# Patient Record
Sex: FEMALE | Race: WHITE | Employment: OTHER | URBAN - NONMETROPOLITAN AREA
[De-identification: names, ages, dates, MRNs, and addresses within clinical notes are randomized per-mention and may not be internally consistent; named-entity substitution may affect disease eponyms.]

---

## 2017-02-02 ENCOUNTER — TELEPHONE (OUTPATIENT)
Dept: FAMILY MEDICINE CLINIC | Age: 77
End: 2017-02-02

## 2017-03-11 DIAGNOSIS — I10 ESSENTIAL HYPERTENSION: ICD-10-CM

## 2017-03-13 RX ORDER — LISINOPRIL AND HYDROCHLOROTHIAZIDE 25; 20 MG/1; MG/1
TABLET ORAL
Qty: 90 TABLET | Refills: 0 | Status: SHIPPED | OUTPATIENT
Start: 2017-03-13 | End: 2017-05-30 | Stop reason: SDUPTHER

## 2017-03-31 DIAGNOSIS — I10 ESSENTIAL HYPERTENSION: ICD-10-CM

## 2017-03-31 RX ORDER — DILTIAZEM HYDROCHLORIDE 180 MG/1
CAPSULE, EXTENDED RELEASE ORAL
Qty: 90 CAPSULE | Refills: 0 | Status: SHIPPED | OUTPATIENT
Start: 2017-03-31 | End: 2017-05-30 | Stop reason: SDUPTHER

## 2017-05-30 DIAGNOSIS — I10 ESSENTIAL HYPERTENSION: ICD-10-CM

## 2017-05-30 RX ORDER — DILTIAZEM HYDROCHLORIDE 180 MG/1
CAPSULE, COATED, EXTENDED RELEASE ORAL
Qty: 90 CAPSULE | Refills: 0 | Status: ON HOLD | OUTPATIENT
Start: 2017-05-30 | End: 2017-09-07 | Stop reason: HOSPADM

## 2017-05-30 RX ORDER — LISINOPRIL AND HYDROCHLOROTHIAZIDE 25; 20 MG/1; MG/1
TABLET ORAL
Qty: 90 TABLET | Refills: 0 | Status: SHIPPED | OUTPATIENT
Start: 2017-05-30 | End: 2017-08-28 | Stop reason: SDUPTHER

## 2017-07-19 ENCOUNTER — HOSPITAL ENCOUNTER (INPATIENT)
Age: 77
LOS: 1 days | Discharge: HOME | DRG: 309 | End: 2017-07-20
Attending: INTERNAL MEDICINE | Admitting: INTERNAL MEDICINE
Payer: MEDICARE

## 2017-07-19 ENCOUNTER — DIRECT ADMIT ORDERS (OUTPATIENT)
Dept: INPATIENT UNIT | Age: 77
End: 2017-07-19

## 2017-07-19 ENCOUNTER — OFFICE VISIT (OUTPATIENT)
Dept: FAMILY MEDICINE CLINIC | Age: 77
End: 2017-07-19
Payer: MEDICARE

## 2017-07-19 VITALS
BODY MASS INDEX: 45.99 KG/M2 | DIASTOLIC BLOOD PRESSURE: 76 MMHG | WEIGHT: 293 LBS | SYSTOLIC BLOOD PRESSURE: 120 MMHG | HEIGHT: 67 IN | RESPIRATION RATE: 20 BRPM | HEART RATE: 74 BPM | OXYGEN SATURATION: 95 %

## 2017-07-19 DIAGNOSIS — Z00.00 MEDICARE ANNUAL WELLNESS VISIT, SUBSEQUENT: ICD-10-CM

## 2017-07-19 DIAGNOSIS — Z23 NEED FOR VACCINATION WITH 13-POLYVALENT PNEUMOCOCCAL CONJUGATE VACCINE: ICD-10-CM

## 2017-07-19 DIAGNOSIS — I48.91 NEW ONSET A-FIB (HCC): Primary | ICD-10-CM

## 2017-07-19 DIAGNOSIS — I10 ESSENTIAL HYPERTENSION: ICD-10-CM

## 2017-07-19 DIAGNOSIS — E66.01 MORBID OBESITY DUE TO EXCESS CALORIES (HCC): ICD-10-CM

## 2017-07-19 DIAGNOSIS — M15.9 PRIMARY OSTEOARTHRITIS INVOLVING MULTIPLE JOINTS: ICD-10-CM

## 2017-07-19 DIAGNOSIS — I49.9 IRREGULAR HEART RATE: ICD-10-CM

## 2017-07-19 DIAGNOSIS — R73.01 IMPAIRED FASTING GLUCOSE: ICD-10-CM

## 2017-07-19 DIAGNOSIS — E78.5 DYSLIPIDEMIA: ICD-10-CM

## 2017-07-19 DIAGNOSIS — I48.91 ATRIAL FIBRILLATION, UNSPECIFIED TYPE (HCC): Primary | ICD-10-CM

## 2017-07-19 DIAGNOSIS — E88.81 INSULIN RESISTANCE: ICD-10-CM

## 2017-07-19 LAB
ABSOLUTE EOS #: 0.2 K/UL (ref 0–0.4)
ABSOLUTE LYMPH #: 1 K/UL (ref 1–4.8)
ABSOLUTE MONO #: 0.7 K/UL (ref 0.1–1.2)
ANION GAP SERPL CALCULATED.3IONS-SCNC: 14 MMOL/L (ref 9–17)
BASOPHILS # BLD: 4 %
BASOPHILS ABSOLUTE: 0.3 K/UL (ref 0–0.2)
BUN BLDV-MCNC: 25 MG/DL (ref 8–23)
BUN/CREAT BLD: 32 (ref 9–20)
CALCIUM SERPL-MCNC: 10 MG/DL (ref 8.6–10.4)
CHLORIDE BLD-SCNC: 100 MMOL/L (ref 98–107)
CO2: 27 MMOL/L (ref 20–31)
CREAT SERPL-MCNC: 0.77 MG/DL (ref 0.5–0.9)
DIFFERENTIAL TYPE: ABNORMAL
EOSINOPHILS RELATIVE PERCENT: 2 %
GFR AFRICAN AMERICAN: >60 ML/MIN
GFR NON-AFRICAN AMERICAN: >60 ML/MIN
GFR SERPL CREATININE-BSD FRML MDRD: ABNORMAL ML/MIN/{1.73_M2}
GFR SERPL CREATININE-BSD FRML MDRD: ABNORMAL ML/MIN/{1.73_M2}
GLUCOSE BLD-MCNC: 116 MG/DL (ref 70–99)
HCT VFR BLD CALC: 45.9 % (ref 36–46)
HEMOGLOBIN: 14.4 G/DL (ref 12–16)
INR BLD: 1.1
LYMPHOCYTES # BLD: 10 %
MAGNESIUM: 1.9 MG/DL (ref 1.6–2.6)
MCH RBC QN AUTO: 29 PG (ref 26–34)
MCHC RBC AUTO-ENTMCNC: 31.4 G/DL (ref 31–37)
MCV RBC AUTO: 92.4 FL (ref 80–100)
MONOCYTES # BLD: 7 %
PARTIAL THROMBOPLASTIN TIME: 30.9 SEC (ref 27–35)
PDW BLD-RTO: 15.2 % (ref 11–14.5)
PLATELET # BLD: 134 K/UL (ref 140–450)
PLATELET ESTIMATE: ABNORMAL
PMV BLD AUTO: 13 FL (ref 6–12)
POTASSIUM SERPL-SCNC: 4.4 MMOL/L (ref 3.7–5.3)
PROTHROMBIN TIME: 11.2 SEC (ref 9.4–11.3)
RBC # BLD: 4.97 M/UL (ref 4–5.2)
RBC # BLD: ABNORMAL 10*6/UL
SEG NEUTROPHILS: 77 %
SEGMENTED NEUTROPHILS ABSOLUTE COUNT: 7.6 K/UL (ref 1.8–7.7)
SODIUM BLD-SCNC: 141 MMOL/L (ref 135–144)
TROPONIN INTERP: NORMAL
TROPONIN T: <0.03 NG/ML
WBC # BLD: 9.9 K/UL (ref 3.5–11)
WBC # BLD: ABNORMAL 10*3/UL

## 2017-07-19 PROCEDURE — 80048 BASIC METABOLIC PNL TOTAL CA: CPT

## 2017-07-19 PROCEDURE — 93000 ELECTROCARDIOGRAM COMPLETE: CPT | Performed by: FAMILY MEDICINE

## 2017-07-19 PROCEDURE — 85730 THROMBOPLASTIN TIME PARTIAL: CPT

## 2017-07-19 PROCEDURE — 2580000003 HC RX 258: Performed by: FAMILY MEDICINE

## 2017-07-19 PROCEDURE — G8427 DOCREV CUR MEDS BY ELIG CLIN: HCPCS | Performed by: FAMILY MEDICINE

## 2017-07-19 PROCEDURE — 1090F PRES/ABSN URINE INCON ASSESS: CPT | Performed by: FAMILY MEDICINE

## 2017-07-19 PROCEDURE — G8417 CALC BMI ABV UP PARAM F/U: HCPCS | Performed by: FAMILY MEDICINE

## 2017-07-19 PROCEDURE — 90670 PCV13 VACCINE IM: CPT | Performed by: FAMILY MEDICINE

## 2017-07-19 PROCEDURE — 1036F TOBACCO NON-USER: CPT | Performed by: FAMILY MEDICINE

## 2017-07-19 PROCEDURE — 99215 OFFICE O/P EST HI 40 MIN: CPT | Performed by: FAMILY MEDICINE

## 2017-07-19 PROCEDURE — 84484 ASSAY OF TROPONIN QUANT: CPT

## 2017-07-19 PROCEDURE — 1123F ACP DISCUSS/DSCN MKR DOCD: CPT | Performed by: FAMILY MEDICINE

## 2017-07-19 PROCEDURE — 2060000000 HC ICU INTERMEDIATE R&B

## 2017-07-19 PROCEDURE — 36415 COLL VENOUS BLD VENIPUNCTURE: CPT

## 2017-07-19 PROCEDURE — 85610 PROTHROMBIN TIME: CPT

## 2017-07-19 PROCEDURE — G0009 ADMIN PNEUMOCOCCAL VACCINE: HCPCS | Performed by: FAMILY MEDICINE

## 2017-07-19 PROCEDURE — 85025 COMPLETE CBC W/AUTO DIFF WBC: CPT

## 2017-07-19 PROCEDURE — 83735 ASSAY OF MAGNESIUM: CPT

## 2017-07-19 PROCEDURE — G8399 PT W/DXA RESULTS DOCUMENT: HCPCS | Performed by: FAMILY MEDICINE

## 2017-07-19 PROCEDURE — 4040F PNEUMOC VAC/ADMIN/RCVD: CPT | Performed by: FAMILY MEDICINE

## 2017-07-19 PROCEDURE — 6370000000 HC RX 637 (ALT 250 FOR IP): Performed by: INTERNAL MEDICINE

## 2017-07-19 PROCEDURE — G0439 PPPS, SUBSEQ VISIT: HCPCS | Performed by: FAMILY MEDICINE

## 2017-07-19 RX ORDER — DICLOFENAC SODIUM 75 MG/1
75 TABLET, DELAYED RELEASE ORAL 2 TIMES DAILY
Qty: 180 TABLET | Refills: 1 | Status: ON HOLD | OUTPATIENT
Start: 2017-07-19 | End: 2017-07-20 | Stop reason: HOSPADM

## 2017-07-19 RX ORDER — ONDANSETRON 2 MG/ML
4 INJECTION INTRAMUSCULAR; INTRAVENOUS EVERY 6 HOURS PRN
Status: CANCELLED | OUTPATIENT
Start: 2017-07-19

## 2017-07-19 RX ORDER — HYDROCHLOROTHIAZIDE 25 MG/1
25 TABLET ORAL DAILY
Status: DISCONTINUED | OUTPATIENT
Start: 2017-07-19 | End: 2017-07-20 | Stop reason: HOSPADM

## 2017-07-19 RX ORDER — SODIUM CHLORIDE 0.9 % (FLUSH) 0.9 %
10 SYRINGE (ML) INJECTION EVERY 12 HOURS SCHEDULED
Status: DISCONTINUED | OUTPATIENT
Start: 2017-07-19 | End: 2017-07-20 | Stop reason: HOSPADM

## 2017-07-19 RX ORDER — DILTIAZEM HYDROCHLORIDE 180 MG/1
180 CAPSULE, COATED, EXTENDED RELEASE ORAL DAILY
Status: DISCONTINUED | OUTPATIENT
Start: 2017-07-19 | End: 2017-07-20 | Stop reason: HOSPADM

## 2017-07-19 RX ORDER — SODIUM CHLORIDE 0.9 % (FLUSH) 0.9 %
10 SYRINGE (ML) INJECTION PRN
Status: DISCONTINUED | OUTPATIENT
Start: 2017-07-19 | End: 2017-07-20 | Stop reason: HOSPADM

## 2017-07-19 RX ORDER — NYSTATIN 100000 [USP'U]/G
POWDER TOPICAL 2 TIMES DAILY
Status: DISCONTINUED | OUTPATIENT
Start: 2017-07-20 | End: 2017-07-20 | Stop reason: HOSPADM

## 2017-07-19 RX ORDER — SODIUM CHLORIDE 0.9 % (FLUSH) 0.9 %
10 SYRINGE (ML) INJECTION EVERY 12 HOURS SCHEDULED
Status: CANCELLED | OUTPATIENT
Start: 2017-07-19

## 2017-07-19 RX ORDER — LISINOPRIL 20 MG/1
20 TABLET ORAL DAILY
Status: DISCONTINUED | OUTPATIENT
Start: 2017-07-19 | End: 2017-07-20 | Stop reason: HOSPADM

## 2017-07-19 RX ORDER — ONDANSETRON 2 MG/ML
4 INJECTION INTRAMUSCULAR; INTRAVENOUS EVERY 6 HOURS PRN
Status: DISCONTINUED | OUTPATIENT
Start: 2017-07-19 | End: 2017-07-20 | Stop reason: HOSPADM

## 2017-07-19 RX ORDER — SODIUM CHLORIDE 0.9 % (FLUSH) 0.9 %
10 SYRINGE (ML) INJECTION PRN
Status: CANCELLED | OUTPATIENT
Start: 2017-07-19

## 2017-07-19 RX ORDER — TRAMADOL HYDROCHLORIDE 50 MG/1
50 TABLET ORAL EVERY 6 HOURS PRN
Qty: 30 TABLET | Refills: 1 | Status: SHIPPED | OUTPATIENT
Start: 2017-07-19 | End: 2017-12-29 | Stop reason: SDUPTHER

## 2017-07-19 RX ORDER — ACETAMINOPHEN 325 MG/1
650 TABLET ORAL EVERY 4 HOURS PRN
Status: CANCELLED | OUTPATIENT
Start: 2017-07-19

## 2017-07-19 RX ORDER — ACETAMINOPHEN 325 MG/1
650 TABLET ORAL EVERY 4 HOURS PRN
Status: DISCONTINUED | OUTPATIENT
Start: 2017-07-19 | End: 2017-07-20 | Stop reason: HOSPADM

## 2017-07-19 RX ORDER — TRAMADOL HYDROCHLORIDE 50 MG/1
50 TABLET ORAL EVERY 6 HOURS PRN
Status: DISCONTINUED | OUTPATIENT
Start: 2017-07-19 | End: 2017-07-20 | Stop reason: HOSPADM

## 2017-07-19 RX ORDER — LISINOPRIL AND HYDROCHLOROTHIAZIDE 25; 20 MG/1; MG/1
1 TABLET ORAL DAILY
Status: DISCONTINUED | OUTPATIENT
Start: 2017-07-19 | End: 2017-07-19

## 2017-07-19 RX ADMIN — Medication 10 ML: at 20:49

## 2017-07-19 RX ADMIN — APIXABAN 5 MG: 2.5 TABLET, FILM COATED ORAL at 20:49

## 2017-07-19 RX ADMIN — METOPROLOL TARTRATE 25 MG: 25 TABLET ORAL at 20:49

## 2017-07-19 ASSESSMENT — ENCOUNTER SYMPTOMS
WHEEZING: 0
SINUS PRESSURE: 0
ABDOMINAL PAIN: 0
DIARRHEA: 0
EYE DISCHARGE: 0
EYE REDNESS: 0
VOMITING: 0
SORE THROAT: 0
CONSTIPATION: 0
NAUSEA: 0
COUGH: 0
TROUBLE SWALLOWING: 0
SHORTNESS OF BREATH: 0
RHINORRHEA: 0

## 2017-07-19 ASSESSMENT — PATIENT HEALTH QUESTIONNAIRE - PHQ9
2. FEELING DOWN, DEPRESSED OR HOPELESS: 1
SUM OF ALL RESPONSES TO PHQ QUESTIONS 1-9: 1
1. LITTLE INTEREST OR PLEASURE IN DOING THINGS: 0
SUM OF ALL RESPONSES TO PHQ9 QUESTIONS 1 & 2: 1

## 2017-07-19 ASSESSMENT — PAIN SCALES - GENERAL: PAINLEVEL_OUTOF10: 0

## 2017-07-20 VITALS
SYSTOLIC BLOOD PRESSURE: 120 MMHG | TEMPERATURE: 97.5 F | DIASTOLIC BLOOD PRESSURE: 73 MMHG | OXYGEN SATURATION: 94 % | HEIGHT: 67 IN | BODY MASS INDEX: 45.99 KG/M2 | WEIGHT: 293 LBS | RESPIRATION RATE: 16 BRPM | HEART RATE: 86 BPM

## 2017-07-20 PROBLEM — I48.91 ATRIAL FIBRILLATION (HCC): Status: ACTIVE | Noted: 2017-07-20

## 2017-07-20 LAB
ABSOLUTE EOS #: 0.3 K/UL (ref 0–0.4)
ABSOLUTE LYMPH #: 1.6 K/UL (ref 1–4.8)
ABSOLUTE MONO #: 0.9 K/UL (ref 0.1–1.2)
ANION GAP SERPL CALCULATED.3IONS-SCNC: 11 MMOL/L (ref 9–17)
BASOPHILS # BLD: 1 %
BASOPHILS ABSOLUTE: 0.1 K/UL (ref 0–0.2)
BUN BLDV-MCNC: 22 MG/DL (ref 8–23)
BUN/CREAT BLD: 33 (ref 9–20)
CALCIUM SERPL-MCNC: 9.6 MG/DL (ref 8.6–10.4)
CHLORIDE BLD-SCNC: 103 MMOL/L (ref 98–107)
CO2: 27 MMOL/L (ref 20–31)
CREAT SERPL-MCNC: 0.67 MG/DL (ref 0.5–0.9)
DIFFERENTIAL TYPE: ABNORMAL
EKG ATRIAL RATE: 220 BPM
EKG Q-T INTERVAL: 402 MS
EKG QRS DURATION: 92 MS
EKG QTC CALCULATION (BAZETT): 458 MS
EKG R AXIS: 16 DEGREES
EKG T AXIS: 23 DEGREES
EKG VENTRICULAR RATE: 78 BPM
EOSINOPHILS RELATIVE PERCENT: 3 %
GFR AFRICAN AMERICAN: >60 ML/MIN
GFR NON-AFRICAN AMERICAN: >60 ML/MIN
GFR SERPL CREATININE-BSD FRML MDRD: ABNORMAL ML/MIN/{1.73_M2}
GFR SERPL CREATININE-BSD FRML MDRD: ABNORMAL ML/MIN/{1.73_M2}
GLUCOSE BLD-MCNC: 98 MG/DL (ref 70–99)
HCT VFR BLD CALC: 41.6 % (ref 36–46)
HEMOGLOBIN: 13.3 G/DL (ref 12–16)
INR BLD: 1.1
LYMPHOCYTES # BLD: 18 %
MAGNESIUM: 1.9 MG/DL (ref 1.6–2.6)
MCH RBC QN AUTO: 29.2 PG (ref 26–34)
MCHC RBC AUTO-ENTMCNC: 31.9 G/DL (ref 31–37)
MCV RBC AUTO: 91.4 FL (ref 80–100)
MONOCYTES # BLD: 9 %
PARTIAL THROMBOPLASTIN TIME: 33.4 SEC (ref 27–35)
PDW BLD-RTO: 15 % (ref 11–14.5)
PLATELET # BLD: 121 K/UL (ref 140–450)
PLATELET ESTIMATE: ABNORMAL
PMV BLD AUTO: 12.9 FL (ref 6–12)
POTASSIUM SERPL-SCNC: 3.9 MMOL/L (ref 3.7–5.3)
PROTHROMBIN TIME: 11.7 SEC (ref 9.4–11.3)
RBC # BLD: 4.55 M/UL (ref 4–5.2)
RBC # BLD: ABNORMAL 10*6/UL
SEG NEUTROPHILS: 69 %
SEGMENTED NEUTROPHILS ABSOLUTE COUNT: 6.5 K/UL (ref 1.8–7.7)
SODIUM BLD-SCNC: 141 MMOL/L (ref 135–144)
TROPONIN INTERP: NORMAL
TROPONIN T: <0.03 NG/ML
WBC # BLD: 9.3 K/UL (ref 3.5–11)
WBC # BLD: ABNORMAL 10*3/UL

## 2017-07-20 PROCEDURE — 80048 BASIC METABOLIC PNL TOTAL CA: CPT

## 2017-07-20 PROCEDURE — 84484 ASSAY OF TROPONIN QUANT: CPT

## 2017-07-20 PROCEDURE — 2580000003 HC RX 258: Performed by: FAMILY MEDICINE

## 2017-07-20 PROCEDURE — 83735 ASSAY OF MAGNESIUM: CPT

## 2017-07-20 PROCEDURE — 99223 1ST HOSP IP/OBS HIGH 75: CPT | Performed by: INTERNAL MEDICINE

## 2017-07-20 PROCEDURE — 6370000000 HC RX 637 (ALT 250 FOR IP): Performed by: INTERNAL MEDICINE

## 2017-07-20 PROCEDURE — 99222 1ST HOSP IP/OBS MODERATE 55: CPT | Performed by: INTERNAL MEDICINE

## 2017-07-20 PROCEDURE — 85730 THROMBOPLASTIN TIME PARTIAL: CPT

## 2017-07-20 PROCEDURE — 85610 PROTHROMBIN TIME: CPT

## 2017-07-20 PROCEDURE — 93005 ELECTROCARDIOGRAM TRACING: CPT

## 2017-07-20 PROCEDURE — 36415 COLL VENOUS BLD VENIPUNCTURE: CPT

## 2017-07-20 PROCEDURE — 85025 COMPLETE CBC W/AUTO DIFF WBC: CPT

## 2017-07-20 RX ORDER — NYSTATIN 100000 [USP'U]/G
POWDER TOPICAL
Qty: 1 BOTTLE | Refills: 0 | Status: SHIPPED | OUTPATIENT
Start: 2017-07-20 | End: 2017-09-13 | Stop reason: ALTCHOICE

## 2017-07-20 RX ADMIN — METOPROLOL TARTRATE 25 MG: 25 TABLET ORAL at 09:13

## 2017-07-20 RX ADMIN — APIXABAN 5 MG: 2.5 TABLET, FILM COATED ORAL at 09:22

## 2017-07-20 RX ADMIN — Medication 10 ML: at 09:22

## 2017-07-20 RX ADMIN — LISINOPRIL 20 MG: 20 TABLET ORAL at 09:13

## 2017-07-20 RX ADMIN — HYDROCHLOROTHIAZIDE 25 MG: 25 TABLET ORAL at 09:13

## 2017-07-20 RX ADMIN — DILTIAZEM HYDROCHLORIDE 180 MG: 180 CAPSULE, COATED, EXTENDED RELEASE ORAL at 09:13

## 2017-07-20 RX ADMIN — NYSTATIN: 100000 POWDER TOPICAL at 09:28

## 2017-07-20 ASSESSMENT — PAIN SCALES - GENERAL
PAINLEVEL_OUTOF10: 0

## 2017-07-21 ENCOUNTER — TELEPHONE (OUTPATIENT)
Dept: FAMILY MEDICINE CLINIC | Age: 77
End: 2017-07-21

## 2017-07-24 ENCOUNTER — HOSPITAL ENCOUNTER (OUTPATIENT)
Dept: PHARMACY | Age: 77
Setting detail: THERAPIES SERIES
Discharge: HOME | End: 2017-07-24
Payer: MEDICARE

## 2017-07-24 LAB — INR BLD: 1.3

## 2017-07-24 PROCEDURE — 36416 COLLJ CAPILLARY BLOOD SPEC: CPT

## 2017-07-24 PROCEDURE — 99211 OFF/OP EST MAY X REQ PHY/QHP: CPT

## 2017-07-24 PROCEDURE — 85610 PROTHROMBIN TIME: CPT

## 2017-07-24 RX ORDER — WARFARIN SODIUM 2.5 MG/1
5 TABLET ORAL DAILY
COMMUNITY
End: 2017-08-15 | Stop reason: SDUPTHER

## 2017-07-25 ENCOUNTER — OFFICE VISIT (OUTPATIENT)
Dept: FAMILY MEDICINE CLINIC | Age: 77
End: 2017-07-25
Payer: MEDICARE

## 2017-07-25 VITALS
RESPIRATION RATE: 22 BRPM | HEIGHT: 67 IN | HEART RATE: 76 BPM | DIASTOLIC BLOOD PRESSURE: 76 MMHG | SYSTOLIC BLOOD PRESSURE: 126 MMHG | OXYGEN SATURATION: 96 % | WEIGHT: 293 LBS | BODY MASS INDEX: 45.99 KG/M2

## 2017-07-25 DIAGNOSIS — I48.19 PERSISTENT ATRIAL FIBRILLATION (HCC): Primary | ICD-10-CM

## 2017-07-25 DIAGNOSIS — R53.83 FATIGUE, UNSPECIFIED TYPE: ICD-10-CM

## 2017-07-25 DIAGNOSIS — G47.9 SLEEP DISTURBANCE: ICD-10-CM

## 2017-07-25 DIAGNOSIS — I10 ESSENTIAL HYPERTENSION: ICD-10-CM

## 2017-07-25 DIAGNOSIS — E66.01 MORBID OBESITY WITH BODY MASS INDEX (BMI) OF 50.0 TO 59.9 IN ADULT (HCC): ICD-10-CM

## 2017-07-25 PROCEDURE — 99495 TRANSJ CARE MGMT MOD F2F 14D: CPT | Performed by: FAMILY MEDICINE

## 2017-07-26 ASSESSMENT — ENCOUNTER SYMPTOMS
COUGH: 0
NAUSEA: 0
EYE REDNESS: 0
TROUBLE SWALLOWING: 0
VOMITING: 0
DIARRHEA: 0
EYE DISCHARGE: 0
ABDOMINAL PAIN: 0
SHORTNESS OF BREATH: 1
RHINORRHEA: 0
SORE THROAT: 0
SINUS PRESSURE: 0
CONSTIPATION: 0
WHEEZING: 0

## 2017-07-28 ENCOUNTER — HOSPITAL ENCOUNTER (OUTPATIENT)
Dept: PHARMACY | Age: 77
Setting detail: THERAPIES SERIES
Discharge: HOME | End: 2017-07-28
Payer: MEDICARE

## 2017-07-28 LAB — INR BLD: 2

## 2017-07-28 PROCEDURE — 85610 PROTHROMBIN TIME: CPT

## 2017-07-28 PROCEDURE — 99211 OFF/OP EST MAY X REQ PHY/QHP: CPT

## 2017-07-28 PROCEDURE — 36416 COLLJ CAPILLARY BLOOD SPEC: CPT

## 2017-07-31 ENCOUNTER — HOSPITAL ENCOUNTER (OUTPATIENT)
Dept: SLEEP CENTER | Age: 77
Discharge: HOME | End: 2017-07-31
Payer: MEDICARE

## 2017-07-31 DIAGNOSIS — I48.91 ATRIAL FIBRILLATION, UNSPECIFIED TYPE (HCC): ICD-10-CM

## 2017-07-31 PROCEDURE — 95810 POLYSOM 6/> YRS 4/> PARAM: CPT

## 2017-08-01 VITALS
DIASTOLIC BLOOD PRESSURE: 89 MMHG | RESPIRATION RATE: 22 BRPM | WEIGHT: 293 LBS | HEIGHT: 67 IN | SYSTOLIC BLOOD PRESSURE: 147 MMHG | HEART RATE: 96 BPM | TEMPERATURE: 97.8 F | BODY MASS INDEX: 45.99 KG/M2

## 2017-08-03 ENCOUNTER — PROCEDURE VISIT (OUTPATIENT)
Dept: CARDIOLOGY | Age: 77
End: 2017-08-03
Payer: MEDICARE

## 2017-08-03 ENCOUNTER — HOSPITAL ENCOUNTER (OUTPATIENT)
Dept: PHARMACY | Age: 77
Setting detail: THERAPIES SERIES
Discharge: HOME | End: 2017-08-03
Payer: MEDICARE

## 2017-08-03 DIAGNOSIS — I48.91 ATRIAL FIBRILLATION, UNSPECIFIED TYPE (HCC): Primary | ICD-10-CM

## 2017-08-03 LAB
INR BLD: 2.2
LV EF: 60 %
LVEF MODALITY: NORMAL

## 2017-08-03 PROCEDURE — 85610 PROTHROMBIN TIME: CPT

## 2017-08-03 PROCEDURE — 99211 OFF/OP EST MAY X REQ PHY/QHP: CPT

## 2017-08-03 PROCEDURE — 36416 COLLJ CAPILLARY BLOOD SPEC: CPT

## 2017-08-03 PROCEDURE — 93306 TTE W/DOPPLER COMPLETE: CPT | Performed by: INTERNAL MEDICINE

## 2017-08-04 ENCOUNTER — TELEPHONE (OUTPATIENT)
Dept: CARDIOLOGY | Age: 77
End: 2017-08-04

## 2017-08-10 ENCOUNTER — HOSPITAL ENCOUNTER (OUTPATIENT)
Dept: PHARMACY | Age: 77
Setting detail: THERAPIES SERIES
Discharge: HOME | End: 2017-08-10
Payer: MEDICARE

## 2017-08-10 ENCOUNTER — OFFICE VISIT (OUTPATIENT)
Dept: CARDIOLOGY | Age: 77
End: 2017-08-10
Payer: MEDICARE

## 2017-08-10 VITALS
WEIGHT: 293 LBS | DIASTOLIC BLOOD PRESSURE: 76 MMHG | SYSTOLIC BLOOD PRESSURE: 132 MMHG | HEART RATE: 88 BPM | BODY MASS INDEX: 45.99 KG/M2 | HEIGHT: 67 IN

## 2017-08-10 DIAGNOSIS — I48.91 ATRIAL FIBRILLATION, UNSPECIFIED TYPE (HCC): ICD-10-CM

## 2017-08-10 DIAGNOSIS — I48.91 ATRIAL FIBRILLATION, UNSPECIFIED TYPE (HCC): Primary | ICD-10-CM

## 2017-08-10 DIAGNOSIS — E78.5 DYSLIPIDEMIA: ICD-10-CM

## 2017-08-10 DIAGNOSIS — R07.9 CHEST PAIN, UNSPECIFIED TYPE: ICD-10-CM

## 2017-08-10 DIAGNOSIS — I10 ESSENTIAL HYPERTENSION: ICD-10-CM

## 2017-08-10 DIAGNOSIS — E88.81 INSULIN RESISTANCE: ICD-10-CM

## 2017-08-10 LAB — INR BLD: 2.4

## 2017-08-10 PROCEDURE — 85610 PROTHROMBIN TIME: CPT

## 2017-08-10 PROCEDURE — 1123F ACP DISCUSS/DSCN MKR DOCD: CPT | Performed by: INTERNAL MEDICINE

## 2017-08-10 PROCEDURE — 1111F DSCHRG MED/CURRENT MED MERGE: CPT | Performed by: INTERNAL MEDICINE

## 2017-08-10 PROCEDURE — 99213 OFFICE O/P EST LOW 20 MIN: CPT | Performed by: INTERNAL MEDICINE

## 2017-08-10 PROCEDURE — 4040F PNEUMOC VAC/ADMIN/RCVD: CPT | Performed by: INTERNAL MEDICINE

## 2017-08-10 PROCEDURE — 1090F PRES/ABSN URINE INCON ASSESS: CPT | Performed by: INTERNAL MEDICINE

## 2017-08-10 PROCEDURE — G8417 CALC BMI ABV UP PARAM F/U: HCPCS | Performed by: INTERNAL MEDICINE

## 2017-08-10 PROCEDURE — 1036F TOBACCO NON-USER: CPT | Performed by: INTERNAL MEDICINE

## 2017-08-10 PROCEDURE — G8399 PT W/DXA RESULTS DOCUMENT: HCPCS | Performed by: INTERNAL MEDICINE

## 2017-08-10 PROCEDURE — 36416 COLLJ CAPILLARY BLOOD SPEC: CPT

## 2017-08-10 PROCEDURE — G8427 DOCREV CUR MEDS BY ELIG CLIN: HCPCS | Performed by: INTERNAL MEDICINE

## 2017-08-10 PROCEDURE — 93000 ELECTROCARDIOGRAM COMPLETE: CPT | Performed by: INTERNAL MEDICINE

## 2017-08-10 PROCEDURE — 99211 OFF/OP EST MAY X REQ PHY/QHP: CPT

## 2017-08-11 ENCOUNTER — TELEPHONE (OUTPATIENT)
Dept: FAMILY MEDICINE CLINIC | Age: 77
End: 2017-08-11

## 2017-08-11 DIAGNOSIS — G47.33 OSA (OBSTRUCTIVE SLEEP APNEA): Primary | ICD-10-CM

## 2017-08-15 RX ORDER — WARFARIN SODIUM 2.5 MG/1
5 TABLET ORAL DAILY
Qty: 180 TABLET | Refills: 1 | Status: SHIPPED | OUTPATIENT
Start: 2017-08-15 | End: 2018-01-26 | Stop reason: SDUPTHER

## 2017-08-24 ENCOUNTER — HOSPITAL ENCOUNTER (OUTPATIENT)
Dept: PHARMACY | Age: 77
Setting detail: THERAPIES SERIES
Discharge: HOME | End: 2017-08-24
Payer: MEDICARE

## 2017-08-24 DIAGNOSIS — I48.91 ATRIAL FIBRILLATION, UNSPECIFIED TYPE (HCC): ICD-10-CM

## 2017-08-24 LAB — INR BLD: 2.7

## 2017-08-24 PROCEDURE — 36416 COLLJ CAPILLARY BLOOD SPEC: CPT

## 2017-08-24 PROCEDURE — 99211 OFF/OP EST MAY X REQ PHY/QHP: CPT

## 2017-08-24 PROCEDURE — 85610 PROTHROMBIN TIME: CPT

## 2017-08-28 ENCOUNTER — HOSPITAL ENCOUNTER (OUTPATIENT)
Dept: NUCLEAR MEDICINE | Age: 77
Discharge: HOME | DRG: 308 | End: 2017-08-28
Payer: MEDICARE

## 2017-08-28 ENCOUNTER — HOSPITAL ENCOUNTER (OUTPATIENT)
Dept: NON INVASIVE DIAGNOSTICS | Age: 77
Discharge: HOME | DRG: 308 | End: 2017-08-28
Payer: MEDICARE

## 2017-08-28 DIAGNOSIS — E78.5 DYSLIPIDEMIA: ICD-10-CM

## 2017-08-28 DIAGNOSIS — I48.91 ATRIAL FIBRILLATION, UNSPECIFIED TYPE (HCC): ICD-10-CM

## 2017-08-28 DIAGNOSIS — I10 ESSENTIAL HYPERTENSION: ICD-10-CM

## 2017-08-28 DIAGNOSIS — E88.81 INSULIN RESISTANCE: ICD-10-CM

## 2017-08-28 DIAGNOSIS — R07.9 CHEST PAIN, UNSPECIFIED TYPE: ICD-10-CM

## 2017-08-28 PROCEDURE — A9500 TC99M SESTAMIBI: HCPCS | Performed by: INTERNAL MEDICINE

## 2017-08-28 PROCEDURE — 93017 CV STRESS TEST TRACING ONLY: CPT

## 2017-08-28 PROCEDURE — 3430000000 HC RX DIAGNOSTIC RADIOPHARMACEUTICAL: Performed by: INTERNAL MEDICINE

## 2017-08-28 PROCEDURE — 6360000002 HC RX W HCPCS: Performed by: INTERNAL MEDICINE

## 2017-08-28 PROCEDURE — 78452 HT MUSCLE IMAGE SPECT MULT: CPT

## 2017-08-28 RX ORDER — AMINOPHYLLINE DIHYDRATE 25 MG/ML
100 INJECTION, SOLUTION INTRAVENOUS ONCE
Status: COMPLETED | OUTPATIENT
Start: 2017-08-28 | End: 2017-08-28

## 2017-08-28 RX ADMIN — REGADENOSON 0.4 MG: 0.08 INJECTION, SOLUTION INTRAVENOUS at 15:06

## 2017-08-28 RX ADMIN — AMINOPHYLLINE 100 MG: 25 INJECTION, SOLUTION INTRAVENOUS at 15:06

## 2017-08-28 RX ADMIN — TETRAKIS(2-METHOXYISOBUTYLISOCYANIDE)COPPER(I) TETRAFLUOROBORATE 10 MILLICURIE: 1 INJECTION, POWDER, LYOPHILIZED, FOR SOLUTION INTRAVENOUS at 14:58

## 2017-08-28 RX ADMIN — TETRAKIS(2-METHOXYISOBUTYLISOCYANIDE)COPPER(I) TETRAFLUOROBORATE 30 MILLICURIE: 1 INJECTION, POWDER, LYOPHILIZED, FOR SOLUTION INTRAVENOUS at 14:59

## 2017-08-29 RX ORDER — LISINOPRIL AND HYDROCHLOROTHIAZIDE 25; 20 MG/1; MG/1
TABLET ORAL
Qty: 90 TABLET | Refills: 1 | Status: ON HOLD | OUTPATIENT
Start: 2017-08-29 | End: 2017-09-06 | Stop reason: HOSPADM

## 2017-08-30 ENCOUNTER — TELEPHONE (OUTPATIENT)
Dept: CARDIOLOGY | Age: 77
End: 2017-08-30

## 2017-08-30 ENCOUNTER — HOSPITAL ENCOUNTER (OUTPATIENT)
Dept: SLEEP CENTER | Age: 77
Discharge: HOME | DRG: 308 | End: 2017-08-30
Payer: MEDICARE

## 2017-08-30 DIAGNOSIS — G47.33 OSA (OBSTRUCTIVE SLEEP APNEA): ICD-10-CM

## 2017-08-30 PROCEDURE — 95811 POLYSOM 6/>YRS CPAP 4/> PARM: CPT

## 2017-08-31 ENCOUNTER — HOSPITAL ENCOUNTER (INPATIENT)
Age: 77
LOS: 1 days | Discharge: ANOTHER HOSPITAL | DRG: 308 | End: 2017-09-01
Attending: EMERGENCY MEDICINE | Admitting: FAMILY MEDICINE
Payer: MEDICARE

## 2017-08-31 ENCOUNTER — APPOINTMENT (OUTPATIENT)
Dept: GENERAL RADIOLOGY | Age: 77
DRG: 308 | End: 2017-08-31
Payer: MEDICARE

## 2017-08-31 VITALS
TEMPERATURE: 97.3 F | SYSTOLIC BLOOD PRESSURE: 116 MMHG | BODY MASS INDEX: 45.99 KG/M2 | WEIGHT: 293 LBS | HEIGHT: 67 IN | DIASTOLIC BLOOD PRESSURE: 74 MMHG | RESPIRATION RATE: 20 BRPM | HEART RATE: 103 BPM

## 2017-08-31 DIAGNOSIS — I50.9 ACUTE CONGESTIVE HEART FAILURE, UNSPECIFIED CONGESTIVE HEART FAILURE TYPE: Primary | ICD-10-CM

## 2017-08-31 LAB
ABSOLUTE EOS #: 0.2 K/UL (ref 0–0.4)
ABSOLUTE LYMPH #: 2 K/UL (ref 1–4.8)
ABSOLUTE MONO #: 1.2 K/UL (ref 0.1–1.2)
ANION GAP SERPL CALCULATED.3IONS-SCNC: 13 MMOL/L (ref 9–17)
BASOPHILS # BLD: 0 %
BASOPHILS ABSOLUTE: 0 K/UL (ref 0–0.2)
BNP INTERPRETATION: ABNORMAL
BUN BLDV-MCNC: 31 MG/DL (ref 8–23)
BUN/CREAT BLD: 33 (ref 9–20)
CALCIUM SERPL-MCNC: 9.2 MG/DL (ref 8.6–10.4)
CHLORIDE BLD-SCNC: 102 MMOL/L (ref 98–107)
CK MB: 1 NG/ML
CO2: 25 MMOL/L (ref 20–31)
CREAT SERPL-MCNC: 0.95 MG/DL (ref 0.5–0.9)
DIFFERENTIAL TYPE: ABNORMAL
EKG ATRIAL RATE: 120 BPM
EKG Q-T INTERVAL: 358 MS
EKG QRS DURATION: 86 MS
EKG QTC CALCULATION (BAZETT): 475 MS
EKG R AXIS: 1 DEGREES
EKG T AXIS: 33 DEGREES
EKG VENTRICULAR RATE: 106 BPM
EOSINOPHILS RELATIVE PERCENT: 2 %
GFR AFRICAN AMERICAN: >60 ML/MIN
GFR NON-AFRICAN AMERICAN: 57 ML/MIN
GFR SERPL CREATININE-BSD FRML MDRD: ABNORMAL ML/MIN/{1.73_M2}
GFR SERPL CREATININE-BSD FRML MDRD: ABNORMAL ML/MIN/{1.73_M2}
GLUCOSE BLD-MCNC: 115 MG/DL (ref 70–99)
HCT VFR BLD CALC: 44.4 % (ref 36–46)
HEMOGLOBIN: 14.1 G/DL (ref 12–16)
INR BLD: 2
LYMPHOCYTES # BLD: 17 %
MCH RBC QN AUTO: 29.3 PG (ref 26–34)
MCHC RBC AUTO-ENTMCNC: 31.8 G/DL (ref 31–37)
MCV RBC AUTO: 92 FL (ref 80–100)
MONOCYTES # BLD: 11 %
MYOGLOBIN: 40 NG/ML (ref 25–58)
PDW BLD-RTO: 14.3 % (ref 11–14.5)
PLATELET # BLD: 136 K/UL (ref 140–450)
PLATELET ESTIMATE: ABNORMAL
PMV BLD AUTO: 12.5 FL (ref 6–12)
POTASSIUM SERPL-SCNC: 4.2 MMOL/L (ref 3.7–5.3)
PRO-BNP: 1280 PG/ML
PROTHROMBIN TIME: 21.5 SEC (ref 9.4–11.3)
RBC # BLD: 4.82 M/UL (ref 4–5.2)
RBC # BLD: ABNORMAL 10*6/UL
SEG NEUTROPHILS: 70 %
SEGMENTED NEUTROPHILS ABSOLUTE COUNT: 7.8 K/UL (ref 1.8–7.7)
SODIUM BLD-SCNC: 140 MMOL/L (ref 135–144)
TOTAL CK: 34 U/L (ref 26–192)
TROPONIN INTERP: NORMAL
TROPONIN T: <0.03 NG/ML
WBC # BLD: 11.2 K/UL (ref 3.5–11)
WBC # BLD: ABNORMAL 10*3/UL

## 2017-08-31 PROCEDURE — 82553 CREATINE MB FRACTION: CPT

## 2017-08-31 PROCEDURE — 83880 ASSAY OF NATRIURETIC PEPTIDE: CPT

## 2017-08-31 PROCEDURE — 93005 ELECTROCARDIOGRAM TRACING: CPT

## 2017-08-31 PROCEDURE — 85025 COMPLETE CBC W/AUTO DIFF WBC: CPT

## 2017-08-31 PROCEDURE — 2060000000 HC ICU INTERMEDIATE R&B

## 2017-08-31 PROCEDURE — 82550 ASSAY OF CK (CPK): CPT

## 2017-08-31 PROCEDURE — 71010 XR CHEST PORTABLE: CPT

## 2017-08-31 PROCEDURE — 6360000002 HC RX W HCPCS: Performed by: EMERGENCY MEDICINE

## 2017-08-31 PROCEDURE — 84484 ASSAY OF TROPONIN QUANT: CPT

## 2017-08-31 PROCEDURE — 83874 ASSAY OF MYOGLOBIN: CPT

## 2017-08-31 PROCEDURE — 85610 PROTHROMBIN TIME: CPT

## 2017-08-31 PROCEDURE — 6370000000 HC RX 637 (ALT 250 FOR IP): Performed by: PHYSICIAN ASSISTANT

## 2017-08-31 PROCEDURE — 6370000000 HC RX 637 (ALT 250 FOR IP): Performed by: EMERGENCY MEDICINE

## 2017-08-31 PROCEDURE — 99285 EMERGENCY DEPT VISIT HI MDM: CPT

## 2017-08-31 PROCEDURE — 80048 BASIC METABOLIC PNL TOTAL CA: CPT

## 2017-08-31 PROCEDURE — 36415 COLL VENOUS BLD VENIPUNCTURE: CPT

## 2017-08-31 PROCEDURE — 96374 THER/PROPH/DIAG INJ IV PUSH: CPT

## 2017-08-31 RX ORDER — HYDROCHLOROTHIAZIDE 25 MG/1
25 TABLET ORAL DAILY
Status: DISCONTINUED | OUTPATIENT
Start: 2017-09-01 | End: 2017-09-01 | Stop reason: HOSPADM

## 2017-08-31 RX ORDER — POTASSIUM CHLORIDE 20MEQ/15ML
40 LIQUID (ML) ORAL PRN
Status: DISCONTINUED | OUTPATIENT
Start: 2017-08-31 | End: 2017-09-01 | Stop reason: HOSPADM

## 2017-08-31 RX ORDER — POTASSIUM CHLORIDE 20 MEQ/1
40 TABLET, EXTENDED RELEASE ORAL PRN
Status: DISCONTINUED | OUTPATIENT
Start: 2017-08-31 | End: 2017-09-01 | Stop reason: HOSPADM

## 2017-08-31 RX ORDER — FUROSEMIDE 10 MG/ML
20 INJECTION INTRAMUSCULAR; INTRAVENOUS ONCE
Status: COMPLETED | OUTPATIENT
Start: 2017-08-31 | End: 2017-08-31

## 2017-08-31 RX ORDER — MAGNESIUM SULFATE 1 G/100ML
1 INJECTION INTRAVENOUS PRN
Status: DISCONTINUED | OUTPATIENT
Start: 2017-08-31 | End: 2017-09-01 | Stop reason: HOSPADM

## 2017-08-31 RX ORDER — LISINOPRIL AND HYDROCHLOROTHIAZIDE 25; 20 MG/1; MG/1
1 TABLET ORAL DAILY
Status: DISCONTINUED | OUTPATIENT
Start: 2017-09-01 | End: 2017-08-31 | Stop reason: SDUPTHER

## 2017-08-31 RX ORDER — ASPIRIN 81 MG/1
324 TABLET, CHEWABLE ORAL ONCE
Status: COMPLETED | OUTPATIENT
Start: 2017-08-31 | End: 2017-08-31

## 2017-08-31 RX ORDER — POTASSIUM CHLORIDE 7.45 MG/ML
10 INJECTION INTRAVENOUS PRN
Status: DISCONTINUED | OUTPATIENT
Start: 2017-08-31 | End: 2017-09-01 | Stop reason: HOSPADM

## 2017-08-31 RX ORDER — FUROSEMIDE 10 MG/ML
20 INJECTION INTRAMUSCULAR; INTRAVENOUS 2 TIMES DAILY
Status: DISCONTINUED | OUTPATIENT
Start: 2017-09-01 | End: 2017-09-01 | Stop reason: HOSPADM

## 2017-08-31 RX ORDER — DILTIAZEM HYDROCHLORIDE 180 MG/1
180 CAPSULE, COATED, EXTENDED RELEASE ORAL DAILY
Status: DISCONTINUED | OUTPATIENT
Start: 2017-09-01 | End: 2017-09-01 | Stop reason: HOSPADM

## 2017-08-31 RX ORDER — LISINOPRIL 20 MG/1
20 TABLET ORAL DAILY
Status: DISCONTINUED | OUTPATIENT
Start: 2017-09-01 | End: 2017-09-01 | Stop reason: HOSPADM

## 2017-08-31 RX ADMIN — ASPIRIN 81 MG 324 MG: 81 TABLET ORAL at 18:59

## 2017-08-31 RX ADMIN — METOPROLOL TARTRATE 25 MG: 25 TABLET ORAL at 22:24

## 2017-08-31 RX ADMIN — FUROSEMIDE 20 MG: 10 INJECTION, SOLUTION INTRAMUSCULAR; INTRAVENOUS at 20:40

## 2017-08-31 ASSESSMENT — PAIN SCALES - GENERAL: PAINLEVEL_OUTOF10: 0

## 2017-08-31 ASSESSMENT — ENCOUNTER SYMPTOMS: EYES NEGATIVE: 1

## 2017-09-01 ENCOUNTER — HOSPITAL ENCOUNTER (INPATIENT)
Age: 77
LOS: 6 days | Discharge: HOME | DRG: 287 | End: 2017-09-07
Attending: INTERNAL MEDICINE | Admitting: INTERNAL MEDICINE
Payer: MEDICARE

## 2017-09-01 VITALS
DIASTOLIC BLOOD PRESSURE: 63 MMHG | RESPIRATION RATE: 18 BRPM | HEIGHT: 65 IN | OXYGEN SATURATION: 93 % | HEART RATE: 95 BPM | BODY MASS INDEX: 48.82 KG/M2 | TEMPERATURE: 98 F | WEIGHT: 293 LBS | SYSTOLIC BLOOD PRESSURE: 110 MMHG

## 2017-09-01 LAB
INR BLD: 2
PROTHROMBIN TIME: 22.1 SEC (ref 9.4–12.6)
TROPONIN INTERP: NORMAL
TROPONIN T: <0.03 NG/ML

## 2017-09-01 PROCEDURE — 36415 COLL VENOUS BLD VENIPUNCTURE: CPT

## 2017-09-01 PROCEDURE — 84484 ASSAY OF TROPONIN QUANT: CPT

## 2017-09-01 PROCEDURE — 6370000000 HC RX 637 (ALT 250 FOR IP): Performed by: INTERNAL MEDICINE

## 2017-09-01 PROCEDURE — 93005 ELECTROCARDIOGRAM TRACING: CPT

## 2017-09-01 PROCEDURE — 1200000000 HC SEMI PRIVATE

## 2017-09-01 PROCEDURE — 94762 N-INVAS EAR/PLS OXIMTRY CONT: CPT

## 2017-09-01 PROCEDURE — 85610 PROTHROMBIN TIME: CPT

## 2017-09-01 RX ORDER — DILTIAZEM HYDROCHLORIDE 180 MG/1
180 CAPSULE, COATED, EXTENDED RELEASE ORAL DAILY
Status: DISCONTINUED | OUTPATIENT
Start: 2017-09-02 | End: 2017-09-07

## 2017-09-01 RX ORDER — TRAMADOL HYDROCHLORIDE 50 MG/1
50 TABLET ORAL EVERY 6 HOURS PRN
Status: DISCONTINUED | OUTPATIENT
Start: 2017-09-01 | End: 2017-09-07 | Stop reason: HOSPADM

## 2017-09-01 RX ORDER — LISINOPRIL AND HYDROCHLOROTHIAZIDE 25; 20 MG/1; MG/1
1 TABLET ORAL DAILY
Status: DISCONTINUED | OUTPATIENT
Start: 2017-09-01 | End: 2017-09-01

## 2017-09-01 RX ORDER — WARFARIN SODIUM 5 MG/1
5 TABLET ORAL
Status: DISCONTINUED | OUTPATIENT
Start: 2017-09-01 | End: 2017-09-01 | Stop reason: HOSPADM

## 2017-09-01 RX ORDER — FAMOTIDINE 20 MG/1
20 TABLET, FILM COATED ORAL 2 TIMES DAILY
Status: DISCONTINUED | OUTPATIENT
Start: 2017-09-01 | End: 2017-09-07 | Stop reason: HOSPADM

## 2017-09-01 RX ORDER — ACETAMINOPHEN 325 MG/1
650 TABLET ORAL EVERY 4 HOURS PRN
Status: DISCONTINUED | OUTPATIENT
Start: 2017-09-01 | End: 2017-09-07 | Stop reason: HOSPADM

## 2017-09-01 RX ORDER — ONDANSETRON 2 MG/ML
4 INJECTION INTRAMUSCULAR; INTRAVENOUS EVERY 6 HOURS PRN
Status: DISCONTINUED | OUTPATIENT
Start: 2017-09-01 | End: 2017-09-07 | Stop reason: HOSPADM

## 2017-09-01 RX ORDER — WARFARIN SODIUM 2.5 MG/1
2.5 TABLET ORAL DAILY
Status: DISCONTINUED | OUTPATIENT
Start: 2017-09-01 | End: 2017-09-01

## 2017-09-01 RX ADMIN — METOPROLOL TARTRATE 25 MG: 25 TABLET ORAL at 20:15

## 2017-09-01 RX ADMIN — FAMOTIDINE 20 MG: 20 TABLET, FILM COATED ORAL at 20:15

## 2017-09-01 RX ADMIN — METOPROLOL TARTRATE 25 MG: 25 TABLET ORAL at 11:10

## 2017-09-01 RX ADMIN — TRAMADOL HYDROCHLORIDE 50 MG: 50 TABLET, FILM COATED ORAL at 22:38

## 2017-09-01 RX ADMIN — FAMOTIDINE 20 MG: 20 TABLET, FILM COATED ORAL at 11:10

## 2017-09-01 ASSESSMENT — PAIN SCALES - GENERAL: PAINLEVEL_OUTOF10: 1

## 2017-09-01 NOTE — H&P
Department of Internal Medicine  General Internal Medicine  Physician Assistant History and Physical      CHIEF COMPLAINT:  Shortness of breath    Reason for Admission:  CHF, a fib    History Obtained From:  patient, father    HISTORY OF PRESENT ILLNESS:      The patient is a 67 y.o. female with significant past medical history of new diagnosis a. Fib, CAD, morbid obesity, and hypertension who presents with increasing a fib symptoms, increasing shortness of breath and leg swelling as well has a recent stress test that was conveyed to the family as \"quite abnormal\". After the patient received her poor results on her stress test she came to the ED for the other symptoms. The patient denies chest pain, she does get short of breath with exertion. Past Medical History:        Diagnosis Date    A-fib ([facility] [address] 07/19/2017    Dyslipidemia     FH: total abdominal hysterectomy and bilateral salpingo-oophorectomy 1966    Glucose intolerance (impaired glucose tolerance)     Hypertension     Obesity     Osteoarthritis      Past Surgical History:        Procedure Laterality Date    CHOLECYSTECTOMY  1960s    HIP ARTHROPLASTY Left     KNEE ARTHROPLASTY Left     KNEE ARTHROPLASTY Right     KARYN AND BSO      KARYN AND BSO  1966    VARICOSE VEIN SURGERY Right 1960s     Immunizations:                Influenza:  Not indicated            Pneumococcal Polysaccharide:  Up-to-date; approximate date: Prevnar 13 7/2017    Medications Prior to Admission:    Prescriptions Prior to Admission: lisinopril-hydrochlorothiazide (PRINZIDE;ZESTORETIC) 20-25 MG per tablet, TAKE ONE TABLET BY MOUTH DAILY  metoprolol tartrate (LOPRESSOR) 25 MG tablet, TAKE ONE TABLET BY MOUTH TWICE A DAY  warfarin (COUMADIN) 2.5 MG tablet, Take 2 tablets by mouth daily Indications: or as directed by INR results  nystatin (MYCOSTATIN) 796227 UNIT/GM powder, Apply topically 4 times daily.   traMADol (ULTRAM) 50 MG tablet, Take 1 tablet by mouth every 6 hours as needed for Pain  diltiazem (CARTIA XT) 180 MG extended release capsule, TAKE ONE CAPSULE BY MOUTH DAILY    Allergies:  Pcn [penicillins] and Bextra [valdecoxib]    Social History:   Social History     Social History    Marital status:      Spouse name: N/A    Number of children: N/A    Years of education: N/A     Occupational History    Not on file. Social History Main Topics    Smoking status: Never Smoker    Smokeless tobacco: Never Used      Comment: sakina rrt 7/19/17    Alcohol use No    Drug use: No    Sexual activity: Not on file     Other Topics Concern    Not on file     Social History Narrative       Family History:       Problem Relation Age of Onset    High Blood Pressure Brother      REVIEW OF SYSTEMS:  CONSTITUTIONAL:  negative  EYES:  negative  HEENT:  negative  RESPIRATORY:  positive for  dyspnea  CARDIOVASCULAR:  positive for  palpitations, orthopnea, PND  GASTROINTESTINAL:  negative  GENITOURINARY:  negative  MUSCULOSKELETAL:  negative  NEUROLOGICAL:  negative  BEHAVIOR/PSYCH:  negative  PHYSICAL EXAM:    Vitals:  BP (!) 127/56  Pulse 106  Temp 98.2 °F (36.8 °C) (Tympanic)   Resp 18  Wt (!) 349 lb (158.3 kg)  LMP 01/01/1968  SpO2 96%  BMI 54.66 kg/m2    CONSTITUTIONAL:  awake, alert, cooperative, no apparent distress, and appears stated age  EYES:  Lids and lashes normal, pupils equal, round and reactive to light, extra ocular muscles intact, sclera clear, conjunctiva normal  ENT:  Normocephalic, without obvious abnormality, atraumatic, sinuses nontender on palpation, external ears without lesions, oral pharynx with moist mucus membranes, tonsils without erythema or exudates, gums normal and good dentition.   NECK:  Supple, symmetrical, trachea midline, no adenopathy, thyroid symmetric, not enlarged and no tenderness, skin normal  HEMATOLOGIC/LYMPHATICS:  no cervical lymphadenopathy and no supraclavicular lymphadenopathy  BACK:  Symmetric, no curvature, spinous processes are non-tender on palpation, paraspinous muscles are non-tender on palpation, no costal vertebral tenderness  LUNGS:  tachypneic, decreased air exchange, no retractions and diminished breath sounds throughout lungs  CARDIOVASCULAR:  normal apical pulses, irregularly irregular rhythm and normal S1 and S2  ABDOMEN:  No scars, normal bowel sounds, soft, non-distended, non-tender, no masses palpated, no hepatosplenomegally  MUSCULOSKELETAL:  There is no redness, warmth, or swelling of the joints. Full range of motion noted. Motor strength is 5 out of 5 all extremities bilaterally. Tone is normal.  NEUROLOGIC:  Awake, alert, oriented to name, place and time. Cranial nerves II-XII are grossly intact. Motor is 5 out of 5 bilaterally. Cerebellar finger to nose, heel to shin intact. Sensory is intact.   Babinski down going, Romberg negative, and gait is normal.  SKIN:  no bruising or bleeding, normal skin color, texture, turgor and no redness, warmth, or swelling    DATA:  CBC with Differential:    Lab Results   Component Value Date    WBC 11.2 08/31/2017    RBC 4.82 08/31/2017    HGB 14.1 08/31/2017    HCT 44.4 08/31/2017     08/31/2017    MCV 92.0 08/31/2017    MCH 29.3 08/31/2017    MCHC 31.8 08/31/2017    RDW 14.3 08/31/2017    LYMPHOPCT 17 08/31/2017    MONOPCT 11 08/31/2017    BASOPCT 0 08/31/2017    MONOSABS 1.20 08/31/2017    LYMPHSABS 2.00 08/31/2017    EOSABS 0.20 08/31/2017    BASOSABS 0.00 08/31/2017    DIFFTYPE NOT REPORTED 08/31/2017     BMP:    Lab Results   Component Value Date     08/31/2017    K 4.2 08/31/2017     08/31/2017    CO2 25 08/31/2017    BUN 31 08/31/2017    LABALBU 3.7 07/12/2017    CREATININE 0.95 08/31/2017    CALCIUM 9.2 08/31/2017    GFRAA >60 08/31/2017    LABGLOM 57 08/31/2017    GLUCOSE 115 08/31/2017     PT/INR:    Lab Results   Component Value Date    PROTIME 21.5 08/31/2017    INR 2.0 08/31/2017     Troponin:  No results found for: TROPONINI  U/A:  No results found for: NITRITE, COLORU, [address], PHUR, LABCAST, [address], RBCUA, MUCUS, TRICHOMONAS, YEAST, BACTERIA, CLARITYU, SPECGRAV, LEUKOCYTESUR, UROBILINOGEN, BILIRUBINUR, BLOODU, GLUCOSEU, AMORPHOUS  ASSESSMENT AND PLAN:      Patient Active Hospital Problem List:   [facility] (8/31/2017)    Assessment: recurrent    Plan: in light of recent stress test results and the absence of inpatient cardiology consult until early next week we will seek a transfer of the patient to [facility] at family's request   Atrial fibrillation ([facility] (7/20/2017)    Assessment: new    Plan: same as above        Claudio Brown PA-C

## 2017-09-01 NOTE — ED PROVIDER NOTES
eMERGENCY dEPARTMENT eNCOUnter      Pt Name: Debra Lerma  MRN: [id number]  [place] 1940  Date of evaluation: 8/31/2017      CHIEF COMPLAINT     No chief complaint on file. HISTORY OF PRESENT ILLNESS    Debra Lerma is a 67 y.o. female who presents the emergency department complaining of a fluttering sensation in her chest that has been going on for several hours -patient states that she's been nauseated. No vomiting ,no diaphoresis, she says she may be mildly short of breath. She complains of no chest pain. The patient states recently had a stress test where she was diagnosed with ischemia and atrial fibrillation and possibly a myocardial infarction. We  Do not know if she was scheduled for cardiac catheterization. REVIEW OF SYSTEMS       Review of Systems   Constitutional: Negative. HENT: Negative. Eyes: Negative. Cardiovascular: Positive for palpitations. Genitourinary: Negative. Musculoskeletal: Negative. Neurological: Negative. Psychiatric/Behavioral: Negative. PAST MEDICAL HISTORY    has a past medical history of A-fib ([address] Dyslipidemia; FH: total abdominal hysterectomy and bilateral salpingo-oophorectomy; Glucose intolerance (impaired glucose tolerance); Hypertension; Obesity; and Osteoarthritis. SURGICAL HISTORY      has a past surgical history that includes kenyon and bso (cervix removed); kenyon and bso (cervix removed) (1966); Cholecystectomy (1960s); Varicose vein surgery (Right, 1960s); Hip Arthroplasty (Left); Knee Arthroplasty (Left); and Knee Arthroplasty (Right).     CURRENT MEDICATIONS       Previous Medications    DILTIAZEM (CARTIA XT) 180 MG EXTENDED RELEASE CAPSULE    TAKE ONE CAPSULE BY MOUTH DAILY    LISINOPRIL-HYDROCHLOROTHIAZIDE (PRINZIDE;ZESTORETIC) 20-25 MG PER TABLET    TAKE ONE TABLET BY MOUTH DAILY    METOPROLOL TARTRATE (LOPRESSOR) 25 MG TABLET    TAKE ONE TABLET BY MOUTH TWICE A DAY    NYSTATIN (MYCOSTATIN) 267293 UNIT/GM POWDER
Blood Pressure in her brother. SOCIAL HISTORY      reports that she has never smoked. She has never used smokeless tobacco. She reports that she does not drink alcohol or use illicit drugs.     Diagnostic Results       RADIOLOGY:   Non-plain film images such as CT, Ultrasound and MRI are read by the radiologist. Plain radiographic images are visualized and the radiologist interpretations are reviewed as follows:     X-ray per radiology shows some mild CHF    LABS:   Results for orders placed or performed during the hospital encounter of 08/31/17   CK   Result Value Ref Range    Total CK 34 26 - 192 U/L   CK MB   Result Value Ref Range    CK-MB 1.0 <5.4 ng/mL   Myoglobin, Serum   Result Value Ref Range    Myoglobin 40 25 - 58 ng/mL   CBC Auto Differential   Result Value Ref Range    WBC 11.2 (H) 3.5 - 11.0 k/uL    RBC 4.82 4.0 - 5.2 m/uL    Hemoglobin 14.1 12.0 - 16.0 g/dL    Hematocrit 44.4 36 - 46 %    MCV 92.0 80 - 100 fL    MCH 29.3 26 - 34 pg    MCHC 31.8 31 - 37 g/dL    RDW 14.3 11.0 - 14.5 %    Platelets 867 (L) 839 - 450 k/uL    MPV 12.5 (H) 6.0 - 12.0 fL    Differential Type NOT REPORTED     WBC Morphology NOT REPORTED     RBC Morphology NOT REPORTED     Platelet Estimate NOT REPORTED     Seg Neutrophils 70 %    Lymphocytes 17 %    Monocytes 11 %    Eosinophils % 2 %    Basophils 0 %    Segs Absolute 7.80 (H) 1.8 - 7.7 k/uL    Absolute Lymph # 2.00 1.0 - 4.8 k/uL    Absolute Mono # 1.20 0.1 - 1.2 k/uL    Absolute Eos # 0.20 0.0 - 0.4 k/uL    Basophils # 0.00 0.0 - 0.2 k/uL   Basic Metabolic Panel   Result Value Ref Range    Glucose 115 (H) 70 - 99 mg/dL    BUN 31 (H) 8 - 23 mg/dL    CREATININE 0.95 (H) 0.50 - 0.90 mg/dL    Bun/Cre Ratio 33 (H) 9 - 20    Calcium 9.2 8.6 - 10.4 mg/dL    Sodium 140 135 - 144 mmol/L    Potassium 4.2 3.7 - 5.3 mmol/L    Chloride 102 98 - 107 mmol/L    CO2 25 20 - 31 mmol/L    Anion Gap 13 9 - 17 mmol/L    GFR Non-African American 57 (L) >60 mL/min    GFR African American >60

## 2017-09-02 LAB
EKG ATRIAL RATE: 101 BPM
EKG Q-T INTERVAL: 368 MS
EKG QRS DURATION: 86 MS
EKG QTC CALCULATION (BAZETT): 472 MS
EKG R AXIS: -166 DEGREES
EKG T AXIS: 178 DEGREES
EKG VENTRICULAR RATE: 99 BPM
INR BLD: 1.6
PROTHROMBIN TIME: 17.7 SEC (ref 9.4–12.6)

## 2017-09-02 PROCEDURE — 6360000002 HC RX W HCPCS: Performed by: NURSE PRACTITIONER

## 2017-09-02 PROCEDURE — 36415 COLL VENOUS BLD VENIPUNCTURE: CPT

## 2017-09-02 PROCEDURE — 1200000000 HC SEMI PRIVATE

## 2017-09-02 PROCEDURE — 6370000000 HC RX 637 (ALT 250 FOR IP): Performed by: INTERNAL MEDICINE

## 2017-09-02 PROCEDURE — 94762 N-INVAS EAR/PLS OXIMTRY CONT: CPT

## 2017-09-02 PROCEDURE — 85610 PROTHROMBIN TIME: CPT

## 2017-09-02 PROCEDURE — 6370000000 HC RX 637 (ALT 250 FOR IP): Performed by: NURSE PRACTITIONER

## 2017-09-02 RX ORDER — ZOLPIDEM TARTRATE 5 MG/1
5 TABLET ORAL NIGHTLY PRN
Status: DISCONTINUED | OUTPATIENT
Start: 2017-09-02 | End: 2017-09-07 | Stop reason: HOSPADM

## 2017-09-02 RX ADMIN — METOPROLOL TARTRATE 25 MG: 25 TABLET ORAL at 08:25

## 2017-09-02 RX ADMIN — FAMOTIDINE 20 MG: 20 TABLET, FILM COATED ORAL at 08:25

## 2017-09-02 RX ADMIN — ENOXAPARIN SODIUM 150 MG: 150 INJECTION SUBCUTANEOUS at 20:32

## 2017-09-02 RX ADMIN — METOPROLOL TARTRATE 25 MG: 25 TABLET ORAL at 20:33

## 2017-09-02 RX ADMIN — FAMOTIDINE 20 MG: 20 TABLET, FILM COATED ORAL at 20:33

## 2017-09-02 RX ADMIN — ZOLPIDEM TARTRATE 5 MG: 5 TABLET ORAL at 22:04

## 2017-09-02 RX ADMIN — DILTIAZEM HYDROCHLORIDE 180 MG: 180 CAPSULE, COATED, EXTENDED RELEASE ORAL at 08:25

## 2017-09-02 RX ADMIN — ENOXAPARIN SODIUM 150 MG: 150 INJECTION SUBCUTANEOUS at 11:14

## 2017-09-02 ASSESSMENT — PAIN SCALES - GENERAL: PAINLEVEL_OUTOF10: 0

## 2017-09-03 LAB
INR BLD: 1.3
PROTHROMBIN TIME: 14.3 SEC (ref 9.4–12.6)

## 2017-09-03 PROCEDURE — 36415 COLL VENOUS BLD VENIPUNCTURE: CPT

## 2017-09-03 PROCEDURE — 6370000000 HC RX 637 (ALT 250 FOR IP): Performed by: NURSE PRACTITIONER

## 2017-09-03 PROCEDURE — 6370000000 HC RX 637 (ALT 250 FOR IP): Performed by: INTERNAL MEDICINE

## 2017-09-03 PROCEDURE — 85610 PROTHROMBIN TIME: CPT

## 2017-09-03 PROCEDURE — 1200000000 HC SEMI PRIVATE

## 2017-09-03 PROCEDURE — 6360000002 HC RX W HCPCS: Performed by: NURSE PRACTITIONER

## 2017-09-03 PROCEDURE — 94762 N-INVAS EAR/PLS OXIMTRY CONT: CPT

## 2017-09-03 RX ORDER — DOCUSATE SODIUM 100 MG/1
100 CAPSULE, LIQUID FILLED ORAL DAILY
Status: DISCONTINUED | OUTPATIENT
Start: 2017-09-03 | End: 2017-09-07 | Stop reason: HOSPADM

## 2017-09-03 RX ADMIN — TRAMADOL HYDROCHLORIDE 50 MG: 50 TABLET, FILM COATED ORAL at 02:45

## 2017-09-03 RX ADMIN — ENOXAPARIN SODIUM 150 MG: 150 INJECTION SUBCUTANEOUS at 20:39

## 2017-09-03 RX ADMIN — ENOXAPARIN SODIUM 150 MG: 150 INJECTION SUBCUTANEOUS at 08:19

## 2017-09-03 RX ADMIN — ZOLPIDEM TARTRATE 5 MG: 5 TABLET ORAL at 20:40

## 2017-09-03 RX ADMIN — FAMOTIDINE 20 MG: 20 TABLET, FILM COATED ORAL at 08:19

## 2017-09-03 RX ADMIN — FAMOTIDINE 20 MG: 20 TABLET, FILM COATED ORAL at 20:39

## 2017-09-03 RX ADMIN — METOPROLOL TARTRATE 25 MG: 25 TABLET ORAL at 08:18

## 2017-09-03 RX ADMIN — DILTIAZEM HYDROCHLORIDE 180 MG: 180 CAPSULE, COATED, EXTENDED RELEASE ORAL at 08:17

## 2017-09-03 RX ADMIN — DOCUSATE SODIUM 100 MG: 100 CAPSULE ORAL at 12:20

## 2017-09-03 RX ADMIN — TRAMADOL HYDROCHLORIDE 50 MG: 50 TABLET, FILM COATED ORAL at 20:40

## 2017-09-03 RX ADMIN — METOPROLOL TARTRATE 25 MG: 25 TABLET ORAL at 20:39

## 2017-09-03 RX ADMIN — TRAMADOL HYDROCHLORIDE 50 MG: 50 TABLET, FILM COATED ORAL at 14:33

## 2017-09-03 ASSESSMENT — PAIN SCALES - GENERAL
PAINLEVEL_OUTOF10: 7
PAINLEVEL_OUTOF10: 4
PAINLEVEL_OUTOF10: 0
PAINLEVEL_OUTOF10: 2
PAINLEVEL_OUTOF10: 4

## 2017-09-03 ASSESSMENT — PAIN DESCRIPTION - PAIN TYPE: TYPE: ACUTE PAIN

## 2017-09-03 ASSESSMENT — PAIN DESCRIPTION - PROGRESSION: CLINICAL_PROGRESSION: GRADUALLY IMPROVING

## 2017-09-03 ASSESSMENT — PAIN DESCRIPTION - LOCATION: LOCATION: LEG

## 2017-09-03 ASSESSMENT — PAIN DESCRIPTION - FREQUENCY: FREQUENCY: INTERMITTENT

## 2017-09-03 ASSESSMENT — PAIN DESCRIPTION - ONSET: ONSET: ON-GOING

## 2017-09-03 ASSESSMENT — PAIN DESCRIPTION - DESCRIPTORS: DESCRIPTORS: DISCOMFORT

## 2017-09-04 LAB
INR BLD: 2
PROTHROMBIN TIME: 22 SEC (ref 9.4–12.6)

## 2017-09-04 PROCEDURE — 1200000000 HC SEMI PRIVATE

## 2017-09-04 PROCEDURE — 85610 PROTHROMBIN TIME: CPT

## 2017-09-04 PROCEDURE — 6370000000 HC RX 637 (ALT 250 FOR IP): Performed by: INTERNAL MEDICINE

## 2017-09-04 PROCEDURE — 6370000000 HC RX 637 (ALT 250 FOR IP): Performed by: NURSE PRACTITIONER

## 2017-09-04 PROCEDURE — 6360000002 HC RX W HCPCS: Performed by: NURSE PRACTITIONER

## 2017-09-04 PROCEDURE — 36415 COLL VENOUS BLD VENIPUNCTURE: CPT

## 2017-09-04 PROCEDURE — 94762 N-INVAS EAR/PLS OXIMTRY CONT: CPT

## 2017-09-04 RX ADMIN — METOPROLOL TARTRATE 25 MG: 25 TABLET ORAL at 20:09

## 2017-09-04 RX ADMIN — DILTIAZEM HYDROCHLORIDE 180 MG: 180 CAPSULE, COATED, EXTENDED RELEASE ORAL at 08:28

## 2017-09-04 RX ADMIN — METOPROLOL TARTRATE 25 MG: 25 TABLET ORAL at 08:28

## 2017-09-04 RX ADMIN — ZOLPIDEM TARTRATE 5 MG: 5 TABLET ORAL at 20:09

## 2017-09-04 RX ADMIN — FAMOTIDINE 20 MG: 20 TABLET, FILM COATED ORAL at 08:28

## 2017-09-04 RX ADMIN — ENOXAPARIN SODIUM 150 MG: 150 INJECTION SUBCUTANEOUS at 08:29

## 2017-09-04 RX ADMIN — FAMOTIDINE 20 MG: 20 TABLET, FILM COATED ORAL at 20:09

## 2017-09-04 RX ADMIN — ENOXAPARIN SODIUM 150 MG: 150 INJECTION SUBCUTANEOUS at 20:09

## 2017-09-04 RX ADMIN — TRAMADOL HYDROCHLORIDE 50 MG: 50 TABLET, FILM COATED ORAL at 20:09

## 2017-09-04 ASSESSMENT — PAIN SCALES - GENERAL
PAINLEVEL_OUTOF10: 0
PAINLEVEL_OUTOF10: 4

## 2017-09-05 ENCOUNTER — APPOINTMENT (OUTPATIENT)
Dept: CARDIAC CATH/INVASIVE PROCEDURES | Age: 77
DRG: 287 | End: 2017-09-05
Attending: INTERNAL MEDICINE
Payer: MEDICARE

## 2017-09-05 LAB
ANION GAP SERPL CALCULATED.3IONS-SCNC: 16 MMOL/L (ref 9–17)
BUN BLDV-MCNC: 16 MG/DL (ref 8–23)
BUN/CREAT BLD: ABNORMAL (ref 9–20)
CALCIUM SERPL-MCNC: 8.8 MG/DL (ref 8.6–10.4)
CHLORIDE BLD-SCNC: 107 MMOL/L (ref 98–107)
CO2: 22 MMOL/L (ref 20–31)
CREAT SERPL-MCNC: 0.68 MG/DL (ref 0.5–0.9)
GFR AFRICAN AMERICAN: >60 ML/MIN
GFR NON-AFRICAN AMERICAN: >60 ML/MIN
GFR SERPL CREATININE-BSD FRML MDRD: ABNORMAL ML/MIN/{1.73_M2}
GFR SERPL CREATININE-BSD FRML MDRD: ABNORMAL ML/MIN/{1.73_M2}
GLUCOSE BLD-MCNC: 110 MG/DL (ref 70–99)
HCT VFR BLD CALC: 42 % (ref 36–46)
HEMOGLOBIN: 13.8 G/DL (ref 12–16)
INR BLD: 1.1
MCH RBC QN AUTO: 29.5 PG (ref 26–34)
MCHC RBC AUTO-ENTMCNC: 32.8 G/DL (ref 31–37)
MCV RBC AUTO: 90 FL (ref 80–100)
PDW BLD-RTO: 14.3 % (ref 12.5–15.4)
PLATELET # BLD: 116 K/UL (ref 140–450)
PMV BLD AUTO: 13.3 FL (ref 6–12)
POTASSIUM SERPL-SCNC: 3.6 MMOL/L (ref 3.7–5.3)
PROTHROMBIN TIME: 12.2 SEC (ref 9.4–12.6)
RBC # BLD: 4.66 M/UL (ref 4–5.2)
SODIUM BLD-SCNC: 145 MMOL/L (ref 135–144)
TROPONIN INTERP: NORMAL
TROPONIN INTERP: NORMAL
TROPONIN T: <0.03 NG/ML
TROPONIN T: <0.03 NG/ML
WBC # BLD: 8 K/UL (ref 3.5–11)

## 2017-09-05 PROCEDURE — 6360000002 HC RX W HCPCS

## 2017-09-05 PROCEDURE — 6360000002 HC RX W HCPCS: Performed by: INTERNAL MEDICINE

## 2017-09-05 PROCEDURE — 76937 US GUIDE VASCULAR ACCESS: CPT

## 2017-09-05 PROCEDURE — 84484 ASSAY OF TROPONIN QUANT: CPT

## 2017-09-05 PROCEDURE — 85027 COMPLETE CBC AUTOMATED: CPT

## 2017-09-05 PROCEDURE — 93312 ECHO TRANSESOPHAGEAL: CPT

## 2017-09-05 PROCEDURE — B2151ZZ FLUOROSCOPY OF LEFT HEART USING LOW OSMOLAR CONTRAST: ICD-10-PCS | Performed by: INTERNAL MEDICINE

## 2017-09-05 PROCEDURE — 1200000000 HC SEMI PRIVATE

## 2017-09-05 PROCEDURE — 4A023N7 MEASUREMENT OF CARDIAC SAMPLING AND PRESSURE, LEFT HEART, PERCUTANEOUS APPROACH: ICD-10-PCS | Performed by: INTERNAL MEDICINE

## 2017-09-05 PROCEDURE — 2580000003 HC RX 258: Performed by: INTERNAL MEDICINE

## 2017-09-05 PROCEDURE — 92960 CARDIOVERSION ELECTRIC EXT: CPT

## 2017-09-05 PROCEDURE — C1769 GUIDE WIRE: HCPCS

## 2017-09-05 PROCEDURE — 6370000000 HC RX 637 (ALT 250 FOR IP): Performed by: INTERNAL MEDICINE

## 2017-09-05 PROCEDURE — 36415 COLL VENOUS BLD VENIPUNCTURE: CPT

## 2017-09-05 PROCEDURE — B2111ZZ FLUOROSCOPY OF MULTIPLE CORONARY ARTERIES USING LOW OSMOLAR CONTRAST: ICD-10-PCS | Performed by: INTERNAL MEDICINE

## 2017-09-05 PROCEDURE — C1894 INTRO/SHEATH, NON-LASER: HCPCS

## 2017-09-05 PROCEDURE — 80048 BASIC METABOLIC PNL TOTAL CA: CPT

## 2017-09-05 PROCEDURE — 85610 PROTHROMBIN TIME: CPT

## 2017-09-05 PROCEDURE — C1725 CATH, TRANSLUMIN NON-LASER: HCPCS

## 2017-09-05 PROCEDURE — 94762 N-INVAS EAR/PLS OXIMTRY CONT: CPT

## 2017-09-05 PROCEDURE — 5A2204Z RESTORATION OF CARDIAC RHYTHM, SINGLE: ICD-10-PCS | Performed by: INTERNAL MEDICINE

## 2017-09-05 PROCEDURE — 93325 DOPPLER ECHO COLOR FLOW MAPG: CPT

## 2017-09-05 PROCEDURE — 93458 L HRT ARTERY/VENTRICLE ANGIO: CPT

## 2017-09-05 PROCEDURE — 6370000000 HC RX 637 (ALT 250 FOR IP): Performed by: NURSE PRACTITIONER

## 2017-09-05 PROCEDURE — C1760 CLOSURE DEV, VASC: HCPCS

## 2017-09-05 PROCEDURE — B246ZZ4 ULTRASONOGRAPHY OF RIGHT AND LEFT HEART, TRANSESOPHAGEAL: ICD-10-PCS | Performed by: INTERNAL MEDICINE

## 2017-09-05 PROCEDURE — 93005 ELECTROCARDIOGRAM TRACING: CPT

## 2017-09-05 RX ORDER — SODIUM CHLORIDE 0.9 % (FLUSH) 0.9 %
10 SYRINGE (ML) INJECTION EVERY 12 HOURS SCHEDULED
Status: DISCONTINUED | OUTPATIENT
Start: 2017-09-05 | End: 2017-09-07 | Stop reason: HOSPADM

## 2017-09-05 RX ORDER — WARFARIN SODIUM 5 MG/1
5 TABLET ORAL
Status: DISCONTINUED | OUTPATIENT
Start: 2017-09-05 | End: 2017-09-07 | Stop reason: HOSPADM

## 2017-09-05 RX ORDER — ACETAMINOPHEN 325 MG/1
650 TABLET ORAL EVERY 4 HOURS PRN
Status: DISCONTINUED | OUTPATIENT
Start: 2017-09-05 | End: 2017-09-07 | Stop reason: HOSPADM

## 2017-09-05 RX ORDER — NYSTATIN 100000 U/G
CREAM TOPICAL 2 TIMES DAILY
Status: DISCONTINUED | OUTPATIENT
Start: 2017-09-05 | End: 2017-09-07 | Stop reason: HOSPADM

## 2017-09-05 RX ORDER — SODIUM CHLORIDE 0.9 % (FLUSH) 0.9 %
10 SYRINGE (ML) INJECTION PRN
Status: DISCONTINUED | OUTPATIENT
Start: 2017-09-05 | End: 2017-09-07 | Stop reason: HOSPADM

## 2017-09-05 RX ORDER — ATORVASTATIN CALCIUM 40 MG/1
40 TABLET, FILM COATED ORAL NIGHTLY
Status: DISCONTINUED | OUTPATIENT
Start: 2017-09-05 | End: 2017-09-07 | Stop reason: HOSPADM

## 2017-09-05 RX ORDER — WARFARIN SODIUM 7.5 MG/1
7.5 TABLET ORAL
Status: DISCONTINUED | OUTPATIENT
Start: 2017-09-07 | End: 2017-09-07 | Stop reason: HOSPADM

## 2017-09-05 RX ADMIN — TRAMADOL HYDROCHLORIDE 50 MG: 50 TABLET, FILM COATED ORAL at 19:21

## 2017-09-05 RX ADMIN — DEXTROSE 0.5 MG/MIN: 5 SOLUTION INTRAVENOUS at 20:13

## 2017-09-05 RX ADMIN — METOPROLOL TARTRATE 25 MG: 25 TABLET ORAL at 20:16

## 2017-09-05 RX ADMIN — DEXTROSE 150 MG: 50 INJECTION, SOLUTION INTRAVENOUS at 11:04

## 2017-09-05 RX ADMIN — ATORVASTATIN CALCIUM 40 MG: 40 TABLET, FILM COATED ORAL at 20:16

## 2017-09-05 RX ADMIN — ZOLPIDEM TARTRATE 5 MG: 5 TABLET ORAL at 22:10

## 2017-09-05 RX ADMIN — FAMOTIDINE 20 MG: 20 TABLET, FILM COATED ORAL at 08:59

## 2017-09-05 RX ADMIN — TRAMADOL HYDROCHLORIDE 50 MG: 50 TABLET, FILM COATED ORAL at 11:52

## 2017-09-05 RX ADMIN — WARFARIN SODIUM 5 MG: 5 TABLET ORAL at 17:58

## 2017-09-05 RX ADMIN — NYSTATIN: 100000 CREAM TOPICAL at 20:19

## 2017-09-05 RX ADMIN — DILTIAZEM HYDROCHLORIDE 180 MG: 180 CAPSULE, COATED, EXTENDED RELEASE ORAL at 09:00

## 2017-09-05 RX ADMIN — DEXTROSE 1 MG/MIN: 5 SOLUTION INTRAVENOUS at 11:52

## 2017-09-05 RX ADMIN — METOPROLOL TARTRATE 25 MG: 25 TABLET ORAL at 08:59

## 2017-09-05 ASSESSMENT — PAIN DESCRIPTION - LOCATION: LOCATION: HEAD

## 2017-09-05 ASSESSMENT — PAIN DESCRIPTION - ORIENTATION: ORIENTATION: ANTERIOR;LEFT

## 2017-09-05 ASSESSMENT — PAIN DESCRIPTION - PAIN TYPE: TYPE: ACUTE PAIN

## 2017-09-05 ASSESSMENT — PAIN SCALES - GENERAL
PAINLEVEL_OUTOF10: 7
PAINLEVEL_OUTOF10: 6

## 2017-09-05 ASSESSMENT — PAIN DESCRIPTION - DESCRIPTORS: DESCRIPTORS: HEADACHE

## 2017-09-06 LAB
ANION GAP SERPL CALCULATED.3IONS-SCNC: 12 MMOL/L (ref 9–17)
BUN BLDV-MCNC: 15 MG/DL (ref 8–23)
BUN/CREAT BLD: ABNORMAL (ref 9–20)
CALCIUM SERPL-MCNC: 8.6 MG/DL (ref 8.6–10.4)
CHLORIDE BLD-SCNC: 102 MMOL/L (ref 98–107)
CO2: 25 MMOL/L (ref 20–31)
CREAT SERPL-MCNC: 0.66 MG/DL (ref 0.5–0.9)
EKG ATRIAL RATE: 208 BPM
EKG Q-T INTERVAL: 424 MS
EKG QRS DURATION: 96 MS
EKG QTC CALCULATION (BAZETT): 521 MS
EKG R AXIS: 6 DEGREES
EKG T AXIS: 41 DEGREES
EKG VENTRICULAR RATE: 91 BPM
GFR AFRICAN AMERICAN: >60 ML/MIN
GFR NON-AFRICAN AMERICAN: >60 ML/MIN
GFR SERPL CREATININE-BSD FRML MDRD: ABNORMAL ML/MIN/{1.73_M2}
GFR SERPL CREATININE-BSD FRML MDRD: ABNORMAL ML/MIN/{1.73_M2}
GLUCOSE BLD-MCNC: 104 MG/DL (ref 70–99)
HCT VFR BLD CALC: 39.7 % (ref 36–46)
HEMOGLOBIN: 13.2 G/DL (ref 12–16)
INR BLD: 1.2
MCH RBC QN AUTO: 29.7 PG (ref 26–34)
MCHC RBC AUTO-ENTMCNC: 33.3 G/DL (ref 31–37)
MCV RBC AUTO: 89.2 FL (ref 80–100)
PARTIAL THROMBOPLASTIN TIME: 27.4 SEC (ref 21.3–31.3)
PDW BLD-RTO: 14.3 % (ref 12.5–15.4)
PLATELET # BLD: 100 K/UL (ref 140–450)
PMV BLD AUTO: 11.5 FL (ref 6–12)
POTASSIUM SERPL-SCNC: 3.9 MMOL/L (ref 3.7–5.3)
PROTHROMBIN TIME: 12.5 SEC (ref 9.4–12.6)
RBC # BLD: 4.45 M/UL (ref 4–5.2)
SODIUM BLD-SCNC: 139 MMOL/L (ref 135–144)
WBC # BLD: 8.9 K/UL (ref 3.5–11)

## 2017-09-06 PROCEDURE — 85730 THROMBOPLASTIN TIME PARTIAL: CPT

## 2017-09-06 PROCEDURE — 6370000000 HC RX 637 (ALT 250 FOR IP): Performed by: INTERNAL MEDICINE

## 2017-09-06 PROCEDURE — 1200000000 HC SEMI PRIVATE

## 2017-09-06 PROCEDURE — 85027 COMPLETE CBC AUTOMATED: CPT

## 2017-09-06 PROCEDURE — 6370000000 HC RX 637 (ALT 250 FOR IP): Performed by: NURSE PRACTITIONER

## 2017-09-06 PROCEDURE — 85610 PROTHROMBIN TIME: CPT

## 2017-09-06 PROCEDURE — 36415 COLL VENOUS BLD VENIPUNCTURE: CPT

## 2017-09-06 PROCEDURE — 6360000002 HC RX W HCPCS: Performed by: INTERNAL MEDICINE

## 2017-09-06 PROCEDURE — 2580000003 HC RX 258: Performed by: INTERNAL MEDICINE

## 2017-09-06 PROCEDURE — 94762 N-INVAS EAR/PLS OXIMTRY CONT: CPT

## 2017-09-06 PROCEDURE — 80048 BASIC METABOLIC PNL TOTAL CA: CPT

## 2017-09-06 RX ORDER — AMIODARONE HYDROCHLORIDE 200 MG/1
200 TABLET ORAL 2 TIMES DAILY
Qty: 10 TABLET | Refills: 0 | Status: SHIPPED | OUTPATIENT
Start: 2017-09-11 | End: 2017-10-03 | Stop reason: ALTCHOICE

## 2017-09-06 RX ORDER — AMIODARONE HYDROCHLORIDE 200 MG/1
200 TABLET ORAL DAILY
Qty: 30 TABLET | Refills: 3 | Status: SHIPPED | OUTPATIENT
Start: 2017-09-16 | End: 2017-10-03 | Stop reason: SDUPTHER

## 2017-09-06 RX ORDER — FAMOTIDINE 20 MG/1
20 TABLET, FILM COATED ORAL 2 TIMES DAILY
Qty: 60 TABLET | Refills: 3 | Status: SHIPPED | OUTPATIENT
Start: 2017-09-06 | End: 2018-08-29

## 2017-09-06 RX ORDER — AMIODARONE HYDROCHLORIDE 200 MG/1
200 TABLET ORAL 3 TIMES DAILY
Qty: 15 TABLET | Refills: 0 | Status: SHIPPED | OUTPATIENT
Start: 2017-09-06 | End: 2017-10-03 | Stop reason: ALTCHOICE

## 2017-09-06 RX ORDER — AMIODARONE HYDROCHLORIDE 200 MG/1
200 TABLET ORAL DAILY
Status: DISCONTINUED | OUTPATIENT
Start: 2017-09-16 | End: 2017-09-07 | Stop reason: HOSPADM

## 2017-09-06 RX ORDER — ATORVASTATIN CALCIUM 40 MG/1
40 TABLET, FILM COATED ORAL NIGHTLY
Qty: 30 TABLET | Refills: 3 | Status: SHIPPED | OUTPATIENT
Start: 2017-09-06 | End: 2017-10-03 | Stop reason: SDUPTHER

## 2017-09-06 RX ORDER — AMIODARONE HYDROCHLORIDE 200 MG/1
200 TABLET ORAL 3 TIMES DAILY
Status: DISCONTINUED | OUTPATIENT
Start: 2017-09-06 | End: 2017-09-07 | Stop reason: HOSPADM

## 2017-09-06 RX ORDER — DIPHENHYDRAMINE HCL 25 MG
25 TABLET ORAL EVERY 6 HOURS PRN
Status: DISCONTINUED | OUTPATIENT
Start: 2017-09-06 | End: 2017-09-07 | Stop reason: HOSPADM

## 2017-09-06 RX ORDER — AMIODARONE HYDROCHLORIDE 200 MG/1
200 TABLET ORAL 2 TIMES DAILY
Status: DISCONTINUED | OUTPATIENT
Start: 2017-09-11 | End: 2017-09-07 | Stop reason: HOSPADM

## 2017-09-06 RX ADMIN — FAMOTIDINE 20 MG: 20 TABLET, FILM COATED ORAL at 21:06

## 2017-09-06 RX ADMIN — AMIODARONE HYDROCHLORIDE 200 MG: 200 TABLET ORAL at 12:40

## 2017-09-06 RX ADMIN — FAMOTIDINE 20 MG: 20 TABLET, FILM COATED ORAL at 08:26

## 2017-09-06 RX ADMIN — DIPHENHYDRAMINE HCL 25 MG: 25 TABLET ORAL at 09:59

## 2017-09-06 RX ADMIN — METOPROLOL TARTRATE 25 MG: 25 TABLET ORAL at 08:26

## 2017-09-06 RX ADMIN — NYSTATIN: 100000 CREAM TOPICAL at 21:00

## 2017-09-06 RX ADMIN — WARFARIN SODIUM 5 MG: 5 TABLET ORAL at 19:07

## 2017-09-06 RX ADMIN — NYSTATIN: 100000 CREAM TOPICAL at 08:31

## 2017-09-06 RX ADMIN — ENOXAPARIN SODIUM 150 MG: 150 INJECTION SUBCUTANEOUS at 21:07

## 2017-09-06 RX ADMIN — ATORVASTATIN CALCIUM 40 MG: 40 TABLET, FILM COATED ORAL at 21:06

## 2017-09-06 RX ADMIN — AMIODARONE HYDROCHLORIDE 200 MG: 200 TABLET ORAL at 21:06

## 2017-09-06 RX ADMIN — METOPROLOL TARTRATE 25 MG: 25 TABLET ORAL at 21:06

## 2017-09-06 RX ADMIN — ENOXAPARIN SODIUM 150 MG: 150 INJECTION SUBCUTANEOUS at 08:26

## 2017-09-06 RX ADMIN — DILTIAZEM HYDROCHLORIDE 180 MG: 180 CAPSULE, COATED, EXTENDED RELEASE ORAL at 08:26

## 2017-09-06 RX ADMIN — ZOLPIDEM TARTRATE 5 MG: 5 TABLET ORAL at 23:43

## 2017-09-06 RX ADMIN — SODIUM CHLORIDE, PRESERVATIVE FREE 10 ML: 5 INJECTION INTRAVENOUS at 21:00

## 2017-09-06 ASSESSMENT — PAIN SCALES - GENERAL: PAINLEVEL_OUTOF10: 0

## 2017-09-07 VITALS
HEART RATE: 74 BPM | WEIGHT: 293 LBS | TEMPERATURE: 98.1 F | BODY MASS INDEX: 48.82 KG/M2 | SYSTOLIC BLOOD PRESSURE: 135 MMHG | RESPIRATION RATE: 17 BRPM | HEIGHT: 65 IN | DIASTOLIC BLOOD PRESSURE: 76 MMHG | OXYGEN SATURATION: 96 %

## 2017-09-07 LAB
INR BLD: 1
PROTHROMBIN TIME: 10.9 SEC (ref 9.4–12.6)

## 2017-09-07 PROCEDURE — 6370000000 HC RX 637 (ALT 250 FOR IP): Performed by: INTERNAL MEDICINE

## 2017-09-07 PROCEDURE — 94762 N-INVAS EAR/PLS OXIMTRY CONT: CPT

## 2017-09-07 PROCEDURE — G8988 SELF CARE GOAL STATUS: HCPCS

## 2017-09-07 PROCEDURE — G8987 SELF CARE CURRENT STATUS: HCPCS

## 2017-09-07 PROCEDURE — 97161 PT EVAL LOW COMPLEX 20 MIN: CPT

## 2017-09-07 PROCEDURE — G8978 MOBILITY CURRENT STATUS: HCPCS

## 2017-09-07 PROCEDURE — 6370000000 HC RX 637 (ALT 250 FOR IP): Performed by: NURSE PRACTITIONER

## 2017-09-07 PROCEDURE — G8980 MOBILITY D/C STATUS: HCPCS

## 2017-09-07 PROCEDURE — 85610 PROTHROMBIN TIME: CPT

## 2017-09-07 PROCEDURE — 36415 COLL VENOUS BLD VENIPUNCTURE: CPT

## 2017-09-07 PROCEDURE — 6360000002 HC RX W HCPCS: Performed by: INTERNAL MEDICINE

## 2017-09-07 PROCEDURE — G8979 MOBILITY GOAL STATUS: HCPCS

## 2017-09-07 PROCEDURE — 97165 OT EVAL LOW COMPLEX 30 MIN: CPT

## 2017-09-07 RX ORDER — DILTIAZEM HYDROCHLORIDE 240 MG/1
240 CAPSULE, COATED, EXTENDED RELEASE ORAL DAILY
Qty: 30 CAPSULE | Refills: 3 | Status: SHIPPED | OUTPATIENT
Start: 2017-09-08 | End: 2017-10-03 | Stop reason: SDUPTHER

## 2017-09-07 RX ORDER — DILTIAZEM HYDROCHLORIDE 240 MG/1
240 CAPSULE, COATED, EXTENDED RELEASE ORAL DAILY
Status: DISCONTINUED | OUTPATIENT
Start: 2017-09-08 | End: 2017-09-07 | Stop reason: HOSPADM

## 2017-09-07 RX ADMIN — AMIODARONE HYDROCHLORIDE 200 MG: 200 TABLET ORAL at 09:18

## 2017-09-07 RX ADMIN — METOPROLOL TARTRATE 25 MG: 25 TABLET ORAL at 09:19

## 2017-09-07 RX ADMIN — NYSTATIN: 100000 CREAM TOPICAL at 09:20

## 2017-09-07 RX ADMIN — DILTIAZEM HYDROCHLORIDE 180 MG: 180 CAPSULE, COATED, EXTENDED RELEASE ORAL at 09:19

## 2017-09-07 RX ADMIN — ENOXAPARIN SODIUM 150 MG: 150 INJECTION SUBCUTANEOUS at 09:19

## 2017-09-07 RX ADMIN — FAMOTIDINE 20 MG: 20 TABLET, FILM COATED ORAL at 09:18

## 2017-09-07 ASSESSMENT — PAIN SCALES - GENERAL: PAINLEVEL_OUTOF10: 0

## 2017-09-08 ENCOUNTER — TELEPHONE (OUTPATIENT)
Dept: FAMILY MEDICINE CLINIC | Age: 77
End: 2017-09-08

## 2017-09-08 ENCOUNTER — TELEPHONE (OUTPATIENT)
Dept: PHARMACY | Age: 77
End: 2017-09-08

## 2017-09-08 ENCOUNTER — CARE COORDINATION (OUTPATIENT)
Dept: CASE MANAGEMENT | Age: 77
End: 2017-09-08

## 2017-09-11 LAB — INR BLD: 1.7

## 2017-09-13 ENCOUNTER — OFFICE VISIT (OUTPATIENT)
Dept: FAMILY MEDICINE CLINIC | Age: 77
End: 2017-09-13
Payer: MEDICARE

## 2017-09-13 VITALS
HEIGHT: 65 IN | RESPIRATION RATE: 20 BRPM | BODY MASS INDEX: 48.82 KG/M2 | SYSTOLIC BLOOD PRESSURE: 134 MMHG | OXYGEN SATURATION: 95 % | HEART RATE: 60 BPM | WEIGHT: 293 LBS | DIASTOLIC BLOOD PRESSURE: 76 MMHG

## 2017-09-13 DIAGNOSIS — I48.91 ATRIAL FIBRILLATION, UNSPECIFIED TYPE (HCC): Primary | ICD-10-CM

## 2017-09-13 DIAGNOSIS — I50.9 ACUTE CONGESTIVE HEART FAILURE, UNSPECIFIED CONGESTIVE HEART FAILURE TYPE: ICD-10-CM

## 2017-09-13 DIAGNOSIS — F51.01 PRIMARY INSOMNIA: ICD-10-CM

## 2017-09-13 DIAGNOSIS — I10 ESSENTIAL HYPERTENSION: ICD-10-CM

## 2017-09-13 DIAGNOSIS — Z23 FLU VACCINE NEED: ICD-10-CM

## 2017-09-13 PROCEDURE — 99495 TRANSJ CARE MGMT MOD F2F 14D: CPT | Performed by: FAMILY MEDICINE

## 2017-09-13 PROCEDURE — G0008 ADMIN INFLUENZA VIRUS VAC: HCPCS | Performed by: FAMILY MEDICINE

## 2017-09-13 PROCEDURE — 90662 IIV NO PRSV INCREASED AG IM: CPT | Performed by: FAMILY MEDICINE

## 2017-09-13 RX ORDER — CHOLECALCIFEROL (VITAMIN D3) 125 MCG
5 CAPSULE ORAL NIGHTLY
Qty: 30 TABLET | Refills: 5 | Status: SHIPPED | OUTPATIENT
Start: 2017-09-13 | End: 2017-10-19

## 2017-09-13 ASSESSMENT — ENCOUNTER SYMPTOMS
WHEEZING: 0
TROUBLE SWALLOWING: 0
SINUS PRESSURE: 0
SHORTNESS OF BREATH: 1
RHINORRHEA: 0
COUGH: 0
EYE DISCHARGE: 0
CONSTIPATION: 0
NAUSEA: 0
BACK PAIN: 1
VOMITING: 0
EYE REDNESS: 0
DIARRHEA: 0
SORE THROAT: 0
ABDOMINAL PAIN: 0

## 2017-09-14 ENCOUNTER — CARE COORDINATION (OUTPATIENT)
Dept: CASE MANAGEMENT | Age: 77
End: 2017-09-14

## 2017-09-15 ENCOUNTER — HOSPITAL ENCOUNTER (OUTPATIENT)
Dept: PHARMACY | Age: 77
Discharge: HOME | End: 2017-09-15

## 2017-09-15 DIAGNOSIS — I48.91 ATRIAL FIBRILLATION, UNSPECIFIED TYPE (HCC): ICD-10-CM

## 2017-09-15 LAB — INR BLD: 1.8

## 2017-09-19 ENCOUNTER — CARE COORDINATION (OUTPATIENT)
Dept: CASE MANAGEMENT | Age: 77
End: 2017-09-19

## 2017-09-20 ENCOUNTER — TELEPHONE (OUTPATIENT)
Dept: FAMILY MEDICINE CLINIC | Age: 77
End: 2017-09-20
Payer: MEDICARE

## 2017-09-20 DIAGNOSIS — I11.0 HYPERTENSIVE HEART DISEASE WITH HEART FAILURE (HCC): ICD-10-CM

## 2017-09-20 DIAGNOSIS — Z79.01 LONG TERM (CURRENT) USE OF ANTICOAGULANTS: ICD-10-CM

## 2017-09-20 DIAGNOSIS — E66.01 MORBID (SEVERE) OBESITY DUE TO EXCESS CALORIES (HCC): ICD-10-CM

## 2017-09-20 DIAGNOSIS — M19.91 PRIMARY OSTEOARTHRITIS, UNSPECIFIED SITE: ICD-10-CM

## 2017-09-20 DIAGNOSIS — Z51.81 ENCOUNTER FOR THERAPEUTIC DRUG LEVEL MONITORING: ICD-10-CM

## 2017-09-20 DIAGNOSIS — I50.9 HEART FAILURE, UNSPECIFIED (HCC): ICD-10-CM

## 2017-09-20 DIAGNOSIS — M81.0 AGE-RELATED OSTEOPOROSIS WITHOUT CURRENT PATHOLOGICAL FRACTURE: ICD-10-CM

## 2017-09-20 DIAGNOSIS — F41.9 ANXIETY DISORDER, UNSPECIFIED TYPE: ICD-10-CM

## 2017-09-20 DIAGNOSIS — I48.91 UNSPECIFIED ATRIAL FIBRILLATION (HCC): Primary | ICD-10-CM

## 2017-09-20 DIAGNOSIS — F33.0 MAJOR DEPRESSIVE DISORDER, RECURRENT, MILD (HCC): ICD-10-CM

## 2017-09-20 DIAGNOSIS — I25.10 ATHEROSCLEROSIS OF NATIVE CORONARY ARTERY OF NATIVE HEART WITHOUT ANGINA PECTORIS: ICD-10-CM

## 2017-09-20 PROCEDURE — G0180 MD CERTIFICATION HHA PATIENT: HCPCS | Performed by: FAMILY MEDICINE

## 2017-09-22 ENCOUNTER — HOSPITAL ENCOUNTER (OUTPATIENT)
Dept: PHARMACY | Age: 77
Discharge: HOME | End: 2017-09-22

## 2017-09-22 DIAGNOSIS — I48.91 ATRIAL FIBRILLATION, UNSPECIFIED TYPE (HCC): ICD-10-CM

## 2017-09-22 LAB — INR BLD: 1.7

## 2017-09-28 ENCOUNTER — HOSPITAL ENCOUNTER (OUTPATIENT)
Dept: PHARMACY | Age: 77
Discharge: HOME | End: 2017-09-28

## 2017-09-28 DIAGNOSIS — I48.91 ATRIAL FIBRILLATION, UNSPECIFIED TYPE (HCC): ICD-10-CM

## 2017-09-28 LAB — INR BLD: 1.9

## 2017-10-03 ENCOUNTER — TELEPHONE (OUTPATIENT)
Dept: FAMILY MEDICINE CLINIC | Age: 77
End: 2017-10-03

## 2017-10-03 RX ORDER — AMIODARONE HYDROCHLORIDE 200 MG/1
200 TABLET ORAL DAILY
Qty: 30 TABLET | Refills: 0 | Status: SHIPPED | OUTPATIENT
Start: 2017-10-03 | End: 2017-10-04 | Stop reason: SDUPTHER

## 2017-10-03 RX ORDER — DILTIAZEM HYDROCHLORIDE 240 MG/1
240 CAPSULE, COATED, EXTENDED RELEASE ORAL DAILY
Qty: 30 CAPSULE | Refills: 0 | Status: SHIPPED | OUTPATIENT
Start: 2017-10-03 | End: 2017-10-04 | Stop reason: SDUPTHER

## 2017-10-03 RX ORDER — ATORVASTATIN CALCIUM 40 MG/1
40 TABLET, FILM COATED ORAL NIGHTLY
Qty: 30 TABLET | Refills: 0 | Status: SHIPPED | OUTPATIENT
Start: 2017-10-03 | End: 2017-10-04 | Stop reason: SDUPTHER

## 2017-10-03 NOTE — TELEPHONE ENCOUNTER
Patient is calling stating she is having trouble affording to pay for her Amiodarone 200 mg once daily, Diltiazem  mg once daily, and Lipitor 40 mg once daily. Advised patient she can get the Lipitor for free from [address] [place] [address]. Also checked with Michelle on the price for Amiodarone $47.66 and Diltizem  mg $37.24.  Patient needs us to send 1 month worth of each of these to [address] [place] [address] and then she will come in to sign an application from [address] [place] [address] where she can get 90 days worth of all her medications for $137.00

## 2017-10-04 ENCOUNTER — TELEPHONE (OUTPATIENT)
Dept: FAMILY MEDICINE CLINIC | Age: 77
End: 2017-10-04

## 2017-10-04 RX ORDER — ATORVASTATIN CALCIUM 40 MG/1
40 TABLET, FILM COATED ORAL NIGHTLY
Qty: 90 TABLET | Refills: 1 | Status: SHIPPED | OUTPATIENT
Start: 2017-10-04 | End: 2018-05-04 | Stop reason: SDUPTHER

## 2017-10-04 RX ORDER — AMIODARONE HYDROCHLORIDE 200 MG/1
200 TABLET ORAL DAILY
Qty: 90 TABLET | Refills: 1 | Status: SHIPPED | OUTPATIENT
Start: 2017-10-04 | End: 2018-04-25 | Stop reason: SDUPTHER

## 2017-10-04 RX ORDER — DILTIAZEM HYDROCHLORIDE 240 MG/1
240 CAPSULE, COATED, EXTENDED RELEASE ORAL DAILY
Qty: 90 CAPSULE | Refills: 1 | Status: SHIPPED | OUTPATIENT
Start: 2017-10-04 | End: 2018-04-25 | Stop reason: SDUPTHER

## 2017-10-04 NOTE — TELEPHONE ENCOUNTER
Called patient back and she stated she spoke with [facility]. and they have an assistance program there for people who do not have prescription insurance and they could get her Lipitor, Amiodarone, and Diltiazem for $50 a month which is affordable for patient. She would like me to send more refills in for these 3 so she can continue to get them monthly.

## 2017-10-05 ENCOUNTER — HOSPITAL ENCOUNTER (OUTPATIENT)
Dept: PHARMACY | Age: 77
Discharge: HOME | End: 2017-10-05

## 2017-10-05 DIAGNOSIS — I48.91 ATRIAL FIBRILLATION, UNSPECIFIED TYPE (HCC): ICD-10-CM

## 2017-10-05 LAB — INR BLD: 2.8

## 2017-10-05 NOTE — PROGRESS NOTES
ANTICOAGULATION SERVICE    Date of Clinic Visit:  10/5/2017    Nasreen Bianchi is a 67 y.o. female who presents to clinic today for anticoagulation monitoring and adjustment. Recent INR Results:  Internal QC passed  Lab Results   Component Value Date    INR 2.8 10/05/2017    INR 1.9 09/28/2017       Current Warfarin Dosage:  Dosing Plan as of 10/5/2017     Full instructions 7.5 mg on Tue, Sat; 5 mg all other days            Assessment/Plan:    Modify warfarin dose as noted above: Significant increase in INR from last week. I am going to decrease Yvette's dose back down to old dose now that she is in range. Recheck one week. Next Clinic Appointment:  Return date as of 10/5/2017     Next INR check 10/12/2017          Please call [facility]ticoagulation Clinic at [phone] with any questions. Thanks!   Hina Hitchcock, [facility]  Anticoagulation Service Pharmacist  10/5/2017 12:19 PM

## 2017-10-12 ENCOUNTER — HOSPITAL ENCOUNTER (OUTPATIENT)
Dept: PHARMACY | Age: 77
Discharge: HOME | End: 2017-10-12

## 2017-10-12 DIAGNOSIS — I48.91 ATRIAL FIBRILLATION, UNSPECIFIED TYPE (HCC): ICD-10-CM

## 2017-10-12 LAB — INR BLD: 2.3

## 2017-10-12 NOTE — PROGRESS NOTES
ANTICOAGULATION SERVICE    Date of Clinic Visit:  10/12/2017    Vin Berg is a 67 y.o. female who is being checked by Home health. Recent INR Results:  Internal QC passed  Lab Results   Component Value Date    INR 2.3 10/12/2017    INR 2.8 10/05/2017       Current Warfarin Dosage:  Dosing Plan  As of 10/12/2017    TTR:   64.1 % (1.8 mo)   Full instructions:   7.5 mg on Tue, Sat; 5 mg all other days               Assessment/Plan:    Continue current regimen as INR remains stable. Next Clinic Appointment:  Return date  As of 10/12/2017    TTR:   64.1 % (1.8 mo)   Next INR check:   10/26/2017             Please call [facility] Anticoagulation Clinic at [phone] with any questions. Thanks!   Millie Ugarte [facility]  Anticoagulation Service Pharmacist  10/12/2017 1:30 PM

## 2017-10-19 ENCOUNTER — OFFICE VISIT (OUTPATIENT)
Dept: CARDIOLOGY | Age: 77
End: 2017-10-19
Payer: MEDICARE

## 2017-10-19 VITALS
HEART RATE: 56 BPM | DIASTOLIC BLOOD PRESSURE: 72 MMHG | HEIGHT: 65 IN | WEIGHT: 293 LBS | SYSTOLIC BLOOD PRESSURE: 183 MMHG | BODY MASS INDEX: 48.82 KG/M2

## 2017-10-19 DIAGNOSIS — I48.91 ATRIAL FIBRILLATION, UNSPECIFIED TYPE (HCC): Primary | ICD-10-CM

## 2017-10-19 PROCEDURE — 1036F TOBACCO NON-USER: CPT | Performed by: INTERNAL MEDICINE

## 2017-10-19 PROCEDURE — G8417 CALC BMI ABV UP PARAM F/U: HCPCS | Performed by: INTERNAL MEDICINE

## 2017-10-19 PROCEDURE — 1090F PRES/ABSN URINE INCON ASSESS: CPT | Performed by: INTERNAL MEDICINE

## 2017-10-19 PROCEDURE — G8427 DOCREV CUR MEDS BY ELIG CLIN: HCPCS | Performed by: INTERNAL MEDICINE

## 2017-10-19 PROCEDURE — 99213 OFFICE O/P EST LOW 20 MIN: CPT | Performed by: INTERNAL MEDICINE

## 2017-10-19 PROCEDURE — 1123F ACP DISCUSS/DSCN MKR DOCD: CPT | Performed by: INTERNAL MEDICINE

## 2017-10-19 PROCEDURE — G8484 FLU IMMUNIZE NO ADMIN: HCPCS | Performed by: INTERNAL MEDICINE

## 2017-10-19 PROCEDURE — 4040F PNEUMOC VAC/ADMIN/RCVD: CPT | Performed by: INTERNAL MEDICINE

## 2017-10-19 PROCEDURE — G8399 PT W/DXA RESULTS DOCUMENT: HCPCS | Performed by: INTERNAL MEDICINE

## 2017-10-19 PROCEDURE — G8598 ASA/ANTIPLAT THER USED: HCPCS | Performed by: INTERNAL MEDICINE

## 2017-10-19 PROCEDURE — 93000 ELECTROCARDIOGRAM COMPLETE: CPT | Performed by: INTERNAL MEDICINE

## 2017-10-19 NOTE — PROGRESS NOTES
Today's Date: 10/19/2017  Patient Name: Halima Roman  Patient's age: 67 y. o., 1940          The patient is a 67 y.o.  female is in the office for f/u after a recent admission for afib where she had BALDO and cardioversion. Past Medical History:   has a past medical history of A-fib [facility]); CHF (congestive heart failure) ([facility] [address] Dyslipidemia; FH: total abdominal hysterectomy and bilateral salpingo-oophorectomy; Glucose intolerance (impaired glucose tolerance); Hypertension; Obesity; and Osteoarthritis. Past Surgical History:   has a past surgical history that includes kenyon and bso (cervix removed); kenyon and bso (cervix removed) (1966); Cholecystectomy (1960s); Varicose vein surgery (Right, 1960s); Hip Arthroplasty (Left); Knee Arthroplasty (Left); Knee Arthroplasty (Right); and Cardiac catheterization (09/05/2017). Home Medications:    Prior to Admission medications    Medication Sig Start Date End Date Taking?  Authorizing Provider   diltiazem (CARDIZEM CD) 240 MG extended release capsule Take 1 capsule by mouth daily 10/4/17  Yes Samantha Mendoza DO   atorvastatin (LIPITOR) 40 MG tablet Take 1 tablet by mouth nightly 10/4/17  Yes Samantha Mendoza DO   amiodarone (CORDARONE) 200 MG tablet Take 1 tablet by mouth daily 10/4/17  Yes Samantha Mendoza DO   famotidine (PEPCID) 20 MG tablet Take 1 tablet by mouth 2 times daily 9/6/17  Yes Esperanza Oneal CNP   metoprolol tartrate (LOPRESSOR) 25 MG tablet TAKE ONE TABLET BY MOUTH TWICE A DAY 8/29/17  Yes Samantha Mendoza DO   warfarin (COUMADIN) 2.5 MG tablet Take 2 tablets by mouth daily Indications: or as directed by INR results  Patient taking differently: Take 2.5 mg by mouth daily Indications: or as directed by INR results 5mg everyday but Monday and Thursday (2 2.5mg)  7.5mg Mondays and Thursdays (3 2.5mg) 8/15/17  Yes Samantha Mendoza DO   traMADol (ULTRAM) 50 MG tablet Take 1 tablet by mouth every 6 hours as complications. 2. Normal Lv size with low normal systolic function. Estimated LVEF 50%  3. No CAROLYN thrombus   4. Moderate to severe tricuspid insufficiency   5. Mild mitral regurgitation   Successful CV to NSR     Cardiac cath on 09/05/17:  Findings:  Left main: NL  LAD: mid-20%  Diag-1: normal  LCX: NL  OM-1: 40%  RCA: Proximal mild-aneurysmal dilation  The LV gram was performed in the OLSON 30 position. LVEF: 55%. LV Wall Motion: Normal  Labs:     CBC: No results for input(s): WBC, HGB, HCT, PLT in the last 72 hours. BMP: No results for input(s): NA, K, CO2, BUN, CREATININE, LABGLOM, GLUCOSE in the last 72 hours. PT/INR: No results for input(s): PROTIME, INR in the last 72 hours. FASTING LIPID PANEL:  Lab Results   Component Value Date    HDL 37 07/12/2017    TRIG 113 07/12/2017     LIVER PROFILE:No results for input(s): AST, ALT, LABALBU in the last 72 hours.     IMPRESSION:    PAF S/P DCCV 9/5/2017  OBESITY  JAYJAY ON CPAP  MILD CAD  MILD MR/TR  A/C ON COUMADIN  Patient Active Problem List   Diagnosis    Healthcare maintenance    HTN (hypertension)    Insulin resistance    Dyslipidemia    Osteoarthritis    Osteoporosis    Medicare annual wellness visit, initial    Medicare annual wellness visit, subsequent    Depression    Anxiety    At[facility] fibrillation ([facility] [address]    CHF (congestive heart failure) ([facility] [address]       RECOMMENDATIONS:  AMIODARONE MONITORING   REGULAR EXERCISE  CALORIE RESTRICTION  WEIGHT LOSS  CONTINUE CURRENT TREATMENT    F[address] Cardiology Consult           [phone]

## 2017-10-20 ENCOUNTER — HOSPITAL ENCOUNTER (OUTPATIENT)
Dept: PHARMACY | Age: 77
Discharge: HOME | End: 2017-10-20

## 2017-10-20 DIAGNOSIS — I48.91 ATRIAL FIBRILLATION, UNSPECIFIED TYPE (HCC): ICD-10-CM

## 2017-10-20 LAB — INR BLD: 2.1

## 2017-10-20 NOTE — PROGRESS NOTES
ANTICOAGULATION SERVICE    Date of Clinic Visit:  10/20/2017    Moreno Falcon is a 67 y.o. female who presents to clinic today for anticoagulation monitoring and adjustment. Recent INR Results:  Internal QC passed  Lab Results   Component Value Date    INR 2.1 10/20/2017    INR 2.3 10/12/2017       Current Warfarin Dosage:  Dosing Plan  As of 10/20/2017    TTR:   71.6 % (2.3 mo)   Full instructions:   7.5 mg on Tue, Sat; 5 mg all other days               Assessment/Plan:    Continue current regimen as INR remains stable. Next Clinic Appointment:  Return date  As of 10/20/2017    TTR:   71.6 % (2.3 mo)   Next INR check:   11/3/2017             Please call [address] [facility] Anticoagulation Clinic at [phone] with any questions. Thanks!   Xenia Vicente, [facility]  Anticoagulation Service Pharmacist  10/20/2017 11:01 AM

## 2017-11-02 ENCOUNTER — HOSPITAL ENCOUNTER (OUTPATIENT)
Dept: PHARMACY | Age: 77
Discharge: HOME | End: 2017-11-02

## 2017-11-02 DIAGNOSIS — I48.91 ATRIAL FIBRILLATION, UNSPECIFIED TYPE (HCC): ICD-10-CM

## 2017-11-02 LAB — INR BLD: 1.8

## 2017-11-02 NOTE — PROGRESS NOTES
ANTICOAGULATION SERVICE    Date of Clinic Visit:  11/2/2017    Barber Arriaga is a 65 y.o. female who presents to clinic today for anticoagulation monitoring and adjustment. Recent INR Results:  Internal QC passed  Lab Results   Component Value Date    INR 1.8 11/02/2017    INR 2.1 10/20/2017       Current Warfarin Dosage:  Dosing Plan  As of 11/2/2017    TTR:   71.6 % (2.3 mo)   Full instructions:   5 mg on Mon, Fri; 6.25 mg all other days               Assessment/Plan:    Modify warfarin dose as noted above: Increase weekly dose 3%. Next Clinic Appointment:  Return date  As of 11/2/2017    TTR:   71.6 % (2.3 mo)   Next INR check:   11/16/2017             Please call [facility] Anticoagulation Clinic at [phone] with any questions. Thanks!   Khadijah Sheikh, [facility]  Anticoagulation Service Pharmacist  11/2/2017 10:05 AM

## 2017-11-03 ENCOUNTER — OFFICE VISIT (OUTPATIENT)
Dept: FAMILY MEDICINE CLINIC | Age: 77
End: 2017-11-03
Payer: MEDICARE

## 2017-11-03 VITALS
DIASTOLIC BLOOD PRESSURE: 80 MMHG | OXYGEN SATURATION: 94 % | HEIGHT: 65 IN | BODY MASS INDEX: 48.82 KG/M2 | SYSTOLIC BLOOD PRESSURE: 130 MMHG | HEART RATE: 56 BPM | RESPIRATION RATE: 20 BRPM | WEIGHT: 293 LBS

## 2017-11-03 DIAGNOSIS — I48.91 ATRIAL FIBRILLATION, UNSPECIFIED TYPE (HCC): ICD-10-CM

## 2017-11-03 DIAGNOSIS — M15.9 PRIMARY OSTEOARTHRITIS INVOLVING MULTIPLE JOINTS: ICD-10-CM

## 2017-11-03 DIAGNOSIS — G47.33 OBSTRUCTIVE SLEEP APNEA: Primary | ICD-10-CM

## 2017-11-03 PROCEDURE — G8399 PT W/DXA RESULTS DOCUMENT: HCPCS | Performed by: FAMILY MEDICINE

## 2017-11-03 PROCEDURE — G8484 FLU IMMUNIZE NO ADMIN: HCPCS | Performed by: FAMILY MEDICINE

## 2017-11-03 PROCEDURE — 1123F ACP DISCUSS/DSCN MKR DOCD: CPT | Performed by: FAMILY MEDICINE

## 2017-11-03 PROCEDURE — G8417 CALC BMI ABV UP PARAM F/U: HCPCS | Performed by: FAMILY MEDICINE

## 2017-11-03 PROCEDURE — G8598 ASA/ANTIPLAT THER USED: HCPCS | Performed by: FAMILY MEDICINE

## 2017-11-03 PROCEDURE — 4040F PNEUMOC VAC/ADMIN/RCVD: CPT | Performed by: FAMILY MEDICINE

## 2017-11-03 PROCEDURE — G8427 DOCREV CUR MEDS BY ELIG CLIN: HCPCS | Performed by: FAMILY MEDICINE

## 2017-11-03 PROCEDURE — 1090F PRES/ABSN URINE INCON ASSESS: CPT | Performed by: FAMILY MEDICINE

## 2017-11-03 PROCEDURE — 1036F TOBACCO NON-USER: CPT | Performed by: FAMILY MEDICINE

## 2017-11-03 PROCEDURE — 99214 OFFICE O/P EST MOD 30 MIN: CPT | Performed by: FAMILY MEDICINE

## 2017-11-03 ASSESSMENT — ENCOUNTER SYMPTOMS
SHORTNESS OF BREATH: 1
GASTROINTESTINAL NEGATIVE: 1
EYES NEGATIVE: 1

## 2017-11-03 NOTE — PROGRESS NOTES
Subjective:      Patient ID: Marisela Nash is a 65 y.o. female. HPI    Patient comes in today for follow-up regarding her CPAP usage for her obstructive sleep apnea. Patient has had her CPAP machine for a little over a month duration. She has been doing relatively well with the machine but notes in the last couple of days she had talked to her son about how she was wearing her mask and he had advised her on some changes as far as the headgear and she seems be tolerating it much better. Was getting a lot of leakage before but apparently now has made some adjustments that seems to be working much better for her. She does feel more rested in her sleep. Is tolerating it at night. Is using it consistently. Does seem to be getting medical benefit from use of her CPAP machine. Does have known history of atrial fibrillation and is on Coumadin therapy. She did discuss with me today as well that she's concerned about coming into the Coumadin clinic to get her Coumadin level checked through the winter months. 3 difficult for her to get in and out of her house and she's concerned about bad weather putting her higher risk for falls. I did talk to her about perhaps getting her a home machine that she can monitor from her home and would have to do weekly INRs but this would prevent her from having to go out during the winter months. She seems agreeable with this plan. She does have known osteoarthritis as well and was interested in pursuing obtaining a scooter. We did talk about this somewhat today but due to the holiday season coming up she did not want to go through the evaluation that is necessary to get her covered for her scooter. Ultimately she will let me know if she does decide to pursue this in the future. She did note with regards to her osteoarthritis that the tramadol doesn't really seem to write her with much benefit as far as her osteoarthritis pain.   She states that her shoulder is really causing her significant pain and she also has problems with her knees and hips. Is not able to take anti-inflammatory is any longer due to the fact that she is on Coumadin therapy. I did advise her she could try Tylenol therapy and she states she was unaware that she could take Tylenol with the Coumadin by think this would be a better option. Another option would be to try intermittent low dose narcotic pain medication if needed such as Tylenol with Codeine, but she really did not want to add any narcotic therapy and I think it's best if we try to avoid this as well. Patient otherwise has no other acute medical concerns. Did review patient's med list, allergies, social history, fam history, pmhx and pshx today as noted in the record. Preventative measures are reviewed today. See health maintenance section for complete preventative plan of care. Review of Systems   Constitutional: Negative. HENT: Negative. Eyes: Negative. Respiratory: Positive for shortness of breath (with exertion). Cardiovascular: Negative. Gastrointestinal: Negative. Genitourinary: Negative. Musculoskeletal: Positive for arthralgias and gait problem. Skin: Negative. Psychiatric/Behavioral: Negative for sleep disturbance. Objective:   Physical Exam   Constitutional: She is oriented to person, place, and time. She appears well-developed and well-nourished. No distress. HENT:   Head: Normocephalic and atraumatic. Right Ear: External ear normal.   Left Ear: External ear normal.   Nose: Nose normal.   Mouth/Throat: Oropharynx is clear and moist. No oropharyngeal exudate. Eyes: Conjunctivae and EOM are normal. Pupils are equal, round, and reactive to light. Right eye exhibits no discharge. Left eye exhibits no discharge. Neck: Normal range of motion. Neck supple. No thyromegaly present. Cardiovascular: Normal rate, regular rhythm and normal heart sounds.     Pulmonary/Chest: Effort normal and breath sounds normal. She has no wheezes. She has no rales. Musculoskeletal: She exhibits no edema. Lymphadenopathy:     She has no cervical adenopathy. Neurological: She is alert and oriented to person, place, and time. Skin: Skin is warm and dry. No rash noted. She is not diaphoretic. Psychiatric: She has a normal mood and affect. Her behavior is normal. Judgment and thought content normal.   Nursing note and vitals reviewed. Assessment:      Encounter Diagnoses   Name Primary?  Obstructive sleep apnea Yes    Atrial fibrillation, unspecified type ([facility] [address]     Primary osteoarthritis involving multiple joints              Plan:        Patient is compliant with CPAP use and continues to get ongoing medical benefit from it. Seems to feel more energetic during the day and sleeps better during the night. With regards to the osteoarthritis she is going to try Tylenol at home to see if this will help provide her with benefit. I also suggested perhaps trying glucosamine and chondroitin sulfate over-the-counter supplement as this may help with some inflammation as well. Could consider doing low-dose pain medications such as Tylenol with codeine if needed for more severe pain issues. She would like to avoid this at this time. Will check into doing at home weekly INRs with a home monitor. This would be easier for patient as it is very difficult for her to leave her home particularly in the winter months. Patient is to return to my office in April as scheduled for her routine medical office visit or sooner if any further problems or symptoms arise.     (Please note that portions of this note were completed with a voice recognition program. Efforts were made to edit the dictations but occasionally words are mis-transcribed.)

## 2017-11-06 ENCOUNTER — HOSPITAL ENCOUNTER (OUTPATIENT)
Dept: PHARMACY | Age: 77
Discharge: HOME | End: 2017-11-06

## 2017-11-06 DIAGNOSIS — I48.91 ATRIAL FIBRILLATION, UNSPECIFIED TYPE (HCC): ICD-10-CM

## 2017-11-06 LAB — INR BLD: 2.3

## 2017-11-13 ENCOUNTER — TELEPHONE (OUTPATIENT)
Dept: FAMILY MEDICINE CLINIC | Age: 77
End: 2017-11-13

## 2017-11-13 NOTE — TELEPHONE ENCOUNTER
[employer] call stating that they need the ICD code needs to be change to Z79.01. They request that old dx code be crossed  off initialed and then new code added. Then faxed back.

## 2017-11-28 LAB — INR BLD: 2

## 2017-12-04 ENCOUNTER — ANTI-COAG VISIT (OUTPATIENT)
Dept: FAMILY MEDICINE CLINIC | Age: 77
End: 2017-12-04

## 2017-12-04 DIAGNOSIS — I48.20 CHRONIC ATRIAL FIBRILLATION (HCC): Primary | ICD-10-CM

## 2017-12-04 PROBLEM — I48.91 ATRIAL FIBRILLATION (HCC): Status: RESOLVED | Noted: 2017-07-20 | Resolved: 2017-12-04

## 2017-12-05 ENCOUNTER — TELEPHONE (OUTPATIENT)
Dept: FAMILY MEDICINE CLINIC | Age: 77
End: 2017-12-05

## 2017-12-05 DIAGNOSIS — R60.0 LOCALIZED EDEMA: Primary | ICD-10-CM

## 2017-12-05 LAB — INR BLD: 2.7

## 2017-12-05 RX ORDER — FUROSEMIDE 20 MG/1
20 TABLET ORAL DAILY
Qty: 30 TABLET | Refills: 5 | Status: SHIPPED | OUTPATIENT
Start: 2017-12-05 | End: 2018-08-29 | Stop reason: SDUPTHER

## 2017-12-05 RX ORDER — POTASSIUM CHLORIDE 20 MEQ/1
20 TABLET, EXTENDED RELEASE ORAL DAILY
Qty: 30 TABLET | Refills: 5 | Status: SHIPPED | OUTPATIENT
Start: 2017-12-05 | End: 2020-03-09 | Stop reason: SDUPTHER

## 2017-12-05 NOTE — TELEPHONE ENCOUNTER
Patient calling because she weighed herself today and noticed she had gained 8 lbs since her last weight 2 wks ago. States she does have some swelling in her feet ankles and maybe a little short of breath but nothing significant. She wondered because of her history of CHF would you need to give her a water pill to drain some fluid? Patient would like Mary Powell if you send something in. Advised patient she needs to try an wear compression stockings and get feet up above heart level when possible.

## 2017-12-05 NOTE — TELEPHONE ENCOUNTER
Patient called about her INR. Did not say anymore, but would like a call back from the nurse.  Please call back at home number   [phone]

## 2017-12-06 ENCOUNTER — ANTI-COAG VISIT (OUTPATIENT)
Dept: FAMILY MEDICINE CLINIC | Age: 77
End: 2017-12-06

## 2017-12-12 LAB — INR BLD: 2.6

## 2017-12-13 ENCOUNTER — ANTI-COAG VISIT (OUTPATIENT)
Dept: FAMILY MEDICINE CLINIC | Age: 77
End: 2017-12-13

## 2017-12-19 LAB — INR BLD: 3

## 2017-12-20 ENCOUNTER — ANTI-COAG VISIT (OUTPATIENT)
Dept: FAMILY MEDICINE CLINIC | Age: 77
End: 2017-12-20

## 2017-12-26 LAB — INR BLD: 3.4

## 2017-12-27 ENCOUNTER — TELEPHONE (OUTPATIENT)
Dept: FAMILY MEDICINE CLINIC | Age: 77
End: 2017-12-27

## 2017-12-27 ENCOUNTER — ANTI-COAG VISIT (OUTPATIENT)
Dept: FAMILY MEDICINE CLINIC | Age: 77
End: 2017-12-27

## 2017-12-27 NOTE — TELEPHONE ENCOUNTER
I would keep her on this chronically, as I think this may be something that is going to be a recurrent issue. If she stays on the daily diuretic this will prevent the fluid from re accumulating.   I think that I would recommend that she come in when able, however to have a BMP done to make sure that with the diuretic that her kidney function and potassium level was normal.

## 2017-12-29 DIAGNOSIS — M15.9 PRIMARY OSTEOARTHRITIS INVOLVING MULTIPLE JOINTS: ICD-10-CM

## 2017-12-29 RX ORDER — TRAMADOL HYDROCHLORIDE 50 MG/1
TABLET ORAL
Qty: 30 TABLET | Refills: 0 | Status: SHIPPED | OUTPATIENT
Start: 2017-12-29 | End: 2018-01-28

## 2017-12-29 NOTE — TELEPHONE ENCOUNTER
Controlled Substances Monitoring:     Attestation: The Prescription Monitoring Report for this patient was reviewed today.  Lorelei Ledezma)

## 2017-12-29 NOTE — TELEPHONE ENCOUNTER
Last ov 11/3/17 next ov 1/19/18. Medication and dosage verified in epic. OARRS on desk for review, last fill 9/21/17 #30. TWV out of office, HEF on notes.

## 2018-01-03 LAB — INR BLD: 2.4

## 2018-01-04 ENCOUNTER — ANTI-COAG VISIT (OUTPATIENT)
Dept: FAMILY MEDICINE CLINIC | Age: 78
End: 2018-01-04

## 2018-01-09 LAB — INR BLD: 2.2

## 2018-01-10 ENCOUNTER — ANTI-COAG VISIT (OUTPATIENT)
Dept: FAMILY MEDICINE CLINIC | Age: 78
End: 2018-01-10

## 2018-01-17 LAB — INR BLD: 1.8

## 2018-01-18 ENCOUNTER — ANTI-COAG VISIT (OUTPATIENT)
Dept: FAMILY MEDICINE CLINIC | Age: 78
End: 2018-01-18

## 2018-01-24 LAB — INR BLD: 2.1

## 2018-01-26 RX ORDER — WARFARIN SODIUM 2.5 MG/1
TABLET ORAL
Qty: 225 TABLET | Refills: 1 | Status: SHIPPED | OUTPATIENT
Start: 2018-01-26 | End: 2018-09-10 | Stop reason: SDUPTHER

## 2018-01-29 ENCOUNTER — HOSPITAL ENCOUNTER (OUTPATIENT)
Dept: LAB | Age: 78
Setting detail: SPECIMEN
Discharge: HOME | End: 2018-01-29
Payer: MEDICARE

## 2018-01-29 DIAGNOSIS — R60.0 LOCALIZED EDEMA: ICD-10-CM

## 2018-01-29 DIAGNOSIS — I10 ESSENTIAL HYPERTENSION: ICD-10-CM

## 2018-01-29 DIAGNOSIS — E78.5 DYSLIPIDEMIA: ICD-10-CM

## 2018-01-29 DIAGNOSIS — R73.01 IMPAIRED FASTING GLUCOSE: ICD-10-CM

## 2018-01-29 LAB
ABSOLUTE EOS #: 0.3 K/UL (ref 0–0.4)
ABSOLUTE IMMATURE GRANULOCYTE: ABNORMAL K/UL (ref 0–0.3)
ABSOLUTE LYMPH #: 2 K/UL (ref 1–4.8)
ABSOLUTE MONO #: 0.8 K/UL (ref 0.1–1.2)
ALBUMIN SERPL-MCNC: 4 G/DL (ref 3.5–5.2)
ALBUMIN/GLOBULIN RATIO: 1.3 (ref 1–2.5)
ALP BLD-CCNC: 114 U/L (ref 35–104)
ALT SERPL-CCNC: 13 U/L (ref 5–33)
ANION GAP SERPL CALCULATED.3IONS-SCNC: 15 MMOL/L (ref 9–17)
AST SERPL-CCNC: 16 U/L
BASOPHILS # BLD: 1 % (ref 0–1)
BASOPHILS ABSOLUTE: 0 K/UL (ref 0–0.2)
BILIRUB SERPL-MCNC: 0.59 MG/DL (ref 0.3–1.2)
BUN BLDV-MCNC: 20 MG/DL (ref 8–23)
BUN/CREAT BLD: 26 (ref 9–20)
CALCIUM SERPL-MCNC: 9.7 MG/DL (ref 8.6–10.4)
CHLORIDE BLD-SCNC: 105 MMOL/L (ref 98–107)
CHOLESTEROL/HDL RATIO: 2.3
CHOLESTEROL: 108 MG/DL
CO2: 24 MMOL/L (ref 20–31)
CREAT SERPL-MCNC: 0.77 MG/DL (ref 0.5–0.9)
DIFFERENTIAL TYPE: ABNORMAL
EOSINOPHILS RELATIVE PERCENT: 4 % (ref 1–7)
ESTIMATED AVERAGE GLUCOSE: 108 MG/DL
GFR AFRICAN AMERICAN: >60 ML/MIN
GFR NON-AFRICAN AMERICAN: >60 ML/MIN
GFR SERPL CREATININE-BSD FRML MDRD: ABNORMAL ML/MIN/{1.73_M2}
GFR SERPL CREATININE-BSD FRML MDRD: ABNORMAL ML/MIN/{1.73_M2}
GLUCOSE BLD-MCNC: 103 MG/DL (ref 70–99)
HBA1C MFR BLD: 5.4 % (ref 4.8–5.9)
HCT VFR BLD CALC: 43.8 % (ref 36–46)
HDLC SERPL-MCNC: 48 MG/DL
HEMOGLOBIN: 14.1 G/DL (ref 12–16)
IMMATURE GRANULOCYTES: ABNORMAL %
LDL CHOLESTEROL: 41 MG/DL (ref 0–130)
LYMPHOCYTES # BLD: 24 % (ref 16–46)
MCH RBC QN AUTO: 29.5 PG (ref 26–34)
MCHC RBC AUTO-ENTMCNC: 32.1 G/DL (ref 31–37)
MCV RBC AUTO: 91.8 FL (ref 80–100)
MONOCYTES # BLD: 9 % (ref 4–11)
NRBC AUTOMATED: ABNORMAL PER 100 WBC
PDW BLD-RTO: 16.1 % (ref 11–14.5)
PLATELET # BLD: 133 K/UL (ref 140–450)
PLATELET ESTIMATE: ABNORMAL
PMV BLD AUTO: 12.5 FL (ref 6–12)
POTASSIUM SERPL-SCNC: 3.8 MMOL/L (ref 3.7–5.3)
RBC # BLD: 4.77 M/UL (ref 4–5.2)
RBC # BLD: ABNORMAL 10*6/UL
SEG NEUTROPHILS: 62 % (ref 43–77)
SEGMENTED NEUTROPHILS ABSOLUTE COUNT: 5.3 K/UL (ref 1.8–7.7)
SODIUM BLD-SCNC: 144 MMOL/L (ref 135–144)
TOTAL PROTEIN: 7.1 G/DL (ref 6.4–8.3)
TRIGL SERPL-MCNC: 95 MG/DL
TSH SERPL DL<=0.05 MIU/L-ACNC: 6.36 MIU/L (ref 0.3–5)
VLDLC SERPL CALC-MCNC: NORMAL MG/DL (ref 1–30)
WBC # BLD: 8.5 K/UL (ref 3.5–11)
WBC # BLD: ABNORMAL 10*3/UL

## 2018-01-29 PROCEDURE — 83036 HEMOGLOBIN GLYCOSYLATED A1C: CPT

## 2018-01-29 PROCEDURE — 85025 COMPLETE CBC W/AUTO DIFF WBC: CPT

## 2018-01-29 PROCEDURE — 80053 COMPREHEN METABOLIC PANEL: CPT

## 2018-01-29 PROCEDURE — 84443 ASSAY THYROID STIM HORMONE: CPT

## 2018-01-29 PROCEDURE — 80061 LIPID PANEL: CPT

## 2018-01-29 PROCEDURE — 36415 COLL VENOUS BLD VENIPUNCTURE: CPT

## 2018-01-30 ENCOUNTER — OFFICE VISIT (OUTPATIENT)
Dept: FAMILY MEDICINE CLINIC | Age: 78
End: 2018-01-30
Payer: MEDICARE

## 2018-01-30 ENCOUNTER — ANTI-COAG VISIT (OUTPATIENT)
Dept: FAMILY MEDICINE CLINIC | Age: 78
End: 2018-01-30

## 2018-01-30 VITALS
SYSTOLIC BLOOD PRESSURE: 130 MMHG | HEART RATE: 56 BPM | HEIGHT: 65 IN | OXYGEN SATURATION: 96 % | WEIGHT: 293 LBS | BODY MASS INDEX: 48.82 KG/M2 | DIASTOLIC BLOOD PRESSURE: 80 MMHG | RESPIRATION RATE: 20 BRPM

## 2018-01-30 DIAGNOSIS — I50.9 ACUTE CONGESTIVE HEART FAILURE, UNSPECIFIED CONGESTIVE HEART FAILURE TYPE: ICD-10-CM

## 2018-01-30 DIAGNOSIS — E78.5 DYSLIPIDEMIA: ICD-10-CM

## 2018-01-30 DIAGNOSIS — E03.2 HYPOTHYROIDISM DUE TO MEDICATION: ICD-10-CM

## 2018-01-30 DIAGNOSIS — I48.0 PAROXYSMAL ATRIAL FIBRILLATION (HCC): ICD-10-CM

## 2018-01-30 DIAGNOSIS — F32.A DEPRESSION, UNSPECIFIED DEPRESSION TYPE: ICD-10-CM

## 2018-01-30 DIAGNOSIS — R73.01 IMPAIRED FASTING GLUCOSE: ICD-10-CM

## 2018-01-30 DIAGNOSIS — E88.81 INSULIN RESISTANCE: ICD-10-CM

## 2018-01-30 DIAGNOSIS — M15.9 PRIMARY OSTEOARTHRITIS INVOLVING MULTIPLE JOINTS: Primary | ICD-10-CM

## 2018-01-30 DIAGNOSIS — F41.9 ANXIETY: ICD-10-CM

## 2018-01-30 DIAGNOSIS — E66.01 MORBID OBESITY (HCC): ICD-10-CM

## 2018-01-30 DIAGNOSIS — I10 ESSENTIAL HYPERTENSION: ICD-10-CM

## 2018-01-30 LAB — INR BLD: 2.1

## 2018-01-30 PROCEDURE — G8427 DOCREV CUR MEDS BY ELIG CLIN: HCPCS | Performed by: FAMILY MEDICINE

## 2018-01-30 PROCEDURE — 99214 OFFICE O/P EST MOD 30 MIN: CPT | Performed by: FAMILY MEDICINE

## 2018-01-30 PROCEDURE — G8417 CALC BMI ABV UP PARAM F/U: HCPCS | Performed by: FAMILY MEDICINE

## 2018-01-30 PROCEDURE — G8484 FLU IMMUNIZE NO ADMIN: HCPCS | Performed by: FAMILY MEDICINE

## 2018-01-30 PROCEDURE — G8399 PT W/DXA RESULTS DOCUMENT: HCPCS | Performed by: FAMILY MEDICINE

## 2018-01-30 PROCEDURE — 1090F PRES/ABSN URINE INCON ASSESS: CPT | Performed by: FAMILY MEDICINE

## 2018-01-30 PROCEDURE — 1036F TOBACCO NON-USER: CPT | Performed by: FAMILY MEDICINE

## 2018-01-30 PROCEDURE — 1123F ACP DISCUSS/DSCN MKR DOCD: CPT | Performed by: FAMILY MEDICINE

## 2018-01-30 PROCEDURE — 4040F PNEUMOC VAC/ADMIN/RCVD: CPT | Performed by: FAMILY MEDICINE

## 2018-01-30 RX ORDER — HYDROCODONE BITARTRATE AND ACETAMINOPHEN 5; 325 MG/1; MG/1
1 TABLET ORAL EVERY 6 HOURS PRN
Qty: 30 TABLET | Refills: 0 | Status: SHIPPED | OUTPATIENT
Start: 2018-01-30 | End: 2018-05-09 | Stop reason: SDUPTHER

## 2018-01-30 RX ORDER — LEVOTHYROXINE SODIUM 0.05 MG/1
50 TABLET ORAL DAILY
Qty: 30 TABLET | Refills: 3 | Status: SHIPPED | OUTPATIENT
Start: 2018-01-30 | End: 2018-05-23 | Stop reason: SDUPTHER

## 2018-01-30 ASSESSMENT — ENCOUNTER SYMPTOMS
NAUSEA: 0
SINUS PRESSURE: 0
RHINORRHEA: 0
VOMITING: 0
SHORTNESS OF BREATH: 0
EYE REDNESS: 0
DIARRHEA: 0
SORE THROAT: 0
WHEEZING: 0
TROUBLE SWALLOWING: 0
EYE DISCHARGE: 0
CONSTIPATION: 0
ABDOMINAL PAIN: 0
COUGH: 0

## 2018-01-30 NOTE — PATIENT INSTRUCTIONS
make and go to all appointments, and call your doctor if you are having problems. It's also a good idea to know your test results and keep a list of the medicines you take. Where can you learn more? Go to [email or web address] and sign in to your [employer] account. Enter P501 in the Quotations Book box to learn more about \"Learning About High Blood Pressure. \"     If you do not have an account, please click on the \"Sign Up Now\" link. Current as of: September 21, 2016  Content Version: 11.5  © 5016-0974 Healthwise, Incorporated. Care instructions adapted under license by [facility] ([facility]). If you have questions about a medical condition or this instruction, always ask your healthcare professional. [address] any warranty or liability for your use of this information.

## 2018-01-30 NOTE — PROGRESS NOTES
Patient declines tdap vaccine at this time.
Alkaline Phosphatase 114 (H) 35 - 104 U/L    ALT 13 5 - 33 U/L    AST 16 <32 U/L    Total Bilirubin 0.59 0.3 - 1.2 mg/dL    Total Protein 7.1 6.4 - 8.3 g/dL    Alb 4.0 3.5 - 5.2 g/dL    Albumin/Globulin Ratio 1.3 1.0 - 2.5    GFR Non-African American >60 >60 mL/min    GFR African American >60 >60 mL/min    GFR Comment          GFR Staging NOT REPORTED    Lipid Panel   Result Value Ref Range    Cholesterol 108 <200 mg/dL    HDL 48 >40 mg/dL    LDL Cholesterol 41 0 - 130 mg/dL    Chol/HDL Ratio 2.3 <5    Triglycerides 95 <150 mg/dL    VLDL NOT REPORTED 1 - 30 mg/dL   Hemoglobin A1C   Result Value Ref Range    Hemoglobin A1C 5.4 4.8 - 5.9 %    Estimated Avg Glucose 108 mg/dL   TSH without Reflex   Result Value Ref Range    TSH 6.36 (H) 0.30 - 5.00 mIU/L     Did discuss dietary modification and increased exercise to keep cholesterol and blood sugar under good control. Other review of systems are as noted below. Did review patient's med list, allergies, social history, fam history, pmhx and pshx today as noted in the record. Preventative measures are reviewed today. See health maintenance section for complete preventative plan of care. Review of Systems   Constitutional: Negative for chills, fatigue and fever. HENT: Negative for congestion, ear pain, postnasal drip, rhinorrhea, sinus pressure, sore throat and trouble swallowing. Eyes: Negative for discharge and redness. Respiratory: Negative for cough, shortness of breath and wheezing. Cardiovascular: Negative for chest pain. Gastrointestinal: Negative for abdominal pain, constipation, diarrhea, nausea and vomiting. Musculoskeletal: Negative for arthralgias, myalgias and neck pain. Skin: Negative for rash and wound. Allergic/Immunologic: Negative for environmental allergies. Neurological: Negative for dizziness, weakness, light-headedness and headaches. Hematological: Negative for adenopathy. Psychiatric/Behavioral: Negative.

## 2018-02-06 LAB — INR BLD: 1.8

## 2018-02-07 ENCOUNTER — ANTI-COAG VISIT (OUTPATIENT)
Dept: PRIMARY CARE CLINIC | Age: 78
End: 2018-02-07

## 2018-02-07 NOTE — PROGRESS NOTES
INR is a little low. I increased her coumadin to 6.25mg every day except Sun and Thurs which is 5mg.  Repeat INR in 1 week

## 2018-02-13 LAB — INR BLD: 1.6

## 2018-02-14 ENCOUNTER — ANTI-COAG VISIT (OUTPATIENT)
Dept: FAMILY MEDICINE CLINIC | Age: 78
End: 2018-02-14

## 2018-02-20 LAB — INR BLD: 2

## 2018-02-21 ENCOUNTER — ANTI-COAG VISIT (OUTPATIENT)
Dept: FAMILY MEDICINE CLINIC | Age: 78
End: 2018-02-21
Payer: MEDICARE

## 2018-02-21 DIAGNOSIS — I48.0 PAROXYSMAL ATRIAL FIBRILLATION (HCC): ICD-10-CM

## 2018-02-21 PROCEDURE — 93793 ANTICOAG MGMT PT WARFARIN: CPT | Performed by: FAMILY MEDICINE

## 2018-02-27 LAB — INR BLD: 2.6

## 2018-02-28 ENCOUNTER — ANTI-COAG VISIT (OUTPATIENT)
Dept: FAMILY MEDICINE CLINIC | Age: 78
End: 2018-02-28
Payer: MEDICARE

## 2018-02-28 DIAGNOSIS — I48.0 PAROXYSMAL ATRIAL FIBRILLATION (HCC): ICD-10-CM

## 2018-02-28 PROCEDURE — 93793 ANTICOAG MGMT PT WARFARIN: CPT | Performed by: FAMILY MEDICINE

## 2018-03-02 DIAGNOSIS — I10 ESSENTIAL HYPERTENSION: ICD-10-CM

## 2018-03-06 ENCOUNTER — ANTI-COAG VISIT (OUTPATIENT)
Dept: FAMILY MEDICINE CLINIC | Age: 78
End: 2018-03-06
Payer: MEDICARE

## 2018-03-06 DIAGNOSIS — I48.0 PAROXYSMAL ATRIAL FIBRILLATION (HCC): ICD-10-CM

## 2018-03-06 LAB — INR BLD: 2.6

## 2018-03-06 PROCEDURE — 93793 ANTICOAG MGMT PT WARFARIN: CPT | Performed by: FAMILY MEDICINE

## 2018-03-13 LAB — INR BLD: 2.9

## 2018-03-14 ENCOUNTER — ANTI-COAG VISIT (OUTPATIENT)
Dept: FAMILY MEDICINE CLINIC | Age: 78
End: 2018-03-14
Payer: MEDICARE

## 2018-03-14 DIAGNOSIS — I48.0 PAROXYSMAL ATRIAL FIBRILLATION (HCC): ICD-10-CM

## 2018-03-14 PROCEDURE — 93793 ANTICOAG MGMT PT WARFARIN: CPT | Performed by: FAMILY MEDICINE

## 2018-03-20 ENCOUNTER — ANTI-COAG VISIT (OUTPATIENT)
Dept: FAMILY MEDICINE CLINIC | Age: 78
End: 2018-03-20
Payer: MEDICARE

## 2018-03-20 DIAGNOSIS — I48.0 PAROXYSMAL ATRIAL FIBRILLATION (HCC): ICD-10-CM

## 2018-03-20 LAB — INR BLD: 3.2

## 2018-03-20 PROCEDURE — 93793 ANTICOAG MGMT PT WARFARIN: CPT | Performed by: FAMILY MEDICINE

## 2018-03-27 ENCOUNTER — ANTI-COAG VISIT (OUTPATIENT)
Dept: FAMILY MEDICINE CLINIC | Age: 78
End: 2018-03-27
Payer: MEDICARE

## 2018-03-27 DIAGNOSIS — I48.0 PAROXYSMAL ATRIAL FIBRILLATION (HCC): Primary | ICD-10-CM

## 2018-03-27 LAB — INR BLD: 2.9

## 2018-03-27 PROCEDURE — 93793 ANTICOAG MGMT PT WARFARIN: CPT | Performed by: FAMILY MEDICINE

## 2018-03-27 NOTE — PROGRESS NOTES
Patient aware of INR results, coumadin dosing and recheck INR in one week. Verbalizes understanding.

## 2018-04-03 ENCOUNTER — ANTI-COAG VISIT (OUTPATIENT)
Dept: FAMILY MEDICINE CLINIC | Age: 78
End: 2018-04-03
Payer: MEDICARE

## 2018-04-03 DIAGNOSIS — I48.0 PAROXYSMAL ATRIAL FIBRILLATION (HCC): ICD-10-CM

## 2018-04-03 LAB — INR BLD: 2.9

## 2018-04-03 PROCEDURE — 93793 ANTICOAG MGMT PT WARFARIN: CPT | Performed by: FAMILY MEDICINE

## 2018-04-12 ENCOUNTER — ANTI-COAG VISIT (OUTPATIENT)
Dept: FAMILY MEDICINE CLINIC | Age: 78
End: 2018-04-12
Payer: MEDICARE

## 2018-04-12 ENCOUNTER — TELEPHONE (OUTPATIENT)
Dept: FAMILY MEDICINE CLINIC | Age: 78
End: 2018-04-12

## 2018-04-12 DIAGNOSIS — I48.0 PAROXYSMAL ATRIAL FIBRILLATION (HCC): ICD-10-CM

## 2018-04-12 LAB — INR BLD: 3.4

## 2018-04-12 PROCEDURE — 93793 ANTICOAG MGMT PT WARFARIN: CPT | Performed by: FAMILY MEDICINE

## 2018-04-17 ENCOUNTER — ANTI-COAG VISIT (OUTPATIENT)
Dept: FAMILY MEDICINE CLINIC | Age: 78
End: 2018-04-17
Payer: MEDICARE

## 2018-04-17 ENCOUNTER — TELEPHONE (OUTPATIENT)
Dept: FAMILY MEDICINE CLINIC | Age: 78
End: 2018-04-17

## 2018-04-17 DIAGNOSIS — I48.0 PAROXYSMAL ATRIAL FIBRILLATION (HCC): ICD-10-CM

## 2018-04-17 LAB — INR BLD: 3.3

## 2018-04-17 PROCEDURE — 93793 ANTICOAG MGMT PT WARFARIN: CPT | Performed by: FAMILY MEDICINE

## 2018-04-17 RX ORDER — AMITRIPTYLINE HYDROCHLORIDE 25 MG/1
25 TABLET, FILM COATED ORAL NIGHTLY
Qty: 30 TABLET | Refills: 2 | Status: SHIPPED | OUTPATIENT
Start: 2018-04-17 | End: 2018-08-25 | Stop reason: SDUPTHER

## 2018-04-19 ENCOUNTER — OFFICE VISIT (OUTPATIENT)
Dept: CARDIOLOGY | Age: 78
End: 2018-04-19
Payer: MEDICARE

## 2018-04-19 VITALS
HEIGHT: 65 IN | BODY MASS INDEX: 48.82 KG/M2 | DIASTOLIC BLOOD PRESSURE: 90 MMHG | WEIGHT: 293 LBS | HEART RATE: 58 BPM | SYSTOLIC BLOOD PRESSURE: 146 MMHG

## 2018-04-19 DIAGNOSIS — I10 ESSENTIAL HYPERTENSION: Primary | ICD-10-CM

## 2018-04-19 PROCEDURE — 93000 ELECTROCARDIOGRAM COMPLETE: CPT | Performed by: INTERNAL MEDICINE

## 2018-04-19 PROCEDURE — 99213 OFFICE O/P EST LOW 20 MIN: CPT | Performed by: INTERNAL MEDICINE

## 2018-04-25 ENCOUNTER — ANTI-COAG VISIT (OUTPATIENT)
Dept: FAMILY MEDICINE CLINIC | Age: 78
End: 2018-04-25
Payer: MEDICARE

## 2018-04-25 DIAGNOSIS — I48.0 PAROXYSMAL ATRIAL FIBRILLATION (HCC): ICD-10-CM

## 2018-04-25 LAB — INR BLD: 4.1

## 2018-04-25 PROCEDURE — 93793 ANTICOAG MGMT PT WARFARIN: CPT | Performed by: FAMILY MEDICINE

## 2018-04-25 RX ORDER — DILTIAZEM HYDROCHLORIDE 240 MG/1
CAPSULE, COATED, EXTENDED RELEASE ORAL
Qty: 30 CAPSULE | Refills: 5 | Status: SHIPPED | OUTPATIENT
Start: 2018-04-25 | End: 2018-12-21 | Stop reason: SDUPTHER

## 2018-04-25 RX ORDER — AMIODARONE HYDROCHLORIDE 200 MG/1
TABLET ORAL
Qty: 30 TABLET | Refills: 5 | Status: SHIPPED | OUTPATIENT
Start: 2018-04-25 | End: 2018-12-21 | Stop reason: SDUPTHER

## 2018-05-01 LAB — INR BLD: 2.5

## 2018-05-02 ENCOUNTER — ANTI-COAG VISIT (OUTPATIENT)
Dept: FAMILY MEDICINE CLINIC | Age: 78
End: 2018-05-02
Payer: MEDICARE

## 2018-05-02 DIAGNOSIS — I48.0 PAROXYSMAL ATRIAL FIBRILLATION (HCC): ICD-10-CM

## 2018-05-02 PROCEDURE — 93793 ANTICOAG MGMT PT WARFARIN: CPT | Performed by: FAMILY MEDICINE

## 2018-05-02 RX ORDER — WARFARIN SODIUM 2.5 MG/1
TABLET ORAL
Qty: 180 TABLET | Refills: 0 | OUTPATIENT
Start: 2018-05-02

## 2018-05-04 RX ORDER — ATORVASTATIN CALCIUM 40 MG/1
TABLET, FILM COATED ORAL
Qty: 90 TABLET | Refills: 1 | Status: SHIPPED | OUTPATIENT
Start: 2018-05-04 | End: 2018-12-21 | Stop reason: SDUPTHER

## 2018-05-08 LAB — INR BLD: 2.6

## 2018-05-09 ENCOUNTER — ANTI-COAG VISIT (OUTPATIENT)
Dept: FAMILY MEDICINE CLINIC | Age: 78
End: 2018-05-09
Payer: MEDICARE

## 2018-05-09 DIAGNOSIS — I48.0 PAROXYSMAL ATRIAL FIBRILLATION (HCC): ICD-10-CM

## 2018-05-09 DIAGNOSIS — M15.9 PRIMARY OSTEOARTHRITIS INVOLVING MULTIPLE JOINTS: ICD-10-CM

## 2018-05-09 PROCEDURE — 93793 ANTICOAG MGMT PT WARFARIN: CPT | Performed by: FAMILY MEDICINE

## 2018-05-09 RX ORDER — HYDROCODONE BITARTRATE AND ACETAMINOPHEN 5; 325 MG/1; MG/1
1 TABLET ORAL EVERY 8 HOURS PRN
Qty: 30 TABLET | Refills: 0 | Status: SHIPPED | OUTPATIENT
Start: 2018-05-09 | End: 2018-06-14

## 2018-05-15 ENCOUNTER — ANTI-COAG VISIT (OUTPATIENT)
Dept: FAMILY MEDICINE CLINIC | Age: 78
End: 2018-05-15
Payer: MEDICARE

## 2018-05-15 DIAGNOSIS — I48.0 PAROXYSMAL ATRIAL FIBRILLATION (HCC): ICD-10-CM

## 2018-05-15 LAB — INR BLD: 2.6

## 2018-05-15 PROCEDURE — 93793 ANTICOAG MGMT PT WARFARIN: CPT | Performed by: FAMILY MEDICINE

## 2018-05-22 LAB — INR BLD: 3.8

## 2018-05-23 ENCOUNTER — ANTI-COAG VISIT (OUTPATIENT)
Dept: FAMILY MEDICINE CLINIC | Age: 78
End: 2018-05-23
Payer: MEDICARE

## 2018-05-23 DIAGNOSIS — I48.0 PAROXYSMAL ATRIAL FIBRILLATION (HCC): ICD-10-CM

## 2018-05-23 PROCEDURE — 93793 ANTICOAG MGMT PT WARFARIN: CPT | Performed by: FAMILY MEDICINE

## 2018-05-23 RX ORDER — LEVOTHYROXINE SODIUM 0.05 MG/1
TABLET ORAL
Qty: 30 TABLET | Refills: 2 | Status: SHIPPED | OUTPATIENT
Start: 2018-05-23 | End: 2018-08-25 | Stop reason: SDUPTHER

## 2018-05-29 LAB — INR BLD: 2.6

## 2018-06-05 ENCOUNTER — ANTI-COAG VISIT (OUTPATIENT)
Dept: FAMILY MEDICINE CLINIC | Age: 78
End: 2018-06-05

## 2018-06-05 LAB — INR BLD: 3.5

## 2018-06-14 ENCOUNTER — ANTI-COAG VISIT (OUTPATIENT)
Dept: FAMILY MEDICINE CLINIC | Age: 78
End: 2018-06-14
Payer: MEDICARE

## 2018-06-14 DIAGNOSIS — I48.0 PAROXYSMAL ATRIAL FIBRILLATION (HCC): ICD-10-CM

## 2018-06-14 DIAGNOSIS — M15.9 PRIMARY OSTEOARTHRITIS INVOLVING MULTIPLE JOINTS: ICD-10-CM

## 2018-06-14 LAB — INR BLD: 3.9

## 2018-06-14 PROCEDURE — 93793 ANTICOAG MGMT PT WARFARIN: CPT | Performed by: FAMILY MEDICINE

## 2018-06-14 RX ORDER — HYDROCODONE BITARTRATE AND ACETAMINOPHEN 5; 325 MG/1; MG/1
1 TABLET ORAL EVERY 8 HOURS PRN
Qty: 30 TABLET | Refills: 0 | Status: SHIPPED | OUTPATIENT
Start: 2018-06-14 | End: 2018-07-10 | Stop reason: SDUPTHER

## 2018-06-19 LAB — INR BLD: 2.8

## 2018-06-21 ENCOUNTER — ANTI-COAG VISIT (OUTPATIENT)
Dept: FAMILY MEDICINE CLINIC | Age: 78
End: 2018-06-21
Payer: MEDICARE

## 2018-06-21 DIAGNOSIS — I48.0 PAROXYSMAL ATRIAL FIBRILLATION (HCC): ICD-10-CM

## 2018-06-21 PROCEDURE — 93793 ANTICOAG MGMT PT WARFARIN: CPT | Performed by: FAMILY MEDICINE

## 2018-06-26 ENCOUNTER — ANTI-COAG VISIT (OUTPATIENT)
Dept: FAMILY MEDICINE CLINIC | Age: 78
End: 2018-06-26
Payer: MEDICARE

## 2018-06-26 DIAGNOSIS — I48.0 PAROXYSMAL ATRIAL FIBRILLATION (HCC): ICD-10-CM

## 2018-06-26 LAB — INR BLD: 3

## 2018-06-26 PROCEDURE — 93793 ANTICOAG MGMT PT WARFARIN: CPT | Performed by: FAMILY MEDICINE

## 2018-07-03 ENCOUNTER — ANTI-COAG VISIT (OUTPATIENT)
Dept: FAMILY MEDICINE CLINIC | Age: 78
End: 2018-07-03
Payer: MEDICARE

## 2018-07-03 DIAGNOSIS — I48.0 PAROXYSMAL ATRIAL FIBRILLATION (HCC): ICD-10-CM

## 2018-07-03 LAB — INR BLD: 2.8

## 2018-07-03 PROCEDURE — 93793 ANTICOAG MGMT PT WARFARIN: CPT | Performed by: FAMILY MEDICINE

## 2018-07-10 ENCOUNTER — ANTI-COAG VISIT (OUTPATIENT)
Dept: FAMILY MEDICINE CLINIC | Age: 78
End: 2018-07-10
Payer: MEDICARE

## 2018-07-10 DIAGNOSIS — M15.9 PRIMARY OSTEOARTHRITIS INVOLVING MULTIPLE JOINTS: ICD-10-CM

## 2018-07-10 DIAGNOSIS — I48.0 PAROXYSMAL ATRIAL FIBRILLATION (HCC): ICD-10-CM

## 2018-07-10 LAB — INR BLD: 2.6

## 2018-07-10 PROCEDURE — 93793 ANTICOAG MGMT PT WARFARIN: CPT | Performed by: FAMILY MEDICINE

## 2018-07-11 RX ORDER — HYDROCODONE BITARTRATE AND ACETAMINOPHEN 5; 325 MG/1; MG/1
1 TABLET ORAL EVERY 8 HOURS PRN
Qty: 30 TABLET | Refills: 0 | Status: SHIPPED | OUTPATIENT
Start: 2018-07-13 | End: 2018-08-29 | Stop reason: SDUPTHER

## 2018-07-17 ENCOUNTER — ANTI-COAG VISIT (OUTPATIENT)
Dept: FAMILY MEDICINE CLINIC | Age: 78
End: 2018-07-17
Payer: MEDICARE

## 2018-07-17 DIAGNOSIS — I48.0 PAROXYSMAL ATRIAL FIBRILLATION (HCC): ICD-10-CM

## 2018-07-17 LAB — INR BLD: 2.5

## 2018-07-17 PROCEDURE — 93793 ANTICOAG MGMT PT WARFARIN: CPT | Performed by: FAMILY MEDICINE

## 2018-07-24 LAB — INR BLD: 2.1

## 2018-07-25 ENCOUNTER — ANTI-COAG VISIT (OUTPATIENT)
Dept: FAMILY MEDICINE CLINIC | Age: 78
End: 2018-07-25
Payer: MEDICARE

## 2018-07-25 DIAGNOSIS — I48.0 PAROXYSMAL ATRIAL FIBRILLATION (HCC): ICD-10-CM

## 2018-07-25 PROCEDURE — 93793 ANTICOAG MGMT PT WARFARIN: CPT | Performed by: FAMILY MEDICINE

## 2018-07-27 ENCOUNTER — HOSPITAL ENCOUNTER (OUTPATIENT)
Dept: LAB | Age: 78
Setting detail: SPECIMEN
Discharge: HOME | End: 2018-07-27
Payer: MEDICARE

## 2018-07-27 DIAGNOSIS — R73.01 IMPAIRED FASTING GLUCOSE: ICD-10-CM

## 2018-07-27 DIAGNOSIS — E03.2 HYPOTHYROIDISM DUE TO MEDICATION: ICD-10-CM

## 2018-07-27 DIAGNOSIS — E78.5 DYSLIPIDEMIA: ICD-10-CM

## 2018-07-27 DIAGNOSIS — I10 ESSENTIAL HYPERTENSION: ICD-10-CM

## 2018-07-27 LAB
ABSOLUTE EOS #: 0.3 K/UL (ref 0–0.4)
ABSOLUTE IMMATURE GRANULOCYTE: ABNORMAL K/UL (ref 0–0.3)
ABSOLUTE LYMPH #: 2 K/UL (ref 1–4.8)
ABSOLUTE MONO #: 0.8 K/UL (ref 0.1–1.2)
ALBUMIN SERPL-MCNC: 3.7 G/DL (ref 3.5–5.2)
ALBUMIN/GLOBULIN RATIO: 1.1 (ref 1–2.5)
ALP BLD-CCNC: 107 U/L (ref 35–104)
ALT SERPL-CCNC: 15 U/L (ref 5–33)
ANION GAP SERPL CALCULATED.3IONS-SCNC: 14 MMOL/L (ref 9–17)
AST SERPL-CCNC: 22 U/L
BASOPHILS # BLD: 0 % (ref 0–1)
BASOPHILS ABSOLUTE: 0 K/UL (ref 0–0.2)
BILIRUB SERPL-MCNC: 0.43 MG/DL (ref 0.3–1.2)
BUN BLDV-MCNC: 18 MG/DL (ref 8–23)
BUN/CREAT BLD: 25 (ref 9–20)
CALCIUM SERPL-MCNC: 9.2 MG/DL (ref 8.6–10.4)
CHLORIDE BLD-SCNC: 103 MMOL/L (ref 98–107)
CHOLESTEROL/HDL RATIO: 2.3
CHOLESTEROL: 109 MG/DL
CO2: 24 MMOL/L (ref 20–31)
CREAT SERPL-MCNC: 0.72 MG/DL (ref 0.5–0.9)
DIFFERENTIAL TYPE: ABNORMAL
EOSINOPHILS RELATIVE PERCENT: 4 % (ref 1–7)
ESTIMATED AVERAGE GLUCOSE: 100 MG/DL
GFR AFRICAN AMERICAN: >60 ML/MIN
GFR NON-AFRICAN AMERICAN: >60 ML/MIN
GFR SERPL CREATININE-BSD FRML MDRD: ABNORMAL ML/MIN/{1.73_M2}
GFR SERPL CREATININE-BSD FRML MDRD: ABNORMAL ML/MIN/{1.73_M2}
GLUCOSE BLD-MCNC: 96 MG/DL (ref 70–99)
HBA1C MFR BLD: 5.1 % (ref 4.8–5.9)
HCT VFR BLD CALC: 41 % (ref 36–46)
HDLC SERPL-MCNC: 48 MG/DL
HEMOGLOBIN: 13.3 G/DL (ref 12–16)
IMMATURE GRANULOCYTES: ABNORMAL %
LDL CHOLESTEROL: 40 MG/DL (ref 0–130)
LYMPHOCYTES # BLD: 24 % (ref 16–46)
MCH RBC QN AUTO: 31.8 PG (ref 26–34)
MCHC RBC AUTO-ENTMCNC: 32.6 G/DL (ref 31–37)
MCV RBC AUTO: 97.8 FL (ref 80–100)
MONOCYTES # BLD: 9 % (ref 4–11)
NRBC AUTOMATED: ABNORMAL PER 100 WBC
PDW BLD-RTO: 15.7 % (ref 11–14.5)
PLATELET # BLD: 148 K/UL (ref 140–450)
PLATELET ESTIMATE: ABNORMAL
PMV BLD AUTO: 12.8 FL (ref 6–12)
POTASSIUM SERPL-SCNC: 4 MMOL/L (ref 3.7–5.3)
RBC # BLD: 4.19 M/UL (ref 4–5.2)
RBC # BLD: ABNORMAL 10*6/UL
SEG NEUTROPHILS: 63 % (ref 43–77)
SEGMENTED NEUTROPHILS ABSOLUTE COUNT: 5.2 K/UL (ref 1.8–7.7)
SODIUM BLD-SCNC: 141 MMOL/L (ref 135–144)
THYROXINE, FREE: 1.59 NG/DL (ref 0.93–1.7)
TOTAL PROTEIN: 7 G/DL (ref 6.4–8.3)
TRIGL SERPL-MCNC: 106 MG/DL
TSH SERPL DL<=0.05 MIU/L-ACNC: 2.1 MIU/L (ref 0.3–5)
VLDLC SERPL CALC-MCNC: NORMAL MG/DL (ref 1–30)
WBC # BLD: 8.4 K/UL (ref 3.5–11)
WBC # BLD: ABNORMAL 10*3/UL

## 2018-07-27 PROCEDURE — 80053 COMPREHEN METABOLIC PANEL: CPT

## 2018-07-27 PROCEDURE — 36415 COLL VENOUS BLD VENIPUNCTURE: CPT

## 2018-07-27 PROCEDURE — 84439 ASSAY OF FREE THYROXINE: CPT

## 2018-07-27 PROCEDURE — 85025 COMPLETE CBC W/AUTO DIFF WBC: CPT

## 2018-07-27 PROCEDURE — 80061 LIPID PANEL: CPT

## 2018-07-27 PROCEDURE — 84443 ASSAY THYROID STIM HORMONE: CPT

## 2018-07-27 PROCEDURE — 83036 HEMOGLOBIN GLYCOSYLATED A1C: CPT

## 2018-07-31 ENCOUNTER — ANTI-COAG VISIT (OUTPATIENT)
Dept: FAMILY MEDICINE CLINIC | Age: 78
End: 2018-07-31
Payer: MEDICARE

## 2018-07-31 DIAGNOSIS — I48.0 PAROXYSMAL ATRIAL FIBRILLATION (HCC): ICD-10-CM

## 2018-07-31 LAB — INR BLD: 2

## 2018-07-31 PROCEDURE — 93793 ANTICOAG MGMT PT WARFARIN: CPT | Performed by: FAMILY MEDICINE

## 2018-08-07 ENCOUNTER — ANTI-COAG VISIT (OUTPATIENT)
Dept: FAMILY MEDICINE CLINIC | Age: 78
End: 2018-08-07
Payer: MEDICARE

## 2018-08-07 DIAGNOSIS — I48.0 PAROXYSMAL ATRIAL FIBRILLATION (HCC): ICD-10-CM

## 2018-08-07 LAB — INR BLD: 2.3

## 2018-08-07 PROCEDURE — 93793 ANTICOAG MGMT PT WARFARIN: CPT | Performed by: FAMILY MEDICINE

## 2018-08-14 LAB — INR BLD: 2.4

## 2018-08-15 ENCOUNTER — ANTI-COAG VISIT (OUTPATIENT)
Dept: FAMILY MEDICINE CLINIC | Age: 78
End: 2018-08-15
Payer: MEDICARE

## 2018-08-15 DIAGNOSIS — I48.0 PAROXYSMAL ATRIAL FIBRILLATION (HCC): ICD-10-CM

## 2018-08-15 PROCEDURE — 93793 ANTICOAG MGMT PT WARFARIN: CPT | Performed by: FAMILY MEDICINE

## 2018-08-21 LAB — INR BLD: 2.2

## 2018-08-22 ENCOUNTER — ANTI-COAG VISIT (OUTPATIENT)
Dept: FAMILY MEDICINE CLINIC | Age: 78
End: 2018-08-22
Payer: MEDICARE

## 2018-08-22 DIAGNOSIS — I48.0 PAROXYSMAL ATRIAL FIBRILLATION (HCC): ICD-10-CM

## 2018-08-22 PROCEDURE — 93793 ANTICOAG MGMT PT WARFARIN: CPT | Performed by: FAMILY MEDICINE

## 2018-08-27 RX ORDER — AMITRIPTYLINE HYDROCHLORIDE 25 MG/1
25 TABLET, FILM COATED ORAL NIGHTLY
Qty: 30 TABLET | Refills: 1 | Status: SHIPPED | OUTPATIENT
Start: 2018-08-27 | End: 2018-08-29 | Stop reason: ALTCHOICE

## 2018-08-27 RX ORDER — LEVOTHYROXINE SODIUM 0.05 MG/1
TABLET ORAL
Qty: 30 TABLET | Refills: 1 | Status: SHIPPED | OUTPATIENT
Start: 2018-08-27 | End: 2018-10-27 | Stop reason: SDUPTHER

## 2018-08-27 NOTE — TELEPHONE ENCOUNTER
Last Appt:  1/30/2018   Next Appt:   8/29/2018  Med verified in [facility] [address],   Last script snythroid was 5/23/18 for 3 months  Last script for Elavil 25mg was 4/17/18 for 3 months

## 2018-08-28 LAB — INR BLD: 2.2

## 2018-08-29 ENCOUNTER — OFFICE VISIT (OUTPATIENT)
Dept: FAMILY MEDICINE CLINIC | Age: 78
End: 2018-08-29
Payer: MEDICARE

## 2018-08-29 ENCOUNTER — ANTI-COAG VISIT (OUTPATIENT)
Dept: FAMILY MEDICINE CLINIC | Age: 78
End: 2018-08-29

## 2018-08-29 VITALS
DIASTOLIC BLOOD PRESSURE: 80 MMHG | HEART RATE: 68 BPM | HEIGHT: 65 IN | SYSTOLIC BLOOD PRESSURE: 138 MMHG | WEIGHT: 293 LBS | BODY MASS INDEX: 48.82 KG/M2 | RESPIRATION RATE: 18 BRPM

## 2018-08-29 DIAGNOSIS — E88.81 INSULIN RESISTANCE: ICD-10-CM

## 2018-08-29 DIAGNOSIS — F41.9 ANXIETY: ICD-10-CM

## 2018-08-29 DIAGNOSIS — Z00.00 ROUTINE GENERAL MEDICAL EXAMINATION AT A HEALTH CARE FACILITY: ICD-10-CM

## 2018-08-29 DIAGNOSIS — I48.0 PAROXYSMAL ATRIAL FIBRILLATION (HCC): ICD-10-CM

## 2018-08-29 DIAGNOSIS — Z23 NEED FOR PNEUMOCOCCAL VACCINATION: ICD-10-CM

## 2018-08-29 DIAGNOSIS — Z00.00 MEDICARE ANNUAL WELLNESS VISIT, SUBSEQUENT: ICD-10-CM

## 2018-08-29 DIAGNOSIS — F32.A DEPRESSION, UNSPECIFIED DEPRESSION TYPE: ICD-10-CM

## 2018-08-29 DIAGNOSIS — F51.01 PRIMARY INSOMNIA: ICD-10-CM

## 2018-08-29 DIAGNOSIS — E66.01 MORBID OBESITY (HCC): ICD-10-CM

## 2018-08-29 DIAGNOSIS — E03.2 HYPOTHYROIDISM DUE TO MEDICATION: ICD-10-CM

## 2018-08-29 DIAGNOSIS — M15.9 PRIMARY OSTEOARTHRITIS INVOLVING MULTIPLE JOINTS: ICD-10-CM

## 2018-08-29 DIAGNOSIS — I10 ESSENTIAL HYPERTENSION: Primary | ICD-10-CM

## 2018-08-29 DIAGNOSIS — E78.5 DYSLIPIDEMIA: ICD-10-CM

## 2018-08-29 PROCEDURE — G0439 PPPS, SUBSEQ VISIT: HCPCS | Performed by: FAMILY MEDICINE

## 2018-08-29 PROCEDURE — G8417 CALC BMI ABV UP PARAM F/U: HCPCS | Performed by: FAMILY MEDICINE

## 2018-08-29 PROCEDURE — 90732 PPSV23 VACC 2 YRS+ SUBQ/IM: CPT | Performed by: FAMILY MEDICINE

## 2018-08-29 PROCEDURE — G0009 ADMIN PNEUMOCOCCAL VACCINE: HCPCS | Performed by: FAMILY MEDICINE

## 2018-08-29 PROCEDURE — 1090F PRES/ABSN URINE INCON ASSESS: CPT | Performed by: FAMILY MEDICINE

## 2018-08-29 PROCEDURE — 93793 ANTICOAG MGMT PT WARFARIN: CPT | Performed by: FAMILY MEDICINE

## 2018-08-29 PROCEDURE — 1101F PT FALLS ASSESS-DOCD LE1/YR: CPT | Performed by: FAMILY MEDICINE

## 2018-08-29 PROCEDURE — G8399 PT W/DXA RESULTS DOCUMENT: HCPCS | Performed by: FAMILY MEDICINE

## 2018-08-29 PROCEDURE — 1036F TOBACCO NON-USER: CPT | Performed by: FAMILY MEDICINE

## 2018-08-29 PROCEDURE — 99214 OFFICE O/P EST MOD 30 MIN: CPT | Performed by: FAMILY MEDICINE

## 2018-08-29 PROCEDURE — 1123F ACP DISCUSS/DSCN MKR DOCD: CPT | Performed by: FAMILY MEDICINE

## 2018-08-29 PROCEDURE — 4040F PNEUMOC VAC/ADMIN/RCVD: CPT | Performed by: FAMILY MEDICINE

## 2018-08-29 PROCEDURE — G8427 DOCREV CUR MEDS BY ELIG CLIN: HCPCS | Performed by: FAMILY MEDICINE

## 2018-08-29 RX ORDER — FUROSEMIDE 20 MG/1
20 TABLET ORAL DAILY
Qty: 30 TABLET | Refills: 5 | Status: SHIPPED | OUTPATIENT
Start: 2018-08-29 | End: 2019-02-26 | Stop reason: SDUPTHER

## 2018-08-29 RX ORDER — HYDROCODONE BITARTRATE AND ACETAMINOPHEN 5; 325 MG/1; MG/1
1 TABLET ORAL EVERY 8 HOURS PRN
Qty: 30 TABLET | Refills: 0 | Status: SHIPPED | OUTPATIENT
Start: 2018-08-29 | End: 2018-09-28

## 2018-08-29 RX ORDER — TRAZODONE HYDROCHLORIDE 50 MG/1
50 TABLET ORAL NIGHTLY
Qty: 30 TABLET | Refills: 5 | Status: SHIPPED | OUTPATIENT
Start: 2018-08-29 | End: 2019-02-26 | Stop reason: SDUPTHER

## 2018-08-29 ASSESSMENT — PATIENT HEALTH QUESTIONNAIRE - PHQ9
SUM OF ALL RESPONSES TO PHQ QUESTIONS 1-9: 0
SUM OF ALL RESPONSES TO PHQ QUESTIONS 1-9: 0

## 2018-08-29 ASSESSMENT — ANXIETY QUESTIONNAIRES: GAD7 TOTAL SCORE: 0

## 2018-08-29 ASSESSMENT — LIFESTYLE VARIABLES: HOW OFTEN DO YOU HAVE A DRINK CONTAINING ALCOHOL: 0

## 2018-09-04 ENCOUNTER — ANTI-COAG VISIT (OUTPATIENT)
Dept: FAMILY MEDICINE CLINIC | Age: 78
End: 2018-09-04
Payer: MEDICARE

## 2018-09-04 DIAGNOSIS — I48.0 PAROXYSMAL ATRIAL FIBRILLATION (HCC): ICD-10-CM

## 2018-09-04 PROBLEM — F51.01 PRIMARY INSOMNIA: Status: ACTIVE | Noted: 2018-09-04

## 2018-09-04 LAB — INR BLD: 2.6

## 2018-09-04 PROCEDURE — 93793 ANTICOAG MGMT PT WARFARIN: CPT | Performed by: FAMILY MEDICINE

## 2018-09-04 ASSESSMENT — ENCOUNTER SYMPTOMS
SORE THROAT: 0
DIARRHEA: 0
NAUSEA: 0
COUGH: 0
EYE DISCHARGE: 0
SHORTNESS OF BREATH: 0
VOMITING: 0
TROUBLE SWALLOWING: 0
WHEEZING: 0
RHINORRHEA: 0
EYE REDNESS: 0
CONSTIPATION: 0
ABDOMINAL PAIN: 0
SINUS PRESSURE: 0

## 2018-09-04 NOTE — PROGRESS NOTES
2018     Eve Quintero (:  1940) is a 65 y.o. female, here for evaluation of the following medical concerns:    HPI  Patient comes in today for follow up of chronic health issues   Chief Complaint   Patient presents with    Medicare AWV     subsequent; last 17    Hypertension     6 mo f/u    Hyperlipidemia     6 mo f/u    Hypothyroidism     6 mo f/u    Blood Sugar Problem     insulin resistance; 6 mo f/u    Atrial Fibrillation     6 mo f/u    Arthritis     OA; 6 mo f/u    Discuss Labs     drawn 18    Insomnia     able to get to sleep but doesn't stay asleep-med not working   . Patient Seems to be doing relatively well overall. Her biggest concern today is that she does doesn't sleep well at night. She is on amitriptyline and states that it does help her fall asleep but she is not able to stay asleep. Just doesn't feel that it's providing her with enough benefit. We can try different medication to help with her sleep patterns and she seems agreeable with this plan. Patient does have ongoing issues with chronic osteoarthritic pain. Is unable to take anti-inflammatories secondary to her being on Coumadin therapy. As a result she is taking Tylenol for less severe pain but will intermittently use Norco when needed for more severe pain issues. Uses the Norco very sparingly she's not really sure that it provides her with significant benefit. Sometimes when the pain is really severe it will help tone down the level of pain but doesn't completely alleviate her pain issues. She does have a known history of hypertension which is stable and controlled on her current medical therapy. As a known history of hyperlipidemia and her cholesterol levels are stable and adequately controlled with her current statin medication. Does have known hypothyroidism and her thyroid levels are adequately supplemented on her current thyroid dose.   Has a known history of impaired fasting glucose MEQ extended release tablet Take 1 tablet by mouth daily  Flor Meneses DO        Social History   Substance Use Topics    Smoking status: Never Smoker    Smokeless tobacco: Never Used      Comment: sakina rrt 7/19/17    Alcohol use No        Vitals:    08/29/18 1521   BP: 138/80   Site: Left Arm  Comment: radial   Position: Sitting   Cuff Size: Medium Adult   Pulse: 68   Resp: 18   Weight: (!) 333 lb (151 kg)   Height: 5' 5\" (1.651 m)     Estimated body mass index is 55.41 kg/m² as calculated from the following:    Height as of this encounter: 5' 5\" (1.651 m). Weight as of this encounter: 333 lb (151 kg). Physical Exam   Constitutional: She is oriented to person, place, and time. She appears well-developed and well-nourished. No distress. HENT:   Head: Normocephalic and atraumatic. Right Ear: External ear normal.   Left Ear: External ear normal.   Nose: Nose normal.   Mouth/Throat: Oropharynx is clear and moist. No oropharyngeal exudate. Eyes: Pupils are equal, round, and reactive to light. Conjunctivae and EOM are normal. Right eye exhibits no discharge. Left eye exhibits no discharge. Neck: Normal range of motion. Neck supple. No thyromegaly present. Cardiovascular: Normal rate, regular rhythm and normal heart sounds. Pulmonary/Chest: Effort normal and breath sounds normal. She has no wheezes. She has no rales. Abdominal: Soft. Bowel sounds are normal.   Musculoskeletal: She exhibits no edema. Lymphadenopathy:     She has no cervical adenopathy. Neurological: She is alert and oriented to person, place, and time. Skin: Skin is warm and dry. No rash noted. She is not diaphoretic. Psychiatric: She has a normal mood and affect. Her behavior is normal. Judgment and thought content normal.   Nursing note and vitals reviewed. ASSESSMENT/PLAN:  1. Essential hypertension  Stable and controlled on her current medical therapy.   No changes are made at this time  - CBC Auto Differential; Future  - Comprehensive Metabolic Panel; Future    2. Insulin resistance  Stable and controlled with dietary efforts. Encouraged continued low carb/low sugar diet and routine exercise to keep this optimally controlled. 3. Dyslipidemia  And controlled on her current statin medication. Continued current med dosing.  - Lipid Panel; Future    4. Hypothyroidism due to medication  Stable and controlled with continued use of Synthroid. - TSH without Reflex; Future  - T4, Free; Future    5. Depression, unspecified depression type  Stable at this time. 6. Anxiety  Stable at this time without medical therapy. 7. Paroxysmal atrial fibrillation (HCC)  Rate controlled with current medical therapy. Appropriately anticoagulated with Coumadin therapy. 8. Morbid obesity ([address]  Weight loss is discussed. 9. Need for pneumococcal vaccination  Immunization given today. - Pneumococcal polysaccharide vaccine 23-valent greater than or equal to 1yo subcutaneous/IM    10. Primary osteoarthritis involving multiple joints  Patient is given a prescription for Norco to use for more severe pain. Can use Tylenol for less severe pain.  - HYDROcodone-acetaminophen (NORCO) 5-325 MG per tablet; Take 1 tablet by mouth every 8 hours as needed for Pain for up to 30 days. .  Dispense: 30 tablet; Refill: 0    11. Medicare annual wellness visit, subsequent  Preventative protocol review as noted on separate annual wellness visit note. 12. Routine general medical examination at a health care facility  As above. 13. Insomnia  Did add trazodone therapy and discontinued amitriptyline as she did not feel this was helping with her sleep patterns.     Orders Placed This Encounter   Procedures    Pneumococcal polysaccharide vaccine 23-valent greater than or equal to 1yo subcutaneous/IM    CBC Auto Differential     To be done in 6 months     Standing Status:   Future     Standing Expiration Date:   8/29/2019   [facility] Comprehensive Metabolic Panel     To be done in 6 months     Standing Status:   Future     Standing Expiration Date:   8/29/2019    Lipid Panel     To be done in 6 months     Standing Status:   Future     Standing Expiration Date:   8/29/2019     Order Specific Question:   Is Patient Fasting?/# of Hours     Answer:   12 hours    TSH without Reflex     To be done in 6 months     Standing Status:   Future     Standing Expiration Date:   8/29/2019    T4, Free     To be done in 6 months     Standing Status:   Future     Standing Expiration Date:   8/29/2019     Orders Placed This Encounter   Medications    furosemide (LASIX) 20 MG tablet     Sig: Take 1 tablet by mouth daily     Dispense:  30 tablet     Refill:  5    HYDROcodone-acetaminophen (NORCO) 5-325 MG per tablet     Sig: Take 1 tablet by mouth every 8 hours as needed for Pain for up to 30 days. .     Dispense:  30 tablet     Refill:  0    traZODone (DESYREL) 50 MG tablet     Sig: Take 1 tablet by mouth nightly     Dispense:  30 tablet     Refill:  5     Discontinue elavil. Sending trazodone in it's place    SHINGRIX 50 MCG SUSR injection     Sig: Inject 0.5 mLs into the muscle once for 1 dose Give 0.5mL IM     Dispense:  1 each     Refill:  1       Patient is to return to my office in 6 months duration or sooner if any further problems or symptoms arise. (Please note that portions of this note were completed with a voice recognition program. Efforts were made to edit the dictations but occasionally words are mis-transcribed.)      Return in about 6 months (around 2/28/2019). An electronic signature was used to authenticate this note.     --Justino Jones DO on 9/4/2018 at 8:22 AM

## 2018-09-10 RX ORDER — WARFARIN SODIUM 2.5 MG/1
TABLET ORAL
Qty: 225 TABLET | Refills: 0 | Status: ON HOLD | OUTPATIENT
Start: 2018-09-10 | End: 2018-10-09 | Stop reason: HOSPADM

## 2018-09-11 LAB — INR BLD: 2.1

## 2018-09-12 ENCOUNTER — ANTI-COAG VISIT (OUTPATIENT)
Dept: FAMILY MEDICINE CLINIC | Age: 78
End: 2018-09-12
Payer: MEDICARE

## 2018-09-12 DIAGNOSIS — I48.0 PAROXYSMAL ATRIAL FIBRILLATION (HCC): ICD-10-CM

## 2018-09-12 PROCEDURE — 93793 ANTICOAG MGMT PT WARFARIN: CPT | Performed by: FAMILY MEDICINE

## 2018-09-18 LAB — INR BLD: 1.8

## 2018-09-19 ENCOUNTER — ANTI-COAG VISIT (OUTPATIENT)
Dept: FAMILY MEDICINE CLINIC | Age: 78
End: 2018-09-19
Payer: MEDICARE

## 2018-09-19 DIAGNOSIS — I48.0 PAROXYSMAL ATRIAL FIBRILLATION (HCC): ICD-10-CM

## 2018-09-19 PROCEDURE — 93793 ANTICOAG MGMT PT WARFARIN: CPT | Performed by: FAMILY MEDICINE

## 2018-09-21 ENCOUNTER — APPOINTMENT (OUTPATIENT)
Dept: CT IMAGING | Age: 78
End: 2018-09-21
Payer: MEDICARE

## 2018-09-21 ENCOUNTER — HOSPITAL ENCOUNTER (EMERGENCY)
Age: 78
Discharge: ANOTHER HOSPITAL | End: 2018-09-22
Attending: EMERGENCY MEDICINE
Payer: MEDICARE

## 2018-09-21 ENCOUNTER — APPOINTMENT (OUTPATIENT)
Dept: GENERAL RADIOLOGY | Age: 78
End: 2018-09-21
Payer: MEDICARE

## 2018-09-21 VITALS
BODY MASS INDEX: 45.99 KG/M2 | RESPIRATION RATE: 18 BRPM | HEART RATE: 57 BPM | HEIGHT: 67 IN | DIASTOLIC BLOOD PRESSURE: 80 MMHG | TEMPERATURE: 97.9 F | WEIGHT: 293 LBS | SYSTOLIC BLOOD PRESSURE: 163 MMHG | OXYGEN SATURATION: 97 %

## 2018-09-21 DIAGNOSIS — Z79.01 ANTICOAGULATED ON COUMADIN: ICD-10-CM

## 2018-09-21 DIAGNOSIS — S01.01XA LACERATION OF SCALP, INITIAL ENCOUNTER: ICD-10-CM

## 2018-09-21 DIAGNOSIS — S22.41XA CLOSED FRACTURE OF MULTIPLE RIBS OF RIGHT SIDE, INITIAL ENCOUNTER: ICD-10-CM

## 2018-09-21 DIAGNOSIS — N39.0 URINARY TRACT INFECTION WITHOUT HEMATURIA, SITE UNSPECIFIED: ICD-10-CM

## 2018-09-21 DIAGNOSIS — S09.90XA INJURY OF HEAD, INITIAL ENCOUNTER: Primary | ICD-10-CM

## 2018-09-21 LAB
-: ABNORMAL
ABSOLUTE EOS #: 0.2 K/UL (ref 0–0.4)
ABSOLUTE IMMATURE GRANULOCYTE: ABNORMAL K/UL (ref 0–0.3)
ABSOLUTE LYMPH #: 1.4 K/UL (ref 1–4.8)
ABSOLUTE MONO #: 0.7 K/UL (ref 0.1–1.2)
AMORPHOUS: ABNORMAL
ANION GAP SERPL CALCULATED.3IONS-SCNC: 14 MMOL/L (ref 9–17)
BACTERIA: ABNORMAL
BASOPHILS # BLD: 1 % (ref 0–1)
BASOPHILS ABSOLUTE: 0 K/UL (ref 0–0.2)
BILIRUBIN URINE: NEGATIVE
BUN BLDV-MCNC: 26 MG/DL (ref 8–23)
BUN/CREAT BLD: 30 (ref 9–20)
CALCIUM SERPL-MCNC: 9.5 MG/DL (ref 8.6–10.4)
CASTS UA: ABNORMAL /LPF (ref 0–2)
CHLORIDE BLD-SCNC: 102 MMOL/L (ref 98–107)
CO2: 25 MMOL/L (ref 20–31)
COLOR: ABNORMAL
COMMENT UA: ABNORMAL
CREAT SERPL-MCNC: 0.88 MG/DL (ref 0.5–0.9)
CRYSTALS, UA: ABNORMAL /HPF
DIFFERENTIAL TYPE: ABNORMAL
EOSINOPHILS RELATIVE PERCENT: 2 % (ref 1–7)
EPITHELIAL CELLS UA: ABNORMAL /HPF (ref 0–5)
GFR AFRICAN AMERICAN: >60 ML/MIN
GFR NON-AFRICAN AMERICAN: >60 ML/MIN
GFR SERPL CREATININE-BSD FRML MDRD: ABNORMAL ML/MIN/{1.73_M2}
GFR SERPL CREATININE-BSD FRML MDRD: ABNORMAL ML/MIN/{1.73_M2}
GLUCOSE BLD-MCNC: 105 MG/DL (ref 70–99)
GLUCOSE URINE: NEGATIVE
HCT VFR BLD CALC: 39.3 % (ref 36–46)
HEMOGLOBIN: 12.9 G/DL (ref 12–16)
IMMATURE GRANULOCYTES: ABNORMAL %
INR BLD: 1.9
KETONES, URINE: NEGATIVE
LEUKOCYTE ESTERASE, URINE: NEGATIVE
LYMPHOCYTES # BLD: 20 % (ref 16–46)
MCH RBC QN AUTO: 31.4 PG (ref 26–34)
MCHC RBC AUTO-ENTMCNC: 32.9 G/DL (ref 31–37)
MCV RBC AUTO: 95.6 FL (ref 80–100)
MONOCYTES # BLD: 10 % (ref 4–11)
MUCUS: ABNORMAL
NITRITE, URINE: POSITIVE
NRBC AUTOMATED: ABNORMAL PER 100 WBC
OTHER OBSERVATIONS UA: ABNORMAL
PARTIAL THROMBOPLASTIN TIME: 34.8 SEC (ref 27–35)
PDW BLD-RTO: 16 % (ref 11–14.5)
PH UA: 6 (ref 5–6)
PLATELET # BLD: 126 K/UL (ref 140–450)
PLATELET ESTIMATE: ABNORMAL
PMV BLD AUTO: 12.3 FL (ref 6–12)
POTASSIUM SERPL-SCNC: 3.8 MMOL/L (ref 3.7–5.3)
PROTEIN UA: NEGATIVE
PROTHROMBIN TIME: 18.8 SEC (ref 9.4–11.3)
RBC # BLD: 4.11 M/UL (ref 4–5.2)
RBC # BLD: ABNORMAL 10*6/UL
RBC UA: ABNORMAL /HPF (ref 0–4)
RENAL EPITHELIAL, UA: ABNORMAL /HPF
SEG NEUTROPHILS: 67 % (ref 43–77)
SEGMENTED NEUTROPHILS ABSOLUTE COUNT: 4.6 K/UL (ref 1.8–7.7)
SODIUM BLD-SCNC: 141 MMOL/L (ref 135–144)
SPECIFIC GRAVITY UA: 1.02 (ref 1.01–1.02)
TRICHOMONAS: ABNORMAL
TURBIDITY: ABNORMAL
URINE HGB: NEGATIVE
UROBILINOGEN, URINE: NORMAL
WBC # BLD: 6.8 K/UL (ref 3.5–11)
WBC # BLD: ABNORMAL 10*3/UL
WBC UA: ABNORMAL /HPF (ref 0–4)
YEAST: ABNORMAL

## 2018-09-21 PROCEDURE — 6360000004 HC RX CONTRAST MEDICATION: Performed by: EMERGENCY MEDICINE

## 2018-09-21 PROCEDURE — 80048 BASIC METABOLIC PNL TOTAL CA: CPT

## 2018-09-21 PROCEDURE — 70450 CT HEAD/BRAIN W/O DYE: CPT

## 2018-09-21 PROCEDURE — 2709999900 CT ABDOMEN PELVIS W IV CONTRAST

## 2018-09-21 PROCEDURE — 71101 X-RAY EXAM UNILAT RIBS/CHEST: CPT

## 2018-09-21 PROCEDURE — 12001 RPR S/N/AX/GEN/TRNK 2.5CM/<: CPT

## 2018-09-21 PROCEDURE — 81001 URINALYSIS AUTO W/SCOPE: CPT

## 2018-09-21 PROCEDURE — 85610 PROTHROMBIN TIME: CPT

## 2018-09-21 PROCEDURE — 85025 COMPLETE CBC W/AUTO DIFF WBC: CPT

## 2018-09-21 PROCEDURE — 72125 CT NECK SPINE W/O DYE: CPT

## 2018-09-21 PROCEDURE — 36415 COLL VENOUS BLD VENIPUNCTURE: CPT

## 2018-09-21 PROCEDURE — 2580000003 HC RX 258: Performed by: EMERGENCY MEDICINE

## 2018-09-21 PROCEDURE — 6370000000 HC RX 637 (ALT 250 FOR IP): Performed by: EMERGENCY MEDICINE

## 2018-09-21 PROCEDURE — 87086 URINE CULTURE/COLONY COUNT: CPT

## 2018-09-21 PROCEDURE — 99285 EMERGENCY DEPT VISIT HI MDM: CPT

## 2018-09-21 PROCEDURE — 85730 THROMBOPLASTIN TIME PARTIAL: CPT

## 2018-09-21 RX ORDER — 0.9 % SODIUM CHLORIDE 0.9 %
1000 INTRAVENOUS SOLUTION INTRAVENOUS ONCE
Status: DISCONTINUED | OUTPATIENT
Start: 2018-09-21 | End: 2018-09-21

## 2018-09-21 RX ORDER — LIDOCAINE HYDROCHLORIDE AND EPINEPHRINE 10; 10 MG/ML; UG/ML
20 INJECTION, SOLUTION INFILTRATION; PERINEURAL ONCE
Status: DISCONTINUED | OUTPATIENT
Start: 2018-09-21 | End: 2018-09-22 | Stop reason: HOSPADM

## 2018-09-21 RX ORDER — NITROFURANTOIN 25; 75 MG/1; MG/1
100 CAPSULE ORAL ONCE
Status: COMPLETED | OUTPATIENT
Start: 2018-09-22 | End: 2018-09-21

## 2018-09-21 RX ORDER — 0.9 % SODIUM CHLORIDE 0.9 %
500 INTRAVENOUS SOLUTION INTRAVENOUS ONCE
Status: COMPLETED | OUTPATIENT
Start: 2018-09-21 | End: 2018-09-21

## 2018-09-21 RX ADMIN — SODIUM CHLORIDE 500 ML: 9 INJECTION, SOLUTION INTRAVENOUS at 20:52

## 2018-09-21 RX ADMIN — IOPAMIDOL 100 ML: 755 INJECTION, SOLUTION INTRAVENOUS at 21:34

## 2018-09-21 RX ADMIN — NITROFURANTOIN MONOHYDRATE AND NITROFURANTOIN MACROCRYSTALLINE 100 MG: 75; 25 CAPSULE ORAL at 23:56

## 2018-09-21 ASSESSMENT — PAIN DESCRIPTION - FREQUENCY: FREQUENCY: CONTINUOUS

## 2018-09-21 ASSESSMENT — PAIN SCALES - GENERAL: PAINLEVEL_OUTOF10: 5

## 2018-09-21 ASSESSMENT — PAIN DESCRIPTION - DESCRIPTORS: DESCRIPTORS: ACHING

## 2018-09-21 ASSESSMENT — PAIN DESCRIPTION - LOCATION: LOCATION: RIB CAGE

## 2018-09-21 ASSESSMENT — PAIN DESCRIPTION - PAIN TYPE: TYPE: ACUTE PAIN

## 2018-09-21 ASSESSMENT — PAIN DESCRIPTION - ORIENTATION: ORIENTATION: RIGHT

## 2018-09-21 NOTE — ED TRIAGE NOTES
Patient presents to the ED after falling on her pouch at home. Son states she got her cane caught on a rug which caused her to fall onto her right side hitting her head. No LOC. Skin tear noted to right arm. Small laceration noted to posterior scalp. Bleeding controlled. Patient states she fells \"shaken up\". Patient also states her rib cage on the right side is hurting. Bruising noted to area. No other questions or concerns at this time. Call light within reach.

## 2018-09-22 ENCOUNTER — HOSPITAL ENCOUNTER (EMERGENCY)
Age: 78
Discharge: HOME | End: 2018-09-22
Attending: EMERGENCY MEDICINE | Admitting: SURGERY
Payer: MEDICARE

## 2018-09-22 VITALS
RESPIRATION RATE: 16 BRPM | DIASTOLIC BLOOD PRESSURE: 61 MMHG | TEMPERATURE: 97.6 F | BODY MASS INDEX: 45.99 KG/M2 | SYSTOLIC BLOOD PRESSURE: 127 MMHG | OXYGEN SATURATION: 97 % | WEIGHT: 293 LBS | HEIGHT: 67 IN | HEART RATE: 62 BPM
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
## 2018-09-22 DIAGNOSIS — N39.0 URINARY TRACT INFECTION WITHOUT HEMATURIA, SITE UNSPECIFIED: ICD-10-CM

## 2018-09-22 DIAGNOSIS — S22.41XA CLOSED FRACTURE OF MULTIPLE RIBS OF RIGHT SIDE, INITIAL ENCOUNTER: ICD-10-CM

## 2018-09-22 DIAGNOSIS — S09.90XA CLOSED HEAD INJURY, INITIAL ENCOUNTER: ICD-10-CM

## 2018-09-22 PROBLEM — S22.49XA MULTIPLE CLOSED FRACTURES OF RIBS: Status: ACTIVE | Noted: 2018-09-22

## 2018-09-22 LAB
ABSOLUTE EOS #: 0.16 K/UL (ref 0–0.44)
ABSOLUTE IMMATURE GRANULOCYTE: <0.03 K/UL (ref 0–0.3)
ABSOLUTE LYMPH #: 1.37 K/UL (ref 1.1–3.7)
ABSOLUTE MONO #: 0.67 K/UL (ref 0.1–1.2)
ANION GAP SERPL CALCULATED.3IONS-SCNC: 8 MMOL/L (ref 9–17)
BASOPHILS # BLD: 0 % (ref 0–2)
BASOPHILS ABSOLUTE: 0.03 K/UL (ref 0–0.2)
BUN BLDV-MCNC: 18 MG/DL (ref 8–23)
BUN/CREAT BLD: ABNORMAL (ref 9–20)
CALCIUM SERPL-MCNC: 8.6 MG/DL (ref 8.6–10.4)
CHLORIDE BLD-SCNC: 108 MMOL/L (ref 98–107)
CO2: 26 MMOL/L (ref 20–31)
CREAT SERPL-MCNC: 0.7 MG/DL (ref 0.5–0.9)
DIFFERENTIAL TYPE: ABNORMAL
EOSINOPHILS RELATIVE PERCENT: 2 % (ref 1–4)
GFR AFRICAN AMERICAN: >60 ML/MIN
GFR NON-AFRICAN AMERICAN: >60 ML/MIN
GFR SERPL CREATININE-BSD FRML MDRD: ABNORMAL ML/MIN/{1.73_M2}
GFR SERPL CREATININE-BSD FRML MDRD: ABNORMAL ML/MIN/{1.73_M2}
GLUCOSE BLD-MCNC: 104 MG/DL (ref 70–99)
HCT VFR BLD CALC: 38.6 % (ref 36.3–47.1)
HEMOGLOBIN: 12 G/DL (ref 11.9–15.1)
IMMATURE GRANULOCYTES: 0 %
LYMPHOCYTES # BLD: 20 % (ref 24–43)
MCH RBC QN AUTO: 30.2 PG (ref 25.2–33.5)
MCHC RBC AUTO-ENTMCNC: 31.1 G/DL (ref 28.4–34.8)
MCV RBC AUTO: 97.2 FL (ref 82.6–102.9)
MONOCYTES # BLD: 10 % (ref 3–12)
NRBC AUTOMATED: 0 PER 100 WBC
PDW BLD-RTO: 14.9 % (ref 11.8–14.4)
PLATELET # BLD: ABNORMAL K/UL (ref 138–453)
PLATELET ESTIMATE: ABNORMAL
PLATELET, FLUORESCENCE: 125 K/UL (ref 138–453)
PLATELET, IMMATURE FRACTION: 12.9 % (ref 1.1–10.3)
PMV BLD AUTO: ABNORMAL FL (ref 8.1–13.5)
POTASSIUM SERPL-SCNC: 3.6 MMOL/L (ref 3.7–5.3)
RBC # BLD: 3.97 M/UL (ref 3.95–5.11)
RBC # BLD: ABNORMAL 10*6/UL
SEG NEUTROPHILS: 67 % (ref 36–65)
SEGMENTED NEUTROPHILS ABSOLUTE COUNT: 4.59 K/UL (ref 1.5–8.1)
SODIUM BLD-SCNC: 142 MMOL/L (ref 135–144)
WBC # BLD: 6.8 K/UL (ref 3.5–11.3)
WBC # BLD: ABNORMAL 10*3/UL

## 2018-09-22 PROCEDURE — 85055 RETICULATED PLATELET ASSAY: CPT

## 2018-09-22 PROCEDURE — 99284 EMERGENCY DEPT VISIT MOD MDM: CPT

## 2018-09-22 PROCEDURE — 6370000000 HC RX 637 (ALT 250 FOR IP): Performed by: STUDENT IN AN ORGANIZED HEALTH CARE EDUCATION/TRAINING PROGRAM

## 2018-09-22 PROCEDURE — 80048 BASIC METABOLIC PNL TOTAL CA: CPT

## 2018-09-22 PROCEDURE — 85025 COMPLETE CBC W/AUTO DIFF WBC: CPT

## 2018-09-22 RX ORDER — LEVOTHYROXINE SODIUM 0.05 MG/1
50 TABLET ORAL DAILY
Status: DISCONTINUED | OUTPATIENT
Start: 2018-09-22 | End: 2018-09-22 | Stop reason: HOSPADM

## 2018-09-22 RX ORDER — OXYCODONE HYDROCHLORIDE 5 MG/1
10 TABLET ORAL EVERY 4 HOURS PRN
Status: DISCONTINUED | OUTPATIENT
Start: 2018-09-22 | End: 2018-09-22 | Stop reason: HOSPADM

## 2018-09-22 RX ORDER — OXYCODONE HYDROCHLORIDE 5 MG/1
5 TABLET ORAL EVERY 4 HOURS PRN
Status: DISCONTINUED | OUTPATIENT
Start: 2018-09-22 | End: 2018-09-22 | Stop reason: HOSPADM

## 2018-09-22 RX ORDER — ATORVASTATIN CALCIUM 40 MG/1
40 TABLET, FILM COATED ORAL DAILY
Status: DISCONTINUED | OUTPATIENT
Start: 2018-09-22 | End: 2018-09-22 | Stop reason: HOSPADM

## 2018-09-22 RX ORDER — NITROFURANTOIN 25; 75 MG/1; MG/1
100 CAPSULE ORAL ONCE
Status: COMPLETED | OUTPATIENT
Start: 2018-09-22 | End: 2018-09-22

## 2018-09-22 RX ORDER — GINSENG 100 MG
CAPSULE ORAL 3 TIMES DAILY
Status: CANCELLED | OUTPATIENT
Start: 2018-09-22

## 2018-09-22 RX ORDER — SODIUM CHLORIDE 0.9 % (FLUSH) 0.9 %
10 SYRINGE (ML) INJECTION EVERY 12 HOURS SCHEDULED
Status: CANCELLED | OUTPATIENT
Start: 2018-09-22

## 2018-09-22 RX ORDER — OXYCODONE HYDROCHLORIDE 5 MG/1
5 TABLET ORAL EVERY 4 HOURS PRN
Qty: 15 TABLET | Refills: 0 | Status: SHIPPED | OUTPATIENT
Start: 2018-09-22 | End: 2018-09-27

## 2018-09-22 RX ORDER — ONDANSETRON 2 MG/ML
4 INJECTION INTRAMUSCULAR; INTRAVENOUS EVERY 6 HOURS PRN
Status: DISCONTINUED | OUTPATIENT
Start: 2018-09-22 | End: 2018-09-22 | Stop reason: HOSPADM

## 2018-09-22 RX ORDER — ACETAMINOPHEN 325 MG/1
650 TABLET ORAL EVERY 6 HOURS
Status: DISCONTINUED | OUTPATIENT
Start: 2018-09-22 | End: 2018-09-22 | Stop reason: HOSPADM

## 2018-09-22 RX ORDER — DILTIAZEM HYDROCHLORIDE 240 MG/1
240 CAPSULE, COATED, EXTENDED RELEASE ORAL DAILY
Status: DISCONTINUED | OUTPATIENT
Start: 2018-09-22 | End: 2018-09-22 | Stop reason: HOSPADM

## 2018-09-22 RX ORDER — AMIODARONE HYDROCHLORIDE 200 MG/1
200 TABLET ORAL DAILY
Status: DISCONTINUED | OUTPATIENT
Start: 2018-09-22 | End: 2018-09-22 | Stop reason: HOSPADM

## 2018-09-22 RX ORDER — FAMOTIDINE 20 MG/1
20 TABLET, FILM COATED ORAL 2 TIMES DAILY
Status: DISCONTINUED | OUTPATIENT
Start: 2018-09-22 | End: 2018-09-22 | Stop reason: HOSPADM

## 2018-09-22 RX ORDER — OXYCODONE HYDROCHLORIDE 5 MG/1
5 TABLET ORAL EVERY 4 HOURS PRN
Qty: 15 TABLET | Refills: 0 | Status: CANCELLED | OUTPATIENT
Start: 2018-09-22 | End: 2018-09-27

## 2018-09-22 RX ORDER — POTASSIUM CHLORIDE 20 MEQ/1
20 TABLET, EXTENDED RELEASE ORAL DAILY
Status: CANCELLED | OUTPATIENT
Start: 2018-09-22

## 2018-09-22 RX ORDER — NITROFURANTOIN 25; 75 MG/1; MG/1
100 CAPSULE ORAL 2 TIMES DAILY
Qty: 6 CAPSULE | Refills: 0 | Status: SHIPPED | OUTPATIENT
Start: 2018-09-22 | End: 2018-09-25

## 2018-09-22 RX ORDER — LIDOCAINE 50 MG/G
1 PATCH TOPICAL DAILY
Status: CANCELLED | OUTPATIENT
Start: 2018-09-22 | End: 2018-10-02

## 2018-09-22 RX ORDER — ACETAMINOPHEN 325 MG/1
325 TABLET ORAL EVERY 6 HOURS PRN
Qty: 60 TABLET | Refills: 0 | Status: ON HOLD | OUTPATIENT
Start: 2018-09-22 | End: 2018-11-09

## 2018-09-22 RX ORDER — FUROSEMIDE 20 MG/1
20 TABLET ORAL DAILY
Status: DISCONTINUED | OUTPATIENT
Start: 2018-09-22 | End: 2018-09-22 | Stop reason: HOSPADM

## 2018-09-22 RX ORDER — SODIUM CHLORIDE 0.9 % (FLUSH) 0.9 %
10 SYRINGE (ML) INJECTION PRN
Status: CANCELLED | OUTPATIENT
Start: 2018-09-22

## 2018-09-22 RX ADMIN — ATORVASTATIN CALCIUM 40 MG: 40 TABLET, FILM COATED ORAL at 09:44

## 2018-09-22 RX ADMIN — METOPROLOL TARTRATE 25 MG: 25 TABLET ORAL at 09:44

## 2018-09-22 RX ADMIN — LEVOTHYROXINE SODIUM 50 MCG: 50 TABLET ORAL at 09:44

## 2018-09-22 RX ADMIN — OXYCODONE HYDROCHLORIDE 10 MG: 5 TABLET ORAL at 14:01

## 2018-09-22 RX ADMIN — DILTIAZEM HYDROCHLORIDE 240 MG: 240 CAPSULE, COATED, EXTENDED RELEASE ORAL at 09:44

## 2018-09-22 RX ADMIN — NITROFURANTOIN (MONOHYDRATE/MACROCRYSTALS) 100 MG: 75; 25 CAPSULE ORAL at 17:09

## 2018-09-22 RX ADMIN — AMIODARONE HYDROCHLORIDE 200 MG: 200 TABLET ORAL at 09:44

## 2018-09-22 ASSESSMENT — ENCOUNTER SYMPTOMS
EYE REDNESS: 0
VOMITING: 0
APNEA: 0
NAUSEA: 0
SHORTNESS OF BREATH: 0
DIARRHEA: 0
ABDOMINAL PAIN: 0
RHINORRHEA: 0
COUGH: 0
VOMITING: 0
EYE PAIN: 0
BLOOD IN STOOL: 0
SHORTNESS OF BREATH: 0
WHEEZING: 0
NAUSEA: 0
SINUS PAIN: 0

## 2018-09-22 ASSESSMENT — PAIN SCALES - GENERAL
PAINLEVEL_OUTOF10: 7
PAINLEVEL_OUTOF10: 2

## 2018-09-22 ASSESSMENT — PAIN DESCRIPTION - LOCATION: LOCATION: RIB CAGE

## 2018-09-22 NOTE — ED PROVIDER NOTES
[facility] ED  Emergency Department  Emergency Medicine Resident Sign-out     Care of Eve Quintero was assumed from Dr. Fariha Gonzalez and is being seen for Fall; Head Laceration (staple placed by defiance ); and Arm Injury (skin tear to right arm)  . The patient's initial evaluation and plan have been discussed with the prior provider who initially evaluated the patient.      EMERGENCY DEPARTMENT COURSE / MEDICAL DECISION MAKING:       MEDICATIONS GIVEN:  Orders Placed This Encounter   Medications    amiodarone (CORDARONE) tablet 200 mg    atorvastatin (LIPITOR) tablet 40 mg    diltiazem (CARDIZEM CD) extended release capsule 240 mg    furosemide (LASIX) tablet 20 mg    levothyroxine (SYNTHROID) tablet 50 mcg    metoprolol tartrate (LOPRESSOR) tablet 25 mg    acetaminophen (TYLENOL) tablet 650 mg    magnesium hydroxide (MILK OF MAGNESIA) 400 MG/5ML suspension 30 mL    ondansetron (ZOFRAN) injection 4 mg    OR Linked Order Group     oxyCODONE (ROXICODONE) immediate release tablet 5 mg     oxyCODONE (ROXICODONE) immediate release tablet 10 mg    famotidine (PEPCID) tablet 20 mg       LABS / RADIOLOGY:     Labs Reviewed   BASIC METABOLIC PANEL W/ REFLEX TO MG FOR LOW K  - Abnormal; Notable for the following:        Result Value    Glucose 104 (*)     Potassium 3.6 (*)     Chloride 108 (*)     Anion Gap 8 (*)     All other components within normal limits   CBC WITH AUTO DIFFERENTIAL - Abnormal; Notable for the following:     RDW 14.9 (*)     Seg Neutrophils 67 (*)     Lymphocytes 20 (*)     All other components within normal limits   IMMATURE PLATELET FRACTION - Abnormal; Notable for the following:     Platelet, Immature Fraction 12.9 (*)     Platelet, Fluorescence 125 (*)     All other components within normal limits       Xr Ribs Right Include Chest (min 3 Views)    Result Date: 9/21/2018  EXAMINATION: FIVE XRAY VIEWS OF THE RIGHT RIBS WITH FRONTAL XRAY VIEW OF THE CHEST 9/21/2018 9:51 Lytic changes noted within L5 and sacrum. Total hip arthroplasty on the left. Multiple nondisplaced rib fractures are noted on the right. Multiple rib fractures on the right. 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 Recommend follow-up MRI of the lumbar spine with contrast non emergently. Fluid collection right buttock consistent with hematoma. RECENT VITALS:     Temp: 97.6 °F (36.4 °C),  Pulse: 62, Resp: 16, BP: 127/61, SpO2: 97 %    This patient is a 65 y.o. Female with him def[place] for fall. Scalp lac. Transfer for trauma evaluation. Patient will be discharged from ED for trauma. Patient will need prescription for antibiotic. OUTSTANDING TASKS / RECOMMENDATIONS:    1. Pending discharge. FINAL IMPRESSION:     1. Fall, initial encounter    2. Closed fracture of multiple ribs of right side, initial encounter    3. Closed head injury, initial encounter    4.  Urinary tract infection without hematuria, site unspecified        DISPOSITION:         DISPOSITION:  [x]  Discharge   []  Transfer -    []  Admission -     []  Against Medical Advice   []  Eloped   FOLLOW-UP: Gopalrenae Oseas   [id number] [facility]  [phone]           DISCHARGE MEDICATIONS: New Prescriptions    No medications on file          Christi Bond MD  Emergency Medicine Resident  [address] [place] [address]       Christi Bond MD  09/23/18 [id number]

## 2018-09-22 NOTE — ED PROVIDER NOTES
admitted the patient. Further management per trauma. PROCEDURES:  None     CONSULTS:  None    CRITICAL CARE:  None     FINAL IMPRESSION      1. Fall, initial encounter          DISPOSITION / [address]    Admitted to Trauma Service    PATIENT REFERRED TO:  No follow-up provider specified.     DISCHARGE MEDICATIONS:  New Prescriptions    No medications on file       Maria Esther Marley MD  Emergency Medicine Resident    (Please note that portions of this note were completed with a voice recognition program.  Efforts were made to edit the dictations but occasionally words are mis-transcribed.)        Maria Esther Marley MD  Resident  09/22/18 [id number]

## 2018-09-22 NOTE — H&P
HYDROcodone-acetaminophen (NORCO) 5-325 MG per tablet Take 1 tablet by mouth every 8 hours as needed for Pain for up to 30 days. . 8/29/18 9/28/18  Conchis Lay, DO   traZODone (DESYREL) 50 MG tablet Take 1 tablet by mouth nightly 8/29/18   Conchis Lay, DO   levothyroxine (SYNTHROID) 50 MCG tablet TAKE 1 TABLET BY MOUTH ONE TIME A DAY  8/27/18   Molly Bro MD   atorvastatin (LIPITOR) 40 MG tablet TAKE ONE TABLET BY MOUTH NIGHTLY 5/4/18   Conchis Lay, DO   amiodarone (CORDARONE) 200 MG tablet TAKE 1 TABLET BY MOUTH ONE TIME A DAY  4/25/18   Conchis Lay, DO   diltiazem (CARDIZEM CD) 240 MG extended release capsule TAKE 1 CAPSULE BY MOUTH ONE TIME A DAY  4/25/18   Conchis Lay, DO   metoprolol tartrate (LOPRESSOR) 25 MG tablet TAKE ONE TABLET BY MOUTH TWICE A DAY 3/2/18   Conchis Lay, DO   potassium chloride (KLOR-CON M) 20 MEQ extended release tablet Take 1 tablet by mouth daily 12/5/17   Conchis Lay, DO       ALLERGIES:   []  None    []   Information not available due to exam limitations documented above   Pcn [penicillins] and Bextra [valdecoxib]    PAST MEDICAL HISTORY: []  None   []   Information not available due to exam limitations documented above    has a past medical history of A-fib [facility]); CHF (congestive heart failure) ([facility] [address] Dyslipidemia; FH: total abdominal hysterectomy and bilateral salpingo-oophorectomy; Glucose intolerance (impaired glucose tolerance); Hypertension; Obesity; and Osteoarthritis. has a past surgical history that includes kenyon and bso (cervix removed); kenyon and bso (cervix removed) (1966); Cholecystectomy (1960s); Varicose vein surgery (Right, 1960s); Hip Arthroplasty (Left); Knee Arthroplasty (Left); Knee Arthroplasty (Right); and Cardiac catheterization (09/05/2017). FAMILY HISTORY   []   Information not available due to exam limitations documented above    family history includes High Blood Pressure in her brother.     SOCIAL HISTORY  [] to person, place and time, well developed and well- nourished, in no acute distress  Skin: warm and dry, small skin tear to right arm with dressing in place - bleeding controlled  Head: normocephalic and laceration to right scalp repaired with staple at outlying facility  Eyes: pupils equal, round, and reactive to light, extraocular eye movements intact, conjunctivae normal  ENT: tympanic membrane, external ear and ear canal normal bilaterally, nose without deformity, nasal mucosa and turbinates normal without polyps  Neck: supple and non-tender without mass, no thyromegaly or thyroid nodules, no cervical lymphadenopathy  Pulmonary/Chest: clear to auscultation bilaterally- no wheezes, rales or rhonchi, normal air movement, no respiratory distress, tender to palpation of right chest wall  Cardiovascular: normal rate, regular rhythm, normal S1 and S2, no murmurs, rubs, clicks, or gallops, distal pulses intact, no carotid bruits  Abdomen: soft, non-tender, non-distended, normal bowel sounds, no masses or organomegaly  Extremities: no cyanosis, clubbing, bilateral lower extremity swelling with 1+ pitting edema and chronic skin changes  Musculoskeletal: normal range of motion, no joint swelling, deformity or tenderness, no midline cervical, thoracic, lumbar tenderness no step-offs or deformities, pelvis stable  Neurologic: reflexes normal and symmetric, no cranial nerve deficit, gait, coordination and speech normal, motor and sensation intact in bilateral upper and lower extremities           RADIOLOGY  See outside images above    LABS    Labs Reviewed   BASIC METABOLIC PANEL W/ REFLEX TO MG FOR LOW K  - Abnormal; Notable for the following:        Result Value    Glucose 104 (*)     Potassium 3.6 (*)     Chloride 108 (*)     Anion Gap 8 (*)     All other components within normal limits   CBC WITH AUTO DIFFERENTIAL - Abnormal; Notable for the following:     RDW 14.9 (*)     Seg Neutrophils 67 (*)     Lymphocytes 20 (*) All other components within normal limits   IMMATURE PLATELET FRACTION - Abnormal; Notable for the following:     Platelet, Immature Fraction 12.9 (*)     Platelet, Fluorescence 125 (*)     All other components within normal limits         Arnoldo Ayon DO  9/22/18, 2:46 AM      Trauma Attending Attestation      I have reviewed the above TECSS note(s) and confirmed the key elements of the medical history and physical exam. I have seen and examined the pt. I have discussed the findings, established the care plan and recommendations with Resident, GCS RN, bedside nurse. I personally reviewed any images in real time to expedite the patient's care.         Nancy Gould MD  9/22/2018  10:36 PM

## 2018-09-22 NOTE — PROGRESS NOTES
Trauma Tertiary Survey    Admit Date: 9/22/2018  Hospital day 0    Fall from standing height        Past Medical History:   Diagnosis Date   [facility]) 07/19/2017    CHF (congestive heart failure) (HCC)     Dyslipidemia     FH: total abdominal hysterectomy and bilateral salpingo-oophorectomy 1966    Glucose intolerance (impaired glucose tolerance)     Hypertension     Obesity     Osteoarthritis        Scheduled Meds:   amiodarone  200 mg Oral Daily    atorvastatin  40 mg Oral Daily    diltiazem  240 mg Oral Daily    furosemide  20 mg Oral Daily    levothyroxine  50 mcg Oral Daily    metoprolol tartrate  25 mg Oral BID    acetaminophen  650 mg Oral Q6H    famotidine  20 mg Oral BID     Continuous Infusions:  PRN Meds:magnesium hydroxide, ondansetron, oxyCODONE **OR** oxyCODONE    Subjective:     Patient complains of right sided chest wall pain. The patient denies any shortness of breath, abdominal pain, or weakness/pain in her extremities. Objective:   Patient Vitals for the past 8 hrs:   BP Temp Temp src Pulse Resp SpO2 Height Weight   09/22/18 0702 (!) 133/52 - - 65 23 95 % - -   09/22/18 0631 (!) 112/49 - - 59 12 - - -   09/22/18 0602 (!) 90/51 - - 60 12 - - -   09/22/18 0532 (!) 107/49 - - 60 13 - - -   09/22/18 0502 (!) 107/48 - - 61 13 - - -   09/22/18 0431 (!) 114/46 - - 60 11 - - -   09/22/18 0402 (!) 119/40 - - 63 13 95 % - -   09/22/18 0332 (!) 122/48 - - 61 14 92 % - -   09/22/18 0302 131/61 - - 66 18 92 % - -   09/22/18 0231 (!) 128/51 - - 60 13 92 % - -   09/22/18 0148 (!) 114/93 97.6 °F (36.4 °C) Oral 63 13 93 % 5' 7\" (1.702 m) (!) 320 lb (145.2 kg)       No intake/output data recorded. No intake/output data recorded.     Radiology:    PHYSICAL EXAM:   GCS:  4 - Opens eyes on own   6 - Follows simple motor commands  5 - Alert and oriented    Pupil size:  Left 3 mm Right 3 mm  Pupil reaction: Yes  Wiggles fingers: Left Yes Right Yes  Hand grasp:   Left normal   Right

## 2018-09-22 NOTE — ED PROVIDER NOTES
[facility] ED  Emergency Department  Emergency Medicine Resident Sign-out     Care of Berenda Moritz was assumed from Dr. Ananya Navarro and is being seen for Fall; Head Laceration (staple placed by [facility] ); and Arm Injury (skin tear to right arm)  . The patient's initial evaluation and plan have been discussed with the prior provider who initially evaluated the patient.      EMERGENCY DEPARTMENT COURSE / MEDICAL DECISION MAKING:       MEDICATIONS GIVEN:  Orders Placed This Encounter   Medications    amiodarone (CORDARONE) tablet 200 mg    atorvastatin (LIPITOR) tablet 40 mg    diltiazem (CARDIZEM CD) extended release capsule 240 mg    furosemide (LASIX) tablet 20 mg    levothyroxine (SYNTHROID) tablet 50 mcg    metoprolol tartrate (LOPRESSOR) tablet 25 mg    acetaminophen (TYLENOL) tablet 650 mg    magnesium hydroxide (MILK OF MAGNESIA) 400 MG/5ML suspension 30 mL    ondansetron (ZOFRAN) injection 4 mg    OR Linked Order Group     oxyCODONE (ROXICODONE) immediate release tablet 5 mg     oxyCODONE (ROXICODONE) immediate release tablet 10 mg    famotidine (PEPCID) tablet 20 mg       LABS / RADIOLOGY:     Labs Reviewed   BASIC METABOLIC PANEL W/ REFLEX TO MG FOR LOW K  - Abnormal; Notable for the following:        Result Value    Glucose 104 (*)     Potassium 3.6 (*)     Chloride 108 (*)     Anion Gap 8 (*)     All other components within normal limits   CBC WITH AUTO DIFFERENTIAL - Abnormal; Notable for the following:     RDW 14.9 (*)     Seg Neutrophils 67 (*)     Lymphocytes 20 (*)     All other components within normal limits   IMMATURE PLATELET FRACTION - Abnormal; Notable for the following:     Platelet, Immature Fraction 12.9 (*)     Platelet, Fluorescence 125 (*)     All other components within normal limits       Xr Ribs Right Include Chest (min 3 Views)    Result Date: 9/21/2018  EXAMINATION: FIVE XRAY VIEWS OF THE RIGHT RIBS WITH FRONTAL XRAY VIEW OF THE CHEST 9/21/2018 9:51 pm COMPARISON: None. HISTORY: ORDERING SYSTEM PROVIDED HISTORY: trauma / right rib pain TECHNOLOGIST PROVIDED HISTORY: trauma / right rib pain Ordering Physician Provided Reason for Exam: Fall; rt sided rib pain Acuity: Acute Type of Exam: Initial FINDINGS: No acute rib fracture identified. Visualized lungs are clear. No pneumothorax. Heart size is normal. No significant pleural effusion. No acute rib fracture identified. Ct Head Wo Contrast    Result Date: 9/21/2018  EXAMINATION: CT OF THE HEAD WITHOUT CONTRAST  9/21/2018 9:17 pm TECHNIQUE: CT of the head was performed without the administration of intravenous contrast. Dose modulation, iterative reconstruction, and/or weight based adjustment of the mA/kV was utilized to reduce the radiation dose to as low as reasonably achievable. COMPARISON: None. HISTORY: ORDERING SYSTEM PROVIDED HISTORY: post head lac / on coumadin TECHNOLOGIST PROVIDED HISTORY: Ordering Physician Provided Reason for Exam: Trauma, fall, posterior head laceration, on coumadin Acuity: Acute Type of Exam: Initial FINDINGS: BRAIN/VENTRICLES: There is no acute intracranial hemorrhage, mass effect or midline shift. No abnormal extra-axial fluid collection. The gray-white differentiation is maintained without evidence of an acute infarct. There is no evidence of hydrocephalus. ORBITS: The visualized portion of the orbits demonstrate no acute abnormality. SINUSES: Opacification left maxillary sinus. SOFT TISSUES/SKULL:  Subcutaneous hematoma right parietal scalp. No acute intracranial abnormality. Ct Cervical Spine Wo Contrast    Result Date: 9/21/2018  EXAMINATION: CT OF THE CERVICAL SPINE WITHOUT CONTRAST 9/21/2018 9:20 pm TECHNIQUE: CT of the cervical spine was performed without the administration of intravenous contrast. Multiplanar reformatted images are provided for review.  Dose modulation, iterative reconstruction, and/or weight based adjustment of the mA/kV was utilized to reduce the radiation dose to as low as reasonably achievable. COMPARISON: None. HISTORY: ORDERING SYSTEM PROVIDED HISTORY: trauma TECHNOLOGIST PROVIDED HISTORY: Ordering Physician Provided Reason for Exam: fall; neck pain Acuity: Acute Type of Exam: Initial FINDINGS: BONES/ALIGNMENT: There is no evidence of an acute cervical spine fracture. There is normal alignment of the cervical spine. DEGENERATIVE CHANGES: No significant degenerative changes. SOFT TISSUES: There is no prevertebral soft tissue swelling. No acute abnormality of the cervical spine. Ct Abdomen Pelvis W Iv Contrast    Result Date: 9/21/2018  EXAMINATION: CT OF THE ABDOMEN AND PELVIS WITH CONTRAST 9/21/2018 9:34 pm TECHNIQUE: CT of the abdomen and pelvis was performed with the administration of intravenous contrast. Multiplanar reformatted images are provided for review. Dose modulation, iterative reconstruction, and/or weight based adjustment of the mA/kV was utilized to reduce the radiation dose to as low as reasonably achievable. COMPARISON: None. HISTORY: ORDERING SYSTEM PROVIDED HISTORY: trauma / fall / contusion RUQ with tenderness / on coumadin TECHNOLOGIST PROVIDED HISTORY: IV contrast only Ordering Physician Provided Reason for Exam: Trauma, fall, contusion of RUQ with tenderness, on coumadin; hx of hysterectomy, cholecystectomy Acuity: Acute Type of Exam: Initial FINDINGS: Lower Chest: Moderate cardiomegaly. Organs: The abdominal wall appears normal. The liver, spleen, pancreas, and adrenals appear normal.  Gallbladder not well visualized. Kidneys appear normal. The bladder appears normal. GI/Bowel: The stomache,small bowel, and colon appear normal. Appendix normal. Small hiatal hernia. Pelvis: Subcutaneous fluid collection right bile duct measuring 52 x 150 mm. Peritoneum/Retroperitoneum: The abdominal aorta and iliac arteries are normal in caliber. There is no pathologic adenopathy. Bones/Soft Tissues: Anterior subluxation L4-5. Lytic changes noted within L5 and sacrum. Total hip arthroplasty on the left. Multiple nondisplaced rib fractures are noted on the right. Multiple rib fractures on the right. 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 Recommend follow-up MRI of the lumbar spine with contrast non emergently. Fluid collection right buttock consistent with hematoma. RECENT VITALS:     Temp: 97.6 °F (36.4 °C),  Pulse: 62, Resp: 16, BP: 127/61, SpO2: 97 %    This patient is a 65 y.o. Female who presents status post fall. Patient was found to have the scalp laceration rib fractures. Patient was transferred from an outlying facility. She is transferred trauma evaluation. Patient admitted to the trauma service. 4:00 PM  Patient's been boarding down here for quite some time. At this point, trauma is okay with plans for discharge. Currently awaiting approval from the attending. Patient care signed out to Dr. Vinita Ha / RECOMMENDATIONS:    1. Follow up with Trauma recommendations     FINAL IMPRESSION:     1. Fall, initial encounter    2. Closed fracture of multiple ribs of right side, initial encounter    3. Closed head injury, initial encounter    4.  Urinary tract infection without hematuria, site unspecified        DISPOSITION:         DISPOSITION:  [x]  Discharge   []  Transfer -    []  Admission -     []  Against Medical Advice   []  Eloped   FOLLOW-UP: Marilia Mcconnell DO  [id number] [facility]  [phone]           DISCHARGE MEDICATIONS: New Prescriptions    No medications on file           Kaylee Kern MD  Emergency Medicine Resident  [facility]       Kaylee Kern MD  Resident  09/22/18 0493

## 2018-09-22 NOTE — ED PROVIDER NOTES
Documentation of the HPI, Physical Exam and Medical Decision Making performed by medical students or scribes is based on my personal performance of the HPI, PE and MDM. For Phys Assistant/ Nurse Practitioner cases/documentation I have had a face to face evaluation of this patient and have completed at least one if not all key elements of the E/M (history, physical exam, and MDM). Additional findings are as noted. For APC cases I have personally evaluated and examined the patient in conjunction with the APC and agree with the treatment plan and disposition of the patient as recorded by the APC.     Yarely Cai MD  Attending Emergency  Physician        Park Zarate MD  09/22/18 2019

## 2018-09-22 NOTE — PROGRESS NOTES
Incentive Spirometry education and demonstration given by Respiratory Therapy. Pt achieving 1500 mL at time of instruction. Pt instructed on cough and splinting techniques as well.       Raymonda Dinning  8:08 AM

## 2018-09-22 NOTE — CONSULTS
Brandon Gather, DO   furosemide (LASIX) 20 MG tablet Take 1 tablet by mouth daily 8/29/18   Tia Giles, DO   HYDROcodone-acetaminophen (NORCO) 5-325 MG per tablet Take 1 tablet by mouth every 8 hours as needed for Pain for up to 30 days. . 8/29/18 9/28/18  Tia Giles, DO   traZODone (DESYREL) 50 MG tablet Take 1 tablet by mouth nightly 8/29/18   Tia Giles, DO   levothyroxine (SYNTHROID) 50 MCG tablet TAKE 1 TABLET BY MOUTH ONE TIME A DAY  8/27/18   Emily Claudio MD   atorvastatin (LIPITOR) 40 MG tablet TAKE ONE TABLET BY MOUTH NIGHTLY 5/4/18   Tia Giles, DO   amiodarone (CORDARONE) 200 MG tablet TAKE 1 TABLET BY MOUTH ONE TIME A DAY  4/25/18   Tia Giles, DO   diltiazem (CARDIZEM CD) 240 MG extended release capsule TAKE 1 CAPSULE BY MOUTH ONE TIME A DAY  4/25/18   Tia Giles, DO   metoprolol tartrate (LOPRESSOR) 25 MG tablet TAKE ONE TABLET BY MOUTH TWICE A DAY 3/2/18   Tia Giles, DO   potassium chloride (KLOR-CON M) 20 MEQ extended release tablet Take 1 tablet by mouth daily 12/5/17   Tia Giles, DO     Allergies:    Pcn [penicillins] and Bextra [valdecoxib]    Social History:   Social History     Social History    Marital status:      Spouse name: N/A    Number of children: N/A    Years of education: N/A     Social History Main Topics    Smoking status: Never Smoker    Smokeless tobacco: Never Used      Comment: sakina rrt 7/19/17    Alcohol use No    Drug use: No    Sexual activity: Not on file     Other Topics Concern    Not on file     Social History Narrative    No narrative on file     Family History:  Family History   Problem Relation Age of Onset    High Blood Pressure Brother      REVIEW OF SYSTEMS:   Constitutional: Negative for fever and chills. HENT: Negative for congestion. Eyes: Negative for blurred vision and double vision. Respiratory: Negative for cough, shortness of breath and wheezing.     Cardiovascular: Negative

## 2018-09-22 NOTE — FLOWSHEET NOTE
[address] [facility] [address]     Emergency/Trauma Note    PATIENT NAME: Eve Quintero    Shift date: 9/21/18  Shift day: Friday   Shift # 2    Room # 14/14   Name: Eve Quintero            Age: 65 y.o. Gender: female          [employer]: None [employer]  Place of [employer]:     Trauma/Incident type: Adult Trauma Consult  Admit Date & Time: 9/22/2018  1:42 AM        PATIENT/EVENT DESCRIPTION:  Eve Quintero is a 65 y.o. female who arrived via EMS from [address] [place] [address] [facility]. Patient was out on here prorch and her cane \"got stuck\" and the patient fell crack open her head and fracturing some ribs. . Pt to be admitted to 14/14. SPIRITUAL ASSESSMENT/INTERVENTION:  Patient lives alone but her granddaughter lives 2 doors down the street, other family members are also nearby.  provided a listening presence then before leaving  prayed with the patient. PATIENT BELONGINGS:  With patient    ANY BELONGINGS OF SIGNIFICANT VALUE NOTED:  None noted    REGISTRATION STAFF NOTIFIED?   Yes      WHAT IS YOUR SPIRITUAL CARE PLAN FOR THIS PATIENT?:  Spiritual Care is ready to provide spiritual and emotional support       09/22/18 0253   Encounter Summary   Services provided to: Patient   Referral/Consult From: Multi-disciplinary team   Support System Family members   Place of [employer] ([address])   Continue Visiting (*9/21/18)   Complexity of Encounter High   Length of Encounter 15 minutes   Spiritual Assessment Completed Yes   Crisis   Type Trauma   Assessment Calm;Passive   Intervention Active listening;Explored coping resources;Nurtured hope;Prayer   Outcome Expressed gratitude       Electronically signed by Karl Gottron Resident, on 9/22/2018 at 2:56 AM.  [address]  [phone]

## 2018-09-22 NOTE — ED PROVIDER NOTES
[facility] ED  [place] [address]  Phone: [phone]  eMERGENCY dEPARTMENT eNCOUnter      Pt Name: Viviane Talamantes  MRN: [id number]  [place] 1940  Date of evaluation: 9/22/18      CHIEF COMPLAINT       Chief Complaint   Patient presents with    Fall    Head Laceration     posterior    Arm Injury     right, skin tear         HISTORY OF PRESENT ILLNESS    Viviane Talamantes is a 65 y.o. female who presents Today after a fall. She got her cane caught on a rug when going out on the porch. She fell striking the back of her head and onto her right side. She has some bleeding from her posterior scalp in her right arm. She also complains of some pain on the right side and flank. She denies loss of consciousness no visual changes no nausea no vomiting denies any neck pain or back pain. The patient is on Coumadin for atrial fibrillation. REVIEW OF SYSTEMS     Review of Systems   Constitutional: Negative for fever. HENT: Negative for congestion. Respiratory: Negative for shortness of breath. Cardiovascular: Negative for chest pain. Gastrointestinal: Negative for nausea and vomiting. Genitourinary: Positive for flank pain. Skin: Positive for wound. Negative for rash. Neurological: Negative for light-headedness and headaches. Psychiatric/Behavioral: Negative for confusion. All other systems reviewed and are negative. PAST MEDICAL HISTORY    has a past medical history of A-fib [facility]); CHF (congestive heart failure) ([facility] [address] Dyslipidemia; FH: total abdominal hysterectomy and bilateral salpingo-oophorectomy; Glucose intolerance (impaired glucose tolerance); Hypertension; Obesity; and Osteoarthritis. SURGICAL HISTORY      has a past surgical history that includes kenyon and bso (cervix removed); kenyon and bso (cervix removed) (1966); Cholecystectomy (1960s); Varicose vein surgery (Right, 1960s);  Hip Arthroplasty (Left); Knee Arthroplasty (Left); Knee Arthroplasty 9/21/2018  EXAMINATION: CT OF THE CERVICAL SPINE WITHOUT CONTRAST 9/21/2018 9:20 pm TECHNIQUE: CT of the cervical spine was performed without the administration of intravenous contrast. Multiplanar reformatted images are provided for review. Dose modulation, iterative reconstruction, and/or weight based adjustment of the mA/kV was utilized to reduce the radiation dose to as low as reasonably achievable. COMPARISON: None. HISTORY: ORDERING SYSTEM PROVIDED HISTORY: trauma TECHNOLOGIST PROVIDED HISTORY: Ordering Physician Provided Reason for Exam: fall; neck pain Acuity: Acute Type of Exam: Initial FINDINGS: BONES/ALIGNMENT: There is no evidence of an acute cervical spine fracture. There is normal alignment of the cervical spine. DEGENERATIVE CHANGES: No significant degenerative changes. SOFT TISSUES: There is no prevertebral soft tissue swelling. No acute abnormality of the cervical spine. Ct Abdomen Pelvis W Iv Contrast    Result Date: 9/21/2018  EXAMINATION: CT OF THE ABDOMEN AND PELVIS WITH CONTRAST 9/21/2018 9:34 pm TECHNIQUE: CT of the abdomen and pelvis was performed with the administration of intravenous contrast. Multiplanar reformatted images are provided for review. Dose modulation, iterative reconstruction, and/or weight based adjustment of the mA/kV was utilized to reduce the radiation dose to as low as reasonably achievable. COMPARISON: None. HISTORY: ORDERING SYSTEM PROVIDED HISTORY: trauma / fall / contusion RUQ with tenderness / on coumadin TECHNOLOGIST PROVIDED HISTORY: IV contrast only Ordering Physician Provided Reason for Exam: Trauma, fall, contusion of RUQ with tenderness, on coumadin; hx of hysterectomy, cholecystectomy Acuity: Acute Type of Exam: Initial FINDINGS: Lower Chest: Moderate cardiomegaly. Organs: The abdominal wall appears normal. The liver, spleen, pancreas, and adrenals appear normal.  Gallbladder not well visualized.  Kidneys appear normal. The bladder appears normal. - 10.4 mg/dL    Sodium 141 135 - 144 mmol/L    Potassium 3.8 3.7 - 5.3 mmol/L    Chloride 102 98 - 107 mmol/L    CO2 25 20 - 31 mmol/L    Anion Gap 14 9 - 17 mmol/L    GFR Non-African American >60 >60 mL/min    GFR African American >60 >60 mL/min    GFR Comment          GFR Staging NOT REPORTED    APTT   Result Value Ref Range    PTT 34.8 27.0 - 35.0 sec   Protime-INR   Result Value Ref Range    Protime 18.8 (H) 9.4 - 11.3 sec    INR 1.9    Urinalysis with Microscopic   Result Value Ref Range    Color, UA NOT REPORTED YEL    Turbidity UA NOT REPORTED CLEAR    Glucose, Ur NEGATIVE NEG    Bilirubin Urine NEGATIVE NEG    Ketones, Urine NEGATIVE NEG    Specific Gravity, UA 1.020 1.010 - 1.025    Urine Hgb NEGATIVE NEG    pH, UA 6.0 5.0 - 6.0    Protein, UA NEGATIVE NEG    Urobilinogen, Urine Normal NORM    Nitrite, Urine POSITIVE (A) NEG    Leukocyte Esterase, Urine NEGATIVE NEG    Urinalysis Comments NOT REPORTED     -          WBC, UA 0 TO 4 0 - 4 /HPF    RBC, UA None 0 - 4 /HPF    Casts UA NOT REPORTED 0 - 2 /LPF    Crystals UA NOT REPORTED NONE /HPF    Epithelial Cells UA 0 TO 4 0 - 5 /HPF    Renal Epithelial, Urine NOT REPORTED 0 /HPF    Bacteria, UA 4+ (A) NONE    Mucus, UA NOT REPORTED NONE    Trichomonas, UA NOT REPORTED NONE    Amorphous, UA NOT REPORTED NONE    Other Observations UA Specimen Cultured (A) NREQ    Yeast, UA NOT REPORTED NONE       EMERGENCY DEPARTMENT COURSE:   Vitals:    Vitals:    09/21/18 2043 09/21/18 2147 09/21/18 2202 09/21/18 2329   BP: (!) 162/75 132/78 (!) 148/71 (!) 163/80   Pulse: 62  62 57   Resp: 18  18    Temp:       TempSrc:       SpO2: 95%  98% 97%   Weight:       Height:         -------------------------  BP: (!) 163/80, Temp: 97.9 °F (36.6 °C), Pulse: 57, Resp: 18      CONSULTS:  Radiology, accepting emergency medicine physician     PROCEDURES:  None    FINAL IMPRESSION      1. Injury of head, initial encounter    2. Laceration of scalp, initial encounter    3.  Closed fracture

## 2018-09-22 NOTE — Clinical Note
Patient Class: Inpatient [101]   REQUIRED: Diagnosis: Multiple closed fractures of ribs [140816]   Estimated Length of Stay: Estimated stay of more than 2 midnights   Future Attending Provider: Georgiana Stratton [[id number]]   Admitting Provider: Georgiana Stratton [[id number]]   Telemetry Bed Required?: Yes

## 2018-09-23 LAB
CULTURE: NORMAL
Lab: NORMAL
SPECIMEN DESCRIPTION: NORMAL
STATUS: NORMAL

## 2018-09-25 ENCOUNTER — ANTI-COAG VISIT (OUTPATIENT)
Dept: FAMILY MEDICINE CLINIC | Age: 78
End: 2018-09-25
Payer: MEDICARE

## 2018-09-25 ENCOUNTER — TELEPHONE (OUTPATIENT)
Dept: FAMILY MEDICINE CLINIC | Age: 78
End: 2018-09-25

## 2018-09-25 DIAGNOSIS — I48.0 PAROXYSMAL ATRIAL FIBRILLATION (HCC): ICD-10-CM

## 2018-09-25 LAB — INR BLD: 1.7

## 2018-09-25 PROCEDURE — 93793 ANTICOAG MGMT PT WARFARIN: CPT | Performed by: FAMILY MEDICINE

## 2018-09-25 NOTE — TELEPHONE ENCOUNTER
Typically staples are removed 1 week after they are put in. She can come to the office or urgent care if needed.

## 2018-09-25 NOTE — TELEPHONE ENCOUNTER
Patient made aware. This happened on 9/22/2018 and she is leaving for out of town on Friday 9/28/2018. She will come into UC on 9/27/2018 to see if ready to come out.

## 2018-09-27 ENCOUNTER — OFFICE VISIT (OUTPATIENT)
Dept: PRIMARY CARE CLINIC | Age: 78
End: 2018-09-27
Payer: MEDICARE

## 2018-09-27 VITALS
TEMPERATURE: 98 F | SYSTOLIC BLOOD PRESSURE: 132 MMHG | DIASTOLIC BLOOD PRESSURE: 82 MMHG | HEART RATE: 74 BPM | OXYGEN SATURATION: 98 %

## 2018-09-27 DIAGNOSIS — S01.01XA LACERATION OF SCALP WITHOUT FOREIGN BODY, INITIAL ENCOUNTER: Primary | ICD-10-CM

## 2018-09-27 DIAGNOSIS — Z48.02 VISIT FOR SUTURE REMOVAL: ICD-10-CM

## 2018-09-27 DIAGNOSIS — Z23 NEED FOR TETANUS BOOSTER: ICD-10-CM

## 2018-09-27 PROCEDURE — 1101F PT FALLS ASSESS-DOCD LE1/YR: CPT | Performed by: FAMILY MEDICINE

## 2018-09-27 PROCEDURE — 99212 OFFICE O/P EST SF 10 MIN: CPT | Performed by: FAMILY MEDICINE

## 2018-09-27 PROCEDURE — G8417 CALC BMI ABV UP PARAM F/U: HCPCS | Performed by: FAMILY MEDICINE

## 2018-09-27 PROCEDURE — 1036F TOBACCO NON-USER: CPT | Performed by: FAMILY MEDICINE

## 2018-09-27 PROCEDURE — 1123F ACP DISCUSS/DSCN MKR DOCD: CPT | Performed by: FAMILY MEDICINE

## 2018-09-27 PROCEDURE — 4040F PNEUMOC VAC/ADMIN/RCVD: CPT | Performed by: FAMILY MEDICINE

## 2018-09-27 PROCEDURE — G8399 PT W/DXA RESULTS DOCUMENT: HCPCS | Performed by: FAMILY MEDICINE

## 2018-09-27 PROCEDURE — G8427 DOCREV CUR MEDS BY ELIG CLIN: HCPCS | Performed by: FAMILY MEDICINE

## 2018-09-27 PROCEDURE — 90714 TD VACC NO PRESV 7 YRS+ IM: CPT | Performed by: FAMILY MEDICINE

## 2018-09-27 PROCEDURE — 90471 IMMUNIZATION ADMIN: CPT | Performed by: FAMILY MEDICINE

## 2018-09-27 PROCEDURE — 1090F PRES/ABSN URINE INCON ASSESS: CPT | Performed by: FAMILY MEDICINE

## 2018-09-27 NOTE — PROGRESS NOTES
[facility]             [address], [address]                        Telephone [phone]             Fax [phone]       Christopher Leos  1940  MRN:  [id number]  Date of visit:  9/27/18    Subjective: Christopher Leos is a 65 y.o.  female who presents  to [facility] today (9/27/18) for follow up/evaluation of:  Suture / Staple Removal (head )      She was seen at [facility] ER on 9/22/2018 after a fall at home. She was diagnosed with rib fractures, and a scalp laceration. She had one staple in the scalp. She was actually transferred to [facility] and stayed one night. She is here today for removal of the staple. She did not receive a Tetanus vaccination. She has the following problem list:  Patient Active Problem List   Diagnosis    Healthcare maintenance    HTN (hypertension)    Insulin resistance    Dyslipidemia    Osteoarthritis    Osteoporosis    Medicare annual wellness visit, initial    Medicare annual wellness visit, subsequent    Depression    Anxiety    Paroxysmal atrial fibrillation ([address]    CHF (congestive heart failure) ([address]    Hypothyroidism due to medication    Morbid obesity ([address]    Primary insomnia    Multiple closed fractures of ribs    UTI (urinary tract infection)        Current medications are:  Outpatient Prescriptions Marked as Taking for the 9/27/18 encounter (Office Visit) with Vickey Chávez MD   Medication Sig Dispense Refill    oxyCODONE (ROXICODONE) 5 MG immediate release tablet Take 1 tablet by mouth every 4 hours as needed for Pain for up to 5 days. Keith Casiano Date: 9/22/18 15 tablet 0    acetaminophen (TYLENOL) 325 MG tablet Take 1 tablet by mouth every 6 hours as needed for Pain 60 tablet 0    warfarin (COUMADIN) 2.5 MG tablet TAKE 2 AND ONE-HALF TABLETS BY MOUTH DAILY 225 tablet 0    than or equal to 6yo IM    She was advised to follow up if symptoms worsen or do not resolve.            (Please note that portions of this note were completed with a voice-recognition program. Efforts were made to edit the dictation but occasionally words are mis-transcribed.)

## 2018-10-03 ENCOUNTER — ANTI-COAG VISIT (OUTPATIENT)
Dept: FAMILY MEDICINE CLINIC | Age: 78
End: 2018-10-03
Payer: MEDICARE

## 2018-10-03 DIAGNOSIS — I48.0 PAROXYSMAL ATRIAL FIBRILLATION (HCC): ICD-10-CM

## 2018-10-03 LAB — INR BLD: 2.1

## 2018-10-03 PROCEDURE — 93793 ANTICOAG MGMT PT WARFARIN: CPT | Performed by: FAMILY MEDICINE

## 2018-10-06 ENCOUNTER — HOSPITAL ENCOUNTER (INPATIENT)
Age: 78
LOS: 2 days | Discharge: HOME | DRG: 375 | End: 2018-10-09
Attending: EMERGENCY MEDICINE | Admitting: FAMILY MEDICINE
Payer: MEDICARE

## 2018-10-06 ENCOUNTER — OFFICE VISIT (OUTPATIENT)
Dept: PRIMARY CARE CLINIC | Age: 78
End: 2018-10-06
Payer: MEDICARE

## 2018-10-06 ENCOUNTER — APPOINTMENT (OUTPATIENT)
Dept: CT IMAGING | Age: 78
DRG: 375 | End: 2018-10-06
Payer: MEDICARE

## 2018-10-06 ENCOUNTER — HOSPITAL ENCOUNTER (OUTPATIENT)
Age: 78
Setting detail: SPECIMEN
Discharge: HOME | DRG: 375 | End: 2018-10-06
Payer: MEDICARE

## 2018-10-06 VITALS
DIASTOLIC BLOOD PRESSURE: 64 MMHG | WEIGHT: 293 LBS | SYSTOLIC BLOOD PRESSURE: 118 MMHG | BODY MASS INDEX: 54.5 KG/M2 | HEART RATE: 64 BPM

## 2018-10-06 DIAGNOSIS — K62.5 RECTAL BLEEDING: ICD-10-CM

## 2018-10-06 DIAGNOSIS — Z79.01 ON ANTICOAGULANT THERAPY: ICD-10-CM

## 2018-10-06 DIAGNOSIS — I10 ESSENTIAL HYPERTENSION: ICD-10-CM

## 2018-10-06 DIAGNOSIS — K62.5 RECTAL BLEEDING: Primary | ICD-10-CM

## 2018-10-06 DIAGNOSIS — K64.4 EXTERNAL HEMORRHOID: Primary | ICD-10-CM

## 2018-10-06 DIAGNOSIS — K92.1 BLOOD IN STOOL: ICD-10-CM

## 2018-10-06 PROBLEM — K92.2 GI BLEED: Status: ACTIVE | Noted: 2018-10-06

## 2018-10-06 PROBLEM — Z99.89 OSA ON CPAP: Status: ACTIVE | Noted: 2018-10-06

## 2018-10-06 PROBLEM — G47.33 OSA ON CPAP: Status: ACTIVE | Noted: 2018-10-06

## 2018-10-06 LAB
ABSOLUTE EOS #: 0.14 K/UL (ref 0–0.4)
ABSOLUTE IMMATURE GRANULOCYTE: ABNORMAL K/UL (ref 0–0.3)
ABSOLUTE LYMPH #: 1.09 K/UL (ref 1–4.8)
ABSOLUTE MONO #: 0.54 K/UL (ref 0.1–1.2)
ALBUMIN SERPL-MCNC: 3.9 G/DL (ref 3.5–5.2)
ALBUMIN/GLOBULIN RATIO: 1.6 (ref 1–2.5)
ALP BLD-CCNC: 121 U/L (ref 35–104)
ALT SERPL-CCNC: 16 U/L (ref 5–33)
AMYLASE: 52 U/L (ref 28–100)
ANION GAP SERPL CALCULATED.3IONS-SCNC: 8 MMOL/L (ref 9–17)
AST SERPL-CCNC: 24 U/L
BASOPHILS # BLD: 1 % (ref 0–1)
BASOPHILS ABSOLUTE: 0.07 K/UL (ref 0–0.2)
BILIRUB SERPL-MCNC: 0.34 MG/DL (ref 0.3–1.2)
BILIRUBIN DIRECT: 0.09 MG/DL
BILIRUBIN, INDIRECT: 0.25 MG/DL (ref 0–1)
BUN BLDV-MCNC: 18 MG/DL (ref 8–23)
BUN/CREAT BLD: 22 (ref 9–20)
CALCIUM SERPL-MCNC: 9.3 MG/DL (ref 8.6–10.4)
CHLORIDE BLD-SCNC: 108 MMOL/L (ref 98–107)
CO2: 28 MMOL/L (ref 20–31)
CREAT SERPL-MCNC: 0.82 MG/DL (ref 0.5–0.9)
DIFFERENTIAL TYPE: ABNORMAL
EOSINOPHILS RELATIVE PERCENT: 2 % (ref 1–7)
GFR AFRICAN AMERICAN: >60 ML/MIN
GFR NON-AFRICAN AMERICAN: >60 ML/MIN
GFR SERPL CREATININE-BSD FRML MDRD: ABNORMAL ML/MIN/{1.73_M2}
GFR SERPL CREATININE-BSD FRML MDRD: ABNORMAL ML/MIN/{1.73_M2}
GLOBULIN: 2.5 G/DL (ref 1.5–3.8)
GLUCOSE BLD-MCNC: 107 MG/DL (ref 70–99)
HCT VFR BLD CALC: 38 % (ref 36–46)
HCT VFR BLD CALC: 39.5 % (ref 36–46)
HEMOGLOBIN: 12.3 G/DL (ref 12–16)
HEMOGLOBIN: 12.4 G/DL (ref 12–16)
IMMATURE GRANULOCYTES: ABNORMAL %
INR BLD: 2.6
LIPASE: 21 U/L (ref 13–60)
LYMPHOCYTES # BLD: 16 % (ref 16–46)
MCH RBC QN AUTO: 30 PG (ref 26–34)
MCHC RBC AUTO-ENTMCNC: 31.4 G/DL (ref 31–37)
MCV RBC AUTO: 95.5 FL (ref 80–100)
MONOCYTES # BLD: 8 % (ref 4–11)
MORPHOLOGY: ABNORMAL
NRBC AUTOMATED: ABNORMAL PER 100 WBC
PDW BLD-RTO: 16.5 % (ref 11–14.5)
PLATELET # BLD: 132 K/UL (ref 140–450)
PLATELET ESTIMATE: ABNORMAL
PMV BLD AUTO: 12.1 FL (ref 6–12)
POTASSIUM SERPL-SCNC: 3.7 MMOL/L (ref 3.7–5.3)
PROTHROMBIN TIME: 25.5 SEC (ref 9.4–11.3)
RBC # BLD: 4.13 M/UL (ref 4–5.2)
RBC # BLD: ABNORMAL 10*6/UL
SEG NEUTROPHILS: 73 % (ref 43–77)
SEGMENTED NEUTROPHILS ABSOLUTE COUNT: 4.96 K/UL (ref 1.8–7.7)
SODIUM BLD-SCNC: 144 MMOL/L (ref 135–144)
TOTAL PROTEIN: 6.4 G/DL (ref 6.4–8.3)
WBC # BLD: 6.8 K/UL (ref 3.5–11)
WBC # BLD: ABNORMAL 10*3/UL

## 2018-10-06 PROCEDURE — G8482 FLU IMMUNIZE ORDER/ADMIN: HCPCS | Performed by: FAMILY MEDICINE

## 2018-10-06 PROCEDURE — 85025 COMPLETE CBC W/AUTO DIFF WBC: CPT

## 2018-10-06 PROCEDURE — G0378 HOSPITAL OBSERVATION PER HR: HCPCS

## 2018-10-06 PROCEDURE — 80076 HEPATIC FUNCTION PANEL: CPT

## 2018-10-06 PROCEDURE — 85014 HEMATOCRIT: CPT

## 2018-10-06 PROCEDURE — 99214 OFFICE O/P EST MOD 30 MIN: CPT | Performed by: FAMILY MEDICINE

## 2018-10-06 PROCEDURE — 36415 COLL VENOUS BLD VENIPUNCTURE: CPT

## 2018-10-06 PROCEDURE — 94761 N-INVAS EAR/PLS OXIMETRY MLT: CPT

## 2018-10-06 PROCEDURE — 1090F PRES/ABSN URINE INCON ASSESS: CPT | Performed by: FAMILY MEDICINE

## 2018-10-06 PROCEDURE — 82150 ASSAY OF AMYLASE: CPT

## 2018-10-06 PROCEDURE — 2580000003 HC RX 258: Performed by: NURSE PRACTITIONER

## 2018-10-06 PROCEDURE — 6370000000 HC RX 637 (ALT 250 FOR IP): Performed by: NURSE PRACTITIONER

## 2018-10-06 PROCEDURE — 2709999900 CT ABDOMEN PELVIS W IV CONTRAST

## 2018-10-06 PROCEDURE — 4040F PNEUMOC VAC/ADMIN/RCVD: CPT | Performed by: FAMILY MEDICINE

## 2018-10-06 PROCEDURE — 94660 CPAP INITIATION&MGMT: CPT

## 2018-10-06 PROCEDURE — 80048 BASIC METABOLIC PNL TOTAL CA: CPT

## 2018-10-06 PROCEDURE — G8399 PT W/DXA RESULTS DOCUMENT: HCPCS | Performed by: FAMILY MEDICINE

## 2018-10-06 PROCEDURE — 1123F ACP DISCUSS/DSCN MKR DOCD: CPT | Performed by: FAMILY MEDICINE

## 2018-10-06 PROCEDURE — 85018 HEMOGLOBIN: CPT

## 2018-10-06 PROCEDURE — 6360000004 HC RX CONTRAST MEDICATION: Performed by: EMERGENCY MEDICINE

## 2018-10-06 PROCEDURE — 1036F TOBACCO NON-USER: CPT | Performed by: FAMILY MEDICINE

## 2018-10-06 PROCEDURE — 83690 ASSAY OF LIPASE: CPT

## 2018-10-06 PROCEDURE — 99285 EMERGENCY DEPT VISIT HI MDM: CPT

## 2018-10-06 PROCEDURE — G8417 CALC BMI ABV UP PARAM F/U: HCPCS | Performed by: FAMILY MEDICINE

## 2018-10-06 PROCEDURE — 85610 PROTHROMBIN TIME: CPT

## 2018-10-06 PROCEDURE — G8427 DOCREV CUR MEDS BY ELIG CLIN: HCPCS | Performed by: FAMILY MEDICINE

## 2018-10-06 PROCEDURE — 1101F PT FALLS ASSESS-DOCD LE1/YR: CPT | Performed by: FAMILY MEDICINE

## 2018-10-06 RX ORDER — SODIUM CHLORIDE 0.9 % (FLUSH) 0.9 %
10 SYRINGE (ML) INJECTION EVERY 12 HOURS SCHEDULED
Status: DISCONTINUED | OUTPATIENT
Start: 2018-10-06 | End: 2018-10-09 | Stop reason: HOSPADM

## 2018-10-06 RX ORDER — TRAZODONE HYDROCHLORIDE 50 MG/1
50 TABLET ORAL NIGHTLY
Status: DISCONTINUED | OUTPATIENT
Start: 2018-10-06 | End: 2018-10-09 | Stop reason: HOSPADM

## 2018-10-06 RX ORDER — SODIUM CHLORIDE 0.9 % (FLUSH) 0.9 %
10 SYRINGE (ML) INJECTION PRN
Status: DISCONTINUED | OUTPATIENT
Start: 2018-10-06 | End: 2018-10-09 | Stop reason: HOSPADM

## 2018-10-06 RX ORDER — AMIODARONE HYDROCHLORIDE 200 MG/1
200 TABLET ORAL DAILY
Status: DISCONTINUED | OUTPATIENT
Start: 2018-10-07 | End: 2018-10-09 | Stop reason: HOSPADM

## 2018-10-06 RX ORDER — ONDANSETRON 2 MG/ML
4 INJECTION INTRAMUSCULAR; INTRAVENOUS EVERY 6 HOURS PRN
Status: DISCONTINUED | OUTPATIENT
Start: 2018-10-06 | End: 2018-10-09 | Stop reason: HOSPADM

## 2018-10-06 RX ORDER — DILTIAZEM HYDROCHLORIDE 120 MG/1
240 CAPSULE, COATED, EXTENDED RELEASE ORAL DAILY
Status: DISCONTINUED | OUTPATIENT
Start: 2018-10-07 | End: 2018-10-09 | Stop reason: HOSPADM

## 2018-10-06 RX ORDER — FUROSEMIDE 20 MG/1
20 TABLET ORAL DAILY
Status: DISCONTINUED | OUTPATIENT
Start: 2018-10-07 | End: 2018-10-09 | Stop reason: HOSPADM

## 2018-10-06 RX ORDER — ACETAMINOPHEN 325 MG/1
650 TABLET ORAL EVERY 6 HOURS PRN
Status: DISCONTINUED | OUTPATIENT
Start: 2018-10-06 | End: 2018-10-09 | Stop reason: HOSPADM

## 2018-10-06 RX ORDER — POTASSIUM CHLORIDE 20 MEQ/1
20 TABLET, EXTENDED RELEASE ORAL DAILY
Status: DISCONTINUED | OUTPATIENT
Start: 2018-10-07 | End: 2018-10-09 | Stop reason: HOSPADM

## 2018-10-06 RX ORDER — ATORVASTATIN CALCIUM 40 MG/1
40 TABLET, FILM COATED ORAL NIGHTLY
Status: DISCONTINUED | OUTPATIENT
Start: 2018-10-06 | End: 2018-10-09 | Stop reason: HOSPADM

## 2018-10-06 RX ORDER — LEVOTHYROXINE SODIUM 0.03 MG/1
50 TABLET ORAL DAILY
Status: DISCONTINUED | OUTPATIENT
Start: 2018-10-07 | End: 2018-10-09 | Stop reason: HOSPADM

## 2018-10-06 RX ADMIN — METOPROLOL TARTRATE 25 MG: 25 TABLET ORAL at 22:15

## 2018-10-06 RX ADMIN — ATORVASTATIN CALCIUM 40 MG: 40 TABLET, FILM COATED ORAL at 22:14

## 2018-10-06 RX ADMIN — Medication 10 ML: at 22:16

## 2018-10-06 RX ADMIN — IOPAMIDOL 100 ML: 755 INJECTION, SOLUTION INTRAVENOUS at 19:49

## 2018-10-06 RX ADMIN — TRAZODONE HYDROCHLORIDE 50 MG: 50 TABLET ORAL at 22:14

## 2018-10-06 RX ADMIN — IOHEXOL 50 ML: 240 INJECTION, SOLUTION INTRATHECAL; INTRAVASCULAR; INTRAVENOUS; ORAL at 18:45

## 2018-10-06 ASSESSMENT — ENCOUNTER SYMPTOMS
RECTAL PAIN: 0
SHORTNESS OF BREATH: 0
CONSTIPATION: 1
DIARRHEA: 0
HEMATOCHEZIA: 1
BLOOD IN STOOL: 0

## 2018-10-06 ASSESSMENT — PAIN SCALES - GENERAL: PAINLEVEL_OUTOF10: 0

## 2018-10-06 NOTE — PROGRESS NOTES
[address] [facility]  [address], [address]  [phone]      Philippe Babin is a 65 y.o. female who is c/o of Rectal Bleeding (Reports it first started 1 week ago. There was blood on the toilet paper. However today after a BM  the stool was dark. Patient reported it was dark red. )      HPI:     Rectal Bleeding    The current episode started today (had one other mild episode last week; worse today). The patient is experiencing no pain. Pertinent negatives include no fever, no diarrhea, no rectal pain and no chest pain. Intake amount: Drinking less water than she should. Feels a swollen area in the anus x past week - not tender. Has had only the 2 episodes of blood with stool. Pt ran out of her Lasix x 2 doses earlier this week while waiting for her refill from pharmacy. Subjective:      Past Medical History:   Diagnosis Date    A-fib ([facility] [address] 07/19/2017    CHF (congestive heart failure) (HCC)     Dyslipidemia     FH: total abdominal hysterectomy and bilateral salpingo-oophorectomy 1966    Glucose intolerance (impaired glucose tolerance)     Hypertension     Obesity     Osteoarthritis       Past Surgical History:   Procedure Laterality Date    CARDIAC CATHETERIZATION  09/05/2017    CHOLECYSTECTOMY  1960s    HIP ARTHROPLASTY Left     KNEE ARTHROPLASTY Left     KNEE ARTHROPLASTY Right     KARYN AND BSO      KARYN AND BSO  1966    VARICOSE VEIN SURGERY Right 1960s       Social History   Substance Use Topics    Smoking status: Never Smoker    Smokeless tobacco: Never Used      Comment: hernando rrt 10/06/2018    Alcohol use No      No current facility-administered medications for this visit. No current outpatient prescriptions on file.      Facility-Administered Medications Ordered in Other Visits   Medication Dose Route Frequency Provider Last Rate Last Dose    acetaminophen (TYLENOL) tablet 650 mg  650 mg Oral Q6H PRN Mary Sim

## 2018-10-06 NOTE — PATIENT INSTRUCTIONS
Patient Education        Hemorrhoids: Care Instructions  Your Care Instructions    Hemorrhoids are enlarged veins that develop in the anal canal. Bleeding during bowel movements, itching, swelling, and rectal pain are the most common symptoms. They can be uncomfortable at times, but hemorrhoids rarely are a serious problem. You can treat most hemorrhoids with simple changes to your diet and bowel habits. These changes include eating more fiber and not straining to pass stools. Most hemorrhoids do not need surgery or other treatment unless they are very large and painful or bleed a lot. Follow-up care is a key part of your treatment and safety. Be sure to make and go to all appointments, and call your doctor if you are having problems. It's also a good idea to know your test results and keep a list of the medicines you take. How can you care for yourself at home? · Sit in a few inches of warm water (sitz bath) 3 times a day and after bowel movements. The warm water helps with pain and itching. · Put ice on your anal area several times a day for 10 minutes at a time. Put a thin cloth between the ice and your skin. Follow this by placing a warm, wet towel on the area for another 10 to 20 minutes. · Take pain medicines exactly as directed. ¨ If the doctor gave you a prescription medicine for pain, take it as prescribed. ¨ If you are not taking a prescription pain medicine, ask your doctor if you can take an over-the-counter medicine. · Keep the anal area clean, but be gentle. Use water and a fragrance-free soap, such as [place], or use baby wipes or medicated pads, such as Tucks. · Wear cotton underwear and loose clothing to decrease moisture in the anal area. · Eat more fiber. Include foods such as whole-grain breads and cereals, raw vegetables, raw and dried fruits, and beans. · Drink plenty of fluids, enough so that your urine is light yellow or clear like water.  If you have kidney, heart, or liver disease and have to limit fluids, talk with your doctor before you increase the amount of fluids you drink. · Use a stool softener that contains bran or psyllium. You can save money by buying bran or psyllium (available in bulk at most health food stores) and sprinkling it on foods or stirring it into fruit juice. Or you can use a product such as Metamucil or Hydrocil. · Practice healthy bowel habits. ¨ Go to the bathroom as soon as you have the urge. ¨ Avoid straining to pass stools. Relax and give yourself time to let things happen naturally. ¨ Do not hold your breath while passing stools. ¨ Do not read while sitting on the toilet. Get off the toilet as soon as you have finished. · Take your medicines exactly as prescribed. Call your doctor if you think you are having a problem with your medicine. When should you call for help? Call 911 anytime you think you may need emergency care. For example, call if:    · You pass maroon or very bloody stools.    Call your doctor now or seek immediate medical care if:    · You have increased pain.     · You have increased bleeding.    Watch closely for changes in your health, and be sure to contact your doctor if:    · Your symptoms have not improved after 3 or 4 days. Where can you learn more? Go to https://[employer]. org and sign in to your [employer] account. Enter H515 in the [facility] box to learn more about \"Hemorrhoids: Care Instructions. \"     If you do not have an account, please click on the \"Sign Up Now\" link. Current as of: May 12, 2017  Content Version: 11.7  © 7774-8546 [employer]. Care instructions adapted under license by [facility] ([facility]). If you have questions about a medical condition or this instruction, always ask your healthcare professional. [place] [address] any warranty or liability for your use of this information.        Patient Education        Rectal Bleeding: Care Health Information box to learn more about \"Rectal Bleeding: Care Instructions. \"     If you do not have an account, please click on the \"Sign Up Now\" link. Current as of: May 12, 2017  Content Version: 11.7  © 9197-6340 [facility]. Care instructions adapted under license by [facility] ([facility]). If you have questions about a medical condition or this instruction, always ask your healthcare professional. [address] any warranty or liability for your use of this information.

## 2018-10-07 LAB
ABSOLUTE EOS #: 0.26 K/UL (ref 0–0.4)
ABSOLUTE IMMATURE GRANULOCYTE: ABNORMAL K/UL (ref 0–0.3)
ABSOLUTE LYMPH #: 1.24 K/UL (ref 1–4.8)
ABSOLUTE MONO #: 0.65 K/UL (ref 0.1–1.2)
ANION GAP SERPL CALCULATED.3IONS-SCNC: 10 MMOL/L (ref 9–17)
BASOPHILS # BLD: 1 % (ref 0–1)
BASOPHILS ABSOLUTE: 0.07 K/UL (ref 0–0.2)
BUN BLDV-MCNC: 14 MG/DL (ref 8–23)
BUN/CREAT BLD: 20 (ref 9–20)
CALCIUM SERPL-MCNC: 9.1 MG/DL (ref 8.6–10.4)
CHLORIDE BLD-SCNC: 108 MMOL/L (ref 98–107)
CO2: 25 MMOL/L (ref 20–31)
CREAT SERPL-MCNC: 0.69 MG/DL (ref 0.5–0.9)
DIFFERENTIAL TYPE: ABNORMAL
EOSINOPHILS RELATIVE PERCENT: 4 % (ref 1–7)
GFR AFRICAN AMERICAN: >60 ML/MIN
GFR NON-AFRICAN AMERICAN: >60 ML/MIN
GFR SERPL CREATININE-BSD FRML MDRD: ABNORMAL ML/MIN/{1.73_M2}
GFR SERPL CREATININE-BSD FRML MDRD: ABNORMAL ML/MIN/{1.73_M2}
GLUCOSE BLD-MCNC: 111 MG/DL (ref 70–99)
HCT VFR BLD CALC: 33.7 % (ref 36–46)
HCT VFR BLD CALC: 34.5 % (ref 36–46)
HCT VFR BLD CALC: 39.4 % (ref 36–46)
HEMOGLOBIN: 11.1 G/DL (ref 12–16)
HEMOGLOBIN: 11.1 G/DL (ref 12–16)
HEMOGLOBIN: 12.6 G/DL (ref 12–16)
IMMATURE GRANULOCYTES: ABNORMAL %
INR BLD: 2.2
LYMPHOCYTES # BLD: 19 % (ref 16–46)
MCH RBC QN AUTO: 30.1 PG (ref 26–34)
MCHC RBC AUTO-ENTMCNC: 32.1 G/DL (ref 31–37)
MCV RBC AUTO: 93.6 FL (ref 80–100)
MONOCYTES # BLD: 10 % (ref 4–11)
MORPHOLOGY: ABNORMAL
NRBC AUTOMATED: ABNORMAL PER 100 WBC
PDW BLD-RTO: 15.7 % (ref 11–14.5)
PLATELET # BLD: 125 K/UL (ref 140–450)
PLATELET ESTIMATE: ABNORMAL
PMV BLD AUTO: 12.3 FL (ref 6–12)
POTASSIUM SERPL-SCNC: 3.7 MMOL/L (ref 3.7–5.3)
PROTHROMBIN TIME: 22.1 SEC (ref 9.4–11.3)
RBC # BLD: 3.69 M/UL (ref 4–5.2)
RBC # BLD: ABNORMAL 10*6/UL
SEG NEUTROPHILS: 66 % (ref 43–77)
SEGMENTED NEUTROPHILS ABSOLUTE COUNT: 4.28 K/UL (ref 1.8–7.7)
SODIUM BLD-SCNC: 143 MMOL/L (ref 135–144)
WBC # BLD: 6.5 K/UL (ref 3.5–11)
WBC # BLD: ABNORMAL 10*3/UL

## 2018-10-07 PROCEDURE — 6370000000 HC RX 637 (ALT 250 FOR IP): Performed by: NURSE PRACTITIONER

## 2018-10-07 PROCEDURE — 36415 COLL VENOUS BLD VENIPUNCTURE: CPT

## 2018-10-07 PROCEDURE — G0378 HOSPITAL OBSERVATION PER HR: HCPCS

## 2018-10-07 PROCEDURE — 2580000003 HC RX 258: Performed by: NURSE PRACTITIONER

## 2018-10-07 PROCEDURE — 2060000000 HC ICU INTERMEDIATE R&B

## 2018-10-07 PROCEDURE — 80048 BASIC METABOLIC PNL TOTAL CA: CPT

## 2018-10-07 PROCEDURE — 85610 PROTHROMBIN TIME: CPT

## 2018-10-07 PROCEDURE — 94761 N-INVAS EAR/PLS OXIMETRY MLT: CPT

## 2018-10-07 PROCEDURE — 6360000002 HC RX W HCPCS: Performed by: SURGERY

## 2018-10-07 PROCEDURE — 2580000003 HC RX 258: Performed by: FAMILY MEDICINE

## 2018-10-07 PROCEDURE — 99232 SBSQ HOSP IP/OBS MODERATE 35: CPT | Performed by: FAMILY MEDICINE

## 2018-10-07 PROCEDURE — 85018 HEMOGLOBIN: CPT

## 2018-10-07 PROCEDURE — 6370000000 HC RX 637 (ALT 250 FOR IP): Performed by: SURGERY

## 2018-10-07 PROCEDURE — 99231 SBSQ HOSP IP/OBS SF/LOW 25: CPT | Performed by: SURGERY

## 2018-10-07 PROCEDURE — 94660 CPAP INITIATION&MGMT: CPT

## 2018-10-07 PROCEDURE — 85014 HEMATOCRIT: CPT

## 2018-10-07 PROCEDURE — 2580000003 HC RX 258: Performed by: SURGERY

## 2018-10-07 PROCEDURE — 85025 COMPLETE CBC W/AUTO DIFF WBC: CPT

## 2018-10-07 RX ORDER — PHYTONADIONE 5 MG/1
5 TABLET ORAL ONCE
Status: DISCONTINUED | OUTPATIENT
Start: 2018-10-07 | End: 2018-10-07

## 2018-10-07 RX ORDER — SODIUM CHLORIDE 9 MG/ML
INJECTION, SOLUTION INTRAVENOUS CONTINUOUS
Status: DISCONTINUED | OUTPATIENT
Start: 2018-10-07 | End: 2018-10-08

## 2018-10-07 RX ORDER — 0.9 % SODIUM CHLORIDE 0.9 %
10 VIAL (ML) INJECTION PRN
Status: DISCONTINUED | OUTPATIENT
Start: 2018-10-07 | End: 2018-10-09 | Stop reason: HOSPADM

## 2018-10-07 RX ORDER — POLYETHYLENE GLYCOL 3350, SODIUM CHLORIDE, SODIUM BICARBONATE, POTASSIUM CHLORIDE 420; 11.2; 5.72; 1.48 G/4L; G/4L; G/4L; G/4L
4000 POWDER, FOR SOLUTION ORAL ONCE
Status: COMPLETED | OUTPATIENT
Start: 2018-10-07 | End: 2018-10-07

## 2018-10-07 RX ORDER — 0.9 % SODIUM CHLORIDE 0.9 %
10 VIAL (ML) INJECTION EVERY 12 HOURS SCHEDULED
Status: DISCONTINUED | OUTPATIENT
Start: 2018-10-07 | End: 2018-10-09 | Stop reason: HOSPADM

## 2018-10-07 RX ORDER — PHYTONADIONE 10 MG/ML
5 INJECTION, EMULSION INTRAMUSCULAR; INTRAVENOUS; SUBCUTANEOUS ONCE
Status: COMPLETED | OUTPATIENT
Start: 2018-10-07 | End: 2018-10-07

## 2018-10-07 RX ADMIN — SODIUM CHLORIDE: 9 INJECTION, SOLUTION INTRAVENOUS at 11:14

## 2018-10-07 RX ADMIN — Medication 10 ML: at 08:02

## 2018-10-07 RX ADMIN — DILTIAZEM HYDROCHLORIDE 240 MG: 120 CAPSULE, COATED, EXTENDED RELEASE ORAL at 07:41

## 2018-10-07 RX ADMIN — AMIODARONE HYDROCHLORIDE 200 MG: 200 TABLET ORAL at 07:41

## 2018-10-07 RX ADMIN — FUROSEMIDE 20 MG: 20 TABLET ORAL at 07:42

## 2018-10-07 RX ADMIN — ACETAMINOPHEN 650 MG: 325 TABLET ORAL at 08:08

## 2018-10-07 RX ADMIN — POTASSIUM CHLORIDE 20 MEQ: 20 TABLET, EXTENDED RELEASE ORAL at 07:42

## 2018-10-07 RX ADMIN — LEVOTHYROXINE SODIUM 50 MCG: 25 TABLET ORAL at 07:42

## 2018-10-07 RX ADMIN — METOPROLOL TARTRATE 25 MG: 25 TABLET ORAL at 20:23

## 2018-10-07 RX ADMIN — BISACODYL 10 MG: 5 TABLET, COATED ORAL at 15:28

## 2018-10-07 RX ADMIN — Medication 10 ML: at 20:24

## 2018-10-07 RX ADMIN — METOPROLOL TARTRATE 25 MG: 25 TABLET ORAL at 07:41

## 2018-10-07 RX ADMIN — ATORVASTATIN CALCIUM 40 MG: 40 TABLET, FILM COATED ORAL at 20:23

## 2018-10-07 RX ADMIN — PHYTONADIONE 5 MG: 10 INJECTION, EMULSION INTRAMUSCULAR; INTRAVENOUS; SUBCUTANEOUS at 15:29

## 2018-10-07 RX ADMIN — POLYETHYLENE GLYCOL-3350, SODIUM CHLORIDE, POTASSIUM CHLORIDE AND SODIUM BICARBONATE 4000 ML: 420; 11.2; 5.72; 1.48 POWDER, FOR SOLUTION ORAL at 18:06

## 2018-10-07 ASSESSMENT — PAIN SCALES - GENERAL
PAINLEVEL_OUTOF10: 3
PAINLEVEL_OUTOF10: 0
PAINLEVEL_OUTOF10: 0

## 2018-10-07 ASSESSMENT — PAIN DESCRIPTION - ONSET: ONSET: ON-GOING

## 2018-10-07 ASSESSMENT — PAIN DESCRIPTION - ORIENTATION: ORIENTATION: LEFT

## 2018-10-07 ASSESSMENT — PAIN DESCRIPTION - DESCRIPTORS: DESCRIPTORS: DISCOMFORT

## 2018-10-07 ASSESSMENT — PAIN DESCRIPTION - PAIN TYPE: TYPE: CHRONIC PAIN

## 2018-10-07 ASSESSMENT — PAIN DESCRIPTION - FREQUENCY: FREQUENCY: CONTINUOUS

## 2018-10-07 ASSESSMENT — PAIN DESCRIPTION - LOCATION: LOCATION: SHOULDER

## 2018-10-07 ASSESSMENT — PAIN DESCRIPTION - PROGRESSION: CLINICAL_PROGRESSION: NOT CHANGED

## 2018-10-07 NOTE — ED PROVIDER NOTES
Extraocular muscles intact, mucus membranes moist,    Neck: Trachea midline   Cardiovascular: Regular rhythm and rate no S3, S4, or murmurs   Respiratory: Clear to auscultation bilaterally no wheezes, rhonchi, rales, no respiratory distress no tachypnea no retractions no hypoxia  Gastrointestinal: Soft, nontender, nondistended, positive bowel sounds. No rebound, rigidity, or guarding. Obese  Musculoskeletal: No extremity pain bilateral lower extremity edema  Neurologic: Moving all 4 extremities without difficulty there are no gross focal neurologic deficits   Skin: Warm and dry     Physical Exam  DIFFERENTIAL DIAGNOSIS/ MDM:     Rectal bleeding. Repeat H&H from this afternoon. CT abdomen and pelvis.     DIAGNOSTIC RESULTS     EKG: All EKG's are interpreted by the Emergency Department Physician who either signs or Co-signs this chart in the absence of a cardiologist.        Not indicated unless otherwise documented above    LABS:  Results for orders placed or performed during the hospital encounter of 10/06/18   HEMOGLOBIN AND HEMATOCRIT, BLOOD   Result Value Ref Range    Hemoglobin 12.3 12.0 - 16.0 g/dL    Hematocrit 38.0 36 - 46 %   Amylase   Result Value Ref Range    Amylase 52 28 - 100 U/L   Lipase   Result Value Ref Range    Lipase 21 13 - 60 U/L   Hepatic Function Panel   Result Value Ref Range    Alb 3.9 3.5 - 5.2 g/dL    Alkaline Phosphatase 121 (H) 35 - 104 U/L    ALT 16 5 - 33 U/L    AST 24 <32 U/L    Total Bilirubin 0.34 0.3 - 1.2 mg/dL    Bilirubin, Direct 0.09 <0.31 mg/dL    Bilirubin, Indirect 0.25 0.00 - 1.00 mg/dL    Total Protein 6.4 6.4 - 8.3 g/dL    Globulin 2.5 1.5 - 3.8 g/dL    Albumin/Globulin Ratio 1.6 1.0 - 2.5       Not indicated unless otherwise documented above    RADIOLOGY:   I reviewed the radiologist interpretations:    CT ABDOMEN PELVIS W IV CONTRAST Additional Contrast? Oral    (Results Pending)       Not indicated unless otherwise documented above    EMERGENCY DEPARTMENT COURSE:
Osteopenia and sclerosis in the superior sacrum is again demonstrated, which may represent sequela of old fracture. Mild induration of the presacral fat is again noted and unchanged in appearance. The left hip arthroplasty is partially visualized. Advanced degenerative change in the spine with grade 1 anterolisthesis of L4 is noted. Partial fusion at L5-S1 is noted. Right gluteal fluid density in the subcutaneous fat is again noted, slightly smaller measuring 14.7 x 3.7 cm (previously 15.0 x 5.2 cm). Diverticulosis. No evidence for acute diverticulitis. Chronic osseous changes in the lumbar spine and sacrum. Slight interval decrease in size of right gluteal subcutaneous hematoma. Ct Abdomen Pelvis W Iv Contrast    Result Date: 9/21/2018  EXAMINATION: CT OF THE ABDOMEN AND PELVIS WITH CONTRAST 9/21/2018 9:34 pm TECHNIQUE: CT of the abdomen and pelvis was performed with the administration of intravenous contrast. Multiplanar reformatted images are provided for review. Dose modulation, iterative reconstruction, and/or weight based adjustment of the mA/kV was utilized to reduce the radiation dose to as low as reasonably achievable. COMPARISON: None. HISTORY: ORDERING SYSTEM PROVIDED HISTORY: trauma / fall / contusion RUQ with tenderness / on coumadin TECHNOLOGIST PROVIDED HISTORY: IV contrast only Ordering Physician Provided Reason for Exam: Trauma, fall, contusion of RUQ with tenderness, on coumadin; hx of hysterectomy, cholecystectomy Acuity: Acute Type of Exam: Initial FINDINGS: Lower Chest: Moderate cardiomegaly. Organs: The abdominal wall appears normal. The liver, spleen, pancreas, and adrenals appear normal.  Gallbladder not well visualized. Kidneys appear normal. The bladder appears normal. GI/Bowel: The stomache,small bowel, and colon appear normal. Appendix normal. Small hiatal hernia. Pelvis: Subcutaneous fluid collection right bile duct measuring 52 x 150 mm. Peritoneum/Retroperitoneum:  The

## 2018-10-07 NOTE — PLAN OF CARE
Problem:  Bowel Function - Altered:  Goal: Bowel elimination is within specified parameters  Bowel elimination is within specified parameters  Outcome: Ongoing      Problem: Fluid Volume - Imbalance:  Goal: Will show no signs and symptoms of excessive bleeding  Will show no signs and symptoms of excessive bleeding  Outcome: Ongoing    Goal: Absence of imbalanced fluid volume signs and symptoms  Absence of imbalanced fluid volume signs and symptoms  Outcome: Ongoing

## 2018-10-07 NOTE — H&P
amiodarone, cardizem, and lopressor  3. CHF--stable, monitor I&Os and daily weight, continue home Lasix  4. JAYJAY--home cpap at hs/rest  5. Home medications reviewed  6. DVT prophylaxis--SCDs due to GI bleeding  7.  See orders     Note that over 50 minutes was spent in evaluation of the patient, review of the chart and pertinent records, discussion with family/staff, etc    Tyler SOLANO, NP-C, FNP-BC  10:42 PM  10/6/2018

## 2018-10-08 ENCOUNTER — ANESTHESIA (OUTPATIENT)
Dept: OPERATING ROOM | Age: 78
DRG: 375 | End: 2018-10-08
Payer: MEDICARE

## 2018-10-08 ENCOUNTER — ANTI-COAG VISIT (OUTPATIENT)
Dept: FAMILY MEDICINE CLINIC | Age: 78
End: 2018-10-08

## 2018-10-08 ENCOUNTER — ANESTHESIA EVENT (OUTPATIENT)
Dept: OPERATING ROOM | Age: 78
DRG: 375 | End: 2018-10-08
Payer: MEDICARE

## 2018-10-08 VITALS — SYSTOLIC BLOOD PRESSURE: 118 MMHG | DIASTOLIC BLOOD PRESSURE: 58 MMHG | OXYGEN SATURATION: 96 %

## 2018-10-08 DIAGNOSIS — I48.0 PAROXYSMAL ATRIAL FIBRILLATION (HCC): ICD-10-CM

## 2018-10-08 PROBLEM — I48.91 A-FIB (HCC): Status: ACTIVE | Noted: 2017-07-19

## 2018-10-08 LAB
ABSOLUTE EOS #: 0.2 K/UL (ref 0–0.4)
ABSOLUTE IMMATURE GRANULOCYTE: ABNORMAL K/UL (ref 0–0.3)
ABSOLUTE LYMPH #: 1.6 K/UL (ref 1–4.8)
ABSOLUTE MONO #: 0.8 K/UL (ref 0.1–1.2)
ANION GAP SERPL CALCULATED.3IONS-SCNC: 11 MMOL/L (ref 9–17)
BASOPHILS # BLD: 0 % (ref 0–1)
BASOPHILS ABSOLUTE: 0 K/UL (ref 0–0.2)
BUN BLDV-MCNC: 9 MG/DL (ref 8–23)
BUN/CREAT BLD: 13 (ref 9–20)
CALCIUM SERPL-MCNC: 9.5 MG/DL (ref 8.6–10.4)
CARCINOEMBRYONIC ANTIGEN: 3.3 NG/ML
CHLORIDE BLD-SCNC: 107 MMOL/L (ref 98–107)
CO2: 25 MMOL/L (ref 20–31)
CREAT SERPL-MCNC: 0.68 MG/DL (ref 0.5–0.9)
DIFFERENTIAL TYPE: ABNORMAL
EOSINOPHILS RELATIVE PERCENT: 3 % (ref 1–7)
GFR AFRICAN AMERICAN: >60 ML/MIN
GFR NON-AFRICAN AMERICAN: >60 ML/MIN
GFR SERPL CREATININE-BSD FRML MDRD: NORMAL ML/MIN/{1.73_M2}
GFR SERPL CREATININE-BSD FRML MDRD: NORMAL ML/MIN/{1.73_M2}
GLUCOSE BLD-MCNC: 90 MG/DL (ref 70–99)
HCT VFR BLD CALC: 33.5 % (ref 36–46)
HCT VFR BLD CALC: 36.1 % (ref 36–46)
HEMOGLOBIN: 10.7 G/DL (ref 12–16)
HEMOGLOBIN: 11.6 G/DL (ref 12–16)
IMMATURE GRANULOCYTES: ABNORMAL %
INR BLD: 1.6
LYMPHOCYTES # BLD: 21 % (ref 16–46)
MCH RBC QN AUTO: 30.2 PG (ref 26–34)
MCHC RBC AUTO-ENTMCNC: 32 G/DL (ref 31–37)
MCV RBC AUTO: 94.2 FL (ref 80–100)
MONOCYTES # BLD: 11 % (ref 4–11)
NRBC AUTOMATED: ABNORMAL PER 100 WBC
PDW BLD-RTO: 15.8 % (ref 11–14.5)
PLATELET # BLD: 124 K/UL (ref 140–450)
PLATELET ESTIMATE: ABNORMAL
PMV BLD AUTO: 12.1 FL (ref 6–12)
POTASSIUM SERPL-SCNC: 3.9 MMOL/L (ref 3.7–5.3)
PROTHROMBIN TIME: 16.6 SEC (ref 9.4–11.3)
RBC # BLD: 3.83 M/UL (ref 4–5.2)
RBC # BLD: ABNORMAL 10*6/UL
SEG NEUTROPHILS: 65 % (ref 43–77)
SEGMENTED NEUTROPHILS ABSOLUTE COUNT: 4.9 K/UL (ref 1.8–7.7)
SODIUM BLD-SCNC: 143 MMOL/L (ref 135–144)
WBC # BLD: 7.5 K/UL (ref 3.5–11)
WBC # BLD: ABNORMAL 10*3/UL

## 2018-10-08 PROCEDURE — 6360000002 HC RX W HCPCS: Performed by: SURGERY

## 2018-10-08 PROCEDURE — 2060000000 HC ICU INTERMEDIATE R&B

## 2018-10-08 PROCEDURE — 82378 CARCINOEMBRYONIC ANTIGEN: CPT

## 2018-10-08 PROCEDURE — 85014 HEMATOCRIT: CPT

## 2018-10-08 PROCEDURE — 2709999900 HC NON-CHARGEABLE SUPPLY: Performed by: SURGERY

## 2018-10-08 PROCEDURE — 85018 HEMOGLOBIN: CPT

## 2018-10-08 PROCEDURE — 6370000000 HC RX 637 (ALT 250 FOR IP): Performed by: INTERNAL MEDICINE

## 2018-10-08 PROCEDURE — 7100000011 HC PHASE II RECOVERY - ADDTL 15 MIN: Performed by: SURGERY

## 2018-10-08 PROCEDURE — 80048 BASIC METABOLIC PNL TOTAL CA: CPT

## 2018-10-08 PROCEDURE — 6370000000 HC RX 637 (ALT 250 FOR IP): Performed by: NURSE PRACTITIONER

## 2018-10-08 PROCEDURE — 36415 COLL VENOUS BLD VENIPUNCTURE: CPT

## 2018-10-08 PROCEDURE — 2580000003 HC RX 258: Performed by: SURGERY

## 2018-10-08 PROCEDURE — 88342 IMHCHEM/IMCYTCHM 1ST ANTB: CPT

## 2018-10-08 PROCEDURE — 88305 TISSUE EXAM BY PATHOLOGIST: CPT

## 2018-10-08 PROCEDURE — 0DBL8ZX EXCISION OF TRANSVERSE COLON, VIA NATURAL OR ARTIFICIAL OPENING ENDOSCOPIC, DIAGNOSTIC: ICD-10-PCS | Performed by: SURGERY

## 2018-10-08 PROCEDURE — 00811 ANES LWR INTST NDSC NOS: CPT | Performed by: NURSE ANESTHETIST, CERTIFIED REGISTERED

## 2018-10-08 PROCEDURE — 3700000000 HC ANESTHESIA ATTENDED CARE: Performed by: SURGERY

## 2018-10-08 PROCEDURE — 88341 IMHCHEM/IMCYTCHM EA ADD ANTB: CPT

## 2018-10-08 PROCEDURE — 3609010300 HC COLONOSCOPY W/BIOPSY SINGLE/MULTIPLE: Performed by: SURGERY

## 2018-10-08 PROCEDURE — 45385 COLONOSCOPY W/LESION REMOVAL: CPT | Performed by: SURGERY

## 2018-10-08 PROCEDURE — 6360000002 HC RX W HCPCS: Performed by: NURSE ANESTHETIST, CERTIFIED REGISTERED

## 2018-10-08 PROCEDURE — 85025 COMPLETE CBC W/AUTO DIFF WBC: CPT

## 2018-10-08 PROCEDURE — 45380 COLONOSCOPY AND BIOPSY: CPT | Performed by: SURGERY

## 2018-10-08 PROCEDURE — 3700000001 HC ADD 15 MINUTES (ANESTHESIA): Performed by: SURGERY

## 2018-10-08 PROCEDURE — 0DBH8ZX EXCISION OF CECUM, VIA NATURAL OR ARTIFICIAL OPENING ENDOSCOPIC, DIAGNOSTIC: ICD-10-PCS | Performed by: SURGERY

## 2018-10-08 PROCEDURE — 0DBP8ZX EXCISION OF RECTUM, VIA NATURAL OR ARTIFICIAL OPENING ENDOSCOPIC, DIAGNOSTIC: ICD-10-PCS | Performed by: SURGERY

## 2018-10-08 PROCEDURE — 99232 SBSQ HOSP IP/OBS MODERATE 35: CPT | Performed by: INTERNAL MEDICINE

## 2018-10-08 PROCEDURE — 94660 CPAP INITIATION&MGMT: CPT

## 2018-10-08 PROCEDURE — 7100000010 HC PHASE II RECOVERY - FIRST 15 MIN: Performed by: SURGERY

## 2018-10-08 PROCEDURE — 85610 PROTHROMBIN TIME: CPT

## 2018-10-08 PROCEDURE — 94760 N-INVAS EAR/PLS OXIMETRY 1: CPT

## 2018-10-08 RX ORDER — PHYTONADIONE 5 MG/1
10 TABLET ORAL ONCE
Status: DISCONTINUED | OUTPATIENT
Start: 2018-10-08 | End: 2018-10-08 | Stop reason: RX

## 2018-10-08 RX ORDER — PROPOFOL 10 MG/ML
INJECTION, EMULSION INTRAVENOUS PRN
Status: DISCONTINUED | OUTPATIENT
Start: 2018-10-08 | End: 2018-10-08 | Stop reason: SDUPTHER

## 2018-10-08 RX ORDER — PHYTONADIONE 10 MG/ML
10 INJECTION, EMULSION INTRAMUSCULAR; INTRAVENOUS; SUBCUTANEOUS ONCE
Status: COMPLETED | OUTPATIENT
Start: 2018-10-08 | End: 2018-10-08

## 2018-10-08 RX ORDER — DOCUSATE SODIUM 100 MG/1
100 CAPSULE, LIQUID FILLED ORAL 2 TIMES DAILY
Status: DISCONTINUED | OUTPATIENT
Start: 2018-10-08 | End: 2018-10-09 | Stop reason: HOSPADM

## 2018-10-08 RX ORDER — SODIUM CHLORIDE, SODIUM LACTATE, POTASSIUM CHLORIDE, CALCIUM CHLORIDE 600; 310; 30; 20 MG/100ML; MG/100ML; MG/100ML; MG/100ML
INJECTION, SOLUTION INTRAVENOUS CONTINUOUS
Status: DISCONTINUED | OUTPATIENT
Start: 2018-10-08 | End: 2018-10-08

## 2018-10-08 RX ADMIN — POTASSIUM CHLORIDE 20 MEQ: 20 TABLET, EXTENDED RELEASE ORAL at 11:40

## 2018-10-08 RX ADMIN — ATORVASTATIN CALCIUM 40 MG: 40 TABLET, FILM COATED ORAL at 21:15

## 2018-10-08 RX ADMIN — PHYTONADIONE 10 MG: 10 INJECTION, EMULSION INTRAMUSCULAR; INTRAVENOUS; SUBCUTANEOUS at 11:41

## 2018-10-08 RX ADMIN — LEVOTHYROXINE SODIUM 50 MCG: 25 TABLET ORAL at 11:40

## 2018-10-08 RX ADMIN — DOCUSATE SODIUM 100 MG: 100 CAPSULE, LIQUID FILLED ORAL at 21:15

## 2018-10-08 RX ADMIN — SODIUM CHLORIDE, POTASSIUM CHLORIDE, SODIUM LACTATE AND CALCIUM CHLORIDE: 600; 310; 30; 20 INJECTION, SOLUTION INTRAVENOUS at 07:32

## 2018-10-08 RX ADMIN — Medication 10 ML: at 21:15

## 2018-10-08 RX ADMIN — TRAZODONE HYDROCHLORIDE 50 MG: 50 TABLET ORAL at 21:15

## 2018-10-08 RX ADMIN — TRAZODONE HYDROCHLORIDE 50 MG: 50 TABLET ORAL at 01:52

## 2018-10-08 RX ADMIN — FUROSEMIDE 20 MG: 20 TABLET ORAL at 11:41

## 2018-10-08 RX ADMIN — ACETAMINOPHEN 650 MG: 325 TABLET ORAL at 19:13

## 2018-10-08 RX ADMIN — AMIODARONE HYDROCHLORIDE 200 MG: 200 TABLET ORAL at 11:41

## 2018-10-08 RX ADMIN — METOPROLOL TARTRATE 25 MG: 25 TABLET ORAL at 21:15

## 2018-10-08 RX ADMIN — DILTIAZEM HYDROCHLORIDE 240 MG: 120 CAPSULE, COATED, EXTENDED RELEASE ORAL at 11:40

## 2018-10-08 RX ADMIN — PROPOFOL 750 MG: 10 INJECTION, EMULSION INTRAVENOUS at 07:44

## 2018-10-08 ASSESSMENT — PAIN SCALES - GENERAL
PAINLEVEL_OUTOF10: 0
PAINLEVEL_OUTOF10: 0
PAINLEVEL_OUTOF10: 3
PAINLEVEL_OUTOF10: 0

## 2018-10-08 ASSESSMENT — PAIN DESCRIPTION - ONSET: ONSET: ON-GOING

## 2018-10-08 ASSESSMENT — PAIN DESCRIPTION - ORIENTATION: ORIENTATION: LEFT;RIGHT

## 2018-10-08 ASSESSMENT — PAIN DESCRIPTION - LOCATION: LOCATION: SHOULDER

## 2018-10-08 ASSESSMENT — PAIN DESCRIPTION - DESCRIPTORS: DESCRIPTORS: ACHING

## 2018-10-08 ASSESSMENT — PAIN DESCRIPTION - FREQUENCY: FREQUENCY: CONTINUOUS

## 2018-10-08 ASSESSMENT — PAIN DESCRIPTION - PAIN TYPE: TYPE: CHRONIC PAIN

## 2018-10-08 ASSESSMENT — PAIN DESCRIPTION - PROGRESSION: CLINICAL_PROGRESSION: NOT CHANGED

## 2018-10-08 NOTE — ANESTHESIA POSTPROCEDURE EVALUATION
Department of Anesthesiology  Postprocedure Note    Patient: Bella Nettles  MRN: [id number]  YOB: 1940  Date of evaluation: 10/8/2018  Time:  8:33 AM     Procedure Summary     Date:  10/08/18 Room / Location:  [address]    Anesthesia Start:  8930 Anesthesia Stop:  1512    Procedure:  COLONOSCOPY WITH COLD ET HOT BIOPSIES ET POLYPECTOMIES (N/A ) Diagnosis:  (lower GI Bleed, bloody stools)    Surgeon:  Bevreley Moreno MD Responsible Provider:  XIOMARA Gibbs CRNA    Anesthesia Type:  MAC, TIVA ASA Status:  3          Anesthesia Type: MAC, TIVA    Shankar Phase I:      Shankar Phase II:      Last vitals: Reviewed and per EMR flowsheets.        Anesthesia Post Evaluation    Patient location during evaluation: PACU  Patient participation: complete - patient participated  Level of consciousness: awake and alert  Pain score: 0  Airway patency: patent  Nausea & Vomiting: no nausea and no vomiting  Complications: no  Cardiovascular status: blood pressure returned to baseline and hemodynamically stable  Respiratory status: acceptable, spontaneous ventilation and room air  Hydration status: euvolemic

## 2018-10-08 NOTE — PROGRESS NOTES
Bilaterally  GI/Abdomen: Obese, Bowel Sounds Present and Soft, Non-tender, without Guarding or Rebound Tenderness  : Not examined  Extremities/Musculoskeletal: BLE with chronic non-pitting edema  Skin: No Cyanosis, No rash and Skin warm and dry  Neuro: Alert and Oriented, to Person, to Time, to Place, to Situation and No Localizing Signs/Symptoms  Psychiatric: Normal mood and affect, slightly anxious at times      Assessment:    Active Problems:    GI bleed    Rectal bleeding  Resolved Problems:    * No resolved hospital problems. *          Plan:  1. GI bleed--per general surgery--plan for colonoscopy later today  2. PAF--in NSR  3.  See orders    Electronically signed by Gauri SOLANO, NPJunC, FNP-BC on 10/8/2018 at 1:25 AM    Hospitalist

## 2018-10-08 NOTE — PROGRESS NOTES
Physical Exam:  Vitals: /70   Pulse 56   Temp 97.6 °F (36.4 °C) (Oral)   Resp 18   Ht 5' 7\" (1.702 m)   Wt (!) 341 lb 4.8 oz (154.8 kg)   LMP 01/01/1968   SpO2 93%   BMI 53.46 kg/m²   24 hour intake/output:  Intake/Output Summary (Last 24 hours) at 10/08/18 1340  Last data filed at 10/08/18 0900   Gross per 24 hour   Intake             1656 ml   Output              250 ml   Net             1406 ml     Last 3 weights: Wt Readings from Last 3 Encounters:   10/08/18 (!) 341 lb 4.8 oz (154.8 kg)   10/06/18 (!) 348 lb (157.9 kg)   09/22/18 (!) 320 lb (145.2 kg)     HEENT: Normocephalic and Atraumatic  Neck: Supple, No Masses, Tenderness, Nodularity and No Lymphadenopathy  Chest/Lungs: Clear to Auscultation without Rales, Rhonchi, or Wheezes  Cardiac: Regular Rate and Rhythm  GI/Abdomen: Bowel Sounds Present, Soft, Non-tender, without Guarding or Rebound Tenderness, No Masses and No Tenderness  : Not examined  EXT/Skin: No Edema, No Cyanosis and No Clubbing  Neuro: generalized weakness----, Alert and Oriented and No Localizing Signs/Symptoms      Assessment:    Active Problems:    GI bleed    Rectal bleeding    Obesity    Hypertension    Glucose intolerance (impaired glucose tolerance)    A-fib (HCC)  Resolved Problems:    * No resolved hospital problems.  *     ANANTH GARCIA dc   FP  78   NICK [place];  [facility]]  FULL CODE      COUMADIN---held   NO ANTICOAGULATION--GI BLEEDING    POD _____ colonoscopy---10.8.2018--multiple polyps--large tumors distal sigmoid                       colon-one ulcerated--extensive diverticulosis----pathology pending  GI bleeding ---10.6.2018---likely due to colon carcinoma---large sigmoid masses--see above              CT abdomen-pelvis----106.2018--diverticulosis--no acute diverticulitis---chronic                        osseous changes LS spine-sacrum--anterolithesis L4--partial fusion                        L5-S1-----decreased right gluteal hematoma   Atrial fibrillation----has been in SR c. 1 year             RVR---newly diagnosed----7.19.2017----has had cardioversion              MI ruled out--7.20.2017             EKG---7.20.2017--atrial fibrillation--78---low voltage              EKG---7.19.2017--atrial fibrillation---RVR---111--diffuse low                         voltage--RSR V1-2 only---cannot exclude prior high                        lateral infarction  Increasing weakness with exertion---decreased exercise            tolerance----17.19.2017  ASCVD---see above   CHF----chronic diastolic   Atypical chest pain--aching right-left arms---history   Right rib fractures---contusions---due to fall----2018   Hypertension  Hyperlipidemia  Insulin resistance--impaired glucose tolerance  Morbid obesity  Suspected sleep apnea  Depression--anxiety   Dermatitis  Thrombocytopenia   PMH:  osteoarthritis, osteoporosis  PSH:   KARYN-BSO--cervix absent--1966, cholecystectomy---              open---c. 1960s, right varicose veins--c. 1960s, left               YUMI, right TKA, left TKA    Allergies:        penicillins                          Intolerances:  Betra--valdecoxib----diarrhea      Plan:  1.    See above----given GI bleeding and biopsies today, General Surgery requests no discharge today and monitor H&H  2.   See orders     Electronically signed by Raina Cruz on 10/8/2018 at 1:40 PM    Hospitalist

## 2018-10-08 NOTE — PROGRESS NOTES
Physical Therapy  Attempted PT eval this date. Patient declined PT eval, stating she does not have any current concerns with mobility, transfers, or ambulation. She uses a cane or walker at home. She states she has had a bad hip for awhile, but it only causes problems with standing up from a low chair, so she avoids this. Discontinue PT orders due to patient declining need for PT services at this time. Explained to patient that if she has any change of status or new concerns during her stay she can ask for a new PT eval. Patient and daughter verbally expressed understanding and agreement to this.     Jeovanny Hayes, PT, DPT

## 2018-10-09 ENCOUNTER — TELEPHONE (OUTPATIENT)
Dept: FAMILY MEDICINE CLINIC | Age: 78
End: 2018-10-09

## 2018-10-09 VITALS
SYSTOLIC BLOOD PRESSURE: 117 MMHG | RESPIRATION RATE: 16 BRPM | BODY MASS INDEX: 45.99 KG/M2 | HEIGHT: 67 IN | HEART RATE: 62 BPM | DIASTOLIC BLOOD PRESSURE: 57 MMHG | OXYGEN SATURATION: 97 % | TEMPERATURE: 97.7 F | WEIGHT: 293 LBS

## 2018-10-09 LAB
ABSOLUTE EOS #: 0.2 K/UL (ref 0–0.4)
ABSOLUTE IMMATURE GRANULOCYTE: ABNORMAL K/UL (ref 0–0.3)
ABSOLUTE LYMPH #: 1.5 K/UL (ref 1–4.8)
ABSOLUTE MONO #: 0.7 K/UL (ref 0.1–1.2)
ANION GAP SERPL CALCULATED.3IONS-SCNC: 9 MMOL/L (ref 9–17)
BASOPHILS # BLD: 0 % (ref 0–1)
BASOPHILS ABSOLUTE: 0 K/UL (ref 0–0.2)
BUN BLDV-MCNC: 9 MG/DL (ref 8–23)
BUN/CREAT BLD: 12 (ref 9–20)
CALCIUM SERPL-MCNC: 8.8 MG/DL (ref 8.6–10.4)
CHLORIDE BLD-SCNC: 106 MMOL/L (ref 98–107)
CO2: 28 MMOL/L (ref 20–31)
CREAT SERPL-MCNC: 0.76 MG/DL (ref 0.5–0.9)
DIFFERENTIAL TYPE: ABNORMAL
EOSINOPHILS RELATIVE PERCENT: 2 % (ref 1–7)
GFR AFRICAN AMERICAN: >60 ML/MIN
GFR NON-AFRICAN AMERICAN: >60 ML/MIN
GFR SERPL CREATININE-BSD FRML MDRD: ABNORMAL ML/MIN/{1.73_M2}
GFR SERPL CREATININE-BSD FRML MDRD: ABNORMAL ML/MIN/{1.73_M2}
GLUCOSE BLD-MCNC: 92 MG/DL (ref 70–99)
HCT VFR BLD CALC: 33.7 % (ref 36–46)
HEMOGLOBIN: 10.8 G/DL (ref 12–16)
IMMATURE GRANULOCYTES: ABNORMAL %
LYMPHOCYTES # BLD: 20 % (ref 16–46)
MCH RBC QN AUTO: 30 PG (ref 26–34)
MCHC RBC AUTO-ENTMCNC: 31.9 G/DL (ref 31–37)
MCV RBC AUTO: 94 FL (ref 80–100)
MONOCYTES # BLD: 10 % (ref 4–11)
NRBC AUTOMATED: ABNORMAL PER 100 WBC
PDW BLD-RTO: 15.9 % (ref 11–14.5)
PLATELET # BLD: 118 K/UL (ref 140–450)
PLATELET ESTIMATE: ABNORMAL
PMV BLD AUTO: 11.9 FL (ref 6–12)
POTASSIUM SERPL-SCNC: 3.6 MMOL/L (ref 3.7–5.3)
RBC # BLD: 3.59 M/UL (ref 4–5.2)
RBC # BLD: ABNORMAL 10*6/UL
SEG NEUTROPHILS: 68 % (ref 43–77)
SEGMENTED NEUTROPHILS ABSOLUTE COUNT: 4.9 K/UL (ref 1.8–7.7)
SODIUM BLD-SCNC: 143 MMOL/L (ref 135–144)
WBC # BLD: 7.3 K/UL (ref 3.5–11)
WBC # BLD: ABNORMAL 10*3/UL

## 2018-10-09 PROCEDURE — 80048 BASIC METABOLIC PNL TOTAL CA: CPT

## 2018-10-09 PROCEDURE — 2580000003 HC RX 258: Performed by: NURSE PRACTITIONER

## 2018-10-09 PROCEDURE — 99238 HOSP IP/OBS DSCHRG MGMT 30/<: CPT | Performed by: INTERNAL MEDICINE

## 2018-10-09 PROCEDURE — 85025 COMPLETE CBC W/AUTO DIFF WBC: CPT

## 2018-10-09 PROCEDURE — 6370000000 HC RX 637 (ALT 250 FOR IP): Performed by: INTERNAL MEDICINE

## 2018-10-09 PROCEDURE — 6370000000 HC RX 637 (ALT 250 FOR IP): Performed by: NURSE PRACTITIONER

## 2018-10-09 PROCEDURE — 94761 N-INVAS EAR/PLS OXIMETRY MLT: CPT

## 2018-10-09 PROCEDURE — 36415 COLL VENOUS BLD VENIPUNCTURE: CPT

## 2018-10-09 RX ORDER — POTASSIUM CHLORIDE 20 MEQ/1
40 TABLET, EXTENDED RELEASE ORAL ONCE
Status: COMPLETED | OUTPATIENT
Start: 2018-10-09 | End: 2018-10-09

## 2018-10-09 RX ORDER — PSEUDOEPHEDRINE HCL 30 MG
100 TABLET ORAL 2 TIMES DAILY
Refills: 0 | Status: ON HOLD | COMMUNITY
Start: 2018-10-09 | End: 2018-11-15 | Stop reason: HOSPADM

## 2018-10-09 RX ADMIN — METOPROLOL TARTRATE 25 MG: 25 TABLET ORAL at 09:54

## 2018-10-09 RX ADMIN — FUROSEMIDE 20 MG: 20 TABLET ORAL at 08:06

## 2018-10-09 RX ADMIN — POTASSIUM CHLORIDE 40 MEQ: 20 TABLET, EXTENDED RELEASE ORAL at 09:54

## 2018-10-09 RX ADMIN — POTASSIUM CHLORIDE 20 MEQ: 20 TABLET, EXTENDED RELEASE ORAL at 08:06

## 2018-10-09 RX ADMIN — AMIODARONE HYDROCHLORIDE 200 MG: 200 TABLET ORAL at 08:05

## 2018-10-09 RX ADMIN — DOCUSATE SODIUM 100 MG: 100 CAPSULE, LIQUID FILLED ORAL at 08:05

## 2018-10-09 RX ADMIN — Medication 10 ML: at 08:06

## 2018-10-09 RX ADMIN — LEVOTHYROXINE SODIUM 50 MCG: 25 TABLET ORAL at 08:05

## 2018-10-09 RX ADMIN — DILTIAZEM HYDROCHLORIDE 240 MG: 120 CAPSULE, COATED, EXTENDED RELEASE ORAL at 08:05

## 2018-10-09 ASSESSMENT — PAIN SCALES - GENERAL
PAINLEVEL_OUTOF10: 0
PAINLEVEL_OUTOF10: 0

## 2018-10-09 NOTE — PROGRESS NOTES
Intravenous Q6H PRN Re Alonso, APRN - CNP         Allergies   Allergen Reactions    Pcn [Penicillins] Hives and Shortness Of Breath    Bextra [Valdecoxib] Diarrhea     Active Problems:    GI bleed    Rectal bleeding    Obesity    Hypertension    Glucose intolerance (impaired glucose tolerance)    A-fib ([facility])  Resolved Problems:    * No resolved hospital problems. *    Blood pressure 113/60, pulse 69, temperature 99 °F (37.2 °C), temperature source Tympanic, resp. rate 18, height 5' 7\" (1.702 m), weight (!) 341 lb 4.8 oz (154.8 kg), last menstrual period 01/01/1968, SpO2 92 %. Subjective  Patient is doing fairly well. No new complaints. Objective  Patient is well appearing in no acute distress. The patient is breathing easily, heart is RRR. The patient's lower extremities have a full ROM.     Assessment & Plan  GI bleed - monitor H&H, await pathology on tumors    Copeland RICH Wilkerson  10/9/2018

## 2018-10-09 NOTE — PROGRESS NOTES
fractures---contusions---due to fall----2018   Hypertension  Hyperlipidemia  Insulin resistance--impaired glucose tolerance  Morbid obesity  Suspected sleep apnea  Depression--anxiety   Dermatitis  Thrombocytopenia   PMH:  osteoarthritis, osteoporosis  PSH:   KARYN-BSO--cervix absent--1966, cholecystectomy---              open---c. 1960s, right varicose veins--c. 1960s, left               YUMI, right TKA, left TKA    Allergies:        penicillins  Intolerances:  Betra--valdecoxib----diarrhea

## 2018-10-09 NOTE — DISCHARGE INSTR - DIET

## 2018-10-10 ENCOUNTER — CARE COORDINATION (OUTPATIENT)
Dept: CASE MANAGEMENT | Age: 78
End: 2018-10-10

## 2018-10-10 DIAGNOSIS — K62.5 RECTAL BLEEDING: Primary | ICD-10-CM

## 2018-10-10 PROCEDURE — 1111F DSCHRG MED/CURRENT MED MERGE: CPT | Performed by: FAMILY MEDICINE

## 2018-10-12 LAB — SURGICAL PATHOLOGY REPORT: NORMAL

## 2018-10-16 ENCOUNTER — TELEPHONE (OUTPATIENT)
Dept: FAMILY MEDICINE CLINIC | Age: 78
End: 2018-10-16

## 2018-10-19 ENCOUNTER — TELEPHONE (OUTPATIENT)
Dept: SURGERY | Age: 78
End: 2018-10-19

## 2018-10-19 ENCOUNTER — OFFICE VISIT (OUTPATIENT)
Dept: SURGERY | Age: 78
End: 2018-10-19
Payer: MEDICARE

## 2018-10-19 ENCOUNTER — OFFICE VISIT (OUTPATIENT)
Dept: FAMILY MEDICINE CLINIC | Age: 78
End: 2018-10-19
Payer: MEDICARE

## 2018-10-19 VITALS — SYSTOLIC BLOOD PRESSURE: 110 MMHG | TEMPERATURE: 96.7 F | DIASTOLIC BLOOD PRESSURE: 68 MMHG | HEART RATE: 62 BPM

## 2018-10-19 VITALS
BODY MASS INDEX: 53.52 KG/M2 | HEIGHT: 67 IN | SYSTOLIC BLOOD PRESSURE: 120 MMHG | HEART RATE: 68 BPM | RESPIRATION RATE: 18 BRPM | DIASTOLIC BLOOD PRESSURE: 66 MMHG

## 2018-10-19 DIAGNOSIS — C18.9 ADENOCARCINOMA, COLON (HCC): Primary | ICD-10-CM

## 2018-10-19 DIAGNOSIS — C18.7 MALIGNANT NEOPLASM OF SIGMOID COLON (HCC): Primary | ICD-10-CM

## 2018-10-19 DIAGNOSIS — K62.5 RECTAL BLEEDING: ICD-10-CM

## 2018-10-19 DIAGNOSIS — R25.1 TREMOR: ICD-10-CM

## 2018-10-19 DIAGNOSIS — I48.0 PAROXYSMAL ATRIAL FIBRILLATION (HCC): ICD-10-CM

## 2018-10-19 DIAGNOSIS — Z01.818 PRE-OP TESTING: ICD-10-CM

## 2018-10-19 DIAGNOSIS — R29.6 RECURRENT FALLS: ICD-10-CM

## 2018-10-19 DIAGNOSIS — M15.9 PRIMARY OSTEOARTHRITIS INVOLVING MULTIPLE JOINTS: ICD-10-CM

## 2018-10-19 PROCEDURE — 1090F PRES/ABSN URINE INCON ASSESS: CPT | Performed by: SURGERY

## 2018-10-19 PROCEDURE — 99495 TRANSJ CARE MGMT MOD F2F 14D: CPT | Performed by: FAMILY MEDICINE

## 2018-10-19 PROCEDURE — 4040F PNEUMOC VAC/ADMIN/RCVD: CPT | Performed by: SURGERY

## 2018-10-19 PROCEDURE — 1101F PT FALLS ASSESS-DOCD LE1/YR: CPT | Performed by: SURGERY

## 2018-10-19 PROCEDURE — G8482 FLU IMMUNIZE ORDER/ADMIN: HCPCS | Performed by: SURGERY

## 2018-10-19 PROCEDURE — G8417 CALC BMI ABV UP PARAM F/U: HCPCS | Performed by: SURGERY

## 2018-10-19 PROCEDURE — 99214 OFFICE O/P EST MOD 30 MIN: CPT | Performed by: SURGERY

## 2018-10-19 PROCEDURE — G8399 PT W/DXA RESULTS DOCUMENT: HCPCS | Performed by: SURGERY

## 2018-10-19 PROCEDURE — 1111F DSCHRG MED/CURRENT MED MERGE: CPT | Performed by: SURGERY

## 2018-10-19 PROCEDURE — 1123F ACP DISCUSS/DSCN MKR DOCD: CPT | Performed by: SURGERY

## 2018-10-19 PROCEDURE — 1111F DSCHRG MED/CURRENT MED MERGE: CPT | Performed by: FAMILY MEDICINE

## 2018-10-19 PROCEDURE — G8427 DOCREV CUR MEDS BY ELIG CLIN: HCPCS | Performed by: SURGERY

## 2018-10-19 PROCEDURE — 1036F TOBACCO NON-USER: CPT | Performed by: SURGERY

## 2018-10-19 RX ORDER — ERYTHROMYCIN 500 MG/1
TABLET, COATED ORAL
Qty: 6 TABLET | Refills: 0 | Status: CANCELLED | OUTPATIENT
Start: 2018-10-19

## 2018-10-19 RX ORDER — POLYETHYLENE GLYCOL 3350, SODIUM CHLORIDE, SODIUM BICARBONATE, POTASSIUM CHLORIDE 420; 11.2; 5.72; 1.48 G/4L; G/4L; G/4L; G/4L
POWDER, FOR SOLUTION ORAL
Qty: 1 BOTTLE | Refills: 0 | Status: SHIPPED | OUTPATIENT
Start: 2018-10-19 | End: 2018-10-26 | Stop reason: ALTCHOICE

## 2018-10-19 RX ORDER — NEOMYCIN SULFATE 500 MG/1
TABLET ORAL
Qty: 40 TABLET | Refills: 0 | Status: ON HOLD | OUTPATIENT
Start: 2018-10-19 | End: 2018-11-09 | Stop reason: HOSPADM

## 2018-10-19 RX ORDER — HYDROCODONE BITARTRATE AND ACETAMINOPHEN 5; 325 MG/1; MG/1
1 TABLET ORAL EVERY 6 HOURS PRN
Status: ON HOLD | COMMUNITY
End: 2018-11-09

## 2018-10-22 PROBLEM — N39.0 UTI (URINARY TRACT INFECTION): Status: RESOLVED | Noted: 2018-09-22 | Resolved: 2018-10-22

## 2018-10-23 ENCOUNTER — HOSPITAL ENCOUNTER (OUTPATIENT)
Dept: NON INVASIVE DIAGNOSTICS | Age: 78
Discharge: HOME | End: 2018-10-23
Payer: MEDICARE

## 2018-10-23 ENCOUNTER — TELEPHONE (OUTPATIENT)
Dept: SURGERY | Age: 78
End: 2018-10-23

## 2018-10-23 ENCOUNTER — HOSPITAL ENCOUNTER (OUTPATIENT)
Dept: GENERAL RADIOLOGY | Age: 78
Discharge: HOME | End: 2018-10-25
Payer: MEDICARE

## 2018-10-23 DIAGNOSIS — Z01.818 PRE-OP TESTING: ICD-10-CM

## 2018-10-23 LAB
EKG ATRIAL RATE: 51 BPM
EKG P AXIS: 45 DEGREES
EKG P-R INTERVAL: 234 MS
EKG Q-T INTERVAL: 532 MS
EKG QRS DURATION: 100 MS
EKG QTC CALCULATION (BAZETT): 490 MS
EKG R AXIS: -16 DEGREES
EKG T AXIS: 7 DEGREES
EKG VENTRICULAR RATE: 51 BPM

## 2018-10-23 PROCEDURE — 71046 X-RAY EXAM CHEST 2 VIEWS: CPT

## 2018-10-23 PROCEDURE — 93005 ELECTROCARDIOGRAM TRACING: CPT

## 2018-10-23 NOTE — TELEPHONE ENCOUNTER
I believe [facility] has already cleared this pt.
[unfilled]    Weight:   Wt Readings from Last 1 Encounters:   10/09/18 (!) 341 lb 11.2 oz (155 kg)     Height:   Ht Readings from Last 1 Encounters:   10/19/18 5' 7\" (1.702 m)      BMI:  There is no height or weight on file to calculate BMI. Estimated body mass index is 53.52 kg/m² as calculated from the following:    Height as of 10/19/18: 5' 7\" (1.702 m). Weight as of 10/9/18: 341 lb 11.2 oz (155 kg). body mass index is unknown because there is no height or weight on file. Cardiac Clearance: None   Medical Clearance:None   Appointment for surgery Clearance scheduled for:None     Preoperative Testing: These are the current and completed labs:  CBC:   Lab Results   Component Value Date    WBC 7.3 10/09/2018    RBC 3.59 10/09/2018    HGB 10.8 10/09/2018    HCT 33.7 10/09/2018    MCV 94.0 10/09/2018    RDW 15.9 10/09/2018     10/09/2018     CMP:   Lab Results   Component Value Date     10/09/2018    K 3.6 10/09/2018     10/09/2018    CO2 28 10/09/2018    BUN 9 10/09/2018    CREATININE 0.76 10/09/2018    GFRAA >60 10/09/2018    LABGLOM >60 10/09/2018    GLUCOSE 92 10/09/2018    PROT 6.4 10/06/2018    CALCIUM 8.8 10/09/2018    BILITOT 0.34 10/06/2018    ALKPHOS 121 10/06/2018    AST 24 10/06/2018    ALT 16 10/06/2018     POC Tests: No results for input(s): POCGLU, POCNA, POCK, POCCL, POCBUN, POCHEMO, POCHCT in the last 72 hours.   Coags    Lab Results   Component Value Date    PROTIME 16.6 10/08/2018    INR 1.6 10/08/2018    APTT 34.8 14/16/0549     HCG (If Applicable) No results found for: PREGTESTUR, PREGSERUM, HCG, HCGQUANT   ABGs No results found for: PHART, PO2ART, DSJ1SJO, ECN5YQO, BEART, T0CYPSKT   Type & Screen (If Applicable)  No results found for: Erick Zhang    Additional ordered pre-operative testing:  [x]CBC    []ABG      [x] BMP   []URINALYSIS   []CMP    []HCG   []COAGS PT/INR  []T&C  []LFTs   []TYPE AND SCREEN    [x] EKG  [x] Chest X-Ray  [] Other Radiology    [x] Sent

## 2018-10-24 ENCOUNTER — TELEPHONE (OUTPATIENT)
Dept: FAMILY MEDICINE CLINIC | Age: 78
End: 2018-10-24

## 2018-10-24 PROBLEM — C18.9 ADENOCARCINOMA, COLON (HCC): Status: ACTIVE | Noted: 2018-10-24

## 2018-10-24 ASSESSMENT — ENCOUNTER SYMPTOMS
TROUBLE SWALLOWING: 0
CONSTIPATION: 0
BLOOD IN STOOL: 0
RHINORRHEA: 0
EYE DISCHARGE: 0
SINUS PRESSURE: 0
VOMITING: 0
ABDOMINAL PAIN: 0
COUGH: 0
NAUSEA: 0
WHEEZING: 0
DIARRHEA: 0
EYE REDNESS: 0
BACK PAIN: 1
SORE THROAT: 0
SHORTNESS OF BREATH: 0

## 2018-10-24 NOTE — PROGRESS NOTES
tablet 5    levothyroxine (SYNTHROID) 50 MCG tablet TAKE 1 TABLET BY MOUTH ONE TIME A DAY  30 tablet 1    atorvastatin (LIPITOR) 40 MG tablet TAKE ONE TABLET BY MOUTH NIGHTLY 90 tablet 1    amiodarone (CORDARONE) 200 MG tablet TAKE 1 TABLET BY MOUTH ONE TIME A DAY  30 tablet 5    diltiazem (CARDIZEM CD) 240 MG extended release capsule TAKE 1 CAPSULE BY MOUTH ONE TIME A DAY  30 capsule 5    metoprolol tartrate (LOPRESSOR) 25 MG tablet TAKE ONE TABLET BY MOUTH TWICE A DAY 60 tablet 6    potassium chloride (KLOR-CON M) 20 MEQ extended release tablet Take 1 tablet by mouth daily 30 tablet 5        Medications patient taking as of now reconciled against medications ordered at time of hospital discharge: Yes    Chief Complaint   Patient presents with    Follow-Up from [facility] 10/6-10/9/18 rectal bleed; newly dx colon cancer; sees surgeon today   Quincy Jamil     family states she has been increasingly shakey-hands/arms       HPI  Patient comes in today with her son, daughter-in-law, and her daughter for follow-up from recent hospitalization secondary to rectal bleed. Patient did have acute onset rectal bleeding on October 6, 2018. At that time was brought into the emergency room and did have a CT scan of the abdomen and pelvis. CT scan did not show any obvious abnormality but she was admitted for further management and care. She does have a known history of atrial fibrillation and has remained in normal sinus rhythm since having cardioversion done in 2017. Has been on Coumadin therapy however since initial diagnosis of atrial fibrillation. Due to the acute GI bleeding the Coumadin has been stopped. She did undergo colonoscopy evaluation on October 8, 2018 while inpatient which did discover multiple polyps and a tumor of the distal sigmoid colon. Biopsies were performed which did confirm Adenocarcinoma of the colon.   Patient is scheduled to see Dr. Fariha Whitlock after my visit today for further discussion appropriately fit for her size. Patient is morbidly obese and the scooter that she has is not adequate for her size. We did discuss referring her to a wheelchair/scooter clinic that can do further assessment and fitting for appropriate scooter. She seems agreeable with pursuing this. Otherwise today no other acute medical concerns. .  Inpatient course: Discharge summary reviewed- see chart. Interval history/Current status: see HPI as noted above. Review of Systems   Constitutional: Negative for chills, fatigue and fever. HENT: Negative for congestion, ear pain, postnasal drip, rhinorrhea, sinus pressure, sore throat and trouble swallowing. Eyes: Negative for discharge and redness. Respiratory: Negative for cough, shortness of breath and wheezing. Cardiovascular: Negative for chest pain. Gastrointestinal: Negative for abdominal pain, blood in stool (none currently, but did have upon admission), constipation, diarrhea, nausea and vomiting. Genitourinary: Negative. Musculoskeletal: Positive for arthralgias, back pain, gait problem (instability, weakness and frequent falls) and myalgias. Negative for neck pain. Skin: Negative for rash and wound. Allergic/Immunologic: Negative for environmental allergies. Neurological: Positive for tremors and weakness. Negative for dizziness, light-headedness and headaches. Hematological: Negative for adenopathy. Psychiatric/Behavioral: Negative. Vitals:    10/19/18 1412   BP: 120/66   Site: Left Lower Arm   Position: Sitting   Cuff Size: Medium Adult   Pulse: 68   Resp: 18   Height: 5' 7\" (1.702 m)     Body mass index is 53.52 kg/m².    Wt Readings from Last 3 Encounters:   10/09/18 (!) 341 lb 11.2 oz (155 kg)   10/06/18 (!) 348 lb (157.9 kg)   09/22/18 (!) 320 lb (145.2 kg)     BP Readings from Last 3 Encounters:   10/19/18 110/68   10/19/18 120/66   10/09/18 (!) 117/57       Physical Exam   Constitutional: She is oriented to person, place, and time. She appears well-developed and well-nourished. No distress. HENT:   Head: Normocephalic and atraumatic. Right Ear: External ear normal.   Left Ear: External ear normal.   Nose: Nose normal.   Mouth/Throat: Oropharynx is clear and moist. No oropharyngeal exudate. Eyes: Pupils are equal, round, and reactive to light. Conjunctivae and EOM are normal. Right eye exhibits no discharge. Left eye exhibits no discharge. Neck: Normal range of motion. Neck supple. No thyromegaly present. Cardiovascular: Normal rate, regular rhythm and normal heart sounds. Pulmonary/Chest: Effort normal and breath sounds normal. She has no wheezes. She has no rales. Abdominal: Soft. Bowel sounds are normal.   Musculoskeletal: She exhibits no edema. Lymphadenopathy:     She has no cervical adenopathy. Neurological: She is alert and oriented to person, place, and time. Skin: Skin is warm and dry. No rash noted. She is not diaphoretic. Psychiatric: She has a normal mood and affect. Her behavior is normal. Judgment and thought content normal.   Nursing note and vitals reviewed. Assessment/Plan:  1. Adenocarcinoma, colon ([facility] [address]  To follow-up with Dr. Tessy Arevalo to discuss further surgical intervention.  - CO DISCHARGE MEDS RECONCILED W/ CURRENT OUTPATIENT MED LIST    2. Rectal bleeding  None currently since stopping Coumadin therapy. Will remain off Coumadin at this time. - CO DISCHARGE MEDS RECONCILED W/ CURRENT OUTPATIENT MED LIST    3. Recurrent falls  Did discuss home safety tips including using walker with ambulation. Does not seem to get enough benefit from using a cane alone. Would consider further intervention with physical therapy for strengthening and also neurology opinion regarding her other issues with gait instability and tremor.   At this time I do think we need to wait for her upcoming surgical intervention for treatment of the colon adenocarcinoma and then can address gait instability and

## 2018-10-24 NOTE — TELEPHONE ENCOUNTER
Called patient to coordinate referral to the seating clinic at [facility] for assessement for a wheelchair/scooter. Patient states she is going to hold on this for now as she is having surgery on November 5, 2018. She will let me know after surgery and when she recooperates to set this up. [address] [place] [address] at [facility] -[phone], fax [phone]. Would just need a referral to seating clinic to get fitted for wheelchair or scooter.

## 2018-10-26 ENCOUNTER — OFFICE VISIT (OUTPATIENT)
Dept: CARDIOLOGY CLINIC | Age: 78
End: 2018-10-26
Payer: MEDICARE

## 2018-10-26 ENCOUNTER — ANTI-COAG VISIT (OUTPATIENT)
Dept: FAMILY MEDICINE CLINIC | Age: 78
End: 2018-10-26

## 2018-10-26 VITALS
SYSTOLIC BLOOD PRESSURE: 100 MMHG | BODY MASS INDEX: 45.99 KG/M2 | WEIGHT: 293 LBS | HEART RATE: 58 BPM | HEIGHT: 67 IN | DIASTOLIC BLOOD PRESSURE: 75 MMHG

## 2018-10-26 DIAGNOSIS — Z01.810 PREOPERATIVE CARDIOVASCULAR EXAMINATION: Primary | ICD-10-CM

## 2018-10-26 DIAGNOSIS — I25.10 CORONARY ARTERY DISEASE INVOLVING NATIVE CORONARY ARTERY OF NATIVE HEART WITHOUT ANGINA PECTORIS: ICD-10-CM

## 2018-10-26 DIAGNOSIS — I10 ESSENTIAL HYPERTENSION: ICD-10-CM

## 2018-10-26 DIAGNOSIS — I48.0 PAROXYSMAL ATRIAL FIBRILLATION (HCC): ICD-10-CM

## 2018-10-26 DIAGNOSIS — E78.5 DYSLIPIDEMIA: ICD-10-CM

## 2018-10-26 PROCEDURE — 99213 OFFICE O/P EST LOW 20 MIN: CPT | Performed by: INTERNAL MEDICINE

## 2018-10-29 ENCOUNTER — CARE COORDINATION (OUTPATIENT)
Dept: CASE MANAGEMENT | Age: 78
End: 2018-10-29

## 2018-10-29 RX ORDER — LEVOTHYROXINE SODIUM 0.05 MG/1
TABLET ORAL
Qty: 90 TABLET | Refills: 1 | Status: SHIPPED | OUTPATIENT
Start: 2018-10-29 | End: 2019-05-26 | Stop reason: SDUPTHER

## 2018-10-29 NOTE — TELEPHONE ENCOUNTER
Jennie Kiran called requesting a refill of the below medication which has been pended for you:     Requested Prescriptions     Pending Prescriptions Disp Refills    levothyroxine (SYNTHROID) 50 MCG tablet [Pharmacy Med Name: Levothyroxine Sodium Oral Tablet 50 MCG] 30 tablet 0     Sig: TAKE 1 TABLET BY MOUTH ONE TIME A DAY       Last Appointment Date: 10/19/2018  Next Appointment Date: 2/26/2019    Allergies   Allergen Reactions    Pcn [Penicillins] Hives and Shortness Of Breath    Bextra [Valdecoxib] Diarrhea

## 2018-11-03 ENCOUNTER — HOSPITAL ENCOUNTER (OUTPATIENT)
Age: 78
Setting detail: SPECIMEN
Discharge: HOME | DRG: 330 | End: 2018-11-03
Payer: MEDICARE

## 2018-11-03 DIAGNOSIS — Z01.818 PRE-OP TESTING: ICD-10-CM

## 2018-11-03 LAB
ABO/RH: NORMAL
ANTIBODY SCREEN: NEGATIVE
ARM BAND NUMBER: NORMAL
EXPIRATION DATE: NORMAL

## 2018-11-03 PROCEDURE — 36415 COLL VENOUS BLD VENIPUNCTURE: CPT

## 2018-11-03 PROCEDURE — 86901 BLOOD TYPING SEROLOGIC RH(D): CPT

## 2018-11-03 PROCEDURE — 86900 BLOOD TYPING SEROLOGIC ABO: CPT

## 2018-11-03 PROCEDURE — 86850 RBC ANTIBODY SCREEN: CPT

## 2018-11-05 ENCOUNTER — ANESTHESIA EVENT (OUTPATIENT)
Dept: OPERATING ROOM | Age: 78
DRG: 330 | End: 2018-11-05
Payer: MEDICARE

## 2018-11-05 ENCOUNTER — ANESTHESIA (OUTPATIENT)
Dept: OPERATING ROOM | Age: 78
DRG: 330 | End: 2018-11-05
Payer: MEDICARE

## 2018-11-05 ENCOUNTER — HOSPITAL ENCOUNTER (INPATIENT)
Age: 78
LOS: 4 days | Discharge: HOME | DRG: 330 | End: 2018-11-09
Attending: SURGERY | Admitting: SURGERY
Payer: MEDICARE

## 2018-11-05 VITALS — OXYGEN SATURATION: 100 % | SYSTOLIC BLOOD PRESSURE: 102 MMHG | TEMPERATURE: 95 F | DIASTOLIC BLOOD PRESSURE: 57 MMHG

## 2018-11-05 DIAGNOSIS — C18.7 MALIGNANT NEOPLASM OF SIGMOID COLON (HCC): Primary | ICD-10-CM

## 2018-11-05 PROBLEM — C18.9 COLON CANCER (HCC): Status: ACTIVE | Noted: 2018-11-05

## 2018-11-05 LAB
ABSOLUTE EOS #: 0 K/UL (ref 0–0.4)
ABSOLUTE EOS #: 0.11 K/UL (ref 0–0.4)
ABSOLUTE IMMATURE GRANULOCYTE: ABNORMAL K/UL (ref 0–0.3)
ABSOLUTE IMMATURE GRANULOCYTE: ABNORMAL K/UL (ref 0–0.3)
ABSOLUTE LYMPH #: 1.24 K/UL (ref 1–4.8)
ABSOLUTE LYMPH #: 2.3 K/UL (ref 1–4.8)
ABSOLUTE MONO #: 0.68 K/UL (ref 0.1–1.2)
ABSOLUTE MONO #: 1 K/UL (ref 0.1–1.2)
ANION GAP SERPL CALCULATED.3IONS-SCNC: 12 MMOL/L (ref 9–17)
BASOPHILS # BLD: 0 % (ref 0–1)
BASOPHILS # BLD: 0 % (ref 0–1)
BASOPHILS ABSOLUTE: 0 K/UL (ref 0–0.2)
BASOPHILS ABSOLUTE: 0.1 K/UL (ref 0–0.2)
BUN BLDV-MCNC: 15 MG/DL (ref 8–23)
BUN/CREAT BLD: 19 (ref 9–20)
CALCIUM SERPL-MCNC: 8.3 MG/DL (ref 8.6–10.4)
CHLORIDE BLD-SCNC: 110 MMOL/L (ref 98–107)
CO2: 22 MMOL/L (ref 20–31)
CREAT SERPL-MCNC: 0.8 MG/DL (ref 0.5–0.9)
DIFFERENTIAL TYPE: ABNORMAL
DIFFERENTIAL TYPE: ABNORMAL
EOSINOPHILS RELATIVE PERCENT: 0 % (ref 1–7)
EOSINOPHILS RELATIVE PERCENT: 1 % (ref 1–7)
GFR AFRICAN AMERICAN: >60 ML/MIN
GFR NON-AFRICAN AMERICAN: >60 ML/MIN
GFR SERPL CREATININE-BSD FRML MDRD: ABNORMAL ML/MIN/{1.73_M2}
GFR SERPL CREATININE-BSD FRML MDRD: ABNORMAL ML/MIN/{1.73_M2}
GLUCOSE BLD-MCNC: 140 MG/DL (ref 70–99)
GLUCOSE BLD-MCNC: 98 MG/DL (ref 65–105)
HCT VFR BLD CALC: 33 % (ref 36–46)
HCT VFR BLD CALC: 36.1 % (ref 36–46)
HEMOGLOBIN: 10.4 G/DL (ref 12–16)
HEMOGLOBIN: 11.4 G/DL (ref 12–16)
IMMATURE GRANULOCYTES: ABNORMAL %
IMMATURE GRANULOCYTES: ABNORMAL %
LYMPHOCYTES # BLD: 11 % (ref 16–46)
LYMPHOCYTES # BLD: 17 % (ref 16–46)
MCH RBC QN AUTO: 29.9 PG (ref 26–34)
MCH RBC QN AUTO: 30.1 PG (ref 26–34)
MCHC RBC AUTO-ENTMCNC: 31.5 G/DL (ref 31–37)
MCHC RBC AUTO-ENTMCNC: 31.6 G/DL (ref 31–37)
MCV RBC AUTO: 94.9 FL (ref 80–100)
MCV RBC AUTO: 95.2 FL (ref 80–100)
MONOCYTES # BLD: 6 % (ref 4–11)
MONOCYTES # BLD: 8 % (ref 4–11)
MORPHOLOGY: ABNORMAL
NRBC AUTOMATED: ABNORMAL PER 100 WBC
NRBC AUTOMATED: ABNORMAL PER 100 WBC
PDW BLD-RTO: 15.6 % (ref 11–14.5)
PDW BLD-RTO: 15.9 % (ref 11–14.5)
PLATELET # BLD: 100 K/UL (ref 140–450)
PLATELET # BLD: 132 K/UL (ref 140–450)
PLATELET ESTIMATE: ABNORMAL
PLATELET ESTIMATE: ABNORMAL
PMV BLD AUTO: 12.3 FL (ref 6–12)
PMV BLD AUTO: 12.4 FL (ref 6–12)
POTASSIUM SERPL-SCNC: 3.3 MMOL/L (ref 3.7–5.3)
RBC # BLD: 3.47 M/UL (ref 4–5.2)
RBC # BLD: 3.8 M/UL (ref 4–5.2)
RBC # BLD: ABNORMAL 10*6/UL
RBC # BLD: ABNORMAL 10*6/UL
SEG NEUTROPHILS: 75 % (ref 43–77)
SEG NEUTROPHILS: 82 % (ref 43–77)
SEGMENTED NEUTROPHILS ABSOLUTE COUNT: 9.27 K/UL (ref 1.8–7.7)
SEGMENTED NEUTROPHILS ABSOLUTE COUNT: 9.9 K/UL (ref 1.8–7.7)
SODIUM BLD-SCNC: 144 MMOL/L (ref 135–144)
WBC # BLD: 11.3 K/UL (ref 3.5–11)
WBC # BLD: 13.4 K/UL (ref 3.5–11)
WBC # BLD: ABNORMAL 10*3/UL
WBC # BLD: ABNORMAL 10*3/UL

## 2018-11-05 PROCEDURE — 85018 HEMOGLOBIN: CPT

## 2018-11-05 PROCEDURE — 3700000000 HC ANESTHESIA ATTENDED CARE: Performed by: SURGERY

## 2018-11-05 PROCEDURE — 0DBN0ZZ EXCISION OF SIGMOID COLON, OPEN APPROACH: ICD-10-PCS | Performed by: SURGERY

## 2018-11-05 PROCEDURE — 2500000003 HC RX 250 WO HCPCS

## 2018-11-05 PROCEDURE — 3600000014 HC SURGERY LEVEL 4 ADDTL 15MIN: Performed by: SURGERY

## 2018-11-05 PROCEDURE — 88309 TISSUE EXAM BY PATHOLOGIST: CPT

## 2018-11-05 PROCEDURE — 2500000003 HC RX 250 WO HCPCS: Performed by: NURSE ANESTHETIST, CERTIFIED REGISTERED

## 2018-11-05 PROCEDURE — 7100000000 HC PACU RECOVERY - FIRST 15 MIN: Performed by: SURGERY

## 2018-11-05 PROCEDURE — 0D1B0Z4 BYPASS ILEUM TO CUTANEOUS, OPEN APPROACH: ICD-10-PCS | Performed by: SURGERY

## 2018-11-05 PROCEDURE — 85025 COMPLETE CBC W/AUTO DIFF WBC: CPT

## 2018-11-05 PROCEDURE — 3E0T3BZ INTRODUCTION OF ANESTHETIC AGENT INTO PERIPHERAL NERVES AND PLEXI, PERCUTANEOUS APPROACH: ICD-10-PCS | Performed by: NURSE ANESTHETIST, CERTIFIED REGISTERED

## 2018-11-05 PROCEDURE — 99223 1ST HOSP IP/OBS HIGH 75: CPT | Performed by: INTERNAL MEDICINE

## 2018-11-05 PROCEDURE — 6360000002 HC RX W HCPCS: Performed by: SURGERY

## 2018-11-05 PROCEDURE — 6360000002 HC RX W HCPCS: Performed by: INTERNAL MEDICINE

## 2018-11-05 PROCEDURE — 6360000002 HC RX W HCPCS: Performed by: NURSE ANESTHETIST, CERTIFIED REGISTERED

## 2018-11-05 PROCEDURE — 2700000000 HC OXYGEN THERAPY PER DAY

## 2018-11-05 PROCEDURE — 07BB0ZX EXCISION OF MESENTERIC LYMPHATIC, OPEN APPROACH, DIAGNOSTIC: ICD-10-PCS | Performed by: SURGERY

## 2018-11-05 PROCEDURE — 2580000003 HC RX 258: Performed by: SURGERY

## 2018-11-05 PROCEDURE — 94761 N-INVAS EAR/PLS OXIMETRY MLT: CPT

## 2018-11-05 PROCEDURE — 3600000004 HC SURGERY LEVEL 4 BASE: Performed by: SURGERY

## 2018-11-05 PROCEDURE — 44310 ILEOSTOMY/JEJUNOSTOMY: CPT | Performed by: SURGERY

## 2018-11-05 PROCEDURE — 2709999900 HC NON-CHARGEABLE SUPPLY: Performed by: SURGERY

## 2018-11-05 PROCEDURE — 82947 ASSAY GLUCOSE BLOOD QUANT: CPT

## 2018-11-05 PROCEDURE — 6370000000 HC RX 637 (ALT 250 FOR IP): Performed by: NURSE ANESTHETIST, CERTIFIED REGISTERED

## 2018-11-05 PROCEDURE — 6370000000 HC RX 637 (ALT 250 FOR IP): Performed by: SURGERY

## 2018-11-05 PROCEDURE — 7100000001 HC PACU RECOVERY - ADDTL 15 MIN: Performed by: SURGERY

## 2018-11-05 PROCEDURE — 00790 ANES IPER UPR ABD NOS: CPT | Performed by: NURSE ANESTHETIST, CERTIFIED REGISTERED

## 2018-11-05 PROCEDURE — 80048 BASIC METABOLIC PNL TOTAL CA: CPT

## 2018-11-05 PROCEDURE — 94664 DEMO&/EVAL PT USE INHALER: CPT

## 2018-11-05 PROCEDURE — 94150 VITAL CAPACITY TEST: CPT

## 2018-11-05 PROCEDURE — 44145 PARTIAL REMOVAL OF COLON: CPT | Performed by: SURGERY

## 2018-11-05 PROCEDURE — 2060000000 HC ICU INTERMEDIATE R&B

## 2018-11-05 PROCEDURE — 94640 AIRWAY INHALATION TREATMENT: CPT

## 2018-11-05 PROCEDURE — 2720000010 HC SURG SUPPLY STERILE: Performed by: SURGERY

## 2018-11-05 PROCEDURE — 6360000002 HC RX W HCPCS

## 2018-11-05 PROCEDURE — 36415 COLL VENOUS BLD VENIPUNCTURE: CPT

## 2018-11-05 PROCEDURE — 6370000000 HC RX 637 (ALT 250 FOR IP): Performed by: INTERNAL MEDICINE

## 2018-11-05 PROCEDURE — 3700000001 HC ADD 15 MINUTES (ANESTHESIA): Performed by: SURGERY

## 2018-11-05 PROCEDURE — 64488 TAP BLOCK BI INJECTION: CPT | Performed by: NURSE ANESTHETIST, CERTIFIED REGISTERED

## 2018-11-05 PROCEDURE — 94660 CPAP INITIATION&MGMT: CPT

## 2018-11-05 PROCEDURE — 0DBP0ZZ EXCISION OF RECTUM, OPEN APPROACH: ICD-10-PCS | Performed by: SURGERY

## 2018-11-05 PROCEDURE — 2580000003 HC RX 258: Performed by: NURSE ANESTHETIST, CERTIFIED REGISTERED

## 2018-11-05 PROCEDURE — 85014 HEMATOCRIT: CPT

## 2018-11-05 PROCEDURE — 2500000003 HC RX 250 WO HCPCS: Performed by: SURGERY

## 2018-11-05 RX ORDER — SODIUM CHLORIDE 0.9 % (FLUSH) 0.9 %
10 SYRINGE (ML) INJECTION EVERY 12 HOURS SCHEDULED
Status: DISCONTINUED | OUTPATIENT
Start: 2018-11-05 | End: 2018-11-09 | Stop reason: HOSPADM

## 2018-11-05 RX ORDER — ROPIVACAINE HYDROCHLORIDE 10 MG/ML
INJECTION EPIDURAL; INFILTRATION; PERINEURAL PRN
Status: DISCONTINUED | OUTPATIENT
Start: 2018-11-05 | End: 2018-11-05 | Stop reason: SDUPTHER

## 2018-11-05 RX ORDER — FENTANYL CITRATE 50 UG/ML
INJECTION, SOLUTION INTRAMUSCULAR; INTRAVENOUS PRN
Status: DISCONTINUED | OUTPATIENT
Start: 2018-11-05 | End: 2018-11-05 | Stop reason: SDUPTHER

## 2018-11-05 RX ORDER — HYDROCODONE BITARTRATE AND ACETAMINOPHEN 5; 325 MG/1; MG/1
2 TABLET ORAL PRN
Status: DISCONTINUED | OUTPATIENT
Start: 2018-11-05 | End: 2018-11-05 | Stop reason: HOSPADM

## 2018-11-05 RX ORDER — MORPHINE SULFATE 2 MG/ML
1 INJECTION, SOLUTION INTRAMUSCULAR; INTRAVENOUS EVERY 5 MIN PRN
Status: DISCONTINUED | OUTPATIENT
Start: 2018-11-05 | End: 2018-11-05 | Stop reason: HOSPADM

## 2018-11-05 RX ORDER — TRAZODONE HYDROCHLORIDE 50 MG/1
50 TABLET ORAL NIGHTLY
Status: DISCONTINUED | OUTPATIENT
Start: 2018-11-05 | End: 2018-11-09 | Stop reason: HOSPADM

## 2018-11-05 RX ORDER — ATORVASTATIN CALCIUM 40 MG/1
40 TABLET, FILM COATED ORAL NIGHTLY
Status: DISCONTINUED | OUTPATIENT
Start: 2018-11-05 | End: 2018-11-09 | Stop reason: HOSPADM

## 2018-11-05 RX ORDER — GLYCOPYRROLATE 0.2 MG/ML
INJECTION INTRAMUSCULAR; INTRAVENOUS PRN
Status: DISCONTINUED | OUTPATIENT
Start: 2018-11-05 | End: 2018-11-05 | Stop reason: SDUPTHER

## 2018-11-05 RX ORDER — SODIUM CHLORIDE FOR INHALATION 0.9 %
3 VIAL, NEBULIZER (ML) INHALATION
Status: DISCONTINUED | OUTPATIENT
Start: 2018-11-05 | End: 2018-11-09 | Stop reason: HOSPADM

## 2018-11-05 RX ORDER — ONDANSETRON 2 MG/ML
4 INJECTION INTRAMUSCULAR; INTRAVENOUS EVERY 6 HOURS PRN
Status: DISCONTINUED | OUTPATIENT
Start: 2018-11-05 | End: 2018-11-09 | Stop reason: HOSPADM

## 2018-11-05 RX ORDER — SODIUM CHLORIDE 0.9 % (FLUSH) 0.9 %
10 SYRINGE (ML) INJECTION PRN
Status: DISCONTINUED | OUTPATIENT
Start: 2018-11-05 | End: 2018-11-09 | Stop reason: HOSPADM

## 2018-11-05 RX ORDER — VECURONIUM BROMIDE 1 MG/ML
INJECTION, POWDER, LYOPHILIZED, FOR SOLUTION INTRAVENOUS PRN
Status: DISCONTINUED | OUTPATIENT
Start: 2018-11-05 | End: 2018-11-05 | Stop reason: SDUPTHER

## 2018-11-05 RX ORDER — ACETAMINOPHEN 325 MG/1
TABLET ORAL PRN
Status: DISCONTINUED | OUTPATIENT
Start: 2018-11-05 | End: 2018-11-05 | Stop reason: SDUPTHER

## 2018-11-05 RX ORDER — ONDANSETRON 2 MG/ML
INJECTION INTRAMUSCULAR; INTRAVENOUS PRN
Status: DISCONTINUED | OUTPATIENT
Start: 2018-11-05 | End: 2018-11-05 | Stop reason: SDUPTHER

## 2018-11-05 RX ORDER — MORPHINE SULFATE 4 MG/ML
4 INJECTION, SOLUTION INTRAMUSCULAR; INTRAVENOUS
Status: DISCONTINUED | OUTPATIENT
Start: 2018-11-05 | End: 2018-11-08

## 2018-11-05 RX ORDER — ALBUTEROL SULFATE 2.5 MG/3ML
2.5 SOLUTION RESPIRATORY (INHALATION)
Status: DISCONTINUED | OUTPATIENT
Start: 2018-11-05 | End: 2018-11-09 | Stop reason: HOSPADM

## 2018-11-05 RX ORDER — MORPHINE SULFATE 2 MG/ML
2 INJECTION, SOLUTION INTRAMUSCULAR; INTRAVENOUS EVERY 5 MIN PRN
Status: DISCONTINUED | OUTPATIENT
Start: 2018-11-05 | End: 2018-11-05 | Stop reason: HOSPADM

## 2018-11-05 RX ORDER — SODIUM CHLORIDE, SODIUM LACTATE, POTASSIUM CHLORIDE, CALCIUM CHLORIDE 600; 310; 30; 20 MG/100ML; MG/100ML; MG/100ML; MG/100ML
INJECTION, SOLUTION INTRAVENOUS CONTINUOUS
Status: DISCONTINUED | OUTPATIENT
Start: 2018-11-05 | End: 2018-11-05

## 2018-11-05 RX ORDER — PROPOFOL 10 MG/ML
INJECTION, EMULSION INTRAVENOUS PRN
Status: DISCONTINUED | OUTPATIENT
Start: 2018-11-05 | End: 2018-11-05 | Stop reason: SDUPTHER

## 2018-11-05 RX ORDER — POTASSIUM CHLORIDE 20 MEQ/1
40 TABLET, EXTENDED RELEASE ORAL ONCE
Status: COMPLETED | OUTPATIENT
Start: 2018-11-05 | End: 2018-11-05

## 2018-11-05 RX ORDER — ACETAMINOPHEN 325 MG/1
650 TABLET ORAL EVERY 4 HOURS PRN
Status: DISCONTINUED | OUTPATIENT
Start: 2018-11-05 | End: 2018-11-09 | Stop reason: HOSPADM

## 2018-11-05 RX ORDER — ALBUTEROL SULFATE 2.5 MG/3ML
2.5 SOLUTION RESPIRATORY (INHALATION)
Status: DISCONTINUED | OUTPATIENT
Start: 2018-11-05 | End: 2018-11-05 | Stop reason: SDUPTHER

## 2018-11-05 RX ORDER — HYDROCODONE BITARTRATE AND ACETAMINOPHEN 5; 325 MG/1; MG/1
1 TABLET ORAL PRN
Status: DISCONTINUED | OUTPATIENT
Start: 2018-11-05 | End: 2018-11-05 | Stop reason: HOSPADM

## 2018-11-05 RX ORDER — DEXTROSE, SODIUM CHLORIDE, AND POTASSIUM CHLORIDE 5; .45; .15 G/100ML; G/100ML; G/100ML
INJECTION INTRAVENOUS CONTINUOUS
Status: DISCONTINUED | OUTPATIENT
Start: 2018-11-05 | End: 2018-11-08

## 2018-11-05 RX ORDER — OXYCODONE HYDROCHLORIDE 5 MG/1
TABLET ORAL PRN
Status: DISCONTINUED | OUTPATIENT
Start: 2018-11-05 | End: 2018-11-05 | Stop reason: SDUPTHER

## 2018-11-05 RX ORDER — ROCURONIUM BROMIDE 10 MG/ML
INJECTION, SOLUTION INTRAVENOUS PRN
Status: DISCONTINUED | OUTPATIENT
Start: 2018-11-05 | End: 2018-11-05 | Stop reason: SDUPTHER

## 2018-11-05 RX ORDER — DEXAMETHASONE SODIUM PHOSPHATE 4 MG/ML
INJECTION, SOLUTION INTRA-ARTICULAR; INTRALESIONAL; INTRAMUSCULAR; INTRAVENOUS; SOFT TISSUE PRN
Status: DISCONTINUED | OUTPATIENT
Start: 2018-11-05 | End: 2018-11-05 | Stop reason: SDUPTHER

## 2018-11-05 RX ORDER — DEXAMETHASONE SODIUM PHOSPHATE 10 MG/ML
INJECTION INTRAMUSCULAR; INTRAVENOUS PRN
Status: DISCONTINUED | OUTPATIENT
Start: 2018-11-05 | End: 2018-11-05 | Stop reason: SDUPTHER

## 2018-11-05 RX ORDER — FUROSEMIDE 20 MG/1
20 TABLET ORAL DAILY
Status: DISCONTINUED | OUTPATIENT
Start: 2018-11-05 | End: 2018-11-09 | Stop reason: HOSPADM

## 2018-11-05 RX ORDER — ONDANSETRON 2 MG/ML
4 INJECTION INTRAMUSCULAR; INTRAVENOUS
Status: DISCONTINUED | OUTPATIENT
Start: 2018-11-05 | End: 2018-11-05 | Stop reason: HOSPADM

## 2018-11-05 RX ORDER — KETAMINE HYDROCHLORIDE 50 MG/ML
INJECTION, SOLUTION, CONCENTRATE INTRAMUSCULAR; INTRAVENOUS PRN
Status: DISCONTINUED | OUTPATIENT
Start: 2018-11-05 | End: 2018-11-05 | Stop reason: SDUPTHER

## 2018-11-05 RX ORDER — MORPHINE SULFATE 2 MG/ML
2 INJECTION, SOLUTION INTRAMUSCULAR; INTRAVENOUS
Status: DISCONTINUED | OUTPATIENT
Start: 2018-11-05 | End: 2018-11-08

## 2018-11-05 RX ORDER — GABAPENTIN 800 MG/1
TABLET ORAL PRN
Status: DISCONTINUED | OUTPATIENT
Start: 2018-11-05 | End: 2018-11-05 | Stop reason: SDUPTHER

## 2018-11-05 RX ORDER — DILTIAZEM HYDROCHLORIDE 120 MG/1
240 CAPSULE, COATED, EXTENDED RELEASE ORAL DAILY
Status: DISCONTINUED | OUTPATIENT
Start: 2018-11-06 | End: 2018-11-09 | Stop reason: HOSPADM

## 2018-11-05 RX ORDER — LEVOTHYROXINE SODIUM 0.03 MG/1
50 TABLET ORAL DAILY
Status: DISCONTINUED | OUTPATIENT
Start: 2018-11-05 | End: 2018-11-09 | Stop reason: HOSPADM

## 2018-11-05 RX ORDER — ALBUTEROL SULFATE 2.5 MG/3ML
2.5 SOLUTION RESPIRATORY (INHALATION) EVERY 4 HOURS
Status: DISCONTINUED | OUTPATIENT
Start: 2018-11-05 | End: 2018-11-07

## 2018-11-05 RX ORDER — AMIODARONE HYDROCHLORIDE 200 MG/1
200 TABLET ORAL DAILY
Status: DISCONTINUED | OUTPATIENT
Start: 2018-11-06 | End: 2018-11-09 | Stop reason: HOSPADM

## 2018-11-05 RX ORDER — MIDAZOLAM HYDROCHLORIDE 1 MG/ML
INJECTION INTRAMUSCULAR; INTRAVENOUS PRN
Status: DISCONTINUED | OUTPATIENT
Start: 2018-11-05 | End: 2018-11-05 | Stop reason: SDUPTHER

## 2018-11-05 RX ORDER — KETOROLAC TROMETHAMINE 30 MG/ML
INJECTION, SOLUTION INTRAMUSCULAR; INTRAVENOUS PRN
Status: DISCONTINUED | OUTPATIENT
Start: 2018-11-05 | End: 2018-11-05 | Stop reason: SDUPTHER

## 2018-11-05 RX ORDER — DEXTROSE, SODIUM CHLORIDE, AND POTASSIUM CHLORIDE 5; .45; .15 G/100ML; G/100ML; G/100ML
INJECTION INTRAVENOUS
Status: DISPENSED
Start: 2018-11-05 | End: 2018-11-06

## 2018-11-05 RX ADMIN — DEXAMETHASONE SODIUM PHOSPHATE 4 MG: 4 INJECTION, SOLUTION INTRAMUSCULAR; INTRAVENOUS at 11:50

## 2018-11-05 RX ADMIN — CEFOXITIN 2 G: 2 INJECTION, POWDER, FOR SOLUTION INTRAVENOUS at 10:20

## 2018-11-05 RX ADMIN — SODIUM CHLORIDE, POTASSIUM CHLORIDE, SODIUM LACTATE AND CALCIUM CHLORIDE: 600; 310; 30; 20 INJECTION, SOLUTION INTRAVENOUS at 09:45

## 2018-11-05 RX ADMIN — SUGAMMADEX 200 MG: 100 INJECTION, SOLUTION INTRAVENOUS at 12:09

## 2018-11-05 RX ADMIN — ALBUTEROL SULFATE 2.5 MG: 2.5 SOLUTION RESPIRATORY (INHALATION) at 19:56

## 2018-11-05 RX ADMIN — KETAMINE HYDROCHLORIDE 10 MG: 50 INJECTION INTRAMUSCULAR; INTRAVENOUS at 09:00

## 2018-11-05 RX ADMIN — MORPHINE SULFATE 4 MG: 4 INJECTION, SOLUTION INTRAMUSCULAR; INTRAVENOUS at 20:24

## 2018-11-05 RX ADMIN — SODIUM CHLORIDE, POTASSIUM CHLORIDE, SODIUM LACTATE AND CALCIUM CHLORIDE: 600; 310; 30; 20 INJECTION, SOLUTION INTRAVENOUS at 11:50

## 2018-11-05 RX ADMIN — ONDANSETRON 4 MG: 2 INJECTION INTRAMUSCULAR; INTRAVENOUS at 11:50

## 2018-11-05 RX ADMIN — VECURONIUM BROMIDE FOR INJECTION 1 MG: 1 INJECTION, POWDER, LYOPHILIZED, FOR SOLUTION INTRAVENOUS at 09:15

## 2018-11-05 RX ADMIN — MIDAZOLAM HYDROCHLORIDE 2 MG: 1 INJECTION, SOLUTION INTRAMUSCULAR; INTRAVENOUS at 07:55

## 2018-11-05 RX ADMIN — VECURONIUM BROMIDE FOR INJECTION 1 MG: 1 INJECTION, POWDER, LYOPHILIZED, FOR SOLUTION INTRAVENOUS at 09:30

## 2018-11-05 RX ADMIN — DEXAMETHASONE SODIUM PHOSPHATE 10 MG: 10 INJECTION INTRAMUSCULAR; INTRAVENOUS at 12:27

## 2018-11-05 RX ADMIN — VECURONIUM BROMIDE FOR INJECTION 1 MG: 1 INJECTION, POWDER, LYOPHILIZED, FOR SOLUTION INTRAVENOUS at 09:45

## 2018-11-05 RX ADMIN — KETAMINE HYDROCHLORIDE 10 MG: 50 INJECTION INTRAMUSCULAR; INTRAVENOUS at 08:09

## 2018-11-05 RX ADMIN — CEFOXITIN 2 G: 2 INJECTION, POWDER, FOR SOLUTION INTRAVENOUS at 08:20

## 2018-11-05 RX ADMIN — VECURONIUM BROMIDE FOR INJECTION 1 MG: 1 INJECTION, POWDER, LYOPHILIZED, FOR SOLUTION INTRAVENOUS at 10:01

## 2018-11-05 RX ADMIN — PHENYLEPHRINE HYDROCHLORIDE 25 MCG/MIN: 10 INJECTION INTRAVENOUS at 11:30

## 2018-11-05 RX ADMIN — PHENYLEPHRINE HYDROCHLORIDE 200 MCG: 10 INJECTION INTRAVENOUS at 08:40

## 2018-11-05 RX ADMIN — PHENYLEPHRINE HYDROCHLORIDE 200 MCG: 10 INJECTION INTRAVENOUS at 09:52

## 2018-11-05 RX ADMIN — POTASSIUM CHLORIDE 40 MEQ: 20 TABLET, EXTENDED RELEASE ORAL at 14:56

## 2018-11-05 RX ADMIN — POTASSIUM CHLORIDE, DEXTROSE MONOHYDRATE AND SODIUM CHLORIDE: 150; 5; 450 INJECTION, SOLUTION INTRAVENOUS at 13:57

## 2018-11-05 RX ADMIN — PROPOFOL 50 MG: 10 INJECTION, EMULSION INTRAVENOUS at 08:15

## 2018-11-05 RX ADMIN — GABAPENTIN 800 MG: 800 TABLET, FILM COATED ORAL at 07:55

## 2018-11-05 RX ADMIN — PROPOFOL 150 MG: 10 INJECTION, EMULSION INTRAVENOUS at 08:06

## 2018-11-05 RX ADMIN — VECURONIUM BROMIDE FOR INJECTION 1 MG: 1 INJECTION, POWDER, LYOPHILIZED, FOR SOLUTION INTRAVENOUS at 11:00

## 2018-11-05 RX ADMIN — KETAMINE HYDROCHLORIDE 10 MG: 50 INJECTION INTRAMUSCULAR; INTRAVENOUS at 11:00

## 2018-11-05 RX ADMIN — MORPHINE SULFATE 2 MG: 2 INJECTION, SOLUTION INTRAMUSCULAR; INTRAVENOUS at 16:56

## 2018-11-05 RX ADMIN — VECURONIUM BROMIDE FOR INJECTION 1 MG: 1 INJECTION, POWDER, LYOPHILIZED, FOR SOLUTION INTRAVENOUS at 08:30

## 2018-11-05 RX ADMIN — Medication 10 ML: at 21:30

## 2018-11-05 RX ADMIN — VECURONIUM BROMIDE FOR INJECTION 1 MG: 1 INJECTION, POWDER, LYOPHILIZED, FOR SOLUTION INTRAVENOUS at 10:15

## 2018-11-05 RX ADMIN — PHENYLEPHRINE HYDROCHLORIDE 400 MCG: 10 INJECTION INTRAVENOUS at 11:22

## 2018-11-05 RX ADMIN — ACETAMINOPHEN 975 MG: 325 TABLET ORAL at 07:55

## 2018-11-05 RX ADMIN — ROCURONIUM BROMIDE 50 MG: 10 INJECTION INTRAVENOUS at 08:06

## 2018-11-05 RX ADMIN — PHENYLEPHRINE HYDROCHLORIDE 200 MCG: 10 INJECTION INTRAVENOUS at 09:12

## 2018-11-05 RX ADMIN — GLYCOPYRROLATE 0.2 MG: 0.2 INJECTION INTRAMUSCULAR; INTRAVENOUS at 07:55

## 2018-11-05 RX ADMIN — LEVOTHYROXINE SODIUM 50 MCG: 25 TABLET ORAL at 14:56

## 2018-11-05 RX ADMIN — FUROSEMIDE 20 MG: 20 TABLET ORAL at 14:56

## 2018-11-05 RX ADMIN — KETOROLAC TROMETHAMINE 30 MG: 30 INJECTION, SOLUTION INTRAMUSCULAR at 12:16

## 2018-11-05 RX ADMIN — VECURONIUM BROMIDE FOR INJECTION 1 MG: 1 INJECTION, POWDER, LYOPHILIZED, FOR SOLUTION INTRAVENOUS at 08:45

## 2018-11-05 RX ADMIN — KETAMINE HYDROCHLORIDE 10 MG: 50 INJECTION INTRAMUSCULAR; INTRAVENOUS at 10:00

## 2018-11-05 RX ADMIN — KETAMINE HYDROCHLORIDE 10 MG: 50 INJECTION INTRAMUSCULAR; INTRAVENOUS at 12:00

## 2018-11-05 RX ADMIN — SODIUM CHLORIDE, POTASSIUM CHLORIDE, SODIUM LACTATE AND CALCIUM CHLORIDE: 600; 310; 30; 20 INJECTION, SOLUTION INTRAVENOUS at 07:55

## 2018-11-05 RX ADMIN — SODIUM CHLORIDE, POTASSIUM CHLORIDE, SODIUM LACTATE AND CALCIUM CHLORIDE: 600; 310; 30; 20 INJECTION, SOLUTION INTRAVENOUS at 07:36

## 2018-11-05 RX ADMIN — VECURONIUM BROMIDE FOR INJECTION 1 MG: 1 INJECTION, POWDER, LYOPHILIZED, FOR SOLUTION INTRAVENOUS at 09:00

## 2018-11-05 RX ADMIN — ALBUTEROL SULFATE 2.5 MG: 2.5 SOLUTION RESPIRATORY (INHALATION) at 23:03

## 2018-11-05 RX ADMIN — SODIUM CHLORIDE, POTASSIUM CHLORIDE, SODIUM LACTATE AND CALCIUM CHLORIDE: 600; 310; 30; 20 INJECTION, SOLUTION INTRAVENOUS at 08:40

## 2018-11-05 RX ADMIN — SODIUM CHLORIDE, POTASSIUM CHLORIDE, SODIUM LACTATE AND CALCIUM CHLORIDE: 600; 310; 30; 20 INJECTION, SOLUTION INTRAVENOUS at 11:06

## 2018-11-05 RX ADMIN — ENOXAPARIN SODIUM 40 MG: 40 INJECTION SUBCUTANEOUS at 08:00

## 2018-11-05 RX ADMIN — ROPIVACAINE HYDROCHLORIDE 200 MG: 10 INJECTION, SOLUTION EPIDURAL at 12:27

## 2018-11-05 RX ADMIN — OXYCODONE HYDROCHLORIDE 10 MG: 5 TABLET ORAL at 07:55

## 2018-11-05 RX ADMIN — VECURONIUM BROMIDE FOR INJECTION 1 MG: 1 INJECTION, POWDER, LYOPHILIZED, FOR SOLUTION INTRAVENOUS at 10:30

## 2018-11-05 RX ADMIN — FENTANYL CITRATE 100 MCG: 50 INJECTION, SOLUTION INTRAMUSCULAR; INTRAVENOUS at 08:06

## 2018-11-05 ASSESSMENT — PULMONARY FUNCTION TESTS
PIF_VALUE: 34
PIF_VALUE: 25
PIF_VALUE: 26
PIF_VALUE: 28
PIF_VALUE: 34
PIF_VALUE: 33
PIF_VALUE: 30
PIF_VALUE: 28
PIF_VALUE: 33
PIF_VALUE: 33
PIF_VALUE: 1
PIF_VALUE: 27
PIF_VALUE: 24
PIF_VALUE: 30
PIF_VALUE: 32
PIF_VALUE: 10
PIF_VALUE: 35
PIF_VALUE: 12
PIF_VALUE: 21
PIF_VALUE: 20
PIF_VALUE: 28
PIF_VALUE: 30
PIF_VALUE: 33
PIF_VALUE: 26
PIF_VALUE: 30
PIF_VALUE: 26
PIF_VALUE: 29
PIF_VALUE: 20
PIF_VALUE: 34
PIF_VALUE: 29
PIF_VALUE: 27
PIF_VALUE: 27
PIF_VALUE: 32
PIF_VALUE: 30
PIF_VALUE: 35
PIF_VALUE: 31
PIF_VALUE: 19
PIF_VALUE: 33
PIF_VALUE: 25
PIF_VALUE: 14
PIF_VALUE: 28
PIF_VALUE: 32
PIF_VALUE: 31
PIF_VALUE: 27
PIF_VALUE: 21
PIF_VALUE: 27
PIF_VALUE: 34
PIF_VALUE: 28
PIF_VALUE: 29
PIF_VALUE: 25
PIF_VALUE: 30
PIF_VALUE: 28
PIF_VALUE: 33
PIF_VALUE: 32
PIF_VALUE: 20
PIF_VALUE: 30
PIF_VALUE: 29
PIF_VALUE: 29
PIF_VALUE: 16
PIF_VALUE: 30
PIF_VALUE: 20
PIF_VALUE: 25
PIF_VALUE: 29
PIF_VALUE: 30
PIF_VALUE: 28
PIF_VALUE: 27
PIF_VALUE: 33
PIF_VALUE: 26
PIF_VALUE: 22
PIF_VALUE: 28
PIF_VALUE: 27
PIF_VALUE: 26
PIF_VALUE: 30
PIF_VALUE: 28
PIF_VALUE: 28
PIF_VALUE: 34
PIF_VALUE: 20
PIF_VALUE: 30
PIF_VALUE: 13
PIF_VALUE: 1
PIF_VALUE: 18
PIF_VALUE: 26
PIF_VALUE: 30
PIF_VALUE: 29
PIF_VALUE: 16
PIF_VALUE: 17
PIF_VALUE: 30
PIF_VALUE: 30
PIF_VALUE: 29
PIF_VALUE: 27
PIF_VALUE: 26
PIF_VALUE: 27
PIF_VALUE: 30
PIF_VALUE: 29
PIF_VALUE: 29
PIF_VALUE: 30
PIF_VALUE: 6
PIF_VALUE: 33
PIF_VALUE: 24
PIF_VALUE: 26
PIF_VALUE: 27
PIF_VALUE: 34
PIF_VALUE: 30
PIF_VALUE: 26
PIF_VALUE: 20
PIF_VALUE: 29
PIF_VALUE: 34
PIF_VALUE: 33
PIF_VALUE: 16
PIF_VALUE: 28
PIF_VALUE: 29
PIF_VALUE: 29
PIF_VALUE: 35
PIF_VALUE: 27
PIF_VALUE: 31
PIF_VALUE: 26
PIF_VALUE: 33
PIF_VALUE: 20
PIF_VALUE: 26
PIF_VALUE: 29
PIF_VALUE: 28
PIF_VALUE: 27
PIF_VALUE: 28
PIF_VALUE: 35
PIF_VALUE: 30
PIF_VALUE: 31
PIF_VALUE: 28
PIF_VALUE: 28
PIF_VALUE: 29
PIF_VALUE: 16
PIF_VALUE: 28
PIF_VALUE: 30
PIF_VALUE: 35
PIF_VALUE: 30
PIF_VALUE: 29
PIF_VALUE: 26
PIF_VALUE: 34
PIF_VALUE: 28
PIF_VALUE: 26
PIF_VALUE: 29
PIF_VALUE: 13
PIF_VALUE: 33
PIF_VALUE: 28
PIF_VALUE: 31
PIF_VALUE: 29
PIF_VALUE: 28
PIF_VALUE: 28
PIF_VALUE: 30
PIF_VALUE: 26
PIF_VALUE: 29
PIF_VALUE: 27
PIF_VALUE: 16
PIF_VALUE: 31
PIF_VALUE: 29
PIF_VALUE: 18
PIF_VALUE: 30
PIF_VALUE: 27
PIF_VALUE: 30
PIF_VALUE: 30
PIF_VALUE: 29
PIF_VALUE: 27
PIF_VALUE: 30
PIF_VALUE: 29
PIF_VALUE: 26
PIF_VALUE: 29
PIF_VALUE: 21
PIF_VALUE: 26
PIF_VALUE: 22
PIF_VALUE: 29
PIF_VALUE: 34
PIF_VALUE: 33
PIF_VALUE: 13
PIF_VALUE: 21
PIF_VALUE: 28
PIF_VALUE: 13
PIF_VALUE: 34
PIF_VALUE: 30
PIF_VALUE: 28
PIF_VALUE: 16
PIF_VALUE: 30
PIF_VALUE: 27
PIF_VALUE: 28
PIF_VALUE: 34
PIF_VALUE: 34
PIF_VALUE: 29
PIF_VALUE: 30
PIF_VALUE: 25
PIF_VALUE: 26
PIF_VALUE: 30
PIF_VALUE: 27
PIF_VALUE: 32
PIF_VALUE: 29
PIF_VALUE: 1
PIF_VALUE: 31
PIF_VALUE: 30
PIF_VALUE: 31
PIF_VALUE: 26
PIF_VALUE: 34
PIF_VALUE: 30
PIF_VALUE: 29
PIF_VALUE: 31
PIF_VALUE: 31
PIF_VALUE: 35
PIF_VALUE: 30
PIF_VALUE: 28
PIF_VALUE: 27
PIF_VALUE: 17
PIF_VALUE: 29
PIF_VALUE: 30
PIF_VALUE: 28
PIF_VALUE: 27
PIF_VALUE: 33
PIF_VALUE: 30
PIF_VALUE: 25
PIF_VALUE: 27
PIF_VALUE: 29
PIF_VALUE: 34
PIF_VALUE: 30
PIF_VALUE: 25
PIF_VALUE: 28
PIF_VALUE: 26
PIF_VALUE: 28
PIF_VALUE: 21
PIF_VALUE: 34
PIF_VALUE: 2
PIF_VALUE: 26
PIF_VALUE: 35
PIF_VALUE: 30
PIF_VALUE: 21
PIF_VALUE: 31
PIF_VALUE: 28
PIF_VALUE: 27
PIF_VALUE: 29
PIF_VALUE: 35
PIF_VALUE: 20
PIF_VALUE: 16
PIF_VALUE: 24
PIF_VALUE: 29
PIF_VALUE: 19
PIF_VALUE: 21
PIF_VALUE: 28
PIF_VALUE: 32
PIF_VALUE: 33
PIF_VALUE: 29
PIF_VALUE: 30
PIF_VALUE: 26
PIF_VALUE: 14
PIF_VALUE: 27
PIF_VALUE: 26
PIF_VALUE: 32
PIF_VALUE: 22
PIF_VALUE: 13
PIF_VALUE: 17
PIF_VALUE: 30
PIF_VALUE: 27
PIF_VALUE: 29
PIF_VALUE: 3
PIF_VALUE: 31
PIF_VALUE: 28
PIF_VALUE: 30
PIF_VALUE: 16
PIF_VALUE: 30
PIF_VALUE: 28
PIF_VALUE: 33
PIF_VALUE: 15
PIF_VALUE: 27
PIF_VALUE: 27
PIF_VALUE: 30
PIF_VALUE: 28
PIF_VALUE: 11
PIF_VALUE: 29
PIF_VALUE: 35
PIF_VALUE: 30
PIF_VALUE: 28
PIF_VALUE: 26
PIF_VALUE: 15
PIF_VALUE: 28
PIF_VALUE: 20
PIF_VALUE: 26
PIF_VALUE: 34
PIF_VALUE: 30
PIF_VALUE: 16

## 2018-11-05 ASSESSMENT — PAIN SCALES - GENERAL
PAINLEVEL_OUTOF10: 0
PAINLEVEL_OUTOF10: 0
PAINLEVEL_OUTOF10: 5
PAINLEVEL_OUTOF10: 0
PAINLEVEL_OUTOF10: 4
PAINLEVEL_OUTOF10: 5
PAINLEVEL_OUTOF10: 5
PAINLEVEL_OUTOF10: 0
PAINLEVEL_OUTOF10: 9

## 2018-11-05 ASSESSMENT — PAIN DESCRIPTION - PROGRESSION: CLINICAL_PROGRESSION: GRADUALLY WORSENING

## 2018-11-05 ASSESSMENT — PAIN - FUNCTIONAL ASSESSMENT: PAIN_FUNCTIONAL_ASSESSMENT: 0-10

## 2018-11-05 ASSESSMENT — PAIN DESCRIPTION - LOCATION: LOCATION: ABDOMEN;BACK

## 2018-11-05 ASSESSMENT — PAIN DESCRIPTION - ORIENTATION: ORIENTATION: MID

## 2018-11-05 ASSESSMENT — PAIN DESCRIPTION - DESCRIPTORS: DESCRIPTORS: SHARP

## 2018-11-05 ASSESSMENT — PAIN DESCRIPTION - FREQUENCY: FREQUENCY: CONTINUOUS

## 2018-11-05 ASSESSMENT — PAIN DESCRIPTION - ONSET: ONSET: ON-GOING

## 2018-11-05 ASSESSMENT — PAIN DESCRIPTION - PAIN TYPE: TYPE: SURGICAL PAIN

## 2018-11-05 NOTE — PROGRESS NOTES
Hospitalist Progress Note    Patient:   Arminda Rangel     YOB: 1940    MRN: [id number]   Admit date: 11/5/2018     Acct: [de-identified]     PCP: Dawn Baird,     CC--Interval History:   POD 0--low anterior resection---diverting ileostomy for rectosigmoid adenocarcinoma---11.5.2018---[facility]    Lighted-stents for procedure--11.5.2018    Hypokalemia---potassium replacement    Thrombocytopenia---on Lovenox--need to monitor platelet count    Atrial fibrillation---history--currently SR    Impaired fasting glucose---goal range -180--within goal range at 140    See note below   All other ROS negative except noted in HPI    Diet:  Diet NPO Effective Now    Medications:  Scheduled Meds:   [START ON 11/6/2018] amiodarone  200 mg Oral Daily    atorvastatin  40 mg Oral Nightly    [START ON 11/6/2018] diltiazem  240 mg Oral Daily    furosemide  20 mg Oral Daily    levothyroxine  50 mcg Oral Daily    metoprolol tartrate  25 mg Oral BID    traZODone  50 mg Oral Nightly    sodium chloride flush  10 mL Intravenous 2 times per day    [START ON 11/6/2018] enoxaparin  40 mg Subcutaneous Daily    dextrose 5% and 0.45% NaCl with KCl 20 mEq        albuterol  2.5 mg Nebulization Q4H    miconazole   Topical BID     Continuous Infusions:   dextrose 5% and 0.45% NaCl with KCl 20 mEq 125 mL/hr at 11/05/18 1357     PRN Meds:sodium chloride flush, acetaminophen, ondansetron, morphine **OR** morphine, albuterol, sodium chloride nebulizer    Objective:  Labs:  CBC with Differential:    Lab Results   Component Value Date    WBC 13.4 11/05/2018    RBC 3.80 11/05/2018    HGB 11.4 11/05/2018    HCT 36.1 11/05/2018     11/05/2018    MCV 95.2 11/05/2018    MCH 30.1 11/05/2018    MCHC 31.6 11/05/2018    RDW 15.9 11/05/2018    LYMPHOPCT 17 11/05/2018    MONOPCT 8 11/05/2018    BASOPCT 0 11/05/2018    MONOSABS 1.00 11/05/2018    LYMPHSABS 2.30 11/05/2018    EOSABS 0.00 11/05/2018    BASOSABS 0.10 11/05/2018 from a large tubulovillous adenoma          Colonoscopy---10.8.2018--multiple polyps--large tumors distal sigmoid                       colon-one ulcerated--extensive diverticulosis---          GI bleeding ---10.6.2018---likely due to colon carcinoma--specifically  two                       large sigmoid masses---see above                 CT abdomen-pelvis----106.2018--diverticulosis--no acute diverticulitis---chronic                                osseous changes LS spine-sacrum--anterolithesis L4--partial fusion                                L5-S1-----decreased right gluteal hematoma   Hypokalemia---11.5.2018  Thrombocytopenia---11.5.2018---chronic      Atrial fibrillation----history---currently SR              EKG---11.5.2018--sinus bradycardia---51--1st degree AVB---LVH--QTc 490              BALDO cardioversion----9.10. 2017---successful atrial fibrillation---to--NSR              BALDO---9.10. 2017--LA dilatation---preserved LVSF--atrial septal aneurysm                           without shunt--LA appendage n o clot--negative bubble study---                          mild MR---moderate TR--normal TV leaflets             Cardiac catheterization---9. 5.2017--0% LM--mid 20%---normal D1---0% left circumflex--                         40% proximal OM1---mild aneurysmal dilatation proximal RCA---LVEF ~ 55%             2D ECHO----8. 3.2017--ROVERTO--NLVSF---dilated RV---NRVSF---MAC--mild MR--                          mild TR---RVSP ~ 31 mm Hg---LVEF ~ 50%             RVR---newly diagnosed----7.19.2017----see cardioversion above---9.10.2017              MI ruled out--7.20.2017             EKG---7.20.2017--atrial fibrillation--78---low voltage              EKG---7.19.2017--atrial fibrillation---RVR---111--diffuse low                         voltage--RSR V1-2 only---cannot exclude prior high                        lateral infarction  Sinus bradycardia---1st-degree AVB, see above   ASCVD---see above

## 2018-11-05 NOTE — ANESTHESIA PROCEDURE NOTES
Peripheral Block    Patient location during procedure: pre-op  Start time: 11/5/2018 12:20 PM  End time: 11/5/2018 12:30 PM  Staffing  Resident/CRNA: Ingris Rebollar  Performed: anesthesiologist   Preanesthetic Checklist  Completed: patient identified, site marked, surgical consent, pre-op evaluation, timeout performed, IV checked, risks and benefits discussed, monitors and equipment checked, anesthesia consent given, oxygen available and patient being monitored  Peripheral Block  Patient position: supine  Prep: ChloraPrep  Patient monitoring: cardiac monitor, continuous pulse ox, frequent blood pressure checks, IV access and continuous capnometry  Block type: TAP  Laterality: bilateral  Injection technique: single-shot  Procedures: ultrasound guided  Infiltration strength: 1 %  Dose: 3 mL  Provider prep: mask and sterile gloves  Needle  Needle gauge: 20 G  Needle length: 15 cm  Needle localization: ultrasound guidance  Assessment  Injection assessment: negative aspiration for heme, no paresthesia on injection and local visualized surrounding nerve on ultrasound  Paresthesia pain: none  Slow fractionated injection: yes  Hemodynamics: stable  Additional Notes  Immediately prior to procedure a \"time out\" was called to verify the correct patient, allergies, laterality, procedure and equipment. Time out performed with Hiwot RN    Local Anesthetic: 80ml of 1% Ropivacaine mixed with 0.9% saline given  in 5 ml increments after negative aspiration each time. 4 injection sites  Midaxillary and subcostal TAP blocks.   200mg Ropivacaine given         Reason for block: post-op pain management and at surgeon's request

## 2018-11-05 NOTE — H&P
rhonchi, symmetrical    Heart: Normal rate, regular rhythm, no murmurs    Abdomen: Soft, positive bowel sounds, non tender, non distended, no masses, no hernias, no HSM, no bruits. Neuro: Normal speech, motor/sensory grossly normal bilateral           ASSESSMENT:  Low Sigmoid Colon Cancer    PLAN:  Low anterior resection with ureteral stents. - See office notes for informed consent discussion.     Electronically signed by Alyssa Fontana MD on 11/5/2018 at 7:46 AM

## 2018-11-05 NOTE — ANESTHESIA PRE PROCEDURE
 KNEE ARTHROPLASTY Right     KARYN AND BSO  1966    VARICOSE VEIN SURGERY Right 1960s       Social History:    Social History   Substance Use Topics    Smoking status: Never Smoker    Smokeless tobacco: Never Used      Comment: syant rrt 10/06/2018    Alcohol use No                                Counseling given: Not Answered      Vital Signs (Current):   Vitals:    11/05/18 0704   BP: 139/65   Pulse: 54   Resp: 16   Temp: 36.4 °C (97.6 °F)   TempSrc: Oral   SpO2: 95%   Weight: (!) 325 lb (147.4 kg)   Height: 5' 7\" (1.702 m)                                              BP Readings from Last 3 Encounters:   11/05/18 139/65   10/26/18 100/75   10/19/18 110/68       NPO Status: Time of last liquid consumption: 0530 (sips with meds)                        Time of last solid consumption: 1830                        Date of last liquid consumption: 11/05/18                        Date of last solid food consumption: 11/03/18    BMI:   Wt Readings from Last 3 Encounters:   11/05/18 (!) 325 lb (147.4 kg)   10/26/18 (!) 338 lb (153.3 kg)   10/09/18 (!) 341 lb 11.2 oz (155 kg)     Body mass index is 50.9 kg/m². CBC:   Lab Results   Component Value Date    WBC 7.3 10/09/2018    RBC 3.59 10/09/2018    HGB 10.8 10/09/2018    HCT 33.7 10/09/2018    MCV 94.0 10/09/2018    RDW 15.9 10/09/2018     10/09/2018       CMP:   Lab Results   Component Value Date     10/09/2018    K 3.6 10/09/2018     10/09/2018    CO2 28 10/09/2018    BUN 9 10/09/2018    CREATININE 0.76 10/09/2018    GFRAA >60 10/09/2018    LABGLOM >60 10/09/2018    GLUCOSE 92 10/09/2018    PROT 6.4 10/06/2018    CALCIUM 8.8 10/09/2018    BILITOT 0.34 10/06/2018    ALKPHOS 121 10/06/2018    AST 24 10/06/2018    ALT 16 10/06/2018       POC Tests:   Recent Labs      11/05/18   0731   POCGLU  98       Coags:   Lab Results   Component Value Date    PROTIME 16.6 10/08/2018    INR 1.6 10/08/2018    APTT 34.8 09/21/2018       HCG (If Applicable):  No

## 2018-11-06 ENCOUNTER — APPOINTMENT (OUTPATIENT)
Dept: INTERVENTIONAL RADIOLOGY/VASCULAR | Age: 78
DRG: 330 | End: 2018-11-06
Attending: SURGERY
Payer: MEDICARE

## 2018-11-06 LAB
-: ABNORMAL
ABSOLUTE EOS #: 0 K/UL (ref 0–0.4)
ABSOLUTE IMMATURE GRANULOCYTE: ABNORMAL K/UL (ref 0–0.3)
ABSOLUTE LYMPH #: 1.1 K/UL (ref 1–4.8)
ABSOLUTE MONO #: 1 K/UL (ref 0.1–1.2)
ALBUMIN SERPL-MCNC: 3.1 G/DL (ref 3.5–5.2)
ALBUMIN/GLOBULIN RATIO: 1.1 (ref 1–2.5)
ALP BLD-CCNC: 71 U/L (ref 35–104)
ALT SERPL-CCNC: 21 U/L (ref 5–33)
AMORPHOUS: ABNORMAL
ANION GAP SERPL CALCULATED.3IONS-SCNC: 12 MMOL/L (ref 9–17)
AST SERPL-CCNC: 24 U/L
BACTERIA: ABNORMAL
BASOPHILS # BLD: 0 % (ref 0–1)
BASOPHILS ABSOLUTE: 0 K/UL (ref 0–0.2)
BILIRUB SERPL-MCNC: 0.49 MG/DL (ref 0.3–1.2)
BILIRUBIN URINE: ABNORMAL
BUN BLDV-MCNC: 20 MG/DL (ref 8–23)
BUN/CREAT BLD: 16 (ref 9–20)
CALCIUM SERPL-MCNC: 8.3 MG/DL (ref 8.6–10.4)
CASTS UA: ABNORMAL /LPF (ref 0–2)
CHLORIDE BLD-SCNC: 108 MMOL/L (ref 98–107)
CO2: 21 MMOL/L (ref 20–31)
COLOR: ABNORMAL
COMMENT UA: ABNORMAL
CREAT SERPL-MCNC: 1.26 MG/DL (ref 0.5–0.9)
CREAT SERPL-MCNC: 1.31 MG/DL (ref 0.5–0.9)
CRYSTALS, UA: ABNORMAL /HPF
DIFFERENTIAL TYPE: ABNORMAL
EOSINOPHILS RELATIVE PERCENT: 0 % (ref 1–7)
EPITHELIAL CELLS UA: ABNORMAL /HPF (ref 0–5)
GFR AFRICAN AMERICAN: 47 ML/MIN
GFR AFRICAN AMERICAN: 50 ML/MIN
GFR NON-AFRICAN AMERICAN: 39 ML/MIN
GFR NON-AFRICAN AMERICAN: 41 ML/MIN
GFR SERPL CREATININE-BSD FRML MDRD: ABNORMAL ML/MIN/{1.73_M2}
GLUCOSE BLD-MCNC: 119 MG/DL (ref 65–105)
GLUCOSE BLD-MCNC: 140 MG/DL (ref 65–105)
GLUCOSE BLD-MCNC: 157 MG/DL (ref 65–105)
GLUCOSE BLD-MCNC: 202 MG/DL (ref 70–99)
GLUCOSE URINE: NEGATIVE
HCT VFR BLD CALC: 32.6 % (ref 36–46)
HCT VFR BLD CALC: 33 % (ref 36–46)
HEMOGLOBIN: 10.4 G/DL (ref 12–16)
HEMOGLOBIN: 10.5 G/DL (ref 12–16)
IMMATURE GRANULOCYTES: ABNORMAL %
KETONES, URINE: ABNORMAL
LEUKOCYTE ESTERASE, URINE: NEGATIVE
LYMPHOCYTES # BLD: 8 % (ref 16–46)
MCH RBC QN AUTO: 30 PG (ref 26–34)
MCHC RBC AUTO-ENTMCNC: 31.5 G/DL (ref 31–37)
MCV RBC AUTO: 95.1 FL (ref 80–100)
MONOCYTES # BLD: 7 % (ref 4–11)
MUCUS: ABNORMAL
NITRITE, URINE: POSITIVE
NRBC AUTOMATED: ABNORMAL PER 100 WBC
OTHER OBSERVATIONS UA: ABNORMAL
PDW BLD-RTO: 16 % (ref 11–14.5)
PH UA: 6.5 (ref 5–6)
PLATELET # BLD: 130 K/UL (ref 140–450)
PLATELET ESTIMATE: ABNORMAL
PMV BLD AUTO: 13.2 FL (ref 6–12)
POTASSIUM SERPL-SCNC: 4.7 MMOL/L (ref 3.7–5.3)
PROTEIN UA: ABNORMAL
RBC # BLD: 3.46 M/UL (ref 4–5.2)
RBC # BLD: ABNORMAL 10*6/UL
RBC UA: >100 /HPF (ref 0–4)
RENAL EPITHELIAL, UA: ABNORMAL /HPF
SEG NEUTROPHILS: 85 % (ref 43–77)
SEGMENTED NEUTROPHILS ABSOLUTE COUNT: 12 K/UL (ref 1.8–7.7)
SODIUM BLD-SCNC: 141 MMOL/L (ref 135–144)
SPECIFIC GRAVITY UA: 1.02 (ref 1.01–1.02)
TOTAL PROTEIN: 5.8 G/DL (ref 6.4–8.3)
TRICHOMONAS: ABNORMAL
TURBIDITY: ABNORMAL
URINE HGB: ABNORMAL
UROBILINOGEN, URINE: NORMAL
WBC # BLD: 14.1 K/UL (ref 3.5–11)
WBC # BLD: ABNORMAL 10*3/UL
WBC UA: ABNORMAL /HPF (ref 0–4)
YEAST: ABNORMAL

## 2018-11-06 PROCEDURE — 87086 URINE CULTURE/COLONY COUNT: CPT

## 2018-11-06 PROCEDURE — 2580000003 HC RX 258: Performed by: INTERNAL MEDICINE

## 2018-11-06 PROCEDURE — G8988 SELF CARE GOAL STATUS: HCPCS | Performed by: OCCUPATIONAL THERAPIST

## 2018-11-06 PROCEDURE — 82947 ASSAY GLUCOSE BLOOD QUANT: CPT

## 2018-11-06 PROCEDURE — 2500000003 HC RX 250 WO HCPCS: Performed by: INTERNAL MEDICINE

## 2018-11-06 PROCEDURE — 6360000002 HC RX W HCPCS: Performed by: SURGERY

## 2018-11-06 PROCEDURE — 85025 COMPLETE CBC W/AUTO DIFF WBC: CPT

## 2018-11-06 PROCEDURE — 97116 GAIT TRAINING THERAPY: CPT | Performed by: PHYSICAL THERAPIST

## 2018-11-06 PROCEDURE — 6360000002 HC RX W HCPCS: Performed by: INTERNAL MEDICINE

## 2018-11-06 PROCEDURE — 2580000003 HC RX 258: Performed by: SURGERY

## 2018-11-06 PROCEDURE — 97161 PT EVAL LOW COMPLEX 20 MIN: CPT | Performed by: PHYSICAL THERAPIST

## 2018-11-06 PROCEDURE — 94664 DEMO&/EVAL PT USE INHALER: CPT

## 2018-11-06 PROCEDURE — 81001 URINALYSIS AUTO W/SCOPE: CPT

## 2018-11-06 PROCEDURE — G8979 MOBILITY GOAL STATUS: HCPCS | Performed by: PHYSICAL THERAPIST

## 2018-11-06 PROCEDURE — G8978 MOBILITY CURRENT STATUS: HCPCS | Performed by: PHYSICAL THERAPIST

## 2018-11-06 PROCEDURE — 2060000000 HC ICU INTERMEDIATE R&B

## 2018-11-06 PROCEDURE — 36415 COLL VENOUS BLD VENIPUNCTURE: CPT

## 2018-11-06 PROCEDURE — 94150 VITAL CAPACITY TEST: CPT

## 2018-11-06 PROCEDURE — 94640 AIRWAY INHALATION TREATMENT: CPT

## 2018-11-06 PROCEDURE — 99024 POSTOP FOLLOW-UP VISIT: CPT | Performed by: SURGERY

## 2018-11-06 PROCEDURE — 6370000000 HC RX 637 (ALT 250 FOR IP): Performed by: INTERNAL MEDICINE

## 2018-11-06 PROCEDURE — 6370000000 HC RX 637 (ALT 250 FOR IP): Performed by: SURGERY

## 2018-11-06 PROCEDURE — 82565 ASSAY OF CREATININE: CPT

## 2018-11-06 PROCEDURE — 76775 US EXAM ABDO BACK WALL LIM: CPT

## 2018-11-06 PROCEDURE — 94761 N-INVAS EAR/PLS OXIMETRY MLT: CPT

## 2018-11-06 PROCEDURE — 80053 COMPREHEN METABOLIC PANEL: CPT

## 2018-11-06 PROCEDURE — 97165 OT EVAL LOW COMPLEX 30 MIN: CPT | Performed by: OCCUPATIONAL THERAPIST

## 2018-11-06 PROCEDURE — 99232 SBSQ HOSP IP/OBS MODERATE 35: CPT | Performed by: INTERNAL MEDICINE

## 2018-11-06 PROCEDURE — 2500000003 HC RX 250 WO HCPCS: Performed by: SURGERY

## 2018-11-06 PROCEDURE — G8987 SELF CARE CURRENT STATUS: HCPCS | Performed by: OCCUPATIONAL THERAPIST

## 2018-11-06 RX ORDER — HYDROCODONE BITARTRATE AND ACETAMINOPHEN 5; 325 MG/1; MG/1
1 TABLET ORAL EVERY 6 HOURS PRN
Status: DISCONTINUED | OUTPATIENT
Start: 2018-11-06 | End: 2018-11-09 | Stop reason: HOSPADM

## 2018-11-06 RX ORDER — INSULIN GLARGINE 100 [IU]/ML
30 INJECTION, SOLUTION SUBCUTANEOUS DAILY
Status: DISCONTINUED | OUTPATIENT
Start: 2018-11-06 | End: 2018-11-09 | Stop reason: HOSPADM

## 2018-11-06 RX ORDER — NICOTINE POLACRILEX 4 MG
15 LOZENGE BUCCAL PRN
Status: DISCONTINUED | OUTPATIENT
Start: 2018-11-06 | End: 2018-11-09 | Stop reason: HOSPADM

## 2018-11-06 RX ORDER — 0.9 % SODIUM CHLORIDE 0.9 %
1000 INTRAVENOUS SOLUTION INTRAVENOUS ONCE
Status: COMPLETED | OUTPATIENT
Start: 2018-11-06 | End: 2018-11-06

## 2018-11-06 RX ORDER — DEXTROSE MONOHYDRATE 25 G/50ML
12.5 INJECTION, SOLUTION INTRAVENOUS PRN
Status: DISCONTINUED | OUTPATIENT
Start: 2018-11-06 | End: 2018-11-09 | Stop reason: HOSPADM

## 2018-11-06 RX ORDER — HYDROCODONE BITARTRATE AND ACETAMINOPHEN 5; 325 MG/1; MG/1
2 TABLET ORAL EVERY 6 HOURS PRN
Status: DISCONTINUED | OUTPATIENT
Start: 2018-11-06 | End: 2018-11-09 | Stop reason: HOSPADM

## 2018-11-06 RX ORDER — DEXTROSE MONOHYDRATE 50 MG/ML
100 INJECTION, SOLUTION INTRAVENOUS PRN
Status: DISCONTINUED | OUTPATIENT
Start: 2018-11-06 | End: 2018-11-09 | Stop reason: HOSPADM

## 2018-11-06 RX ADMIN — LEVOTHYROXINE SODIUM 50 MCG: 25 TABLET ORAL at 07:50

## 2018-11-06 RX ADMIN — ALBUTEROL SULFATE 2.5 MG: 2.5 SOLUTION RESPIRATORY (INHALATION) at 23:51

## 2018-11-06 RX ADMIN — HYDROCODONE BITARTRATE AND ACETAMINOPHEN 2 TABLET: 5; 325 TABLET ORAL at 11:09

## 2018-11-06 RX ADMIN — ATORVASTATIN CALCIUM 40 MG: 40 TABLET, FILM COATED ORAL at 21:20

## 2018-11-06 RX ADMIN — MORPHINE SULFATE 2 MG: 2 INJECTION, SOLUTION INTRAMUSCULAR; INTRAVENOUS at 08:27

## 2018-11-06 RX ADMIN — SODIUM CHLORIDE 1000 ML: 9 INJECTION, SOLUTION INTRAVENOUS at 14:52

## 2018-11-06 RX ADMIN — METOPROLOL TARTRATE 25 MG: 25 TABLET ORAL at 21:20

## 2018-11-06 RX ADMIN — ALBUTEROL SULFATE 2.5 MG: 2.5 SOLUTION RESPIRATORY (INHALATION) at 04:04

## 2018-11-06 RX ADMIN — MORPHINE SULFATE 4 MG: 4 INJECTION, SOLUTION INTRAMUSCULAR; INTRAVENOUS at 00:21

## 2018-11-06 RX ADMIN — POTASSIUM CHLORIDE, DEXTROSE MONOHYDRATE AND SODIUM CHLORIDE: 150; 5; 450 INJECTION, SOLUTION INTRAVENOUS at 05:39

## 2018-11-06 RX ADMIN — ALBUTEROL SULFATE 2.5 MG: 2.5 SOLUTION RESPIRATORY (INHALATION) at 08:52

## 2018-11-06 RX ADMIN — ALBUTEROL SULFATE 2.5 MG: 2.5 SOLUTION RESPIRATORY (INHALATION) at 16:45

## 2018-11-06 RX ADMIN — MICONAZOLE NITRATE: 20 POWDER TOPICAL at 00:22

## 2018-11-06 RX ADMIN — CEFTRIAXONE 1 G: 1 INJECTION, POWDER, FOR SOLUTION INTRAMUSCULAR; INTRAVENOUS at 21:20

## 2018-11-06 RX ADMIN — INSULIN GLARGINE 30 UNITS: 100 INJECTION, SOLUTION SUBCUTANEOUS at 10:23

## 2018-11-06 RX ADMIN — DILTIAZEM HYDROCHLORIDE 240 MG: 120 CAPSULE, COATED, EXTENDED RELEASE ORAL at 07:51

## 2018-11-06 RX ADMIN — ENOXAPARIN SODIUM 40 MG: 40 INJECTION SUBCUTANEOUS at 10:22

## 2018-11-06 RX ADMIN — MICONAZOLE NITRATE: 20 POWDER TOPICAL at 21:20

## 2018-11-06 RX ADMIN — HYDROCODONE BITARTRATE AND ACETAMINOPHEN 2 TABLET: 5; 325 TABLET ORAL at 17:15

## 2018-11-06 RX ADMIN — ALBUTEROL SULFATE 2.5 MG: 2.5 SOLUTION RESPIRATORY (INHALATION) at 19:43

## 2018-11-06 RX ADMIN — METOPROLOL TARTRATE 25 MG: 25 TABLET ORAL at 07:51

## 2018-11-06 RX ADMIN — ALBUTEROL SULFATE 2.5 MG: 2.5 SOLUTION RESPIRATORY (INHALATION) at 12:55

## 2018-11-06 RX ADMIN — POTASSIUM CHLORIDE, DEXTROSE MONOHYDRATE AND SODIUM CHLORIDE: 150; 5; 450 INJECTION, SOLUTION INTRAVENOUS at 12:28

## 2018-11-06 RX ADMIN — INSULIN LISPRO 1 UNITS: 100 INJECTION, SOLUTION INTRAVENOUS; SUBCUTANEOUS at 10:23

## 2018-11-06 RX ADMIN — FUROSEMIDE 20 MG: 20 TABLET ORAL at 07:50

## 2018-11-06 RX ADMIN — INSULIN LISPRO 1 UNITS: 100 INJECTION, SOLUTION INTRAVENOUS; SUBCUTANEOUS at 12:27

## 2018-11-06 RX ADMIN — TRAZODONE HYDROCHLORIDE 50 MG: 50 TABLET ORAL at 21:20

## 2018-11-06 RX ADMIN — MICONAZOLE NITRATE: 20 POWDER TOPICAL at 07:51

## 2018-11-06 RX ADMIN — Medication 10 ML: at 21:21

## 2018-11-06 RX ADMIN — MORPHINE SULFATE 4 MG: 4 INJECTION, SOLUTION INTRAMUSCULAR; INTRAVENOUS at 21:20

## 2018-11-06 RX ADMIN — AMIODARONE HYDROCHLORIDE 200 MG: 200 TABLET ORAL at 07:51

## 2018-11-06 ASSESSMENT — PAIN DESCRIPTION - LOCATION
LOCATION: ABDOMEN

## 2018-11-06 ASSESSMENT — PAIN SCALES - GENERAL
PAINLEVEL_OUTOF10: 4
PAINLEVEL_OUTOF10: 9
PAINLEVEL_OUTOF10: 4
PAINLEVEL_OUTOF10: 8
PAINLEVEL_OUTOF10: 6
PAINLEVEL_OUTOF10: 3
PAINLEVEL_OUTOF10: 0
PAINLEVEL_OUTOF10: 7
PAINLEVEL_OUTOF10: 6
PAINLEVEL_OUTOF10: 0
PAINLEVEL_OUTOF10: 9
PAINLEVEL_OUTOF10: 0
PAINLEVEL_OUTOF10: 7
PAINLEVEL_OUTOF10: 0

## 2018-11-06 ASSESSMENT — PAIN DESCRIPTION - PAIN TYPE
TYPE: SURGICAL PAIN

## 2018-11-06 ASSESSMENT — PAIN DESCRIPTION - DESCRIPTORS
DESCRIPTORS: TENDER;ACHING
DESCRIPTORS: ACHING;DISCOMFORT
DESCRIPTORS: TENDER;ACHING

## 2018-11-06 ASSESSMENT — PAIN DESCRIPTION - ONSET
ONSET: ON-GOING
ONSET: ON-GOING

## 2018-11-06 ASSESSMENT — PAIN DESCRIPTION - FREQUENCY
FREQUENCY: CONTINUOUS
FREQUENCY: INTERMITTENT

## 2018-11-06 ASSESSMENT — PAIN DESCRIPTION - ORIENTATION
ORIENTATION: MID;UPPER
ORIENTATION: MID
ORIENTATION: MID
ORIENTATION: MID;UPPER

## 2018-11-06 ASSESSMENT — PAIN DESCRIPTION - PROGRESSION: CLINICAL_PROGRESSION: GRADUALLY WORSENING

## 2018-11-06 NOTE — PROGRESS NOTES
Surgical History: None = 2     Assessment     Peak Flow (asthma only)    Predicted: 327  Personal Best: 135    % Predicted 41%  Peak Flow : Less than 50% = 4    FEV1/FVC    FEV1 Predicted 2.55      FEV1 0.5    FEV1 % Predicted 20%  FVC 0.7  IS volume 2500      RR 18  Breath Sounds: Clear and Diminished      · Bronchodilator assessment at level  4  · Hyperinflation assessment at level 1  · Secretion Management assessment at level  1  ·   · [x]    Bronchodilator Assessment  BRONCHODILATOR ASSESSMENT SCORE  Score 0 1 2 3 4 5   Breath Sounds   []  Patient Baseline [x]  No Wheeze good aeration []  Faint, scattered wheezing, good aeration []  Expiratory Wheezing and or moderately diminished []  Insp/Exp wheeze and/or very diminished []  Insp/Exp and/ or marked distress   Respiratory Rate   []  Patient Baseline [x]  Less than 20 []  Less than 20 []  20-25 []  Greater than 25 []  Greater than 25   Peak flow % of Pred or PB []  NA   []  Greater than 90%  []  81-90% []  71-80% [x]  Less than or equal to 70%  or unable to perform []  Unable due to Respiratory Distress   Dyspnea re []  Patient Baseline []  No SOB []  No SOB []  SOB on exertion [x]  SOB min activity []  At rest/acute   e FEV% Predicted       []  NA []  Above 69%  []  Unable []  Above 60-69%  []  Unable []  Above 50-59%  []  Unable [x]  Above 35-49%  []  Unable []  Less than 35%  []  Unable                 [x]  Hyperinflation Assessment  Score 1 2 3   CXR and Breath Sounds   []  Clear []  No atelectasis  Basilar aeration []  Atelectasis or absent basilar breath sounds   Incentive Spirometry Volume  (Per IBW)   [x]  Greater than or equal to 15ml/Kg []  less than 15ml/Kg []  less than 15ml/Kg   Surgery within last 2 weeks []  None or general   [x]  Abdominal or thoracic surgery  []  Abdominal or thoracic   Chronic Pulmonary Historyre [x]  No []  Yes []  Yes     [x]  Secretion Management Assessment  Score 1 2 3   Bilateral Breath Sounds   [x]  Occasional Rhonchi []  Scattered Rhonchi []  Course Rhonchi and/or poor aeration   Sputum    [x]  Small amount of thin secretions []  Moderate amount of viscous secretions []  Copius, Viscious Yellow/ Secretions   CXR as reported by physician []  clear  [x]  Unavailable []  Infiltrates and/or consolidation  []  Unavailable []  Mucus Plugging and or lobar consolidation  []  Unavailable   Cough [x]  Strong, productive cough []  Weak productive cough []  No cough or weak non-productive cough   Roni Justice  5:10 PM                            FEMALE                                  MALE                            FEV1 Predicted Normal Values                        FEV1 Predicted Normal Values          Age                                     Height in Feet and Inches       Age                                     Height in Feet and Inches       4' 11\" 5' 1\" 5' 3\" 5' 5\" 5' 7\" 5' 9\" 5' 11\" 6' 1\"  4' 11\" 5' 1\" 5' 3\" 5' 5\" 5' 7\" 5' 9\" 5' 11\" 6' 1\"   42 - 45 2.49 2.66 2.84 3.03 3.22 3.42 3.62 3.83 42 - 45 2.82 3.03 3.26 3.49 3.72 3.96 4.22 4.47   46 - 49 2.40 2.57 2.76 2.94 3.14 3.33 3.54 3.75 46 - 49 2.70 2.92 3.14 3.37 3.61 3.85 4.10 4.36   50 - 53 2.31 2.48 2.66 2.85 3.04 3.24 3.45 3.66 50 - 53 2.58 2.80 3.02 3.25 3.49 3.73 3.98 4.24   54 - 57 2.21 2.38 2.57 2.75 2.95 3.14 3.35 3.56 54 - 57 2.46 2.67 2.89 3.12 3.36 3.60 3.85 4.11   58 - 61 2.10 2.28 2.46 2.65 2.84 3.04 3.24 3.45 58 - 61 2.32 2.54 2.76 2.99 3.23 3.47 3.72 3.98   62 - 65 1.99 2.17 2.35 2.54 2.73 2.93 3.13 3.34 62 - 65 2.19 2.40 2.62 2.85 3.09 3.33 3.58 3.84   66 - 69 1.88 2.05 2.23 2.42 2.61 2.81 3.02 3.23 66 - 69 2.04 2.26 2.48 2.71 2.95 3.19 3.44 3.70   70+ 1.82 1.99 2.17 2.36 2.55 2.75 2.95 3.16 70+ 1.97 2.19 2.41 2.64 2.87 3.12 3.37 3.62             Predicted Peak Expiratory Flow Rate                                       Height (in)  Female       Height (in) Male           Age 64 62 64 63 57 71 78 74 Age            91 172 321 986 570 [phone]  60 62 64 66 78

## 2018-11-06 NOTE — PROGRESS NOTES
rectosigmoid---proximal diverting                       ileostomy---11.5.2018  POD _____ cystoscopy-lighted stents---11.5.2018   Colon carcinoma--rectosigmoid----11.5.2018---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          Colonoscopy---10.8.2018--multiple polyps--large tumors distal sigmoid                       colon-one ulcerated--extensive diverticulosis---          GI bleeding ---10.6.2018---likely due to colon carcinoma--specifically  two                       large sigmoid masses---see above                 CT abdomen-pelvis----106.2018--diverticulosis--no acute diverticulitis---chronic                                osseous changes LS spine-sacrum--anterolithesis L4--partial fusion                                L5-S1-----decreased right gluteal hematoma   MICHAEL--acute kidney injury--11.5.2018--11.6.2018--Stage 3 superposed on Stage 2  Hypokalemia---11.5.2018  Thrombocytopenia---11.5.2018---chronic      Atrial fibrillation----history---currently SR              EKG---11.5.2018--sinus bradycardia---51--1st degree AVB---LVH--QTc 490              BALDO cardioversion----9.10. 2017---successful atrial fibrillation---to--NSR              BALDO---9.10. 2017--LA dilatation---preserved LVSF--atrial septal aneurysm                           without shunt--LA appendage n o clot--negative bubble study---                          mild MR---moderate TR--normal TV leaflets             Cardiac catheterization---9. 5.2017--0% LM--mid 20%---normal D1---0% left circumflex--                         40% proximal OM1---mild aneurysmal dilatation proximal RCA---LVEF ~ 55%             2D ECHO----8. 3.2017--ROVERTO--NLVSF---dilated RV---NRVSF---MAC--mild MR--                          mild TR---RVSP ~ 31 mm Hg---LVEF ~ 50%             RVR---newly diagnosed----7.19.2017----see cardioversion above---9.10.2017              MI ruled out--7.20.2017             EKG---7.20.2017--atrial fibrillation--78---low voltage              EKG---7.19.2017--atrial fibrillation---RVR---111--diffuse low                         voltage--RSR V1-2 only---cannot exclude prior high                        lateral infarction  Sinus bradycardia---1st-degree AVB, see above   ASCVD---see above   Hypertension  Hyperlipidemia  Insulin resistance--impaired glucose tolerance  Morbid obesity  Depression--anxiety   Dermatitis---skin folds  PMH:    osteoarthritis, osteoporosis, weakness with exertion---decreased exercise               tolerance----2017, atypical chest pain--aching right-left arms,  right rib               fractures---contusions---due to fall----2018,  dermatitis, suspected sleep apnea   PSH:    KARYN-BSO--cervix absent--1966, cholecystectomy---              open---c. 1960s, right varicose veins--c. 1960s, left               YUMI, right TKA, left TKA    Allergies:        penicillins  Intolerances:  Betra--valdecoxib----diarrhea      Plan:  1. MICHAEL---fluid bolus---renal ultrasound  2. Respiratory regimen  3. Dermatitis--antifungal topical  4. Ambulate !!!  5. Check BMET  6.   See orders    Electronically signed by Broderick Kinney on 11/6/2018 at 11:29 AM    Hospitalist

## 2018-11-06 NOTE — PROGRESS NOTES
Certified Wound Ostomy Continence Nurse visit per pt request.   Pt is POD 1 low anterior resection of rectosigmoid with proximal diverting ileostomy placement, per Dr. Calos Masterson. Pt has multiple questions about ileostomy care, which were answered to her satisfaction today. Will plan to do ileostomy appliance change and more in-depth teaching on Friday 11/9/18 at 1:00pm. Pt's son will plan to be present for that. Pt states she desires to go home with home health care at time of hospital discharge.

## 2018-11-06 NOTE — PROGRESS NOTES
Patient wears home cpap, family was unable to bring in. Patient placed on autopap; resting comfortable at this time.

## 2018-11-06 NOTE — PROGRESS NOTES
Maliha Malik is a 77 y.o. female patient.     Current Facility-Administered Medications   Medication Dose Route Frequency Provider Last Rate Last Dose    [START ON 11/6/2018] amiodarone (CORDARONE) tablet 200 mg  200 mg Oral Daily Soco Farrar MD        atorvastatin (LIPITOR) tablet 40 mg  40 mg Oral Nightly Soco Farrar MD        Lakeisha Hograyson ON 11/6/2018] diltiazem (CARDIZEM CD) extended release capsule 240 mg  240 mg Oral Daily Soco Farrar MD        furosemide (LASIX) tablet 20 mg  20 mg Oral Daily Soco Farrar MD   20 mg at 11/05/18 1456    levothyroxine (SYNTHROID) tablet 50 mcg  50 mcg Oral Daily Soco Farrar MD   50 mcg at 11/05/18 1456    metoprolol tartrate (LOPRESSOR) tablet 25 mg  25 mg Oral BID Soco Farrar MD        traZODone (DESYREL) tablet 50 mg  50 mg Oral Nightly Soco Farrar MD        sodium chloride flush 0.9 % injection 10 mL  10 mL Intravenous 2 times per day Soco Farrar MD        sodium chloride flush 0.9 % injection 10 mL  10 mL Intravenous PRN Soco Farrar MD        acetaminophen (TYLENOL) tablet 650 mg  650 mg Oral Q4H PRN Soco Farrar MD        ondansetron [facility]) injection 4 mg  4 mg Intravenous Q6H PRN Soco Farrar MD        [START ON 11/6/2018] enoxaparin (LOVENOX) injection 40 mg  40 mg Subcutaneous Daily Soco Farrar MD        dextrose 5 % and 0.45 % NaCl with KCl 20 mEq infusion   Intravenous Continuous Soco Farrar  mL/hr at 11/05/18 1357      morphine (PF) injection 2 mg  2 mg Intravenous Q2H PRN Soco Farrar MD   2 mg at 11/05/18 1656    Or    morphine (PF) injection 4 mg  4 mg Intravenous Q2H PRN Soco Farrar MD   4 mg at 11/05/18 2024    dextrose 5% and 0.45% NaCl with KCl 20 mEq 20-5-0.45 MEQ/L-%-% infusion             albuterol (PROVENTIL) nebulizer solution 2.5 mg  2.5 mg Nebulization As Directed RT PRN Dot Pop        sodium chloride nebulizer 0.9 % solution 3 mL  3 mL Nebulization As Directed RT PRN Nayeli Overall        albuterol (PROVENTIL) nebulizer solution 2.5 mg  2.5 mg Nebulization Q4H Dimas Arrieta   2.5 mg at 11/05/18 2303    miconazole (MICOTIN) 2 % powder   Topical BID Nayeli Overall         Allergies   Allergen Reactions    Pcn [Penicillins] Hives and Shortness Of Breath    Bextra [Valdecoxib] Diarrhea     Active Problems:    Colon cancer (HCC)  Resolved Problems:    * No resolved hospital problems. *    Blood pressure 121/62, pulse 69, temperature 97 °F (36.1 °C), temperature source Tympanic, resp. rate 16, height 5' 7\" (1.702 m), weight (!) 325 lb (147.4 kg), last menstrual period 01/01/1968, SpO2 93 %. Subjective Patient is doing well. POD 0 diverting ileostomy s/p resection of rectosigmoid CA. The patient only complains of incisional pain. Tolerating ice chips. Objective  Patient is well appearing in no acute distress. The patient is breathing easily, heart is RRR. The patient's lower extremities have a full ROM and no edema. Abdomen is tender, no rebound. Bowel sounds are hypoactive.     Assessment & Plan  Colon CA - POD 0, doing well, monitor H&h overnight, vitals have been stable, pain control, surgery will control diet    Shirley Dumont PA-C  11/5/2018

## 2018-11-07 LAB
ABSOLUTE EOS #: 0 K/UL (ref 0–0.4)
ABSOLUTE EOS #: 0 K/UL (ref 0–0.4)
ABSOLUTE IMMATURE GRANULOCYTE: ABNORMAL K/UL (ref 0–0.3)
ABSOLUTE IMMATURE GRANULOCYTE: ABNORMAL K/UL (ref 0–0.3)
ABSOLUTE LYMPH #: 0.55 K/UL (ref 1–4.8)
ABSOLUTE LYMPH #: 1 K/UL (ref 1–4.8)
ABSOLUTE MONO #: 1.23 K/UL (ref 0.1–1.2)
ABSOLUTE MONO #: 1.3 K/UL (ref 0.1–1.2)
ANION GAP SERPL CALCULATED.3IONS-SCNC: 12 MMOL/L (ref 9–17)
ANION GAP SERPL CALCULATED.3IONS-SCNC: 12 MMOL/L (ref 9–17)
BASOPHILS # BLD: 0 % (ref 0–1)
BASOPHILS # BLD: 0 % (ref 0–1)
BASOPHILS ABSOLUTE: 0 K/UL (ref 0–0.2)
BASOPHILS ABSOLUTE: 0 K/UL (ref 0–0.2)
BUN BLDV-MCNC: 21 MG/DL (ref 8–23)
BUN BLDV-MCNC: 22 MG/DL (ref 8–23)
BUN/CREAT BLD: 20 (ref 9–20)
BUN/CREAT BLD: 20 (ref 9–20)
CALCIUM SERPL-MCNC: 7.9 MG/DL (ref 8.6–10.4)
CALCIUM SERPL-MCNC: 8.3 MG/DL (ref 8.6–10.4)
CHLORIDE BLD-SCNC: 105 MMOL/L (ref 98–107)
CHLORIDE BLD-SCNC: 106 MMOL/L (ref 98–107)
CO2: 16 MMOL/L (ref 20–31)
CO2: 19 MMOL/L (ref 20–31)
CREAT SERPL-MCNC: 1.07 MG/DL (ref 0.5–0.9)
CREAT SERPL-MCNC: 1.1 MG/DL (ref 0.5–0.9)
DIFFERENTIAL TYPE: ABNORMAL
DIFFERENTIAL TYPE: ABNORMAL
EOSINOPHILS RELATIVE PERCENT: 0 % (ref 1–7)
EOSINOPHILS RELATIVE PERCENT: 0 % (ref 1–7)
GFR AFRICAN AMERICAN: 58 ML/MIN
GFR AFRICAN AMERICAN: 60 ML/MIN
GFR NON-AFRICAN AMERICAN: 48 ML/MIN
GFR NON-AFRICAN AMERICAN: 49 ML/MIN
GFR SERPL CREATININE-BSD FRML MDRD: ABNORMAL ML/MIN/{1.73_M2}
GLUCOSE BLD-MCNC: 101 MG/DL (ref 65–105)
GLUCOSE BLD-MCNC: 102 MG/DL (ref 65–105)
GLUCOSE BLD-MCNC: 105 MG/DL (ref 65–105)
GLUCOSE BLD-MCNC: 110 MG/DL (ref 70–99)
GLUCOSE BLD-MCNC: 144 MG/DL (ref 70–99)
GLUCOSE BLD-MCNC: 93 MG/DL (ref 65–105)
HCT VFR BLD CALC: 26.5 % (ref 36–46)
HCT VFR BLD CALC: 27.1 % (ref 36–46)
HEMOGLOBIN: 8.4 G/DL (ref 12–16)
HEMOGLOBIN: 8.4 G/DL (ref 12–16)
IMMATURE GRANULOCYTES: ABNORMAL %
IMMATURE GRANULOCYTES: ABNORMAL %
LYMPHOCYTES # BLD: 4 % (ref 16–46)
LYMPHOCYTES # BLD: 8 % (ref 16–46)
MCH RBC QN AUTO: 30 PG (ref 26–34)
MCH RBC QN AUTO: 30.5 PG (ref 26–34)
MCHC RBC AUTO-ENTMCNC: 30.9 G/DL (ref 31–37)
MCHC RBC AUTO-ENTMCNC: 31.7 G/DL (ref 31–37)
MCV RBC AUTO: 94.8 FL (ref 80–100)
MCV RBC AUTO: 98.7 FL (ref 80–100)
MONOCYTES # BLD: 10 % (ref 4–11)
MONOCYTES # BLD: 9 % (ref 4–11)
MORPHOLOGY: ABNORMAL
NRBC AUTOMATED: ABNORMAL PER 100 WBC
NRBC AUTOMATED: ABNORMAL PER 100 WBC
PDW BLD-RTO: 16.1 % (ref 11–14.5)
PDW BLD-RTO: 16.8 % (ref 11–14.5)
PLATELET # BLD: 104 K/UL (ref 140–450)
PLATELET # BLD: 97 K/UL (ref 140–450)
PLATELET ESTIMATE: ABNORMAL
PLATELET ESTIMATE: ABNORMAL
PMV BLD AUTO: 12.5 FL (ref 6–12)
PMV BLD AUTO: 12.8 FL (ref 6–12)
POTASSIUM SERPL-SCNC: 4.3 MMOL/L (ref 3.7–5.3)
POTASSIUM SERPL-SCNC: 4.6 MMOL/L (ref 3.7–5.3)
RBC # BLD: 2.74 M/UL (ref 4–5.2)
RBC # BLD: 2.79 M/UL (ref 4–5.2)
RBC # BLD: ABNORMAL 10*6/UL
RBC # BLD: ABNORMAL 10*6/UL
SEG NEUTROPHILS: 82 % (ref 43–77)
SEG NEUTROPHILS: 87 % (ref 43–77)
SEGMENTED NEUTROPHILS ABSOLUTE COUNT: 10.4 K/UL (ref 1.8–7.7)
SEGMENTED NEUTROPHILS ABSOLUTE COUNT: 11.92 K/UL (ref 1.8–7.7)
SODIUM BLD-SCNC: 134 MMOL/L (ref 135–144)
SODIUM BLD-SCNC: 136 MMOL/L (ref 135–144)
SURGICAL PATHOLOGY REPORT: NORMAL
WBC # BLD: 12.6 K/UL (ref 3.5–11)
WBC # BLD: 13.7 K/UL (ref 3.5–11)
WBC # BLD: ABNORMAL 10*3/UL
WBC # BLD: ABNORMAL 10*3/UL

## 2018-11-07 PROCEDURE — 2060000000 HC ICU INTERMEDIATE R&B

## 2018-11-07 PROCEDURE — 99232 SBSQ HOSP IP/OBS MODERATE 35: CPT | Performed by: INTERNAL MEDICINE

## 2018-11-07 PROCEDURE — 85025 COMPLETE CBC W/AUTO DIFF WBC: CPT

## 2018-11-07 PROCEDURE — 2500000003 HC RX 250 WO HCPCS: Performed by: SURGERY

## 2018-11-07 PROCEDURE — 97535 SELF CARE MNGMENT TRAINING: CPT

## 2018-11-07 PROCEDURE — 6370000000 HC RX 637 (ALT 250 FOR IP): Performed by: SURGERY

## 2018-11-07 PROCEDURE — 36415 COLL VENOUS BLD VENIPUNCTURE: CPT

## 2018-11-07 PROCEDURE — 97110 THERAPEUTIC EXERCISES: CPT

## 2018-11-07 PROCEDURE — 6360000002 HC RX W HCPCS: Performed by: INTERNAL MEDICINE

## 2018-11-07 PROCEDURE — 97116 GAIT TRAINING THERAPY: CPT

## 2018-11-07 PROCEDURE — 94761 N-INVAS EAR/PLS OXIMETRY MLT: CPT

## 2018-11-07 PROCEDURE — 2700000000 HC OXYGEN THERAPY PER DAY

## 2018-11-07 PROCEDURE — 6370000000 HC RX 637 (ALT 250 FOR IP): Performed by: INTERNAL MEDICINE

## 2018-11-07 PROCEDURE — 2580000003 HC RX 258: Performed by: INTERNAL MEDICINE

## 2018-11-07 PROCEDURE — 82947 ASSAY GLUCOSE BLOOD QUANT: CPT

## 2018-11-07 PROCEDURE — 80048 BASIC METABOLIC PNL TOTAL CA: CPT

## 2018-11-07 PROCEDURE — 94664 DEMO&/EVAL PT USE INHALER: CPT

## 2018-11-07 PROCEDURE — 6360000002 HC RX W HCPCS: Performed by: SURGERY

## 2018-11-07 PROCEDURE — 94660 CPAP INITIATION&MGMT: CPT

## 2018-11-07 PROCEDURE — 94640 AIRWAY INHALATION TREATMENT: CPT

## 2018-11-07 RX ORDER — ALBUTEROL SULFATE 2.5 MG/3ML
2.5 SOLUTION RESPIRATORY (INHALATION) 4 TIMES DAILY
Status: DISCONTINUED | OUTPATIENT
Start: 2018-11-08 | End: 2018-11-09 | Stop reason: HOSPADM

## 2018-11-07 RX ADMIN — HYDROCODONE BITARTRATE AND ACETAMINOPHEN 2 TABLET: 5; 325 TABLET ORAL at 21:09

## 2018-11-07 RX ADMIN — CEFTRIAXONE 1 G: 1 INJECTION, POWDER, FOR SOLUTION INTRAMUSCULAR; INTRAVENOUS at 19:43

## 2018-11-07 RX ADMIN — LEVOTHYROXINE SODIUM 50 MCG: 25 TABLET ORAL at 07:58

## 2018-11-07 RX ADMIN — ALBUTEROL SULFATE 2.5 MG: 2.5 SOLUTION RESPIRATORY (INHALATION) at 20:51

## 2018-11-07 RX ADMIN — ALBUTEROL SULFATE 2.5 MG: 2.5 SOLUTION RESPIRATORY (INHALATION) at 11:51

## 2018-11-07 RX ADMIN — MICONAZOLE NITRATE: 20 POWDER TOPICAL at 21:02

## 2018-11-07 RX ADMIN — POTASSIUM CHLORIDE, DEXTROSE MONOHYDRATE AND SODIUM CHLORIDE: 150; 5; 450 INJECTION, SOLUTION INTRAVENOUS at 08:56

## 2018-11-07 RX ADMIN — FUROSEMIDE 20 MG: 20 TABLET ORAL at 07:58

## 2018-11-07 RX ADMIN — HYDROCODONE BITARTRATE AND ACETAMINOPHEN 2 TABLET: 5; 325 TABLET ORAL at 13:26

## 2018-11-07 RX ADMIN — TRAZODONE HYDROCHLORIDE 50 MG: 50 TABLET ORAL at 23:15

## 2018-11-07 RX ADMIN — INSULIN GLARGINE 30 UNITS: 100 INJECTION, SOLUTION SUBCUTANEOUS at 07:58

## 2018-11-07 RX ADMIN — ENOXAPARIN SODIUM 40 MG: 40 INJECTION SUBCUTANEOUS at 07:58

## 2018-11-07 RX ADMIN — ALBUTEROL SULFATE 2.5 MG: 2.5 SOLUTION RESPIRATORY (INHALATION) at 03:39

## 2018-11-07 RX ADMIN — ALBUTEROL SULFATE 2.5 MG: 2.5 SOLUTION RESPIRATORY (INHALATION) at 16:37

## 2018-11-07 RX ADMIN — AMIODARONE HYDROCHLORIDE 200 MG: 200 TABLET ORAL at 07:58

## 2018-11-07 RX ADMIN — HYDROCODONE BITARTRATE AND ACETAMINOPHEN 2 TABLET: 5; 325 TABLET ORAL at 08:00

## 2018-11-07 RX ADMIN — DILTIAZEM HYDROCHLORIDE 240 MG: 120 CAPSULE, COATED, EXTENDED RELEASE ORAL at 07:58

## 2018-11-07 RX ADMIN — ATORVASTATIN CALCIUM 40 MG: 40 TABLET, FILM COATED ORAL at 21:01

## 2018-11-07 RX ADMIN — MICONAZOLE NITRATE: 20 POWDER TOPICAL at 07:59

## 2018-11-07 RX ADMIN — METOPROLOL TARTRATE 25 MG: 25 TABLET ORAL at 07:58

## 2018-11-07 RX ADMIN — INSULIN LISPRO 1 UNITS: 100 INJECTION, SOLUTION INTRAVENOUS; SUBCUTANEOUS at 07:59

## 2018-11-07 RX ADMIN — ALBUTEROL SULFATE 2.5 MG: 2.5 SOLUTION RESPIRATORY (INHALATION) at 08:06

## 2018-11-07 RX ADMIN — METOPROLOL TARTRATE 25 MG: 25 TABLET ORAL at 21:01

## 2018-11-07 ASSESSMENT — PAIN SCALES - GENERAL
PAINLEVEL_OUTOF10: 5
PAINLEVEL_OUTOF10: 6
PAINLEVEL_OUTOF10: 7
PAINLEVEL_OUTOF10: 8
PAINLEVEL_OUTOF10: 0

## 2018-11-07 NOTE — PROGRESS NOTES
Physical Therapy  Facility/Department: [facility]  PROGRESSIVE C[facility]  Daily Treatment Note  NAME: Benton Garcia  : 1940  MRN: [id number]    Date of Service: 2018    Discharge Recommendations:  Continue to assess pending progress        Patient Diagnosis(es): There were no encounter diagnoses. has a past medical history of A-fib [facility]); CHF (congestive heart failure) ([facility] [address] Dyslipidemia; FH: total abdominal hysterectomy and bilateral salpingo-oophorectomy; Glucose intolerance (impaired glucose tolerance); Hypertension; Obesity; and Osteoarthritis. has a past surgical history that includes kenyon and bso (cervix removed) (); Cholecystectomy (); Varicose vein surgery (Right, 1960s); Hip Arthroplasty (Left); Knee Arthroplasty (Left); Knee Arthroplasty (Right); Cardiac catheterization (2017); Colonoscopy (N/A, 10/8/2018); pr lap,surg,colectomy, partial, w/anast (N/A, 2018); and pr cystoscopy,insert ureteral stent (Bilateral, 2018). Restrictions  Restrictions/Precautions  Restrictions/Precautions: Surgical Protocols, General Precautions  Implants present? : Metal implants (BTKA, LTHA)  Subjective   General  Chart Reviewed: Yes  Response To Previous Treatment: Patient with no complaints from previous session. Family / Caregiver Present: No  Subjective  Subjective: Pt found supine in bed upon arrival. Agreeable to therapy.    Pain Screening  Patient Currently in Pain: No  Pain Assessment  Pain Assessment: 0-10  Pain Level: 0  Patient's Stated Pain Goal: No pain  Vital Signs  Patient Currently in Pain: No       Orientation  Orientation  Overall Orientation Status: Within Normal Limits  Cognition      Objective   Bed mobility  Bridging: Unable to assess  Rolling to Left: Stand by assistance  Rolling to Right: Stand by assistance  Supine to Sit: Stand by assistance  Sit to Supine: Stand by assistance  Scooting: Stand by assistance  Transfers  Sit to Stand: Minimal Assistance  Stand to sit: per day: Daily  Current Treatment Recommendations: Strengthening, Functional Mobility Training, Balance Training, Transfer Training, Gait Training, Endurance Training  Safety Devices  Type of devices:  All fall risk precautions in place, Call light within reach, Gait belt, Patient at risk for falls, Left in bed, Nurse notified     Therapy Time   Individual Concurrent Group Co-treatment   Time In 0915         Time Out 0940         Minutes 25         Timed Code Treatment Minutes: [address] [facility], [facility]

## 2018-11-08 PROBLEM — D62 ACUTE BLOOD LOSS AS CAUSE OF POSTOPERATIVE ANEMIA: Status: ACTIVE | Noted: 2018-11-08

## 2018-11-08 LAB
ABSOLUTE EOS #: 0.09 K/UL (ref 0–0.4)
ABSOLUTE IMMATURE GRANULOCYTE: ABNORMAL K/UL (ref 0–0.3)
ABSOLUTE LYMPH #: 1.12 K/UL (ref 1–4.8)
ABSOLUTE MONO #: 0.93 K/UL (ref 0.1–1.2)
ANION GAP SERPL CALCULATED.3IONS-SCNC: 10 MMOL/L (ref 9–17)
BASOPHILS # BLD: 0 % (ref 0–1)
BASOPHILS ABSOLUTE: 0 K/UL (ref 0–0.2)
BUN BLDV-MCNC: 19 MG/DL (ref 8–23)
BUN/CREAT BLD: 20 (ref 9–20)
CALCIUM SERPL-MCNC: 8.6 MG/DL (ref 8.6–10.4)
CHLORIDE BLD-SCNC: 107 MMOL/L (ref 98–107)
CO2: 23 MMOL/L (ref 20–31)
CREAT SERPL-MCNC: 0.97 MG/DL (ref 0.5–0.9)
CULTURE: NO GROWTH
DIFFERENTIAL TYPE: ABNORMAL
EOSINOPHILS RELATIVE PERCENT: 1 % (ref 1–7)
GFR AFRICAN AMERICAN: >60 ML/MIN
GFR NON-AFRICAN AMERICAN: 55 ML/MIN
GFR SERPL CREATININE-BSD FRML MDRD: ABNORMAL ML/MIN/{1.73_M2}
GFR SERPL CREATININE-BSD FRML MDRD: ABNORMAL ML/MIN/{1.73_M2}
GLUCOSE BLD-MCNC: 110 MG/DL (ref 65–105)
GLUCOSE BLD-MCNC: 82 MG/DL (ref 65–105)
GLUCOSE BLD-MCNC: 83 MG/DL (ref 65–105)
GLUCOSE BLD-MCNC: 91 MG/DL (ref 70–99)
HCT VFR BLD CALC: 24.3 % (ref 36–46)
HCT VFR BLD CALC: 24.5 % (ref 36–46)
HEMOGLOBIN: 7.7 G/DL (ref 12–16)
HEMOGLOBIN: 7.8 G/DL (ref 12–16)
IMMATURE GRANULOCYTES: ABNORMAL %
LYMPHOCYTES # BLD: 12 % (ref 16–46)
Lab: NORMAL
MCH RBC QN AUTO: 30.4 PG (ref 26–34)
MCHC RBC AUTO-ENTMCNC: 31.9 G/DL (ref 31–37)
MCV RBC AUTO: 95.2 FL (ref 80–100)
MONOCYTES # BLD: 10 % (ref 4–11)
MORPHOLOGY: ABNORMAL
NRBC AUTOMATED: ABNORMAL PER 100 WBC
PDW BLD-RTO: 15.8 % (ref 11–14.5)
PLATELET # BLD: 93 K/UL (ref 140–450)
PLATELET ESTIMATE: ABNORMAL
PMV BLD AUTO: 13.3 FL (ref 6–12)
POTASSIUM SERPL-SCNC: 4.1 MMOL/L (ref 3.7–5.3)
RBC # BLD: 2.55 M/UL (ref 4–5.2)
RBC # BLD: ABNORMAL 10*6/UL
SEG NEUTROPHILS: 77 % (ref 43–77)
SEGMENTED NEUTROPHILS ABSOLUTE COUNT: 7.16 K/UL (ref 1.8–7.7)
SODIUM BLD-SCNC: 140 MMOL/L (ref 135–144)
SPECIMEN DESCRIPTION: NORMAL
STATUS: NORMAL
WBC # BLD: 9.3 K/UL (ref 3.5–11)
WBC # BLD: ABNORMAL 10*3/UL

## 2018-11-08 PROCEDURE — 94640 AIRWAY INHALATION TREATMENT: CPT

## 2018-11-08 PROCEDURE — 85018 HEMOGLOBIN: CPT

## 2018-11-08 PROCEDURE — 2500000003 HC RX 250 WO HCPCS: Performed by: SURGERY

## 2018-11-08 PROCEDURE — 2580000003 HC RX 258: Performed by: SURGERY

## 2018-11-08 PROCEDURE — 6360000002 HC RX W HCPCS: Performed by: SURGERY

## 2018-11-08 PROCEDURE — 97116 GAIT TRAINING THERAPY: CPT

## 2018-11-08 PROCEDURE — 6360000002 HC RX W HCPCS: Performed by: INTERNAL MEDICINE

## 2018-11-08 PROCEDURE — 2060000000 HC ICU INTERMEDIATE R&B

## 2018-11-08 PROCEDURE — 6370000000 HC RX 637 (ALT 250 FOR IP): Performed by: INTERNAL MEDICINE

## 2018-11-08 PROCEDURE — 36415 COLL VENOUS BLD VENIPUNCTURE: CPT

## 2018-11-08 PROCEDURE — 85025 COMPLETE CBC W/AUTO DIFF WBC: CPT

## 2018-11-08 PROCEDURE — 99024 POSTOP FOLLOW-UP VISIT: CPT | Performed by: SURGERY

## 2018-11-08 PROCEDURE — 97110 THERAPEUTIC EXERCISES: CPT

## 2018-11-08 PROCEDURE — 94760 N-INVAS EAR/PLS OXIMETRY 1: CPT

## 2018-11-08 PROCEDURE — 82947 ASSAY GLUCOSE BLOOD QUANT: CPT

## 2018-11-08 PROCEDURE — 85014 HEMATOCRIT: CPT

## 2018-11-08 PROCEDURE — 99232 SBSQ HOSP IP/OBS MODERATE 35: CPT | Performed by: INTERNAL MEDICINE

## 2018-11-08 PROCEDURE — 6370000000 HC RX 637 (ALT 250 FOR IP): Performed by: SURGERY

## 2018-11-08 PROCEDURE — 80048 BASIC METABOLIC PNL TOTAL CA: CPT

## 2018-11-08 PROCEDURE — 94761 N-INVAS EAR/PLS OXIMETRY MLT: CPT

## 2018-11-08 RX ORDER — CEFUROXIME AXETIL 250 MG/1
500 TABLET ORAL EVERY 12 HOURS SCHEDULED
Status: DISCONTINUED | OUTPATIENT
Start: 2018-11-08 | End: 2018-11-09 | Stop reason: HOSPADM

## 2018-11-08 RX ADMIN — ALBUTEROL SULFATE 2.5 MG: 2.5 SOLUTION RESPIRATORY (INHALATION) at 16:52

## 2018-11-08 RX ADMIN — CEFUROXIME AXETIL 500 MG: 250 TABLET ORAL at 21:03

## 2018-11-08 RX ADMIN — HYDROCODONE BITARTRATE AND ACETAMINOPHEN 2 TABLET: 5; 325 TABLET ORAL at 06:02

## 2018-11-08 RX ADMIN — METOPROLOL TARTRATE 25 MG: 25 TABLET ORAL at 08:27

## 2018-11-08 RX ADMIN — MICONAZOLE NITRATE: 20 POWDER TOPICAL at 08:27

## 2018-11-08 RX ADMIN — HYDROCODONE BITARTRATE AND ACETAMINOPHEN 2 TABLET: 5; 325 TABLET ORAL at 18:23

## 2018-11-08 RX ADMIN — POTASSIUM CHLORIDE, DEXTROSE MONOHYDRATE AND SODIUM CHLORIDE: 150; 5; 450 INJECTION, SOLUTION INTRAVENOUS at 10:42

## 2018-11-08 RX ADMIN — MICONAZOLE NITRATE: 20 POWDER TOPICAL at 21:04

## 2018-11-08 RX ADMIN — DILTIAZEM HYDROCHLORIDE 240 MG: 120 CAPSULE, COATED, EXTENDED RELEASE ORAL at 08:27

## 2018-11-08 RX ADMIN — ALBUTEROL SULFATE 2.5 MG: 2.5 SOLUTION RESPIRATORY (INHALATION) at 11:35

## 2018-11-08 RX ADMIN — FUROSEMIDE 20 MG: 20 TABLET ORAL at 08:27

## 2018-11-08 RX ADMIN — ATORVASTATIN CALCIUM 40 MG: 40 TABLET, FILM COATED ORAL at 21:04

## 2018-11-08 RX ADMIN — LEVOTHYROXINE SODIUM 50 MCG: 25 TABLET ORAL at 08:27

## 2018-11-08 RX ADMIN — AMIODARONE HYDROCHLORIDE 200 MG: 200 TABLET ORAL at 08:27

## 2018-11-08 RX ADMIN — INSULIN GLARGINE 30 UNITS: 100 INJECTION, SOLUTION SUBCUTANEOUS at 08:26

## 2018-11-08 RX ADMIN — MORPHINE SULFATE 2 MG: 2 INJECTION, SOLUTION INTRAMUSCULAR; INTRAVENOUS at 09:39

## 2018-11-08 RX ADMIN — ALBUTEROL SULFATE 2.5 MG: 2.5 SOLUTION RESPIRATORY (INHALATION) at 07:35

## 2018-11-08 RX ADMIN — METOPROLOL TARTRATE 25 MG: 25 TABLET ORAL at 21:03

## 2018-11-08 RX ADMIN — ENOXAPARIN SODIUM 40 MG: 40 INJECTION SUBCUTANEOUS at 08:27

## 2018-11-08 RX ADMIN — TRAZODONE HYDROCHLORIDE 50 MG: 50 TABLET ORAL at 21:04

## 2018-11-08 RX ADMIN — ALBUTEROL SULFATE 2.5 MG: 2.5 SOLUTION RESPIRATORY (INHALATION) at 19:47

## 2018-11-08 ASSESSMENT — PAIN DESCRIPTION - FREQUENCY
FREQUENCY: CONTINUOUS
FREQUENCY: CONTINUOUS

## 2018-11-08 ASSESSMENT — PAIN DESCRIPTION - PAIN TYPE
TYPE: SURGICAL PAIN

## 2018-11-08 ASSESSMENT — PAIN SCALES - GENERAL
PAINLEVEL_OUTOF10: 0
PAINLEVEL_OUTOF10: 2
PAINLEVEL_OUTOF10: 4
PAINLEVEL_OUTOF10: 2
PAINLEVEL_OUTOF10: 7
PAINLEVEL_OUTOF10: 4
PAINLEVEL_OUTOF10: 6
PAINLEVEL_OUTOF10: 4
PAINLEVEL_OUTOF10: 6
PAINLEVEL_OUTOF10: 6

## 2018-11-08 ASSESSMENT — PAIN DESCRIPTION - PROGRESSION
CLINICAL_PROGRESSION: GRADUALLY IMPROVING
CLINICAL_PROGRESSION: RAPIDLY WORSENING
CLINICAL_PROGRESSION: RAPIDLY WORSENING
CLINICAL_PROGRESSION: NOT CHANGED

## 2018-11-08 ASSESSMENT — PAIN DESCRIPTION - LOCATION
LOCATION: ABDOMEN

## 2018-11-08 ASSESSMENT — PAIN DESCRIPTION - DESCRIPTORS
DESCRIPTORS: ACHING
DESCRIPTORS: ACHING

## 2018-11-08 ASSESSMENT — PAIN DESCRIPTION - ONSET
ONSET: ON-GOING
ONSET: ON-GOING

## 2018-11-08 ASSESSMENT — PAIN DESCRIPTION - ORIENTATION: ORIENTATION: RIGHT;MID

## 2018-11-08 NOTE — PROGRESS NOTES
sit: Moderate Assistance  Bed to Chair: Moderate assistance  Stand Pivot Transfers: Unable to assess  Squat Pivot Transfers: Unable to assess  Lateral Transfers: Unable to assess  Car Transfer: Unable to assess  Ambulation  Ambulation?: Yes  WB Status: WBAT  More Ambulation?: No  Ambulation 1  Surface: level tile  Device: Standard Walker  Assistance: Contact guard assistance  Quality of Gait: Pt exhibits much better ambulation from previous date. Slight hunched posture present. Distance: 25'x1  Stairs/Curb  Stairs?: No  Neuromuscular Education  NDT Treatment: Gait ;Sitting;Standing  Balance  Posture: Fair  Sitting - Static: Good  Sitting - Dynamic: Good  Standing - Static: Good  Standing - Dynamic: Fair  Exercises  Quad Sets: x15  Heelslides: x15  Gluteal Sets: x15  Hip Abduction: x15  Knee Long Arc Quad: x15  Ankle Pumps: x20  Comments: hip add x15  Other exercises  Other exercises?: No                        Assessment   Body structures, Functions, Activity limitations: Decreased functional mobility ; Decreased strength;Decreased balance;Decreased endurance  Prognosis: Good  REQUIRES PT FOLLOW UP: Yes  Activity Tolerance  Activity Tolerance: Patient Tolerated treatment well;Patient limited by endurance     G-Code     OutComes Score                                                    AM-PAC Score             Goals  Short term goals  Time Frame for Short term goals: 7 days  Short term goal 1: Assess functional ability, initiate HEP  Short term goal 2: Pt to perfrom bed mobility with SBA to prepare for D/C  Short term goal 3: Pt to perform transfers with SBA to prepare for D/C  Short term goal 4: Pt to amb 50' c least restrictive AD and CGA to prepare for D/C  Patient Goals   Patient goals : \"get better\"    Plan    Plan  Times per week: 7  Times per day: Daily  Current Treatment Recommendations: Strengthening, Balance Training, Functional Mobility Training, Transfer Training, Gait Training, Endurance Training  Safety Devices  Type of devices:  All fall risk precautions in place, Call light within reach, Gait belt, Patient at risk for falls, Left in bed, Nurse notified     Therapy Time   Individual Concurrent Group Co-treatment   Time In 0907         Time Out 0930         Minutes 23         Timed Code Treatment Minutes: [address], PTA

## 2018-11-08 NOTE — PROGRESS NOTES
S. Patient is very sore after being up with physical therapy earlier. Appetite is good. No nausea.  No difficulty voiding  O. BP (!) 108/53   Pulse 63   Temp 97.9 °F (36.6 °C) (Oral)   Resp 16   Ht 5' 7\" (1.702 m)   Wt (!) 325 lb (147.4 kg)   LMP 01/01/1968   SpO2 95%   BMI 50.90 kg/m²     Intake/Output Summary (Last 24 hours) at 11/08/18 1041  Last data filed at 11/07/18 2125   Gross per 24 hour   Intake             1699 ml   Output             2075 ml   Net             -376 ml     CBC with Differential:  Lab Results   Component Value Date    WBC 9.3 11/08/2018    RBC 2.55 11/08/2018    HGB 7.7 11/08/2018    HCT 24.3 11/08/2018    PLT 93 11/08/2018    MCV 95.2 11/08/2018    MCH 30.4 11/08/2018    MCHC 31.9 11/08/2018    RDW 15.8 11/08/2018    LYMPHOPCT 12 11/08/2018    MONOPCT 10 11/08/2018    BASOPCT 0 11/08/2018    MONOSABS 0.93 11/08/2018    LYMPHSABS 1.12 11/08/2018    EOSABS 0.09 11/08/2018    BASOSABS 0.00 11/08/2018    DIFFTYPE NOT REPORTED 11/08/2018     CMP:  Lab Results   Component Value Date     11/08/2018    K 4.1 11/08/2018     11/08/2018    CO2 23 11/08/2018    BUN 19 11/08/2018    CREATININE 0.97 11/08/2018    GFRAA >60 11/08/2018    LABGLOM 55 11/08/2018    GLUCOSE 91 11/08/2018    PROT 5.8 11/06/2018    LABALBU 3.1 11/06/2018    CALCIUM 8.6 11/08/2018    BILITOT 0.49 11/06/2018    ALKPHOS 71 11/06/2018    AST 24 11/06/2018    ALT 21 11/06/2018     BMP:  Lab Results   Component Value Date     11/08/2018    K 4.1 11/08/2018     11/08/2018    CO2 23 11/08/2018    BUN 19 11/08/2018    LABALBU 3.1 11/06/2018    CREATININE 0.97 11/08/2018    CALCIUM 8.6 11/08/2018    GFRAA >60 11/08/2018    LABGLOM 55 11/08/2018    GLUCOSE 91 11/08/2018        Lungs - CTA  Heart - RRR  Abdomen - Soft, +BS  Wound - Some serous drainage from the lower part of the incision  Stoma - pink, viable, drain bilious fluid    IMP/PLAN   1) POD#3, Low anterior resection, ileostomy  2) Pathology

## 2018-11-09 ENCOUNTER — APPOINTMENT (OUTPATIENT)
Dept: GENERAL RADIOLOGY | Age: 78
DRG: 389 | End: 2018-11-09
Payer: MEDICARE

## 2018-11-09 ENCOUNTER — HOSPITAL ENCOUNTER (INPATIENT)
Age: 78
LOS: 4 days | Discharge: NURSING, REHAB OR LONG-TERM CARE | DRG: 389 | End: 2018-11-15
Attending: EMERGENCY MEDICINE | Admitting: SURGERY
Payer: MEDICARE

## 2018-11-09 VITALS
HEART RATE: 68 BPM | OXYGEN SATURATION: 92 % | HEIGHT: 67 IN | SYSTOLIC BLOOD PRESSURE: 128 MMHG | TEMPERATURE: 98.1 F | BODY MASS INDEX: 45.99 KG/M2 | DIASTOLIC BLOOD PRESSURE: 59 MMHG | WEIGHT: 293 LBS | RESPIRATION RATE: 16 BRPM

## 2018-11-09 DIAGNOSIS — R11.2 INTRACTABLE VOMITING WITH NAUSEA, UNSPECIFIED VOMITING TYPE: Primary | ICD-10-CM

## 2018-11-09 DIAGNOSIS — K56.609 SBO (SMALL BOWEL OBSTRUCTION) (HCC): ICD-10-CM

## 2018-11-09 LAB
-: ABNORMAL
ABSOLUTE EOS #: 0.2 K/UL (ref 0–0.4)
ABSOLUTE EOS #: 0.4 K/UL (ref 0–0.4)
ABSOLUTE IMMATURE GRANULOCYTE: ABNORMAL K/UL (ref 0–0.3)
ABSOLUTE IMMATURE GRANULOCYTE: ABNORMAL K/UL (ref 0–0.3)
ABSOLUTE LYMPH #: 1.4 K/UL (ref 1–4.8)
ABSOLUTE LYMPH #: 2.1 K/UL (ref 1–4.8)
ABSOLUTE MONO #: 0.8 K/UL (ref 0.1–1.2)
ABSOLUTE MONO #: 1 K/UL (ref 0.1–1.2)
ALBUMIN SERPL-MCNC: 3.3 G/DL (ref 3.5–5.2)
ALBUMIN/GLOBULIN RATIO: 1 (ref 1–2.5)
ALP BLD-CCNC: 76 U/L (ref 35–104)
ALT SERPL-CCNC: 19 U/L (ref 5–33)
AMORPHOUS: ABNORMAL
ANION GAP SERPL CALCULATED.3IONS-SCNC: 10 MMOL/L (ref 9–17)
ANION GAP SERPL CALCULATED.3IONS-SCNC: 14 MMOL/L (ref 9–17)
AST SERPL-CCNC: 22 U/L
BACTERIA: ABNORMAL
BASOPHILS # BLD: 0 % (ref 0–1)
BASOPHILS # BLD: 0 % (ref 0–1)
BASOPHILS ABSOLUTE: 0 K/UL (ref 0–0.2)
BASOPHILS ABSOLUTE: 0 K/UL (ref 0–0.2)
BILIRUB SERPL-MCNC: 0.68 MG/DL (ref 0.3–1.2)
BILIRUBIN URINE: NEGATIVE
BUN BLDV-MCNC: 13 MG/DL (ref 8–23)
BUN BLDV-MCNC: 16 MG/DL (ref 8–23)
BUN/CREAT BLD: 14 (ref 9–20)
BUN/CREAT BLD: 15 (ref 9–20)
CALCIUM SERPL-MCNC: 8.7 MG/DL (ref 8.6–10.4)
CALCIUM SERPL-MCNC: 9.4 MG/DL (ref 8.6–10.4)
CASTS UA: ABNORMAL /LPF (ref 0–2)
CHLORIDE BLD-SCNC: 104 MMOL/L (ref 98–107)
CHLORIDE BLD-SCNC: 107 MMOL/L (ref 98–107)
CO2: 25 MMOL/L (ref 20–31)
CO2: 26 MMOL/L (ref 20–31)
COLOR: ABNORMAL
COMMENT UA: ABNORMAL
CREAT SERPL-MCNC: 0.9 MG/DL (ref 0.5–0.9)
CREAT SERPL-MCNC: 1.05 MG/DL (ref 0.5–0.9)
CRYSTALS, UA: ABNORMAL /HPF
DIFFERENTIAL TYPE: ABNORMAL
DIFFERENTIAL TYPE: ABNORMAL
EKG ATRIAL RATE: 73 BPM
EKG P AXIS: 56 DEGREES
EKG P-R INTERVAL: 190 MS
EKG Q-T INTERVAL: 440 MS
EKG QRS DURATION: 98 MS
EKG QTC CALCULATION (BAZETT): 484 MS
EKG R AXIS: -5 DEGREES
EKG T AXIS: 43 DEGREES
EKG VENTRICULAR RATE: 73 BPM
EOSINOPHILS RELATIVE PERCENT: 3 % (ref 1–7)
EOSINOPHILS RELATIVE PERCENT: 4 % (ref 1–7)
EPITHELIAL CELLS UA: ABNORMAL /HPF (ref 0–5)
GFR AFRICAN AMERICAN: >60 ML/MIN
GFR AFRICAN AMERICAN: >60 ML/MIN
GFR NON-AFRICAN AMERICAN: 51 ML/MIN
GFR NON-AFRICAN AMERICAN: >60 ML/MIN
GFR SERPL CREATININE-BSD FRML MDRD: ABNORMAL ML/MIN/{1.73_M2}
GLUCOSE BLD-MCNC: 75 MG/DL (ref 65–105)
GLUCOSE BLD-MCNC: 88 MG/DL (ref 70–99)
GLUCOSE BLD-MCNC: 91 MG/DL (ref 70–99)
GLUCOSE URINE: NEGATIVE
HCT VFR BLD CALC: 24.1 % (ref 36–46)
HCT VFR BLD CALC: 30.1 % (ref 36–46)
HEMOGLOBIN: 7.8 G/DL (ref 12–16)
HEMOGLOBIN: 9.7 G/DL (ref 12–16)
IMMATURE GRANULOCYTES: ABNORMAL %
IMMATURE GRANULOCYTES: ABNORMAL %
KETONES, URINE: NEGATIVE
LACTIC ACID, SEPSIS WHOLE BLOOD: NORMAL MMOL/L (ref 0.5–1.9)
LACTIC ACID, SEPSIS: 0.9 MMOL/L (ref 0.5–1.9)
LEUKOCYTE ESTERASE, URINE: NEGATIVE
LIPASE: 26 U/L (ref 13–60)
LYMPHOCYTES # BLD: 18 % (ref 16–46)
LYMPHOCYTES # BLD: 20 % (ref 16–46)
MCH RBC QN AUTO: 30.3 PG (ref 26–34)
MCH RBC QN AUTO: 30.8 PG (ref 26–34)
MCHC RBC AUTO-ENTMCNC: 32.2 G/DL (ref 31–37)
MCHC RBC AUTO-ENTMCNC: 32.3 G/DL (ref 31–37)
MCV RBC AUTO: 93.8 FL (ref 80–100)
MCV RBC AUTO: 95.4 FL (ref 80–100)
MONOCYTES # BLD: 10 % (ref 4–11)
MONOCYTES # BLD: 10 % (ref 4–11)
MUCUS: ABNORMAL
NITRITE, URINE: NEGATIVE
NRBC AUTOMATED: ABNORMAL PER 100 WBC
NRBC AUTOMATED: ABNORMAL PER 100 WBC
OTHER OBSERVATIONS UA: ABNORMAL
PDW BLD-RTO: 15.8 % (ref 11–14.5)
PDW BLD-RTO: 15.8 % (ref 11–14.5)
PH UA: 6 (ref 5–6)
PLATELET # BLD: 118 K/UL (ref 140–450)
PLATELET # BLD: 191 K/UL (ref 140–450)
PLATELET ESTIMATE: ABNORMAL
PLATELET ESTIMATE: ABNORMAL
PMV BLD AUTO: 12 FL (ref 6–12)
PMV BLD AUTO: 12.3 FL (ref 6–12)
POTASSIUM SERPL-SCNC: 3.9 MMOL/L (ref 3.7–5.3)
POTASSIUM SERPL-SCNC: 4.1 MMOL/L (ref 3.7–5.3)
PROTEIN UA: NEGATIVE
RBC # BLD: 2.57 M/UL (ref 4–5.2)
RBC # BLD: 3.16 M/UL (ref 4–5.2)
RBC # BLD: ABNORMAL 10*6/UL
RBC # BLD: ABNORMAL 10*6/UL
RBC UA: ABNORMAL /HPF (ref 0–4)
RENAL EPITHELIAL, UA: ABNORMAL /HPF
SEG NEUTROPHILS: 66 % (ref 43–77)
SEG NEUTROPHILS: 69 % (ref 43–77)
SEGMENTED NEUTROPHILS ABSOLUTE COUNT: 5.4 K/UL (ref 1.8–7.7)
SEGMENTED NEUTROPHILS ABSOLUTE COUNT: 6.6 K/UL (ref 1.8–7.7)
SODIUM BLD-SCNC: 142 MMOL/L (ref 135–144)
SODIUM BLD-SCNC: 144 MMOL/L (ref 135–144)
SPECIFIC GRAVITY UA: 1.01 (ref 1.01–1.02)
TOTAL PROTEIN: 6.5 G/DL (ref 6.4–8.3)
TRICHOMONAS: ABNORMAL
TROPONIN INTERP: NORMAL
TROPONIN T: <0.03 NG/ML
TURBIDITY: ABNORMAL
URINE HGB: ABNORMAL
UROBILINOGEN, URINE: NORMAL
WBC # BLD: 10.1 K/UL (ref 3.5–11)
WBC # BLD: 7.8 K/UL (ref 3.5–11)
WBC # BLD: ABNORMAL 10*3/UL
WBC # BLD: ABNORMAL 10*3/UL
WBC UA: ABNORMAL /HPF (ref 0–4)
YEAST: ABNORMAL

## 2018-11-09 PROCEDURE — 83605 ASSAY OF LACTIC ACID: CPT

## 2018-11-09 PROCEDURE — 99285 EMERGENCY DEPT VISIT HI MDM: CPT

## 2018-11-09 PROCEDURE — 36415 COLL VENOUS BLD VENIPUNCTURE: CPT

## 2018-11-09 PROCEDURE — 6370000000 HC RX 637 (ALT 250 FOR IP): Performed by: SURGERY

## 2018-11-09 PROCEDURE — 85025 COMPLETE CBC W/AUTO DIFF WBC: CPT

## 2018-11-09 PROCEDURE — 96374 THER/PROPH/DIAG INJ IV PUSH: CPT

## 2018-11-09 PROCEDURE — 97110 THERAPEUTIC EXERCISES: CPT

## 2018-11-09 PROCEDURE — 6360000002 HC RX W HCPCS: Performed by: EMERGENCY MEDICINE

## 2018-11-09 PROCEDURE — 93005 ELECTROCARDIOGRAM TRACING: CPT

## 2018-11-09 PROCEDURE — 80053 COMPREHEN METABOLIC PANEL: CPT

## 2018-11-09 PROCEDURE — 81001 URINALYSIS AUTO W/SCOPE: CPT

## 2018-11-09 PROCEDURE — 6360000002 HC RX W HCPCS

## 2018-11-09 PROCEDURE — G0378 HOSPITAL OBSERVATION PER HR: HCPCS

## 2018-11-09 PROCEDURE — 74018 RADEX ABDOMEN 1 VIEW: CPT

## 2018-11-09 PROCEDURE — 99232 SBSQ HOSP IP/OBS MODERATE 35: CPT | Performed by: INTERNAL MEDICINE

## 2018-11-09 PROCEDURE — 94640 AIRWAY INHALATION TREATMENT: CPT

## 2018-11-09 PROCEDURE — 80048 BASIC METABOLIC PNL TOTAL CA: CPT

## 2018-11-09 PROCEDURE — 99024 POSTOP FOLLOW-UP VISIT: CPT | Performed by: SURGERY

## 2018-11-09 PROCEDURE — 6360000002 HC RX W HCPCS: Performed by: INTERNAL MEDICINE

## 2018-11-09 PROCEDURE — 84484 ASSAY OF TROPONIN QUANT: CPT

## 2018-11-09 PROCEDURE — 74022 RADEX COMPL AQT ABD SERIES: CPT

## 2018-11-09 PROCEDURE — 83690 ASSAY OF LIPASE: CPT

## 2018-11-09 PROCEDURE — 82947 ASSAY GLUCOSE BLOOD QUANT: CPT

## 2018-11-09 PROCEDURE — 6370000000 HC RX 637 (ALT 250 FOR IP): Performed by: INTERNAL MEDICINE

## 2018-11-09 PROCEDURE — 94761 N-INVAS EAR/PLS OXIMETRY MLT: CPT

## 2018-11-09 PROCEDURE — 96376 TX/PRO/DX INJ SAME DRUG ADON: CPT

## 2018-11-09 RX ORDER — INSULIN GLARGINE 100 [IU]/ML
30 INJECTION, SOLUTION SUBCUTANEOUS DAILY
Qty: 1 VIAL | Refills: 0
Start: 2018-11-10 | End: 2018-12-12 | Stop reason: HOSPADM

## 2018-11-09 RX ORDER — ALBUTEROL SULFATE 2.5 MG/3ML
2.5 SOLUTION RESPIRATORY (INHALATION) 4 TIMES DAILY
Qty: 120 EACH | Refills: 0 | Status: ON HOLD
Start: 2018-11-09 | End: 2018-11-15 | Stop reason: HOSPADM

## 2018-11-09 RX ORDER — ONDANSETRON 2 MG/ML
INJECTION INTRAMUSCULAR; INTRAVENOUS
Status: COMPLETED
Start: 2018-11-09 | End: 2018-11-09

## 2018-11-09 RX ORDER — ONDANSETRON 2 MG/ML
4 INJECTION INTRAMUSCULAR; INTRAVENOUS ONCE
Status: COMPLETED | OUTPATIENT
Start: 2018-11-09 | End: 2018-11-09

## 2018-11-09 RX ORDER — ALBUTEROL SULFATE 2.5 MG/3ML
2.5 SOLUTION RESPIRATORY (INHALATION) EVERY 4 HOURS PRN
Qty: 120 EACH | Refills: 0
Start: 2018-11-09 | End: 2018-12-18 | Stop reason: ALTCHOICE

## 2018-11-09 RX ORDER — CEFUROXIME AXETIL 500 MG/1
500 TABLET ORAL 2 TIMES DAILY
Qty: 8 TABLET | Refills: 0 | Status: ON HOLD
Start: 2018-11-09 | End: 2018-11-15 | Stop reason: HOSPADM

## 2018-11-09 RX ORDER — ACETAMINOPHEN 325 MG/1
650 TABLET ORAL EVERY 4 HOURS PRN
Qty: 60 TABLET | Refills: 0 | COMMUNITY
Start: 2018-11-09

## 2018-11-09 RX ORDER — HYDROCODONE BITARTRATE AND ACETAMINOPHEN 5; 325 MG/1; MG/1
2 TABLET ORAL EVERY 6 HOURS PRN
Qty: 10 TABLET | Refills: 0 | Status: ON HOLD | OUTPATIENT
Start: 2018-11-09 | End: 2018-11-15 | Stop reason: HOSPADM

## 2018-11-09 RX ADMIN — METOPROLOL TARTRATE 25 MG: 25 TABLET ORAL at 07:56

## 2018-11-09 RX ADMIN — HYDROCODONE BITARTRATE AND ACETAMINOPHEN 2 TABLET: 5; 325 TABLET ORAL at 07:55

## 2018-11-09 RX ADMIN — INSULIN GLARGINE 30 UNITS: 100 INJECTION, SOLUTION SUBCUTANEOUS at 07:56

## 2018-11-09 RX ADMIN — MICONAZOLE NITRATE: 20 POWDER TOPICAL at 12:21

## 2018-11-09 RX ADMIN — AMIODARONE HYDROCHLORIDE 200 MG: 200 TABLET ORAL at 07:56

## 2018-11-09 RX ADMIN — DILTIAZEM HYDROCHLORIDE 240 MG: 120 CAPSULE, COATED, EXTENDED RELEASE ORAL at 07:55

## 2018-11-09 RX ADMIN — ALBUTEROL SULFATE 2.5 MG: 2.5 SOLUTION RESPIRATORY (INHALATION) at 11:52

## 2018-11-09 RX ADMIN — FUROSEMIDE 20 MG: 20 TABLET ORAL at 07:56

## 2018-11-09 RX ADMIN — ONDANSETRON 4 MG: 2 INJECTION INTRAMUSCULAR; INTRAVENOUS at 21:18

## 2018-11-09 RX ADMIN — ALBUTEROL SULFATE 2.5 MG: 2.5 SOLUTION RESPIRATORY (INHALATION) at 08:31

## 2018-11-09 RX ADMIN — LEVOTHYROXINE SODIUM 50 MCG: 25 TABLET ORAL at 07:55

## 2018-11-09 RX ADMIN — CEFUROXIME AXETIL 500 MG: 250 TABLET ORAL at 07:55

## 2018-11-09 RX ADMIN — ONDANSETRON 4 MG: 2 INJECTION INTRAMUSCULAR; INTRAVENOUS at 22:05

## 2018-11-09 ASSESSMENT — PAIN DESCRIPTION - ORIENTATION: ORIENTATION: MID

## 2018-11-09 ASSESSMENT — PAIN DESCRIPTION - PROGRESSION: CLINICAL_PROGRESSION: NOT CHANGED

## 2018-11-09 ASSESSMENT — PAIN DESCRIPTION - ONSET: ONSET: ON-GOING

## 2018-11-09 ASSESSMENT — PAIN DESCRIPTION - PAIN TYPE: TYPE: SURGICAL PAIN

## 2018-11-09 ASSESSMENT — PAIN DESCRIPTION - FREQUENCY: FREQUENCY: CONTINUOUS

## 2018-11-09 ASSESSMENT — PAIN DESCRIPTION - DESCRIPTORS: DESCRIPTORS: ACHING;DULL

## 2018-11-09 ASSESSMENT — PAIN SCALES - GENERAL
PAINLEVEL_OUTOF10: 0
PAINLEVEL_OUTOF10: 0
PAINLEVEL_OUTOF10: 8
PAINLEVEL_OUTOF10: 0

## 2018-11-09 ASSESSMENT — PAIN DESCRIPTION - LOCATION: LOCATION: ABDOMEN

## 2018-11-09 NOTE — PROGRESS NOTES
sit: Contact guard assistance  Bed to Chair: Unable to assess  Stand Pivot Transfers: Unable to assess  Squat Pivot Transfers: Unable to assess  Lateral Transfers: Unable to assess  Car Transfer: Unable to assess  Ambulation  Ambulation?: Yes  WB Status: WBAT  More Ambulation?: No  Ambulation 1  Surface: level tile  Device: Standard Walker  Assistance: Contact guard assistance  Quality of Gait: Pt exhibits upright posture and decreased step length on B sides. Distance: 35'x1  Stairs/Curb  Stairs?: No  Neuromuscular Education  NDT Treatment: Gait ;Sitting;Standing  Balance  Posture: Fair  Sitting - Static: Good  Sitting - Dynamic: Good  Standing - Static: Good  Standing - Dynamic: Fair  Exercises  Quad Sets: x15  Heelslides: x15  Gluteal Sets: x15  Hip Abduction: x15  Knee Long Arc Quad: x15  Ankle Pumps: x20  Comments: hip add x15  Other exercises  Other exercises?: No                        Assessment   Body structures, Functions, Activity limitations: Decreased functional mobility ; Decreased strength;Decreased balance;Decreased endurance  Prognosis: Excellent;Good  REQUIRES PT FOLLOW UP: Yes  Activity Tolerance  Activity Tolerance: Patient Tolerated treatment well;Patient limited by endurance     G-Code     OutComes Score                                                    AM-PAC Score             Goals  Short term goals  Time Frame for Short term goals: 7 days  Short term goal 1: Assess functional ability, initiate HEP  Short term goal 2: Pt to perfrom bed mobility with SBA to prepare for D/C  Short term goal 3: Pt to perform transfers with SBA to prepare for D/C  Short term goal 4: Pt to amb 50' c least restrictive AD and CGA to prepare for D/C  Patient Goals   Patient goals : \"get better\"    Plan    Plan  Times per week: 7  Times per day: Daily  Current Treatment Recommendations: Strengthening, Balance Training, Functional Mobility Training, Transfer Training, Gait Training, Endurance Training  Safety

## 2018-11-09 NOTE — PROGRESS NOTES
 traZODone (DESYREL) tablet 50 mg  50 mg Oral Nightly Shree Garnica MD   50 mg at 11/08/18 2104    sodium chloride flush 0.9 % injection 10 mL  10 mL Intravenous 2 times per day Shree Garnica MD   10 mL at 11/06/18 2121    sodium chloride flush 0.9 % injection 10 mL  10 mL Intravenous PRN Shree Garnica MD        acetaminophen (TYLENOL) tablet 650 mg  650 mg Oral Q4H PRN Shree Garnica MD        ondansetron [facility]) injection 4 mg  4 mg Intravenous Q6H PRN Shree Garnica MD        enoxaparin (LOVENOX) injection 40 mg  40 mg Subcutaneous Daily Shree Garnica MD   40 mg at 11/08/18 0827    albuterol (PROVENTIL) nebulizer solution 2.5 mg  2.5 mg Nebulization As Directed RT PRN Grace Meth   2.5 mg at 11/07/18 2051    sodium chloride nebulizer 0.9 % solution 3 mL  3 mL Nebulization As Directed RT PRN Grace Meth        miconazole (MICOTIN) 2 % powder   Topical BID Grace Meth         Allergies   Allergen Reactions    Pcn [Penicillins] Hives and Shortness Of Breath    Bextra [Valdecoxib] Diarrhea     Principal Problem:    Colon cancer ([address]  Active Problems: Morbid obesity (HCC)    Glucose intolerance (impaired glucose tolerance)    Acute blood loss as cause of postoperative anemia  Resolved Problems:    * No resolved hospital problems. *    Blood pressure 123/60, pulse 72, temperature 98 °F (36.7 °C), temperature source Oral, resp. rate 16, height 5' 7\" (1.702 m), weight (!) 325 lb (147.4 kg), last menstrual period 01/01/1968, SpO2 95 %. Subjective Patient is doing well. Tolerating diet and po meds  Objective  Patient is well appearing in no acute distress. The patient is breathing easily, heart is RRR. The patient's lower extremities have a full ROM and no edema.   Abdomen soft, active bowel sounds  Assessment & Plan  Colon Ca - patient is doing well, waiting for urine culture, discharge planning    Cindy Garzon PA-C  11/8/2018

## 2018-11-09 NOTE — PROGRESS NOTES
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 Appliance is nearly full of dark brown pasty stool. Ileostomy placed in RUQ, near umbilical fold; suspect pt may leak at this point, especially as she becomes more active. Red, round moist rosebud ileostomy measures 1 1/4\" today, 3 cm protrustion, with mild stomal edema typical for POD 4. Black sutures are visible. Mucocutaneous junction is well-approximated with no noted defect today. Peristomal skin is clean, dry, & intact. Photo obtained. Midline abdominal surgical dressing was CDI, and left in place by writer. Pt becomes tearful when appliance is removed. Required emotional support & encouragement. This may have impacted pt's ability to retain teaching today. Pt has f/u appt scheduled w/ Dr. Carlos Garvey next week; Onofre Davis will continue education at that visit. One piece Convatec appliance # [id number] was applied today, with no paste or powder. Recommend filtered appliance with convex wafer (because stoma is close to umbilical fold and she may have problem with appliance leaking), and use of a moldable ring (i.e. Eakins or Brava ring), and possibly a stoma belt. Pt will prefer an appliance with an opaque cloth-like backing, and moldable wafer. She is not yet sure if she prefers a clip closure or hook & loop closure.           Ileostomy Ileostomy RUQ (Active)   Stomal Appliance 2

## 2018-11-09 NOTE — PROGRESS NOTES
Hospitalist Progress Note    Patient:   Philippe Babin     YOB: 1940    MRN: [id number]   Admit date: 11/5/2018     Acct: [de-identified]     PCP: Orlin Stewart DO    CC--Interval History:   POD 4--LAR--diverting ileostomy--for colon carcinoma--11.5.2018--BM-[+]--tolerating diet--    UTI--POA---Rocephin --> PO Ceftin for outpatient to finish course    Hypokalemia--corrected    Anemia---hemoglobin stable at 7.8    CKD--now stable Stage 3    See note below     All other ROS negative except noted in HPI    Diet:  DIET DENTAL SOFT; No Added Salt (3-4 GM)    Medications:  Scheduled Meds:   cefUROXime  500 mg Oral 2 times per day    albuterol  2.5 mg Nebulization 4x daily    insulin glargine  30 Units Subcutaneous Daily    insulin lispro  0-6 Units Subcutaneous TID WC    insulin lispro  0-3 Units Subcutaneous Nightly    amiodarone  200 mg Oral Daily    atorvastatin  40 mg Oral Nightly    diltiazem  240 mg Oral Daily    furosemide  20 mg Oral Daily    levothyroxine  50 mcg Oral Daily    metoprolol tartrate  25 mg Oral BID    traZODone  50 mg Oral Nightly    sodium chloride flush  10 mL Intravenous 2 times per day    enoxaparin  40 mg Subcutaneous Daily    miconazole   Topical BID     Continuous Infusions:   dextrose       PRN Meds:glucose, dextrose, glucagon (rDNA), dextrose, HYDROcodone-acetaminophen, HYDROcodone-acetaminophen, sodium chloride flush, acetaminophen, ondansetron, albuterol, sodium chloride nebulizer    Objective:  Labs:  CBC with Differential:    Lab Results   Component Value Date    WBC 7.8 11/09/2018    RBC 2.57 11/09/2018    HGB 7.8 11/09/2018    HCT 24.1 11/09/2018     11/09/2018    MCV 93.8 11/09/2018    MCH 30.3 11/09/2018    MCHC 32.2 11/09/2018    RDW 15.8 11/09/2018    LYMPHOPCT 18 11/09/2018    MONOPCT 10 11/09/2018    BASOPCT 0 11/09/2018    MONOSABS 0.80 11/09/2018    LYMPHSABS 1.40 11/09/2018    EOSABS 0.20 11/09/2018    BASOSABS 0.00 11/09/2018

## 2018-11-10 ENCOUNTER — APPOINTMENT (OUTPATIENT)
Dept: CT IMAGING | Age: 78
DRG: 389 | End: 2018-11-10
Payer: MEDICARE

## 2018-11-10 LAB
ABSOLUTE EOS #: 0.2 K/UL (ref 0–0.4)
ABSOLUTE IMMATURE GRANULOCYTE: ABNORMAL K/UL (ref 0–0.3)
ABSOLUTE LYMPH #: 1.4 K/UL (ref 1–4.8)
ABSOLUTE MONO #: 1.1 K/UL (ref 0.1–1.2)
ANION GAP SERPL CALCULATED.3IONS-SCNC: 11 MMOL/L (ref 9–17)
BASOPHILS # BLD: 0 % (ref 0–1)
BASOPHILS ABSOLUTE: 0 K/UL (ref 0–0.2)
BUN BLDV-MCNC: 14 MG/DL (ref 8–23)
BUN/CREAT BLD: 15 (ref 9–20)
CALCIUM SERPL-MCNC: 8.9 MG/DL (ref 8.6–10.4)
CHLORIDE BLD-SCNC: 107 MMOL/L (ref 98–107)
CO2: 28 MMOL/L (ref 20–31)
CREAT SERPL-MCNC: 0.92 MG/DL (ref 0.5–0.9)
DIFFERENTIAL TYPE: ABNORMAL
EOSINOPHILS RELATIVE PERCENT: 2 % (ref 1–7)
ESTIMATED AVERAGE GLUCOSE: 97 MG/DL
GFR AFRICAN AMERICAN: >60 ML/MIN
GFR NON-AFRICAN AMERICAN: 59 ML/MIN
GFR SERPL CREATININE-BSD FRML MDRD: ABNORMAL ML/MIN/{1.73_M2}
GFR SERPL CREATININE-BSD FRML MDRD: ABNORMAL ML/MIN/{1.73_M2}
GLUCOSE BLD-MCNC: 85 MG/DL (ref 65–105)
GLUCOSE BLD-MCNC: 85 MG/DL (ref 70–99)
GLUCOSE BLD-MCNC: 87 MG/DL (ref 65–105)
HBA1C MFR BLD: 5 % (ref 4.8–5.9)
HCT VFR BLD CALC: 27.6 % (ref 36–46)
HEMOGLOBIN: 8.9 G/DL (ref 12–16)
IMMATURE GRANULOCYTES: ABNORMAL %
LACTIC ACID, SEPSIS WHOLE BLOOD: NORMAL MMOL/L (ref 0.5–1.9)
LACTIC ACID, SEPSIS: 0.9 MMOL/L (ref 0.5–1.9)
LACTIC ACID: 0.6 MMOL/L (ref 0.5–2.2)
LYMPHOCYTES # BLD: 16 % (ref 16–46)
MAGNESIUM: 1.8 MG/DL (ref 1.6–2.6)
MCH RBC QN AUTO: 30.3 PG (ref 26–34)
MCHC RBC AUTO-ENTMCNC: 32.1 G/DL (ref 31–37)
MCV RBC AUTO: 94.3 FL (ref 80–100)
MONOCYTES # BLD: 12 % (ref 4–11)
NRBC AUTOMATED: ABNORMAL PER 100 WBC
PDW BLD-RTO: 15.5 % (ref 11–14.5)
PHOSPHORUS: 3.9 MG/DL (ref 2.6–4.5)
PLATELET # BLD: 170 K/UL (ref 140–450)
PLATELET ESTIMATE: ABNORMAL
PMV BLD AUTO: 11 FL (ref 6–12)
POTASSIUM SERPL-SCNC: 3.6 MMOL/L (ref 3.7–5.3)
RBC # BLD: 2.93 M/UL (ref 4–5.2)
RBC # BLD: ABNORMAL 10*6/UL
SEG NEUTROPHILS: 70 % (ref 43–77)
SEGMENTED NEUTROPHILS ABSOLUTE COUNT: 6.2 K/UL (ref 1.8–7.7)
SODIUM BLD-SCNC: 146 MMOL/L (ref 135–144)
WBC # BLD: 8.9 K/UL (ref 3.5–11)
WBC # BLD: ABNORMAL 10*3/UL

## 2018-11-10 PROCEDURE — 85025 COMPLETE CBC W/AUTO DIFF WBC: CPT

## 2018-11-10 PROCEDURE — 83735 ASSAY OF MAGNESIUM: CPT

## 2018-11-10 PROCEDURE — 84100 ASSAY OF PHOSPHORUS: CPT

## 2018-11-10 PROCEDURE — 83605 ASSAY OF LACTIC ACID: CPT

## 2018-11-10 PROCEDURE — 74177 CT ABD & PELVIS W/CONTRAST: CPT

## 2018-11-10 PROCEDURE — 6360000004 HC RX CONTRAST MEDICATION: Performed by: SURGERY

## 2018-11-10 PROCEDURE — 94640 AIRWAY INHALATION TREATMENT: CPT

## 2018-11-10 PROCEDURE — 94760 N-INVAS EAR/PLS OXIMETRY 1: CPT

## 2018-11-10 PROCEDURE — 6370000000 HC RX 637 (ALT 250 FOR IP): Performed by: FAMILY MEDICINE

## 2018-11-10 PROCEDURE — 82947 ASSAY GLUCOSE BLOOD QUANT: CPT

## 2018-11-10 PROCEDURE — 94664 DEMO&/EVAL PT USE INHALER: CPT

## 2018-11-10 PROCEDURE — 94150 VITAL CAPACITY TEST: CPT

## 2018-11-10 PROCEDURE — 6360000002 HC RX W HCPCS

## 2018-11-10 PROCEDURE — 36415 COLL VENOUS BLD VENIPUNCTURE: CPT

## 2018-11-10 PROCEDURE — 96372 THER/PROPH/DIAG INJ SC/IM: CPT

## 2018-11-10 PROCEDURE — 99222 1ST HOSP IP/OBS MODERATE 55: CPT | Performed by: FAMILY MEDICINE

## 2018-11-10 PROCEDURE — G0378 HOSPITAL OBSERVATION PER HR: HCPCS

## 2018-11-10 PROCEDURE — 83036 HEMOGLOBIN GLYCOSYLATED A1C: CPT

## 2018-11-10 PROCEDURE — 2580000003 HC RX 258: Performed by: SURGERY

## 2018-11-10 PROCEDURE — 6360000002 HC RX W HCPCS: Performed by: SURGERY

## 2018-11-10 PROCEDURE — 96375 TX/PRO/DX INJ NEW DRUG ADDON: CPT

## 2018-11-10 PROCEDURE — 80048 BASIC METABOLIC PNL TOTAL CA: CPT

## 2018-11-10 RX ORDER — DEXTROSE MONOHYDRATE 25 G/50ML
12.5 INJECTION, SOLUTION INTRAVENOUS PRN
Status: DISCONTINUED | OUTPATIENT
Start: 2018-11-10 | End: 2018-11-15 | Stop reason: HOSPADM

## 2018-11-10 RX ORDER — DEXTROSE MONOHYDRATE 50 MG/ML
100 INJECTION, SOLUTION INTRAVENOUS PRN
Status: DISCONTINUED | OUTPATIENT
Start: 2018-11-10 | End: 2018-11-15 | Stop reason: HOSPADM

## 2018-11-10 RX ORDER — POTASSIUM CHLORIDE 20 MEQ/1
20 TABLET, EXTENDED RELEASE ORAL 2 TIMES DAILY WITH MEALS
Status: DISCONTINUED | OUTPATIENT
Start: 2018-11-10 | End: 2018-11-15 | Stop reason: HOSPADM

## 2018-11-10 RX ORDER — SODIUM CHLORIDE, SODIUM LACTATE, POTASSIUM CHLORIDE, CALCIUM CHLORIDE 600; 310; 30; 20 MG/100ML; MG/100ML; MG/100ML; MG/100ML
INJECTION, SOLUTION INTRAVENOUS CONTINUOUS
Status: DISCONTINUED | OUTPATIENT
Start: 2018-11-10 | End: 2018-11-15

## 2018-11-10 RX ORDER — AMIODARONE HYDROCHLORIDE 200 MG/1
200 TABLET ORAL DAILY
Status: DISCONTINUED | OUTPATIENT
Start: 2018-11-10 | End: 2018-11-15 | Stop reason: HOSPADM

## 2018-11-10 RX ORDER — DIPHENHYDRAMINE HYDROCHLORIDE 50 MG/ML
INJECTION INTRAMUSCULAR; INTRAVENOUS
Status: COMPLETED
Start: 2018-11-10 | End: 2018-11-10

## 2018-11-10 RX ORDER — NICOTINE POLACRILEX 4 MG
15 LOZENGE BUCCAL PRN
Status: DISCONTINUED | OUTPATIENT
Start: 2018-11-10 | End: 2018-11-15 | Stop reason: HOSPADM

## 2018-11-10 RX ORDER — ALBUTEROL SULFATE 2.5 MG/3ML
2.5 SOLUTION RESPIRATORY (INHALATION) 4 TIMES DAILY
Status: DISCONTINUED | OUTPATIENT
Start: 2018-11-10 | End: 2018-11-15 | Stop reason: HOSPADM

## 2018-11-10 RX ORDER — INSULIN GLARGINE 100 [IU]/ML
20 INJECTION, SOLUTION SUBCUTANEOUS NIGHTLY
Status: DISCONTINUED | OUTPATIENT
Start: 2018-11-10 | End: 2018-11-10

## 2018-11-10 RX ORDER — DILTIAZEM HYDROCHLORIDE 120 MG/1
240 CAPSULE, COATED, EXTENDED RELEASE ORAL DAILY
Status: DISCONTINUED | OUTPATIENT
Start: 2018-11-10 | End: 2018-11-15 | Stop reason: HOSPADM

## 2018-11-10 RX ORDER — ALBUTEROL SULFATE 2.5 MG/3ML
2.5 SOLUTION RESPIRATORY (INHALATION) EVERY 4 HOURS PRN
Status: DISCONTINUED | OUTPATIENT
Start: 2018-11-10 | End: 2018-11-15 | Stop reason: HOSPADM

## 2018-11-10 RX ORDER — ONDANSETRON 2 MG/ML
4 INJECTION INTRAMUSCULAR; INTRAVENOUS EVERY 6 HOURS PRN
Status: DISCONTINUED | OUTPATIENT
Start: 2018-11-10 | End: 2018-11-15 | Stop reason: HOSPADM

## 2018-11-10 RX ORDER — SODIUM CHLORIDE 0.9 % (FLUSH) 0.9 %
10 SYRINGE (ML) INJECTION PRN
Status: DISCONTINUED | OUTPATIENT
Start: 2018-11-10 | End: 2018-11-15 | Stop reason: HOSPADM

## 2018-11-10 RX ORDER — SODIUM CHLORIDE 0.9 % (FLUSH) 0.9 %
10 SYRINGE (ML) INJECTION EVERY 12 HOURS SCHEDULED
Status: DISCONTINUED | OUTPATIENT
Start: 2018-11-10 | End: 2018-11-15 | Stop reason: HOSPADM

## 2018-11-10 RX ORDER — DIPHENHYDRAMINE HYDROCHLORIDE 50 MG/ML
25 INJECTION INTRAMUSCULAR; INTRAVENOUS NIGHTLY PRN
Status: DISCONTINUED | OUTPATIENT
Start: 2018-11-10 | End: 2018-11-12

## 2018-11-10 RX ORDER — LEVOTHYROXINE SODIUM 0.03 MG/1
50 TABLET ORAL DAILY
Status: DISCONTINUED | OUTPATIENT
Start: 2018-11-10 | End: 2018-11-15 | Stop reason: HOSPADM

## 2018-11-10 RX ORDER — HEPARIN SODIUM 5000 [USP'U]/ML
5000 INJECTION, SOLUTION INTRAVENOUS; SUBCUTANEOUS EVERY 8 HOURS SCHEDULED
Status: DISCONTINUED | OUTPATIENT
Start: 2018-11-10 | End: 2018-11-15 | Stop reason: HOSPADM

## 2018-11-10 RX ADMIN — ALBUTEROL SULFATE 2.5 MG: 2.5 SOLUTION RESPIRATORY (INHALATION) at 08:52

## 2018-11-10 RX ADMIN — HEPARIN SODIUM 5000 UNITS: 5000 INJECTION INTRAVENOUS; SUBCUTANEOUS at 14:54

## 2018-11-10 RX ADMIN — POTASSIUM CHLORIDE 20 MEQ: 20 TABLET, EXTENDED RELEASE ORAL at 17:49

## 2018-11-10 RX ADMIN — METOPROLOL TARTRATE 25 MG: 25 TABLET ORAL at 12:12

## 2018-11-10 RX ADMIN — CHLORASEPTIC 1 SPRAY: 1.5 LIQUID ORAL at 10:26

## 2018-11-10 RX ADMIN — AMIODARONE HYDROCHLORIDE 200 MG: 200 TABLET ORAL at 12:12

## 2018-11-10 RX ADMIN — ALBUTEROL SULFATE 2.5 MG: 2.5 SOLUTION RESPIRATORY (INHALATION) at 16:27

## 2018-11-10 RX ADMIN — DILTIAZEM HYDROCHLORIDE 240 MG: 120 CAPSULE, COATED, EXTENDED RELEASE ORAL at 12:13

## 2018-11-10 RX ADMIN — SODIUM CHLORIDE, POTASSIUM CHLORIDE, SODIUM LACTATE AND CALCIUM CHLORIDE: 600; 310; 30; 20 INJECTION, SOLUTION INTRAVENOUS at 08:42

## 2018-11-10 RX ADMIN — HEPARIN SODIUM 5000 UNITS: 5000 INJECTION INTRAVENOUS; SUBCUTANEOUS at 22:28

## 2018-11-10 RX ADMIN — ALBUTEROL SULFATE 2.5 MG: 2.5 SOLUTION RESPIRATORY (INHALATION) at 12:06

## 2018-11-10 RX ADMIN — ALBUTEROL SULFATE 2.5 MG: 2.5 SOLUTION RESPIRATORY (INHALATION) at 20:01

## 2018-11-10 RX ADMIN — LEVOTHYROXINE SODIUM 50 MCG: 25 TABLET ORAL at 12:12

## 2018-11-10 RX ADMIN — POTASSIUM CHLORIDE 20 MEQ: 20 TABLET, EXTENDED RELEASE ORAL at 12:11

## 2018-11-10 RX ADMIN — HEPARIN SODIUM 5000 UNITS: 5000 INJECTION INTRAVENOUS; SUBCUTANEOUS at 06:06

## 2018-11-10 RX ADMIN — IOHEXOL 50 ML: 240 INJECTION, SOLUTION INTRATHECAL; INTRAVASCULAR; INTRAVENOUS; ORAL at 22:09

## 2018-11-10 RX ADMIN — SODIUM CHLORIDE, POTASSIUM CHLORIDE, SODIUM LACTATE AND CALCIUM CHLORIDE: 600; 310; 30; 20 INJECTION, SOLUTION INTRAVENOUS at 16:16

## 2018-11-10 RX ADMIN — DIPHENHYDRAMINE HYDROCHLORIDE 25 MG: 50 INJECTION, SOLUTION INTRAMUSCULAR; INTRAVENOUS at 00:51

## 2018-11-10 RX ADMIN — IOPAMIDOL 100 ML: 755 INJECTION, SOLUTION INTRAVENOUS at 22:08

## 2018-11-10 RX ADMIN — SODIUM CHLORIDE, POTASSIUM CHLORIDE, SODIUM LACTATE AND CALCIUM CHLORIDE: 600; 310; 30; 20 INJECTION, SOLUTION INTRAVENOUS at 00:42

## 2018-11-10 ASSESSMENT — PAIN DESCRIPTION - FREQUENCY: FREQUENCY: INTERMITTENT

## 2018-11-10 ASSESSMENT — PAIN SCALES - GENERAL
PAINLEVEL_OUTOF10: 0

## 2018-11-10 ASSESSMENT — ENCOUNTER SYMPTOMS
NAUSEA: 1
ABDOMINAL PAIN: 1
VOMITING: 1
SHORTNESS OF BREATH: 0

## 2018-11-10 NOTE — PROGRESS NOTES
Genesis Rodriguez, PPatient Assessment complete. Ileus of unspecified type ([facility] [address] [K56.7] . Vitals:    11/10/18 0041   BP:    Pulse:    Resp: 18   Temp:    SpO2: 97%   . Patients home meds are   Prior to Admission medications    Medication Sig Start Date End Date Taking? Authorizing Provider   insulin glargine (LANTUS) 100 UNIT/ML injection vial Inject 30 Units into the skin daily 11/10/18  Yes Dimas Arrieta   insulin lispro (HUMALOG) 100 UNIT/ML injection vial Inject 0-3 Units into the skin nightly 11/9/18  Yes Dimas Arrieta   insulin lispro (HUMALOG) 100 UNIT/ML injection vial Inject 0-6 Units into the skin 3 times daily (with meals) 11/9/18  Yes Miryam Sam   cefUROXime (CEFTIN) 500 MG tablet Take 1 tablet by mouth 2 times daily for 8 doses 11/9/18 11/13/18 Yes Dimas Arrieta   levothyroxine (SYNTHROID) 50 MCG tablet TAKE 1 TABLET BY MOUTH ONE TIME A DAY  10/29/18  Yes Cayetano Lombardi DO   docusate sodium (COLACE, DULCOLAX) 100 MG CAPS Take 100 mg by mouth 2 times daily 10/9/18  Yes Dimas Arrieta   furosemide (LASIX) 20 MG tablet Take 1 tablet by mouth daily 8/29/18  Yes Cayetano Lombardi DO   traZODone (DESYREL) 50 MG tablet Take 1 tablet by mouth nightly 8/29/18  Yes Cayetano Lombardi DO   atorvastatin (LIPITOR) 40 MG tablet TAKE ONE TABLET BY MOUTH NIGHTLY 5/4/18  Yes Cayetano Lombardi DO   diltiazem (CARDIZEM CD) 240 MG extended release capsule TAKE 1 CAPSULE BY MOUTH ONE TIME A DAY  4/25/18  Yes Cayetano Lombardi DO   metoprolol tartrate (LOPRESSOR) 25 MG tablet TAKE ONE TABLET BY MOUTH TWICE A DAY 3/2/18  Yes Cayetano Lombardi DO   potassium chloride (KLOR-CON M) 20 MEQ extended release tablet Take 1 tablet by mouth daily 12/5/17  Yes Cayetano Lombardi DO   HYDROcodone-acetaminophen (NORCO) 5-325 MG per tablet Take 2 tablets by mouth every 6 hours as needed for Pain for up to 3 days. . 11/9/18 11/12/18  Miryam Sam   acetaminophen (TYLENOL) 325 MG tablet Take 2 tablets by mouth every 4 hours as

## 2018-11-10 NOTE — ED PROVIDER NOTES
heard.  Pulmonary/Chest: Effort normal and breath sounds normal. No respiratory distress. Abdominal: Soft. There is no tenderness. The ileostomy site is beefy red. The incision has staples that is clean dry and intact without evidence of cellulitis induration or abscess or bleeding. There are hyperactive bowel sounds in the upper abdomen. The abdomen is soft without peritoneal signs. Musculoskeletal: Normal range of motion. She exhibits edema. She exhibits no tenderness. Neurological: She is alert and oriented to person, place, and time. Skin: Skin is warm and dry. Capillary refill takes less than 2 seconds. No rash noted. She is not diaphoretic. Psychiatric: She has a normal mood and affect. Her behavior is normal.   Vitals reviewed. DIFFERENTIAL DIAGNOSIS / MDM / EMERGENCY DEPARTMENT COURSE:     Plan for labs, antiemetics, abdominal series May need to follow-up with abdominal CT scan will also get an EKG and troponin and urinalysis. The x-ray series is suggestive of ileus versus partial small bowel obstruction given her recent bowel surgery feel that she should be brought into the hospital.  NG tube orders were given for placement. Discussed the case with Dr. Min Hirsch the general surgeon on call who was kind enough to accept the patient to the floor. The patient and family were in agreement plan and they did not have any further questions or concerns. DIAGNOSTIC RESULTS     EKG: All EKG's are interpreted by the Emergency Department Physician who either signs or Co-signs this chart inthe absence of a cardiologist.    Twelve-lead EKG has underlying motion artifact due to the patient having dry heaves. Normal sinus rhythm 73 bpm.  Normal intervals and left axis deviation. I do not appreciate any acute ST elevation depression or T-wave inversions there is a limited interpretation capabilities in leads 12 and 3 due to underlying motion artifact.     RADIOLOGY:   I directly visualized the following

## 2018-11-10 NOTE — PLAN OF CARE
Problem: Falls - Risk of:  Goal: Will remain free from falls  Will remain free from falls   Outcome: Ongoing    Goal: Absence of physical injury  Absence of physical injury   Outcome: Ongoing      Problem:  Bowel/Gastric:  Goal: Ability to achieve a regular elimination pattern will improve  Ability to achieve a regular elimination pattern will improve  Outcome: Ongoing    Goal: Control of bowel function will improve  Control of bowel function will improve  Outcome: Ongoing    Goal: Will not experience complications related to bowel motility  Will not experience complications related to bowel motility  Outcome: Ongoing

## 2018-11-11 PROBLEM — K56.609 SMALL BOWEL OBSTRUCTION (HCC): Status: ACTIVE | Noted: 2018-11-11

## 2018-11-11 LAB
ABSOLUTE EOS #: 0.1 K/UL (ref 0–0.4)
ABSOLUTE IMMATURE GRANULOCYTE: ABNORMAL K/UL (ref 0–0.3)
ABSOLUTE LYMPH #: 0.9 K/UL (ref 1–4.8)
ABSOLUTE MONO #: 1 K/UL (ref 0.1–1.2)
ANION GAP SERPL CALCULATED.3IONS-SCNC: 12 MMOL/L (ref 9–17)
BASOPHILS # BLD: 0 % (ref 0–1)
BASOPHILS ABSOLUTE: 0 K/UL (ref 0–0.2)
BUN BLDV-MCNC: 13 MG/DL (ref 8–23)
BUN/CREAT BLD: 16 (ref 9–20)
CALCIUM SERPL-MCNC: 9.1 MG/DL (ref 8.6–10.4)
CHLORIDE BLD-SCNC: 106 MMOL/L (ref 98–107)
CO2: 26 MMOL/L (ref 20–31)
CREAT SERPL-MCNC: 0.81 MG/DL (ref 0.5–0.9)
DIFFERENTIAL TYPE: ABNORMAL
EOSINOPHILS RELATIVE PERCENT: 1 % (ref 1–7)
GFR AFRICAN AMERICAN: >60 ML/MIN
GFR NON-AFRICAN AMERICAN: >60 ML/MIN
GFR SERPL CREATININE-BSD FRML MDRD: NORMAL ML/MIN/{1.73_M2}
GFR SERPL CREATININE-BSD FRML MDRD: NORMAL ML/MIN/{1.73_M2}
GLUCOSE BLD-MCNC: 76 MG/DL (ref 65–105)
GLUCOSE BLD-MCNC: 79 MG/DL (ref 70–99)
GLUCOSE BLD-MCNC: 83 MG/DL (ref 65–105)
GLUCOSE BLD-MCNC: 86 MG/DL (ref 65–105)
HCT VFR BLD CALC: 26.6 % (ref 36–46)
HEMOGLOBIN: 8.4 G/DL (ref 12–16)
IMMATURE GRANULOCYTES: ABNORMAL %
LYMPHOCYTES # BLD: 8 % (ref 16–46)
MCH RBC QN AUTO: 30 PG (ref 26–34)
MCHC RBC AUTO-ENTMCNC: 31.7 G/DL (ref 31–37)
MCV RBC AUTO: 94.7 FL (ref 80–100)
MONOCYTES # BLD: 9 % (ref 4–11)
NRBC AUTOMATED: ABNORMAL PER 100 WBC
PDW BLD-RTO: 15.7 % (ref 11–14.5)
PLATELET # BLD: 169 K/UL (ref 140–450)
PLATELET ESTIMATE: ABNORMAL
PMV BLD AUTO: 11.2 FL (ref 6–12)
POTASSIUM SERPL-SCNC: 4 MMOL/L (ref 3.7–5.3)
RBC # BLD: 2.8 M/UL (ref 4–5.2)
RBC # BLD: ABNORMAL 10*6/UL
SEG NEUTROPHILS: 82 % (ref 43–77)
SEGMENTED NEUTROPHILS ABSOLUTE COUNT: 9.1 K/UL (ref 1.8–7.7)
SODIUM BLD-SCNC: 144 MMOL/L (ref 135–144)
WBC # BLD: 11.2 K/UL (ref 3.5–11)
WBC # BLD: ABNORMAL 10*3/UL

## 2018-11-11 PROCEDURE — G0378 HOSPITAL OBSERVATION PER HR: HCPCS

## 2018-11-11 PROCEDURE — 6360000002 HC RX W HCPCS: Performed by: SURGERY

## 2018-11-11 PROCEDURE — 94640 AIRWAY INHALATION TREATMENT: CPT

## 2018-11-11 PROCEDURE — 6370000000 HC RX 637 (ALT 250 FOR IP): Performed by: FAMILY MEDICINE

## 2018-11-11 PROCEDURE — 2060000000 HC ICU INTERMEDIATE R&B

## 2018-11-11 PROCEDURE — 82947 ASSAY GLUCOSE BLOOD QUANT: CPT

## 2018-11-11 PROCEDURE — 80048 BASIC METABOLIC PNL TOTAL CA: CPT

## 2018-11-11 PROCEDURE — 94664 DEMO&/EVAL PT USE INHALER: CPT

## 2018-11-11 PROCEDURE — 85025 COMPLETE CBC W/AUTO DIFF WBC: CPT

## 2018-11-11 PROCEDURE — 99232 SBSQ HOSP IP/OBS MODERATE 35: CPT | Performed by: FAMILY MEDICINE

## 2018-11-11 PROCEDURE — 94760 N-INVAS EAR/PLS OXIMETRY 1: CPT

## 2018-11-11 PROCEDURE — 2580000003 HC RX 258: Performed by: SURGERY

## 2018-11-11 PROCEDURE — 36415 COLL VENOUS BLD VENIPUNCTURE: CPT

## 2018-11-11 PROCEDURE — 94150 VITAL CAPACITY TEST: CPT

## 2018-11-11 RX ADMIN — POTASSIUM CHLORIDE 20 MEQ: 20 TABLET, EXTENDED RELEASE ORAL at 18:14

## 2018-11-11 RX ADMIN — DILTIAZEM HYDROCHLORIDE 240 MG: 120 CAPSULE, COATED, EXTENDED RELEASE ORAL at 10:08

## 2018-11-11 RX ADMIN — ALBUTEROL SULFATE 2.5 MG: 2.5 SOLUTION RESPIRATORY (INHALATION) at 09:02

## 2018-11-11 RX ADMIN — SODIUM CHLORIDE, POTASSIUM CHLORIDE, SODIUM LACTATE AND CALCIUM CHLORIDE: 600; 310; 30; 20 INJECTION, SOLUTION INTRAVENOUS at 16:07

## 2018-11-11 RX ADMIN — ALBUTEROL SULFATE 2.5 MG: 2.5 SOLUTION RESPIRATORY (INHALATION) at 16:43

## 2018-11-11 RX ADMIN — METOPROLOL TARTRATE 25 MG: 25 TABLET ORAL at 10:08

## 2018-11-11 RX ADMIN — ALBUTEROL SULFATE 2.5 MG: 2.5 SOLUTION RESPIRATORY (INHALATION) at 20:00

## 2018-11-11 RX ADMIN — SODIUM CHLORIDE, POTASSIUM CHLORIDE, SODIUM LACTATE AND CALCIUM CHLORIDE: 600; 310; 30; 20 INJECTION, SOLUTION INTRAVENOUS at 23:50

## 2018-11-11 RX ADMIN — SODIUM CHLORIDE, POTASSIUM CHLORIDE, SODIUM LACTATE AND CALCIUM CHLORIDE: 600; 310; 30; 20 INJECTION, SOLUTION INTRAVENOUS at 08:18

## 2018-11-11 RX ADMIN — METOPROLOL TARTRATE 25 MG: 25 TABLET ORAL at 21:20

## 2018-11-11 RX ADMIN — HEPARIN SODIUM 5000 UNITS: 5000 INJECTION INTRAVENOUS; SUBCUTANEOUS at 21:15

## 2018-11-11 RX ADMIN — AMIODARONE HYDROCHLORIDE 200 MG: 200 TABLET ORAL at 10:08

## 2018-11-11 RX ADMIN — POTASSIUM CHLORIDE 20 MEQ: 20 TABLET, EXTENDED RELEASE ORAL at 10:08

## 2018-11-11 RX ADMIN — ALBUTEROL SULFATE 2.5 MG: 2.5 SOLUTION RESPIRATORY (INHALATION) at 12:00

## 2018-11-11 RX ADMIN — HEPARIN SODIUM 5000 UNITS: 5000 INJECTION INTRAVENOUS; SUBCUTANEOUS at 14:45

## 2018-11-11 RX ADMIN — HEPARIN SODIUM 5000 UNITS: 5000 INJECTION INTRAVENOUS; SUBCUTANEOUS at 05:45

## 2018-11-11 ASSESSMENT — PAIN DESCRIPTION - FREQUENCY: FREQUENCY: CONTINUOUS

## 2018-11-11 ASSESSMENT — PAIN SCALES - GENERAL
PAINLEVEL_OUTOF10: 4
PAINLEVEL_OUTOF10: 0

## 2018-11-11 ASSESSMENT — PAIN DESCRIPTION - LOCATION: LOCATION: ABDOMEN

## 2018-11-11 ASSESSMENT — PAIN DESCRIPTION - ONSET: ONSET: ON-GOING

## 2018-11-11 ASSESSMENT — PAIN DESCRIPTION - PAIN TYPE: TYPE: ACUTE PAIN

## 2018-11-11 ASSESSMENT — PAIN DESCRIPTION - PROGRESSION: CLINICAL_PROGRESSION: NOT CHANGED

## 2018-11-11 ASSESSMENT — PAIN DESCRIPTION - DESCRIPTORS: DESCRIPTORS: PRESSURE

## 2018-11-11 ASSESSMENT — PAIN DESCRIPTION - ORIENTATION: ORIENTATION: RIGHT;LOWER

## 2018-11-12 ENCOUNTER — APPOINTMENT (OUTPATIENT)
Dept: GENERAL RADIOLOGY | Age: 78
DRG: 389 | End: 2018-11-12
Payer: MEDICARE

## 2018-11-12 LAB
ABSOLUTE EOS #: 0.3 K/UL (ref 0–0.4)
ABSOLUTE IMMATURE GRANULOCYTE: ABNORMAL K/UL (ref 0–0.3)
ABSOLUTE LYMPH #: 1 K/UL (ref 1–4.8)
ABSOLUTE MONO #: 1.3 K/UL (ref 0.1–1.2)
ANION GAP SERPL CALCULATED.3IONS-SCNC: 12 MMOL/L (ref 9–17)
BASOPHILS # BLD: 0 % (ref 0–1)
BASOPHILS ABSOLUTE: 0 K/UL (ref 0–0.2)
BUN BLDV-MCNC: 14 MG/DL (ref 8–23)
BUN/CREAT BLD: 18 (ref 9–20)
CALCIUM SERPL-MCNC: 8.6 MG/DL (ref 8.6–10.4)
CHLORIDE BLD-SCNC: 103 MMOL/L (ref 98–107)
CO2: 26 MMOL/L (ref 20–31)
CREAT SERPL-MCNC: 0.78 MG/DL (ref 0.5–0.9)
DIFFERENTIAL TYPE: ABNORMAL
EOSINOPHILS RELATIVE PERCENT: 3 % (ref 1–7)
GFR AFRICAN AMERICAN: >60 ML/MIN
GFR NON-AFRICAN AMERICAN: >60 ML/MIN
GFR SERPL CREATININE-BSD FRML MDRD: NORMAL ML/MIN/{1.73_M2}
GFR SERPL CREATININE-BSD FRML MDRD: NORMAL ML/MIN/{1.73_M2}
GLUCOSE BLD-MCNC: 70 MG/DL (ref 65–105)
GLUCOSE BLD-MCNC: 73 MG/DL (ref 65–105)
GLUCOSE BLD-MCNC: 82 MG/DL (ref 65–105)
GLUCOSE BLD-MCNC: 82 MG/DL (ref 70–99)
HCT VFR BLD CALC: 26.7 % (ref 36–46)
HEMOGLOBIN: 8.4 G/DL (ref 12–16)
IMMATURE GRANULOCYTES: ABNORMAL %
LYMPHOCYTES # BLD: 8 % (ref 16–46)
MCH RBC QN AUTO: 29.8 PG (ref 26–34)
MCHC RBC AUTO-ENTMCNC: 31.5 G/DL (ref 31–37)
MCV RBC AUTO: 94.7 FL (ref 80–100)
MONOCYTES # BLD: 10 % (ref 4–11)
NRBC AUTOMATED: ABNORMAL PER 100 WBC
PDW BLD-RTO: 15.5 % (ref 11–14.5)
PLATELET # BLD: 174 K/UL (ref 140–450)
PLATELET ESTIMATE: ABNORMAL
PMV BLD AUTO: 10.3 FL (ref 6–12)
POTASSIUM SERPL-SCNC: 3.9 MMOL/L (ref 3.7–5.3)
RBC # BLD: 2.82 M/UL (ref 4–5.2)
RBC # BLD: ABNORMAL 10*6/UL
SEG NEUTROPHILS: 79 % (ref 43–77)
SEGMENTED NEUTROPHILS ABSOLUTE COUNT: 10.7 K/UL (ref 1.8–7.7)
SODIUM BLD-SCNC: 141 MMOL/L (ref 135–144)
WBC # BLD: 13.3 K/UL (ref 3.5–11)
WBC # BLD: ABNORMAL 10*3/UL

## 2018-11-12 PROCEDURE — 85025 COMPLETE CBC W/AUTO DIFF WBC: CPT

## 2018-11-12 PROCEDURE — 6360000004 HC RX CONTRAST MEDICATION: Performed by: SURGERY

## 2018-11-12 PROCEDURE — G8978 MOBILITY CURRENT STATUS: HCPCS | Performed by: PHYSICAL THERAPIST

## 2018-11-12 PROCEDURE — 6360000002 HC RX W HCPCS: Performed by: SURGERY

## 2018-11-12 PROCEDURE — 36415 COLL VENOUS BLD VENIPUNCTURE: CPT

## 2018-11-12 PROCEDURE — 2060000000 HC ICU INTERMEDIATE R&B

## 2018-11-12 PROCEDURE — G8979 MOBILITY GOAL STATUS: HCPCS | Performed by: PHYSICAL THERAPIST

## 2018-11-12 PROCEDURE — 94640 AIRWAY INHALATION TREATMENT: CPT

## 2018-11-12 PROCEDURE — G8987 SELF CARE CURRENT STATUS: HCPCS | Performed by: OCCUPATIONAL THERAPIST

## 2018-11-12 PROCEDURE — 99232 SBSQ HOSP IP/OBS MODERATE 35: CPT | Performed by: INTERNAL MEDICINE

## 2018-11-12 PROCEDURE — 80048 BASIC METABOLIC PNL TOTAL CA: CPT

## 2018-11-12 PROCEDURE — 97165 OT EVAL LOW COMPLEX 30 MIN: CPT | Performed by: OCCUPATIONAL THERAPIST

## 2018-11-12 PROCEDURE — 6370000000 HC RX 637 (ALT 250 FOR IP): Performed by: NURSE PRACTITIONER

## 2018-11-12 PROCEDURE — 2580000003 HC RX 258: Performed by: SURGERY

## 2018-11-12 PROCEDURE — 6370000000 HC RX 637 (ALT 250 FOR IP): Performed by: FAMILY MEDICINE

## 2018-11-12 PROCEDURE — 82947 ASSAY GLUCOSE BLOOD QUANT: CPT

## 2018-11-12 PROCEDURE — 97162 PT EVAL MOD COMPLEX 30 MIN: CPT | Performed by: PHYSICAL THERAPIST

## 2018-11-12 PROCEDURE — G8988 SELF CARE GOAL STATUS: HCPCS | Performed by: OCCUPATIONAL THERAPIST

## 2018-11-12 PROCEDURE — 2580000003 HC RX 258: Performed by: FAMILY MEDICINE

## 2018-11-12 PROCEDURE — 94761 N-INVAS EAR/PLS OXIMETRY MLT: CPT

## 2018-11-12 PROCEDURE — 74250 X-RAY XM SM INT 1CNTRST STD: CPT

## 2018-11-12 RX ORDER — ALPRAZOLAM 0.25 MG/1
0.25 TABLET ORAL NIGHTLY PRN
Status: DISCONTINUED | OUTPATIENT
Start: 2018-11-12 | End: 2018-11-15 | Stop reason: HOSPADM

## 2018-11-12 RX ADMIN — DEXTROSE MONOHYDRATE 100 ML/HR: 5 INJECTION INTRAVENOUS at 21:30

## 2018-11-12 RX ADMIN — DILTIAZEM HYDROCHLORIDE 240 MG: 120 CAPSULE, COATED, EXTENDED RELEASE ORAL at 11:06

## 2018-11-12 RX ADMIN — DIPHENHYDRAMINE HYDROCHLORIDE 25 MG: 50 INJECTION, SOLUTION INTRAMUSCULAR; INTRAVENOUS at 02:13

## 2018-11-12 RX ADMIN — ALBUTEROL SULFATE 2.5 MG: 2.5 SOLUTION RESPIRATORY (INHALATION) at 15:46

## 2018-11-12 RX ADMIN — ALBUTEROL SULFATE 2.5 MG: 2.5 SOLUTION RESPIRATORY (INHALATION) at 20:23

## 2018-11-12 RX ADMIN — HEPARIN SODIUM 5000 UNITS: 5000 INJECTION INTRAVENOUS; SUBCUTANEOUS at 14:40

## 2018-11-12 RX ADMIN — AMIODARONE HYDROCHLORIDE 200 MG: 200 TABLET ORAL at 11:06

## 2018-11-12 RX ADMIN — SODIUM CHLORIDE, POTASSIUM CHLORIDE, SODIUM LACTATE AND CALCIUM CHLORIDE: 600; 310; 30; 20 INJECTION, SOLUTION INTRAVENOUS at 08:35

## 2018-11-12 RX ADMIN — DIATRIZOATE MEGLUMINE AND DIATRIZOATE SODIUM 120 ML: 660; 100 LIQUID ORAL; RECTAL at 15:30

## 2018-11-12 RX ADMIN — LEVOTHYROXINE SODIUM 50 MCG: 25 TABLET ORAL at 05:08

## 2018-11-12 RX ADMIN — ALBUTEROL SULFATE 2.5 MG: 2.5 SOLUTION RESPIRATORY (INHALATION) at 11:43

## 2018-11-12 RX ADMIN — ALPRAZOLAM 0.25 MG: 0.25 TABLET ORAL at 22:00

## 2018-11-12 RX ADMIN — HEPARIN SODIUM 5000 UNITS: 5000 INJECTION INTRAVENOUS; SUBCUTANEOUS at 21:25

## 2018-11-12 RX ADMIN — POTASSIUM CHLORIDE 20 MEQ: 20 TABLET, EXTENDED RELEASE ORAL at 11:06

## 2018-11-12 RX ADMIN — METOPROLOL TARTRATE 25 MG: 25 TABLET ORAL at 19:53

## 2018-11-12 RX ADMIN — ALBUTEROL SULFATE 2.5 MG: 2.5 SOLUTION RESPIRATORY (INHALATION) at 08:13

## 2018-11-12 RX ADMIN — HEPARIN SODIUM 5000 UNITS: 5000 INJECTION INTRAVENOUS; SUBCUTANEOUS at 05:07

## 2018-11-12 RX ADMIN — POTASSIUM CHLORIDE 20 MEQ: 20 TABLET, EXTENDED RELEASE ORAL at 20:00

## 2018-11-12 RX ADMIN — METOPROLOL TARTRATE 25 MG: 25 TABLET ORAL at 11:06

## 2018-11-12 ASSESSMENT — PAIN SCALES - GENERAL
PAINLEVEL_OUTOF10: 0
PAINLEVEL_OUTOF10: 1
PAINLEVEL_OUTOF10: 0
PAINLEVEL_OUTOF10: 3
PAINLEVEL_OUTOF10: 0
PAINLEVEL_OUTOF10: 1
PAINLEVEL_OUTOF10: 0

## 2018-11-12 ASSESSMENT — PAIN DESCRIPTION - DESCRIPTORS: DESCRIPTORS: ACHING

## 2018-11-12 ASSESSMENT — PAIN DESCRIPTION - LOCATION
LOCATION: ABDOMEN

## 2018-11-12 ASSESSMENT — PAIN DESCRIPTION - PAIN TYPE
TYPE: ACUTE PAIN
TYPE: SURGICAL PAIN
TYPE: SURGICAL PAIN

## 2018-11-12 NOTE — PROGRESS NOTES
Occupational Therapy   Occupational Therapy Initial Assessment  Date: 2018   Patient Name: Madelaine Oliva  MRN: [id number]     : 1940    Date of Service: 2018    Discharge Recommendations:  ECF with OT, Patient would benefit from continued therapy after discharge     Patient Diagnosis(es): The primary encounter diagnosis was Intractable vomiting with nausea, unspecified vomiting type. A diagnosis of SBO (small bowel obstruction) (HCC) was also pertinent to this visit. has a past medical history of A-fib [facility]); CHF (congestive heart failure) ([facility] [address] Dyslipidemia; FH: total abdominal hysterectomy and bilateral salpingo-oophorectomy; Glucose intolerance (impaired glucose tolerance); Hypertension; Obesity; and Osteoarthritis. has a past surgical history that includes kenyon and bso (cervix removed) (); Cholecystectomy (); Varicose vein surgery (Right, ); Hip Arthroplasty (Left); Knee Arthroplasty (Left); Knee Arthroplasty (Right); Cardiac catheterization (2017); Colonoscopy (N/A, 10/8/2018); pr lap,surg,colectomy, partial, w/anast (N/A, 2018); and pr cystoscopy,insert ureteral stent (Bilateral, 2018). Treatment Diagnosis: general weakness      Restrictions  Restrictions/Precautions  Restrictions/Precautions: Surgical Protocols, General Precautions  Implants present? : Metal implants (BTKA, LTHA)    Subjective   General  Chart Reviewed: Yes  Patient assessed for rehabilitation services?: Yes  Family / Caregiver Present: No  Referring Practitioner: ADELA aFustin  Diagnosis: SBO  Subjective  Subjective: Patient rec'd in bed, pleasant and cooperative 77 yr old female with vomiting.   Pain Assessment  Patient Currently in Pain: Yes  Pain Assessment: 0-10  Pain Level: 1  Pain Type: Surgical pain  Pain Location: Abdomen  Pain Orientation: Right, Lower  Pain Descriptors: Aching  Pain Frequency: Continuous  Clinical Progression: Not changed  Patient's Stated Pain Goal: No (degrees)  Left Hand AROM: WFL  RUE AROM (degrees)  RUE General AROM: limited at shoulder  Right Hand AROM (degrees)  Right Hand AROM: WFL  LUE Strength  Gross LUE Strength: WFL  RUE Strength  Gross RUE Strength: WFL    Assessment   Performance deficits / Impairments: Decreased functional mobility ; Decreased ADL status; Decreased ROM; Decreased endurance;Decreased strength;Decreased balance;Decreased high-level IADLs  Treatment Diagnosis: general weakness  Prognosis: Good  Decision Making: Low Complexity  REQUIRES OT FOLLOW UP: Yes  Safety Devices  Safety Devices in place: Yes  Type of devices: Left in bed;Call light within reach;Nurse notified         Plan   Plan  Times per week: 1-2    G-Code  OT G-codes  Functional Limitation: Self care  Self Care Current Status (): At least 40 percent but less than 60 percent impaired, limited or restricted  Self Care Goal Status ():  At least 20 percent but less than 40 percent impaired, limited or restricted  OutComes Score    AM-PAC Score    Goals  Short term goals  Time Frame for Short term goals: 3 days  Short term goal 1: Patient to complete toilet transfer and toileting tasks with mod I/S using a/e as needed  Short term goal 2: Patient to complete LB dressng with mod I/S using a/e as needed       Therapy Time   Individual Concurrent Group Co-treatment   Time In 1015         Time Out 1030         Minutes 15         Timed Code Treatment Minutes: 0 Minutes       JENNIFER OCHOA OTR, OT

## 2018-11-12 NOTE — PROGRESS NOTES
11/12/2018    BUN 14 11/12/2018    LABALBU 3.3 11/09/2018    CREATININE 0.78 11/12/2018    CALCIUM 8.6 11/12/2018    GFRAA >60 11/12/2018    LABGLOM >60 11/12/2018    GLUCOSE 82 11/12/2018           Physical Exam:  Vitals: BP (!) 161/67   Pulse 69   Temp 98.3 °F (36.8 °C) (Tympanic)   Resp 18   Ht 5' 7\" (1.702 m)   Wt (!) 332 lb 12.8 oz (151 kg)   LMP 01/01/1968   SpO2 91%   BMI 52.12 kg/m²   24 hour intake/output:  Intake/Output Summary (Last 24 hours) at 11/12/18 1208  Last data filed at 11/12/18 0908   Gross per 24 hour   Intake             2999 ml   Output             1600 ml   Net             1399 ml     Last 3 weights: Wt Readings from Last 3 Encounters:   11/12/18 (!) 332 lb 12.8 oz (151 kg)   11/09/18 (!) 342 lb 12.8 oz (155.5 kg)   10/26/18 (!) 338 lb (153.3 kg)     HEENT: NGT present-----, Normocephalic and Atraumatic  Neck: Supple, No Masses, Tenderness, Nodularity and No Lymphadenopathy  Chest/Lungs: Clear to Auscultation without Rales, Rhonchi, or Wheezes  Cardiac: Regular Rate and Rhythm--II/VI FLORINDA  GI/Abdomen: Bowel Sounds Absent and Tenderness--diffusely---ileostomy with small amount of stool only  : Not examined  EXT/Skin: No Cyanosis, No Clubbing and Edema Present  Neuro: Alert and Oriented, to Person, to Time, to Place, to Situation and No Localizing Signs/Symptoms      Assessment:    Principal Problem:    SBO (small bowel obstruction) (HCC)  Active Problems:    Intestinal occlusion (HCC)    Intractable vomiting with nausea    Small bowel obstruction (HCC)  Resolved Problems:    * No resolved hospital problems.  *      ANANTH GARCIA dc   FP  78 WF  [NICK Quezada;  [facility]]  FULL CODE      LOVENOX     AMIODARONE     SUPPLEMENTAL OXYGEN       SBO--vs--postoperative ileus---11.9.2018       UGISBFT----11.12.2018--- diffuse bowel dilatation---delayed transit                       of enteric contrast through the GI tract had not reached ileum in catheterization---9. 5.2017--0% LM--mid 20%---normal D1---0% left circumflex--                         40% proximal OM1---mild aneurysmal dilatation proximal RCA---LVEF ~ 55%             2D ECHO----8. 3.2017--ROVERTO--NLVSF---dilated RV---NRVSF---MAC--mild MR--                          mild TR---RVSP ~ 31 mm Hg---LVEF ~ 50%             RVR---newly diagnosed----7.19.2017----see cardioversion above---9.10.2017              MI ruled out--7.20.2017             EKG---7.20.2017--atrial fibrillation--78---low voltage              EKG---7.19.2017--atrial fibrillation---RVR---111--diffuse low                         voltage--RSR V1-2 only---cannot exclude prior high                        lateral infarction               Sinus bradycardia---1st-degree AVB, see above   ASCVD---see above  CHF--chronic--diastolic    II/VI FLORINDA  Hypertension  Hyperlipidemia  Insulin resistance--impaired glucose tolerance  Morbid obesity  Depression--anxiety   PMH:    osteoarthritis, osteoporosis, weakness with exertion---decreased exercise               tolerance----2017, atypical chest pain--aching right-left arms,  right rib               fractures---contusions---due to fall----2018,  dermatitis, suspected sleep apnea,                UTI---POA----11.5.2018, hypokalemia---11.5.2018, dermatitis---skin folds  PSH:    KARYN-BSO--cervix absent--1966, cholecystectomy---              open---c. 1960s, right varicose veins--c. 1960s, left               YUMI, right TKA, left TKA    Allergies:        penicillins                          Intolerances:  Betra--valdecoxib----diarrhea      Plan:  1. NGT  2. NPO  3.     See orders     Electronically signed by Florencia Merino on 11/12/2018 at 12:08 PM    Hospitalist

## 2018-11-12 NOTE — PROGRESS NOTES
11/11/18 1008    phenol 1.4 % mouth spray 1 spray  1 spray Mouth/Throat Q2H PRN Rafia Garcia MD   1 spray at 11/10/18 1026     Allergies   Allergen Reactions    Pcn [Penicillins] Hives and Shortness Of Breath    Bextra [Valdecoxib] Diarrhea     Principal Problem:    SBO (small bowel obstruction) (HCC)  Active Problems:    Intestinal occlusion (HCC)    Intractable vomiting with nausea    Small bowel obstruction (HCC)  Resolved Problems:    * No resolved hospital problems. *    Blood pressure (!) 120/56, pulse 71, temperature 98.4 °F (36.9 °C), temperature source Oral, resp. rate 18, height 5' 7\" (1.702 m), weight (!) 333 lb 1.6 oz (151.1 kg), last menstrual period 01/01/1968, SpO2 92 %. Subjective Patient is still having output from the NG. Objective  Patient is well appearing in no acute distress. The patient is breathing easily, heart is RRR. The patient's lower extremities have a full ROM and no edema.   Abdomen is soft, ilestomy in place with output, hypoactive bowel sounds  Assessment & Plan  SBO - continue NG, small bowel follow through tomorrow    Joan Jnoes PA-C  11/11/2018

## 2018-11-13 LAB
ABSOLUTE EOS #: 0.4 K/UL (ref 0–0.4)
ABSOLUTE IMMATURE GRANULOCYTE: ABNORMAL K/UL (ref 0–0.3)
ABSOLUTE LYMPH #: 1.2 K/UL (ref 1–4.8)
ABSOLUTE MONO #: 0.9 K/UL (ref 0.1–1.2)
ANION GAP SERPL CALCULATED.3IONS-SCNC: 12 MMOL/L (ref 9–17)
BASOPHILS # BLD: 0 % (ref 0–1)
BASOPHILS ABSOLUTE: 0 K/UL (ref 0–0.2)
BUN BLDV-MCNC: 12 MG/DL (ref 8–23)
BUN/CREAT BLD: 18 (ref 9–20)
CALCIUM SERPL-MCNC: 8.4 MG/DL (ref 8.6–10.4)
CHLORIDE BLD-SCNC: 103 MMOL/L (ref 98–107)
CO2: 28 MMOL/L (ref 20–31)
CREAT SERPL-MCNC: 0.65 MG/DL (ref 0.5–0.9)
DIFFERENTIAL TYPE: ABNORMAL
EOSINOPHILS RELATIVE PERCENT: 3 % (ref 1–7)
GFR AFRICAN AMERICAN: >60 ML/MIN
GFR NON-AFRICAN AMERICAN: >60 ML/MIN
GFR SERPL CREATININE-BSD FRML MDRD: ABNORMAL ML/MIN/{1.73_M2}
GFR SERPL CREATININE-BSD FRML MDRD: ABNORMAL ML/MIN/{1.73_M2}
GLUCOSE BLD-MCNC: 78 MG/DL (ref 65–105)
GLUCOSE BLD-MCNC: 80 MG/DL (ref 70–99)
GLUCOSE BLD-MCNC: 89 MG/DL (ref 65–105)
GLUCOSE BLD-MCNC: 97 MG/DL (ref 65–105)
HCT VFR BLD CALC: 26 % (ref 36–46)
HCT VFR BLD CALC: 28 % (ref 36–46)
HEMOGLOBIN: 8 G/DL (ref 12–16)
HEMOGLOBIN: 8.7 G/DL (ref 12–16)
IMMATURE GRANULOCYTES: ABNORMAL %
LYMPHOCYTES # BLD: 11 % (ref 16–46)
MCH RBC QN AUTO: 29.2 PG (ref 26–34)
MCHC RBC AUTO-ENTMCNC: 30.8 G/DL (ref 31–37)
MCV RBC AUTO: 94.6 FL (ref 80–100)
MONOCYTES # BLD: 9 % (ref 4–11)
NRBC AUTOMATED: ABNORMAL PER 100 WBC
PDW BLD-RTO: 15.8 % (ref 11–14.5)
PLATELET # BLD: 176 K/UL (ref 140–450)
PLATELET ESTIMATE: ABNORMAL
PMV BLD AUTO: 10.4 FL (ref 6–12)
POTASSIUM SERPL-SCNC: 4 MMOL/L (ref 3.7–5.3)
RBC # BLD: 2.75 M/UL (ref 4–5.2)
RBC # BLD: ABNORMAL 10*6/UL
SEG NEUTROPHILS: 77 % (ref 43–77)
SEGMENTED NEUTROPHILS ABSOLUTE COUNT: 8.5 K/UL (ref 1.8–7.7)
SODIUM BLD-SCNC: 143 MMOL/L (ref 135–144)
WBC # BLD: 11 K/UL (ref 3.5–11)
WBC # BLD: ABNORMAL 10*3/UL

## 2018-11-13 PROCEDURE — 94761 N-INVAS EAR/PLS OXIMETRY MLT: CPT

## 2018-11-13 PROCEDURE — 82947 ASSAY GLUCOSE BLOOD QUANT: CPT

## 2018-11-13 PROCEDURE — 6360000002 HC RX W HCPCS: Performed by: SURGERY

## 2018-11-13 PROCEDURE — 85014 HEMATOCRIT: CPT

## 2018-11-13 PROCEDURE — 94640 AIRWAY INHALATION TREATMENT: CPT

## 2018-11-13 PROCEDURE — 85018 HEMOGLOBIN: CPT

## 2018-11-13 PROCEDURE — 2060000000 HC ICU INTERMEDIATE R&B

## 2018-11-13 PROCEDURE — 2580000003 HC RX 258: Performed by: SURGERY

## 2018-11-13 PROCEDURE — 6370000000 HC RX 637 (ALT 250 FOR IP): Performed by: FAMILY MEDICINE

## 2018-11-13 PROCEDURE — 80048 BASIC METABOLIC PNL TOTAL CA: CPT

## 2018-11-13 PROCEDURE — 6370000000 HC RX 637 (ALT 250 FOR IP): Performed by: INTERNAL MEDICINE

## 2018-11-13 PROCEDURE — 99232 SBSQ HOSP IP/OBS MODERATE 35: CPT | Performed by: INTERNAL MEDICINE

## 2018-11-13 PROCEDURE — 6370000000 HC RX 637 (ALT 250 FOR IP): Performed by: NURSE PRACTITIONER

## 2018-11-13 PROCEDURE — 99024 POSTOP FOLLOW-UP VISIT: CPT | Performed by: SURGERY

## 2018-11-13 PROCEDURE — 97116 GAIT TRAINING THERAPY: CPT | Performed by: PHYSICAL THERAPY ASSISTANT

## 2018-11-13 PROCEDURE — 85025 COMPLETE CBC W/AUTO DIFF WBC: CPT

## 2018-11-13 PROCEDURE — 36415 COLL VENOUS BLD VENIPUNCTURE: CPT

## 2018-11-13 RX ORDER — ACETAMINOPHEN 325 MG/1
650 TABLET ORAL EVERY 4 HOURS PRN
Status: DISCONTINUED | OUTPATIENT
Start: 2018-11-13 | End: 2018-11-15 | Stop reason: HOSPADM

## 2018-11-13 RX ADMIN — HEPARIN SODIUM 5000 UNITS: 5000 INJECTION INTRAVENOUS; SUBCUTANEOUS at 15:02

## 2018-11-13 RX ADMIN — ACETAMINOPHEN 650 MG: 325 TABLET ORAL at 17:27

## 2018-11-13 RX ADMIN — HEPARIN SODIUM 5000 UNITS: 5000 INJECTION INTRAVENOUS; SUBCUTANEOUS at 05:30

## 2018-11-13 RX ADMIN — ALBUTEROL SULFATE 2.5 MG: 2.5 SOLUTION RESPIRATORY (INHALATION) at 19:54

## 2018-11-13 RX ADMIN — SODIUM CHLORIDE, POTASSIUM CHLORIDE, SODIUM LACTATE AND CALCIUM CHLORIDE: 600; 310; 30; 20 INJECTION, SOLUTION INTRAVENOUS at 02:11

## 2018-11-13 RX ADMIN — ALBUTEROL SULFATE 2.5 MG: 2.5 SOLUTION RESPIRATORY (INHALATION) at 08:11

## 2018-11-13 RX ADMIN — ALBUTEROL SULFATE 2.5 MG: 2.5 SOLUTION RESPIRATORY (INHALATION) at 11:55

## 2018-11-13 RX ADMIN — POTASSIUM CHLORIDE 20 MEQ: 20 TABLET, EXTENDED RELEASE ORAL at 17:27

## 2018-11-13 RX ADMIN — SODIUM CHLORIDE, POTASSIUM CHLORIDE, SODIUM LACTATE AND CALCIUM CHLORIDE: 600; 310; 30; 20 INJECTION, SOLUTION INTRAVENOUS at 17:28

## 2018-11-13 RX ADMIN — ACETAMINOPHEN 650 MG: 325 TABLET ORAL at 22:01

## 2018-11-13 RX ADMIN — POTASSIUM CHLORIDE 20 MEQ: 20 TABLET, EXTENDED RELEASE ORAL at 09:31

## 2018-11-13 RX ADMIN — LEVOTHYROXINE SODIUM 50 MCG: 25 TABLET ORAL at 05:29

## 2018-11-13 RX ADMIN — HEPARIN SODIUM 5000 UNITS: 5000 INJECTION INTRAVENOUS; SUBCUTANEOUS at 21:22

## 2018-11-13 RX ADMIN — ALPRAZOLAM 0.25 MG: 0.25 TABLET ORAL at 22:01

## 2018-11-13 RX ADMIN — AMIODARONE HYDROCHLORIDE 200 MG: 200 TABLET ORAL at 09:31

## 2018-11-13 RX ADMIN — METOPROLOL TARTRATE 25 MG: 25 TABLET ORAL at 21:21

## 2018-11-13 RX ADMIN — DILTIAZEM HYDROCHLORIDE 240 MG: 120 CAPSULE, COATED, EXTENDED RELEASE ORAL at 09:31

## 2018-11-13 RX ADMIN — ALBUTEROL SULFATE 2.5 MG: 2.5 SOLUTION RESPIRATORY (INHALATION) at 16:15

## 2018-11-13 RX ADMIN — METOPROLOL TARTRATE 25 MG: 25 TABLET ORAL at 09:31

## 2018-11-13 ASSESSMENT — PAIN DESCRIPTION - LOCATION: LOCATION: GENERALIZED

## 2018-11-13 ASSESSMENT — PAIN SCALES - GENERAL
PAINLEVEL_OUTOF10: 0
PAINLEVEL_OUTOF10: 3
PAINLEVEL_OUTOF10: 0
PAINLEVEL_OUTOF10: 5
PAINLEVEL_OUTOF10: 0

## 2018-11-13 ASSESSMENT — PAIN DESCRIPTION - PAIN TYPE: TYPE: CHRONIC PAIN

## 2018-11-13 NOTE — PROGRESS NOTES
Hospitalist Progress Note    Patient:   Alessandra Uriarte     YOB: 1940    MRN: [id number]   Admit date: 11/9/2018     Acct: [de-identified]     PCP: Satnam Webber DO    CC--Interval History:   SBO----ileostomy with 2200 mL out followed by 300 mL earlier this AM---seen by General Surgery    CKD---maintained at Stage 2 level    Hemoglobin = 8.0    WBC down to 11.0    See note below     All other ROS negative except noted in HPI    Diet:  Diet NPO Effective Now Exceptions are: Ice Chips    Medications:  Scheduled Meds:   albuterol  2.5 mg Nebulization 4x daily    sodium chloride flush  10 mL Intravenous 2 times per day    heparin (porcine)  5,000 Units Subcutaneous 3 times per day    levothyroxine  50 mcg Oral Daily    diltiazem  240 mg Oral Daily    metoprolol tartrate  25 mg Oral BID    potassium chloride  20 mEq Oral BID WC    amiodarone  200 mg Oral Daily     Continuous Infusions:   lactated ringers 125 mL/hr at 11/13/18 0211    dextrose 100 mL/hr (11/12/18 2130)     PRN Meds:diatrizoate meglumine-sodium, ALPRAZolam, albuterol, sodium chloride flush, ondansetron, glucose, dextrose, glucagon (rDNA), dextrose, phenol    Objective:  Labs:  CBC with Differential:    Lab Results   Component Value Date    WBC 11.0 11/13/2018    RBC 2.75 11/13/2018    HGB 8.7 11/13/2018    HCT 28.0 11/13/2018     11/13/2018    MCV 94.6 11/13/2018    MCH 29.2 11/13/2018    MCHC 30.8 11/13/2018    RDW 15.8 11/13/2018    LYMPHOPCT 11 11/13/2018    MONOPCT 9 11/13/2018    BASOPCT 0 11/13/2018    MONOSABS 0.90 11/13/2018    LYMPHSABS 1.20 11/13/2018    EOSABS 0.40 11/13/2018    BASOSABS 0.00 11/13/2018    DIFFTYPE NOT REPORTED 11/13/2018     BMP:    Lab Results   Component Value Date     11/13/2018    K 4.0 11/13/2018     11/13/2018    CO2 28 11/13/2018    BUN 12 11/13/2018    LABALBU 3.3 11/09/2018    CREATININE 0.65 11/13/2018    CALCIUM 8.4 11/13/2018    GFRAA >60 11/13/2018    LABGLOM >60

## 2018-11-13 NOTE — PROGRESS NOTES
Physical Therapy  Facility/Department: [address] [place] [address] CARE  Daily Treatment Note  NAME: Justin Haji  : 1940  MRN: [id number]    Date of Service: 2018    Discharge Recommendations:  ECF with PT   PT Equipment Recommendations  Equipment Needed: No    Patient Diagnosis(es): The primary encounter diagnosis was Intractable vomiting with nausea, unspecified vomiting type. A diagnosis of SBO (small bowel obstruction) (HCC) was also pertinent to this visit. has a past medical history of A-fib [facility]); CHF (congestive heart failure) ([facility] [address] Dyslipidemia; FH: total abdominal hysterectomy and bilateral salpingo-oophorectomy; Glucose intolerance (impaired glucose tolerance); Hypertension; Obesity; and Osteoarthritis. has a past surgical history that includes kenyon and bso (cervix removed) (); Cholecystectomy (); Varicose vein surgery (Right, ); Hip Arthroplasty (Left); Knee Arthroplasty (Left); Knee Arthroplasty (Right); Cardiac catheterization (2017); Colonoscopy (N/A, 10/8/2018); pr lap,surg,colectomy, partial, w/anast (N/A, 2018); and pr cystoscopy,insert ureteral stent (Bilateral, 2018). Restrictions  Restrictions/Precautions  Restrictions/Precautions: Surgical Protocols, General Precautions  Implants present? : Metal implants (BTKA, LTHA)  Subjective   General  Chart Reviewed: Yes  Response To Previous Treatment: Patient with no complaints from previous session. Family / Caregiver Present: Yes (SON)  Subjective  Subjective: Pt. in bedside chair at initiation of session. Agreeable to therapy at this time. Pain Screening  Patient Currently in Pain: No  Pain Assessment  Pain Assessment: 0-10  Pain Level: 0  Vital Signs  Patient Currently in Pain: No       Orientation  Orientation  Overall Orientation Status: Within Normal Limits  Cognition      Objective   Bed mobility  Scooting: Stand by assistance  Transfers  Sit to Stand:  Moderate Assistance;Maximum Assistance (From

## 2018-11-13 NOTE — PROGRESS NOTES
Patient readmitted Friday even after being discharged earlier that day, with nausea and vomiting, and a diagnosis of SBO. Treated with NPO and NG. Last night began putting a large amount out her ileostomy and much less out her NG tube. She is no longer nauseated. BP (!) 140/60   Pulse 65   Temp 97.1 °F (36.2 °C)   Resp 16   Ht 5' 7\" (1.702 m)   Wt (!) 333 lb 1.6 oz (151.1 kg)   LMP 01/01/1968   SpO2 94%   BMI 52.17 kg/m²     Lungs - clear  Heart - RRR  Abd - soft, active bowel sounds  Stoma is pink and draining a brisk amount of enteric fluid. IMP/PLAN  1) Post-op SBO - seems to be resolving  - Clamp NG and d/c if tolerated.

## 2018-11-13 NOTE — OP NOTE
Patient: Bella Nettles  MRN: [id number]  YOB: 1940    FACILITY: [address] [place] [address]    DATE: 11/5/2018    SURGEON: Svetlana Mack MD     ASSISTANT: none    PREOPERATIVE DIAGNOSIS: colon cancer   1. Ureteral identification    POSTOPERATIVE DIAGNOSIS: colon cancer    PROCEDURE PERFORMED:   1. Cystourethroscopy  2. Bilateral ureteral lighted stent placement    ANESTHESIA: General    ESTIMATED BLOOD LOSS: 1200 (units unknown)     COMPLICATIONS: None immediate    DRAINS: bilateral ureteral lighted stents and 16 Lao urethral kraus    SPECIMENS:   ID Type Source Tests Collected by Time Destination   A : SIGMOID COLON WITH ANASTAMOSIS RING Tissue Sigmoid Colon SURGICAL PATHOLOGY Beverley Moreno MD 11/5/2018 1038    B : SIGMOID COLON DISTAL MARGIN Tissue Sigmoid Colon SURGICAL PATHOLOGY Beverley Moreno MD 11/5/2018 1039        INDICATIONS FOR PROCEDURE:  The patient is a 77 y.o. female who presents today with colon cancer  here for Open Sigmoid Colectomy w/ lighted stents ILEOSTOMY PLACEMENT. After risks, benefits and alternatives of the procedure were discussed with the patient, the patient elected to proceed. DETAILS OF THE PROCEDURE:  The patient was correctly identified in the preoperative holding area. He was brought back to the operating room and placed in the dorsal lithotomy position. Anesthesia was administered; antibiotics administered by Anesthesia. EPC cuffs were on and functional. Patient was then prepped and draped in the usual sterile fashion. Once an appropriate time out had been performed, with all parties consenting, a 25 Bengali cystoscope with a 30-degree lens was placed through the urethra into the bladder. The right ureteral orifice was cannulated with a lighted ureteral catheter and advanced to the renal pelvis. Then the left ureteral orifice was cannulated with a lighted ureteral catheter and advanced to the renal pelvis.   The bladder was drained with a kraus catheter, and the ureteral catheters were secured to the kraus catheter with umbilical tape. The patient tolerated the procedure well and the primary service will proceed with their part of the operation. They will call if they need our assistance during the dissection. Plan:  The ureteral catheters can be removed at the end of the case by the primary service. Please call with any questions.

## 2018-11-13 NOTE — PROGRESS NOTES
Hospitalist Progress Note    Patient:   Nova Madison     YOB: 1940    MRN: [id number]   Admit date: 11/9/2018     Acct: [de-identified]     PCP: Christianne Rowland DO    CC--Interval History: PSBO vs ileus---NG to ILWS--ileostomy output increased some overnight      CKD--stable      Anemia--stable  C/o insomnia, denies nausea, and states pain is minimal and tolerable    All other ROS negative except noted in HPI    Diet:  Diet NPO Effective Now Exceptions are: Ice Chips    Medications:  Scheduled Meds:   albuterol  2.5 mg Nebulization 4x daily    sodium chloride flush  10 mL Intravenous 2 times per day    heparin (porcine)  5,000 Units Subcutaneous 3 times per day    levothyroxine  50 mcg Oral Daily    diltiazem  240 mg Oral Daily    metoprolol tartrate  25 mg Oral BID    potassium chloride  20 mEq Oral BID WC    amiodarone  200 mg Oral Daily     Continuous Infusions:   lactated ringers 125 mL/hr at 11/13/18 0211    dextrose 100 mL/hr (11/12/18 2130)     PRN Meds:diatrizoate meglumine-sodium, ALPRAZolam, albuterol, sodium chloride flush, ondansetron, glucose, dextrose, glucagon (rDNA), dextrose, phenol    Objective:  Pertinent Labs:  Hgb 8.4  WBC 13.3      Physical Exam:  Vitals: BP (!) 154/62   Pulse 69   Temp 97.8 °F (36.6 °C)   Resp 16   Ht 5' 7\" (1.702 m)   Wt (!) 332 lb 12.8 oz (151 kg)   LMP 01/01/1968   SpO2 95%   BMI 52.12 kg/m²   24 hour intake/output:  Intake/Output Summary (Last 24 hours) at 11/13/18 0317  Last data filed at 11/13/18 0220   Gross per 24 hour   Intake             2849 ml   Output             5550 ml   Net            -2701 ml       General: awake, alert and cooperative  HEENT: NG in place, External nose normal, Normocephalic and Atraumatic  Neck: Supple, No Masses, Tenderness, Nodularity and No Lymphadenopathy  Chest/Lungs: bases diminished bilaterally, Clear to Auscultation without Rales, Rhonchi, or Wheezes and Respirations even and unlabored  Cardiac: Regular Rate and Rhythm and Pedal Pulses Palpable Bilaterally  GI/Abdomen: Bowel Sounds Absent and Soft, no guarding, no rebound tenderness, c/o diffuse mild tenderness with palpation, ileostomy with green thin output  : Not examined  Extremities/Musculoskeletal: BLE with mild edema  Skin: No Cyanosis, Skin warm and dry and Incision with staples MIKAL  Neuro: Alert and Oriented and No Localizing Signs/Symptoms  Psychiatric: Normal mood and affect      Assessment:    Principal Problem:    SBO (small bowel obstruction) (HCC)  Active Problems:    Intestinal occlusion (HCC)    Intractable vomiting with nausea    Small bowel obstruction (HCC)  Resolved Problems:    * No resolved hospital problems. *        Plan:  1. PSBO vs ileus--NG to ILWS, per general surgery  2. CKD--stable, monitor renal function  3. Anemia--stable, monitor  4. Insomnia--trying low dose xanax at HS, allow for periods of rest when possible  5.  See orders    Electronically signed by Anjana SOLANO, NPJunC, FNP-BC on 11/13/2018 at 3:17 AM    Hospitalist

## 2018-11-14 ENCOUNTER — TELEPHONE (OUTPATIENT)
Dept: INTERNAL MEDICINE | Age: 78
End: 2018-11-14

## 2018-11-14 LAB
ABSOLUTE EOS #: 0.3 K/UL (ref 0–0.4)
ABSOLUTE IMMATURE GRANULOCYTE: ABNORMAL K/UL (ref 0–0.3)
ABSOLUTE LYMPH #: 1.6 K/UL (ref 1–4.8)
ABSOLUTE MONO #: 0.8 K/UL (ref 0.1–1.2)
ANION GAP SERPL CALCULATED.3IONS-SCNC: 9 MMOL/L (ref 9–17)
BASOPHILS # BLD: 0 % (ref 0–1)
BASOPHILS ABSOLUTE: 0 K/UL (ref 0–0.2)
BUN BLDV-MCNC: 8 MG/DL (ref 8–23)
BUN/CREAT BLD: 12 (ref 9–20)
CALCIUM SERPL-MCNC: 8.4 MG/DL (ref 8.6–10.4)
CHLORIDE BLD-SCNC: 103 MMOL/L (ref 98–107)
CO2: 28 MMOL/L (ref 20–31)
CREAT SERPL-MCNC: 0.65 MG/DL (ref 0.5–0.9)
DIFFERENTIAL TYPE: ABNORMAL
EOSINOPHILS RELATIVE PERCENT: 4 % (ref 1–7)
GFR AFRICAN AMERICAN: >60 ML/MIN
GFR NON-AFRICAN AMERICAN: >60 ML/MIN
GFR SERPL CREATININE-BSD FRML MDRD: ABNORMAL ML/MIN/{1.73_M2}
GFR SERPL CREATININE-BSD FRML MDRD: ABNORMAL ML/MIN/{1.73_M2}
GLUCOSE BLD-MCNC: 94 MG/DL (ref 65–105)
GLUCOSE BLD-MCNC: 94 MG/DL (ref 70–99)
HCT VFR BLD CALC: 26.8 % (ref 36–46)
HEMOGLOBIN: 8.3 G/DL (ref 12–16)
IMMATURE GRANULOCYTES: ABNORMAL %
LYMPHOCYTES # BLD: 19 % (ref 16–46)
MCH RBC QN AUTO: 29.4 PG (ref 26–34)
MCHC RBC AUTO-ENTMCNC: 31.1 G/DL (ref 31–37)
MCV RBC AUTO: 94.6 FL (ref 80–100)
MONOCYTES # BLD: 10 % (ref 4–11)
NRBC AUTOMATED: ABNORMAL PER 100 WBC
PDW BLD-RTO: 15.6 % (ref 11–14.5)
PLATELET # BLD: 194 K/UL (ref 140–450)
PLATELET ESTIMATE: ABNORMAL
PMV BLD AUTO: 10.5 FL (ref 6–12)
POTASSIUM SERPL-SCNC: 3.5 MMOL/L (ref 3.7–5.3)
RBC # BLD: 2.83 M/UL (ref 4–5.2)
RBC # BLD: ABNORMAL 10*6/UL
SEG NEUTROPHILS: 67 % (ref 43–77)
SEGMENTED NEUTROPHILS ABSOLUTE COUNT: 5.8 K/UL (ref 1.8–7.7)
SODIUM BLD-SCNC: 140 MMOL/L (ref 135–144)
WBC # BLD: 8.6 K/UL (ref 3.5–11)
WBC # BLD: ABNORMAL 10*3/UL

## 2018-11-14 PROCEDURE — 97110 THERAPEUTIC EXERCISES: CPT | Performed by: PHYSICAL THERAPY ASSISTANT

## 2018-11-14 PROCEDURE — 94761 N-INVAS EAR/PLS OXIMETRY MLT: CPT

## 2018-11-14 PROCEDURE — 99232 SBSQ HOSP IP/OBS MODERATE 35: CPT | Performed by: INTERNAL MEDICINE

## 2018-11-14 PROCEDURE — 85025 COMPLETE CBC W/AUTO DIFF WBC: CPT

## 2018-11-14 PROCEDURE — 97535 SELF CARE MNGMENT TRAINING: CPT

## 2018-11-14 PROCEDURE — 6360000002 HC RX W HCPCS: Performed by: SURGERY

## 2018-11-14 PROCEDURE — 6370000000 HC RX 637 (ALT 250 FOR IP): Performed by: INTERNAL MEDICINE

## 2018-11-14 PROCEDURE — 80048 BASIC METABOLIC PNL TOTAL CA: CPT

## 2018-11-14 PROCEDURE — 97116 GAIT TRAINING THERAPY: CPT | Performed by: PHYSICAL THERAPY ASSISTANT

## 2018-11-14 PROCEDURE — 6370000000 HC RX 637 (ALT 250 FOR IP): Performed by: NURSE PRACTITIONER

## 2018-11-14 PROCEDURE — 2060000000 HC ICU INTERMEDIATE R&B

## 2018-11-14 PROCEDURE — 82947 ASSAY GLUCOSE BLOOD QUANT: CPT

## 2018-11-14 PROCEDURE — 2580000003 HC RX 258: Performed by: SURGERY

## 2018-11-14 PROCEDURE — 36415 COLL VENOUS BLD VENIPUNCTURE: CPT

## 2018-11-14 PROCEDURE — 94640 AIRWAY INHALATION TREATMENT: CPT

## 2018-11-14 PROCEDURE — 6370000000 HC RX 637 (ALT 250 FOR IP): Performed by: FAMILY MEDICINE

## 2018-11-14 RX ORDER — POTASSIUM CHLORIDE 20 MEQ/1
20 TABLET, EXTENDED RELEASE ORAL ONCE
Status: COMPLETED | OUTPATIENT
Start: 2018-11-14 | End: 2018-11-14

## 2018-11-14 RX ADMIN — HEPARIN SODIUM 5000 UNITS: 5000 INJECTION INTRAVENOUS; SUBCUTANEOUS at 21:23

## 2018-11-14 RX ADMIN — ALBUTEROL SULFATE 2.5 MG: 2.5 SOLUTION RESPIRATORY (INHALATION) at 19:47

## 2018-11-14 RX ADMIN — AMIODARONE HYDROCHLORIDE 200 MG: 200 TABLET ORAL at 09:25

## 2018-11-14 RX ADMIN — ALBUTEROL SULFATE 2.5 MG: 2.5 SOLUTION RESPIRATORY (INHALATION) at 16:28

## 2018-11-14 RX ADMIN — Medication 10 ML: at 19:41

## 2018-11-14 RX ADMIN — SODIUM CHLORIDE, POTASSIUM CHLORIDE, SODIUM LACTATE AND CALCIUM CHLORIDE: 600; 310; 30; 20 INJECTION, SOLUTION INTRAVENOUS at 09:41

## 2018-11-14 RX ADMIN — SODIUM CHLORIDE, POTASSIUM CHLORIDE, SODIUM LACTATE AND CALCIUM CHLORIDE: 600; 310; 30; 20 INJECTION, SOLUTION INTRAVENOUS at 23:37

## 2018-11-14 RX ADMIN — ACETAMINOPHEN 650 MG: 325 TABLET ORAL at 06:35

## 2018-11-14 RX ADMIN — HEPARIN SODIUM 5000 UNITS: 5000 INJECTION INTRAVENOUS; SUBCUTANEOUS at 14:36

## 2018-11-14 RX ADMIN — SODIUM CHLORIDE, POTASSIUM CHLORIDE, SODIUM LACTATE AND CALCIUM CHLORIDE: 600; 310; 30; 20 INJECTION, SOLUTION INTRAVENOUS at 18:01

## 2018-11-14 RX ADMIN — METOPROLOL TARTRATE 25 MG: 25 TABLET ORAL at 19:41

## 2018-11-14 RX ADMIN — ALPRAZOLAM 0.25 MG: 0.25 TABLET ORAL at 21:37

## 2018-11-14 RX ADMIN — POTASSIUM CHLORIDE 20 MEQ: 20 TABLET, EXTENDED RELEASE ORAL at 10:41

## 2018-11-14 RX ADMIN — METOPROLOL TARTRATE 25 MG: 25 TABLET ORAL at 09:25

## 2018-11-14 RX ADMIN — POTASSIUM CHLORIDE 20 MEQ: 20 TABLET, EXTENDED RELEASE ORAL at 10:43

## 2018-11-14 RX ADMIN — ALBUTEROL SULFATE 2.5 MG: 2.5 SOLUTION RESPIRATORY (INHALATION) at 11:44

## 2018-11-14 RX ADMIN — POTASSIUM CHLORIDE 20 MEQ: 20 TABLET, EXTENDED RELEASE ORAL at 18:03

## 2018-11-14 RX ADMIN — ACETAMINOPHEN 650 MG: 325 TABLET ORAL at 12:05

## 2018-11-14 RX ADMIN — HEPARIN SODIUM 5000 UNITS: 5000 INJECTION INTRAVENOUS; SUBCUTANEOUS at 05:20

## 2018-11-14 RX ADMIN — ACETAMINOPHEN 650 MG: 325 TABLET ORAL at 19:41

## 2018-11-14 RX ADMIN — ALBUTEROL SULFATE 2.5 MG: 2.5 SOLUTION RESPIRATORY (INHALATION) at 08:00

## 2018-11-14 RX ADMIN — SODIUM CHLORIDE, POTASSIUM CHLORIDE, SODIUM LACTATE AND CALCIUM CHLORIDE: 600; 310; 30; 20 INJECTION, SOLUTION INTRAVENOUS at 01:17

## 2018-11-14 RX ADMIN — DILTIAZEM HYDROCHLORIDE 240 MG: 120 CAPSULE, COATED, EXTENDED RELEASE ORAL at 09:25

## 2018-11-14 RX ADMIN — LEVOTHYROXINE SODIUM 50 MCG: 25 TABLET ORAL at 05:19

## 2018-11-14 ASSESSMENT — PAIN DESCRIPTION - PAIN TYPE
TYPE: CHRONIC PAIN
TYPE: CHRONIC PAIN

## 2018-11-14 ASSESSMENT — PAIN SCALES - GENERAL
PAINLEVEL_OUTOF10: 0
PAINLEVEL_OUTOF10: 3
PAINLEVEL_OUTOF10: 1
PAINLEVEL_OUTOF10: 5
PAINLEVEL_OUTOF10: 5
PAINLEVEL_OUTOF10: 0
PAINLEVEL_OUTOF10: 6
PAINLEVEL_OUTOF10: 0
PAINLEVEL_OUTOF10: 5
PAINLEVEL_OUTOF10: 0
PAINLEVEL_OUTOF10: 0
PAINLEVEL_OUTOF10: 6
PAINLEVEL_OUTOF10: 0
PAINLEVEL_OUTOF10: 0

## 2018-11-14 ASSESSMENT — PAIN DESCRIPTION - LOCATION: LOCATION: GENERALIZED

## 2018-11-14 ASSESSMENT — PAIN DESCRIPTION - DESCRIPTORS: DESCRIPTORS: ACHING

## 2018-11-14 NOTE — PROGRESS NOTES
Ileostomy leaking by umbilical area. Gown changed. Ostomy appliance removed, stoma area cleaned. Daily barrier flange #[id number] cut to 1 3/8\" to fit stoma size, placed using a convex barrier moldable ring, fit to match stoma size, connected to [place] high output appliance #[id number], and then connected to gravity bag. Pt tolerated procedure well; asked appropriate questions.   Alejandro Lizarraga RN

## 2018-11-14 NOTE — PROGRESS NOTES
Pt seen per request of nursing staff for continued education regarding new diverting ileostomy, placed 11/5/18 per Dr. Kimberli Yu. Pt currently admitted for SBO. Pt rouses easily, alert. Reports she is still coping with diagnosis and presence of ileostomy. Greatest complaint at this time is the need for frequent emptying of appliance due to high output. Pt reports appliance already changed today per student nurses & instructor; states old appliance was placed by this writer on 11/9/18, and had just started leaking today. Appliance assessed, not removed; new appliance appears well in place. 1/3 full of liquid, hazy reddish-brown output. Red round rosebud stoma noted. Can try a high output appliance until stool thickens. Left [place] high-output appliance [id number], and evan barrier #[id number] with nursing. This appliance can be connected to a gravity bag at bedside during the night. Recommend soft convex barrier; see my note from 11/9/18. Pt states she plans on going to [facility] after discharge from PCU. Plan for Ostomy Care:  Nursing:   · Appliance to be emptied when 1/2 to 2/3 full. Nursing/aide to check appliance every 2 hours and ensure no leaks, and that gas is expelled from appliance. · In the event of leaking, the appliance is to be changed; it is not to be reinforced with tape or wrapped with towels. · Measure stoma with each appliance change to ensure appropriate sizing (stoma was 1 1/4\" on 11/9/18). · Change appliance every 3 days, and as needed for leaks. (goal is 3-5 day wear time)  · May utilize stoma paste, hydrocolloid strips and/or moldable barrier ring, etc.  to fill creases and reduce leaking. · May apply liquid skin barrier with each appliance change to prevent skin breakdown. · Encourage patient participation in appliance changes to enhance enhance independence.     1) pt will be able to manipulate appliance closure & empty appliance before follow up appointment.    2)

## 2018-11-14 NOTE — PROGRESS NOTES
Hospitalist Progress Note    Patient: Alessandra Uriarte     YOB: 1940    MRN: [id number]   Admit date: 11/9/2018     Acct: [de-identified]     PCP: Satnam Webber DO    CC--Interval History: PSBO--resolving--NG was D/C'd      CKD--stable      Anemia--stable     Denies nausea tonight    All other ROS negative except noted in HPI    Diet:  DIET CLEAR LIQUID;    Medications:  Scheduled Meds:   albuterol  2.5 mg Nebulization 4x daily    sodium chloride flush  10 mL Intravenous 2 times per day    heparin (porcine)  5,000 Units Subcutaneous 3 times per day    levothyroxine  50 mcg Oral Daily    diltiazem  240 mg Oral Daily    metoprolol tartrate  25 mg Oral BID    potassium chloride  20 mEq Oral BID WC    amiodarone  200 mg Oral Daily     Continuous Infusions:   lactated ringers 125 mL/hr at 11/14/18 0117    dextrose 100 mL/hr (11/12/18 2130)     PRN Meds:acetaminophen, diatrizoate meglumine-sodium, ALPRAZolam, albuterol, sodium chloride flush, ondansetron, glucose, dextrose, glucagon (rDNA), dextrose, phenol    Objective:  Pertinent Labs:  Hgb 8.4--8-->8.7      Physical Exam:  Vitals: BP (!) 115/56   Pulse 70   Temp 97.8 °F (36.6 °C) (Oral)   Resp 16   Ht 5' 7\" (1.702 m)   Wt (!) 334 lb 3.2 oz (151.6 kg)   LMP 01/01/1968   SpO2 94%   BMI 52.34 kg/m²   24 hour intake/output:    Intake/Output Summary (Last 24 hours) at 11/14/18 0127  Last data filed at 11/13/18 2300   Gross per 24 hour   Intake             3811 ml   Output             3375 ml   Net              436 ml       General: awake, alert and cooperative  HEENT: External nose normal, Normocephalic and Atraumatic  Neck: Supple, No Masses, Tenderness, Nodularity and No Lymphadenopathy  Chest/Lungs: bases diminished bilaterally, Clear to Auscultation without Rales, Rhonchi, or Wheezes and Respirations even and unlabored  Cardiac: Regular Rate and Rhythm and Pedal Pulses Palpable Bilaterally  GI/Abdomen:  Bowel Sounds Present and Soft, no

## 2018-11-15 VITALS
WEIGHT: 293 LBS | TEMPERATURE: 97.6 F | HEART RATE: 59 BPM | OXYGEN SATURATION: 94 % | HEIGHT: 67 IN | SYSTOLIC BLOOD PRESSURE: 96 MMHG | BODY MASS INDEX: 45.99 KG/M2 | DIASTOLIC BLOOD PRESSURE: 55 MMHG | RESPIRATION RATE: 18 BRPM

## 2018-11-15 LAB
ABSOLUTE EOS #: 0.3 K/UL (ref 0–0.4)
ABSOLUTE IMMATURE GRANULOCYTE: ABNORMAL K/UL (ref 0–0.3)
ABSOLUTE LYMPH #: 1.6 K/UL (ref 1–4.8)
ABSOLUTE MONO #: 0.8 K/UL (ref 0.1–1.2)
ANION GAP SERPL CALCULATED.3IONS-SCNC: 9 MMOL/L (ref 9–17)
BASOPHILS # BLD: 0 % (ref 0–1)
BASOPHILS ABSOLUTE: 0 K/UL (ref 0–0.2)
BUN BLDV-MCNC: 6 MG/DL (ref 8–23)
BUN/CREAT BLD: 9 (ref 9–20)
CALCIUM SERPL-MCNC: 8.6 MG/DL (ref 8.6–10.4)
CHLORIDE BLD-SCNC: 105 MMOL/L (ref 98–107)
CO2: 28 MMOL/L (ref 20–31)
CREAT SERPL-MCNC: 0.7 MG/DL (ref 0.5–0.9)
DIFFERENTIAL TYPE: ABNORMAL
EOSINOPHILS RELATIVE PERCENT: 3 % (ref 1–7)
GFR AFRICAN AMERICAN: >60 ML/MIN
GFR NON-AFRICAN AMERICAN: >60 ML/MIN
GFR SERPL CREATININE-BSD FRML MDRD: ABNORMAL ML/MIN/{1.73_M2}
GFR SERPL CREATININE-BSD FRML MDRD: ABNORMAL ML/MIN/{1.73_M2}
GLUCOSE BLD-MCNC: 92 MG/DL (ref 65–105)
GLUCOSE BLD-MCNC: 94 MG/DL (ref 70–99)
HCT VFR BLD CALC: 25 % (ref 36–46)
HCT VFR BLD CALC: 29 % (ref 36–46)
HEMOGLOBIN: 7.8 G/DL (ref 12–16)
HEMOGLOBIN: 8.9 G/DL (ref 12–16)
IMMATURE GRANULOCYTES: ABNORMAL %
LYMPHOCYTES # BLD: 20 % (ref 16–46)
MCH RBC QN AUTO: 29.8 PG (ref 26–34)
MCHC RBC AUTO-ENTMCNC: 31.3 G/DL (ref 31–37)
MCV RBC AUTO: 95.4 FL (ref 80–100)
MONOCYTES # BLD: 10 % (ref 4–11)
NRBC AUTOMATED: ABNORMAL PER 100 WBC
PDW BLD-RTO: 16 % (ref 11–14.5)
PLATELET # BLD: 189 K/UL (ref 140–450)
PLATELET ESTIMATE: ABNORMAL
PMV BLD AUTO: 10.5 FL (ref 6–12)
POTASSIUM SERPL-SCNC: 3.9 MMOL/L (ref 3.7–5.3)
RBC # BLD: 2.62 M/UL (ref 4–5.2)
RBC # BLD: ABNORMAL 10*6/UL
SEG NEUTROPHILS: 67 % (ref 43–77)
SEGMENTED NEUTROPHILS ABSOLUTE COUNT: 5.3 K/UL (ref 1.8–7.7)
SODIUM BLD-SCNC: 142 MMOL/L (ref 135–144)
WBC # BLD: 8 K/UL (ref 3.5–11)
WBC # BLD: ABNORMAL 10*3/UL

## 2018-11-15 PROCEDURE — 6370000000 HC RX 637 (ALT 250 FOR IP): Performed by: INTERNAL MEDICINE

## 2018-11-15 PROCEDURE — 99024 POSTOP FOLLOW-UP VISIT: CPT | Performed by: SURGERY

## 2018-11-15 PROCEDURE — 6360000002 HC RX W HCPCS: Performed by: SURGERY

## 2018-11-15 PROCEDURE — 6370000000 HC RX 637 (ALT 250 FOR IP): Performed by: FAMILY MEDICINE

## 2018-11-15 PROCEDURE — 36415 COLL VENOUS BLD VENIPUNCTURE: CPT

## 2018-11-15 PROCEDURE — 85014 HEMATOCRIT: CPT

## 2018-11-15 PROCEDURE — 97116 GAIT TRAINING THERAPY: CPT

## 2018-11-15 PROCEDURE — 80048 BASIC METABOLIC PNL TOTAL CA: CPT

## 2018-11-15 PROCEDURE — 85018 HEMOGLOBIN: CPT

## 2018-11-15 PROCEDURE — 85025 COMPLETE CBC W/AUTO DIFF WBC: CPT

## 2018-11-15 PROCEDURE — 94640 AIRWAY INHALATION TREATMENT: CPT

## 2018-11-15 PROCEDURE — 94760 N-INVAS EAR/PLS OXIMETRY 1: CPT

## 2018-11-15 PROCEDURE — 97110 THERAPEUTIC EXERCISES: CPT

## 2018-11-15 PROCEDURE — 82947 ASSAY GLUCOSE BLOOD QUANT: CPT

## 2018-11-15 PROCEDURE — 99232 SBSQ HOSP IP/OBS MODERATE 35: CPT | Performed by: INTERNAL MEDICINE

## 2018-11-15 RX ADMIN — METOPROLOL TARTRATE 25 MG: 25 TABLET ORAL at 08:52

## 2018-11-15 RX ADMIN — DILTIAZEM HYDROCHLORIDE 240 MG: 120 CAPSULE, COATED, EXTENDED RELEASE ORAL at 08:52

## 2018-11-15 RX ADMIN — ALBUTEROL SULFATE 2.5 MG: 2.5 SOLUTION RESPIRATORY (INHALATION) at 07:40

## 2018-11-15 RX ADMIN — HEPARIN SODIUM 5000 UNITS: 5000 INJECTION INTRAVENOUS; SUBCUTANEOUS at 05:46

## 2018-11-15 RX ADMIN — POTASSIUM CHLORIDE 20 MEQ: 20 TABLET, EXTENDED RELEASE ORAL at 08:51

## 2018-11-15 RX ADMIN — AMIODARONE HYDROCHLORIDE 200 MG: 200 TABLET ORAL at 08:51

## 2018-11-15 RX ADMIN — ACETAMINOPHEN 650 MG: 325 TABLET ORAL at 08:51

## 2018-11-15 RX ADMIN — ACETAMINOPHEN 650 MG: 325 TABLET ORAL at 02:41

## 2018-11-15 RX ADMIN — LEVOTHYROXINE SODIUM 50 MCG: 25 TABLET ORAL at 05:47

## 2018-11-15 ASSESSMENT — PAIN DESCRIPTION - DESCRIPTORS: DESCRIPTORS: ACHING

## 2018-11-15 ASSESSMENT — PAIN DESCRIPTION - ORIENTATION: ORIENTATION: LOWER

## 2018-11-15 ASSESSMENT — PAIN SCALES - GENERAL
PAINLEVEL_OUTOF10: 5
PAINLEVEL_OUTOF10: 0
PAINLEVEL_OUTOF10: 3
PAINLEVEL_OUTOF10: 0

## 2018-11-15 ASSESSMENT — PAIN DESCRIPTION - LOCATION
LOCATION: BACK
LOCATION: BACK

## 2018-11-15 ASSESSMENT — PAIN DESCRIPTION - ONSET: ONSET: ON-GOING

## 2018-11-15 ASSESSMENT — PAIN DESCRIPTION - FREQUENCY: FREQUENCY: INTERMITTENT

## 2018-11-15 ASSESSMENT — PAIN DESCRIPTION - PAIN TYPE
TYPE: CHRONIC PAIN
TYPE: CHRONIC PAIN

## 2018-11-15 NOTE — PROGRESS NOTES
abdomen-pelvis--11.10.2018--dilated stomach and loops of bowel---postoperative ileus        AAS--11.9.2018--ileus--vs--PSBO           POD _____ open low anterior resection rectosigmoid---proximal diverting                       ileostomy---11.5.2018          POD _____ cystoscopy-lighted stents---11.5.2018           Colon carcinoma--rectosigmoid----11.5.2018---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          Colonoscopy---10.8.2018--multiple polyps--large tumors distal sigmoid                       colon-one ulcerated--extensive diverticulosis---          GI bleeding ---10.6.2018---likely due to colon carcinoma--specifically  two                       large sigmoid masses---see above                   CT abdomen-pelvis----10.6.2018--diverticulosis--no acute diverticulitis---chronic                                osseous changes LS spine-sacrum--anterolithesis L4--partial fusion                                L5-S1-----decreased right gluteal hematoma   CKD--Stage 2             MICHAEL--acute kidney injury--11.5.2018--11.6.2018--Stage 3 superposed on Stage 2             DUS----kidney----11.6.2018---unremarkable---normal cortical echogenicity--no                                  hydronephrosis---no stones  Thrombocytopenia---11.5.2018---chronic    Anemia---expected acute blood loss due to tumor and surgery  Atrial fibrillation----history---currently SR              EKG---11.9.2018---NSR              EKG---11.5.2018--sinus bradycardia---51--1st degree AVB---LVH--QTc 490              BALDO cardioversion----9.10. 2017---successful atrial fibrillation---to--NSR              BALDO---9.10. 2017--LA dilatation---preserved LVSF--atrial septal aneurysm                           without shunt--LA appendage n o clot--negative bubble study---                          mild MR---moderate TR--normal TV leaflets             Cardiac catheterization---9. 5.2017--0% LM--mid 20%---normal D1---0%

## 2018-11-15 NOTE — PLAN OF CARE
Problem: Falls - Risk of:  Goal: Will remain free from falls  Will remain free from falls   Outcome: Ongoing    Goal: Absence of physical injury  Absence of physical injury   Outcome: Ongoing      Problem: Bowel/Gastric:  Goal: Ability to achieve a regular elimination pattern will improve  Ability to achieve a regular elimination pattern will improve   Outcome: Ongoing    Goal: Control of bowel function will improve  Control of bowel function will improve   Outcome: Ongoing    Goal: Will not experience complications related to bowel motility  Will not experience complications related to bowel motility   Outcome: Ongoing    Goal: Bowel function will improve  Bowel function will improve   Outcome: Ongoing    Goal: Diagnostic test results will improve  Diagnostic test results will improve   Outcome: Ongoing    Goal: Occurrences of nausea will decrease  Occurrences of nausea will decrease   Outcome: Ongoing    Goal: Occurrences of vomiting will decrease  Occurrences of vomiting will decrease   Outcome: Ongoing      Problem: Nutritional:  Goal: Maintenance of adequate nutrition will improve  Maintenance of adequate nutrition will improve   Outcome: Ongoing      Problem:  Activity:  Goal: Risk for activity intolerance will decrease  Risk for activity intolerance will decrease   Outcome: Ongoing      Problem: Fluid Volume:  Goal: Maintenance of adequate hydration will improve  Maintenance of adequate hydration will improve   Outcome: Ongoing      Problem: Health Behavior:  Goal: Ability to state signs and symptoms to report to health care provider will improve  Ability to state signs and symptoms to report to health care provider will improve   Outcome: Ongoing      Problem: Physical Regulation:  Goal: Complications related to the disease process, condition or treatment will be avoided or minimized  Complications related to the disease process, condition or treatment will be avoided or minimized   Outcome: Ongoing    Goal: Ability to maintain clinical measurements within normal limits will improve  Ability to maintain clinical measurements within normal limits will improve   Outcome: Ongoing      Problem: Sensory:  Goal: Ability to identify factors that increase the pain will improve  Ability to identify factors that increase the pain will improve   Outcome: Ongoing    Goal: Ability to notify healthcare provider of pain before it becomes unmanageable or unbearable will improve  Ability to notify healthcare provider of pain before it becomes unmanageable or unbearable will improve   Outcome: Ongoing    Goal: Pain level will decrease  Pain level will decrease    Outcome: Ongoing      Problem: Pain:  Goal: Control of acute pain  Control of acute pain   Outcome: Ongoing    Goal: Control of chronic pain  Control of chronic pain   Outcome: Ongoing    Goal: Pain level will decrease  Pain level will decrease    Outcome: Ongoing

## 2018-11-15 NOTE — PLAN OF CARE
Problem: Falls - Risk of:  Goal: Will remain free from falls  Will remain free from falls   Outcome: Ongoing      Problem: Bowel/Gastric:  Goal: Ability to achieve a regular elimination pattern will improve  Ability to achieve a regular elimination pattern will improve   Outcome: Ongoing    Goal: Control of bowel function will improve  Control of bowel function will improve   Outcome: Ongoing    Goal: Bowel function will improve  Bowel function will improve   Outcome: Ongoing      Problem: Nutritional:  Goal: Maintenance of adequate nutrition will improve  Maintenance of adequate nutrition will improve   Outcome: Ongoing      Problem:  Activity:  Goal: Risk for activity intolerance will decrease  Risk for activity intolerance will decrease   Outcome: Ongoing      Problem: Health Behavior:  Goal: Ability to state signs and symptoms to report to health care provider will improve  Ability to state signs and symptoms to report to health care provider will improve   Outcome: Ongoing

## 2018-11-16 ENCOUNTER — TELEPHONE (OUTPATIENT)
Dept: INTERNAL MEDICINE | Age: 78
End: 2018-11-16

## 2018-11-19 ENCOUNTER — TELEPHONE (OUTPATIENT)
Dept: WOUND CARE | Age: 78
End: 2018-11-19

## 2018-11-19 ENCOUNTER — TELEPHONE (OUTPATIENT)
Dept: INTERNAL MEDICINE | Age: 78
End: 2018-11-19

## 2018-11-20 ENCOUNTER — OUTSIDE SERVICES (OUTPATIENT)
Dept: INTERNAL MEDICINE | Age: 78
End: 2018-11-20
Payer: MEDICARE

## 2018-11-20 ENCOUNTER — OFFICE VISIT (OUTPATIENT)
Dept: SURGERY | Age: 78
End: 2018-11-20

## 2018-11-20 VITALS
SYSTOLIC BLOOD PRESSURE: 118 MMHG | BODY MASS INDEX: 45.99 KG/M2 | DIASTOLIC BLOOD PRESSURE: 60 MMHG | HEART RATE: 64 BPM | HEIGHT: 67 IN | RESPIRATION RATE: 14 BRPM | TEMPERATURE: 97 F | WEIGHT: 293 LBS

## 2018-11-20 DIAGNOSIS — I10 ESSENTIAL HYPERTENSION: ICD-10-CM

## 2018-11-20 DIAGNOSIS — I50.9 CHRONIC CONGESTIVE HEART FAILURE, UNSPECIFIED HEART FAILURE TYPE (HCC): ICD-10-CM

## 2018-11-20 DIAGNOSIS — C18.7 MALIGNANT NEOPLASM OF SIGMOID COLON (HCC): Primary | ICD-10-CM

## 2018-11-20 DIAGNOSIS — E03.8 OTHER SPECIFIED HYPOTHYROIDISM: ICD-10-CM

## 2018-11-20 DIAGNOSIS — Z93.3 COLOSTOMY IN PLACE (HCC): ICD-10-CM

## 2018-11-20 DIAGNOSIS — F51.01 PRIMARY INSOMNIA: ICD-10-CM

## 2018-11-20 DIAGNOSIS — K56.691 OTHER COMPLETE INTESTINAL OBSTRUCTION (HCC): ICD-10-CM

## 2018-11-20 DIAGNOSIS — E66.01 MORBID OBESITY (HCC): ICD-10-CM

## 2018-11-20 DIAGNOSIS — C18.7 MALIGNANT NEOPLASM OF SIGMOID COLON (HCC): ICD-10-CM

## 2018-11-20 DIAGNOSIS — I48.0 PAROXYSMAL ATRIAL FIBRILLATION (HCC): ICD-10-CM

## 2018-11-20 DIAGNOSIS — E78.5 DYSLIPIDEMIA: ICD-10-CM

## 2018-11-20 DIAGNOSIS — R73.01 ELEVATED FASTING GLUCOSE: Primary | ICD-10-CM

## 2018-11-20 PROBLEM — K56.609 INTESTINAL OCCLUSION (HCC): Status: RESOLVED | Noted: 2018-11-09 | Resolved: 2018-11-20

## 2018-11-20 PROBLEM — K92.2 GI BLEED: Status: RESOLVED | Noted: 2018-10-06 | Resolved: 2018-11-20

## 2018-11-20 PROBLEM — K56.609 SMALL BOWEL OBSTRUCTION (HCC): Status: RESOLVED | Noted: 2018-11-11 | Resolved: 2018-11-20

## 2018-11-20 PROBLEM — K56.609 SBO (SMALL BOWEL OBSTRUCTION) (HCC): Status: RESOLVED | Noted: 2018-11-09 | Resolved: 2018-11-20

## 2018-11-20 PROCEDURE — 99024 POSTOP FOLLOW-UP VISIT: CPT | Performed by: SURGERY

## 2018-11-20 PROCEDURE — 99306 1ST NF CARE HIGH MDM 50: CPT | Performed by: INTERNAL MEDICINE

## 2018-11-20 ASSESSMENT — PATIENT HEALTH QUESTIONNAIRE - PHQ9
SUM OF ALL RESPONSES TO PHQ QUESTIONS 1-9: 0
SUM OF ALL RESPONSES TO PHQ9 QUESTIONS 1 & 2: 0
2. FEELING DOWN, DEPRESSED OR HOPELESS: 0
1. LITTLE INTEREST OR PLEASURE IN DOING THINGS: 0
SUM OF ALL RESPONSES TO PHQ QUESTIONS 1-9: 0

## 2018-11-20 NOTE — PROGRESS NOTES
Old appliance removed, using no-sting adhesive remover. Peristomal skin cleansed with Disposable bath cloths. Noted significant skin breakdown & adhesive stripping, which is quite painful to pt. Photo taken. Old appliance appeared to have been cut to appropriate size, but elastic barrier strips (appear to be Coloplast Y-shaped Brava strips (?)) appear to have been placed under the appliance wafer. Red rosebud stoma measured irregular oval 1 ½ x 1 5/8 with 3cm protrustion. Noted that stoma is sited near umbilical skin fold, when pt sits up, this area retracts. This may cause loss of appliance adhesion, especially as pt increases    Denuded skin was treated with stoma powder & no-sting liquid skin barrier film crusting technique. A small piece (about 2) of strip barrier CLAUDIO [facility] [employer] #[id number]), was applied to inferior aspect of peristomal skin & molded into skin fold to fill skin crease. A moldable barrier ring ( [employer] ring #[id number]) was applied aroung the contours of stoma. The appliance opening was then cut off-center to space away from the umbilical fold as much as possible. A thin bead of stoma paste was applied to appliance barrier before application of appliance. Recommendations:    1 or 2 piece appliance (pt preference), with soft convex, extended wear barrier, belt loops, & filter   Clamp-closure for easier handling for patient.  Empty appliance when 1/2 -2/3 full, so weight of effluent does not pull at adhesive. Empty gas (burp)  as needed. Ileostomies can output a lot, so check frequently and empty accordingly.  **Pt to use ostomy belt for best outcome**.  OK to use Barrier strips for extra support, but use on the outside of the appliance barrier.  Goal is 3-5 day wear time.  Mornings are usually best for ileostomy appliance changes, before breakfast, when stoma is least active.  Encourage patient independence with emptying pouch.  May have to empty 2-3x during night. Follow up with ostomy nurse in 1 week, or before for problems or concerns.

## 2018-11-21 NOTE — PROGRESS NOTES
DR. [facility] PATIENT HISTORY & PHYSICAL EXAM    DATE OF SERVICE: 11/20/18    NURSING HOME: [address] [place] [address]    CHRONIC/ACTIVE PROBLEM LIST:     Patient Active Problem List   Diagnosis    HTN (hypertension)    Insulin resistance    Dyslipidemia    Osteoarthritis    Osteoporosis    Depression    Anxiety    CHF (congestive heart failure) ([facility] [address]    Hypothyroidism due to medication    Morbid obesity ([facility] [address]    Primary insomnia    Multiple closed fractures of ribs    JAYJAY on CPAP    Obesity    Hypertension    Glucose intolerance (impaired glucose tolerance)    A-fib (HCC)    Colon cancer (HCC)    Acute blood loss as cause of postoperative anemia       CHIEF COMPLAINT: Here for rehabilitation and physical therapy    HISTORY OF CHIEF COMPLAINT: This 77 y.o.  female was admitted to [address] [place] [address] for rehabilitation physical therapy following a recent hospitalization at [facility] for a bowel obstruction. She had surgery to relieve it, which was done by Dr. Tessa Anguiano. He discovered colon cancer, which was completely removed. She now has a colostomy bag. She is going to be seeing oncology next week to see if she needs additional treatment. She has been having some difficulty sleeping, and trazodone does not seem to be helping. She is doing well with physical therapy. There are no other complaints. ALLERGIES:   Allergies   Allergen Reactions    Pcn [Penicillins] Hives and Shortness Of Breath    Bextra [Valdecoxib] Diarrhea       MEDICATIONS: As noted on the [address] [place] [address] MAR, referenced and incorporated herein.     PAST MEDICAL HISTORY:   Past Medical History:   Diagnosis Date    A-fib [facility]) 07/19/2017    CHF (congestive heart failure) (HCC)     Dyslipidemia     FH: total abdominal hysterectomy and bilateral salpingo-oophorectomy 1966    Glucose intolerance (impaired glucose tolerance)     Hypertension     Hygiene adequate. Normal buccal mucosa. Normal pharynx. Neck / Thyroid: Supple, no masses, nodes, nodules or enlargement. Chest: Rises and falls symmetrically. No use of accessory muscles. No retractions. Lungs: Normal - CTA without rales, rhonchi, or wheezing. Heart: regular rate and rhythm, S1, S2 normal, no murmur, click, rub or gallop No S3 or S4. Abdomen: Obese soft, non-distended, non-tender, normal active bowel sounds, no masses palpated and no hepatosplenomegaly. There was a colostomy in place. It was pink and functioning. Extremities: Strength is 4/5 bilaterally. Radial pulses are +2/4 bilaterally. Dorsalis pedis pulses are +2/4 bilaterally. There is no clubbing, cyanosis, or edema in any of the extremities. Neurologic: Deep tendon reflexes are 2+/4+ bilaterally. cranial nerves II-XII are grossly intact    ASSESSMENT:     Diagnosis Orders   1. Elevated fasting glucose     2. Primary insomnia     3. Essential hypertension     4. Dyslipidemia     5. Other specified hypothyroidism     6. Other complete intestinal obstruction ([address]     7. Malignant neoplasm of sigmoid colon ([address]     8. Colostomy in place ([address]     9. Chronic congestive heart failure, unspecified heart failure type ([address]     10. Paroxysmal atrial fibrillation (HCC)     11. Morbid obesity ([address]     12. BMI 50.0-59.9, adult ([address]           PLAN:    1. Continue current treatment  2. Nursing home record reviewed and updates summarized and entered into electronic record  3. CBC with differential, CMP in AM  4. Discontinue trazodone  5. Melatonin 3 mg at bedtime  6. See nursing home orders and MAR.       Electronically signed by Drake Vines DO on 11/20/2018 at 11:17 PM  Internal Medicine

## 2018-11-26 ENCOUNTER — TELEPHONE (OUTPATIENT)
Dept: FAMILY MEDICINE CLINIC | Age: 78
End: 2018-11-26

## 2018-11-26 ENCOUNTER — OFFICE VISIT (OUTPATIENT)
Dept: ONCOLOGY | Age: 78
End: 2018-11-26
Payer: MEDICARE

## 2018-11-26 VITALS
OXYGEN SATURATION: 93 % | BODY MASS INDEX: 45.99 KG/M2 | DIASTOLIC BLOOD PRESSURE: 84 MMHG | HEIGHT: 67 IN | HEART RATE: 62 BPM | SYSTOLIC BLOOD PRESSURE: 138 MMHG | WEIGHT: 293 LBS | TEMPERATURE: 97.7 F

## 2018-11-26 DIAGNOSIS — C18.7 MALIGNANT NEOPLASM OF SIGMOID COLON (HCC): Primary | ICD-10-CM

## 2018-11-26 PROCEDURE — G8427 DOCREV CUR MEDS BY ELIG CLIN: HCPCS | Performed by: INTERNAL MEDICINE

## 2018-11-26 PROCEDURE — 1090F PRES/ABSN URINE INCON ASSESS: CPT | Performed by: INTERNAL MEDICINE

## 2018-11-26 PROCEDURE — 1101F PT FALLS ASSESS-DOCD LE1/YR: CPT | Performed by: INTERNAL MEDICINE

## 2018-11-26 PROCEDURE — G8417 CALC BMI ABV UP PARAM F/U: HCPCS | Performed by: INTERNAL MEDICINE

## 2018-11-26 PROCEDURE — G8482 FLU IMMUNIZE ORDER/ADMIN: HCPCS | Performed by: INTERNAL MEDICINE

## 2018-11-26 PROCEDURE — 99205 OFFICE O/P NEW HI 60 MIN: CPT | Performed by: INTERNAL MEDICINE

## 2018-11-26 RX ORDER — LANOLIN ALCOHOL/MO/W.PET/CERES
3 CREAM (GRAM) TOPICAL DAILY
COMMUNITY
End: 2018-12-12

## 2018-11-26 NOTE — PROGRESS NOTES
Spouse name: N/A    Number of children: N/A    Years of education: N/A     Occupational History    Not on file. Social History Main Topics    Smoking status: Never Smoker    Smokeless tobacco: Never Used      Comment: hernando 11/10/2018    Alcohol use No    Drug use: No    Sexual activity: Not on file     Other Topics Concern    Not on file     Social History Narrative    No narrative on file     Family History   Problem Relation Age of Onset    Adopted: Yes    High Blood Pressure Brother         adopted brother      REVIEW OF SYSTEM:   Constitutional: No fever or chills. No night sweats, no weight loss   Eyes: No eye discharge, double vision, or eye pain   HEENT: negative for sore mouth, sore throat, hoarseness and voice change   Respiratory: negative for cough , sputum, dyspnea, wheezing, hemoptysis, chest pain   Cardiovascular: negative for chest pain, dyspnea, palpitations, orthopnea, PND   Gastrointestinal: negative for nausea, vomiting, diarrhea, constipation, abdominal pain, Dysphagia, hematemesis and hematochezia   Genitourinary: negative for frequency, dysuria, nocturia, urinary incontinence, and hematuria   Integument: negative for rash, skin lesions, bruises.    Hematologic/Lymphatic: negative for easy bruising, bleeding, lymphadenopathy, petechiae and swelling/edema   Endocrine: negative for heat or cold intolerance, tremor, weight changes, change in bowel habits and hair loss   Musculoskeletal: negative for myalgias, arthralgias, pain, joint swelling,and bone pain   Neurological: negative for headaches, dizziness, seizures, weakness, numbness       OBJECTIVE:         Vitals:    11/26/18 1008   BP: 138/84   Pulse: 62   Temp: 97.7 °F (36.5 °C)   SpO2: 93%   PHYSICAL EXAM:   General appearance - well appearing, no in pain or distress   Mental status - alert and cooperative   Eyes - pupils equal and reactive, extraocular eye movements intact   Ears - bilateral TM's and external ear canals normal   Mouth - mucous membranes moist, pharynx normal without lesions   Neck - supple, no significant adenopathy   Lymphatics - no palpable lymphadenopathy, no hepatosplenomegaly   Chest - clear to auscultation, no wheezes, rales or rhonchi, symmetric air entry   Heart - normal rate, regular rhythm, normal S1, S2, no murmurs, rubs, clicks or gallops   Abdomen - Surgical scar healing well, soft, nontender, nondistended, no masses or organomegaly   Neurological - alert, oriented, normal speech, no focal findings or movement disorder noted   Musculoskeletal - no joint tenderness, deformity or swelling   Extremities - peripheral pulses normal, no pedal edema, no clubbing or cyanosis   Skin - normal coloration and turgor, no rashes, no suspicious skin lesions noted ,  LABORATORY DATA:     Lab Results   Component Value Date    WBC 8.0 11/15/2018    HGB 8.9 (L) 11/15/2018    HCT 29.0 (L) 11/15/2018    MCV 95.4 11/15/2018     11/15/2018    LYMPHOPCT 20 11/15/2018    RBC 2.62 (L) 11/15/2018    MCH 29.8 11/15/2018    MCHC 31.3 11/15/2018    RDW 16.0 (H) 11/15/2018    MONOPCT 10 11/15/2018    BASOPCT 0 11/15/2018    NEUTROABS 5.30 11/15/2018    LYMPHSABS 1.60 11/15/2018    MONOSABS 0.80 11/15/2018    EOSABS 0.30 11/15/2018    BASOSABS 0.00 11/15/2018         Chemistry        Component Value Date/Time     11/15/2018 0533    K 3.9 11/15/2018 0533     11/15/2018 0533    CO2 28 11/15/2018 0533    BUN 6 (L) 11/15/2018 0533    CREATININE 0.70 11/15/2018 0533        Component Value Date/Time    CALCIUM 8.6 11/15/2018 0533    ALKPHOS 76 11/09/2018 2127    AST 22 11/09/2018 2127    ALT 19 11/09/2018 2127    BILITOT 0.68 11/09/2018 2127        PATHOLOGY DATA:     -- Diagnosis --   1.  SIGMOID COLON, SEGMENTAL RESECTION:   - LOW-GRADE ADENOCARCINOMA ARISING IN A LARGE SESSILE TUBULOVILLOUS   ADENOMA, INVADING INTO THE SUBMUCOSA.   - THE FINAL SURGICAL MARGINS ARE NEGATIVE FOR NEOPLASM.    - BENIGN REGIONAL LYMPH NODES actually reflects the content of the visit, the document can still have some errors including those of syntax and sound a like substitutions which may escape proof reading. It such instances, actual meaning can be extrapolated by contextual diversion.

## 2018-11-26 NOTE — LETTER
Chrissy [address] [place] [address]  Phone: [phone]  Fax: Danny,         November 26, 2018     Patient: Fermin Ernst   YOB: 1940   Date of Visit: 11/26/2018       To Whom it May Concern: Yvette needed some help from her daughter on 11/21/18. Please excuse, her daughter Davmadison Hasten  from work 11/21/18 as she was here with her mother at that time of need due to reason explained in her FMLA statement. If you have any questions or concerns, please don't hesitate to call.     Sincerely,         Steve Nguyen DO/PATTY,CMA

## 2018-11-30 ENCOUNTER — TELEPHONE (OUTPATIENT)
Dept: WOUND CARE | Age: 78
End: 2018-11-30

## 2018-11-30 ENCOUNTER — NURSE ONLY (OUTPATIENT)
Dept: WOUND CARE | Age: 78
End: 2018-11-30

## 2018-11-30 VITALS — TEMPERATURE: 98.3 F

## 2018-11-30 DIAGNOSIS — Z93.2 ILEOSTOMY STATUS (HCC): ICD-10-CM

## 2018-11-30 PROCEDURE — 99024 POSTOP FOLLOW-UP VISIT: CPT | Performed by: SURGERY

## 2018-12-04 ENCOUNTER — TELEPHONE (OUTPATIENT)
Dept: FAMILY MEDICINE CLINIC | Age: 78
End: 2018-12-04

## 2018-12-07 ENCOUNTER — NURSE ONLY (OUTPATIENT)
Dept: WOUND CARE | Age: 78
End: 2018-12-07

## 2018-12-07 DIAGNOSIS — Z93.2 ILEOSTOMY STATUS (HCC): Primary | ICD-10-CM

## 2018-12-07 PROCEDURE — 99024 POSTOP FOLLOW-UP VISIT: CPT | Performed by: SURGERY

## 2018-12-07 NOTE — LETTER
[address] [place], [place] [address]ly  Phone: [phone]  Fax: [phone]    Harvinder Mclain [facility]        December 7, 2018    To Whom It May Concern    Yue Garland accompanied her mother to a follow up visit today. Please call for any concerns. Sincerely,        Raj Llamas.  EDILBERTO TaylorN, RN, [facility], [address]

## 2018-12-12 ENCOUNTER — OFFICE VISIT (OUTPATIENT)
Dept: FAMILY MEDICINE CLINIC | Age: 78
End: 2018-12-12
Payer: MEDICARE

## 2018-12-12 VITALS
HEART RATE: 56 BPM | RESPIRATION RATE: 18 BRPM | BODY MASS INDEX: 49.34 KG/M2 | SYSTOLIC BLOOD PRESSURE: 120 MMHG | OXYGEN SATURATION: 98 % | DIASTOLIC BLOOD PRESSURE: 76 MMHG | HEIGHT: 67 IN

## 2018-12-12 DIAGNOSIS — C18.7 MALIGNANT NEOPLASM OF SIGMOID COLON (HCC): Primary | ICD-10-CM

## 2018-12-12 DIAGNOSIS — I48.0 PAROXYSMAL ATRIAL FIBRILLATION (HCC): ICD-10-CM

## 2018-12-12 DIAGNOSIS — K56.691 OTHER COMPLETE INTESTINAL OBSTRUCTION (HCC): ICD-10-CM

## 2018-12-12 DIAGNOSIS — I10 ESSENTIAL HYPERTENSION: ICD-10-CM

## 2018-12-12 DIAGNOSIS — D62 ACUTE BLOOD LOSS AS CAUSE OF POSTOPERATIVE ANEMIA: ICD-10-CM

## 2018-12-12 PROCEDURE — 1111F DSCHRG MED/CURRENT MED MERGE: CPT | Performed by: FAMILY MEDICINE

## 2018-12-12 PROCEDURE — 99495 TRANSJ CARE MGMT MOD F2F 14D: CPT | Performed by: FAMILY MEDICINE

## 2018-12-14 ENCOUNTER — TELEPHONE (OUTPATIENT)
Dept: WOUND CARE | Age: 78
End: 2018-12-14

## 2018-12-17 ENCOUNTER — NURSE ONLY (OUTPATIENT)
Dept: WOUND CARE | Age: 78
End: 2018-12-17

## 2018-12-17 DIAGNOSIS — Z43.2 ATTENTION TO ILEOSTOMY (HCC): ICD-10-CM

## 2018-12-17 DIAGNOSIS — Z93.2 ILEOSTOMY STATUS (HCC): Primary | ICD-10-CM

## 2018-12-17 PROCEDURE — 99024 POSTOP FOLLOW-UP VISIT: CPT | Performed by: SURGERY

## 2018-12-18 ENCOUNTER — TELEPHONE (OUTPATIENT)
Dept: FAMILY MEDICINE CLINIC | Age: 78
End: 2018-12-18
Payer: MEDICARE

## 2018-12-18 DIAGNOSIS — I11.0 HYPERTENSIVE HEART DISEASE WITH HEART FAILURE (HCC): ICD-10-CM

## 2018-12-18 DIAGNOSIS — Z48.3 AFTERCARE FOLLOWING SURGERY FOR NEOPLASM: Primary | ICD-10-CM

## 2018-12-18 DIAGNOSIS — C18.7 MALIGNANT NEOPLASM OF SIGMOID COLON (HCC): ICD-10-CM

## 2018-12-18 DIAGNOSIS — C18.9 MALIGNANT NEOPLASM OF COLON, UNSPECIFIED PART OF COLON (HCC): ICD-10-CM

## 2018-12-18 DIAGNOSIS — Z93.2 ILEOSTOMY STATUS (HCC): ICD-10-CM

## 2018-12-18 PROCEDURE — G0180 MD CERTIFICATION HHA PATIENT: HCPCS | Performed by: FAMILY MEDICINE

## 2018-12-18 ASSESSMENT — ENCOUNTER SYMPTOMS
SINUS PRESSURE: 0
EYE REDNESS: 0
RHINORRHEA: 0
SHORTNESS OF BREATH: 0
CONSTIPATION: 0
ABDOMINAL PAIN: 0
WHEEZING: 0
DIARRHEA: 0
NAUSEA: 0
EYE DISCHARGE: 0
COUGH: 0
TROUBLE SWALLOWING: 0
SORE THROAT: 0
VOMITING: 0

## 2018-12-18 NOTE — PROGRESS NOTES
acetaminophen (TYLENOL) 325 MG tablet  Take 2 tablets by mouth every 4 hours as needed for Pain or Fever             albuterol (PROVENTIL) (2.5 MG/3ML) 0.083% nebulizer solution  Take 3 mLs by nebulization every 4 hours as needed for Wheezing             amiodarone (CORDARONE) 200 MG tablet  TAKE 1 TABLET BY MOUTH ONE TIME A DAY              atorvastatin (LIPITOR) 40 MG tablet  TAKE ONE TABLET BY MOUTH NIGHTLY             diltiazem (CARDIZEM CD) 240 MG extended release capsule  TAKE 1 CAPSULE BY MOUTH ONE TIME A DAY              FERROUS SULFATE PO  Take by mouth             furosemide (LASIX) 20 MG tablet  Take 1 tablet by mouth daily             levothyroxine (SYNTHROID) 50 MCG tablet  TAKE 1 TABLET BY MOUTH ONE TIME A DAY              metoprolol tartrate (LOPRESSOR) 25 MG tablet  TAKE ONE TABLET BY MOUTH TWICE A DAY             miconazole (MICOTIN) 2 % powder  Apply topically 2 times daily.              potassium chloride (KLOR-CON M) 20 MEQ extended release tablet  Take 1 tablet by mouth daily             traZODone (DESYREL) 50 MG tablet  Take 1 tablet by mouth nightly                   Medications marked \"taking\" at this time  Outpatient Prescriptions Marked as Taking for the 12/12/18 encounter (Office Visit) with Bessy Perry, DO   Medication Sig Dispense Refill    acetaminophen (TYLENOL) 325 MG tablet Take 2 tablets by mouth every 4 hours as needed for Pain or Fever 60 tablet 0    levothyroxine (SYNTHROID) 50 MCG tablet TAKE 1 TABLET BY MOUTH ONE TIME A DAY  90 tablet 1    furosemide (LASIX) 20 MG tablet Take 1 tablet by mouth daily 30 tablet 5    traZODone (DESYREL) 50 MG tablet Take 1 tablet by mouth nightly 30 tablet 5    atorvastatin (LIPITOR) 40 MG tablet TAKE ONE TABLET BY MOUTH NIGHTLY 90 tablet 1    amiodarone (CORDARONE) 200 MG tablet TAKE 1 TABLET BY MOUTH ONE TIME A DAY  30 tablet 5    diltiazem (CARDIZEM CD) 240 MG extended release capsule TAKE 1 CAPSULE BY MOUTH ONE TIME A DAY  30 pressure, sore throat and trouble swallowing. Eyes: Negative for discharge and redness. Respiratory: Negative for cough, shortness of breath and wheezing. Cardiovascular: Negative for chest pain. Gastrointestinal: Negative for abdominal pain, constipation, diarrhea, nausea and vomiting. Musculoskeletal: Negative for arthralgias, myalgias and neck pain. Skin: Negative for rash and wound. Allergic/Immunologic: Negative for environmental allergies. Neurological: Positive for weakness. Negative for dizziness, light-headedness and headaches. Hematological: Negative for adenopathy. Psychiatric/Behavioral: Negative. Vitals:    12/12/18 1140   BP: 120/76   Site: Right Lower Arm   Position: Sitting   Cuff Size: Medium Adult   Pulse: 56   Resp: 18   SpO2: 98%   Height: 5' 7\" (1.702 m)     Body mass index is 49.34 kg/m². Wt Readings from Last 3 Encounters:   11/26/18 (!) 315 lb (142.9 kg)   11/20/18 (!) 321 lb (145.6 kg)   11/14/18 (!) 334 lb 3.2 oz (151.6 kg)     BP Readings from Last 3 Encounters:   12/12/18 120/76   11/26/18 138/84   11/20/18 118/60       Physical Exam   Constitutional: She is oriented to person, place, and time. She appears well-developed and well-nourished. No distress. HENT:   Head: Normocephalic and atraumatic. Right Ear: External ear normal.   Left Ear: External ear normal.   Nose: Nose normal.   Mouth/Throat: Oropharynx is clear and moist. No oropharyngeal exudate. Eyes: Pupils are equal, round, and reactive to light. Conjunctivae and EOM are normal. Right eye exhibits no discharge. Left eye exhibits no discharge. Neck: Normal range of motion. Neck supple. No thyromegaly present. Cardiovascular: Normal rate, regular rhythm and normal heart sounds. Pulmonary/Chest: Effort normal and breath sounds normal. She has no wheezes. She has no rales. Abdominal: Soft. Bowel sounds are normal. She exhibits no distension and no mass. There is no tenderness.  There is no

## 2018-12-19 ENCOUNTER — CLINICAL DOCUMENTATION (OUTPATIENT)
Dept: WOUND CARE | Age: 78
End: 2018-12-19

## 2018-12-19 ENCOUNTER — TELEPHONE (OUTPATIENT)
Dept: FAMILY MEDICINE CLINIC | Age: 78
End: 2018-12-19

## 2018-12-19 NOTE — PROGRESS NOTES
and empty accordingly. · Goal is 3-5 day wear time. · Mornings are usually best for ileostomy appliance changes, before breakfast, when stoma is least active. · Raw, moist, denuded skin must be treated; the stoma powder/liquid skin barrier is a simple, cost-effective, and effective treatment. If a yeast infection is suspected, can use an over-the-counter antifungal (Athletes foot) powder in place of the stoma powder until rash is resolved. Discontinue the crusting treatment when skin is healed.     · A weight gain or loss of 10 pounds may alter the fit of the appliance

## 2018-12-19 NOTE — TELEPHONE ENCOUNTER
Interim  [facility] calling stating that when the OV note from 12/12/18 is completed and signed they need it faxed to them.

## 2018-12-27 RX ORDER — AMIODARONE HYDROCHLORIDE 200 MG/1
TABLET ORAL
Qty: 30 TABLET | Refills: 0 | Status: SHIPPED | OUTPATIENT
Start: 2018-12-27 | End: 2019-01-25 | Stop reason: SDUPTHER

## 2018-12-27 RX ORDER — DILTIAZEM HYDROCHLORIDE 240 MG/1
CAPSULE, COATED, EXTENDED RELEASE ORAL
Qty: 30 CAPSULE | Refills: 0 | Status: SHIPPED | OUTPATIENT
Start: 2018-12-27 | End: 2019-01-25 | Stop reason: SDUPTHER

## 2018-12-27 RX ORDER — ATORVASTATIN CALCIUM 40 MG/1
TABLET, FILM COATED ORAL
Qty: 30 TABLET | Refills: 0 | Status: SHIPPED | OUTPATIENT
Start: 2018-12-27 | End: 2019-01-25 | Stop reason: SDUPTHER

## 2019-01-02 ENCOUNTER — TELEPHONE (OUTPATIENT)
Dept: WOUND CARE | Age: 79
End: 2019-01-02

## 2019-01-24 ENCOUNTER — TELEPHONE (OUTPATIENT)
Dept: WOUND CARE | Age: 79
End: 2019-01-24

## 2019-01-25 RX ORDER — ATORVASTATIN CALCIUM 40 MG/1
TABLET, FILM COATED ORAL
Qty: 30 TABLET | Refills: 5 | Status: SHIPPED | OUTPATIENT
Start: 2019-01-25 | End: 2019-07-31 | Stop reason: SDUPTHER

## 2019-01-25 RX ORDER — AMIODARONE HYDROCHLORIDE 200 MG/1
TABLET ORAL
Qty: 30 TABLET | Refills: 0 | OUTPATIENT
Start: 2019-01-25

## 2019-01-25 RX ORDER — DILTIAZEM HYDROCHLORIDE 240 MG/1
CAPSULE, COATED, EXTENDED RELEASE ORAL
Qty: 30 CAPSULE | Refills: 5 | Status: SHIPPED | OUTPATIENT
Start: 2019-01-25 | End: 2019-05-02

## 2019-01-28 RX ORDER — AMIODARONE HYDROCHLORIDE 200 MG/1
TABLET ORAL
Qty: 90 TABLET | Refills: 3 | Status: SHIPPED | OUTPATIENT
Start: 2019-01-28 | End: 2020-02-05

## 2019-02-22 ENCOUNTER — HOSPITAL ENCOUNTER (OUTPATIENT)
Dept: LAB | Age: 79
Discharge: HOME | End: 2019-02-22
Payer: MEDICARE

## 2019-02-22 DIAGNOSIS — E78.5 DYSLIPIDEMIA: ICD-10-CM

## 2019-02-22 DIAGNOSIS — I10 ESSENTIAL HYPERTENSION: ICD-10-CM

## 2019-02-22 DIAGNOSIS — E03.2 HYPOTHYROIDISM DUE TO MEDICATION: ICD-10-CM

## 2019-02-22 DIAGNOSIS — C18.7 MALIGNANT NEOPLASM OF SIGMOID COLON (HCC): ICD-10-CM

## 2019-02-22 LAB
ABSOLUTE EOS #: 0.21 K/UL (ref 0–0.4)
ABSOLUTE EOS #: 0.21 K/UL (ref 0–0.4)
ABSOLUTE IMMATURE GRANULOCYTE: ABNORMAL K/UL (ref 0–0.3)
ABSOLUTE IMMATURE GRANULOCYTE: ABNORMAL K/UL (ref 0–0.3)
ABSOLUTE LYMPH #: 1.59 K/UL (ref 1–4.8)
ABSOLUTE LYMPH #: 1.7 K/UL (ref 1–4.8)
ABSOLUTE MONO #: 0.64 K/UL (ref 0.1–1.2)
ABSOLUTE MONO #: 0.69 K/UL (ref 0.1–1.2)
ALBUMIN SERPL-MCNC: 4 G/DL (ref 3.5–5.2)
ALBUMIN SERPL-MCNC: 4 G/DL (ref 3.5–5.2)
ALBUMIN/GLOBULIN RATIO: 1.1 (ref 1–2.5)
ALBUMIN/GLOBULIN RATIO: 1.1 (ref 1–2.5)
ALP BLD-CCNC: 108 U/L (ref 35–104)
ALP BLD-CCNC: 108 U/L (ref 35–104)
ALT SERPL-CCNC: 13 U/L (ref 5–33)
ALT SERPL-CCNC: 13 U/L (ref 5–33)
ANION GAP SERPL CALCULATED.3IONS-SCNC: 15 MMOL/L (ref 9–17)
ANION GAP SERPL CALCULATED.3IONS-SCNC: 15 MMOL/L (ref 9–17)
AST SERPL-CCNC: 16 U/L
AST SERPL-CCNC: 16 U/L
BASOPHILS # BLD: 0 % (ref 0–1)
BASOPHILS # BLD: 1 % (ref 0–1)
BASOPHILS ABSOLUTE: 0 K/UL (ref 0–0.2)
BASOPHILS ABSOLUTE: 0.07 K/UL (ref 0–0.2)
BILIRUB SERPL-MCNC: 0.37 MG/DL (ref 0.3–1.2)
BILIRUB SERPL-MCNC: 0.37 MG/DL (ref 0.3–1.2)
BUN BLDV-MCNC: 21 MG/DL (ref 8–23)
BUN BLDV-MCNC: 21 MG/DL (ref 8–23)
BUN/CREAT BLD: 24 (ref 9–20)
BUN/CREAT BLD: 24 (ref 9–20)
CALCIUM SERPL-MCNC: 9.8 MG/DL (ref 8.6–10.4)
CALCIUM SERPL-MCNC: 9.8 MG/DL (ref 8.6–10.4)
CARCINOEMBRYONIC ANTIGEN: 2.7 NG/ML
CHLORIDE BLD-SCNC: 110 MMOL/L (ref 98–107)
CHLORIDE BLD-SCNC: 110 MMOL/L (ref 98–107)
CHOLESTEROL/HDL RATIO: 2
CHOLESTEROL: 107 MG/DL
CO2: 23 MMOL/L (ref 20–31)
CO2: 23 MMOL/L (ref 20–31)
CREAT SERPL-MCNC: 0.86 MG/DL (ref 0.5–0.9)
CREAT SERPL-MCNC: 0.86 MG/DL (ref 0.5–0.9)
DIFFERENTIAL TYPE: ABNORMAL
DIFFERENTIAL TYPE: ABNORMAL
EOSINOPHILS RELATIVE PERCENT: 3 % (ref 1–7)
EOSINOPHILS RELATIVE PERCENT: 3 % (ref 1–7)
GFR AFRICAN AMERICAN: >60 ML/MIN
GFR AFRICAN AMERICAN: >60 ML/MIN
GFR NON-AFRICAN AMERICAN: >60 ML/MIN
GFR NON-AFRICAN AMERICAN: >60 ML/MIN
GFR SERPL CREATININE-BSD FRML MDRD: ABNORMAL ML/MIN/{1.73_M2}
GLUCOSE BLD-MCNC: 107 MG/DL (ref 70–99)
GLUCOSE BLD-MCNC: 107 MG/DL (ref 70–99)
HCT VFR BLD CALC: 42.7 % (ref 36–46)
HCT VFR BLD CALC: 43.1 % (ref 36–46)
HDLC SERPL-MCNC: 53 MG/DL
HEMOGLOBIN: 13.2 G/DL (ref 12–16)
HEMOGLOBIN: 13.2 G/DL (ref 12–16)
IMMATURE GRANULOCYTES: ABNORMAL %
IMMATURE GRANULOCYTES: ABNORMAL %
LDL CHOLESTEROL: 38 MG/DL (ref 0–130)
LYMPHOCYTES # BLD: 23 % (ref 16–46)
LYMPHOCYTES # BLD: 24 % (ref 16–46)
MCH RBC QN AUTO: 27.3 PG (ref 26–34)
MCH RBC QN AUTO: 27.6 PG (ref 26–34)
MCHC RBC AUTO-ENTMCNC: 30.6 G/DL (ref 31–37)
MCHC RBC AUTO-ENTMCNC: 30.9 G/DL (ref 31–37)
MCV RBC AUTO: 89.3 FL (ref 80–100)
MCV RBC AUTO: 89.3 FL (ref 80–100)
MONOCYTES # BLD: 10 % (ref 4–11)
MONOCYTES # BLD: 9 % (ref 4–11)
MORPHOLOGY: ABNORMAL
NRBC AUTOMATED: ABNORMAL PER 100 WBC
NRBC AUTOMATED: ABNORMAL PER 100 WBC
PDW BLD-RTO: 17.3 % (ref 11–14.5)
PDW BLD-RTO: 17.4 % (ref 11–14.5)
PLATELET # BLD: 123 K/UL (ref 140–450)
PLATELET # BLD: 129 K/UL (ref 140–450)
PLATELET ESTIMATE: ABNORMAL
PLATELET ESTIMATE: ABNORMAL
PMV BLD AUTO: 12.4 FL (ref 6–12)
PMV BLD AUTO: 12.8 FL (ref 6–12)
POTASSIUM SERPL-SCNC: 3.5 MMOL/L (ref 3.7–5.3)
POTASSIUM SERPL-SCNC: 3.5 MMOL/L (ref 3.7–5.3)
RBC # BLD: 4.78 M/UL (ref 4–5.2)
RBC # BLD: 4.82 M/UL (ref 4–5.2)
RBC # BLD: ABNORMAL 10*6/UL
RBC # BLD: ABNORMAL 10*6/UL
SEG NEUTROPHILS: 63 % (ref 43–77)
SEG NEUTROPHILS: 64 % (ref 43–77)
SEGMENTED NEUTROPHILS ABSOLUTE COUNT: 4.41 K/UL (ref 1.8–7.7)
SEGMENTED NEUTROPHILS ABSOLUTE COUNT: 4.48 K/UL (ref 1.8–7.7)
SODIUM BLD-SCNC: 148 MMOL/L (ref 135–144)
SODIUM BLD-SCNC: 148 MMOL/L (ref 135–144)
THYROXINE, FREE: 1.6 NG/DL (ref 0.93–1.7)
TOTAL PROTEIN: 7.5 G/DL (ref 6.4–8.3)
TOTAL PROTEIN: 7.5 G/DL (ref 6.4–8.3)
TRIGL SERPL-MCNC: 79 MG/DL
TSH SERPL DL<=0.05 MIU/L-ACNC: 1.12 MIU/L (ref 0.3–5)
VLDLC SERPL CALC-MCNC: NORMAL MG/DL (ref 1–30)
WBC # BLD: 6.9 K/UL (ref 3.5–11)
WBC # BLD: 7.1 K/UL (ref 3.5–11)
WBC # BLD: ABNORMAL 10*3/UL
WBC # BLD: ABNORMAL 10*3/UL

## 2019-02-22 PROCEDURE — 84443 ASSAY THYROID STIM HORMONE: CPT

## 2019-02-22 PROCEDURE — 82378 CARCINOEMBRYONIC ANTIGEN: CPT

## 2019-02-22 PROCEDURE — 85025 COMPLETE CBC W/AUTO DIFF WBC: CPT

## 2019-02-22 PROCEDURE — 80053 COMPREHEN METABOLIC PANEL: CPT

## 2019-02-22 PROCEDURE — 36415 COLL VENOUS BLD VENIPUNCTURE: CPT

## 2019-02-22 PROCEDURE — 84439 ASSAY OF FREE THYROXINE: CPT

## 2019-02-22 PROCEDURE — 80061 LIPID PANEL: CPT

## 2019-02-26 ENCOUNTER — OFFICE VISIT (OUTPATIENT)
Dept: FAMILY MEDICINE CLINIC | Age: 79
End: 2019-02-26
Payer: MEDICARE

## 2019-02-26 ENCOUNTER — HOSPITAL ENCOUNTER (OUTPATIENT)
Dept: CT IMAGING | Age: 79
Discharge: HOME | End: 2019-02-28
Payer: MEDICARE

## 2019-02-26 VITALS
WEIGHT: 293 LBS | RESPIRATION RATE: 18 BRPM | DIASTOLIC BLOOD PRESSURE: 70 MMHG | HEIGHT: 67 IN | SYSTOLIC BLOOD PRESSURE: 116 MMHG | HEART RATE: 60 BPM | BODY MASS INDEX: 45.99 KG/M2

## 2019-02-26 DIAGNOSIS — I50.9 CHRONIC CONGESTIVE HEART FAILURE, UNSPECIFIED HEART FAILURE TYPE (HCC): ICD-10-CM

## 2019-02-26 DIAGNOSIS — C18.7 MALIGNANT NEOPLASM OF SIGMOID COLON (HCC): ICD-10-CM

## 2019-02-26 DIAGNOSIS — E78.2 MIXED HYPERLIPIDEMIA: ICD-10-CM

## 2019-02-26 DIAGNOSIS — Z93.3 COLOSTOMY IN PLACE (HCC): ICD-10-CM

## 2019-02-26 DIAGNOSIS — E66.01 MORBID OBESITY WITH BMI OF 45.0-49.9, ADULT (HCC): ICD-10-CM

## 2019-02-26 DIAGNOSIS — R73.01 IMPAIRED FASTING GLUCOSE: ICD-10-CM

## 2019-02-26 DIAGNOSIS — I48.0 PAROXYSMAL ATRIAL FIBRILLATION (HCC): ICD-10-CM

## 2019-02-26 DIAGNOSIS — F32.5 MAJOR DEPRESSIVE DISORDER WITH SINGLE EPISODE, IN FULL REMISSION (HCC): ICD-10-CM

## 2019-02-26 DIAGNOSIS — E03.9 ACQUIRED HYPOTHYROIDISM: ICD-10-CM

## 2019-02-26 DIAGNOSIS — M15.9 PRIMARY OSTEOARTHRITIS INVOLVING MULTIPLE JOINTS: ICD-10-CM

## 2019-02-26 DIAGNOSIS — I10 ESSENTIAL HYPERTENSION: Primary | ICD-10-CM

## 2019-02-26 PROCEDURE — 1123F ACP DISCUSS/DSCN MKR DOCD: CPT | Performed by: FAMILY MEDICINE

## 2019-02-26 PROCEDURE — G8399 PT W/DXA RESULTS DOCUMENT: HCPCS | Performed by: FAMILY MEDICINE

## 2019-02-26 PROCEDURE — 1101F PT FALLS ASSESS-DOCD LE1/YR: CPT | Performed by: FAMILY MEDICINE

## 2019-02-26 PROCEDURE — 99214 OFFICE O/P EST MOD 30 MIN: CPT | Performed by: FAMILY MEDICINE

## 2019-02-26 PROCEDURE — G8417 CALC BMI ABV UP PARAM F/U: HCPCS | Performed by: FAMILY MEDICINE

## 2019-02-26 PROCEDURE — 1090F PRES/ABSN URINE INCON ASSESS: CPT | Performed by: FAMILY MEDICINE

## 2019-02-26 PROCEDURE — G8427 DOCREV CUR MEDS BY ELIG CLIN: HCPCS | Performed by: FAMILY MEDICINE

## 2019-02-26 PROCEDURE — G8482 FLU IMMUNIZE ORDER/ADMIN: HCPCS | Performed by: FAMILY MEDICINE

## 2019-02-26 PROCEDURE — 6360000004 HC RX CONTRAST MEDICATION: Performed by: INTERNAL MEDICINE

## 2019-02-26 PROCEDURE — 2709999900 CT ABDOMEN PELVIS W IV CONTRAST

## 2019-02-26 PROCEDURE — 4040F PNEUMOC VAC/ADMIN/RCVD: CPT | Performed by: FAMILY MEDICINE

## 2019-02-26 PROCEDURE — 1036F TOBACCO NON-USER: CPT | Performed by: FAMILY MEDICINE

## 2019-02-26 RX ORDER — DICLOFENAC SODIUM 75 MG/1
75 TABLET, DELAYED RELEASE ORAL 2 TIMES DAILY PRN
Qty: 60 TABLET | Refills: 5 | Status: ON HOLD | OUTPATIENT
Start: 2019-02-26 | End: 2019-06-03 | Stop reason: HOSPADM

## 2019-02-26 RX ORDER — FUROSEMIDE 20 MG/1
20 TABLET ORAL DAILY
Qty: 30 TABLET | Refills: 5 | Status: SHIPPED | OUTPATIENT
Start: 2019-02-26 | End: 2019-08-26 | Stop reason: SDUPTHER

## 2019-02-26 RX ORDER — PREDNISONE 20 MG/1
TABLET ORAL
Qty: 10 TABLET | Refills: 0 | Status: SHIPPED | OUTPATIENT
Start: 2019-02-26 | End: 2019-05-01 | Stop reason: ALTCHOICE

## 2019-02-26 RX ORDER — TRAZODONE HYDROCHLORIDE 50 MG/1
50 TABLET ORAL NIGHTLY
Qty: 30 TABLET | Refills: 5 | Status: SHIPPED | OUTPATIENT
Start: 2019-02-26 | End: 2019-08-26 | Stop reason: SDUPTHER

## 2019-02-26 RX ADMIN — IOHEXOL 50 ML: 240 INJECTION, SOLUTION INTRATHECAL; INTRAVASCULAR; INTRAVENOUS; ORAL at 09:30

## 2019-02-26 RX ADMIN — IOPAMIDOL 100 ML: 755 INJECTION, SOLUTION INTRAVENOUS at 11:23

## 2019-02-26 ASSESSMENT — PATIENT HEALTH QUESTIONNAIRE - PHQ9
1. LITTLE INTEREST OR PLEASURE IN DOING THINGS: 0
SUM OF ALL RESPONSES TO PHQ QUESTIONS 1-9: 0
SUM OF ALL RESPONSES TO PHQ QUESTIONS 1-9: 0
2. FEELING DOWN, DEPRESSED OR HOPELESS: 0
SUM OF ALL RESPONSES TO PHQ9 QUESTIONS 1 & 2: 0

## 2019-03-04 ENCOUNTER — OFFICE VISIT (OUTPATIENT)
Dept: ONCOLOGY | Age: 79
End: 2019-03-04
Payer: MEDICARE

## 2019-03-04 VITALS
WEIGHT: 293 LBS | SYSTOLIC BLOOD PRESSURE: 126 MMHG | BODY MASS INDEX: 45.99 KG/M2 | HEART RATE: 56 BPM | HEIGHT: 67 IN | DIASTOLIC BLOOD PRESSURE: 70 MMHG | TEMPERATURE: 97.1 F

## 2019-03-04 DIAGNOSIS — C18.7 MALIGNANT NEOPLASM OF SIGMOID COLON (HCC): Primary | ICD-10-CM

## 2019-03-04 PROCEDURE — G8417 CALC BMI ABV UP PARAM F/U: HCPCS | Performed by: INTERNAL MEDICINE

## 2019-03-04 PROCEDURE — 1036F TOBACCO NON-USER: CPT | Performed by: INTERNAL MEDICINE

## 2019-03-04 PROCEDURE — G8427 DOCREV CUR MEDS BY ELIG CLIN: HCPCS | Performed by: INTERNAL MEDICINE

## 2019-03-04 PROCEDURE — 1123F ACP DISCUSS/DSCN MKR DOCD: CPT | Performed by: INTERNAL MEDICINE

## 2019-03-04 PROCEDURE — G8482 FLU IMMUNIZE ORDER/ADMIN: HCPCS | Performed by: INTERNAL MEDICINE

## 2019-03-04 PROCEDURE — G8399 PT W/DXA RESULTS DOCUMENT: HCPCS | Performed by: INTERNAL MEDICINE

## 2019-03-04 PROCEDURE — 99215 OFFICE O/P EST HI 40 MIN: CPT | Performed by: INTERNAL MEDICINE

## 2019-03-04 PROCEDURE — 1101F PT FALLS ASSESS-DOCD LE1/YR: CPT | Performed by: INTERNAL MEDICINE

## 2019-03-04 PROCEDURE — 1090F PRES/ABSN URINE INCON ASSESS: CPT | Performed by: INTERNAL MEDICINE

## 2019-03-04 PROCEDURE — 4040F PNEUMOC VAC/ADMIN/RCVD: CPT | Performed by: INTERNAL MEDICINE

## 2019-03-04 ASSESSMENT — ENCOUNTER SYMPTOMS
CONSTIPATION: 0
EYE REDNESS: 0
NAUSEA: 0
SINUS PRESSURE: 0
TROUBLE SWALLOWING: 0
SORE THROAT: 0
DIARRHEA: 0
RHINORRHEA: 0
VOMITING: 0
EYE DISCHARGE: 0
SHORTNESS OF BREATH: 0
ABDOMINAL PAIN: 0
COUGH: 0
WHEEZING: 0

## 2019-04-04 ENCOUNTER — OFFICE VISIT (OUTPATIENT)
Dept: SURGERY | Age: 79
End: 2019-04-04
Payer: MEDICARE

## 2019-04-04 VITALS
SYSTOLIC BLOOD PRESSURE: 110 MMHG | BODY MASS INDEX: 45.99 KG/M2 | HEART RATE: 47 BPM | HEIGHT: 67 IN | WEIGHT: 293 LBS | DIASTOLIC BLOOD PRESSURE: 80 MMHG

## 2019-04-04 DIAGNOSIS — C18.7 MALIGNANT NEOPLASM OF SIGMOID COLON (HCC): Primary | ICD-10-CM

## 2019-04-04 DIAGNOSIS — Z98.890 H/O ILEOSTOMY: ICD-10-CM

## 2019-04-04 PROCEDURE — G8427 DOCREV CUR MEDS BY ELIG CLIN: HCPCS | Performed by: SURGERY

## 2019-04-04 PROCEDURE — 1036F TOBACCO NON-USER: CPT | Performed by: SURGERY

## 2019-04-04 PROCEDURE — 1123F ACP DISCUSS/DSCN MKR DOCD: CPT | Performed by: SURGERY

## 2019-04-04 PROCEDURE — G8417 CALC BMI ABV UP PARAM F/U: HCPCS | Performed by: SURGERY

## 2019-04-04 PROCEDURE — 4040F PNEUMOC VAC/ADMIN/RCVD: CPT | Performed by: SURGERY

## 2019-04-04 PROCEDURE — G8399 PT W/DXA RESULTS DOCUMENT: HCPCS | Performed by: SURGERY

## 2019-04-04 PROCEDURE — 99214 OFFICE O/P EST MOD 30 MIN: CPT | Performed by: SURGERY

## 2019-04-04 PROCEDURE — 1090F PRES/ABSN URINE INCON ASSESS: CPT | Performed by: SURGERY

## 2019-04-04 NOTE — PROGRESS NOTES
General Surgery History & Physical  Eleuterio Pritchard MD  Pt Name: Lydia Ramirez  MRN: [id number]  [place]: 1940  Date of evaluation: 4/4/2019  Primary Care Physician: Abimael Arenas DO    Patient evaluated at the request of [address] [place] [address] Road  Reason for evaluation: Thai Varela       SUBJECTIVE:  Chief Complaint:   Chief Complaint   Patient presents with    GI Problem      needs ileostomy takedown     History of Present Illness:         . Patient is a 67-year-old femalewho had a open sigmoid colectomy for colon cancer on 11/5/2018. It was very difficult anastomosis and she had a diverting ileostomy. She now would like to have that reversed. She has no complaints from the rectum. Past Medical History   has a past medical history of A-fib ([address] CHF (congestive heart failure) ([facility] Dyslipidemia, FH: total abdominal hysterectomy and bilateral salpingo-oophorectomy, Glucose intolerance (impaired glucose tolerance), Hypertension, Malignant neoplasm of sigmoid colon ([facility] Obesity, and Osteoarthritis. Past Surgical History   has a past surgical history that includes kenyon and bso (cervix removed) (1966); Cholecystectomy (1960s); Varicose vein surgery (Right, 1960s); Hip Arthroplasty (Left); Knee Arthroplasty (Left); Knee Arthroplasty (Right); Cardiac catheterization (09/05/2017); Colonoscopy (N/A, 10/8/2018); pr lap,surg,colectomy, partial, w/anast (N/A, 11/5/2018); and pr cystoscopy,insert ureteral stent (Bilateral, 11/5/2018). Family History  family history includes High Blood Pressure in her brother. She was adopted. Social History  Tobacco use:  reports that she has never smoked. She has never used smokeless tobacco.  Alcohol use:  reports that she does not drink alcohol. Drug use:  reports that she does not use drugs.       Medications  Current Medications:   Current Outpatient Medications   Medication Sig Dispense Refill    metoprolol tartrate (LOPRESSOR) 25 MG tablet TAKE ONE TABLET BY MOUTH Yes Kali Goins, DO   atorvastatin (LIPITOR) 40 MG tablet TAKE 1 TABLET BY MOUTH nightly 1/25/19  Yes Kali Goins DO   levothyroxine (SYNTHROID) 50 MCG tablet TAKE 1 TABLET BY MOUTH ONE TIME A DAY  10/29/18  Yes Kali Goins DO   potassium chloride (KLOR-CON M) 20 MEQ extended release tablet Take 1 tablet by mouth daily 12/5/17  Yes Kali Goins DO   acetaminophen (TYLENOL) 325 MG tablet Take 2 tablets by mouth every 4 hours as needed for Pain or Fever 11/9/18 2/26/19  Garcia Ana       Allergies  Pcn [penicillins] and Bextra [valdecoxib]      Review of Systems:  General: Denies any fever, chills. HEENT: Denies any diplopia, tinnitus or vertigo. Respiratory: Denies any shortness of breath or cough. Cardiac: Denies any chest pain, palpitations, claudication or edema. Gastrointestinal: Denies any melena, hematochezia, hematemesis or pyrosis. Genitourinary: Denies any frequency, urgency, hesitancy or incontinence. Hematologic: Denies bruising or bleeding easily. Endocrine: Denies any history of diabetes or thyroid disease. PHYSICAL EXAMINATION  Vitals:   Vitals:    04/04/19 1447   BP: 110/80   Pulse: (!) 47     General Appearance:  awake, alert, oriented, in no acute distress, well developed, well nourished and in no acute distress  Skin:  Skin color, texture, turgor normal. No rashes or lesions. Head/face:  NCAT  Eyes:  No gross abnormalities. , PERRL, Pupils- PERRL. Ears:  canals and TMs NI and External- normal  Nose/Sinuses:  Nares normal. Septum midline. Mucosa normal. No drainage or sinus tenderness. Mouth/Throat:  Mucosa moist.  No lesions. Pharynx without erythema, edema or exudate. Lungs:  Normal expansion. Clear to auscultation. No rales, rhonchi, or wheezing., Breathing Pattern: regular, no distress, Breath sounds: normal  Heart:  Heart sounds are normal.  Regular rate and rhythm without murmur, gallop or rub. Auscultation: Normal S1 and S2.  Regular rhythm.  No anastomosis is in good shape and we will visualize it with a colonoscopy. 3.  We will also get cardiac clearance and medical clearance and then proceed      Thank you for the interesting evaluation. Further recommendations to follow.     Parker Wang MD  Electronically signed 4/4/2019 at 5:00 PM

## 2019-04-08 DIAGNOSIS — C18.9 MALIGNANT NEOPLASM OF COLON, UNSPECIFIED PART OF COLON (HCC): Primary | ICD-10-CM

## 2019-04-25 ENCOUNTER — ANESTHESIA EVENT (OUTPATIENT)
Dept: OPERATING ROOM | Age: 79
End: 2019-04-25
Payer: MEDICARE

## 2019-04-25 ENCOUNTER — HOSPITAL ENCOUNTER (OUTPATIENT)
Age: 79
Setting detail: OUTPATIENT SURGERY
Discharge: HOME | End: 2019-04-25
Attending: SURGERY | Admitting: SURGERY
Payer: MEDICARE

## 2019-04-25 ENCOUNTER — HOSPITAL ENCOUNTER (OUTPATIENT)
Dept: GENERAL RADIOLOGY | Age: 79
Discharge: HOME | End: 2019-04-27
Attending: SURGERY
Payer: MEDICARE

## 2019-04-25 ENCOUNTER — ANESTHESIA (OUTPATIENT)
Dept: OPERATING ROOM | Age: 79
End: 2019-04-25
Payer: MEDICARE

## 2019-04-25 VITALS
RESPIRATION RATE: 16 BRPM | BODY MASS INDEX: 45.83 KG/M2 | TEMPERATURE: 97.6 F | DIASTOLIC BLOOD PRESSURE: 62 MMHG | OXYGEN SATURATION: 97 % | HEIGHT: 67 IN | WEIGHT: 292 LBS | HEART RATE: 57 BPM | SYSTOLIC BLOOD PRESSURE: 110 MMHG

## 2019-04-25 VITALS — DIASTOLIC BLOOD PRESSURE: 58 MMHG | SYSTOLIC BLOOD PRESSURE: 120 MMHG | OXYGEN SATURATION: 100 %

## 2019-04-25 DIAGNOSIS — C18.9 MALIGNANT NEOPLASM OF COLON, UNSPECIFIED PART OF COLON (HCC): ICD-10-CM

## 2019-04-25 PROCEDURE — 00811 ANES LWR INTST NDSC NOS: CPT | Performed by: NURSE ANESTHETIST, CERTIFIED REGISTERED

## 2019-04-25 PROCEDURE — 74270 X-RAY XM COLON 1CNTRST STD: CPT

## 2019-04-25 PROCEDURE — 3609009500 HC COLONOSCOPY DIAGNOSTIC OR SCREENING: Performed by: SURGERY

## 2019-04-25 PROCEDURE — 2709999900 HC NON-CHARGEABLE SUPPLY: Performed by: SURGERY

## 2019-04-25 PROCEDURE — 7100000011 HC PHASE II RECOVERY - ADDTL 15 MIN: Performed by: SURGERY

## 2019-04-25 PROCEDURE — 2500000003 HC RX 250 WO HCPCS: Performed by: NURSE ANESTHETIST, CERTIFIED REGISTERED

## 2019-04-25 PROCEDURE — 3700000000 HC ANESTHESIA ATTENDED CARE: Performed by: SURGERY

## 2019-04-25 PROCEDURE — 6360000002 HC RX W HCPCS: Performed by: NURSE ANESTHETIST, CERTIFIED REGISTERED

## 2019-04-25 PROCEDURE — 45330 DIAGNOSTIC SIGMOIDOSCOPY: CPT | Performed by: SURGERY

## 2019-04-25 PROCEDURE — 2580000003 HC RX 258: Performed by: SURGERY

## 2019-04-25 PROCEDURE — 6360000004 HC RX CONTRAST MEDICATION: Performed by: SURGERY

## 2019-04-25 PROCEDURE — 7100000010 HC PHASE II RECOVERY - FIRST 15 MIN: Performed by: SURGERY

## 2019-04-25 RX ORDER — LIDOCAINE HYDROCHLORIDE 40 MG/ML
INJECTION, SOLUTION RETROBULBAR; TOPICAL PRN
Status: DISCONTINUED | OUTPATIENT
Start: 2019-04-25 | End: 2019-04-25 | Stop reason: SDUPTHER

## 2019-04-25 RX ORDER — SODIUM CHLORIDE, SODIUM LACTATE, POTASSIUM CHLORIDE, CALCIUM CHLORIDE 600; 310; 30; 20 MG/100ML; MG/100ML; MG/100ML; MG/100ML
INJECTION, SOLUTION INTRAVENOUS CONTINUOUS
Status: DISCONTINUED | OUTPATIENT
Start: 2019-04-25 | End: 2019-04-25 | Stop reason: SDUPTHER

## 2019-04-25 RX ORDER — PROPOFOL 10 MG/ML
INJECTION, EMULSION INTRAVENOUS PRN
Status: DISCONTINUED | OUTPATIENT
Start: 2019-04-25 | End: 2019-04-25 | Stop reason: SDUPTHER

## 2019-04-25 RX ORDER — SODIUM CHLORIDE, SODIUM LACTATE, POTASSIUM CHLORIDE, CALCIUM CHLORIDE 600; 310; 30; 20 MG/100ML; MG/100ML; MG/100ML; MG/100ML
INJECTION, SOLUTION INTRAVENOUS CONTINUOUS
Status: DISCONTINUED | OUTPATIENT
Start: 2019-04-25 | End: 2019-04-25 | Stop reason: HOSPADM

## 2019-04-25 RX ADMIN — LIDOCAINE HYDROCHLORIDE 40 MG: 40 INJECTION, SOLUTION RETROBULBAR; TOPICAL at 08:45

## 2019-04-25 RX ADMIN — PROPOFOL 100 MG: 10 INJECTION, EMULSION INTRAVENOUS at 08:45

## 2019-04-25 RX ADMIN — SODIUM CHLORIDE, SODIUM LACTATE, POTASSIUM CHLORIDE, AND CALCIUM CHLORIDE: .6; .31; .03; .02 INJECTION, SOLUTION INTRAVENOUS at 08:29

## 2019-04-25 RX ADMIN — DIATRIZOATE MEGLUMINE AND DIATRIZOATE SODIUM 480 ML: 660; 100 LIQUID ORAL; RECTAL at 12:10

## 2019-04-25 ASSESSMENT — PAIN SCALES - GENERAL
PAINLEVEL_OUTOF10: 0

## 2019-04-25 ASSESSMENT — PAIN - FUNCTIONAL ASSESSMENT: PAIN_FUNCTIONAL_ASSESSMENT: 0-10

## 2019-04-25 NOTE — ANESTHESIA PRE PROCEDURE
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    Morbid obesity ([facility] E66.01    Primary insomnia F51.01    Multiple closed fractures of ribs S22.49XA    JAYJAY on CPAP G47.33, Z99.89    Obesity E66.9    Hypertension I10    Glucose intolerance (impaired glucose tolerance) R73.02    A-fib ([facility]) I48.91    Colon cancer ([facility]) C18.9    Acute blood loss as cause of postoperative anemia D62    Other complete intestinal obstruction ([facility]) K56.691    Elevated fasting glucose R73.01    BMI 50.0-59.9, adult ([facility]) Z68.43    Colostomy in place [facility]) Z93.3    Other specified hypothyroidism E03.8    Ileostomy status ([facility] Z93.2    Attention to ileostomy ([facility] Z43.2       Past Medical History:        Diagnosis Date    A-fib ([facility] 07/19/2017    CHF (congestive heart failure) ([facility])     Dyslipidemia     FH: total abdominal hysterectomy and bilateral salpingo-oophorectomy 1966    Glucose intolerance (impaired glucose tolerance)     Hypertension     Malignant neoplasm of sigmoid colon ([facility]     Obesity     Osteoarthritis        Past Surgical History:        Procedure Laterality Date    CARDIAC CATHETERIZATION  09/05/2017    CHOLECYSTECTOMY  1960s    COLONOSCOPY N/A 10/8/2018    COLONOSCOPY WITH COLD ET HOT BIOPSIES ET POLYPECTOMIES performed by Soheila Sandoval MD at [facility] Left     KNEE ARTHROPLASTY Left     KNEE ARTHROPLASTY Right     MA CYSTOSCOPY,INSERT URETERAL STENT Bilateral 11/5/2018    CYSTO Bilateral Lighted stent placements performed by Stephen Garza MD at [address] [facility] LAP,SURG,COLECTOMY, PARTIAL, W/ANAST N/A 11/5/2018    Open Sigmoid Colectomy w/ lighted stents ILEOSTOMY PLACEMENT performed by Marcus Torrez MD at [address] [place] [address] Right 1960s       Social History:    Social History     Tobacco Use    Smoking status: Never Smoker    Smokeless tobacco: Never Used    Tobacco comment: syant 11/10/2018   Substance Use Topics    Alcohol use: No                                Counseling given: Not Answered  Comment: syant 11/10/2018      Vital Signs (Current): There were no vitals filed for this visit.                                            BP Readings from Last 3 Encounters:   04/04/19 110/80   03/04/19 126/70   02/26/19 116/70       NPO Status: Time of last liquid consumption: 0600(sip with meds)                        Time of last solid consumption: 1800                        Date of last liquid consumption: 04/25/19                        Date of last solid food consumption: 04/23/19    BMI:   Wt Readings from Last 3 Encounters:   04/04/19 300 lb (136.1 kg)   03/04/19 (!) 307 lb (139.3 kg)   02/26/19 (!) 307 lb (139.3 kg)     There is no height or weight on file to calculate BMI.    CBC:   Lab Results   Component Value Date    WBC 7.1 02/22/2019    WBC 6.9 02/22/2019    RBC 4.78 02/22/2019    RBC 4.82 02/22/2019    HGB 13.2 02/22/2019    HGB 13.2 02/22/2019    HCT 42.7 02/22/2019    HCT 43.1 02/22/2019    MCV 89.3 02/22/2019    MCV 89.3 02/22/2019    RDW 17.4 02/22/2019    RDW 17.3 02/22/2019     02/22/2019     02/22/2019       CMP:   Lab Results   Component Value Date     02/22/2019     02/22/2019    K 3.5 02/22/2019    K 3.5 02/22/2019     02/22/2019     02/22/2019    CO2 23 02/22/2019    CO2 23 02/22/2019    BUN 21 02/22/2019    BUN 21 02/22/2019    CREATININE 0.86 02/22/2019    CREATININE 0.86 02/22/2019    GFRAA >60 02/22/2019    GFRAA >60 02/22/2019    LABGLOM >60 02/22/2019    LABGLOM >60 02/22/2019    GLUCOSE 107 02/22/2019    GLUCOSE 107 02/22/2019    PROT 7.5 02/22/2019    PROT 7.5 02/22/2019    CALCIUM 9.8 02/22/2019    CALCIUM 9.8 02/22/2019    BILITOT 0.37 02/22/2019    BILITOT 0.37 02/22/2019    ALKPHOS 108 02/22/2019    ALKPHOS 108 02/22/2019    AST 16 02/22/2019    AST 16 02/22/2019    ALT 13 02/22/2019    ALT 13 02/22/2019       POC Tests: No results for input(s): POCGLU, POCNA, POCK, POCCL, POCBUN, POCHEMO, POCHCT in the last 72 hours. Coags:   Lab Results   Component Value Date    PROTIME 16.6 10/08/2018    INR 1.6 10/08/2018    APTT 34.8 09/21/2018       HCG (If Applicable): No results found for: PREGTESTUR, PREGSERUM, HCG, HCGQUANT     ABGs: No results found for: PHART, PO2ART, FYV3PCO, FTS6QQZ, BEART, A6HGYGAP     Type & Screen (If Applicable):  No results found for: LABABO, [address]    Anesthesia Evaluation  Patient summary reviewed no history of anesthetic complications:   Airway: Mallampati: III  TM distance: >3 FB   Neck ROM: limited  Mouth opening: < 3 FB Dental:    (+) upper dentures and lower dentures      Pulmonary:normal exam    (+) sleep apnea: on CPAP,                             Cardiovascular:    (+) hypertension: no interval change, CHF: no interval change,                   Neuro/Psych:   (+) psychiatric history: stable with treatment            GI/Hepatic/Renal: Neg GI/Hepatic/Renal ROS  (+) bowel prep,           Endo/Other:    (+) hypothyroidism::., .                 Abdominal:   (+) obese,         Vascular: negative vascular ROS. Anesthesia Plan      MAC and TIVA     ASA 3       Induction: intravenous. Anesthetic plan and risks discussed with patient.       Plan discussed with surgical team.                  XIOMARA Hilario - CRNA   4/25/2019

## 2019-04-25 NOTE — H&P
General Surgery History & Physical  Salvatore Dasilva MD  Pt Name: Candido Espinoza  MRN: [id number]  [place]: 1940  Date of evaluation: 4/4/2019  Primary Care Physician: Milly Pelayo DO     Patient evaluated at the request of [address]  Reason for evaluation: Cynthia Nany                   SUBJECTIVE:  Chief Complaint:        Chief Complaint   Patient presents with    GI Problem        needs ileostomy takedown      History of Present Illness:         . Patient is a 65-year-old femalewho had a open sigmoid colectomy for colon cancer on 11/5/2018. It was very difficult anastomosis and she had a diverting ileostomy. She now would like to have that reversed. She has no complaints from the rectum. Past Medical History   has a past medical history of A-fib ([address] CHF (congestive heart failure) ([facility] Dyslipidemia, FH: total abdominal hysterectomy and bilateral salpingo-oophorectomy, Glucose intolerance (impaired glucose tolerance), Hypertension, Malignant neoplasm of sigmoid colon ([facility] Obesity, and Osteoarthritis. Past Surgical History   has a past surgical history that includes kenyon and bso (cervix removed) (1966); Cholecystectomy (1960s); Varicose vein surgery (Right, 1960s); Hip Arthroplasty (Left); Knee Arthroplasty (Left); Knee Arthroplasty (Right); Cardiac catheterization (09/05/2017); Colonoscopy (N/A, 10/8/2018); pr lap,surg,colectomy, partial, w/anast (N/A, 11/5/2018); and pr cystoscopy,insert ureteral stent (Bilateral, 11/5/2018). Family History  family history includes High Blood Pressure in her brother. She was adopted. Social History  Tobacco use:  reports that she has never smoked. She has never used smokeless tobacco.  Alcohol use:  reports that she does not drink alcohol. Drug use:  reports that she does not use drugs.         Medications  Current Medications:   Current Facility-Administered Medications          Current Outpatient Medications   Medication Sig Dispense Heart sounds are normal.  Regular rate and rhythm without murmur, gallop or rub. Auscultation: Normal S1 and S2.  Regular rhythm. No murmurs, gallops, or rubs. Abdomen:  Soft, non-tender, normal bowel sounds. No bruits, organomegaly or masses. Musculoskeletal:  negative, negative findings: no erythema, induration, or nodules, ROM of all joints is normal, strength normal  Neurologic:  negative findings: proximal muscle strength normal, speech normal, mental status intact, cranial nerves 2-12 intact, muscle tone normal, muscle strength normal     LABS:  CBC         Lab Results   Component Value Date     WBC 7.1 02/22/2019     WBC 6.9 02/22/2019     HGB 13.2 02/22/2019     HGB 13.2 02/22/2019     HCT 42.7 02/22/2019     HCT 43.1 02/22/2019      02/22/2019      02/22/2019      BMP         Lab Results   Component Value Date      02/22/2019      02/22/2019     K 3.5 02/22/2019     K 3.5 02/22/2019      02/22/2019      02/22/2019     CO2 23 02/22/2019     CO2 23 02/22/2019     BUN 21 02/22/2019     BUN 21 02/22/2019     CREATININE 0.86 02/22/2019     CREATININE 0.86 02/22/2019     PHOS 3.9 11/10/2018     MG 1.8 11/10/2018      LFT's:         Lab Results   Component Value Date     AST 16 02/22/2019     AST 16 02/22/2019     ALT 13 02/22/2019     ALT 13 02/22/2019     ALKPHOS 108 02/22/2019     ALKPHOS 108 02/22/2019     BILITOT 0.37 02/22/2019     BILITOT 0.37 02/22/2019     BILIDIR 0.09 10/06/2018     AMYLASE 52 10/06/2018     LIPASE 26 11/09/2018      COAGS:         Lab Results   Component Value Date     INR 1.6 10/08/2018      Lipids:         Lab Results   Component Value Date     CHOL 107 02/22/2019     HDL 53 02/22/2019     LDLCHOLESTEROL 38 02/22/2019     CHOLHDLRATIO 2.0 02/22/2019     TRIG 79 02/22/2019     VLDL NOT REPORTED 02/22/2019       PHYSICAL EXAM:    Last menstrual period 01/01/1968.     Gen:  A and O x 3, NAD, well nourished  Eyes:  Sclera non icterus,

## 2019-04-25 NOTE — ANESTHESIA POSTPROCEDURE EVALUATION
Department of Anesthesiology  Postprocedure Note    Patient: Daniel Ewing  MRN: [id number]  YOB: 1940  Date of evaluation: 4/25/2019  Time:  9:37 AM     Procedure Summary     Date:  04/25/19 Room / Location:  [address]    Anesthesia Start:  8278 Anesthesia Stop:  8357    Procedure:  COLONOSCOPY(pt has a barium @ 10am in X-ray) (N/A ) Diagnosis:  (colon cancer)    Surgeon:  Moncho Robles MD Responsible Provider:  XIOMARA Navas CRNA    Anesthesia Type:  MAC, TIVA ASA Status:  3          Anesthesia Type: MAC, TIVA    Shankar Phase I: Shankar Score: 10    Shankar Phase II: Shankar Score: 10    Last vitals: Reviewed and per EMR flowsheets.        Anesthesia Post Evaluation    Patient location during evaluation: bedside  Patient participation: complete - patient participated  Level of consciousness: awake  Pain score: 0  Airway patency: patent  Nausea & Vomiting: no nausea and no vomiting  Complications: no  Cardiovascular status: blood pressure returned to baseline  Respiratory status: acceptable, room air and spontaneous ventilation  Hydration status: stable

## 2019-04-25 NOTE — OP NOTE
COLONOSCOPY PROCEDURE NOTE:      Pre op diagnosis:    1. Hx colon cancer and sigmoid colectomy with protecting ileostomy 11-5-2018, here for preop evaluation of anastomosis and colon. Operative Procedure:    1. Attempted colonoscopy    Surgeon:    Dr. Salma Canela     Anesthesia:    IV sedation per CRNA    EBL:  minimal    Procedure:    Patient was taken to the endoscopy suite and placed in a left lateral decubitus position. They were given IV sedation as mentioned above. A digital rectal exam was performed. There were no masses and anal tone was normal.  The colonoscope was inserted into the rectum and at about 6-7 cm there was a stricture. We were unable to get the scope through. The scope was removed. The patient tolerated the procedure well. The following findings were noted. Final Diagnosis:    1. Stricture at 6-7 cm  2. Anastomosis looked intact with no leak. Plan:    1. Will proceed to xray to have BE (with gastrograffin) to evaluate the anastomosis. If no leak , will bring back for repeat CS and balloon dilation of rectal stricture prior to taking down the ileostomy. ADDENDUM:  Endo Review :  5/12/2019    Pathology:    none    Plan:    1.   Did stricture dilatation and is now scheduled for ileostomy takedown

## 2019-04-25 NOTE — LETTER
[address] [facility] [address]  Phone: [phone]            April 25, 2019     Patient: Rosa Bowens   YOB: 1940   Date of Visit: 4/25/2019       To Whom It May Concern: It is my medical opinion that Marielos Reich  may return to work on 4/26/2019. She was with her mother here today who required a  home from a procedure. If you have any questions or concerns, please don't hesitate to call.     Sincerely,        Dr. Oly Briggs/ Kamlesh

## 2019-05-01 ENCOUNTER — OFFICE VISIT (OUTPATIENT)
Dept: FAMILY MEDICINE CLINIC | Age: 79
End: 2019-05-01
Payer: MEDICARE

## 2019-05-01 VITALS
OXYGEN SATURATION: 95 % | DIASTOLIC BLOOD PRESSURE: 68 MMHG | HEIGHT: 67 IN | TEMPERATURE: 97 F | WEIGHT: 292 LBS | RESPIRATION RATE: 18 BRPM | SYSTOLIC BLOOD PRESSURE: 114 MMHG | HEART RATE: 54 BPM | BODY MASS INDEX: 45.83 KG/M2

## 2019-05-01 DIAGNOSIS — I10 ESSENTIAL HYPERTENSION: Primary | ICD-10-CM

## 2019-05-01 DIAGNOSIS — Z01.818 PREOPERATIVE CLEARANCE: ICD-10-CM

## 2019-05-01 DIAGNOSIS — Z93.2 ILEOSTOMY IN PLACE (HCC): ICD-10-CM

## 2019-05-01 DIAGNOSIS — E03.9 ACQUIRED HYPOTHYROIDISM: ICD-10-CM

## 2019-05-01 DIAGNOSIS — I48.0 PAROXYSMAL ATRIAL FIBRILLATION (HCC): ICD-10-CM

## 2019-05-01 DIAGNOSIS — R73.01 IMPAIRED FASTING GLUCOSE: ICD-10-CM

## 2019-05-01 PROCEDURE — G8427 DOCREV CUR MEDS BY ELIG CLIN: HCPCS | Performed by: FAMILY MEDICINE

## 2019-05-01 PROCEDURE — 99213 OFFICE O/P EST LOW 20 MIN: CPT | Performed by: FAMILY MEDICINE

## 2019-05-01 PROCEDURE — 1036F TOBACCO NON-USER: CPT | Performed by: FAMILY MEDICINE

## 2019-05-01 PROCEDURE — 1090F PRES/ABSN URINE INCON ASSESS: CPT | Performed by: FAMILY MEDICINE

## 2019-05-01 PROCEDURE — 4040F PNEUMOC VAC/ADMIN/RCVD: CPT | Performed by: FAMILY MEDICINE

## 2019-05-01 PROCEDURE — 1123F ACP DISCUSS/DSCN MKR DOCD: CPT | Performed by: FAMILY MEDICINE

## 2019-05-01 PROCEDURE — G8417 CALC BMI ABV UP PARAM F/U: HCPCS | Performed by: FAMILY MEDICINE

## 2019-05-01 PROCEDURE — G8399 PT W/DXA RESULTS DOCUMENT: HCPCS | Performed by: FAMILY MEDICINE

## 2019-05-02 ENCOUNTER — OFFICE VISIT (OUTPATIENT)
Dept: CARDIOLOGY | Age: 79
End: 2019-05-02
Payer: MEDICARE

## 2019-05-02 VITALS
SYSTOLIC BLOOD PRESSURE: 130 MMHG | HEART RATE: 51 BPM | HEIGHT: 67 IN | DIASTOLIC BLOOD PRESSURE: 64 MMHG | WEIGHT: 292 LBS | BODY MASS INDEX: 45.83 KG/M2

## 2019-05-02 DIAGNOSIS — I48.0 PAROXYSMAL ATRIAL FIBRILLATION (HCC): Primary | ICD-10-CM

## 2019-05-02 PROCEDURE — 1036F TOBACCO NON-USER: CPT | Performed by: INTERNAL MEDICINE

## 2019-05-02 PROCEDURE — 1090F PRES/ABSN URINE INCON ASSESS: CPT | Performed by: INTERNAL MEDICINE

## 2019-05-02 PROCEDURE — G8399 PT W/DXA RESULTS DOCUMENT: HCPCS | Performed by: INTERNAL MEDICINE

## 2019-05-02 PROCEDURE — G8427 DOCREV CUR MEDS BY ELIG CLIN: HCPCS | Performed by: INTERNAL MEDICINE

## 2019-05-02 PROCEDURE — 4040F PNEUMOC VAC/ADMIN/RCVD: CPT | Performed by: INTERNAL MEDICINE

## 2019-05-02 PROCEDURE — 99214 OFFICE O/P EST MOD 30 MIN: CPT | Performed by: INTERNAL MEDICINE

## 2019-05-02 PROCEDURE — 93000 ELECTROCARDIOGRAM COMPLETE: CPT | Performed by: INTERNAL MEDICINE

## 2019-05-02 PROCEDURE — 1123F ACP DISCUSS/DSCN MKR DOCD: CPT | Performed by: INTERNAL MEDICINE

## 2019-05-02 PROCEDURE — G8417 CALC BMI ABV UP PARAM F/U: HCPCS | Performed by: INTERNAL MEDICINE

## 2019-05-02 RX ORDER — DILTIAZEM HYDROCHLORIDE 120 MG/1
120 CAPSULE, COATED, EXTENDED RELEASE ORAL DAILY
Qty: 90 CAPSULE | Refills: 1 | Status: SHIPPED | OUTPATIENT
Start: 2019-05-02 | End: 2019-11-06 | Stop reason: SDUPTHER

## 2019-05-02 NOTE — PROGRESS NOTES
[address] [id number]  Dept: [phone]  Loc: [phone]    Subjective: The patient is a 77y.o. year old, , female is in the office for a follow up visit. No cp, no sob, can climb stairs, and walk with no issues. Lives alone, takes care of her self. Does her own cooking and laundry. Past Medical History:   has a past medical history of A-fib ([facility] [address] CHF (congestive heart failure) ([facility] [address] Dyslipidemia, FH: total abdominal hysterectomy and bilateral salpingo-oophorectomy, Glucose intolerance (impaired glucose tolerance), Hypertension, Malignant neoplasm of sigmoid colon ([facility] [address] Obesity, and Osteoarthritis. Past Surgical History:   has a past surgical history that includes kenyon and bso (cervix removed) (1966); Cholecystectomy (1960s); Varicose vein surgery (Right, 1960s); Hip Arthroplasty (Left); Knee Arthroplasty (Left); Knee Arthroplasty (Right); Cardiac catheterization (09/05/2017); Colonoscopy (N/A, 10/8/2018); pr lap,surg,colectomy, partial, w/anast (N/A, 11/5/2018); pr cystoscopy,insert ureteral stent (Bilateral, 11/5/2018); and Colonoscopy (N/A, 4/25/2019). Home Medications:  Prior to Admission medications    Medication Sig Start Date End Date Taking?  Authorizing Provider   metoprolol tartrate (LOPRESSOR) 25 MG tablet TAKE ONE TABLET BY MOUTH TWICE A DAY 2/26/19  Yes Onel Patino DO   furosemide (LASIX) 20 MG tablet Take 1 tablet by mouth daily 2/26/19  Yes Onel Patino DO   traZODone (DESYREL) 50 MG tablet Take 1 tablet by mouth nightly 2/26/19  Yes Onel Pation DO   diclofenac (VOLTAREN) 75 MG EC tablet Take 1 tablet by mouth 2 times daily as needed for Pain 2/26/19  Yes Onel Patino DO   amiodarone (CORDARONE) 200 MG tablet TAKE 1 TABLET BY MOUTH ONE TIME A DAY 1/28/19  Yes Anival Casas DO   diltiazem (CARDIZEM CD) 240 MG extended release capsule TAKE 1 CAPSULE BY MOUTH ONE TIME A DAY  1/25/19 Yes Bhavin Fernández, DO   atorvastatin (LIPITOR) 40 MG tablet TAKE 1 TABLET BY MOUTH nightly 1/25/19  Yes Bhavin Fernández, DO   levothyroxine (SYNTHROID) 50 MCG tablet TAKE 1 TABLET BY MOUTH ONE TIME A DAY  10/29/18  Yes Bhavin Fernández, DO   potassium chloride (KLOR-CON M) 20 MEQ extended release tablet Take 1 tablet by mouth daily 12/5/17  Yes Bhavin Fernández, DO   acetaminophen (TYLENOL) 325 MG tablet Take 2 tablets by mouth every 4 hours as needed for Pain or Fever 11/9/18 5/1/19  Vicente Cerna       Allergies:  Pcn [penicillins] and Bextra [valdecoxib]    Social History:   reports that she has never smoked. She has never used smokeless tobacco. She reports that she does not drink alcohol or use drugs. Review of Systems:  · Constitutional: there has been no unanticipated weight loss. There's been No change in energy level, No change in activity level. · Eyes: No visual changes or diplopia. No scleral icterus. · ENT: No Headaches, hearing loss or vertigo. No mouth sores or sore throat. · Cardiovascular: As above. · Respiratory: No SOB, cough or hemoptysis. · Gastrointestinal: No abdominal pain, appetite loss, blood in stools. No change in bowel or bladder habits. · Genitourinary: No dysuria, trouble voiding, or hematuria. · Musculoskeletal:  No gait disturbance, No weakness or joint complaints. · Integumentary: No rash or pruritis. · Psychiatric: No anxiety, or depression. · Hematologic/Lymphatic: No abnormal bruising or bleeding, blood clots or swollen lymph nodes. · Allergic/Immunologic: No nasal congestion or hives. Physical Exam:  /64   Pulse 51   Ht 5' 7\" (1.702 m)   Wt 292 lb (132.5 kg)   LMP 01/01/1968   BMI 45.73 kg/m²     Constitutional and General Appearance: alert, cooperative, no distress and appears stated age  HEENT: PERRL, no cervical lymphadenopathy. No masses palpable.  Normal oral mucosa  Respiratory:  · Normal excursion and expansion without use of accessory muscles  · Resp Auscultation: Good respiratory effort. No for increased work of breathing. On auscultation: clear to auscultation bilaterally  Cardiovascular:  · The apical impulse is not displaced  · Heart tones are crisp and normal. regular S1 and S2.  · Jugular venous pulsation Normal  · The carotid upstroke is normal in amplitude and contour without delay or bruit  · Peripheral pulses are symmetrical and full   Abdomen:   · No masses or tenderness  · Bowel sounds present  Extremities:  ·  No Cyanosis or Clubbing  ·  Lower extremity edema: No  ·  Skin: Warm and dry    Cardiac Data:  EKG: sinus juan, HR 51, 1st AVB    Labs:     CBC: No results for input(s): WBC, HGB, HCT, PLT in the last 72 hours. BMP: No results for input(s): NA, K, CO2, BUN, CREATININE, LABGLOM, GLUCOSE in the last 72 hours. PT/INR: No results for input(s): PROTIME, INR in the last 72 hours. FASTING LIPID PANEL:  Lab Results   Component Value Date    CHOL 107 02/22/2019    HDL 53 02/22/2019    LDLCHOLESTEROL 38 02/22/2019    TRIG 79 02/22/2019    CHOLHDLRATIO 2.0 02/22/2019     LIVER PROFILE:No results for input(s): AST, ALT, LABALBU in the last 72 hours.       IMPRESSION:    Patient Active Problem List   Diagnosis    HTN (hypertension)    Insulin resistance    Dyslipidemia    Osteoarthritis    Osteoporosis    Depression    Anxiety    CHF (congestive heart failure) ([address]    Hypothyroidism due to medication    Morbid obesity ([address]    Primary insomnia    Multiple closed fractures of ribs    JAYJAY on CPAP    Obesity    Hypertension    Glucose intolerance (impaired glucose tolerance)    A-fib (HCC)    Colon cancer (HCC)    Acute blood loss as cause of postoperative anemia    Other complete intestinal obstruction (HCC)    Elevated fasting glucose    BMI 50.0-59.9, adult (HCC)    Colostomy in place [facility])    Other specified hypothyroidism    Ileostomy status ([address]    Attention to ileostomy ([address] RECOMMENDATIONS:  - Pre op risk for repeat colon surgery. Based on Cath from 2017 and lack of symptoms, and physical status, can proceed at intermediate risk. - Sinus juan- decrease cardizem 120 mg qd    - CAD- History of CATH WITH MINIMAL CAD on cath 9/2017  NO CHEST PAIN, CONTINUE CURRENT MEDICATIONS    - HYPERTENSION- WELL CONTROLLED, WILL CONTINUE CURRENT MEDICATIONS    -H/O AFIB HAS BEEN IN NSR OFF COUMADIN DUE TO GI BLEED AND FALL WITH HEAD INJURY . - consider ASA after all her surgeries     -HYPERLIPIDEMIA- ON STATIN, NEEDS TO WATCH LIPID PROFILE AND LFT'S    -SLEEP APNEA , CONTINUE CPAP     - S/p Colon surgery for Colon Ca with ileostomy in place- plan for colonoscopy and then surgery to reverse ileostomy. Follow up in 6 months. Thank you for allowing me to participate in the care of this patient, please do not hesitate to call if you have any questions. Mal Hsu DO, [facility], [address] [facility]  [facility] with [facility] [place]  [phone]

## 2019-05-02 NOTE — LETTER
Cardiology Consultation  [facility] [address], [address] Michelle Nguyen  [phone]    5/2/19    Regarding: Afuaoneida   1940    To Whom It May Concern,    Patient is Intermediate Cardiac Risk for planned surgery. Special instructions:    Please continue other meds.     Thank you,    Lexii Islas DO  Cardiology  [facility]    Electronically signed by Lexii Islas DO

## 2019-05-06 ASSESSMENT — ENCOUNTER SYMPTOMS
DIARRHEA: 0
SHORTNESS OF BREATH: 0
RHINORRHEA: 0
WHEEZING: 0
VOMITING: 0
TROUBLE SWALLOWING: 0
COUGH: 0
NAUSEA: 0
SINUS PRESSURE: 0
SORE THROAT: 0
CONSTIPATION: 0
EYE REDNESS: 0
ABDOMINAL PAIN: 0
EYE DISCHARGE: 0

## 2019-05-06 NOTE — PROGRESS NOTES
2019    Ti Mccauley (:  1940) is a 77 y.o. female, here for a preoperative clearance for proposed ileostomy takedown scheduled on 19. Patient overall is doing relatively well. Does have planned ileostomy takedown scheduled for May 22. She will be seeing cardiology tomorrow for cardiac clearance. Currently does not have any acute concerns to discuss. She does have a known history of hypertension her blood pressure is stable and controlled on her current medical therapy. Has known Afib, but has remained in Normal sinus rhythm. No acute cardiac symptoms at this time. She does have a known history of impaired fasting glucose which has remained stable with dietary efforts. Also has a known history of dyslipidemia and her cholesterol levels have remained relatively stable and controlled with continued use of Lipitor. She has known hypothyroidism and her thyroid levels have remained stable and controlled on her current thyroid dose. Patient otherwise has no other acute medical concerns at this time. Health history is as reviewed below. Patient Active Problem List   Diagnosis    HTN (hypertension)    Insulin resistance    Dyslipidemia    Osteoarthritis    Osteoporosis    Depression    Anxiety    CHF (congestive heart failure) ([address]    Hypothyroidism due to medication    Morbid obesity ([address]    Primary insomnia    Multiple closed fractures of ribs    JAYJAY on CPAP    Obesity    Hypertension    Glucose intolerance (impaired glucose tolerance)    A-fib (HCC)    Colon cancer (HCC)    Acute blood loss as cause of postoperative anemia    Other complete intestinal obstruction (HCC)    Elevated fasting glucose    BMI 50.0-59.9, adult (HCC)    Colostomy in place [facility])    Other specified hypothyroidism    Ileostomy status ([facility] [address]    Attention to ileostomy [facility])     Preventative measures are reviewed today.  See health maintenance section for complete preventative plan of care. Review of Systems   Constitutional: Negative for chills, fatigue and fever. HENT: Negative for congestion, ear pain, postnasal drip, rhinorrhea, sinus pressure, sore throat and trouble swallowing. Eyes: Negative for discharge and redness. Respiratory: Negative for cough, shortness of breath and wheezing. Cardiovascular: Negative for chest pain. Gastrointestinal: Negative for abdominal pain, constipation, diarrhea, nausea and vomiting. Musculoskeletal: Negative for arthralgias, myalgias and neck pain. Skin: Negative for rash and wound. Allergic/Immunologic: Negative for environmental allergies. Neurological: Negative for dizziness, weakness, light-headedness and headaches. Hematological: Negative for adenopathy. Psychiatric/Behavioral: Negative. Prior to Visit Medications    Medication Sig Taking?  Authorizing Provider   metoprolol tartrate (LOPRESSOR) 25 MG tablet TAKE ONE TABLET BY MOUTH TWICE A DAY Yes Scott Khan DO   furosemide (LASIX) 20 MG tablet Take 1 tablet by mouth daily Yes Scott Khan DO   traZODone (DESYREL) 50 MG tablet Take 1 tablet by mouth nightly Yes Scott Khan DO   diclofenac (VOLTAREN) 75 MG EC tablet Take 1 tablet by mouth 2 times daily as needed for Pain Yes Scott Khan DO   amiodarone (CORDARONE) 200 MG tablet TAKE 1 TABLET BY MOUTH ONE TIME A DAY Yes Anival Casas DO   atorvastatin (LIPITOR) 40 MG tablet TAKE 1 TABLET BY MOUTH nightly Yes Scott Khan DO   acetaminophen (TYLENOL) 325 MG tablet Take 2 tablets by mouth every 4 hours as needed for Pain or Fever Yes Adilia Cartwright   levothyroxine (SYNTHROID) 50 MCG tablet TAKE 1 TABLET BY MOUTH ONE TIME A DAY  Yes Scott Khan DO   potassium chloride (KLOR-CON M) 20 MEQ extended release tablet Take 1 tablet by mouth daily Yes Scott Khan DO   diltiazem (CARDIZEM CD) 120 MG extended release capsule Take 1 capsule by mouth daily  Fredo Casas DO activity: Not on file   Lifestyle    Physical activity:     Days per week: Not on file     Minutes per session: Not on file    Stress: Not on file   Relationships    Social connections:     Talks on phone: Not on file     Gets together: Not on file     Attends Sabianism service: Not on file     Active member of club or organization: Not on file     Attends meetings of clubs or organizations: Not on file     Relationship status: Not on file    Intimate partner violence:     Fear of current or ex partner: Not on file     Emotionally abused: Not on file     Physically abused: Not on file     Forced sexual activity: Not on file   Other Topics Concern    Not on file   Social History Narrative    Not on file        Family History   Adopted: Yes   Problem Relation Age of Onset    High Blood Pressure Brother         adopted brother       ADVANCE DIRECTIVE: N, Not Received    Vitals:    05/01/19 1518   BP: 114/68   Site: Left Lower Arm   Position: Sitting   Cuff Size: Medium Adult   Pulse: 54   Resp: 18   Temp: 97 °F (36.1 °C)   TempSrc: Tympanic   SpO2: 95%   Weight: 292 lb (132.5 kg)   Height: 5' 7\" (1.702 m)     Estimated body mass index is 45.73 kg/m² as calculated from the following:    Height as of this encounter: 5' 7\" (1.702 m). Weight as of this encounter: 292 lb (132.5 kg). Physical Exam   Constitutional: She is oriented to person, place, and time. She appears well-developed and well-nourished. No distress. HENT:   Head: Normocephalic and atraumatic. Right Ear: External ear normal.   Left Ear: External ear normal.   Nose: Nose normal.   Mouth/Throat: Oropharynx is clear and moist. No oropharyngeal exudate. Eyes: Pupils are equal, round, and reactive to light. Conjunctivae and EOM are normal. Right eye exhibits no discharge. Left eye exhibits no discharge. Neck: Normal range of motion. Neck supple. No thyromegaly present. Cardiovascular: Normal rate, regular rhythm and normal heart sounds. Pulmonary/Chest: Effort normal and breath sounds normal. She has no wheezes. She has no rales. Abdominal: Soft. Bowel sounds are normal.   Musculoskeletal: She exhibits no edema. Lymphadenopathy:     She has no cervical adenopathy. Neurological: She is alert and oriented to person, place, and time. Skin: Skin is warm and dry. No rash noted. She is not diaphoretic. Psychiatric: She has a normal mood and affect. Her behavior is normal. Judgment and thought content normal.   Nursing note and vitals reviewed. No flowsheet data found. Lab Results   Component Value Date    CHOL 107 02/22/2019    CHOL 109 07/27/2018    CHOL 108 01/29/2018    TRIG 79 02/22/2019    TRIG 106 07/27/2018    TRIG 95 01/29/2018    HDL 53 02/22/2019    HDL 48 07/27/2018    HDL 48 01/29/2018    LDLCHOLESTEROL 38 02/22/2019    LDLCHOLESTEROL 40 07/27/2018    LDLCHOLESTEROL 41 01/29/2018    GLUCOSE 107 02/22/2019    GLUCOSE 107 02/22/2019    LABA1C 5.0 11/10/2018    LABA1C 5.1 07/27/2018    LABA1C 5.4 01/29/2018       The ASCVD Risk score (Estevan Addison, et al., 2013) failed to calculate for the following reasons: The valid total cholesterol range is 130 to 320 mg/dL    Immunization History   Administered Date(s) Administered    Influenza, High Dose (Fluzone 65 yrs and older) 10/28/2016, 09/13/2017, 09/21/2018    Pneumococcal 13-valent Conjugate ([id number]) 07/19/2017    Pneumococcal Polysaccharide ([id number]) 08/29/2018    Td, unspecified formulation 09/27/2018       Health Maintenance   Topic Date Due    Shingles Vaccine (1 of 2) 10/22/1989    DTaP/Tdap/Td vaccine (1 - Tdap) 09/28/2018    TSH testing  02/22/2020    Potassium monitoring  02/22/2020    Creatinine monitoring  02/22/2020    DEXA (modify frequency per FRAX score)  Completed    Flu vaccine  Completed    Pneumococcal 65+ years Vaccine  Completed       ASSESSMENT/PLAN:  Encounter Diagnoses   Name Primary?     Essential hypertension Yes    Impaired fasting glucose     Paroxysmal atrial fibrillation (HCC)     Acquired hypothyroidism     Ile[facility] in place [facility])     Preoperative clearance      No orders of the defined types were placed in this encounter. Orders Placed This Encounter   Procedures    Basic Metabolic Panel     Standing Status:   Future     Standing Expiration Date:   4/30/2020    CBC Auto Differential     Standing Status:   Future     Standing Expiration Date:   4/30/2020     Patient will get updated lab work to ensure that her blood count and basic metabolic panel are essentially normal.  As long as these are relatively normal without significant abnormality patient will be cleared medically for upcoming proposed ileostomy takedown. Patient is pursuing cardiac clearance and will be seeing the cardiologist tomorrow. Did review her chronic health conditions and current symptomatology today. Based on review of history and physical examination today patient is medically stable and cleared for upcoming proposed ileostomy takedown as scheduled. We'll make further recommendations once testing reports as ordered are available. I did ask her to hold off on getting the lab work until she sees Dr. Woodfin Felty on May 7 in case he wants to order additional lab testing preoperatively. She will need to hold her diclofenac 7 days prior to procedural intervention. She is only using this on a when necessary basis and uses it relatively sparingly at this point. Patient may continue on the rest of her chronic medical therapy as ordered. Patient is to return to my office as scheduled for her routine medical follow-up visit or sooner if any further problems or symptoms arise. (Please note that portions of this note were completed with a voice recognition program. Efforts were made to edit the dictations but occasionally words are mis-transcribed.)        No follow-ups on file. An electronic signature was used to authenticate this note.     --Karine Quinn Peter Poe DO on 5/6/2019 at 1:01 PM

## 2019-05-07 ENCOUNTER — OFFICE VISIT (OUTPATIENT)
Dept: SURGERY | Age: 79
End: 2019-05-07
Payer: MEDICARE

## 2019-05-07 ENCOUNTER — HOSPITAL ENCOUNTER (OUTPATIENT)
Dept: LAB | Age: 79
Discharge: HOME | End: 2019-05-07
Payer: MEDICARE

## 2019-05-07 VITALS
HEIGHT: 67 IN | SYSTOLIC BLOOD PRESSURE: 130 MMHG | BODY MASS INDEX: 45.83 KG/M2 | DIASTOLIC BLOOD PRESSURE: 66 MMHG | HEART RATE: 60 BPM | WEIGHT: 292 LBS

## 2019-05-07 DIAGNOSIS — I10 ESSENTIAL HYPERTENSION: ICD-10-CM

## 2019-05-07 DIAGNOSIS — Z93.2 ILEOSTOMY STATUS (HCC): Primary | ICD-10-CM

## 2019-05-07 LAB
ABSOLUTE EOS #: 0.28 K/UL (ref 0–0.4)
ABSOLUTE IMMATURE GRANULOCYTE: ABNORMAL K/UL (ref 0–0.3)
ABSOLUTE LYMPH #: 1.28 K/UL (ref 1–4.8)
ABSOLUTE MONO #: 0.5 K/UL (ref 0.1–1.2)
ANION GAP SERPL CALCULATED.3IONS-SCNC: 13 MMOL/L (ref 9–17)
BASOPHILS # BLD: 2 % (ref 0–1)
BASOPHILS ABSOLUTE: 0.14 K/UL (ref 0–0.2)
BUN BLDV-MCNC: 23 MG/DL (ref 8–23)
BUN/CREAT BLD: 25 (ref 9–20)
CALCIUM SERPL-MCNC: 9.8 MG/DL (ref 8.6–10.4)
CHLORIDE BLD-SCNC: 105 MMOL/L (ref 98–107)
CO2: 26 MMOL/L (ref 20–31)
CREAT SERPL-MCNC: 0.93 MG/DL (ref 0.5–0.9)
DIFFERENTIAL TYPE: ABNORMAL
EOSINOPHILS RELATIVE PERCENT: 4 % (ref 1–7)
GFR AFRICAN AMERICAN: >60 ML/MIN
GFR NON-AFRICAN AMERICAN: 58 ML/MIN
GFR SERPL CREATININE-BSD FRML MDRD: ABNORMAL ML/MIN/{1.73_M2}
GFR SERPL CREATININE-BSD FRML MDRD: ABNORMAL ML/MIN/{1.73_M2}
GLUCOSE BLD-MCNC: 101 MG/DL (ref 70–99)
HCT VFR BLD CALC: 43.2 % (ref 36–46)
HEMOGLOBIN: 13.8 G/DL (ref 12–16)
IMMATURE GRANULOCYTES: ABNORMAL %
LYMPHOCYTES # BLD: 18 % (ref 16–46)
MCH RBC QN AUTO: 30 PG (ref 26–34)
MCHC RBC AUTO-ENTMCNC: 31.9 G/DL (ref 31–37)
MCV RBC AUTO: 94.1 FL (ref 80–100)
MONOCYTES # BLD: 7 % (ref 4–11)
MORPHOLOGY: ABNORMAL
NRBC AUTOMATED: ABNORMAL PER 100 WBC
PDW BLD-RTO: 19.5 % (ref 11–14.5)
PLATELET # BLD: 131 K/UL (ref 140–450)
PLATELET ESTIMATE: ABNORMAL
PMV BLD AUTO: 12.3 FL (ref 6–12)
POTASSIUM SERPL-SCNC: 3.8 MMOL/L (ref 3.7–5.3)
RBC # BLD: 4.59 M/UL (ref 4–5.2)
RBC # BLD: ABNORMAL 10*6/UL
SEG NEUTROPHILS: 69 % (ref 43–77)
SEGMENTED NEUTROPHILS ABSOLUTE COUNT: 4.9 K/UL (ref 1.8–7.7)
SODIUM BLD-SCNC: 144 MMOL/L (ref 135–144)
WBC # BLD: 7.1 K/UL (ref 3.5–11)
WBC # BLD: ABNORMAL 10*3/UL

## 2019-05-07 PROCEDURE — 80048 BASIC METABOLIC PNL TOTAL CA: CPT

## 2019-05-07 PROCEDURE — 1036F TOBACCO NON-USER: CPT | Performed by: SURGERY

## 2019-05-07 PROCEDURE — 1123F ACP DISCUSS/DSCN MKR DOCD: CPT | Performed by: SURGERY

## 2019-05-07 PROCEDURE — 99214 OFFICE O/P EST MOD 30 MIN: CPT | Performed by: SURGERY

## 2019-05-07 PROCEDURE — G8417 CALC BMI ABV UP PARAM F/U: HCPCS | Performed by: SURGERY

## 2019-05-07 PROCEDURE — G8399 PT W/DXA RESULTS DOCUMENT: HCPCS | Performed by: SURGERY

## 2019-05-07 PROCEDURE — 85025 COMPLETE CBC W/AUTO DIFF WBC: CPT

## 2019-05-07 PROCEDURE — 1090F PRES/ABSN URINE INCON ASSESS: CPT | Performed by: SURGERY

## 2019-05-07 PROCEDURE — G8427 DOCREV CUR MEDS BY ELIG CLIN: HCPCS | Performed by: SURGERY

## 2019-05-07 PROCEDURE — 4040F PNEUMOC VAC/ADMIN/RCVD: CPT | Performed by: SURGERY

## 2019-05-07 PROCEDURE — 36415 COLL VENOUS BLD VENIPUNCTURE: CPT

## 2019-05-07 NOTE — PROGRESS NOTES
Christa Mcfarland is a 77 y.o. female          CC:    . Other (ileostomy)      HISTORY OF PRESENT ILLNESS:    Patient is a 76-year-old femalewho had a open sigmoid colectomy for colon cancer on 11/5/2018. It was very difficult anastomosis and she had a diverting ileostomy. She now would like to have that reversed. She has no complaints from the rectum. .  She had a recent colonoscopy were only able to get upto about 7 cm. There was a stricture that would not allow the scope through. She then underwent a barium enema which showed no leak good flow through the stricture and not a tight stricture. She also get cardiac clearance    However, prior to ileostomy takedown. It would like to dilate the rectum stricture at least once to see if we get the scope through of 4 taking down the ileostomy. It may also need more than one dilatation. Review of Systems:    General:  Fever: Negative  Weight Change:Negative  Night Sweats: Negative    Eye:  Blurry Vision:Negative  Double Vision: Negative    Ent:  Headaches: Negative  Sore throat: Negative    Allergy/Immunology:  Hives: Negative  Latex allergy: Negative    Hematology/Lymphatic:  Bleeding Problems: Negative  Blood Clots: Negative  Swollen Lymph Nodes: Negative    Lungs:  Cough: Negative  SOB: Negative  Wheezing:Negative    Cardiovascular:  Chest Pain: Negative  Palpitations:Negative    GI:   Decreased Appetite: Negative  Heartburn: Negative  Dysphagia: Negative  Nausea/Vomiting: Negative  Abdominal Pain: Negative  Change in Bowels:Negative  Constipation: Negative  Diarrhea: Negative  Rectal Bleeding: Negative    :   Dysuria: Negative  Increase Urinary Frequency/Urgency: Negative    Neuro:  Seizures: Negative  Confusion: Negative        PAST MEDICAL HISTORY:        Family History   Adopted: Yes   Problem Relation Age of Onset    High Blood Pressure Brother         adopted brother     Social History     Socioeconomic History    Marital status:   Spouse name: Not on file    Number of children: Not on file    Years of education: Not on file    Highest education level: Not on file   Occupational History    Not on file   Social Needs    Financial resource strain: Not on file    Food insecurity:     Worry: Not on file     Inability: Not on file    Transportation needs:     Medical: Not on file     Non-medical: Not on file   Tobacco Use    Smoking status: Never Smoker    Smokeless tobacco: Never Used    Tobacco comment: syant 11/10/2018   Substance and Sexual Activity    Alcohol use: No    Drug use: No    Sexual activity: Not on file   Lifestyle    Physical activity:     Days per week: Not on file     Minutes per session: Not on file    Stress: Not on file   Relationships    Social connections:     Talks on phone: Not on file     Gets together: Not on file     Attends Restoration service: Not on file     Active member of club or organization: Not on file     Attends meetings of clubs or organizations: Not on file     Relationship status: Not on file    Intimate partner violence:     Fear of current or ex partner: Not on file     Emotionally abused: Not on file     Physically abused: Not on file     Forced sexual activity: Not on file   Other Topics Concern    Not on file   Social History Narrative    Not on file     Past Surgical History:   Procedure Laterality Date    CARDIAC CATHETERIZATION  09/05/2017    CHOLECYSTECTOMY  1960s    COLONOSCOPY N/A 10/8/2018    COLONOSCOPY WITH COLD ET HOT BIOPSIES ET POLYPECTOMIES performed by Marybeth Reyes MD at [address] 4/25/2019    COLONOSCOPY performed by Jordyn Rockwell MD at [place] [address] Left     KNEE ARTHROPLASTY Left     KNEE ARTHROPLASTY Right     [place] Bilateral 11/5/2018    CYSTO Bilateral Lighted stent placements performed by Toño Weaver MD at [address], PARTIAL, W/ANAST N/A 11/5/2018    Open Sigmoid Colectomy w/ lighted stents ILEOSTOMY PLACEMENT performed by Kayode Wilde MD at [address] [place] [address] Drive Right 1960s     Past Medical History:   Diagnosis Date    A-fib [facility]) 07/19/2017    CHF (congestive heart failure) (HCC)     Dyslipidemia     FH: total abdominal hysterectomy and bilateral salpingo-oophorectomy 1966    Glucose intolerance (impaired glucose tolerance)     Hypertension     Malignant neoplasm of sigmoid colon (HCC)     Obesity     Osteoarthritis      Current Outpatient Medications on File Prior to Visit   Medication Sig Dispense Refill    diltiazem (CARDIZEM CD) 120 MG extended release capsule Take 1 capsule by mouth daily 90 capsule 1    metoprolol tartrate (LOPRESSOR) 25 MG tablet TAKE ONE TABLET BY MOUTH TWICE A DAY 60 tablet 5    furosemide (LASIX) 20 MG tablet Take 1 tablet by mouth daily 30 tablet 5    traZODone (DESYREL) 50 MG tablet Take 1 tablet by mouth nightly 30 tablet 5    diclofenac (VOLTAREN) 75 MG EC tablet Take 1 tablet by mouth 2 times daily as needed for Pain 60 tablet 5    amiodarone (CORDARONE) 200 MG tablet TAKE 1 TABLET BY MOUTH ONE TIME A DAY 90 tablet 3    atorvastatin (LIPITOR) 40 MG tablet TAKE 1 TABLET BY MOUTH nightly 30 tablet 5    acetaminophen (TYLENOL) 325 MG tablet Take 2 tablets by mouth every 4 hours as needed for Pain or Fever 60 tablet 0    levothyroxine (SYNTHROID) 50 MCG tablet TAKE 1 TABLET BY MOUTH ONE TIME A DAY  90 tablet 1    potassium chloride (KLOR-CON M) 20 MEQ extended release tablet Take 1 tablet by mouth daily 30 tablet 5     No current facility-administered medications on file prior to visit.       Allergies as of 05/07/2019 - Review Complete 05/07/2019   Allergen Reaction Noted    Pcn [penicillins] Hives and Shortness Of Breath 07/17/2013    Bextra [valdecoxib] Diarrhea 07/17/2013           PHYSICAL EXAM:    Blood pressure 130/66, pulse 60, height 5' 7.01\" (1.702 m), weight 292 lb (132.5 kg),

## 2019-05-08 ENCOUNTER — TELEPHONE (OUTPATIENT)
Dept: SURGERY | Age: 79
End: 2019-05-08

## 2019-05-08 NOTE — TELEPHONE ENCOUNTER
[facility]    Pre-Operative Evaluation/Consultation    Name:  Darshan Organ                                         Age:  77 y.o. MRN:  [id number]       :  1940   Date:  2019         Sex: female    There were no encounter diagnoses. Surgeon:  Dr. Toya Gonzalez  Procedure (Planned):  Ileostomy takedown  Date Scheduled surgery: 2019      Attending : No att. providers found    Primary Physician: Osorio Gloria  Cardiologist: Chan Carroll    Type of Anesthesia Requested: General    Patient Medical history:   Allergies   Allergen Reactions    Pcn [Penicillins] Hives and Shortness Of Breath    Bextra [Valdecoxib] Diarrhea     Patient Active Problem List   Diagnosis    HTN (hypertension)    Insulin resistance    Dyslipidemia    Osteoarthritis    Osteoporosis    Depression    Anxiety    CHF (congestive heart failure) (HCC)    Hypothyroidism due to medication    Morbid obesity ([facility]    Primary insomnia    Multiple closed fractures of ribs    JAYJAY on CPAP    Obesity    Hypertension    Glucose intolerance (impaired glucose tolerance)    A-fib (HCC)    Colon cancer (HCC)    Acute blood loss as cause of postoperative anemia    Other complete intestinal obstruction (HCC)    Elevated fasting glucose    BMI 50.0-59.9, adult (HCC)    Colostomy in place [facility])    Other specified hypothyroidism    Ileostomy status ([facility]    Attention to ileostomy [facility])     Past Medical History:   Diagnosis Date    A-fib ([facility] 2017    CHF (congestive heart failure) (HCC)     Dyslipidemia     FH: total abdominal hysterectomy and bilateral salpingo-oophorectomy 1966    Glucose intolerance (impaired glucose tolerance)     Hypertension     Malignant neoplasm of sigmoid colon (HCC)     Obesity     Osteoarthritis      Past Surgical History:   Procedure Laterality Date    CARDIAC CATHETERIZATION  2017    CHOLECYSTECTOMY  1960s    COLONOSCOPY N/A 10/8/2018    COLONOSCOPY WITH COLD ET HOT BIOPSIES ET POLYPECTOMIES performed by Edna Chavez MD at [address] N/A 4/25/2019    COLONOSCOPY performed by Marvell Krabbe, MD at [place] [address] Left     KNEE ARTHROPLASTY Left     KNEE ARTHROPLASTY Right     [place] CYSTOSCOPY,INSERT URETERAL STENT Bilateral 11/5/2018    CYSTO Bilateral Lighted stent placements performed by Fiona Patricio MD at [address], PARTIAL, W/ANAST N/A 11/5/2018    Open Sigmoid Colectomy w/ lighted stents ILEOSTOMY PLACEMENT performed by Edna Chavez MD at [address] [place] [address] Right [facility]     Social History     Tobacco Use    Smoking status: Never Smoker    Smokeless tobacco: Never Used    Tobacco comment: syant 11/10/2018   Substance Use Topics    Alcohol use: No    Drug use: No     Medications:  Current Outpatient Medications   Medication Sig Dispense Refill    diltiazem (CARDIZEM CD) 120 MG extended release capsule Take 1 capsule by mouth daily 90 capsule 1    metoprolol tartrate (LOPRESSOR) 25 MG tablet TAKE ONE TABLET BY MOUTH TWICE A DAY 60 tablet 5    furosemide (LASIX) 20 MG tablet Take 1 tablet by mouth daily 30 tablet 5    traZODone (DESYREL) 50 MG tablet Take 1 tablet by mouth nightly 30 tablet 5    diclofenac (VOLTAREN) 75 MG EC tablet Take 1 tablet by mouth 2 times daily as needed for Pain 60 tablet 5    amiodarone (CORDARONE) 200 MG tablet TAKE 1 TABLET BY MOUTH ONE TIME A DAY 90 tablet 3    atorvastatin (LIPITOR) 40 MG tablet TAKE 1 TABLET BY MOUTH nightly 30 tablet 5    acetaminophen (TYLENOL) 325 MG tablet Take 2 tablets by mouth every 4 hours as needed for Pain or Fever 60 tablet 0    levothyroxine (SYNTHROID) 50 MCG tablet TAKE 1 TABLET BY MOUTH ONE TIME A DAY  90 tablet 1    potassium chloride (KLOR-CON M) 20 MEQ extended release tablet Take 1 tablet by mouth daily 30 tablet 5     No current facility-administered medications for this visit.       Scheduled Meds:  Continuous Infusions:  PRN Meds:. Prior to Admission medications    Medication Sig Start Date End Date Taking? Authorizing Provider   diltiazem (CARDIZEM CD) 120 MG extended release capsule Take 1 capsule by mouth daily 5/2/19   Fredo Casas, DO   metoprolol tartrate (LOPRESSOR) 25 MG tablet TAKE ONE TABLET BY MOUTH TWICE A DAY 2/26/19   Maren Gonzalez, DO   furosemide (LASIX) 20 MG tablet Take 1 tablet by mouth daily 2/26/19   Maren Gonzalez, DO   traZODone (DESYREL) 50 MG tablet Take 1 tablet by mouth nightly 2/26/19   Maren Gonzalez, DO   diclofenac (VOLTAREN) 75 MG EC tablet Take 1 tablet by mouth 2 times daily as needed for Pain 2/26/19   Maren Gonzalez, DO   amiodarone (CORDARONE) 200 MG tablet TAKE 1 TABLET BY MOUTH ONE TIME A DAY 1/28/19   Anival Casas, DO   atorvastatin (LIPITOR) 40 MG tablet TAKE 1 TABLET BY MOUTH nightly 1/25/19   Maren Gonzalez, DO   acetaminophen (TYLENOL) 325 MG tablet Take 2 tablets by mouth every 4 hours as needed for Pain or Fever 11/9/18 5/1/19  Major Jean-Pierre   levothyroxine (SYNTHROID) 50 MCG tablet TAKE 1 TABLET BY MOUTH ONE TIME A DAY  10/29/18   Maren Gonzalez, DO   potassium chloride (KLOR-CON M) 20 MEQ extended release tablet Take 1 tablet by mouth daily 12/5/17   Maren Gonzalez, DO     Vital Signs (Current) [unfilled]    Weight:   Wt Readings from Last 1 Encounters:   05/07/19 292 lb (132.5 kg)     Height:   Ht Readings from Last 1 Encounters:   05/07/19 5' 7.01\" (1.702 m)      BMI:  There is no height or weight on file to calculate BMI. Estimated body mass index is 45.72 kg/m² as calculated from the following:    Height as of 5/7/19: 5' 7.01\" (1.702 m). Weight as of 5/7/19: 292 lb (132.5 kg). body mass index is unknown because there is no height or weight on file.     Cardiac Clearance: cleared by [address] [place] [address] by Thang Echols Allyson   Appointment for surgery Clearance scheduled for:Date:5/1/2019     Preoperative

## 2019-05-09 ENCOUNTER — HOSPITAL ENCOUNTER (OUTPATIENT)
Age: 79
Setting detail: OUTPATIENT SURGERY
Discharge: HOME | End: 2019-05-09
Attending: SURGERY | Admitting: SURGERY
Payer: MEDICARE

## 2019-05-09 ENCOUNTER — ANESTHESIA EVENT (OUTPATIENT)
Dept: OPERATING ROOM | Age: 79
End: 2019-05-09
Payer: MEDICARE

## 2019-05-09 ENCOUNTER — ANESTHESIA (OUTPATIENT)
Dept: OPERATING ROOM | Age: 79
End: 2019-05-09
Payer: MEDICARE

## 2019-05-09 VITALS
TEMPERATURE: 97.6 F | HEIGHT: 67 IN | DIASTOLIC BLOOD PRESSURE: 56 MMHG | WEIGHT: 292 LBS | HEART RATE: 51 BPM | RESPIRATION RATE: 18 BRPM | OXYGEN SATURATION: 95 % | SYSTOLIC BLOOD PRESSURE: 109 MMHG | BODY MASS INDEX: 45.83 KG/M2

## 2019-05-09 VITALS — OXYGEN SATURATION: 97 % | DIASTOLIC BLOOD PRESSURE: 53 MMHG | SYSTOLIC BLOOD PRESSURE: 112 MMHG

## 2019-05-09 PROCEDURE — C1726 CATH, BAL DIL, NON-VASCULAR: HCPCS | Performed by: SURGERY

## 2019-05-09 PROCEDURE — 7100000011 HC PHASE II RECOVERY - ADDTL 15 MIN: Performed by: SURGERY

## 2019-05-09 PROCEDURE — 2580000003 HC RX 258: Performed by: SURGERY

## 2019-05-09 PROCEDURE — 6360000002 HC RX W HCPCS: Performed by: NURSE ANESTHETIST, CERTIFIED REGISTERED

## 2019-05-09 PROCEDURE — 3609008400 HC SIGMOIDOSCOPY DIAGNOSTIC: Performed by: SURGERY

## 2019-05-09 PROCEDURE — 3700000000 HC ANESTHESIA ATTENDED CARE: Performed by: SURGERY

## 2019-05-09 PROCEDURE — 7100000010 HC PHASE II RECOVERY - FIRST 15 MIN: Performed by: SURGERY

## 2019-05-09 PROCEDURE — 2709999900 HC NON-CHARGEABLE SUPPLY: Performed by: SURGERY

## 2019-05-09 PROCEDURE — 3700000001 HC ADD 15 MINUTES (ANESTHESIA): Performed by: SURGERY

## 2019-05-09 PROCEDURE — 45910 DILATION OF RECTAL NARROWING: CPT | Performed by: SURGERY

## 2019-05-09 RX ORDER — LIDOCAINE HYDROCHLORIDE 20 MG/ML
INJECTION, SOLUTION INFILTRATION; PERINEURAL PRN
Status: DISCONTINUED | OUTPATIENT
Start: 2019-05-09 | End: 2019-05-09

## 2019-05-09 RX ORDER — PROPOFOL 10 MG/ML
INJECTION, EMULSION INTRAVENOUS PRN
Status: DISCONTINUED | OUTPATIENT
Start: 2019-05-09 | End: 2019-05-09 | Stop reason: SDUPTHER

## 2019-05-09 RX ORDER — SODIUM CHLORIDE, SODIUM LACTATE, POTASSIUM CHLORIDE, CALCIUM CHLORIDE 600; 310; 30; 20 MG/100ML; MG/100ML; MG/100ML; MG/100ML
INJECTION, SOLUTION INTRAVENOUS CONTINUOUS
Status: DISCONTINUED | OUTPATIENT
Start: 2019-05-09 | End: 2019-05-09 | Stop reason: HOSPADM

## 2019-05-09 RX ADMIN — SODIUM CHLORIDE, POTASSIUM CHLORIDE, SODIUM LACTATE AND CALCIUM CHLORIDE: 600; 310; 30; 20 INJECTION, SOLUTION INTRAVENOUS at 15:30

## 2019-05-09 RX ADMIN — PROPOFOL 250 MG: 10 INJECTION, EMULSION INTRAVENOUS at 16:07

## 2019-05-09 ASSESSMENT — PAIN SCALES - GENERAL
PAINLEVEL_OUTOF10: 0

## 2019-05-09 ASSESSMENT — PAIN - FUNCTIONAL ASSESSMENT: PAIN_FUNCTIONAL_ASSESSMENT: 0-10

## 2019-05-09 NOTE — ANESTHESIA POSTPROCEDURE EVALUATION
Department of Anesthesiology  Postprocedure Note    Patient: Cecilia Rios  MRN: [id number]  YOB: 1940  Date of evaluation: 5/9/2019  Time:  4:32 PM     Procedure Summary     Date:  05/09/19 Room / Location:  [address]    Anesthesia Start:  2870 Anesthesia Stop:      Procedure:  Flexible Sigmoidoscopy with Dilatation of rectal stricture (N/A ) Diagnosis:  (rectal stricture)    Surgeon:  Conrad Mart MD Responsible Provider:      Anesthesia Type:  MAC, general ASA Status:  3          Anesthesia Type: MAC, general    Shankar Phase I: Shankar Score: 10    Shankar Phase II: Shankar Score: 8    Last vitals: Reviewed and per EMR flowsheets.        Anesthesia Post Evaluation    Patient location during evaluation: PACU  Patient participation: complete - patient participated  Level of consciousness: awake and alert  Pain score: 0  Airway patency: patent  Nausea & Vomiting: no nausea  Complications: no  Cardiovascular status: blood pressure returned to baseline and hemodynamically stable  Respiratory status: acceptable  Hydration status: euvolemic

## 2019-05-09 NOTE — ANESTHESIA PRE PROCEDURE
Department of Anesthesiology  Preprocedure Note       Name:  Dalia Perez   Age:  77 y.o.  :  1940                                          MRN:  [id number]         Date:  2019      Surgeon: Vickie Ram):  Saul Babin MD    Procedure: Sigmoidoscopy with Dilatation (N/A )    Medications prior to admission:   Prior to Admission medications    Medication Sig Start Date End Date Taking? Authorizing Provider   diltiazem (CARDIZEM CD) 120 MG extended release capsule Take 1 capsule by mouth daily 19  Yes Fredo Casas,    metoprolol tartrate (LOPRESSOR) 25 MG tablet TAKE ONE TABLET BY MOUTH TWICE A DAY 19  Yes Jesus Whalen DO   furosemide (LASIX) 20 MG tablet Take 1 tablet by mouth daily 19  Yes Jesus Whalen DO   traZODone (DESYREL) 50 MG tablet Take 1 tablet by mouth nightly 19  Yes Jesus Whalen DO   amiodarone (CORDARONE) 200 MG tablet TAKE 1 TABLET BY MOUTH ONE TIME A DAY 19  Yes Anival Casas DO   atorvastatin (LIPITOR) 40 MG tablet TAKE 1 TABLET BY MOUTH nightly 19  Yes Jesus Whalen DO   levothyroxine (SYNTHROID) 50 MCG tablet TAKE 1 TABLET BY MOUTH ONE TIME A DAY  10/29/18  Yes Jesus Whalen DO   potassium chloride (KLOR-CON M) 20 MEQ extended release tablet Take 1 tablet by mouth daily 17  Yes Jesus Whalen DO   diclofenac (VOLTAREN) 75 MG EC tablet Take 1 tablet by mouth 2 times daily as needed for Pain 19   Jesus Whalen DO   acetaminophen (TYLENOL) 325 MG tablet Take 2 tablets by mouth every 4 hours as needed for Pain or Fever 18  Ford Dickerson       Current medications:    Current Facility-Administered Medications   Medication Dose Route Frequency Provider Last Rate Last Dose    lactated ringers infusion   Intravenous Continuous Saul Babin MD           Allergies:     Allergies   Allergen Reactions    Pcn [Penicillins] Hives and Shortness Of Breath    Bextra [Valdecoxib] Diarrhea       Problem List: Patient Active Problem List   Diagnosis Code    HTN (hypertension) I10    Insulin resistance E88.81    Dyslipidemia E78.5    Osteoarthritis M19.90    Osteoporosis M81.0    Depression F32.9    Anxiety F41.9    CHF (congestive heart failure) ([facility] I50.9    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    Morbid obesity ([facility] E66.01    Primary insomnia F51.01    Multiple closed fractures of ribs S22.49XA    JAYJAY on CPAP G47.33, Z99.89    Obesity E66.9    Hypertension I10    Glucose intolerance (impaired glucose tolerance) R73.02    A-fib ([facility]) I48.91    Colon cancer ([facility]) C18.9    Acute blood loss as cause of postoperative anemia D62    Other complete intestinal obstruction ([facility]) K56.691    Elevated fasting glucose R73.01    BMI 50.0-59.9, adult ([facility]) Z68.43    Colostomy in place [facility]) Z93.3    Other specified hypothyroidism E03.8    Ileostomy status ([facility] Z93.2    Attention to ileostomy ([facility] Z43.2       Past Medical History:        Diagnosis Date    A-fib ([facility] 07/19/2017    CHF (congestive heart failure) ([facility])     Dyslipidemia     FH: total abdominal hysterectomy and bilateral salpingo-oophorectomy 1966    Glucose intolerance (impaired glucose tolerance)     Hypertension     Malignant neoplasm of sigmoid colon ([facility]     Obesity     Osteoarthritis        Past Surgical History:        Procedure Laterality Date    CARDIAC CATHETERIZATION  09/05/2017    CHOLECYSTECTOMY  1960s    COLONOSCOPY N/A 10/8/2018    COLONOSCOPY WITH COLD ET HOT BIOPSIES ET POLYPECTOMIES performed by Suleman Reno MD at [address] [place] [address] COLONOSCOPY N/A 4/25/2019    COLONOSCOPY performed by Terrell Watson MD at [place] [address] Left     KNEE ARTHROPLASTY Left     KNEE ARTHROPLASTY Right     [place] CYSTOSCOPY,INSERT URETERAL STENT Bilateral 11/5/2018    CYSTO Bilateral Lighted stent placements performed by Topher Hammer MD at [address] [place] [address], PARTIAL, W/ANAST N/A 11/5/2018    Open Sigmoid Colectomy w/ lighted stents ILEOSTOMY PLACEMENT performed by Kike Amaya MD at [address] [place] [address] Drive Right 1960s       Social History:    Social History     Tobacco Use    Smoking status: Never Smoker    Smokeless tobacco: Never Used    Tobacco comment: hernando 11/10/2018   Substance Use Topics    Alcohol use: No                                Counseling given: Not Answered  Comment: hernando 11/10/2018      Vital Signs (Current):   Vitals:    05/09/19 1501   BP: (!) 186/73   Pulse: 51   Resp: 16   Temp: 36.5 °C (97.7 °F)   TempSrc: Oral   SpO2: 98%   Weight: 292 lb (132.5 kg)   Height: 5' 7\" (1.702 m)                                              BP Readings from Last 3 Encounters:   05/09/19 (!) 186/73   05/07/19 130/66   05/02/19 130/64       NPO Status: Time of last liquid consumption: 2330                        Time of last solid consumption: 1900                        Date of last liquid consumption: 05/08/19(sip with med this am)                        Date of last solid food consumption: 05/07/19    BMI:   Wt Readings from Last 3 Encounters:   05/09/19 292 lb (132.5 kg)   05/07/19 292 lb (132.5 kg)   05/02/19 292 lb (132.5 kg)     Body mass index is 45.73 kg/m².     CBC:   Lab Results   Component Value Date    WBC 7.1 05/07/2019    RBC 4.59 05/07/2019    HGB 13.8 05/07/2019    HCT 43.2 05/07/2019    MCV 94.1 05/07/2019    RDW 19.5 05/07/2019     05/07/2019       CMP:   Lab Results   Component Value Date     05/07/2019    K 3.8 05/07/2019     05/07/2019    CO2 26 05/07/2019    BUN 23 05/07/2019    CREATININE 0.93 05/07/2019    GFRAA >60 05/07/2019    LABGLOM 58 05/07/2019    GLUCOSE 101 05/07/2019    PROT 7.5 02/22/2019    PROT 7.5 02/22/2019    CALCIUM 9.8 05/07/2019    BILITOT 0.37 02/22/2019    BILITOT 0.37 02/22/2019    ALKPHOS 108 02/22/2019    ALKPHOS 108 02/22/2019    AST 16 02/22/2019    AST 16 02/22/2019    ALT 13 02/22/2019    ALT 13 02/22/2019       POC Tests: No results for input(s): POCGLU, POCNA, POCK, POCCL, POCBUN, POCHEMO, POCHCT in the last 72 hours. Coags:   Lab Results   Component Value Date    PROTIME 16.6 10/08/2018    INR 1.6 10/08/2018    APTT 34.8 09/21/2018       HCG (If Applicable): No results found for: PREGTESTUR, PREGSERUM, HCG, HCGQUANT     ABGs: No results found for: PHART, PO2ART, WIQ1LKB, JZV1IMQ, BEART, T2FYATFH     Type & Screen (If Applicable):  No results found for: LABABO, LABRH    Anesthesia Evaluation    Airway: Mallampati: III  TM distance: >3 FB   Neck ROM: limited  Mouth opening: > = 3 FB Dental:    (+) upper dentures and lower dentures      Pulmonary:   (+) sleep apnea: on CPAP,  decreased breath sounds,                             Cardiovascular:    (+) hypertension:,         Rhythm: regular  Rate: normal                 ROS comment: HTN well controlled with medications, sees cardiology regularly     Neuro/Psych:   Negative Neuro/Psych ROS              GI/Hepatic/Renal:            ROS comment: H/O colon CA with resection. Endo/Other:    (+) hypothyroidism::., .                 Abdominal:           Vascular:                                        Anesthesia Plan      MAC and general     ASA 3       Induction: intravenous. Anesthetic plan and risks discussed with patient.       Plan discussed with surgical team.                  XIOMARA Cueva - CRNA   5/9/2019

## 2019-05-09 NOTE — OP NOTE
COLONOSCOPY PROCEDURE NOTE:      Pre op diagnosis:    1. Malignant neoplasm of sigmoid colon ([address]      2. H/O ileostomy             Operative Procedure:    1. Flexible sigmoidoscopy with balloon dilatation from 39 Fr  to 47 Fr    Surgeon:    Dr. Alesha Alanis     Anesthesia:    IV sedation per CRNA    EBL:  minimal    Procedure:    Patient was taken to the endoscopy suite and placed in a left lateral decubitus position. They were given IV sedation as mentioned above. A digital rectal exam was performed. There were no masses and anal tone was normal.      The gastroscope, was inserted through the anus and into the rectum. There was a stricture at about 7 cm from dentate line. We could not get a gastroscope through it. We started by doing a  balloon dilation at the stricture. We started at about 39 Fr and went up 3 more 54 Fr. No complains or efidence of any bleeding. Scope was easily p[assied throught the strictrue. The rest of the sigmoid colon was WNL. Final Diagnosis:    1. Stricture at 7 cm  2. S/p balloon dilation from 45 Fr to 54%    Plan:    1.proceed with ileostomy take done        ADDENDUM:  Endo Review :  6/30/2019    Pathology:    none    Plan:    1. Proceed with ileostomy take down.  (already done)  2.   Needs a repeat CS in 1 year from her last one in 11/2018

## 2019-05-09 NOTE — TELEPHONE ENCOUNTER
OK for [address] [place] [address], APRN - CRNA,MSN,5/9/2019 5:44 PM  Staff Anesthetist  [facility] [place] [facility] - [facility]  Dept of Anesthesia

## 2019-05-13 ENCOUNTER — TELEPHONE (OUTPATIENT)
Dept: SURGERY | Age: 79
End: 2019-05-13

## 2019-05-13 NOTE — TELEPHONE ENCOUNTER
Patient is aware of colonoscopy and barium enema results. She had appointment with Dr. Keshav Mendiola to discuss these results on 5/07/19.

## 2019-05-14 NOTE — H&P
Rufus Hernandez is a 77 y.o. female              CC:     .Other (ileostomy)        HISTORY OF PRESENT ILLNESS:     Patient is a 26-year-old femalewho had a open sigmoid colectomy for colon cancer on 11/5/2018. It was very difficult anastomosis and she had a diverting ileostomy. She now would like to have that reversed. She has no complaints from the rectum. .  She had a recent colonoscopy were only able to get upto about 7 cm. There was a stricture that would not allow the scope through. She then underwent a barium enema which showed no leak good flow through the stricture and not a tight stricture. She also get cardiac clearance     However, prior to ileostomy takedown. It would like to dilate the rectum stricture at least once to see if we get the scope through of 4 taking down the ileostomy. It may also need more than one dilatation.            Review of Systems:     General:  Fever: Negative  Weight Change:Negative  Night Sweats: Negative     Eye:  Blurry Vision:Negative  Double Vision: Negative     Ent:  Headaches: Negative  Sore throat: Negative     Allergy/Immunology:  Hives: Negative  Latex allergy: Negative     Hematology/Lymphatic:  Bleeding Problems: Negative  Blood Clots: Negative  Swollen Lymph Nodes: Negative     Lungs:  Cough: Negative  SOB: Negative  Wheezing:Negative     Cardiovascular:  Chest Pain: Negative  Palpitations:Negative     GI:   Decreased Appetite: Negative  Heartburn: Negative  Dysphagia: Negative  Nausea/Vomiting: Negative  Abdominal Pain: Negative  Change in Bowels:Negative  Constipation: Negative  Diarrhea: Negative  Rectal Bleeding: Negative     :   Dysuria: Negative  Increase Urinary Frequency/Urgency: Negative     Neuro:  Seizures: Negative  Confusion: Negative           PAST MEDICAL HISTORY:           Family History         Family History   Adopted: Yes   Problem Relation Age of Onset    High Blood Pressure Brother           adopted brother         Social History

## 2019-05-22 ENCOUNTER — ANESTHESIA (OUTPATIENT)
Dept: OPERATING ROOM | Age: 79
DRG: 330 | End: 2019-05-22
Payer: MEDICARE

## 2019-05-22 ENCOUNTER — ANESTHESIA EVENT (OUTPATIENT)
Dept: OPERATING ROOM | Age: 79
DRG: 330 | End: 2019-05-22
Payer: MEDICARE

## 2019-05-22 ENCOUNTER — HOSPITAL ENCOUNTER (INPATIENT)
Age: 79
LOS: 3 days | Discharge: HOME | DRG: 330 | End: 2019-05-25
Attending: SURGERY | Admitting: SURGERY
Payer: MEDICARE

## 2019-05-22 VITALS
SYSTOLIC BLOOD PRESSURE: 140 MMHG | DIASTOLIC BLOOD PRESSURE: 62 MMHG | TEMPERATURE: 96.1 F | OXYGEN SATURATION: 98 % | RESPIRATION RATE: 3 BRPM

## 2019-05-22 DIAGNOSIS — G89.18 POSTOPERATIVE PAIN: Primary | ICD-10-CM

## 2019-05-22 PROBLEM — Z90.49 S/P SMALL BOWEL RESECTION: Status: ACTIVE | Noted: 2019-05-22

## 2019-05-22 LAB
ABSOLUTE EOS #: 0.1 K/UL (ref 0–0.4)
ABSOLUTE IMMATURE GRANULOCYTE: ABNORMAL K/UL (ref 0–0.3)
ABSOLUTE LYMPH #: 0.9 K/UL (ref 1–4.8)
ABSOLUTE MONO #: 0.4 K/UL (ref 0.1–1.2)
ALBUMIN SERPL-MCNC: 3.9 G/DL (ref 3.5–5.2)
ALBUMIN/GLOBULIN RATIO: 1.4 (ref 1–2.5)
ALP BLD-CCNC: 105 U/L (ref 35–104)
ALT SERPL-CCNC: 15 U/L (ref 5–33)
ANION GAP SERPL CALCULATED.3IONS-SCNC: 12 MMOL/L (ref 9–17)
AST SERPL-CCNC: 17 U/L
BASOPHILS # BLD: 0 % (ref 0–1)
BASOPHILS ABSOLUTE: 0 K/UL (ref 0–0.2)
BILIRUB SERPL-MCNC: 0.77 MG/DL (ref 0.3–1.2)
BILIRUBIN DIRECT: 0.22 MG/DL
BILIRUBIN, INDIRECT: 0.55 MG/DL (ref 0–1)
BUN BLDV-MCNC: 16 MG/DL (ref 8–23)
CALCIUM SERPL-MCNC: 9.1 MG/DL (ref 8.6–10.4)
CHLORIDE BLD-SCNC: 104 MMOL/L (ref 98–107)
CO2: 24 MMOL/L (ref 20–31)
CREAT SERPL-MCNC: 0.69 MG/DL (ref 0.5–0.9)
DIFFERENTIAL TYPE: ABNORMAL
EOSINOPHILS RELATIVE PERCENT: 1 % (ref 1–7)
GFR AFRICAN AMERICAN: >60 ML/MIN
GFR NON-AFRICAN AMERICAN: >60 ML/MIN
GFR SERPL CREATININE-BSD FRML MDRD: ABNORMAL ML/MIN/{1.73_M2}
GFR SERPL CREATININE-BSD FRML MDRD: ABNORMAL ML/MIN/{1.73_M2}
GLUCOSE BLD-MCNC: 122 MG/DL (ref 70–99)
HCT VFR BLD CALC: 41.1 % (ref 36–46)
HEMOGLOBIN: 13.3 G/DL (ref 12–16)
IMMATURE GRANULOCYTES: ABNORMAL %
LYMPHOCYTES # BLD: 10 % (ref 16–46)
MAGNESIUM: 1.9 MG/DL (ref 1.6–2.6)
MCH RBC QN AUTO: 30.6 PG (ref 26–34)
MCHC RBC AUTO-ENTMCNC: 32.4 G/DL (ref 31–37)
MCV RBC AUTO: 94.5 FL (ref 80–100)
MONOCYTES # BLD: 5 % (ref 4–11)
NRBC AUTOMATED: ABNORMAL PER 100 WBC
PDW BLD-RTO: 16.9 % (ref 11–14.5)
PLATELET # BLD: 123 K/UL (ref 140–450)
PLATELET ESTIMATE: ABNORMAL
PMV BLD AUTO: 11.7 FL (ref 6–12)
POTASSIUM SERPL-SCNC: 3.4 MMOL/L (ref 3.7–5.3)
RBC # BLD: 4.35 M/UL (ref 4–5.2)
RBC # BLD: ABNORMAL 10*6/UL
SEG NEUTROPHILS: 84 % (ref 43–77)
SEGMENTED NEUTROPHILS ABSOLUTE COUNT: 7.8 K/UL (ref 1.8–7.7)
SODIUM BLD-SCNC: 140 MMOL/L (ref 135–144)
TOTAL PROTEIN: 6.6 G/DL (ref 6.4–8.3)
WBC # BLD: 9.3 K/UL (ref 3.5–11)
WBC # BLD: ABNORMAL 10*3/UL

## 2019-05-22 PROCEDURE — 3600000004 HC SURGERY LEVEL 4 BASE: Performed by: SURGERY

## 2019-05-22 PROCEDURE — 1200000000 HC SEMI PRIVATE

## 2019-05-22 PROCEDURE — 3600000014 HC SURGERY LEVEL 4 ADDTL 15MIN: Performed by: SURGERY

## 2019-05-22 PROCEDURE — 94760 N-INVAS EAR/PLS OXIMETRY 1: CPT

## 2019-05-22 PROCEDURE — 83735 ASSAY OF MAGNESIUM: CPT

## 2019-05-22 PROCEDURE — 99232 SBSQ HOSP IP/OBS MODERATE 35: CPT | Performed by: INTERNAL MEDICINE

## 2019-05-22 PROCEDURE — 3700000000 HC ANESTHESIA ATTENDED CARE: Performed by: SURGERY

## 2019-05-22 PROCEDURE — C9113 INJ PANTOPRAZOLE SODIUM, VIA: HCPCS | Performed by: SURGERY

## 2019-05-22 PROCEDURE — 2720000010 HC SURG SUPPLY STERILE: Performed by: SURGERY

## 2019-05-22 PROCEDURE — 64488 TAP BLOCK BI INJECTION: CPT | Performed by: NURSE ANESTHETIST, CERTIFIED REGISTERED

## 2019-05-22 PROCEDURE — 6360000002 HC RX W HCPCS: Performed by: SURGERY

## 2019-05-22 PROCEDURE — 94150 VITAL CAPACITY TEST: CPT

## 2019-05-22 PROCEDURE — 2580000003 HC RX 258: Performed by: SURGERY

## 2019-05-22 PROCEDURE — 6360000002 HC RX W HCPCS: Performed by: NURSE ANESTHETIST, CERTIFIED REGISTERED

## 2019-05-22 PROCEDURE — 94640 AIRWAY INHALATION TREATMENT: CPT

## 2019-05-22 PROCEDURE — 85025 COMPLETE CBC W/AUTO DIFF WBC: CPT

## 2019-05-22 PROCEDURE — 36415 COLL VENOUS BLD VENIPUNCTURE: CPT

## 2019-05-22 PROCEDURE — 6370000000 HC RX 637 (ALT 250 FOR IP): Performed by: INTERNAL MEDICINE

## 2019-05-22 PROCEDURE — 80053 COMPREHEN METABOLIC PANEL: CPT

## 2019-05-22 PROCEDURE — 2709999900 HC NON-CHARGEABLE SUPPLY: Performed by: SURGERY

## 2019-05-22 PROCEDURE — 2500000003 HC RX 250 WO HCPCS: Performed by: NURSE ANESTHETIST, CERTIFIED REGISTERED

## 2019-05-22 PROCEDURE — 94664 DEMO&/EVAL PT USE INHALER: CPT

## 2019-05-22 PROCEDURE — 7100000000 HC PACU RECOVERY - FIRST 15 MIN: Performed by: SURGERY

## 2019-05-22 PROCEDURE — 6360000002 HC RX W HCPCS: Performed by: INTERNAL MEDICINE

## 2019-05-22 PROCEDURE — 2500000003 HC RX 250 WO HCPCS: Performed by: SURGERY

## 2019-05-22 PROCEDURE — 7100000001 HC PACU RECOVERY - ADDTL 15 MIN: Performed by: SURGERY

## 2019-05-22 PROCEDURE — 6370000000 HC RX 637 (ALT 250 FOR IP): Performed by: SURGERY

## 2019-05-22 PROCEDURE — 88304 TISSUE EXAM BY PATHOLOGIST: CPT

## 2019-05-22 PROCEDURE — 82248 BILIRUBIN DIRECT: CPT

## 2019-05-22 PROCEDURE — 3700000001 HC ADD 15 MINUTES (ANESTHESIA): Performed by: SURGERY

## 2019-05-22 PROCEDURE — 0DBB0ZZ EXCISION OF ILEUM, OPEN APPROACH: ICD-10-PCS | Performed by: SURGERY

## 2019-05-22 RX ORDER — ONDANSETRON 2 MG/ML
INJECTION INTRAMUSCULAR; INTRAVENOUS PRN
Status: DISCONTINUED | OUTPATIENT
Start: 2019-05-22 | End: 2019-05-22 | Stop reason: SDUPTHER

## 2019-05-22 RX ORDER — ALBUTEROL SULFATE 2.5 MG/3ML
2.5 SOLUTION RESPIRATORY (INHALATION)
Status: DISCONTINUED | OUTPATIENT
Start: 2019-05-22 | End: 2019-05-22 | Stop reason: SDUPTHER

## 2019-05-22 RX ORDER — EPHEDRINE SULFATE 50 MG/ML
INJECTION, SOLUTION INTRAVENOUS PRN
Status: DISCONTINUED | OUTPATIENT
Start: 2019-05-22 | End: 2019-05-22 | Stop reason: SDUPTHER

## 2019-05-22 RX ORDER — POTASSIUM CHLORIDE 20 MEQ/1
20 TABLET, EXTENDED RELEASE ORAL DAILY
Status: DISCONTINUED | OUTPATIENT
Start: 2019-05-22 | End: 2019-05-25 | Stop reason: HOSPADM

## 2019-05-22 RX ORDER — SODIUM CHLORIDE FOR INHALATION 0.9 %
3 VIAL, NEBULIZER (ML) INHALATION
Status: DISCONTINUED | OUTPATIENT
Start: 2019-05-22 | End: 2019-05-22 | Stop reason: ALTCHOICE

## 2019-05-22 RX ORDER — FENTANYL CITRATE 50 UG/ML
50 INJECTION, SOLUTION INTRAMUSCULAR; INTRAVENOUS EVERY 5 MIN PRN
Status: DISCONTINUED | OUTPATIENT
Start: 2019-05-22 | End: 2019-05-22 | Stop reason: HOSPADM

## 2019-05-22 RX ORDER — DILTIAZEM HYDROCHLORIDE 120 MG/1
120 CAPSULE, COATED, EXTENDED RELEASE ORAL DAILY
Status: DISCONTINUED | OUTPATIENT
Start: 2019-05-23 | End: 2019-05-25 | Stop reason: HOSPADM

## 2019-05-22 RX ORDER — NEOSTIGMINE METHYLSULFATE 1 MG/ML
INJECTION, SOLUTION INTRAVENOUS PRN
Status: DISCONTINUED | OUTPATIENT
Start: 2019-05-22 | End: 2019-05-22 | Stop reason: SDUPTHER

## 2019-05-22 RX ORDER — ALBUTEROL SULFATE 2.5 MG/3ML
2.5 SOLUTION RESPIRATORY (INHALATION)
Status: DISCONTINUED | OUTPATIENT
Start: 2019-05-22 | End: 2019-05-25 | Stop reason: HOSPADM

## 2019-05-22 RX ORDER — MEPERIDINE HYDROCHLORIDE 50 MG/ML
12.5 INJECTION INTRAMUSCULAR; INTRAVENOUS; SUBCUTANEOUS EVERY 5 MIN PRN
Status: DISCONTINUED | OUTPATIENT
Start: 2019-05-22 | End: 2019-05-22 | Stop reason: HOSPADM

## 2019-05-22 RX ORDER — ROCURONIUM BROMIDE 10 MG/ML
INJECTION, SOLUTION INTRAVENOUS PRN
Status: DISCONTINUED | OUTPATIENT
Start: 2019-05-22 | End: 2019-05-22 | Stop reason: SDUPTHER

## 2019-05-22 RX ORDER — SODIUM CHLORIDE 0.9 % (FLUSH) 0.9 %
10 SYRINGE (ML) INJECTION PRN
Status: DISCONTINUED | OUTPATIENT
Start: 2019-05-22 | End: 2019-05-25 | Stop reason: HOSPADM

## 2019-05-22 RX ORDER — HYDROCODONE BITARTRATE AND ACETAMINOPHEN 5; 325 MG/1; MG/1
2 TABLET ORAL PRN
Status: DISCONTINUED | OUTPATIENT
Start: 2019-05-22 | End: 2019-05-22 | Stop reason: HOSPADM

## 2019-05-22 RX ORDER — ALBUTEROL SULFATE 2.5 MG/3ML
2.5 SOLUTION RESPIRATORY (INHALATION) 3 TIMES DAILY
Status: DISCONTINUED | OUTPATIENT
Start: 2019-05-22 | End: 2019-05-25 | Stop reason: HOSPADM

## 2019-05-22 RX ORDER — SODIUM CHLORIDE, SODIUM LACTATE, POTASSIUM CHLORIDE, CALCIUM CHLORIDE 600; 310; 30; 20 MG/100ML; MG/100ML; MG/100ML; MG/100ML
INJECTION, SOLUTION INTRAVENOUS CONTINUOUS
Status: DISCONTINUED | OUTPATIENT
Start: 2019-05-22 | End: 2019-05-22

## 2019-05-22 RX ORDER — HYDROCODONE BITARTRATE AND ACETAMINOPHEN 5; 325 MG/1; MG/1
1 TABLET ORAL PRN
Status: DISCONTINUED | OUTPATIENT
Start: 2019-05-22 | End: 2019-05-22 | Stop reason: HOSPADM

## 2019-05-22 RX ORDER — LIDOCAINE HYDROCHLORIDE 20 MG/ML
INJECTION, SOLUTION INTRAVENOUS PRN
Status: DISCONTINUED | OUTPATIENT
Start: 2019-05-22 | End: 2019-05-22 | Stop reason: SDUPTHER

## 2019-05-22 RX ORDER — DEXTROSE, SODIUM CHLORIDE, AND POTASSIUM CHLORIDE 5; .45; .15 G/100ML; G/100ML; G/100ML
INJECTION INTRAVENOUS CONTINUOUS
Status: DISCONTINUED | OUTPATIENT
Start: 2019-05-22 | End: 2019-05-24

## 2019-05-22 RX ORDER — FUROSEMIDE 20 MG/1
20 TABLET ORAL DAILY
Status: DISCONTINUED | OUTPATIENT
Start: 2019-05-22 | End: 2019-05-25 | Stop reason: HOSPADM

## 2019-05-22 RX ORDER — LEVOTHYROXINE SODIUM 0.03 MG/1
50 TABLET ORAL DAILY
Status: DISCONTINUED | OUTPATIENT
Start: 2019-05-22 | End: 2019-05-25 | Stop reason: HOSPADM

## 2019-05-22 RX ORDER — HYDROCODONE BITARTRATE AND ACETAMINOPHEN 5; 325 MG/1; MG/1
1 TABLET ORAL EVERY 4 HOURS PRN
Status: DISCONTINUED | OUTPATIENT
Start: 2019-05-22 | End: 2019-05-25 | Stop reason: HOSPADM

## 2019-05-22 RX ORDER — ONDANSETRON 2 MG/ML
4 INJECTION INTRAMUSCULAR; INTRAVENOUS
Status: DISCONTINUED | OUTPATIENT
Start: 2019-05-22 | End: 2019-05-22 | Stop reason: HOSPADM

## 2019-05-22 RX ORDER — CIPROFLOXACIN 2 MG/ML
400 INJECTION, SOLUTION INTRAVENOUS EVERY 12 HOURS
Status: COMPLETED | OUTPATIENT
Start: 2019-05-22 | End: 2019-05-22

## 2019-05-22 RX ORDER — ATORVASTATIN CALCIUM 40 MG/1
40 TABLET, FILM COATED ORAL NIGHTLY
Status: DISCONTINUED | OUTPATIENT
Start: 2019-05-22 | End: 2019-05-25 | Stop reason: HOSPADM

## 2019-05-22 RX ORDER — MORPHINE SULFATE 2 MG/ML
1 INJECTION, SOLUTION INTRAMUSCULAR; INTRAVENOUS EVERY 5 MIN PRN
Status: DISCONTINUED | OUTPATIENT
Start: 2019-05-22 | End: 2019-05-22 | Stop reason: HOSPADM

## 2019-05-22 RX ORDER — DEXAMETHASONE SODIUM PHOSPHATE 4 MG/ML
INJECTION, SOLUTION INTRA-ARTICULAR; INTRALESIONAL; INTRAMUSCULAR; INTRAVENOUS; SOFT TISSUE PRN
Status: DISCONTINUED | OUTPATIENT
Start: 2019-05-22 | End: 2019-05-22 | Stop reason: SDUPTHER

## 2019-05-22 RX ORDER — SODIUM CHLORIDE, SODIUM LACTATE, POTASSIUM CHLORIDE, CALCIUM CHLORIDE 600; 310; 30; 20 MG/100ML; MG/100ML; MG/100ML; MG/100ML
INJECTION, SOLUTION INTRAVENOUS CONTINUOUS
Status: DISCONTINUED | OUTPATIENT
Start: 2019-05-22 | End: 2019-05-22 | Stop reason: SDUPTHER

## 2019-05-22 RX ORDER — FENTANYL CITRATE 50 UG/ML
INJECTION, SOLUTION INTRAMUSCULAR; INTRAVENOUS PRN
Status: DISCONTINUED | OUTPATIENT
Start: 2019-05-22 | End: 2019-05-22 | Stop reason: SDUPTHER

## 2019-05-22 RX ORDER — DIPHENHYDRAMINE HYDROCHLORIDE 50 MG/ML
12.5 INJECTION INTRAMUSCULAR; INTRAVENOUS
Status: DISCONTINUED | OUTPATIENT
Start: 2019-05-22 | End: 2019-05-22 | Stop reason: HOSPADM

## 2019-05-22 RX ORDER — PANTOPRAZOLE SODIUM 40 MG/10ML
40 INJECTION, POWDER, LYOPHILIZED, FOR SOLUTION INTRAVENOUS DAILY
Status: DISCONTINUED | OUTPATIENT
Start: 2019-05-22 | End: 2019-05-23

## 2019-05-22 RX ORDER — POTASSIUM CHLORIDE 20 MEQ/1
40 TABLET, EXTENDED RELEASE ORAL ONCE
Status: COMPLETED | OUTPATIENT
Start: 2019-05-22 | End: 2019-05-22

## 2019-05-22 RX ORDER — TRAZODONE HYDROCHLORIDE 50 MG/1
50 TABLET ORAL NIGHTLY
Status: DISCONTINUED | OUTPATIENT
Start: 2019-05-22 | End: 2019-05-25 | Stop reason: HOSPADM

## 2019-05-22 RX ORDER — GLYCOPYRROLATE 1 MG/5 ML
SYRINGE (ML) INTRAVENOUS PRN
Status: DISCONTINUED | OUTPATIENT
Start: 2019-05-22 | End: 2019-05-22 | Stop reason: SDUPTHER

## 2019-05-22 RX ORDER — ACETAMINOPHEN 10 MG/ML
INJECTION, SOLUTION INTRAVENOUS PRN
Status: DISCONTINUED | OUTPATIENT
Start: 2019-05-22 | End: 2019-05-22 | Stop reason: SDUPTHER

## 2019-05-22 RX ORDER — AMIODARONE HYDROCHLORIDE 200 MG/1
200 TABLET ORAL DAILY
Status: DISCONTINUED | OUTPATIENT
Start: 2019-05-23 | End: 2019-05-25 | Stop reason: HOSPADM

## 2019-05-22 RX ORDER — HYDROCODONE BITARTRATE AND ACETAMINOPHEN 5; 325 MG/1; MG/1
2 TABLET ORAL EVERY 4 HOURS PRN
Status: DISCONTINUED | OUTPATIENT
Start: 2019-05-22 | End: 2019-05-25 | Stop reason: HOSPADM

## 2019-05-22 RX ORDER — MORPHINE SULFATE 2 MG/ML
2 INJECTION, SOLUTION INTRAMUSCULAR; INTRAVENOUS EVERY 5 MIN PRN
Status: DISCONTINUED | OUTPATIENT
Start: 2019-05-22 | End: 2019-05-22 | Stop reason: HOSPADM

## 2019-05-22 RX ORDER — CIPROFLOXACIN 2 MG/ML
400 INJECTION, SOLUTION INTRAVENOUS
Status: COMPLETED | OUTPATIENT
Start: 2019-05-22 | End: 2019-05-22

## 2019-05-22 RX ORDER — PROPOFOL 10 MG/ML
INJECTION, EMULSION INTRAVENOUS PRN
Status: DISCONTINUED | OUTPATIENT
Start: 2019-05-22 | End: 2019-05-22 | Stop reason: SDUPTHER

## 2019-05-22 RX ORDER — 0.9 % SODIUM CHLORIDE 0.9 %
10 VIAL (ML) INJECTION DAILY
Status: DISCONTINUED | OUTPATIENT
Start: 2019-05-22 | End: 2019-05-25 | Stop reason: HOSPADM

## 2019-05-22 RX ORDER — FENTANYL CITRATE 50 UG/ML
25 INJECTION, SOLUTION INTRAMUSCULAR; INTRAVENOUS EVERY 5 MIN PRN
Status: DISCONTINUED | OUTPATIENT
Start: 2019-05-22 | End: 2019-05-22 | Stop reason: HOSPADM

## 2019-05-22 RX ORDER — ONDANSETRON 2 MG/ML
4 INJECTION INTRAMUSCULAR; INTRAVENOUS EVERY 6 HOURS PRN
Status: DISCONTINUED | OUTPATIENT
Start: 2019-05-22 | End: 2019-05-25 | Stop reason: HOSPADM

## 2019-05-22 RX ORDER — ROPIVACAINE HYDROCHLORIDE 10 MG/ML
INJECTION EPIDURAL; INFILTRATION; PERINEURAL PRN
Status: DISCONTINUED | OUTPATIENT
Start: 2019-05-22 | End: 2019-05-22 | Stop reason: SDUPTHER

## 2019-05-22 RX ORDER — SODIUM CHLORIDE 0.9 % (FLUSH) 0.9 %
10 SYRINGE (ML) INJECTION EVERY 12 HOURS SCHEDULED
Status: DISCONTINUED | OUTPATIENT
Start: 2019-05-22 | End: 2019-05-25 | Stop reason: HOSPADM

## 2019-05-22 RX ADMIN — HYDROMORPHONE HYDROCHLORIDE 0.25 MG: 1 INJECTION, SOLUTION INTRAMUSCULAR; INTRAVENOUS; SUBCUTANEOUS at 23:23

## 2019-05-22 RX ADMIN — ACETAMINOPHEN 1000 MG: 10 INJECTION, SOLUTION INTRAVENOUS at 09:35

## 2019-05-22 RX ADMIN — CIPROFLOXACIN 400 MG: 2 INJECTION, SOLUTION INTRAVENOUS at 08:14

## 2019-05-22 RX ADMIN — Medication 0.4 MG: at 10:47

## 2019-05-22 RX ADMIN — ROPIVACAINE HYDROCHLORIDE 200 MG: 10 INJECTION, SOLUTION EPIDURAL at 08:35

## 2019-05-22 RX ADMIN — DEXAMETHASONE SODIUM PHOSPHATE 4 MG: 4 INJECTION, SOLUTION INTRAMUSCULAR; INTRAVENOUS at 10:01

## 2019-05-22 RX ADMIN — METRONIDAZOLE 500 MG: 500 INJECTION, SOLUTION INTRAVENOUS at 09:14

## 2019-05-22 RX ADMIN — LEVOTHYROXINE SODIUM 50 MCG: 25 TABLET ORAL at 16:28

## 2019-05-22 RX ADMIN — ENOXAPARIN SODIUM 40 MG: 40 INJECTION SUBCUTANEOUS at 07:57

## 2019-05-22 RX ADMIN — Medication 0.2 MG: at 08:54

## 2019-05-22 RX ADMIN — PROPOFOL 120 MG: 10 INJECTION, EMULSION INTRAVENOUS at 08:20

## 2019-05-22 RX ADMIN — CIPROFLOXACIN 400 MG: 2 INJECTION, SOLUTION INTRAVENOUS at 20:33

## 2019-05-22 RX ADMIN — POTASSIUM CHLORIDE 20 MEQ: 20 TABLET, EXTENDED RELEASE ORAL at 16:28

## 2019-05-22 RX ADMIN — HYDROCODONE BITARTRATE AND ACETAMINOPHEN 2 TABLET: 5; 325 TABLET ORAL at 14:38

## 2019-05-22 RX ADMIN — SODIUM CHLORIDE, POTASSIUM CHLORIDE, SODIUM LACTATE AND CALCIUM CHLORIDE: 600; 310; 30; 20 INJECTION, SOLUTION INTRAVENOUS at 10:53

## 2019-05-22 RX ADMIN — ONDANSETRON 4 MG: 2 INJECTION INTRAMUSCULAR; INTRAVENOUS at 10:43

## 2019-05-22 RX ADMIN — ROCURONIUM BROMIDE 10 MG: 10 INJECTION, SOLUTION INTRAVENOUS at 09:15

## 2019-05-22 RX ADMIN — EPHEDRINE SULFATE 5 MG: 50 INJECTION, SOLUTION INTRAVENOUS at 09:13

## 2019-05-22 RX ADMIN — ROCURONIUM BROMIDE 15 MG: 10 INJECTION, SOLUTION INTRAVENOUS at 09:31

## 2019-05-22 RX ADMIN — ATORVASTATIN CALCIUM 40 MG: 40 TABLET, FILM COATED ORAL at 21:02

## 2019-05-22 RX ADMIN — HYDROMORPHONE HYDROCHLORIDE 0.5 MG: 1 INJECTION, SOLUTION INTRAMUSCULAR; INTRAVENOUS; SUBCUTANEOUS at 17:16

## 2019-05-22 RX ADMIN — LIDOCAINE HYDROCHLORIDE 50 MG: 20 INJECTION, SOLUTION INTRAVENOUS at 08:20

## 2019-05-22 RX ADMIN — ROCURONIUM BROMIDE 40 MG: 10 INJECTION, SOLUTION INTRAVENOUS at 08:20

## 2019-05-22 RX ADMIN — SODIUM CHLORIDE, POTASSIUM CHLORIDE, SODIUM LACTATE AND CALCIUM CHLORIDE: 600; 310; 30; 20 INJECTION, SOLUTION INTRAVENOUS at 08:03

## 2019-05-22 RX ADMIN — POTASSIUM CHLORIDE, DEXTROSE MONOHYDRATE AND SODIUM CHLORIDE: 150; 5; 450 INJECTION, SOLUTION INTRAVENOUS at 13:27

## 2019-05-22 RX ADMIN — Medication 0.2 MG: at 08:47

## 2019-05-22 RX ADMIN — METRONIDAZOLE 500 MG: 500 INJECTION, SOLUTION INTRAVENOUS at 23:25

## 2019-05-22 RX ADMIN — TRAZODONE HYDROCHLORIDE 50 MG: 50 TABLET ORAL at 22:57

## 2019-05-22 RX ADMIN — EPHEDRINE SULFATE 5 MG: 50 INJECTION, SOLUTION INTRAVENOUS at 09:15

## 2019-05-22 RX ADMIN — Medication 10 ML: at 13:28

## 2019-05-22 RX ADMIN — FENTANYL CITRATE 50 MCG: 50 INJECTION, SOLUTION INTRAMUSCULAR; INTRAVENOUS at 08:20

## 2019-05-22 RX ADMIN — ALBUTEROL SULFATE 2.5 MG: 2.5 SOLUTION RESPIRATORY (INHALATION) at 20:00

## 2019-05-22 RX ADMIN — FENTANYL CITRATE 50 MCG: 50 INJECTION, SOLUTION INTRAMUSCULAR; INTRAVENOUS at 09:47

## 2019-05-22 RX ADMIN — FUROSEMIDE 20 MG: 20 TABLET ORAL at 16:28

## 2019-05-22 RX ADMIN — SODIUM CHLORIDE, POTASSIUM CHLORIDE, SODIUM LACTATE AND CALCIUM CHLORIDE: 600; 310; 30; 20 INJECTION, SOLUTION INTRAVENOUS at 07:57

## 2019-05-22 RX ADMIN — PANTOPRAZOLE SODIUM 40 MG: 40 INJECTION, POWDER, FOR SOLUTION INTRAVENOUS at 13:28

## 2019-05-22 RX ADMIN — FENTANYL CITRATE 25 MCG: 50 INJECTION, SOLUTION INTRAMUSCULAR; INTRAVENOUS at 10:43

## 2019-05-22 RX ADMIN — PROPOFOL 40 MG: 10 INJECTION, EMULSION INTRAVENOUS at 08:26

## 2019-05-22 RX ADMIN — METRONIDAZOLE 500 MG: 500 INJECTION, SOLUTION INTRAVENOUS at 16:28

## 2019-05-22 RX ADMIN — ROCURONIUM BROMIDE 10 MG: 10 INJECTION, SOLUTION INTRAVENOUS at 10:25

## 2019-05-22 RX ADMIN — POTASSIUM CHLORIDE 40 MEQ: 20 TABLET, EXTENDED RELEASE ORAL at 16:28

## 2019-05-22 RX ADMIN — POTASSIUM CHLORIDE, DEXTROSE MONOHYDRATE AND SODIUM CHLORIDE: 150; 5; 450 INJECTION, SOLUTION INTRAVENOUS at 21:01

## 2019-05-22 RX ADMIN — Medication 3 MG: at 10:47

## 2019-05-22 ASSESSMENT — PULMONARY FUNCTION TESTS
PIF_VALUE: 23
PIF_VALUE: 24
PIF_VALUE: 23
PIF_VALUE: 24
PIF_VALUE: 24
PIF_VALUE: 23
PIF_VALUE: 25
PIF_VALUE: 24
PIF_VALUE: 22
PIF_VALUE: 2
PIF_VALUE: 24
PIF_VALUE: 23
PIF_VALUE: 22
PIF_VALUE: 24
PIF_VALUE: 23
PIF_VALUE: 25
PIF_VALUE: 22
PIF_VALUE: 24
PIF_VALUE: 23
PIF_VALUE: 23
PIF_VALUE: 24
PIF_VALUE: 24
PIF_VALUE: 23
PIF_VALUE: 24
PIF_VALUE: 24
PIF_VALUE: 25
PIF_VALUE: 24
PIF_VALUE: 24
PIF_VALUE: 4
PIF_VALUE: 24
PIF_VALUE: 23
PIF_VALUE: 24
PIF_VALUE: 23
PIF_VALUE: 3
PIF_VALUE: 24
PIF_VALUE: 23
PIF_VALUE: 24
PIF_VALUE: 23
PIF_VALUE: 5
PIF_VALUE: 23
PIF_VALUE: 2
PIF_VALUE: 24
PIF_VALUE: 23
PIF_VALUE: 24
PIF_VALUE: 24
PIF_VALUE: 20
PIF_VALUE: 23
PIF_VALUE: 23
PIF_VALUE: 24
PIF_VALUE: 23
PIF_VALUE: 22
PIF_VALUE: 23
PIF_VALUE: 24
PIF_VALUE: 23
PIF_VALUE: 24
PIF_VALUE: 23
PIF_VALUE: 24
PIF_VALUE: 23
PIF_VALUE: 24
PIF_VALUE: 22
PIF_VALUE: 24
PIF_VALUE: 22
PIF_VALUE: 25
PIF_VALUE: 24
PIF_VALUE: 16
PIF_VALUE: 24
PIF_VALUE: 23
PIF_VALUE: 2
PIF_VALUE: 23
PIF_VALUE: 24
PIF_VALUE: 24
PIF_VALUE: 23
PIF_VALUE: 23
PIF_VALUE: 24
PIF_VALUE: 25
PIF_VALUE: 24
PIF_VALUE: 23
PIF_VALUE: 24
PIF_VALUE: 23
PIF_VALUE: 24
PIF_VALUE: 23
PIF_VALUE: 3
PIF_VALUE: 23
PIF_VALUE: 24
PIF_VALUE: 21
PIF_VALUE: 23
PIF_VALUE: 19
PIF_VALUE: 24
PIF_VALUE: 24
PIF_VALUE: 25
PIF_VALUE: 23
PIF_VALUE: 24
PIF_VALUE: 23
PIF_VALUE: 25
PIF_VALUE: 23
PIF_VALUE: 23
PIF_VALUE: 25
PIF_VALUE: 24
PIF_VALUE: 23
PIF_VALUE: 24
PIF_VALUE: 23
PIF_VALUE: 25
PIF_VALUE: 23
PIF_VALUE: 23
PIF_VALUE: 2
PIF_VALUE: 24
PIF_VALUE: 23
PIF_VALUE: 24
PIF_VALUE: 21
PIF_VALUE: 3
PIF_VALUE: 24
PIF_VALUE: 24
PIF_VALUE: 23
PIF_VALUE: 24
PIF_VALUE: 23
PIF_VALUE: 25
PIF_VALUE: 23
PIF_VALUE: 23
PIF_VALUE: 24
PIF_VALUE: 23
PIF_VALUE: 24
PIF_VALUE: 24
PIF_VALUE: 28
PIF_VALUE: 23

## 2019-05-22 ASSESSMENT — PAIN SCALES - GENERAL
PAINLEVEL_OUTOF10: 0
PAINLEVEL_OUTOF10: 7
PAINLEVEL_OUTOF10: 0
PAINLEVEL_OUTOF10: 7
PAINLEVEL_OUTOF10: 5
PAINLEVEL_OUTOF10: 0
PAINLEVEL_OUTOF10: 0

## 2019-05-22 ASSESSMENT — PAIN - FUNCTIONAL ASSESSMENT: PAIN_FUNCTIONAL_ASSESSMENT: 0-10

## 2019-05-22 NOTE — PROGRESS NOTES
Hospitalist Progress Note    Patient:   Tim Leone     YOB: 1940    MRN: [id number]   Admit date: 5/22/2019     Acct: [de-identified]     PCP: DO SHEA Maldonado--Interval History: POD 0 take down loop ileostomy--prior surgeries for adenocarcinoma colon    K = 3.4    CKD--Stage 2    Thrombocytopenia----chronic--on Lovenox    Atrial fibrillation----history    CHF---chronic diastolic    ASCVD    See note below     Patient ocgo-dxxrtypgtj-tqjqfmwm-available records reviewed, including, but not limited to, OR reports--labs-office records---personal notes    All other ROS negative except noted in HPI    Diet:  Diet NPO Effective Now Exceptions are: Ice Chips, Sips with Meds, Popsicles    Medications:  Scheduled Meds:   sodium chloride flush  10 mL Intravenous 2 times per day    pantoprazole  40 mg Intravenous Daily    And    sodium chloride (PF)  10 mL Intravenous Daily    [START ON 5/23/2019] enoxaparin  40 mg Subcutaneous Daily    metroNIDAZOLE  500 mg Intravenous Q8H    And    ciprofloxacin  400 mg Intravenous Q12H     Continuous Infusions:   dextrose 5% and 0.45% NaCl with KCl 20 mEq 125 mL/hr at 05/22/19 1327     PRN Meds:sodium chloride flush, HYDROcodone 5 mg - acetaminophen **OR** HYDROcodone 5 mg - acetaminophen, HYDROmorphone **OR** HYDROmorphone, ondansetron    Objective:  Labs:  CBC with Differential:    Lab Results   Component Value Date    WBC 9.3 05/22/2019    RBC 4.35 05/22/2019    HGB 13.3 05/22/2019    HCT 41.1 05/22/2019     05/22/2019    MCV 94.5 05/22/2019    MCH 30.6 05/22/2019    MCHC 32.4 05/22/2019    RDW 16.9 05/22/2019    LYMPHOPCT 10 05/22/2019    MONOPCT 5 05/22/2019    BASOPCT 0 05/22/2019    MONOSABS 0.40 05/22/2019    LYMPHSABS 0.90 05/22/2019    EOSABS 0.10 05/22/2019    BASOSABS 0.00 05/22/2019    DIFFTYPE NOT REPORTED 05/22/2019     BMP:    Lab Results   Component Value Date     05/22/2019    K 3.4 05/22/2019     05/22/2019    CO2 24 05/22/2019    BUN 16 05/22/2019    LABALBU 3.9 05/22/2019    CREATININE 0.69 05/22/2019    CALCIUM 9.1 05/22/2019    GFRAA >60 05/22/2019    LABGLOM >60 05/22/2019    GLUCOSE 122 05/22/2019           Physical Exam:  Vitals: BP (!) 116/58   Pulse (!) 47   Temp 97.6 °F (36.4 °C)   Resp 16   Ht 5' 7.01\" (1.702 m)   Wt 292 lb (132.5 kg)   LMP 01/01/1968   SpO2 94%   BMI 45.72 kg/m²   24 hour intake/output:    Intake/Output Summary (Last 24 hours) at 5/22/2019 1432  Last data filed at 5/22/2019 1105  Gross per 24 hour   Intake 1100 ml   Output 200 ml   Net 900 ml     Last 3 weights: Wt Readings from Last 3 Encounters:   05/22/19 292 lb (132.5 kg)   05/09/19 292 lb (132.5 kg)   05/07/19 292 lb (132.5 kg)     HEENT: Normocephalic and Atraumatic  Neck: Supple, No Masses, Tenderness, Nodularity and No Lymphadenopathy  Chest/Lungs: Clear to Auscultation without Rales, Rhonchi, or Wheezes  Cardiac: Regular Rate and Rhythm  GI/Abdomen: Bowel Sounds Present and Soft, expected tenderness without Guarding or Rebound Tenderness--dressing RLQ--saturated serosanguinous fluid--no cony blood  : Not examined  EXT/Skin: SCDs----, No Edema, No Cyanosis and No Clubbing  Neuro: Alert and Oriented and No Localizing Signs/Symptoms      Assessment:    Active Problems:    S/P small bowel resection  Resolved Problems:    * No resolved hospital problems. *    ANANTH GARCIA dc   FP  77 WF  NICK Ortiz;  [facility]---[facility];  ZULEIKA Pappas--lennox Mendiola ]  FULL CODE      LOVENOX     AMIODARONE     SUPPLEMENTAL OXYGEN     Anti-infectives:  Cipro IV, Flagyl IV    POD ____  take-down loop ileostomy---5.22.2019---[place] ---mild adhesions ileostomy  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 3.2017--ROVERTO--NLVSF---dilated RV---NRVSF---MAC--mild MR--                          mild TR---RVSP ~ 31 mm Hg---LVEF ~ 50%             RVR---newly diagnosed----7.19.2017----see cardioversion above---9.10.2017              MI ruled out--7.20.2017             EKG---7.20.2017--atrial fibrillation--78---low voltage              EKG---7.19.2017--atrial fibrillation---RVR---111--diffuse low                         voltage--RSR V1-2 only---cannot exclude prior high                        lateral infarction               Sinus bradycardia---1st-degree AVB, see above   ASCVD---see above  CHF--chronic--diastolic    II/VI FLORINDA  Hypertension  Hyperlipidemia  Insulin resistance--impaired glucose tolerance  Morbid obesity  Depression--anxiety   Hypokalemia   PMH:    osteoarthritis, osteoporosis, weakness with exertion---decreased exercise               tolerance----2017, atypical chest pain--aching right-left arms,  right rib               fractures---contusions---due to fall----2018,  dermatitis, suspected sleep apnea,                UTI---POA----11.5.2018, hypokalemia---11.5.2018, dermatitis---skin folds,                SBO--vs--postoperative ileus---11.9.2018  PSH:    KARYN-BSO--cervix absent--1966, cholecystectomy---              open---shahid amato, right varicose veins--shahid amato, left               YUMI, right TKA, left TKA    Allergies:        penicillins  Intolerances:  Betra--valdecoxib----diarrhea      Plan:  1. Home medications reviewed  2. Respiratory therapy  3. Potassium replacement  4. Essential PO medications initiated  5. Insulin resistance---goal blood glucose---100-180  6.   See orders    Electronically signed by Job Davison on 5/22/2019 at 2:32 PM    Hospitalist

## 2019-05-22 NOTE — BRIEF OP NOTE
Brief Postoperative Note  ______________________________________________________________    Patient: Sowmya Osuna  YOB: 1940  MRN: [id number]  Date of Procedure: 5/22/2019    Pre-Op Diagnosis: colon cancer, status ileostomy    Post-Op Diagnosis: Same       Procedure(s):  Loop Ileostomy take down    Anesthesia: General    Surgeon(s):  Aliyah Billingsley MD    Assistant: Parker    Estimated Blood Loss (mL): less than 50     Complications: None    Specimens:   ID Type Source Tests Collected by Time Destination   A : SMALL BOWEL LOOP ILEOSTOMY Tissue Ileum SURGICAL PATHOLOGY Aliyah Billingsley MD 5/22/2019 7254        Implants:  * No implants in log *      Drains:   [REMOVED] NG/OG/NJ/NE Tube Orogastric 18 fr Center mouth (Removed)       [REMOVED] Ileostomy Ileostomy RUQ (Removed)   Stomal Appliance 2 piece 5/22/2019  7:11 AM   Stoma  Assessment Flippin 5/22/2019  7:11 AM   Peristomal Assessment Clean; Intact 5/22/2019  7:11 AM   Stool Appearance Mucous 5/22/2019  7:11 AM   Stool Amount Smear 5/22/2019  7:11 AM       Findings: mild adhesions of lleostomy    Otf Edward MD  Date: 5/22/2019  Time: 12:02 PM

## 2019-05-22 NOTE — H&P
Darshan Woods is a 77 y.o. female              CC:     .Other (ileostomy)        HISTORY OF PRESENT ILLNESS:     Patient is a 48-year-old femalewho had a open sigmoid colectomy for colon cancer on 11/5/2018. It was very difficult anastomosis and she had a diverting ileostomy. She now would like to have that reversed. She has no complaints from the rectum. .  She had a recent colonoscopy were only able to get upto about 7 cm. There was a stricture that would not allow the scope through. She then underwent a barium enema which showed no leak good flow through the stricture and not a tight stricture. She also get cardiac clearance     However, prior to ileostomy takedown. It would like to dilate the rectum stricture at least once to see if we get the scope through of 4 taking down the ileostomy. It may also need more than one dilatation.            Review of Systems:     General:  Fever: Negative  Weight Change:Negative  Night Sweats: Negative     Eye:  Blurry Vision:Negative  Double Vision: Negative     Ent:  Headaches: Negative  Sore throat: Negative     Allergy/Immunology:  Hives: Negative  Latex allergy: Negative     Hematology/Lymphatic:  Bleeding Problems: Negative  Blood Clots: Negative  Swollen Lymph Nodes: Negative     Lungs:  Cough: Negative  SOB: Negative  Wheezing:Negative     Cardiovascular:  Chest Pain: Negative  Palpitations:Negative     GI:   Decreased Appetite: Negative  Heartburn: Negative  Dysphagia: Negative  Nausea/Vomiting: Negative  Abdominal Pain: Negative  Change in Bowels:Negative  Constipation: Negative  Diarrhea: Negative  Rectal Bleeding: Negative     :   Dysuria: Negative  Increase Urinary Frequency/Urgency: Negative     Neuro:  Seizures: Negative  Confusion: Negative           PAST MEDICAL HISTORY:           Family History         Family History   Adopted: Yes   Problem Relation Age of Onset    High Blood Pressure Brother           adopted brother         Social History Socioeconomic History    Marital status:         Spouse name: Not on file    Number of children: Not on file    Years of education: Not on file    Highest education level: Not on file   Occupational History    Not on file   Social Needs    Financial resource strain: Not on file    Food insecurity:       Worry: Not on file       Inability: Not on file    Transportation needs:       Medical: Not on file       Non-medical: Not on file   Tobacco Use    Smoking status: Never Smoker    Smokeless tobacco: Never Used    Tobacco comment: hernando 11/10/2018   Substance and Sexual Activity    Alcohol use: No    Drug use: No    Sexual activity: Not on file   Lifestyle    Physical activity:       Days per week: Not on file       Minutes per session: Not on file    Stress: Not on file   Relationships    Social connections:       Talks on phone: Not on file       Gets together: Not on file       Attends [employer] service: Not on file       Active member of club or organization: Not on file       Attends meetings of clubs or organizations: Not on file       Relationship status: Not on file    Intimate partner violence:       Fear of current or ex partner: Not on file       Emotionally abused: Not on file       Physically abused: Not on file       Forced sexual activity: Not on file   Other Topics Concern    Not on file   Social History Narrative    Not on file         Past Surgical History         Past Surgical History:   Procedure Laterality Date    CARDIAC CATHETERIZATION   09/05/2017    CHOLECYSTECTOMY   1960s    COLONOSCOPY N/A 10/8/2018     COLONOSCOPY WITH COLD ET HOT BIOPSIES ET POLYPECTOMIES performed by Ludwin Cox MD at [address] [place] [address] 4/25/2019     COLONOSCOPY performed by Conrad Mart MD at [place] [address] Left      KNEE ARTHROPLASTY Left      KNEE ARTHROPLASTY Right      [place] CYSTOSCOPY,INSERT URETERAL STENT Bilateral 11/5/2018     CYSTO Bilateral Hives and Shortness Of Breath 07/17/2013    Bextra [valdecoxib] Diarrhea 07/17/2013          PHYSICAL EXAM:    Blood pressure (!) 147/63, pulse 50, temperature 97.3 °F (36.3 °C), temperature source Oral, resp. rate 18, height 5' 7.01\" (1.702 m), weight 292 lb (132.5 kg), last menstrual period 01/01/1968, SpO2 95 %. Gen:  A and O x 3, NAD, well nourished  Eyes:  Sclera non icterus, PERRL  Lungs:  CTA, symmetrical  Chest:  RRR, no murmurs  Abd:  Soft, NT, ND, no HSM, no bruits        ASSESS MENT:     1. Protecting loop ileostomy for a difficult sigmoid colectomy for colon cancer. 2.  Rectal anastomotic stricture        PLAN:     1. Flex sig for balloon dilatation for rectal stricture  2. Then set up for loop ileostomy takedown     Addendum:  Pt had flex sig and dilation of stricture that went well. Now here for loop ileostomy takedown. H and P unchanged.

## 2019-05-22 NOTE — PLAN OF CARE
Problem: Falls - Risk of:  Goal: Will remain free from falls  Description  Will remain free from falls  Outcome: Ongoing  Goal: Absence of physical injury  Description  Absence of physical injury  Outcome: Ongoing     Problem: HEMODYNAMIC STATUS  Goal: Patient has stable vital signs and fluid balance  Outcome: Ongoing     Problem: OXYGENATION/RESPIRATORY FUNCTION  Goal: Patient will achieve/maintain normal respiratory rate/effort  Outcome: Ongoing     Problem: MOBILITY  Goal: Early mobilization is achieved  Outcome: Ongoing     Problem: ELIMINATION  Goal: Elimination patterns are normal or improving  Description  Elimination patterns return to pre-surgery normal patterns  Outcome: Ongoing     Problem: SKIN INTEGRITY  Goal: Skin integrity is maintained or improved  Outcome: Ongoing

## 2019-05-22 NOTE — ANESTHESIA PRE PROCEDURE
Department of Anesthesiology  Preprocedure Note       Name:  Daniel Ewing   Age:  77 y.o.  :  1940                                          MRN:  [id number]         Date:  2019      Surgeon: Rey Woodruff):  Moncho Robles MD    Procedure: Loop Ileostomy take down (Dr. florez 4hrs-expecting a lot of adhesions) (N/A )    Medications prior to admission:   Prior to Admission medications    Medication Sig Start Date End Date Taking? Authorizing Provider   diltiazem (CARDIZEM CD) 120 MG extended release capsule Take 1 capsule by mouth daily 19  Yes Fredo Casas DO   metoprolol tartrate (LOPRESSOR) 25 MG tablet TAKE ONE TABLET BY MOUTH TWICE A DAY 19  Yes Larry Tran DO   furosemide (LASIX) 20 MG tablet Take 1 tablet by mouth daily 19  Yes Larry Tran DO   traZODone (DESYREL) 50 MG tablet Take 1 tablet by mouth nightly 19  Yes Larry Tran DO   diclofenac (VOLTAREN) 75 MG EC tablet Take 1 tablet by mouth 2 times daily as needed for Pain 19  Yes Larry Tran DO   amiodarone (CORDARONE) 200 MG tablet TAKE 1 TABLET BY MOUTH ONE TIME A DAY 19  Yes Anival Casas DO   atorvastatin (LIPITOR) 40 MG tablet TAKE 1 TABLET BY MOUTH nightly 19  Yes Larry Tran DO   levothyroxine (SYNTHROID) 50 MCG tablet TAKE 1 TABLET BY MOUTH ONE TIME A DAY  10/29/18  Yes Larry Tran DO   potassium chloride (KLOR-CON M) 20 MEQ extended release tablet Take 1 tablet by mouth daily 17  Yes Larry Tran DO   acetaminophen (TYLENOL) 325 MG tablet Take 2 tablets by mouth every 4 hours as needed for Pain or Fever 18  Nikki Stafford       Current medications:    Current Facility-Administered Medications   Medication Dose Route Frequency Provider Last Rate Last Dose    lactated ringers infusion   Intravenous Continuous Moncho Robles MD           Allergies:     Allergies   Allergen Reactions    Pcn [Penicillins] Hives and Shortness Of Breath    Bextra [Valdecoxib] Diarrhea       Problem List:    Patient Active Problem List   Diagnosis Code    HTN (hypertension) I10    Insulin resistance E88.81    Dyslipidemia E78.5    Osteoarthritis M19.90    Osteoporosis M81.0    Depression F32.9    Anxiety F41.9    CHF (congestive heart failure) ([facility] I50.9    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    Morbid obesity ([facility] E66.01    Primary insomnia F51.01    Multiple closed fractures of ribs S22.49XA    JAYJAY on CPAP G47.33, Z99.89    Obesity E66.9    Hypertension I10    Glucose intolerance (impaired glucose tolerance) R73.02    A-fib (HCC) I48.91    Colon cancer (HCC) C18.9    Acute blood loss as cause of postoperative anemia D62    Other complete intestinal obstruction (HCC) K56.691    Elevated fasting glucose R73.01    BMI 50.0-59.9, adult (HCC) Z68.43    Colostomy in place [facility]) Z93.3    Other specified hypothyroidism E03.8    Ileostomy status ([facility] Z93.2    Attention to ileostomy ([facility] Z43.2       Past Medical History:        Diagnosis Date    A-fib ([facility] 07/19/2017    CHF (congestive heart failure) (HCC)     Dyslipidemia     FH: total abdominal hysterectomy and bilateral salpingo-oophorectomy 1966    Glucose intolerance (impaired glucose tolerance)     Hypertension     Malignant neoplasm of sigmoid colon (HCC)     Obesity     Osteoarthritis        Past Surgical History:        Procedure Laterality Date    CARDIAC CATHETERIZATION  09/05/2017    CHOLECYSTECTOMY  1960s    COLONOSCOPY N/A 10/8/2018    COLONOSCOPY WITH COLD ET HOT BIOPSIES ET POLYPECTOMIES performed by Lalit Landaverde MD at [address] [place] [address] COLONOSCOPY N/A 4/25/2019    COLONOSCOPY performed by Sonu Trotter MD at [facility] OR  stricture at 7 cm     HIP ARTHROPLASTY Left     KNEE ARTHROPLASTY Left     KNEE ARTHROPLASTY Right     [place] CYSTOSCOPY,INSERT URETERAL STENT Bilateral 11/5/2018    CYSTO Bilateral Lighted stent placements performed by Grace Forrester MD at [address] [facility] LAP,SURG,COLECTOMY, PARTIAL, W/ANAST N/A 11/5/2018    Open Sigmoid Colectomy w/ lighted stents ILEOSTOMY PLACEMENT performed by Heath Mendoza MD at [address] [facility] N/A 5/9/2019    Flexible Sigmoidoscopy with Dilatation of rectal stricture performed by Gracie Mckeon MD at [address] [place] [address] Drive Right 1960s       Social History:    Social History     Tobacco Use    Smoking status: Never Smoker    Smokeless tobacco: Never Used    Tobacco comment: hernando 11/10/2018   Substance Use Topics    Alcohol use: No                                Counseling given: Not Answered  Comment: syant 11/10/2018      Vital Signs (Current):   Vitals:    05/22/19 0656 05/22/19 0659   BP:  (!) 147/63   Pulse: 50    Resp: 18    Temp: 36.3 °C (97.3 °F)    TempSrc: Oral    SpO2: 95%    Weight: 292 lb (132.5 kg)    Height: 5' 7.01\" (1.702 m)                                               BP Readings from Last 3 Encounters:   05/22/19 (!) 147/63   05/09/19 (!) 109/56   05/09/19 (!) 112/53       NPO Status: Time of last liquid consumption: 0530                        Time of last solid consumption: 1830                        Date of last liquid consumption: 05/22/19                        Date of last solid food consumption: 05/20/19    BMI:   Wt Readings from Last 3 Encounters:   05/22/19 292 lb (132.5 kg)   05/09/19 292 lb (132.5 kg)   05/07/19 292 lb (132.5 kg)     Body mass index is 45.72 kg/m².     CBC:   Lab Results   Component Value Date    WBC 7.1 05/07/2019    RBC 4.59 05/07/2019    HGB 13.8 05/07/2019    HCT 43.2 05/07/2019    MCV 94.1 05/07/2019    RDW 19.5 05/07/2019     05/07/2019       CMP:   Lab Results   Component Value Date     05/07/2019    K 3.8 05/07/2019     05/07/2019    CO2 26 05/07/2019    BUN 23 05/07/2019    CREATININE 0.93 05/07/2019    GFRAA >60 05/07/2019    LABGLOM 58 05/07/2019    GLUCOSE 101 05/07/2019    PROT 7.5 02/22/2019    PROT 7.5 02/22/2019 CALCIUM 9.8 05/07/2019    BILITOT 0.37 02/22/2019    BILITOT 0.37 02/22/2019    ALKPHOS 108 02/22/2019    ALKPHOS 108 02/22/2019    AST 16 02/22/2019    AST 16 02/22/2019    ALT 13 02/22/2019    ALT 13 02/22/2019       POC Tests: No results for input(s): POCGLU, POCNA, POCK, POCCL, POCBUN, POCHEMO, POCHCT in the last 72 hours. Coags:   Lab Results   Component Value Date    PROTIME 16.6 10/08/2018    INR 1.6 10/08/2018    APTT 34.8 09/21/2018       HCG (If Applicable): No results found for: PREGTESTUR, PREGSERUM, HCG, HCGQUANT     ABGs: No results found for: PHART, PO2ART, LFK4LKA, HKB3XWS, BEART, I0HXMJDL     Type & Screen (If Applicable):  No results found for: LABABO, [address]    Anesthesia Evaluation  Patient summary reviewed no history of anesthetic complications:   Airway: Mallampati: III  TM distance: >3 FB   Neck ROM: full  Mouth opening: > = 3 FB Dental: normal exam   (+) upper dentures and lower dentures      Pulmonary:normal exam    (+) sleep apnea: on CPAP,                             Cardiovascular:    (+) hypertension: no interval change, CHF: no interval change,       ECG reviewed               Beta Blocker:  Dose within 24 Hrs         Neuro/Psych:   (+) psychiatric history: stable with treatment            GI/Hepatic/Renal: Neg GI/Hepatic/Renal ROS            Endo/Other:    (+) hypothyroidism::., .                 Abdominal:   (+) obese,         Vascular: negative vascular ROS. Anesthesia Plan      general     ASA 3       Induction: intravenous. Anesthetic plan and risks discussed with patient.       Plan discussed with surgical team.                  Arturo Wyatt, APRN - CRNA   5/22/2019

## 2019-05-22 NOTE — PROGRESS NOTES
1  ·   · [x]    Bronchodilator Assessment  BRONCHODILATOR ASSESSMENT SCORE  Score 0 1 2 3 4 5   Breath Sounds   []  Patient Baseline []  No Wheeze good aeration [x]  Faint, scattered wheezing, good aeration []  Expiratory Wheezing and or moderately diminished []  Insp/Exp wheeze and/or very diminished []  Insp/Exp and/ or marked distress   Respiratory Rate   []  Patient Baseline []  Less than 20 [x]  Less than 20 []  20-25 []  Greater than 25 []  Greater than 25   Peak flow % of Pred or PB []  NA   []  Greater than 90%  []  81-90% []  71-80% [x]  Less than or equal to 70%  or unable to perform []  Unable due to Respiratory Distress   Dyspnea re []  Patient Baseline []  No SOB [x]  No SOB []  SOB on exertion []  SOB min activity []  At rest/acute   e FEV% Predicted       []  NA []  Above 69%  []  Unable []  Above 60-69%  []  Unable []  Above 50-59%  []  Unable [x]  Above 35-49%  []  Unable []  Less than 35%  []  Unable                 [x]  Hyperinflation Assessment  Score 1 2 3   CXR and Breath Sounds   [x]  Clear []  No atelectasis  Basilar aeration []  Atelectasis or absent basilar breath sounds   Incentive Spirometry Volume  (Per IBW)   [x]  Greater than or equal to 15ml/Kg []  less than 15ml/Kg []  less than 15ml/Kg   Surgery within last 2 weeks []  None or general   [x]  Abdominal or thoracic surgery  []  Abdominal or thoracic   Chronic Pulmonary Historyre [x]  No []  Yes []  Yes     [x]  Secretion Management Assessment  Score 1 2 3   Bilateral Breath Sounds   [x]  Occasional Rhonchi []  Scattered Rhonchi []  Course Rhonchi and/or poor aeration   Sputum    [x]  Small amount of thin secretions []  Moderate amount of viscous secretions []  Copius, Viscious Yellow/ Secretions   CXR as reported by physician []  clear  [x]  Unavailable []  Infiltrates and/or consolidation  []  Unavailable []  Mucus Plugging and or lobar consolidation  []  Unavailable   Cough [x]  Strong, productive cough []  Weak productive cough []  No cough or weak non-productive cough   Butch Daley  4:52 PM                            FEMALE                                  MALE                            FEV1 Predicted Normal Values                        FEV1 Predicted Normal Values          Age                                     Height in Feet and Inches       Age                                     Height in Feet and Inches       4' 11\" 5' 1\" 5' 3\" 5' 5\" 5' 7\" 5' 9\" 5' 11\" 6' 1\"  4' 11\" 5' 1\" 5' 3\" 5' 5\" 5' 7\" 5' 9\" 5' 11\" 6' 1\"   43 - 45 2.49 2.66 2.84 3.03 3.22 3.42 3.62 3.83 42 - 45 2.82 3.03 3.26 3.49 3.72 3.96 4.22 4.47   46 - 49 2.40 2.57 2.76 2.94 3.14 3.33 3.54 3.75 46 - 49 2.70 2.92 3.14 3.37 3.61 3.85 4.10 4.36   50 - 53 2.31 2.48 2.66 2.85 3.04 3.24 3.45 3.66 50 - 53 2.58 2.80 3.02 3.25 3.49 3.73 3.98 4.24   54 - 57 2.21 2.38 2.57 2.75 2.95 3.14 3.35 3.56 54 - 57 2.46 2.67 2.89 3.12 3.36 3.60 3.85 4.11   58 - 61 2.10 2.28 2.46 2.65 2.84 3.04 3.24 3.45 58 - 61 2.32 2.54 2.76 2.99 3.23 3.47 3.72 3.98   62 - 65 1.99 2.17 2.35 2.54 2.73 2.93 3.13 3.34 62 - 65 2.19 2.40 2.62 2.85 3.09 3.33 3.58 3.84   66 - 69 1.88 2.05 2.23 2.42 2.61 2.81 3.02 3.23 66 - 69 2.04 2.26 2.48 2.71 2.95 3.19 3.44 3.70   70+ 1.82 1.99 2.17 2.36 2.55 2.75 2.95 3.16 70+ 1.97 2.19 2.41 2.64 2.87 3.12 3.37 3.62             Predicted Peak Expiratory Flow Rate                                       Height (in)  Female       Height (in) Male           Age 04 00 47 96 94 77 78 74 Age            21 344 357 372 387 402 417 432 446  60 62 64 66 68 70 72 74 76   25 337 352 366 381 396 411 426 441 25 447 476 505 533 562 591 619 648 677   30 329 344 359 374 389 404 419 434 30 437 466 494 523 552 580 609 638 667   35 322 337 351 366 381 396 411 426 35 426 455 484 512 541 570 598 627 657   40 314 329 344 359 374 389 404 419 40 416 445 473 502 531 559 588 617 647   45 307 322 336 351 366 381 396 411 45 405 434 463 491 520 549 577 606 636   50 299 314 329 344 359 374 389 404 50 395 424 085 946 148 685 616 406 405   16 773 515 760 698 925 843 389 278 02 495 601 917 523 122 969 119 292 339   32 335 847 203 498 448 978 043 223 54 369 868 698 313 620 422 925 363 914   18 468 799 092 763 091 819 188 247 13 516 997 418 745 250 507 535 564 594   70 269 284 299 314 329 344 359 374 70 353 382 410 439 468 496 525 554 583   75 261 274 289 305 319 334 348 364 75 344 372 400 429 458 487 515 544 573   80 253 266 282 296 312 327 342 356 80 335 362 390 419 448 [phone] 562

## 2019-05-22 NOTE — ANESTHESIA PROCEDURE NOTES
Peripheral Block    Patient location during procedure: OR  Start time: 5/22/2019 8:25 AM  End time: 5/22/2019 8:35 AM  Staffing  Resident/CRNA: XIOMARA Smith CRNA  Performed: resident/CRNA   Preanesthetic Checklist  Completed: patient identified, site marked, surgical consent, pre-op evaluation, timeout performed, IV checked, risks and benefits discussed, monitors and equipment checked, anesthesia consent given, oxygen available and patient being monitored  Peripheral Block  Patient position: supine  Prep: ChloraPrep  Patient monitoring: cardiac monitor, continuous pulse ox, frequent blood pressure checks, IV access and continuous capnometry  Block type: TAP  Laterality: bilateral (subcostal and midaxillary bilateral)  Injection technique: single-shot  Procedures: ultrasound guided  Provider prep: mask and sterile gloves  Needle  Needle type: combined needle/nerve stimulator   Needle gauge: 21 G  Needle length: 10 cm  Needle localization: ultrasound guidance  Assessment  Injection assessment: negative aspiration for heme, no paresthesia on injection and local visualized surrounding nerve on ultrasound  Paresthesia pain: none  Slow fractionated injection: yes  Hemodynamics: stable  Additional Notes  U/S [id number].  (1) Under ultrasound guidance, a 21 gauge needle was inserted  (2) Ultrasound was also used to visualize the spread of the anesthetic (3) Zuleyma Spatz appeared anatomically normal, and  there were no apparent abnormal pathological findings on the image that were readily visible and related to the nerve field being blocked. (5) A permanent ultrasound image was saved in the patient's record. 200mg Ropivacaine diluted to 80ml with 0.9 NACL and given in 4 divided doses-20ml at each injection site.           Reason for block: post-op pain management and at surgeon's request

## 2019-05-23 LAB
ABSOLUTE EOS #: 0 K/UL (ref 0–0.4)
ABSOLUTE IMMATURE GRANULOCYTE: ABNORMAL K/UL (ref 0–0.3)
ABSOLUTE LYMPH #: 0.8 K/UL (ref 1–4.8)
ABSOLUTE MONO #: 0.8 K/UL (ref 0.1–1.2)
ALBUMIN SERPL-MCNC: 3.9 G/DL (ref 3.5–5.2)
ALBUMIN/GLOBULIN RATIO: 1.1 (ref 1–2.5)
ALP BLD-CCNC: 109 U/L (ref 35–104)
ALT SERPL-CCNC: 15 U/L (ref 5–33)
ANION GAP SERPL CALCULATED.3IONS-SCNC: 13 MMOL/L (ref 9–17)
AST SERPL-CCNC: 15 U/L
BASOPHILS # BLD: 0 % (ref 0–1)
BASOPHILS ABSOLUTE: 0 K/UL (ref 0–0.2)
BILIRUB SERPL-MCNC: 0.57 MG/DL (ref 0.3–1.2)
BUN BLDV-MCNC: 14 MG/DL (ref 8–23)
BUN/CREAT BLD: 18 (ref 9–20)
CALCIUM SERPL-MCNC: 9.1 MG/DL (ref 8.6–10.4)
CHLORIDE BLD-SCNC: 103 MMOL/L (ref 98–107)
CO2: 23 MMOL/L (ref 20–31)
CREAT SERPL-MCNC: 0.79 MG/DL (ref 0.5–0.9)
DIFFERENTIAL TYPE: ABNORMAL
EOSINOPHILS RELATIVE PERCENT: 0 % (ref 1–7)
GFR AFRICAN AMERICAN: >60 ML/MIN
GFR NON-AFRICAN AMERICAN: >60 ML/MIN
GFR SERPL CREATININE-BSD FRML MDRD: ABNORMAL ML/MIN/{1.73_M2}
GFR SERPL CREATININE-BSD FRML MDRD: ABNORMAL ML/MIN/{1.73_M2}
GLUCOSE BLD-MCNC: 169 MG/DL (ref 70–99)
HCT VFR BLD CALC: 44 % (ref 36–46)
HEMOGLOBIN: 14 G/DL (ref 12–16)
IMMATURE GRANULOCYTES: ABNORMAL %
LYMPHOCYTES # BLD: 7 % (ref 16–46)
MCH RBC QN AUTO: 30.4 PG (ref 26–34)
MCHC RBC AUTO-ENTMCNC: 31.7 G/DL (ref 31–37)
MCV RBC AUTO: 96.1 FL (ref 80–100)
MONOCYTES # BLD: 7 % (ref 4–11)
NRBC AUTOMATED: ABNORMAL PER 100 WBC
PDW BLD-RTO: 17.7 % (ref 11–14.5)
PLATELET # BLD: 117 K/UL (ref 140–450)
PLATELET ESTIMATE: ABNORMAL
PMV BLD AUTO: 11.9 FL (ref 6–12)
POTASSIUM SERPL-SCNC: 4.3 MMOL/L (ref 3.7–5.3)
RBC # BLD: 4.58 M/UL (ref 4–5.2)
RBC # BLD: ABNORMAL 10*6/UL
SEG NEUTROPHILS: 86 % (ref 43–77)
SEGMENTED NEUTROPHILS ABSOLUTE COUNT: 9.7 K/UL (ref 1.8–7.7)
SODIUM BLD-SCNC: 139 MMOL/L (ref 135–144)
TOTAL PROTEIN: 7.3 G/DL (ref 6.4–8.3)
WBC # BLD: 11.4 K/UL (ref 3.5–11)
WBC # BLD: ABNORMAL 10*3/UL

## 2019-05-23 PROCEDURE — 6370000000 HC RX 637 (ALT 250 FOR IP): Performed by: INTERNAL MEDICINE

## 2019-05-23 PROCEDURE — 6360000002 HC RX W HCPCS: Performed by: SURGERY

## 2019-05-23 PROCEDURE — 2580000003 HC RX 258: Performed by: INTERNAL MEDICINE

## 2019-05-23 PROCEDURE — 99232 SBSQ HOSP IP/OBS MODERATE 35: CPT | Performed by: INTERNAL MEDICINE

## 2019-05-23 PROCEDURE — 6370000000 HC RX 637 (ALT 250 FOR IP)

## 2019-05-23 PROCEDURE — 36415 COLL VENOUS BLD VENIPUNCTURE: CPT

## 2019-05-23 PROCEDURE — 2500000003 HC RX 250 WO HCPCS: Performed by: SURGERY

## 2019-05-23 PROCEDURE — 1200000000 HC SEMI PRIVATE

## 2019-05-23 PROCEDURE — 94640 AIRWAY INHALATION TREATMENT: CPT

## 2019-05-23 PROCEDURE — 94664 DEMO&/EVAL PT USE INHALER: CPT

## 2019-05-23 PROCEDURE — 85025 COMPLETE CBC W/AUTO DIFF WBC: CPT

## 2019-05-23 PROCEDURE — 97161 PT EVAL LOW COMPLEX 20 MIN: CPT | Performed by: PHYSICAL THERAPIST

## 2019-05-23 PROCEDURE — 97165 OT EVAL LOW COMPLEX 30 MIN: CPT | Performed by: OCCUPATIONAL THERAPIST

## 2019-05-23 PROCEDURE — 94760 N-INVAS EAR/PLS OXIMETRY 1: CPT

## 2019-05-23 PROCEDURE — 80053 COMPREHEN METABOLIC PANEL: CPT

## 2019-05-23 PROCEDURE — 6370000000 HC RX 637 (ALT 250 FOR IP): Performed by: SURGERY

## 2019-05-23 PROCEDURE — 6360000002 HC RX W HCPCS: Performed by: INTERNAL MEDICINE

## 2019-05-23 RX ORDER — PANTOPRAZOLE SODIUM 40 MG/1
40 TABLET, DELAYED RELEASE ORAL
Status: DISCONTINUED | OUTPATIENT
Start: 2019-05-23 | End: 2019-05-25 | Stop reason: HOSPADM

## 2019-05-23 RX ORDER — 0.9 % SODIUM CHLORIDE 0.9 %
500 INTRAVENOUS SOLUTION INTRAVENOUS ONCE
Status: COMPLETED | OUTPATIENT
Start: 2019-05-23 | End: 2019-05-23

## 2019-05-23 RX ORDER — PANTOPRAZOLE SODIUM 40 MG/1
TABLET, DELAYED RELEASE ORAL
Status: COMPLETED
Start: 2019-05-23 | End: 2019-05-23

## 2019-05-23 RX ADMIN — FUROSEMIDE 20 MG: 20 TABLET ORAL at 08:34

## 2019-05-23 RX ADMIN — ALBUTEROL SULFATE 2.5 MG: 2.5 SOLUTION RESPIRATORY (INHALATION) at 07:41

## 2019-05-23 RX ADMIN — PANTOPRAZOLE SODIUM 40 MG: 40 TABLET, DELAYED RELEASE ORAL at 08:40

## 2019-05-23 RX ADMIN — SODIUM CHLORIDE 500 ML: 0.9 INJECTION, SOLUTION INTRAVENOUS at 08:36

## 2019-05-23 RX ADMIN — HYDROCODONE BITARTRATE AND ACETAMINOPHEN 2 TABLET: 5; 325 TABLET ORAL at 06:50

## 2019-05-23 RX ADMIN — ALBUTEROL SULFATE 2.5 MG: 2.5 SOLUTION RESPIRATORY (INHALATION) at 20:44

## 2019-05-23 RX ADMIN — ALBUTEROL SULFATE 2.5 MG: 2.5 SOLUTION RESPIRATORY (INHALATION) at 16:04

## 2019-05-23 RX ADMIN — POTASSIUM CHLORIDE, DEXTROSE MONOHYDRATE AND SODIUM CHLORIDE: 150; 5; 450 INJECTION, SOLUTION INTRAVENOUS at 15:29

## 2019-05-23 RX ADMIN — TRAZODONE HYDROCHLORIDE 50 MG: 50 TABLET ORAL at 22:32

## 2019-05-23 RX ADMIN — ENOXAPARIN SODIUM 40 MG: 40 INJECTION SUBCUTANEOUS at 08:34

## 2019-05-23 RX ADMIN — POTASSIUM CHLORIDE 20 MEQ: 20 TABLET, EXTENDED RELEASE ORAL at 08:34

## 2019-05-23 RX ADMIN — POTASSIUM CHLORIDE, DEXTROSE MONOHYDRATE AND SODIUM CHLORIDE: 150; 5; 450 INJECTION, SOLUTION INTRAVENOUS at 07:12

## 2019-05-23 RX ADMIN — HYDROMORPHONE HYDROCHLORIDE 0.5 MG: 1 INJECTION, SOLUTION INTRAMUSCULAR; INTRAVENOUS; SUBCUTANEOUS at 05:46

## 2019-05-23 RX ADMIN — HYDROCODONE BITARTRATE AND ACETAMINOPHEN 2 TABLET: 5; 325 TABLET ORAL at 13:13

## 2019-05-23 RX ADMIN — HYDROCODONE BITARTRATE AND ACETAMINOPHEN 2 TABLET: 5; 325 TABLET ORAL at 20:41

## 2019-05-23 RX ADMIN — ATORVASTATIN CALCIUM 40 MG: 40 TABLET, FILM COATED ORAL at 20:41

## 2019-05-23 RX ADMIN — LEVOTHYROXINE SODIUM 50 MCG: 25 TABLET ORAL at 08:34

## 2019-05-23 ASSESSMENT — PAIN DESCRIPTION - ONSET: ONSET: ON-GOING

## 2019-05-23 ASSESSMENT — PAIN SCALES - GENERAL
PAINLEVEL_OUTOF10: 3
PAINLEVEL_OUTOF10: 7
PAINLEVEL_OUTOF10: 4
PAINLEVEL_OUTOF10: 7
PAINLEVEL_OUTOF10: 6
PAINLEVEL_OUTOF10: 6
PAINLEVEL_OUTOF10: 8
PAINLEVEL_OUTOF10: 7

## 2019-05-23 ASSESSMENT — PAIN DESCRIPTION - PAIN TYPE
TYPE: SURGICAL PAIN
TYPE: SURGICAL PAIN

## 2019-05-23 ASSESSMENT — PAIN DESCRIPTION - FREQUENCY: FREQUENCY: CONTINUOUS

## 2019-05-23 ASSESSMENT — PAIN DESCRIPTION - PROGRESSION: CLINICAL_PROGRESSION: NOT CHANGED

## 2019-05-23 ASSESSMENT — PAIN DESCRIPTION - ORIENTATION: ORIENTATION: RIGHT;LEFT;MID

## 2019-05-23 ASSESSMENT — PAIN DESCRIPTION - LOCATION
LOCATION: ABDOMEN
LOCATION: ABDOMEN

## 2019-05-23 ASSESSMENT — PAIN - FUNCTIONAL ASSESSMENT: PAIN_FUNCTIONAL_ASSESSMENT: PREVENTS OR INTERFERES WITH MANY ACTIVE NOT PASSIVE ACTIVITIES

## 2019-05-23 NOTE — PLAN OF CARE
Problem: Falls - Risk of:  Goal: Will remain free from falls  Description  Will remain free from falls  5/22/2019 2314 by Ava Lara RN  Outcome: Ongoing  5/22/2019 1612 by Ena Motta RN  Outcome: Ongoing  Goal: Absence of physical injury  Description  Absence of physical injury  5/22/2019 2314 by Ava Lara RN  Outcome: Ongoing  5/22/2019 1612 by Ena Motta RN  Outcome: Ongoing     Problem: HEMODYNAMIC STATUS  Goal: Patient has stable vital signs and fluid balance  5/22/2019 2314 by Ava Lara RN  Outcome: Ongoing  5/22/2019 1612 by Ena Motta RN  Outcome: Ongoing     Problem: OXYGENATION/RESPIRATORY FUNCTION  Goal: Patient will achieve/maintain normal respiratory rate/effort  5/22/2019 2314 by Ava Lara RN  Outcome: Ongoing  5/22/2019 1612 by Ena Motta RN  Outcome: Ongoing     Problem: MOBILITY  Goal: Early mobilization is achieved  5/22/2019 2314 by Ava Lara RN  Outcome: Ongoing  5/22/2019 1612 by Ena Motta RN  Outcome: Ongoing     Problem: ELIMINATION  Goal: Elimination patterns are normal or improving  Description  Elimination patterns return to pre-surgery normal patterns  5/22/2019 2314 by Ava Lara RN  Outcome: Ongoing  5/22/2019 1612 by Ena Motta RN  Outcome: Ongoing     Problem: SKIN INTEGRITY  Goal: Skin integrity is maintained or improved  5/22/2019 2314 by Ava Lara RN  Outcome: Ongoing  5/22/2019 1612 by Ena Motta RN  Outcome: Ongoing

## 2019-05-23 NOTE — PLAN OF CARE
Problem: Falls - Risk of:  Goal: Will remain free from falls  Description  Will remain free from falls  5/23/2019 1012 by Nellene Epley, RN  Outcome: Ongoing  5/22/2019 2314 by Violeta Bull RN  Outcome: Ongoing  Goal: Absence of physical injury  Description  Absence of physical injury  5/23/2019 1012 by Nellene Epley, RN  Outcome: Ongoing  5/22/2019 2314 by Violeta Bull RN  Outcome: Ongoing     Problem: HEMODYNAMIC STATUS  Goal: Patient has stable vital signs and fluid balance  5/23/2019 1012 by Nellene Epley, RN  Outcome: Ongoing  5/22/2019 2314 by Violeta Bull RN  Outcome: Ongoing     Problem: OXYGENATION/RESPIRATORY FUNCTION  Goal: Patient will achieve/maintain normal respiratory rate/effort  5/23/2019 1012 by Nellene Epley, RN  Outcome: Ongoing  5/22/2019 2314 by Violeta Bull RN  Outcome: Ongoing     Problem: MOBILITY  Goal: Early mobilization is achieved  5/23/2019 1012 by Nellene Epley, RN  Outcome: Ongoing  5/22/2019 2314 by Violeta Bull RN  Outcome: Ongoing     Problem: ELIMINATION  Goal: Elimination patterns are normal or improving  Description  Elimination patterns return to pre-surgery normal patterns  5/23/2019 1012 by Nellene Epley, RN  Outcome: Ongoing  5/22/2019 2314 by Violeta Bull RN  Outcome: Ongoing     Problem: SKIN INTEGRITY  Goal: Skin integrity is maintained or improved  5/23/2019 1012 by Nellene Epley, RN  Outcome: Ongoing  5/22/2019 2314 by Violeta Bull RN  Outcome: Ongoing

## 2019-05-23 NOTE — FLOWSHEET NOTE
Assessment: Patient is recovering from surgery to reverse colostomy from last October. Her prognosis is good and she is thankful. She has good support from family and friends. Intervention: Patient engaged in conversation, often expressing thankfulness and engagement with life. She joyfully accepted a prayer of thanksgiving. Plan: Chaplains remain available for spiritual care.      05/23/19 1100   Encounter Summary   Services provided to: Patient   Referral/Consult From: [address] [place] [address] Family members   Continue Visiting   (5/23/19)   Complexity of Encounter Moderate   Length of Encounter 15 minutes   Spiritual/Presybeterian   Type Spiritual support   Assessment Approachable   Intervention Active listening;Explored feelings, thoughts, concerns;Prayer   Outcome Expressed gratitude;Expressed feelings of siena, peace, and/or awe;Engaged in conversation;Expressed feelings/needs/concerns

## 2019-05-23 NOTE — OP NOTE
Tamra [address]                 [address] [place] [id number]                                OPERATIVE REPORT    PATIENT NAME:                    :  MED REC NO:                                ROOM:  ACCOUNT NO:                              ADMIT DATE:  PROVIDER:    DATE OF PROCEDURE:  2019    PREOPERATIVE DIAGNOSIS:  Loop ileostomy for a previous sigmoid colectomy  for cancer. POSTOPERATIVE DIAGNOSIS:  Loop ileostomy for a previous sigmoid  colectomy for cancer. OPERATION PERFORMED:  Takedown of ileostomy and resection of small  bowel. SURGEON:  Lise Jim MD    ASSISTANT:  Anival Bird. ANESTHESIA:  General anesthetic with endotracheal tube plus a regional  block. ESTIMATED BLOOD LOSS:  Less than 50 mL. DRAINS:  None. OPERATIVE PROCEDURE:  The patient was taken to the OR, placed in the  supine position. The abdomen was prepped and draped in the usual  sterile fashion. Prior to prepping the patient, we did close the  ileostomy, both the efferent and afferent loops, with 0 silk pursestring  sutures. We then completed the prepping and draping. We made a  transverse incision over the incision including the ileostomy. The  incision was about 8 cm. We took it down through the subcutaneous  tissue to the edge of the fascia. We were able to identify the small  bowel _____ (01:34)small bowel all the way through the fascial defect. We took down some adhesions and we were able to pull the small bowel up  into the incision. We were able to see both the efferent and afferent  loops very nicely. We did resect out a part of the small bowel to get  down to the good small bowel below the adhesions from where the loop  ileostomy was done. We transected both the efferent and afferent loop  with an Endo-OMAR 75 stapler.   We then cleaned off the edges of the bowel  and made sure we had good approximation of the small bowel and a  side-to-side

## 2019-05-23 NOTE — PROGRESS NOTES
Physical Therapy    Facility/Department: [address] [place] [address]  Initial Assessment    NAME: Alfred Cabral  : 1940  MRN: [id number]    Date of Service: 2019    Discharge Recommendations:  Home with Home health PT, Home independently, Continue to assess pending progress        Assessment   Body structures, Functions, Activity limitations: Decreased functional mobility ; Decreased ADL status; Decreased strength; Increased Pain;Decreased balance;Decreased endurance  Treatment Diagnosis: Generalized weakness  Prognosis: Good  Decision Making: Low Complexity  Patient Education: Yes  REQUIRES PT FOLLOW UP: Yes  Activity Tolerance  Activity Tolerance: Patient limited by endurance; Patient limited by fatigue       Patient Diagnosis(es): There were no encounter diagnoses. has a past medical history of A-fib ([facility] [address] CHF (congestive heart failure) ([facility] [address] Dyslipidemia, FH: total abdominal hysterectomy and bilateral salpingo-oophorectomy, Glucose intolerance (impaired glucose tolerance), Hypertension, Malignant neoplasm of sigmoid colon ([facility] [address] Obesity, and Osteoarthritis. has a past surgical history that includes kenyon and bso (cervix removed) (); Cholecystectomy (1960s); Varicose vein surgery (Right, 1960s); Hip Arthroplasty (Left); Knee Arthroplasty (Left); Knee Arthroplasty (Right); Cardiac catheterization (2017); Colonoscopy (N/A, 10/8/2018); pr lap,surg,colectomy, partial, w/anast (N/A, 2018); pr cystoscopy,insert ureteral stent (Bilateral, 2018); Colonoscopy (N/A, 2019); sigmoidoscopy (N/A, 2019); and Small intestine surgery (N/A, 2019).     Restrictions  Restrictions/Precautions  Restrictions/Precautions: General Precautions(abdominal surgery)  Vision/Hearing        Subjective  General  Chart Reviewed: Yes  Patient assessed for rehabilitation services?: Yes  Response To Previous Treatment: Not applicable  Family / Caregiver Present: No  Referring Practitioner: Fredi Galindo MD  Referral Date : 05/22/19  Diagnosis: S/P small bowel resection  Follows Commands: Within Functional Limits  Subjective  Subjective: \"I'm feeling pretty tired and sore today\"  Pain Screening  Patient Currently in Pain: Yes  Pain Assessment  Pain Assessment: 0-10  Pain Level: 6  Pain Type: Surgical pain  Pain Location: Abdomen  Pain Orientation: Right;Left;Mid  Pain Frequency: Continuous  Pain Onset: On-going  Clinical Progression: Not changed  Functional Pain Assessment: Prevents or interferes with many active not passive activities  Vital Signs  Patient Currently in Pain: Yes       Orientation  Orientation  Overall Orientation Status: Within Normal Limits  Social/Functional History  Social/Functional History  Lives With: Alone  Type of Home: House  Home Layout: One level  Home Access: Stairs to enter with rails  Entrance Stairs - Number of Steps: 6  Bathroom Shower/Tub: Walk-in shower  Bathroom Toilet: Handicap height  Bathroom Equipment: Grab bars in shower, Shower chair  Bathroom Accessibility: Accessible  Home Equipment: 4 wheeled walker, [employer] Help From: Family(granddaughter lives close)  ADL Assistance: Independent  Homemaking Assistance: Independent  Homemaking Responsibilities: Yes  Meal Prep Responsibility: Primary  Laundry Responsibility: Primary  Cleaning Responsibility: Primary  Shopping Responsibility: Primary  Ambulation Assistance: Independent  Transfer Assistance: Independent  Active : Yes  Mode of Transportation: [facility]  Occupation: Retired  Cognition        Objective     Observation/Palpation  Observation: pt rec'd in bed, call light within reach, agreeable to therapy    PROM RLE (degrees)  RLE PROM: WFL  AROM RLE (degrees)  RLE AROM: WFL  PROM LLE (degrees)  LLE PROM: WFL  AROM LLE (degrees)  LLE AROM : WFL  Strength RLE  Comment: grossly 4/5  Strength LLE  Comment: grossly 4/5        Bed mobility  Supine to Sit: Contact guard assistance  Sit to Supine: Minimal assistance  Scooting: Modified independent  Transfers  Sit to Stand: Minimal Assistance  Stand to sit: Minimal Assistance  Bed to Chair: Minimal assistance  Lateral Transfers: Minimal Assistance  Ambulation  Ambulation?: Yes  Ambulation 1  Surface: level tile  Device: Rolling Walker  Assistance: Minimal assistance  Quality of Gait: decreased endurance  Gait Deviations: Slow Joya;Decreased step length  Distance: 15'     Balance  Posture: Fair  Sitting - Static: Fair  Sitting - Dynamic: Fair  Standing - Static: Fair;-  Standing - Dynamic: Fair;-        Plan   Plan  Times per week: 7  Times per day: Daily  Current Treatment Recommendations: Strengthening, ROM, Balance Training, Functional Mobility Training, Transfer Training, ADL/Self-care Training, Gait Training, Neuromuscular Re-education, Home Exercise Program  Safety Devices  Type of devices: Call light within reach, Gait belt, Patient at risk for falls, Left in bed, Nurse notified    Goals  Short term goals  Time Frame for Short term goals: 7 days  Short term goal 1: Assess functional ability, initiate HEP  Short term goal 2: Pt to perform bed mobility mod I to prepare for D/C  Short term goal 3: Pt to perform functional transfers in room with CGA to prepare for D/C  Short term goal 4: Pt to amb 30' with AD and SBA/CGA to prepare for D/C  Patient Goals   Patient goals : \"get to go home\"       Therapy Time   Individual Concurrent Group Co-treatment   Time In 1010         Time Out 1020         Minutes 10                 Jose Dorantes PT, DPT

## 2019-05-23 NOTE — PROGRESS NOTES
old female with ileostomy  Pain Assessment  Pain Assessment: 0-10  Pain Level: 6  Pain Type: Surgical pain  Pain Location: Abdomen  Social/Functional History  Social/Functional History  Lives With: Alone  Type of Home: House  Home Layout: One level  Home Access: Stairs to enter with rails  Entrance Stairs - Number of Steps: 6  Bathroom Shower/Tub: Walk-in shower  Bathroom Toilet: Handicap height  Bathroom Equipment: Grab bars in shower, Shower chair  Bathroom Accessibility: Accessible  Home Equipment: 4 wheeled walker, [employer] Help From: Family([facility] close)  ADL Assistance: Independent  Homemaking Assistance: Independent  Homemaking Responsibilities: Yes  Meal Prep Responsibility: Primary  Laundry Responsibility: Primary  Cleaning Responsibility: Primary  Shopping Responsibility: Primary  Ambulation Assistance: Independent  Transfer Assistance: Independent  Active : Yes  Mode of Transportation: [employer]  Occupation: Retired       Objective   Vision: Within Functional Limits  Hearing: Within functional limits    Orientation  Overall Orientation Status: Within Normal Limits        ADL  LE Dressing: Maximum assistance  Toileting: Contact guard assistance        Bed mobility  Supine to Sit: Contact guard assistance  Sit to Supine: Minimal assistance  Scooting: Modified independent  Transfers  Sit to stand: Stand by assistance  Stand to sit: Stand by assistance     Cognition  Overall Cognitive Status: WNL                 LUE AROM (degrees)  LUE AROM : WFL  Left Hand AROM (degrees)  Left Hand AROM: WFL  RUE AROM (degrees)  RUE AROM : WFL  Right Hand AROM (degrees)  Right Hand AROM: WFL  LUE Strength  Gross LUE Strength: WFL  RUE Strength  Gross RUE Strength: WFL                   Plan   Plan  Times per week: 1-2    G-Code     OutComes Score                                                  AM-PAC Score             Goals  Short term goals  Time Frame for Short term goals: duration of hospital

## 2019-05-24 LAB
ABSOLUTE EOS #: 0.1 K/UL (ref 0–0.4)
ABSOLUTE IMMATURE GRANULOCYTE: ABNORMAL K/UL (ref 0–0.3)
ABSOLUTE LYMPH #: 1.19 K/UL (ref 1–4.8)
ABSOLUTE MONO #: 1.19 K/UL (ref 0.1–1.2)
ANION GAP SERPL CALCULATED.3IONS-SCNC: 11 MMOL/L (ref 9–17)
BASOPHILS # BLD: 0 % (ref 0–1)
BASOPHILS ABSOLUTE: 0 K/UL (ref 0–0.2)
BUN BLDV-MCNC: 10 MG/DL (ref 8–23)
BUN/CREAT BLD: 15 (ref 9–20)
CALCIUM SERPL-MCNC: 8.5 MG/DL (ref 8.6–10.4)
CHLORIDE BLD-SCNC: 106 MMOL/L (ref 98–107)
CO2: 23 MMOL/L (ref 20–31)
CREAT SERPL-MCNC: 0.66 MG/DL (ref 0.5–0.9)
DIFFERENTIAL TYPE: ABNORMAL
EOSINOPHILS RELATIVE PERCENT: 1 % (ref 1–7)
GFR AFRICAN AMERICAN: >60 ML/MIN
GFR NON-AFRICAN AMERICAN: >60 ML/MIN
GFR SERPL CREATININE-BSD FRML MDRD: ABNORMAL ML/MIN/{1.73_M2}
GFR SERPL CREATININE-BSD FRML MDRD: ABNORMAL ML/MIN/{1.73_M2}
GLUCOSE BLD-MCNC: 125 MG/DL (ref 70–99)
HCT VFR BLD CALC: 39.2 % (ref 36–46)
HEMOGLOBIN: 12.5 G/DL (ref 12–16)
IMMATURE GRANULOCYTES: ABNORMAL %
LYMPHOCYTES # BLD: 12 % (ref 16–46)
MCH RBC QN AUTO: 30.7 PG (ref 26–34)
MCHC RBC AUTO-ENTMCNC: 32 G/DL (ref 31–37)
MCV RBC AUTO: 95.9 FL (ref 80–100)
MONOCYTES # BLD: 12 % (ref 4–11)
MORPHOLOGY: ABNORMAL
NRBC AUTOMATED: ABNORMAL PER 100 WBC
PDW BLD-RTO: 17.1 % (ref 11–14.5)
PLATELET # BLD: 95 K/UL (ref 140–450)
PLATELET ESTIMATE: ABNORMAL
PMV BLD AUTO: 11.6 FL (ref 6–12)
POTASSIUM SERPL-SCNC: 4 MMOL/L (ref 3.7–5.3)
RBC # BLD: 4.08 M/UL (ref 4–5.2)
RBC # BLD: ABNORMAL 10*6/UL
SEG NEUTROPHILS: 75 % (ref 43–77)
SEGMENTED NEUTROPHILS ABSOLUTE COUNT: 7.42 K/UL (ref 1.8–7.7)
SODIUM BLD-SCNC: 140 MMOL/L (ref 135–144)
SURGICAL PATHOLOGY REPORT: NORMAL
WBC # BLD: 9.9 K/UL (ref 3.5–11)
WBC # BLD: ABNORMAL 10*3/UL

## 2019-05-24 PROCEDURE — 94761 N-INVAS EAR/PLS OXIMETRY MLT: CPT

## 2019-05-24 PROCEDURE — 99232 SBSQ HOSP IP/OBS MODERATE 35: CPT | Performed by: INTERNAL MEDICINE

## 2019-05-24 PROCEDURE — 6370000000 HC RX 637 (ALT 250 FOR IP): Performed by: SURGERY

## 2019-05-24 PROCEDURE — 80048 BASIC METABOLIC PNL TOTAL CA: CPT

## 2019-05-24 PROCEDURE — 85025 COMPLETE CBC W/AUTO DIFF WBC: CPT

## 2019-05-24 PROCEDURE — 6370000000 HC RX 637 (ALT 250 FOR IP): Performed by: INTERNAL MEDICINE

## 2019-05-24 PROCEDURE — 2500000003 HC RX 250 WO HCPCS: Performed by: SURGERY

## 2019-05-24 PROCEDURE — 6370000000 HC RX 637 (ALT 250 FOR IP): Performed by: NURSE PRACTITIONER

## 2019-05-24 PROCEDURE — 97535 SELF CARE MNGMENT TRAINING: CPT

## 2019-05-24 PROCEDURE — 36415 COLL VENOUS BLD VENIPUNCTURE: CPT

## 2019-05-24 PROCEDURE — 2580000003 HC RX 258: Performed by: SURGERY

## 2019-05-24 PROCEDURE — 97116 GAIT TRAINING THERAPY: CPT | Performed by: PHYSICAL THERAPY ASSISTANT

## 2019-05-24 PROCEDURE — 6360000002 HC RX W HCPCS: Performed by: SURGERY

## 2019-05-24 PROCEDURE — 6360000002 HC RX W HCPCS: Performed by: INTERNAL MEDICINE

## 2019-05-24 PROCEDURE — 94640 AIRWAY INHALATION TREATMENT: CPT

## 2019-05-24 PROCEDURE — 1200000000 HC SEMI PRIVATE

## 2019-05-24 PROCEDURE — 94760 N-INVAS EAR/PLS OXIMETRY 1: CPT

## 2019-05-24 PROCEDURE — 97110 THERAPEUTIC EXERCISES: CPT | Performed by: PHYSICAL THERAPY ASSISTANT

## 2019-05-24 RX ORDER — HYDROCODONE BITARTRATE AND ACETAMINOPHEN 5; 325 MG/1; MG/1
1 TABLET ORAL EVERY 6 HOURS PRN
Qty: 28 TABLET | Refills: 0 | Status: ON HOLD | OUTPATIENT
Start: 2019-05-24 | End: 2019-06-03 | Stop reason: HOSPADM

## 2019-05-24 RX ADMIN — ATORVASTATIN CALCIUM 40 MG: 40 TABLET, FILM COATED ORAL at 20:04

## 2019-05-24 RX ADMIN — HYDROCODONE BITARTRATE AND ACETAMINOPHEN 2 TABLET: 5; 325 TABLET ORAL at 20:04

## 2019-05-24 RX ADMIN — DILTIAZEM HYDROCHLORIDE 120 MG: 120 CAPSULE, COATED, EXTENDED RELEASE ORAL at 09:14

## 2019-05-24 RX ADMIN — HYDROCODONE BITARTRATE AND ACETAMINOPHEN 2 TABLET: 5; 325 TABLET ORAL at 11:36

## 2019-05-24 RX ADMIN — METOPROLOL TARTRATE 25 MG: 25 TABLET ORAL at 09:14

## 2019-05-24 RX ADMIN — Medication 10 ML: at 20:04

## 2019-05-24 RX ADMIN — HYDROCODONE BITARTRATE AND ACETAMINOPHEN 2 TABLET: 5; 325 TABLET ORAL at 06:48

## 2019-05-24 RX ADMIN — AMIODARONE HYDROCHLORIDE 200 MG: 200 TABLET ORAL at 09:14

## 2019-05-24 RX ADMIN — POTASSIUM CHLORIDE, DEXTROSE MONOHYDRATE AND SODIUM CHLORIDE 125 ML/HR: 150; 5; 450 INJECTION, SOLUTION INTRAVENOUS at 09:00

## 2019-05-24 RX ADMIN — TRAZODONE HYDROCHLORIDE 50 MG: 50 TABLET ORAL at 23:23

## 2019-05-24 RX ADMIN — LEVOTHYROXINE SODIUM 50 MCG: 25 TABLET ORAL at 09:14

## 2019-05-24 RX ADMIN — ALBUTEROL SULFATE 2.5 MG: 2.5 SOLUTION RESPIRATORY (INHALATION) at 19:19

## 2019-05-24 RX ADMIN — HYDROCODONE BITARTRATE AND ACETAMINOPHEN 2 TABLET: 5; 325 TABLET ORAL at 15:48

## 2019-05-24 RX ADMIN — ENOXAPARIN SODIUM 40 MG: 40 INJECTION SUBCUTANEOUS at 09:14

## 2019-05-24 RX ADMIN — POTASSIUM CHLORIDE 20 MEQ: 20 TABLET, EXTENDED RELEASE ORAL at 09:14

## 2019-05-24 RX ADMIN — PANTOPRAZOLE SODIUM 40 MG: 40 TABLET, DELAYED RELEASE ORAL at 06:48

## 2019-05-24 RX ADMIN — METOPROLOL TARTRATE 25 MG: 25 TABLET ORAL at 20:04

## 2019-05-24 RX ADMIN — POTASSIUM CHLORIDE, DEXTROSE MONOHYDRATE AND SODIUM CHLORIDE: 150; 5; 450 INJECTION, SOLUTION INTRAVENOUS at 00:03

## 2019-05-24 RX ADMIN — FUROSEMIDE 20 MG: 20 TABLET ORAL at 09:14

## 2019-05-24 RX ADMIN — ALBUTEROL SULFATE 2.5 MG: 2.5 SOLUTION RESPIRATORY (INHALATION) at 14:27

## 2019-05-24 RX ADMIN — ALBUTEROL SULFATE 2.5 MG: 2.5 SOLUTION RESPIRATORY (INHALATION) at 07:31

## 2019-05-24 ASSESSMENT — PAIN DESCRIPTION - PROGRESSION
CLINICAL_PROGRESSION: GRADUALLY IMPROVING
CLINICAL_PROGRESSION: NOT CHANGED
CLINICAL_PROGRESSION: GRADUALLY IMPROVING

## 2019-05-24 ASSESSMENT — PAIN DESCRIPTION - ONSET
ONSET: ON-GOING

## 2019-05-24 ASSESSMENT — PAIN DESCRIPTION - FREQUENCY
FREQUENCY: CONTINUOUS

## 2019-05-24 ASSESSMENT — PAIN SCALES - GENERAL
PAINLEVEL_OUTOF10: 7
PAINLEVEL_OUTOF10: 7
PAINLEVEL_OUTOF10: 5
PAINLEVEL_OUTOF10: 5
PAINLEVEL_OUTOF10: 8
PAINLEVEL_OUTOF10: 0
PAINLEVEL_OUTOF10: 4
PAINLEVEL_OUTOF10: 7

## 2019-05-24 ASSESSMENT — PAIN DESCRIPTION - ORIENTATION
ORIENTATION: RIGHT
ORIENTATION: RIGHT;LOWER
ORIENTATION: RIGHT
ORIENTATION: RIGHT
ORIENTATION: RIGHT;MID;LEFT

## 2019-05-24 ASSESSMENT — PAIN DESCRIPTION - DESCRIPTORS: DESCRIPTORS: SHARP;SORE

## 2019-05-24 ASSESSMENT — PAIN DESCRIPTION - PAIN TYPE
TYPE: SURGICAL PAIN

## 2019-05-24 ASSESSMENT — PAIN DESCRIPTION - LOCATION
LOCATION: ABDOMEN

## 2019-05-24 ASSESSMENT — PAIN - FUNCTIONAL ASSESSMENT: PAIN_FUNCTIONAL_ASSESSMENT: ACTIVITIES ARE NOT PREVENTED

## 2019-05-24 NOTE — PLAN OF CARE
Problem: Falls - Risk of:  Goal: Will remain free from falls  Description  Will remain free from falls  Outcome: Ongoing     Problem: HEMODYNAMIC STATUS  Goal: Patient has stable vital signs and fluid balance  Outcome: Ongoing     Problem: OXYGENATION/RESPIRATORY FUNCTION  Goal: Patient will achieve/maintain normal respiratory rate/effort  Outcome: Ongoing     Problem: MOBILITY  Goal: Early mobilization is achieved  Outcome: Ongoing     Problem: ELIMINATION  Goal: Elimination patterns are normal or improving  Description  Elimination patterns return to pre-surgery normal patterns  Outcome: Ongoing     Problem: SKIN INTEGRITY  Goal: Skin integrity is maintained or improved  Outcome: Ongoing     Problem: Pain:  Goal: Pain level will decrease  Description  Pain level will decrease  Outcome: Ongoing     Problem: Pain:  Goal: Control of acute pain  Description  Control of acute pain  Outcome: Ongoing     Problem: Pain:  Goal: Control of chronic pain  Description  Control of chronic pain  Outcome: Ongoing

## 2019-05-24 NOTE — PROGRESS NOTES
Pt seen this am about 11 am.    POD 1    She was feeling good.   Some mild incisional pain  No N or V no flatus    BP (!) 115/58   Pulse 62   Temp 97.9 °F (36.6 °C) (Tympanic)   Resp 16   Ht 5' 7.01\" (1.702 m)   Wt 299 lb (135.6 kg)   LMP 01/01/1968   SpO2 98%   BMI 46.82 kg/m²     abd- soft , few bs incision clean    Imp:  Doing good     Plan:  Continue clear liquids      IMP

## 2019-05-24 NOTE — PROGRESS NOTES
Physical Therapy  Facility/Department: [facility]  PROGRESSIVE C[facility]  Daily Treatment Note  NAME: Isabel Patel  : 1940  MRN: [id number]    Date of Service: 2019    Discharge Recommendations:  Home with Home health PT, Home independently, Continue to assess pending progress, Outpatient PT   PT Equipment Recommendations  Equipment Needed: No    Patient Diagnosis(es): There were no encounter diagnoses. has a past medical history of A-fib ([facility] CHF (congestive heart failure) ([facility] Dyslipidemia, FH: total abdominal hysterectomy and bilateral salpingo-oophorectomy, Glucose intolerance (impaired glucose tolerance), Hypertension, Malignant neoplasm of sigmoid colon ([facility] Obesity, and Osteoarthritis. has a past surgical history that includes kenyon and bso (cervix removed) (); Cholecystectomy (); Varicose vein surgery (Right, ); Hip Arthroplasty (Left); Knee Arthroplasty (Left); Knee Arthroplasty (Right); Cardiac catheterization (2017); Colonoscopy (N/A, 10/8/2018); pr lap,surg,colectomy, partial, w/anast (N/A, 2018); pr cystoscopy,insert ureteral stent (Bilateral, 2018); Colonoscopy (N/A, 2019); sigmoidoscopy (N/A, 2019); and Small intestine surgery (N/A, 2019). Restrictions  Restrictions/Precautions  Restrictions/Precautions: General Precautions(abdominal surgery)  Subjective   General  Chart Reviewed: Yes  Response To Previous Treatment: Patient with no complaints from previous session. Family / Caregiver Present: No  Subjective  Subjective: Fatigue reported this date. Pain rated 4/10 through right abdominal region. General Comment  Comments: Pt in bed at initiation of session. Agreeable to therapy at this time.    Pain Screening  Patient Currently in Pain: Yes  Pain Assessment  Pain Assessment: 0-10  Pain Level: 4  Pain Type: Surgical pain  Pain Location: Abdomen  Pain Orientation: Right  Vital Signs  Patient Currently in Pain: Yes  Oxygen Therapy  Pulse Oximeter Device Mode: Intermittent  O2 Device: None (Room air)       Orientation  Orientation  Overall Orientation Status: Within Normal Limits  Cognition      Objective   Bed mobility  Supine to Sit: Stand by assistance  Sit to Supine: Contact guard assistance(POOR TECHNIQUE)  Transfers  Sit to Stand: Contact guard assistance  Stand to sit: Contact guard assistance(Poor Technique. )  Ambulation  Ambulation?: Yes  WB Status: FULL  More Ambulation?: No  Ambulation 1  Surface: level tile  Device: Rolling Walker  Assistance: Stand by assistance  Quality of Gait: Fatigue only  Gait Deviations: Slow Joya  Distance: 50'x2  Stairs/Curb  Stairs?: No  Neuromuscular Education  NDT Treatment: Gait ;Sitting;Standing  Balance  Sitting - Static: Fair;+  Sitting - Dynamic: Fair;+  Standing - Static: Fair  Standing - Dynamic: Fair  Exercises  Quad Sets: 20x  Heelslides: 10x  Gluteal Sets: 20x  Hip Abduction: 20x  Ankle Pumps: 20x                        Assessment   Body structures, Functions, Activity limitations: Decreased functional mobility ; Decreased ADL status; Decreased strength; Increased Pain;Decreased balance;Decreased endurance  Prognosis: Good  Decision Making: Low Complexity  REQUIRES PT FOLLOW UP: Yes  Activity Tolerance  Activity Tolerance: Patient limited by endurance; Patient limited by fatigue     G-Code     OutComes Score                                                  AM-PAC Score             Goals  Short term goals  Time Frame for Short term goals: 7 days  Short term goal 1: Assess functional ability, initiate HEP  Short term goal 2: Pt to perform bed mobility mod I to prepare for D/C  Short term goal 3: Pt to perform functional transfers in room with CGA to prepare for D/C  Short term goal 4: Pt to amb 30' with AD and SBA/CGA to prepare for D/C  Patient Goals   Patient goals : \"get to go home\"    Plan    Plan  Times per week: 7  Times per day: Daily  Current Treatment Recommendations: Strengthening, ROM, Balance Training, Functional Mobility Training, Transfer Training, ADL/Self-care Training, Gait Training, Neuromuscular Re-education, Home Exercise Program  Safety Devices  Type of devices: Call light within reach, Gait belt, Patient at risk for falls, Left in bed, Nurse notified     Therapy Time   Individual Concurrent Group Co-treatment   Time In 1300         Time Out 1325         Minutes [address] [place], [place]

## 2019-05-24 NOTE — PROGRESS NOTES
Hospitalist Progress Note    Patient:   Yaritza Marcelo     YOB: 1940    MRN: [id number]   Admit date: 5/22/2019     Acct: [de-identified]     PCP: Naima Luke DO    CC--Interval History:   POD 2  Take down loop ileostomy--5.22.2019--BM x 2    CKD--2--stable     Thrombocytopenia---have cont'd Lovenox    RA O2--94%    See note below     All other ROS negative except noted in HPI    Diet:  DIET CLEAR LIQUID;    Medications:  Scheduled Meds:   pantoprazole  40 mg Oral QAM AC    sodium chloride flush  10 mL Intravenous 2 times per day    sodium chloride (PF)  10 mL Intravenous Daily    enoxaparin  40 mg Subcutaneous Daily    amiodarone  200 mg Oral Daily    atorvastatin  40 mg Oral Nightly    diltiazem  120 mg Oral Daily    levothyroxine  50 mcg Oral Daily    furosemide  20 mg Oral Daily    metoprolol tartrate  25 mg Oral BID    potassium chloride  20 mEq Oral Daily    traZODone  50 mg Oral Nightly    albuterol  2.5 mg Nebulization TID     Continuous Infusions:  PRN Meds:sodium chloride flush, HYDROcodone 5 mg - acetaminophen **OR** HYDROcodone 5 mg - acetaminophen, HYDROmorphone **OR** HYDROmorphone, ondansetron, albuterol    Objective:  Labs:  CBC with Differential:    Lab Results   Component Value Date    WBC 9.9 05/24/2019    RBC 4.08 05/24/2019    HGB 12.5 05/24/2019    HCT 39.2 05/24/2019    PLT 95 05/24/2019    MCV 95.9 05/24/2019    MCH 30.7 05/24/2019    MCHC 32.0 05/24/2019    RDW 17.1 05/24/2019    LYMPHOPCT 12 05/24/2019    MONOPCT 12 05/24/2019    BASOPCT 0 05/24/2019    MONOSABS 1.19 05/24/2019    LYMPHSABS 1.19 05/24/2019    EOSABS 0.10 05/24/2019    BASOSABS 0.00 05/24/2019    DIFFTYPE NOT REPORTED 05/24/2019     BMP:    Lab Results   Component Value Date     05/24/2019    K 4.0 05/24/2019     05/24/2019    CO2 23 05/24/2019    BUN 10 05/24/2019    LABALBU 3.9 05/23/2019    CREATININE 0.66 05/24/2019    CALCIUM 8.5 05/24/2019    GFRAA >60 05/24/2019    LABGLOM >60 05/24/2019    GLUCOSE 125 05/24/2019           Physical Exam:  Vitals: BP (!) 159/74   Pulse 80   Temp 97.9 °F (36.6 °C)   Resp 18   Ht 5' 7.01\" (1.702 m)   Wt 299 lb (135.6 kg)   LMP 01/01/1968   SpO2 90%   BMI 46.82 kg/m²   24 hour intake/output:    Intake/Output Summary (Last 24 hours) at 5/24/2019 1324  Last data filed at 5/23/2019 1849  Gross per 24 hour   Intake --   Output 800 ml   Net -800 ml     Last 3 weights: Wt Readings from Last 3 Encounters:   05/23/19 299 lb (135.6 kg)   05/09/19 292 lb (132.5 kg)   05/07/19 292 lb (132.5 kg)     HEENT: Normocephalic and Atraumatic  Neck: Supple, No Masses, Tenderness, Nodularity and No Lymphadenopathy  Chest/Lungs: Clear to Auscultation without Rales, Rhonchi, or Wheezes  Cardiac: Regular Rate and Rhythm  GI/Abdomen: Bowel Sounds Present and Soft, mild expected tenderness, without Guarding or Rebound Tenderness  : Not examined  EXT/Skin: No Edema, No Cyanosis and No Clubbing  Neuro: Alert and Oriented and No Localizing Signs/Symptoms      Assessment:    Active Problems:    S/P small bowel resection  Resolved Problems:    * No resolved hospital problems. 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           POD _____ open low anterior resection rectosigmoid---proximal diverting                       ileostomy---11.5.2018          POD _____ cystoscopy-lighted stents---11.5.2018           Colon carcinoma--rectosigmoid----11.5.2018---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          Colonoscopy---10.8.2018--multiple polyps--large tumors distal sigmoid                       colon-one ulcerated--extensive diverticulosis---          GI bleeding ---10.6.2018---likely due to colon carcinoma--specifically  two                       large sigmoid masses---see above                   CT abdomen-pelvis----10.6.2018--diverticulosis--no acute diverticulitis---chronic                                osseous changes LS spine-sacrum--anterolithesis L4--partial fusion                                L5-S1-----decreased right gluteal hematoma     CKD--Stage 2             MICHAEL--acute kidney injury--11.5.2018--11.6.2018--Stage 3 superposed on Stage 2             DUS----kidney----11.6.2018---unremarkable---normal cortical echogenicity--no                                  hydronephrosis---no stones  Thrombocytopenia---chronic    Anemia---expected acute blood loss due to tumor and surgery  Bradycardia   Atrial fibrillation----history              EKG---11.9.2018---NSR              EKG---11.5.2018--sinus bradycardia---51--1st degree AVB---LVH--QTc 490              BALDO cardioversion----9.10. 2017---successful atrial fibrillation---to--NSR              BALDO---9.10. 2017--LA dilatation---preserved LVSF--atrial septal aneurysm                           without shunt--LA appendage n o clot--negative bubble study---                          mild MR---moderate TR--normal TV leaflets             Cardiac catheterization---9. 5.2017--0% LM--mid 20%---normal D1---0% left circumflex--                         40% proximal OM1---mild aneurysmal dilatation proximal RCA---LVEF ~ 55%             2D ECHO----8. 3.2017--ROVERTO--NLVSF---dilated RV---NRVSF---MAC--mild MR--                          mild TR---RVSP ~ 31 mm Hg---LVEF ~ 50%             RVR---newly diagnosed----7.19.2017----see cardioversion above---9.10.2017              MI ruled ALR--[id number]             EKG---7.20.2017--atrial fibrillation--78---low voltage              EKG---7.19.2017--atrial fibrillation---RVR---111--diffuse low                         voltage--RSR V1-2 only---cannot exclude prior high                        lateral infarction               Sinus bradycardia---1st-degree AVB, see above   ASCVD---see above  CHF--chronic--diastolic    II/VI FLORINDA  Hypertension  Hyperlipidemia  Insulin resistance--impaired glucose tolerance  Morbid obesity  Depression--anxiety   Hypokalemia   PMH:    osteoarthritis, osteoporosis, weakness with exertion---decreased exercise               tolerance----2017, atypical chest pain--aching right-left arms,  right rib               fractures---contusions---due to fall----2018,  dermatitis, suspected sleep apnea,                UTI---POA----11.5.2018, hypokalemia---11.5.2018, dermatitis---skin folds,                SBO--vs--postoperative ileus---11.9.2018  PSH:    KARYN-BSO--cervix absent--1966, cholecystectomy---              open---c. 1960s, right varicose veins--c. 1960s, left               YUMI, right TKA, left TKA    Allergies:        penicillins  Intolerances:  Betra--valdecoxib----diarrhea      Plan:  1. Respiratory regimen  2. Saline lock  3. Ambulate  4.   See orders     Electronically signed by Beniia Qian on 5/24/2019 at 1:24 PM    Hospitalist

## 2019-05-24 NOTE — PROGRESS NOTES
Occupational Therapy  Facility/Department: [facility]  PROGRESSIVE C[facility]  Daily Treatment Note  NAME: Lydia Ramirez  : 1940  MRN: [id number]    Date of Service: 2019    Discharge Recommendations:  Home with assist PRN       Assessment   Performance deficits / Impairments: Decreased functional mobility ; Decreased ADL status; Decreased endurance;Decreased balance;Decreased high-level IADLs  Treatment Diagnosis: general weakness  Prognosis: Good  REQUIRES OT FOLLOW UP: No  Activity Tolerance  Activity Tolerance: Patient Tolerated treatment well  Safety Devices  Safety Devices in place: Yes  Type of devices: All fall risk precautions in place; Left in bed;Call light within reach         Patient Diagnosis(es): The encounter diagnosis was Postoperative pain. has a past medical history of A-fib ([facility] [address] CHF (congestive heart failure) ([facility] [address] Dyslipidemia, FH: total abdominal hysterectomy and bilateral salpingo-oophorectomy, Glucose intolerance (impaired glucose tolerance), Hypertension, Malignant neoplasm of sigmoid colon ([facility] [address] Obesity, and Osteoarthritis. has a past surgical history that includes kenyon and bso (cervix removed) (); Cholecystectomy (1960s); Varicose vein surgery (Right, ); Hip Arthroplasty (Left); Knee Arthroplasty (Left); Knee Arthroplasty (Right); Cardiac catheterization (2017); Colonoscopy (N/A, 10/8/2018); pr lap,surg,colectomy, partial, w/anast (N/A, 2018); pr cystoscopy,insert ureteral stent (Bilateral, 2018); Colonoscopy (N/A, 2019); sigmoidoscopy (N/A, 2019); and Small intestine surgery (N/A, 2019).     Restrictions  Restrictions/Precautions  Restrictions/Precautions: General Precautions(abdominal surgery)     Subjective   General  Chart Reviewed: Yes  Patient assessed for rehabilitation services?: Yes  Response to previous treatment: Patient with no complaints from previous session  Family / Caregiver Present: No  Referring Practitioner: Ava Khan J  Diagnosis: s/p small bowel resection  Subjective  Subjective: Patient rec'd in bed, pleasant and cooperative 77 yr old female with ileostomy.  Patient verbalized possible discharge from hospital on 5/25/19  Pain Assessment  Pain Assessment: 0-10  Pain Level: 5  Pain Type: Surgical pain  Pain Location: Abdomen  Pain Orientation: Right  Pain Frequency: Continuous  Clinical Progression: Not changed  Vital Signs  Patient Currently in Pain: Yes  Oxygen Therapy  O2 Device: None (Room air)      Orientation  Orientation  Overall Orientation Status: Within Normal Limits     Objective    ADL  LE Dressing: Modified independent (Doff/jacquelyn B socks seated EOB without use of AE)  Toileting: Modified independent   Balance  Sitting Balance: Modified independent   Standing Balance: Supervision  Functional Mobility  Functional - Mobility Device: Rolling Walker  Activity: To/from bathroom  Assist Level: Supervision  Toilet Transfers  Toilet - Technique: Stand step  Equipment Used: Grab bars  Toilet Transfer: Modified independent  Bed mobility  Supine to Sit: Stand by assistance  Sit to Supine: Contact guard assistance  Scooting: Modified independent  Transfers  Sit to stand: Supervision  Stand to sit: Supervision        Plan   Plan  Times per week: 1-2  Current Treatment Recommendations: Self-Care / ADL, Patient/Caregiver Education & Training, Safety Education & Training  Plan Comment: Discontinue OT services    Goals  Short term goals  Time Frame for Short term goals: duration of hospital stay  Short term goal 1: Patient to complete toilet transfer and toileting tasks with S/mod I using a/e as needed - GOAL MET  Short term goal 2: Patient to complete LB dressing with S/mod I using a/e as needed - GOAL MET       Therapy Time   Individual Concurrent Group Co-treatment   Time In 1437         Time Out 1450         Minutes 13         Timed Code Treatment Minutes: [address], [facility]

## 2019-05-24 NOTE — PROGRESS NOTES
POD # 2    Pt doing well   + bm  No nasuea or vomiting  BP (!) 159/74   Pulse 80   Temp 97.9 °F (36.6 °C)   Resp 18   Ht 5' 7.01\" (1.702 m)   Wt 299 lb (135.6 kg)   LMP 01/01/1968   SpO2 90%   BMI 46.82 kg/m²   Packing removed   Wound clean  abd- soft + bs mild inc tenderness  IMP:  Adv diet   If chilo may dc in am.

## 2019-05-25 VITALS
HEIGHT: 67 IN | WEIGHT: 293 LBS | HEART RATE: 67 BPM | BODY MASS INDEX: 45.99 KG/M2 | DIASTOLIC BLOOD PRESSURE: 55 MMHG | SYSTOLIC BLOOD PRESSURE: 125 MMHG | RESPIRATION RATE: 18 BRPM | TEMPERATURE: 98.4 F | OXYGEN SATURATION: 93 %

## 2019-05-25 LAB
ABSOLUTE EOS #: 0.3 K/UL (ref 0–0.4)
ABSOLUTE IMMATURE GRANULOCYTE: ABNORMAL K/UL (ref 0–0.3)
ABSOLUTE LYMPH #: 1.3 K/UL (ref 1–4.8)
ABSOLUTE MONO #: 1 K/UL (ref 0.1–1.2)
ANION GAP SERPL CALCULATED.3IONS-SCNC: 12 MMOL/L (ref 9–17)
BASOPHILS # BLD: 0 % (ref 0–1)
BASOPHILS ABSOLUTE: 0 K/UL (ref 0–0.2)
BUN BLDV-MCNC: 10 MG/DL (ref 8–23)
BUN/CREAT BLD: 13 (ref 9–20)
CALCIUM SERPL-MCNC: 8.6 MG/DL (ref 8.6–10.4)
CHLORIDE BLD-SCNC: 105 MMOL/L (ref 98–107)
CO2: 23 MMOL/L (ref 20–31)
CREAT SERPL-MCNC: 0.79 MG/DL (ref 0.5–0.9)
DIFFERENTIAL TYPE: ABNORMAL
EOSINOPHILS RELATIVE PERCENT: 4 % (ref 1–7)
GFR AFRICAN AMERICAN: >60 ML/MIN
GFR NON-AFRICAN AMERICAN: >60 ML/MIN
GFR SERPL CREATININE-BSD FRML MDRD: ABNORMAL ML/MIN/{1.73_M2}
GFR SERPL CREATININE-BSD FRML MDRD: ABNORMAL ML/MIN/{1.73_M2}
GLUCOSE BLD-MCNC: 103 MG/DL (ref 70–99)
HCT VFR BLD CALC: 37.8 % (ref 36–46)
HEMOGLOBIN: 12.2 G/DL (ref 12–16)
IMMATURE GRANULOCYTES: ABNORMAL %
LYMPHOCYTES # BLD: 17 % (ref 16–46)
MCH RBC QN AUTO: 30.6 PG (ref 26–34)
MCHC RBC AUTO-ENTMCNC: 32.2 G/DL (ref 31–37)
MCV RBC AUTO: 95.1 FL (ref 80–100)
MONOCYTES # BLD: 13 % (ref 4–11)
NRBC AUTOMATED: ABNORMAL PER 100 WBC
PDW BLD-RTO: 17.4 % (ref 11–14.5)
PLATELET # BLD: 97 K/UL (ref 140–450)
PLATELET ESTIMATE: ABNORMAL
PMV BLD AUTO: 11.6 FL (ref 6–12)
POTASSIUM SERPL-SCNC: 4 MMOL/L (ref 3.7–5.3)
RBC # BLD: 3.98 M/UL (ref 4–5.2)
RBC # BLD: ABNORMAL 10*6/UL
SEG NEUTROPHILS: 66 % (ref 43–77)
SEGMENTED NEUTROPHILS ABSOLUTE COUNT: 5.1 K/UL (ref 1.8–7.7)
SODIUM BLD-SCNC: 140 MMOL/L (ref 135–144)
WBC # BLD: 7.9 K/UL (ref 3.5–11)
WBC # BLD: ABNORMAL 10*3/UL

## 2019-05-25 PROCEDURE — 80048 BASIC METABOLIC PNL TOTAL CA: CPT

## 2019-05-25 PROCEDURE — 97116 GAIT TRAINING THERAPY: CPT

## 2019-05-25 PROCEDURE — 6370000000 HC RX 637 (ALT 250 FOR IP): Performed by: INTERNAL MEDICINE

## 2019-05-25 PROCEDURE — 85025 COMPLETE CBC W/AUTO DIFF WBC: CPT

## 2019-05-25 PROCEDURE — 36415 COLL VENOUS BLD VENIPUNCTURE: CPT

## 2019-05-25 PROCEDURE — 6370000000 HC RX 637 (ALT 250 FOR IP): Performed by: SURGERY

## 2019-05-25 PROCEDURE — 6370000000 HC RX 637 (ALT 250 FOR IP): Performed by: NURSE PRACTITIONER

## 2019-05-25 PROCEDURE — 99232 SBSQ HOSP IP/OBS MODERATE 35: CPT | Performed by: FAMILY MEDICINE

## 2019-05-25 RX ADMIN — METOPROLOL TARTRATE 25 MG: 25 TABLET ORAL at 08:45

## 2019-05-25 RX ADMIN — AMIODARONE HYDROCHLORIDE 200 MG: 200 TABLET ORAL at 08:45

## 2019-05-25 RX ADMIN — PANTOPRAZOLE SODIUM 40 MG: 40 TABLET, DELAYED RELEASE ORAL at 05:12

## 2019-05-25 RX ADMIN — LEVOTHYROXINE SODIUM 50 MCG: 25 TABLET ORAL at 08:44

## 2019-05-25 RX ADMIN — POTASSIUM CHLORIDE 20 MEQ: 20 TABLET, EXTENDED RELEASE ORAL at 08:44

## 2019-05-25 RX ADMIN — DILTIAZEM HYDROCHLORIDE 120 MG: 120 CAPSULE, COATED, EXTENDED RELEASE ORAL at 08:45

## 2019-05-25 RX ADMIN — HYDROCODONE BITARTRATE AND ACETAMINOPHEN 2 TABLET: 5; 325 TABLET ORAL at 09:17

## 2019-05-25 RX ADMIN — FUROSEMIDE 20 MG: 20 TABLET ORAL at 08:44

## 2019-05-25 RX ADMIN — HYDROCODONE BITARTRATE AND ACETAMINOPHEN 2 TABLET: 5; 325 TABLET ORAL at 05:12

## 2019-05-25 ASSESSMENT — PAIN DESCRIPTION - ORIENTATION
ORIENTATION: RIGHT
ORIENTATION: RIGHT

## 2019-05-25 ASSESSMENT — PAIN DESCRIPTION - FREQUENCY: FREQUENCY: CONTINUOUS

## 2019-05-25 ASSESSMENT — PAIN DESCRIPTION - DESCRIPTORS: DESCRIPTORS: SHARP

## 2019-05-25 ASSESSMENT — PAIN SCALES - GENERAL
PAINLEVEL_OUTOF10: 7
PAINLEVEL_OUTOF10: 5
PAINLEVEL_OUTOF10: 8

## 2019-05-25 ASSESSMENT — PAIN DESCRIPTION - LOCATION
LOCATION: ABDOMEN
LOCATION: ABDOMEN

## 2019-05-25 ASSESSMENT — PAIN DESCRIPTION - PAIN TYPE
TYPE: SURGICAL PAIN
TYPE: SURGICAL PAIN

## 2019-05-25 ASSESSMENT — PAIN - FUNCTIONAL ASSESSMENT: PAIN_FUNCTIONAL_ASSESSMENT: ACTIVITIES ARE NOT PREVENTED

## 2019-05-25 ASSESSMENT — PAIN DESCRIPTION - ONSET: ONSET: ON-GOING

## 2019-05-25 NOTE — PROGRESS NOTES
Dressing changed per Dr. Antonio Amezquita request. Patient educated on signs and symptoms of infection. Patient verbalizes understanding.

## 2019-05-25 NOTE — PLAN OF CARE
Problem: Falls - Risk of:  Goal: Will remain free from falls  Description  Will remain free from falls  5/24/2019 2240 by Tarah Siddiqi RN  Outcome: Ongoing  5/24/2019 1839 by Renetta Yang RN  Outcome: Ongoing  Goal: Absence of physical injury  Description  Absence of physical injury  5/24/2019 2240 by Tarah Siddiqi RN  Outcome: Ongoing  5/24/2019 1839 by Renetta Yang RN  Outcome: Ongoing     Problem: HEMODYNAMIC STATUS  Goal: Patient has stable vital signs and fluid balance  5/24/2019 2240 by Tarah Siddiqi RN  Outcome: Ongoing  5/24/2019 1839 by Renetta Yang RN  Outcome: Ongoing     Problem: OXYGENATION/RESPIRATORY FUNCTION  Goal: Patient will achieve/maintain normal respiratory rate/effort  5/24/2019 2240 by Tarah Siddiqi RN  Outcome: Ongoing  5/24/2019 1839 by Renetta Yang RN  Outcome: Ongoing     Problem: MOBILITY  Goal: Early mobilization is achieved  5/24/2019 2240 by Tarah Siddiqi RN  Outcome: Ongoing  5/24/2019 1839 by Renetta Yang RN  Outcome: Ongoing     Problem: ELIMINATION  Goal: Elimination patterns are normal or improving  Description  Elimination patterns return to pre-surgery normal patterns  5/24/2019 2240 by Tarah Siddiqi RN  Outcome: Ongoing  5/24/2019 1839 by Renetta Yang RN  Outcome: Ongoing     Problem: SKIN INTEGRITY  Goal: Skin integrity is maintained or improved  5/24/2019 2240 by Tarah Siddiqi RN  Outcome: Ongoing  5/24/2019 1839 by Renetta Yang RN  Outcome: Ongoing     Problem: Pain:  Goal: Pain level will decrease  Description  Pain level will decrease  5/24/2019 2240 by Tarah Siddiqi RN  Outcome: Ongoing  5/24/2019 1839 by Renetta Yang RN  Outcome: Ongoing  Goal: Control of acute pain  Description  Control of acute pain  5/24/2019 2240 by Tarah Siddiqi RN  Outcome: Ongoing  5/24/2019 1839 by Renetta Yang RN  Outcome: Ongoing  Goal: Control of chronic pain  Description  Control of chronic pain  5/24/2019 2240 by Tarah Siddiqi RN  Outcome: Ongoing  5/24/2019 1839 by Sarahy Griffith RN  Outcome: Ongoing     Problem: IP MOBILITY  Goal: LTG - patient will demonstrate safe mobility requirements  5/24/2019 2240 by Ori Reed RN  Outcome: Ongoing  5/24/2019 1839 by Sarahy Griffith RN  Outcome: Ongoing

## 2019-05-25 NOTE — PROGRESS NOTES
Hospitalist Progress Note  5/25/2019 10:08 AM  Subjective:   Admit Date: 5/22/2019  PCP: Jeremy Rouse DO    Interval History: Patient is s/p take down of loop ileostomy post op day #3,doing well,tolerating diet,has had BM.no chest pain or SOB. Diet: DIET GENERAL;  Medications:   Scheduled Meds:   pantoprazole  40 mg Oral QAM AC    sodium chloride flush  10 mL Intravenous 2 times per day    sodium chloride (PF)  10 mL Intravenous Daily    enoxaparin  40 mg Subcutaneous Daily    amiodarone  200 mg Oral Daily    atorvastatin  40 mg Oral Nightly    diltiazem  120 mg Oral Daily    levothyroxine  50 mcg Oral Daily    furosemide  20 mg Oral Daily    metoprolol tartrate  25 mg Oral BID    potassium chloride  20 mEq Oral Daily    traZODone  50 mg Oral Nightly    albuterol  2.5 mg Nebulization TID     Continuous Infusions:  CBC:   Recent Labs     05/23/19  0522 05/24/19  0539 05/25/19  0508   WBC 11.4* 9.9 7.9   HGB 14.0 12.5 12.2   * 95* 97*     BMP:    Recent Labs     05/23/19  0522 05/24/19  0539 05/25/19  0508    140 140   K 4.3 4.0 4.0    106 105   CO2 23 23 23   BUN 14 10 10   CREATININE 0.79 0.66 0.79   GLUCOSE 169* 125* 103*     Hepatic:   Recent Labs     05/22/19  1244 05/23/19  0522   AST 17 15   ALT 15 15   BILITOT 0.77 0.57   ALKPHOS 105* 109*     Troponin: No results for input(s): TROPONINI in the last 72 hours. BNP: No results for input(s): BNP in the last 72 hours. Lipids: No results for input(s): CHOL, HDL in the last 72 hours. Invalid input(s): LDLCALCU  INR: No results for input(s): INR in the last 72 hours. Objective:   Vitals: BP (!) 125/55   Pulse 67   Temp 98.4 °F (36.9 °C) (Oral)   Resp 18   Ht 5' 7.01\" (1.702 m)   Wt (!) 303 lb (137.4 kg)   LMP 01/01/1968   SpO2 93%   BMI 47.45 kg/m²   General appearance: alert and cooperative with exam  HEENT: Eye: Normal external eye, conjunctiva, lids cornea, BIJU.   Neck: no adenopathy, no carotid bruit, no JVD, supple, symmetrical, trachea midline and thyroid not enlarged, symmetric, no tenderness/mass/nodules  Lungs: clear to auscultation bilaterally  Heart: regular rate and rhythm, S1, S2 normal, no murmur, click, rub or gallop  Abdomen: soft,wound dressing in place. Extremities: extremities normal, atraumatic, no cyanosis or edema  Neurologic: Mental status: Alert, oriented, thought content appropriate    Assessment and Plan:   1. S/p take down of loop ileostomy  2. Thrombocytopenia    Patient to be disvharged to home today per surgery,clinically stable.     Patient Active Problem List:     HTN (hypertension)     Insulin resistance     Dyslipidemia     Osteoarthritis     Osteoporosis     Depression     Anxiety     CHF (congestive heart failure) (HCC)     Hypothyroidism due to medication     Morbid obesity ([address]     Primary insomnia     Multiple closed fractures of ribs     JAYJAY on CPAP     Obesity     Hypertension     Glucose intolerance (impaired glucose tolerance)     A-fib (HCC)     Colon cancer (HCC)     Acute blood loss as cause of postoperative anemia     Other complete intestinal obstruction (HCC)     Elevated fasting glucose     BMI 50.0-59.9, adult ([address]     Colostomy in place [facility])     Other specified hypothyroidism     Ileostomy status ([address]     Attention to ileostomy ([address]     S/P small bowel resection      Renea Stark MD  [facility] Hospitalist

## 2019-05-25 NOTE — PROGRESS NOTES
Physical Therapy  Facility/Department: [facility]  PROGRESSIVE C[facility]  Daily Treatment Note  NAME: Angeline Doyle  : 1940  MRN: [id number]    Date of Service: 2019    Discharge Recommendations:  Home with Home health PT, Home independently, Continue to assess pending progress, Outpatient PT        Patient Diagnosis(es): The encounter diagnosis was Postoperative pain. has a past medical history of A-fib ([facility] [address] CHF (congestive heart failure) ([facility] [address] Dyslipidemia, FH: total abdominal hysterectomy and bilateral salpingo-oophorectomy, Glucose intolerance (impaired glucose tolerance), Hypertension, Malignant neoplasm of sigmoid colon ([facility] [address] Obesity, and Osteoarthritis. has a past surgical history that includes kenyon and bso (cervix removed) (); Cholecystectomy (); Varicose vein surgery (Right, ); Hip Arthroplasty (Left); Knee Arthroplasty (Left); Knee Arthroplasty (Right); Cardiac catheterization (2017); Colonoscopy (N/A, 10/8/2018); pr lap,surg,colectomy, partial, w/anast (N/A, 2018); pr cystoscopy,insert ureteral stent (Bilateral, 2018); Colonoscopy (N/A, 2019); sigmoidoscopy (N/A, 2019); and Small intestine surgery (N/A, 2019). Restrictions  Restrictions/Precautions  Restrictions/Precautions: General Precautions(abdominal surgery)  Subjective   General  Chart Reviewed: Yes  Response To Previous Treatment: Patient with no complaints from previous session. Family / Caregiver Present: No  Subjective  Subjective: \"I'm going home later today. But the pain in my incision is really hurting today. \"  General Comment  Comments: Patient received supine in bed. Agreeable to therapy at this time.   Pain Screening  Patient Currently in Pain: Yes  Pain Assessment  Pain Assessment: 0-10  Pain Level: 5  Pain Type: Surgical pain  Pain Location: Abdomen  Pain Orientation: Right  Vital Signs  Patient Currently in Pain: Yes  Oxygen Therapy  O2 Device: None (Room air) Orientation  Orientation  Overall Orientation Status: Within Normal Limits  Cognition      Objective   Bed mobility  Bridging: Supervision  Supine to Sit: Supervision  Sit to Supine: Stand by assistance  Scooting: Modified independent  Transfers  Sit to Stand: Supervision  Stand to sit: Supervision  Bed to Chair: Unable to assess  Squat Pivot Transfers: Unable to assess  Lateral Transfers: Unable to assess  Car Transfer: Unable to assess  Ambulation  Ambulation?: Yes  WB Status: FULL  More Ambulation?: No  Ambulation 1  Surface: level tile  Device: Rolling Walker  Assistance: Stand by assistance  Quality of Gait: Fatigue only  Gait Deviations: Slow Joya  Distance: 50ft x3  Stairs/Curb  Stairs?: No  Neuromuscular Education  NDT Treatment: Gait ;Sitting  Balance  Posture: Fair  Sitting - Static: Fair  Sitting - Dynamic: Fair  Standing - Static: Fair  Standing - Dynamic: Fair  Exercises  Gluteal Sets: 20x  Hip Flexion: seated x10  Hip Abduction: seated against manual resistance x15  Knee Long Arc Quad: x15  Ankle Pumps: seated x20  Comments: Seated HS curls and hip add iso against manual resistance [id number]  Other exercises  Other exercises?: No                        Assessment   Body structures, Functions, Activity limitations: Decreased endurance;Decreased balance  Prognosis: Good  Decision Making: Low Complexity  REQUIRES PT FOLLOW UP: No  Activity Tolerance  Activity Tolerance: Patient Tolerated treatment well;Patient limited by fatigue  Activity Tolerance: Patient's daughter and grandchildren arrived during therapy session and patient requested to end therapy at this time to spend with family.  Ended session at request.     G-Code     OutComes Score                                                  AM-PAC Score             Goals  Short term goals  Time Frame for Short term goals: 7 days  Short term goal 1: Assess functional ability, initiate HEP  Short term goal 2: Pt to perform bed mobility mod I to prepare for D/C  Short term goal 3: Pt to perform functional transfers in room with CGA to prepare for D/C  Short term goal 4: Pt to amb 30' with AD and SBA/CGA to prepare for D/C  Patient Goals   Patient goals : \"get to go home\"    Plan    Plan  Times per week: 7  Times per day: Daily  Current Treatment Recommendations: Strengthening, ROM, Balance Training, Functional Mobility Training, Transfer Training, ADL/Self-care Training, Gait Training, Neuromuscular Re-education, Home Exercise Program  Safety Devices  Type of devices:  All fall risk precautions in place, Call light within reach, Left in bed, Nurse notified     Therapy Time   Individual Concurrent Group Co-treatment   Time In 0912         Time Out 0927         Minutes 15         Timed Code Treatment Minutes: [facility], [address]

## 2019-05-25 NOTE — PROGRESS NOTES
CLINICAL PHARMACY NOTE: MEDS TO [address] Select Patient?: Yes  Total # of Prescriptions Filled: 1   The following medications were delivered to the patient:  · Hydrocodone/APAP 5/325mg  Total # of Interventions Completed: 0  Time Spent (min): 30    Additional Documentation:

## 2019-05-28 ENCOUNTER — CARE COORDINATION (OUTPATIENT)
Dept: CASE MANAGEMENT | Age: 79
End: 2019-05-28

## 2019-05-28 RX ORDER — LEVOTHYROXINE SODIUM 0.05 MG/1
TABLET ORAL
Qty: 30 TABLET | Refills: 5 | Status: SHIPPED | OUTPATIENT
Start: 2019-05-28 | End: 2019-11-30 | Stop reason: SDUPTHER

## 2019-05-28 NOTE — CARE COORDINATION
Jered 45 Transitions Initial Follow Up Call    Call within 2 business days of discharge: No    Patient: Nika Doran Patient : 1940   MRN: <[id number]>  Reason for Admission: Small bowel resection   Discharge Date: 19 RARS: Readmission Risk Score: 11      Last Discharge [facility]       Complaint Diagnosis Description Type Department Provider    19  Postoperative pain Admission (Discharged) Marilia Ku MD           Spoke with: [address]: [employer]     Non-face-to-face services provided:  Obtained and reviewed discharge summary and/or continuity of care documents    Care Transitions 24 Hour Call    Do you have a copy of your discharge instructions?:  Yes  Do you have all of your prescriptions and are they filled?:  Yes  Have you been contacted by a [facility] Pharmacist?:  No  Have you scheduled your follow up appointment?:  Yes  How are you going to get to your appointment?:  Car - family or friend to transport  Were you discharged with any Home Care or Post Acute Services:  No  Post Acute Services:  Home Health (Comment: [address])  Do you feel like you have everything you need to keep you well at home?:  Yes  Care Transitions Interventions         Follow Up : Spoke with patient who said she is doing ok, still not feeling very energetic. Pt said she is passing gas and has some liquid stool when passing gas. Pt denies any nausea or vomiting and said she is tolerating her regular diet fine. Pt said she is trying to move around a lot and stays active. Pt said she lives alone but her grand daughter comes over a lot and she was actually there when I called today cleaning the house for her. Pt said she changed the dressing last night and said the incision looks good, no drainage or open areas within the incision line. Pt denies any redness to the incision area. Pt said she is urinating fine.  Pt is taking pain medication that was ordered, we discussed observing for constipation as the hydrocodone is very constipating. Pt agreed to call surgeon with any changes that she may be concerned about. Pt is aware of her follow up visit next week. Will continue to follow patient.    Future Appointments   Date Time Provider Mira Cannoni   6/4/2019  2:00 PM Yue De La Fuente MD [facility], [facility]   6/10/2019 11:00 AM SCHEDULE, Efe Noel [address] [facility] LAB [place]   6/17/2019  2:40 PM Belinda Wesley MD [facility]   8/19/2019  7:40 AM SCHEDULE, Efe Noel [address] [facility] LAB [place]   8/26/2019  3:00 [facility]   11/7/2019  9:45 AM [facility] [address]       Inna Ellis RN

## 2019-05-28 NOTE — TELEPHONE ENCOUNTER
Paramjit Briceno called requesting a refill of the below medication which has been pended for you:     Requested Prescriptions     Pending Prescriptions Disp Refills    levothyroxine (SYNTHROID) 50 MCG tablet [Pharmacy Med Name: Levothyroxine Sodium Oral Tablet 50 MCG] 30 tablet 5     Sig: TAKE 1 TABLET BY MOUTH ONE TIME A DAY       Last Appointment Date: 5/1/2019  Next Appointment Date: 8/26/2019    Allergies   Allergen Reactions    Pcn [Penicillins] Hives and Shortness Of Breath    Bextra [Valdecoxib] Diarrhea

## 2019-05-30 ENCOUNTER — CARE COORDINATION (OUTPATIENT)
Dept: CASE MANAGEMENT | Age: 79
End: 2019-05-30

## 2019-05-30 ENCOUNTER — TELEPHONE (OUTPATIENT)
Dept: SURGERY | Age: 79
End: 2019-05-30

## 2019-05-30 NOTE — CARE COORDINATION
[facility]    2019    Patient: Michael Prado  Patient : 1940   MRN: <[id number]>  Reason for Admission:   Discharge Date: 19 RARS: Readmission Risk Score: 11        Care Transitions Subsequent and Final Call    Subsequent and Final Calls  Are you currently active with any services?:  Home Health  Care Transitions Interventions  Other Interventions: Follow Up: Attempted to contact patient for transition follow up. Left message on voicemail with contact information and request for return phone call.     Future Appointments   Date Time Provider Mira Kirkland   2019  2:00 PM Gurvinder De Oliveira MD [facility], [facility]   6/10/2019 11:00 AM SCHEDULE, Efe Noel [address] [facility] LAB [place]   2019  2:40 PM [facility]   2019  7:40 AM SCHEDULE, Efe Noel [address] [facility] LAB [place]   2019  3:00 PM DO J LUIS Rodríguez [facility]   2019  9:45 AM MD MIRYAM Muniz [facility]       Carmen Alicea RN

## 2019-05-30 NOTE — CARE COORDINATION
[facility]    2019    Patient: Rufus Hernandez  Patient : 1940   MRN: <[id number]>  Reason for Admission:   Discharge Date: 19 RARS: Readmission Risk Score: 11         Spoke with: Lora Dc, patient    Care Transitions Subsequent and Final Call    Subsequent and Final Calls  Are you currently active with any services?:  Home Health  Care Transitions Interventions  Other Interventions: Follow Up: Spoke with patient for transition follow up. She states that she's been having loose stools. States she was up from 3 am to 6 am early this morning having to go to the bathroom. Informed her that it could take a few weeks before stools return to normal.  She states her appetite has decreased but she did eat country fried steak and gravy last night. She denies abdominal pain, nausea/vomiting, fever, chills. States incision site looks good. Denies swelling, redness, drainage. Patient waiting on call back from the MD office regarding her question about procedure. No other questions or concerns at this time.       Future Appointments   Date Time Provider Mira Kirkland   2019  2:00 PM Negar Santiago MD [facility], [facility]   6/10/2019 11:00 AM SCHEDULE, Efe Noel [address] [facility] [place]   2019  2:40 PM Wendie Stone MD [facility]   2019  7:40 AM SCHEDULE, fEe Noel [address] [facility] [place]   2019  3:00 PM Maren Gonzalez DO [facility]   2019  9:45 AM [facility]       Sonia Rodriguez, AURY

## 2019-05-30 NOTE — TELEPHONE ENCOUNTER
Patient called requesting to speak with Dr. Uyen Swartz nurse- states she has some questions regarding her procedure that was done last week. Please call patient at [phone].

## 2019-05-31 ENCOUNTER — APPOINTMENT (OUTPATIENT)
Dept: CT IMAGING | Age: 79
DRG: 372 | End: 2019-05-31
Payer: MEDICARE

## 2019-05-31 ENCOUNTER — HOSPITAL ENCOUNTER (INPATIENT)
Age: 79
LOS: 3 days | Discharge: HOME | DRG: 372 | End: 2019-06-03
Attending: SPECIALIST | Admitting: INTERNAL MEDICINE
Payer: MEDICARE

## 2019-05-31 DIAGNOSIS — E87.6 HYPOKALEMIA: ICD-10-CM

## 2019-05-31 DIAGNOSIS — R10.9 ABDOMINAL PAIN, UNSPECIFIED ABDOMINAL LOCATION: Primary | ICD-10-CM

## 2019-05-31 DIAGNOSIS — E86.0 DEHYDRATION: ICD-10-CM

## 2019-05-31 DIAGNOSIS — R19.7 DIARRHEA, UNSPECIFIED TYPE: ICD-10-CM

## 2019-05-31 DIAGNOSIS — G89.18 POSTOPERATIVE PAIN: ICD-10-CM

## 2019-05-31 PROBLEM — K52.9 COLITIS: Status: ACTIVE | Noted: 2019-05-31

## 2019-05-31 LAB
-: ABNORMAL
ABSOLUTE EOS #: 0.11 K/UL (ref 0–0.4)
ABSOLUTE IMMATURE GRANULOCYTE: ABNORMAL K/UL (ref 0–0.3)
ABSOLUTE LYMPH #: 0.96 K/UL (ref 1–4.8)
ABSOLUTE MONO #: 1.07 K/UL (ref 0.1–1.2)
ALBUMIN SERPL-MCNC: 3.8 G/DL (ref 3.5–5.2)
ALBUMIN/GLOBULIN RATIO: 1.4 (ref 1–2.5)
ALP BLD-CCNC: 110 U/L (ref 35–104)
ALT SERPL-CCNC: 16 U/L (ref 5–33)
AMORPHOUS: ABNORMAL
ANION GAP SERPL CALCULATED.3IONS-SCNC: 13 MMOL/L (ref 9–17)
AST SERPL-CCNC: 19 U/L
BACTERIA: ABNORMAL
BASOPHILS # BLD: 0 % (ref 0–1)
BASOPHILS ABSOLUTE: 0 K/UL (ref 0–0.2)
BILIRUB SERPL-MCNC: 0.64 MG/DL (ref 0.3–1.2)
BILIRUBIN DIRECT: 0.2 MG/DL
BILIRUBIN URINE: NEGATIVE
BILIRUBIN, INDIRECT: 0.44 MG/DL (ref 0–1)
BUN BLDV-MCNC: 13 MG/DL (ref 8–23)
BUN/CREAT BLD: 16 (ref 9–20)
CALCIUM SERPL-MCNC: 9 MG/DL (ref 8.6–10.4)
CASTS UA: ABNORMAL /LPF (ref 0–2)
CHLORIDE BLD-SCNC: 105 MMOL/L (ref 98–107)
CO2: 26 MMOL/L (ref 20–31)
COLOR: ABNORMAL
COMMENT UA: ABNORMAL
CREAT SERPL-MCNC: 0.83 MG/DL (ref 0.5–0.9)
CRYSTALS, UA: ABNORMAL /HPF
DATE, STOOL #1: 5
DATE, STOOL #2: ABNORMAL
DATE, STOOL #3: ABNORMAL
DIFFERENTIAL TYPE: ABNORMAL
EKG ATRIAL RATE: 64 BPM
EKG P-R INTERVAL: 216 MS
EKG Q-T INTERVAL: 496 MS
EKG QRS DURATION: 106 MS
EKG QTC CALCULATION (BAZETT): 511 MS
EKG R AXIS: -11 DEGREES
EKG T AXIS: 10 DEGREES
EKG VENTRICULAR RATE: 64 BPM
EOSINOPHILS RELATIVE PERCENT: 1 % (ref 1–7)
EPITHELIAL CELLS UA: ABNORMAL /HPF (ref 0–5)
GFR AFRICAN AMERICAN: >60 ML/MIN
GFR NON-AFRICAN AMERICAN: >60 ML/MIN
GFR SERPL CREATININE-BSD FRML MDRD: ABNORMAL ML/MIN/{1.73_M2}
GFR SERPL CREATININE-BSD FRML MDRD: ABNORMAL ML/MIN/{1.73_M2}
GLOBULIN: 2.7 G/DL (ref 1.5–3.8)
GLUCOSE BLD-MCNC: 113 MG/DL (ref 70–99)
GLUCOSE URINE: NEGATIVE
HCT VFR BLD CALC: 40.7 % (ref 36–46)
HEMOCCULT SP1 STL QL: POSITIVE
HEMOCCULT SP2 STL QL: ABNORMAL
HEMOCCULT SP3 STL QL: ABNORMAL
HEMOGLOBIN: 13.1 G/DL (ref 12–16)
IMMATURE GRANULOCYTES: ABNORMAL %
KETONES, URINE: ABNORMAL
LACTIC ACID: 0.8 MMOL/L (ref 0.5–2.2)
LEUKOCYTE ESTERASE, URINE: NEGATIVE
LIPASE: 17 U/L (ref 13–60)
LYMPHOCYTES # BLD: 9 % (ref 16–46)
MAGNESIUM: 1.7 MG/DL (ref 1.6–2.6)
MCH RBC QN AUTO: 30.9 PG (ref 26–34)
MCHC RBC AUTO-ENTMCNC: 32.3 G/DL (ref 31–37)
MCV RBC AUTO: 95.8 FL (ref 80–100)
MONOCYTES # BLD: 10 % (ref 4–11)
MORPHOLOGY: ABNORMAL
MORPHOLOGY: ABNORMAL
MUCUS: ABNORMAL
NITRITE, URINE: NEGATIVE
NRBC AUTOMATED: ABNORMAL PER 100 WBC
OTHER OBSERVATIONS UA: ABNORMAL
PDW BLD-RTO: 16 % (ref 11–14.5)
PH UA: 6.5 (ref 5–6)
PLATELET # BLD: 129 K/UL (ref 140–450)
PLATELET ESTIMATE: ABNORMAL
PMV BLD AUTO: 11.7 FL (ref 6–12)
POTASSIUM SERPL-SCNC: 2.6 MMOL/L (ref 3.7–5.3)
POTASSIUM SERPL-SCNC: 2.7 MMOL/L (ref 3.7–5.3)
POTASSIUM SERPL-SCNC: 3 MMOL/L (ref 3.7–5.3)
PROTEIN UA: ABNORMAL
RBC # BLD: 4.25 M/UL (ref 4–5.2)
RBC # BLD: ABNORMAL 10*6/UL
RBC UA: ABNORMAL /HPF (ref 0–4)
RENAL EPITHELIAL, UA: ABNORMAL /HPF
SEG NEUTROPHILS: 80 % (ref 43–77)
SEGMENTED NEUTROPHILS ABSOLUTE COUNT: 8.56 K/UL (ref 1.8–7.7)
SODIUM BLD-SCNC: 144 MMOL/L (ref 135–144)
SPECIFIC GRAVITY UA: 1.01 (ref 1.01–1.02)
TIME, STOOL #1: 1930
TIME, STOOL #2: ABNORMAL
TIME, STOOL #3: ABNORMAL
TOTAL PROTEIN: 6.5 G/DL (ref 6.4–8.3)
TRICHOMONAS: ABNORMAL
TURBIDITY: ABNORMAL
URINE HGB: NEGATIVE
UROBILINOGEN, URINE: NORMAL
WBC # BLD: 10.7 K/UL (ref 3.5–11)
WBC # BLD: ABNORMAL 10*3/UL
WBC UA: ABNORMAL /HPF (ref 0–4)
YEAST: ABNORMAL

## 2019-05-31 PROCEDURE — 82274 ASSAY TEST FOR BLOOD FECAL: CPT

## 2019-05-31 PROCEDURE — 6360000002 HC RX W HCPCS: Performed by: SPECIALIST

## 2019-05-31 PROCEDURE — 2580000003 HC RX 258: Performed by: SPECIALIST

## 2019-05-31 PROCEDURE — 96361 HYDRATE IV INFUSION ADD-ON: CPT

## 2019-05-31 PROCEDURE — 83735 ASSAY OF MAGNESIUM: CPT

## 2019-05-31 PROCEDURE — 80048 BASIC METABOLIC PNL TOTAL CA: CPT

## 2019-05-31 PROCEDURE — 74177 CT ABD & PELVIS W/CONTRAST: CPT

## 2019-05-31 PROCEDURE — 99285 EMERGENCY DEPT VISIT HI MDM: CPT

## 2019-05-31 PROCEDURE — 87449 NOS EACH ORGANISM AG IA: CPT

## 2019-05-31 PROCEDURE — 85025 COMPLETE CBC W/AUTO DIFF WBC: CPT

## 2019-05-31 PROCEDURE — 6370000000 HC RX 637 (ALT 250 FOR IP): Performed by: SPECIALIST

## 2019-05-31 PROCEDURE — 96365 THER/PROPH/DIAG IV INF INIT: CPT

## 2019-05-31 PROCEDURE — 6360000002 HC RX W HCPCS: Performed by: INTERNAL MEDICINE

## 2019-05-31 PROCEDURE — 36415 COLL VENOUS BLD VENIPUNCTURE: CPT

## 2019-05-31 PROCEDURE — 83690 ASSAY OF LIPASE: CPT

## 2019-05-31 PROCEDURE — 96366 THER/PROPH/DIAG IV INF ADDON: CPT

## 2019-05-31 PROCEDURE — 2060000000 HC ICU INTERMEDIATE R&B

## 2019-05-31 PROCEDURE — 84132 ASSAY OF SERUM POTASSIUM: CPT

## 2019-05-31 PROCEDURE — 81001 URINALYSIS AUTO W/SCOPE: CPT

## 2019-05-31 PROCEDURE — 6360000002 HC RX W HCPCS: Performed by: NURSE PRACTITIONER

## 2019-05-31 PROCEDURE — 87506 IADNA-DNA/RNA PROBE TQ 6-11: CPT

## 2019-05-31 PROCEDURE — 6370000000 HC RX 637 (ALT 250 FOR IP): Performed by: INTERNAL MEDICINE

## 2019-05-31 PROCEDURE — 87324 CLOSTRIDIUM AG IA: CPT

## 2019-05-31 PROCEDURE — 87493 C DIFF AMPLIFIED PROBE: CPT

## 2019-05-31 PROCEDURE — 96375 TX/PRO/DX INJ NEW DRUG ADDON: CPT

## 2019-05-31 PROCEDURE — 93005 ELECTROCARDIOGRAM TRACING: CPT | Performed by: SPECIALIST

## 2019-05-31 PROCEDURE — 99223 1ST HOSP IP/OBS HIGH 75: CPT | Performed by: INTERNAL MEDICINE

## 2019-05-31 PROCEDURE — 83605 ASSAY OF LACTIC ACID: CPT

## 2019-05-31 PROCEDURE — 87425 ROTAVIRUS AG IA: CPT

## 2019-05-31 PROCEDURE — 80076 HEPATIC FUNCTION PANEL: CPT

## 2019-05-31 PROCEDURE — 6360000004 HC RX CONTRAST MEDICATION: Performed by: SPECIALIST

## 2019-05-31 RX ORDER — SODIUM CHLORIDE AND POTASSIUM CHLORIDE .9; .15 G/100ML; G/100ML
SOLUTION INTRAVENOUS CONTINUOUS
Status: DISCONTINUED | OUTPATIENT
Start: 2019-05-31 | End: 2019-06-03 | Stop reason: HOSPADM

## 2019-05-31 RX ORDER — MAGNESIUM SULFATE 1 G/100ML
1 INJECTION INTRAVENOUS
Status: COMPLETED | OUTPATIENT
Start: 2019-05-31 | End: 2019-05-31

## 2019-05-31 RX ORDER — POTASSIUM CHLORIDE 20 MEQ/1
40 TABLET, EXTENDED RELEASE ORAL ONCE
Status: COMPLETED | OUTPATIENT
Start: 2019-05-31 | End: 2019-05-31

## 2019-05-31 RX ORDER — ONDANSETRON 2 MG/ML
4 INJECTION INTRAMUSCULAR; INTRAVENOUS ONCE
Status: COMPLETED | OUTPATIENT
Start: 2019-05-31 | End: 2019-05-31

## 2019-05-31 RX ORDER — DILTIAZEM HYDROCHLORIDE 120 MG/1
120 CAPSULE, COATED, EXTENDED RELEASE ORAL DAILY
Status: DISCONTINUED | OUTPATIENT
Start: 2019-05-31 | End: 2019-06-03 | Stop reason: HOSPADM

## 2019-05-31 RX ORDER — ATORVASTATIN CALCIUM 40 MG/1
40 TABLET, FILM COATED ORAL NIGHTLY
Status: DISCONTINUED | OUTPATIENT
Start: 2019-05-31 | End: 2019-06-03 | Stop reason: HOSPADM

## 2019-05-31 RX ORDER — TRAZODONE HYDROCHLORIDE 50 MG/1
50 TABLET ORAL NIGHTLY
Status: DISCONTINUED | OUTPATIENT
Start: 2019-05-31 | End: 2019-06-03 | Stop reason: HOSPADM

## 2019-05-31 RX ORDER — HYDROCODONE BITARTRATE AND ACETAMINOPHEN 5; 325 MG/1; MG/1
1 TABLET ORAL EVERY 4 HOURS PRN
Status: DISCONTINUED | OUTPATIENT
Start: 2019-05-31 | End: 2019-06-03 | Stop reason: HOSPADM

## 2019-05-31 RX ORDER — MAGNESIUM SULFATE IN WATER 40 MG/ML
2 INJECTION, SOLUTION INTRAVENOUS ONCE
Status: DISCONTINUED | OUTPATIENT
Start: 2019-05-31 | End: 2019-05-31 | Stop reason: RX

## 2019-05-31 RX ORDER — 0.9 % SODIUM CHLORIDE 0.9 %
1000 INTRAVENOUS SOLUTION INTRAVENOUS ONCE
Status: COMPLETED | OUTPATIENT
Start: 2019-05-31 | End: 2019-05-31

## 2019-05-31 RX ORDER — PROMETHAZINE HYDROCHLORIDE 25 MG/ML
12.5 INJECTION, SOLUTION INTRAMUSCULAR; INTRAVENOUS EVERY 4 HOURS PRN
Status: DISCONTINUED | OUTPATIENT
Start: 2019-05-31 | End: 2019-06-03 | Stop reason: HOSPADM

## 2019-05-31 RX ORDER — ONDANSETRON 2 MG/ML
4 INJECTION INTRAMUSCULAR; INTRAVENOUS EVERY 6 HOURS PRN
Status: DISCONTINUED | OUTPATIENT
Start: 2019-05-31 | End: 2019-05-31

## 2019-05-31 RX ORDER — CIPROFLOXACIN 2 MG/ML
400 INJECTION, SOLUTION INTRAVENOUS EVERY 12 HOURS
Status: DISCONTINUED | OUTPATIENT
Start: 2019-05-31 | End: 2019-06-01

## 2019-05-31 RX ORDER — LEVOTHYROXINE SODIUM 0.03 MG/1
50 TABLET ORAL DAILY
Status: DISCONTINUED | OUTPATIENT
Start: 2019-05-31 | End: 2019-06-03 | Stop reason: HOSPADM

## 2019-05-31 RX ORDER — SODIUM CHLORIDE 9 MG/ML
INJECTION, SOLUTION INTRAVENOUS CONTINUOUS
Status: DISCONTINUED | OUTPATIENT
Start: 2019-05-31 | End: 2019-05-31

## 2019-05-31 RX ORDER — AMIODARONE HYDROCHLORIDE 200 MG/1
200 TABLET ORAL DAILY
Status: DISCONTINUED | OUTPATIENT
Start: 2019-05-31 | End: 2019-05-31

## 2019-05-31 RX ORDER — POTASSIUM CHLORIDE 7.45 MG/ML
10 INJECTION INTRAVENOUS PRN
Status: DISCONTINUED | OUTPATIENT
Start: 2019-05-31 | End: 2019-06-03 | Stop reason: HOSPADM

## 2019-05-31 RX ORDER — SODIUM CHLORIDE AND POTASSIUM CHLORIDE .9; .15 G/100ML; G/100ML
100 SOLUTION INTRAVENOUS CONTINUOUS
Status: DISCONTINUED | OUTPATIENT
Start: 2019-05-31 | End: 2019-05-31

## 2019-05-31 RX ORDER — ACETAMINOPHEN 325 MG/1
650 TABLET ORAL EVERY 4 HOURS PRN
Status: DISCONTINUED | OUTPATIENT
Start: 2019-05-31 | End: 2019-06-03 | Stop reason: HOSPADM

## 2019-05-31 RX ORDER — SODIUM CHLORIDE 0.9 % (FLUSH) 0.9 %
10 SYRINGE (ML) INJECTION EVERY 12 HOURS SCHEDULED
Status: DISCONTINUED | OUTPATIENT
Start: 2019-05-31 | End: 2019-06-03 | Stop reason: HOSPADM

## 2019-05-31 RX ORDER — SODIUM CHLORIDE 0.9 % (FLUSH) 0.9 %
10 SYRINGE (ML) INJECTION PRN
Status: DISCONTINUED | OUTPATIENT
Start: 2019-05-31 | End: 2019-06-03 | Stop reason: HOSPADM

## 2019-05-31 RX ADMIN — POTASSIUM CHLORIDE 10 MEQ: 10 INJECTION, SOLUTION INTRAVENOUS at 21:11

## 2019-05-31 RX ADMIN — POTASSIUM CHLORIDE AND SODIUM CHLORIDE: 900; 150 INJECTION, SOLUTION INTRAVENOUS at 17:32

## 2019-05-31 RX ADMIN — TRAZODONE HYDROCHLORIDE 50 MG: 50 TABLET ORAL at 20:29

## 2019-05-31 RX ADMIN — POTASSIUM CHLORIDE 40 MEQ: 20 TABLET, EXTENDED RELEASE ORAL at 14:00

## 2019-05-31 RX ADMIN — IOPAMIDOL 100 ML: 755 INJECTION, SOLUTION INTRAVENOUS at 13:46

## 2019-05-31 RX ADMIN — ATORVASTATIN CALCIUM 40 MG: 40 TABLET, FILM COATED ORAL at 20:29

## 2019-05-31 RX ADMIN — CIPROFLOXACIN 400 MG: 2 INJECTION, SOLUTION INTRAVENOUS at 17:32

## 2019-05-31 RX ADMIN — ENOXAPARIN SODIUM 40 MG: 40 INJECTION SUBCUTANEOUS at 17:32

## 2019-05-31 RX ADMIN — MAGNESIUM SULFATE HEPTAHYDRATE 1 G: 1 INJECTION, SOLUTION INTRAVENOUS at 15:10

## 2019-05-31 RX ADMIN — DILTIAZEM HYDROCHLORIDE 120 MG: 120 CAPSULE, COATED, EXTENDED RELEASE ORAL at 17:35

## 2019-05-31 RX ADMIN — METOPROLOL TARTRATE 25 MG: 25 TABLET ORAL at 20:29

## 2019-05-31 RX ADMIN — Medication 4 MG: at 12:48

## 2019-05-31 RX ADMIN — POTASSIUM BICARBONATE 40 MEQ: 782 TABLET, EFFERVESCENT ORAL at 18:24

## 2019-05-31 RX ADMIN — LEVOTHYROXINE SODIUM 50 MCG: 25 TABLET ORAL at 17:32

## 2019-05-31 RX ADMIN — SODIUM CHLORIDE 1000 ML: 9 INJECTION, SOLUTION INTRAVENOUS at 12:51

## 2019-05-31 RX ADMIN — SODIUM CHLORIDE AND POTASSIUM CHLORIDE 100 ML/HR: .9; .15 SOLUTION INTRAVENOUS at 13:58

## 2019-05-31 RX ADMIN — AMIODARONE HYDROCHLORIDE 200 MG: 200 TABLET ORAL at 17:32

## 2019-05-31 RX ADMIN — POTASSIUM CHLORIDE 10 MEQ: 10 INJECTION, SOLUTION INTRAVENOUS at 22:21

## 2019-05-31 RX ADMIN — POTASSIUM CHLORIDE 10 MEQ: 10 INJECTION, SOLUTION INTRAVENOUS at 23:24

## 2019-05-31 RX ADMIN — MAGNESIUM SULFATE HEPTAHYDRATE 1 G: 1 INJECTION, SOLUTION INTRAVENOUS at 13:59

## 2019-05-31 ASSESSMENT — PAIN SCALES - GENERAL
PAINLEVEL_OUTOF10: 0

## 2019-05-31 ASSESSMENT — ENCOUNTER SYMPTOMS
NAUSEA: 1
SHORTNESS OF BREATH: 0
ABDOMINAL PAIN: 1
BLOOD IN STOOL: 0
DIARRHEA: 1
COUGH: 0

## 2019-05-31 NOTE — TELEPHONE ENCOUNTER
Patient's son called and said his mother's symptoms are becoming worse. She is having dry heaves and diarrhea. Writer instructed him to take her to [facility] ER for evaluation. Son agrees to take to ER.

## 2019-05-31 NOTE — ED TRIAGE NOTES
Here from home. No pain- just weak. Had abd sx last week and has had diarrhea this week and now feeling weak.

## 2019-05-31 NOTE — ED PROVIDER NOTES
[facility]  eMERGENCY dEPARTMENT eNCOUnter      Pt Name: Tim Leone  MRN: [id number]  [place] 1940  Date of evaluation: 5/31/2019      CHIEF COMPLAINT       Chief Complaint   Patient presents with    Extremity Weakness     diarrhea, weak. nausea. no vomiting. no fever. pt has abd pain last week here          HISTORY OF PRESENT ILLNESS    Tim Leone is a 77 y.o. female who presents to the emergency department accompanied by her son and daughter-in-law after patient has been having the diarrhea for last 5 days becoming very frequent, to many to count over last 2 days. Patient has been having generalized weakness and she also has had nausea and dry heaves today but denies any vomiting. She denies any fever or chills. Appetite is decreased. She denies any cough, runny nose or sore throat. She has had low-grade fever as per EMS during the transportation but she is afebrile upon arrival.  Patient has history of stage I colon cancer and the underwent the colon resection on November 5, 2018 and did not require any chemotherapy or radiation therapy. She has had the ileostomy and underwent the ileostomy loop take down 9 days ago and was discharged home 6 days ago. Patient what was given antibiotic during the hospitalization but is not on any antibiotics at home. She has had abdominal cramps at home and has been taking the Norco which was prescribed postoperatively. She denies any chest pain, shortness of breath, palpitations or diaphoresis and denies any melena or hematochezia. She has history of atrial fibrillation, underwent cardioversion and does not take any anticoagulants. REVIEW OF SYSTEMS       Review of Systems   Constitutional: Positive for fatigue. Respiratory: Negative for cough and shortness of breath. Gastrointestinal: Positive for abdominal pain, diarrhea and nausea. Negative for blood in stool.    Genitourinary: Negative for dysuria, flank pain and frequency. Neurological: Positive for weakness. All other systems reviewed and are negative. PAST MEDICAL HISTORY    has a past medical history of A-fib ([facility] [facility] CHF (congestive heart failure) ([facility] [address] Dyslipidemia, FH: total abdominal hysterectomy and bilateral salpingo-oophorectomy, Glucose intolerance (impaired glucose tolerance), Hypertension, Malignant neoplasm of sigmoid colon ([facility] [address] Obesity, and Osteoarthritis. SURGICAL HISTORY      has a past surgical history that includes kenyon and bso (cervix removed) (1966); Cholecystectomy (1960s); Varicose vein surgery (Right, 1960s); Hip Arthroplasty (Left); Knee Arthroplasty (Left); Knee Arthroplasty (Right); Cardiac catheterization (09/05/2017); Colonoscopy (N/A, 10/8/2018); pr lap,surg,colectomy, partial, w/anast (N/A, 11/5/2018); pr cystoscopy,insert ureteral stent (Bilateral, 11/5/2018); Colonoscopy (N/A, 4/25/2019); sigmoidoscopy (N/A, 5/9/2019); and Small intestine surgery (N/A, 5/22/2019). CURRENT MEDICATIONS       Previous Medications    ACETAMINOPHEN (TYLENOL) 325 MG TABLET    Take 2 tablets by mouth every 4 hours as needed for Pain or Fever    AMIODARONE (CORDARONE) 200 MG TABLET    TAKE 1 TABLET BY MOUTH ONE TIME A DAY    ATORVASTATIN (LIPITOR) 40 MG TABLET    TAKE 1 TABLET BY MOUTH nightly    DICLOFENAC (VOLTAREN) 75 MG EC TABLET    Take 1 tablet by mouth 2 times daily as needed for Pain    DILTIAZEM (CARDIZEM CD) 120 MG EXTENDED RELEASE CAPSULE    Take 1 capsule by mouth daily    FUROSEMIDE (LASIX) 20 MG TABLET    Take 1 tablet by mouth daily    HYDROCODONE-ACETAMINOPHEN (NORCO) 5-325 MG PER TABLET    Take 1 tablet by mouth every 6 hours as needed for Pain for up to 7 days. Intended supply: 7 days.  Take lowest dose possible to manage pain    LEVOTHYROXINE (SYNTHROID) 50 MCG TABLET    TAKE 1 TABLET BY MOUTH ONE TIME A DAY     METOPROLOL TARTRATE (LOPRESSOR) 25 MG TABLET    TAKE ONE TABLET BY MOUTH TWICE A DAY    POTASSIUM CHLORIDE (KLOR-CON M) 20 MEQ EXTENDED RELEASE TABLET    Take 1 tablet by mouth daily    TRAZODONE (DESYREL) 50 MG TABLET    Take 1 tablet by mouth nightly       ALLERGIES     is allergic to pcn [penicillins] and bextra [valdecoxib]. FAMILY HISTORY     is adopted. family history includes High Blood Pressure in her brother. She was adopted. SOCIAL HISTORY      reports that she has never smoked. She has never used smokeless tobacco. She reports that she does not drink alcohol or use drugs. PHYSICAL EXAM     INITIAL VITALS:  height is 5' 7\" (1.702 m) and weight is 293 lb (132.9 kg). Her tympanic temperature is 98 °F (36.7 °C). Her blood pressure is 151/65 (abnormal) and her pulse is 61. Her respiration is 12 and oxygen saturation is 91%. Physical Exam   Constitutional: She is oriented to person, place, and time. She appears well-developed and well-nourished. HENT:   Head: Normocephalic and atraumatic. Nose: Nose normal.   Mouth/Throat: Oropharynx is clear and moist.   Eyes: Pupils are equal, round, and reactive to light. EOM are normal.   Neck: Normal range of motion. Neck supple. Cardiovascular: Normal rate, regular rhythm, normal heart sounds and intact distal pulses. No murmur heard. Pulmonary/Chest: Effort normal and breath sounds normal. No respiratory distress. Abdominal: Soft. Bowel sounds are normal. She exhibits no distension. There is no tenderness. Surgical incision appears clean and there are several staples in place. Neurological: She is alert and oriented to person, place, and time. Skin: Skin is warm and dry. Nursing note and vitals reviewed.         DIFFERENTIAL DIAGNOSIS/ MDM:     C. diff colitis, infectious diarrhea, dehydration, electrolyte imbalance, UTI    DIAGNOSTIC RESULTS     EKG: All EKG's are interpreted by the Emergency Department Physician who either signs or Co-signs this chart in the absence of a cardiologist.    EKG Interpretation    Interpreted by emergency department physician    Rhythm: normal sinus   Rate: normal  Axis: normal  Conduction: normal  ST Segments: no acute change  T Waves: no acute change  Q Waves: no acute change    Clinical Impression: no acute change, but this is a nonspecific EKG. RADIOLOGY:   I directly visualized the following  images and reviewed the radiologist interpretations:      Ct Abdomen Pelvis W Iv Contrast Additional Contrast? None    Result Date: 5/31/2019  EXAMINATION: CT OF THE ABDOMEN AND PELVIS WITH CONTRAST 5/31/2019 1:44 pm TECHNIQUE: CT of the abdomen and pelvis was performed with the administration of intravenous contrast. Multiplanar reformatted images are provided for review. Dose modulation, iterative reconstruction, and/or weight based adjustment of the mA/kV was utilized to reduce the radiation dose to as low as reasonably achievable. COMPARISON: February 26, 2019 HISTORY: ORDERING SYSTEM PROVIDED HISTORY: abd pain TECHNOLOGIST PROVIDED HISTORY: Ordering Physician Provided Reason for Exam: HX OF COLON CA; STATES FOR THE PAST 4 DAYS SHE HAS HAD DIARRHEA AND NAUSEA; HX OF CHOLECYSTECTOMY, APPENDECTOMY, COLON SURGERY, HYSTERECTOMY Acuity: Acute Type of Exam: Initial FINDINGS: Evaluation is limited by arm positioning and motion. Lower Chest: Dependent lung changes. No pleural effusions. Organs: No liver lesion. Cholecystectomy. No pancreatic lesion. Hypodensity within the spleen is indeterminate. No adrenal lesion. No hydronephrosis. Right renal hypodensity measuring 17 mm is incompletely characterized and has enlarged over multiple prior studies. GI/Bowel: No bowel obstruction. Mild diffuse colonic wall thickening. Postsurgical changes within the right lower quadrant. Pelvis: No free fluid. No bladder calculus. Peritoneum/Retroperitoneum: Mild stranding within the mesentery may be related to recent surgery. Atherosclerotic calcification of the abdominal aorta without aneurysmal dilatation. No adenopathy.  Bones/Soft Tissues: Postsurgical changes along the right lower quadrant. Left hip total arthroplasty. Deformity of the sacrum likely relates to remote insufficiency fractures. Lumbar spine degenerative changes. Mild diffuse colonic wall thickening may be related to recent surgery versus a colitis. No bowel obstruction. Incompletely characterized right renal lesion may be a complex cyst but has increased over multiple prior studies. Recommend follow-up with MRI with contrast.     ED BEDSIDE ULTRASOUND:       LABS:  Labs Reviewed   CBC WITH AUTO DIFFERENTIAL - Abnormal; Notable for the following components:       Result Value    RDW 16.0 (*)     Platelets 980 (*)     Lymphocytes 9 (*)     Seg Neutrophils 80 (*)     Absolute Lymph # 0.96 (*)     Segs Absolute 8.56 (*)     All other components within normal limits   BASIC METABOLIC PANEL W/ REFLEX TO MG FOR LOW K - Abnormal; Notable for the following components:    Glucose 113 (*)     Potassium 2.6 (*)     All other components within normal limits   HEPATIC FUNCTION PANEL - Abnormal; Notable for the following components:    Alkaline Phosphatase 110 (*)     All other components within normal limits   URINE RT REFLEX TO CULTURE - Abnormal; Notable for the following components:    Ketones, Urine TRACE (*)     pH, UA 6.5 (*)     Protein, UA TRACE (*)     All other components within normal limits   MICROSCOPIC URINALYSIS - Abnormal; Notable for the following components:    Bacteria, UA TRACE (*)     Mucus, UA 1+ (*)     All other components within normal limits   CULTURE STOOL   C DIFF TOXIN/ANTIGEN   LIPASE   LACTIC ACID   MAGNESIUM   BLOOD OCCULT STOOL SCREEN #1   ROTAVIRUS ANTIGEN, STOOL   POTASSIUM       I reviewed all the lab results, patient has hypokalemia with potassium level 2.6.   Patient was not able to give us a stool specimen during the ED stay    EMERGENCY DEPARTMENT COURSE:   Vitals:    Vitals:    05/31/19 1435 05/31/19 1505 05/31/19 1535 05/31/19 1605   BP: (!) 144/65 (!) 141/61 (!) 151/69 (!) 151/65   Pulse: 60 61 63 61   Resp: 22 11 22 12   Temp:       TempSrc:       SpO2: 96% 95% 92% 91%   Weight:       Height:         -------------------------  BP: (!) 151/65, Temp: 98 °F (36.7 °C), Pulse: 61, Resp: 12    Orders Placed This Encounter   Medications    0.9 % sodium chloride bolus    DISCONTD: 0.9 % sodium chloride infusion    ondansetron (ZOFRAN) injection 4 mg    DISCONTD: magnesium sulfate 2 g in 50 mL IVPB premix    DISCONTD: 0.9% NaCl with KCl 20 mEq infusion    potassium chloride (KLOR-CON M) extended release tablet 40 mEq    magnesium sulfate 1 g in dextrose 5% 100 mL IVPB    iopamidol (ISOVUE-370) 76 % injection 100 mL    ciprofloxacin (CIPRO) IVPB 400 mg    metronidazole (FLAGYL) 500 mg in NaCl 100 mL IVPB premix    0.9% NaCl with KCl 20 mEq infusion    sodium chloride flush 0.9 % injection 10 mL    sodium chloride flush 0.9 % injection 10 mL    enoxaparin (LOVENOX) injection 40 mg    magnesium hydroxide (MILK OF MAGNESIA) 400 MG/5ML suspension 30 mL    ondansetron (ZOFRAN) injection 4 mg    acetaminophen (TYLENOL) tablet 650 mg    HYDROcodone-acetaminophen (NORCO) 5-325 MG per tablet 1 tablet    HYDROmorphone (DILAUDID) injection 1 mg    amiodarone (CORDARONE) tablet 200 mg    atorvastatin (LIPITOR) tablet 40 mg    diltiazem (CARDIZEM CD) extended release capsule 120 mg    levothyroxine (SYNTHROID) tablet 50 mcg    metoprolol tartrate (LOPRESSOR) tablet 25 mg    traZODone (DESYREL) tablet 50 mg       During emergency department course, patient was given normal saline bolus followed by infusion as well as Zofran 4 mg IV push. She has potassium level II.6 and she was given magnesium sulfate 2 g IV piggyback, potassium chloride 40 mEq orally and 20 minutes milliequivalents IV piggyback. She will be started on Cipro and Flagyl IV piggyback once a stool sample is sent for culture and C. diff colitis.   I discussed the case with Dr. Keshav Mendiola and

## 2019-05-31 NOTE — TELEPHONE ENCOUNTER
Patient called in and said she is having uncontrolled diarrhea this morning and nausea. She denies elevated temperature. She only ate a pork chop , toast and peaches last night. She wants to know what she can do.

## 2019-05-31 NOTE — ED NOTES
Pt resting in bed- waiting on testing.  Pt has medications hung as ordered      Ingrid Hall, RN  05/31/19 1775

## 2019-05-31 NOTE — ED NOTES
Pt still denies pain.  Pt said still feels weak and pt said nausea is better      José Holland RN  05/31/19 9755

## 2019-05-31 NOTE — ED NOTES
pts son at bedside. Pt lives at home and uses walker to get around. Warm blanket provided.  Pt son asked for another blanket for pt      Paula Lu RN  05/31/19 2138

## 2019-05-31 NOTE — ED NOTES
At bedside to clean pt up- pt upset because she had diarrhea in her underwear      Ingrid Hall RN  05/31/19 1300

## 2019-05-31 NOTE — H&P
HOSPITALIST ADMISSION H&P      REASON FOR ADMISSION:  Colitis---diarrhea---hypokalemia  ESTIMATED LENGTH OF STAY:  > 2 midnights----2-3 days    ATTENDING/ADMITTING PHYSICIAN: Job Davison MD  PCP: Santiago Justice DO    HISTORY OF PRESENT ILLNESS:      The patient is a 77 y.o. female patient of Santiago Justice DO who presents with increasing diarrhea and dry heaves---imaging shows colonic thickening-----had takedown of a loop colostomy on 5.22.2019---see further history below in subnote---has had nausea and hanna heaves---diarrhea so extreme and excessive that the family had to call the squad to bring her to the ER--patient's General Surgeon contacted and requested admission for IV antibiotics--stool-C difficle cultures---IV fluids and potassium replacement. K = 2.6           See below for additional PMH.     Patient bkbs-yuilumznoy-njnjmpmn-available records reviewed, including, but not limited to,  ER reports--labs--imaging--office records---personal notes       Past Medical History:   Diagnosis Date    A-fib [facility]) 07/19/2017    CHF [facility]congestive heart failure) ([facility] [address]     Dyslipidemia     FH: total abdominal hysterectomy and bilateral salpingo-oophorectomy 1966    Glucose intolerance (impaired glucose tolerance)     Hypertension     Malignant neoplasm of sigmoid colon ([facility] [address]     Obesity     Osteoarthritis            Past Surgical History:   Procedure Laterality Date    CARDIAC CATHETERIZATION  09/05/2017    CHOLECYSTECTOMY  1960s    COLONOSCOPY N/A 10/8/2018    COLONOSCOPY WITH COLD ET HOT BIOPSIES ET POLYPECTOMIES performed by Lalit Landaverde MD at [address] 4/25/2019    COLONOSCOPY performed by Sonu Trotter MD at [address] [facility]  stricture at 7 cm     HIP ARTHROPLASTY Left     KNEE ARTHROPLASTY Left     KNEE ARTHROPLASTY Right     TX CYSTOSCOPY,INSERT URETERAL STENT Bilateral 11/5/2018    CYSTO Bilateral Lighted stent placements performed by Grace Forrester MD at [address] [facility] LAP,SURG,COLECTOMY, PARTIAL, W/ANAST N/A 11/5/2018    Open Sigmoid Colectomy w/ lighted stents ILEOSTOMY PLACEMENT performed by Lamar Leal MD at [address] [facility] N/A 5/9/2019    Flexible Sigmoidoscopy with Dilatation of rectal stricture performed by Christianne Pearl MD at [address] [place] [address] N/A 5/22/2019    Loop Ileostomy take down performed by Christianne Pearl MD at [address] [place] [address] Right 1960s       Medications Prior to Admission:    Not in a hospital admission. Allergies:    Pcn [penicillins] and Bextra [valdecoxib]    Social History:    reports that she has never smoked. She has never used smokeless tobacco. She reports that she does not drink alcohol or use drugs. Family History:   family history includes High Blood Pressure in her brother. She was adopted. REVIEW OF SYSTEMS:  See HPI and problem list; otherwise no other new complaints with respect to HEENT, neck, pulmonary, coronary, GI, , endocrine, musculoskeletal, immune system/connective tissue disease, hematologic, neuropsych, skin, lymphatics, or malignancies.      PHYSICAL EXAM:  Vitals:  BP (!) 151/65   Pulse 61   Temp 98 °F (36.7 °C) (Tympanic)   Resp 12   Ht 5' 7\" (1.702 m)   Wt 293 lb (132.9 kg)   LMP 01/01/1968   SpO2 91%   BMI 45.89 kg/m²     HEENT: Normocephalic and Atraumatic  Neck: Supple, No Masses, Tenderness, Nodularity and No Lymphadenopathy  Chest/Lungs: Clear to Auscultation without Rales, Rhonchi, or Wheezes  Cardiac: Regular Rate and Rhythm  GI/Abdomen: wound site---staples in place CDI, Bowel Sounds Present and Soft,  without Guarding or Rebound Tenderness  : Not examined  EXT/Skin: No Edema, No Cyanosis and No Clubbing---no rashes  Neuro: generalized weakness----, Alert and Oriented and No Localizing Signs/Symptoms        LABS:    CBC with Differential:    Lab Results   Component Value Date    WBC 10.7 05/31/2019    RBC 4.25 05/31/2019    HGB 13.1 05/31/2019    HCT 40.7 05/31/2019     05/31/2019    MCV 95.8 05/31/2019    MCH 30.9 05/31/2019    MCHC 32.3 05/31/2019    RDW 16.0 05/31/2019    LYMPHOPCT 9 05/31/2019    MONOPCT 10 05/31/2019    BASOPCT 0 05/31/2019    MONOSABS 1.07 05/31/2019    LYMPHSABS 0.96 05/31/2019    EOSABS 0.11 05/31/2019    BASOSABS 0.00 05/31/2019    DIFFTYPE NOT REPORTED 05/31/2019     CMP:    Lab Results   Component Value Date     05/31/2019    K 2.6 05/31/2019     05/31/2019    CO2 26 05/31/2019    BUN 13 05/31/2019    CREATININE 0.83 05/31/2019    GFRAA >60 05/31/2019    LABGLOM >60 05/31/2019    GLUCOSE 113 05/31/2019    PROT 6.5 05/31/2019    LABALBU 3.8 05/31/2019    CALCIUM 9.0 05/31/2019    BILITOT 0.64 05/31/2019    ALKPHOS 110 05/31/2019    AST 19 05/31/2019    ALT 16 05/31/2019       ASSESSMENT:      Patient Active Problem List   Diagnosis    HTN (hypertension)    Insulin resistance    Dyslipidemia    Osteoarthritis    Osteoporosis    Depression    Anxiety    CHF (congestive heart failure) (HCC)    Hypothyroidism due to medication    Morbid obesity ([address]    Primary insomnia    Multiple closed fractures of ribs    JAYJAY on CPAP    Obesity    Hypertension    Glucose intolerance (impaired glucose tolerance)    A-fib (HCC)    Colon cancer (HCC)    Acute blood loss as cause of postoperative anemia    Other complete intestinal obstruction (HCC)    Elevated fasting glucose    BMI 50.0-59.9, adult ([address]    Colostomy in place [facility])    Other specified hypothyroidism    Ileostomy status ([address]    Attention to ileostomy ([address]    S/P small bowel resection    Colitis     ANANTH GARCIA dc   FP  [address]  NICK Estevez;  ER[facility]---[facility];  ZULEIKA Kinsey--jw ZULEIKA Tenorio]  FULL CODE      LOVENOX     AMIODARONE     SUPPLEMENTAL OXYGEN     Anti-infectives:  Cipro IV, Flagyl IV    Colitis---5.31.2019  Hypokalemia---5.31.2019  Diarrhea----5.31.2019   Dehydration---5.31.2019  Nausea-dry francisco---5.31.2019          CT abdomen-pelvis----5.31.2019---diffuse colonic wall thickening---no obstruction--                            right renal lesion increased over multiple studies [possible complex                            cyst MRI with contrast recommended]      POD ____  take-down loop ileostomy---5.22.2019---[place] ---mild adhesions ileostomy  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           POD _____ open low anterior resection rectosigmoid---proximal diverting                       ileostomy---11.5.2018          POD _____ cystoscopy-lighted stents---11.5.2018           Colon carcinoma--rectosigmoid----11.5.2018---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          Colonoscopy---10.8.2018--multiple polyps--large tumors distal sigmoid                       colon-one ulcerated--extensive diverticulosis---          GI bleeding ---10.6.2018---likely due to colon carcinoma--specifically  two                       large sigmoid masses---see above                   CT abdomen-pelvis----10.6.2018--diverticulosis--no acute diverticulitis---chronic                                osseous changes LS spine-sacrum--anterolithesis L4--partial fusion                                L5-S1-----decreased right gluteal hematoma     CKD--Stage 2             MICHAEL--acute kidney injury--11.5.2018--11.6.2018--Stage 3 superposed on Stage 2             DUS----kidney----11.6.2018---unremarkable---normal cortical echogenicity--no                                  hydronephrosis---no stones  Thrombocytopenia---chronic    Anemia---expected acute blood loss due to tumor and surgery  Bradycardia   Atrial fibrillation----history EKG---11.9.2018---NSR              EKG---11.5.2018--sinus bradycardia---51--1st degree AVB---LVH--QTc 490              BALDO cardioversion----9.10. 2017---successful atrial fibrillation---to--NSR              BALDO---9.10. 2017--LA dilatation---preserved LVSF--atrial septal aneurysm                           without shunt--LA appendage n o clot--negative bubble study---                          mild MR---moderate TR--normal TV leaflets             Cardiac catheterization---9. 5.2017--0% LM--mid 20%---normal D1---0% left circumflex--                         40% proximal OM1---mild aneurysmal dilatation proximal RCA---LVEF ~ 55%             2D ECHO----8. 3.2017--ROVERTO--NLVSF---dilated RV---NRVSF---MAC--mild MR--                          mild TR---RVSP ~ 31 mm Hg---LVEF ~ 50%             RVR---newly diagnosed----7.19.2017----see cardioversion above---9.10.2017              MI ruled out--7.20.2017             EKG---7.20.2017--atrial fibrillation--78---low voltage              EKG---7.19.2017--atrial fibrillation---RVR---111--diffuse low                         voltage--RSR V1-2 only---cannot exclude prior high                        lateral infarction               Sinus bradycardia---1st-degree AVB, see above   ASCVD---see above  CHF--chronic--diastolic    II/VI FLORINDA  Hypertension  Hyperlipidemia  Insulin resistance--impaired glucose tolerance  Morbid obesity  Depression--anxiety   Hypokalemia   PMH:    osteoarthritis, osteoporosis, weakness with exertion---decreased exercise               tolerance----2017, atypical chest pain--aching right-left arms,  right rib               fractures---contusions---due to fall----2018,  dermatitis, suspected sleep apnea,                UTI---POA----11.5.2018, hypokalemia---11.5.2018, dermatitis---skin folds,                SBO--vs--postoperative ileus---11.9.2018  PSH:    KARYN-BSO--cervix absent--1966, cholecystectomy---              open---c. 1960s, right varicose veins--c. 1960s, left YUMI, right TKA, left TKA    Allergies:        penicillins  Intolerances:  Betra--valdecoxib----diarrhea    Code Status--FULL CODE  OARRS---5.31.2019---[-]    PLAN:    1. Colitis--diarrhea----stool cultures including C difficle  2. Potassium replacement  3. IVFs  4. Cipro--Flagyl [known to be on amiodarone---on monitor]  5. Antiemetic---Phenergan---no Zofran due to amiodarone  6. Clear liquids  7. General Surgery  8. Home medications reviewed  9.   See orders     Note that over 50 minutes was spent in evaluation of the patient, review of the chart and pertinent records, discussion with family/staff, etc    Britton Moore MD  4:41 PM  5/31/2019

## 2019-06-01 LAB
ABSOLUTE EOS #: 0.1 K/UL (ref 0–0.4)
ABSOLUTE IMMATURE GRANULOCYTE: ABNORMAL K/UL (ref 0–0.3)
ABSOLUTE LYMPH #: 1.2 K/UL (ref 1–4.8)
ABSOLUTE MONO #: 1 K/UL (ref 0.1–1.2)
ANION GAP SERPL CALCULATED.3IONS-SCNC: 9 MMOL/L (ref 9–17)
BASOPHILS # BLD: 1 % (ref 0–1)
BASOPHILS ABSOLUTE: 0 K/UL (ref 0–0.2)
BUN BLDV-MCNC: 9 MG/DL (ref 8–23)
BUN/CREAT BLD: 11 (ref 9–20)
C DIFF AG + TOXIN: ABNORMAL
CALCIUM SERPL-MCNC: 8.5 MG/DL (ref 8.6–10.4)
CHLORIDE BLD-SCNC: 105 MMOL/L (ref 98–107)
CO2: 26 MMOL/L (ref 20–31)
CREAT SERPL-MCNC: 0.8 MG/DL (ref 0.5–0.9)
DIFFERENTIAL TYPE: ABNORMAL
DIRECT EXAM: ABNORMAL
DIRECT EXAM: NORMAL
EOSINOPHILS RELATIVE PERCENT: 1 % (ref 1–7)
GFR AFRICAN AMERICAN: >60 ML/MIN
GFR NON-AFRICAN AMERICAN: >60 ML/MIN
GFR SERPL CREATININE-BSD FRML MDRD: ABNORMAL ML/MIN/{1.73_M2}
GFR SERPL CREATININE-BSD FRML MDRD: ABNORMAL ML/MIN/{1.73_M2}
GLUCOSE BLD-MCNC: 115 MG/DL (ref 70–99)
HCT VFR BLD CALC: 35.7 % (ref 36–46)
HEMOGLOBIN: 11.8 G/DL (ref 12–16)
IMMATURE GRANULOCYTES: ABNORMAL %
LYMPHOCYTES # BLD: 15 % (ref 16–46)
Lab: ABNORMAL
Lab: NORMAL
MAGNESIUM: 2.2 MG/DL (ref 1.6–2.6)
MCH RBC QN AUTO: 31 PG (ref 26–34)
MCHC RBC AUTO-ENTMCNC: 32.9 G/DL (ref 31–37)
MCV RBC AUTO: 94.1 FL (ref 80–100)
MONOCYTES # BLD: 12 % (ref 4–11)
NRBC AUTOMATED: ABNORMAL PER 100 WBC
PDW BLD-RTO: 15.6 % (ref 11–14.5)
PLATELET # BLD: 124 K/UL (ref 140–450)
PLATELET ESTIMATE: ABNORMAL
PMV BLD AUTO: 12 FL (ref 6–12)
POTASSIUM SERPL-SCNC: 3.1 MMOL/L (ref 3.7–5.3)
POTASSIUM SERPL-SCNC: 3.7 MMOL/L (ref 3.7–5.3)
RBC # BLD: 3.8 M/UL (ref 4–5.2)
RBC # BLD: ABNORMAL 10*6/UL
SEG NEUTROPHILS: 71 % (ref 43–77)
SEGMENTED NEUTROPHILS ABSOLUTE COUNT: 5.7 K/UL (ref 1.8–7.7)
SODIUM BLD-SCNC: 140 MMOL/L (ref 135–144)
SPECIMEN DESCRIPTION: ABNORMAL
SPECIMEN DESCRIPTION: ABNORMAL
SPECIMEN DESCRIPTION: NORMAL
WBC # BLD: 8 K/UL (ref 3.5–11)
WBC # BLD: ABNORMAL 10*3/UL

## 2019-06-01 PROCEDURE — 99232 SBSQ HOSP IP/OBS MODERATE 35: CPT | Performed by: FAMILY MEDICINE

## 2019-06-01 PROCEDURE — 2060000000 HC ICU INTERMEDIATE R&B

## 2019-06-01 PROCEDURE — 83735 ASSAY OF MAGNESIUM: CPT

## 2019-06-01 PROCEDURE — 93005 ELECTROCARDIOGRAM TRACING: CPT | Performed by: INTERNAL MEDICINE

## 2019-06-01 PROCEDURE — 80048 BASIC METABOLIC PNL TOTAL CA: CPT

## 2019-06-01 PROCEDURE — 94760 N-INVAS EAR/PLS OXIMETRY 1: CPT

## 2019-06-01 PROCEDURE — 6360000002 HC RX W HCPCS: Performed by: INTERNAL MEDICINE

## 2019-06-01 PROCEDURE — 99222 1ST HOSP IP/OBS MODERATE 55: CPT | Performed by: SURGERY

## 2019-06-01 PROCEDURE — 6370000000 HC RX 637 (ALT 250 FOR IP)

## 2019-06-01 PROCEDURE — 36415 COLL VENOUS BLD VENIPUNCTURE: CPT

## 2019-06-01 PROCEDURE — 6370000000 HC RX 637 (ALT 250 FOR IP): Performed by: INTERNAL MEDICINE

## 2019-06-01 PROCEDURE — 85025 COMPLETE CBC W/AUTO DIFF WBC: CPT

## 2019-06-01 PROCEDURE — 84132 ASSAY OF SERUM POTASSIUM: CPT

## 2019-06-01 PROCEDURE — 6360000002 HC RX W HCPCS: Performed by: NURSE PRACTITIONER

## 2019-06-01 RX ORDER — DIPHENOXYLATE HYDROCHLORIDE AND ATROPINE SULFATE 2.5; .025 MG/1; MG/1
1 TABLET ORAL 4 TIMES DAILY PRN
Status: DISCONTINUED | OUTPATIENT
Start: 2019-06-01 | End: 2019-06-01

## 2019-06-01 RX ORDER — LOPERAMIDE HYDROCHLORIDE 2 MG/1
2 CAPSULE ORAL 4 TIMES DAILY PRN
Status: DISCONTINUED | OUTPATIENT
Start: 2019-06-01 | End: 2019-06-01

## 2019-06-01 RX ORDER — VANCOMYCIN HYDROCHLORIDE 125 MG/1
125 CAPSULE ORAL EVERY 6 HOURS
Status: DISCONTINUED | OUTPATIENT
Start: 2019-06-01 | End: 2019-06-03

## 2019-06-01 RX ADMIN — POTASSIUM CHLORIDE AND SODIUM CHLORIDE: 900; 150 INJECTION, SOLUTION INTRAVENOUS at 22:22

## 2019-06-01 RX ADMIN — POTASSIUM CHLORIDE 10 MEQ: 10 INJECTION, SOLUTION INTRAVENOUS at 11:48

## 2019-06-01 RX ADMIN — POTASSIUM CHLORIDE 10 MEQ: 10 INJECTION, SOLUTION INTRAVENOUS at 09:35

## 2019-06-01 RX ADMIN — METOPROLOL TARTRATE 25 MG: 25 TABLET ORAL at 21:06

## 2019-06-01 RX ADMIN — TRAZODONE HYDROCHLORIDE 50 MG: 50 TABLET ORAL at 21:06

## 2019-06-01 RX ADMIN — ENOXAPARIN SODIUM 40 MG: 40 INJECTION SUBCUTANEOUS at 08:04

## 2019-06-01 RX ADMIN — POTASSIUM CHLORIDE 10 MEQ: 10 INJECTION, SOLUTION INTRAVENOUS at 08:04

## 2019-06-01 RX ADMIN — POTASSIUM CHLORIDE 10 MEQ: 10 INJECTION, SOLUTION INTRAVENOUS at 01:53

## 2019-06-01 RX ADMIN — ACETAMINOPHEN 650 MG: 325 TABLET ORAL at 13:15

## 2019-06-01 RX ADMIN — POTASSIUM CHLORIDE 10 MEQ: 10 INJECTION, SOLUTION INTRAVENOUS at 03:28

## 2019-06-01 RX ADMIN — DILTIAZEM HYDROCHLORIDE 120 MG: 120 CAPSULE, COATED, EXTENDED RELEASE ORAL at 08:04

## 2019-06-01 RX ADMIN — POTASSIUM CHLORIDE 10 MEQ: 10 INJECTION, SOLUTION INTRAVENOUS at 00:58

## 2019-06-01 RX ADMIN — CIPROFLOXACIN 400 MG: 2 INJECTION, SOLUTION INTRAVENOUS at 05:03

## 2019-06-01 RX ADMIN — ACETAMINOPHEN 650 MG: 325 TABLET ORAL at 21:06

## 2019-06-01 RX ADMIN — VANCOMYCIN HYDROCHLORIDE 125 MG: 125 CAPSULE ORAL at 17:19

## 2019-06-01 RX ADMIN — POTASSIUM CHLORIDE 10 MEQ: 10 INJECTION, SOLUTION INTRAVENOUS at 06:59

## 2019-06-01 RX ADMIN — POTASSIUM BICARBONATE 40 MEQ: 782 TABLET, EFFERVESCENT ORAL at 08:03

## 2019-06-01 RX ADMIN — LEVOTHYROXINE SODIUM 50 MCG: 25 TABLET ORAL at 08:04

## 2019-06-01 RX ADMIN — ATORVASTATIN CALCIUM 40 MG: 40 TABLET, FILM COATED ORAL at 21:06

## 2019-06-01 ASSESSMENT — PAIN SCALES - GENERAL
PAINLEVEL_OUTOF10: 0
PAINLEVEL_OUTOF10: 3
PAINLEVEL_OUTOF10: 8
PAINLEVEL_OUTOF10: 1
PAINLEVEL_OUTOF10: 2
PAINLEVEL_OUTOF10: 0
PAINLEVEL_OUTOF10: 0

## 2019-06-01 ASSESSMENT — PAIN DESCRIPTION - PAIN TYPE: TYPE: CHRONIC PAIN

## 2019-06-01 ASSESSMENT — PAIN DESCRIPTION - LOCATION: LOCATION: GENERALIZED

## 2019-06-01 ASSESSMENT — PAIN DESCRIPTION - DESCRIPTORS: DESCRIPTORS: ACHING;DISCOMFORT

## 2019-06-01 NOTE — PLAN OF CARE
Problem: Falls - Risk of:  Goal: Will remain free from falls  Description  Will remain free from falls  6/1/2019 1009 by Kirti Oliveros RN  Outcome: Ongoing  5/31/2019 2316 by Bernardo Estrada RN  Outcome: Ongoing  Goal: Absence of physical injury  Description  Absence of physical injury  6/1/2019 1009 by Kirti Oliveros RN  Outcome: Ongoing  5/31/2019 2316 by Bernardo Estrada RN  Outcome: Ongoing     Problem: Diarrhea:  Goal: Bowel elimination is within specified parameters  Description  Bowel elimination is within specified parameters  6/1/2019 1009 by Kirti Oliveros RN  Outcome: Ongoing  5/31/2019 2316 by Bernardo Estrada RN  Outcome: Ongoing  Goal: Passage of soft, formed stool  Description  Passage of soft, formed stool  6/1/2019 1009 by Kirti Oliveros RN  Outcome: Ongoing  5/31/2019 2316 by Bernardo Estrada RN  Outcome: Ongoing     Problem: Pain:  Goal: Pain level will decrease  Description  Pain level will decrease  6/1/2019 1009 by Kirti Oliveros RN  Outcome: Ongoing  5/31/2019 2316 by Bernardo Estrada RN  Outcome: Ongoing  Goal: Control of acute pain  Description  Control of acute pain  6/1/2019 1009 by Kirti Oliveros RN  Outcome: Ongoing  5/31/2019 2316 by Bernardo Estrada RN  Outcome: Ongoing  Goal: Control of chronic pain  Description  Control of chronic pain  6/1/2019 1009 by Kirti Oliveros RN  Outcome: Ongoing  5/31/2019 2316 by Bernardo Estrada RN  Outcome: Ongoing     Problem: Fluid Volume:  Goal: Ability to achieve a balanced intake and output will improve  Description  Ability to achieve a balanced intake and output will improve  6/1/2019 1009 by Kirti Oliveros RN  Outcome: Ongoing  5/31/2019 2316 by Bernardo Estrada RN  Outcome: Ongoing     Problem: Physical Regulation:  Goal: Ability to maintain clinical measurements within normal limits will improve  Description  Ability to maintain clinical measurements within normal limits will improve  6/1/2019 1009 by Kirti Oliveros RN  Outcome:

## 2019-06-01 NOTE — CONSULTS
General Surgery   Consultation        PATIENT NAME: Celi Ryan OF BIRTH: 1940    ADMISSION DATE: 5/31/2019 11:31 AM     Admitting Provider: Marlon Tony Physician: Tiana Shaver DATE: 6/1/2019    Consult Regarding:  Recent surgery and diarrhea    HISTORY OF PRESENT ILLNESS:  The patient is a 77 y.o. female  who presents with recent history of surgical procedure on 5/22 with Dr. González Smith. Pt had takedown of ileostomy and was admitted post operatively for care. Per review of records she had a fairly uncomplicated course and was discharged home. However, pt began to experience diarrhea on 5/30 and this worsened over 24 hours. She was seen in ER yesterday and was noted to have hypokalemia and was admitted for further care and treatment of her diarrhea and electrolyte derangement. Per review of records the patient was on antibiotic therapy after her surgery. She was not discharged on any antibiotics. On questioning today the patient states she has been having multiple bouts of loose stools since Wednesday of this week which has been worsening. She denies any blood per rectum. Denies any fevers or chills prior to coming to hospital.  No significant abdominal pain. Denies any nausea or emesis since onset of diarrhea. She came to ER and was admitted. Stool studies do reveal positive C diff result.       Past Medical History:        Diagnosis Date    A-fib [facility]) 07/19/2017    CHF (congestive heart failure) (HCC)     Dyslipidemia     FH: total abdominal hysterectomy and bilateral salpingo-oophorectomy 1966    Glucose intolerance (impaired glucose tolerance)     Hypertension     Malignant neoplasm of sigmoid colon ([address]     Obesity     Osteoarthritis        Past Surgical History:        Procedure Laterality Date    CARDIAC CATHETERIZATION  09/05/2017    CHOLECYSTECTOMY  1960s    COLONOSCOPY N/A 10/8/2018    COLONOSCOPY WITH COLD ET HOT BIOPSIES ET POLYPECTOMIES performed by Pcn [penicillins] and Bextra [valdecoxib]    Social History:   Social History     Socioeconomic History    Marital status:      Spouse name: Not on file    Number of children: Not on file    Years of education: Not on file    Highest education level: Not on file   Occupational History    Not on file   Social Needs    Financial resource strain: Not on file    Food insecurity:     Worry: Not on file     Inability: Not on file    Transportation needs:     Medical: Not on file     Non-medical: Not on file   Tobacco Use    Smoking status: Never Smoker    Smokeless tobacco: Never Used    Tobacco comment: hernando 11/10/2018   Substance and Sexual Activity    Alcohol use: No    Drug use: No    Sexual activity: Not on file   Lifestyle    Physical activity:     Days per week: Not on file     Minutes per session: Not on file    Stress: Not on file   Relationships    Social connections:     Talks on phone: Not on file     Gets together: Not on file     Attends Spiritism service: Not on file     Active member of club or organization: Not on file     Attends meetings of clubs or organizations: Not on file     Relationship status: Not on file    Intimate partner violence:     Fear of current or ex partner: Not on file     Emotionally abused: Not on file     Physically abused: Not on file     Forced sexual activity: Not on file   Other Topics Concern    Not on file   Social History Narrative    Not on file       Family History:       Adopted: Yes   Problem Relation Age of Onset    High Blood Pressure Brother         adopted brother       REVIEW OF SYSTEMS:    CONSTITUTIONAL:  No recent weight gain/loss. Energy level normal for pt. HEENT:  negative  CARDIOVASCULAR:  No chest pain  GASTROINTESTINAL:  Per HPI  GENITOURINARY:  No dysuria  HEMATOLOGIC/LYMPHATIC:  No easy bruising. No history of cancer  ENDOCRINE: negative  Review of systems negative unless above.     PHYSICAL EXAM:    VITALS:  /64 Pulse 52   Temp 97.9 °F (36.6 °C) (Tympanic)   Resp 16   Ht 5' 7\" (1.702 m)   Wt (!) 304 lb 11.2 oz (138.2 kg)   LMP 01/01/1968   SpO2 95%   BMI 47.72 kg/m²   INTAKE/OUTPUT:     Intake/Output Summary (Last 24 hours) at 6/1/2019 1625  Last data filed at 6/1/2019 1218  Gross per 24 hour   Intake 1772 ml   Output 950 ml   Net 822 ml       CONSTITUTIONAL:  awake, alert, not distressed  ENT:  normocephalic/atraumatic, without obvious abnormality  NECK:  supple, symmetrical, trachea midline   LUNGS:  CTA bilaterally  CARDIOVASCULAR:  regular rate and rhythm   ABDOMEN: soft, non distended, some tenderness to palpation around ostomy takedown but otherwise non tender, bowel sounds present. MUSCULOSKELETAL:  negative, there is not obvious somatic dysfunction  NEUROLOGIC:  Mental Status Exam:  Level of Alertness:   alert  Orientation:   oriented to person, place, and time    CBC:   Lab Results   Component Value Date    WBC 8.0 06/01/2019    RBC 3.80 06/01/2019    HGB 11.8 06/01/2019    HCT 35.7 06/01/2019    MCV 94.1 06/01/2019    MCH 31.0 06/01/2019    MCHC 32.9 06/01/2019    RDW 15.6 06/01/2019     06/01/2019    MPV 12.0 06/01/2019       Pertinent Radiology:  CT abd/pel images personally reviewed by me. There is some diffuse mild thickening of colon wall. No pneumatosis noted. No evidence of perforation. Radiologist's read noted. ASSESSMENT   Active Problems:    Colitis    Abdominal pain    Diarrhea  Resolved Problems:    * No resolved hospital problems. *    63 yo female s/p ileostomy takedown  C diff colitis confirmed by C diff DNA testing - likely related to recent abx after surgery    PLAN    1. Pt to be started on oral vancomycin 125mg Q6H. Will need continued treatment for at least 10 days  2. Pt may have diet as tolerated  3. Medical management per primary  4. No plans for surgical intervention.   I did discuss with patient that should her condition worsen with signs of fulminant colitis that this would need surgical intervention. I think this is unlikely based on current condition but will continue to monitor. 5. Discussed with nursing to D/C any agents designed to slow gut motility. Imodium discontinued. 6. Continue IV fluids. Monitor potassium and replace as needed as she will likely continue to have GI losses while diarrhea is ongoing.   7. Medical management per primary        Electronically signed by Yanet Millan,   on 6/1/2019 at 4:25 PM

## 2019-06-01 NOTE — PROGRESS NOTES
Hospitalist Progress Note  6/1/2019 2:55 PM  Subjective:   Admit Date: 5/31/2019  PCP: Cesar Bautista DO     Full Code      C/c:  Chief Complaint   Patient presents with    Extremity Weakness     diarrhea, weak. nausea. no vomiting. no fever. pt has abd pain last week here          Interval History: pt still having weakness, stool negative c diff,pt able to drink, having loose bowel    Diet: DIET CLEAR LIQUID;                                ip days:1  Medications:   Scheduled Meds:   ciprofloxacin  400 mg Intravenous Q12H    sodium chloride flush  10 mL Intravenous 2 times per day    enoxaparin  40 mg Subcutaneous Daily    atorvastatin  40 mg Oral Nightly    diltiazem  120 mg Oral Daily    levothyroxine  50 mcg Oral Daily    metoprolol tartrate  25 mg Oral BID    traZODone  50 mg Oral Nightly    potassium bicarb-citric acid  40 mEq Oral Daily     Continuous Infusions:   0.9% NaCl with KCl 20 mEq 125 mL/hr at 06/01/19 1312     PRN Meds:. loperamide, sodium chloride flush, magnesium hydroxide, acetaminophen, HYDROcodone 5 mg - acetaminophen, HYDROmorphone, promethazine, potassium chloride     CBC:   Recent Labs     05/31/19  1242 06/01/19  0546   WBC 10.7 8.0   HGB 13.1 11.8*   * 124*     BMP:    Recent Labs     05/31/19  1242  05/31/19  1949 06/01/19  0546 06/01/19  1345     --   --  140  --    K 2.6*   < > 3.0* 3.1* 3.7     --   --  105  --    CO2 26  --   --  26  --    BUN 13  --   --  9  --    CREATININE 0.83  --   --  0.80  --    GLUCOSE 113*  --   --  115*  --     < > = values in this interval not displayed. Hepatic:   Recent Labs     05/31/19  1242   AST 19   ALT 16   BILITOT 0.64   ALKPHOS 110*     Troponin: No results for input(s): TROPONINI in the last 72 hours. BNP: No results for input(s): BNP in the last 72 hours. Lipids: No results for input(s): CHOL, HDL in the last 72 hours.     Invalid input(s): LDLCALCU  INR: No results for input(s): INR in the last 72 hours.    Objective:   Vitals: BP (!) 113/56   Pulse 55   Temp 97.9 °F (36.6 °C) (Tympanic)   Resp 16   Ht 5' 7\" (1.702 m)   Wt (!) 304 lb 11.2 oz (138.2 kg)   LMP 01/01/1968   SpO2 92%   BMI 47.72 kg/m²   General appearance: alert, appears stated age and cooperative  Skin: Skin color, texture, turgor normal. No rashes or lesions  Lungs: clear to auscultation bilaterally  Heart: regular rate and rhythm, S1, S2 normal, no murmur, click, rub or gallop  Abdomen: soft, non-tender; bowel sounds normal; no masses,  no organomegaly  Extremities: extremities normal, atraumatic, no cyanosis or edema  Neurologic: Mental status: Alert, oriented, thought content appropriate    Prophylaxis:   DVT with  [x] lovenox        [] heparin        [] Scd        [] none:     Radiology:  Ct Abdomen Pelvis W Iv Contrast Additional Contrast? None    Result Date: 5/31/2019  EXAMINATION: CT OF THE ABDOMEN AND PELVIS WITH CONTRAST 5/31/2019 1:44 pm TECHNIQUE: CT of the abdomen and pelvis was performed with the administration of intravenous contrast. Multiplanar reformatted images are provided for review. Dose modulation, iterative reconstruction, and/or weight based adjustment of the mA/kV was utilized to reduce the radiation dose to as low as reasonably achievable. COMPARISON: February 26, 2019 HISTORY: ORDERING SYSTEM PROVIDED HISTORY: abd pain TECHNOLOGIST PROVIDED HISTORY: Ordering Physician Provided Reason for Exam: HX OF COLON CA; STATES FOR THE PAST 4 DAYS SHE HAS HAD DIARRHEA AND NAUSEA; HX OF CHOLECYSTECTOMY, APPENDECTOMY, COLON SURGERY, HYSTERECTOMY Acuity: Acute Type of Exam: Initial FINDINGS: Evaluation is limited by arm positioning and motion. Lower Chest: Dependent lung changes. No pleural effusions. Organs: No liver lesion. Cholecystectomy. No pancreatic lesion. Hypodensity within the spleen is indeterminate. No adrenal lesion. No hydronephrosis.   Right renal hypodensity measuring 17 mm is incompletely characterized

## 2019-06-01 NOTE — PLAN OF CARE
Problem: Falls - Risk of:  Goal: Will remain free from falls  Description  Will remain free from falls  6/1/2019 1857 by Colette Thomason RN  Outcome: Ongoing  6/1/2019 1009 by Lex Polk RN  Outcome: Ongoing  Goal: Absence of physical injury  Description  Absence of physical injury  6/1/2019 1857 by Colette Thomason RN  Outcome: Ongoing  6/1/2019 1009 by Lex Polk RN  Outcome: Ongoing

## 2019-06-01 NOTE — FLOWSHEET NOTE
Dr Johanne Barajas called writer to have EKG done to check QT interval. If prolonged dc flagyl and if stool C-diff positive then start patient on oral vancomycin.

## 2019-06-02 LAB
ABSOLUTE EOS #: 0.24 K/UL (ref 0–0.4)
ABSOLUTE IMMATURE GRANULOCYTE: ABNORMAL K/UL (ref 0–0.3)
ABSOLUTE LYMPH #: 1.46 K/UL (ref 1–4.8)
ABSOLUTE MONO #: 0.79 K/UL (ref 0.1–1.2)
ANION GAP SERPL CALCULATED.3IONS-SCNC: 8 MMOL/L (ref 9–17)
BASOPHILS # BLD: 0 % (ref 0–1)
BASOPHILS ABSOLUTE: 0 K/UL (ref 0–0.2)
BUN BLDV-MCNC: 8 MG/DL (ref 8–23)
BUN/CREAT BLD: 13 (ref 9–20)
CALCIUM SERPL-MCNC: 8.5 MG/DL (ref 8.6–10.4)
CAMPYLOBACTER PCR: NORMAL
CHLORIDE BLD-SCNC: 111 MMOL/L (ref 98–107)
CO2: 24 MMOL/L (ref 20–31)
CREAT SERPL-MCNC: 0.64 MG/DL (ref 0.5–0.9)
DIFFERENTIAL TYPE: ABNORMAL
E COLI ENTEROTOXIGENIC PCR: NORMAL
EKG ATRIAL RATE: 51 BPM
EKG P AXIS: 92 DEGREES
EKG P-R INTERVAL: 218 MS
EKG Q-T INTERVAL: 580 MS
EKG QRS DURATION: 106 MS
EKG QTC CALCULATION (BAZETT): 534 MS
EKG R AXIS: 0 DEGREES
EKG T AXIS: 27 DEGREES
EKG VENTRICULAR RATE: 51 BPM
EOSINOPHILS RELATIVE PERCENT: 4 % (ref 1–7)
GFR AFRICAN AMERICAN: >60 ML/MIN
GFR NON-AFRICAN AMERICAN: >60 ML/MIN
GFR SERPL CREATININE-BSD FRML MDRD: ABNORMAL ML/MIN/{1.73_M2}
GFR SERPL CREATININE-BSD FRML MDRD: ABNORMAL ML/MIN/{1.73_M2}
GLUCOSE BLD-MCNC: 96 MG/DL (ref 70–99)
HCT VFR BLD CALC: 33.8 % (ref 36–46)
HEMOGLOBIN: 11 G/DL (ref 12–16)
IMMATURE GRANULOCYTES: ABNORMAL %
LYMPHOCYTES # BLD: 24 % (ref 16–46)
MCH RBC QN AUTO: 30.7 PG (ref 26–34)
MCHC RBC AUTO-ENTMCNC: 32.4 G/DL (ref 31–37)
MCV RBC AUTO: 94.8 FL (ref 80–100)
MONOCYTES # BLD: 13 % (ref 4–11)
MORPHOLOGY: ABNORMAL
MORPHOLOGY: ABNORMAL
NRBC AUTOMATED: ABNORMAL PER 100 WBC
PDW BLD-RTO: 15.8 % (ref 11–14.5)
PLATELET # BLD: 114 K/UL (ref 140–450)
PLATELET ESTIMATE: ABNORMAL
PLESIOMONAS SHIGELLOIDES PCR: NORMAL
PMV BLD AUTO: 11.6 FL (ref 6–12)
POTASSIUM SERPL-SCNC: 3.6 MMOL/L (ref 3.7–5.3)
RBC # BLD: 3.57 M/UL (ref 4–5.2)
RBC # BLD: ABNORMAL 10*6/UL
SALMONELLA PCR: NORMAL
SEG NEUTROPHILS: 59 % (ref 43–77)
SEGMENTED NEUTROPHILS ABSOLUTE COUNT: 3.61 K/UL (ref 1.8–7.7)
SHIGATOXIN GENE PCR: NORMAL
SHIGELLA SP PCR: NORMAL
SODIUM BLD-SCNC: 143 MMOL/L (ref 135–144)
SPECIMEN DESCRIPTION: NORMAL
VIBRIO PCR: NORMAL
WBC # BLD: 6.1 K/UL (ref 3.5–11)
WBC # BLD: ABNORMAL 10*3/UL
YERSINIA ENTEROCOLITICA PCR: NORMAL

## 2019-06-02 PROCEDURE — 94760 N-INVAS EAR/PLS OXIMETRY 1: CPT

## 2019-06-02 PROCEDURE — 6360000002 HC RX W HCPCS: Performed by: INTERNAL MEDICINE

## 2019-06-02 PROCEDURE — 80048 BASIC METABOLIC PNL TOTAL CA: CPT

## 2019-06-02 PROCEDURE — 6370000000 HC RX 637 (ALT 250 FOR IP)

## 2019-06-02 PROCEDURE — 2060000000 HC ICU INTERMEDIATE R&B

## 2019-06-02 PROCEDURE — 36415 COLL VENOUS BLD VENIPUNCTURE: CPT

## 2019-06-02 PROCEDURE — 6370000000 HC RX 637 (ALT 250 FOR IP): Performed by: INTERNAL MEDICINE

## 2019-06-02 PROCEDURE — 6370000000 HC RX 637 (ALT 250 FOR IP): Performed by: FAMILY MEDICINE

## 2019-06-02 PROCEDURE — 85025 COMPLETE CBC W/AUTO DIFF WBC: CPT

## 2019-06-02 PROCEDURE — 99232 SBSQ HOSP IP/OBS MODERATE 35: CPT | Performed by: SURGERY

## 2019-06-02 RX ORDER — POTASSIUM CHLORIDE 20 MEQ/1
40 TABLET, EXTENDED RELEASE ORAL ONCE
Status: COMPLETED | OUTPATIENT
Start: 2019-06-02 | End: 2019-06-02

## 2019-06-02 RX ADMIN — DILTIAZEM HYDROCHLORIDE 120 MG: 120 CAPSULE, COATED, EXTENDED RELEASE ORAL at 08:15

## 2019-06-02 RX ADMIN — POTASSIUM CHLORIDE AND SODIUM CHLORIDE: 900; 150 INJECTION, SOLUTION INTRAVENOUS at 14:31

## 2019-06-02 RX ADMIN — ACETAMINOPHEN 650 MG: 325 TABLET ORAL at 21:09

## 2019-06-02 RX ADMIN — VANCOMYCIN HYDROCHLORIDE 125 MG: 125 CAPSULE ORAL at 00:53

## 2019-06-02 RX ADMIN — VANCOMYCIN HYDROCHLORIDE 125 MG: 125 CAPSULE ORAL at 23:03

## 2019-06-02 RX ADMIN — VANCOMYCIN HYDROCHLORIDE 125 MG: 125 CAPSULE ORAL at 05:49

## 2019-06-02 RX ADMIN — LEVOTHYROXINE SODIUM 50 MCG: 25 TABLET ORAL at 08:15

## 2019-06-02 RX ADMIN — POTASSIUM BICARBONATE 40 MEQ: 782 TABLET, EFFERVESCENT ORAL at 08:15

## 2019-06-02 RX ADMIN — ATORVASTATIN CALCIUM 40 MG: 40 TABLET, FILM COATED ORAL at 21:09

## 2019-06-02 RX ADMIN — POTASSIUM CHLORIDE AND SODIUM CHLORIDE: 900; 150 INJECTION, SOLUTION INTRAVENOUS at 05:52

## 2019-06-02 RX ADMIN — VANCOMYCIN HYDROCHLORIDE 125 MG: 125 CAPSULE ORAL at 17:59

## 2019-06-02 RX ADMIN — ACETAMINOPHEN 650 MG: 325 TABLET ORAL at 10:40

## 2019-06-02 RX ADMIN — POTASSIUM CHLORIDE 40 MEQ: 20 TABLET, EXTENDED RELEASE ORAL at 10:40

## 2019-06-02 RX ADMIN — TRAZODONE HYDROCHLORIDE 50 MG: 50 TABLET ORAL at 21:09

## 2019-06-02 RX ADMIN — METOPROLOL TARTRATE 25 MG: 25 TABLET ORAL at 08:15

## 2019-06-02 RX ADMIN — VANCOMYCIN HYDROCHLORIDE 125 MG: 125 CAPSULE ORAL at 11:38

## 2019-06-02 RX ADMIN — POTASSIUM CHLORIDE AND SODIUM CHLORIDE: 900; 150 INJECTION, SOLUTION INTRAVENOUS at 22:17

## 2019-06-02 ASSESSMENT — PAIN DESCRIPTION - DESCRIPTORS: DESCRIPTORS: ACHING

## 2019-06-02 ASSESSMENT — PAIN SCALES - GENERAL
PAINLEVEL_OUTOF10: 0
PAINLEVEL_OUTOF10: 2
PAINLEVEL_OUTOF10: 0
PAINLEVEL_OUTOF10: 0
PAINLEVEL_OUTOF10: 7
PAINLEVEL_OUTOF10: 6
PAINLEVEL_OUTOF10: 2

## 2019-06-02 ASSESSMENT — PAIN DESCRIPTION - LOCATION: LOCATION: GENERALIZED

## 2019-06-02 ASSESSMENT — PAIN DESCRIPTION - PAIN TYPE: TYPE: CHRONIC PAIN

## 2019-06-02 NOTE — PROGRESS NOTES
Surgery Progress Note            PATIENT NAME: Mary Lou Jara     TODAY'S DATE: 6/2/2019, 9:31 AM    CHIEF COMPLAINT:  C diff colitis    SUBJECTIVE:    Pt seen and examined. No acute events overnight. Pt denies any abdominal pain. Has been tolerating diet. Continues to have loose stools but does report mild decrease in number of stools overnight. No blood per rectum. No other issues. OBJECTIVE:   VITALS:  BP (!) 153/62   Pulse 53   Temp 97.9 °F (36.6 °C) (Oral)   Resp 18   Ht 5' 7\" (1.702 m)   Wt (!) 305 lb 1.6 oz (138.4 kg)   LMP 01/01/1968   SpO2 95%   BMI 47.79 kg/m²      INTAKE/OUTPUT:      Intake/Output Summary (Last 24 hours) at 6/2/2019 0931  Last data filed at 6/2/2019 0552  Gross per 24 hour   Intake 1865 ml   Output 200 ml   Net 1665 ml                 CONSTITUTIONAL:  awake and alert. No acute distress  CARDIOVASCULAR:  regular rate and rhythm   LUNGS:  CTA Bilaterally  ABDOMEN:   Soft, non distended, non tender to palpation, bowel sounds present. Incision in right abdomen is intact with skin staples in place. EXT:  No c/c/e. Peripheral pulses intact.       Data:  CBC:   Lab Results   Component Value Date    WBC 6.1 06/02/2019    RBC 3.57 06/02/2019    HGB 11.0 06/02/2019    HCT 33.8 06/02/2019    MCV 94.8 06/02/2019    MCH 30.7 06/02/2019    MCHC 32.4 06/02/2019    RDW 15.8 06/02/2019     06/02/2019    MPV 11.6 06/02/2019     BMP:    Lab Results   Component Value Date     06/02/2019    K 3.6 06/02/2019     06/02/2019    CO2 24 06/02/2019    BUN 8 06/02/2019    LABALBU 3.8 05/31/2019    CREATININE 0.64 06/02/2019    CALCIUM 8.5 06/02/2019    GFRAA >60 06/02/2019    LABGLOM >60 06/02/2019    GLUCOSE 96 06/02/2019       Radiology Review:        ASSESSMENT   Patient Active Problem List   Diagnosis    HTN (hypertension)    Insulin resistance    Dyslipidemia    Osteoarthritis    Osteoporosis    Depression    Anxiety    [facility]) [facility]    Hypothyroidism due to medication    Morbid obesity ([facility]    Primary insomnia    Multiple closed fractures of ribs    JAYJAY on CPAP    Obesity    Hypertension    Glucose intolerance (impaired glucose tolerance)    A-fib (HCC)    Colon cancer (HCC)    Acute blood loss as cause of postoperative anemia    Other complete intestinal obstruction (HCC)    Elevated fasting glucose    BMI 50.0-59.9, adult (HCC)    Col[facility] in place [facility])    Other specified hypothyroidism    Ileostomy status ([facility]    Attention to ileostomy ([facility]    S/P small bowel resection    Colitis    Abdominal pain    Diarrhea    Colitis due to Clostridium difficile    Dehydration    Hypokalemia     78 yo female with C diff colitis  S/p ileostomy takedown    Plan  Continue oral vancomycin for now. Pt continues to have loose stools but no abdominal.  Plan for continued support to prevent dehydration or electrolyte derangements while we wait for diarrhea to resolve. Ok to continue diet. Supportive care. Will continue to follow.       Electronically signed by Ella Gracia DO  on 6/2/2019 at 9:31 AM

## 2019-06-02 NOTE — PROGRESS NOTES
°C) (Oral)   Resp 18   Ht 5' 7\" (1.702 m)   Wt (!) 305 lb 1.6 oz (138.4 kg)   LMP 01/01/1968   SpO2 98%   BMI 47.79 kg/m²   General appearance: alert, appears stated age and cooperative  Skin: Skin color, texture, turgor normal. No rashes or lesions  Lungs: clear to auscultation bilaterally  Heart: regular rate and rhythm, S1, S2 normal, no murmur, click, rub or gallop  Abdomen: soft, non-tender; bowel sounds normal; no masses,  no organomegaly  Extremities: extremities normal, atraumatic, no cyanosis or edema  Neurologic: Mental status: Alert, oriented, thought content appropriate    Prophylaxis:   DVT with  [x] lovenox        [] heparin        [] Scd        [] none:     Radiology:  Ct Abdomen Pelvis W Iv Contrast Additional Contrast? None    Result Date: 5/31/2019  EXAMINATION: CT OF THE ABDOMEN AND PELVIS WITH CONTRAST 5/31/2019 1:44 pm TECHNIQUE: CT of the abdomen and pelvis was performed with the administration of intravenous contrast. Multiplanar reformatted images are provided for review. Dose modulation, iterative reconstruction, and/or weight based adjustment of the mA/kV was utilized to reduce the radiation dose to as low as reasonably achievable. COMPARISON: February 26, 2019 HISTORY: ORDERING SYSTEM PROVIDED HISTORY: abd pain TECHNOLOGIST PROVIDED HISTORY: Ordering Physician Provided Reason for Exam: HX OF COLON CA; STATES FOR THE PAST 4 DAYS SHE HAS HAD DIARRHEA AND NAUSEA; HX OF CHOLECYSTECTOMY, APPENDECTOMY, COLON SURGERY, HYSTERECTOMY Acuity: Acute Type of Exam: Initial FINDINGS: Evaluation is limited by arm positioning and motion. Lower Chest: Dependent lung changes. No pleural effusions. Organs: No liver lesion. Cholecystectomy. No pancreatic lesion. Hypodensity within the spleen is indeterminate. No adrenal lesion. No hydronephrosis. Right renal hypodensity measuring 17 mm is incompletely characterized and has enlarged over multiple prior studies. GI/Bowel: No bowel obstruction.   [facility]                                                                         [] [facility]                                                                         [] [address]      Patient is admitted as inpatient status because of co-morbidities listed above, severity of signs and symptoms as outlined, requirement for current medical therapies and most importantly because of direct risk to patient if care not provided in a hospital setting.           Maura Russell MD  Rounding Hospitalist

## 2019-06-02 NOTE — PLAN OF CARE
Problem: Falls - Risk of:  Goal: Will remain free from falls  Description  Will remain free from falls  Outcome: Ongoing  Goal: Absence of physical injury  Description  Absence of physical injury  Outcome: Ongoing     Problem: Diarrhea:  Goal: Bowel elimination is within specified parameters  Description  Bowel elimination is within specified parameters  Outcome: Ongoing  Goal: Passage of soft, formed stool  Description  Passage of soft, formed stool  Outcome: Ongoing     Problem: Pain:  Goal: Pain level will decrease  Description  Pain level will decrease  Outcome: Ongoing  Goal: Control of acute pain  Description  Control of acute pain  Outcome: Ongoing  Goal: Control of chronic pain  Description  Control of chronic pain  Outcome: Ongoing     Problem: Fluid Volume:  Goal: Ability to achieve a balanced intake and output will improve  Description  Ability to achieve a balanced intake and output will improve  Outcome: Ongoing     Problem: Physical Regulation:  Goal: Ability to maintain clinical measurements within normal limits will improve  Description  Ability to maintain clinical measurements within normal limits will improve  Outcome: Ongoing  Goal: Will show no signs and symptoms of electrolyte imbalance  Description  Will show no signs and symptoms of electrolyte imbalance  Outcome: Ongoing

## 2019-06-03 ENCOUNTER — TELEPHONE (OUTPATIENT)
Dept: FAMILY MEDICINE CLINIC | Age: 79
End: 2019-06-03

## 2019-06-03 VITALS
HEIGHT: 67 IN | HEART RATE: 50 BPM | WEIGHT: 293 LBS | SYSTOLIC BLOOD PRESSURE: 127 MMHG | OXYGEN SATURATION: 97 % | BODY MASS INDEX: 45.99 KG/M2 | TEMPERATURE: 97.8 F | RESPIRATION RATE: 16 BRPM | DIASTOLIC BLOOD PRESSURE: 57 MMHG

## 2019-06-03 LAB
ABSOLUTE EOS #: 0.2 K/UL (ref 0–0.4)
ABSOLUTE IMMATURE GRANULOCYTE: ABNORMAL K/UL (ref 0–0.3)
ABSOLUTE LYMPH #: 1.45 K/UL (ref 1–4.8)
ABSOLUTE MONO #: 0.73 K/UL (ref 0.1–1.2)
ANION GAP SERPL CALCULATED.3IONS-SCNC: 8 MMOL/L (ref 9–17)
BASOPHILS # BLD: 1 % (ref 0–1)
BASOPHILS ABSOLUTE: 0.07 K/UL (ref 0–0.2)
BUN BLDV-MCNC: 9 MG/DL (ref 8–23)
BUN/CREAT BLD: 14 (ref 9–20)
CALCIUM SERPL-MCNC: 8.6 MG/DL (ref 8.6–10.4)
CHLORIDE BLD-SCNC: 116 MMOL/L (ref 98–107)
CO2: 21 MMOL/L (ref 20–31)
CREAT SERPL-MCNC: 0.65 MG/DL (ref 0.5–0.9)
DIFFERENTIAL TYPE: ABNORMAL
EOSINOPHILS RELATIVE PERCENT: 3 % (ref 1–7)
GFR AFRICAN AMERICAN: >60 ML/MIN
GFR NON-AFRICAN AMERICAN: >60 ML/MIN
GFR SERPL CREATININE-BSD FRML MDRD: ABNORMAL ML/MIN/{1.73_M2}
GFR SERPL CREATININE-BSD FRML MDRD: ABNORMAL ML/MIN/{1.73_M2}
GLUCOSE BLD-MCNC: 110 MG/DL (ref 70–99)
HCT VFR BLD CALC: 36.6 % (ref 36–46)
HEMOGLOBIN: 11.7 G/DL (ref 12–16)
IMMATURE GRANULOCYTES: ABNORMAL %
LYMPHOCYTES # BLD: 22 % (ref 16–46)
MCH RBC QN AUTO: 30.8 PG (ref 26–34)
MCHC RBC AUTO-ENTMCNC: 31.9 G/DL (ref 31–37)
MCV RBC AUTO: 96.4 FL (ref 80–100)
MONOCYTES # BLD: 11 % (ref 4–11)
MORPHOLOGY: ABNORMAL
NRBC AUTOMATED: ABNORMAL PER 100 WBC
PDW BLD-RTO: 16.5 % (ref 11–14.5)
PLATELET # BLD: 114 K/UL (ref 140–450)
PLATELET ESTIMATE: ABNORMAL
PMV BLD AUTO: 12.2 FL (ref 6–12)
POTASSIUM SERPL-SCNC: 4.4 MMOL/L (ref 3.7–5.3)
RBC # BLD: 3.8 M/UL (ref 4–5.2)
RBC # BLD: ABNORMAL 10*6/UL
SEG NEUTROPHILS: 63 % (ref 43–77)
SEGMENTED NEUTROPHILS ABSOLUTE COUNT: 4.15 K/UL (ref 1.8–7.7)
SODIUM BLD-SCNC: 145 MMOL/L (ref 135–144)
WBC # BLD: 6.6 K/UL (ref 3.5–11)
WBC # BLD: ABNORMAL 10*3/UL

## 2019-06-03 PROCEDURE — 6370000000 HC RX 637 (ALT 250 FOR IP)

## 2019-06-03 PROCEDURE — 99238 HOSP IP/OBS DSCHRG MGMT 30/<: CPT | Performed by: INTERNAL MEDICINE

## 2019-06-03 PROCEDURE — 94760 N-INVAS EAR/PLS OXIMETRY 1: CPT

## 2019-06-03 PROCEDURE — 6370000000 HC RX 637 (ALT 250 FOR IP): Performed by: INTERNAL MEDICINE

## 2019-06-03 PROCEDURE — 80048 BASIC METABOLIC PNL TOTAL CA: CPT

## 2019-06-03 PROCEDURE — 36415 COLL VENOUS BLD VENIPUNCTURE: CPT

## 2019-06-03 PROCEDURE — 85025 COMPLETE CBC W/AUTO DIFF WBC: CPT

## 2019-06-03 PROCEDURE — 6360000002 HC RX W HCPCS: Performed by: INTERNAL MEDICINE

## 2019-06-03 RX ADMIN — Medication 125 MG: at 12:02

## 2019-06-03 RX ADMIN — POTASSIUM BICARBONATE 40 MEQ: 782 TABLET, EFFERVESCENT ORAL at 08:17

## 2019-06-03 RX ADMIN — METOPROLOL TARTRATE 25 MG: 25 TABLET ORAL at 08:17

## 2019-06-03 RX ADMIN — HYDROCODONE BITARTRATE AND ACETAMINOPHEN 1 TABLET: 5; 325 TABLET ORAL at 08:22

## 2019-06-03 RX ADMIN — POTASSIUM CHLORIDE AND SODIUM CHLORIDE: 900; 150 INJECTION, SOLUTION INTRAVENOUS at 05:28

## 2019-06-03 RX ADMIN — DILTIAZEM HYDROCHLORIDE 120 MG: 120 CAPSULE, COATED, EXTENDED RELEASE ORAL at 08:17

## 2019-06-03 RX ADMIN — HYDROCODONE BITARTRATE AND ACETAMINOPHEN 1 TABLET: 5; 325 TABLET ORAL at 12:49

## 2019-06-03 RX ADMIN — VANCOMYCIN HYDROCHLORIDE 125 MG: 125 CAPSULE ORAL at 05:25

## 2019-06-03 RX ADMIN — LEVOTHYROXINE SODIUM 50 MCG: 25 TABLET ORAL at 08:17

## 2019-06-03 ASSESSMENT — PAIN SCALES - GENERAL
PAINLEVEL_OUTOF10: 0
PAINLEVEL_OUTOF10: 2
PAINLEVEL_OUTOF10: 6
PAINLEVEL_OUTOF10: 6
PAINLEVEL_OUTOF10: 0
PAINLEVEL_OUTOF10: 0

## 2019-06-03 NOTE — PLAN OF CARE
Problem: Falls - Risk of:  Goal: Will remain free from falls  Description  Will remain free from falls  6/2/2019 2144 by Pat Bernabe RN  Outcome: Ongoing  6/2/2019 1118 by Melecio Lepe RN  Outcome: Ongoing  Goal: Absence of physical injury  Description  Absence of physical injury  6/2/2019 2144 by Pat Bernabe RN  Outcome: Ongoing  6/2/2019 1118 by Melecio Lepe RN  Outcome: Ongoing

## 2019-06-03 NOTE — PROGRESS NOTES
Discharge instructions given to pt. Pt verbalized understanding and denies any questions/concerns at present time. Pt states she will have a ride around 1530. Will continue to monitor.

## 2019-06-03 NOTE — PLAN OF CARE
Problem: Falls - Risk of:  Goal: Will remain free from falls  Description  Will remain free from falls  6/3/2019 1008 by Toney Alfaro RN  Outcome: Ongoing  6/2/2019 2144 by Shahrzad Colin RN  Outcome: Ongoing  Goal: Absence of physical injury  Description  Absence of physical injury  6/3/2019 1008 by Toney Alfaro RN  Outcome: Ongoing  6/2/2019 2144 by Shahrzad Colin RN  Outcome: Ongoing     Problem: Diarrhea:  Goal: Bowel elimination is within specified parameters  Description  Bowel elimination is within specified parameters  6/3/2019 1008 by Toney Alfaro RN  Outcome: Ongoing  6/2/2019 2144 by Shahrzad Colin RN  Outcome: Ongoing  Goal: Passage of soft, formed stool  Description  Passage of soft, formed stool  6/3/2019 1008 by Toney Alfaro RN  Outcome: Ongoing  6/2/2019 2144 by Shahrzad Colin RN  Outcome: Ongoing     Problem: Pain:  Goal: Pain level will decrease  Description  Pain level will decrease  6/3/2019 1008 by Toney Alfaro RN  Outcome: Ongoing  6/2/2019 2144 by Shahrzad Colin RN  Outcome: Ongoing  Goal: Control of acute pain  Description  Control of acute pain  6/3/2019 1008 by Toney Alfaro RN  Outcome: Ongoing  6/2/2019 2144 by Shahrzad Colin RN  Outcome: Ongoing  Goal: Control of chronic pain  Description  Control of chronic pain  6/3/2019 1008 by Toney Alfaro RN  Outcome: Ongoing  6/2/2019 2144 by Shahrzad Colin RN  Outcome: Ongoing     Problem: Fluid Volume:  Goal: Ability to achieve a balanced intake and output will improve  Description  Ability to achieve a balanced intake and output will improve  6/3/2019 1008 by Toney Alfaro RN  Outcome: Ongoing  6/2/2019 2144 by Shahrzad Colin RN  Outcome: Ongoing     Problem: Physical Regulation:  Goal: Ability to maintain clinical measurements within normal limits will improve  Description  Ability to maintain clinical measurements within normal limits will improve  6/3/2019 1008 by Toney Alfaro RN  Outcome: Ongoing  6/2/2019 2144 by Christina Krause RN  Outcome: Ongoing  Goal: Will show no signs and symptoms of electrolyte imbalance  Description  Will show no signs and symptoms of electrolyte imbalance  6/3/2019 1008 by George Dorado RN  Outcome: Ongoing  6/2/2019 2144 by Christina Krause RN  Outcome: Ongoing

## 2019-06-04 ENCOUNTER — CARE COORDINATION (OUTPATIENT)
Dept: CASE MANAGEMENT | Age: 79
End: 2019-06-04

## 2019-06-04 NOTE — TELEPHONE ENCOUNTER
Jered 45 Transitions Initial Follow Up Call    Outreach made within 2 business days of discharge: Yes    Patient: Georgie Arndt Patient : 1940   MRN: [id number]  Reason for Admission: C DIFF  Discharge Date: 6/3/19       Spoke with: Patient    Discharge department/facility: [facility]    TCM Interactive Patient Contact:  Was patient able to fill all prescriptions: Yes  Was patient instructed to bring all medications to the follow-up visit: Yes  Is patient taking all medications as directed in the discharge summary?  Yes  Does patient understand their discharge instructions: Yes  Does patient have questions or concerns that need addressed prior to 7-14 day follow up office visit: no    Scheduled appointment with PCP within 7-14 days    Follow Up  Future Appointments   Date Time Provider Miar Kirkland   6/10/2019 11:00 AM SCHEDULE, Efe Nichols 112 LAB MD LAB [place]   2019 11:00 AM Jordon Chung APRN - [facility]   2019  2:00 PM Gracie Mckeon MD [address] [place] [address]   2019  2:40 PM Jose Britt MD [facility]   2019  7:40 AM SCHEDULE, Efe Nichols 112 LAB [address] LAB [place]   2019  3:00 PM Man Baltazar DO [facility]   2019  9:45 AM Georgie Arndt MD [facility]       Lambert Pickard CMA

## 2019-06-04 NOTE — DISCHARGE SUMMARY
[address]                 [address] [place] [address]                               DISCHARGE SUMMARY    PATIENT NAME: Sudhir Tapia                   :        1940  MED REC NO:   [id number]                             ROOM:       [id number]  ACCOUNT NO:   [de-identified]                           ADMIT DATE: 2019  PROVIDER:     Shawna Leone                  DISCHARGE DATE: 2019    ATTENDING PHYSICIAN OF HOSPITALIZATION:  Jacob Costa MD    PERSONAL PHYSICIAN:  Tom Segovia D.O., [facility]. DIAGNOSES:  1.  C. Difficile colitis on 2019.  2.  Hypokalemia on 2019, corrected. 3.  Diarrhea due to C. Difficile colitis on 2019.  4.  Dehydration on 2019 due to diarrhea and due to C. Difficile  colitis. 5.  Nausea and dry heaves on 2019.  6.  CT scan of the abdomen and pelvis on 2019, diffuse colonic  wall thickening, no obstruction, right renal lesion increased over  multiple studies, possible complex cyst, MRI with contrast recommended  outpatient setting. 7.  Prior takedown of the loop ileostomy on 2019 by Dr. Toribio Valentin. 8.  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 9.  Chronic kidney disease stage II.  10.  Chronic thrombocytopenia. 11.  Anemia, expected. 12.  Bradycardia. 13.  Atrial fibrillation history. 14.  Coronary artery disease history. Cardiac catheterization on  2017, 0% OM, mid 20%, normal T1 with 0% left circumflex, 40%  proximal OM1, mild aneurysmal dilatation proximal RCA and LVEF 55%. 2D  echo on 2017, VAE, normal left ventricular systolic function,  dilated RV, normal right ventricular systolic function, MAC, mild MR,  mild TR, RVSP 31 mmHg, LVEF 50%. 15.  Congestive heart failure, chronic diastolic, stable. 16.  Grade 2/6 systolic ejection murmur. 17.  Hypertension. 18.  Hyperlipidemia.   19.  Insulin resistance impaired fasting glucose. 20.  Morbid obesity. 21.  Depression and anxiety, controlled. 22.  Hypokalemia, corrected. HISTORY OF PRESENT ILLNESS AND HOSPITAL COURSE:  The patient is a  78-year-old white female, patient of Shai Henry D.O., [facility]. The patient presented with diarrhea, dehydration,  nausea, dry heaves; ultimately found to be positive for C-difficile  colitis. The patient was treated with vancomycin oral given  Finding. Initially, the patient had been on amiodarone. Given the patient's  initial colitis, she was to be placed on Cipro and Flagyl, hence  amiodarone was held. The patient however had a prolonged QT interval  such that Flagyl was not initiated. The patient had been placed on  vancomycin subsequently for the C. Difficile colitis. The patient's  amiodarone now reinitiated. The patient stabilized and discharged to  home on 06/03/2019. The patient had some mild liquid stools  occasionally however markedly improved. The patient's other medical  issues remained stable during the course of hospitalization. Laboratory data around the time of discharge; C. Difficile positive. White cell count 6.6, hemoglobin 11.7, hematocrit 36.6 and platelets  321,140. Sodium 145, potassium 4.4, chloride 116, CO2 21, BUN 9,  creatinine 0.6, glucose 110, calcium 8.6 and GFR greater than 60. DISCHARGE INSTRUCTIONS AND FOLLOWUP:  Discharged to home on 06/03/2019. DIET:  Cardiac and diabetic. ACTIVITY:  As tolerated. CONDITION ON DISCHARGE:  Improved. MEDICATIONS:  New medication, vancomycin 125 mg p.o. every 6 hours for  10 additional days to complete course. Following medications continued, no change:  1. Acetaminophen (Tylenol) 650 mg p.o. q.4h. p.r.n. pain and fever. 2.  Amiodarone 200 mg p.o. daily. 3.  Lipitor (atorvastatin) 40 mg p.o. nightly. 4.  Cardizem CD (diltiazem extended release) 120 mg p.o. daily.   5.  Lasix (furosemide) 20 mg p.o. daily. 6.  Synthroid (levothyroxine) 50 mcg (0.05 mg) p.o. daily. 7.  Lopressor (metoprolol tartrate) 25 mg p.o. b.i.d.  8.  Potassium chloride 20 mEq p.o. daily. 9.  Trazodone (Desyrel) 50 mg p.o. nightly. Following medications stopped:  1. Diclofenac. 2.  Voltaren. 3.  Hydrocodone APAP (Norco). Follow up with the patient's personal physician, Lisseth Salcedo D.O.,  [facility]. Any aspect of the patient's care not discussed in the chart and/or  dictation will be addressed and treated as an outpatient. The patient's medications have been reviewed including, but not limited  to, pre-hospital, hospital and discharge medications. The patient  and/or the patient's personal representatives were specifically advised  the only medications to be taken are those set forth in the discharge  orders and no other medications should be taken. Any prior medications  not on the discharge orders are specifically discontinued. The patient has been advised of the financial relationship and ownership  interests between [address] [place] [address] physicians and  employees of [facility] and [address] [place] [address].         Rafy Morgan    D: 06/03/2019 17:19:14       T: 06/03/2019 17:23:00     [email or web address]  Job#: [id number]     Doc#: [id number]    CC:

## 2019-06-04 NOTE — CARE COORDINATION
Jered 45 Transitions Initial Follow Up Call    Call within 2 business days of discharge: Yes    Patient: Ti Mccauley Patient : 1940   MRN: <[id number]>  Reason for Admission: Abdominal pain  Discharge Date: 6/3/19 RARS: Readmission Risk Score: 20      Last Discharge [facility]       Complaint Diagnosis Description Type Department Provider    19 Extremity Weakness Abdominal pain, unspecified abdominal location . .. ED to Hosp-Admission (Discharged) (ADMITTED) Brando Gandhi MD               Facility: [facility]    Attempted to contact patient for initial transitions call. Contact information left to  requesting call back at the earliest convenience. CTC contacted [facility] and verified HHC order with Asha King. RN is scheduled for initial assessment today. Tom Dan RN BSN   Care Transitions Coordinator  [phone]         Care Transitions 24 Hour Call    Do you have all of your prescriptions and are they filled?:  Yes  Post Acute Services:  Home Health (Comment:  [address])  Care Transitions Interventions         Follow Up  Future Appointments   Date Time Provider Mira Kirkland   6/10/2019 11:00 AM SCHEDULE, Efe Nichols 112 LAB MDH[facility] LAB [place]   2019 11:00 AM [facility] - [facility]   2019  2:00 PM Carole Rasmussen MD [facility]   2019  2:40 PM [facility]   2019  7:40 AM SCHEDULE, Efe Negronmar 112 LAB 8049 [facility] LAB [place]   2019  3:00 PM Kareen Pfeiffer DO [facility]   2019  9:45 AM MD MIRYAM [facility]       Tom Dan, RN

## 2019-06-05 ENCOUNTER — HOSPITAL ENCOUNTER (OUTPATIENT)
Age: 79
Setting detail: SPECIMEN
Discharge: HOME | End: 2019-06-05
Payer: MEDICARE

## 2019-06-05 LAB
ANION GAP SERPL CALCULATED.3IONS-SCNC: 11 MMOL/L (ref 9–17)
BUN BLDV-MCNC: 7 MG/DL (ref 8–23)
BUN/CREAT BLD: 10 (ref 9–20)
CALCIUM SERPL-MCNC: 9.5 MG/DL (ref 8.6–10.4)
CHLORIDE BLD-SCNC: 103 MMOL/L (ref 98–107)
CO2: 27 MMOL/L (ref 20–31)
CREAT SERPL-MCNC: 0.68 MG/DL (ref 0.5–0.9)
GFR AFRICAN AMERICAN: >60 ML/MIN
GFR NON-AFRICAN AMERICAN: >60 ML/MIN
GFR SERPL CREATININE-BSD FRML MDRD: ABNORMAL ML/MIN/{1.73_M2}
GFR SERPL CREATININE-BSD FRML MDRD: ABNORMAL ML/MIN/{1.73_M2}
GLUCOSE BLD-MCNC: 101 MG/DL (ref 70–99)
HCT VFR BLD CALC: 41.4 % (ref 36–46)
HEMOGLOBIN: 13.2 G/DL (ref 12–16)
MCH RBC QN AUTO: 30.5 PG (ref 26–34)
MCHC RBC AUTO-ENTMCNC: 31.9 G/DL (ref 31–37)
MCV RBC AUTO: 95.5 FL (ref 80–100)
NRBC AUTOMATED: ABNORMAL PER 100 WBC
PDW BLD-RTO: 16.1 % (ref 11–14.5)
PLATELET # BLD: 137 K/UL (ref 140–450)
PMV BLD AUTO: 11.9 FL (ref 6–12)
POTASSIUM SERPL-SCNC: 3.8 MMOL/L (ref 3.7–5.3)
RBC # BLD: 4.34 M/UL (ref 4–5.2)
SODIUM BLD-SCNC: 141 MMOL/L (ref 135–144)
WBC # BLD: 8.3 K/UL (ref 3.5–11)

## 2019-06-05 PROCEDURE — 80048 BASIC METABOLIC PNL TOTAL CA: CPT

## 2019-06-05 PROCEDURE — 85027 COMPLETE CBC AUTOMATED: CPT

## 2019-06-05 RX ORDER — VANCOMYCIN HYDROCHLORIDE 125 MG/1
125 CAPSULE ORAL 4 TIMES DAILY
Qty: 40 CAPSULE | Refills: 0 | Status: SHIPPED | OUTPATIENT
Start: 2019-06-05 | End: 2019-07-02 | Stop reason: SDUPTHER

## 2019-06-05 NOTE — CARE COORDINATION
I haven't seen patient in follow up yet, but per review of hospital notes it appears that Dr. Nancy Seals (general surgery) had ordered the vancomycin while in the hospital.  I'm not sure if there was a reason he chose this over flagyl. She may be able to change to flagyl orally if there was no issue with this in the hospital, but I might suggest contacting Dr. Juan Push office to see if he had a specific reason why he wanted her on Vanco instead.

## 2019-06-05 NOTE — TELEPHONE ENCOUNTER
Received notification from home health and care coordinator that patient is unable to afford Vanco Solution. Did run cost through [employer] and they can provide Vanco capules #40 through [employer] for 139. 11. The [facility] also stated they could provide her this medication for $151.98 through there cash pay program and they have #20 in stock and could get the next 20 within a 24 hour period. Advised patient of these options and also advised she could call [facility] to see if they could help any with the cost of the medication. Patient states her current script she has is for the solution so she needs a new script for the capsules along with the [employer] card and placed in . Advised patient this would be done.

## 2019-06-05 NOTE — CARE COORDINATION
Jered 45 Transitions Initial Follow Up Call    Call within 2 business days of discharge: Yes    Patient: Jimmy Bustillo Patient : 1940   MRN: <[id number]>  Reason for Admission:   Discharge Date: 6/3/19 RARS: Readmission Risk Score: 20      Last Discharge [facility]       Complaint Diagnosis Description Type Department Provider    19 Extremity Weakness Abdominal pain, unspecified abdominal location . .. ED to Hosp-Admission (Discharged) (ADMITTED) Brando Espinoza MD           Spoke with: Yesi 87: [place]      Care Transitions 24 Hour Call    Do you have all of your prescriptions and are they filled?:  No  Have you been contacted by a [facility] Pharmacist?:  No  Have you scheduled your follow up appointment?:  Yes  Were you discharged with any Home Care or Post Acute Services:  Yes  Post Acute Services:  Home Health (Comment: [facility])  Care Transitions Interventions       Attempted to contact patient for transitions call. Informed by friend Fede Bacon that patient is currently sleeping. Stated she knows pt is upset because she is unable to afford Vancomycin 50 MG/Ml solution. Pt's last dose per Fedecheryl Bacon was noon yesterday. Stated oral antibiotic solution is $300 and capsules are $900 for 10 day course. Stated [facility] RN was there yesterday and stated she will contact  regarding coverage or alternative medication. CTC will attempt to contact pt at later time.     Ave Roblero, RN BSN   Care Transitions Coordinator  [phone]     Follow Up  Future Appointments   Date Time Provider Mira Kirkland   6/10/2019 11:00 AM SCHEDULE, Efe Nichols 112 LAB MD[facility]Z LAB [place]   2019 11:00 AM Elen Chung, APRN - CNP [facility]   2019  2:00 PM Cherelle Baires MD [facility]   2019  2:40 PM Charmayne Quinones, MD [facility]   2019  7:40 AM SCHEDULE, Efe Nichols 112 LAB [address] [facility] LAB [place]   2019  3:00 PM Sonia Quintero DO [facility]   2019  9:45 AM Anival Gaurav Barth, [address]       Mechelle Wesley RN

## 2019-06-06 ENCOUNTER — CARE COORDINATION (OUTPATIENT)
Dept: CASE MANAGEMENT | Age: 79
End: 2019-06-06

## 2019-06-07 ENCOUNTER — CARE COORDINATION (OUTPATIENT)
Dept: CASE MANAGEMENT | Age: 79
End: 2019-06-07

## 2019-06-07 DIAGNOSIS — E78.5 DYSLIPIDEMIA: Primary | ICD-10-CM

## 2019-06-07 PROCEDURE — 1111F DSCHRG MED/CURRENT MED MERGE: CPT | Performed by: FAMILY MEDICINE

## 2019-06-07 NOTE — CARE COORDINATION
Jered 45 Transitions Follow Up Call    2019    Patient: Cecilia Rios  Patient : 1940   MRN: <[id number]>  Reason for Admission: diarrhea dehydration  Discharge Date: 6/3/19 RARS: Readmission Risk Score: 20         Spoke with: [facility] [facility] Transitions Subsequent and Final Call    Subsequent and Final Calls  Do you have any ongoing symptoms?:  No  Have your medications changed?:  No  Do you have any questions related to your medications?:  No  Do you currently have any active services?:  Yes  Are you currently active with any services?:  Home Health  Do you have any needs or concerns that I can assist you with?:  No  Identified Barriers:  None  Care Transitions Interventions  No Identified Needs  Other Interventions: Follow Up : Per patient she is home and feeling much better. She stated that she has everything she needs, she reports that she is taking her vancomycin antibiotic for \"10 days\", she denies abd pain, N/V, she reports that she is still having soft stools but that it is no longer \"diarrhea\", she denies fever, chills, dizziness, falls, confusion, headaches, confusion, she stated that she is still getting [facility] services from [facility], she stated that she is eating and drinking fluids, med list reviewed, she stated that she has and understands her d/c instructions and plan of care, she is aware of when to call her doctor or report to ED for worsening or severe sx. She denies any issues, needs, or concerns to report at this time. She will see CNP in PCP office next week and has transportation. Patient had to get an incoming phone call from [facility] nurse during transition call. Patient agreeable to continued transition calls.      Future Appointments   Date Time Provider Mira Kirkland   6/10/2019 11:00 AM SCHEDULE, Efe Negronmar 112 LAB MD LAB [place]   2019 11:00 AM XIOMARA Dickey - CNP [facility]   2019  2:00 PM Conrad Mart MD [address] [place] [address]   2019 2:40 PM Jovanny Marks MD [facility]   8/19/2019  7:40 AM SCHEDULE, Efe Negronmar 112 LAB MDHZ LAB [place]   8/26/2019  3:00 PM [facility],  [facility]   11/7/2019  9:45 AM Rohini Odom MD [facility]       Florina Raygoza, RN BSN  Care Transitions Coordinator

## 2019-06-10 ENCOUNTER — HOSPITAL ENCOUNTER (OUTPATIENT)
Dept: LAB | Age: 79
Discharge: HOME | End: 2019-06-10
Payer: MEDICARE

## 2019-06-10 DIAGNOSIS — C18.7 MALIGNANT NEOPLASM OF SIGMOID COLON (HCC): ICD-10-CM

## 2019-06-10 LAB
ABSOLUTE EOS #: 0.1 K/UL (ref 0–0.4)
ABSOLUTE IMMATURE GRANULOCYTE: ABNORMAL K/UL (ref 0–0.3)
ABSOLUTE LYMPH #: 1.3 K/UL (ref 1–4.8)
ABSOLUTE MONO #: 0.6 K/UL (ref 0.1–1.2)
ALBUMIN SERPL-MCNC: 3.8 G/DL (ref 3.5–5.2)
ALBUMIN/GLOBULIN RATIO: 1.3 (ref 1–2.5)
ALP BLD-CCNC: 87 U/L (ref 35–104)
ALT SERPL-CCNC: 8 U/L (ref 5–33)
ANION GAP SERPL CALCULATED.3IONS-SCNC: 10 MMOL/L (ref 9–17)
AST SERPL-CCNC: 13 U/L
BASOPHILS # BLD: 0 % (ref 0–1)
BASOPHILS ABSOLUTE: 0 K/UL (ref 0–0.2)
BILIRUB SERPL-MCNC: 0.67 MG/DL (ref 0.3–1.2)
BUN BLDV-MCNC: 13 MG/DL (ref 8–23)
BUN/CREAT BLD: 17 (ref 9–20)
CALCIUM SERPL-MCNC: 9.6 MG/DL (ref 8.6–10.4)
CARCINOEMBRYONIC ANTIGEN: 3 NG/ML
CHLORIDE BLD-SCNC: 106 MMOL/L (ref 98–107)
CO2: 29 MMOL/L (ref 20–31)
CREAT SERPL-MCNC: 0.75 MG/DL (ref 0.5–0.9)
DIFFERENTIAL TYPE: ABNORMAL
EOSINOPHILS RELATIVE PERCENT: 2 % (ref 1–7)
GFR AFRICAN AMERICAN: >60 ML/MIN
GFR NON-AFRICAN AMERICAN: >60 ML/MIN
GFR SERPL CREATININE-BSD FRML MDRD: ABNORMAL ML/MIN/{1.73_M2}
GFR SERPL CREATININE-BSD FRML MDRD: ABNORMAL ML/MIN/{1.73_M2}
GLUCOSE BLD-MCNC: 100 MG/DL (ref 70–99)
HCT VFR BLD CALC: 39.2 % (ref 36–46)
HEMOGLOBIN: 12.9 G/DL (ref 12–16)
IMMATURE GRANULOCYTES: ABNORMAL %
LYMPHOCYTES # BLD: 19 % (ref 16–46)
MCH RBC QN AUTO: 31.4 PG (ref 26–34)
MCHC RBC AUTO-ENTMCNC: 32.9 G/DL (ref 31–37)
MCV RBC AUTO: 95.5 FL (ref 80–100)
MONOCYTES # BLD: 10 % (ref 4–11)
NRBC AUTOMATED: ABNORMAL PER 100 WBC
PDW BLD-RTO: 16 % (ref 11–14.5)
PLATELET # BLD: 145 K/UL (ref 140–450)
PLATELET ESTIMATE: ABNORMAL
PMV BLD AUTO: 12 FL (ref 6–12)
POTASSIUM SERPL-SCNC: 3.8 MMOL/L (ref 3.7–5.3)
RBC # BLD: 4.1 M/UL (ref 4–5.2)
RBC # BLD: ABNORMAL 10*6/UL
SEG NEUTROPHILS: 69 % (ref 43–77)
SEGMENTED NEUTROPHILS ABSOLUTE COUNT: 4.7 K/UL (ref 1.8–7.7)
SODIUM BLD-SCNC: 145 MMOL/L (ref 135–144)
TOTAL PROTEIN: 6.7 G/DL (ref 6.4–8.3)
WBC # BLD: 6.8 K/UL (ref 3.5–11)
WBC # BLD: ABNORMAL 10*3/UL

## 2019-06-10 PROCEDURE — 36415 COLL VENOUS BLD VENIPUNCTURE: CPT

## 2019-06-10 PROCEDURE — 82378 CARCINOEMBRYONIC ANTIGEN: CPT

## 2019-06-10 PROCEDURE — 85025 COMPLETE CBC W/AUTO DIFF WBC: CPT

## 2019-06-10 PROCEDURE — 80053 COMPREHEN METABOLIC PANEL: CPT

## 2019-06-11 ENCOUNTER — OFFICE VISIT (OUTPATIENT)
Dept: SURGERY | Age: 79
End: 2019-06-11

## 2019-06-11 ENCOUNTER — TELEPHONE (OUTPATIENT)
Dept: FAMILY MEDICINE CLINIC | Age: 79
End: 2019-06-11

## 2019-06-11 DIAGNOSIS — Z09 POSTOP CHECK: Primary | ICD-10-CM

## 2019-06-11 DIAGNOSIS — A04.72 CLOSTRIDIUM DIFFICILE DIARRHEA: ICD-10-CM

## 2019-06-11 PROCEDURE — 99024 POSTOP FOLLOW-UP VISIT: CPT | Performed by: SURGERY

## 2019-06-11 NOTE — TELEPHONE ENCOUNTER
[facility] calling to let you know that pt was unable to afford her meds but they found some assistance and pt picked up all her meds last night.

## 2019-06-13 ENCOUNTER — CARE COORDINATION (OUTPATIENT)
Dept: CASE MANAGEMENT | Age: 79
End: 2019-06-13

## 2019-06-13 NOTE — CARE COORDINATION
Jered 45 Transitions Follow Up Call    2019    Patient: Michael Prado  Patient : 1940   MRN: [id number]  Reason for Admission:   Discharge Date: 6/3/19 RARS: Readmission Risk Score: 21         Spoke with: Pt's friend    Care Transitions Subsequent and Final Call    Subsequent and Final Calls  Do you have any ongoing symptoms?:  No  Have your medications changed?:  No  Do you have any questions related to your medications?:  No  Do you currently have any active services?:  No  Are you currently active with any services?:  Home Health  Do you have any needs or concerns that I can assist you with?:  No  Identified Barriers:  None  Care Transitions Interventions  Other Interventions:          CTC spoke to pt's friend who stated pt is currently not home. Stated she is feeling much better. Advised to call physician for fever, nausea, diarrhea, increased pain. Will continue to follow for transitions.     Uche Whitehead, RN BSN   Care Transitions Coordinator  [phone]     Follow Up  Future Appointments   Date Time Provider Department Center   2019  2:40 PM Renea Timmons MD [facility]   2019 10:00 AM Gurvinder De Oliveira MD [address] [place] [address]   2019  7:40 AM SCHEDULE, [facility] LAB [facility] LAB [place]   2019  3:00 PM Darrin Champagne DO [facility]   2019  9:45 AM [facility]       Uche Whitehead RN

## 2019-06-17 ENCOUNTER — OFFICE VISIT (OUTPATIENT)
Dept: ONCOLOGY | Age: 79
End: 2019-06-17
Payer: MEDICARE

## 2019-06-17 VITALS
DIASTOLIC BLOOD PRESSURE: 78 MMHG | SYSTOLIC BLOOD PRESSURE: 126 MMHG | HEIGHT: 67 IN | BODY MASS INDEX: 45.99 KG/M2 | HEART RATE: 52 BPM | TEMPERATURE: 97.8 F | WEIGHT: 293 LBS

## 2019-06-17 DIAGNOSIS — C18.7 MALIGNANT NEOPLASM OF SIGMOID COLON (HCC): Primary | ICD-10-CM

## 2019-06-17 PROCEDURE — 1036F TOBACCO NON-USER: CPT | Performed by: INTERNAL MEDICINE

## 2019-06-17 PROCEDURE — G8427 DOCREV CUR MEDS BY ELIG CLIN: HCPCS | Performed by: INTERNAL MEDICINE

## 2019-06-17 PROCEDURE — 1111F DSCHRG MED/CURRENT MED MERGE: CPT | Performed by: INTERNAL MEDICINE

## 2019-06-17 PROCEDURE — 1123F ACP DISCUSS/DSCN MKR DOCD: CPT | Performed by: INTERNAL MEDICINE

## 2019-06-17 PROCEDURE — G8399 PT W/DXA RESULTS DOCUMENT: HCPCS | Performed by: INTERNAL MEDICINE

## 2019-06-17 PROCEDURE — G8417 CALC BMI ABV UP PARAM F/U: HCPCS | Performed by: INTERNAL MEDICINE

## 2019-06-17 PROCEDURE — 99214 OFFICE O/P EST MOD 30 MIN: CPT | Performed by: INTERNAL MEDICINE

## 2019-06-17 PROCEDURE — 1090F PRES/ABSN URINE INCON ASSESS: CPT | Performed by: INTERNAL MEDICINE

## 2019-06-17 PROCEDURE — 4040F PNEUMOC VAC/ADMIN/RCVD: CPT | Performed by: INTERNAL MEDICINE

## 2019-06-17 NOTE — PROGRESS NOTES
distress   Mental status - alert and cooperative   Eyes - pupils equal and reactive, extraocular eye movements intact   Ears - bilateral TM's and external ear canals normal   Mouth - mucous membranes moist, pharynx normal without lesions   Neck - supple, no significant adenopathy   Lymphatics - no palpable lymphadenopathy, no hepatosplenomegaly   Chest - clear to auscultation, no wheezes, rales or rhonchi, symmetric air entry   Heart - normal rate, regular rhythm, normal S1, S2, no murmurs, rubs, clicks or gallops   Abdomen - Surgical scar healing well, soft, nontender, nondistended, no masses or organomegaly   Neurological - alert, oriented, normal speech, no focal findings or movement disorder noted   Musculoskeletal - no joint tenderness, deformity or swelling   Extremities - peripheral pulses normal, no pedal edema, no clubbing or cyanosis   Skin - normal coloration and turgor, no rashes, no suspicious skin lesions noted ,  LABORATORY DATA:     Lab Results   Component Value Date    WBC 6.8 06/10/2019    HGB 12.9 06/10/2019    HCT 39.2 06/10/2019    MCV 95.5 06/10/2019     06/10/2019    LYMPHOPCT 19 06/10/2019    RBC 4.10 06/10/2019    MCH 31.4 06/10/2019    MCHC 32.9 06/10/2019    RDW 16.0 (H) 06/10/2019    MONOPCT 10 06/10/2019    BASOPCT 0 06/10/2019    NEUTROABS 4.70 06/10/2019    LYMPHSABS 1.30 06/10/2019    MONOSABS 0.60 06/10/2019    EOSABS 0.10 06/10/2019    BASOSABS 0.00 06/10/2019         Chemistry        Component Value Date/Time     (H) 06/10/2019 1043    K 3.8 06/10/2019 1043     06/10/2019 1043    CO2 29 06/10/2019 1043    BUN 13 06/10/2019 1043    CREATININE 0.75 06/10/2019 1043        Component Value Date/Time    CALCIUM 9.6 06/10/2019 1043    ALKPHOS 87 06/10/2019 1043    AST 13 06/10/2019 1043    ALT 8 06/10/2019 1043    BILITOT 0.67 06/10/2019 1043        PATHOLOGY DATA:     -- Diagnosis --   1.  SIGMOID COLON, SEGMENTAL RESECTION:   - LOW-GRADE ADENOCARCINOMA ARISING IN A LARGE SESSILE TUBULOVILLOUS   ADENOMA, INVADING INTO THE SUBMUCOSA.   - THE FINAL SURGICAL MARGINS ARE NEGATIVE FOR NEOPLASM. - BENIGN REGIONAL LYMPH NODES (0/14). 2.  SIGMOID COLON, DISTAL MARGIN, RESECTION:   - UNREMARKABLE COLON SEGMENT.   - SINGLE BENIGN LYMPH NODE (0/1). PATHOLOGIC STAGE CLASSIFICATION:     pT1  pN0   ADDENDUM DIAGNOSIS   AT THE REQUEST OF DR. Tyshawn Turk, BLOCK 14B WAS SENT TO [facility]   FOR MMR TESTING.  THE RESULTS ARE AS FOLLOWS:     MISMATCH REPAIR BY IHC RESULT:  NORMAL   MISMATCH REPAIR BY IHC WITH MLH1:  NORMAL   MISMATCH REPAIR BY IHC WITH MSH2:  NORMAL   MISMATCH REPAIR BY IHC WITH MSH6:  NORMAL   MISMATCH REPAIR BY IHC WITH PMS2:  NORMAL   IMAGING DATA:    CT abdomen pelvis 11/10/18  Impression   Dilated stomach and loops of bowel, possibly reflecting postoperative ileus.       Trace perihepatic and mild pelvic ascites.       Redemonstration of subcutaneous mass along the right buttock. CT abdomen pelvis 2/26/19:  Improved postoperative findings status post partial colon resection and   ileostomy.  No acute abnormality identified.       Stable chronic findings, as above. ASSESSMENT:    Linsey Saravia has T1NO, stage I colon adenocarcinoma, MMR proficient. I discussed the NCCN guidelines. I discussed that for stage I colon cancer, there is no role of adjuvant chemotherapy, so surveillance is recommended. I will follow her in three months with CEA, CBC and CMP. She will need colonoscopy in one year. I discussed the results of recent lab work and CT scans which showed no evidence of recurrence. Clinically she is doing well. She is planning to have follow up with Gen. surgery in May and possible colonoscopy for planning reversal colostomy. During today's visit, the patient and the family had a number of reasonable questions which were answered to their satisfaction. They verbalized understanding of the information provided and they agreed to proceed as outlined above. PLAN:   Continue surveillance  Will repeat lab work in 3 months and plan for CT scan in 6 months  She will see Gen. surgery in May for discussion about colostomy reversal  RTC 3 months with labs     I spent more than 25 minutes examining, evaluating, reviewing data and counseling the patient. Greater than 50% of that time was spent face-to-face with the patient in counseling and coordinating her care. Rory Perez MD  Hematologist/Medical Oncologist          This note is created with the assistance of a speech recognition program.  While intending to generate a document that actually reflects the content of the visit, the document can still have some errors including those of syntax and sound a like substitutions which may escape proof reading. It such instances, actual meaning can be extrapolated by contextual diversion. Patient ID: Darshan Woods, 1940, [id number], 77 y.o. Referred by : Lalit Landaverde MD  Reason for consultation:   Diagnosis of colon cancer  HISTORY OF PRESENT ILLNESS:    Oncologic History: This is a 50-year-old female with new diagnosis of colon cancer was seen during initial consultation visit. She presented with rectal bleeding going on for about 4-6 weeks. She had a colonoscopy on 10/8/18 which showed multiple polyps and large distal sigmoid tumors with extensive diverticulosis. Biopsy showed invasive low-grade adenocarcinoma arising in a large tubulovillous adenoma with extensive high-grade dysplasia. Subsequently she had open low anterior resection of the rectosigmoid with proximal dilating ileostomy. Final pathology showed T1 N0, stage I disease. Now she is recovering well from surgery. She denied any pain, nausea vomiting. She does not have family history of colon cancer. She is adopted and does not know about her family history.     Past Medical History:   Diagnosis Date    [facility]-fib [facility]) 07/19/2017    [facility]estive heart failure) [facility] [address]  Dyslipidemia     FH: total abdominal hysterectomy and bilateral salpingo-oophorectomy 1966    Glucose intolerance (impaired glucose tolerance)     Hypertension     Malignant neoplasm of sigmoid colon ([address]     Obesity     Osteoarthritis        Past Surgical History:   Procedure Laterality Date    CARDIAC CATHETERIZATION  09/05/2017    CHOLECYSTECTOMY  1960s    COLONOSCOPY N/A 10/8/2018    COLONOSCOPY WITH COLD ET HOT BIOPSIES ET POLYPECTOMIES performed by Edna Chavez MD at [facility] N/A 4/25/2019    COLONOSCOPY performed by Marvell Krabbe, MD at [facility]  stricture at 7 cm     HIP ARTHROPLASTY Left     KNEE ARTHROPLASTY Left     KNEE ARTHROPLASTY Right     NM CYSTOSCOPY,INSERT URETERAL STENT Bilateral 11/5/2018    CYSTO Bilateral Lighted stent placements performed by Fiona Patricio MD at [facility] ., PARTIAL, W/ANAST N/A 11/5/2018    Open Sigmoid Colectomy w/ lighted stents ILEOSTOMY PLACEMENT performed by Edna Chavez MD at [facility] N/A 5/9/2019    Flexible Sigmoidoscopy with Dilatation of rectal stricture performed by Marvell Krabbe, MD at [address] [place] [address] N/A 5/22/2019    Loop Ileostomy take down performed by Marvell Krabbe, MD at [address] [place] [address] Drive Right 1960s       Allergies   Allergen Reactions    Pcn [Penicillins] Hives and Shortness Of Breath    Bextra [Valdecoxib] Diarrhea       Current Outpatient Medications   Medication Sig Dispense Refill    levothyroxine (SYNTHROID) 50 MCG tablet TAKE 1 TABLET BY MOUTH ONE TIME A DAY  30 tablet 5    diltiazem (CARDIZEM CD) 120 MG extended release capsule Take 1 capsule by mouth daily 90 capsule 1    metoprolol tartrate (LOPRESSOR) 25 MG tablet TAKE ONE TABLET BY MOUTH TWICE A DAY 60 tablet 5    furosemide (LASIX) 20 MG tablet Take 1 tablet by mouth daily 30 tablet 5    traZODone (DESYREL) 50 MG tablet Take 1 tablet by mouth nightly 30 tablet 5    amiodarone (CORDARONE) 200 MG tablet TAKE 1 TABLET BY MOUTH ONE TIME A DAY 90 tablet 3    atorvastatin (LIPITOR) 40 MG tablet TAKE 1 TABLET BY MOUTH nightly 30 tablet 5    acetaminophen (TYLENOL) 325 MG tablet Take 2 tablets by mouth every 4 hours as needed for Pain or Fever 60 tablet 0    potassium chloride (KLOR-CON M) 20 MEQ extended release tablet Take 1 tablet by mouth daily 30 tablet 5     No current facility-administered medications for this visit. Social History     Socioeconomic History    Marital status:       Spouse name: Not on file    Number of children: Not on file    Years of education: Not on file    Highest education level: Not on file   Occupational History    Not on file   Social Needs    Financial resource strain: Not on file    Food insecurity:     Worry: Not on file     Inability: Not on file    Transportation needs:     Medical: Not on file     Non-medical: Not on file   Tobacco Use    Smoking status: Never Smoker    Smokeless tobacco: Never Used    Tobacco comment: hernando 11/10/2018   Substance and Sexual Activity    Alcohol use: No    Drug use: No    Sexual activity: Not on file   Lifestyle    Physical activity:     Days per week: Not on file     Minutes per session: Not on file    Stress: Not on file   Relationships    Social connections:     Talks on phone: Not on file     Gets together: Not on file     Attends [employer] service: Not on file     Active member of club or organization: Not on file     Attends meetings of clubs or organizations: Not on file     Relationship status: Not on file    Intimate partner violence:     Fear of current or ex partner: Not on file     Emotionally abused: Not on file     Physically abused: Not on file     Forced sexual activity: Not on file   Other Topics Concern    Not on file   Social History Narrative    Not on file     Family History   Adopted: Yes   Problem Relation Age of Onset    High Blood Pressure Brother  NORMAL   IMAGING DATA:    CT abdomen pelvis 11/10/18  Impression   Dilated stomach and loops of bowel, possibly reflecting postoperative ileus.       Trace perihepatic and mild pelvic ascites.       Redemonstration of subcutaneous mass along the right buttock. CT abd 5/31/19  Mild diffuse colonic wall thickening may be related to recent surgery versus   a colitis.  No bowel obstruction.       Incompletely characterized right renal lesion may be a complex cyst but has   increased over multiple prior studies.  Recommend follow-up with MRI with   contrast.     ASSESSMENT:    Denver Hammock has T1NO, stage I colon adenocarcinoma, MMR proficient. I discussed the NCCN guidelines. I discussed that for stage I colon cancer, there is no role of adjuvant chemotherapy, so surveillance is recommended. I will follow her in three months with CEA, CBC and CMP. She will need colonoscopy in one year. C diff infection: recovering. During today's visit, the patient and the family had a number of reasonable questions which were answered to their satisfaction. They verbalized understanding of the information provided and they agreed to proceed as outlined above. PLAN:   No indication of adjuvant chemotherapy  Discussed recent labs   No e/o recurrence   Colonic wall thickening may be due to C diff infection   RTC 3 months with labs and also CT abd      Rory Denny MD  Hematologist/Medical Oncologist          This note is created with the assistance of a speech recognition program.  While intending to generate a document that actually reflects the content of the visit, the document can still have some errors including those of syntax and sound a like substitutions which may escape proof reading. It such instances, actual meaning can be extrapolated by contextual diversion.

## 2019-06-18 ENCOUNTER — TELEPHONE (OUTPATIENT)
Dept: FAMILY MEDICINE CLINIC | Age: 79
End: 2019-06-18
Payer: MEDICARE

## 2019-06-18 DIAGNOSIS — A04.72 ENTEROCOLITIS DUE TO CLOSTRIDIUM DIFFICILE, NOT SPECIFIED AS RECURRENT: Primary | ICD-10-CM

## 2019-06-18 DIAGNOSIS — Z48.816 ENCOUNTER FOR SURGICAL AFTERCARE FOLLOWING SURGERY ON THE GENITOURINARY SYSTEM: ICD-10-CM

## 2019-06-18 DIAGNOSIS — I13.0 HYPERTENSIVE HEART AND CHRONIC KIDNEY DISEASE WITH HEART FAILURE AND STAGE 1 THROUGH STAGE 4 CHRONIC KIDNEY DISEASE, OR UNSPECIFIED CHRONIC KIDNEY DISEASE (HCC): ICD-10-CM

## 2019-06-18 PROCEDURE — G0180 MD CERTIFICATION HHA PATIENT: HCPCS | Performed by: FAMILY MEDICINE

## 2019-06-18 NOTE — TELEPHONE ENCOUNTER
Home health certification and plan of care done 6/18/2019 on patient for date services 6/04/2019-8/2/2019. Verified medications. Physician time spent is 15 minutes for activities to coordinate services, documenting, medical decision making, and review of reports, treatment plans, and test results.

## 2019-06-19 ENCOUNTER — CARE COORDINATION (OUTPATIENT)
Dept: CARE COORDINATION | Age: 79
End: 2019-06-19

## 2019-06-19 NOTE — CARE COORDINATION
[facility] Transitions 24 Hour Follow Up Call    2019    Patient: Lm Barros Patient : 1940    MRN: [id number]  Reason for Admission: There are no discharge diagnoses documented for the most recent discharge. Discharge Date: 6/3/19 RARS: Mitch           Spoke with: Attempted to contact patient for follow up Care Transition call. Left message on Voice mail to call office (number given) with any questions and an update on your condition since discharge. Will Follow up at later time.    Facility:    Non-face-to-face services provided:      Inpatient Assessment  Care Transitions 24 Hour Call    Do you have all of your prescriptions and are they filled?:  No  Care Transitions Interventions         Follow Up  Future Appointments   Date Time Provider Mira Kirkland   2019 10:00 AM Heidy Burris MD [address] [place] [address]   2019  7:40 AM SCHEDULE, Efe Nichols 112 LAB [id number] [facility] LAB [place]   2019  3:00 PM Kim Gomez DO [facility]   2019  9:45 AM Lm Barros MD [facility]   2019  9:00 AM SCHEDULE, Efe Nichols 112 LAB MD LAB [place]   2019  9:00 AM Yahir Rios MD [facility]       Mae Black LPN

## 2019-06-24 ENCOUNTER — TELEPHONE (OUTPATIENT)
Dept: FAMILY MEDICINE CLINIC | Age: 79
End: 2019-06-24

## 2019-06-24 DIAGNOSIS — R19.7 DIARRHEA, UNSPECIFIED TYPE: Primary | ICD-10-CM

## 2019-06-24 NOTE — TELEPHONE ENCOUNTER
Patient states she has had several episodes of liquid stool so would like to be tested for C diff again. Order placed and collection kit placed in  for her grandduaghter to  tonight after work.

## 2019-06-24 NOTE — TELEPHONE ENCOUNTER
Pt calling stating she's had c-diff in the past and was told to call anytime she had diarrhea, pt calling stating she's had diarrhea since Saturday, [facility] nurse told her to take an Immodium last night and pt did and she states the diarrhea has almost stopped but wanted to check with her PCP, pt uses loaded pharmacy, please advise at above number.

## 2019-06-26 ENCOUNTER — CARE COORDINATION (OUTPATIENT)
Dept: CASE MANAGEMENT | Age: 79
End: 2019-06-26

## 2019-06-28 ENCOUNTER — CARE COORDINATION (OUTPATIENT)
Dept: CARE COORDINATION | Age: 79
End: 2019-06-28

## 2019-06-28 NOTE — CARE COORDINATION
[facility] Transitions Follow Up Call    2019    Patient: Nika Marssumeet  Patient : 1940   MRN: [id number]  Reason for Admission:   Discharge Date: 6/3/19 RARS: Readmission Risk Score: 20         Spoke with: Patient, Gianna Mcadams. Patient states she is doing \"pretty good\". No abdominal pain. Had diarrhea but that has subsided now. Had Ileostomy in 2018 and it was reversed in May 2019. She states she was told she would have diarrhea for a while after ileostomy reversal. Sees Dr. Keshav Mendiola on 19. Patient has no needs or concerns for writer at this time. Final Call. Care Transitions Subsequent and Final Call    Subsequent and Final Calls  Do you have any ongoing symptoms?:  No  Have your medications changed?:  No  Do you have any questions related to your medications?:  No  Do you currently have any active services?:  No  Are you currently active with any services?:  Home Health  Do you have any needs or concerns that I can assist you with?:  No  Care Transitions Interventions  Other Interventions:             Follow Up  Future Appointments   Date Time Provider Mira Kirkland   2019 10:00 AM Charu Collins MD [facility]G [facility]   2019  7:40 AM SCHEDULE, Efe Negronmar 112 LAB [facility] LAB [place]   2019  3:00 PM Lea President, DO DIETZ [facility]   [facility]  9:45 [facility]   2019  9:00 AM SCHEDULE, [facility] LAB MD LAB [place]   2019  9:00 AM Thelma Dodd MD [facility]       Nelia Knox LPN

## 2019-06-28 NOTE — DISCHARGE SUMMARY
Physician Discharge Summary     Patient ID:  Argentina Arguelles  [id number]  83 y.o.  1940    Admit date: 5/22/2019    Discharge date and time: 5/25/2019 12:50 PM     Admitting Physician: Aga Nuno MD     Discharge Physician: Dr. Mani Morse    Admission Diagnoses: S/P small bowel resection [Z90.49]    Discharge Diagnoses: s/p loop ileostomy takedown    Admission Condition: good    Discharged Condition: good    Indication for Admission: ileostomy takedown    Hospital Course: pt had ileostomy takedown and did well and was dc home.     Consults: hospitalist for med management    Significant Diagnostic Studies: none    Outstanding Order Results     Date and Time Order Name Status Description    5/22/2019 0849 Peripheral Block In process           Treatments: surgery: loop ileostomy takedown    Discharge Exam:  BP (!) 125/55   Pulse 67   Temp 98.4 °F (36.9 °C) (Oral)   Resp 18   Ht 5' 7.01\" (1.702 m)   Wt (!) 303 lb (137.4 kg)   LMP 01/01/1968   SpO2 93%   BMI 47.45 kg/m²     General Appearance:    Alert, cooperative, no distress, appears stated age   Head:    Normocephalic, without obvious abnormality, atraumatic   Eyes:    PERRL, conjunctiva/corneas clear, EOM's intact, fundi     benign, both eyes   Ears:    Normal TM's and external ear canals, both ears   Nose:   Nares normal, septum midline, mucosa normal, no drainage    or sinus tenderness   Throat:   Lips, mucosa, and tongue normal; teeth and gums normal   Neck:   Supple, symmetrical, trachea midline, no adenopathy;     thyroid:  no enlargement/tenderness/nodules; no carotid    bruit or JVD   Back:     Symmetric, no curvature, ROM normal, no CVA tenderness   Lungs:     Clear to auscultation bilaterally, respirations unlabored   Chest Wall:    No tenderness or deformity    Heart:    Regular rate and rhythm, S1 and S2 normal, no murmur, rub   or gallop   Breast Exam:    No tenderness, masses, or nipple abnormality   Abdomen:     Soft, non-tender, bowel sounds active all four quadrants,     no masses, no organomegaly   Genitalia:    Normal female without lesion, discharge or tenderness   Rectal:    Normal tone ;guaiac negative stool   Extremities:   Extremities normal, atraumatic, no cyanosis or edema   Pulses:   2+ and symmetric all extremities   Skin:   Skin color, texture, turgor normal, no rashes or lesions   Lymph nodes:   Cervical, supraclavicular, and axillary nodes normal   Neurologic:   CNII-XII intact, normal strength, sensation and reflexes     throughout       Disposition: home    Patient Instructions:   [unfilled]  Activity: activity as tolerated  Diet: regular diet  Wound Care: none needed    Follow-up with Dr. Onesimo Chester in 2 weeks.     Flaquito Tamayo [facility]  6/28/2019  7:22 PM

## 2019-07-01 ENCOUNTER — HOSPITAL ENCOUNTER (OUTPATIENT)
Age: 79
Setting detail: SPECIMEN
Discharge: HOME | End: 2019-07-01
Payer: MEDICARE

## 2019-07-01 PROCEDURE — 87324 CLOSTRIDIUM AG IA: CPT

## 2019-07-01 PROCEDURE — 87449 NOS EACH ORGANISM AG IA: CPT

## 2019-07-02 ENCOUNTER — TELEPHONE (OUTPATIENT)
Dept: FAMILY MEDICINE CLINIC | Age: 79
End: 2019-07-02

## 2019-07-02 DIAGNOSIS — R19.7 DIARRHEA, UNSPECIFIED TYPE: ICD-10-CM

## 2019-07-02 DIAGNOSIS — R19.7 DIARRHEA, UNSPECIFIED TYPE: Primary | ICD-10-CM

## 2019-07-02 LAB
C DIFF AG + TOXIN: ABNORMAL
SPECIMEN DESCRIPTION: ABNORMAL

## 2019-07-02 RX ORDER — VANCOMYCIN HYDROCHLORIDE 125 MG/1
125 CAPSULE ORAL 4 TIMES DAILY
Qty: 40 CAPSULE | Refills: 0 | Status: SHIPPED | OUTPATIENT
Start: 2019-07-02 | End: 2019-07-12

## 2019-07-11 ENCOUNTER — OFFICE VISIT (OUTPATIENT)
Dept: SURGERY | Age: 79
End: 2019-07-11

## 2019-07-11 VITALS
HEIGHT: 67 IN | HEART RATE: 50 BPM | TEMPERATURE: 97.6 F | DIASTOLIC BLOOD PRESSURE: 70 MMHG | BODY MASS INDEX: 45.99 KG/M2 | WEIGHT: 293 LBS | SYSTOLIC BLOOD PRESSURE: 104 MMHG

## 2019-07-11 DIAGNOSIS — Z09 POSTOP CHECK: Primary | ICD-10-CM

## 2019-07-11 PROCEDURE — 99024 POSTOP FOLLOW-UP VISIT: CPT | Performed by: SURGERY

## 2019-07-27 DIAGNOSIS — I10 ESSENTIAL HYPERTENSION: ICD-10-CM

## 2019-07-31 RX ORDER — ATORVASTATIN CALCIUM 40 MG/1
TABLET, FILM COATED ORAL
Qty: 30 TABLET | Refills: 5 | Status: SHIPPED | OUTPATIENT
Start: 2019-07-31 | End: 2020-01-31

## 2019-08-12 ENCOUNTER — TELEPHONE (OUTPATIENT)
Dept: FAMILY MEDICINE CLINIC | Age: 79
End: 2019-08-12

## 2019-08-12 NOTE — TELEPHONE ENCOUNTER
Pt has had flare of seasonal allergies, would like to know what she can take. Per pt, she voices that she cannot take claritin. Has \"raspy to her voice, and eyes are irritated. \"    Pt only wants advisement if can take zyrtec OTC. Please advise pt.

## 2019-08-20 ENCOUNTER — HOSPITAL ENCOUNTER (OUTPATIENT)
Dept: LAB | Age: 79
Discharge: HOME | End: 2019-08-20
Payer: MEDICARE

## 2019-08-20 DIAGNOSIS — I10 ESSENTIAL HYPERTENSION: ICD-10-CM

## 2019-08-20 DIAGNOSIS — R73.01 IMPAIRED FASTING GLUCOSE: ICD-10-CM

## 2019-08-20 DIAGNOSIS — E78.2 MIXED HYPERLIPIDEMIA: ICD-10-CM

## 2019-08-20 DIAGNOSIS — E03.9 ACQUIRED HYPOTHYROIDISM: ICD-10-CM

## 2019-08-20 LAB
ABSOLUTE EOS #: 0.31 K/UL (ref 0–0.4)
ABSOLUTE IMMATURE GRANULOCYTE: ABNORMAL K/UL (ref 0–0.3)
ABSOLUTE LYMPH #: 1.48 K/UL (ref 1–4.8)
ABSOLUTE MONO #: 0.7 K/UL (ref 0.1–1.2)
ALBUMIN SERPL-MCNC: 3.7 G/DL (ref 3.5–5.2)
ALBUMIN/GLOBULIN RATIO: 1.1 (ref 1–2.5)
ALP BLD-CCNC: 131 U/L (ref 35–104)
ALT SERPL-CCNC: 21 U/L (ref 5–33)
ANION GAP SERPL CALCULATED.3IONS-SCNC: 13 MMOL/L (ref 9–17)
AST SERPL-CCNC: 23 U/L
BASOPHILS # BLD: 0 % (ref 0–1)
BASOPHILS ABSOLUTE: 0 K/UL (ref 0–0.2)
BILIRUB SERPL-MCNC: 0.59 MG/DL (ref 0.3–1.2)
BUN BLDV-MCNC: 25 MG/DL (ref 8–23)
BUN/CREAT BLD: 32 (ref 9–20)
CALCIUM SERPL-MCNC: 9.3 MG/DL (ref 8.6–10.4)
CHLORIDE BLD-SCNC: 107 MMOL/L (ref 98–107)
CHOLESTEROL/HDL RATIO: 2.1
CHOLESTEROL: 92 MG/DL
CO2: 25 MMOL/L (ref 20–31)
CREAT SERPL-MCNC: 0.79 MG/DL (ref 0.5–0.9)
DIFFERENTIAL TYPE: ABNORMAL
EOSINOPHILS RELATIVE PERCENT: 4 % (ref 1–7)
ESTIMATED AVERAGE GLUCOSE: 111 MG/DL
GFR AFRICAN AMERICAN: >60 ML/MIN
GFR NON-AFRICAN AMERICAN: >60 ML/MIN
GFR SERPL CREATININE-BSD FRML MDRD: ABNORMAL ML/MIN/{1.73_M2}
GFR SERPL CREATININE-BSD FRML MDRD: ABNORMAL ML/MIN/{1.73_M2}
GLUCOSE BLD-MCNC: 104 MG/DL (ref 70–99)
HBA1C MFR BLD: 5.5 % (ref 4.8–5.9)
HCT VFR BLD CALC: 41.8 % (ref 36–46)
HDLC SERPL-MCNC: 43 MG/DL
HEMOGLOBIN: 13.5 G/DL (ref 12–16)
IMMATURE GRANULOCYTES: ABNORMAL %
LDL CHOLESTEROL: 31 MG/DL (ref 0–130)
LYMPHOCYTES # BLD: 19 % (ref 16–46)
MCH RBC QN AUTO: 30.8 PG (ref 26–34)
MCHC RBC AUTO-ENTMCNC: 32.2 G/DL (ref 31–37)
MCV RBC AUTO: 95.7 FL (ref 80–100)
MONOCYTES # BLD: 9 % (ref 4–11)
MORPHOLOGY: ABNORMAL
MORPHOLOGY: ABNORMAL
NRBC AUTOMATED: ABNORMAL PER 100 WBC
PDW BLD-RTO: 16.5 % (ref 11–14.5)
PLATELET # BLD: 119 K/UL (ref 140–450)
PLATELET ESTIMATE: ABNORMAL
PMV BLD AUTO: 12.3 FL (ref 6–12)
POTASSIUM SERPL-SCNC: 3.9 MMOL/L (ref 3.7–5.3)
RBC # BLD: 4.37 M/UL (ref 4–5.2)
RBC # BLD: ABNORMAL 10*6/UL
SEG NEUTROPHILS: 68 % (ref 43–77)
SEGMENTED NEUTROPHILS ABSOLUTE COUNT: 5.31 K/UL (ref 1.8–7.7)
SODIUM BLD-SCNC: 145 MMOL/L (ref 135–144)
THYROXINE, FREE: 1.52 NG/DL (ref 0.93–1.7)
TOTAL PROTEIN: 7 G/DL (ref 6.4–8.3)
TRIGL SERPL-MCNC: 91 MG/DL
TSH SERPL DL<=0.05 MIU/L-ACNC: 1.91 MIU/L (ref 0.3–5)
VLDLC SERPL CALC-MCNC: NORMAL MG/DL (ref 1–30)
WBC # BLD: 7.8 K/UL (ref 3.5–11)
WBC # BLD: ABNORMAL 10*3/UL

## 2019-08-20 PROCEDURE — 80061 LIPID PANEL: CPT

## 2019-08-20 PROCEDURE — 80053 COMPREHEN METABOLIC PANEL: CPT

## 2019-08-20 PROCEDURE — 36415 COLL VENOUS BLD VENIPUNCTURE: CPT

## 2019-08-20 PROCEDURE — 84439 ASSAY OF FREE THYROXINE: CPT

## 2019-08-20 PROCEDURE — 85025 COMPLETE CBC W/AUTO DIFF WBC: CPT

## 2019-08-20 PROCEDURE — 84443 ASSAY THYROID STIM HORMONE: CPT

## 2019-08-20 PROCEDURE — 83036 HEMOGLOBIN GLYCOSYLATED A1C: CPT

## 2019-08-26 ENCOUNTER — OFFICE VISIT (OUTPATIENT)
Dept: FAMILY MEDICINE CLINIC | Age: 79
End: 2019-08-26
Payer: MEDICARE

## 2019-08-26 VITALS
HEART RATE: 56 BPM | SYSTOLIC BLOOD PRESSURE: 110 MMHG | BODY MASS INDEX: 45.2 KG/M2 | DIASTOLIC BLOOD PRESSURE: 74 MMHG | RESPIRATION RATE: 18 BRPM | HEIGHT: 67 IN | WEIGHT: 288 LBS

## 2019-08-26 DIAGNOSIS — R73.01 IMPAIRED FASTING GLUCOSE: ICD-10-CM

## 2019-08-26 DIAGNOSIS — E78.2 MIXED HYPERLIPIDEMIA: ICD-10-CM

## 2019-08-26 DIAGNOSIS — I48.0 PAROXYSMAL ATRIAL FIBRILLATION (HCC): ICD-10-CM

## 2019-08-26 DIAGNOSIS — M15.9 PRIMARY OSTEOARTHRITIS INVOLVING MULTIPLE JOINTS: ICD-10-CM

## 2019-08-26 DIAGNOSIS — I10 ESSENTIAL HYPERTENSION: Primary | ICD-10-CM

## 2019-08-26 DIAGNOSIS — E03.9 ACQUIRED HYPOTHYROIDISM: ICD-10-CM

## 2019-08-26 PROCEDURE — 99214 OFFICE O/P EST MOD 30 MIN: CPT | Performed by: FAMILY MEDICINE

## 2019-08-26 PROCEDURE — 1090F PRES/ABSN URINE INCON ASSESS: CPT | Performed by: FAMILY MEDICINE

## 2019-08-26 PROCEDURE — 1123F ACP DISCUSS/DSCN MKR DOCD: CPT | Performed by: FAMILY MEDICINE

## 2019-08-26 PROCEDURE — G8399 PT W/DXA RESULTS DOCUMENT: HCPCS | Performed by: FAMILY MEDICINE

## 2019-08-26 PROCEDURE — G8417 CALC BMI ABV UP PARAM F/U: HCPCS | Performed by: FAMILY MEDICINE

## 2019-08-26 PROCEDURE — 1036F TOBACCO NON-USER: CPT | Performed by: FAMILY MEDICINE

## 2019-08-26 PROCEDURE — G8427 DOCREV CUR MEDS BY ELIG CLIN: HCPCS | Performed by: FAMILY MEDICINE

## 2019-08-26 PROCEDURE — 4040F PNEUMOC VAC/ADMIN/RCVD: CPT | Performed by: FAMILY MEDICINE

## 2019-08-26 RX ORDER — DICLOFENAC SODIUM 75 MG/1
75 TABLET, DELAYED RELEASE ORAL 2 TIMES DAILY PRN
Qty: 60 TABLET | Refills: 2 | Status: SHIPPED | OUTPATIENT
Start: 2019-08-26 | End: 2021-03-15

## 2019-08-26 RX ORDER — FUROSEMIDE 20 MG/1
20 TABLET ORAL DAILY
Qty: 30 TABLET | Refills: 5 | Status: SHIPPED | OUTPATIENT
Start: 2019-08-26 | End: 2020-04-22

## 2019-08-26 RX ORDER — TRAZODONE HYDROCHLORIDE 50 MG/1
50 TABLET ORAL NIGHTLY
Qty: 30 TABLET | Refills: 5 | Status: SHIPPED | OUTPATIENT
Start: 2019-08-26 | End: 2020-04-22

## 2019-08-26 ASSESSMENT — PATIENT HEALTH QUESTIONNAIRE - PHQ9
SUM OF ALL RESPONSES TO PHQ QUESTIONS 1-9: 0
2. FEELING DOWN, DEPRESSED OR HOPELESS: 0
SUM OF ALL RESPONSES TO PHQ9 QUESTIONS 1 & 2: 0
SUM OF ALL RESPONSES TO PHQ QUESTIONS 1-9: 0
1. LITTLE INTEREST OR PLEASURE IN DOING THINGS: 0

## 2019-08-26 NOTE — PATIENT INSTRUCTIONS
do not have an account, please click on the \"Sign Up Now\" link. Current as of: July 22, 2018  Content Version: 12.1  © 3522-1154 [employer], [employer]orporated. Care instructions adapted under license by [facility] ([facility]). If you have questions about a medical condition or this instruction, always ask your healthcare professional. [address] any warranty or liability for your use of this information.

## 2019-08-31 ASSESSMENT — ENCOUNTER SYMPTOMS
SORE THROAT: 0
SINUS PRESSURE: 0
VOMITING: 0
CONSTIPATION: 0
RHINORRHEA: 0
ABDOMINAL PAIN: 0
COUGH: 0
DIARRHEA: 0
EYE REDNESS: 0
SHORTNESS OF BREATH: 0
TROUBLE SWALLOWING: 0
EYE DISCHARGE: 0
NAUSEA: 0
WHEEZING: 0

## 2019-08-31 NOTE — PROGRESS NOTES
Medications    Medication Sig Taking? Authorizing Provider   traZODone (DESYREL) 50 MG tablet Take 1 tablet by mouth nightly Yes Luis Fernando Horan, DO   furosemide (LASIX) 20 MG tablet Take 1 tablet by mouth daily Yes Luis Fernando Horan DO   diclofenac (VOLTAREN) 75 MG EC tablet Take 1 tablet by mouth 2 times daily as needed for Pain Yes Luis Fernando Horan, DO   atorvastatin (LIPITOR) 40 MG tablet TAKE 1 TABLET BY MOUTH ONE TIME NIGHTLY Yes Luis Fernando Horan DO   metoprolol tartrate (LOPRESSOR) 25 MG tablet TAKE ONE TABLET BY MOUTH TWICE A DAY Yes Luis Fernando Horan, DO   levothyroxine (SYNTHROID) 50 MCG tablet TAKE 1 TABLET BY MOUTH ONE TIME A DAY  Yes Emy Trevino, DO   diltiazem (CARDIZEM CD) 120 MG extended release capsule Take 1 capsule by mouth daily Yes Fredo Casas, DO   amiodarone (CORDARONE) 200 MG tablet TAKE 1 TABLET BY MOUTH ONE TIME A DAY Yes Anival Casas,    acetaminophen (TYLENOL) 325 MG tablet Take 2 tablets by mouth every 4 hours as needed for Pain or Fever Yes Jose Arrieta   potassium chloride (KLOR-CON M) 20 MEQ extended release tablet Take 1 tablet by mouth daily  Patient not taking: Reported on 8/26/2019  Luis Fernando Horan DO        Social History     Tobacco Use    Smoking status: Never Smoker    Smokeless tobacco: Never Used    Tobacco comment: hernando 11/10/2018   Substance Use Topics    Alcohol use: No        Vitals:    08/26/19 1441   BP: 110/74   Site: Left Lower Arm   Position: Sitting   Cuff Size: Medium Adult   Pulse: 56   Resp: 18   Weight: 288 lb (130.6 kg)   Height: 5' 7\" (1.702 m)     Estimated body mass index is 45.11 kg/m² as calculated from the following:    Height as of this encounter: 5' 7\" (1.702 m). Weight as of this encounter: 288 lb (130.6 kg). Physical Exam   Constitutional: She is oriented to person, place, and time. She appears well-developed and well-nourished. No distress. HENT:   Head: Normocephalic and atraumatic.    Right Ear: External ear normal. Left Ear: External ear normal.   Nose: Nose normal.   Mouth/Throat: Oropharynx is clear and moist. No oropharyngeal exudate. Eyes: Pupils are equal, round, and reactive to light. Conjunctivae and EOM are normal. Right eye exhibits no discharge. Left eye exhibits no discharge. Neck: Normal range of motion. Neck supple. No thyromegaly present. Cardiovascular: Normal rate, regular rhythm and normal heart sounds. Pulmonary/Chest: Effort normal and breath sounds normal. She has no wheezes. She has no rales. Abdominal: Soft. Bowel sounds are normal.   Musculoskeletal: She exhibits no edema. Lymphadenopathy:     She has no cervical adenopathy. Neurological: She is alert and oriented to person, place, and time. Skin: Skin is warm and dry. No rash noted. She is not diaphoretic. Psychiatric: She has a normal mood and affect. Her behavior is normal. Judgment and thought content normal.   Nursing note and vitals reviewed. ASSESSMENT/PLAN:  Encounter Diagnoses   Name Primary?     Essential hypertension Yes    Impaired fasting glucose     Paroxysmal atrial fibrillation (HCC)     Acquired hypothyroidism     Mixed hyperlipidemia     Primary osteoarthritis involving multiple joints      Orders Placed This Encounter   Medications    traZODone (DESYREL) 50 MG tablet     Sig: Take 1 tablet by mouth nightly     Dispense:  30 tablet     Refill:  5    furosemide (LASIX) 20 MG tablet     Sig: Take 1 tablet by mouth daily     Dispense:  30 tablet     Refill:  5    diclofenac (VOLTAREN) 75 MG EC tablet     Sig: Take 1 tablet by mouth 2 times daily as needed for Pain     Dispense:  60 tablet     Refill:  2     Orders Placed This Encounter   Procedures    CBC Auto Differential     To be done in 6 months     Standing Status:   Future     Standing Expiration Date:   8/25/2020    Comprehensive Metabolic Panel     To be done in 6 months     Standing Status:   Future     Standing Expiration Date:   8/25/2020   [employer]

## 2019-09-03 ENCOUNTER — TELEPHONE (OUTPATIENT)
Dept: FAMILY MEDICINE CLINIC | Age: 79
End: 2019-09-03
Payer: MEDICARE

## 2019-09-03 DIAGNOSIS — I13.0 HYPERTENSIVE HEART AND CHRONIC KIDNEY DISEASE WITH HEART FAILURE AND STAGE 1 THROUGH STAGE 4 CHRONIC KIDNEY DISEASE, OR UNSPECIFIED CHRONIC KIDNEY DISEASE (HCC): ICD-10-CM

## 2019-09-03 DIAGNOSIS — Z51.5 ENCOUNTER FOR PALLIATIVE CARE: Primary | ICD-10-CM

## 2019-09-03 DIAGNOSIS — Z48.816 ENCOUNTER FOR SURGICAL AFTERCARE FOLLOWING SURGERY ON THE GENITOURINARY SYSTEM: ICD-10-CM

## 2019-09-03 DIAGNOSIS — A04.72 ENTEROCOLITIS DUE TO CLOSTRIDIUM DIFFICILE, NOT SPECIFIED AS RECURRENT: ICD-10-CM

## 2019-09-03 PROCEDURE — G0179 MD RECERTIFICATION HHA PT: HCPCS | Performed by: FAMILY MEDICINE

## 2019-09-03 NOTE — TELEPHONE ENCOUNTER
Home health certification and plan of care done 9/3/2019 on patient for date services 8/3/2019-10/01/2019. Verified medications. Physician time spent is 15 minutes for activities to coordinate services, documenting, medical decision making, and review of reports, treatment plans, and test results.

## 2019-11-06 RX ORDER — DILTIAZEM HYDROCHLORIDE 120 MG/1
CAPSULE, EXTENDED RELEASE ORAL
Qty: 90 CAPSULE | Refills: 3 | Status: SHIPPED | OUTPATIENT
Start: 2019-11-06 | End: 2019-11-07 | Stop reason: ALTCHOICE

## 2019-11-07 ENCOUNTER — OFFICE VISIT (OUTPATIENT)
Dept: CARDIOLOGY | Age: 79
End: 2019-11-07
Payer: MEDICARE

## 2019-11-07 VITALS
HEIGHT: 67 IN | BODY MASS INDEX: 45.2 KG/M2 | SYSTOLIC BLOOD PRESSURE: 156 MMHG | DIASTOLIC BLOOD PRESSURE: 80 MMHG | HEART RATE: 47 BPM | WEIGHT: 288 LBS

## 2019-11-07 DIAGNOSIS — R00.1 BRADYCARDIA: ICD-10-CM

## 2019-11-07 DIAGNOSIS — I48.0 PAROXYSMAL ATRIAL FIBRILLATION (HCC): Primary | ICD-10-CM

## 2019-11-07 PROCEDURE — 1036F TOBACCO NON-USER: CPT | Performed by: INTERNAL MEDICINE

## 2019-11-07 PROCEDURE — G8484 FLU IMMUNIZE NO ADMIN: HCPCS | Performed by: INTERNAL MEDICINE

## 2019-11-07 PROCEDURE — G8399 PT W/DXA RESULTS DOCUMENT: HCPCS | Performed by: INTERNAL MEDICINE

## 2019-11-07 PROCEDURE — G8427 DOCREV CUR MEDS BY ELIG CLIN: HCPCS | Performed by: INTERNAL MEDICINE

## 2019-11-07 PROCEDURE — G8417 CALC BMI ABV UP PARAM F/U: HCPCS | Performed by: INTERNAL MEDICINE

## 2019-11-07 PROCEDURE — 1090F PRES/ABSN URINE INCON ASSESS: CPT | Performed by: INTERNAL MEDICINE

## 2019-11-07 PROCEDURE — 4040F PNEUMOC VAC/ADMIN/RCVD: CPT | Performed by: INTERNAL MEDICINE

## 2019-11-07 PROCEDURE — 1123F ACP DISCUSS/DSCN MKR DOCD: CPT | Performed by: INTERNAL MEDICINE

## 2019-11-07 PROCEDURE — 93000 ELECTROCARDIOGRAM COMPLETE: CPT | Performed by: INTERNAL MEDICINE

## 2019-11-07 PROCEDURE — 99214 OFFICE O/P EST MOD 30 MIN: CPT | Performed by: INTERNAL MEDICINE

## 2019-11-07 RX ORDER — AMLODIPINE BESYLATE 5 MG/1
5 TABLET ORAL DAILY
Qty: 30 TABLET | Refills: 5 | Status: SHIPPED | OUTPATIENT
Start: 2019-11-07 | End: 2020-05-04

## 2019-12-02 RX ORDER — LEVOTHYROXINE SODIUM 0.05 MG/1
TABLET ORAL
Qty: 30 TABLET | Refills: 5 | Status: SHIPPED | OUTPATIENT
Start: 2019-12-02 | End: 2020-06-02

## 2019-12-12 ENCOUNTER — OFFICE VISIT (OUTPATIENT)
Dept: CARDIOLOGY | Age: 79
End: 2019-12-12
Payer: MEDICARE

## 2019-12-12 VITALS
HEIGHT: 67 IN | DIASTOLIC BLOOD PRESSURE: 67 MMHG | SYSTOLIC BLOOD PRESSURE: 139 MMHG | BODY MASS INDEX: 45.2 KG/M2 | WEIGHT: 288 LBS | HEART RATE: 50 BPM

## 2019-12-12 DIAGNOSIS — I10 ESSENTIAL HYPERTENSION: ICD-10-CM

## 2019-12-12 DIAGNOSIS — R00.1 BRADYCARDIA: ICD-10-CM

## 2019-12-12 DIAGNOSIS — I48.0 PAROXYSMAL ATRIAL FIBRILLATION (HCC): Primary | ICD-10-CM

## 2019-12-12 PROCEDURE — 1036F TOBACCO NON-USER: CPT | Performed by: INTERNAL MEDICINE

## 2019-12-12 PROCEDURE — G8427 DOCREV CUR MEDS BY ELIG CLIN: HCPCS | Performed by: INTERNAL MEDICINE

## 2019-12-12 PROCEDURE — G8399 PT W/DXA RESULTS DOCUMENT: HCPCS | Performed by: INTERNAL MEDICINE

## 2019-12-12 PROCEDURE — 1123F ACP DISCUSS/DSCN MKR DOCD: CPT | Performed by: INTERNAL MEDICINE

## 2019-12-12 PROCEDURE — G8484 FLU IMMUNIZE NO ADMIN: HCPCS | Performed by: INTERNAL MEDICINE

## 2019-12-12 PROCEDURE — 99214 OFFICE O/P EST MOD 30 MIN: CPT | Performed by: INTERNAL MEDICINE

## 2019-12-12 PROCEDURE — 4040F PNEUMOC VAC/ADMIN/RCVD: CPT | Performed by: INTERNAL MEDICINE

## 2019-12-12 PROCEDURE — G8417 CALC BMI ABV UP PARAM F/U: HCPCS | Performed by: INTERNAL MEDICINE

## 2019-12-12 PROCEDURE — 1090F PRES/ABSN URINE INCON ASSESS: CPT | Performed by: INTERNAL MEDICINE

## 2020-01-16 ENCOUNTER — HOSPITAL ENCOUNTER (OUTPATIENT)
Dept: LAB | Age: 80
Discharge: HOME | End: 2020-01-16
Payer: MEDICARE

## 2020-01-16 LAB
ABSOLUTE EOS #: 0.25 K/UL (ref 0–0.44)
ABSOLUTE IMMATURE GRANULOCYTE: 0.03 K/UL (ref 0–0.3)
ABSOLUTE LYMPH #: 1.89 K/UL (ref 1.1–3.7)
ABSOLUTE MONO #: 0.69 K/UL (ref 0.1–1.2)
ALBUMIN SERPL-MCNC: 4.2 G/DL (ref 3.5–5.2)
ALBUMIN/GLOBULIN RATIO: 1.2 (ref 1–2.5)
ALP BLD-CCNC: 107 U/L (ref 35–104)
ALT SERPL-CCNC: 9 U/L (ref 5–33)
ANION GAP SERPL CALCULATED.3IONS-SCNC: 15 MMOL/L (ref 9–17)
AST SERPL-CCNC: 17 U/L
BASOPHILS # BLD: 1 % (ref 0–2)
BASOPHILS ABSOLUTE: 0.04 K/UL (ref 0–0.2)
BILIRUB SERPL-MCNC: 0.77 MG/DL (ref 0.3–1.2)
BUN BLDV-MCNC: 19 MG/DL (ref 8–23)
BUN/CREAT BLD: 24 (ref 9–20)
CALCIUM SERPL-MCNC: 10 MG/DL (ref 8.6–10.4)
CARCINOEMBRYONIC ANTIGEN: 5.1 NG/ML
CHLORIDE BLD-SCNC: 100 MMOL/L (ref 98–107)
CO2: 24 MMOL/L (ref 20–31)
CREAT SERPL-MCNC: 0.8 MG/DL (ref 0.5–0.9)
DIFFERENTIAL TYPE: ABNORMAL
EOSINOPHILS RELATIVE PERCENT: 3 % (ref 1–4)
GFR AFRICAN AMERICAN: >60 ML/MIN
GFR NON-AFRICAN AMERICAN: >60 ML/MIN
GFR SERPL CREATININE-BSD FRML MDRD: ABNORMAL ML/MIN/{1.73_M2}
GFR SERPL CREATININE-BSD FRML MDRD: ABNORMAL ML/MIN/{1.73_M2}
GLUCOSE BLD-MCNC: 103 MG/DL (ref 70–99)
HCT VFR BLD CALC: 45.6 % (ref 36.3–47.1)
HEMOGLOBIN: 14.2 G/DL (ref 11.9–15.1)
IMMATURE GRANULOCYTES: 0 %
LYMPHOCYTES # BLD: 25 % (ref 24–43)
MCH RBC QN AUTO: 30.9 PG (ref 25.2–33.5)
MCHC RBC AUTO-ENTMCNC: 31.1 G/DL (ref 25.2–33.5)
MCV RBC AUTO: 99.1 FL (ref 82.6–102.9)
MONOCYTES # BLD: 9 % (ref 3–12)
NRBC AUTOMATED: 0 PER 100 WBC
PDW BLD-RTO: 14.2 % (ref 11.8–14.4)
PLATELET # BLD: 126 K/UL (ref 138–453)
PLATELET ESTIMATE: ABNORMAL
PMV BLD AUTO: 14.9 FL (ref 8.1–13.5)
POTASSIUM SERPL-SCNC: 3.8 MMOL/L (ref 3.7–5.3)
RBC # BLD: 4.6 M/UL (ref 3.95–5.11)
RBC # BLD: ABNORMAL 10*6/UL
SEG NEUTROPHILS: 61 % (ref 36–65)
SEGMENTED NEUTROPHILS ABSOLUTE COUNT: 4.59 K/UL (ref 1.5–8.1)
SODIUM BLD-SCNC: 139 MMOL/L (ref 135–144)
TOTAL PROTEIN: 7.8 G/DL (ref 6.4–8.3)
WBC # BLD: 7.5 K/UL (ref 3.5–11.3)
WBC # BLD: ABNORMAL 10*3/UL

## 2020-01-16 PROCEDURE — 36415 COLL VENOUS BLD VENIPUNCTURE: CPT

## 2020-01-16 PROCEDURE — 80053 COMPREHEN METABOLIC PANEL: CPT

## 2020-01-16 PROCEDURE — 82378 CARCINOEMBRYONIC ANTIGEN: CPT

## 2020-01-16 PROCEDURE — 85025 COMPLETE CBC W/AUTO DIFF WBC: CPT

## 2020-01-23 NOTE — TELEPHONE ENCOUNTER
Alonso Magallanes called requesting a refill of the below medication which has been pended for you:     Requested Prescriptions     Pending Prescriptions Disp Refills    metoprolol tartrate (LOPRESSOR) 25 MG tablet [Pharmacy Med Name: METOPROLOL TARTRATE 25 MG TAB] 60 tablet 4     Sig: TAKE ONE TABLET BY MOUTH TWICE A DAY       Last Appointment Date: 8/26/2019  Next Appointment Date: 2/28/2020    Allergies   Allergen Reactions    Pcn [Penicillins] Hives and Shortness Of Breath    Bextra [Valdecoxib] Diarrhea

## 2020-01-27 ENCOUNTER — TELEPHONE (OUTPATIENT)
Dept: SURGERY | Age: 80
End: 2020-01-27

## 2020-01-27 ENCOUNTER — OFFICE VISIT (OUTPATIENT)
Dept: ONCOLOGY | Age: 80
End: 2020-01-27
Payer: MEDICARE

## 2020-01-27 VITALS
SYSTOLIC BLOOD PRESSURE: 124 MMHG | TEMPERATURE: 96.2 F | HEIGHT: 67 IN | OXYGEN SATURATION: 99 % | WEIGHT: 290.6 LBS | DIASTOLIC BLOOD PRESSURE: 80 MMHG | BODY MASS INDEX: 45.61 KG/M2 | HEART RATE: 46 BPM

## 2020-01-27 PROCEDURE — 99214 OFFICE O/P EST MOD 30 MIN: CPT

## 2020-01-27 PROCEDURE — G8417 CALC BMI ABV UP PARAM F/U: HCPCS | Performed by: INTERNAL MEDICINE

## 2020-01-27 PROCEDURE — 99214 OFFICE O/P EST MOD 30 MIN: CPT | Performed by: INTERNAL MEDICINE

## 2020-01-27 PROCEDURE — G8484 FLU IMMUNIZE NO ADMIN: HCPCS | Performed by: INTERNAL MEDICINE

## 2020-01-27 PROCEDURE — G8427 DOCREV CUR MEDS BY ELIG CLIN: HCPCS | Performed by: INTERNAL MEDICINE

## 2020-01-27 PROCEDURE — 1123F ACP DISCUSS/DSCN MKR DOCD: CPT | Performed by: INTERNAL MEDICINE

## 2020-01-27 PROCEDURE — 4040F PNEUMOC VAC/ADMIN/RCVD: CPT | Performed by: INTERNAL MEDICINE

## 2020-01-27 PROCEDURE — 1036F TOBACCO NON-USER: CPT | Performed by: INTERNAL MEDICINE

## 2020-01-27 PROCEDURE — G8399 PT W/DXA RESULTS DOCUMENT: HCPCS | Performed by: INTERNAL MEDICINE

## 2020-01-27 PROCEDURE — 1090F PRES/ABSN URINE INCON ASSESS: CPT | Performed by: INTERNAL MEDICINE

## 2020-01-27 RX ORDER — POLYETHYLENE GLYCOL 3350, SODIUM CHLORIDE, SODIUM BICARBONATE, POTASSIUM CHLORIDE 420; 11.2; 5.72; 1.48 G/4L; G/4L; G/4L; G/4L
POWDER, FOR SOLUTION ORAL
Qty: 1 BOTTLE | Refills: 0 | Status: ON HOLD | OUTPATIENT
Start: 2020-01-27 | End: 2020-02-03

## 2020-01-27 NOTE — TELEPHONE ENCOUNTER
H &P Colonoscopy  Patient: Lorenzo Mancilla                 :1940  (yes) patient has seen Dr. Devendra Ramos prior to procedure  PCP: Dr. Carmencita Gracia of colon cancer, elevated CEA        HPI:        Colonoscopy  Abd pain: no  Anemia: no  Bloating:no  Diarrhea: no  Constipation: no  Melena: no  Hematochezia:no  Rectal Bleeding:no  Rectal/Anal Pain:no  Pruritus: no  Family history colon Cancer: no  Previous colon cancer: yes  Previous Colon Polyp: yes  Change in bowels: no  Decrease caliber of stool: no  Change in color of stool: no    Previous work up date: 19 Ileostomy takedown, 19 Sigmoidoscopy, 10/9/18 [facility] (Dr. Reyna Fuchs)      Family History   Adopted: Yes   Problem Relation Age of Onset    High Blood Pressure Brother         adopted brother     Social History     Socioeconomic History    Marital status:       Spouse name: Not on file    Number of children: Not on file    Years of education: Not on file    Highest education level: Not on file   Occupational History    Not on file   Social Needs    Financial resource strain: Not on file    Food insecurity:     Worry: Not on file     Inability: Not on file    Transportation needs:     Medical: Not on file     Non-medical: Not on file   Tobacco Use    Smoking status: Never Smoker    Smokeless tobacco: Never Used    Tobacco comment: hernando 11/10/2018   Substance and Sexual Activity    Alcohol use: No    Drug use: No    Sexual activity: Not on file   Lifestyle    Physical activity:     Days per week: Not on file     Minutes per session: Not on file    Stress: Not on file   Relationships    Social connections:     Talks on phone: Not on file     Gets together: Not on file     Attends [employer] service: Not on file     Active member of club or organization: Not on file     Attends meetings of clubs or organizations: Not on file     Relationship status: Not on file    Intimate partner violence:     Fear of current or ex partner: Not on
Phone call placed to patient. Oncology requesting colonoscopy ASAP due to patient has elevated CEA. Instructions given. Bowel prep ordered. Patient verbalizes understanding.
[facility]

## 2020-01-27 NOTE — PROGRESS NOTES
Patient ID: Ruiz Best, 1940, [id number], [de-identified] y.o. Referred by : Vidya Riley MD  Diagnosis:   Diagnosis of colon cancer, 11/5/18    low anterior resection of the rectosigmoid with proximal diverting ileostomy. Final pathology showed T1 N0, stage I disease. HISTORY OF PRESENT ILLNESS:    Oncologic History: This is a 76-year-old female with new diagnosis of colon cancer was seen during initial consultation visit. She presented with rectal bleeding going on for about 4-6 weeks. She had a colonoscopy on 10/8/18 which showed multiple polyps and large distal sigmoid tumors with extensive diverticulosis. Biopsy showed invasive low-grade adenocarcinoma arising in a large tubulovillous adenoma with extensive high-grade dysplasia. Subsequently she had open low anterior resection of the rectosigmoid with proximal dilating ileostomy. Final pathology showed T1 N0, stage I disease. Now she is recovering well from surgery. She denied any pain, nausea vomiting. She does not have family history of colon cancer. She is adopted and does not know about her family history. Interval history:  Patient is returning for follow-up visit and to discuss the results of lab work,    denies any abdominal pain nausea vomiting. She denies any diarrhea. She did not have 1 ER follow-up colonoscopy yet. During this visit patient's allergy, social, medical, surgical history and medications were reviewed and updated.     Allergies   Allergen Reactions    Pcn [Penicillins] Hives and Shortness Of Breath    Bextra [Valdecoxib] Diarrhea     Current Outpatient Medications   Medication Sig Dispense Refill    metoprolol tartrate (LOPRESSOR) 25 MG tablet TAKE ONE TABLET BY MOUTH TWICE A DAY 60 tablet 5    levothyroxine (SYNTHROID) 50 MCG tablet TAKE 1 TABLET BY MOUTH ONE TIME A DAY  30 tablet 5    amLODIPine (NORVASC) 5 MG tablet Take 1 tablet by mouth daily 30 tablet 5    traZODone (DESYREL) 50 MG tablet Take 1 tablet by mouth nightly 30 tablet 5    furosemide (LASIX) 20 MG tablet Take 1 tablet by mouth daily 30 tablet 5    atorvastatin (LIPITOR) 40 MG tablet TAKE 1 TABLET BY MOUTH ONE TIME NIGHTLY 30 tablet 5    amiodarone (CORDARONE) 200 MG tablet TAKE 1 TABLET BY MOUTH ONE TIME A DAY 90 tablet 3    acetaminophen (TYLENOL) 325 MG tablet Take 2 tablets by mouth every 4 hours as needed for Pain or Fever 60 tablet 0    potassium chloride (KLOR-CON M) 20 MEQ extended release tablet Take 1 tablet by mouth daily 30 tablet 5    diclofenac (VOLTAREN) 75 MG EC tablet Take 1 tablet by mouth 2 times daily as needed for Pain (Patient not taking: Reported on 1/27/2020) 60 tablet 2     No current facility-administered medications for this visit. REVIEW OF SYSTEM:   Constitutional: No fever or chills. No night sweats, no weight loss   Eyes: No eye discharge, double vision, or eye pain   HEENT: negative for sore mouth, sore throat, hoarseness and voice change   Respiratory: negative for cough , sputum, dyspnea, wheezing, hemoptysis, chest pain   Cardiovascular: negative for chest pain, dyspnea, palpitations, orthopnea, PND   Gastrointestinal: negative for nausea, vomiting, diarrhea, constipation, abdominal pain, Dysphagia, hematemesis and hematochezia   Genitourinary: negative for frequency, dysuria, nocturia, urinary incontinence, and hematuria   Integument: negative for rash, skin lesions, bruises.    Hematologic/Lymphatic: negative for easy bruising, bleeding, lymphadenopathy, petechiae and swelling/edema   Endocrine: negative for heat or cold intolerance, tremor, weight changes, change in bowel habits and hair loss   Musculoskeletal: negative for myalgias, arthralgias, pain, joint swelling,and bone pain   Neurological: negative for headaches, dizziness, seizures, weakness, numbness   OBJECTIVE:         Vitals:    01/27/20 1110   BP: 124/80   Pulse: (!) 46   Temp: 96.2 °F (35.7 °C)   SpO2: 99%   PHYSICAL EXAM:   General appearance - well appearing, no in pain or distress   Mental status - alert and cooperative   Eyes - pupils equal and reactive, extraocular eye movements intact   Ears - bilateral TM's and external ear canals normal   Mouth - mucous membranes moist, pharynx normal without lesions   Neck - supple, no significant adenopathy   Lymphatics - no palpable lymphadenopathy, no hepatosplenomegaly   Chest - clear to auscultation, no wheezes, rales or rhonchi, symmetric air entry   Heart - normal rate, regular rhythm, normal S1, S2, no murmurs, rubs, clicks or gallops   Abdomen - Surgical scar healing well, soft, nontender, nondistended, no masses or organomegaly   Neurological - alert, oriented, normal speech, no focal findings or movement disorder noted   Musculoskeletal - no joint tenderness, deformity or swelling   Extremities - peripheral pulses normal, no pedal edema, no clubbing or cyanosis   Skin - normal coloration and turgor, no rashes, no suspicious skin lesions noted ,  LABORATORY DATA:     Lab Results   Component Value Date    WBC 7.5 01/16/2020    HGB 14.2 01/16/2020    HCT 45.6 01/16/2020    MCV 99.1 01/16/2020     (L) 01/16/2020    LYMPHOPCT 25 01/16/2020    RBC 4.60 01/16/2020    MCH 30.9 01/16/2020    MCHC 31.1 01/16/2020    RDW 14.2 01/16/2020    MONOPCT 9 01/16/2020    BASOPCT 1 01/16/2020    NEUTROABS 4.59 01/16/2020    LYMPHSABS 1.89 01/16/2020    MONOSABS 0.69 01/16/2020    EOSABS 0.25 01/16/2020    BASOSABS 0.04 01/16/2020         Chemistry        Component Value Date/Time     01/16/2020 1113    K 3.8 01/16/2020 1113     01/16/2020 1113    CO2 24 01/16/2020 1113    BUN 19 01/16/2020 1113    CREATININE 0.80 01/16/2020 1113        Component Value Date/Time    CALCIUM 10.0 01/16/2020 1113    ALKPHOS 107 (H) 01/16/2020 1113    AST 17 01/16/2020 1113    ALT 9 01/16/2020 1113    BILITOT 0.77 01/16/2020 1113        PATHOLOGY DATA:     -- Diagnosis --   1.  SIGMOID COLON, SEGMENTAL RESECTION:   - LOW-GRADE ADENOCARCINOMA ARISING IN A LARGE SESSILE TUBULOVILLOUS   ADENOMA, INVADING INTO THE SUBMUCOSA.   - THE FINAL SURGICAL MARGINS ARE NEGATIVE FOR NEOPLASM. - BENIGN REGIONAL LYMPH NODES (0/14). 2.  SIGMOID COLON, DISTAL MARGIN, RESECTION:   - UNREMARKABLE COLON SEGMENT.   - SINGLE BENIGN LYMPH NODE (0/1). PATHOLOGIC STAGE CLASSIFICATION:     pT1  pN0   ADDENDUM DIAGNOSIS   AT THE REQUEST OF DR. Wil Dominguez, BLOCK 14B WAS SENT TO [facility]   FOR MMR TESTING.  THE RESULTS ARE AS FOLLOWS:     MISMATCH REPAIR BY IHC RESULT:  NORMAL   MISMATCH REPAIR BY IHC WITH MLH1:  NORMAL   MISMATCH REPAIR BY IHC WITH MSH2:  NORMAL   MISMATCH REPAIR BY IHC WITH MSH6:  NORMAL   MISMATCH REPAIR BY IHC WITH PMS2:  NORMAL   IMAGING DATA:    CT abdomen pelvis 11/10/18  Impression   Dilated stomach and loops of bowel, possibly reflecting postoperative ileus.       Trace perihepatic and mild pelvic ascites.       Redemonstration of subcutaneous mass along the right buttock. CT abdomen pelvis 2/26/19:  Improved postoperative findings status post partial colon resection and   ileostomy.  No acute abnormality identified.       Stable chronic findings, as above. ASSESSMENT:    [facility] has T1NO, stage I colon adenocarcinoma, MMR proficient. I discussed the NCCN guidelines. I discussed that for stage I colon cancer, there is no role of adjuvant chemotherapy, so surveillance is recommended. I will follow her in three months with CEA, CBC and CMP. She will need colonoscopy in one year. I discussed the results of recent lab work and CT scans which showed no evidence of recurrence. Clinically she is doing well. She is planning to have follow up with Gen. surgery in May and possible colonoscopy for planning reversal colostomy. During today's visit, the patient and the family had a number of reasonable questions which were answered to their satisfaction.   They verbalized understanding of the information provided and they agreed to proceed as outlined above. PLAN:   Continue surveillance  Will repeat lab work in 3 months and plan for CT scan in 6 months  She will see Gen. surgery in May for discussion about colostomy reversal  RTC 3 months with labs     I spent more than 25 minutes examining, evaluating, reviewing data and counseling the patient. Greater than 50% of that time was spent face-to-face with the patient in counseling and coordinating her care. Rory Bee MD  Hematologist/Medical Oncologist          This note is created with the assistance of a speech recognition program.  While intending to generate a document that actually reflects the content of the visit, the document can still have some errors including those of syntax and sound a like substitutions which may escape proof reading. It such instances, actual meaning can be extrapolated by contextual diversion. Patient ID: Chrissie Krishnamurthy, 1940, [id number], [de-identified] y.o. Referred by : Kylee Berkowitz MD  Reason for consultation:   Diagnosis of colon cancer  HISTORY OF PRESENT ILLNESS:    Oncologic History: This is a 76-year-old female with new diagnosis of colon cancer was seen during initial consultation visit. She presented with rectal bleeding going on for about 4-6 weeks. She had a colonoscopy on 10/8/18 which showed multiple polyps and large distal sigmoid tumors with extensive diverticulosis. Biopsy showed invasive low-grade adenocarcinoma arising in a large tubulovillous adenoma with extensive high-grade dysplasia. Subsequently she had open low anterior resection of the rectosigmoid with proximal dilating ileostomy. Final pathology showed T1 N0, stage I disease. Now she is recovering well from surgery. She denied any pain, nausea vomiting. She does not have family history of colon cancer. She is adopted and does not know about her family history.     Past Medical History:   Diagnosis Date    [facility]-fib [facility]) 07/19/2017    [facility]est[facility] [address]     Dyslipidemia     FH: total abdominal hysterectomy and bilateral salpingo-oophorectomy 1966    Glucose intolerance (impaired glucose tolerance)     Hypertension     Malignant neoplasm of sigmoid colon ([facility] [address]     Obesity     Osteoarthritis        Past Surgical History:   Procedure Laterality Date    CARDIAC CATHETERIZATION  09/05/2017    CHOLECYSTECTOMY  1960s    COLONOSCOPY N/A 10/8/2018    COLONOSCOPY WITH COLD ET HOT BIOPSIES ET POLYPECTOMIES performed by Roselia Finley MD at [address] [facility] N/A 4/25/2019    COLONOSCOPY performed by Elena Rockwell MD at [address] [facility]  stricture at 7 cm     HIP ARTHROPLASTY Left     KNEE ARTHROPLASTY Left     KNEE ARTHROPLASTY Right     CT CYSTOSCOPY,INSERT URETERAL STENT Bilateral 11/5/2018    CYSTO Bilateral Lighted stent placements performed by Charyl Riedel, MD at [address] [facility], PARTIAL, W/ANAST N/A 11/5/2018    Open Sigmoid Colectomy w/ lighted stents ILEOSTOMY PLACEMENT performed by Roselia Finley MD at [address] [facility] N/A 5/9/2019    Flexible Sigmoidoscopy with Dilatation of rectal stricture performed by Elena Rockwell MD at [address] [facility] N/A 5/22/2019    Loop Ileostomy take down performed by Elena Rockwell MD at [address] [place] [address] Drive Right [facility]       Allergies   Allergen Reactions    Pcn [Penicillins] Hives and Shortness Of Breath    Bextra [Valdecoxib] Diarrhea       Current Outpatient Medications   Medication Sig Dispense Refill    metoprolol tartrate (LOPRESSOR) 25 MG tablet TAKE ONE TABLET BY MOUTH TWICE A DAY 60 tablet 5    levothyroxine (SYNTHROID) 50 MCG tablet TAKE 1 TABLET BY MOUTH ONE TIME A DAY  30 tablet 5    amLODIPine (NORVASC) 5 MG tablet Take 1 tablet by mouth daily 30 tablet 5    traZODone (DESYREL) 50 MG tablet Take 1 tablet by mouth nightly 30 tablet 5    furosemide (LASIX) 20 MG tablet Take 1 tablet by mouth daily 30 tablet 5    atorvastatin (LIPITOR) 40 MG tablet TAKE 1 TABLET BY MOUTH ONE TIME NIGHTLY 30 tablet 5    amiodarone (CORDARONE) 200 MG tablet TAKE 1 TABLET BY MOUTH ONE TIME A DAY 90 tablet 3    acetaminophen (TYLENOL) 325 MG tablet Take 2 tablets by mouth every 4 hours as needed for Pain or Fever 60 tablet 0    potassium chloride (KLOR-CON M) 20 MEQ extended release tablet Take 1 tablet by mouth daily 30 tablet 5    diclofenac (VOLTAREN) 75 MG EC tablet Take 1 tablet by mouth 2 times daily as needed for Pain (Patient not taking: Reported on 1/27/2020) 60 tablet 2     No current facility-administered medications for this visit. Social History     Socioeconomic History    Marital status:       Spouse name: Not on file    Number of children: Not on file    Years of education: Not on file    Highest education level: Not on file   Occupational History    Not on file   Social Needs    Financial resource strain: Not on file    Food insecurity:     Worry: Not on file     Inability: Not on file    Transportation needs:     Medical: Not on file     Non-medical: Not on file   Tobacco Use    Smoking status: Never Smoker    Smokeless tobacco: Never Used    Tobacco comment: hernando 11/10/2018   Substance and Sexual Activity    Alcohol use: No    Drug use: No    Sexual activity: Not on file   Lifestyle    Physical activity:     Days per week: Not on file     Minutes per session: Not on file    Stress: Not on file   Relationships    Social connections:     Talks on phone: Not on file     Gets together: Not on file     Attends Latter-[employer] service: Not on file     Active member of club or organization: Not on file     Attends meetings of clubs or organizations: Not on file     Relationship status: Not on file    Intimate partner violence:     Fear of current or ex partner: Not on file     Emotionally abused: Not on file     Physically abused: Not on file     Forced sexual activity: Not on file rubs, clicks or gallops   Abdomen - Surgical scar healing well, soft, nontender, nondistended, no masses or organomegaly   Neurological - alert, oriented, normal speech, no focal findings or movement disorder noted   Musculoskeletal - no joint tenderness, deformity or swelling   Extremities - peripheral pulses normal, no pedal edema, no clubbing or cyanosis   Skin - normal coloration and turgor, no rashes, no suspicious skin lesions noted ,  LABORATORY DATA:     Lab Results   Component Value Date    WBC 7.5 01/16/2020    HGB 14.2 01/16/2020    HCT 45.6 01/16/2020    MCV 99.1 01/16/2020     (L) 01/16/2020    LYMPHOPCT 25 01/16/2020    RBC 4.60 01/16/2020    MCH 30.9 01/16/2020    MCHC 31.1 01/16/2020    RDW 14.2 01/16/2020    MONOPCT 9 01/16/2020    BASOPCT 1 01/16/2020    NEUTROABS 4.59 01/16/2020    LYMPHSABS 1.89 01/16/2020    MONOSABS 0.69 01/16/2020    EOSABS 0.25 01/16/2020    BASOSABS 0.04 01/16/2020         Chemistry        Component Value Date/Time     01/16/2020 1113    K 3.8 01/16/2020 1113     01/16/2020 1113    CO2 24 01/16/2020 1113    BUN 19 01/16/2020 1113    CREATININE 0.80 01/16/2020 1113        Component Value Date/Time    CALCIUM 10.0 01/16/2020 1113    ALKPHOS 107 (H) 01/16/2020 1113    AST 17 01/16/2020 1113    ALT 9 01/16/2020 1113    BILITOT 0.77 01/16/2020 1113        PATHOLOGY DATA:     -- Diagnosis --   1.  SIGMOID COLON, SEGMENTAL RESECTION:   - LOW-GRADE ADENOCARCINOMA ARISING IN A LARGE SESSILE TUBULOVILLOUS   ADENOMA, INVADING INTO THE SUBMUCOSA.   - THE FINAL SURGICAL MARGINS ARE NEGATIVE FOR NEOPLASM. - BENIGN REGIONAL LYMPH NODES (0/14). 2.  SIGMOID COLON, DISTAL MARGIN, RESECTION:   - UNREMARKABLE COLON SEGMENT.   - SINGLE BENIGN LYMPH NODE (0/1).      PATHOLOGIC STAGE CLASSIFICATION:     pT1  pN0   ADDENDUM DIAGNOSIS   AT THE REQUEST OF DR. Tashi Berumen, BLOCK 14B WAS SENT TO [facility]   FOR MMR TESTING.  THE RESULTS ARE AS FOLLOWS:     MISMATCH REPAIR BY IHC RESULT:  NORMAL   MISMATCH REPAIR BY IHC WITH MLH1:  NORMAL   MISMATCH REPAIR BY IHC WITH MSH2:  NORMAL   MISMATCH REPAIR BY IHC WITH MSH6:  NORMAL   MISMATCH REPAIR BY IHC WITH PMS2:  NORMAL   IMAGING DATA:    CT abdomen pelvis 11/10/18  Impression   Dilated stomach and loops of bowel, possibly reflecting postoperative ileus.       Trace perihepatic and mild pelvic ascites.       Redemonstration of subcutaneous mass along the right buttock. CT abd 5/31/19  Mild diffuse colonic wall thickening may be related to recent surgery versus   a colitis.  No bowel obstruction.       Incompletely characterized right renal lesion may be a complex cyst but has   increased over multiple prior studies.  Recommend follow-up with MRI with   contrast.     ASSESSMENT:    Shaina Sears has T1NO, stage I colon adenocarcinoma, MMR proficient. I discussed the NCCN guidelines. I discussed that for stage I colon cancer, there is no role of adjuvant chemotherapy, so surveillance is recommended. I will follow her in three months with CEA, CBC and CMP. She will need colonoscopy in one year after her surgery. Slight rise in her CEA level. C diff infection: recovering. During today's visit, the patient and the family had a number of reasonable questions which were answered to their satisfaction. They verbalized understanding of the information provided and they agreed to proceed as outlined above. PLAN:   No indication of adjuvant chemotherapy  Discussed recent labs. Her CEA has slightly increased from 3.0-5.1. Will get CT scans  Will refer her to surgery for annual colonoscopy  Return to clinic after scan      Royr Felder MD  Hematologist/Medical Oncologist          This note is created with the assistance of a speech recognition program.  While intending to generate a document that actually reflects the content of the visit, the document can still have some errors including those of syntax and sound a like substitutions which may escape proof reading. It such instances, actual meaning can be extrapolated by contextual diversion.

## 2020-01-31 RX ORDER — ATORVASTATIN CALCIUM 40 MG/1
TABLET, FILM COATED ORAL
Qty: 30 TABLET | Refills: 0 | Status: SHIPPED | OUTPATIENT
Start: 2020-01-31 | End: 2020-03-02

## 2020-01-31 NOTE — TELEPHONE ENCOUNTER
Jordyn Cardozo called requesting a refill of the below medication which has been pended for you:     Requested Prescriptions     Pending Prescriptions Disp Refills    atorvastatin (LIPITOR) 40 MG tablet [Pharmacy Med Name: Atorvastatin Calcium Oral Tablet 40 MG] 30 tablet 4     Sig: TAKE 1 TABLET BY MOUTH nightly       Last Appointment Date: 8/26/2019  Next Appointment Date: 2/28/2020    Allergies   Allergen Reactions    Pcn [Penicillins] Hives and Shortness Of Breath    Bextra [Valdecoxib] Diarrhea

## 2020-02-03 ENCOUNTER — ANESTHESIA (OUTPATIENT)
Dept: OPERATING ROOM | Age: 80
End: 2020-02-03
Payer: MEDICARE

## 2020-02-03 ENCOUNTER — ANESTHESIA EVENT (OUTPATIENT)
Dept: OPERATING ROOM | Age: 80
End: 2020-02-03
Payer: MEDICARE

## 2020-02-03 ENCOUNTER — HOSPITAL ENCOUNTER (OUTPATIENT)
Age: 80
Setting detail: OUTPATIENT SURGERY
Discharge: HOME | End: 2020-02-03
Attending: SURGERY | Admitting: SURGERY
Payer: MEDICARE

## 2020-02-03 VITALS
BODY MASS INDEX: 44.48 KG/M2 | DIASTOLIC BLOOD PRESSURE: 66 MMHG | RESPIRATION RATE: 16 BRPM | TEMPERATURE: 97.9 F | WEIGHT: 283.4 LBS | HEART RATE: 46 BPM | OXYGEN SATURATION: 96 % | SYSTOLIC BLOOD PRESSURE: 151 MMHG | HEIGHT: 67 IN

## 2020-02-03 VITALS — SYSTOLIC BLOOD PRESSURE: 132 MMHG | DIASTOLIC BLOOD PRESSURE: 60 MMHG | OXYGEN SATURATION: 99 %

## 2020-02-03 PROCEDURE — 88305 TISSUE EXAM BY PATHOLOGIST: CPT

## 2020-02-03 PROCEDURE — 2580000003 HC RX 258: Performed by: SURGERY

## 2020-02-03 PROCEDURE — 3609010300 HC COLONOSCOPY W/BIOPSY SINGLE/MULTIPLE: Performed by: SURGERY

## 2020-02-03 PROCEDURE — 7100000011 HC PHASE II RECOVERY - ADDTL 15 MIN: Performed by: SURGERY

## 2020-02-03 PROCEDURE — 3700000000 HC ANESTHESIA ATTENDED CARE: Performed by: SURGERY

## 2020-02-03 PROCEDURE — 7100000010 HC PHASE II RECOVERY - FIRST 15 MIN: Performed by: SURGERY

## 2020-02-03 PROCEDURE — 2709999900 HC NON-CHARGEABLE SUPPLY: Performed by: SURGERY

## 2020-02-03 PROCEDURE — 45380 COLONOSCOPY AND BIOPSY: CPT | Performed by: SURGERY

## 2020-02-03 PROCEDURE — 3700000001 HC ADD 15 MINUTES (ANESTHESIA): Performed by: SURGERY

## 2020-02-03 PROCEDURE — 6360000002 HC RX W HCPCS: Performed by: NURSE ANESTHETIST, CERTIFIED REGISTERED

## 2020-02-03 PROCEDURE — 2500000003 HC RX 250 WO HCPCS: Performed by: NURSE ANESTHETIST, CERTIFIED REGISTERED

## 2020-02-03 RX ORDER — SODIUM CHLORIDE, SODIUM LACTATE, POTASSIUM CHLORIDE, CALCIUM CHLORIDE 600; 310; 30; 20 MG/100ML; MG/100ML; MG/100ML; MG/100ML
INJECTION, SOLUTION INTRAVENOUS CONTINUOUS
Status: DISCONTINUED | OUTPATIENT
Start: 2020-02-03 | End: 2020-02-03 | Stop reason: HOSPADM

## 2020-02-03 RX ORDER — LIDOCAINE HYDROCHLORIDE 40 MG/ML
INJECTION, SOLUTION RETROBULBAR; TOPICAL PRN
Status: DISCONTINUED | OUTPATIENT
Start: 2020-02-03 | End: 2020-02-03 | Stop reason: SDUPTHER

## 2020-02-03 RX ORDER — PROPOFOL 10 MG/ML
INJECTION, EMULSION INTRAVENOUS PRN
Status: DISCONTINUED | OUTPATIENT
Start: 2020-02-03 | End: 2020-02-03 | Stop reason: SDUPTHER

## 2020-02-03 RX ADMIN — LIDOCAINE HYDROCHLORIDE 100 MG: 40 INJECTION, SOLUTION RETROBULBAR; TOPICAL at 08:58

## 2020-02-03 RX ADMIN — SODIUM CHLORIDE, POTASSIUM CHLORIDE, SODIUM LACTATE AND CALCIUM CHLORIDE: 600; 310; 30; 20 INJECTION, SOLUTION INTRAVENOUS at 08:26

## 2020-02-03 RX ADMIN — PROPOFOL 200 MG: 10 INJECTION, EMULSION INTRAVENOUS at 08:58

## 2020-02-03 RX ADMIN — SODIUM CHLORIDE, POTASSIUM CHLORIDE, SODIUM LACTATE AND CALCIUM CHLORIDE: 600; 310; 30; 20 INJECTION, SOLUTION INTRAVENOUS at 08:18

## 2020-02-03 ASSESSMENT — PAIN SCALES - GENERAL
PAINLEVEL_OUTOF10: 0

## 2020-02-03 ASSESSMENT — PAIN - FUNCTIONAL ASSESSMENT: PAIN_FUNCTIONAL_ASSESSMENT: 0-10

## 2020-02-03 ASSESSMENT — PAIN DESCRIPTION - DESCRIPTORS: DESCRIPTORS: ACHING

## 2020-02-03 NOTE — H&P
H &P Colonoscopy  Patient: Carol Vega                 :1940  (yes) patient has seen Dr. Olaf Casanova prior to procedure  PCP: Dr. Cecily Brittle of colon cancer, elevated CEA           HPI:           Colonoscopy  Abd pain: no  Anemia: no  Bloating:no  Diarrhea: no  Constipation: no  Melena: no  Hematochezia:no  Rectal Bleeding:no  Rectal/Anal Pain:no  Pruritus: no  Family history colon Cancer: no  Previous colon cancer: yes  Previous Colon Polyp: yes  Change in bowels: no  Decrease caliber of stool: no  Change in color of stool: no     Previous work up date: 19 Ileostomy takedown, 19 Sigmoidoscopy, 10/9/18 LAR (Dr. Mildred Sterling)        Family History         Family History   Adopted: Yes   Problem Relation Age of Onset    High Blood Pressure Brother           adopted brother         Social History               Socioeconomic History    Marital status:         Spouse name: Not on file    Number of children: Not on file    Years of education: Not on file    Highest education level: Not on file   Occupational History    Not on file   Social Needs    Financial resource strain: Not on file    Food insecurity:       Worry: Not on file       Inability: Not on file    Transportation needs:       Medical: Not on file       Non-medical: Not on file   Tobacco Use    Smoking status: Never Smoker    Smokeless tobacco: Never Used    Tobacco comment: hernando 11/10/2018   Substance and Sexual Activity    Alcohol use: No    Drug use: No    Sexual activity: Not on file   Lifestyle    Physical activity:       Days per week: Not on file       Minutes per session: Not on file    Stress: Not on file   Relationships    Social connections:       Talks on phone: Not on file       Gets together: Not on file       Attends Hoahaoism service: Not on file       Active member of club or organization: Not on file       Attends meetings of clubs or organizations: Not on file       Relationship status: Not on file    Intimate partner violence:       Fear of current or ex partner: Not on file       Emotionally abused: Not on file       Physically abused: Not on file       Forced sexual activity: Not on file   Other Topics Concern    Not on file   Social History Narrative    Not on file         Past Surgical History         Past Surgical History:   Procedure Laterality Date    CARDIAC CATHETERIZATION   09/05/2017    CHOLECYSTECTOMY   1960s    COLONOSCOPY N/A 10/8/2018     COLONOSCOPY WITH COLD ET HOT BIOPSIES ET POLYPECTOMIES performed by Gerhard Espinoza MD at [address] N/A 4/25/2019     COLONOSCOPY performed by Lynnea Severe, MD at [facility]  stricture at 7 cm     HIP ARTHROPLASTY Left      KNEE ARTHROPLASTY Left      KNEE ARTHROPLASTY Right      AR CYSTOSCOPY,INSERT URETERAL STENT Bilateral 11/5/2018     CYSTO Bilateral Lighted stent placements performed by Jimbo Hurley MD at [address], PARTIAL, W/ANAST N/A 11/5/2018     Open Sigmoid Colectomy w/ lighted stents ILEOSTOMY PLACEMENT performed by Gerhard Espinoza MD at [facility] N/A 5/9/2019     Flexible Sigmoidoscopy with Dilatation of rectal stricture performed by Lynnea Severe, MD at [address] N/A 5/22/2019     Loop Ileostomy take down performed by Lynnea Severe, MD at [address] [facility] Right 1960s         Past Medical History        Past Medical History:   Diagnosis Date    A-fib [facility]) 07/19/2017    [facility]estive [facility] failure) [facility] [address]      Dyslipidemia      FH: total abdominal hysterectomy and bilateral salpingo-oophorectomy 1966    Glucose intolerance (impaired glucose tolerance)      Hypertension      Malignant neoplasm of sigmoid colon ([facility] [address]      Obesity      Osteoarthritis                  Current Outpatient Medications on File Prior to Visit   Medication Sig Dispense Refill    metoprolol tartrate (LOPRESSOR) 25 MG tablet TAKE ONE TABLET BY MOUTH TWICE A DAY 60 tablet 5    levothyroxine (SYNTHROID) 50 MCG tablet TAKE 1 TABLET BY MOUTH ONE TIME A DAY  30 tablet 5    amLODIPine (NORVASC) 5 MG tablet Take 1 tablet by mouth daily 30 tablet 5    traZODone (DESYREL) 50 MG tablet Take 1 tablet by mouth nightly 30 tablet 5    furosemide (LASIX) 20 MG tablet Take 1 tablet by mouth daily 30 tablet 5    diclofenac (VOLTAREN) 75 MG EC tablet Take 1 tablet by mouth 2 times daily as needed for Pain (Patient not taking: Reported on 1/27/2020) 60 tablet 2    atorvastatin (LIPITOR) 40 MG tablet TAKE 1 TABLET BY MOUTH ONE TIME NIGHTLY 30 tablet 5    amiodarone (CORDARONE) 200 MG tablet TAKE 1 TABLET BY MOUTH ONE TIME A DAY 90 tablet 3    acetaminophen (TYLENOL) 325 MG tablet Take 2 tablets by mouth every 4 hours as needed for Pain or Fever 60 tablet 0    potassium chloride (KLOR-CON M) 20 MEQ extended release tablet Take 1 tablet by mouth daily 30 tablet 5      No current facility-administered medications on file prior to visit. Allergies as of 01/27/2020 - Review Complete 01/27/2020   Allergen Reaction Noted    Pcn [penicillins] Hives and Shortness Of Breath 07/17/2013    Bextra [valdecoxib] Diarrhea 07/17/2013          PHYSICAL EXAM:    Blood pressure (!) 153/72, pulse 52, temperature 97.9 °F (36.6 °C), temperature source Oral, resp. rate 18, height 5' 7\" (1.702 m), weight 283 lb 6.4 oz (128.5 kg), last menstrual period 01/01/1968, SpO2 95 %. Gen:  A and O x 3, NAD, well nourished  Eyes:  Sclera non icterus, PERRL  Lungs:  CTA, symmetrical  Chest:  RRR, no murmurs  Abd:  Soft, NT, ND, no HSM, no bruits              ASSESSMENT:  1. Hx of colon cancer and LAR with Dr. Liliane Ho in 10-9-18, T1N0 stage 1  2. Then ileostomy takedown on 5-22-19 Dr. Dunn Left  3. CEA up slightly from 3-5.1   4.   Awaiting CT abd/pelvis for follow up     PLAN:Colonoscopy

## 2020-02-03 NOTE — ANESTHESIA PRE PROCEDURE
outpatient medications on file. Facility-Administered Medications Ordered in Other Visits   Medication Dose Route Frequency Provider Last Rate Last Dose    lactated ringers infusion   Intravenous Continuous Tala Alexandra MD           Allergies:     Allergies   Allergen Reactions    Pcn [Penicillins] Hives and Shortness Of Breath    Bextra [Valdecoxib] Diarrhea       Problem List:    Patient Active Problem List   Diagnosis Code    HTN (hypertension) I10    Insulin resistance E88.81    Dyslipidemia E78.5    Osteoarthritis M19.90    Osteoporosis M81.0    Depression F32.9    Anxiety F41.9    CH[facility]congestive heart failure) ([facility]) I50.9    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    Morbid obesity ([facility] [address] E66.01    Primary insomnia F51.01    Multiple closed fractures of ribs S22.49XA    JAYJAY on CPAP G47.33, Z99.89    Obesity E66.9    Hypertension I10    Glucose intolerance (impaired glucose tolerance) R73.02    A-fib ([facility]) I48.91    Colon cancer ([facility]) C18.9    Acute blood loss as cause of postoperative anemia D62    Other complete intestinal obstruction ([facility]) K56.691    Elevated fasting glucose R73.01    BMI 50.0-59.9, adult ([facility]) Z68.43    Colostomy in place ([facility]) Z93.3    Other specified hypothyroidism E03.8    Ileostomy status ([facility] [address] Z93.2    Attention to ileostomy ([facility] [address] Z43.2    S/P small bowel resection Z90.49    Colitis K52.9    Abdominal pain R10.9    Diarrhea R19.7    Colitis due to Clostridium difficile A04.72    Hypokalemia E87.6       Past Medical History:        Diagnosis Date    A-fib ([facility] [address] 07/19/2017    CHF (congestive heart failure) ([facility])     Dyslipidemia     FH: total abdominal hysterectomy and bilateral salpingo-oophorectomy 1966    Glucose intolerance (impaired glucose tolerance)     Hypertension     Malignant neoplasm of sigmoid colon ([facility])     Obesity     Osteoarthritis        Past Surgical History:        Procedure Laterality Date    CARDIAC CATHETERIZATION 01/16/2020     01/16/2020       CMP:   Lab Results   Component Value Date     01/16/2020    K 3.8 01/16/2020     01/16/2020    CO2 24 01/16/2020    BUN 19 01/16/2020    CREATININE 0.80 01/16/2020    GFRAA >60 01/16/2020    LABGLOM >60 01/16/2020    GLUCOSE 103 01/16/2020    PROT 7.8 01/16/2020    CALCIUM 10.0 01/16/2020    BILITOT 0.77 01/16/2020    ALKPHOS 107 01/16/2020    AST 17 01/16/2020    ALT 9 01/16/2020       POC Tests: No results for input(s): POCGLU, POCNA, POCK, POCCL, POCBUN, POCHEMO, POCHCT in the last 72 hours. Coags:   Lab Results   Component Value Date    PROTIME 16.6 10/08/2018    INR 1.6 10/08/2018    APTT 34.8 09/21/2018       HCG (If Applicable): No results found for: PREGTESTUR, PREGSERUM, HCG, HCGQUANT     ABGs: No results found for: PHART, PO2ART, CGP1QYW, RNU5AGV, BEART, I6HQOOQN     Type & Screen (If Applicable):  No results found for: [facility]    Anesthesia Evaluation  Nursing notes reviewed no history of anesthetic complications:   Airway: Mallampati: III  TM distance: >3 FB   Neck ROM: full  Mouth opening: > = 3 FB Dental: normal exam   (+) upper dentures and lower dentures      Pulmonary:normal exam    (+) sleep apnea: on CPAP,                             Cardiovascular:    (+) hypertension: no interval change, CHF: no interval change,       ECG reviewed               Beta Blocker:  Dose within 24 Hrs         Neuro/Psych:   (+) psychiatric history: stable with treatment            GI/Hepatic/Renal: Neg GI/Hepatic/Renal ROS            Endo/Other:    (+) hypothyroidism::., .                 Abdominal:   (+) obese,         Vascular: negative vascular ROS. Anesthesia Plan      general and TIVA     ASA 3       Induction: intravenous. Anesthetic plan and risks discussed with patient.       Plan discussed with surgical team.                  XIOMARA Esquivel - CRNA   2/3/2020

## 2020-02-04 ENCOUNTER — HOSPITAL ENCOUNTER (OUTPATIENT)
Dept: CT IMAGING | Age: 80
Discharge: HOME | End: 2020-02-06
Payer: MEDICARE

## 2020-02-04 PROCEDURE — 6360000004 HC RX CONTRAST MEDICATION: Performed by: INTERNAL MEDICINE

## 2020-02-04 PROCEDURE — 74176 CT ABD & PELVIS W/O CONTRAST: CPT

## 2020-02-04 RX ADMIN — IOHEXOL 50 ML: 240 INJECTION, SOLUTION INTRATHECAL; INTRAVASCULAR; INTRAVENOUS; ORAL at 12:54

## 2020-02-05 LAB — SURGICAL PATHOLOGY REPORT: NORMAL

## 2020-02-05 RX ORDER — ATORVASTATIN CALCIUM 40 MG/1
TABLET, FILM COATED ORAL
Qty: 30 TABLET | Refills: 0 | OUTPATIENT
Start: 2020-02-05

## 2020-02-05 NOTE — TELEPHONE ENCOUNTER
Niurka Olmstead called requesting a refill of the below medication which has been pended for you: Dr Wong Fernandez would like cardiology to fill     Requested Prescriptions     Pending Prescriptions Disp Refills    amiodarone (CORDARONE) 200 MG tablet [Pharmacy Med Name: Amiodarone HCl Oral Tablet 200 MG] 90 tablet 0     Sig: TAKE 1 TABLET BY MOUTH ONE TIME A DAY           Allergies   Allergen Reactions    Pcn [Penicillins] Hives and Shortness Of Breath    Bextra [Valdecoxib] Diarrhea

## 2020-02-06 RX ORDER — AMIODARONE HYDROCHLORIDE 200 MG/1
TABLET ORAL
Qty: 30 TABLET | Refills: 3 | Status: SHIPPED | OUTPATIENT
Start: 2020-02-06 | End: 2020-05-04 | Stop reason: SDUPTHER

## 2020-02-24 ENCOUNTER — OFFICE VISIT (OUTPATIENT)
Dept: ONCOLOGY | Age: 80
End: 2020-02-24
Payer: MEDICARE

## 2020-02-24 ENCOUNTER — HOSPITAL ENCOUNTER (OUTPATIENT)
Dept: LAB | Age: 80
Discharge: HOME | End: 2020-02-24
Payer: MEDICARE

## 2020-02-24 VITALS
DIASTOLIC BLOOD PRESSURE: 72 MMHG | RESPIRATION RATE: 16 BRPM | SYSTOLIC BLOOD PRESSURE: 130 MMHG | WEIGHT: 290 LBS | OXYGEN SATURATION: 97 % | HEART RATE: 50 BPM | BODY MASS INDEX: 45.52 KG/M2 | HEIGHT: 67 IN

## 2020-02-24 LAB
ABSOLUTE EOS #: 0.18 K/UL (ref 0–0.44)
ABSOLUTE IMMATURE GRANULOCYTE: <0.03 K/UL (ref 0–0.3)
ABSOLUTE LYMPH #: 1.98 K/UL (ref 1.1–3.7)
ABSOLUTE MONO #: 0.58 K/UL (ref 0.1–1.2)
ALBUMIN SERPL-MCNC: 4.2 G/DL (ref 3.5–5.2)
ALBUMIN/GLOBULIN RATIO: 1.4 (ref 1–2.5)
ALP BLD-CCNC: 96 U/L (ref 35–104)
ALT SERPL-CCNC: 10 U/L (ref 5–33)
ANION GAP SERPL CALCULATED.3IONS-SCNC: 16 MMOL/L (ref 9–17)
AST SERPL-CCNC: 16 U/L
BASOPHILS # BLD: 1 % (ref 0–2)
BASOPHILS ABSOLUTE: 0.05 K/UL (ref 0–0.2)
BILIRUB SERPL-MCNC: 0.69 MG/DL (ref 0.3–1.2)
BUN BLDV-MCNC: 22 MG/DL (ref 8–23)
BUN/CREAT BLD: 30 (ref 9–20)
CALCIUM SERPL-MCNC: 9.7 MG/DL (ref 8.6–10.4)
CHLORIDE BLD-SCNC: 101 MMOL/L (ref 98–107)
CHOLESTEROL/HDL RATIO: 2.2
CHOLESTEROL: 117 MG/DL
CO2: 26 MMOL/L (ref 20–31)
CREAT SERPL-MCNC: 0.73 MG/DL (ref 0.5–0.9)
DIFFERENTIAL TYPE: NORMAL
EOSINOPHILS RELATIVE PERCENT: 3 % (ref 1–4)
ESTIMATED AVERAGE GLUCOSE: 108 MG/DL
GFR AFRICAN AMERICAN: >60 ML/MIN
GFR NON-AFRICAN AMERICAN: >60 ML/MIN
GFR SERPL CREATININE-BSD FRML MDRD: ABNORMAL ML/MIN/{1.73_M2}
GFR SERPL CREATININE-BSD FRML MDRD: ABNORMAL ML/MIN/{1.73_M2}
GLUCOSE BLD-MCNC: 98 MG/DL (ref 70–99)
HBA1C MFR BLD: 5.4 % (ref 4.8–5.9)
HCT VFR BLD CALC: 43.4 % (ref 36.3–47.1)
HDLC SERPL-MCNC: 54 MG/DL
HEMOGLOBIN: 13.6 G/DL (ref 11.9–15.1)
IMMATURE GRANULOCYTES: 0 %
LDL CHOLESTEROL: 48 MG/DL (ref 0–130)
LYMPHOCYTES # BLD: 31 % (ref 24–43)
MCH RBC QN AUTO: 30.9 PG (ref 25.2–33.5)
MCHC RBC AUTO-ENTMCNC: 31.3 G/DL (ref 25.2–33.5)
MCV RBC AUTO: 98.6 FL (ref 82.6–102.9)
MONOCYTES # BLD: 9 % (ref 3–12)
NRBC AUTOMATED: 0 PER 100 WBC
PDW BLD-RTO: 14.2 % (ref 11.8–14.4)
PLATELET # BLD: NORMAL K/UL (ref 138–453)
PLATELET ESTIMATE: NORMAL
PLATELET, FLUORESCENCE: 124 K/UL (ref 138–453)
PLATELET, IMMATURE FRACTION: 17.5 % (ref 1.1–10.3)
PMV BLD AUTO: NORMAL FL (ref 8.1–13.5)
POTASSIUM SERPL-SCNC: 3.5 MMOL/L (ref 3.7–5.3)
RBC # BLD: 4.4 M/UL (ref 3.95–5.11)
RBC # BLD: NORMAL 10*6/UL
SEG NEUTROPHILS: 57 % (ref 36–65)
SEGMENTED NEUTROPHILS ABSOLUTE COUNT: 3.66 K/UL (ref 1.5–8.1)
SODIUM BLD-SCNC: 143 MMOL/L (ref 135–144)
THYROXINE, FREE: 1.79 NG/DL (ref 0.93–1.7)
TOTAL PROTEIN: 7.3 G/DL (ref 6.4–8.3)
TRIGL SERPL-MCNC: 77 MG/DL
TSH SERPL DL<=0.05 MIU/L-ACNC: 1.98 MIU/L (ref 0.3–5)
VLDLC SERPL CALC-MCNC: NORMAL MG/DL (ref 1–30)
WBC # BLD: 6.5 K/UL (ref 3.5–11.3)
WBC # BLD: NORMAL 10*3/UL

## 2020-02-24 PROCEDURE — G8484 FLU IMMUNIZE NO ADMIN: HCPCS | Performed by: INTERNAL MEDICINE

## 2020-02-24 PROCEDURE — 4040F PNEUMOC VAC/ADMIN/RCVD: CPT | Performed by: INTERNAL MEDICINE

## 2020-02-24 PROCEDURE — 84443 ASSAY THYROID STIM HORMONE: CPT

## 2020-02-24 PROCEDURE — 85055 RETICULATED PLATELET ASSAY: CPT

## 2020-02-24 PROCEDURE — G8427 DOCREV CUR MEDS BY ELIG CLIN: HCPCS | Performed by: INTERNAL MEDICINE

## 2020-02-24 PROCEDURE — 1123F ACP DISCUSS/DSCN MKR DOCD: CPT | Performed by: INTERNAL MEDICINE

## 2020-02-24 PROCEDURE — 1090F PRES/ABSN URINE INCON ASSESS: CPT | Performed by: INTERNAL MEDICINE

## 2020-02-24 PROCEDURE — 85025 COMPLETE CBC W/AUTO DIFF WBC: CPT

## 2020-02-24 PROCEDURE — 99214 OFFICE O/P EST MOD 30 MIN: CPT | Performed by: INTERNAL MEDICINE

## 2020-02-24 PROCEDURE — 1036F TOBACCO NON-USER: CPT | Performed by: INTERNAL MEDICINE

## 2020-02-24 PROCEDURE — 83036 HEMOGLOBIN GLYCOSYLATED A1C: CPT

## 2020-02-24 PROCEDURE — G8399 PT W/DXA RESULTS DOCUMENT: HCPCS | Performed by: INTERNAL MEDICINE

## 2020-02-24 PROCEDURE — G8417 CALC BMI ABV UP PARAM F/U: HCPCS | Performed by: INTERNAL MEDICINE

## 2020-02-24 PROCEDURE — 84439 ASSAY OF FREE THYROXINE: CPT

## 2020-02-24 PROCEDURE — 36415 COLL VENOUS BLD VENIPUNCTURE: CPT

## 2020-02-24 PROCEDURE — 80061 LIPID PANEL: CPT

## 2020-02-24 PROCEDURE — 80053 COMPREHEN METABOLIC PANEL: CPT

## 2020-02-24 NOTE — PROGRESS NOTES
Patient ID: Gabby Manley, 1940, [id number], [de-identified] y.o. Referred by : Fredis Sabillon MD  Diagnosis:   Diagnosis of colon cancer, 11/5/18    low anterior resection of the rectosigmoid with proximal diverting ileostomy. Final pathology showed T1 N0, stage I disease. HISTORY OF PRESENT ILLNESS:    Oncologic History: This is a 54-year-old female with new diagnosis of colon cancer was seen during initial consultation visit. She presented with rectal bleeding going on for about 4-6 weeks. She had a colonoscopy on 10/8/18 which showed multiple polyps and large distal sigmoid tumors with extensive diverticulosis. Biopsy showed invasive low-grade adenocarcinoma arising in a large tubulovillous adenoma with extensive high-grade dysplasia. Subsequently she had open low anterior resection of the rectosigmoid with proximal dilating ileostomy. Final pathology showed T1 N0, stage I disease. Now she is recovering well from surgery. She denied any pain, nausea vomiting. She does not have family history of colon cancer. She is adopted and does not know about her family history. Interval history:  Patient is returning for follow-up visit and to discuss the results of lab work, and CT scan results and further recommendations. She had a recent colonoscopy which showed no evidence of recurrence denies any abdominal pain nausea vomiting. She denies any diarrhea. During this visit patient's allergy, social, medical, surgical history and medications were reviewed and updated.     Allergies   Allergen Reactions    Pcn [Penicillins] Hives and Shortness Of Breath    Bextra [Valdecoxib] Diarrhea     Current Outpatient Medications   Medication Sig Dispense Refill    amiodarone (CORDARONE) 200 MG tablet TAKE 1 TABLET BY MOUTH ONE TIME A DAY  30 tablet 3    atorvastatin (LIPITOR) 40 MG tablet TAKE 1 TABLET BY MOUTH nightly 30 tablet 0    metoprolol tartrate (LOPRESSOR) 25 MG tablet TAKE ONE TABLET BY MOUTH TWICE ADENOCARCINOMA ARISING IN A LARGE SESSILE TUBULOVILLOUS   ADENOMA, INVADING INTO THE SUBMUCOSA.   - THE FINAL SURGICAL MARGINS ARE NEGATIVE FOR NEOPLASM. - BENIGN REGIONAL LYMPH NODES (0/14). 2.  SIGMOID COLON, DISTAL MARGIN, RESECTION:   - UNREMARKABLE COLON SEGMENT.   - SINGLE BENIGN LYMPH NODE (0/1). PATHOLOGIC STAGE CLASSIFICATION:     pT1  pN0   ADDENDUM DIAGNOSIS   AT THE REQUEST OF DR. Maddi Tran, BLOCK 14B WAS SENT TO [facility]   FOR MMR TESTING.  THE RESULTS ARE AS FOLLOWS:     MISMATCH REPAIR BY IHC RESULT:  NORMAL   MISMATCH REPAIR BY IHC WITH MLH1:  NORMAL   MISMATCH REPAIR BY IHC WITH MSH2:  NORMAL   MISMATCH REPAIR BY IHC WITH MSH6:  NORMAL   MISMATCH REPAIR BY IHC WITH PMS2:  NORMAL   IMAGING DATA:    CT abdomen pelvis 11/10/18  Impression   Dilated stomach and loops of bowel, possibly reflecting postoperative ileus.       Trace perihepatic and mild pelvic ascites.       Redemonstration of subcutaneous mass along the right buttock. CT abdomen pelvis 2/26/19:  Improved postoperative findings status post partial colon resection and   ileostomy.  No acute abnormality identified.       Stable chronic findings, as above. ASSESSMENT:    Angel Torres has T1NO, stage I colon adenocarcinoma, MMR proficient. I discussed the NCCN guidelines. I discussed that for stage I colon cancer, there is no role of adjuvant chemotherapy, so surveillance is recommended. I will follow her in three months with CEA, CBC and CMP. She will need colonoscopy in one year. I discussed the results of recent lab work and CT scans which showed no evidence of recurrence. Clinically she is doing well. She is planning to have follow up with Gen. surgery in May and possible colonoscopy for planning reversal colostomy. During today's visit, the patient and the family had a number of reasonable questions which were answered to their satisfaction.   They verbalized understanding of the information provided and they agreed to 1 TABLET BY MOUTH ONE TIME A DAY  30 tablet 5    amLODIPine (NORVASC) 5 MG tablet Take 1 tablet by mouth daily 30 tablet 5    traZODone (DESYREL) 50 MG tablet Take 1 tablet by mouth nightly 30 tablet 5    furosemide (LASIX) 20 MG tablet Take 1 tablet by mouth daily 30 tablet 5    diclofenac (VOLTAREN) 75 MG EC tablet Take 1 tablet by mouth 2 times daily as needed for Pain 60 tablet 2    potassium chloride (KLOR-CON M) 20 MEQ extended release tablet Take 1 tablet by mouth daily 30 tablet 5    acetaminophen (TYLENOL) 325 MG tablet Take 2 tablets by mouth every 4 hours as needed for Pain or Fever 60 tablet 0     No current facility-administered medications for this visit. REVIEW OF SYSTEM:   Constitutional: No fever or chills. No night sweats, no weight loss   Eyes: No eye discharge, double vision, or eye pain   HEENT: negative for sore mouth, sore throat, hoarseness and voice change   Respiratory: negative for cough , sputum, dyspnea, wheezing, hemoptysis, chest pain   Cardiovascular: negative for chest pain, dyspnea, palpitations, orthopnea, PND   Gastrointestinal: negative for nausea, vomiting, diarrhea, constipation, abdominal pain, Dysphagia, hematemesis and hematochezia   Genitourinary: negative for frequency, dysuria, nocturia, urinary incontinence, and hematuria   Integument: negative for rash, skin lesions, bruises.    Hematologic/Lymphatic: negative for easy bruising, bleeding, lymphadenopathy, petechiae and swelling/edema   Endocrine: negative for heat or cold intolerance, tremor, weight changes, change in bowel habits and hair loss   Musculoskeletal: negative for myalgias, arthralgias, pain, joint swelling,and bone pain   Neurological: negative for headaches, dizziness, seizures, weakness, numbness   OBJECTIVE:         Vitals:    02/24/20 1514   BP: 130/72   Pulse: 50   Resp: 16   SpO2: 97%   PHYSICAL EXAM:   General appearance - well appearing, no in pain or distress   Mental status - alert and INVADING INTO THE SUBMUCOSA.   - THE FINAL SURGICAL MARGINS ARE NEGATIVE FOR NEOPLASM. - BENIGN REGIONAL LYMPH NODES (0/14). 2.  SIGMOID COLON, DISTAL MARGIN, RESECTION:   - UNREMARKABLE COLON SEGMENT.   - SINGLE BENIGN LYMPH NODE (0/1). PATHOLOGIC STAGE CLASSIFICATION:     pT1  pN0   ADDENDUM DIAGNOSIS   AT THE REQUEST OF DR. Larisa Arango, BLOCK 14B WAS SENT TO [facility]   FOR MMR TESTING.  THE RESULTS ARE AS FOLLOWS:     MISMATCH REPAIR BY IHC RESULT:  NORMAL   MISMATCH REPAIR BY IHC WITH MLH1:  NORMAL   MISMATCH REPAIR BY IHC WITH MSH2:  NORMAL   MISMATCH REPAIR BY IHC WITH MSH6:  NORMAL   MISMATCH REPAIR BY IHC WITH PMS2:  NORMAL   IMAGING DATA:    CT abdomen pelvis 11/10/18  Impression   Dilated stomach and loops of bowel, possibly reflecting postoperative ileus.       Trace perihepatic and mild pelvic ascites.       Redemonstration of subcutaneous mass along the right buttock. CT abd 5/31/19  Mild diffuse colonic wall thickening may be related to recent surgery versus   a colitis.  No bowel obstruction.       Incompletely characterized right renal lesion may be a complex cyst but has   increased over multiple prior studies.  Recommend follow-up with MRI with   contrast.   CT abdomen pelvis 2/4/20  Impression   1. Susanaean Lints limited study due to lack of IV contrast, which the patient   refused after several unsuccessful IV access attempts.       2.  No definite evidence of metastatic disease in the chest, abdomen, or   pelvis.       3.  Redemonstration of patchy lucencies in the sacrum, which has been present   since at least 09/21/2018 and could be sequelae of previous insufficiency   fractures.  If clinically indicated, this could be further evaluated with MRI. ASSESSMENT:    Jordyn Cardozo has T1NO, stage I colon adenocarcinoma, MMR proficient. I discussed the NCCN guidelines. I discussed that for stage I colon cancer, there is no role of adjuvant chemotherapy, so surveillance is recommended.     I will follow her in three months with CEA, CBC and CMP. She had a colonoscopy in one year after her surgery. Slight rise in her CEA level. CT scan showed no recurrence and will repeat lab work in 3 months  C diff infection: recovering. During today's visit, the patient and the family had a number of reasonable questions which were answered to their satisfaction. They verbalized understanding of the information provided and they agreed to proceed as outlined above. PLAN:   I discussed the results of recent CT scan which showed no evidence of progression. Small lucencies in the sacrum were present few months ago from her prior falls and will watch it closely  Recent colonoscopy showed no evidence of recurrence  Plan to repeat CT and lab work in 3 months  Return to clinic in 3 months      Rory Ruiz MD  Hematologist/Medical Oncologist      This note is created with the assistance of a speech recognition program.  While intending to generate a document that actually reflects the content of the visit, the document can still have some errors including those of syntax and sound a like substitutions which may escape proof reading. It such instances, actual meaning can be extrapolated by contextual diversion.

## 2020-03-02 RX ORDER — ATORVASTATIN CALCIUM 40 MG/1
TABLET, FILM COATED ORAL
Qty: 30 TABLET | Refills: 5 | Status: SHIPPED | OUTPATIENT
Start: 2020-03-02 | End: 2020-09-09

## 2020-03-09 ENCOUNTER — OFFICE VISIT (OUTPATIENT)
Dept: FAMILY MEDICINE CLINIC | Age: 80
End: 2020-03-09
Payer: MEDICARE

## 2020-03-09 VITALS
WEIGHT: 281 LBS | DIASTOLIC BLOOD PRESSURE: 80 MMHG | BODY MASS INDEX: 44.1 KG/M2 | SYSTOLIC BLOOD PRESSURE: 135 MMHG | HEIGHT: 67 IN

## 2020-03-09 PROBLEM — D69.6 THROMBOCYTOPENIA, UNSPECIFIED (HCC): Status: ACTIVE | Noted: 2020-03-09

## 2020-03-09 PROBLEM — Z93.2 ILEOSTOMY STATUS (HCC): Status: RESOLVED | Noted: 2018-11-30 | Resolved: 2020-03-09

## 2020-03-09 PROBLEM — Z43.2 ATTENTION TO ILEOSTOMY (HCC): Status: RESOLVED | Noted: 2018-12-17 | Resolved: 2020-03-09

## 2020-03-09 PROBLEM — F32.5 MAJOR DEPRESSIVE DISORDER WITH SINGLE EPISODE, IN FULL REMISSION (HCC): Status: ACTIVE | Noted: 2020-03-09

## 2020-03-09 PROCEDURE — G8427 DOCREV CUR MEDS BY ELIG CLIN: HCPCS | Performed by: FAMILY MEDICINE

## 2020-03-09 PROCEDURE — G8399 PT W/DXA RESULTS DOCUMENT: HCPCS | Performed by: FAMILY MEDICINE

## 2020-03-09 PROCEDURE — G0446 INTENS BEHAVE THER CARDIO DX: HCPCS | Performed by: FAMILY MEDICINE

## 2020-03-09 PROCEDURE — 99213 OFFICE O/P EST LOW 20 MIN: CPT | Performed by: FAMILY MEDICINE

## 2020-03-09 PROCEDURE — 1090F PRES/ABSN URINE INCON ASSESS: CPT | Performed by: FAMILY MEDICINE

## 2020-03-09 PROCEDURE — 4040F PNEUMOC VAC/ADMIN/RCVD: CPT | Performed by: FAMILY MEDICINE

## 2020-03-09 PROCEDURE — G0439 PPPS, SUBSEQ VISIT: HCPCS | Performed by: FAMILY MEDICINE

## 2020-03-09 PROCEDURE — 1036F TOBACCO NON-USER: CPT | Performed by: FAMILY MEDICINE

## 2020-03-09 PROCEDURE — G8484 FLU IMMUNIZE NO ADMIN: HCPCS | Performed by: FAMILY MEDICINE

## 2020-03-09 PROCEDURE — G8417 CALC BMI ABV UP PARAM F/U: HCPCS | Performed by: FAMILY MEDICINE

## 2020-03-09 PROCEDURE — 1123F ACP DISCUSS/DSCN MKR DOCD: CPT | Performed by: FAMILY MEDICINE

## 2020-03-09 RX ORDER — POTASSIUM CHLORIDE 20 MEQ/1
20 TABLET, EXTENDED RELEASE ORAL DAILY
Qty: 30 TABLET | Refills: 5 | Status: SHIPPED | OUTPATIENT
Start: 2020-03-09 | End: 2021-03-15 | Stop reason: SDUPTHER

## 2020-03-09 ASSESSMENT — LIFESTYLE VARIABLES: HOW OFTEN DO YOU HAVE A DRINK CONTAINING ALCOHOL: 0

## 2020-03-09 ASSESSMENT — PATIENT HEALTH QUESTIONNAIRE - PHQ9
SUM OF ALL RESPONSES TO PHQ QUESTIONS 1-9: 0
SUM OF ALL RESPONSES TO PHQ QUESTIONS 1-9: 0

## 2020-03-09 NOTE — PATIENT INSTRUCTIONS
Personalized Preventive Plan for Bonnie Oakes - 3/9/2020  Medicare offers a range of preventive health benefits. Some of the tests and screenings are paid in full while other may be subject to a deductible, co-insurance, and/or copay. Some of these benefits include a comprehensive review of your medical history including lifestyle, illnesses that may run in your family, and various assessments and screenings as appropriate. After reviewing your medical record and screening and assessments performed today your provider may have ordered immunizations, labs, imaging, and/or referrals for you. A list of these orders (if applicable) as well as your Preventive Care list are included within your After Visit Summary for your review. Other Preventive Recommendations:    · A preventive eye exam performed by an eye specialist is recommended every 1-2 years to screen for glaucoma; cataracts, macular degeneration, and other eye disorders. · A preventive dental visit is recommended every 6 months. · Try to get at least 150 minutes of exercise per week or 10,000 steps per day on a pedometer . · Order or download the FREE \"Exercise & Physical Activity: Your Everyday Guide\" from [employer]. Call 1-276.816.6684 or search [employer] online. · You need 8270-5103 mg of calcium and 8953-1109 IU of vitamin D per day. It is possible to meet your calcium requirement with diet alone, but a vitamin D supplement is usually necessary to meet this goal.  · When exposed to the sun, use a sunscreen that protects against both UVA and UVB radiation with an SPF of 30 or greater. Reapply every 2 to 3 hours or after sweating, drying off with a towel, or swimming. · Always wear a seat belt when traveling in a car. Always wear a helmet when riding a bicycle or motorcycle.

## 2020-03-09 NOTE — PROGRESS NOTES
(congestive heart failure) ([facility]     Dyslipidemia     FH: total abdominal hysterectomy and bilateral salpingo-oophorectomy 1966    Glucose intolerance (impaired glucose tolerance)     Hypertension     Malignant neoplasm of sigmoid colon ([facility] [address] [facility]     Obesity     Osteoarthritis        Past Surgical History:   Procedure Laterality Date    CARDIAC CATHETERIZATION  09/05/2017    CHOLECYSTECTOMY  1960s    COLONOSCOPY N/A 10/8/2018    COLONOSCOPY WITH COLD ET HOT BIOPSIES ET POLYPECTOMIES performed by Sanju Salas MD at [address] N/A 4/25/2019    COLONOSCOPY performed by Noam Campbell MD at [facility]  stricture at 7 cm     COLONOSCOPY N/A 2/3/2020    COLONOSCOPY WITH BIOPSY performed by Noam Campbell MD at [facility] [address] Left     KNEE ARTHROPLASTY Left     KNEE ARTHROPLASTY Right     TN CYSTOSCOPY,INSERT URETERAL STENT Bilateral 11/5/2018    CYSTO Bilateral Lighted stent placements performed by Amandeep Pereira MD at [address] , PARTIAL, W/ANAST N/A 11/5/2018    Open Sigmoid Colectomy w/ lighted stents ILEOSTOMY PLACEMENT performed by Sanju Salas MD at [address] [facility] N/A 5/9/2019    Flexible Sigmoidoscopy with Dilatation of rectal stricture performed by Noam Campbell MD at [facility] N/A 5/22/2019    Loop Ileostomy take down performed by Noam Campbell MD at [address] [facility] [address] Right 1960s       Family History   Adopted: Yes   Problem Relation Age of Onset    High Blood Pressure Brother         adopted brother       CareTeam (Including outside providers/suppliers regularly involved in providing care):   Patient Care Team:  Barrie Draper DO as PCP - General (Family Medicine)  Barrie Draper DO as PCP - [facility] Empaneled Provider  Dr. Aneesh Zee, General Surgeon  [facility], Cardiology    Wt Readings from Last 3 Encounters:   03/09/20 281 lb (127.5 kg) 02/24/20 290 lb (131.5 kg)   02/03/20 283 lb 6.4 oz (128.5 kg)     Vitals:    03/09/20 1054   Weight: 281 lb (127.5 kg)   Height: 5' 7\" (1.702 m)     Body mass index is 44.01 kg/m². Based upon direct observation of the patient, evaluation of cognition reveals recent and remote memory intact. Patient's complete Health Risk Assessment and screening values have been reviewed and are found in Flowsheets. The following problems were reviewed today and where indicated follow up appointments were made and/or referrals ordered. Positive Risk Factor Screenings with Interventions:     Fall Risk:  Timed Up and Go Test > 12 seconds? (Complete if either Fall Risk answers are Yes): (!) yes  2 or more falls in past year?: no  Fall with injury in past year?: no  Fall Risk Interventions:    · Home safety tips provided, uses walker    Health Habits/Nutrition:  Health Habits/Nutrition  Do you exercise for at least 20 minutes 2-3 times per week?: (!) No  Have you lost any weight without trying in the past 3 months?: No  Do you eat fewer than 2 meals per day?: No  Have you seen a dentist within the past year?: (!) No  Body mass index is 44.01 kg/m². Health Habits/Nutrition Interventions:  · Inadequate physical activity:  patient is not ready to increase his/her physical activity level at this time, has hip pain which limits mobility.   Does try to ambulate as much as able  · Dental exam overdue:  Has dentures    Hearing/Vision:  No exam data present  Hearing/Vision  Do you or your family notice any trouble with your hearing?: No  Do you have difficulty driving, watching TV, or doing any of your daily activities because of your eyesight?: No  Have you had an eye exam within the past year?: (!) No  Hearing/Vision Interventions:  · Vision concerns:  patient encouraged to make appointment with his/her eye specialist    Personalized Preventive Plan   Current Health Maintenance Status  Immunization History   Administered Counseling: Assessed the patient's risk to develop cardiovascular disease and reviewed main risk factors. Reviewed steps to reduce disease risk including:   · Quitting tobacco use, reducing amount smoked, or not starting the habit  · Making healthy food choices  · Being physically active and gradualy increasing activity levels   · Reduce weight and determine a healthy BMI goal  · Monitor blood pressure and treat if higher than 140/90 mmHg  · Maintain blood total cholesterol levels under 5 mmol/l or 190 mg/dl  · Maintain LDL cholesterol levels under 3.0 mmol/l or 115 mg/dl   · Control blood glucose levels  · Consider taking aspirin (75 mg daily), once blood pressure is controlled   Provided a follow up plan.   Time spent (minutes): 15 min

## 2020-03-19 ASSESSMENT — ENCOUNTER SYMPTOMS
WHEEZING: 0
SINUS PRESSURE: 0
RHINORRHEA: 0
SHORTNESS OF BREATH: 0
VOMITING: 0
CONSTIPATION: 0
COUGH: 0
ABDOMINAL PAIN: 0
TROUBLE SWALLOWING: 0
SORE THROAT: 0
EYE REDNESS: 0
DIARRHEA: 0
EYE DISCHARGE: 0
NAUSEA: 0

## 2020-03-19 NOTE — PROGRESS NOTES
discuss dietary measures for weight management. Has known thrombocytopenia and her platelet count is stable at this time. Has known CHF but is not acutely symptomatic at this time .   Patient's recent lab reports are as follows:    Results for orders placed or performed during the hospital encounter of 02/24/20   T4, Free   Result Value Ref Range    Thyroxine, Free 1.79 (H) 0.93 - 1.70 ng/dL   TSH without Reflex   Result Value Ref Range    TSH 1.98 0.30 - 5.00 mIU/L   Hemoglobin A1C   Result Value Ref Range    Hemoglobin A1C 5.4 4.8 - 5.9 %    Estimated Avg Glucose 108 mg/dL   Lipid Panel   Result Value Ref Range    Cholesterol 117 <200 mg/dL    HDL 54 >40 mg/dL    LDL Cholesterol 48 0 - 130 mg/dL    Chol/HDL Ratio 2.2 <5    Triglycerides 77 <150 mg/dL    VLDL NOT REPORTED 1 - 30 mg/dL   Comprehensive Metabolic Panel   Result Value Ref Range    Glucose 98 70 - 99 mg/dL    BUN 22 8 - 23 mg/dL    CREATININE 0.73 0.50 - 0.90 mg/dL    Bun/Cre Ratio 30 (H) 9 - 20    Calcium 9.7 8.6 - 10.4 mg/dL    Sodium 143 135 - 144 mmol/L    Potassium 3.5 (L) 3.7 - 5.3 mmol/L    Chloride 101 98 - 107 mmol/L    CO2 26 20 - 31 mmol/L    Anion Gap 16 9 - 17 mmol/L    Alkaline Phosphatase 96 35 - 104 U/L    ALT 10 5 - 33 U/L    AST 16 <32 U/L    Total Bilirubin 0.69 0.3 - 1.2 mg/dL    Total Protein 7.3 6.4 - 8.3 g/dL    Alb 4.2 3.5 - 5.2 g/dL    Albumin/Globulin Ratio 1.4 1.0 - 2.5    GFR Non-African American >60 >60 mL/min    GFR African American >60 >60 mL/min    GFR Comment          GFR Staging NOT REPORTED    CBC Auto Differential   Result Value Ref Range    WBC 6.5 3.5 - 11.3 k/uL    RBC 4.40 3.95 - 5.11 m/uL    Hemoglobin 13.6 11.9 - 15.1 g/dL    Hematocrit 43.4 36.3 - 47.1 %    MCV 98.6 82.6 - 102.9 fL    MCH 30.9 25.2 - 33.5 pg    MCHC 31.3 25.2 - 33.5 g/dL    RDW 14.2 11.8 - 14.4 %    Platelets See Reflexed IPF Result 138 - 453 k/uL    MPV NOT REPORTED 8.1 - 13.5 fL    NRBC Automated 0.0 0.0 per 100 WBC    Differential Type NOT Mouth: Mucous membranes are moist.      Pharynx: Oropharynx is clear. No oropharyngeal exudate. Eyes:      General:         Right eye: No discharge. Left eye: No discharge. Conjunctiva/sclera: Conjunctivae normal.      Pupils: Pupils are equal, round, and reactive to light. Neck:      Musculoskeletal: Normal range of motion and neck supple. Thyroid: No thyromegaly. Cardiovascular:      Rate and Rhythm: Normal rate and regular rhythm. Heart sounds: Normal heart sounds. Pulmonary:      Effort: Pulmonary effort is normal.      Breath sounds: Normal breath sounds. No wheezing or rales. Abdominal:      General: Bowel sounds are normal. There is no distension. Palpations: Abdomen is soft. Tenderness: There is no abdominal tenderness. Lymphadenopathy:      Cervical: No cervical adenopathy. Skin:     General: Skin is warm and dry. Findings: No rash. Neurological:      Mental Status: She is alert and oriented to person, place, and time. Psychiatric:         Behavior: Behavior normal.         Thought Content: Thought content normal.         Judgment: Judgment normal.         ASSESSMENT/PLAN:  Encounter Diagnoses   Name Primary?     Hypokalemia Yes    Primary osteoarthritis involving multiple joints     Essential hypertension     Impaired fasting glucose     Paroxysmal atrial fibrillation (HCC)     Acquired hypothyroidism     Mixed hyperlipidemia     Major depressive disorder with single episode, in full remission ([address]     Morbid obesity with BMI of 45.0-49.9, adult (HCC)     Thrombocytopenia, unspecified (HCC)     Chronic congestive heart failure, unspecified heart failure type ([address]     Screening for cardiovascular condition     Routine general medical examination at a health care facility        Orders Placed This Encounter   Medications    potassium chloride (KLOR-CON M) 20 MEQ extended release tablet     Sig: Take 1 tablet by mouth daily     Dispense: 30 tablet     Refill:  5     Orders Placed This Encounter   Procedures    CBC Auto Differential     Standing Status:   Future     Standing Expiration Date:   3/9/2021    Comprehensive Metabolic Panel     Standing Status:   Future     Standing Expiration Date:   3/9/2021    Hemoglobin A1C     Standing Status:   Future     Standing Expiration Date:   3/9/2021    Lipid Panel     Standing Status:   Future     Standing Expiration Date:   3/9/2021     Order Specific Question:   Is Patient Fasting?/# of Hours     Answer:   12 hours    TSH without Reflex     Standing Status:   Future     Standing Expiration Date:   3/9/2021    T4, Free     Standing Status:   Future     Standing Expiration Date:   3/9/2021    PA Intens behave ther cardio dx, 15 minutes []     Patient with hypokalemia. Will give potassium supplement as noted above. Is on chronic lasix and will need chronic potassium supplementation. Did have lengthy discussion with patient regarding treatment options for her osteoarthritis. Patient is not getting significant relief from diclofenac alone. Did discuss the only other prescription option for pain control would be either tramdol or narcotic therapy. Patient is aware of pros and cons of use of these medications. Does not want to start any medication that could be more problematic long term. Will continue with her current diclofenac dose and use tylenol in between if needed on bad days of acute arthritic flare. Can take over the counter glucosamine and chondroitin sulfate. Patient is to continue on the rest of her current medical therapy. No additional changes are made. Patient is to continue to work on following a low carb/low sugar diet and try to increase activity as able for blood sugar control and to help with weight loss efforts. Patient is to return to my office in 6 months duration or sooner if any further problems or symptoms arise.     (Please note that portions of this note

## 2020-03-24 ENCOUNTER — TELEPHONE (OUTPATIENT)
Dept: SURGERY | Age: 80
End: 2020-03-24

## 2020-03-24 NOTE — TELEPHONE ENCOUNTER
Letter mailed to patient with colonoscopy results and Dr. Raquel Jiang recommendations. Results entered in chart history, health maintenance, recall list, and forwarded to PCP.

## 2020-04-22 RX ORDER — TRAZODONE HYDROCHLORIDE 50 MG/1
TABLET ORAL
Qty: 30 TABLET | Refills: 5 | Status: SHIPPED
Start: 2020-04-22 | End: 2020-09-14 | Stop reason: HOSPADM

## 2020-04-22 RX ORDER — FUROSEMIDE 20 MG/1
TABLET ORAL
Qty: 30 TABLET | Refills: 5 | Status: SHIPPED | OUTPATIENT
Start: 2020-04-22 | End: 2021-03-01

## 2020-05-04 RX ORDER — AMLODIPINE BESYLATE 5 MG/1
TABLET ORAL
Qty: 90 TABLET | Refills: 3 | Status: SHIPPED | OUTPATIENT
Start: 2020-05-04 | End: 2020-06-18

## 2020-05-04 RX ORDER — AMIODARONE HYDROCHLORIDE 200 MG/1
200 TABLET ORAL DAILY
Qty: 90 TABLET | Refills: 3 | Status: SHIPPED | OUTPATIENT
Start: 2020-05-04 | End: 2020-06-18

## 2020-05-27 ENCOUNTER — HOSPITAL ENCOUNTER (OUTPATIENT)
Dept: LAB | Age: 80
Discharge: HOME | End: 2020-05-27
Payer: MEDICARE

## 2020-05-27 LAB
ABSOLUTE EOS #: 0.19 K/UL (ref 0–0.44)
ABSOLUTE IMMATURE GRANULOCYTE: 0.03 K/UL (ref 0–0.3)
ABSOLUTE LYMPH #: 1.51 K/UL (ref 1.1–3.7)
ABSOLUTE MONO #: 0.6 K/UL (ref 0.1–1.2)
ALBUMIN SERPL-MCNC: 4.3 G/DL (ref 3.5–5.2)
ALBUMIN/GLOBULIN RATIO: 1.6 (ref 1–2.5)
ALP BLD-CCNC: 95 U/L (ref 35–104)
ALT SERPL-CCNC: 12 U/L (ref 5–33)
ANION GAP SERPL CALCULATED.3IONS-SCNC: 11 MMOL/L (ref 9–17)
AST SERPL-CCNC: 14 U/L
BASOPHILS # BLD: 0 % (ref 0–2)
BASOPHILS ABSOLUTE: <0.03 K/UL (ref 0–0.2)
BILIRUB SERPL-MCNC: 0.6 MG/DL (ref 0.3–1.2)
BUN BLDV-MCNC: 24 MG/DL (ref 8–23)
BUN/CREAT BLD: 31 (ref 9–20)
CALCIUM SERPL-MCNC: 9.6 MG/DL (ref 8.6–10.4)
CARCINOEMBRYONIC ANTIGEN: 3.9 NG/ML
CHLORIDE BLD-SCNC: 105 MMOL/L (ref 98–107)
CO2: 25 MMOL/L (ref 20–31)
CREAT SERPL-MCNC: 0.77 MG/DL (ref 0.5–0.9)
DIFFERENTIAL TYPE: ABNORMAL
EOSINOPHILS RELATIVE PERCENT: 3 % (ref 1–4)
GFR AFRICAN AMERICAN: >60 ML/MIN
GFR NON-AFRICAN AMERICAN: >60 ML/MIN
GFR SERPL CREATININE-BSD FRML MDRD: ABNORMAL ML/MIN/{1.73_M2}
GFR SERPL CREATININE-BSD FRML MDRD: ABNORMAL ML/MIN/{1.73_M2}
GLUCOSE BLD-MCNC: 97 MG/DL (ref 70–99)
HCT VFR BLD CALC: 41.4 % (ref 36.3–47.1)
HEMOGLOBIN: 13.2 G/DL (ref 11.9–15.1)
IMMATURE GRANULOCYTES: 0 %
LYMPHOCYTES # BLD: 21 % (ref 24–43)
MCH RBC QN AUTO: 31.3 PG (ref 25.2–33.5)
MCHC RBC AUTO-ENTMCNC: 31.9 G/DL (ref 25.2–33.5)
MCV RBC AUTO: 98.1 FL (ref 82.6–102.9)
MONOCYTES # BLD: 9 % (ref 3–12)
NRBC AUTOMATED: 0 PER 100 WBC
PDW BLD-RTO: 14.6 % (ref 11.8–14.4)
PLATELET # BLD: ABNORMAL K/UL (ref 138–453)
PLATELET ESTIMATE: ABNORMAL
PLATELET, FLUORESCENCE: 113 K/UL (ref 138–453)
PLATELET, IMMATURE FRACTION: 13.7 % (ref 1.1–10.3)
PMV BLD AUTO: ABNORMAL FL (ref 8.1–13.5)
POTASSIUM SERPL-SCNC: 4.1 MMOL/L (ref 3.7–5.3)
RBC # BLD: 4.22 M/UL (ref 3.95–5.11)
RBC # BLD: ABNORMAL 10*6/UL
SEG NEUTROPHILS: 67 % (ref 36–65)
SEGMENTED NEUTROPHILS ABSOLUTE COUNT: 4.73 K/UL (ref 1.5–8.1)
SODIUM BLD-SCNC: 141 MMOL/L (ref 135–144)
TOTAL PROTEIN: 7 G/DL (ref 6.4–8.3)
WBC # BLD: 7.1 K/UL (ref 3.5–11.3)
WBC # BLD: ABNORMAL 10*3/UL

## 2020-05-27 PROCEDURE — 85055 RETICULATED PLATELET ASSAY: CPT

## 2020-05-27 PROCEDURE — 36415 COLL VENOUS BLD VENIPUNCTURE: CPT

## 2020-05-27 PROCEDURE — 80053 COMPREHEN METABOLIC PANEL: CPT

## 2020-05-27 PROCEDURE — 82378 CARCINOEMBRYONIC ANTIGEN: CPT

## 2020-05-27 PROCEDURE — 85025 COMPLETE CBC W/AUTO DIFF WBC: CPT

## 2020-06-01 ENCOUNTER — OFFICE VISIT (OUTPATIENT)
Dept: ONCOLOGY | Age: 80
End: 2020-06-01
Payer: MEDICARE

## 2020-06-01 VITALS
RESPIRATION RATE: 16 BRPM | TEMPERATURE: 97.7 F | HEART RATE: 50 BPM | WEIGHT: 242 LBS | SYSTOLIC BLOOD PRESSURE: 124 MMHG | HEIGHT: 67 IN | BODY MASS INDEX: 37.98 KG/M2 | DIASTOLIC BLOOD PRESSURE: 64 MMHG | OXYGEN SATURATION: 95 %

## 2020-06-01 PROCEDURE — G8417 CALC BMI ABV UP PARAM F/U: HCPCS | Performed by: INTERNAL MEDICINE

## 2020-06-01 PROCEDURE — G8427 DOCREV CUR MEDS BY ELIG CLIN: HCPCS | Performed by: INTERNAL MEDICINE

## 2020-06-01 PROCEDURE — 4040F PNEUMOC VAC/ADMIN/RCVD: CPT | Performed by: INTERNAL MEDICINE

## 2020-06-01 PROCEDURE — 1090F PRES/ABSN URINE INCON ASSESS: CPT | Performed by: INTERNAL MEDICINE

## 2020-06-01 PROCEDURE — G8399 PT W/DXA RESULTS DOCUMENT: HCPCS | Performed by: INTERNAL MEDICINE

## 2020-06-01 PROCEDURE — 99214 OFFICE O/P EST MOD 30 MIN: CPT | Performed by: INTERNAL MEDICINE

## 2020-06-01 PROCEDURE — 1036F TOBACCO NON-USER: CPT | Performed by: INTERNAL MEDICINE

## 2020-06-01 PROCEDURE — 1123F ACP DISCUSS/DSCN MKR DOCD: CPT | Performed by: INTERNAL MEDICINE

## 2020-06-01 NOTE — PROGRESS NOTES
proceed as outlined above. PLAN:   Continue surveillance  Will repeat lab work in 3 months and plan for CT scan in 6 months  She will see Gen. surgery in May for discussion about colostomy reversal  RTC 3 months with labs     I spent more than 25 minutes examining, evaluating, reviewing data and counseling the patient. Greater than 50% of that time was spent face-to-face with the patient in counseling and coordinating her care. Rory Ruiz MD  Hematologist/Medical Oncologist          This note is created with the assistance of a speech recognition program.  While intending to generate a document that actually reflects the content of the visit, the document can still have some errors including those of syntax and sound a like substitutions which may escape proof reading. It such instances, actual meaning can be extrapolated by contextual diversion. Patient ID: Marine Gerber, 1940, [id number], [de-identified] y.o. Referred by : Eros Garvey MD  Reason for consultation:   Diagnosis of colon cancer  HISTORY OF PRESENT ILLNESS:    Oncologic History: This is a 58-year-old female with new diagnosis of colon cancer was seen during initial consultation visit. She presented with rectal bleeding going on for about 4-6 weeks. She had a colonoscopy on 10/8/18 which showed multiple polyps and large distal sigmoid tumors with extensive diverticulosis. Biopsy showed invasive low-grade adenocarcinoma arising in a large tubulovillous adenoma with extensive high-grade dysplasia. Subsequently she had open low anterior resection of the rectosigmoid with proximal dilating ileostomy. Final pathology showed T1 N0, stage I disease. Now she is recovering well from surgery. She denied any pain, nausea vomiting. She does not have family history of colon cancer. She is adopted and does not know about her family history.     Past Medical History:   Diagnosis Date    A-fib [facility]) 07/19/2017    [facility] [address] [address]     Dyslipidemia     FH: total abdominal hysterectomy and bilateral salpingo-oophorectomy 1966    Glucose intolerance (impaired glucose tolerance)     Hypertension     Malignant [facility] of sigmoid colon ([facility] [address]     Obesity     Osteoarthritis        Past Surgical History:   Procedure Laterality Date    CARDIAC CATHETERIZATION  09/05/2017    CHOLECYSTECTOMY  1960s    COLONOSCOPY N/A 10/8/2018    COLONOSCOPY WITH COLD ET HOT BIOPSIES ET POLYPECTOMIES performed by Clifton Diaz MD at [facility] N/A 4/25/2019    COLONOSCOPY performed by Rivka Garcia MD at [facility]  stricture at 7 cm     COLONOSCOPY N/A 2/3/2020    tubular adenoma X 1, Dr. Juliocesar Richter Bilateral 11/5/2018    CYSTO Bilateral Lighted stent placements performed by Ger Doyle MD at [address], PARTIAL, W/ANAST N/A 11/5/2018    Open Sigmoid Colectomy w/ lighted stents ILEOSTOMY PLACEMENT performed by Clifton Diaz MD at [address] N/A 5/9/2019    Flexible Sigmoidoscopy with Dilatation of rectal stricture performed by Rivka Garcia MD at [address] N/A 5/22/2019    Loop Ileostomy take down performed by Rivka Garcia MD at [address] [place] [address] Right 1960s       Allergies   Allergen Reactions    Pcn [Penicillins] Hives and Shortness Of Breath    Bextra [Valdecoxib] Diarrhea       Current Outpatient Medications   Medication Sig Dispense Refill    amLODIPine (NORVASC) 5 MG tablet TAKE 1 TABLET BY MOUTH ONE TIME A DAY  90 tablet 3    amiodarone (CORDARONE) 200 MG tablet Take 1 tablet by mouth daily 90 tablet 3    traZODone (DESYREL) 50 MG tablet TAKE ONE TABLET BY MOUTH ONCE NIGHTLY 30 tablet 5    furosemide (LASIX) 20 MG tablet TAKE ONE TABLET BY MOUTH DAILY 30 tablet 5    potassium alert and cooperative   Eyes - pupils equal and reactive, extraocular eye movements intact   Ears - bilateral TM's and external ear canals normal   Mouth - mucous membranes moist, pharynx normal without lesions   Neck - supple, no significant adenopathy   Lymphatics - no palpable lymphadenopathy, no hepatosplenomegaly   Chest - clear to auscultation, no wheezes, rales or rhonchi, symmetric air entry   Heart - normal rate, regular rhythm, normal S1, S2, no murmurs, rubs, clicks or gallops   Abdomen - Surgical scar healing well, soft, nontender, nondistended, no masses or organomegaly   Neurological - alert, oriented, normal speech, no focal findings or movement disorder noted   Musculoskeletal - no joint tenderness, deformity or swelling   Extremities - peripheral pulses normal, no pedal edema, no clubbing or cyanosis   Skin - normal coloration and turgor, no rashes, no suspicious skin lesions noted ,  LABORATORY DATA:     Lab Results   Component Value Date    WBC 7.1 05/27/2020    HGB 13.2 05/27/2020    HCT 41.4 05/27/2020    MCV 98.1 05/27/2020    PLT See Reflexed IPF Result 05/27/2020    LYMPHOPCT 21 (L) 05/27/2020    RBC 4.22 05/27/2020    MCH 31.3 05/27/2020    MCHC 31.9 05/27/2020    RDW 14.6 (H) 05/27/2020    MONOPCT 9 05/27/2020    BASOPCT 0 05/27/2020    NEUTROABS 4.73 05/27/2020    LYMPHSABS 1.51 05/27/2020    MONOSABS 0.60 05/27/2020    EOSABS 0.19 05/27/2020    BASOSABS <0.03 05/27/2020         Chemistry        Component Value Date/Time     05/27/2020 1007    K 4.1 05/27/2020 1007     05/27/2020 1007    CO2 25 05/27/2020 1007    BUN 24 (H) 05/27/2020 1007    CREATININE 0.77 05/27/2020 1007        Component Value Date/Time    CALCIUM 9.6 05/27/2020 1007    ALKPHOS 95 05/27/2020 1007    AST 14 05/27/2020 1007    ALT 12 05/27/2020 1007    BILITOT 0.60 05/27/2020 1007        PATHOLOGY DATA:     -- Diagnosis --   1.  SIGMOID COLON, SEGMENTAL RESECTION:   - LOW-GRADE ADENOCARCINOMA ARISING IN A

## 2020-06-02 RX ORDER — LEVOTHYROXINE SODIUM 0.05 MG/1
TABLET ORAL
Qty: 30 TABLET | Refills: 5 | Status: SHIPPED | OUTPATIENT
Start: 2020-06-02 | End: 2020-12-14

## 2020-06-18 ENCOUNTER — OFFICE VISIT (OUTPATIENT)
Dept: CARDIOLOGY | Age: 80
End: 2020-06-18
Payer: MEDICARE

## 2020-06-18 VITALS
WEIGHT: 242 LBS | BODY MASS INDEX: 37.98 KG/M2 | SYSTOLIC BLOOD PRESSURE: 164 MMHG | DIASTOLIC BLOOD PRESSURE: 90 MMHG | HEART RATE: 49 BPM | HEIGHT: 67 IN

## 2020-06-18 PROCEDURE — 1090F PRES/ABSN URINE INCON ASSESS: CPT | Performed by: INTERNAL MEDICINE

## 2020-06-18 PROCEDURE — G8417 CALC BMI ABV UP PARAM F/U: HCPCS | Performed by: INTERNAL MEDICINE

## 2020-06-18 PROCEDURE — 4040F PNEUMOC VAC/ADMIN/RCVD: CPT | Performed by: INTERNAL MEDICINE

## 2020-06-18 PROCEDURE — 93010 ELECTROCARDIOGRAM REPORT: CPT | Performed by: INTERNAL MEDICINE

## 2020-06-18 PROCEDURE — G8427 DOCREV CUR MEDS BY ELIG CLIN: HCPCS | Performed by: INTERNAL MEDICINE

## 2020-06-18 PROCEDURE — 99214 OFFICE O/P EST MOD 30 MIN: CPT | Performed by: INTERNAL MEDICINE

## 2020-06-18 PROCEDURE — 93005 ELECTROCARDIOGRAM TRACING: CPT | Performed by: INTERNAL MEDICINE

## 2020-06-18 PROCEDURE — 1123F ACP DISCUSS/DSCN MKR DOCD: CPT | Performed by: INTERNAL MEDICINE

## 2020-06-18 PROCEDURE — 99213 OFFICE O/P EST LOW 20 MIN: CPT | Performed by: INTERNAL MEDICINE

## 2020-06-18 PROCEDURE — G8399 PT W/DXA RESULTS DOCUMENT: HCPCS | Performed by: INTERNAL MEDICINE

## 2020-06-18 PROCEDURE — 1036F TOBACCO NON-USER: CPT | Performed by: INTERNAL MEDICINE

## 2020-06-18 RX ORDER — AMLODIPINE BESYLATE 10 MG/1
10 TABLET ORAL DAILY
Qty: 90 TABLET | Refills: 1 | Status: SHIPPED | OUTPATIENT
Start: 2020-06-18 | End: 2021-01-21

## 2020-06-18 RX ORDER — AMIODARONE HYDROCHLORIDE 100 MG/1
100 TABLET ORAL DAILY
Qty: 90 TABLET | Refills: 1 | Status: SHIPPED | OUTPATIENT
Start: 2020-06-18 | End: 2020-12-14

## 2020-06-18 NOTE — PROGRESS NOTES
DO   potassium chloride (KLOR-CON M) 20 MEQ extended release tablet Take 1 tablet by mouth daily 3/9/20  Yes Ramonita Rosas, DO   atorvastatin (LIPITOR) 40 MG tablet TAKE 1 TABLET BY MOUTH nightly 3/2/20  Yes Ramonita Rosas, DO   metoprolol tartrate (LOPRESSOR) 25 MG tablet TAKE ONE TABLET BY MOUTH TWICE A DAY 1/23/20  Yes Ramonita Rosas, DO   diclofenac (VOLTAREN) 75 MG EC tablet Take 1 tablet by mouth 2 times daily as needed for Pain 8/26/19  Yes Ramonita Rosas, DO   acetaminophen (TYLENOL) 325 MG tablet Take 2 tablets by mouth every 4 hours as needed for Pain or Fever 11/9/18 6/18/20 Yes Viviann Boxer       Allergies:  Pcn [penicillins] and Bextra [valdecoxib]    Social History:   reports that she has never smoked. She has never used smokeless tobacco. She reports that she does not drink alcohol or use drugs. Review of Systems:  · Constitutional: there has been no unanticipated weight loss. There's been No change in energy level, No change in activity level. · Eyes: No visual changes or diplopia. No scleral icterus. · ENT: No Headaches, hearing loss or vertigo. No mouth sores or sore throat. · Cardiovascular: As above. · Respiratory: No SOB, cough or hemoptysis. · Gastrointestinal: No abdominal pain, appetite loss, blood in stools. No change in bowel or bladder habits. · Genitourinary: No dysuria, trouble voiding, or hematuria. · Musculoskeletal:  No gait disturbance, No weakness or joint complaints. · Integumentary: No rash or pruritis. · Psychiatric: No anxiety, or depression. · Hematologic/Lymphatic: No abnormal bruising or bleeding, blood clots or swollen lymph nodes. · Allergic/Immunologic: No nasal congestion or hives.     Physical Exam:  BP (!) 164/90   Pulse (!) 49   Ht 5' 7\" (1.702 m)   Wt 242 lb (109.8 kg)   LMP 01/01/1968   BMI 37.90 kg/m²     Constitutional and General Appearance: alert, cooperative, no distress and appears stated age  [de-identified]: PERRL, no cervical

## 2020-09-08 ENCOUNTER — HOSPITAL ENCOUNTER (OUTPATIENT)
Dept: LAB | Age: 80
Discharge: HOME | End: 2020-09-08
Payer: MEDICARE

## 2020-09-08 LAB
ABSOLUTE EOS #: 0.11 K/UL (ref 0–0.44)
ABSOLUTE IMMATURE GRANULOCYTE: 0.03 K/UL (ref 0–0.3)
ABSOLUTE LYMPH #: 1.46 K/UL (ref 1.1–3.7)
ABSOLUTE MONO #: 1.1 K/UL (ref 0.1–1.2)
ALBUMIN SERPL-MCNC: 3.9 G/DL (ref 3.5–5.2)
ALBUMIN/GLOBULIN RATIO: 1.3 (ref 1–2.5)
ALP BLD-CCNC: 94 U/L (ref 35–104)
ALT SERPL-CCNC: 11 U/L (ref 5–33)
ANION GAP SERPL CALCULATED.3IONS-SCNC: 10 MMOL/L (ref 9–17)
AST SERPL-CCNC: 13 U/L
BASOPHILS # BLD: 0 % (ref 0–2)
BASOPHILS ABSOLUTE: 0.04 K/UL (ref 0–0.2)
BILIRUB SERPL-MCNC: 0.7 MG/DL (ref 0.3–1.2)
BUN BLDV-MCNC: 25 MG/DL (ref 8–23)
BUN/CREAT BLD: 32 (ref 9–20)
CALCIUM SERPL-MCNC: 9.7 MG/DL (ref 8.6–10.4)
CHLORIDE BLD-SCNC: 106 MMOL/L (ref 98–107)
CHOLESTEROL/HDL RATIO: 2
CHOLESTEROL: 97 MG/DL
CO2: 27 MMOL/L (ref 20–31)
CREAT SERPL-MCNC: 0.77 MG/DL (ref 0.5–0.9)
DIFFERENTIAL TYPE: ABNORMAL
EOSINOPHILS RELATIVE PERCENT: 1 % (ref 1–4)
ESTIMATED AVERAGE GLUCOSE: 103 MG/DL
GFR AFRICAN AMERICAN: >60 ML/MIN
GFR NON-AFRICAN AMERICAN: >60 ML/MIN
GFR SERPL CREATININE-BSD FRML MDRD: ABNORMAL ML/MIN/{1.73_M2}
GFR SERPL CREATININE-BSD FRML MDRD: ABNORMAL ML/MIN/{1.73_M2}
GLUCOSE BLD-MCNC: 108 MG/DL (ref 70–99)
HBA1C MFR BLD: 5.2 % (ref 4.8–5.9)
HCT VFR BLD CALC: 42.8 % (ref 36.3–47.1)
HDLC SERPL-MCNC: 48 MG/DL
HEMOGLOBIN: 13.3 G/DL (ref 11.9–15.1)
IMMATURE GRANULOCYTES: 0 %
LDL CHOLESTEROL: 31 MG/DL (ref 0–130)
LYMPHOCYTES # BLD: 16 % (ref 24–43)
MCH RBC QN AUTO: 31.1 PG (ref 25.2–33.5)
MCHC RBC AUTO-ENTMCNC: 31.1 G/DL (ref 25.2–33.5)
MCV RBC AUTO: 100.2 FL (ref 82.6–102.9)
MONOCYTES # BLD: 12 % (ref 3–12)
NRBC AUTOMATED: 0 PER 100 WBC
PDW BLD-RTO: 14 % (ref 11.8–14.4)
PLATELET # BLD: ABNORMAL K/UL (ref 138–453)
PLATELET ESTIMATE: ABNORMAL
PLATELET, FLUORESCENCE: 121 K/UL (ref 138–453)
PLATELET, IMMATURE FRACTION: 21.3 % (ref 1.1–10.3)
PMV BLD AUTO: ABNORMAL FL (ref 8.1–13.5)
POTASSIUM SERPL-SCNC: 3.6 MMOL/L (ref 3.7–5.3)
RBC # BLD: 4.27 M/UL (ref 3.95–5.11)
RBC # BLD: ABNORMAL 10*6/UL
SEG NEUTROPHILS: 70 % (ref 36–65)
SEGMENTED NEUTROPHILS ABSOLUTE COUNT: 6.47 K/UL (ref 1.5–8.1)
SODIUM BLD-SCNC: 143 MMOL/L (ref 135–144)
THYROXINE, FREE: 1.62 NG/DL (ref 0.93–1.7)
TOTAL PROTEIN: 6.9 G/DL (ref 6.4–8.3)
TRIGL SERPL-MCNC: 90 MG/DL
TSH SERPL DL<=0.05 MIU/L-ACNC: 0.7 MIU/L (ref 0.3–5)
VLDLC SERPL CALC-MCNC: NORMAL MG/DL (ref 1–30)
WBC # BLD: 9.2 K/UL (ref 3.5–11.3)
WBC # BLD: ABNORMAL 10*3/UL

## 2020-09-08 PROCEDURE — 36415 COLL VENOUS BLD VENIPUNCTURE: CPT

## 2020-09-08 PROCEDURE — 85025 COMPLETE CBC W/AUTO DIFF WBC: CPT

## 2020-09-08 PROCEDURE — 85055 RETICULATED PLATELET ASSAY: CPT

## 2020-09-08 PROCEDURE — 80053 COMPREHEN METABOLIC PANEL: CPT

## 2020-09-08 PROCEDURE — 84443 ASSAY THYROID STIM HORMONE: CPT

## 2020-09-08 PROCEDURE — 83036 HEMOGLOBIN GLYCOSYLATED A1C: CPT

## 2020-09-08 PROCEDURE — 84439 ASSAY OF FREE THYROXINE: CPT

## 2020-09-08 PROCEDURE — 80061 LIPID PANEL: CPT

## 2020-09-09 RX ORDER — ATORVASTATIN CALCIUM 40 MG/1
TABLET, FILM COATED ORAL
Qty: 90 TABLET | Refills: 1 | Status: SHIPPED | OUTPATIENT
Start: 2020-09-09 | End: 2020-12-14

## 2020-09-09 NOTE — TELEPHONE ENCOUNTER
Kathy Roach called requesting a refill of the below medication which has been pended for you:     Requested Prescriptions     Pending Prescriptions Disp Refills    atorvastatin (LIPITOR) 40 MG tablet [Pharmacy Med Name: Atorvastatin Calcium Oral Tablet 40 MG] 30 tablet 0     Sig: TAKE 1 TABLET BY MOUTH ONE TIME nightly       Last Appointment Date: 3/9/2020  Next Appointment Date: 9/14/2020    Allergies   Allergen Reactions    Pcn [Penicillins] Hives and Shortness Of Breath    Bextra [Valdecoxib] Diarrhea

## 2020-09-14 ENCOUNTER — OFFICE VISIT (OUTPATIENT)
Dept: FAMILY MEDICINE CLINIC | Age: 80
End: 2020-09-14
Payer: MEDICARE

## 2020-09-14 VITALS
SYSTOLIC BLOOD PRESSURE: 130 MMHG | DIASTOLIC BLOOD PRESSURE: 80 MMHG | HEIGHT: 67 IN | RESPIRATION RATE: 20 BRPM | HEART RATE: 52 BPM | BODY MASS INDEX: 37.9 KG/M2 | OXYGEN SATURATION: 97 %

## 2020-09-14 PROCEDURE — G8417 CALC BMI ABV UP PARAM F/U: HCPCS | Performed by: FAMILY MEDICINE

## 2020-09-14 PROCEDURE — 99213 OFFICE O/P EST LOW 20 MIN: CPT | Performed by: FAMILY MEDICINE

## 2020-09-14 PROCEDURE — 1123F ACP DISCUSS/DSCN MKR DOCD: CPT | Performed by: FAMILY MEDICINE

## 2020-09-14 PROCEDURE — G8399 PT W/DXA RESULTS DOCUMENT: HCPCS | Performed by: FAMILY MEDICINE

## 2020-09-14 PROCEDURE — 90694 VACC AIIV4 NO PRSRV 0.5ML IM: CPT | Performed by: FAMILY MEDICINE

## 2020-09-14 PROCEDURE — 99214 OFFICE O/P EST MOD 30 MIN: CPT | Performed by: FAMILY MEDICINE

## 2020-09-14 PROCEDURE — G8427 DOCREV CUR MEDS BY ELIG CLIN: HCPCS | Performed by: FAMILY MEDICINE

## 2020-09-14 PROCEDURE — 1036F TOBACCO NON-USER: CPT | Performed by: FAMILY MEDICINE

## 2020-09-14 PROCEDURE — 1090F PRES/ABSN URINE INCON ASSESS: CPT | Performed by: FAMILY MEDICINE

## 2020-09-14 PROCEDURE — 4040F PNEUMOC VAC/ADMIN/RCVD: CPT | Performed by: FAMILY MEDICINE

## 2020-09-14 PROCEDURE — G0008 ADMIN INFLUENZA VIRUS VAC: HCPCS | Performed by: FAMILY MEDICINE

## 2020-09-14 ASSESSMENT — ENCOUNTER SYMPTOMS
EYE DISCHARGE: 0
COUGH: 0
ABDOMINAL PAIN: 0
SHORTNESS OF BREATH: 0
DIARRHEA: 0
NAUSEA: 0
TROUBLE SWALLOWING: 0
EYE REDNESS: 0
RHINORRHEA: 0
VOMITING: 0
WHEEZING: 0
CONSTIPATION: 0
SORE THROAT: 0
SINUS PRESSURE: 0

## 2020-09-14 ASSESSMENT — PATIENT HEALTH QUESTIONNAIRE - PHQ9
SUM OF ALL RESPONSES TO PHQ QUESTIONS 1-9: 0
1. LITTLE INTEREST OR PLEASURE IN DOING THINGS: 0
SUM OF ALL RESPONSES TO PHQ QUESTIONS 1-9: 0
2. FEELING DOWN, DEPRESSED OR HOPELESS: 0
SUM OF ALL RESPONSES TO PHQ9 QUESTIONS 1 & 2: 0

## 2020-09-14 NOTE — PATIENT INSTRUCTIONS
Hospital Outpatient Visit on 09/08/2020   Component Date Value Ref Range Status    Thyroxine, Free 09/08/2020 1.62  0.93 - 1.70 ng/dL Final    TSH 09/08/2020 0.70  0.30 - 5.00 mIU/L Final    Cholesterol 09/08/2020 97  <200 mg/dL Final    Comment:    Cholesterol Guidelines:      <200  Desirable   200-240  Borderline      >240  Undesirable         HDL 09/08/2020 48  >40 mg/dL Final    Comment:    HDL Guidelines:    <40     Undesirable   40-59    Borderline    >59     Desirable         LDL Cholesterol 09/08/2020 31  0 - 130 mg/dL Final    Comment:    LDL Guidelines:     <100    Desirable   100-129   Near to/above Desirable   130-159   Borderline      >159   Undesirable     Direct (measured) LDL and calculated LDL are not interchangeable tests.  Chol/HDL Ratio 09/08/2020 2.0  <5 Final            Triglycerides 09/08/2020 90  <150 mg/dL Final    Comment:    Triglyceride Guidelines:     <150   Desirable   150-199  Borderline   200-499  High     >499   Very high   Based on AHA Guidelines for fasting triglyceride, October 2012.          VLDL 09/08/2020 NOT REPORTED  1 - 30 mg/dL Final    Hemoglobin A1C 09/08/2020 5.2  4.8 - 5.9 % Final    Estimated Avg Glucose 09/08/2020 103  mg/dL Final    Glucose 09/08/2020 108* 70 - 99 mg/dL Final    BUN 09/08/2020 25* 8 - 23 mg/dL Final    CREATININE 09/08/2020 0.77  0.50 - 0.90 mg/dL Final    Bun/Cre Ratio 09/08/2020 32* 9 - 20 Final    Calcium 09/08/2020 9.7  8.6 - 10.4 mg/dL Final    Sodium 09/08/2020 143  135 - 144 mmol/L Final    Potassium 09/08/2020 3.6* 3.7 - 5.3 mmol/L Final    Chloride 09/08/2020 106  98 - 107 mmol/L Final    CO2 09/08/2020 27  20 - 31 mmol/L Final    Anion Gap 09/08/2020 10  9 - 17 mmol/L Final    Alkaline Phosphatase 09/08/2020 94  35 - 104 U/L Final    ALT 09/08/2020 11  5 - 33 U/L Final    AST 09/08/2020 13  <32 U/L Final    Total Bilirubin 09/08/2020 0.70  0.3 - 1.2 mg/dL Final    Total Protein 09/08/2020 6.9  6.4 - 8.3 g/dL Final    Alb 09/08/2020 3.9  3.5 - 5.2 g/dL Final    Albumin/Globulin Ratio 09/08/2020 1.3  1.0 - 2.5 Final    GFR Non- 09/08/2020 >60  >60 mL/min Final    GFR  09/08/2020 >60  >60 mL/min Final    GFR Comment 09/08/2020        Final    Comment: Average GFR for 79or more years old:   76 mL/min/1.73sq m  Chronic Kidney Disease:   <60 mL/min/1.73sq m  Kidney failure:   <15 mL/min/1.73sq m              eGFR calculated using average adult body mass.  Additional eGFR calculator available at:        [email or web address]            GFR Staging 09/08/2020 NOT REPORTED   Final    WBC 09/08/2020 9.2  3.5 - 11.3 k/uL Final    RBC 09/08/2020 4.27  3.95 - 5.11 m/uL Final    Hemoglobin 09/08/2020 13.3  11.9 - 15.1 g/dL Final    Hematocrit 09/08/2020 42.8  36.3 - 47.1 % Final    MCV 09/08/2020 100.2  82.6 - 102.9 fL Final    MCH 09/08/2020 31.1  25.2 - 33.5 pg Final    MCHC 09/08/2020 31.1  25.2 - 33.5 g/dL Final    RDW 09/08/2020 14.0  11.8 - 14.4 % Final    Platelets 85/33/5746 See Reflexed IPF Result  138 - 453 k/uL Final    MPV 09/08/2020 NOT REPORTED  8.1 - 13.5 fL Final    NRBC Automated 09/08/2020 0.0  0.0 per 100 WBC Final    Differential Type 09/08/2020 NOT REPORTED   Final    WBC Morphology 09/08/2020 NOT REPORTED   Final    RBC Morphology 09/08/2020 NOT REPORTED   Final    Platelet Estimate 06/28/3060 NOT REPORTED   Final    Seg Neutrophils 09/08/2020 70* 36 - 65 % Final    Lymphocytes 09/08/2020 16* 24 - 43 % Final    Monocytes 09/08/2020 12  3 - 12 % Final    Eosinophils % 09/08/2020 1  1 - 4 % Final    Basophils 09/08/2020 0  0 - 2 % Final    Immature Granulocytes 09/08/2020 0  0 % Final    Segs Absolute 09/08/2020 6.47  1.50 - 8.10 k/uL Final    Absolute Lymph # 09/08/2020 1.46  1.10 - 3.70 k/uL Final    Absolute Mono # 09/08/2020 1.10  0.10 - 1.20 k/uL Final    Absolute Eos # 09/08/2020 0.11  0.00 - 0.44 k/uL Final    Basophils Absolute 09/08/2020 0.04  0.00 - 0.20 k/uL Final    Absolute Immature Granulocyte 09/08/2020 0.03  0.00 - 0.30 k/uL Final    Platelet, Immature Fraction 09/08/2020 21.3* 1.1 - 10.3 % Final    Platelet, Fluorescence 09/08/2020 121* 138 - 453 k/uL Final

## 2020-09-28 NOTE — PROGRESS NOTES
143 135 - 144 mmol/L    Potassium 3.6 (L) 3.7 - 5.3 mmol/L    Chloride 106 98 - 107 mmol/L    CO2 27 20 - 31 mmol/L    Anion Gap 10 9 - 17 mmol/L    Alkaline Phosphatase 94 35 - 104 U/L    ALT 11 5 - 33 U/L    AST 13 <32 U/L    Total Bilirubin 0.70 0.3 - 1.2 mg/dL    Total Protein 6.9 6.4 - 8.3 g/dL    Alb 3.9 3.5 - 5.2 g/dL    Albumin/Globulin Ratio 1.3 1.0 - 2.5    GFR Non-African American >60 >60 mL/min    GFR African American >60 >60 mL/min    GFR Comment          GFR Staging NOT REPORTED    CBC Auto Differential   Result Value Ref Range    WBC 9.2 3.5 - 11.3 k/uL    RBC 4.27 3.95 - 5.11 m/uL    Hemoglobin 13.3 11.9 - 15.1 g/dL    Hematocrit 42.8 36.3 - 47.1 %    .2 82.6 - 102.9 fL    MCH 31.1 25.2 - 33.5 pg    MCHC 31.1 25.2 - 33.5 g/dL    RDW 14.0 11.8 - 14.4 %    Platelets See Reflexed IPF Result 138 - 453 k/uL    MPV NOT REPORTED 8.1 - 13.5 fL    NRBC Automated 0.0 0.0 per 100 WBC    Differential Type NOT REPORTED     WBC Morphology NOT REPORTED     RBC Morphology NOT REPORTED     Platelet Estimate NOT REPORTED     Seg Neutrophils 70 (H) 36 - 65 %    Lymphocytes 16 (L) 24 - 43 %    Monocytes 12 3 - 12 %    Eosinophils % 1 1 - 4 %    Basophils 0 0 - 2 %    Immature Granulocytes 0 0 %    Segs Absolute 6.47 1.50 - 8.10 k/uL    Absolute Lymph # 1.46 1.10 - 3.70 k/uL    Absolute Mono # 1.10 0.10 - 1.20 k/uL    Absolute Eos # 0.11 0.00 - 0.44 k/uL    Basophils Absolute 0.04 0.00 - 0.20 k/uL    Absolute Immature Granulocyte 0.03 0.00 - 0.30 k/uL   Immature Platelet Fraction   Result Value Ref Range    Platelet, Immature Fraction 21.3 (H) 1.1 - 10.3 %    Platelet, Fluorescence 121 (L) 138 - 453 k/uL      Other review of systems are as noted below. Preventative measures are reviewed today. See health maintenance section for complete preventative plan of care. Did review patient's med list, allergies, social history, fam history, pmhx and pshx today as noted in the record.       Review of Systems Constitutional: Negative for chills, fatigue and fever. HENT: Negative for congestion, ear pain, postnasal drip, rhinorrhea, sinus pressure, sore throat and trouble swallowing. Eyes: Negative for discharge and redness. Respiratory: Negative for cough, shortness of breath and wheezing. Cardiovascular: Negative for chest pain. Gastrointestinal: Negative for abdominal pain, constipation, diarrhea, nausea and vomiting. Genitourinary: Negative for dysuria, flank pain, frequency and urgency. Musculoskeletal: Positive for arthralgias and gait problem. Negative for myalgias and neck pain. Skin: Negative for rash and wound. Allergic/Immunologic: Negative for environmental allergies. Neurological: Negative for dizziness, weakness, light-headedness and headaches. Hematological: Negative for adenopathy. Psychiatric/Behavioral: Negative. Prior to Visit Medications    Medication Sig Taking?  Authorizing Provider   atorvastatin (LIPITOR) 40 MG tablet TAKE 1 TABLET BY MOUTH ONE TIME nightly  Emy Trevino DO   metoprolol tartrate (LOPRESSOR) 25 MG tablet TAKE ONE TABLET BY MOUTH TWICE A DAY  XIOMARA Wadsworth - CNP   amiodarone (PACERONE) 100 MG tablet Take 1 tablet by mouth daily  Fredo Casas, DO   amLODIPine (NORVASC) 10 MG tablet Take 1 tablet by mouth daily  Fredo Casas DO   levothyroxine (SYNTHROID) 50 MCG tablet TAKE 1 TABLET BY MOUTH ONE TIME A DAY   Emy Trevino,    traZODone (DESYREL) 50 MG tablet TAKE ONE TABLET BY MOUTH ONCE NIGHTLY  Cherelle Alicea,    furosemide (LASIX) 20 MG tablet TAKE ONE TABLET BY MOUTH DAILY  Cherelle Alicea DO   potassium chloride (KLOR-CON M) 20 MEQ extended release tablet Take 1 tablet by mouth daily  Emy Trevino DO   diclofenac (VOLTAREN) 75 MG EC tablet Take 1 tablet by mouth 2 times daily as needed for Pain  Cherelle Alicea DO   acetaminophen (TYLENOL) 325 MG tablet Take 2 tablets by mouth every 4 hours as needed for Pain or Fever  Huang Jess        Social History     Tobacco Use    Smoking status: Never Smoker    Smokeless tobacco: Never Used    Tobacco comment: hernando 11/10/2018   Substance Use Topics    Alcohol use: No        There were no vitals filed for this visit. Estimated body mass index is 37.9 kg/m² as calculated from the following:    Height as of 6/18/20: 5' 7\" (1.702 m). Weight as of 6/18/20: 242 lb (109.8 kg). Physical Exam  Vitals signs and nursing note reviewed. Constitutional:       General: She is not in acute distress. Appearance: Normal appearance. She is well-developed. She is not diaphoretic. HENT:      Head: Normocephalic and atraumatic. Right Ear: Tympanic membrane, ear canal and external ear normal.      Left Ear: Tympanic membrane, ear canal and external ear normal.      Nose: Nose normal.      Mouth/Throat:      Mouth: Mucous membranes are moist.      Pharynx: Oropharynx is clear. No oropharyngeal exudate. Eyes:      General:         Right eye: No discharge. Left eye: No discharge. Conjunctiva/sclera: Conjunctivae normal.      Pupils: Pupils are equal, round, and reactive to light. Neck:      Musculoskeletal: Normal range of motion and neck supple. Thyroid: No thyromegaly. Cardiovascular:      Rate and Rhythm: Normal rate and regular rhythm. Heart sounds: Normal heart sounds. Pulmonary:      Effort: Pulmonary effort is normal.      Breath sounds: Normal breath sounds. No wheezing or rales. Abdominal:      General: Bowel sounds are normal. There is no distension. Palpations: Abdomen is soft. Tenderness: There is no abdominal tenderness. Lymphadenopathy:      Cervical: No cervical adenopathy. Skin:     General: Skin is warm and dry. Findings: No rash. Neurological:      Mental Status: She is alert and oriented to person, place, and time. Psychiatric:         Behavior: Behavior normal.         Thought Content:  Thought content English

## 2020-10-26 ENCOUNTER — TELEPHONE (OUTPATIENT)
Dept: FAMILY MEDICINE CLINIC | Age: 80
End: 2020-10-26

## 2020-11-20 ENCOUNTER — TELEPHONE (OUTPATIENT)
Dept: FAMILY MEDICINE CLINIC | Age: 80
End: 2020-11-20

## 2020-11-20 ENCOUNTER — HOSPITAL ENCOUNTER (OUTPATIENT)
Dept: LAB | Age: 80
Discharge: HOME | End: 2020-11-20
Payer: MEDICARE

## 2020-11-20 LAB
ABSOLUTE EOS #: 0.19 K/UL (ref 0–0.44)
ABSOLUTE IMMATURE GRANULOCYTE: 0.03 K/UL (ref 0–0.3)
ABSOLUTE LYMPH #: 1.6 K/UL (ref 1.1–3.7)
ABSOLUTE MONO #: 0.59 K/UL (ref 0.1–1.2)
ALBUMIN SERPL-MCNC: 4.3 G/DL (ref 3.5–5.2)
ALBUMIN/GLOBULIN RATIO: 1.4 (ref 1–2.5)
ALP BLD-CCNC: 97 U/L (ref 35–104)
ALT SERPL-CCNC: 8 U/L (ref 5–33)
ANION GAP SERPL CALCULATED.3IONS-SCNC: 10 MMOL/L (ref 9–17)
AST SERPL-CCNC: 12 U/L
BASOPHILS # BLD: 0 % (ref 0–2)
BASOPHILS ABSOLUTE: <0.03 K/UL (ref 0–0.2)
BILIRUB SERPL-MCNC: 0.51 MG/DL (ref 0.3–1.2)
BUN BLDV-MCNC: 20 MG/DL (ref 8–23)
BUN/CREAT BLD: 25 (ref 9–20)
CALCIUM SERPL-MCNC: 10.1 MG/DL (ref 8.6–10.4)
CARCINOEMBRYONIC ANTIGEN: 3.8 NG/ML
CHLORIDE BLD-SCNC: 106 MMOL/L (ref 98–107)
CO2: 28 MMOL/L (ref 20–31)
CREAT SERPL-MCNC: 0.79 MG/DL (ref 0.5–0.9)
DIFFERENTIAL TYPE: ABNORMAL
EOSINOPHILS RELATIVE PERCENT: 3 % (ref 1–4)
FREE KAPPA/LAMBDA RATIO: 0.9 (ref 0.26–1.65)
GFR AFRICAN AMERICAN: >60 ML/MIN
GFR NON-AFRICAN AMERICAN: >60 ML/MIN
GFR SERPL CREATININE-BSD FRML MDRD: ABNORMAL ML/MIN/{1.73_M2}
GFR SERPL CREATININE-BSD FRML MDRD: ABNORMAL ML/MIN/{1.73_M2}
GLUCOSE BLD-MCNC: 100 MG/DL (ref 70–99)
HCT VFR BLD CALC: 44.5 % (ref 36.3–47.1)
HEMOGLOBIN: 13.7 G/DL (ref 11.9–15.1)
IMMATURE GRANULOCYTES: 1 %
KAPPA FREE LIGHT CHAINS QNT: 1.54 MG/DL (ref 0.37–1.94)
LAMBDA FREE LIGHT CHAINS QNT: 1.72 MG/DL (ref 0.57–2.63)
LYMPHOCYTES # BLD: 26 % (ref 24–43)
MCH RBC QN AUTO: 31.1 PG (ref 25.2–33.5)
MCHC RBC AUTO-ENTMCNC: 30.8 G/DL (ref 25.2–33.5)
MCV RBC AUTO: 100.9 FL (ref 82.6–102.9)
MONOCYTES # BLD: 10 % (ref 3–12)
NRBC AUTOMATED: 0 PER 100 WBC
PDW BLD-RTO: 14.1 % (ref 11.8–14.4)
PLATELET # BLD: 126 K/UL (ref 138–453)
PLATELET ESTIMATE: ABNORMAL
PMV BLD AUTO: 14 FL (ref 8.1–13.5)
POTASSIUM SERPL-SCNC: 4.2 MMOL/L (ref 3.7–5.3)
RBC # BLD: 4.41 M/UL (ref 3.95–5.11)
RBC # BLD: ABNORMAL 10*6/UL
SEG NEUTROPHILS: 61 % (ref 36–65)
SEGMENTED NEUTROPHILS ABSOLUTE COUNT: 3.76 K/UL (ref 1.5–8.1)
SODIUM BLD-SCNC: 144 MMOL/L (ref 135–144)
TOTAL PROTEIN: 7.4 G/DL (ref 6.4–8.3)
WBC # BLD: 6.2 K/UL (ref 3.5–11.3)
WBC # BLD: ABNORMAL 10*3/UL

## 2020-11-20 PROCEDURE — 80053 COMPREHEN METABOLIC PANEL: CPT

## 2020-11-20 PROCEDURE — 84155 ASSAY OF PROTEIN SERUM: CPT

## 2020-11-20 PROCEDURE — 83883 ASSAY NEPHELOMETRY NOT SPEC: CPT

## 2020-11-20 PROCEDURE — 84165 PROTEIN E-PHORESIS SERUM: CPT

## 2020-11-20 PROCEDURE — 86334 IMMUNOFIX E-PHORESIS SERUM: CPT

## 2020-11-20 PROCEDURE — 36415 COLL VENOUS BLD VENIPUNCTURE: CPT

## 2020-11-20 PROCEDURE — 82378 CARCINOEMBRYONIC ANTIGEN: CPT

## 2020-11-20 PROCEDURE — 85025 COMPLETE CBC W/AUTO DIFF WBC: CPT

## 2020-11-20 NOTE — TELEPHONE ENCOUNTER
Pt calling to get a renewal script for her handicap placcard, please call above number when ready to , pt would like to  today as she is coming to the Clinic for lab work.

## 2020-11-23 LAB
ALBUMIN (CALCULATED): 4.2 G/DL (ref 3.2–5.2)
ALBUMIN PERCENT: 63 % (ref 45–65)
ALPHA 1 PERCENT: 4 % (ref 3–6)
ALPHA 2 PERCENT: 12 % (ref 6–13)
ALPHA-1-GLOBULIN: 0.2 G/DL (ref 0.1–0.4)
ALPHA-2-GLOBULIN: 0.8 G/DL (ref 0.5–0.9)
BETA GLOBULIN: 0.7 G/DL (ref 0.5–1.1)
BETA PERCENT: 10 % (ref 11–19)
GAMMA GLOBULIN %: 12 % (ref 9–20)
GAMMA GLOBULIN: 0.8 G/DL (ref 0.5–1.5)
PATHOLOGIST: ABNORMAL
PATHOLOGIST: NORMAL
PROTEIN ELECTROPHORESIS, SERUM: ABNORMAL
SERUM IFX INTERP: NORMAL
TOTAL PROT. SUM,%: 101 % (ref 98–102)
TOTAL PROT. SUM: 6.7 G/DL (ref 6.3–8.2)
TOTAL PROTEIN: 6.7 G/DL (ref 6.4–8.3)

## 2020-11-27 ENCOUNTER — HOSPITAL ENCOUNTER (OUTPATIENT)
Dept: CT IMAGING | Age: 80
Discharge: HOME | End: 2020-11-29
Payer: MEDICARE

## 2020-11-27 PROCEDURE — 2709999900 CT ABDOMEN PELVIS W IV CONTRAST

## 2020-11-27 PROCEDURE — 6360000004 HC RX CONTRAST MEDICATION: Performed by: INTERNAL MEDICINE

## 2020-11-27 RX ADMIN — IOPAMIDOL 100 ML: 755 INJECTION, SOLUTION INTRAVENOUS at 12:26

## 2020-11-27 RX ADMIN — IOHEXOL 50 ML: 240 INJECTION, SOLUTION INTRATHECAL; INTRAVASCULAR; INTRAVENOUS; ORAL at 11:00

## 2020-12-14 ENCOUNTER — OFFICE VISIT (OUTPATIENT)
Dept: ONCOLOGY | Age: 80
End: 2020-12-14
Payer: MEDICARE

## 2020-12-14 VITALS
HEIGHT: 67 IN | OXYGEN SATURATION: 95 % | SYSTOLIC BLOOD PRESSURE: 130 MMHG | RESPIRATION RATE: 16 BRPM | TEMPERATURE: 97.2 F | DIASTOLIC BLOOD PRESSURE: 82 MMHG | BODY MASS INDEX: 37.9 KG/M2 | HEART RATE: 77 BPM

## 2020-12-14 PROCEDURE — G8417 CALC BMI ABV UP PARAM F/U: HCPCS | Performed by: INTERNAL MEDICINE

## 2020-12-14 PROCEDURE — 1036F TOBACCO NON-USER: CPT | Performed by: INTERNAL MEDICINE

## 2020-12-14 PROCEDURE — 4040F PNEUMOC VAC/ADMIN/RCVD: CPT | Performed by: INTERNAL MEDICINE

## 2020-12-14 PROCEDURE — G8427 DOCREV CUR MEDS BY ELIG CLIN: HCPCS | Performed by: INTERNAL MEDICINE

## 2020-12-14 PROCEDURE — 1090F PRES/ABSN URINE INCON ASSESS: CPT | Performed by: INTERNAL MEDICINE

## 2020-12-14 PROCEDURE — 99213 OFFICE O/P EST LOW 20 MIN: CPT | Performed by: INTERNAL MEDICINE

## 2020-12-14 PROCEDURE — G8484 FLU IMMUNIZE NO ADMIN: HCPCS | Performed by: INTERNAL MEDICINE

## 2020-12-14 PROCEDURE — 1123F ACP DISCUSS/DSCN MKR DOCD: CPT | Performed by: INTERNAL MEDICINE

## 2020-12-14 PROCEDURE — G8399 PT W/DXA RESULTS DOCUMENT: HCPCS | Performed by: INTERNAL MEDICINE

## 2020-12-14 PROCEDURE — 99214 OFFICE O/P EST MOD 30 MIN: CPT | Performed by: INTERNAL MEDICINE

## 2020-12-14 RX ORDER — AMIODARONE HYDROCHLORIDE 200 MG/1
TABLET ORAL
Qty: 45 TABLET | Refills: 3 | Status: SHIPPED | OUTPATIENT
Start: 2020-12-14 | End: 2021-12-20

## 2020-12-14 NOTE — PROGRESS NOTES
Patient ID: Samella Romberg, 1940, [id number], 79 y.o. Referred by : Reid Libman, MD  Diagnosis:   Diagnosis of colon cancer, 11/5/18    low anterior resection of the rectosigmoid with proximal diverting ileostomy. Final pathology showed T1 N0, stage I disease. HISTORY OF PRESENT ILLNESS:    Oncologic History: This is a 79 y.o. -old female with new diagnosis of colon cancer was seen during initial consultation visit. She presented with rectal bleeding going on for about 4-6 weeks. She had a colonoscopy on 10/8/18 which showed multiple polyps and large distal sigmoid tumors with extensive diverticulosis. Biopsy showed invasive low-grade adenocarcinoma arising in a large tubulovillous adenoma with extensive high-grade dysplasia. Subsequently she had open low anterior resection of the rectosigmoid with proximal dilating ileostomy. Final pathology showed T1 N0, stage I disease. Now she is recovering well from surgery. She denied any pain, nausea vomiting. She does not have family history of colon cancer. She is adopted and does not know about her family history. Interval history:  Patient is return for follow-up to discuss lab results and further recommendations. She denies any nausea vomiting, abdominal pain or diarrhea. She denies any blood in stool. During this visit patient's allergy, social, medical, surgical history and medications were reviewed and updated.     Allergies   Allergen Reactions    Pcn [Penicillins] Hives and Shortness Of Breath    Bextra [Valdecoxib] Diarrhea     Current Outpatient Medications   Medication Sig Dispense Refill    amiodarone (CORDARONE) 200 MG tablet TAKE 1/2 TABLET BY MOUTH ONE TIME A DAY  45 tablet 3    Handicap Placard MISC by Does not apply route Expires in 5 years 1 each 0    atorvastatin (LIPITOR) 40 MG tablet TAKE 1 TABLET BY MOUTH ONE TIME nightly 90 tablet 1    metoprolol tartrate (LOPRESSOR) 25 MG tablet TAKE ONE TABLET BY MOUTH TWICE A DAY 60 tablet 1    amLODIPine (NORVASC) 10 MG tablet Take 1 tablet by mouth daily 90 tablet 1    levothyroxine (SYNTHROID) 50 MCG tablet TAKE 1 TABLET BY MOUTH ONE TIME A DAY  30 tablet 5    furosemide (LASIX) 20 MG tablet TAKE ONE TABLET BY MOUTH DAILY 30 tablet 5    potassium chloride (KLOR-CON M) 20 MEQ extended release tablet Take 1 tablet by mouth daily (Patient taking differently: Take 20 mEq by mouth every other day ) 30 tablet 5    diclofenac (VOLTAREN) 75 MG EC tablet Take 1 tablet by mouth 2 times daily as needed for Pain 60 tablet 2    acetaminophen (TYLENOL) 325 MG tablet Take 2 tablets by mouth every 4 hours as needed for Pain or Fever 60 tablet 0     No current facility-administered medications for this visit. REVIEW OF SYSTEM:   Constitutional: No fever or chills. No night sweats, no weight loss   Eyes: No eye discharge, double vision, or eye pain   HEENT: negative for sore mouth, sore throat, hoarseness and voice change   Respiratory: negative for cough , sputum, dyspnea, wheezing, hemoptysis, chest pain   Cardiovascular: negative for chest pain, dyspnea, palpitations, orthopnea, PND   Gastrointestinal: negative for nausea, vomiting, diarrhea, constipation, abdominal pain, Dysphagia, hematemesis and hematochezia   Genitourinary: negative for frequency, dysuria, nocturia, urinary incontinence, and hematuria   Integument: negative for rash, skin lesions, bruises.    Hematologic/Lymphatic: negative for easy bruising, bleeding, lymphadenopathy, petechiae and swelling/edema   Endocrine: negative for heat or cold intolerance, tremor, weight changes, change in bowel habits and hair loss   Musculoskeletal: negative for myalgias, arthralgias, pain, joint swelling,and bone pain   Neurological: negative for headaches, dizziness, seizures, weakness, numbness   OBJECTIVE:         Vitals:    12/14/20 1440   BP: 130/82   Pulse: 77   Resp: 16   Temp: 97.2 °F (36.2 °C)   SpO2: 95%   PHYSICAL EXAM: General appearance - well appearing, no in pain or distress   Mental status - alert and cooperative   Eyes - pupils equal and reactive, extraocular eye movements intact   Ears - bilateral TM's and external ear canals normal   Mouth - mucous membranes moist, pharynx normal without lesions   Neck - supple, no significant adenopathy   Lymphatics - no palpable lymphadenopathy, no hepatosplenomegaly   Chest - clear to auscultation, no wheezes, rales or rhonchi, symmetric air entry   Heart - normal rate, regular rhythm, normal S1, S2, no murmurs, rubs, clicks or gallops   Abdomen - Surgical scar healing well, soft, nontender, nondistended, no masses or organomegaly   Neurological - alert, oriented, normal speech, no focal findings or movement disorder noted   Musculoskeletal - no joint tenderness, deformity or swelling   Extremities - peripheral pulses normal, no pedal edema, no clubbing or cyanosis   Skin - normal coloration and turgor, no rashes, no suspicious skin lesions noted ,  LABORATORY DATA:     Lab Results   Component Value Date    WBC 6.2 11/20/2020    HGB 13.7 11/20/2020    HCT 44.5 11/20/2020    .9 11/20/2020     (L) 11/20/2020    LYMPHOPCT 26 11/20/2020    RBC 4.41 11/20/2020    MCH 31.1 11/20/2020    MCHC 30.8 11/20/2020    RDW 14.1 11/20/2020    MONOPCT 10 11/20/2020    BASOPCT 0 11/20/2020    NEUTROABS 3.76 11/20/2020    LYMPHSABS 1.60 11/20/2020    MONOSABS 0.59 11/20/2020    EOSABS 0.19 11/20/2020    BASOSABS <0.03 11/20/2020         Chemistry        Component Value Date/Time     11/20/2020 0955    K 4.2 11/20/2020 0955     11/20/2020 0955    CO2 28 11/20/2020 0955    BUN 20 11/20/2020 0955    CREATININE 0.79 11/20/2020 0955        Component Value Date/Time    CALCIUM 10.1 11/20/2020 0955    ALKPHOS 97 11/20/2020 0955    AST 12 11/20/2020 0955    ALT 8 11/20/2020 0955    BILITOT 0.51 11/20/2020 0955        PATHOLOGY DATA:     -- Diagnosis --   1.  SIGMOID COLON, SEGMENTAL RESECTION:   - LOW-GRADE ADENOCARCINOMA ARISING IN A LARGE SESSILE TUBULOVILLOUS   ADENOMA, INVADING INTO THE SUBMUCOSA.   - THE FINAL SURGICAL MARGINS ARE NEGATIVE FOR NEOPLASM. - BENIGN REGIONAL LYMPH NODES (0/14). 2.  SIGMOID COLON, DISTAL MARGIN, RESECTION:   - UNREMARKABLE COLON SEGMENT.   - SINGLE BENIGN LYMPH NODE (0/1). PATHOLOGIC STAGE CLASSIFICATION:     pT1  pN0   ADDENDUM DIAGNOSIS   AT THE REQUEST OF DR. Lisseth Fox, BLOCK 14B WAS SENT TO [facility]   FOR MMR TESTING.  THE RESULTS ARE AS FOLLOWS:     MISMATCH REPAIR BY IHC RESULT:  NORMAL   MISMATCH REPAIR BY IHC WITH MLH1:  NORMAL   MISMATCH REPAIR BY IHC WITH MSH2:  NORMAL   MISMATCH REPAIR BY IHC WITH MSH6:  NORMAL   MISMATCH REPAIR BY IHC WITH PMS2:  NORMAL   IMAGING DATA:    CT abdomen pelvis 11/10/18  Impression   Dilated stomach and loops of bowel, possibly reflecting postoperative ileus.       Trace perihepatic and mild pelvic ascites.       Redemonstration of subcutaneous mass along the right buttock. CT abdomen pelvis 2/26/19:  Improved postoperative findings status post partial colon resection and   ileostomy.  No acute abnormality identified.       Stable chronic findings, as above. ASSESSMENT:    David Emmanuel has T1NO, stage I colon adenocarcinoma, MMR proficient. I discussed the NCCN guidelines. I discussed that for stage I colon cancer, there is no role of adjuvant chemotherapy, so surveillance is recommended. I will follow her in three months with CEA, CBC and CMP. She will need colonoscopy in one year. I discussed the results of recent lab work and CT scans which showed no evidence of recurrence. Clinically she is doing well. She is planning to have follow up with Gen. surgery in May and possible colonoscopy for planning reversal colostomy. During today's visit, the patient and the family had a number of reasonable questions which were answered to their satisfaction.   They verbalized understanding of the information 07/19/2017    CHF (congestive heart failure) (HCC)     Dyslipidemia     FH: total abdominal hysterectomy and bilateral salpingo-oophorectomy 1966    Glucose intolerance (impaired glucose tolerance)     Hypertension     Malignant neoplasm of sigmoid colon ([facility] [address]     Obesity     Osteoarthritis        Past Surgical History:   Procedure Laterality Date    CARDIAC CATHETERIZATION  09/05/2017    CHOLECYSTECTOMY  1960s    COLONOSCOPY N/A 10/8/2018    COLONOSCOPY WITH COLD ET HOT BIOPSIES ET POLYPECTOMIES performed by Inga Bright MD at [facility] N/A 4/25/2019    COLONOSCOPY performed by Berna Azul MD at [facility]  stricture at 7 cm     COLONOSCOPY N/A 2/3/2020    tubular adenoma X 1, Dr. Tia Woodward Bilateral 11/5/2018    CYSTO Bilateral Lighted stent placements performed by Sami Rowe MD at [address] [facility], PARTIAL, W/ANAST N/A 11/5/2018    Open Sigmoid Colectomy w/ lighted stents ILEOSTOMY PLACEMENT performed by Inga Bright MD at [address] [facility] N/A 5/9/2019    Flexible Sigmoidoscopy with Dilatation of rectal stricture performed by Berna Azul MD at [facility] N/A 5/22/2019    Loop Ileostomy take down performed by Berna Azul MD at [address] [place] [address] Right [facility]       Allergies   Allergen Reactions    Pcn [Penicillins] Hives and Shortness Of Breath    Bextra [Valdecoxib] Diarrhea       Current Outpatient Medications   Medication Sig Dispense Refill    amiodarone (CORDARONE) 200 MG tablet TAKE 1/2 TABLET BY MOUTH ONE TIME A DAY  45 tablet 3    Handicap Placard MISC by Does not apply route Expires in 5 years 1 each 0    atorvastatin (LIPITOR) 40 MG tablet TAKE 1 TABLET BY MOUTH ONE TIME nightly 90 tablet 1    metoprolol tartrate (LOPRESSOR) 25 MG tablet TAKE ONE TABLET BY MOUTH TWICE A DAY 60 tablet 1    amLODIPine (NORVASC) 10 MG tablet Take 1 tablet by mouth daily 90 tablet 1    levothyroxine (SYNTHROID) 50 MCG tablet TAKE 1 TABLET BY MOUTH ONE TIME A DAY  30 tablet 5    furosemide (LASIX) 20 MG tablet TAKE ONE TABLET BY MOUTH DAILY 30 tablet 5    potassium chloride (KLOR-CON M) 20 MEQ extended release tablet Take 1 tablet by mouth daily (Patient taking differently: Take 20 mEq by mouth every other day ) 30 tablet 5    diclofenac (VOLTAREN) 75 MG EC tablet Take 1 tablet by mouth 2 times daily as needed for Pain 60 tablet 2    acetaminophen (TYLENOL) 325 MG tablet Take 2 tablets by mouth every 4 hours as needed for Pain or Fever 60 tablet 0     No current facility-administered medications for this visit. REVIEW OF SYSTEM:   Constitutional: No fever or chills. No night sweats, no weight loss   Eyes: No eye discharge, double vision, or eye pain   HEENT: negative for sore mouth, sore throat, hoarseness and voice change   Respiratory: negative for cough , sputum, dyspnea, wheezing, hemoptysis, chest pain   Cardiovascular: negative for chest pain, dyspnea, palpitations, orthopnea, PND   Gastrointestinal: negative for nausea, vomiting, diarrhea, constipation, abdominal pain, Dysphagia, hematemesis and hematochezia   Genitourinary: negative for frequency, dysuria, nocturia, urinary incontinence, and hematuria   Integument: negative for rash, skin lesions, bruises.    Hematologic/Lymphatic: negative for easy bruising, bleeding, lymphadenopathy, petechiae and swelling/edema   Endocrine: negative for heat or cold intolerance, tremor, weight changes, change in bowel habits and hair loss   Musculoskeletal: negative for myalgias, arthralgias, pain, joint swelling,and bone pain   Neurological: negative for headaches, dizziness, seizures, weakness, numbness   OBJECTIVE:         Vitals:    12/14/20 1440   BP: 130/82   Pulse: 77   Resp: 16   Temp: 97.2 °F (36.2 °C)   SpO2: 95%   PHYSICAL EXAM:   General appearance - well appearing, no in pain or distress   Mental status - alert and cooperative   Eyes - pupils equal and reactive, extraocular eye movements intact   Ears - bilateral TM's and external ear canals normal   Mouth - mucous membranes moist, pharynx normal without lesions   Neck - supple, no significant adenopathy   Lymphatics - no palpable lymphadenopathy, no hepatosplenomegaly   Chest - clear to auscultation, no wheezes, rales or rhonchi, symmetric air entry   Heart - normal rate, regular rhythm, normal S1, S2, no murmurs, rubs, clicks or gallops   Abdomen - Surgical scar healing well, soft, nontender, nondistended, no masses or organomegaly   Neurological - alert, oriented, normal speech, no focal findings or movement disorder noted   Musculoskeletal - no joint tenderness, deformity or swelling   Extremities - peripheral pulses normal, no pedal edema, no clubbing or cyanosis   Skin - normal coloration and turgor, no rashes, no suspicious skin lesions noted ,  LABORATORY DATA:     Lab Results   Component Value Date    WBC 6.2 11/20/2020    HGB 13.7 11/20/2020    HCT 44.5 11/20/2020    .9 11/20/2020     (L) 11/20/2020    LYMPHOPCT 26 11/20/2020    RBC 4.41 11/20/2020    MCH 31.1 11/20/2020    MCHC 30.8 11/20/2020    RDW 14.1 11/20/2020    MONOPCT 10 11/20/2020    BASOPCT 0 11/20/2020    NEUTROABS 3.76 11/20/2020    LYMPHSABS 1.60 11/20/2020    MONOSABS 0.59 11/20/2020    EOSABS 0.19 11/20/2020    BASOSABS <0.03 11/20/2020         Chemistry        Component Value Date/Time     11/20/2020 0955    K 4.2 11/20/2020 0955     11/20/2020 0955    CO2 28 11/20/2020 0955    BUN 20 11/20/2020 0955    CREATININE 0.79 11/20/2020 0955        Component Value Date/Time    CALCIUM 10.1 11/20/2020 0955    ALKPHOS 97 11/20/2020 0955    AST 12 11/20/2020 0955    ALT 8 11/20/2020 0955    BILITOT 0.51 11/20/2020 0955        PATHOLOGY DATA:     -- Diagnosis --   1.  SIGMOID COLON, SEGMENTAL RESECTION:   - LOW-GRADE ADENOCARCINOMA ARISING IN A LARGE SESSILE TUBULOVILLOUS   ADENOMA, INVADING INTO THE SUBMUCOSA.   - THE FINAL SURGICAL MARGINS ARE NEGATIVE FOR NEOPLASM. - BENIGN REGIONAL LYMPH NODES (0/14). 2.  SIGMOID COLON, DISTAL MARGIN, RESECTION:   - UNREMARKABLE COLON SEGMENT.   - SINGLE BENIGN LYMPH NODE (0/1). PATHOLOGIC STAGE CLASSIFICATION:     pT1  pN0   ADDENDUM DIAGNOSIS   AT THE REQUEST OF DR. Joey Valerio, BLOCK 14B WAS SENT TO [facility]   FOR MMR TESTING.  THE RESULTS ARE AS FOLLOWS:     MISMATCH REPAIR BY IHC RESULT:  NORMAL   MISMATCH REPAIR BY IHC WITH MLH1:  NORMAL   MISMATCH REPAIR BY IHC WITH MSH2:  NORMAL   MISMATCH REPAIR BY IHC WITH MSH6:  NORMAL   MISMATCH REPAIR BY IHC WITH PMS2:  NORMAL   IMAGING DATA:    CT abdomen pelvis 11/10/18  Impression   Dilated stomach and loops of bowel, possibly reflecting postoperative ileus.       Trace perihepatic and mild pelvic ascites.       Redemonstration of subcutaneous mass along the right buttock. CT abd 5/31/19  Mild diffuse colonic wall thickening may be related to recent surgery versus   a colitis.  No bowel obstruction.       Incompletely characterized right renal lesion may be a complex cyst but has   increased over multiple prior studies.  Recommend follow-up with MRI with   contrast.   CT abdomen pelvis 2/4/20  Impression   1. Le Torres limited study due to lack of IV contrast, which the patient   refused after several unsuccessful IV access attempts.       2.  No definite evidence of metastatic disease in the chest, abdomen, or   pelvis.       3.  Redemonstration of patchy lucencies in the sacrum, which has been present   since at least 09/21/2018 and could be sequelae of previous insufficiency   fractures.  If clinically indicated, this could be further evaluated with MRI. ASSESSMENT:    Shailesh Marcos has T1NO, stage I colon adenocarcinoma, MMR proficient. I discussed the NCCN guidelines.  I discussed that for stage I colon cancer, there is no role of adjuvant chemotherapy, so surveillance is recommended. I will follow her in three months with CEA, CBC and CMP. She had a colonoscopy in one year after her surgery. Slight rise in her CEA level. CT scan showed no recurrence and will repeat lab work in 6 months  Bone lucencies: Stable on recent scan    During today's visit, the patient and the family had a number of reasonable questions which were answered to their satisfaction. They verbalized understanding of the information provided and they agreed to proceed as outlined above. PLAN:   Discuss recent lab work with patient  Reviewed the recent CT scan of abdomen pelvis which showed stable heterogenous lucencies in the sacrum possibly from previous insufficiency fractures. Overall no evidence of metastasis or progression  Return to clinic in 6 months with labs prior    Rory Edwards MD  Hematologist/Medical Oncologist      This note is created with the assistance of a speech recognition program.  While intending to generate a document that actually reflects the content of the visit, the document can still have some errors including those of syntax and sound a like substitutions which may escape proof reading. It such instances, actual meaning can be extrapolated by contextual diversion.

## 2020-12-14 NOTE — TELEPHONE ENCOUNTER
Gia Vera is requesting a refill on the following medication(s):  Requested Prescriptions     Pending Prescriptions Disp Refills    atorvastatin (LIPITOR) 40 MG tablet [Pharmacy Med Name: Atorvastatin Calcium Oral Tablet 40 MG] 90 tablet 0     Sig: TAKE 1 TABLET BY MOUTH nightly    levothyroxine (SYNTHROID) 50 MCG tablet [Pharmacy Med Name: Levothyroxine Sodium Oral Tablet 50 MCG] 30 tablet 0     Sig: TAKE 1 TABLET BY MOUTH ONE TIME A DAY       Last Visit Date (If Applicable):  9/04/8331    Next Visit Date:    3/15/2021

## 2020-12-14 NOTE — TELEPHONE ENCOUNTER
Last Appt:  6/18/2020  Next Appt:   1/14/2021  Med verified in [facility]

## 2020-12-16 RX ORDER — ATORVASTATIN CALCIUM 40 MG/1
TABLET, FILM COATED ORAL
Qty: 90 TABLET | Refills: 1 | Status: SHIPPED | OUTPATIENT
Start: 2020-12-16 | End: 2021-10-20

## 2020-12-16 RX ORDER — LEVOTHYROXINE SODIUM 0.05 MG/1
TABLET ORAL
Qty: 90 TABLET | Refills: 1 | Status: SHIPPED | OUTPATIENT
Start: 2020-12-16 | End: 2021-08-10

## 2021-01-14 ENCOUNTER — OFFICE VISIT (OUTPATIENT)
Dept: CARDIOLOGY | Age: 81
End: 2021-01-14
Payer: MEDICARE

## 2021-01-14 VITALS
WEIGHT: 243 LBS | SYSTOLIC BLOOD PRESSURE: 145 MMHG | HEIGHT: 67 IN | BODY MASS INDEX: 38.14 KG/M2 | HEART RATE: 64 BPM | DIASTOLIC BLOOD PRESSURE: 64 MMHG

## 2021-01-14 DIAGNOSIS — I48.0 PAROXYSMAL ATRIAL FIBRILLATION (HCC): Primary | ICD-10-CM

## 2021-01-14 PROCEDURE — 1090F PRES/ABSN URINE INCON ASSESS: CPT | Performed by: INTERNAL MEDICINE

## 2021-01-14 PROCEDURE — G8427 DOCREV CUR MEDS BY ELIG CLIN: HCPCS | Performed by: INTERNAL MEDICINE

## 2021-01-14 PROCEDURE — 99214 OFFICE O/P EST MOD 30 MIN: CPT

## 2021-01-14 PROCEDURE — G8417 CALC BMI ABV UP PARAM F/U: HCPCS | Performed by: INTERNAL MEDICINE

## 2021-01-14 PROCEDURE — G8399 PT W/DXA RESULTS DOCUMENT: HCPCS | Performed by: INTERNAL MEDICINE

## 2021-01-14 PROCEDURE — 99214 OFFICE O/P EST MOD 30 MIN: CPT | Performed by: INTERNAL MEDICINE

## 2021-01-14 PROCEDURE — 1123F ACP DISCUSS/DSCN MKR DOCD: CPT | Performed by: INTERNAL MEDICINE

## 2021-01-14 PROCEDURE — 1036F TOBACCO NON-USER: CPT | Performed by: INTERNAL MEDICINE

## 2021-01-14 PROCEDURE — 4040F PNEUMOC VAC/ADMIN/RCVD: CPT | Performed by: INTERNAL MEDICINE

## 2021-01-14 PROCEDURE — 93010 ELECTROCARDIOGRAM REPORT: CPT | Performed by: INTERNAL MEDICINE

## 2021-01-14 PROCEDURE — 93005 ELECTROCARDIOGRAM TRACING: CPT | Performed by: INTERNAL MEDICINE

## 2021-01-14 PROCEDURE — G8484 FLU IMMUNIZE NO ADMIN: HCPCS | Performed by: INTERNAL MEDICINE

## 2021-01-14 NOTE — PROGRESS NOTES
[facility] [address] [facility] [address]  Dept: [phone]  Loc: [phone]    CC: follow up for VINOD Perez     Subjective: The patient is a 79y.o. year old, , female is in the office for a follow up visit. Feels well. No cp, sob, orthopnea, pnd, le edema. No fatigue, dizziness, lightheadedness. BP at home 130s. Past Medical History:   has a past medical history of A-fib ([facility] [place] [address] CHF (congestive heart failure) ([facility] [place] [address] Dyslipidemia, FH: total abdominal hysterectomy and bilateral salpingo-oophorectomy, Glucose intolerance (impaired glucose tolerance), Hypertension, Malignant neoplasm of sigmoid colon ([facility] [place] [address] Obesity, and Osteoarthritis. Past Surgical History:   has a past surgical history that includes kenyon and bso (cervix removed) (1966); Cholecystectomy (1960s); Varicose vein surgery (Right, 1960s); Hip Arthroplasty (Left); Knee Arthroplasty (Left); Knee Arthroplasty (Right); Cardiac catheterization (09/05/2017); Colonoscopy (N/A, 10/8/2018); pr lap,surg,colectomy, partial, w/anast (N/A, 11/5/2018); pr cystoscopy,insert ureteral stent (Bilateral, 11/5/2018); Colonoscopy (N/A, 4/25/2019); sigmoidoscopy (N/A, 5/9/2019); Small intestine surgery (N/A, 5/22/2019); and Colonoscopy (N/A, 2/3/2020). Home Medications:  Prior to Admission medications    Medication Sig Start Date End Date Taking?  Authorizing Provider   atorvastatin (LIPITOR) 40 MG tablet TAKE 1 TABLET BY MOUTH nightly 12/16/20  Yes Palma Mchugh DO   levothyroxine (SYNTHROID) 50 MCG tablet TAKE 1 TABLET BY MOUTH ONE TIME A DAY  12/16/20  Yes Palma Mchugh DO   amiodarone (CORDARONE) 200 MG tablet TAKE 1/2 TABLET BY MOUTH ONE TIME A DAY  12/14/20  Yes MD Claire Morgan MISC by Does not apply route Expires in 5 years 11/20/20  Yes Palma Mchugh    metoprolol tartrate (LOPRESSOR) 25 MG tablet TAKE ONE TABLET BY MOUTH TWICE A DAY 8/20/20  Yes Ruthann Chung, APRN - CNP   amLODIPine (NORVASC) 10 MG tablet Take 1 tablet by mouth daily 6/18/20  Yes Ferdo Casas, DO   furosemide (LASIX) 20 MG tablet TAKE ONE TABLET BY MOUTH DAILY 4/22/20  Yes Renate Bi, DO   potassium chloride (KLOR-CON M) 20 MEQ extended release tablet Take 1 tablet by mouth daily  Patient taking differently: Take 20 mEq by mouth every other day  3/9/20  Yes Renate Bi, DO   diclofenac (VOLTAREN) 75 MG EC tablet Take 1 tablet by mouth 2 times daily as needed for Pain 8/26/19  Yes Renate Bi, DO   acetaminophen (TYLENOL) 325 MG tablet Take 2 tablets by mouth every 4 hours as needed for Pain or Fever 11/9/18 9/14/20  Oralee Hurtado       Allergies:  Pcn [penicillins] and Bextra [valdecoxib]    Social History:   reports that she has never smoked. She has never used smokeless tobacco. She reports that she does not drink alcohol or use drugs. Review of Systems:  · Constitutional: there has been no unanticipated weight loss. There's been No change in energy level, No change in activity level. · Eyes: No visual changes or diplopia. No scleral icterus. · ENT: No Headaches, hearing loss or vertigo. No mouth sores or sore throat. · Cardiovascular: As above. · Respiratory: as hpi  · Gastrointestinal: No abdominal pain, appetite loss, blood in stools. No change in bowel or bladder habits. · Genitourinary: No dysuria, trouble voiding, or hematuria. · Musculoskeletal:  No gait disturbance, No weakness or joint complaints. · Integumentary: No rash or pruritis. · Psychiatric: No anxiety, or depression. · Hematologic/Lymphatic: No abnormal bruising or bleeding, blood clots or swollen lymph nodes. · Allergic/Immunologic: No nasal congestion or hives.     Physical Exam:  BP (!) 145/64   Pulse 64   Ht 5' 7\" (1.702 m)   Wt 243 lb (110.2 kg) Comment: Per patient  LMP 01/01/1968   BMI 38.06 kg/m²     Constitutional and General Appearance: alert, cooperative, no distress and appears stated age  [de-identified]: PERRL, no cervical lymphadenopathy. No masses palpable. Normal oral mucosa  Respiratory:  · Normal excursion and expansion without use of accessory muscles  · Resp Auscultation: Good respiratory effort. No for increased work of breathing. On auscultation: clear to auscultation bilaterally  Cardiovascular:  · The apical impulse is not displaced  · Heart tones are crisp and normal. regular S1 and S2.+SM   · Jugular venous pulsation Normal  · The carotid upstroke is normal in amplitude and contour without delay or bruit  · Peripheral pulses are symmetrical and full   Abdomen:   · No masses or tenderness  · Bowel sounds present  Extremities:  ·  No Cyanosis or Clubbing  ·  Lower extremity edema: No  ·  Skin: Warm and dry    Cardiac Data:  EKG: Sinus  Rhythm   Voltage criteria for LVH  (R(I)+S(III) exceeds 2.50 mV)  -Voltage criteria w/o ST/T abnormality may be normal.     Labs:   LABS  Lab Results   Component Value Date    CHOL 97 09/08/2020    TRIG 90 09/08/2020    HDL 48 09/08/2020    LDLCHOLESTEROL 31 09/08/2020    VLDL NOT REPORTED 09/08/2020    CHOLHDLRATIO 2.0 09/08/2020       Lab Results   Component Value Date     11/20/2020    K 4.2 11/20/2020     11/20/2020    CO2 28 11/20/2020    BUN 20 11/20/2020    CREATININE 0.79 11/20/2020    GLUCOSE 100 (H) 11/20/2020    CALCIUM 10.1 11/20/2020    PROT 6.7 11/20/2020    PROT 7.4 11/20/2020    LABALBU 4.3 11/20/2020    BILITOT 0.51 11/20/2020    ALKPHOS 97 11/20/2020    AST 12 11/20/2020    ALT 8 11/20/2020    LABGLOM >60 11/20/2020    GFRAA >60 11/20/2020    GLOB 2.7 05/31/2019         IMPRESSION/RECOMMENDATIONS:  Sinus Bradycardia- resolved   - continue amio 100 mg po qd   - continue lopressor     PAF IN NSR - no AC due to GI bleed.     CHF/ COR-PULMONALE ,  WELL COMPENSATED CONTINUE CURRENT MEDS     HYPERTENSION- higher in office, well controlled at home BP 130s, will not aggresively treat given her older age and risk of falls    HYPERLIPIDEMIA- ON STATIN    The patient is to continue heart healthy diet, weight loss and exercise as tolerated. Patient's medications and side effects were discussed. Medication refills were provided if needed. Follow up appointment timing was discussed. All questions and concerns were addressed to patient's satisfaction. The patient is to follow up in 6 months or sooner if necessary. Thank you for allowing me to participate in the care of this patient, please do not hesitate to call if you have any questions. Maria Elena Lundberg DO, [facility] [place], [address] [facility]  [facility] [employer]  [phone]

## 2021-01-21 RX ORDER — AMLODIPINE BESYLATE 10 MG/1
TABLET ORAL
Qty: 90 TABLET | Refills: 3 | Status: SHIPPED | OUTPATIENT
Start: 2021-01-21 | End: 2022-02-21

## 2021-01-21 NOTE — TELEPHONE ENCOUNTER
Last Appt:  1/14/2021  Next Appt:   7/15/2021  Med verified in [facility]

## 2021-01-27 ENCOUNTER — IMMUNIZATION (OUTPATIENT)
Dept: LAB | Age: 81
End: 2021-01-27
Payer: MEDICARE

## 2021-01-27 PROCEDURE — 91301 COVID-19, MODERNA VACCINE 100MCG/0.5ML DOSE: CPT

## 2021-02-24 ENCOUNTER — IMMUNIZATION (OUTPATIENT)
Dept: LAB | Age: 81
End: 2021-02-24
Payer: MEDICARE

## 2021-02-24 PROCEDURE — 91301 COVID-19, MODERNA VACCINE 100MCG/0.5ML DOSE: CPT

## 2021-03-01 RX ORDER — FUROSEMIDE 20 MG/1
TABLET ORAL
Qty: 30 TABLET | Refills: 1 | Status: SHIPPED | OUTPATIENT
Start: 2021-03-01 | End: 2021-08-30

## 2021-03-01 NOTE — TELEPHONE ENCOUNTER
Dilip Stevens called requesting a refill of the below medication which has been pended for you:     Requested Prescriptions     Pending Prescriptions Disp Refills    furosemide (LASIX) 20 MG tablet [Pharmacy Med Name: FUROSEMIDE 20 MG TABLET] 30 tablet 4     Sig: TAKE ONE TABLET BY MOUTH DAILY       Last Appointment Date: 9/14/2020  Next Appointment Date: 3/15/2021    Allergies   Allergen Reactions    Pcn [Penicillins] Hives and Shortness Of Breath    Bextra [Valdecoxib] Diarrhea

## 2021-03-02 DIAGNOSIS — I10 ESSENTIAL HYPERTENSION: ICD-10-CM

## 2021-03-02 NOTE — TELEPHONE ENCOUNTER
[facility] called requesting a refill of the below medication which has been pended for you:     Requested Prescriptions     Pending Prescriptions Disp Refills    metoprolol tartrate (LOPRESSOR) 25 MG tablet 60 tablet 1       Last Appointment Date: 9/14/2020  Next Appointment Date: 3/15/2021    Allergies   Allergen Reactions    Pcn [Penicillins] Hives and Shortness Of Breath    Bextra [Valdecoxib] Diarrhea

## 2021-03-09 ENCOUNTER — HOSPITAL ENCOUNTER (OUTPATIENT)
Dept: LAB | Age: 81
Discharge: HOME | End: 2021-03-09
Payer: MEDICARE

## 2021-03-09 DIAGNOSIS — C18.7 MALIGNANT NEOPLASM OF SIGMOID COLON (HCC): ICD-10-CM

## 2021-03-09 DIAGNOSIS — E78.2 MIXED HYPERLIPIDEMIA: ICD-10-CM

## 2021-03-09 DIAGNOSIS — E03.9 ACQUIRED HYPOTHYROIDISM: ICD-10-CM

## 2021-03-09 DIAGNOSIS — I10 ESSENTIAL HYPERTENSION: ICD-10-CM

## 2021-03-09 DIAGNOSIS — R73.01 IMPAIRED FASTING GLUCOSE: ICD-10-CM

## 2021-03-09 LAB
ABSOLUTE EOS #: 0.21 K/UL (ref 0–0.44)
ABSOLUTE EOS #: 0.21 K/UL (ref 0–0.44)
ABSOLUTE IMMATURE GRANULOCYTE: <0.03 K/UL (ref 0–0.3)
ABSOLUTE IMMATURE GRANULOCYTE: <0.03 K/UL (ref 0–0.3)
ABSOLUTE LYMPH #: 1.73 K/UL (ref 1.1–3.7)
ABSOLUTE LYMPH #: 1.73 K/UL (ref 1.1–3.7)
ABSOLUTE MONO #: 0.68 K/UL (ref 0.1–1.2)
ABSOLUTE MONO #: 0.68 K/UL (ref 0.1–1.2)
ALBUMIN SERPL-MCNC: 4.2 G/DL (ref 3.5–5.2)
ALBUMIN SERPL-MCNC: 4.2 G/DL (ref 3.5–5.2)
ALBUMIN/GLOBULIN RATIO: 1.4 (ref 1–2.5)
ALBUMIN/GLOBULIN RATIO: 1.4 (ref 1–2.5)
ALP BLD-CCNC: 89 U/L (ref 35–104)
ALP BLD-CCNC: 89 U/L (ref 35–104)
ALT SERPL-CCNC: 8 U/L (ref 5–33)
ALT SERPL-CCNC: 8 U/L (ref 5–33)
ANION GAP SERPL CALCULATED.3IONS-SCNC: 12 MMOL/L (ref 9–17)
ANION GAP SERPL CALCULATED.3IONS-SCNC: 12 MMOL/L (ref 9–17)
AST SERPL-CCNC: 14 U/L
AST SERPL-CCNC: 14 U/L
BASOPHILS # BLD: 0 % (ref 0–2)
BASOPHILS # BLD: 0 % (ref 0–2)
BASOPHILS ABSOLUTE: 0.03 K/UL (ref 0–0.2)
BASOPHILS ABSOLUTE: 0.03 K/UL (ref 0–0.2)
BILIRUB SERPL-MCNC: 0.7 MG/DL (ref 0.3–1.2)
BILIRUB SERPL-MCNC: 0.7 MG/DL (ref 0.3–1.2)
BUN BLDV-MCNC: 21 MG/DL (ref 8–23)
BUN BLDV-MCNC: 21 MG/DL (ref 8–23)
BUN/CREAT BLD: 28 (ref 9–20)
BUN/CREAT BLD: 28 (ref 9–20)
CALCIUM SERPL-MCNC: 9.5 MG/DL (ref 8.6–10.4)
CALCIUM SERPL-MCNC: 9.5 MG/DL (ref 8.6–10.4)
CARCINOEMBRYONIC ANTIGEN: 3.8 NG/ML
CHLORIDE BLD-SCNC: 108 MMOL/L (ref 98–107)
CHLORIDE BLD-SCNC: 108 MMOL/L (ref 98–107)
CHOLESTEROL/HDL RATIO: 2
CHOLESTEROL: 111 MG/DL
CO2: 23 MMOL/L (ref 20–31)
CO2: 23 MMOL/L (ref 20–31)
CREAT SERPL-MCNC: 0.76 MG/DL (ref 0.5–0.9)
CREAT SERPL-MCNC: 0.76 MG/DL (ref 0.5–0.9)
DIFFERENTIAL TYPE: ABNORMAL
DIFFERENTIAL TYPE: ABNORMAL
EOSINOPHILS RELATIVE PERCENT: 3 % (ref 1–4)
EOSINOPHILS RELATIVE PERCENT: 3 % (ref 1–4)
ESTIMATED AVERAGE GLUCOSE: 105 MG/DL
GFR AFRICAN AMERICAN: >60 ML/MIN
GFR AFRICAN AMERICAN: >60 ML/MIN
GFR NON-AFRICAN AMERICAN: >60 ML/MIN
GFR NON-AFRICAN AMERICAN: >60 ML/MIN
GFR SERPL CREATININE-BSD FRML MDRD: ABNORMAL ML/MIN/{1.73_M2}
GLUCOSE BLD-MCNC: 100 MG/DL (ref 70–99)
GLUCOSE FASTING: 100 MG/DL (ref 70–99)
HBA1C MFR BLD: 5.3 % (ref 4–6)
HCT VFR BLD CALC: 44 % (ref 36.3–47.1)
HCT VFR BLD CALC: 44 % (ref 36.3–47.1)
HDLC SERPL-MCNC: 56 MG/DL
HEMOGLOBIN: 13.4 G/DL (ref 11.9–15.1)
HEMOGLOBIN: 13.4 G/DL (ref 11.9–15.1)
IMMATURE GRANULOCYTES: 0 %
IMMATURE GRANULOCYTES: 0 %
LDL CHOLESTEROL: 34 MG/DL (ref 0–130)
LYMPHOCYTES # BLD: 25 % (ref 24–43)
LYMPHOCYTES # BLD: 25 % (ref 24–43)
MCH RBC QN AUTO: 31.6 PG (ref 25.2–33.5)
MCH RBC QN AUTO: 31.6 PG (ref 25.2–33.5)
MCHC RBC AUTO-ENTMCNC: 30.5 G/DL (ref 25.2–33.5)
MCHC RBC AUTO-ENTMCNC: 30.5 G/DL (ref 25.2–33.5)
MCV RBC AUTO: 103.8 FL (ref 82.6–102.9)
MCV RBC AUTO: 103.8 FL (ref 82.6–102.9)
MONOCYTES # BLD: 10 % (ref 3–12)
MONOCYTES # BLD: 10 % (ref 3–12)
NRBC AUTOMATED: 0 PER 100 WBC
NRBC AUTOMATED: 0 PER 100 WBC
PDW BLD-RTO: 14.2 % (ref 11.8–14.4)
PDW BLD-RTO: 14.2 % (ref 11.8–14.4)
PLATELET # BLD: 131 K/UL (ref 138–453)
PLATELET # BLD: 131 K/UL (ref 138–453)
PLATELET ESTIMATE: ABNORMAL
PLATELET ESTIMATE: ABNORMAL
PMV BLD AUTO: 13 FL (ref 8.1–13.5)
PMV BLD AUTO: 13 FL (ref 8.1–13.5)
POTASSIUM SERPL-SCNC: 3.6 MMOL/L (ref 3.7–5.3)
POTASSIUM SERPL-SCNC: 3.6 MMOL/L (ref 3.7–5.3)
RBC # BLD: 4.24 M/UL (ref 3.95–5.11)
RBC # BLD: 4.24 M/UL (ref 3.95–5.11)
RBC # BLD: ABNORMAL 10*6/UL
RBC # BLD: ABNORMAL 10*6/UL
SEG NEUTROPHILS: 61 % (ref 36–65)
SEG NEUTROPHILS: 62 % (ref 36–65)
SEGMENTED NEUTROPHILS ABSOLUTE COUNT: 4.15 K/UL (ref 1.5–8.1)
SEGMENTED NEUTROPHILS ABSOLUTE COUNT: 4.15 K/UL (ref 1.5–8.1)
SODIUM BLD-SCNC: 143 MMOL/L (ref 135–144)
SODIUM BLD-SCNC: 143 MMOL/L (ref 135–144)
THYROXINE, FREE: 1.65 NG/DL (ref 0.93–1.7)
TOTAL PROTEIN: 7.2 G/DL (ref 6.4–8.3)
TOTAL PROTEIN: 7.2 G/DL (ref 6.4–8.3)
TRIGL SERPL-MCNC: 105 MG/DL
TSH SERPL DL<=0.05 MIU/L-ACNC: 1.08 MIU/L (ref 0.3–5)
VLDLC SERPL CALC-MCNC: NORMAL MG/DL (ref 1–30)
WBC # BLD: 6.8 K/UL (ref 3.5–11.3)
WBC # BLD: 6.8 K/UL (ref 3.5–11.3)
WBC # BLD: ABNORMAL 10*3/UL
WBC # BLD: ABNORMAL 10*3/UL

## 2021-03-09 PROCEDURE — 80053 COMPREHEN METABOLIC PANEL: CPT

## 2021-03-09 PROCEDURE — 83036 HEMOGLOBIN GLYCOSYLATED A1C: CPT

## 2021-03-09 PROCEDURE — 84443 ASSAY THYROID STIM HORMONE: CPT

## 2021-03-09 PROCEDURE — 82378 CARCINOEMBRYONIC ANTIGEN: CPT

## 2021-03-09 PROCEDURE — 36415 COLL VENOUS BLD VENIPUNCTURE: CPT

## 2021-03-09 PROCEDURE — 84439 ASSAY OF FREE THYROXINE: CPT

## 2021-03-09 PROCEDURE — 80061 LIPID PANEL: CPT

## 2021-03-09 PROCEDURE — 85025 COMPLETE CBC W/AUTO DIFF WBC: CPT

## 2021-03-15 ENCOUNTER — APPOINTMENT (OUTPATIENT)
Dept: GENERAL RADIOLOGY | Age: 81
End: 2021-03-15
Payer: MEDICARE

## 2021-03-15 ENCOUNTER — HOSPITAL ENCOUNTER (EMERGENCY)
Age: 81
Discharge: HOME | End: 2021-03-15
Attending: EMERGENCY MEDICINE
Payer: MEDICARE

## 2021-03-15 ENCOUNTER — TELEPHONE (OUTPATIENT)
Dept: FAMILY MEDICINE CLINIC | Age: 81
End: 2021-03-15

## 2021-03-15 ENCOUNTER — OFFICE VISIT (OUTPATIENT)
Dept: FAMILY MEDICINE CLINIC | Age: 81
End: 2021-03-15
Payer: MEDICARE

## 2021-03-15 VITALS
HEART RATE: 58 BPM | TEMPERATURE: 95.1 F | HEIGHT: 67 IN | DIASTOLIC BLOOD PRESSURE: 62 MMHG | OXYGEN SATURATION: 94 % | RESPIRATION RATE: 16 BRPM | BODY MASS INDEX: 37.98 KG/M2 | WEIGHT: 242 LBS | SYSTOLIC BLOOD PRESSURE: 151 MMHG

## 2021-03-15 VITALS
OXYGEN SATURATION: 97 % | HEIGHT: 67 IN | RESPIRATION RATE: 20 BRPM | SYSTOLIC BLOOD PRESSURE: 118 MMHG | BODY MASS INDEX: 38.06 KG/M2 | TEMPERATURE: 97.5 F | DIASTOLIC BLOOD PRESSURE: 70 MMHG | HEART RATE: 52 BPM

## 2021-03-15 DIAGNOSIS — E78.2 MIXED HYPERLIPIDEMIA: ICD-10-CM

## 2021-03-15 DIAGNOSIS — D69.6 THROMBOCYTOPENIA, UNSPECIFIED (HCC): ICD-10-CM

## 2021-03-15 DIAGNOSIS — E66.01 CLASS 2 SEVERE OBESITY WITH SERIOUS COMORBIDITY AND BODY MASS INDEX (BMI) OF 38.0 TO 38.9 IN ADULT, UNSPECIFIED OBESITY TYPE (HCC): ICD-10-CM

## 2021-03-15 DIAGNOSIS — S42.202A CLOSED FRACTURE OF PROXIMAL END OF LEFT HUMERUS, UNSPECIFIED FRACTURE MORPHOLOGY, INITIAL ENCOUNTER: Primary | ICD-10-CM

## 2021-03-15 DIAGNOSIS — I10 ESSENTIAL HYPERTENSION: Primary | ICD-10-CM

## 2021-03-15 DIAGNOSIS — E03.9 ACQUIRED HYPOTHYROIDISM: ICD-10-CM

## 2021-03-15 DIAGNOSIS — I48.0 PAROXYSMAL ATRIAL FIBRILLATION (HCC): ICD-10-CM

## 2021-03-15 DIAGNOSIS — C18.7 MALIGNANT NEOPLASM OF SIGMOID COLON (HCC): ICD-10-CM

## 2021-03-15 DIAGNOSIS — I50.9 CHRONIC CONGESTIVE HEART FAILURE, UNSPECIFIED HEART FAILURE TYPE (HCC): ICD-10-CM

## 2021-03-15 DIAGNOSIS — R73.01 IMPAIRED FASTING GLUCOSE: ICD-10-CM

## 2021-03-15 PROBLEM — F32.5 MAJOR DEPRESSIVE DISORDER WITH SINGLE EPISODE, IN FULL REMISSION (HCC): Status: RESOLVED | Noted: 2020-03-09 | Resolved: 2021-03-15

## 2021-03-15 PROCEDURE — G8417 CALC BMI ABV UP PARAM F/U: HCPCS | Performed by: FAMILY MEDICINE

## 2021-03-15 PROCEDURE — 1123F ACP DISCUSS/DSCN MKR DOCD: CPT | Performed by: FAMILY MEDICINE

## 2021-03-15 PROCEDURE — G8427 DOCREV CUR MEDS BY ELIG CLIN: HCPCS | Performed by: FAMILY MEDICINE

## 2021-03-15 PROCEDURE — 6360000002 HC RX W HCPCS: Performed by: EMERGENCY MEDICINE

## 2021-03-15 PROCEDURE — 96372 THER/PROPH/DIAG INJ SC/IM: CPT

## 2021-03-15 PROCEDURE — G8484 FLU IMMUNIZE NO ADMIN: HCPCS | Performed by: FAMILY MEDICINE

## 2021-03-15 PROCEDURE — 73060 X-RAY EXAM OF HUMERUS: CPT

## 2021-03-15 PROCEDURE — 1036F TOBACCO NON-USER: CPT | Performed by: FAMILY MEDICINE

## 2021-03-15 PROCEDURE — 99285 EMERGENCY DEPT VISIT HI MDM: CPT

## 2021-03-15 PROCEDURE — G8399 PT W/DXA RESULTS DOCUMENT: HCPCS | Performed by: FAMILY MEDICINE

## 2021-03-15 PROCEDURE — 99214 OFFICE O/P EST MOD 30 MIN: CPT | Performed by: FAMILY MEDICINE

## 2021-03-15 PROCEDURE — 1090F PRES/ABSN URINE INCON ASSESS: CPT | Performed by: FAMILY MEDICINE

## 2021-03-15 PROCEDURE — 4040F PNEUMOC VAC/ADMIN/RCVD: CPT | Performed by: FAMILY MEDICINE

## 2021-03-15 PROCEDURE — 99213 OFFICE O/P EST LOW 20 MIN: CPT | Performed by: FAMILY MEDICINE

## 2021-03-15 RX ORDER — HYDROCODONE BITARTRATE AND ACETAMINOPHEN 5; 325 MG/1; MG/1
1 TABLET ORAL EVERY 6 HOURS PRN
Qty: 20 TABLET | Refills: 0 | Status: SHIPPED | OUTPATIENT
Start: 2021-03-15 | End: 2021-03-20

## 2021-03-15 RX ORDER — MORPHINE SULFATE 10 MG/ML
10 INJECTION, SOLUTION INTRAMUSCULAR; INTRAVENOUS ONCE
Status: COMPLETED | OUTPATIENT
Start: 2021-03-15 | End: 2021-03-15

## 2021-03-15 RX ORDER — POTASSIUM CHLORIDE 20 MEQ/1
20 TABLET, EXTENDED RELEASE ORAL DAILY
Qty: 90 TABLET | Refills: 1 | Status: SHIPPED | OUTPATIENT
Start: 2021-03-15

## 2021-03-15 RX ADMIN — MORPHINE SULFATE 10 MG: 10 INJECTION, SOLUTION INTRAMUSCULAR; INTRAVENOUS at 11:47

## 2021-03-15 ASSESSMENT — ENCOUNTER SYMPTOMS
EYE REDNESS: 0
CONSTIPATION: 0
SORE THROAT: 0
SINUS PRESSURE: 0
DIARRHEA: 0
NAUSEA: 0
RHINORRHEA: 0
EYE DISCHARGE: 0
SHORTNESS OF BREATH: 0
COUGH: 0
WHEEZING: 0
ABDOMINAL PAIN: 0
TROUBLE SWALLOWING: 0
VOMITING: 0

## 2021-03-15 ASSESSMENT — PAIN DESCRIPTION - LOCATION: LOCATION: SHOULDER

## 2021-03-15 ASSESSMENT — PATIENT HEALTH QUESTIONNAIRE - PHQ9
SUM OF ALL RESPONSES TO PHQ QUESTIONS 1-9: 0
1. LITTLE INTEREST OR PLEASURE IN DOING THINGS: 0

## 2021-03-15 ASSESSMENT — PAIN DESCRIPTION - ORIENTATION
ORIENTATION: LEFT
ORIENTATION: LEFT

## 2021-03-15 ASSESSMENT — PAIN DESCRIPTION - PAIN TYPE: TYPE: ACUTE PAIN

## 2021-03-15 ASSESSMENT — PAIN SCALES - GENERAL: PAINLEVEL_OUTOF10: 10

## 2021-03-15 ASSESSMENT — PAIN DESCRIPTION - DESCRIPTORS: DESCRIPTORS: SHARP

## 2021-03-15 NOTE — FLOWSHEET NOTE
rounding on ED. Assessment: Patient is resting on bed with her  present for support. They are waiting for test results. Intervention: Engaged in conversation. Patient expressed appreciation for visit and offer of continued prayer. Plan: Chaplains are available on site or on call 24/7 for spiritual and emotional support.      03/15/21 1234   Encounter Summary   Services provided to: Patient and family together   Referral/Consult From: Rounding   Continue Visiting   (3/15/21)   Complexity of Encounter Low   Length of Encounter 15 minutes   Spiritual/Shinto   Type Spiritual support   Assessment Approachable   Intervention Sustaining presence/ Ministry of presence   Outcome Expressed gratitude;Engaged in conversation

## 2021-03-15 NOTE — PROGRESS NOTES
3/15/2021     Marlon Lehman (:  1940) is a 79 y.o. female, here for evaluation of the following medical concerns:    HPI  Patient comes in today for follow up of chronic health issues Patient does report continued ongoing pain related to her arthritis. She is taking Tylenol but this notes that it really does not provide her with much benefit. Was taking diclofenac briefly but she states it did not help with her arthritic pain that much either to justify the potential risk of that medication. We did talk about other treatment options including narcotic pain medication after discussing the negative side effects of chronic narcotic pain medication she states she will to stick with her Tylenol dosing. She does have a known history of hypertension and her blood pressure is stable controlled on her current medical therapy. Has known impaired fasting glucose her blood sugar levels remain stable and controlled with dietary efforts. Encouraged continued low-carb/low sugar diet and routine exercise to keep this optimally controlled. Does have a known history of hypothyroidism and her thyroid levels are stable and adequately supplemented. Has known hyperlipidemia and her cholesterol levels are stable and adequately controlled on her current statin dose. Has known paroxysmal atrial fibrillation and is stable with her current medical regimen. Rate is controlled at this time. Has history of chronic congestive heart failure with no acute cardiac symptoms. Has a history of malignant neoplasm of the sigmoid colon but her CEA remains normal at this time. Does have class II obesity with a BMI of 38.06. Continue dietary efforts to help with weight loss is encouraged. Does have known thrombocytopenia and her platelets are stable at this time. No evidence of acute bleeding. Patient otherwise has no other acute medical concerns. .  Patient's recent lab reports are as follows:    Results for orders placed or performed during the hospital encounter of 03/09/21   CEA   Result Value Ref Range    CEA 3.8 <3.9 ng/mL   Comprehensive Metabolic Panel, Fasting   Result Value Ref Range    Glucose, Fasting 100 (H) 70 - 99 mg/dL    BUN 21 8 - 23 mg/dL    CREATININE 0.76 0.50 - 0.90 mg/dL    Bun/Cre Ratio 28 (H) 9 - 20    Calcium 9.5 8.6 - 10.4 mg/dL    Sodium 143 135 - 144 mmol/L    Potassium 3.6 (L) 3.7 - 5.3 mmol/L    Chloride 108 (H) 98 - 107 mmol/L    CO2 23 20 - 31 mmol/L    Anion Gap 12 9 - 17 mmol/L    Alkaline Phosphatase 89 35 - 104 U/L    ALT 8 5 - 33 U/L    AST 14 <32 U/L    Total Bilirubin 0.70 0.3 - 1.2 mg/dL    Total Protein 7.2 6.4 - 8.3 g/dL    Albumin 4.2 3.5 - 5.2 g/dL    Albumin/Globulin Ratio 1.4 1.0 - 2.5    GFR Non-African American >60 >60 mL/min    GFR African American >60 >60 mL/min    GFR Comment          GFR Staging NOT REPORTED    CBC With Auto Differential   Result Value Ref Range    WBC 6.8 3.5 - 11.3 k/uL    RBC 4.24 3.95 - 5.11 m/uL    Hemoglobin 13.4 11.9 - 15.1 g/dL    Hematocrit 44.0 36.3 - 47.1 %    .8 (H) 82.6 - 102.9 fL    MCH 31.6 25.2 - 33.5 pg    MCHC 30.5 25.2 - 33.5 g/dL    RDW 14.2 11.8 - 14.4 %    Platelets 575 (L) 964 - 453 k/uL    MPV 13.0 8.1 - 13.5 fL    NRBC Automated 0.0 0.0 per 100 WBC    Differential Type NOT REPORTED     WBC Morphology NOT REPORTED     RBC Morphology MACROCYTOSIS PRESENT     Platelet Estimate NOT REPORTED     Seg Neutrophils 61 36 - 65 %    Lymphocytes 25 24 - 43 %    Monocytes 10 3 - 12 %    Eosinophils % 3 1 - 4 %    Basophils 0 0 - 2 %    Immature Granulocytes 0 0 %    Segs Absolute 4.15 1.50 - 8.10 k/uL    Absolute Lymph # 1.73 1.10 - 3.70 k/uL    Absolute Mono # 0.68 0.10 - 1.20 k/uL    Absolute Eos # 0.21 0.00 - 0.44 k/uL    Basophils Absolute 0.03 0.00 - 0.20 k/uL    Absolute Immature Granulocyte <0.03 0.00 - 0.30 k/uL      Other review of systems are as noted below. Preventative measures are reviewed today.  See health maintenance section for complete preventative plan of care. Did review patient's med list, allergies, social history, fam history, pmhx and pshx today as noted in the record. Review of Systems   Constitutional: Negative for chills, fatigue and fever. HENT: Negative for congestion, ear pain, postnasal drip, rhinorrhea, sinus pressure, sore throat and trouble swallowing. Eyes: Negative for discharge and redness. Respiratory: Negative for cough, shortness of breath and wheezing. Cardiovascular: Negative for chest pain. Gastrointestinal: Negative for abdominal pain, constipation, diarrhea, nausea and vomiting. Genitourinary: Negative for dysuria, flank pain, frequency and urgency. Musculoskeletal: Positive for arthralgias. Negative for myalgias and neck pain. Skin: Negative for rash and wound. Allergic/Immunologic: Negative for environmental allergies. Neurological: Negative for dizziness, weakness, light-headedness and headaches. Hematological: Negative for adenopathy. Psychiatric/Behavioral: Negative. Prior to Visit Medications    Medication Sig Taking?  Authorizing Provider   metoprolol tartrate (LOPRESSOR) 25 MG tablet TAKE ONE TABLET BY MOUTH TWICE A DAY  Emy Trevino, DO   furosemide (LASIX) 20 MG tablet TAKE ONE TABLET BY MOUTH DAILY  Brooke Busch, DO   amLODIPine (NORVASC) 10 MG tablet TAKE 1 TABLET BY MOUTH ONE TIME A DAY   Jairo Vásquez MD   atorvastatin (LIPITOR) 40 MG tablet TAKE 1 TABLET BY MOUTH nightly  Emy Trevino DO   levothyroxine (SYNTHROID) 50 MCG tablet TAKE 1 TABLET BY MOUTH ONE TIME A DAY   Brooke Busch, DO   amiodarone (CORDARONE) 200 MG tablet TAKE 1/2 TABLET BY MOUTH ONE TIME A DAY   Jairo Vásquez MD   Handicap Placard MISC by Does not apply route Expires in 5 years  Brooke Busch, DO   potassium chloride (KLOR-CON M) 20 MEQ extended release tablet Take 1 tablet by mouth daily  Patient taking differently: Take 20 mEq by mouth every other day   Viviane Cranberry, DO   diclofenac (VOLTAREN) 75 MG EC tablet Take 1 tablet by mouth 2 times daily as needed for Pain  Viviane Cranberry, DO   acetaminophen (TYLENOL) 325 MG tablet Take 2 tablets by mouth every 4 hours as needed for Pain or Fever  Rosibel Willis        Social History     Tobacco Use    Smoking status: Never Smoker    Smokeless tobacco: Never Used    Tobacco comment: hernando 11/10/2018   Substance Use Topics    Alcohol use: No        There were no vitals filed for this visit. Estimated body mass index is 38.06 kg/m² as calculated from the following:    Height as of 1/14/21: 5' 7\" (1.702 m). Weight as of 1/14/21: 243 lb (110.2 kg). Physical Exam  Vitals signs and nursing note reviewed. Constitutional:       General: She is not in acute distress. Appearance: Normal appearance. She is well-developed. She is not diaphoretic. HENT:      Head: Normocephalic and atraumatic. Right Ear: Tympanic membrane, ear canal and external ear normal.      Left Ear: Tympanic membrane, ear canal and external ear normal.      Nose: Nose normal.      Mouth/Throat:      Mouth: Mucous membranes are moist.      Pharynx: Oropharynx is clear. No oropharyngeal exudate. Eyes:      General:         Right eye: No discharge. Left eye: No discharge. Conjunctiva/sclera: Conjunctivae normal.      Pupils: Pupils are equal, round, and reactive to light. Neck:      Musculoskeletal: Normal range of motion and neck supple. Thyroid: No thyromegaly. Cardiovascular:      Rate and Rhythm: Normal rate and regular rhythm. Heart sounds: Normal heart sounds. Pulmonary:      Effort: Pulmonary effort is normal.      Breath sounds: Normal breath sounds. No wheezing or rales. Abdominal:      General: Bowel sounds are normal. There is no distension. Palpations: Abdomen is soft. Tenderness: There is no abdominal tenderness. Lymphadenopathy:      Cervical: No cervical adenopathy. office in 6 months duration or sooner if any further problems or symptoms arise. (Please note that portions of this note were completed with a voice recognition program. Efforts were made to edit the dictations but occasionally words are mis-transcribed.)        No follow-ups on file. An electronic signature was used to authenticate this note.     --Anselmo Ruiz, DO on 3/15/2021 at 7:25 AM

## 2021-03-15 NOTE — ED NOTES
Sling applied after getting pt shirts back on. Explained need to be more aware of stability and lack there of while she has the sling on. Also explained to take care while taking the narcotic . Pt verbalizes understanding.       Bettie Solorio RN  03/15/21 [id number]

## 2021-03-15 NOTE — PATIENT INSTRUCTIONS
30.5  25.2 - 33.5 g/dL Final    RDW 03/09/2021 14.2  11.8 - 14.4 % Final    Platelets 26/64/4180 131* 138 - 453 k/uL Final    MPV 03/09/2021 13.0  8.1 - 13.5 fL Final    NRBC Automated 03/09/2021 0.0  0.0 per 100 WBC Final    Differential Type 03/09/2021 NOT REPORTED   Final    WBC Morphology 03/09/2021 NOT REPORTED   Final    RBC Morphology 03/09/2021 MACROCYTOSIS PRESENT   Final    Platelet Estimate 20/50/2980 NOT REPORTED   Final    Seg Neutrophils 03/09/2021 61  36 - 65 % Final    Lymphocytes 03/09/2021 25  24 - 43 % Final    Monocytes 03/09/2021 10  3 - 12 % Final    Eosinophils % 03/09/2021 3  1 - 4 % Final    Basophils 03/09/2021 0  0 - 2 % Final    Immature Granulocytes 03/09/2021 0  0 % Final    Segs Absolute 03/09/2021 4.15  1.50 - 8.10 k/uL Final    Absolute Lymph # 03/09/2021 1.73  1.10 - 3.70 k/uL Final    Absolute Mono # 03/09/2021 0.68  0.10 - 1.20 k/uL Final    Absolute Eos # 03/09/2021 0.21  0.00 - 0.44 k/uL Final    Basophils Absolute 03/09/2021 0.03  0.00 - 0.20 k/uL Final    Absolute Immature Granulocyte 03/09/2021 <0.03  0.00 - 0.30 k/uL Final

## 2021-03-15 NOTE — ED PROVIDER NOTES
[address]      Pt Name: Brittany Maurciio  MRN: [id number]  [place] 1940  Date of evaluation: 3/15/2021      CHIEF COMPLAINT       Chief Complaint   Patient presents with    Fall     L shoulder pain         HISTORY OF PRESENT ILLNESS      The patient presents with an injury to her left shoulder. She suffered a mechanical fall at home. She tripped on uneven ground at her home. She was on the ground for a limited period of time. She denies striking her head or losing consciousness. She was not dizzy prior to or following the fall. Pain is worse with palpation and movement and better with rest.  She denies numbness or tingling. REVIEW OF SYSTEMS       All systems reviewed and negative unless noted in HPI. The patient denies fever or constitutional symptoms. Denies vision change. Denies any sore throat or rhinorrhea. Denies any neck pain or stiffness. Denies chest pain or shortness of breath. No nausea,  vomiting or diarrhea. Shoulder injury as noted in HPI. Denies any weakness, numbness or focal neurologic deficit. Denies any skin rash or edema. No recent psychiatric issues. No easy bruising or bleeding. Denies any polyuria, polydypsia or history of immunocompromise. PAST MEDICAL HISTORY    has a past medical history of A-fib ([address] CHF (congestive heart failure) ([address] Dyslipidemia, FH: total abdominal hysterectomy and bilateral salpingo-oophorectomy, Glucose intolerance (impaired glucose tolerance), Hypertension, Malignant neoplasm of sigmoid colon ([address] Obesity, and Osteoarthritis. SURGICAL HISTORY      has a past surgical history that includes kenyon and bso (cervix removed) (1966); Cholecystectomy (1960s); Varicose vein surgery (Right, 1960s); Hip Arthroplasty (Left); Knee Arthroplasty (Left); Knee Arthroplasty (Right); Cardiac catheterization (09/05/2017);  Colonoscopy (N/A, 10/8/2018); pr lap,surg,colectomy, partial, w/anast (N/A, 11/5/2018); pr cystoscopy,insert ureteral stent (Bilateral, 11/5/2018); Colonoscopy (N/A, 4/25/2019); sigmoidoscopy (N/A, 5/9/2019); Small intestine surgery (N/A, 5/22/2019); and Colonoscopy (N/A, 2/3/2020). CURRENT MEDICATIONS       Previous Medications    ACETAMINOPHEN (TYLENOL) 325 MG TABLET    Take 2 tablets by mouth every 4 hours as needed for Pain or Fever    AMIODARONE (CORDARONE) 200 MG TABLET    TAKE 1/2 TABLET BY MOUTH ONE TIME A DAY     AMLODIPINE (NORVASC) 10 MG TABLET    TAKE 1 TABLET BY MOUTH ONE TIME A DAY     ATORVASTATIN (LIPITOR) 40 MG TABLET    TAKE 1 TABLET BY MOUTH nightly    FUROSEMIDE (LASIX) 20 MG TABLET    TAKE ONE TABLET BY MOUTH DAILY    HANDICAP PLACARD MISC    by Does not apply route Expires in 5 years    LEVOTHYROXINE (SYNTHROID) 50 MCG TABLET    TAKE 1 TABLET BY MOUTH ONE TIME A DAY     METOPROLOL TARTRATE (LOPRESSOR) 25 MG TABLET    TAKE ONE TABLET BY MOUTH TWICE A DAY    POTASSIUM CHLORIDE (KLOR-CON M) 20 MEQ EXTENDED RELEASE TABLET    Take 1 tablet by mouth daily       ALLERGIES     is allergic to pcn [penicillins] and bextra [valdecoxib]. FAMILY HISTORY     is adopted. family history includes High Blood Pressure in her brother. She was adopted. SOCIAL HISTORY      reports that she has never smoked. She has never used smokeless tobacco. She reports that she does not drink alcohol or use drugs. PHYSICAL EXAM     INITIAL VITALS:  height is 5' 7\" (1.702 m) and weight is 242 lb (109.8 kg). Her tympanic temperature is 95.1 °F (35.1 °C). Her blood pressure is 142/51 (abnormal) and her pulse is 56. Her respiration is 18 and oxygen saturation is 95%. The patient is alert and oriented, in mild to moderate distress secondary to pain. HEENT is atraumatic. Pupils are PERRL at 4 mm. Mucous membranes moist.    Neck is supple with no pain or step-off. Heart sounds regular rate and rhythm with no gallops, murmurs, or rubs.     Lungs clear, no wheezes, rales or rhonchi. Abdomen: Morbidly obese and soft with no tenderness noted. Musculoskeletal exam: Swelling and pain noted in the patient's left shoulder. No pain over the clavicular area. No pain in the elbow or wrist.  Normal radial pulse. No pain in the back or lower extremities. The remainder the musculoskeletal exam is unremarkable. Skin: no rash or edema. Neurological exam reveals cranial nerves 2 through 12 grossly intact. Patient has equal  and normal deep tendon reflexes. DIFFERENTIAL DIAGNOSIS/ MDM:     Fracture, dislocation, cervical injury, head injury    DIAGNOSTIC RESULTS     RADIOLOGY:   I reviewed the radiologist interpretations:  XR HUMERUS LEFT (MIN 2 VIEWS)    (Results Pending)       Emergency physician interpretation: Left humerus x-ray: Fracture proximal humerus         EMERGENCY DEPARTMENT COURSE:   Vitals:    Vitals:    03/15/21 1139 03/15/21 1145 03/15/21 1200   BP:  (!) 151/95 (!) 142/51   Pulse:   56   Resp: 18     Temp: 95.1 °F (35.1 °C)     TempSrc: Tympanic     SpO2:  96% 95%   Weight: 242 lb (109.8 kg)     Height: 5' 7\" (1.702 m)       -------------------------  BP: (!) 142/51, Temp: 95.1 °F (35.1 °C), Pulse: 56, Resp: 18      Re-evaluation Notes    The patient was advised to apply ice. She was provided a sling. I have given referral to the orthopedist and written for pain medication. The patient is discharged in good condition. Controlled Substance Monitoring:    Acute and Chronic Pain Monitoring:   RX Monitoring 8/29/2018   Attestation The Prescription Monitoring Report for this patient was reviewed today. Periodic Controlled Substance Monitoring Possible medication side effects, risk of tolerance/dependence & alternative treatments discussed. ;No signs of potential drug abuse or diversion identified. PROCEDURES:    Sling was applied by the nurse. The patient had good color, sensation, and motion of the fingers after placement. FINAL IMPRESSION      1. Closed fracture of proximal end of left humerus, unspecified fracture morphology, initial encounter          DISPOSITION/PLAN   DISPOSITION Decision To Discharge 03/15/2021 12:43:41 PM      Condition on Disposition    good    PATIENT REFERRED TO:  Robina August MD  [phone]    In 1 week        DISCHARGE MEDICATIONS:  New Prescriptions    HYDROCODONE-ACETAMINOPHEN (NORCO) 5-325 MG PER TABLET    Take 1 tablet by mouth every 6 hours as needed for Pain for up to 5 days.        (Please note that portions of this note were completed with a voice recognition program.  Efforts were made to edit the dictations but occasionally words are mis-transcribed.)    Franklin MD   Attending Emergency Physician         Regina De La Fuente MD  03/15/21 [id number]

## 2021-03-15 NOTE — TELEPHONE ENCOUNTER
Pt calling stating she fell at home today and fractured her humerus and would like us to set up [facility] to come in and help her, please advise at above number.

## 2021-03-16 ENCOUNTER — OFFICE VISIT (OUTPATIENT)
Dept: ORTHOPEDIC SURGERY | Age: 81
End: 2021-03-16
Payer: MEDICARE

## 2021-03-16 ENCOUNTER — TELEPHONE (OUTPATIENT)
Dept: INTERNAL MEDICINE | Age: 81
End: 2021-03-16

## 2021-03-16 VITALS
OXYGEN SATURATION: 94 % | WEIGHT: 242 LBS | HEART RATE: 75 BPM | BODY MASS INDEX: 37.98 KG/M2 | DIASTOLIC BLOOD PRESSURE: 80 MMHG | SYSTOLIC BLOOD PRESSURE: 128 MMHG | HEIGHT: 67 IN

## 2021-03-16 DIAGNOSIS — S42.202A CLOSED FRACTURE OF PROXIMAL END OF LEFT HUMERUS, UNSPECIFIED FRACTURE MORPHOLOGY, INITIAL ENCOUNTER: Primary | ICD-10-CM

## 2021-03-16 PROCEDURE — 99213 OFFICE O/P EST LOW 20 MIN: CPT

## 2021-03-16 PROCEDURE — 23600 CLTX PROX HUMRL FX W/O MNPJ: CPT | Performed by: PHYSICIAN ASSISTANT

## 2021-03-16 RX ORDER — HYDROCODONE BITARTRATE AND ACETAMINOPHEN 5; 325 MG/1; MG/1
1 TABLET ORAL EVERY 6 HOURS PRN
Qty: 28 TABLET | Refills: 0 | Status: SHIPPED | OUTPATIENT
Start: 2021-03-16 | End: 2021-04-26 | Stop reason: SDUPTHER

## 2021-03-16 NOTE — PROGRESS NOTES
Orthopaedic Progress Note      CHIEF COMPLAINT:  Left proximal humerus fracture    HISTORY OF PRESENT ILLNESS:       Ms. Chaparrita Garcia  is a 79 y.o. female  who presents with history of proximal humerus fracture after a fall. Pain left arm. NVI sensation intact. Multiple co-morbidities, hx of joint replacements, hx for colon cancer.        Past Medical History:    Past Medical History:   Diagnosis Date    A-fib [facility]) 07/19/2017    CHF (congestive heart failure) (HCC)     Dyslipidemia     FH: total abdominal hysterectomy and bilateral salpingo-oophorectomy 1966    Fractures 03/15/2021    Lt humerus impacted displaced    Glucose intolerance (impaired glucose tolerance)     Hypertension     Malignant neoplasm of sigmoid colon ([address]     Obesity     Osteoarthritis        Past Surgical History:    Past Surgical History:   Procedure Laterality Date    CARDIAC CATHETERIZATION  09/05/2017    CHOLECYSTECTOMY  1960s    COLONOSCOPY N/A 10/8/2018    COLONOSCOPY WITH COLD ET HOT BIOPSIES ET POLYPECTOMIES performed by Dayami Guillen MD at [address] [facility] N/A 4/25/2019    COLONOSCOPY performed by Carlos Dimas MD at [address] [facility]  stricture at 7 cm     COLONOSCOPY N/A 2/3/2020    tubular adenoma X 1, Dr. Jade Menendez Bilateral 11/5/2018    CYSTO Bilateral Lighted stent placements performed by Tatianna Reno MD at [address] [facility], PARTIAL, W/ANAST N/A 11/5/2018    Open Sigmoid Colectomy w/ lighted stents ILEOSTOMY PLACEMENT performed by Dayami Guillen MD at [address] [facility] N/A 5/9/2019    Flexible Sigmoidoscopy with Dilatation of rectal stricture performed by Carlos Dimas MD at [address] [facility] [address] 5/22/2019    Loop Ileostomy take down performed by Carlos Dimas MD at [address] [facility] [address] Right [facility]         Current Medications:  Current Outpatient Medications   Medication Sig Dispense Refill    potassium chloride (KLOR-CON M) 20 MEQ extended release tablet Take 1 tablet by mouth daily 90 tablet 1    HYDROcodone-acetaminophen (NORCO) 5-325 MG per tablet Take 1 tablet by mouth every 6 hours as needed for Pain for up to 5 days. 20 tablet 0    metoprolol tartrate (LOPRESSOR) 25 MG tablet TAKE ONE TABLET BY MOUTH TWICE A DAY 60 tablet 1    furosemide (LASIX) 20 MG tablet TAKE ONE TABLET BY MOUTH DAILY 30 tablet 1    amLODIPine (NORVASC) 10 MG tablet TAKE 1 TABLET BY MOUTH ONE TIME A DAY  90 tablet 3    atorvastatin (LIPITOR) 40 MG tablet TAKE 1 TABLET BY MOUTH nightly 90 tablet 1    levothyroxine (SYNTHROID) 50 MCG tablet TAKE 1 TABLET BY MOUTH ONE TIME A DAY  90 tablet 1    amiodarone (CORDARONE) 200 MG tablet TAKE 1/2 TABLET BY MOUTH ONE TIME A DAY  45 tablet 3    Handicap Placard MISC by Does not apply route Expires in 5 years 1 each 0    acetaminophen (TYLENOL) 325 MG tablet Take 2 tablets by mouth every 4 hours as needed for Pain or Fever 60 tablet 0     No current facility-administered medications for this visit. Allergies:  Pcn [penicillins] and Bextra [valdecoxib]    Social History:   Social History     Tobacco Use   Smoking Status Never Smoker   Smokeless Tobacco Never Used   Tobacco Comment    syant 11/10/2018     Social History     Substance and Sexual Activity   Alcohol Use No     Social History     Substance and Sexual Activity   Drug Use No       Family History:  Family History   Adopted: Yes   Problem Relation Age of Onset    High Blood Pressure Brother         adopted brother       REVIEW OF SYSTEMS:  Constitutional: Denies any fever, chills. Musculoskeletal: Positive for left shoulder pain . PHYSICAL EXAM:  [unfilled]  General appearance:  Alert and oriented x 3. No apparent distress, appears stated age, calm and cooperative. Musculoskeletal:  Pain with motion of left shoulder.  Able to Closed fracture of proximal end of left humerus, unspecified fracture morphology, initial encounter  OK CLOSED RX PROX HUMERUS FRACTURE         PLAN:  Will plan to treat this non op follow up 2 weeks repeat xrays to evaluate fracture site if fracture continues to move will discuss surgical treatment.         Procedures    OK CLOSED RX PROX HUMERUS FRACTURE        Procedure:        Electronically signed by Christopher Plata PA-C on 3/16/2021 at 8:39 AM

## 2021-03-17 ENCOUNTER — VIRTUAL VISIT (OUTPATIENT)
Dept: FAMILY MEDICINE CLINIC | Age: 81
End: 2021-03-17
Payer: MEDICARE

## 2021-03-17 ENCOUNTER — TELEPHONE (OUTPATIENT)
Dept: FAMILY MEDICINE CLINIC | Age: 81
End: 2021-03-17

## 2021-03-17 DIAGNOSIS — Z00.00 ROUTINE GENERAL MEDICAL EXAMINATION AT A HEALTH CARE FACILITY: Primary | ICD-10-CM

## 2021-03-17 PROCEDURE — G0439 PPPS, SUBSEQ VISIT: HCPCS | Performed by: FAMILY MEDICINE

## 2021-03-17 PROCEDURE — G8484 FLU IMMUNIZE NO ADMIN: HCPCS | Performed by: FAMILY MEDICINE

## 2021-03-17 PROCEDURE — 1123F ACP DISCUSS/DSCN MKR DOCD: CPT | Performed by: FAMILY MEDICINE

## 2021-03-17 PROCEDURE — 4040F PNEUMOC VAC/ADMIN/RCVD: CPT | Performed by: FAMILY MEDICINE

## 2021-03-17 ASSESSMENT — PATIENT HEALTH QUESTIONNAIRE - PHQ9
SUM OF ALL RESPONSES TO PHQ QUESTIONS 1-9: 0
SUM OF ALL RESPONSES TO PHQ9 QUESTIONS 1 & 2: 0
1. LITTLE INTEREST OR PLEASURE IN DOING THINGS: 0
SUM OF ALL RESPONSES TO PHQ QUESTIONS 1-9: 0
SUM OF ALL RESPONSES TO PHQ QUESTIONS 1-9: 0

## 2021-03-17 ASSESSMENT — LIFESTYLE VARIABLES: HOW OFTEN DO YOU HAVE A DRINK CONTAINING ALCOHOL: 0

## 2021-03-17 NOTE — PATIENT INSTRUCTIONS
Personalized Preventive Plan for Shonda Holden - 3/17/2021  Medicare offers a range of preventive health benefits. Some of the tests and screenings are paid in full while other may be subject to a deductible, co-insurance, and/or copay. Some of these benefits include a comprehensive review of your medical history including lifestyle, illnesses that may run in your family, and various assessments and screenings as appropriate. After reviewing your medical record and screening and assessments performed today your provider may have ordered immunizations, labs, imaging, and/or referrals for you. A list of these orders (if applicable) as well as your Preventive Care list are included within your After Visit Summary for your review. Other Preventive Recommendations:    · A preventive eye exam performed by an eye specialist is recommended every 1-2 years to screen for glaucoma; cataracts, macular degeneration, and other eye disorders. · A preventive dental visit is recommended every 6 months. · Try to get at least 150 minutes of exercise per week or 10,000 steps per day on a pedometer . · Order or download the FREE \"Exercise & Physical Activity: Your Everyday Guide\" from [employer]. Call 4-987.468.1321 or search [employer] online. · You need 8587-8527 mg of calcium and 1018-1828 IU of vitamin D per day. It is possible to meet your calcium requirement with diet alone, but a vitamin D supplement is usually necessary to meet this goal.  · When exposed to the sun, use a sunscreen that protects against both UVA and UVB radiation with an SPF of 30 or greater. Reapply every 2 to 3 hours or after sweating, drying off with a towel, or swimming. · Always wear a seat belt when traveling in a car. Always wear a helmet when riding a bicycle or motorcycle. Heart-Healthy Diet   Sodium, Fat, and Cholesterol Controlled Diet       What Is a Heart Healthy Diet?    A good cholesterol, this type of cholesterol actually carries cholesterol away from your arteries and may, therefore, help lower your risk of having a heart attack. You want this level to be high (ideally greater than 60). It is a risk to have a level less than 40. You can raise this good cholesterol by eating olive oil, canola oil, avocados, or nuts. Exercise raises this level, too. Fat    Fat is calorie dense and packs a lot of calories into a small amount of food. Even though fats should be limited due to their high calorie content, not all fats are bad. In fact, some fats are quite healthful. Fat can be broken down into four main types. The good-for-you fats are:   Monounsaturated fat  found in oils such as olive and canola, avocados, and nuts and natural nut butters; can decrease cholesterol levels, while keeping levels of HDL cholesterol high   Polyunsaturated fat  found in oils such as safflower, sunflower, soybean, corn, and sesame; can decrease total cholesterol and LDL cholesterol   Omega-3 fatty acids  particularly those found in fatty fish (such as salmon, trout, tuna, mackerel, herring, and sardines); can decrease risk of arrhythmias, decrease triglyceride levels, and slightly lower blood pressure   The fats that you want to limit are:   Saturated fat  found in animal products, many fast foods, and a few vegetables; increases total blood cholesterol, including LDL levels   Animal fats that are saturated include: butter, lard, whole-milk dairy products, meat fat, and poultry skin   Vegetable fats that are saturated include: hydrogenated shortening, palm oil, coconut oil, cocoa butter   Hydrogenated or trans fat  found in margarine and vegetable shortening, most shelf stable snack foods, and fried foods; increases LDL and decreases HDL     It is generally recommended that you limit your total fat for the day to less than 30% of your total calories.  If you follow an 1800-calorie heart healthy diet, for example, this would mean 60 grams of fat or less per day. Saturated fat and trans fat in your diet raises your blood cholesterol the most, much more than dietary cholesterol does. For this reason, on a heart-healthy diet, less than 7% of your calories should come from saturated fat and ideally 0% from trans fat. On an 1800-calorie diet, this translates into less than 14 grams of saturated fat per day, leaving 46 grams of fat to come from mono- and polyunsaturated fats.    Food Choices on a Heart Healthy Diet   Food Category   Foods Recommended   Foods to Avoid   Grains   Breads and rolls without salted tops Most dry and cooked cereals Unsalted crackers and breadsticks Low-sodium or homemade breadcrumbs or stuffing All rice and pastas   Breads, rolls, and crackers with salted tops High-fat baked goods (eg, muffins, donuts, pastries) Quick breads, self-rising flour, and biscuit mixes Regular bread crumbs Instant hot cereals Commercially prepared rice, pasta, or stuffing mixes   Vegetables   Most fresh, frozen, and low-sodium canned vegetables Low-sodium and salt-free vegetable juices Canned vegetables if unsalted or rinsed   Regular canned vegetables and juices, including sauerkraut and pickled vegetables Frozen vegetables with sauces Commercially prepared potato and vegetable mixes   Fruits   Most fresh, frozen, and canned fruits All fruit juices   Fruits processed with salt or sodium   Milk   Nonfat or low-fat (1%) milk Nonfat or low-fat yogurt Cottage cheese, low-fat ricotta, cheeses labeled as low-fat and low-sodium   Whole milk Reduced-fat (2%) milk Malted and chocolate milk Full fat yogurt Most cheeses (unless low-fat and low salt) Buttermilk (no more than 1 cup per week)   Meats and Beans   Lean cuts of fresh or frozen beef, veal, lamb, or pork (look for the word loin) Fresh or frozen poultry without the skin Fresh or frozen fish and some shellfish Egg whites and egg substitutes (Limit whole eggs to three per week) Tofu Nuts or seeds (unsalted, dry-roasted), low-sodium peanut butter Dried peas, beans, and lentils   Any smoked, cured, salted, or canned meat, fish, or poultry (including disla, chipped beef, cold cuts, hot dogs, sausages, sardines, and anchovies) Poultry skins Breaded and/or fried fish or meats Canned peas, beans, and lentils Salted nuts   Fats and Oils   Olive oil and canola oil Low-sodium, low-fat salad dressings and mayonnaise   Butter, margarine, coconut and palm oils, disla fat   Snacks, Sweets, and Condiments   Low-sodium or unsalted versions of broths, soups, soy sauce, and condiments Pepper, herbs, and spices; vinegar, lemon, or lime juice Low-fat frozen desserts (yogurt, sherbet, fruit bars) Sugar, cocoa powder, honey, syrup, jam, and preserves Low-fat, trans-fat free cookies, cakes, and pies Delio and animal crackers, fig bars, stacy snaps   High-fat desserts Broth, soups, gravies, and sauces, made from instant mixes or other high-sodium ingredients Salted snack foods Canned olives Meat tenderizers, seasoning salt, and most flavored vinegars   Beverages   Low-sodium carbonated beverages Tea and coffee in moderation Soy milk   Commercially softened water   Suggestions   Make whole grains, fruits, and vegetables the base of your diet. Choose heart-healthy fats such as canola, olive, and flaxseed oil, and foods high in heart-healthy fats, such as nuts, seeds, soybeans, tofu, and fish. Eat fish at least twice per week; the fish highest in omega-3 fatty acids and lowest in mercury include salmon, herring, mackerel, sardines, and canned chunk light tuna. If you eat fish less than twice per week or have high triglycerides, talk to your doctor about taking fish oil supplements. Read food labels.    For products low in fat and cholesterol, look for fat free, low-fat, cholesterol free, saturated fat free, and trans fat freeAlso scan the Nutrition Facts Label, which lists saturated fat, trans fat, and cholesterol amounts. For products low in sodium, look for sodium free, very low sodium, low sodium, no added salt, and unsalted   Skip the salt when cooking or at the table; if food needs more flavor, get creative and try out different herbs and spices. Garlic and onion also add substantial flavor to foods. Trim any visible fat off meat and poultry before cooking, and drain the fat off after miles. Use cooking methods that require little or no added fat, such as grilling, boiling, baking, poaching, broiling, roasting, steaming, stir-frying, and sauting. Avoid fast food and convenience food. They tend to be high in saturated and trans fat and have a lot of added salt. Talk to a registered dietitian for individualized diet advice. Last Reviewed: March 2011 Alden Noble MS, MPH, RD   Updated: 3/29/2011   ·     Keep Your Memory Brianna Leonel       Many factors can affect your ability to remembera hectic lifestyle, aging, stress, chronic disease, and certain medicines. But, there are steps you can take to sharpen your mind and help preserve your memory. Challenge Your Brain   Regularly challenging your mind may help keeps it in top shape. Good mental exercises include:   Crossword puzzlesUse a dictionary if you need it; you will learn more that way. Brainteasers Try some! Crafts, such as wood working and sewing   Hobbies, such as gardening and building model airplanes   SocializingVisit old friends or join groups to meet new ones. Reading   Learning a new language   Taking a class, whether it be art history or aaron chi   TravelingExperience the food, history, and culture of your destination   Learning to use a computer   Going to museums, the theater, or thought-provoking movies   Changing things in your daily life, such as reversing your pattern in the grocery store or brushing your teeth using your nondominant hand   Use Memory Aids   There is no need to remember every detail on your own.  These memory aids can help:   Calendars and day planners   Electronic organizers to store all sorts of helpful informationThese devices can \"beep\" to remind you of appointments. A book of days to record birthdays, anniversaries, and other occasions that occur on the same date every year   Detailed \"to-do\" lists and strategically placed sticky notes   Quick \"study\" sessionsBefore a gathering, review who will be there so their names will be fresh in your mind. Establish routinesFor example, keep your keys, wallet, and umbrella in the same place all the time or take medicine with your 8:00 AM glass of juice   Live a Healthy Life   Many actions that will keep your body strong will do the same for your mind. For example:   Talk to Your Doctor About Herbs and Supplements    Malnutrition and vitamin deficiencies can impair your mental function. For example, vitamin B12 deficiency can cause a range of symptoms, including confusion. But, what if your nutritional needs are being met? Can herbs and supplements still offer a benefit? Researchers have investigated a range of natural remedies, such as ginkgo , ginseng , and the supplement phosphatidylserine (PS). So far, though, the evidence is inconsistent as to whether these products can improve memory or thinking. If you are interested in taking herbs and supplements, talk to your doctor first because they may interact with other medicines that you are taking. Exercise Regularly    Among the many benefits of regular exercise are increased blood flow to the brain and decreased risk of certain diseases that can interfere with memory function. One study found that even moderate exercise has a beneficial effect. Examples of \"moderate\" exercise include:   Playing 18 holes of golf once a week, without a cart   Playing tennis twice a week   Walking one mile per day   Manage Stress    It can be tough to remember what is important when your mind is cluttered.  Make time for relaxation. Choose activities that calm you down, and make it routine. Manage Chronic Conditions    Side effects of high blood pressure , diabetes, and heart disease can interfere with mental function. Many of the lifestyle steps discussed here can help manage these conditions. Strive to eat a healthy diet, exercise regularly, get stress under control, and follow your doctor's advice for your condition. Minimize Medications    Talk to your doctor about the medicines that you take. Some may be unnecessary. Also, healthy lifestyle habits may lower the need for certain drugs. Last Reviewed: April 2010 Mili Crenshaw MD   Updated: 4/13/2010   ·     [address]       As we get older, changes in balance, gait, strength, vision, hearing, and cognition make even the most youthful senior more prone to accidents. Falls are one of the leading health risks for older people. This increased risk of falling is related to:   Aging process (eg, decreased muscle strength, slowed reflexes)   Higher incidence of chronic health problems (eg, arthritis, diabetes) that may limit mobility, agility or sensory awareness   Side effects of medicine (eg, dizziness, blurred vision)especially medicines like prescription pain medicines and drugs used to treat mental health conditions   Depending on the brittleness of your bones, the consequences of a fall can be serious and long lasting. Home Life   Research by the Association of Aging [facility]) shows that some home accidents among older adults can be prevented by making simple lifestyle changes and basic modifications and repairs to the home environment. Here are some lifestyle changes that experts recommend:   Have your hearing and vision checked regularly. Be sure to wear prescription glasses that are right for you. Speak to your doctor or pharmacist about the possible side effects of your medicines. A number of medicines can cause dizziness.    If you have problems with wood floors can be particularly slippery. Stairs should always have handrails and be carpeted or fitted with a non-skid tread. Is your telephone easily reachable. Is the cord safely tucked away? Room by Room   According to the Association of Aging, bathrooms and farhat are the two most potentially hazardous rooms in your home. In the Kitchen    Be sure your stove is in proper working order and always make sure burners and the oven are off before you go out or go to sleep. Keep pots on the back burners, turn handles away from the front of the stove, and keep stove clean and free of grease build-up. Kitchen ventilation systems and range exhausts should be working properly. Keep flammable objects such as towels and pot holders away from the cooking area except when in use. Make sure kitchen curtains are tied back. Move cords and appliances away from the sink and hot surfaces. If extension cords are needed, install wiring guides so they do not hang over the sink, range, or working areas. Look for coffee pots, kettles and toaster ovens with automatic shut-offs. Keep a mop handy in the kitchen so you can wipe up spills instantly. You should also have a small fire extinguisher. Arrange your kitchen with frequently used items on lower shelves to avoid the need to stand on a stepstool to reach them. Make sure countertops are well-lit to avoid injuries while cutting and preparing food. In the Bathroom    Use a non-slip mat or decals in the tub and shower, since wet, soapy tile or porcelain surfaces are extremely slippery. Make sure bathroom rugs are non-skid or tape them firmly to the floor. Bathtubs should have at least one, preferably two, grab bars, firmly attached to structural supports in the wall. (Do not use built-in soap holders or glass shower doors as grab bars.)    Tub seats fitted with non-slip material on the legs allow you to wash sitting down.  For people with limited the development of new products to help older adults live safely and independently in spite of age-related changes. Making Life More Livable  , by [employer], lists over 1,000 products for \"living well in the mature years,\" such as bathing and mobility aids, household security devices, ergonomically designed knives and peelers, and faucet valves and knobs for temperature control. Medical supply stores and organizations are good sources of information about products that improve your quality of life and insure your safety. Last Reviewed: November 2009 Charlie Antonio MD   Updated: 3/7/2011     ·        Learning About Living Chi  What is a living will? A living will, also called a declaration, is a legal form. It tells your family and your doctor your wishes when you can't speak for yourself. It's used by the health professionals who will treat you as you near the end of your life or if you get seriously hurt or ill. If you put your wishes in writing, your loved ones and others will know what kind of care you want. They won't need to guess. This can ease your mind and be helpful to others. And you can change or cancel your living will at any time. A living will is not the same as an estate or property will. An estate will explains what you want to happen with your money and property after you die. How do you use it? A living will is used to describe the kinds of treatment or life support you want as you near the end of your life or if you get seriously hurt or ill. Keep these facts in mind about living lara. Your living will is used only if you can't speak or make decisions for yourself. Most often, one or more doctors must certify that you can't speak or decide for yourself before your living will takes effect. If you get better and can speak for yourself again, you can accept or refuse any treatment. It doesn't matter what you said in your living will.   Some states may limit your right to refuse treatment in certain cases. For example, you may need to clearly state in your living will that you don't want artificial hydration and nutrition, such as being fed through a tube. Is a living will a legal document? A living will is a legal document. Each state has its own laws about living lara. And a living will may be called something else in your state. Here are some things to know about living lara. You don't need an  to complete a living will. But legal advice can be helpful if your state's laws are unclear. It can also help if your health history is complicated or your family can't agree on what should be in your living will. You can change your living will at any time. Some people find that their wishes about end-of-life care change as their health changes. If you make big changes to your living will, complete a new form. If you move to another state, make sure that your living will is legal in the state where you now live. In most cases, doctors will respect your wishes even if you have a form from a different state. You might use a universal form that has been approved by many states. This kind of form can sometimes be filled out and stored online. Your digital copy will then be available wherever you have a connection to the internet. The doctors and nurses who need to treat you can find it right away. Your state may offer an online registry. This is another place where you can store your living will online. It's a good idea to get your living will notarized. This means using a person called a  to watch two people sign, or witness, your living will. What should you know when you create a living will? Here are some questions to ask yourself as you make your living will:  Do you know enough about life support methods that might be used? If not, talk to your doctor so you know what might be done if you can't breathe on your own, your heart stops, or you can't swallow.   What things would you still want to be able to do after you receive life-support methods? Would you want to be able to walk? To speak? To eat on your own? To live without the help of machines? Do you want certain Adventism practices performed if you become very ill? If you have a choice, where do you want to be cared for? In your home? At a hospital or nursing home? If you have a choice at the end of your life, where would you prefer to die? At home? In a hospital or nursing home? Somewhere else? Would you prefer to be buried or cremated? Do you want your organs to be donated after you die? What should you do with your living will? Make sure that your family members and your health care agent have copies of your living will (also called a declaration). Give your doctor a copy of your living will. Ask him or her to keep it as part of your medical record. If you have more than one doctor, make sure that each one has a copy. Put a copy of your living will where it can be easily found. For example, some people may put a copy on their refrigerator door. If you are using a digital copy, be sure your doctor, family members, and health care agent know how to find and access it. Where can you learn more? Go to https://[employer]. org and sign in to your SeeWhy account. Enter U714 in the [employer] box to learn more about \"Learning About Living [place]. \"     If you do not have an account, please click on the \"Sign Up Now\" link. Current as of: July 17, 2020               Content Version: 12.8  © 6035-0909 Health[employer], [employer]. Care instructions adapted under license by [facility] ([facility]). If you have questions about a medical condition or this instruction, always ask your healthcare professional. [place] [address] any warranty or liability for your use of this information.     ·

## 2021-03-17 NOTE — PROGRESS NOTES
Medicare Annual Wellness Visit  Name: Johanna Forman Date: 3/17/2021   MRN: [id number] Sex: Female   Age: 79 y.o. Ethnicity: Non-/Non    : 1940 Race: Alonso Chavarria is here for Medicare AWV (subsequent; last 3/9/2020)    Screenings for behavioral, psychosocial and functional/safety risks, and cognitive dysfunction are all negative except as indicated below. These results, as well as other patient data from the [address] form, are documented in Flowsheets linked to this Encounter. Allergies   Allergen Reactions    Pcn [Penicillins] Hives and Shortness Of Breath    Bextra [Valdecoxib] Diarrhea       Prior to Visit Medications    Medication Sig Taking? Authorizing Provider   HYDROcodone-acetaminophen (NORCO) 5-325 MG per tablet Take 1 tablet by mouth every 6 hours as needed for Pain for up to 7 days. Intended supply: 3 days. Take lowest dose possible to manage pain Yes Martin العراقي PA-C   potassium chloride (KLOR-CON M) 20 MEQ extended release tablet Take 1 tablet by mouth daily Yes Flaco Stinson DO   HYDROcodone-acetaminophen (NORCO) 5-325 MG per tablet Take 1 tablet by mouth every 6 hours as needed for Pain for up to 5 days.  Yes Shad Lee MD   metoprolol tartrate (LOPRESSOR) 25 MG tablet TAKE ONE TABLET BY MOUTH TWICE A DAY Yes Flaco Stinson DO   furosemide (LASIX) 20 MG tablet TAKE ONE TABLET BY MOUTH DAILY Yes Flaco Stinson DO   amLODIPine (NORVASC) 10 MG tablet TAKE 1 TABLET BY MOUTH ONE TIME A DAY  Yes Fabrizio Montenegro MD   atorvastatin (LIPITOR) 40 MG tablet TAKE 1 TABLET BY MOUTH nightly Yes Flaco Stinson DO   levothyroxine (SYNTHROID) 50 MCG tablet TAKE 1 TABLET BY MOUTH ONE TIME A DAY  Yes Flaco Stinson DO   amiodarone (CORDARONE) 200 MG tablet TAKE 1/2 TABLET BY MOUTH ONE TIME A DAY  Yes Fabrizio Montenegro MD   Handicap Placard MISC by Does not apply route Expires in 5 years Yes Flaco Stinson DO acetaminophen (TYLENOL) 325 MG tablet Take 2 tablets by mouth every 4 hours as needed for Pain or Fever Yes Jaky Mar       Past Medical History:   Diagnosis Date    [facility]-fib [facility]) 07/19/2017    CHF (congestive heart failure) (HCC)     Dyslipidemia     FH: total abdominal hysterectomy and bilateral salpingo-oophorectomy 1966    Fractures 03/15/2021    Lt humerus impacted displaced    Glucose intolerance (impaired glucose tolerance)     Hypertension     Malignant neoplasm of sigmoid colon ([address]     Obesity     Osteoarthritis        Past Surgical History:   Procedure Laterality Date    CARDIAC CATHETERIZATION  09/05/2017    CHOLECYSTECTOMY  1960s    COLONOSCOPY N/A 10/8/2018    COLONOSCOPY WITH COLD ET HOT BIOPSIES ET POLYPECTOMIES performed by Margot Turcios MD at [address] [facility] N/A 4/25/2019    COLONOSCOPY performed by Lorenzo Barnett MD at [facility]  stricture at 7 cm     COLONOSCOPY N/A 2/3/2020    tubular adenoma X 1, Dr. Keller Min Bilateral 11/5/2018    CYSTO Bilateral Lighted stent placements performed by Adelaida Vasquez MD at [address], PARTIAL, W/ANAST N/A 11/5/2018    Open Sigmoid Colectomy w/ lighted stents ILEOSTOMY PLACEMENT performed by Margot Turcios MD at [facility] N/A 5/9/2019    Flexible Sigmoidoscopy with Dilatation of rectal stricture performed by Lorenzo Barnett MD at [address] N/A 5/22/2019    Loop Ileostomy take down performed by Lorenzo Barnett MD at [address] [place] [address] Drive Right 1960s       Family History   Adopted: Yes   Problem Relation Age of Onset    High Blood Pressure Brother         adopted brother       CareTeam (Including outside providers/suppliers regularly involved in providing care):   Patient Care Team:  Paola Mitchell DO as PCP - General (Family Medicine)  Perez Ramos DO [facility] PCP [facility]  Carmel Monaco DO as Consulting Physician (Cardiology)  Pradip Mac MD as Consulting Physician (Hematology and Oncology)    [facility] Readings from Last 3 Encounters:   03/16/21 242 lb (109.8 kg)   03/15/21 242 lb (109.8 kg)   01/14/21 243 lb (110.2 kg)     There were no vitals filed for this visit. There is no height or weight on file to calculate BMI. Based upon direct observation of the patient, evaluation of cognition reveals recent and remote memory intact. Patient's complete Health Risk Assessment and screening values have been reviewed and are found in Flowsheets. The following problems were reviewed today and where indicated follow up appointments were made and/or referrals ordered. Positive Risk Factor Screenings with Interventions:     Fall Risk:  Timed Up and Go Test > 12 seconds? (Complete if either Fall Risk answers are Yes): (telephone encounter - unable to visualize patient)  2 or more falls in past year?: no  Fall with injury in past year?: (!) yes(fell on 3/15/2021 fractured humerus)  Fall Risk Interventions:    · Home safety tips provided        General Health and ACP:  General  In general, how would you say your health is?: Good  In the past 7 days, have you experienced any of the following?  New or Increased Pain, New or Increased Fatigue, Loneliness, Social Isolation, Stress or Anger?: (!) New or Increased Pain(pain related to fractured humerus sustained on Monday)  Do you get the social and emotional support that you need?: Yes  Do you have a Living Will?: (!) No  Advance Directives     Power of  Living Will ACP-Advance Directive ACP-Power of     Not on File Filed on 03/17/21 Filed Not on File      General Health Risk Interventions:  · No Living Will: Advance Care Planning addressed with patient today    Health Habits/Nutrition:  Health Habits/Nutrition  Do you exercise for at least 20 minutes 2-3 times per week?: Yes  Have you lost any weight without trying in the past 3 months?: No  Do you eat only one meal per day?: No  Have you seen the dentist within the past year?: N/A - wear dentures     Health Habits/Nutrition Interventions:  · Nutritional issues:  educational materials for healthy, well-balanced diet provided    Hearing/Vision:  No exam data present  Hearing/Vision  Do you or your family notice any trouble with your hearing that hasn't been managed with hearing aids?: No  Do you have difficulty driving, watching TV, or doing any of your daily activities because of your eyesight?: No  Have you had an eye exam within the past year?: (!) No  Hearing/Vision Interventions:  · Vision concerns:  patient encouraged to make appointment with his/her eye specialist      Personalized Preventive Plan   Current Health Maintenance Status  Immunization History   Administered Date(s) Administered    COVID-19, Moderna, PF, 100mcg/0.5mL 01/27/2021, 02/24/2021    Influenza, High Dose (Fluzone 65 yrs and older) 10/28/2016, 09/13/2017, 09/21/2018    Influenza, Quadv, adjuvanted, 65 yrs +, IM, PF (Fluad) 09/14/2020    Pneumococcal Conjugate 13-valent ([id number]) 07/19/2017    Pneumococcal Polysaccharide ([id number]) 08/29/2018    Td, unspecified formulation 09/27/2018        Health Maintenance   Topic Date Due    DTaP/Tdap/Td vaccine (1 - Tdap) Recommended. Patient declines.  Shingles Vaccine (1 of 2) Recommended. Patient declines.  Annual Wellness Visit (AWV)  Completed today.  Colon cancer screen colonoscopy  Recommended. Patient declines.     Lipid screen  03/09/2022    TSH testing  03/09/2022    Potassium monitoring  03/09/2022    Creatinine monitoring  03/09/2022    DEXA (modify frequency per FRAX score)  Completed    Flu vaccine  Completed    Pneumococcal 65+ years Vaccine  Completed    COVID-19 Vaccine  Completed    Hepatitis A vaccine  Aged Out    Hepatitis B vaccine  Aged Out    Hib vaccine  Aged Out    Meningococcal (ACWY) vaccine  Aged Out     Recommendations for Preventive Services Due: see orders and patient instructions/AVS.  . Recommended screening schedule for the next 5-10 years is provided to the patient in written form: see Patient Instructions/AVS.    Claudette Gardner RN, 3/17/2021, performed the documented evaluation under the direct supervision of the attending physician. Juvenal Mcclain, was evaluated through a synchronous (real-time) telephone encounter. The patient (or guardian if applicable) is aware that this is a billable service. Verbal consent to proceed has been obtained within the past 12 months. The visit was conducted pursuant to the emergency declaration under the [address] [place] [address], [address] [place] [address] authority and the [employer] General Act. Patient identification was verified, and a caregiver was present when appropriate. The patient was located in a state where the provider was credentialed to provide care. Total time spent for this encounter: Not billed by time    --Gustavo Her RN on 3/17/2021 at 2:33 PM    An electronic signature was used to authenticate this note.

## 2021-03-23 PROCEDURE — 99305 1ST NF CARE MODERATE MDM 35: CPT | Performed by: INTERNAL MEDICINE

## 2021-03-24 DIAGNOSIS — S42.202A CLOSED FRACTURE OF PROXIMAL END OF LEFT HUMERUS, UNSPECIFIED FRACTURE MORPHOLOGY, INITIAL ENCOUNTER: Primary | ICD-10-CM

## 2021-03-29 ENCOUNTER — TELEPHONE (OUTPATIENT)
Dept: INTERNAL MEDICINE | Age: 81
End: 2021-03-29

## 2021-03-30 ENCOUNTER — TELEPHONE (OUTPATIENT)
Dept: INTERNAL MEDICINE | Age: 81
End: 2021-03-30

## 2021-03-30 ENCOUNTER — HOSPITAL ENCOUNTER (OUTPATIENT)
Dept: GENERAL RADIOLOGY | Age: 81
Discharge: HOME | End: 2021-04-01
Payer: MEDICARE

## 2021-03-30 ENCOUNTER — OFFICE VISIT (OUTPATIENT)
Dept: ORTHOPEDIC SURGERY | Age: 81
End: 2021-03-30
Payer: MEDICARE

## 2021-03-30 VITALS
WEIGHT: 242 LBS | BODY MASS INDEX: 37.98 KG/M2 | HEIGHT: 67 IN | HEART RATE: 64 BPM | DIASTOLIC BLOOD PRESSURE: 62 MMHG | SYSTOLIC BLOOD PRESSURE: 115 MMHG

## 2021-03-30 DIAGNOSIS — S42.202D CLOSED FRACTURE OF PROXIMAL END OF LEFT HUMERUS WITH ROUTINE HEALING, UNSPECIFIED FRACTURE MORPHOLOGY, SUBSEQUENT ENCOUNTER: Primary | ICD-10-CM

## 2021-03-30 DIAGNOSIS — S42.202A CLOSED FRACTURE OF PROXIMAL END OF LEFT HUMERUS, UNSPECIFIED FRACTURE MORPHOLOGY, INITIAL ENCOUNTER: ICD-10-CM

## 2021-03-30 PROCEDURE — 99024 POSTOP FOLLOW-UP VISIT: CPT | Performed by: PHYSICIAN ASSISTANT

## 2021-03-30 PROCEDURE — 73060 X-RAY EXAM OF HUMERUS: CPT

## 2021-03-30 PROCEDURE — 99213 OFFICE O/P EST LOW 20 MIN: CPT | Performed by: PHYSICIAN ASSISTANT

## 2021-03-30 RX ORDER — TRAZODONE HYDROCHLORIDE 50 MG/1
50 TABLET ORAL NIGHTLY
COMMUNITY
End: 2021-07-13

## 2021-03-30 NOTE — PROGRESS NOTES
Orthopaedic Progress Note      CHIEF COMPLAINT: Left proximal humerus fracture    HISTORY OF PRESENT ILLNESS:       Ms. Channing Felton  is a 79 y.o. female  who presents with 2 and half weeks out from left proximal humerus fracture. Does have multiple comorbidities. History of inability to weight-bear as she does utilize a wheeled walker for mobilization she is currently residing in a nursing home secondary to inability to care for self at home. There is concern if she is to have a fall she would not be able to stand back up. Secondary to morbid obesity, multiple comorbidities.       Past Medical History:    Past Medical History:   Diagnosis Date    A-fib [facility]) 07/19/2017    CHF (congestive heart failure) (HCC)     Dyslipidemia     FH: total abdominal hysterectomy and bilateral salpingo-oophorectomy 1966    Fractures 03/15/2021    Lt humerus impacted displaced    Glucose intolerance (impaired glucose tolerance)     Hypertension     Malignant ne[facility] of [address]moid colon ([facility]     Obesity     Osteoarthritis        Past Surgical History:    Past Surgical History:   Procedure Laterality Date    CARDIAC CATHETERIZATION  09/05/2017    CHOLECYSTECTOMY  1960s    COLONOSCOPY N/A 10/8/2018    COLONOSCOPY WITH COLD ET HOT BIOPSIES ET POLYPECTOMIES performed by Dain Bautista MD at [address] [place] [address] N/A 4/25/2019    COLONOSCOPY performed by Trav Earl MD at [address] [place] [address]  stricture at 7 cm     COLONOSCOPY N/A 2/3/2020    tubular adenoma X 1, Dr. Nga Powell Bilateral 11/5/2018    CYSTO Bilateral Lighted stent placements performed by Daniel Spring MD at [address] [place] [address], PARTIAL, W/ANAST N/A 11/5/2018    Open Sigmoid Colectomy w/ lighted stents ILEOSTOMY PLACEMENT performed by Dain Bautista MD at [address] [place] [address] 5/9/2019    Flexible Sigmoidoscopy with Dilatation of rectal stricture performed by Mahendra Grayson MD at [address] [facility] [address] 5/22/2019    Loop Ileostomy take down performed by Mahendra Grayson MD at [address] [place] [address] Drive Right 1960s         Current  Medications:  Current Outpatient Medications   Medication Sig Dispense Refill    traZODone (DESYREL) 50 MG tablet Take 50 mg by mouth nightly      potassium chloride (KLOR-CON M) 20 MEQ extended release tablet Take 1 tablet by mouth daily 90 tablet 1    metoprolol tartrate (LOPRESSOR) 25 MG tablet TAKE ONE TABLET BY MOUTH TWICE A DAY 60 tablet 1    furosemide (LASIX) 20 MG tablet TAKE ONE TABLET BY MOUTH DAILY 30 tablet 1    amLODIPine (NORVASC) 10 MG tablet TAKE 1 TABLET BY MOUTH ONE TIME A DAY  90 tablet 3    atorvastatin (LIPITOR) 40 MG tablet TAKE 1 TABLET BY MOUTH nightly 90 tablet 1    levothyroxine (SYNTHROID) 50 MCG tablet TAKE 1 TABLET BY MOUTH ONE TIME A DAY  90 tablet 1    amiodarone (CORDARONE) 200 MG tablet TAKE 1/2 TABLET BY MOUTH ONE TIME A DAY  45 tablet 3    Handicap Placard MISC by Does not apply route Expires in 5 years 1 each 0    acetaminophen (TYLENOL) 325 MG tablet Take 2 tablets by mouth every 4 hours as needed for Pain or Fever 60 tablet 0     No current facility-administered medications for this visit. Allergies:  Pcn [penicillins] and Bextra [valdecoxib]    Social History:   Social History     Tobacco Use   Smoking Status Never Smoker   Smokeless Tobacco Never Used   Tobacco Comment    syant 11/10/2018     Social History     Substance and Sexual Activity   Alcohol Use No     Social History     Substance and Sexual Activity   Drug Use No       Family History:  Family History   Adopted: Yes   Problem Relation Age of Onset    High Blood Pressure Brother         adopted brother       REVIEW OF SYSTEMS:  Constitutional: Denies any fever, chills. Musculoskeletal: Positive for left proximal humerus fracture.       PHYSICAL EXAM:  [unfilled]  General appearance:  Alert and oriented x 3. No apparent distress, appears stated age, calm and cooperative. Musculoskeletal: Patient is able to flex extend fingers wrist and elbow. Pain to palpation proximal humerus. DATA:  CBC:   Lab Results   Component Value Date    WBC 6.8 03/09/2021    WBC 6.8 03/09/2021    HGB 13.4 03/09/2021    HGB 13.4 03/09/2021     03/09/2021     03/09/2021     BMP:    Lab Results   Component Value Date     03/09/2021     03/09/2021    K 3.6 03/09/2021    K 3.6 03/09/2021     03/09/2021     03/09/2021    CO2 23 03/09/2021    CO2 23 03/09/2021    BUN 21 03/09/2021    BUN 21 03/09/2021    CREATININE 0.76 03/09/2021    CREATININE 0.76 03/09/2021    CALCIUM 9.5 03/09/2021    CALCIUM 9.5 03/09/2021    GLUCOSE 100 03/09/2021     PT/INR:    Lab Results   Component Value Date    PROTIME 16.6 10/08/2018    INR 1.6 10/08/2018     Troponin:  No results found for: TROPONINI  No results for input(s): LIPASE, AMYLASE in the last 72 hours. No results for input(s): AST, ALT, BILIDIR, BILITOT, ALKPHOS in the last 72 hours. Uric Acid:  No components found for: URIC  Urine Culture:  No components found for: SEMAJ    Radiology:   Xr Humerus Left (min 2 Views)    Result Date: 3/15/2021  EXAMINATION: TWO XRAY VIEWS OF THE LEFT HUMERUS 3/15/2021 11:54 am COMPARISON: None. HISTORY: ORDERING SYSTEM PROVIDED HISTORY: fall TECHNOLOGIST PROVIDED HISTORY: fall Reason for Exam: Fall; left shoulder pain Acuity: Acute Type of Exam: Initial FINDINGS: Mildly comminuted impacted displaced fracture surgical neck proximal left humerus, with possible extension into the anatomic neck, and with anteromedial displacement major distal fragment (1.4-2.2 cm). No other fracture seen. Degenerative changes glenohumeral and acromioclavicular joints. Mild degenerative changes acromion and humerus. Visualized left ribs appear intact.      Impacted displaced fracture proximal left humerus, as above. No dislocation. Degenerative and other findings, as above. X-rays personally reviewed by me of impacted displaced fracture left proximal humerus. Fracture has translated further since last x-ray but there is still contact from the proximal fracture as well as the distal fracture       Diagnosis Orders   1. Closed fracture of proximal end of left humerus with routine healing, unspecified fracture morphology, subsequent encounter           PLAN:  Continue trying treated nonoperatively follow-up in 2 weeks repeat x-rays left proximal humerus if this does continue to further displace we can discuss getting this patient set up for open reduction internal fixation versus reverse total shoulder. This time feel this patient benefit from continued stay at nursing home secondary to inability to care for self secondary to multiple comorbidities, obesity utilization of walker for mobilization. No orders of the defined types were placed in this encounter.        Procedure:        Electronically signed by Carmina Meneses PA-C on 3/30/2021 at 10:48 AM

## 2021-04-07 ENCOUNTER — TELEPHONE (OUTPATIENT)
Dept: INTERNAL MEDICINE | Age: 81
End: 2021-04-07

## 2021-04-07 DIAGNOSIS — S42.202D CLOSED FRACTURE OF PROXIMAL END OF LEFT HUMERUS WITH ROUTINE HEALING, UNSPECIFIED FRACTURE MORPHOLOGY, SUBSEQUENT ENCOUNTER: Primary | ICD-10-CM

## 2021-04-13 ENCOUNTER — HOSPITAL ENCOUNTER (OUTPATIENT)
Dept: GENERAL RADIOLOGY | Age: 81
Discharge: HOME | End: 2021-04-15
Payer: MEDICARE

## 2021-04-13 ENCOUNTER — OFFICE VISIT (OUTPATIENT)
Dept: ORTHOPEDIC SURGERY | Age: 81
End: 2021-04-13
Payer: MEDICARE

## 2021-04-13 VITALS
HEIGHT: 67 IN | SYSTOLIC BLOOD PRESSURE: 132 MMHG | HEART RATE: 55 BPM | BODY MASS INDEX: 37.9 KG/M2 | DIASTOLIC BLOOD PRESSURE: 72 MMHG

## 2021-04-13 DIAGNOSIS — S42.202D CLOSED FRACTURE OF PROXIMAL END OF LEFT HUMERUS WITH ROUTINE HEALING, UNSPECIFIED FRACTURE MORPHOLOGY, SUBSEQUENT ENCOUNTER: Primary | ICD-10-CM

## 2021-04-13 DIAGNOSIS — S42.202D CLOSED FRACTURE OF PROXIMAL END OF LEFT HUMERUS WITH ROUTINE HEALING, UNSPECIFIED FRACTURE MORPHOLOGY, SUBSEQUENT ENCOUNTER: ICD-10-CM

## 2021-04-13 PROCEDURE — 73060 X-RAY EXAM OF HUMERUS: CPT

## 2021-04-13 PROCEDURE — 99213 OFFICE O/P EST LOW 20 MIN: CPT | Performed by: NURSE PRACTITIONER

## 2021-04-13 PROCEDURE — 99024 POSTOP FOLLOW-UP VISIT: CPT | Performed by: NURSE PRACTITIONER

## 2021-04-13 NOTE — PROGRESS NOTES
LAP,SURG,COLECTOMY, PARTIAL, W/ANAST N/A 11/5/2018    Open Sigmoid Colectomy w/ lighted stents ILEOSTOMY PLACEMENT performed by Barbie Ayon MD at [address] N/A 5/9/2019    Flexible Sigmoidoscopy with Dilatation of rectal stricture performed by Getachew Islas MD at [address] N/A 5/22/2019    Loop Ileostomy take down performed by Getachew Islas MD at [address] [place] [address] Drive Right [facility]         Current  Medications:  Current Outpatient Medications   Medication Sig Dispense Refill    traZODone (DESYREL) 50 MG tablet Take 50 mg by mouth nightly      potassium chloride (KLOR-CON M) 20 MEQ extended release tablet Take 1 tablet by mouth daily 90 tablet 1    metoprolol tartrate (LOPRESSOR) 25 MG tablet TAKE ONE TABLET BY MOUTH TWICE A DAY 60 tablet 1    furosemide (LASIX) 20 MG tablet TAKE ONE TABLET BY MOUTH DAILY 30 tablet 1    amLODIPine (NORVASC) 10 MG tablet TAKE 1 TABLET BY MOUTH ONE TIME A DAY  90 tablet 3    atorvastatin (LIPITOR) 40 MG tablet TAKE 1 TABLET BY MOUTH nightly 90 tablet 1    levothyroxine (SYNTHROID) 50 MCG tablet TAKE 1 TABLET BY MOUTH ONE TIME A DAY  90 tablet 1    amiodarone (CORDARONE) 200 MG tablet TAKE 1/2 TABLET BY MOUTH ONE TIME A DAY  45 tablet 3    Handicap Placard MISC by Does not apply route Expires in 5 years 1 each 0    acetaminophen (TYLENOL) 325 MG tablet Take 2 tablets by mouth every 4 hours as needed for Pain or Fever 60 tablet 0     No current facility-administered medications for this visit.         Allergies:  Pcn [penicillins] and Bextra [valdecoxib]    Social History:   Social History     Tobacco Use   Smoking Status Never Smoker   Smokeless Tobacco Never Used   Tobacco Comment    syant 11/10/2018     Social History     Substance and Sexual Activity   Alcohol Use No     Social History     Substance and Sexual Activity   Drug Use No       Family History:  Family History   Adopted: Yes   Problem Relation Age of Onset    High Blood Pressure Brother         adopted brother       REVIEW OF SYSTEMS:  Constitutional: Denies any fever, chills. Musculoskeletal: Positive for left shoulder pain. PHYSICAL EXAM:  [unfilled]  General appearance:  Alert and oriented x 3. No apparent distress, appears stated age, calm and cooperative. Musculoskeletal: Left upper extremity was examined. Skin is intact no rashes lesions or drainage. Finger flexion and extension is intact. Pain to palpation over the proximal humerus. Swelling and bruising is noted. Sensation intact neurovascularly intact to the left hand. Silvia Maya DATA:  CBC:   Lab Results   Component Value Date    WBC 6.8 03/09/2021    WBC 6.8 03/09/2021    HGB 13.4 03/09/2021    HGB 13.4 03/09/2021     03/09/2021     03/09/2021     BMP:    Lab Results   Component Value Date     03/09/2021     03/09/2021    K 3.6 03/09/2021    K 3.6 03/09/2021     03/09/2021     03/09/2021    CO2 23 03/09/2021    CO2 23 03/09/2021    BUN 21 03/09/2021    BUN 21 03/09/2021    CREATININE 0.76 03/09/2021    CREATININE 0.76 03/09/2021    CALCIUM 9.5 03/09/2021    CALCIUM 9.5 03/09/2021    GLUCOSE 100 03/09/2021     PT/INR:    Lab Results   Component Value Date    PROTIME 16.6 10/08/2018    INR 1.6 10/08/2018     Troponin:  No results found for: TROPONINI  No results for input(s): LIPASE, AMYLASE in the last 72 hours. No results for input(s): AST, ALT, BILIDIR, BILITOT, ALKPHOS in the last 72 hours. Uric Acid:  No components found for: URIC  Urine Culture:  No components found for: JIEINE    Radiology:   Xr Humerus Left (min 2 Views)    Result Date: 4/13/2021  EXAMINATION: TWO XRAY VIEWS OF THE LEFT HUMERUS 4/13/2021 9:11 am COMPARISON: None.  HISTORY: ORDERING SYSTEM PROVIDED HISTORY: Closed fracture of proximal end of left humerus with routine healing, unspecified fracture morphology, subsequent encounter TECHNOLOGIST PROVIDED HISTORY: Reason for Exam: Ced Tucker left humerus fx; done in wheelchair- best images possible FINDINGS: There is an impacted and displaced fracture of the proximal left humeral metaphysis. There is degenerative change of the left shoulder joint spaces. The visualized left lung is without acute process. The surrounding soft tissues are unremarkable. Displaced and impacted fracture of the proximal left humeral metaphysis. Xr Humerus Left (min 2 Views)    Result Date: 3/30/2021  EXAMINATION: TWO XRAY VIEWS OF THE LEFT HUMERUS 3/30/2021 9:35 am COMPARISON: 03/15/2021 HISTORY: ORDERING SYSTEM PROVIDED HISTORY: Closed fracture of proximal end of left humerus, unspecified fracture morphology, initial encounter TECHNOLOGIST PROVIDED HISTORY: fx Reason for Exam: f/u fx left humerus, pt on wheelchair unable to stand, best images possible Acuity: Chronic Type of Exam: Subsequent/Follow-up 78-year-old female with left humerus fracture follow-up FINDINGS: Medially displaced proximal left humeral surgical neck fracture with relatively stable medial displacement of the diaphyseal fracture component by 1.7 cm and medial apex angulation. Visualized left-sided ribs appear intact. Mild degenerative changes of the left glenohumeral and left AC joint. There is periosteal reaction and early callus formation when compared with 03/15/2021. No superimposed acute fracture or dislocation. 1. Medially displaced proximal left humeral/surgical neck fracture with early healing response as detailed above when compared with 03/15/2021. 2. Mild degenerative change of the left glenohumeral and left AC joints. Xr Humerus Left (min 2 Views)    Result Date: 3/15/2021  EXAMINATION: TWO XRAY VIEWS OF THE LEFT HUMERUS 3/15/2021 11:54 am COMPARISON: None.  HISTORY: ORDERING SYSTEM PROVIDED HISTORY: fall TECHNOLOGIST PROVIDED HISTORY: fall Reason for Exam: Fall; left shoulder pain Acuity: Acute Type of Exam: Initial FINDINGS: Mildly comminuted impacted

## 2021-04-22 ENCOUNTER — TELEPHONE (OUTPATIENT)
Dept: INTERNAL MEDICINE | Age: 81
End: 2021-04-22

## 2021-04-26 ENCOUNTER — TELEPHONE (OUTPATIENT)
Dept: INTERNAL MEDICINE | Age: 81
End: 2021-04-26

## 2021-04-26 ENCOUNTER — OUTSIDE SERVICES (OUTPATIENT)
Dept: INTERNAL MEDICINE | Age: 81
End: 2021-04-26
Payer: MEDICARE

## 2021-04-26 DIAGNOSIS — S42.202A CLOSED FRACTURE OF PROXIMAL END OF LEFT HUMERUS, UNSPECIFIED FRACTURE MORPHOLOGY, INITIAL ENCOUNTER: ICD-10-CM

## 2021-04-26 DIAGNOSIS — I48.91 ATRIAL FIBRILLATION, UNSPECIFIED TYPE (HCC): ICD-10-CM

## 2021-04-26 DIAGNOSIS — C18.7 MALIGNANT NEOPLASM OF SIGMOID COLON (HCC): ICD-10-CM

## 2021-04-26 DIAGNOSIS — R73.01 ELEVATED FASTING GLUCOSE: ICD-10-CM

## 2021-04-26 DIAGNOSIS — I10 ESSENTIAL HYPERTENSION: ICD-10-CM

## 2021-04-26 DIAGNOSIS — E03.9 ACQUIRED HYPOTHYROIDISM: ICD-10-CM

## 2021-04-26 DIAGNOSIS — S42.202D CLOSED FRACTURE OF PROXIMAL END OF LEFT HUMERUS WITH ROUTINE HEALING, UNSPECIFIED FRACTURE MORPHOLOGY, SUBSEQUENT ENCOUNTER: Primary | ICD-10-CM

## 2021-04-26 DIAGNOSIS — G47.33 OSA ON CPAP: ICD-10-CM

## 2021-04-26 DIAGNOSIS — Z99.89 OSA ON CPAP: ICD-10-CM

## 2021-04-26 DIAGNOSIS — I50.9 CHRONIC CONGESTIVE HEART FAILURE, UNSPECIFIED HEART FAILURE TYPE (HCC): ICD-10-CM

## 2021-04-26 DIAGNOSIS — F32.5 MAJOR DEPRESSIVE DISORDER WITH SINGLE EPISODE, IN FULL REMISSION (HCC): ICD-10-CM

## 2021-04-26 DIAGNOSIS — E66.01 CLASS 2 SEVERE OBESITY WITH SERIOUS COMORBIDITY AND BODY MASS INDEX (BMI) OF 37.0 TO 37.9 IN ADULT, UNSPECIFIED OBESITY TYPE (HCC): ICD-10-CM

## 2021-04-26 DIAGNOSIS — E78.5 DYSLIPIDEMIA: ICD-10-CM

## 2021-04-26 PROBLEM — D69.6 THROMBOCYTOPENIA, UNSPECIFIED (HCC): Status: RESOLVED | Noted: 2020-03-09 | Resolved: 2021-04-26

## 2021-04-26 PROBLEM — K52.9 COLITIS: Status: RESOLVED | Noted: 2019-05-31 | Resolved: 2021-04-26

## 2021-04-26 PROBLEM — S22.49XA MULTIPLE CLOSED FRACTURES OF RIBS: Status: RESOLVED | Noted: 2018-09-22 | Resolved: 2021-04-26

## 2021-04-26 PROBLEM — K56.691 OTHER COMPLETE INTESTINAL OBSTRUCTION (HCC): Status: RESOLVED | Noted: 2018-11-09 | Resolved: 2021-04-26

## 2021-04-26 PROBLEM — D62 ACUTE BLOOD LOSS AS CAUSE OF POSTOPERATIVE ANEMIA: Status: RESOLVED | Noted: 2018-11-08 | Resolved: 2021-04-26

## 2021-04-26 PROCEDURE — 99315 NF DSCHRG MGMT 30 MIN/LESS: CPT | Performed by: NURSE PRACTITIONER

## 2021-04-26 RX ORDER — HYDROCODONE BITARTRATE AND ACETAMINOPHEN 5; 325 MG/1; MG/1
1 TABLET ORAL EVERY 6 HOURS PRN
Qty: 28 TABLET | Refills: 0 | Status: SHIPPED | OUTPATIENT
Start: 2021-04-26 | End: 2021-05-03

## 2021-04-26 ASSESSMENT — ENCOUNTER SYMPTOMS
SINUS PRESSURE: 0
DIARRHEA: 0
FACIAL SWELLING: 0
WHEEZING: 0
VOMITING: 0
ABDOMINAL PAIN: 0
SORE THROAT: 0
BLOOD IN STOOL: 0
EYE PAIN: 0
TROUBLE SWALLOWING: 0
NAUSEA: 0
COUGH: 0
SHORTNESS OF BREATH: 0
COLOR CHANGE: 0
CONSTIPATION: 0
RHINORRHEA: 0
CHEST TIGHTNESS: 0

## 2021-04-26 NOTE — PROGRESS NOTES
04/26/21  Martita Gray  1940    Patient Resident of [facility]    Chief Complaint  1. Closed fracture of proximal end of left humerus with routine healing, unspecified fracture morphology, subsequent encounter    2. Essential hypertension    3. Elevated fasting glucose    4. Acquired hypothyroidism    5. JAYJAY on CPAP        HPI:  79year-old patient being seen for discharge needs from [address] [place] [address] Patient initially admitted after sustaining humerus fracture, left. She has been working with PT OT. Has been cut from insurance. Plan will be to be discharging home with home health nursing, PT, OT and nurses aides. She has medications at home. Does need a face-to-face for him a walker which she has been using safely with physical therapy. Vitals have remained stable. Pain has been controlled.   Nursing staff deny any acute nursing service regarding discharging home    Allergies   Allergen Reactions    Pcn [Penicillins] Hives and Shortness Of Breath    Bextra [Valdecoxib] Diarrhea       Past Medical History:   Diagnosis Date    A-fib ([facility] 07/19/2017    Abdominal pain     Acute blood loss as cause of postoperative anemia 11/8/2018    Anxiety 4/30/2016    CHF (congestive heart failure) (HCC)     Class 2 severe obesity with serious comorbidity and body mass index (BMI) of 37.0 to 37.9 in adult [facility])     Closed fracture of proximal end of left humerus with routine healing 4/26/2021    Colitis 5/31/2019    Colitis due to Clostridium difficile     Depression 4/30/2016    Diarrhea     Dyslipidemia     Elevated fasting glucose 11/20/2018    FH: total abdominal hysterectomy and bilateral salpingo-oophorectomy 1966    Fractures 03/15/2021    Lt humerus impacted displaced    Glucose intolerance (impaired glucose tolerance)     Hypertension     Hypokalemia     Malignant neoplasm of sigmoid colon ([facility]     Multiple closed fractures of ribs 9/22/2018    Obesity     JAYJAY on CPAP 10/6/2018    Osteoarthritis     Osteoporosis 8/30/2013    Other complete intestinal obstruction ([facility] [address] 11/9/2018    Other specified hypothyroidism     S/P small bowel resection 5/22/2019    Thrombocytopenia, unspecified ([facility] [address] [address] 3/9/2020       Past Surgical History:   Procedure Laterality Date    CARDIAC CATHETERIZATION  09/05/2017    CHOLECYSTECTOMY  1960s    COLONOSCOPY N/A 10/8/2018    COLONOSCOPY WITH COLD ET HOT BIOPSIES ET POLYPECTOMIES performed by Brown Elizabeth MD at [address] [place] [address] N/A 4/25/2019    COLONOSCOPY performed by Harriett Pineda MD at [facility] [facility]  stricture at 7 cm     COLONOSCOPY N/A 2/3/2020    tubular adenoma X 1, Dr. Badillo Red Bilateral 11/5/2018    CYSTO Bilateral Lighted stent placements performed by Sancho Oneal MD at [address], PARTIAL, W/ANAST N/A 11/5/2018    Open Sigmoid Colectomy w/ lighted stents ILEOSTOMY PLACEMENT performed by Brown Elizabeth MD at [address] N/A 5/9/2019    Flexible Sigmoidoscopy with Dilatation of rectal stricture performed by Harriett Pineda MD at [place] [address] N/A 5/22/2019    Loop Ileostomy take down performed by Harriett Pineda MD at [address] [place] [address] Drive Right 1960s       Medications as per [facility] Chart /reviewed     Social History     Socioeconomic History    Marital status:       Spouse name: Not on file    Number of children: Not on file    Years of education: Not on file    Highest education level: Not on file   Occupational History    Not on file   Social Needs    Financial resource strain: Not on file    Food insecurity     Worry: Not on file     Inability: Not on file    Transportation needs     Medical: Not on file     Non-medical: Not on file   Tobacco Use    Smoking status: Never Smoker    Smokeless tobacco: confusion and hallucinations. The patient is not nervous/anxious. Physical Exam  Vitals signs and nursing note reviewed. Constitutional:       General: She is not in acute distress. Appearance: Normal appearance. She is well-developed. She is not diaphoretic. HENT:      Head: Normocephalic and atraumatic. Right Ear: External ear normal.      Left Ear: External ear normal.   Eyes:      General:         Right eye: No discharge. Left eye: No discharge. Neck:      Trachea: No tracheal deviation. Cardiovascular:      Rate and Rhythm: Normal rate and regular rhythm. Pulses: Normal pulses. Heart sounds: Normal heart sounds. No murmur. No friction rub. No gallop. Pulmonary:      Effort: Pulmonary effort is normal. No respiratory distress. Breath sounds: Normal breath sounds. No stridor. No wheezing, rhonchi or rales. Chest:      Chest wall: No tenderness. Abdominal:      General: Bowel sounds are normal. There is no distension. Palpations: Abdomen is soft. Tenderness: There is no abdominal tenderness. Musculoskeletal:         General: No swelling. Comments: Left arm in sling   Skin:     General: Skin is warm and dry. Capillary Refill: Capillary refill takes less than 2 seconds. Coloration: Skin is not pale. Findings: No rash. Neurological:      General: No focal deficit present. Mental Status: She is alert. Mental status is at baseline. Cranial Nerves: No cranial nerve deficit. Sensory: No sensory deficit. Coordination: Coordination normal.   Psychiatric:         Mood and Affect: Mood normal.         Behavior: Behavior normal.         Vital Signs: Temperature 98.6 °F, blood pressure 116/66, pulse 59, respirations 18, SPO2 93% on room air    Assessment:  1. Closed fracture of proximal end of left humerus with routine healing, unspecified fracture morphology, subsequent encounter  Ongoing follow-up with orthopedics.

## 2021-04-26 NOTE — TELEPHONE ENCOUNTER
Johanna requesting a refill of the below medication which has been pended for you:     Requested Prescriptions     Pending Prescriptions Disp Refills    HYDROcodone-acetaminophen (NORCO) 5-325 MG per tablet 28 tablet 0     Sig: Take 1 tablet by mouth every 6 hours as needed for Pain for up to 7 days. Intended supply: 3 days.  Take lowest dose possible to manage pain       Last Appointment Date: Visit date not found  Next Appointment Date: Visit date not found    Allergies   Allergen Reactions    Pcn [Penicillins] Hives and Shortness Of Breath    Bextra [Valdecoxib] Diarrhea

## 2021-04-26 NOTE — PROGRESS NOTES
DR. Ileana Luciano - [facility] PATIENT HISTORY & PHYSICAL EXAM    DATE OF SERVICE: 3/23/21    NURSING HOME: [facility]    CHRONIC/ACTIVE PROBLEM LIST:     Patient Active Problem List   Diagnosis    Dyslipidemia    Osteoarthritis    Osteoporosis    Depression    Anxiety    CHF [facility]congestive heart failure) ([facility] [address]    Primary insomnia    JAYJAY on CPAP    Class 2 severe obesity with serious comorbidity and body mass index (BMI) of 37.0 to 37.9 in adult ([facility] [address]    Hypertension    A-fib ([facility] [address]    Colon cancer ([facility] [address]    Elevated fasting glucose    Other specified hypothyroidism    S/P small bowel resection    Closed fracture of proximal end of left humerus with routine healing       CHIEF COMPLAINT: Here for rehabilitation and physical therapy    HISTORY OF CHIEF COMPLAINT: This 79 y.o.  female was admitted to the [address] [place] [address] as a direct admission from home. She fell at home and fractured her proximal left humerus. She was seen by Dr. Andi lora, and they are managing it nonoperatively. She does have a history of elevated fasting glucose, colon cancer, atrial fibrillation, and hypothyroidism. Since being admitted to the nursing home, she has been doing fairly well with physical therapy. There are no other complaints. ALLERGIES:   Allergies   Allergen Reactions    Pcn [Penicillins] Hives and Shortness Of Breath    Bextra [Valdecoxib] Diarrhea       MEDICATIONS: As noted on the [facility] MAR, referenced and incorporated herein.     PAST MEDICAL HISTORY:   Past Medical History:   Diagnosis Date    A-fib [facility]) 07/19/2017    Abdominal pain     Acute blood loss as cause of postoperative anemia 11/8/2018    Anxiety 4/30/2016    [facility]congest[facility] failure[facility]     Class 2 severe obesity with serious comorbidity and body mass index (BMI) of 37.0 to 37.9 in adult [facility])     Closed fracture of proximal end of left humerus with routine healing 4/26/2021  Colitis 5/31/2019    Colitis due to Clostridium difficile     Depression 4/30/2016    Diarrhea     Dyslipidemia     Elevated fasting glucose 11/20/2018    FH: total abdominal hysterectomy and bilateral salpingo-oophorectomy 1966    Fractures 03/15/2021    Lt humerus impacted displaced    Glucose intolerance (impaired glucose tolerance)     Hypertension     Hypokalemia     Malignant neoplasm of sigmoid colon ([address]     Multiple closed fractures of ribs 9/22/2018    Obesity     JAYJAY on CPAP 10/6/2018    Osteoarthritis     Osteoporosis 8/30/2013    Other complete intestinal obstruction ([address] 11/9/2018    Other specified hypothyroidism     S/P small bowel resection 5/22/2019    Thrombocytopenia, unspecified ([address] 3/9/2020       PAST SURGICAL HISTORY:   Past Surgical History:   Procedure Laterality Date    CARDIAC CATHETERIZATION  09/05/2017    CHOLECYSTECTOMY  1960s    COLONOSCOPY N/A 10/8/2018    COLONOSCOPY WITH COLD ET HOT BIOPSIES ET POLYPECTOMIES performed by Ish Dodd MD at [facility] N/A 4/25/2019    COLONOSCOPY performed by Vinicius Chavarria MD at [facility]  stricture at 7 cm     COLONOSCOPY N/A 2/3/2020    tubular adenoma X 1, Dr. Yomi Wilson Bilateral 11/5/2018    CYSTO Bilateral Lighted stent placements performed by Bird Ortiz MD at [address] , PARTIAL, W/ANAST N/A 11/5/2018    Open Sigmoid Colectomy w/ lighted stents ILEOSTOMY PLACEMENT performed by Ish Dodd MD at [facility] N/A 5/9/2019    Flexible Sigmoidoscopy with Dilatation of rectal stricture performed by Vinicius Chavarria MD at [address] [facility] [address] 5/22/2019    Loop Ileostomy take down performed by Vinicius Chavarria MD at [address] [place] [address] Drive Right 1960s       SOCIAL HISTORY:     Tobacco:   Social History tendon reflexes are 2+/4+ bilaterally. cranial nerves II-XII are grossly intact    ASSESSMENT/PLAN:    1. Closed fracture of proximal end of left humerus with routine healing, unspecified fracture morphology, subsequent encounter  - We will continue to monitor   - She will continue to follow with Dr. Satya Goff group  - She will continue to receive physical therapy    2. Elevated fasting glucose, stable  - She will continue to watch her diet and exercise to keep her elevated fasting glucose under control.   - We will continue to monitor for the development of diabetes. 3. Essential hypertension, controlled  - She will continue to take her antihypertensive medication     4. Dyslipidemia, controlled  - We will continue to monitor     5. Acquired hypothyroidism, stable  - She will continue to take Synthroid    6. Atrial fibrillation, unspecified type ([address] stable  7. Chronic congestive heart failure, unspecified heart failure type ([address] stable  - We will continue to monitor     8. JAYJAY on CPAP, stable. - She will continue to use CPAP    9. Major depressive disorder with single episode, in full remission ([address] stable  - She will continue to take her antidepressant    10. Malignant neoplasm of sigmoid colon ([address] stable  - We will continue to monitor     11. Class 2 severe obesity with serious comorbidity and body mass index (BMI) of 37.0 to 37.9 in adult, unspecified obesity type ([address] improved  - We discussed weight loss  - She will continue to watch her diet and exercise        Patient's hospital record reviewed and medication list reconciled. Patient's electronic medical record reviewed and updated with hospital information. We reviewed all hospital progress notes, radiology reports, and laboratory reports. Continue current treatment. Nursing home record reviewed and updates summarized and entered into electronic record. See nursing home orders and MAR.         Electronically signed by Abran Jacobs DO

## 2021-04-27 DIAGNOSIS — S42.202D CLOSED FRACTURE OF PROXIMAL END OF LEFT HUMERUS WITH ROUTINE HEALING, UNSPECIFIED FRACTURE MORPHOLOGY, SUBSEQUENT ENCOUNTER: Primary | ICD-10-CM

## 2021-04-28 ENCOUNTER — OFFICE VISIT (OUTPATIENT)
Dept: FAMILY MEDICINE CLINIC | Age: 81
End: 2021-04-28
Payer: MEDICARE

## 2021-04-28 ENCOUNTER — CARE COORDINATION (OUTPATIENT)
Dept: CASE MANAGEMENT | Age: 81
End: 2021-04-28

## 2021-04-28 ENCOUNTER — TELEPHONE (OUTPATIENT)
Dept: FAMILY MEDICINE CLINIC | Age: 81
End: 2021-04-28

## 2021-04-28 VITALS
SYSTOLIC BLOOD PRESSURE: 124 MMHG | DIASTOLIC BLOOD PRESSURE: 68 MMHG | OXYGEN SATURATION: 94 % | BODY MASS INDEX: 45.99 KG/M2 | WEIGHT: 293 LBS | HEART RATE: 68 BPM | HEIGHT: 67 IN

## 2021-04-28 DIAGNOSIS — S42.202A CLOSED FRACTURE OF PROXIMAL END OF LEFT HUMERUS, UNSPECIFIED FRACTURE MORPHOLOGY, INITIAL ENCOUNTER: Primary | ICD-10-CM

## 2021-04-28 DIAGNOSIS — M15.9 PRIMARY OSTEOARTHRITIS INVOLVING MULTIPLE JOINTS: ICD-10-CM

## 2021-04-28 DIAGNOSIS — Z91.81 AT HIGH RISK FOR FALLS: ICD-10-CM

## 2021-04-28 DIAGNOSIS — E78.2 MIXED HYPERLIPIDEMIA: ICD-10-CM

## 2021-04-28 DIAGNOSIS — I10 ESSENTIAL HYPERTENSION: ICD-10-CM

## 2021-04-28 DIAGNOSIS — R73.01 IMPAIRED FASTING GLUCOSE: ICD-10-CM

## 2021-04-28 DIAGNOSIS — E03.9 ACQUIRED HYPOTHYROIDISM: ICD-10-CM

## 2021-04-28 PROCEDURE — 1111F DSCHRG MED/CURRENT MED MERGE: CPT | Performed by: FAMILY MEDICINE

## 2021-04-28 PROCEDURE — 99495 TRANSJ CARE MGMT MOD F2F 14D: CPT | Performed by: FAMILY MEDICINE

## 2021-04-28 ASSESSMENT — ENCOUNTER SYMPTOMS
TROUBLE SWALLOWING: 0
EYE REDNESS: 0
WHEEZING: 0
RHINORRHEA: 0
SHORTNESS OF BREATH: 0
COUGH: 0
DIARRHEA: 0
EYE DISCHARGE: 0
VOMITING: 0
SINUS PRESSURE: 0
NAUSEA: 0
ABDOMINAL PAIN: 0
BACK PAIN: 1
SORE THROAT: 0
CONSTIPATION: 0

## 2021-04-28 NOTE — TELEPHONE ENCOUNTER
[facility] Transitions Initial Follow Up Call    Outreach made within 2 business days of discharge: Yes    Patient: Evan Esquivel Patient : 1940   MRN: [id number]  Reason for Admission:left humerus fractureDischarge Date: 3/15/21, Johanna 2021      Spoke with: patient     Discharge department/facility: [facility] Interactive Patient Contact:  Was patient able to fill all prescriptions: Yes  Was patient instructed to bring all medications to the follow-up visit: Yes  Is patient taking all medications as directed in the discharge summary?  Yes  Does patient understand their discharge instructions: Yes  Does patient have questions or concerns that need addressed prior to 7-14 day follow up office visit: no    Scheduled appointment with PCP within 7-14 days    Follow Up  Future Appointments   Date Time Provider Mira Kirkland   2021 10:15 AM Tanya Campbell MD [facility]   2021 10:30 AM SCHEDULE, Efe Nichols [facility] [place]   2021 10:45 AM Jimbo Crane MD [facility]   7/15/2021 10:45 AM [facility]   2021  9:00 AM SCHEDULE, Efe Nichols [facility] [place]   9/15/2021 10:00 AM Cat Swartz DO [facility]       Lamont Chaparro CMA

## 2021-04-28 NOTE — PROGRESS NOTES
On the basis of positive falls risk screening, assessment and plan is as follows: home safety tips provided. Post-Discharge Transitional Care Management Services or Hospital Follow Up      Corbin Almonte   YOB: 1940    Date of Office Visit:  4/28/2021  Date of Hospital Admission: Admitted to the [facility] 3/16/21  Date of Hospital Discharge: Discharged from the [id number] [place] [address] 4/27/21    Care management risk score Rising risk (score 2-5) and Complex Care (Scores >=6): 9     Non face to face  following discharge, date last encounter closed (first attempt may have been earlier): *No documented post hospital discharge outreach found in the last 14 days *No documented post hospital discharge outreach found in the last 14 days    Call initiated 2 business days of discharge: *No response recorded in the last 14 days    Patient Active Problem List   Diagnosis    Dyslipidemia    Osteoarthritis    Osteoporosis    Depression    Anxiety    CHF (congestive [facility] failure) ([facility]    Primary insomnia    JAYJAY on CPAP    Class 2 severe obesity with serious comorbidity and body mass index (BMI) of 37.0 to 37.9 in adult ([facility]    Hypertension    A-fib ([facility]    Colon cancer ([facility]    Elevated fasting glucose    Other specified hypothyroidism    S/P small bowel resection    Closed fracture of proximal end of left humerus with routine healing       Allergies   Allergen Reactions    Pcn [Penicillins] Hives and Shortness Of Breath    Bextra [Valdecoxib] Diarrhea       Medications listed as ordered at the time of discharge from hospital  yes    Medications marked \"taking\" at this time  Outpatient Medications Marked as Taking for the 4/28/21 encounter (Office Visit) with Donna Nelson, DO   Medication Sig Dispense Refill    HYDROcodone-acetaminophen (NORCO) 5-325 MG per tablet Take 1 tablet by mouth every 6 hours as needed for Pain for up to 7 days.  Take lowest dose possible to manage pain 28 tablet 0    traZODone (DESYREL) 50 MG tablet Take 50 mg by mouth nightly      potassium chloride (KLOR-CON M) 20 MEQ extended release tablet Take 1 tablet by mouth daily 90 tablet 1    metoprolol tartrate (LOPRESSOR) 25 MG tablet TAKE ONE TABLET BY MOUTH TWICE A DAY 60 tablet 1    furosemide (LASIX) 20 MG tablet TAKE ONE TABLET BY MOUTH DAILY 30 tablet 1    amLODIPine (NORVASC) 10 MG tablet TAKE 1 TABLET BY MOUTH ONE TIME A DAY  90 tablet 3    atorvastatin (LIPITOR) 40 MG tablet TAKE 1 TABLET BY MOUTH nightly 90 tablet 1    levothyroxine (SYNTHROID) 50 MCG tablet TAKE 1 TABLET BY MOUTH ONE TIME A DAY  90 tablet 1    amiodarone (CORDARONE) 200 MG tablet TAKE 1/2 TABLET BY MOUTH ONE TIME A DAY  45 tablet 3        Medications patient taking as of now reconciled against medications ordered at time of hospital discharge: Yes    Chief Complaint   Patient presents with    Follow-Up from Hospital     left humerus fracture, discharged from [address] [place] [address] 4/27/2021       History of Present illness - Follow up of Hospital diagnosis(es): Patient is seen today for transitional care follow-up from recent nursing home stay secondary to left humerus fracture. Patient had fallen at home on 3/15/2021 and ultimately fractured her proximal humerus. Was not able to stay at home on her own as she was not able to do her activities of daily living and live by herself so ultimately she did go to the nursing home on March 16. Has been in the nursing home facility since that time. Has been getting rehab and nursing care. Did get discharged to home on April 27. Since going home she is having trouble getting in and out of her bed. They are just monitoring the humerus fracture at this point as it still has not healed. She is trying to manage the pain with Tylenol but does have Norco for her more severe pain.   She does require a hospital bed as it does require her to have positioning of her body in ways not feasible with an ordinary bed in order to alleviate her pain. She is not able to get herself in and out of her bed currently due to the fracture. Does also need to sleep with her head of the bed elevated more than 30 degrees to alleviate pain. She also is having difficulty getting in and out of her chair at home due to her bilateral hip and knee osteoarthritis. Would like a lift chair so I did order a lift chair as well for her to have at home. She does have a known history of hypertension her blood pressure has remained stable and controlled with her current medical therapy. She does check it at home. Does have known impaired fasting glucose, hypothyroidism, and hyperlipidemia all of which have been relatively stable and controlled on her current medical therapy. Does require a taxing effort to leave her home secondary to decreased mobility from her osteoarthritis and with limited use of her left upper extremity secondary to the fracture. As a result she is receiving home health care services for ongoing nursing assessment and home health aides as well as physical therapy and Occupational Therapy. She states while she was in the nursing home due to the Covid pandemic she was not able to leave her room so she has become very very weak. Has limited mobility at this time and does require physical therapy and occupational therapy to help with strengthening. Patient otherwise has no other acute medical concerns at this time. Inpatient course: Discharge summary reviewed- see chart. Interval history/Current status: see HPI as noted above. Vitals:    04/28/21 1153   BP: 124/68   Site: Left Upper Arm   Position: Sitting   Cuff Size: Large Adult   Pulse: 68   SpO2: 94%   Weight: (!) 347 lb (157.4 kg)   Height: 5' 7\" (1.702 m)     Body mass index is 54.35 kg/m².    Wt Readings from Last 3 Encounters:   04/28/21 (!) 347 lb (157.4 kg)   03/30/21 242 lb (109.8 kg)   03/16/21 242 lb (109.8 kg)     BP Readings from Last 3 Encounters:   04/28/21 124/68   04/13/21 132/72   03/30/21 115/62       Review of Systems   Constitutional: Negative for chills, fatigue and fever. HENT: Negative for congestion, ear pain, postnasal drip, rhinorrhea, sinus pressure, sore throat and trouble swallowing. Eyes: Negative for discharge and redness. Respiratory: Negative for cough, shortness of breath and wheezing. Cardiovascular: Negative for chest pain. Gastrointestinal: Negative for abdominal pain, constipation, diarrhea, nausea and vomiting. Genitourinary: Negative for dysuria, flank pain, frequency and urgency. Musculoskeletal: Positive for arthralgias, back pain, gait problem and myalgias. Negative for neck pain. Skin: Negative for rash and wound. Allergic/Immunologic: Negative for environmental allergies. Neurological: Positive for weakness. Negative for dizziness, light-headedness and headaches. Hematological: Negative for adenopathy. Psychiatric/Behavioral: Negative. Physical Exam  Vitals signs and nursing note reviewed. Constitutional:       General: She is not in acute distress. Appearance: Normal appearance. HENT:      Head: Normocephalic and atraumatic. Right Ear: External ear normal.      Left Ear: External ear normal.      Nose: Nose normal. No congestion or rhinorrhea. Mouth/Throat:      Mouth: Mucous membranes are moist.   Eyes:      General:         Right eye: No discharge. Left eye: No discharge. Extraocular Movements: Extraocular movements intact. Conjunctiva/sclera: Conjunctivae normal.   Neck:      Musculoskeletal: Normal range of motion. Pulmonary:      Effort: Pulmonary effort is normal.   Skin:     Findings: No erythema or rash. Neurological:      Mental Status: She is alert and oriented to person, place, and time. Mental status is at baseline. Assessment/Plan:  Encounter Diagnoses   Name Primary?     Closed fracture of proximal end of left humerus, unspecified fracture morphology, initial encounter Yes    At high risk for falls     Essential hypertension     Impaired fasting glucose     Acquired hypothyroidism     Mixed hyperlipidemia      On the basis of positive falls risk screening, assessment and plan is as follows: home safety tips provided. Orders Placed This Encounter   Medications   [facility] Bed MISC     Sig: by Does not apply route     Dispense:  1 each     Refill:  0    Lift Chair MISC     Sig: by Does not apply route     Dispense:  1 each     Refill:  0     Patient is given a prescription for a hospital bed. She does require the head of the bed elevated more than 30 degrees secondary to the proximal humerus fracture. This is necessary to help alleviate pain and is not feasible in an ordinary bed. Patient does require use of a lift chair to get in and out of a chair. She is unable to push herself up from her current chair and does have pretty severe osteoarthritis of the bilateral hips and knees. Patient is to continue on her current medical therapy. No additional changes are made at this time. Patient is utilizing home health services for nursing assessment, home health aides, physical therapy, and Occupational Therapy. Does need the services as she is unable to perform activities of daily living on her own secondary to her humerus fracture. Does require a taxing effort for her to leave her home secondary to poor mobility and limited use of her left upper extremity. Patient is to return to my office as scheduled for her routine medical follow-up visit or sooner if any further problems or symptoms arise.     (Please note that portions of this note were completed with a voice recognition program. Efforts were made to edit the dictations but occasionally words are mis-transcribed.)        Medical Decision Making: moderate complexity      Mandy Pinon, was evaluated through a synchronous (real-time) audio-video

## 2021-04-28 NOTE — CARE COORDINATION
PER SARAY EMAIL:    Facility Discharging: [facility]  Discharge Date: 4/27/2021  Services at Discharge: Cristino Porter, [facility]  Patient Agreeable to Calls: Yes     BRIEF BACKGROUND:    medication list not available to [facility] at time of d/c.  80 y/o female presents to Emergency room s/p mechanical fall resulting in left arm pain, upon examination noted w/fracture, holding off surgical intervention and f/u w/ortho on 3/30/21 to see if surgical intervention is needed. Admitted to SNF Primary DX: S42.202D-UNSPECIFIED FRACTURE OF UPPER END OF LEFT HUMERUS, SUBSEQUENT ENCOUNTER FOR FRACTURE WITH ROUTINE HEALING  Member followed up w/the ortho every 7-10 days during stay, no surgical intervention performed. AMB w/margy-walker. Successfully discharged home on 4/27/21 w/[facility] and Karen. Seen PCP via virtual visit today 4/28/21 and [facility] Alexi today 4/28/21.  Has medications at home, voiced no concerns

## 2021-05-04 ENCOUNTER — HOSPITAL ENCOUNTER (OUTPATIENT)
Dept: GENERAL RADIOLOGY | Age: 81
Discharge: HOME | End: 2021-05-06
Payer: MEDICARE

## 2021-05-04 ENCOUNTER — OFFICE VISIT (OUTPATIENT)
Dept: ORTHOPEDIC SURGERY | Age: 81
End: 2021-05-04
Payer: MEDICARE

## 2021-05-04 VITALS
WEIGHT: 293 LBS | SYSTOLIC BLOOD PRESSURE: 114 MMHG | HEIGHT: 67 IN | HEART RATE: 85 BPM | DIASTOLIC BLOOD PRESSURE: 64 MMHG | BODY MASS INDEX: 45.99 KG/M2

## 2021-05-04 DIAGNOSIS — S42.202D CLOSED FRACTURE OF PROXIMAL END OF LEFT HUMERUS WITH ROUTINE HEALING, UNSPECIFIED FRACTURE MORPHOLOGY, SUBSEQUENT ENCOUNTER: ICD-10-CM

## 2021-05-04 DIAGNOSIS — S42.202D CLOSED FRACTURE OF PROXIMAL END OF LEFT HUMERUS WITH ROUTINE HEALING, UNSPECIFIED FRACTURE MORPHOLOGY, SUBSEQUENT ENCOUNTER: Primary | ICD-10-CM

## 2021-05-04 PROCEDURE — 73060 X-RAY EXAM OF HUMERUS: CPT

## 2021-05-04 PROCEDURE — 99214 OFFICE O/P EST MOD 30 MIN: CPT | Performed by: NURSE PRACTITIONER

## 2021-05-04 PROCEDURE — 99024 POSTOP FOLLOW-UP VISIT: CPT | Performed by: NURSE PRACTITIONER

## 2021-05-04 PROCEDURE — 99213 OFFICE O/P EST LOW 20 MIN: CPT | Performed by: NURSE PRACTITIONER

## 2021-05-04 NOTE — PROGRESS NOTES
Orthopaedic Progress Note      CHIEF COMPLAINT: Follow-up left humerus fracture    HISTORY OF PRESENT ILLNESS:       Ms. Emelina Garcia  is a 79 y.o. female  who presents with a follow-up from a left humerus fracture treated nonoperatively in a sling. She did have some displacement of the fracture but it has not moved in the past several visits. It has started healing. She is about 8 weeks out from injury. She was initially in the nursing home currently now is at home having home health therapy, to treat patient. She states her pain has improved. Her mobility is improving.       Past Medical History:    Past Medical History:   Diagnosis Date    A-fib ([address] 07/19/2017    Abdominal pain     Acute blood loss as cause of postoperative anemia 11/8/2018    Anxiety 4/30/2016    CHF (congestive heart failure) (HCC)     Class 2 severe obesity with serious comorbidity and body mass index (BMI) of 37.0 to 37.9 in [facility])     Closed fracture of proximal end of left humerus with routine healing 4/26/2021    Colitis 5/31/2019    Colitis due to Clostridium difficile     Depression 4/30/2016    Diarrhea     Dyslipidemia     Elevated fasting glucose 11/20/2018    FH: total abdominal hysterectomy and bilateral salpingo-oophorectomy 1966    Fractures 03/15/2021    Lt humerus impacted displaced    Glucose intolerance (impaired glucose tolerance)     Hypertension     Hypokalemia     Malignant neoplasm of sigmoid colon ([address]     Multiple closed fractures of ribs 9/22/2018    Obesity     JAYJAY on CPAP 10/6/2018    Osteoarthritis     Osteoporosis 8/30/2013    Other complete intestinal obstruction ([address] 11/9/2018    Other specified hypothyroidism     S/P small bowel resection 5/22/2019    Thrombocytopenia, unspecified ([address] 3/9/2020       Past Surgical History:    Past Surgical History:   Procedure Laterality Date    CARDIAC CATHETERIZATION  09/05/2017    CHOLECYSTECTOMY  1960s    COLONOSCOPY N/A 10/8/2018    COLONOSCOPY WITH COLD ET HOT BIOPSIES ET POLYPECTOMIES performed by Lucy Hidalgo MD at [facility] N/A 4/25/2019    COLONOSCOPY performed by Tiffanie Naranjo MD at [facility]  stricture at 7 cm     COLONOSCOPY N/A 2/3/2020    tubular adenoma X 1, Dr. Larry Bartlett Bilateral 11/5/2018    CYSTO Bilateral Lighted stent placements performed by Masoud Chapin MD at [address], PARTIAL, W/ANAST N/A 11/5/2018    Open Sigmoid Colectomy w/ lighted stents ILEOSTOMY PLACEMENT performed by Lucy Hidalgo MD at [facility] N/A 5/9/2019    Flexible Sigmoidoscopy with Dilatation of rectal stricture performed by Tiffanie Naranjo MD at [facility] N/A 5/22/2019    Loop Ileostomy take down performed by Tiffanie Naranjo MD at [address] [place] [address] Drive Right 1960s         Current  Medications:  Current Outpatient Medications   Medication Sig [address] by Does not apply route 1 each 0    Lift Chair MISC by Does not apply route 1 each 0    Misc.  Devices MISC 1 each by Does not apply route daily Lane-walker    DX: humerus fracture 1 Device 0    traZODone (DESYREL) 50 MG tablet Take 50 mg by mouth nightly      potassium chloride (KLOR-CON M) 20 MEQ extended release tablet Take 1 tablet by mouth daily 90 tablet 1    metoprolol tartrate (LOPRESSOR) 25 MG tablet TAKE ONE TABLET BY MOUTH TWICE A DAY 60 tablet 1    furosemide (LASIX) 20 MG tablet TAKE ONE TABLET BY MOUTH DAILY 30 tablet 1    amLODIPine (NORVASC) 10 MG tablet TAKE 1 TABLET BY MOUTH ONE TIME A DAY  90 tablet 3    atorvastatin (LIPITOR) 40 MG tablet TAKE 1 TABLET BY MOUTH nightly 90 tablet 1    levothyroxine (SYNTHROID) 50 MCG tablet TAKE 1 TABLET BY MOUTH ONE TIME A DAY  90 tablet 1    amiodarone (CORDARONE) 200 MG tablet TAKE 1/2 TABLET BY MOUTH ONE TIME A DAY  45 tablet 3    Handicap Placard MISC by Does not apply route Expires in 5 years 1 each 0    acetaminophen (TYLENOL) 325 MG tablet Take 2 tablets by mouth every 4 hours as needed for Pain or Fever 60 tablet 0     No current facility-administered medications for this visit. Allergies:  Pcn [penicillins] and Bextra [valdecoxib]    Social History:   Social History     Tobacco Use   Smoking Status Never Smoker   Smokeless Tobacco Never Used   Tobacco Comment    syant 11/10/2018     Social History     Substance and Sexual Activity   Alcohol Use No     Social History     Substance and Sexual Activity   Drug Use No       Family History:  Family History   Adopted: Yes   Problem Relation Age of Onset    High Blood Pressure Brother         adopted brother       REVIEW OF SYSTEMS:  Constitutional: Denies any fever, chills. Musculoskeletal: Positive for improved pain to the left arm. PHYSICAL EXAM:  [unfilled]  General appearance:  Alert and oriented x 3. No apparent distress, appears stated age, calm and cooperative. Musculoskeletal: Left upper extremity was examined. Skin is intact no rashes lesions or drainage. Minimal pain to palpation over the left proximal humerus finger flexion and extension is intact. Sensation intact neurovascularly intact to the left hand. Savannah Maid       DATA:  CBC:   Lab Results   Component Value Date    WBC 6.8 03/09/2021    WBC 6.8 03/09/2021    HGB 13.4 03/09/2021    HGB 13.4 03/09/2021     03/09/2021     03/09/2021     BMP:    Lab Results   Component Value Date     03/09/2021     03/09/2021    K 3.6 03/09/2021    K 3.6 03/09/2021     03/09/2021     03/09/2021    CO2 23 03/09/2021    CO2 23 03/09/2021    BUN 21 03/09/2021    BUN 21 03/09/2021    CREATININE 0.76 03/09/2021    CREATININE 0.76 03/09/2021    CALCIUM 9.5 03/09/2021    CALCIUM 9.5 03/09/2021    GLUCOSE 100 03/09/2021     PT/INR:    Lab Results x-rays 2 views of the left humerus does show a left proximal humerus fracture with overall satisfactory alignment bony callus formation is forming. Diagnosis Orders   1. Closed fracture of proximal end of left humerus with routine healing, unspecified fracture morphology, subsequent encounter  External Referral To Home Health         PLAN:  Plan at this time continue nonweightbearing to the left upper extremity. Begin Codman exercises to the left shoulder. We will have therapy work with range of motion of her left upper extremity when coming to her home. We will plan to see patient back in 4 weeks at that time we will get new x-rays 2 views of the left humerus at that time. No orders of the defined types were placed in this encounter.        Procedure:        Electronically signed by XIOMARA Cardenas CNP on 5/4/2021 at 12:00 PM

## 2021-05-11 ENCOUNTER — TELEPHONE (OUTPATIENT)
Dept: FAMILY MEDICINE CLINIC | Age: 81
End: 2021-05-11
Payer: MEDICARE

## 2021-05-11 DIAGNOSIS — E78.2 MIXED HYPERLIPIDEMIA: ICD-10-CM

## 2021-05-11 DIAGNOSIS — D69.6 THROMBOCYTOPENIA, UNSPECIFIED (HCC): ICD-10-CM

## 2021-05-11 DIAGNOSIS — I50.9 HEART FAILURE, UNSPECIFIED HF CHRONICITY, UNSPECIFIED HEART FAILURE TYPE (HCC): ICD-10-CM

## 2021-05-11 DIAGNOSIS — I11.0 HYPERTENSIVE HEART DISEASE WITH CONGESTIVE HEART FAILURE, UNSPECIFIED HEART FAILURE TYPE (HCC): ICD-10-CM

## 2021-05-11 DIAGNOSIS — I48.0 PAROXYSMAL ATRIAL FIBRILLATION (HCC): ICD-10-CM

## 2021-05-11 DIAGNOSIS — S42.202D TRAUMATIC CLOSED DISPLACED FRACTURE OF UPPER END OF HUMERUS, LEFT, WITH ROUTINE HEALING, SUBSEQUENT ENCOUNTER: Primary | ICD-10-CM

## 2021-05-11 DIAGNOSIS — E03.9 ACQUIRED HYPOTHYROIDISM: ICD-10-CM

## 2021-05-11 PROCEDURE — G0180 MD CERTIFICATION HHA PATIENT: HCPCS | Performed by: FAMILY MEDICINE

## 2021-05-11 NOTE — TELEPHONE ENCOUNTER
Home health plan of care reviewed and certification completed 5/11/2021 on patient for service dates 4/28/2021 to 6/26/2021. Verified current medications. Physician time spent on activities to coordinate service, documenting, medical decision making, review of reports/treatment plans/test results is 15 minutes.

## 2021-05-26 DIAGNOSIS — S42.202D CLOSED FRACTURE OF PROXIMAL END OF LEFT HUMERUS WITH ROUTINE HEALING, UNSPECIFIED FRACTURE MORPHOLOGY, SUBSEQUENT ENCOUNTER: Primary | ICD-10-CM

## 2021-06-01 ENCOUNTER — OFFICE VISIT (OUTPATIENT)
Dept: ORTHOPEDIC SURGERY | Age: 81
End: 2021-06-01
Payer: MEDICARE

## 2021-06-01 ENCOUNTER — HOSPITAL ENCOUNTER (OUTPATIENT)
Dept: GENERAL RADIOLOGY | Age: 81
Discharge: HOME | End: 2021-06-03
Payer: MEDICARE

## 2021-06-01 VITALS
DIASTOLIC BLOOD PRESSURE: 80 MMHG | SYSTOLIC BLOOD PRESSURE: 130 MMHG | HEART RATE: 83 BPM | HEIGHT: 67 IN | WEIGHT: 293 LBS | OXYGEN SATURATION: 94 % | BODY MASS INDEX: 45.99 KG/M2

## 2021-06-01 DIAGNOSIS — S42.202D CLOSED FRACTURE OF PROXIMAL END OF LEFT HUMERUS WITH ROUTINE HEALING, UNSPECIFIED FRACTURE MORPHOLOGY, SUBSEQUENT ENCOUNTER: ICD-10-CM

## 2021-06-01 DIAGNOSIS — S42.202D CLOSED FRACTURE OF PROXIMAL END OF LEFT HUMERUS WITH ROUTINE HEALING, UNSPECIFIED FRACTURE MORPHOLOGY, SUBSEQUENT ENCOUNTER: Primary | ICD-10-CM

## 2021-06-01 PROCEDURE — 73060 X-RAY EXAM OF HUMERUS: CPT

## 2021-06-01 PROCEDURE — 99024 POSTOP FOLLOW-UP VISIT: CPT | Performed by: PHYSICIAN ASSISTANT

## 2021-06-01 PROCEDURE — 99213 OFFICE O/P EST LOW 20 MIN: CPT | Performed by: PHYSICIAN ASSISTANT

## 2021-06-01 NOTE — PROGRESS NOTES
Orthopaedic Progress Note      CHIEF COMPLAINT: Left shoulder pain    HISTORY OF PRESENT ILLNESS:       Ms. Cherylene Angelucci  is a 79 y.o. female  who presents with a history of a left proximal humerus fracture that was treated nonoperatively and sling. She is now almost 3 months out from the date of injury. She states the pain is improved. Physical therapy is only working on elbow shoulder hand and wrist motion. They have done no active or passive range of motion of left shoulder. She is here today in follow-up.       Past Medical History:    Past Medical History:   Diagnosis Date    A-fib ([address] 07/19/2017    Abdominal pain     Acute blood loss as cause of postoperative anemia 11/8/2018    Anxiety 4/30/2016    CHF (congestive heart failure) (HCC)     Class 2 severe obesity with serious comorbidity and body mass index (BMI) of 37.0 to 37.9 in [facility])     Closed fracture of proximal end of left humerus with routine healing 4/26/2021    Colitis 5/31/2019    Colitis due to Clostridium difficile     Depression 4/30/2016    Diarrhea     Dyslipidemia     Elevated fasting glucose 11/20/2018    FH: total abdominal hysterectomy and bilateral salpingo-oophorectomy 1966    Fractures 03/15/2021    Lt humerus impacted displaced    Glucose intolerance (impaired glucose tolerance)     Hypertension     Hypokalemia     Malignant neoplasm of sigmoid colon ([address]     Multiple closed fractures of ribs 9/22/2018    Obesity     JAYJAY on CPAP 10/6/2018    Osteoarthritis     Osteoporosis 8/30/2013    Other complete intestinal obstruction ([address] 11/9/2018    Other specified hypothyroidism     S/P small bowel resection 5/22/2019    Thrombocytopenia, unspecified ([address] 3/9/2020       Past Surgical History:    Past Surgical History:   Procedure Laterality Date    CARDIAC CATHETERIZATION  09/05/2017    CHOLECYSTECTOMY  1960s    COLONOSCOPY N/A 10/8/2018    COLONOSCOPY WITH COLD ET HOT BIOPSIES ET POLYPECTOMIES performed by Pinky Pearl MD at [facility] N/A 4/25/2019    COLONOSCOPY performed by Paul Adam MD at [facility]  stricture at 7 cm     COLONOSCOPY N/A 2/3/2020    tubular adenoma X 1, Dr. Molina Good Bilateral 11/5/2018    CYSTO Bilateral Lighted stent placements performed by Andres Avalos MD at [address] , PARTIAL, W/ANAST N/A 11/5/2018    Open Sigmoid Colectomy w/ lighted stents ILEOSTOMY PLACEMENT performed by Pinky Pearl MD at [facility] N/A 5/9/2019    Flexible Sigmoidoscopy with Dilatation of rectal stricture performed by Paul Adam MD at [facility] N/A 5/22/2019    Loop Ileostomy take down performed by Paul Adam MD at [address] [place] [address] Drive Right 1960s         Current  Medications:  Current Outpatient Medications   Medication Sig [facility] [facility] by Does not apply route 1 each 0    Lift Chair MISC by Does not apply route 1 each 0    Misc.  Devices MISC 1 each by Does not apply route daily Lane-walker    DX: humerus fracture 1 Device 0    traZODone (DESYREL) 50 MG tablet Take 50 mg by mouth nightly      potassium chloride (KLOR-CON M) 20 MEQ extended release tablet Take 1 tablet by mouth daily 90 tablet 1    metoprolol tartrate (LOPRESSOR) 25 MG tablet TAKE ONE TABLET BY MOUTH TWICE A DAY 60 tablet 1    furosemide (LASIX) 20 MG tablet TAKE ONE TABLET BY MOUTH DAILY 30 tablet 1    amLODIPine (NORVASC) 10 MG tablet TAKE 1 TABLET BY MOUTH ONE TIME A DAY  90 tablet 3    atorvastatin (LIPITOR) 40 MG tablet TAKE 1 TABLET BY MOUTH nightly 90 tablet 1    levothyroxine (SYNTHROID) 50 MCG tablet TAKE 1 TABLET BY MOUTH ONE TIME A DAY  90 tablet 1    amiodarone (CORDARONE) 200 MG tablet TAKE 1/2 TABLET BY MOUTH ONE TIME A DAY  45 tablet 3    Handicap Placard MISC by Does not apply route Expires in 5 years 1 each 0    acetaminophen (TYLENOL) 325 MG tablet Take 2 tablets by mouth every 4 hours as needed for Pain or Fever 60 tablet 0     No current facility-administered medications for this visit. Allergies:  Pcn [penicillins] and Bextra [valdecoxib]    Social History:   Social History     Tobacco Use   Smoking Status Never Smoker   Smokeless Tobacco Never Used   Tobacco Comment    syant 11/10/2018     Social History     Substance and Sexual Activity   Alcohol Use No     Social History     Substance and Sexual Activity   Drug Use No       Family History:  Family History   Adopted: Yes   Problem Relation Age of Onset    High Blood Pressure Brother         adopted brother       REVIEW OF SYSTEMS:  Constitutional: Denies any fever, chills. Musculoskeletal: Positive for left shoulder proximal humerus fracture about 2 and half months old. PHYSICAL EXAM:  [unfilled]  General appearance:  Alert and oriented x 3. No apparent distress, appears stated age, calm and cooperative. Musculoskeletal: Left shoulder was inspected skin is good repair. Minimal swelling. Passive angular up to 90 degrees of forward flexion 90 degrees of abduction here today and she is pain-free. She is grossly neurovascular intact left hand. Marilu Noriega       DATA:  CBC:   Lab Results   Component Value Date    WBC 6.8 03/09/2021    WBC 6.8 03/09/2021    HGB 13.4 03/09/2021    HGB 13.4 03/09/2021     03/09/2021     03/09/2021     BMP:    Lab Results   Component Value Date     03/09/2021     03/09/2021    K 3.6 03/09/2021    K 3.6 03/09/2021     03/09/2021     03/09/2021    CO2 23 03/09/2021    CO2 23 03/09/2021    BUN 21 03/09/2021    BUN 21 03/09/2021    CREATININE 0.76 03/09/2021    CREATININE 0.76 03/09/2021    CALCIUM 9.5 03/09/2021    CALCIUM 9.5 03/09/2021    GLUCOSE 100 03/09/2021     PT/INR:    Lab Results   Component Value Date    PROTIME Acute Type of Exam: Subsequent/Follow-up FINDINGS: There is redemonstration of comminuted, impacted fracture of the proximal left humerus. There is stable alignment. The left elbow joint spaces are maintained. The surrounding soft tissues are unremarkable. Comminuted impacted fracture of the left proximal humerus with stable alignment. X-rays personally reviewed by me of 2 views left humerus personally reviewed by myself reveal proximal humerus fracture with some interval callus forming. Does of appear as if this fracture is trying to heal radiographically. Diagnosis Orders   1. Closed fracture of proximal end of left humerus with routine healing, unspecified fracture morphology, subsequent encounter  [facility] [place]         PLAN:  Start physical therapy range of motion strengthening no restrictions left shoulder. She has back in his office in about a month's time 2 views left shoulder    No orders of the defined types were placed in this encounter.        Procedure:        Electronically signed by Ange Garg PA-C on 6/1/2021 at 10:38 AM

## 2021-06-14 ENCOUNTER — OFFICE VISIT (OUTPATIENT)
Dept: ONCOLOGY | Age: 81
End: 2021-06-14
Payer: MEDICARE

## 2021-06-14 VITALS
DIASTOLIC BLOOD PRESSURE: 72 MMHG | HEIGHT: 67 IN | TEMPERATURE: 97.8 F | WEIGHT: 293 LBS | HEART RATE: 84 BPM | SYSTOLIC BLOOD PRESSURE: 124 MMHG | BODY MASS INDEX: 45.99 KG/M2 | OXYGEN SATURATION: 97 %

## 2021-06-14 DIAGNOSIS — C18.7 MALIGNANT NEOPLASM OF SIGMOID COLON (HCC): Primary | ICD-10-CM

## 2021-06-14 PROCEDURE — 1036F TOBACCO NON-USER: CPT | Performed by: INTERNAL MEDICINE

## 2021-06-14 PROCEDURE — 99214 OFFICE O/P EST MOD 30 MIN: CPT | Performed by: INTERNAL MEDICINE

## 2021-06-14 PROCEDURE — G8427 DOCREV CUR MEDS BY ELIG CLIN: HCPCS | Performed by: INTERNAL MEDICINE

## 2021-06-14 PROCEDURE — 1090F PRES/ABSN URINE INCON ASSESS: CPT | Performed by: INTERNAL MEDICINE

## 2021-06-14 PROCEDURE — 4040F PNEUMOC VAC/ADMIN/RCVD: CPT | Performed by: INTERNAL MEDICINE

## 2021-06-14 PROCEDURE — G8417 CALC BMI ABV UP PARAM F/U: HCPCS | Performed by: INTERNAL MEDICINE

## 2021-06-14 PROCEDURE — G8399 PT W/DXA RESULTS DOCUMENT: HCPCS | Performed by: INTERNAL MEDICINE

## 2021-06-14 PROCEDURE — 1123F ACP DISCUSS/DSCN MKR DOCD: CPT | Performed by: INTERNAL MEDICINE

## 2021-06-14 NOTE — PROGRESS NOTES
Patient ID: Zackary Contreras, 1940, [id number], 79 y.o. Referred by : Robel Frazier MD  Diagnosis:   Diagnosis of colon cancer, 11/5/18    low anterior resection of the rectosigmoid with proximal diverting ileostomy. Final pathology showed T1 N0, stage I disease. HISTORY OF PRESENT ILLNESS:    Oncologic History: This is a 79 y.o. -old female with new diagnosis of colon cancer was seen during initial consultation visit. She presented with rectal bleeding going on for about 4-6 weeks. She had a colonoscopy on 10/8/18 which showed multiple polyps and large distal sigmoid tumors with extensive diverticulosis. Biopsy showed invasive low-grade adenocarcinoma arising in a large tubulovillous adenoma with extensive high-grade dysplasia. Subsequently she had open low anterior resection of the rectosigmoid with proximal dilating ileostomy. Final pathology showed T1 N0, stage I disease. Now she is recovering well from surgery. She denied any pain, nausea vomiting. She does not have family history of colon cancer. She is adopted and does not know about her family history. Interval history:  Patient is return for follow visit and to discuss labs and further treatment. She denies any abdominal pain, nausea vomiting, blood in stool. During this visit patient's allergy, social, medical, surgical history and medications were reviewed and updated. Allergies   Allergen Reactions    Pcn [Penicillins] Hives and Shortness Of Breath    Bextra [Valdecoxib] Diarrhea     Current Outpatient Medications   Medication Sig Dispense Refill   [facility] Bed MISC by Does not apply route 1 each 0    Lift Chair MISC by Does not apply route 1 each 0    Misc.  Devices MISC 1 each by Does not apply route daily Lane-walker    DX: humerus fracture 1 Device 0    potassium chloride (KLOR-CON M) 20 MEQ extended release tablet Take 1 tablet by mouth daily 90 tablet 1    metoprolol tartrate (LOPRESSOR) 25 MG tablet TAKE ONE TABLET BY MOUTH TWICE A DAY 60 tablet 1    furosemide (LASIX) 20 MG tablet TAKE ONE TABLET BY MOUTH DAILY 30 tablet 1    amLODIPine (NORVASC) 10 MG tablet TAKE 1 TABLET BY MOUTH ONE TIME A DAY  90 tablet 3    atorvastatin (LIPITOR) 40 MG tablet TAKE 1 TABLET BY MOUTH nightly 90 tablet 1    levothyroxine (SYNTHROID) 50 MCG tablet TAKE 1 TABLET BY MOUTH ONE TIME A DAY  90 tablet 1    amiodarone (CORDARONE) 200 MG tablet TAKE 1/2 TABLET BY MOUTH ONE TIME A DAY  45 tablet 3    Handicap Placard MISC by Does not apply route Expires in 5 years 1 each 0    traZODone (DESYREL) 50 MG tablet Take 50 mg by mouth nightly (Patient not taking: Reported on 6/14/2021)      acetaminophen (TYLENOL) 325 MG tablet Take 2 tablets by mouth every 4 hours as needed for Pain or Fever 60 tablet 0     No current facility-administered medications for this visit. REVIEW OF SYSTEM:   Constitutional: No fever or chills. No night sweats, no weight loss   Eyes: No eye discharge, double vision, or eye pain   HEENT: negative for sore mouth, sore throat, hoarseness and voice change   Respiratory: negative for cough , sputum, dyspnea, wheezing, hemoptysis, chest pain   Cardiovascular: negative for chest pain, dyspnea, palpitations, orthopnea, PND   Gastrointestinal: negative for nausea, vomiting, diarrhea, constipation, abdominal pain, Dysphagia, hematemesis and hematochezia   Genitourinary: negative for frequency, dysuria, nocturia, urinary incontinence, and hematuria   Integument: negative for rash, skin lesions, bruises.    Hematologic/Lymphatic: negative for easy bruising, bleeding, lymphadenopathy, petechiae and swelling/edema   Endocrine: negative for heat or cold intolerance, tremor, weight changes, change in bowel habits and hair loss   Musculoskeletal: negative for myalgias, arthralgias, pain, joint swelling,and bone pain   Neurological: negative for headaches, dizziness, seizures, weakness, numbness   OBJECTIVE: Vitals:    06/14/21 1048   BP: 124/72   Pulse: 84   Temp: 97.8 °F (36.6 °C)   SpO2: 97%   PHYSICAL EXAM:   General appearance - well appearing, no in pain or distress   Mental status - alert and cooperative   Eyes - pupils equal and reactive, extraocular eye movements intact   Ears - bilateral TM's and external ear canals normal   Mouth - mucous membranes moist, pharynx normal without lesions   Neck - supple, no significant adenopathy   Lymphatics - no palpable lymphadenopathy, no hepatosplenomegaly   Chest - clear to auscultation, no wheezes, rales or rhonchi, symmetric air entry   Heart - normal rate, regular rhythm, normal S1, S2, no murmurs, rubs, clicks or gallops   Abdomen - Surgical scar healing well, soft, nontender, nondistended, no masses or organomegaly   Neurological - alert, oriented, normal speech, no focal findings or movement disorder noted   Musculoskeletal - no joint tenderness, deformity or swelling   Extremities - peripheral pulses normal, no pedal edema, no clubbing or cyanosis   Skin - normal coloration and turgor, no rashes, no suspicious skin lesions noted ,  LABORATORY DATA:     Lab Results   Component Value Date    WBC 6.8 03/09/2021    WBC 6.8 03/09/2021    HGB 13.4 03/09/2021    HGB 13.4 03/09/2021    HCT 44.0 03/09/2021    HCT 44.0 03/09/2021    .8 (H) 03/09/2021    .8 (H) 03/09/2021     (L) 03/09/2021     (L) 03/09/2021    LYMPHOPCT 25 03/09/2021    LYMPHOPCT 25 03/09/2021    RBC 4.24 03/09/2021    RBC 4.24 03/09/2021    MCH 31.6 03/09/2021    MCH 31.6 03/09/2021    MCHC 30.5 03/09/2021    MCHC 30.5 03/09/2021    RDW 14.2 03/09/2021    RDW 14.2 03/09/2021    MONOPCT 10 03/09/2021    MONOPCT 10 03/09/2021    BASOPCT 0 03/09/2021    BASOPCT 0 03/09/2021    NEUTROABS 4.15 03/09/2021    NEUTROABS 4.15 03/09/2021    LYMPHSABS 1.73 03/09/2021    LYMPHSABS 1.73 03/09/2021    MONOSABS 0.68 03/09/2021    MONOSABS 0.68 03/09/2021    EOSABS 0.21 03/09/2021    EOSABS 0.21 03/09/2021    BASOSABS 0.03 03/09/2021    BASOSABS 0.03 03/09/2021         Chemistry        Component Value Date/Time     03/09/2021 1002     03/09/2021 1002    K 3.6 (L) 03/09/2021 1002    K 3.6 (L) 03/09/2021 1002     (H) 03/09/2021 1002     (H) 03/09/2021 1002    CO2 23 03/09/2021 1002    CO2 23 03/09/2021 1002    BUN 21 03/09/2021 1002    BUN 21 03/09/2021 1002    CREATININE 0.76 03/09/2021 1002    CREATININE 0.76 03/09/2021 1002        Component Value Date/Time    CALCIUM 9.5 03/09/2021 1002    CALCIUM 9.5 03/09/2021 1002    ALKPHOS 89 03/09/2021 1002    ALKPHOS 89 03/09/2021 1002    AST 14 03/09/2021 1002    AST 14 03/09/2021 1002    ALT 8 03/09/2021 1002    ALT 8 03/09/2021 1002    BILITOT 0.70 03/09/2021 1002    BILITOT 0.70 03/09/2021 1002        PATHOLOGY DATA:     -- Diagnosis --   1.  SIGMOID COLON, SEGMENTAL RESECTION:   - LOW-GRADE ADENOCARCINOMA ARISING IN A LARGE SESSILE TUBULOVILLOUS   ADENOMA, INVADING INTO THE SUBMUCOSA.   - THE FINAL SURGICAL MARGINS ARE NEGATIVE FOR NEOPLASM. - BENIGN REGIONAL LYMPH NODES (0/14). 2.  SIGMOID COLON, DISTAL MARGIN, RESECTION:   - UNREMARKABLE COLON SEGMENT.   - SINGLE BENIGN LYMPH NODE (0/1). PATHOLOGIC STAGE CLASSIFICATION:     pT1  pN0   ADDENDUM DIAGNOSIS   AT THE REQUEST OF DR. Lord Art, BLOCK 14B WAS SENT TO [facility]   FOR MMR TESTING.  THE RESULTS ARE AS FOLLOWS:     MISMATCH REPAIR BY IHC RESULT:  NORMAL   MISMATCH REPAIR BY IHC WITH MLH1:  NORMAL   MISMATCH REPAIR BY IHC WITH MSH2:  NORMAL   MISMATCH REPAIR BY IHC WITH MSH6:  NORMAL   MISMATCH REPAIR BY IHC WITH PMS2:  NORMAL   IMAGING DATA:    CT abdomen pelvis 11/10/18  Impression   Dilated stomach and loops of bowel, possibly reflecting postoperative ileus.       Trace perihepatic and mild pelvic ascites.       Redemonstration of subcutaneous mass along the right buttock.    CT abdomen pelvis 2/26/19:  Improved postoperative findings status post partial colon resection and   ileostomy.  No acute abnormality identified.       Stable chronic findings, as above. ASSESSMENT:    Brittany Caldwell has T1NO, stage I colon adenocarcinoma, MMR proficient. I discussed the NCCN guidelines. I discussed that for stage I colon cancer, there is no role of adjuvant chemotherapy, so surveillance is recommended. I will follow her in three months with CEA, CBC and CMP. She will need colonoscopy in one year. I discussed the results of recent lab work and CT scans which showed no evidence of recurrence. Clinically she is doing well. She is planning to have follow up with Gen. surgery in May and possible colonoscopy for planning reversal colostomy. During today's visit, the patient and the family had a number of reasonable questions which were answered to their satisfaction. They verbalized understanding of the information provided and they agreed to proceed as outlined above. PLAN:   Continue surveillance  Will repeat lab work in 3 months and plan for CT scan in 6 months  She will see Gen. surgery in May for discussion about colostomy reversal  RTC 3 months with labs     I spent more than 25 minutes examining, evaluating, reviewing data and counseling the patient. Greater than 50% of that time was spent face-to-face with the patient in counseling and coordinating her care. Rory Jimenez MD  Hematologist/Medical Oncologist          This note is created with the assistance of a speech recognition program.  While intending to generate a document that actually reflects the content of the visit, the document can still have some errors including those of syntax and sound a like substitutions which may escape proof reading. It such instances, actual meaning can be extrapolated by contextual diversion. Patient ID: Zackary Contreras, 1940, [id number], 79 y.o.   Referred by : Robel Frazier MD  Reason for consultation:   Diagnosis of colon cancer  HISTORY OF PRESENT ILLNESS:    Oncologic History: This is a 74-year-old female with new diagnosis of colon cancer was seen during initial consultation visit. She presented with rectal bleeding going on for about 4-6 weeks. She had a colonoscopy on 10/8/18 which showed multiple polyps and large distal sigmoid tumors with extensive diverticulosis. Biopsy showed invasive low-grade adenocarcinoma arising in a large tubulovillous adenoma with extensive high-grade dysplasia. Subsequently she had open low anterior resection of the rectosigmoid with proximal dilating ileostomy. Final pathology showed T1 N0, stage I disease. Now she is recovering well from surgery. She denied any pain, nausea vomiting. She does not have family history of colon cancer. She is adopted and does not know about her family history.     Past Medical History:   Diagnosis Date    A-fib [facility]) 07/19/2017    Abdominal pain     Acute blood loss as cause of postoperative anemia 11/8/2018    Anxiety 4/30/2016    CHF (congestive heart failure) (HCC)     Class 2 severe obesity with serious comorbidity and body mass index (BMI) of 37.0 to 37.9 in adult [facility])     Closed fracture of proximal end of left humerus with routine healing 4/26/2021    Colitis 5/31/2019    Colitis due to Clostridium difficile     Depression 4/30/2016    Diarrhea     Dyslipidemia     Elevated fasting glucose 11/20/2018    FH: total abdominal hysterectomy and bilateral salpingo-oophorectomy 1966    Fractures 03/15/2021    Lt humerus impacted displaced    Glucose intolerance (impaired glucose tolerance)     Hypertension     Hypokalemia     Malignant neoplasm of sigmoid colon ([address]     Multiple closed fractures of ribs 9/22/2018    Obesity     JAYJAY on CPAP 10/6/2018    Osteoarthritis     Osteoporosis 8/30/2013    Other complete intestinal obstruction ([address] 11/9/2018    Other specified hypothyroidism     S/P small bowel resection 5/22/2019    [facility], unspecified [facility] 3/9/2020       Past Surgical History:   Procedure Laterality Date    CARDIAC CATHETERIZATION  09/05/2017    CHOLECYSTECTOMY  1960s    COLONOSCOPY N/A 10/8/2018    COLONOSCOPY WITH COLD ET HOT BIOPSIES ET POLYPECTOMIES performed by Kristie Das MD at [address] N/A 4/25/2019    COLONOSCOPY performed by Vergil Meckel, MD at [facility]  stricture at 7 cm     COLONOSCOPY N/A 2/3/2020    tubular adenoma X 1, Dr. Fiona Castro Bilateral 11/5/2018    CYSTO Bilateral Lighted stent placements performed by Trinity Chauhan MD at [address], PARTIAL, W/ANAST N/A 11/5/2018    Open Sigmoid Colectomy w/ lighted stents ILEOSTOMY PLACEMENT performed [place] Kristie Das MD [place] [facility] N/A 5/9/2019    Flexible Sigmoidoscopy with Dilatation of rectal stricture performed by Vergil Meckel, MD at [address] N/A 5/22/2019    Loop Ileostomy take down performed by Vergil Meckel, MD at [address] [place] [address] [facility]       Allergies   Allergen Reactions    Pcn [Penicillins] Hives and Shortness Of Breath    Bextra [Valdecoxib] Diarrhea       Current Outpatient Medications   Medication Sig [facility] Bed MISC by Does not apply route 1 each 0    Lift Chair MISC by Does not apply route 1 each 0    Misc.  Devices MISC 1 each by Does not apply route daily Lane-walker    DX: humerus fracture 1 Device 0    potassium chloride (KLOR-CON M) 20 MEQ extended release tablet Take 1 tablet by mouth daily 90 tablet 1    metoprolol tartrate (LOPRESSOR) 25 MG tablet TAKE ONE TABLET BY MOUTH TWICE A DAY 60 tablet 1    furosemide (LASIX) 20 MG tablet TAKE ONE TABLET BY MOUTH DAILY 30 tablet 1    amLODIPine (NORVASC) 10 MG tablet TAKE 1 TABLET BY MOUTH ONE TIME A DAY  90 tablet 3    atorvastatin (LIPITOR) 40 MG tablet TAKE 1 TABLET BY MOUTH nightly 90 tablet 1    levothyroxine (SYNTHROID) 50 MCG tablet TAKE 1 TABLET BY MOUTH ONE TIME A DAY  90 tablet 1    amiodarone (CORDARONE) 200 MG tablet TAKE 1/2 TABLET BY MOUTH ONE TIME A DAY  45 tablet 3    Handicap Placard MISC by Does not apply route Expires in 5 years 1 each 0    traZODone (DESYREL) 50 MG tablet Take 50 mg by mouth nightly (Patient not taking: Reported on 6/14/2021)      acetaminophen (TYLENOL) 325 MG tablet Take 2 tablets by mouth every 4 hours as needed for Pain or Fever 60 tablet 0     No current facility-administered medications for this visit. REVIEW OF SYSTEM:   Constitutional: No fever or chills. No night sweats, no weight loss   Eyes: No eye discharge, double vision, or eye pain   HEENT: negative for sore mouth, sore throat, hoarseness and voice change   Respiratory: negative for cough , sputum, dyspnea, wheezing, hemoptysis, chest pain   Cardiovascular: negative for chest pain, dyspnea, palpitations, orthopnea, PND   Gastrointestinal: negative for nausea, vomiting, diarrhea, constipation, abdominal pain, Dysphagia, hematemesis and hematochezia   Genitourinary: negative for frequency, dysuria, nocturia, urinary incontinence, and hematuria   Integument: negative for rash, skin lesions, bruises.    Hematologic/Lymphatic: negative for easy bruising, bleeding, lymphadenopathy, petechiae and swelling/edema   Endocrine: negative for heat or cold intolerance, tremor, weight changes, change in bowel habits and hair loss   Musculoskeletal: negative for myalgias, arthralgias, pain, joint swelling,and bone pain   Neurological: negative for headaches, dizziness, seizures, weakness, numbness   OBJECTIVE:         Vitals:    06/14/21 1048   BP: 124/72   Pulse: 84   Temp: 97.8 °F (36.6 °C)   SpO2: 97%   PHYSICAL EXAM:   General appearance - well appearing, no in pain or distress   Mental status - alert and cooperative   Eyes - pupils equal and reactive, extraocular eye movements intact   Ears - bilateral TM's and external ear canals normal   Mouth - mucous membranes moist, pharynx normal without lesions   Neck - supple, no significant adenopathy   Lymphatics - no palpable lymphadenopathy, no hepatosplenomegaly   Chest - clear to auscultation, no wheezes, rales or rhonchi, symmetric air entry   Heart - normal rate, regular rhythm, normal S1, S2, no murmurs, rubs, clicks or gallops   Abdomen - Surgical scar healing well, soft, nontender, nondistended, no masses or organomegaly   Neurological - alert, oriented, normal speech, no focal findings or movement disorder noted   Musculoskeletal - no joint tenderness, deformity or swelling   Extremities - peripheral pulses normal, no pedal edema, no clubbing or cyanosis   Skin - normal coloration and turgor, no rashes, no suspicious skin lesions noted ,  LABORATORY DATA:     Lab Results   Component Value Date    WBC 6.8 03/09/2021    WBC 6.8 03/09/2021    HGB 13.4 03/09/2021    HGB 13.4 03/09/2021    HCT 44.0 03/09/2021    HCT 44.0 03/09/2021    .8 (H) 03/09/2021    .8 (H) 03/09/2021     (L) 03/09/2021     (L) 03/09/2021    LYMPHOPCT 25 03/09/2021    LYMPHOPCT 25 03/09/2021    RBC 4.24 03/09/2021    RBC 4.24 03/09/2021    MCH 31.6 03/09/2021    MCH 31.6 03/09/2021    MCHC 30.5 03/09/2021    MCHC 30.5 03/09/2021    RDW 14.2 03/09/2021    RDW 14.2 03/09/2021    MONOPCT 10 03/09/2021    MONOPCT 10 03/09/2021    BASOPCT 0 03/09/2021    BASOPCT 0 03/09/2021    NEUTROABS 4.15 03/09/2021    NEUTROABS 4.15 03/09/2021    LYMPHSABS 1.73 03/09/2021    LYMPHSABS 1.73 03/09/2021    MONOSABS 0.68 03/09/2021    MONOSABS 0.68 03/09/2021    EOSABS 0.21 03/09/2021    EOSABS 0.21 03/09/2021    BASOSABS 0.03 03/09/2021    BASOSABS 0.03 03/09/2021         Chemistry        Component Value Date/Time     03/09/2021 1002     03/09/2021 1002    K 3.6 (L) 03/09/2021 1002    K 3.6 (L) 03/09/2021 1002  (H) 03/09/2021 1002     (H) 03/09/2021 1002    CO2 23 03/09/2021 1002    CO2 23 03/09/2021 1002    BUN 21 03/09/2021 1002    BUN 21 03/09/2021 1002    CREATININE 0.76 03/09/2021 1002    CREATININE 0.76 03/09/2021 1002        Component Value Date/Time    CALCIUM 9.5 03/09/2021 1002    CALCIUM 9.5 03/09/2021 1002    ALKPHOS 89 03/09/2021 1002    ALKPHOS 89 03/09/2021 1002    AST 14 03/09/2021 1002    AST 14 03/09/2021 1002    ALT 8 03/09/2021 1002    ALT 8 03/09/2021 1002    BILITOT 0.70 03/09/2021 1002    BILITOT 0.70 03/09/2021 1002        PATHOLOGY DATA:     -- Diagnosis --   1.  SIGMOID COLON, SEGMENTAL RESECTION:   - LOW-GRADE ADENOCARCINOMA ARISING IN A LARGE SESSILE TUBULOVILLOUS   ADENOMA, INVADING INTO THE SUBMUCOSA.   - THE FINAL SURGICAL MARGINS ARE NEGATIVE FOR NEOPLASM. - BENIGN REGIONAL LYMPH NODES (0/14). 2.  SIGMOID COLON, DISTAL MARGIN, RESECTION:   - UNREMARKABLE COLON SEGMENT.   - SINGLE BENIGN LYMPH NODE (0/1). PATHOLOGIC STAGE CLASSIFICATION:     pT1  pN0   ADDENDUM DIAGNOSIS   AT THE REQUEST OF DR. Lay Ngo, BLOCK 14B WAS SENT TO [facility]   FOR MMR TESTING.  THE RESULTS ARE AS FOLLOWS:     MISMATCH REPAIR BY IHC RESULT:  NORMAL   MISMATCH REPAIR BY IHC WITH MLH1:  NORMAL   MISMATCH REPAIR BY IHC WITH MSH2:  NORMAL   MISMATCH REPAIR BY IHC WITH MSH6:  NORMAL   MISMATCH REPAIR BY IHC WITH PMS2:  NORMAL   IMAGING DATA:    CT abdomen pelvis 11/10/18  Impression   Dilated stomach and loops of bowel, possibly reflecting postoperative ileus.       Trace perihepatic and mild pelvic ascites.       Redemonstration of subcutaneous mass along the right buttock.    CT abd 5/31/19  Mild diffuse colonic wall thickening may be related to recent surgery versus   a colitis.  No bowel obstruction.       Incompletely characterized right renal lesion may be a complex cyst but has   increased over multiple prior studies.  Recommend follow-up with MRI with   contrast.   CT abdomen pelvis 2/4/20  Impression   1. Lenward Clock limited study due to lack of IV contrast, which the patient   refused after several unsuccessful IV access attempts.       2.  No definite evidence of metastatic disease in the chest, abdomen, or   pelvis.       3.  Redemonstration of patchy lucencies in the sacrum, which has been present   since at least 09/21/2018 and could be sequelae of previous insufficiency   fractures.  If clinically indicated, this could be further evaluated with MRI. ASSESSMENT:    Vera Chamberlain has T1NO, stage I colon adenocarcinoma, MMR proficient. I discussed the NCCN guidelines. I discussed that for stage I colon cancer, there is no role of adjuvant chemotherapy, so surveillance is recommended. I will follow her in three months with CEA, CBC and CMP. She had a colonoscopy in one year after her surgery. Slight rise in her CEA level. CT scan showed no recurrence and will repeat lab work in 6 months  Bone lucencies: Stable on recent scan  Mild thrombocytopenia:  During today's visit, the patient and the family had a number of reasonable questions which were answered to their satisfaction. They verbalized understanding of the information provided and they agreed to proceed as outlined above. PLAN:   Discuss recent lab work with patient  She will need follow-up colonoscopy with general surgery  Return to clinic in 6 months with labs and CT scan      Rory Rosario MD  Hematologist/Medical Oncologist      This note is created with the assistance of a speech recognition program.  While intending to generate a document that actually reflects the content of the visit, the document can still have some errors including those of syntax and sound a like substitutions which may escape proof reading. It such instances, actual meaning can be extrapolated by contextual diversion.

## 2021-07-06 DIAGNOSIS — S42.202D CLOSED FRACTURE OF PROXIMAL END OF LEFT HUMERUS WITH ROUTINE HEALING, UNSPECIFIED FRACTURE MORPHOLOGY, SUBSEQUENT ENCOUNTER: Primary | ICD-10-CM

## 2021-07-13 ENCOUNTER — OFFICE VISIT (OUTPATIENT)
Dept: ORTHOPEDIC SURGERY | Age: 81
End: 2021-07-13
Payer: MEDICARE

## 2021-07-13 ENCOUNTER — HOSPITAL ENCOUNTER (OUTPATIENT)
Dept: GENERAL RADIOLOGY | Age: 81
Discharge: HOME | End: 2021-07-15
Payer: MEDICARE

## 2021-07-13 VITALS
HEIGHT: 67 IN | DIASTOLIC BLOOD PRESSURE: 80 MMHG | SYSTOLIC BLOOD PRESSURE: 130 MMHG | OXYGEN SATURATION: 93 % | WEIGHT: 293 LBS | HEART RATE: 67 BPM | BODY MASS INDEX: 45.99 KG/M2

## 2021-07-13 DIAGNOSIS — S42.202D CLOSED FRACTURE OF PROXIMAL END OF LEFT HUMERUS WITH ROUTINE HEALING, UNSPECIFIED FRACTURE MORPHOLOGY, SUBSEQUENT ENCOUNTER: Primary | ICD-10-CM

## 2021-07-13 DIAGNOSIS — S42.202D CLOSED FRACTURE OF PROXIMAL END OF LEFT HUMERUS WITH ROUTINE HEALING, UNSPECIFIED FRACTURE MORPHOLOGY, SUBSEQUENT ENCOUNTER: ICD-10-CM

## 2021-07-13 PROCEDURE — 4040F PNEUMOC VAC/ADMIN/RCVD: CPT | Performed by: PHYSICIAN ASSISTANT

## 2021-07-13 PROCEDURE — 1123F ACP DISCUSS/DSCN MKR DOCD: CPT | Performed by: PHYSICIAN ASSISTANT

## 2021-07-13 PROCEDURE — G8427 DOCREV CUR MEDS BY ELIG CLIN: HCPCS | Performed by: PHYSICIAN ASSISTANT

## 2021-07-13 PROCEDURE — G8417 CALC BMI ABV UP PARAM F/U: HCPCS | Performed by: PHYSICIAN ASSISTANT

## 2021-07-13 PROCEDURE — 1090F PRES/ABSN URINE INCON ASSESS: CPT | Performed by: PHYSICIAN ASSISTANT

## 2021-07-13 PROCEDURE — 73030 X-RAY EXAM OF SHOULDER: CPT

## 2021-07-13 PROCEDURE — 99213 OFFICE O/P EST LOW 20 MIN: CPT | Performed by: PHYSICIAN ASSISTANT

## 2021-07-13 PROCEDURE — 99212 OFFICE O/P EST SF 10 MIN: CPT | Performed by: PHYSICIAN ASSISTANT

## 2021-07-13 PROCEDURE — G8399 PT W/DXA RESULTS DOCUMENT: HCPCS | Performed by: PHYSICIAN ASSISTANT

## 2021-07-13 PROCEDURE — 1036F TOBACCO NON-USER: CPT | Performed by: PHYSICIAN ASSISTANT

## 2021-07-13 NOTE — PROGRESS NOTES
Orthopaedic Progress Note      CHIEF COMPLAINT: Left shoulder pain    HISTORY OF PRESENT ILLNESS:       Ms. Anatoly Walter  is a 79 y.o. female  who presents with a history of a left proximal humerus fracture treated nonoperatively. She is now out of the sling. She continues with physical therapy. She is almost 4 months into this. Her pain is improved motion is improved she is satisfied the way things are progressing at this time. She denies numbness or tingling in her hand.       Past Medical History:    Past Medical History:   Diagnosis Date    A-fib ([address] 07/19/2017    Abdominal pain     Acute blood loss as cause of postoperative anemia 11/8/2018    Anxiety 4/30/2016    CH[facility]congestive heart failure) ([facility])     Class 2 severe obesity with serious comorbidity and body mass index (BMI) of 37.0 to 37.9 in [facility])     Closed fracture of proximal end of left humerus with routine healing 4/26/2021    Colitis 5/31/2019    Colitis due to Clostridium difficile     Depression 4/30/2016    Diarrhea     Dyslipidemia     Elevated fasting glucose 11/20/2018    FH: total abdominal hysterectomy and bilateral salpingo-oophorectomy 1966    Fractures 03/15/2021    Lt humerus impacted displaced    Glucose intolerance (impaired glucose tolerance)     Hypertension     Hypokalemia     Malignant neoplasm of sigmoid colon ([address]     Multiple closed fractures of ribs 9/22/2018    Obesity     JAYJAY on CPAP 10/6/2018    Osteoarthritis     Osteoporosis 8/30/2013    Other complete intestinal obstruction ([address] 11/9/2018    Other specified hypothyroidism     S/P small bowel resection 5/22/2019    Thrombocytopenia, unspecified ([address] 3/9/2020       Past Surgical History:    Past Surgical History:   Procedure Laterality Date    CARDIAC CATHETERIZATION  09/05/2017    CHOLECYSTECTOMY  1960s    COLONOSCOPY N/A 10/8/2018    COLONOSCOPY WITH COLD ET HOT BIOPSIES ET POLYPECTOMIES performed by Shey Rivas MD at [address] [facility] COLONOSCOPY N/A 4/25/2019    COLONOSCOPY performed by Edward Aguirre MD at [address] [facility]  stricture at 7 cm     COLONOSCOPY N/A 2/3/2020    tubular adenoma X 1, Dr. Nancy Hayes Bilateral 11/5/2018    CYSTO Bilateral Lighted stent placements performed by Charlene Jara MD at [address], PARTIAL, W/ANAST N/A 11/5/2018    Open Sigmoid Colectomy w/ lighted stents ILEOSTOMY PLACEMENT performed by Nery Vital MD at [address] N/A 5/9/2019    Flexible Sigmoidoscopy with Dilatation of rectal stricture performed by Edward Aguirre MD at [address] [facility] [address] N/A 5/22/2019    Loop Ileostomy take down performed by Edward Aguirre MD at [address] [place] [address] Drive Right 1960s         Current  Medications:  Current Outpatient Medications   Medication Sig [address] [facility] by Does not apply route 1 each 0    Lift Chair MISC by Does not apply route 1 each 0    Misc.  Devices MISC 1 each by Does not apply route daily Lane-walker    DX: humerus fracture 1 Device 0    potassium chloride (KLOR-CON M) 20 MEQ extended release tablet Take 1 tablet by mouth daily 90 tablet 1    metoprolol tartrate (LOPRESSOR) 25 MG tablet TAKE ONE TABLET BY MOUTH TWICE A DAY 60 tablet 1    furosemide (LASIX) 20 MG tablet TAKE ONE TABLET BY MOUTH DAILY 30 tablet 1    amLODIPine (NORVASC) 10 MG tablet TAKE 1 TABLET BY MOUTH ONE TIME A DAY  90 tablet 3    atorvastatin (LIPITOR) 40 MG tablet TAKE 1 TABLET BY MOUTH nightly 90 tablet 1    levothyroxine (SYNTHROID) 50 MCG tablet TAKE 1 TABLET BY MOUTH ONE TIME A DAY  90 tablet 1    amiodarone (CORDARONE) 200 MG tablet TAKE 1/2 TABLET BY MOUTH ONE TIME A DAY  45 tablet 3    Handicap Placard MISC by Does not apply route Expires in 5 years 1 each 0    acetaminophen (TYLENOL) 325 MG tablet Take 2 tablets by mouth every 4 hours as needed for Pain or Fever 60 tablet 0     No current facility-administered medications for this visit. Allergies:  Pcn [penicillins] and Bextra [valdecoxib]    Social History:   Social History     Tobacco Use   Smoking Status Never Smoker   Smokeless Tobacco Never Used   Tobacco Comment    syant 11/10/2018     Social History     Substance and Sexual Activity   Alcohol Use No     Social History     Substance and Sexual Activity   Drug Use No       Family History:  Family History   Adopted: Yes   Problem Relation Age of Onset    High Blood Pressure Brother         adopted brother       REVIEW OF SYSTEMS:  Constitutional: Denies any fever, chills. Musculoskeletal: Positive for left proximal humerus fracture. PHYSICAL EXAM:  [unfilled]  General appearance:  Alert and oriented x 3. No apparent distress, appears stated age, calm and cooperative. Musculoskeletal: Left upper extremity was inspected skin is good repair. There is no erythema no increased warmth no bruising. Active range of motion shoulders up to 90 degrees of forward flexion 85 degrees of abduction. She is neurovascular intact left hand.       DATA:  CBC:   Lab Results   Component Value Date    WBC 6.8 03/09/2021    WBC 6.8 03/09/2021    HGB 13.4 03/09/2021    HGB 13.4 03/09/2021     03/09/2021     03/09/2021     BMP:    Lab Results   Component Value Date     03/09/2021     03/09/2021    K 3.6 03/09/2021    K 3.6 03/09/2021     03/09/2021     03/09/2021    CO2 23 03/09/2021    CO2 23 03/09/2021    BUN 21 03/09/2021    BUN 21 03/09/2021    CREATININE 0.76 03/09/2021    CREATININE 0.76 03/09/2021    CALCIUM 9.5 03/09/2021    CALCIUM 9.5 03/09/2021    GLUCOSE 100 03/09/2021     PT/INR:    Lab Results   Component Value Date    PROTIME 16.6 10/08/2018    INR 1.6 10/08/2018     Troponin:  No results found for: TROPONINI  No results for input(s): LIPASE, AMYLASE in the last 72 hours. No results for input(s): AST, ALT, BILIDIR, BILITOT, ALKPHOS in the last 72 hours. Uric Acid:  No components found for: URIC  Urine Culture:  No components found for: SEMAJ    Radiology:   XR SHOULDER LEFT (MIN 2 VIEWS)    Result Date: 7/13/2021  1. Subacute/chronic fracture deformity of the proximal left humerus with healing change. No change in alignment. 2.  Moderate to severe degenerative changes of the glenohumeral joint. X-rays personally reviewed by me of 2 views left humerus reveal healed proximal humerus fracture in reasonably good over alignment       Diagnosis Orders   1. Closed fracture of proximal end of left humerus with routine healing, unspecified fracture morphology, subsequent encounter           PLAN:  Activities as tolerated, weightbearing as tolerated, see us back here on an as-needed basis    No orders of the defined types were placed in this encounter.        Procedure:        Electronically signed by Pastor Berny PA-C on 7/13/2021 at 11:53 AM

## 2021-07-15 ENCOUNTER — OFFICE VISIT (OUTPATIENT)
Dept: CARDIOLOGY | Age: 81
End: 2021-07-15
Payer: MEDICARE

## 2021-07-15 ENCOUNTER — TELEPHONE (OUTPATIENT)
Dept: PHARMACY | Age: 81
End: 2021-07-15

## 2021-07-15 VITALS
HEIGHT: 67 IN | WEIGHT: 293 LBS | BODY MASS INDEX: 45.99 KG/M2 | HEART RATE: 68 BPM | DIASTOLIC BLOOD PRESSURE: 91 MMHG | SYSTOLIC BLOOD PRESSURE: 142 MMHG

## 2021-07-15 DIAGNOSIS — I48.0 PAROXYSMAL ATRIAL FIBRILLATION (HCC): Primary | ICD-10-CM

## 2021-07-15 PROCEDURE — 93005 ELECTROCARDIOGRAM TRACING: CPT | Performed by: INTERNAL MEDICINE

## 2021-07-15 PROCEDURE — G8417 CALC BMI ABV UP PARAM F/U: HCPCS | Performed by: INTERNAL MEDICINE

## 2021-07-15 PROCEDURE — 99213 OFFICE O/P EST LOW 20 MIN: CPT | Performed by: INTERNAL MEDICINE

## 2021-07-15 PROCEDURE — 1090F PRES/ABSN URINE INCON ASSESS: CPT | Performed by: INTERNAL MEDICINE

## 2021-07-15 PROCEDURE — 93010 ELECTROCARDIOGRAM REPORT: CPT | Performed by: INTERNAL MEDICINE

## 2021-07-15 PROCEDURE — 1036F TOBACCO NON-USER: CPT | Performed by: INTERNAL MEDICINE

## 2021-07-15 PROCEDURE — G8399 PT W/DXA RESULTS DOCUMENT: HCPCS | Performed by: INTERNAL MEDICINE

## 2021-07-15 PROCEDURE — 4040F PNEUMOC VAC/ADMIN/RCVD: CPT | Performed by: INTERNAL MEDICINE

## 2021-07-15 PROCEDURE — 1123F ACP DISCUSS/DSCN MKR DOCD: CPT | Performed by: INTERNAL MEDICINE

## 2021-07-15 PROCEDURE — 99214 OFFICE O/P EST MOD 30 MIN: CPT | Performed by: INTERNAL MEDICINE

## 2021-07-15 PROCEDURE — G8427 DOCREV CUR MEDS BY ELIG CLIN: HCPCS | Performed by: INTERNAL MEDICINE

## 2021-07-15 RX ORDER — WARFARIN SODIUM 2 MG/1
2 TABLET ORAL DAILY
Qty: 30 TABLET | Refills: 3 | Status: SHIPPED | OUTPATIENT
Start: 2021-07-15 | End: 2022-04-20

## 2021-07-15 NOTE — TELEPHONE ENCOUNTER
Patient started warfarin to today she is new start. She has absolutly NO one to bring her for INR checks.  Initial starts are a couple times a week she was unsure how that would happen as she cannot  drive d/t broken arm

## 2021-07-15 NOTE — PROGRESS NOTES
[facility] [id number] [facility] [id number]  Dept: [phone]  Loc: [phone]    CC: follow up for VINOD Perez     Subjective: The patient is a 79y.o. year old, , female is in the office for a follow up visit. She is fairly stable from cardiac standpoint however she is back in atrial fibrillation. She denies any palpitations or irregular heartbeat as such. Overall feels well. No cp, sob, orthopnea, pnd, le edema. Blood pressure and heart rate normal    Past Medical History:   has a past medical history of A-fib ([address] Abdominal pain, Acute blood loss as cause of postoperative anemia, Anxiety, CHF (congestive heart failure) (HCC), Class 2 severe obesity with serious comorbidity and body mass index (BMI) of 37.0 to 37.9 in [facility]), Closed fracture of proximal end of left humerus with routine healing, Colitis, Colitis due to Clostridium difficile, Depression, Diarrhea, Dyslipidemia, Elevated fasting glucose, FH: total abdominal hysterectomy and bilateral salpingo-oophorectomy, Fractures, Glucose intolerance (impaired glucose tolerance), Hypertension, Hypokalemia, Malignant neoplasm of sigmoid colon ([address] Multiple closed fractures of ribs, Obesity, JAYJAY on CPAP, Osteoarthritis, Osteoporosis, Other complete intestinal obstruction ([address] Other specified hypothyroidism, S/P small bowel resection, and Thrombocytopenia, unspecified ([address] Past Surgical History:   has a past surgical history that includes kenyon and bso (cervix removed) (1966); Cholecystectomy (1960s); Varicose vein surgery (Right, 1960s); Hip Arthroplasty (Left); Knee Arthroplasty (Left); Knee Arthroplasty (Right); Cardiac catheterization (09/05/2017); Colonoscopy (N/A, 10/8/2018); pr lap,surg,colectomy, partial, w/anast (N/A, 11/5/2018); pr cystoscopy,insert ureteral stent (Bilateral, 11/5/2018);  Colonoscopy (N/A, 4/25/2019); sigmoidoscopy (N/A, 5/9/2019); Small intestine surgery (N/A, 5/22/2019); and Colonoscopy (N/A, 2/3/2020). Home Medications:  Prior to Admission medications    Medication Sig Start Date End Date Taking? [address] by Does not apply route 4/28/21   [place] Bis, DO   Lift Chair MISC by Does not apply route 4/28/21   [place] Bis, DO   Misc. Devices MISC 1 each by Does not apply route daily Lane-walker    DX: humerus fracture 4/26/21   Yomi Loco, APRN - CNP   potassium chloride (KLOR-CON M) 20 MEQ extended release tablet Take 1 tablet by mouth daily 3/15/21   Rosie Bis, DO   metoprolol tartrate (LOPRESSOR) 25 MG tablet TAKE ONE TABLET BY MOUTH TWICE A DAY 3/2/21   Rosie Bis, DO   furosemide (LASIX) 20 MG tablet TAKE ONE TABLET BY MOUTH DAILY 3/1/21   Rosie Bis, DO   amLODIPine (NORVASC) 10 MG tablet TAKE 1 TABLET BY MOUTH ONE TIME A DAY  1/21/21   Marisabel Mirza MD   atorvastatin (LIPITOR) 40 MG tablet TAKE 1 TABLET BY MOUTH nightly 12/16/20   [place] Bis, DO   levothyroxine (SYNTHROID) 50 MCG tablet TAKE 1 TABLET BY MOUTH ONE TIME A DAY  12/16/20   Rosie Bis, DO   amiodarone (CORDARONE) 200 MG tablet TAKE 1/2 TABLET BY MOUTH ONE TIME A DAY  12/14/20   Marisabel Mirza MD   Handicap Placard MISC by Does not apply route Expires in 5 years 11/20/20   Rosie Bis, DO   acetaminophen (TYLENOL) 325 MG tablet Take 2 tablets by mouth every 4 hours as needed for Pain or Fever 11/9/18 7/13/21  Zak Small       Allergies:  Pcn [penicillins] and Bextra [valdecoxib]    Social History:   reports that she has never smoked. She has never used smokeless tobacco. She reports that she does not drink alcohol and does not use drugs. Review of Systems:  · Constitutional: there has been no unanticipated weight loss. There's been No change in energy level, No change in activity level. · Eyes: No visual changes or diplopia.  No scleral icterus. · ENT: No Headaches, hearing loss or vertigo. No mouth sores or sore throat. · Cardiovascular: As above. · Respiratory: as hpi  · Gastrointestinal: No abdominal pain, appetite loss, blood in stools. No change in bowel or bladder habits. · Genitourinary: No dysuria, trouble voiding, or hematuria. · Musculoskeletal:  No gait disturbance, No weakness or joint complaints. · Integumentary: No rash or pruritis. · Psychiatric: No anxiety, or depression. · Hematologic/Lymphatic: No abnormal bruising or bleeding, blood clots or swollen lymph nodes. · Allergic/Immunologic: No nasal congestion or hives. Physical Exam:  Ht 5' 7\" (1.702 m)   Wt (!) 347 lb (157.4 kg)   LMP 01/01/1968   BMI 54.35 kg/m²     Constitutional and General Appearance: alert, cooperative, no distress and appears stated age  [de-identified]: PERRL, no cervical lymphadenopathy. No masses palpable. Normal oral mucosa  Respiratory:  · Normal excursion and expansion without use of accessory muscles  · Resp Auscultation: Good respiratory effort. No for increased work of breathing. On auscultation: clear to auscultation bilaterally  Cardiovascular:  · The apical impulse is not displaced  · Heart tones are irregularly irregular. Grade 2/6 ejection systolic murmur in the left lower sternal border  · Jugular venous pulsation Normal  · The carotid upstroke is normal in amplitude and contour without delay or bruit  · Peripheral pulses are symmetrical and full   Abdomen:   · No masses or tenderness  · Bowel sounds present  Extremities:  ·  No Cyanosis or Clubbing  ·  Lower extremity edema: Trace bilateral pedal edema  ·  Skin: Warm and dry    Cardiac Data:  EKG 7/15/21: Atrial fibrillation, rate controlled     TTE 2017  1. Normal left ventricular size and wall thickness. 2.  Normal systolic left ventricular function. Ejection fraction is 60%. 3.  Biatrial enlargement. 4.  Dilated right ventricle with normal function.   5.  Mitral annular calcification. Mild mitral regurgitation. 6.  Normal aortic valve. 7.  Mild tricuspid regurgitation. RVSP is 31 mm Hg. 8.  No pericardial effusion    Cardiac cath 9/5/2017  Non-obstructive coronary artery disease. Low normal LV systolic function. Labs:   LABS  Lab Results   Component Value Date    CHOL 111 03/09/2021    TRIG 105 03/09/2021    HDL 56 03/09/2021    LDLCHOLESTEROL 34 03/09/2021    VLDL NOT REPORTED 03/09/2021    CHOLHDLRATIO 2.0 03/09/2021       Lab Results   Component Value Date     03/09/2021     03/09/2021    K 3.6 (L) 03/09/2021    K 3.6 (L) 03/09/2021     (H) 03/09/2021     (H) 03/09/2021    CO2 23 03/09/2021    CO2 23 03/09/2021    BUN 21 03/09/2021    BUN 21 03/09/2021    CREATININE 0.76 03/09/2021    CREATININE 0.76 03/09/2021    GLUCOSE 100 (H) 03/09/2021    CALCIUM 9.5 03/09/2021    CALCIUM 9.5 03/09/2021    PROT 7.2 03/09/2021    PROT 7.2 03/09/2021    LABALBU 4.2 03/09/2021    LABALBU 4.2 03/09/2021    BILITOT 0.70 03/09/2021    BILITOT 0.70 03/09/2021    ALKPHOS 89 03/09/2021    ALKPHOS 89 03/09/2021    AST 14 03/09/2021    AST 14 03/09/2021    ALT 8 03/09/2021    ALT 8 03/09/2021    LABGLOM >60 03/09/2021    LABGLOM >60 03/09/2021    GFRAA >60 03/09/2021    GFRAA >60 03/09/2021    GLOB 2.7 05/31/2019         IMPRESSION/RECOMMENDATIONS:    1. PAF, currently back in atrial fibrillation with controlled ventricular response. Asymptomatic. Patient was previously on warfarin which was taken off in 2018 due to colon cancer and colectomy. This was never really addressed later. Patient now is back in atrial fibrillation with high [id number] score along with underlying colon cancer, which puts her at higher risk of thromboembolism. I called and discussed with the patient's surgeon Dr. Sulma Glynn, discussed most recent colonoscopy finding and there is no high risk bleeding stigmata.   Recommend restarting warfarin for therapeutic anticoagulation  Risks, benefits and potential bleeding complications of anticoagulation therapy was discussed with patient. She verbalized understanding and willing to proceed. Coumadin to be managed by [facility]. Goal INR 2-3  Repeat CBC in 1 to 2 weeks  Continue Lopressor and amiodarone    2. HFpEF, currently compensated on Lasix 20 mg daily with no signs of volume overload on examination. Maintained on low-dose beta-blocker. 3. HYPERTENSION- higher in office, well controlled at home BP 130s, will not aggresively treat given her older age and risk of falls    4. HYPERLIPIDEMIA- ON STATIN, controlled, last LP in 03/2021 within normal limits     5. Colon cancer status post colectomy, follows with oncology and general surgery. The patient is to continue heart healthy diet, weight loss and exercise as tolerated. Patient's medications and side effects were discussed. Medication refills were provided if needed. Follow up appointment timing was discussed. All questions and concerns were addressed to patient's satisfaction. The patient is to follow up in 6 months or sooner if necessary. Thank you for allowing me to participate in the care of this patient, please do not hesitate to call if you have any questions. Kathy Robertson MD [address] [facility]  [facility]  [phone]

## 2021-07-20 ENCOUNTER — HOSPITAL ENCOUNTER (OUTPATIENT)
Dept: PHARMACY | Age: 81
Setting detail: THERAPIES SERIES
Discharge: HOME | End: 2021-07-20
Payer: MEDICARE

## 2021-07-20 LAB
INR BLD: 1.1
PROTIME: 13 SECONDS

## 2021-07-20 PROCEDURE — 85610 PROTHROMBIN TIME: CPT

## 2021-07-20 PROCEDURE — 36416 COLLJ CAPILLARY BLOOD SPEC: CPT

## 2021-07-20 PROCEDURE — 99211 OFF/OP EST MAY X REQ PHY/QHP: CPT

## 2021-07-23 ENCOUNTER — HOSPITAL ENCOUNTER (OUTPATIENT)
Dept: PHARMACY | Age: 81
Setting detail: THERAPIES SERIES
Discharge: HOME | End: 2021-07-23
Payer: MEDICARE

## 2021-07-23 DIAGNOSIS — I48.91 ATRIAL FIBRILLATION, UNSPECIFIED TYPE (HCC): Primary | ICD-10-CM

## 2021-07-23 LAB
INR BLD: 1.2
PROTIME: 13.9 SECONDS

## 2021-07-23 NOTE — PROGRESS NOTES
ANTICOAGULATION SERVICE    Date of Clinic Visit:  7/23/2021    Sid Carroll is a 79 y.o. female who presents to clinic today for anticoagulation monitoring and adjustment. Curbside visit    Recent INR Results:  Internal QC passed  Lab Results   Component Value Date    INR 1.2 07/23/2021    INR 1.1 07/20/2021       Current Warfarin Dosage:  Dosing Plan  As of 7/23/2021    TTR:     Full warfarin instructions:  6 mg every day               Assessment/Plan:    Modify warfarin dose as noted above:INR remains low today. Increase dose as noted and recheck 3 days. Next Clinic Appointment:  Return date  As of 7/23/2021    TTR:     Next INR check:               Please call [facility] at [phone] with any questions. Thanks!   Iftikhar Murphy, [facility]  Anticoagulation Service Pharmacist  7/23/2021 2:15 PM     For Pharmacy Admin Tracking Only     Intervention Detail: Dose Adjustment: 1, reason: Therapy Optimization   Total # of Interventions Recommended: 1   Total # of Interventions Accepted: 1   Time Spent (min): 15

## 2021-07-26 ENCOUNTER — HOSPITAL ENCOUNTER (OUTPATIENT)
Dept: PHARMACY | Age: 81
Setting detail: THERAPIES SERIES
Discharge: HOME | End: 2021-07-26
Payer: MEDICARE

## 2021-07-26 DIAGNOSIS — I48.91 ATRIAL FIBRILLATION, UNSPECIFIED TYPE (HCC): Primary | ICD-10-CM

## 2021-07-26 LAB — INR BLD: 1.5

## 2021-07-26 PROCEDURE — 85610 PROTHROMBIN TIME: CPT

## 2021-07-26 PROCEDURE — 36416 COLLJ CAPILLARY BLOOD SPEC: CPT

## 2021-07-26 PROCEDURE — 99211 OFF/OP EST MAY X REQ PHY/QHP: CPT

## 2021-07-26 NOTE — PROGRESS NOTES
ANTICOAGULATION SERVICE    Date of Clinic Visit:  7/26/2021    Negrita Nevarez is a 79 y.o. female who presents to clinic today for anticoagulation monitoring and adjustment. Curbside visit    Recent INR Results:  Internal QC passed  Lab Results   Component Value Date    INR 1.5 07/26/2021    INR 1.2 07/23/2021       Current Warfarin Dosage:  Dosing Plan  As of 7/26/2021    TTR:     Full warfarin instructions:  7/26: 8 mg; Otherwise 6 mg every day               Assessment/Plan:    Modify warfarin dose as noted above: INR remains low after dose increase. Roland Hinojosa is 11 days into therapy. I will increase dose as noted above. Recheck 3 days. Next Clinic Appointment:  Return date  As of 7/26/2021    TTR:     Next INR check:               Please call [facility] at [phone] with any questions. Thanks!   Keith Matt [facility]  Anticoagulation Service Pharmacist  7/26/2021 2:11 PM     For Pharmacy Admin Tracking Only     Intervention Detail: Dose Adjustment: 1, reason: Therapy Optimization   Total # of Interventions Recommended: 1   Total # of Interventions Accepted: 1   Time Spent (min): 15

## 2021-07-29 ENCOUNTER — HOSPITAL ENCOUNTER (OUTPATIENT)
Dept: PHARMACY | Age: 81
Setting detail: THERAPIES SERIES
Discharge: HOME | End: 2021-07-29
Payer: MEDICARE

## 2021-07-29 DIAGNOSIS — I48.91 ATRIAL FIBRILLATION, UNSPECIFIED TYPE (HCC): Primary | ICD-10-CM

## 2021-07-29 LAB
INR BLD: 2.4
PROTIME: 28.7 SECONDS

## 2021-07-29 PROCEDURE — 36416 COLLJ CAPILLARY BLOOD SPEC: CPT

## 2021-07-29 PROCEDURE — 99211 OFF/OP EST MAY X REQ PHY/QHP: CPT

## 2021-07-29 PROCEDURE — 85610 PROTHROMBIN TIME: CPT

## 2021-07-29 RX ORDER — WARFARIN SODIUM 3 MG/1
6 TABLET ORAL DAILY
Qty: 60 TABLET | Refills: 3 | Status: SHIPPED | OUTPATIENT
Start: 2021-07-29 | End: 2021-12-23

## 2021-07-29 NOTE — PROGRESS NOTES
ANTICOAGULATION SERVICE    Date of Clinic Visit:  7/29/2021    Ricki Casanova is a 79 y.o. female who presents to clinic today for anticoagulation monitoring and adjustment. Recent INR Results:  Internal QC passed  Lab Results   Component Value Date    INR 2.4 07/29/2021    INR 1.5 07/26/2021       Current Warfarin Dosage:  Dosing Plan  As of 7/29/2021    TTR:     Full warfarin instructions:  6 mg every day               Assessment/Plan:    Continue current regimen as INR remains stable. Patient nicely into range at this visit. Will continue with plan, anticipate increase in INR so will leave dose same for now. Re-check 4 days. Next Clinic Appointment:  Return date  As of 7/29/2021    TTR:     Next INR check:  8/2/2021             Please call [facility]agulation [facility] at [phone] with any questions. Thanks!   Kiko Morales [facility]  Anticoagulation Service Pharmacist  7/29/2021 2:14 PM  For Pharmacy Admin Tracking Only        Total # of Interventions Recommended: 0   Total # of Interventions Accepted: 0   Time Spent (min): 20

## 2021-07-29 NOTE — PATIENT INSTRUCTIONS
\"On day of next appointment, please screen for temperature and COVID-19 symptoms prior to you clinic appointment. If any symptoms present, please call [phone] to reschedule. \"

## 2021-08-02 ENCOUNTER — HOSPITAL ENCOUNTER (OUTPATIENT)
Dept: PHARMACY | Age: 81
Setting detail: THERAPIES SERIES
Discharge: HOME | End: 2021-08-02
Payer: MEDICARE

## 2021-08-02 DIAGNOSIS — I48.91 ATRIAL FIBRILLATION, UNSPECIFIED TYPE (HCC): Primary | ICD-10-CM

## 2021-08-02 LAB
INR BLD: 3.3
PROTIME: 39.1 SECONDS

## 2021-08-02 PROCEDURE — 99211 OFF/OP EST MAY X REQ PHY/QHP: CPT

## 2021-08-02 PROCEDURE — 85610 PROTHROMBIN TIME: CPT

## 2021-08-02 PROCEDURE — 36416 COLLJ CAPILLARY BLOOD SPEC: CPT

## 2021-08-02 NOTE — PROGRESS NOTES
ANTICOAGULATION SERVICE    Date of Clinic Visit:  8/2/2021    Jorge Alberto Laguerre is a 79 y.o. female who presents to clinic today for anticoagulation monitoring and adjustment. Recent INR Results:  Internal QC passed  Lab Results   Component Value Date    INR 3.3 08/02/2021    INR 2.4 07/29/2021       Current Warfarin Dosage:  Dosing Plan  As of 8/2/2021    TTR:  55.6 % (3 d)   Full warfarin instructions:  8/2: 3 mg; Otherwise 4 mg every Mon; 4.5 mg every Wed; 6 mg all other days               Assessment/Plan:    Modify warfarin dose as noted above: Patient at high end of INR range. Will reduce dose and re-check Thursday. Next Clinic Appointment:  Return date  As of 8/2/2021    TTR:  55.6 % (3 d)   Next INR check:  8/5/2021             Please call [facility]ticoagulation Clinic at [phone] with any questions. Thanks!   Tamara Hare [facility]  Anticoagulation Service Pharmacist  8/2/2021 4:02 PM  For Pharmacy Admin Tracking Only     Intervention Detail: Dose Adjustment: 1, reason: Therapy De-escalation   Total # of Interventions Recommended: 1   Total # of Interventions Accepted: 1   Time Spent (min): 20

## 2021-08-02 NOTE — PATIENT INSTRUCTIONS
\"On day of next appointment, please screen for temperature and COVID-19 symptoms prior to you clinic appointment. If any symptoms present, please call [phone] to reschedule. \"

## 2021-08-05 ENCOUNTER — HOSPITAL ENCOUNTER (OUTPATIENT)
Dept: PHARMACY | Age: 81
Setting detail: THERAPIES SERIES
Discharge: HOME | End: 2021-08-05
Payer: MEDICARE

## 2021-08-05 DIAGNOSIS — I48.91 ATRIAL FIBRILLATION, UNSPECIFIED TYPE (HCC): Primary | ICD-10-CM

## 2021-08-05 LAB
INR BLD: 2.5
PROTIME: 29.6 SECONDS

## 2021-08-05 PROCEDURE — 85610 PROTHROMBIN TIME: CPT

## 2021-08-05 PROCEDURE — 99211 OFF/OP EST MAY X REQ PHY/QHP: CPT

## 2021-08-05 PROCEDURE — 36416 COLLJ CAPILLARY BLOOD SPEC: CPT

## 2021-08-10 RX ORDER — LEVOTHYROXINE SODIUM 0.05 MG/1
TABLET ORAL
Qty: 90 TABLET | Refills: 0 | Status: SHIPPED | OUTPATIENT
Start: 2021-08-10 | End: 2021-11-22

## 2021-08-10 NOTE — TELEPHONE ENCOUNTER
David Emmanuel called requesting a refill of the below medication which has been pended for you:     Requested Prescriptions     Pending Prescriptions Disp Refills    levothyroxine (SYNTHROID) 50 MCG tablet [Pharmacy Med Name: Levothyroxine Sodium Oral Tablet 50 MCG] 90 tablet 0     Sig: TAKE 1 TABLET BY MOUTH EVERY DAY       Last Appointment Date: 4/28/2021  Next Appointment Date: 9/15/2021  Last TSH/T4 done 3/9/21    Allergies   Allergen Reactions    Pcn [Penicillins] Hives and Shortness Of Breath    Bextra [Valdecoxib] Diarrhea

## 2021-08-12 ENCOUNTER — HOSPITAL ENCOUNTER (OUTPATIENT)
Dept: PHARMACY | Age: 81
Setting detail: THERAPIES SERIES
Discharge: HOME | End: 2021-08-12
Payer: MEDICARE

## 2021-08-12 DIAGNOSIS — I48.91 ATRIAL FIBRILLATION, UNSPECIFIED TYPE (HCC): Primary | ICD-10-CM

## 2021-08-12 LAB
INR BLD: 2.3
PROTIME: 27.8 SECONDS

## 2021-08-12 PROCEDURE — 99211 OFF/OP EST MAY X REQ PHY/QHP: CPT

## 2021-08-12 PROCEDURE — 85610 PROTHROMBIN TIME: CPT

## 2021-08-12 PROCEDURE — 36416 COLLJ CAPILLARY BLOOD SPEC: CPT

## 2021-08-12 NOTE — PROGRESS NOTES
ANTICOAGULATION SERVICE    Date of Clinic Visit:  2021    Clarita Yang is a 79 y.o. female who presents to clinic today for anticoagulation monitoring and adjustment. Curbside visit. Recent INR Results:  Internal QC passed  Lab Results   Component Value Date    INR 2.3 2021    INR 2.5 2021       Current Warfarin Dosage:  Dosing Plan  As of 2021    TTR:  80.3 % (1.9 wk)   Full warfarin instructions:  4.5 mg every Mon, Wed, Fri; 6 mg all other days               Assessment/Plan:    Continue current regimen as INR remains stable. INR remained in range on current dose. Recheck 2 weeks. Next Clinic Appointment:  Return date  As of 2021    TTR:  80.3 % (1.9 wk)   Next INR check:  2021             Please call [facility] Anticoagulation Clinic at [phone] with any questions. Thanks!   Olga Simons, [facility]  Anticoagulation Service Pharmacist  2021 2:02 PM     For Pharmacy Admin Tracking Only     Intervention Detail: Adherence Monitorin   Total # of Interventions Recommended: 0   Total # of Interventions Accepted: 0   Time Spent (min): 15

## 2021-08-28 DIAGNOSIS — I10 ESSENTIAL HYPERTENSION: ICD-10-CM

## 2021-08-30 ENCOUNTER — HOSPITAL ENCOUNTER (OUTPATIENT)
Dept: PHARMACY | Age: 81
Setting detail: THERAPIES SERIES
Discharge: HOME | End: 2021-08-30
Payer: MEDICARE

## 2021-08-30 DIAGNOSIS — I48.91 ATRIAL FIBRILLATION, UNSPECIFIED TYPE (HCC): Primary | ICD-10-CM

## 2021-08-30 LAB
INR BLD: 1.7
PROTIME: 20 SECONDS

## 2021-08-30 PROCEDURE — 36416 COLLJ CAPILLARY BLOOD SPEC: CPT

## 2021-08-30 PROCEDURE — 99211 OFF/OP EST MAY X REQ PHY/QHP: CPT

## 2021-08-30 PROCEDURE — 85610 PROTHROMBIN TIME: CPT

## 2021-08-30 RX ORDER — FUROSEMIDE 20 MG/1
TABLET ORAL
Qty: 90 TABLET | Refills: 1 | Status: SHIPPED | OUTPATIENT
Start: 2021-08-30

## 2021-08-30 NOTE — TELEPHONE ENCOUNTER
Arben Beck called requesting a refill of the below medication which has been pended for you:     Requested Prescriptions     Pending Prescriptions Disp Refills    furosemide (LASIX) 20 MG tablet [Pharmacy Med Name: FUROSEMIDE 20 MG TABLET] 30 tablet 1     Sig: TAKE ONE TABLET BY MOUTH DAILY    metoprolol tartrate (LOPRESSOR) 25 MG tablet [Pharmacy Med Name: METOPROLOL TARTRATE 25 MG TAB] 60 tablet 1     Sig: TAKE ONE TABLET BY MOUTH TWICE A DAY       Last Appointment Date: 4/28/2021  Next Appointment Date: 9/15/2021    Allergies   Allergen Reactions    Pcn [Penicillins] Hives and Shortness Of Breath    Bextra [Valdecoxib] Diarrhea

## 2021-08-30 NOTE — PROGRESS NOTES
ANTICOAGULATION SERVICE    Date of Clinic Visit:  8/30/2021    Baldomero Pineda is a 79 y.o. female who presents curbside today. Recent INR Results:  Internal QC passed  Lab Results   Component Value Date    INR 1.7 08/30/2021    INR 2.3 08/12/2021       Current Warfarin Dosage:  Dosing Plan  As of 8/30/2021    TTR:  63.3 % (1 mo)   Full warfarin instructions:  8/30: 6 mg; Otherwise 4.5 mg every Mon, Wed, Fri; 6 mg all other days               Assessment/Plan:    Modify warfarin dose as noted above: Patient fell below target today. Will boost today's dose and increase overall dose 4% and re-check 2 weeks. Next Clinic Appointment:  Return date  As of 8/30/2021    TTR:  63.3 % (1 mo)   Next INR check:  9/13/2021             Please call [facility] at [phone] with any questions. Thanks!   Bebe Chacon [facility]  Anticoagulation Service Pharmacist  8/30/2021 2:06 PM  For Pharmacy Admin Tracking Only     Intervention Detail: Dose Adjustment: 1, reason: Therapy Optimization   Total # of Interventions Recommended: 1   Total # of Interventions Accepted: 1   Time Spent (min): 15

## 2021-09-03 ENCOUNTER — TELEPHONE (OUTPATIENT)
Dept: PHARMACY | Age: 81
End: 2021-09-03

## 2021-09-03 NOTE — TELEPHONE ENCOUNTER
Patient called and said that she will miss tonights dose b/c she is out of medications. I told her to take 9mg on 9/4/21 to make up missed dose. I will make dose caledar.

## 2021-09-13 ENCOUNTER — HOSPITAL ENCOUNTER (OUTPATIENT)
Dept: PHARMACY | Age: 81
Setting detail: THERAPIES SERIES
Discharge: HOME | End: 2021-09-13
Payer: MEDICARE

## 2021-09-13 ENCOUNTER — HOSPITAL ENCOUNTER (OUTPATIENT)
Dept: LAB | Age: 81
End: 2021-09-13
Payer: MEDICARE

## 2021-09-13 ENCOUNTER — HOSPITAL ENCOUNTER (OUTPATIENT)
Dept: LAB | Age: 81
Discharge: HOME | End: 2021-09-13
Payer: MEDICARE

## 2021-09-13 DIAGNOSIS — C18.7 MALIGNANT NEOPLASM OF SIGMOID COLON (HCC): Primary | ICD-10-CM

## 2021-09-13 DIAGNOSIS — I48.91 ATRIAL FIBRILLATION, UNSPECIFIED TYPE (HCC): Primary | ICD-10-CM

## 2021-09-13 DIAGNOSIS — E78.2 MIXED HYPERLIPIDEMIA: ICD-10-CM

## 2021-09-13 DIAGNOSIS — I10 ESSENTIAL HYPERTENSION: ICD-10-CM

## 2021-09-13 DIAGNOSIS — C18.7 MALIGNANT NEOPLASM OF SIGMOID COLON (HCC): ICD-10-CM

## 2021-09-13 DIAGNOSIS — E03.9 ACQUIRED HYPOTHYROIDISM: ICD-10-CM

## 2021-09-13 DIAGNOSIS — R73.01 IMPAIRED FASTING GLUCOSE: ICD-10-CM

## 2021-09-13 LAB
ABSOLUTE EOS #: 0.12 K/UL (ref 0–0.44)
ABSOLUTE EOS #: 0.12 K/UL (ref 0–0.44)
ABSOLUTE IMMATURE GRANULOCYTE: <0.03 K/UL (ref 0–0.3)
ABSOLUTE IMMATURE GRANULOCYTE: <0.03 K/UL (ref 0–0.3)
ABSOLUTE LYMPH #: 1.68 K/UL (ref 1.1–3.7)
ABSOLUTE LYMPH #: 1.68 K/UL (ref 1.1–3.7)
ABSOLUTE MONO #: 0.67 K/UL (ref 0.1–1.2)
ABSOLUTE MONO #: 0.67 K/UL (ref 0.1–1.2)
ALBUMIN SERPL-MCNC: 4.2 G/DL (ref 3.5–5.2)
ALBUMIN SERPL-MCNC: 4.2 G/DL (ref 3.5–5.2)
ALBUMIN/GLOBULIN RATIO: 1.4 (ref 1–2.5)
ALBUMIN/GLOBULIN RATIO: 1.4 (ref 1–2.5)
ALP BLD-CCNC: 95 U/L (ref 35–104)
ALP BLD-CCNC: 95 U/L (ref 35–104)
ALT SERPL-CCNC: 10 U/L (ref 5–33)
ALT SERPL-CCNC: 10 U/L (ref 5–33)
ANION GAP SERPL CALCULATED.3IONS-SCNC: 12 MMOL/L (ref 9–17)
ANION GAP SERPL CALCULATED.3IONS-SCNC: 12 MMOL/L (ref 9–17)
AST SERPL-CCNC: 13 U/L
AST SERPL-CCNC: 13 U/L
BASOPHILS # BLD: 1 % (ref 0–2)
BASOPHILS # BLD: 1 % (ref 0–2)
BASOPHILS ABSOLUTE: 0.04 K/UL (ref 0–0.2)
BASOPHILS ABSOLUTE: 0.04 K/UL (ref 0–0.2)
BILIRUB SERPL-MCNC: 0.62 MG/DL (ref 0.3–1.2)
BILIRUB SERPL-MCNC: 0.62 MG/DL (ref 0.3–1.2)
BUN BLDV-MCNC: 24 MG/DL (ref 8–23)
BUN BLDV-MCNC: 24 MG/DL (ref 8–23)
BUN/CREAT BLD: 30 (ref 9–20)
BUN/CREAT BLD: 30 (ref 9–20)
CALCIUM SERPL-MCNC: 9.4 MG/DL (ref 8.6–10.4)
CALCIUM SERPL-MCNC: 9.4 MG/DL (ref 8.6–10.4)
CHLORIDE BLD-SCNC: 107 MMOL/L (ref 98–107)
CHLORIDE BLD-SCNC: 107 MMOL/L (ref 98–107)
CHOLESTEROL/HDL RATIO: 1.9
CHOLESTEROL: 88 MG/DL
CO2: 24 MMOL/L (ref 20–31)
CO2: 24 MMOL/L (ref 20–31)
CREAT SERPL-MCNC: 0.8 MG/DL (ref 0.5–0.9)
CREAT SERPL-MCNC: 0.8 MG/DL (ref 0.5–0.9)
DIFFERENTIAL TYPE: ABNORMAL
DIFFERENTIAL TYPE: ABNORMAL
EOSINOPHILS RELATIVE PERCENT: 2 % (ref 1–4)
EOSINOPHILS RELATIVE PERCENT: 2 % (ref 1–4)
ESTIMATED AVERAGE GLUCOSE: 111 MG/DL
GFR AFRICAN AMERICAN: >60 ML/MIN
GFR AFRICAN AMERICAN: >60 ML/MIN
GFR NON-AFRICAN AMERICAN: >60 ML/MIN
GFR NON-AFRICAN AMERICAN: >60 ML/MIN
GFR SERPL CREATININE-BSD FRML MDRD: ABNORMAL ML/MIN/{1.73_M2}
GLUCOSE BLD-MCNC: 96 MG/DL (ref 70–99)
GLUCOSE FASTING: 96 MG/DL (ref 70–99)
HBA1C MFR BLD: 5.5 % (ref 4–6)
HCT VFR BLD CALC: 43.1 % (ref 36.3–47.1)
HCT VFR BLD CALC: 43.1 % (ref 36.3–47.1)
HDLC SERPL-MCNC: 47 MG/DL
HEMOGLOBIN: 13.5 G/DL (ref 11.9–15.1)
HEMOGLOBIN: 13.5 G/DL (ref 11.9–15.1)
IMMATURE GRANULOCYTES: 0 %
IMMATURE GRANULOCYTES: 0 %
INR BLD: 3.2
LDL CHOLESTEROL: 26 MG/DL (ref 0–130)
LYMPHOCYTES # BLD: 26 % (ref 24–43)
LYMPHOCYTES # BLD: 26 % (ref 24–43)
MCH RBC QN AUTO: 30.8 PG (ref 25.2–33.5)
MCH RBC QN AUTO: 30.8 PG (ref 25.2–33.5)
MCHC RBC AUTO-ENTMCNC: 31.3 G/DL (ref 25.2–33.5)
MCHC RBC AUTO-ENTMCNC: 31.3 G/DL (ref 25.2–33.5)
MCV RBC AUTO: 98.2 FL (ref 82.6–102.9)
MCV RBC AUTO: 98.2 FL (ref 82.6–102.9)
MONOCYTES # BLD: 10 % (ref 3–12)
MONOCYTES # BLD: 10 % (ref 3–12)
NRBC AUTOMATED: 0 PER 100 WBC
NRBC AUTOMATED: 0 PER 100 WBC
PDW BLD-RTO: 14.7 % (ref 11.8–14.4)
PDW BLD-RTO: 14.7 % (ref 11.8–14.4)
PLATELET # BLD: ABNORMAL K/UL (ref 138–453)
PLATELET # BLD: ABNORMAL K/UL (ref 138–453)
PLATELET ESTIMATE: ABNORMAL
PLATELET ESTIMATE: ABNORMAL
PLATELET, FLUORESCENCE: 136 K/UL (ref 138–453)
PLATELET, FLUORESCENCE: 136 K/UL (ref 138–453)
PLATELET, IMMATURE FRACTION: 16.2 % (ref 1.1–10.3)
PLATELET, IMMATURE FRACTION: 16.2 % (ref 1.1–10.3)
PMV BLD AUTO: ABNORMAL FL (ref 8.1–13.5)
PMV BLD AUTO: ABNORMAL FL (ref 8.1–13.5)
POTASSIUM SERPL-SCNC: 3.7 MMOL/L (ref 3.7–5.3)
POTASSIUM SERPL-SCNC: 3.7 MMOL/L (ref 3.7–5.3)
PROTIME: 38.4 SECONDS
RBC # BLD: 4.39 M/UL (ref 3.95–5.11)
RBC # BLD: 4.39 M/UL (ref 3.95–5.11)
RBC # BLD: ABNORMAL 10*6/UL
RBC # BLD: ABNORMAL 10*6/UL
SEG NEUTROPHILS: 61 % (ref 36–65)
SEG NEUTROPHILS: 61 % (ref 36–65)
SEGMENTED NEUTROPHILS ABSOLUTE COUNT: 3.97 K/UL (ref 1.5–8.1)
SEGMENTED NEUTROPHILS ABSOLUTE COUNT: 3.97 K/UL (ref 1.5–8.1)
SODIUM BLD-SCNC: 143 MMOL/L (ref 135–144)
SODIUM BLD-SCNC: 143 MMOL/L (ref 135–144)
THYROXINE, FREE: 1.77 NG/DL (ref 0.93–1.7)
TOTAL PROTEIN: 7.2 G/DL (ref 6.4–8.3)
TOTAL PROTEIN: 7.2 G/DL (ref 6.4–8.3)
TRIGL SERPL-MCNC: 74 MG/DL
TSH SERPL DL<=0.05 MIU/L-ACNC: 1.2 MIU/L (ref 0.3–5)
VLDLC SERPL CALC-MCNC: NORMAL MG/DL (ref 1–30)
WBC # BLD: 6.5 K/UL (ref 3.5–11.3)
WBC # BLD: 6.5 K/UL (ref 3.5–11.3)
WBC # BLD: ABNORMAL 10*3/UL
WBC # BLD: ABNORMAL 10*3/UL

## 2021-09-13 PROCEDURE — 36415 COLL VENOUS BLD VENIPUNCTURE: CPT

## 2021-09-13 PROCEDURE — 85055 RETICULATED PLATELET ASSAY: CPT

## 2021-09-13 PROCEDURE — 84439 ASSAY OF FREE THYROXINE: CPT

## 2021-09-13 PROCEDURE — 84443 ASSAY THYROID STIM HORMONE: CPT

## 2021-09-13 PROCEDURE — 80053 COMPREHEN METABOLIC PANEL: CPT

## 2021-09-13 PROCEDURE — 83036 HEMOGLOBIN GLYCOSYLATED A1C: CPT

## 2021-09-13 PROCEDURE — 36416 COLLJ CAPILLARY BLOOD SPEC: CPT

## 2021-09-13 PROCEDURE — 99211 OFF/OP EST MAY X REQ PHY/QHP: CPT

## 2021-09-13 PROCEDURE — 85025 COMPLETE CBC W/AUTO DIFF WBC: CPT

## 2021-09-13 PROCEDURE — 80061 LIPID PANEL: CPT

## 2021-09-13 PROCEDURE — 85610 PROTHROMBIN TIME: CPT

## 2021-09-13 NOTE — PROGRESS NOTES
ANTICOAGULATION SERVICE    Date of Clinic Visit:  9/13/2021    Figueroa Rodriguez is a 79 y.o. female who presents to clinic today for anticoagulation monitoring and adjustment. Recent INR Results:  Internal QC passed  Lab Results   Component Value Date    INR 3.2 09/13/2021    INR 1.7 08/30/2021       Current Warfarin Dosage:  Dosing Plan  As of 9/13/2021    TTR:  64.3 % (1.5 mo)   Full warfarin instructions:  6 mg every Sun, Tue, Thu; 4.5 mg all other days               Assessment/Plan:    Modify warfarin dose as noted above: INR is slightly elevated today but increased significantly in the past two weeks after dose increase. I will decrease dose back down 4% and recheck in two weeks. Next Clinic Appointment:  Return date  As of 9/13/2021    TTR:  64.3 % (1.5 mo)   Next INR check:  9/27/2021             Please call [facility] at [phone] with any questions. Thanks!   Dania Pizarro, [facility]  Anticoagulation Service Pharmacist  9/13/2021 11:39 AM     For Pharmacy Admin Tracking Only     Intervention Detail: Dose Adjustment: 1, reason: Therapy Optimization   Total # of Interventions Recommended: 1   Total # of Interventions Accepted: 1   Time Spent (min): 15

## 2021-09-15 ENCOUNTER — VIRTUAL VISIT (OUTPATIENT)
Dept: FAMILY MEDICINE CLINIC | Age: 81
End: 2021-09-15
Payer: MEDICARE

## 2021-09-15 ENCOUNTER — TELEPHONE (OUTPATIENT)
Dept: SURGERY | Age: 81
End: 2021-09-15

## 2021-09-15 DIAGNOSIS — I48.0 PAROXYSMAL ATRIAL FIBRILLATION (HCC): ICD-10-CM

## 2021-09-15 DIAGNOSIS — Z99.89 OSA ON CPAP: ICD-10-CM

## 2021-09-15 DIAGNOSIS — I10 ESSENTIAL HYPERTENSION: Primary | ICD-10-CM

## 2021-09-15 DIAGNOSIS — E03.9 ACQUIRED HYPOTHYROIDISM: ICD-10-CM

## 2021-09-15 DIAGNOSIS — C18.7 MALIGNANT NEOPLASM OF SIGMOID COLON (HCC): ICD-10-CM

## 2021-09-15 DIAGNOSIS — R73.01 IMPAIRED FASTING GLUCOSE: ICD-10-CM

## 2021-09-15 DIAGNOSIS — E78.2 MIXED HYPERLIPIDEMIA: ICD-10-CM

## 2021-09-15 DIAGNOSIS — G47.33 OSA ON CPAP: ICD-10-CM

## 2021-09-15 PROCEDURE — 99214 OFFICE O/P EST MOD 30 MIN: CPT | Performed by: FAMILY MEDICINE

## 2021-09-15 PROCEDURE — 4040F PNEUMOC VAC/ADMIN/RCVD: CPT | Performed by: FAMILY MEDICINE

## 2021-09-15 PROCEDURE — G8427 DOCREV CUR MEDS BY ELIG CLIN: HCPCS | Performed by: FAMILY MEDICINE

## 2021-09-15 PROCEDURE — 1090F PRES/ABSN URINE INCON ASSESS: CPT | Performed by: FAMILY MEDICINE

## 2021-09-15 PROCEDURE — G8399 PT W/DXA RESULTS DOCUMENT: HCPCS | Performed by: FAMILY MEDICINE

## 2021-09-15 PROCEDURE — 99211 OFF/OP EST MAY X REQ PHY/QHP: CPT | Performed by: FAMILY MEDICINE

## 2021-09-15 PROCEDURE — 1123F ACP DISCUSS/DSCN MKR DOCD: CPT | Performed by: FAMILY MEDICINE

## 2021-09-15 SDOH — ECONOMIC STABILITY: FOOD INSECURITY: WITHIN THE PAST 12 MONTHS, YOU WORRIED THAT YOUR FOOD WOULD RUN OUT BEFORE YOU GOT MONEY TO BUY MORE.: NEVER TRUE

## 2021-09-15 SDOH — ECONOMIC STABILITY: FOOD INSECURITY: WITHIN THE PAST 12 MONTHS, THE FOOD YOU BOUGHT JUST DIDN'T LAST AND YOU DIDN'T HAVE MONEY TO GET MORE.: NEVER TRUE

## 2021-09-15 ASSESSMENT — ENCOUNTER SYMPTOMS
NAUSEA: 0
SHORTNESS OF BREATH: 0
CONSTIPATION: 0
SINUS PRESSURE: 0
RHINORRHEA: 0
COUGH: 0
SORE THROAT: 0
DIARRHEA: 0
EYE REDNESS: 0
WHEEZING: 0
ABDOMINAL PAIN: 0
TROUBLE SWALLOWING: 0
EYE DISCHARGE: 0
VOMITING: 0

## 2021-09-15 ASSESSMENT — SOCIAL DETERMINANTS OF HEALTH (SDOH): HOW HARD IS IT FOR YOU TO PAY FOR THE VERY BASICS LIKE FOOD, HOUSING, MEDICAL CARE, AND HEATING?: NOT HARD AT ALL

## 2021-09-15 NOTE — PROGRESS NOTES
9/15/2021    TELEHEALTH EVALUATION -- Audio/Visual (During GVUGU-47 public health emergency)    HPI:    Maida Martinez (:  1940) has requested an audio/video evaluation for the following concern(s):    Patient is seen today via a video visit for follow up of chronic health issues Patient is doing well overall. Is still going through physical therapy for her left humeral fracture and does still have some limitations with range of motion but hopefully with continued therapy this will continue to improve. She does state that her arthritic pain is very bothersome to her and with her being back on Coumadin she is not able to take any anti-inflammatory medication. Is using Tylenol which provides her with slight relief. Did discuss options of taking additional pain medication such as Norco that she was on when she fractured her humerus but she did not really feel that this helped her much better so at this point she is just going to continue with Tylenol. We did talk about perhaps trying turmeric over-the-counter or fish oil but she will need to monitor her Coumadin dosing while taking any of these anti-inflammatory medications. Does have a known history of hypertension and her blood pressure has been stable and controlled with her current medical therapy. Does have a known history of hyperlipidemia and her cholesterol levels are stable and controlled on her current statin dose. Has a known history of hypothyroidism and her thyroid levels are stable and adequately supplemented on her current thyroid medication. Does have a known history of impaired fasting glucose her blood sugar level is within normal limits. Did encourage continued low-carb/low sugar diet in order to keep this optimally controlled. Has a known history of paroxysmal atrial fibrillation and her rate is controlled and she is on Coumadin anticoagulant therapy and tolerating this without difficulty.   I did make note that she is due for a follow-up colonoscopy and she thought that Dr. Itzel Mcgraw office was coordinating this but she has not heard from Dr. Debbie Fay office to get this scheduled. I will make referral at this time. Patient otherwise does have known obstructive sleep apnea and is using her CPAP machine consistently. Is getting ongoing medical benefit from its use. Patient otherwise has no other acute medical concerns. Patient's recent lab reports are as follows:    Results for orders placed or performed during the hospital encounter of 09/13/21   Protime-INR   Result Value Ref Range    INR 3.2     Protime 38.4 seconds     Lab Results   Component Value Date    WBC 6.5 09/13/2021    RBC 4.39 09/13/2021    HGB 13.5 09/13/2021    HCT 43.1 09/13/2021    MCV 98.2 09/13/2021    MCH 30.8 09/13/2021    MCHC 31.3 09/13/2021    RDW 14.7 09/13/2021    PLT See Reflexed IPF Result 09/13/2021    MPV NOT REPORTED 09/13/2021       Lab Results   Component Value Date    CHOL 88 09/13/2021    HDL 47 09/13/2021    CHOLHDLRATIO 1.9 09/13/2021    TRIG 74 09/13/2021    VLDL NOT REPORTED 09/13/2021       Lab Results   Component Value Date    TSH 1.20 09/13/2021       Lab Results   Component Value Date     09/13/2021    K 3.7 09/13/2021     09/13/2021    CO2 24 09/13/2021    BUN 24 09/13/2021    CREATININE 0.80 09/13/2021    GLUCOSE 96 09/13/2021    CALCIUM 9.4 09/13/2021       Lab Results   Component Value Date    LABA1C 5.5 09/13/2021          Other review of systems are as noted below. Review of Systems   Constitutional: Negative for chills, fatigue and fever. HENT: Negative for congestion, ear pain, postnasal drip, rhinorrhea, sinus pressure, sore throat and trouble swallowing. Eyes: Negative for discharge and redness. Respiratory: Negative for cough, shortness of breath and wheezing. Cardiovascular: Negative for chest pain. Gastrointestinal: Negative for abdominal pain, constipation, diarrhea, nausea and vomiting. Genitourinary: Negative for dysuria, flank pain, frequency and urgency. Musculoskeletal: Positive for arthralgias. Negative for myalgias and neck pain. Skin: Negative for rash and wound. Allergic/Immunologic: Negative for environmental allergies. Neurological: Negative for dizziness, weakness, light-headedness and headaches. Hematological: Negative for adenopathy. Psychiatric/Behavioral: Negative. Prior to Visit Medications    Medication Sig Taking? Authorizing Provider   furosemide (LASIX) 20 MG tablet TAKE ONE TABLET BY MOUTH DAILY Yes Simon Wiley DO   metoprolol tartrate (LOPRESSOR) 25 MG tablet TAKE ONE TABLET BY MOUTH TWICE A DAY Yes Simon Wiley DO   levothyroxine (SYNTHROID) 50 MCG tablet TAKE 1 TABLET BY MOUTH EVERY DAY  Yes Simon Wiley DO   warfarin (COUMADIN) 3 MG tablet Take 2 tablets by mouth daily Or as directed by INR results. Yes Fredo Casas,    warfarin (COUMADIN) 2 MG tablet Take 1 tablet by mouth daily Yes Jorge Polk MD   Hospital Bed MISC by Does not apply route Yes Simon Wiley DO   Lift Chair MISC by Does not apply route Yes Simon Wiley DO   Misc.  Devices MISC 1 each by Does not apply route daily Lane-walker    DX: humerus fracture Yes Gianna Gonzalez, APRN - CNP   potassium chloride (KLOR-CON M) 20 MEQ extended release tablet Take 1 tablet by mouth daily Yes Simon Wiley DO   amLODIPine (NORVASC) 10 MG tablet TAKE 1 TABLET BY MOUTH ONE TIME A DAY  Yes Jorge Polk MD   atorvastatin (LIPITOR) 40 MG tablet TAKE 1 TABLET BY MOUTH nightly Yes Simon Wiley DO   amiodarone (CORDARONE) 200 MG tablet TAKE 1/2 TABLET BY MOUTH ONE TIME A DAY  Yes Jorge Polk MD   Handicap Placard MISC by Does not apply route Expires in 5 years Yes Simon Wiley DO   acetaminophen (TYLENOL) 325 MG tablet Take 2 tablets by mouth every 4 hours as needed for Pain or Fever  Gina Main       Social History     Tobacco Use    Smoking status: Never Smoker    Smokeless tobacco: Never Used    Tobacco comment: hernando 11/10/2018   Vaping Use    Vaping Use: Never used   Substance Use Topics    Alcohol use: No    Drug use: No        Allergies   Allergen Reactions    Pcn [Penicillins] Hives and Shortness Of Breath    Bextra [Valdecoxib] Diarrhea   ,   Past Medical History:   Diagnosis Date    A-fib ([address] 07/19/2017    Abdominal pain     Acute blood loss as cause of postoperative anemia 11/8/2018    Anxiety 4/30/2016    CHF (congestive heart failure) (HCC)     Class 2 severe obesity with serious comorbidity and body mass index (BMI) of 37.0 to 37.9 in adult [facility])     Closed fracture of proximal end of left humerus with routine healing 4/26/2021    Colitis 5/31/2019    Colitis due to Clostridium difficile     Depression 4/30/2016    Diarrhea     Dyslipidemia     Elevated fasting glucose 11/20/2018    FH: total abdominal hysterectomy and bilateral salpingo-oophorectomy 1966    Fractures 03/15/2021    Lt humerus impacted displaced    Glucose intolerance (impaired glucose tolerance)     Hypertension     Hypokalemia     Malignant neoplasm of sigmoid colon ([address]     Multiple closed fractures of ribs 9/22/2018    Obesity     JAYJAY on CPAP 10/6/2018    Osteoarthritis     Osteoporosis 8/30/2013    Other complete intestinal obstruction ([address] 11/9/2018    Other specified hypothyroidism     S/P small bowel resection 5/22/2019    Thrombocytopenia, unspecified ([address] 3/9/2020   ,   Past Surgical History:   Procedure Laterality Date    CARDIAC CATHETERIZATION  09/05/2017    CHOLECYSTECTOMY  1960s    COLONOSCOPY N/A 10/8/2018    COLONOSCOPY WITH COLD ET HOT BIOPSIES ET POLYPECTOMIES performed by Marisol Denton MD at [address] [place] [address] N/A 4/25/2019    COLONOSCOPY performed by Juan Alberto Becker MD at [facility] OR  stricture at 7 cm     COLONOSCOPY N/A 2/3/2020    tubular adenoma X 1, Dr. Rayo Gramajo ARTHROPLASTY Left     KNEE ARTHROPLASTY Right     AL CYSTOSCOPY,INSERT URETERAL STENT Bilateral 11/5/2018    CYSTO Bilateral Lighted stent placements performed by Emy Melendez MD at [address], PARTIAL, W/ANAST N/A 11/5/2018    Open Sigmoid Colectomy w/ lighted stents ILEOSTOMY PLACEMENT performed by Bienvenido Barton MD at [address] [facility] N/A 5/9/2019    Flexible Sigmoidoscopy with Dilatation of rectal stricture performed by Lawrence Jose MD at [address] [facility] N/A 5/22/2019    Loop Ileostomy take down performed by Lawrence Jose MD at [address] [facility] [address] Right 1960s       Physical Exam  Vitals and nursing note reviewed. Constitutional:       General: She is not in acute distress. Appearance: Normal appearance. HENT:      Head: Normocephalic and atraumatic. Right Ear: External ear normal.      Left Ear: External ear normal.      Nose: Nose normal. No congestion or rhinorrhea. Mouth/Throat:      Mouth: Mucous membranes are moist.   Eyes:      General:         Right eye: No discharge. Left eye: No discharge. Extraocular Movements: Extraocular movements intact. Conjunctiva/sclera: Conjunctivae normal.   Pulmonary:      Effort: Pulmonary effort is normal.   Musculoskeletal:      Cervical back: Normal range of motion. Skin:     Findings: No erythema or rash. Neurological:      Mental Status: She is alert and oriented to person, place, and time. Mental status is at baseline. ASSESSMENT/PLAN:    Encounter Diagnoses   Name Primary?  Essential hypertension Yes    Mixed hyperlipidemia     Acquired hypothyroidism     Impaired fasting glucose     Paroxysmal atrial fibrillation (HCC)     Malignant neoplasm of sigmoid colon (HCC)     JAYJAY on CPAP      No orders of the defined types were placed in this encounter.     Orders Placed This Encounter   Procedures    CBC Auto Differential Standing Status:   Future     Standing Expiration Date:   9/15/2022    Comprehensive Metabolic Panel     Standing Status:   Future     Standing Expiration Date:   9/15/2022    Hemoglobin A1C     Standing Status:   Future     Standing Expiration Date:   9/15/2022    Lipid Panel     Standing Status:   Future     Standing Expiration Date:   9/15/2022     Order Specific Question:   Is Patient Fasting?/# of Hours     Answer:   12 hours    TSH without Reflex     Standing Status:   Future     Standing Expiration Date:   9/15/2022    T4, Free     Standing Status:   Future     Standing Expiration Date:   9/15/2022    Abby Grider MD, General Surgery, [place]     Referral Priority:   Routine     Referral Type:   Eval and Treat     Referral Reason:   Specialty Services Required     Referred to Provider:   Megan Marie MD     Requested Specialty:   General Surgery     Number of Visits Requested:   1     Will make referral to Dr. Michael iFeld for colonoscopy evaluation. Patient can try turmeric over-the-counter to see if this will help with her inflammation from her arthritis. Patient is to continue to follow a low-carb/low sugar/low-fat diet and increase exercise for optimal blood sugar and cholesterol control. Patient is to return to my office in 6 months duration or sooner if any further problems or symptoms arise. (Please note that portions of this note were completed with a voice recognition program. Efforts were made to edit the dictations but occasionally words are mis-transcribed.)        No follow-ups on file. Molly Oneal is a 79 y.o. female being evaluated by a Virtual Visit (video visit) encounter to address concerns as mentioned above. A caregiver was present when appropriate. Due to this being a TeleHealth encounter (During [facility] public health emergency), evaluation of the following organ systems was limited: Vitals/Constitutional/EENT/Resp/CV/GI//MS/Neuro/Skin/Heme-Lymph-Imm. Pursuant to the emergency declaration under the [facility], [address] [place] [address] authority and the [employer] Act, this Virtual Visit was conducted with patient's (and/or legal guardian's) consent, to reduce the patient's risk of exposure to COVID-19 and provide necessary medical care. The patient (and/or legal guardian) has also been advised to contact this office for worsening conditions or problems, and seek emergency medical treatment and/or call 911 if deemed necessary. Patient identification was verified at the start of the visit: Yes    Total time spent on this encounter: Not billed by time    Services were provided through a video synchronous discussion virtually to substitute for in-person clinic visit. Patient was in their home setting on their mobile device and I was in my office on a secured video interface. --Annia Prakash,  on 9/15/2021 at 10:26 AM    An electronic signature was used to authenticate this note.

## 2021-09-28 ENCOUNTER — HOSPITAL ENCOUNTER (OUTPATIENT)
Dept: PHARMACY | Age: 81
Setting detail: THERAPIES SERIES
Discharge: HOME | End: 2021-09-28
Payer: MEDICARE

## 2021-09-28 DIAGNOSIS — I48.91 ATRIAL FIBRILLATION, UNSPECIFIED TYPE (HCC): Primary | ICD-10-CM

## 2021-09-28 LAB
INR BLD: 2.5
PROTIME: 29.9 SECONDS

## 2021-09-28 PROCEDURE — 85610 PROTHROMBIN TIME: CPT

## 2021-09-28 PROCEDURE — 99211 OFF/OP EST MAY X REQ PHY/QHP: CPT

## 2021-09-28 PROCEDURE — 36416 COLLJ CAPILLARY BLOOD SPEC: CPT

## 2021-09-28 NOTE — PROGRESS NOTES
ANTICOAGULATION SERVICE    Date of Clinic Visit:  2021    Lawrence Guillen is a 79 y.o. female who presents to clinic today for anticoagulation monitoring and adjustment. Recent INR Results:  Internal QC passed  Lab Results   Component Value Date    INR 2.5 2021    INR 3.2 2021       Current Warfarin Dosage:  Dosing Plan  As of 2021    TTR:  66.1 % (2 mo)   Full warfarin instructions:  6 mg every Sun, e, Thu; 4.5 mg all other days               Assessment/Plan:    Continue current regimen as INR remains stable. INR is back in range after dose decrease as last visit. I will continue current dose and recheck in 4 weeks. Next Clinic Appointment:  Return date  As of 2021    TTR:  66.1 % (2 mo)   Next INR check:  10/25/2021             Please call [facility]agulation Clinic at [phone] with any questions. Thanks!   Eneida Garcia, [facility] [facility]  Anticoagulation Service Pharmacist  2021 2:45 PM     For Pharmacy Admin Tracking Only     Intervention Detail: Adherence Monitorin   Total # of Interventions Recommended: 0   Total # of Interventions Accepted: 0   Time Spent (min): 15

## 2021-10-20 RX ORDER — ATORVASTATIN CALCIUM 40 MG/1
TABLET, FILM COATED ORAL
Qty: 90 TABLET | Refills: 1 | Status: SHIPPED | OUTPATIENT
Start: 2021-10-20 | End: 2022-04-19

## 2021-10-26 ENCOUNTER — HOSPITAL ENCOUNTER (OUTPATIENT)
Dept: PHARMACY | Age: 81
Setting detail: THERAPIES SERIES
Discharge: HOME | End: 2021-10-26
Payer: MEDICARE

## 2021-10-26 DIAGNOSIS — I48.91 ATRIAL FIBRILLATION, UNSPECIFIED TYPE (HCC): Primary | ICD-10-CM

## 2021-10-26 LAB
INR BLD: 2.3
PROTIME: 27.2 SECONDS

## 2021-10-26 PROCEDURE — 36416 COLLJ CAPILLARY BLOOD SPEC: CPT

## 2021-10-26 PROCEDURE — 85610 PROTHROMBIN TIME: CPT

## 2021-10-26 PROCEDURE — 99211 OFF/OP EST MAY X REQ PHY/QHP: CPT

## 2021-10-26 NOTE — PROGRESS NOTES
ANTICOAGULATION SERVICE    Date of Clinic Visit:  10/26/2021    Kathy Jones is a 79 y.o. female who presents to clinic today for anticoagulation monitoring and adjustment. Recent INR Results:  Internal QC passed  Lab Results   Component Value Date    INR 2.3 10/26/2021    INR 2.5 2021       Current Warfarin Dosage:  Dosing Plan  As of 10/26/2021    TTR:  76.8 % (2.9 mo)   Full warfarin instructions:  6 mg every Sun, e, Thu; 4.5 mg all other days               Assessment/Plan:    Continue current regimen as INR remains stable. Recheck 4 weeks. Next Clinic Appointment:  Return date  As of 10/26/2021    TTR:  76.8 % (2.9 mo)   Next INR check:  2021             Please call [facility] at [phone] with any questions. Thanks!   Tana Gomez, [facility]  Anticoagulation Service Pharmacist  10/26/2021 2:16 PM     For Pharmacy Admin Tracking Only     Intervention Detail: Adherence Monitorin   Total # of Interventions Recommended: 0   Total # of Interventions Accepted: 0   Time Spent (min): 15

## 2021-11-22 ENCOUNTER — HOSPITAL ENCOUNTER (OUTPATIENT)
Dept: PHARMACY | Age: 81
Setting detail: THERAPIES SERIES
Discharge: HOME | End: 2021-11-22
Payer: MEDICARE

## 2021-11-22 DIAGNOSIS — I48.91 ATRIAL FIBRILLATION, UNSPECIFIED TYPE (HCC): Primary | ICD-10-CM

## 2021-11-22 LAB
INR BLD: 2.3
PROTIME: 28.2 SECONDS

## 2021-11-22 PROCEDURE — 36416 COLLJ CAPILLARY BLOOD SPEC: CPT

## 2021-11-22 PROCEDURE — 85610 PROTHROMBIN TIME: CPT

## 2021-11-22 PROCEDURE — 99211 OFF/OP EST MAY X REQ PHY/QHP: CPT

## 2021-11-22 RX ORDER — LEVOTHYROXINE SODIUM 0.05 MG/1
TABLET ORAL
Qty: 90 TABLET | Refills: 1 | Status: SHIPPED | OUTPATIENT
Start: 2021-11-22 | End: 2022-04-20 | Stop reason: SDUPTHER

## 2021-11-22 NOTE — PROGRESS NOTES
ANTICOAGULATION SERVICE    Date of Clinic Visit:  2021    Radha Flores is a 79 y.o. female who presents to clinic today for anticoagulation monitoring and adjustment. Recent INR Results:  Internal QC passed  Lab Results   Component Value Date    INR 2.3 2021    INR 2.3 10/26/2021       Current Warfarin Dosage:  Dosing Plan  As of 2021    TTR:  82.2 % (3.8 mo)   Full warfarin instructions:  6 mg every Sun, e, Thu; 4.5 mg all other days               Assessment/Plan:    Continue current regimen as INR remains stable. Recheck 4 weeks. Next Clinic Appointment:  Return date  As of 2021    TTR:  82.2 % (3.8 mo)   Next INR check:  2021             Please call [facility] Anticoagulation Clinic at [phone] with any questions. Thanks!   Jennifer Austin, [facility]  Anticoagulation Service Pharmacist  2021 3:34 PM    For Pharmacy Admin Tracking Only     Intervention Detail: Adherence Monitorin   Total # of Interventions Recommended: 0   Total # of Interventions Accepted: 0   Time Spent (min): 15

## 2021-12-20 ENCOUNTER — TELEPHONE (OUTPATIENT)
Dept: ONCOLOGY | Age: 81
End: 2021-12-20

## 2021-12-20 RX ORDER — AMIODARONE HYDROCHLORIDE 200 MG/1
TABLET ORAL
Qty: 45 TABLET | Refills: 3 | Status: SHIPPED | OUTPATIENT
Start: 2021-12-20 | End: 2022-03-10 | Stop reason: ALTCHOICE

## 2021-12-20 NOTE — TELEPHONE ENCOUNTER
Writer received call from radiology needing creatinine lab value prior to CT on 12/22/21. Writer contacted patient for reminder to have creatinine lab prior to CT. Patient states she had labs drawn earlier today.

## 2021-12-23 RX ORDER — WARFARIN SODIUM 3 MG/1
TABLET ORAL
Qty: 60 TABLET | Refills: 0 | Status: SHIPPED | OUTPATIENT
Start: 2021-12-23 | End: 2022-01-26

## 2021-12-27 ENCOUNTER — HOSPITAL ENCOUNTER (OUTPATIENT)
Dept: PHARMACY | Age: 81
Setting detail: THERAPIES SERIES
Discharge: HOME | End: 2021-12-27
Payer: MEDICARE

## 2021-12-27 DIAGNOSIS — I48.91 ATRIAL FIBRILLATION, UNSPECIFIED TYPE (HCC): Primary | ICD-10-CM

## 2021-12-27 LAB
INR BLD: 1.6
PROTIME: 18.8 SECONDS

## 2021-12-27 PROCEDURE — 36416 COLLJ CAPILLARY BLOOD SPEC: CPT

## 2021-12-27 PROCEDURE — 99211 OFF/OP EST MAY X REQ PHY/QHP: CPT

## 2021-12-27 PROCEDURE — 85610 PROTHROMBIN TIME: CPT

## 2021-12-27 NOTE — PROGRESS NOTES
ANTICOAGULATION SERVICE    Date of Clinic Visit:  2021    Paulina Varela is a 79 y.o. female who presents to clinic today for anticoagulation monitoring and adjustment. Recent INR Results:  Internal QC passed  Lab Results   Component Value Date    INR 1.6 2021    INR 2.3 2021       Current Warfarin Dosage:  Dosing Plan  As of 2021    TTR:  73.1 % (5 mo)   Full warfarin instructions:  : 7.5 mg; Otherwise 6 mg every Sun, Tue, Thu; 4.5 mg all other days               Assessment/Plan:    Modify warfarin dose as noted above: INR is below range today. Shashank Kaufman said she did not miss any doses. I will not change weekly dose at this time since she has been in range on this dose for over six months. I will give extra 3 mg today to get INR back up in range. Recheck two weeks. Next Clinic Appointment:  Return date  As of 2021    TTR:  73.1 % (5 mo)   Next INR check:  2022             Please call [facility] Anticoagulation Clinic at [phone] with any questions. Thanks!   Hardeep Martinez [facility]  Anticoagulation Service Pharmacist  2021 12:19 PM     For Pharmacy Admin Tracking Only     Intervention Detail: Adherence Monitorin   Total # of Interventions Recommended: 1   Total # of Interventions Accepted: 1   Time Spent (min): 15

## 2021-12-29 DIAGNOSIS — C18.7 MALIGNANT NEOPLASM OF SIGMOID COLON (HCC): Primary | ICD-10-CM

## 2021-12-30 ENCOUNTER — HOSPITAL ENCOUNTER (OUTPATIENT)
Dept: LAB | Age: 81
Discharge: HOME | End: 2021-12-30
Payer: MEDICARE

## 2021-12-30 DIAGNOSIS — C18.7 MALIGNANT NEOPLASM OF SIGMOID COLON (HCC): ICD-10-CM

## 2021-12-30 LAB
ABSOLUTE EOS #: 0.2 K/UL (ref 0–0.44)
ABSOLUTE IMMATURE GRANULOCYTE: <0.03 K/UL (ref 0–0.3)
ABSOLUTE LYMPH #: 1.74 K/UL (ref 1.1–3.7)
ABSOLUTE MONO #: 0.62 K/UL (ref 0.1–1.2)
ALBUMIN SERPL-MCNC: 4.3 G/DL (ref 3.5–5.2)
ALBUMIN/GLOBULIN RATIO: 1.4 (ref 1–2.5)
ALP BLD-CCNC: 110 U/L (ref 35–104)
ALT SERPL-CCNC: 8 U/L (ref 5–33)
ANION GAP SERPL CALCULATED.3IONS-SCNC: 12 MMOL/L (ref 9–17)
AST SERPL-CCNC: 13 U/L
BASOPHILS # BLD: 0 % (ref 0–2)
BASOPHILS ABSOLUTE: 0.03 K/UL (ref 0–0.2)
BILIRUB SERPL-MCNC: 0.62 MG/DL (ref 0.3–1.2)
BUN BLDV-MCNC: 20 MG/DL (ref 8–23)
BUN/CREAT BLD: 30 (ref 9–20)
CALCIUM SERPL-MCNC: 9.4 MG/DL (ref 8.6–10.4)
CARCINOEMBRYONIC ANTIGEN: 3.4 NG/ML
CHLORIDE BLD-SCNC: 107 MMOL/L (ref 98–107)
CO2: 24 MMOL/L (ref 20–31)
CREAT SERPL-MCNC: 0.67 MG/DL (ref 0.5–0.9)
DIFFERENTIAL TYPE: ABNORMAL
EOSINOPHILS RELATIVE PERCENT: 3 % (ref 1–4)
GFR AFRICAN AMERICAN: >60 ML/MIN
GFR NON-AFRICAN AMERICAN: >60 ML/MIN
GFR SERPL CREATININE-BSD FRML MDRD: ABNORMAL ML/MIN/{1.73_M2}
GFR SERPL CREATININE-BSD FRML MDRD: ABNORMAL ML/MIN/{1.73_M2}
GLUCOSE BLD-MCNC: 99 MG/DL (ref 70–99)
HCT VFR BLD CALC: 41 % (ref 36.3–47.1)
HEMOGLOBIN: 13.1 G/DL (ref 11.9–15.1)
IMMATURE GRANULOCYTES: 0 %
LYMPHOCYTES # BLD: 26 % (ref 24–43)
MCH RBC QN AUTO: 31.3 PG (ref 25.2–33.5)
MCHC RBC AUTO-ENTMCNC: 32 G/DL (ref 25.2–33.5)
MCV RBC AUTO: 98.1 FL (ref 82.6–102.9)
MONOCYTES # BLD: 9 % (ref 3–12)
NRBC AUTOMATED: 0 PER 100 WBC
PDW BLD-RTO: 14.7 % (ref 11.8–14.4)
PLATELET # BLD: ABNORMAL K/UL (ref 138–453)
PLATELET ESTIMATE: ABNORMAL
PLATELET, FLUORESCENCE: 133 K/UL (ref 138–453)
PLATELET, IMMATURE FRACTION: 15.1 % (ref 1.1–10.3)
PMV BLD AUTO: ABNORMAL FL (ref 8.1–13.5)
POTASSIUM SERPL-SCNC: 3.7 MMOL/L (ref 3.7–5.3)
RBC # BLD: 4.18 M/UL (ref 3.95–5.11)
RBC # BLD: ABNORMAL 10*6/UL
SEG NEUTROPHILS: 62 % (ref 36–65)
SEGMENTED NEUTROPHILS ABSOLUTE COUNT: 4.19 K/UL (ref 1.5–8.1)
SODIUM BLD-SCNC: 143 MMOL/L (ref 135–144)
TOTAL PROTEIN: 7.3 G/DL (ref 6.4–8.3)
WBC # BLD: 6.8 K/UL (ref 3.5–11.3)
WBC # BLD: ABNORMAL 10*3/UL

## 2021-12-30 PROCEDURE — 36415 COLL VENOUS BLD VENIPUNCTURE: CPT

## 2021-12-30 PROCEDURE — 82378 CARCINOEMBRYONIC ANTIGEN: CPT

## 2021-12-30 PROCEDURE — 85055 RETICULATED PLATELET ASSAY: CPT

## 2021-12-30 PROCEDURE — 80053 COMPREHEN METABOLIC PANEL: CPT

## 2021-12-30 PROCEDURE — 85025 COMPLETE CBC W/AUTO DIFF WBC: CPT

## 2022-01-11 NOTE — LETTER
[address] [address]  [address]  [address]  Phone: [phone]  Fax: [phone]    Hardeep Elkins MD      3/24/2020    Dear Eri Otoole,      Dr. Dyana Mccallum reviewed the results of your recent colonoscopy. He removed one polyp that was benign (no cancer). His recommendations are to be scoped again in 1 years, due to your history of colon cancer. If you have any questions or concerns regarding your test results or our recommendations, please call the office at [phone].       Sincerely,           [facility]

## 2022-01-12 ENCOUNTER — HOSPITAL ENCOUNTER (OUTPATIENT)
Dept: CT IMAGING | Age: 82
Discharge: HOME | End: 2022-01-14
Payer: MEDICARE

## 2022-01-12 ENCOUNTER — HOSPITAL ENCOUNTER (OUTPATIENT)
Dept: PHARMACY | Age: 82
Setting detail: THERAPIES SERIES
Discharge: HOME | End: 2022-01-12
Payer: MEDICARE

## 2022-01-12 ENCOUNTER — IMMUNIZATION (OUTPATIENT)
Dept: LAB | Age: 82
End: 2022-01-12
Payer: MEDICARE

## 2022-01-12 DIAGNOSIS — I48.91 ATRIAL FIBRILLATION, UNSPECIFIED TYPE (HCC): Primary | ICD-10-CM

## 2022-01-12 DIAGNOSIS — C18.7 MALIGNANT NEOPLASM OF SIGMOID COLON (HCC): ICD-10-CM

## 2022-01-12 LAB
INR BLD: 1.8
PROTIME: 21.8 SECONDS

## 2022-01-12 PROCEDURE — PBSHW COVID-19, MODERNA BOOSTER VACCINE 0.25ML DOSE: Performed by: INTERNAL MEDICINE

## 2022-01-12 PROCEDURE — 36416 COLLJ CAPILLARY BLOOD SPEC: CPT

## 2022-01-12 PROCEDURE — 6360000004 HC RX CONTRAST MEDICATION: Performed by: INTERNAL MEDICINE

## 2022-01-12 PROCEDURE — 91306 COVID-19, MODERNA BOOSTER VACCINE 0.25ML DOSE: CPT | Performed by: INTERNAL MEDICINE

## 2022-01-12 PROCEDURE — 85610 PROTHROMBIN TIME: CPT

## 2022-01-12 PROCEDURE — 2709999900 CT ABDOMEN PELVIS W IV CONTRAST

## 2022-01-12 PROCEDURE — 99212 OFFICE O/P EST SF 10 MIN: CPT

## 2022-01-12 RX ADMIN — IOHEXOL 50 ML: 240 INJECTION, SOLUTION INTRATHECAL; INTRAVASCULAR; INTRAVENOUS; ORAL at 10:38

## 2022-01-12 RX ADMIN — IOPAMIDOL 100 ML: 755 INJECTION, SOLUTION INTRAVENOUS at 10:43

## 2022-01-12 NOTE — PROGRESS NOTES
ANTICOAGULATION SERVICE    Date of Clinic Visit:  1/12/2022    Mallory Wang is a 79 y.o. female who presents to clinic today for anticoagulation monitoring and adjustment. Recent INR Results:  Internal QC passed  Lab Results   Component Value Date    INR 1.8 01/12/2022    INR 1.6 12/27/2021       Current Warfarin Dosage:  Dosing Plan  As of 1/12/2022    TTR:  66.3 % (5.5 mo)   Full warfarin instructions:  4.5 mg every Mon, Wed, Fri; 6 mg all other days               Assessment/Plan:    Modify warfarin dose as noted above:     Next Clinic Appointment:  Return date  As of 1/12/2022    TTR:  66.3 % (5.5 mo)   Next INR check:  1/31/2022             Please call [facility]ticoagulation Clinic at [phone] with any questions. Thanks!   Joaquin Garg, [address] [facility]  Anticoagulation Service Pharmacist  1/12/2022 11:34 AM  For Pharmacy Admin Tracking Only     Intervention Detail: Dose Adjustment: 1, reason: Therapy Optimization   Total # of Interventions Recommended: 1   Total # of Interventions Accepted: 1   Time Spent (min): 10

## 2022-01-17 ENCOUNTER — VIRTUAL VISIT (OUTPATIENT)
Dept: ONCOLOGY | Age: 82
End: 2022-01-17
Payer: MEDICARE

## 2022-01-17 DIAGNOSIS — N28.89 RENAL MASS, RIGHT: Primary | ICD-10-CM

## 2022-01-17 DIAGNOSIS — C18.7 MALIGNANT NEOPLASM OF SIGMOID COLON (HCC): ICD-10-CM

## 2022-01-17 PROCEDURE — 1123F ACP DISCUSS/DSCN MKR DOCD: CPT | Performed by: INTERNAL MEDICINE

## 2022-01-17 PROCEDURE — G8427 DOCREV CUR MEDS BY ELIG CLIN: HCPCS | Performed by: INTERNAL MEDICINE

## 2022-01-17 PROCEDURE — 99214 OFFICE O/P EST MOD 30 MIN: CPT | Performed by: INTERNAL MEDICINE

## 2022-01-17 PROCEDURE — 4040F PNEUMOC VAC/ADMIN/RCVD: CPT | Performed by: INTERNAL MEDICINE

## 2022-01-17 PROCEDURE — 1090F PRES/ABSN URINE INCON ASSESS: CPT | Performed by: INTERNAL MEDICINE

## 2022-01-17 PROCEDURE — G8399 PT W/DXA RESULTS DOCUMENT: HCPCS | Performed by: INTERNAL MEDICINE

## 2022-01-17 PROCEDURE — 99211 OFF/OP EST MAY X REQ PHY/QHP: CPT | Performed by: INTERNAL MEDICINE

## 2022-01-17 NOTE — PROGRESS NOTES
mass in the right kidney. During this visit patient's allergy, social, medical, surgical history and medications were reviewed and updated. Allergies   Allergen Reactions    Pcn [Penicillins] Hives and Shortness Of Breath    Bextra [Valdecoxib] Diarrhea     Current Outpatient Medications   Medication Sig Dispense Refill    amiodarone (CORDARONE) 200 MG tablet TAKE 1/2 TABLET BY MOUTH ONE TIME A DAY 45 tablet 3    levothyroxine (SYNTHROID) 50 MCG tablet TAKE 1 TABLET BY MOUTH EVERY DAY 90 tablet 1    atorvastatin (LIPITOR) 40 MG tablet TAKE 1 TABLET BY MOUTH EVERY DAY AT NIGHT 90 tablet 1    furosemide (LASIX) 20 MG tablet TAKE ONE TABLET BY MOUTH DAILY 90 tablet 1    metoprolol tartrate (LOPRESSOR) 25 MG tablet TAKE ONE TABLET BY MOUTH TWICE A  tablet 1    warfarin (COUMADIN) 2 MG tablet Take 1 tablet by mouth daily 30 tablet 3    potassium chloride (KLOR-CON M) 20 MEQ extended release tablet Take 1 tablet by mouth daily 90 tablet 1    amLODIPine (NORVASC) 10 MG tablet TAKE 1 TABLET BY MOUTH ONE TIME A DAY  90 tablet 3    Handicap Placard MISC by Does not apply route Expires in 5 years 1 each 0    acetaminophen (TYLENOL) 325 MG tablet Take 2 tablets by mouth every 4 hours as needed for Pain or Fever 60 tablet 0    JANTOVEN 3 MG tablet TAKE 2 TABLETS BY MOUTH EVERY DAY or as DIRECTED INR results (Patient not taking: Reported on 1/17/2022) 60 tablet [address] by Does not apply route (Patient not taking: Reported on 1/17/2022) 1 each 0    Lift Chair MISC by Does not apply route (Patient not taking: Reported on 1/17/2022) 1 each 0    Misc. Devices MISC 1 each by Does not apply route daily Lane-walker    DX: humerus fracture (Patient not taking: Reported on 1/17/2022) 1 Device 0     No current facility-administered medications for this visit. REVIEW OF SYSTEM:   Constitutional: No fever or chills.  No night sweats, no weight loss   Eyes: No eye discharge, double vision, or eye pain   HEENT: negative for sore mouth, sore throat, hoarseness and voice change   Respiratory: negative for cough , sputum, dyspnea, wheezing, hemoptysis, chest pain   Cardiovascular: negative for chest pain, dyspnea, palpitations, orthopnea, PND   Gastrointestinal: negative for nausea, vomiting, diarrhea, constipation, abdominal pain, Dysphagia, hematemesis and hematochezia   Genitourinary: negative for frequency, dysuria, nocturia, urinary incontinence, and hematuria   Integument: negative for rash, skin lesions, bruises. Hematologic/Lymphatic: negative for easy bruising, bleeding, lymphadenopathy, petechiae and swelling/edema   Endocrine: negative for heat or cold intolerance, tremor, weight changes, change in bowel habits and hair loss   Musculoskeletal: negative for myalgias, arthralgias, pain, joint swelling,and bone pain   Neurological: negative for headaches, dizziness, seizures, weakness, numbness   OBJECTIVE:         There were no vitals filed for this visit. PHYSICAL EXAM:   General appearance - well appearing, no in pain or distress   Mental status - alert and cooperative   Eyes - pupils equal and reactive, extraocular eye movements intact   Ears - bilateral TM's and external ear canals normal   Mouth - mucous membranes moist, pharynx normal without lesions   Neck - supple, no significant adenopathy   Lymphatics - no palpable lymphadenopathy, no hepatosplenomegaly   Chest - clear to auscultation, no wheezes, rales or rhonchi, symmetric air entry   Heart - normal rate, regular rhythm, normal S1, S2, no murmurs, rubs, clicks or gallops   Abdomen - Surgical scar healing well, soft, nontender, nondistended, no masses or organomegaly   Neurological - alert, oriented, normal speech, no focal findings or movement disorder noted   Musculoskeletal - no joint tenderness, deformity or swelling   Extremities - peripheral pulses normal, no pedal edema, no clubbing or cyanosis   Skin - normal coloration and turgor, no rashes, no suspicious skin lesions noted ,  LABORATORY DATA:     Lab Results   Component Value Date    WBC 6.8 12/30/2021    HGB 13.1 12/30/2021    HCT 41.0 12/30/2021    MCV 98.1 12/30/2021    PLT See Reflexed IPF Result 12/30/2021    LYMPHOPCT 26 12/30/2021    RBC 4.18 12/30/2021    MCH 31.3 12/30/2021    MCHC 32.0 12/30/2021    RDW 14.7 (H) 12/30/2021    MONOPCT 9 12/30/2021    BASOPCT 0 12/30/2021    NEUTROABS 4.19 12/30/2021    LYMPHSABS 1.74 12/30/2021    MONOSABS 0.62 12/30/2021    EOSABS 0.20 12/30/2021    BASOSABS 0.03 12/30/2021         Chemistry        Component Value Date/Time     12/30/2021 1350    K 3.7 12/30/2021 1350     12/30/2021 1350    CO2 24 12/30/2021 1350    BUN 20 12/30/2021 1350    CREATININE 0.67 12/30/2021 1350        Component Value Date/Time    CALCIUM 9.4 12/30/2021 1350    ALKPHOS 110 (H) 12/30/2021 1350    AST 13 12/30/2021 1350    ALT 8 12/30/2021 1350    BILITOT 0.62 12/30/2021 1350        PATHOLOGY DATA:     -- Diagnosis --   1.  SIGMOID COLON, SEGMENTAL RESECTION:   - LOW-GRADE ADENOCARCINOMA ARISING IN A LARGE SESSILE TUBULOVILLOUS   ADENOMA, INVADING INTO THE SUBMUCOSA.   - THE FINAL SURGICAL MARGINS ARE NEGATIVE FOR NEOPLASM. - BENIGN REGIONAL LYMPH NODES (0/14). 2.  SIGMOID COLON, DISTAL MARGIN, RESECTION:   - UNREMARKABLE COLON SEGMENT.   - SINGLE BENIGN LYMPH NODE (0/1).      PATHOLOGIC STAGE CLASSIFICATION:     pT1  pN0   ADDENDUM DIAGNOSIS   AT THE REQUEST OF DR. Amira Love, BLOCK 14B WAS SENT TO [facility]   FOR MMR TESTING.  THE RESULTS ARE AS FOLLOWS:     MISMATCH REPAIR BY IHC RESULT:  NORMAL   MISMATCH REPAIR BY IHC WITH MLH1:  NORMAL   MISMATCH REPAIR BY IHC WITH MSH2:  NORMAL   MISMATCH REPAIR BY IHC WITH MSH6:  NORMAL   MISMATCH REPAIR BY IHC WITH PMS2:  NORMAL   IMAGING DATA:    CT abdomen pelvis 11/10/18  Impression   Dilated stomach and loops of bowel, possibly reflecting postoperative ileus.       Trace perihepatic and mild pelvic ascites.       Redemonstration of subcutaneous mass along the right buttock. CT abdomen pelvis 2/26/19:  Improved postoperative findings status post partial colon resection and   ileostomy.  No acute abnormality identified.       Stable chronic findings, as above. ASSESSMENT:    Festus Mack has T1NO, stage I colon adenocarcinoma, MMR proficient. I discussed the NCCN guidelines. I discussed that for stage I colon cancer, there is no role of adjuvant chemotherapy, so surveillance is recommended. I will follow her in three months with CEA, CBC and CMP. She will need colonoscopy in one year. I discussed the results of recent lab work and CT scans which showed no evidence of recurrence. Clinically she is doing well. She is planning to have follow up with Gen. surgery in May and possible colonoscopy for planning reversal colostomy. During today's visit, the patient and the family had a number of reasonable questions which were answered to their satisfaction. They verbalized understanding of the information provided and they agreed to proceed as outlined above. PLAN:   Continue surveillance  Will repeat lab work in 3 months and plan for CT scan in 6 months  She will see Gen. surgery in May for discussion about colostomy reversal  RTC 3 months with labs     I spent more than 25 minutes examining, evaluating, reviewing data and counseling the patient. Greater than 50% of that time was spent face-to-face with the patient in counseling and coordinating her care. Rory Zamorano MD  Hematologist/Medical Oncologist          This note is created with the assistance of a speech recognition program.  While intending to generate a document that actually reflects the content of the visit, the document can still have some errors including those of syntax and sound a like substitutions which may escape proof reading.   It such instances, actual meaning can be extrapolated by contextual diversion. Patient ID: Toribio Holter, 1940, [id number], 79 y.o. Referred by : Saundra Boss MD  Reason for consultation:   Diagnosis of colon cancer  HISTORY OF PRESENT ILLNESS:    Oncologic History: This is a 65-year-old female with new diagnosis of colon cancer was seen during initial consultation visit. She presented with rectal bleeding going on for about 4-6 weeks. She had a colonoscopy on 10/8/18 which showed multiple polyps and large distal sigmoid tumors with extensive diverticulosis. Biopsy showed invasive low-grade adenocarcinoma arising in a large tubulovillous adenoma with extensive high-grade dysplasia. Subsequently she had open low anterior resection of the rectosigmoid with proximal dilating ileostomy. Final pathology showed T1 N0, stage I disease. Now she is recovering well from surgery. She denied any pain, nausea vomiting. She does not have family history of colon cancer. She is adopted and does not know about her family history.     Past Medical History:   Diagnosis Date    A-fib [facility]) 07/19/2017    Abdominal pain     Acute blood loss as cause of postoperative anemia 11/8/2018    Anxiety 4/30/2016    CHF (congestive heart failure) (HCC)     Class 2 severe obesity with serious comorbidity and body mass index (BMI) of 37.0 to 37.9 in adult [facility])     Closed fracture of proximal end of left humerus with routine healing 4/26/2021    Colitis 5/31/2019    Colitis due to Clostridium difficile     Depression 4/30/2016    Diarrhea     Dyslipidemia     Elevated fasting glucose 11/20/2018    FH: total abdominal hysterectomy and bilateral salpingo-oophorectomy 1966    Fractures 03/15/2021    Lt humerus impacted displaced    Glucose intolerance (impaired glucose tolerance)     Hypertension     Hypokalemia     Mal[facility] of [facility]moid colon [facility] [address]     Multiple closed fractures of ribs 9/22/2018    Obesity     JAYJAY on CPAP 10/6/2018    Osteoarthritis  Osteoporosis 8/30/2013    Other complete intestinal obstruction ([facility] [address] 11/9/2018    Other specified hypothyroidism     S/P small bowel resection 5/22/2019    Thrombocytopenia, unspecified ([facility] [address] 3/9/2020       Past Surgical History:   Procedure Laterality Date    CARDIAC CATHETERIZATION  09/05/2017    CHOLECYSTECTOMY  1960s    COLONOSCOPY N/A 10/8/2018    COLONOSCOPY WITH COLD ET HOT BIOPSIES ET POLYPECTOMIES performed by Hernan Jones MD at [facility] N/A 4/25/2019    COLONOSCOPY performed by Lyndsey White MD at [address] [facility]  stricture at 7 cm     COLONOSCOPY N/A 2/3/2020    tubular adenoma X 1, Dr. Esa Parsons Bilateral 11/5/2018    CYSTO Bilateral Lighted stent placements performed by Maria De Jesus Husain MD at [address] [facility], PARTIAL, W/ANAST N/A 11/5/2018    Open Sigmoid Colectomy w/ lighted stents ILEOSTOMY PLACEMENT performed by Hernan Jones MD at [address] [facility] N/A 5/9/2019    Flexible Sigmoidoscopy with Dilatation of rectal stricture performed by Lyndsey White MD at [facility] N/A 5/22/2019    Loop Ileostomy take down performed by Lyndsey White MD at [address] [facility] [address] Right 1960s       Allergies   Allergen Reactions    Pcn [Penicillins] Hives and Shortness Of Breath    Bextra [Valdecoxib] Diarrhea       Current Outpatient Medications   Medication Sig Dispense Refill    amiodarone (CORDARONE) 200 MG tablet TAKE 1/2 TABLET BY MOUTH ONE TIME A DAY 45 tablet 3    levothyroxine (SYNTHROID) 50 MCG tablet TAKE 1 TABLET BY MOUTH EVERY DAY 90 tablet 1    atorvastatin (LIPITOR) 40 MG tablet TAKE 1 TABLET BY MOUTH EVERY DAY AT NIGHT 90 tablet 1    furosemide (LASIX) 20 MG tablet TAKE ONE TABLET BY MOUTH DAILY 90 tablet 1    metoprolol tartrate (LOPRESSOR) 25 MG tablet TAKE ONE TABLET BY MOUTH TWICE A  tablet 1    warfarin (COUMADIN) 2 MG tablet Take 1 tablet by mouth daily 30 tablet 3    potassium chloride (KLOR-CON M) 20 MEQ extended release tablet Take 1 tablet by mouth daily 90 tablet 1    amLODIPine (NORVASC) 10 MG tablet TAKE 1 TABLET BY MOUTH ONE TIME A DAY  90 tablet 3    Handicap Placard MISC by Does not apply route Expires in 5 years 1 each 0    acetaminophen (TYLENOL) 325 MG tablet Take 2 tablets by mouth every 4 hours as needed for Pain or Fever 60 tablet 0    JANTOVEN 3 MG tablet TAKE 2 TABLETS BY MOUTH EVERY DAY or as DIRECTED INR results (Patient not taking: Reported on 1/17/2022) 60 tablet [address] by Does not apply route (Patient not taking: Reported on 1/17/2022) 1 each 0    Lift Chair MISC by Does not apply route (Patient not taking: Reported on 1/17/2022) 1 each 0    Misc. Devices MISC 1 each by Does not apply route daily Lane-walker    DX: humerus fracture (Patient not taking: Reported on 1/17/2022) 1 Device 0     No current facility-administered medications for this visit. REVIEW OF SYSTEM:   Constitutional: No fever or chills. No night sweats, no weight loss   Eyes: No eye discharge, double vision, or eye pain   HEENT: negative for sore mouth, sore throat, hoarseness and voice change   Respiratory: negative for cough , sputum, dyspnea, wheezing, hemoptysis, chest pain   Cardiovascular: negative for chest pain, dyspnea, palpitations, orthopnea, PND   Gastrointestinal: negative for nausea, vomiting, diarrhea, constipation, abdominal pain, Dysphagia, hematemesis and hematochezia   Genitourinary: negative for frequency, dysuria, nocturia, urinary incontinence, and hematuria   Integument: negative for rash, skin lesions, bruises.    Hematologic/Lymphatic: negative for easy bruising, bleeding, lymphadenopathy, petechiae and swelling/edema   Endocrine: negative for heat or cold intolerance, tremor, weight changes, change in bowel habits and hair loss Musculoskeletal: negative for myalgias, arthralgias, pain, joint swelling,and bone pain   Neurological: negative for headaches, dizziness, seizures, weakness, numbness   OBJECTIVE:         There were no vitals filed for this visit. Vital signs were reviewed  PHYSICAL EXAM:     PHYSICAL EXAMINATION:  [ INSTRUCTIONS:  \"[x]\" Indicates a positive item  \"[]\" Indicates a negative item  -- DELETE ALL ITEMS NOT EXAMINED]  Vital Signs: (As obtained by patient/caregiver or practitioner observation)    Blood pressure-  Heart rate-    Respiratory rate-    Temperature-  Pulse oximetry-     Constitutional: [x] Appears well-developed and well-nourished [x] No apparent distress      [] Abnormal-   Mental status  [x] Alert and awake  [x] Oriented to person/place/time [x]Able to follow commands      Eyes:  EOM    [x]  Normal  [] Abnormal-  Sclera  [x]  Normal  [] Abnormal -         Discharge [x]  None visible  [] Abnormal -    HENT:   [x] Normocephalic, atraumatic.   [] Abnormal   [x] Mouth/Throat: Mucous membranes are moist.     External Ears [x] Normal  [] Abnormal-     Neck: [x] No visualized mass     Pulmonary/Chest: [x] Respiratory effort normal.  [x] No visualized signs of difficulty breathing or respiratory distress        [] Abnormal-      Musculoskeletal:   [x] Normal gait with no signs of ataxia         [x] Normal range of motion of neck        [] Abnormal-       Neurological:        [x] No Facial Asymmetry (Cranial nerve 7 motor function) (limited exam to video visit)          [x] No gaze palsy        [] Abnormal-         Skin:        [x] No significant exanthematous lesions or discoloration noted on facial skin         [] Abnormal-            Psychiatric:       [x] Normal Affect [] No Hallucinations        [] Abnormal-     LABORATORY DATA:     Lab Results   Component Value Date    WBC 6.8 12/30/2021    HGB 13.1 12/30/2021    HCT 41.0 12/30/2021    MCV 98.1 12/30/2021    PLT See Reflexed IPF Result 12/30/2021 LYMPHOPCT 26 12/30/2021    RBC 4.18 12/30/2021    MCH 31.3 12/30/2021    MCHC 32.0 12/30/2021    RDW 14.7 (H) 12/30/2021    MONOPCT 9 12/30/2021    BASOPCT 0 12/30/2021    NEUTROABS 4.19 12/30/2021    LYMPHSABS 1.74 12/30/2021    MONOSABS 0.62 12/30/2021    EOSABS 0.20 12/30/2021    BASOSABS 0.03 12/30/2021         Chemistry        Component Value Date/Time     12/30/2021 1350    K 3.7 12/30/2021 1350     12/30/2021 1350    CO2 24 12/30/2021 1350    BUN 20 12/30/2021 1350    CREATININE 0.67 12/30/2021 1350        Component Value Date/Time    CALCIUM 9.4 12/30/2021 1350    ALKPHOS 110 (H) 12/30/2021 1350    AST 13 12/30/2021 1350    ALT 8 12/30/2021 1350    BILITOT 0.62 12/30/2021 1350        PATHOLOGY DATA:     -- Diagnosis --   1.  SIGMOID COLON, SEGMENTAL RESECTION:   - LOW-GRADE ADENOCARCINOMA ARISING IN A LARGE SESSILE TUBULOVILLOUS   ADENOMA, INVADING INTO THE SUBMUCOSA.   - THE FINAL SURGICAL MARGINS ARE NEGATIVE FOR NEOPLASM. - BENIGN REGIONAL LYMPH NODES (0/14). 2.  SIGMOID COLON, DISTAL MARGIN, RESECTION:   - UNREMARKABLE COLON SEGMENT.   - SINGLE BENIGN LYMPH NODE (0/1). PATHOLOGIC STAGE CLASSIFICATION:     pT1  pN0   ADDENDUM DIAGNOSIS   AT THE REQUEST OF DR. Park [place], BLOCK 14B WAS SENT TO [facility]   FOR MMR TESTING.  THE RESULTS ARE AS FOLLOWS:     MISMATCH REPAIR BY IHC RESULT:  NORMAL   MISMATCH REPAIR BY IHC WITH MLH1:  NORMAL   MISMATCH REPAIR BY IHC WITH MSH2:  NORMAL   MISMATCH REPAIR BY IHC WITH MSH6:  NORMAL   MISMATCH REPAIR BY IHC WITH PMS2:  NORMAL   IMAGING DATA:    CT abdomen pelvis 11/10/18  Impression   Dilated stomach and loops of bowel, possibly reflecting postoperative ileus.       Trace perihepatic and mild pelvic ascites.       Redemonstration of subcutaneous mass along the right buttock.    CT abd 5/31/19  Mild diffuse colonic wall thickening may be related to recent surgery versus   a colitis.  No bowel obstruction.       Incompletely characterized right renal lesion may be a complex cyst but has   increased over multiple prior studies.  Recommend follow-up with MRI with   contrast.   CT abdomen pelvis 2/4/20  Impression   1. Alyx Racheal limited study due to lack of IV contrast, which the patient   refused after several unsuccessful IV access attempts.       2.  No definite evidence of metastatic disease in the chest, abdomen, or   pelvis.       3.  Redemonstration of patchy lucencies in the sacrum, which has been present   since at least 09/21/2018 and could be sequelae of previous insufficiency   fractures.  If clinically indicated, this could be further evaluated with MRI. CT abdomen pelvis 1/12/2022  Impression   1.  Right renal mass, highly suspicious for renal cell carcinoma.  Urologic   consultation is recommended.       2.  Other findings, as described above. ASSESSMENT:    Phil Simons has T1NO, stage I colon adenocarcinoma, MMR proficient. I discussed the NCCN guidelines. I discussed that for stage I colon cancer, there is no role of adjuvant chemotherapy, so surveillance is recommended. I will follow her in three months with CEA, CBC and CMP. She had a colonoscopy in one year after her surgery. Right renal mass: Suspicious for malignancy and will refer her to urology  Bone lucencies: Stable on recent scan  Mild thrombocytopenia:  During today's visit, the patient and the family had a number of reasonable questions which were answered to their satisfaction. They verbalized understanding of the information provided and they agreed to proceed as outlined above.    PLAN:   I reviewed the results of recent lab work and discussed the results of CT scan results and further recommendations with patient   Her lab work looks unremarkable without any evidence of recurrence for colon cancer   Unfortunately her CT abdomen showing mass in the right kidney   She cannot have MRI as she has significant claustrophobia   I will refer her to urology for further recommendations   Return to clinic in 4 to 6 weeks or earlier if needed     Valora Dubin, was evaluated through a synchronous (real-time) audio-video encounter. The patient (or guardian if applicable) is aware that this is a billable service. Verbal consent to proceed has been obtained within the past 12 months. The visit was conducted pursuant to the emergency declaration under the [facility], [address] [place] [address] and the [employer] and [employer] General Act. Patient identification was verified, and a caregiver was present when appropriate. The patient was located in a state where the provider was credentialed to provide care. Total time spent on this encounter: 30 min    --Michael Jernigan MD on 1/17/2022 at 2:43 PM    An electronic signature was used to authenticate this note. Rory Jose MD  Hematologist/Medical Oncologist      This note is created with the assistance of a speech recognition program.  While intending to generate a document that actually reflects the content of the visit, the document can still have some errors including those of syntax and sound a like substitutions which may escape proof reading. It such instances, actual meaning can be extrapolated by contextual diversion.

## 2022-01-19 ENCOUNTER — OFFICE VISIT (OUTPATIENT)
Dept: UROLOGY | Age: 82
End: 2022-01-19
Payer: MEDICARE

## 2022-01-19 VITALS — DIASTOLIC BLOOD PRESSURE: 82 MMHG | OXYGEN SATURATION: 96 % | SYSTOLIC BLOOD PRESSURE: 138 MMHG | HEART RATE: 92 BPM

## 2022-01-19 DIAGNOSIS — N28.89 RENAL MASS: Primary | ICD-10-CM

## 2022-01-19 PROCEDURE — 4040F PNEUMOC VAC/ADMIN/RCVD: CPT | Performed by: UROLOGY

## 2022-01-19 PROCEDURE — G8399 PT W/DXA RESULTS DOCUMENT: HCPCS | Performed by: UROLOGY

## 2022-01-19 PROCEDURE — 1036F TOBACCO NON-USER: CPT | Performed by: UROLOGY

## 2022-01-19 PROCEDURE — 1090F PRES/ABSN URINE INCON ASSESS: CPT | Performed by: UROLOGY

## 2022-01-19 PROCEDURE — 99213 OFFICE O/P EST LOW 20 MIN: CPT | Performed by: UROLOGY

## 2022-01-19 PROCEDURE — G8417 CALC BMI ABV UP PARAM F/U: HCPCS | Performed by: UROLOGY

## 2022-01-19 PROCEDURE — 99204 OFFICE O/P NEW MOD 45 MIN: CPT | Performed by: UROLOGY

## 2022-01-19 PROCEDURE — G8427 DOCREV CUR MEDS BY ELIG CLIN: HCPCS | Performed by: UROLOGY

## 2022-01-19 PROCEDURE — 1123F ACP DISCUSS/DSCN MKR DOCD: CPT | Performed by: UROLOGY

## 2022-01-19 PROCEDURE — G8484 FLU IMMUNIZE NO ADMIN: HCPCS | Performed by: UROLOGY

## 2022-01-19 NOTE — PROGRESS NOTES
Tessie Taylor MD  Urology Clinic office visit  NEW PATIENT    Patient: Jax Jim  YOB: 1940  Date: 1/19/2022    HISTORY OF PRESENT ILLNESS:   The patient is a 79 y.o. female who presents today for evaluation of the following problems:      1. Renal mass         Overall the problem(s) : are worsening. Associated Symptoms: No dysuria, gross hematuria. Pain Severity:      Summary of old records: N/A  (Patient's old records, notes and chart reviewed and summarized above.)    CT was done for surveillance for Colon cancer  Had resection 2 years ago  Had no chemo    There is a fairly well-defined, rounded soft tissue mass involving the mid to lower pole of the right kidney. It measures 3.3 x 3.2 cm in size    Central, 60% endophytic, lower half of kidney    Denies pain  Hx of cholecystectomy; hysterectomy lower infraumbilical incision  Had ileostomy on right side for 6 months and was then reversed    Urinalysis today:  No results found for this visit on 01/19/22. Last BUN and creatinine:  Lab Results   Component Value Date    BUN 20 12/30/2021     Lab Results   Component Value Date    CREATININE 0.67 12/30/2021       Imaging Reviewed during this Office Visit:   (results were independently reviewed by physician and radiology report verified)  I independently reviewed and verified the images and reports from:    CT ABDOMEN PELVIS W IV CONTRAST Additional Contrast? Oral    Result Date: 1/12/2022  EXAMINATION: CT OF THE ABDOMEN AND PELVIS WITH CONTRAST 1/12/2022 10:55 am TECHNIQUE: CT of the abdomen and pelvis was performed with the administration of intravenous contrast. Multiplanar reformatted images are provided for review. Dose modulation, iterative reconstruction, and/or weight based adjustment of the mA/kV was utilized to reduce the radiation dose to as low as reasonably achievable. COMPARISON: The previous study performed 11/27/2020.  HISTORY: ORDERING SYSTEM PROVIDED HISTORY: Malignant neoplasm of sigmoid colon [facility]) TECHNOLOGIST PROVIDED HISTORY: follow up Reason for Exam: follow up, Malignant neoplasm of sigmoid colon, p states no new complaints FINDINGS: Lower Chest: Atelectasis is again seen involving the lung bases. Organs: The liver, spleen, biliary tree, pancreas, adrenal glands, and left kidney are unremarkable. The patient is again status post cholecystectomy. There is a fairly well-defined, rounded soft tissue mass involving the mid to lower pole of the right kidney. It measures 3.3 x 3.2 cm in size. The finding is highly suspicious for renal cell carcinoma. Urologic consultation is recommended. Mild bilateral perinephric linear stranding is again noted, which may be age related. GI/Bowel: A small hiatal hernia is noted. Postsurgical changes are again noted involving small bowel in the right lower quadrant. The remainder of small bowel is unremarkable. Postsurgical changes are again identified in the region of the rectum. The remainder of the colon is unremarkable. A moderate amount of retained stool is identified throughout most of the colon. The appendix is not definitely visualized. Pelvis: Evaluation of the pelvis is again slightly suboptimal, secondary to dense streak artifact created by the patient's total left hip prosthesis. The urinary bladder is suboptimally distended and grossly unremarkable. The patient is again status post hysterectomy. No adnexal abnormality is seen. Peritoneum/Retroperitoneum: No retroperitoneal or pelvic lymphadenopathy is identified. No free fluid is seen in the abdomen and pelvis. No abdominal or pelvic soft tissue mass is appreciated. The abdominal aorta is again atherosclerotic. Bones/Soft Tissues: Again seen is a large area of radiolucency involving the sacrum, without significant interval change. Osteo-degenerative changes are again noted involving the visualized thoracolumbar spine.   A grade 1 spondylolisthesis is again seen at L4 over L5.     1.  Right renal mass, highly suspicious for renal cell carcinoma. Urologic consultation is recommended. 2.  Other findings, as described above.          PAST MEDICAL, FAMILY AND SOCIAL HISTORY:  Past Medical History:   Diagnosis Date    A-fib [facility]) 07/19/2017    Abdominal pain     Acute blood loss as cause of postoperative anemia 11/8/2018    Anxiety 4/30/2016    CHF (congestive heart failure) (HCC)     Class 2 severe obesity with serious comorbidity and body mass index (BMI) of 37.0 to 37.9 in adult [facility])     Closed fracture of proximal end of left humerus with routine healing 4/26/2021    Colitis 5/31/2019    Colitis due to Clostridium difficile     Depression 4/30/2016    Diarrhea     Dyslipidemia     Elevated fasting glucose 11/20/2018    FH: total abdominal hysterectomy and bilateral salpingo-oophorectomy 1966    Fractures 03/15/2021    Lt humerus impacted displaced    Glucose intolerance (impaired glucose tolerance)     Hypertension     Hypokalemia     Malignant neoplasm of sigmoid colon ([address]     Multiple closed fractures of ribs 9/22/2018    Obesity     JAYJAY on CPAP 10/6/2018    Osteoarthritis     Osteoporosis 8/30/2013    Other complete intestinal obstruction ([address] 11/9/2018    Other specified hypothyroidism     S/P small bowel resection 5/22/2019    Thrombocytopenia, unspecified ([address] 3/9/2020     Past Surgical History:   Procedure Laterality Date    CARDIAC CATHETERIZATION  09/05/2017    CHOLECYSTECTOMY  1960s    COLONOSCOPY N/A 10/8/2018    COLONOSCOPY WITH COLD ET HOT BIOPSIES ET POLYPECTOMIES performed by Chang Mccrary MD at [address] [place] [address] N/A 4/25/2019    COLONOSCOPY performed by Jere Vuong MD at [address] [place] [address] OR  stricture at 7 cm     COLONOSCOPY N/A 2/3/2020    tubular adenoma X 1, Dr. Es Prado Left     KNEE ARTHROPLASTY Right     [place] CYSTOSCOPY,INSERT URETERAL STENT Bilateral 11/5/2018 CYSTO Bilateral Lighted stent placements performed by Rustam Hussein MD at [address], PARTIAL, W/ANAST N/A 11/5/2018    Open Sigmoid Colectomy w/ lighted stents ILEOSTOMY PLACEMENT performed by Paula Godinez MD at [place] [address] N/A 5/9/2019    Flexible Sigmoidoscopy with Dilatation of rectal stricture performed by Phuc Sosa MD at [facility] 1 N/A 5/22/2019    Loop Ileostomy take down performed by Phuc Sosa MD at [address] [place] [address] Drive Right 1960s     Family History   Adopted: Yes   Problem Relation Age of Onset    High Blood Pressure Brother         adopted brother     No outpatient medications have been marked as taking for the 1/19/22 encounter (Office Visit) with Emmie Tellez MD.       Pcn [penicillins] and Bextra [valdecoxib]  Social History     Tobacco Use   Smoking Status Never Smoker   Smokeless Tobacco Never Used   Tobacco Comment    syant 11/10/2018       Social History     Substance and Sexual Activity   Alcohol Use No       REVIEW OF SYSTEMS:  Constitutional: negative  Eyes: negative  Respiratory: negative  Cardiovascular: negative  Gastrointestinal: negative  Genitourinary: negative except for what is in HPI  Musculoskeletal: negative  Skin: negative   Neurological: negative  Hematological/Lymphatic: negative  Psychological: negative    Physical Exam:    This a 79 y.o. female      Vitals:    01/19/22 1024   BP: 138/82   Pulse: 92   SpO2: 96%     Constitutional: Patient in no acute distress. Neuro: Alert and oriented to person, place and time. Psych: mood and affect normal  HEENT negative  Lungs: Respiratory effort is normal  Cardiovascular: Normal peripheral pulses  Abdomen: Soft, non-tender, non-distended   Lymphatics: No palpable lymphadenopathy. Bladder non-tender and not distended. Assessment and Plan      1.  Renal mass           Plan:           I discussed and reviewed the images with the patient. I discussed al the options including observation, ablative treatment (cryo or RF ablation), radical and partial nephrectomy. I discussed all the risks, benefits, possible outcomes and complications of the above options. I answered all the patient's questions. For partial nephrectomy, I discussed risk of bleeding, conversion to radical nephrectomy or conversion to open approach. CT 11/2020 reviewed it was 2.5cm at that time; now it is 3.3cm    Given her co-morbidities, obesity, surgical history discussed that observation maybe a good option for her and safe to do within next 2-3 years  This is what they would like to do at this time      Ct scan in 6 months for surveillance           Prescriptions Ordered:  No orders of the defined types were placed in this encounter.      Orders Placed:  Orders Placed This Encounter   Procedures    CT ABDOMEN PELVIS W WO CONTRAST Additional Contrast? None     Standing Status:   Future     Standing Expiration Date:   1/19/2023     Order Specific Question:   Additional Contrast?     Answer:   None            MD Jeni Champion M.D, MD [facility]

## 2022-01-26 ENCOUNTER — TELEPHONE (OUTPATIENT)
Dept: PHARMACY | Age: 82
End: 2022-01-26

## 2022-01-26 RX ORDER — WARFARIN SODIUM 3 MG/1
TABLET ORAL
Qty: 60 TABLET | Refills: 0 | Status: SHIPPED | OUTPATIENT
Start: 2022-01-26 | End: 2022-02-18

## 2022-01-26 NOTE — TELEPHONE ENCOUNTER
Is patient to be taking medication? Reported to be not taking on 1/17/2022 appointment. Patient has not be seen by Bebeto Pendleton previously.

## 2022-01-26 NOTE — TELEPHONE ENCOUNTER
pt called for refill on warfarin 3 mg it seems as though pharmacy already contaced family practice for refill authorization and request was completed

## 2022-02-07 ENCOUNTER — TELEPHONE (OUTPATIENT)
Dept: FAMILY MEDICINE CLINIC | Age: 82
End: 2022-02-07

## 2022-02-07 ENCOUNTER — HOSPITAL ENCOUNTER (OUTPATIENT)
Dept: PHARMACY | Age: 82
Setting detail: THERAPIES SERIES
Discharge: HOME | End: 2022-02-07
Payer: MEDICARE

## 2022-02-07 DIAGNOSIS — I48.91 ATRIAL FIBRILLATION, UNSPECIFIED TYPE (HCC): Primary | ICD-10-CM

## 2022-02-07 LAB
INR BLD: 2.1
PROTIME: 25.4 SECONDS

## 2022-02-07 PROCEDURE — 36416 COLLJ CAPILLARY BLOOD SPEC: CPT

## 2022-02-07 PROCEDURE — 99211 OFF/OP EST MAY X REQ PHY/QHP: CPT

## 2022-02-07 PROCEDURE — 85610 PROTHROMBIN TIME: CPT

## 2022-02-07 NOTE — PROGRESS NOTES
ANTICOAGULATION SERVICE    Date of Clinic Visit:  2022    Sean Hernandez is a 79 y.o. female who presents to clinic today for anticoagulation monitoring and adjustment. Recent INR Results:  Internal QC passed  Lab Results   Component Value Date    INR 2.1 2022    INR 1.8 2022       Current Warfarin Dosage:  Dosing Plan  As of 2022    TTR:  61.8 % (6.4 mo)   Full warfarin instructions:  4.5 mg every Mon, Wed, Fri; 6 mg all other days               Assessment/Plan:    Continue current regimen as INR remains stable. Recheck 4 weeks. Next Clinic Appointment:  Return date  As of 2022    TTR:  61.8 % (6.4 mo)   Next INR check:  3/11/2022             Please call [facility] at [phone] with any questions. Thanks!   Naty Gomez [facility]  Anticoagulation Service Pharmacist  2022 11:27 AM     For Pharmacy Admin Tracking Only     Intervention Detail: Adherence Monitorin   Total # of Interventions Recommended: 0   Total # of Interventions Accepted: 0   Time Spent (min): 15

## 2022-02-07 NOTE — TELEPHONE ENCOUNTER
MD INR form faxed. Patient aware to continue to follow with the coumadin clinic until [address] delivers her machine and instructs her how to use it.

## 2022-02-18 ENCOUNTER — ANTI-COAG VISIT (OUTPATIENT)
Dept: FAMILY MEDICINE CLINIC | Age: 82
End: 2022-02-18
Payer: MEDICARE

## 2022-02-18 DIAGNOSIS — I48.11 LONGSTANDING PERSISTENT ATRIAL FIBRILLATION (HCC): Primary | ICD-10-CM

## 2022-02-18 LAB — INR BLD: 2

## 2022-02-18 PROCEDURE — 93793 ANTICOAG MGMT PT WARFARIN: CPT | Performed by: FAMILY MEDICINE

## 2022-02-18 RX ORDER — WARFARIN SODIUM 3 MG/1
TABLET ORAL
Qty: 60 TABLET | Refills: 0 | Status: SHIPPED | OUTPATIENT
Start: 2022-02-18 | End: 2022-03-22

## 2022-02-21 RX ORDER — AMLODIPINE BESYLATE 10 MG/1
TABLET ORAL
Qty: 90 TABLET | Refills: 3 | Status: SHIPPED | OUTPATIENT
Start: 2022-02-21

## 2022-02-25 ENCOUNTER — ANTI-COAG VISIT (OUTPATIENT)
Dept: FAMILY MEDICINE CLINIC | Age: 82
End: 2022-02-25
Payer: MEDICARE

## 2022-02-25 DIAGNOSIS — I48.91 ATRIAL FIBRILLATION, UNSPECIFIED TYPE (HCC): ICD-10-CM

## 2022-02-25 DIAGNOSIS — I48.0 PAROXYSMAL ATRIAL FIBRILLATION (HCC): Primary | ICD-10-CM

## 2022-02-25 LAB — INR BLD: 1.9

## 2022-02-25 PROCEDURE — 93793 ANTICOAG MGMT PT WARFARIN: CPT | Performed by: FAMILY MEDICINE

## 2022-02-28 NOTE — PROGRESS NOTES
Patient notified of INR result, coumadin dosing instructions, and next recommended INR lab draw. Patient verbalizes understanding. Patient states she is taking 1 1/2 tabs of 3mg coumadin on M,W,F and 2 tabs to 3 mg the rest of the week.

## 2022-03-03 ENCOUNTER — HOSPITAL ENCOUNTER (EMERGENCY)
Age: 82
Discharge: HOME | End: 2022-03-03
Attending: EMERGENCY MEDICINE
Payer: MEDICARE

## 2022-03-03 VITALS
RESPIRATION RATE: 16 BRPM | BODY MASS INDEX: 43.16 KG/M2 | OXYGEN SATURATION: 95 % | HEART RATE: 98 BPM | DIASTOLIC BLOOD PRESSURE: 84 MMHG | SYSTOLIC BLOOD PRESSURE: 121 MMHG | HEIGHT: 67 IN | TEMPERATURE: 97.5 F | WEIGHT: 275 LBS

## 2022-03-03 DIAGNOSIS — I48.11 LONGSTANDING PERSISTENT ATRIAL FIBRILLATION (HCC): Primary | ICD-10-CM

## 2022-03-03 LAB
ABSOLUTE EOS #: 0.19 K/UL (ref 0–0.44)
ABSOLUTE IMMATURE GRANULOCYTE: <0.03 K/UL (ref 0–0.3)
ABSOLUTE LYMPH #: 1.65 K/UL (ref 1.1–3.7)
ABSOLUTE MONO #: 0.68 K/UL (ref 0.1–1.2)
ANION GAP SERPL CALCULATED.3IONS-SCNC: 12 MMOL/L (ref 9–17)
BASOPHILS # BLD: 1 % (ref 0–2)
BASOPHILS ABSOLUTE: 0.04 K/UL (ref 0–0.2)
BUN BLDV-MCNC: 19 MG/DL (ref 8–23)
BUN/CREAT BLD: 20 (ref 9–20)
CALCIUM SERPL-MCNC: 10.1 MG/DL (ref 8.6–10.4)
CHLORIDE BLD-SCNC: 103 MMOL/L (ref 98–107)
CO2: 27 MMOL/L (ref 20–31)
CREAT SERPL-MCNC: 0.96 MG/DL (ref 0.5–0.9)
EOSINOPHILS RELATIVE PERCENT: 2 % (ref 1–4)
GFR AFRICAN AMERICAN: >60 ML/MIN
GFR NON-AFRICAN AMERICAN: 56 ML/MIN
GFR SERPL CREATININE-BSD FRML MDRD: ABNORMAL ML/MIN/{1.73_M2}
GLUCOSE BLD-MCNC: 121 MG/DL (ref 70–99)
HCT VFR BLD CALC: 47.6 % (ref 36.3–47.1)
HEMOGLOBIN: 15.2 G/DL (ref 11.9–15.1)
IMMATURE GRANULOCYTES: 0 %
INR BLD: 1.8
LYMPHOCYTES # BLD: 21 % (ref 24–43)
MCH RBC QN AUTO: 31.5 PG (ref 25.2–33.5)
MCHC RBC AUTO-ENTMCNC: 31.9 G/DL (ref 25.2–33.5)
MCV RBC AUTO: 98.8 FL (ref 82.6–102.9)
MONOCYTES # BLD: 9 % (ref 3–12)
NRBC AUTOMATED: 0 PER 100 WBC
PDW BLD-RTO: 14.2 % (ref 11.8–14.4)
PLATELET # BLD: ABNORMAL K/UL (ref 138–453)
PLATELET, FLUORESCENCE: 140 K/UL (ref 138–453)
PLATELET, IMMATURE FRACTION: 20.9 % (ref 1.1–10.3)
POTASSIUM SERPL-SCNC: 4.1 MMOL/L (ref 3.7–5.3)
PROTHROMBIN TIME: 20.3 SEC (ref 11.5–14.2)
RBC # BLD: 4.82 M/UL (ref 3.95–5.11)
SEG NEUTROPHILS: 67 % (ref 36–65)
SEGMENTED NEUTROPHILS ABSOLUTE COUNT: 5.37 K/UL (ref 1.5–8.1)
SODIUM BLD-SCNC: 142 MMOL/L (ref 135–144)
TROPONIN, HIGH SENSITIVITY: <6 NG/L (ref 0–14)
WBC # BLD: 8 K/UL (ref 3.5–11.3)

## 2022-03-03 PROCEDURE — 2500000003 HC RX 250 WO HCPCS: Performed by: EMERGENCY MEDICINE

## 2022-03-03 PROCEDURE — 84484 ASSAY OF TROPONIN QUANT: CPT

## 2022-03-03 PROCEDURE — 99284 EMERGENCY DEPT VISIT MOD MDM: CPT

## 2022-03-03 PROCEDURE — 85025 COMPLETE CBC W/AUTO DIFF WBC: CPT

## 2022-03-03 PROCEDURE — 93005 ELECTROCARDIOGRAM TRACING: CPT | Performed by: EMERGENCY MEDICINE

## 2022-03-03 PROCEDURE — 85055 RETICULATED PLATELET ASSAY: CPT

## 2022-03-03 PROCEDURE — 80048 BASIC METABOLIC PNL TOTAL CA: CPT

## 2022-03-03 PROCEDURE — 85610 PROTHROMBIN TIME: CPT

## 2022-03-03 PROCEDURE — 96374 THER/PROPH/DIAG INJ IV PUSH: CPT

## 2022-03-03 RX ORDER — DILTIAZEM HYDROCHLORIDE 5 MG/ML
10 INJECTION INTRAVENOUS ONCE
Status: COMPLETED | OUTPATIENT
Start: 2022-03-03 | End: 2022-03-03

## 2022-03-03 RX ADMIN — DILTIAZEM HYDROCHLORIDE 10 MG: 5 INJECTION, SOLUTION INTRAVENOUS at 21:40

## 2022-03-04 ENCOUNTER — ANTI-COAG VISIT (OUTPATIENT)
Dept: FAMILY MEDICINE CLINIC | Age: 82
End: 2022-03-04
Payer: MEDICARE

## 2022-03-04 DIAGNOSIS — I48.91 ATRIAL FIBRILLATION, UNSPECIFIED TYPE (HCC): ICD-10-CM

## 2022-03-04 LAB
EKG ATRIAL RATE: 125 BPM
EKG Q-T INTERVAL: 298 MS
EKG QRS DURATION: 94 MS
EKG QTC CALCULATION (BAZETT): 414 MS
EKG R AXIS: 1 DEGREES
EKG T AXIS: 44 DEGREES
EKG VENTRICULAR RATE: 116 BPM
INR BLD: 2.1

## 2022-03-04 PROCEDURE — 93793 ANTICOAG MGMT PT WARFARIN: CPT | Performed by: FAMILY MEDICINE

## 2022-03-04 NOTE — ED PROVIDER NOTES
eMERGENCY dEPARTMENT eNCOUnter      Pt Name: Jasmyn Rob  MRN: [id number]  [place] 1940  Date of evaluation: 3/3/2022      CHIEF COMPLAINT       Chief Complaint   Patient presents with    Atrial [address]    Jasmyn Rob is a 79 y.o. female who presents with heart fluttering. Patient states she has noticed that her heart been fluttering she denies associated chest pain shortness of breath lightheadedness nausea vomiting states she has had a history of atrial fib in the past was taken off her medicines and then 6 months ago when she followed up for routine cardiology visit noticed that she was back in atrial fibrillation so she was placed back on medicines and anticoagulants she is here for evaluation        REVIEW OF SYSTEMS       Review of systems are all reviewed and negative except stated above in HPI    [address]    has a past medical history of A-fib ([address] Abdominal pain, Acute blood loss as cause of postoperative anemia, Anxiety, CHF (congestive heart failure) ([address] Class 2 severe obesity with serious comorbidity and body mass index (BMI) of 37.0 to 37.9 in [facility]), Closed fracture of proximal end of left humerus with routine healing, Colitis, Colitis due to Clostridium difficile, Depression, Diarrhea, Dyslipidemia, Elevated fasting glucose, FH: total abdominal hysterectomy and bilateral salpingo-oophorectomy, Fractures, Glucose intolerance (impaired glucose tolerance), Hypertension, Hypokalemia, Malignant neoplasm of sigmoid colon ([address] Multiple closed fractures of ribs, Obesity, JAYJAY on CPAP, Osteoarthritis, Osteoporosis, Other complete intestinal obstruction ([address] Other specified hypothyroidism, S/P small bowel resection, and Thrombocytopenia, unspecified ([address] SURGICAL HISTORY      has a past surgical history that includes kenyon and bso (cervix removed) (1966); Cholecystectomy (1960s); Varicose vein surgery (Right, 1960s);  Hip Arthroplasty (Left); Knee Arthroplasty (Left); Knee Arthroplasty (Right); Cardiac catheterization (09/05/2017); Colonoscopy (N/A, 10/8/2018); pr lap,surg,colectomy, partial, w/anast (N/A, 11/5/2018); pr cystoscopy,insert ureteral stent (Bilateral, 11/5/2018); Colonoscopy (N/A, 4/25/2019); sigmoidoscopy (N/A, 5/9/2019); Small intestine surgery (N/A, 5/22/2019); and Colonoscopy (N/A, 2/3/2020). CURRENT MEDICATIONS       Discharge Medication List as of 3/3/2022 10:38 PM      CONTINUE these medications which have NOT CHANGED    Details   amLODIPine (NORVASC) 10 MG tablet TAKE 1 TABLET BY MOUTH EVERY DAY, Disp-90 tablet, R-3Normal      amiodarone (CORDARONE) 200 MG tablet TAKE 1/2 TABLET BY MOUTH ONE TIME A DAY, Disp-45 tablet, R-3Normal      furosemide (LASIX) 20 MG tablet TAKE ONE TABLET BY MOUTH DAILY, Disp-90 tablet, R-1Normal      metoprolol tartrate (LOPRESSOR) 25 MG tablet TAKE ONE TABLET BY MOUTH TWICE A DAY, Disp-180 tablet, R-1Normal      !! warfarin (COUMADIN) 2 MG tablet Take 1 tablet by mouth daily, Disp-30 tablet, R-3Normal      !! JANTOVEN 3 MG tablet TAKE 2 TABLETS BY MOUTH EVERY DAY OR AS DIRECTED PER INR RESULTS, Disp-60 tablet, R-0Normal      levothyroxine (SYNTHROID) 50 MCG tablet TAKE 1 TABLET BY MOUTH EVERY DAY, Disp-90 tablet, R-1Normal      atorvastatin (LIPITOR) 40 MG tablet TAKE 1 TABLET BY MOUTH EVERY DAY AT NIGHT, Disp-90 tablet, R-1Normal      Hospital Bed MISC Starting Wed 4/28/2021, Disp-1 each, R-0, Print      Lift Chair MISC Starting Wed 4/28/2021, Disp-1 each, R-0, Print      Misc.  Devices MISC DAILY Starting Mon 4/26/2021, Disp-1 Device, R-0, PrintWilberi-walker  DX: humerus fracture      potassium chloride (KLOR-CON M) 20 MEQ extended release tablet Take 1 tablet by mouth daily, Disp-90 tablet, R-1Normal      Handicap Placard [facility] Starting Fri 11/20/2020, Disp-1 each,R-0, PrintExpires in 5 years      acetaminophen (TYLENOL) 325 MG tablet Take 2 tablets by mouth every 4 hours as needed for Pain or Fever, Disp-60 tablet, R-0OTC       !! - Potential duplicate medications found. Please discuss with provider. ALLERGIES     is allergic to pcn [penicillins] and bextra [valdecoxib]. FAMILY HISTORY     is adopted. family history includes High Blood Pressure in her brother. She was adopted. SOCIAL HISTORY      reports that she has never smoked. She has never used smokeless tobacco. She reports that she does not drink alcohol and does not use drugs. PHYSICAL EXAM     INITIAL VITALS:  height is 5' 7\" (1.702 m) and weight is 275 lb (124.7 kg). Her temperature is 97.5 °F (36.4 °C). Her blood pressure is 121/84 and her pulse is 98. Her respiration is 16 and oxygen saturation is 95%.       General: Patient alert nontoxic-appearing female in no apparent distress  HEENT: Head is atraumatic conjunctiva are clear  Neck: Supple  Respiratory: Lung sounds are clear bilateral  Cardiac: Heart is irregularly irregular and fast  GI: Abdomen soft nontender  Neuro: Patient has no gross focal neurological deficits at bedside exam    DIFFERENTIAL DIAGNOSIS/ MDM:     Atrial fibrillation obtain labs EKG    DIAGNOSTIC RESULTS     EKG: All EKG's are interpreted by the Emergency Department Physician who either signs or Co-signs this chart in the absence of a cardiologist.    EKG interpreted by me reveals atrial fibrillation a rate of 116 with no acute ST elevation depressions none normal QS complex    RADIOLOGY:   I directly visualized the following  images and reviewed the radiologist interpretations:  No orders to display         LABS:  Labs Reviewed   CBC WITH AUTO DIFFERENTIAL - Abnormal; Notable for the following components:       Result Value    Hemoglobin 15.2 (*)     Hematocrit 47.6 (*)     Seg Neutrophils 67 (*)     Lymphocytes 21 (*)     All other components within normal limits   BASIC METABOLIC PANEL - Abnormal; Notable for the following components:    Glucose 121 (*)     CREATININE 0.96 (*) GFR Non- 56 (*)     All other components within normal limits   PROTIME-INR - Abnormal; Notable for the following components:    Protime 20.3 (*)     All other components within normal limits   IMMATURE PLATELET FRACTION - Abnormal; Notable for the following components:    Platelet, Immature Fraction 20.9 (*)     All other components within normal limits   TROPONIN         EMERGENCY DEPARTMENT COURSE:   Vitals:    Vitals:    03/03/22 2145 03/03/22 2200 03/03/22 2215 03/03/22 2252   BP: (!) 126/111 (!) 134/90 132/87 121/84   Pulse: 100 94 95 98   Resp: 16 16 14 16   Temp:       SpO2: 96% 97% 96% 95%   Weight:       Height:         -------------------------  BP: 121/84, Temp: 97.5 °F (36.4 °C), Pulse: 98, Resp: 16    Orders Placed This Encounter   Medications    dilTIAZem injection 10 mg           Re-evaluation Notes    Patient was given Cardizem her heart rate was mostly 1 teens to 120s initially came down to below 100 consistently still in atrial fibrillation she has been in A. fib at least for the last 6 months she is on medicines for control and on Coumadin as her level was subtherapeutic she was advised to increase her dose today follow-up with cardiology return if worse she denies feeling the heart flutter at this time once her rate got below 100    CRITICAL CARE:   None      CONSULTS:      PROCEDURES:  None    FINAL IMPRESSION      1.  Longstanding persistent atrial fibrillation [facility])          DISPOSITION/PLAN   DISPOSITION Decision To Discharge 03/03/2022 10:37:43 PM      Condition on Disposition    Stable    PATIENT REFERRED TO:  Anna Bird DO  [address] [facility]  [phone]    In 1 day        DISCHARGE MEDICATIONS:  Discharge Medication List as of 3/3/2022 10:38 PM          (Please note that portions of this note were completed with a voice recognition program.  Efforts were made to edit the dictations but occasionally words are mis-transcribed.)    Flor Stevens MD,, MD, F.A.C.E.P.   Attending Emergency Physician        Flor Stevens MD  03/04/22 0859

## 2022-03-10 ENCOUNTER — OFFICE VISIT (OUTPATIENT)
Dept: CARDIOLOGY | Age: 82
End: 2022-03-10
Payer: MEDICARE

## 2022-03-10 VITALS
WEIGHT: 275 LBS | HEIGHT: 67 IN | DIASTOLIC BLOOD PRESSURE: 99 MMHG | BODY MASS INDEX: 43.16 KG/M2 | HEART RATE: 93 BPM | SYSTOLIC BLOOD PRESSURE: 155 MMHG

## 2022-03-10 DIAGNOSIS — I48.91 ATRIAL FIBRILLATION, UNSPECIFIED TYPE (HCC): Primary | ICD-10-CM

## 2022-03-10 PROCEDURE — 4040F PNEUMOC VAC/ADMIN/RCVD: CPT | Performed by: INTERNAL MEDICINE

## 2022-03-10 PROCEDURE — 99214 OFFICE O/P EST MOD 30 MIN: CPT | Performed by: INTERNAL MEDICINE

## 2022-03-10 PROCEDURE — G8427 DOCREV CUR MEDS BY ELIG CLIN: HCPCS | Performed by: INTERNAL MEDICINE

## 2022-03-10 PROCEDURE — 93010 ELECTROCARDIOGRAM REPORT: CPT | Performed by: INTERNAL MEDICINE

## 2022-03-10 PROCEDURE — 1036F TOBACCO NON-USER: CPT | Performed by: INTERNAL MEDICINE

## 2022-03-10 PROCEDURE — 1123F ACP DISCUSS/DSCN MKR DOCD: CPT | Performed by: INTERNAL MEDICINE

## 2022-03-10 PROCEDURE — 93005 ELECTROCARDIOGRAM TRACING: CPT | Performed by: INTERNAL MEDICINE

## 2022-03-10 PROCEDURE — G8399 PT W/DXA RESULTS DOCUMENT: HCPCS | Performed by: INTERNAL MEDICINE

## 2022-03-10 PROCEDURE — 1090F PRES/ABSN URINE INCON ASSESS: CPT | Performed by: INTERNAL MEDICINE

## 2022-03-10 PROCEDURE — G8417 CALC BMI ABV UP PARAM F/U: HCPCS | Performed by: INTERNAL MEDICINE

## 2022-03-10 PROCEDURE — G8484 FLU IMMUNIZE NO ADMIN: HCPCS | Performed by: INTERNAL MEDICINE

## 2022-03-10 NOTE — PROGRESS NOTES
[place] [address] [place] [address] Son Garcia  [facility] [address]  Dept: [phone]    CC: follow up for TRUONGDenise Lemonsconnor     Subjective: The patient is a 79y.o. year old, , female is in the office for a follow up visit. She is stable from cardiac standpoint  She denies any palpitations or irregular heartbeat as such. Overall feels well. No cp, sob, orthopnea, pnd, le edema. Blood pressure and heart rate normal    Past Medical History:   has a past medical history of A-fib ([address] Abdominal pain, Acute blood loss as cause of postoperative anemia, Anxiety, CHF (congestive heart failure) (HCC), Class 2 severe obesity with serious comorbidity and body mass index (BMI) of 37.0 to 37.9 in [facility]), Closed fracture of proximal end of left humerus with routine healing, Colitis, Colitis due to Clostridium difficile, Depression, Diarrhea, Dyslipidemia, Elevated fasting glucose, FH: total abdominal hysterectomy and bilateral salpingo-oophorectomy, Fractures, Glucose intolerance (impaired glucose tolerance), Hypertension, Hypokalemia, Malignant neoplasm of sigmoid colon ([address] Multiple closed fractures of ribs, Obesity, JAYJAY on CPAP, Osteoarthritis, Osteoporosis, Other complete intestinal obstruction ([address] Other specified hypothyroidism, S/P small bowel resection, and Thrombocytopenia, unspecified ([address] Past Surgical History:   has a past surgical history that includes kenyon and bso (cervix removed) (1966); Cholecystectomy (1960s); Varicose vein surgery (Right, 1960s); Hip Arthroplasty (Left); Knee Arthroplasty (Left); Knee Arthroplasty (Right); Cardiac catheterization (09/05/2017); Colonoscopy (N/A, 10/8/2018); pr lap,surg,colectomy, partial, w/anast (N/A, 11/5/2018); pr cystoscopy,insert ureteral stent (Bilateral, 11/5/2018); Colonoscopy (N/A, 4/25/2019); sigmoidoscopy (N/A, 5/9/2019);  Small intestine surgery (N/A, 5/22/2019); and Colonoscopy (N/A, 2/3/2020). Home Medications:  Prior to Admission medications    Medication Sig Start Date End Date Taking? Authorizing Provider   amLODIPine (NORVASC) 10 MG tablet TAKE 1 TABLET BY MOUTH EVERY DAY 2/21/22  Yes Fredo Casas,    JANTOVEN 3 MG tablet TAKE 2 TABLETS BY MOUTH EVERY DAY OR AS DIRECTED PER INR RESULTS 2/18/22  Yes Olivia Jennings MD   levothyroxine (SYNTHROID) 50 MCG tablet TAKE 1 TABLET BY MOUTH EVERY DAY 11/22/21  Yes Lazaro Limon DO   atorvastatin (LIPITOR) 40 MG tablet TAKE 1 TABLET BY MOUTH EVERY DAY AT NIGHT 10/20/21  Yes Lazaro Limon DO   furosemide (LASIX) 20 MG tablet TAKE ONE TABLET BY MOUTH DAILY 8/30/21  Yes Lazaro Limon DO   metoprolol tartrate (LOPRESSOR) 25 MG tablet TAKE ONE TABLET BY MOUTH TWICE A DAY 8/30/21  Yes Lazaro Limon DO   warfarin (COUMADIN) 2 MG tablet Take 1 tablet by mouth daily 7/15/21  Yes Anyi Stone, [address] [facility] by Does not apply route 4/28/21  Yes Lazaro Limon DO   Lift Chair MISC by Does not apply route 4/28/21  Yes Lazaro Limon DO   Misc. Devices MISC 1 each by Does not apply route daily Lane-walker    DX: humerus fracture 4/26/21  Yes Miryam , APRN - CNP   potassium chloride (KLOR-CON M) 20 MEQ extended release tablet Take 1 tablet by mouth daily 3/15/21  Yes Lazaro Limon DO   Handicap Placard MISC by Does not apply route Expires in 5 years 11/20/20  Yes Lazaro Limon DO   acetaminophen (TYLENOL) 325 MG tablet Take 2 tablets by mouth every 4 hours as needed for Pain or Fever 11/9/18 3/10/22 Yes Jenny Berumen       Allergies:  Pcn [penicillins] and Bextra [valdecoxib]    Social History:   reports that she has never smoked. She has never used smokeless tobacco. She reports that she does not drink alcohol and does not use drugs. Review of Systems:  · Constitutional: there has been no unanticipated weight loss.  There's been No change in energy level, No change in activity level. · Eyes: No visual changes or diplopia. No scleral icterus. · ENT: No Headaches, hearing loss or vertigo. No mouth sores or sore throat. · Cardiovascular: As above. · Respiratory: as hpi  · Gastrointestinal: No abdominal pain, appetite loss, blood in stools. No change in bowel or bladder habits. · Genitourinary: No dysuria, trouble voiding, or hematuria. · Musculoskeletal:  No gait disturbance, No weakness or joint complaints. · Integumentary: No rash or pruritis. · Psychiatric: No anxiety, or depression. · Hematologic/Lymphatic: No abnormal bruising or bleeding, blood clots or swollen lymph nodes. · Allergic/Immunologic: No nasal congestion or hives. Physical Exam:  BP (!) 155/99   Pulse 93   Ht 5' 7\" (1.702 m)   Wt 275 lb (124.7 kg)   LMP 01/01/1968   BMI 43.07 kg/m²     Constitutional and General Appearance: alert, cooperative, no distress and appears stated age  HEENT: PERRL, no cervical lymphadenopathy. No masses palpable. Normal oral mucosa  Respiratory:  · Normal excursion and expansion without use of accessory muscles  · Resp Auscultation: Good respiratory effort. No for increased work of breathing. On auscultation: clear to auscultation bilaterally  Cardiovascular:  · The apical impulse is not displaced  · Heart tones are irregularly irregular. Grade 2/6 ejection systolic murmur in the left lower sternal border  · Jugular venous pulsation Normal  · The carotid upstroke is normal in amplitude and contour without delay or bruit  · Peripheral pulses are symmetrical and full   Abdomen:   · No masses or tenderness  · Bowel sounds present  Extremities:  ·  No Cyanosis or Clubbing  ·  Lower extremity edema: Trace bilateral pedal edema  ·  Skin: Warm and dry    Cardiac Data:  EKG 3/10/2022: Atrial fibrillation, rate controlled     TTE 2017  1. Normal left ventricular size and wall thickness. 2.  Normal systolic left ventricular function.   Ejection fraction is 60%. 3.  Biatrial enlargement. 4.  Dilated right ventricle with normal function. 5.  Mitral annular calcification. Mild mitral regurgitation. 6.  Normal aortic valve. 7.  Mild tricuspid regurgitation. RVSP is 31 mm Hg. 8.  No pericardial effusion    Cardiac cath 9/5/2017  Non-obstructive coronary artery disease. Low normal LV systolic function. Labs:   LABS  Lab Results   Component Value Date    CHOL 88 09/13/2021    TRIG 74 09/13/2021    HDL 47 09/13/2021    LDLCHOLESTEROL 26 09/13/2021    VLDL NOT REPORTED 09/13/2021    CHOLHDLRATIO 1.9 09/13/2021       Lab Results   Component Value Date     03/03/2022    K 4.1 03/03/2022     03/03/2022    CO2 27 03/03/2022    BUN 19 03/03/2022    CREATININE 0.96 (H) 03/03/2022    GLUCOSE 121 (H) 03/03/2022    CALCIUM 10.1 03/03/2022    PROT 7.3 12/30/2021    LABALBU 4.3 12/30/2021    BILITOT 0.62 12/30/2021    ALKPHOS 110 (H) 12/30/2021    AST 13 12/30/2021    ALT 8 12/30/2021    LABGLOM 56 (L) 03/03/2022    GFRAA >60 03/03/2022    GLOB 2.7 05/31/2019         IMPRESSION/RECOMMENDATIONS:    1. Atrial fibrillation, rate controlled, on therapeutic anticoagulation with coumadin, Hb has been stable and no recurrence of GI bleeding, asymptomatic and feels well. Will cont current medical therapy. Coumadin to be managed by [facility]. D/c amiodarone as she has been in Afib persistently  Increase Lopressor to 25 mg bid     2. HFpEF, currently compensated on Lasix 20 mg daily with no signs of volume overload on examination. Maintained on low-dose beta-blocker. 3. HYPERTENSION- higher in office, well controlled at home BP 130s, will not aggresively treat given her older age and risk of falls    4. HYPERLIPIDEMIA- ON STATIN, controlled, last LP in 03/2021 within normal limits     5. Colon cancer status post colectomy, follows with oncology and general surgery.     The patient is to continue heart healthy diet, weight loss and exercise as tolerated. Patient's medications and side effects were discussed. Medication refills were provided if needed. Follow up appointment timing was discussed. All questions and concerns were addressed to patient's satisfaction. The patient is to follow up in 6 months or sooner if necessary. Thank you for allowing me to participate in the care of this patient, please do not hesitate to call if you have any questions. Antonio Abernathy MD [id number] [facility]  [facility]. [facility]  [phone]

## 2022-03-11 ENCOUNTER — ANTI-COAG VISIT (OUTPATIENT)
Dept: FAMILY MEDICINE CLINIC | Age: 82
End: 2022-03-11
Payer: MEDICARE

## 2022-03-11 DIAGNOSIS — I48.91 ATRIAL FIBRILLATION, UNSPECIFIED TYPE (HCC): ICD-10-CM

## 2022-03-11 LAB — INR BLD: 2.1

## 2022-03-11 PROCEDURE — 93793 ANTICOAG MGMT PT WARFARIN: CPT | Performed by: FAMILY MEDICINE

## 2022-03-11 NOTE — PROGRESS NOTES
Patient notified of INR result, coumadin dosing instructions, and next recommended INR lab draw. Patient verbalizes understanding.

## 2022-03-18 ENCOUNTER — ANTI-COAG VISIT (OUTPATIENT)
Dept: FAMILY MEDICINE CLINIC | Age: 82
End: 2022-03-18
Payer: MEDICARE

## 2022-03-18 DIAGNOSIS — I48.91 ATRIAL FIBRILLATION, UNSPECIFIED TYPE (HCC): ICD-10-CM

## 2022-03-18 LAB — INR BLD: 2.1

## 2022-03-18 PROCEDURE — 93793 ANTICOAG MGMT PT WARFARIN: CPT | Performed by: FAMILY MEDICINE

## 2022-03-21 RX ORDER — WARFARIN SODIUM 3 MG/1
TABLET ORAL
Qty: 60 TABLET | Refills: 0 | OUTPATIENT
Start: 2022-03-21

## 2022-03-22 RX ORDER — WARFARIN SODIUM 3 MG/1
TABLET ORAL
Qty: 60 TABLET | Refills: 0 | Status: SHIPPED | OUTPATIENT
Start: 2022-03-22 | End: 2022-04-19

## 2022-03-22 NOTE — TELEPHONE ENCOUNTER
Dr Enma Johnson patient. Dr Enma Johnson is out of the office.     Kenia called requesting a refill of the below medication which has been pended for you:     Requested Prescriptions     Pending Prescriptions Disp Refills    JANTOVEN 3 MG tablet [Pharmacy Med Name: Celestina [place] Oral Tablet 3 MG] 60 tablet 0     Sig: TAKE 2 TABLETS BY MOUTH EVERY DAY OR AS DIRECTED PER INR RESULTS       Last Appointment Date: 9/15/2021  Next Appointment Date: 4/20/2022    Allergies   Allergen Reactions    Pcn [Penicillins] Hives and Shortness Of Breath    Bextra [Valdecoxib] Diarrhea

## 2022-03-25 ENCOUNTER — ANTI-COAG VISIT (OUTPATIENT)
Dept: FAMILY MEDICINE CLINIC | Age: 82
End: 2022-03-25
Payer: MEDICARE

## 2022-03-25 DIAGNOSIS — I48.91 ATRIAL FIBRILLATION, UNSPECIFIED TYPE (HCC): ICD-10-CM

## 2022-03-25 LAB — INR BLD: 2

## 2022-03-25 PROCEDURE — 93793 ANTICOAG MGMT PT WARFARIN: CPT | Performed by: PHYSICIAN ASSISTANT

## 2022-03-28 ENCOUNTER — CARE COORDINATION (OUTPATIENT)
Dept: CARE COORDINATION | Age: 82
End: 2022-03-28

## 2022-03-28 SDOH — ECONOMIC STABILITY: HOUSING INSECURITY
IN THE LAST 12 MONTHS, WAS THERE A TIME WHEN YOU DID NOT HAVE A STEADY PLACE TO SLEEP OR SLEPT IN A SHELTER (INCLUDING NOW)?: NO

## 2022-03-28 SDOH — HEALTH STABILITY: PHYSICAL HEALTH: ON AVERAGE, HOW MANY DAYS PER WEEK DO YOU ENGAGE IN MODERATE TO STRENUOUS EXERCISE (LIKE A BRISK WALK)?: 0 DAYS

## 2022-03-28 SDOH — ECONOMIC STABILITY: TRANSPORTATION INSECURITY
IN THE PAST 12 MONTHS, HAS LACK OF TRANSPORTATION KEPT YOU FROM MEETINGS, WORK, OR FROM GETTING THINGS NEEDED FOR DAILY LIVING?: NO

## 2022-03-28 SDOH — HEALTH STABILITY: PHYSICAL HEALTH: ON AVERAGE, HOW MANY MINUTES DO YOU ENGAGE IN EXERCISE AT THIS LEVEL?: 0 MIN

## 2022-03-28 SDOH — ECONOMIC STABILITY: INCOME INSECURITY: IN THE LAST 12 MONTHS, WAS THERE A TIME WHEN YOU WERE NOT ABLE TO PAY THE MORTGAGE OR RENT ON TIME?: NO

## 2022-03-28 SDOH — ECONOMIC STABILITY: HOUSING INSECURITY: IN THE LAST 12 MONTHS, HOW MANY PLACES HAVE YOU LIVED?: 1

## 2022-03-28 SDOH — ECONOMIC STABILITY: TRANSPORTATION INSECURITY
IN THE PAST 12 MONTHS, HAS THE LACK OF TRANSPORTATION KEPT YOU FROM MEDICAL APPOINTMENTS OR FROM GETTING MEDICATIONS?: NO

## 2022-03-28 ASSESSMENT — SOCIAL DETERMINANTS OF HEALTH (SDOH)
IN A TYPICAL WEEK, HOW MANY TIMES DO YOU TALK ON THE PHONE WITH FAMILY, FRIENDS, OR NEIGHBORS?: MORE THAN THREE TIMES A WEEK
DO YOU BELONG TO ANY CLUBS OR ORGANIZATIONS SUCH AS CHURCH GROUPS UNIONS, FRATERNAL OR ATHLETIC GROUPS, OR SCHOOL GROUPS?: NO
HOW OFTEN DO YOU ATTENT MEETINGS OF THE CLUB OR ORGANIZATION YOU BELONG TO?: NEVER
HOW OFTEN DO YOU ATTEND CHURCH OR RELIGIOUS SERVICES?: 1 TO 4 TIMES PER YEAR
HOW OFTEN DO YOU GET TOGETHER WITH FRIENDS OR RELATIVES?: MORE THAN THREE TIMES A WEEK

## 2022-03-28 ASSESSMENT — LIFESTYLE VARIABLES: HOW OFTEN DO YOU HAVE A DRINK CONTAINING ALCOHOL: NEVER

## 2022-03-28 NOTE — CARE COORDINATION
Ambulatory Care Coordination Note  3/28/2022  CM Risk Score: 13  Charlson 10 Year Mortality Risk Score: 100%     ACC: Georgette Sheppard RN    Summary Note: Christopher Francis was referred for ACM. Christopher Francis has:  CHF- EF- 60%, dyslipidemia, A-fib- INR follows by PCP - on low salt diet, weighs self daily, medications and follows with cardiology     JAYJAY- on CPAP     H/O colon cancer 2019, renal mass- following with oncologist and urologist      OA- ambulates with walker      Plan of Care :  Enrolled in ACM       Continue assessments, education, and support      Medications reviewed      SDOH completed      Zone tool- CHF reviewed      Review HC decision maker, ACP       Covid vaccinated      Reviewed clinic, Urgent care and My Chart      Nutrition- declined referral to dietitian. Tayler Menadereckmichael on [id number] [employer] team updated      Care Gaps      Apt oncologist 4/4/2022      Apt PCP 4/20/2022      Apt urology 7/20/2022      Apt cardiology 9/10/2022      F/U on weights      3/28/2022- 10:15 am spoke with Christopher Francis. In agreement to this writer following. She was given contact information. Her family lives nearby and assists with care as needed. We reviewed upcoming apts and in agreement. She denied any swelling, urine clear. No concerns.      General Assessment    Do you have any symptoms that are causing concern?: No       Congestive Heart Failure Assessment    Are you currently restricting fluids?: No Restriction  Do you understand a low sodium diet?: Yes  Do you understand how to read food labels?: Yes  How many restaurant meals do you eat per week?: 0  Do you salt your food before tasting it?: No     No patient-reported symptoms      Symptoms:     Symptom course: stable  Patient-reported weight (lb): 275  Weight trend: stable  Salt intake watch compared to last visit: stable           Ambulatory Care Coordination Assessment    Care Coordination Protocol  Program Enrollment: Complex Care  Referral from Primary Care Provider: No  Week 1 - Initial Assessment     Do you have all of your prescriptions and are they filled?: No  Barriers to medication adherence: None  Are you able to afford your medications?: Yes  How often do you have trouble taking your medications the way you have been told to take them?: I always take them as prescribed. Do you have Home O2 Therapy?: No      Ability to seek help/take action for Emergent Urgent situations i.e. fire, crime, inclement weather or health crisis. : Independent  Ability to ambulate to restroom: Independent  Ability handle personal hygeine needs (bathing/dressing/grooming): Independent  Ability to manage Medications: Independent  Ability to prepare Food Preparation: Independent  Ability to maintain home (clean home, laundry): Independent  Ability to drive and/or has transportation: Independent  Ability to do shopping: Independent  Ability to manage finances: Independent  Is patient able to live independently?: Yes     Current Housing: Private Residence        Per the Fall Risk Screening, did the patient have 2 or more falls or 1 fall with injury in the past year?: No     Frequent urination at night?: No  Do you use rails/bars?: Yes  Do you have a non-slip tub mat?: Yes     Are you experiencing loss of meaning?: No  Are you experiencing loss of hope and peace?: No     Thinking about your patient's physical health needs, are there any symptoms or problems (risk indicators) you are unsure about that require further investigation?: No identified areas of uncertainly or problems already being investigated   Are the patients physical health problems impacting on their mental well-being?: No identified areas of concern   Are there any problems with your patients lifestyle behaviors (alcohol, drugs, diet, exercise) that are impacting on physical or mental well-being?: No identified areas of concern   Do you have any other concerns about your patients mental well-being?  How would you rate their severity and impact on the patient?: No identified areas of concern   How would you rate their home environment in terms of safety and stability (including domestic violence, insecure housing, neighbor harassment)?: Consistently safe, supportive, stable, no identified problems   How do daily activities impact on the patient's well-being? (include current or anticipated unemployment, work, caregiving, access to transportation or other): No identified problems or perceived positive benefits   How would you rate their social network (family, work, friends)?: Good participation with social networks   How would you rate their financial resources (including ability to afford all required medical care)?: Financially secure, resources adequate, no identified problems   How wells does the patient now understand their health and well-being (symptoms, signs or risk factors) and what they need to do to manage their health?: Reasonable to good understanding and already engages in managing health or is willing to undertake better management   How well do you think your patient can engage in healthcare discussions? (Barriers include language, deafness, aphasia, alcohol or drug problems, learning difficulties, concentration): Clear and open communication, no identified barriers   Do other services need to be involved to help this patient?: Other care/services not required at this time   Are current services involved with this patient well-coordinated? (Include coordination with other services you are now recommendation): All required care/services in place and well-coordinated   Suggested Interventions and [employer] on Wheels: Completed   Registered Dietician: Declined   Zone Management Tools: In Process         Set up/Review Goals, Set up/Review an Education Plan              Prior to Admission medications    Medication Sig Start Date End Date Taking?  Authorizing Provider   JANTOVEN 3 MG tablet TAKE 2 TABLETS BY MOUTH EVERY DAY OR AS DIRECTED PER INR RESULTS 3/22/22  Yes RUSSELL Kramer   amLODIPine (NORVASC) 10 MG tablet TAKE 1 TABLET BY MOUTH EVERY DAY 2/21/22  Yes Fredo Casas DO   levothyroxine (SYNTHROID) 50 MCG tablet TAKE 1 TABLET BY MOUTH EVERY DAY 11/22/21  Yes Prosper Mayers DO   atorvastatin (LIPITOR) 40 MG tablet TAKE 1 TABLET BY MOUTH EVERY DAY AT NIGHT 10/20/21  Yes Prosper Mayers, DO   furosemide (LASIX) 20 MG tablet TAKE ONE TABLET BY MOUTH DAILY 8/30/21  Yes Prosper Mayers, DO   metoprolol tartrate (LOPRESSOR) 25 MG tablet TAKE ONE TABLET BY MOUTH TWICE A DAY 8/30/21  Yes Prosper Mayers, DO   potassium chloride (KLOR-CON M) 20 MEQ extended release tablet Take 1 tablet by mouth daily 3/15/21  Yes Prosper Mayers DO   warfarin (COUMADIN) 2 MG tablet Take 1 tablet by mouth daily 7/15/21   Christian Salter, [facility] by Does not apply route 4/28/21   Prosper Mayers DO   Lift Chair MISC by Does not apply route 4/28/21   Prosper Mayers, DO   Misc.  Devices MISC 1 each by Does not apply route daily Lane-walker    DX: humerus fracture 4/26/21   XIOMARA Chaudhry - CNP   Handicap Placard MISC by Does not apply route Expires in 5 years 11/20/20   Prosper Mayers DO   acetaminophen (TYLENOL) 325 MG tablet Take 2 tablets by mouth every 4 hours as needed for Pain or Fever 11/9/18 3/10/22  Della Talley       Future Appointments   Date Time Provider Mira Kirkland   4/4/2022  9:45 AM [facility]   4/20/2022  2:00 PM Prosper Mayers DO [facility]   7/12/2022  9:35 AM SCHEDULE, [facility] LAB GUI LAB [place]   7/19/2022 10:00 AM [facility] CT ROOM GUI CT SCAN STV [place]   7/20/2022 11:20 AM MD Misael Wyman [facility]   9/1/2022  2:30 PM Christian Salter MD [facility]

## 2022-03-30 ENCOUNTER — CARE COORDINATION (OUTPATIENT)
Dept: CARE COORDINATION | Age: 82
End: 2022-03-30

## 2022-04-01 ENCOUNTER — ANTI-COAG VISIT (OUTPATIENT)
Dept: FAMILY MEDICINE CLINIC | Age: 82
End: 2022-04-01
Payer: MEDICARE

## 2022-04-01 DIAGNOSIS — I48.91 ATRIAL FIBRILLATION, UNSPECIFIED TYPE (HCC): ICD-10-CM

## 2022-04-01 LAB — INR BLD: 2.1

## 2022-04-01 PROCEDURE — 93793 ANTICOAG MGMT PT WARFARIN: CPT | Performed by: FAMILY MEDICINE

## 2022-04-01 NOTE — PROGRESS NOTES
Notify patient continue same dose. Repeat PT/INR 1 week.   Would have her do this on either Wednesday or early Thursday as I will be out of town on Friday

## 2022-04-04 ENCOUNTER — HOSPITAL ENCOUNTER (OUTPATIENT)
Dept: LAB | Age: 82
Discharge: HOME | End: 2022-04-04
Payer: MEDICARE

## 2022-04-04 ENCOUNTER — OFFICE VISIT (OUTPATIENT)
Dept: ONCOLOGY | Age: 82
End: 2022-04-04
Payer: MEDICARE

## 2022-04-04 VITALS
RESPIRATION RATE: 18 BRPM | BODY MASS INDEX: 43.16 KG/M2 | OXYGEN SATURATION: 95 % | HEIGHT: 67 IN | SYSTOLIC BLOOD PRESSURE: 138 MMHG | HEART RATE: 104 BPM | DIASTOLIC BLOOD PRESSURE: 76 MMHG | TEMPERATURE: 97.7 F | WEIGHT: 275 LBS

## 2022-04-04 DIAGNOSIS — I10 ESSENTIAL HYPERTENSION: ICD-10-CM

## 2022-04-04 DIAGNOSIS — E03.9 ACQUIRED HYPOTHYROIDISM: ICD-10-CM

## 2022-04-04 DIAGNOSIS — R73.01 IMPAIRED FASTING GLUCOSE: ICD-10-CM

## 2022-04-04 DIAGNOSIS — E66.01 OBESITY, CLASS III, BMI 40-49.9 (MORBID OBESITY) (HCC): ICD-10-CM

## 2022-04-04 DIAGNOSIS — E78.2 MIXED HYPERLIPIDEMIA: ICD-10-CM

## 2022-04-04 DIAGNOSIS — C18.7 MALIGNANT NEOPLASM OF SIGMOID COLON (HCC): Primary | ICD-10-CM

## 2022-04-04 LAB
ABSOLUTE EOS #: 0.12 K/UL (ref 0–0.44)
ABSOLUTE IMMATURE GRANULOCYTE: <0.03 K/UL (ref 0–0.3)
ABSOLUTE LYMPH #: 1.23 K/UL (ref 1.1–3.7)
ABSOLUTE MONO #: 0.62 K/UL (ref 0.1–1.2)
ALBUMIN SERPL-MCNC: 4.4 G/DL (ref 3.5–5.2)
ALBUMIN/GLOBULIN RATIO: 1.5 (ref 1–2.5)
ALP BLD-CCNC: 123 U/L (ref 35–104)
ALT SERPL-CCNC: 16 U/L (ref 5–33)
ANION GAP SERPL CALCULATED.3IONS-SCNC: 8 MMOL/L (ref 9–17)
AST SERPL-CCNC: 18 U/L
BASOPHILS # BLD: 0 % (ref 0–2)
BASOPHILS ABSOLUTE: 0.03 K/UL (ref 0–0.2)
BILIRUB SERPL-MCNC: 0.57 MG/DL (ref 0.3–1.2)
BUN BLDV-MCNC: 24 MG/DL (ref 8–23)
BUN/CREAT BLD: 32 (ref 9–20)
CALCIUM SERPL-MCNC: 9.4 MG/DL (ref 8.6–10.4)
CHLORIDE BLD-SCNC: 108 MMOL/L (ref 98–107)
CHOLESTEROL/HDL RATIO: 2
CHOLESTEROL: 90 MG/DL
CO2: 26 MMOL/L (ref 20–31)
CREAT SERPL-MCNC: 0.76 MG/DL (ref 0.5–0.9)
EOSINOPHILS RELATIVE PERCENT: 2 % (ref 1–4)
ESTIMATED AVERAGE GLUCOSE: 117 MG/DL
GFR AFRICAN AMERICAN: >60 ML/MIN
GFR NON-AFRICAN AMERICAN: >60 ML/MIN
GFR SERPL CREATININE-BSD FRML MDRD: ABNORMAL ML/MIN/{1.73_M2}
GLUCOSE BLD-MCNC: 109 MG/DL (ref 70–99)
HBA1C MFR BLD: 5.7 % (ref 4–6)
HCT VFR BLD CALC: 43.9 % (ref 36.3–47.1)
HDLC SERPL-MCNC: 46 MG/DL
HEMOGLOBIN: 14.1 G/DL (ref 11.9–15.1)
IMMATURE GRANULOCYTES: 0 %
LDL CHOLESTEROL: 31 MG/DL (ref 0–130)
LYMPHOCYTES # BLD: 18 % (ref 24–43)
MCH RBC QN AUTO: 31.4 PG (ref 25.2–33.5)
MCHC RBC AUTO-ENTMCNC: 32.1 G/DL (ref 25.2–33.5)
MCV RBC AUTO: 97.8 FL (ref 82.6–102.9)
MONOCYTES # BLD: 9 % (ref 3–12)
NRBC AUTOMATED: 0 PER 100 WBC
PDW BLD-RTO: 14.6 % (ref 11.8–14.4)
PLATELET # BLD: ABNORMAL K/UL (ref 138–453)
PLATELET, FLUORESCENCE: 143 K/UL (ref 138–453)
PLATELET, IMMATURE FRACTION: 16.9 % (ref 1.1–10.3)
POTASSIUM SERPL-SCNC: 3.6 MMOL/L (ref 3.7–5.3)
RBC # BLD: 4.49 M/UL (ref 3.95–5.11)
SEG NEUTROPHILS: 72 % (ref 36–65)
SEGMENTED NEUTROPHILS ABSOLUTE COUNT: 5.02 K/UL (ref 1.5–8.1)
SODIUM BLD-SCNC: 142 MMOL/L (ref 135–144)
THYROXINE, FREE: 1.51 NG/DL (ref 0.93–1.7)
TOTAL PROTEIN: 7.3 G/DL (ref 6.4–8.3)
TRIGL SERPL-MCNC: 63 MG/DL
TSH SERPL DL<=0.05 MIU/L-ACNC: 1.75 UIU/ML (ref 0.3–5)
WBC # BLD: 7 K/UL (ref 3.5–11.3)

## 2022-04-04 PROCEDURE — 84443 ASSAY THYROID STIM HORMONE: CPT

## 2022-04-04 PROCEDURE — 99214 OFFICE O/P EST MOD 30 MIN: CPT | Performed by: INTERNAL MEDICINE

## 2022-04-04 PROCEDURE — 80061 LIPID PANEL: CPT

## 2022-04-04 PROCEDURE — 84439 ASSAY OF FREE THYROXINE: CPT

## 2022-04-04 PROCEDURE — G8427 DOCREV CUR MEDS BY ELIG CLIN: HCPCS | Performed by: INTERNAL MEDICINE

## 2022-04-04 PROCEDURE — 80053 COMPREHEN METABOLIC PANEL: CPT

## 2022-04-04 PROCEDURE — G8417 CALC BMI ABV UP PARAM F/U: HCPCS | Performed by: INTERNAL MEDICINE

## 2022-04-04 PROCEDURE — 1123F ACP DISCUSS/DSCN MKR DOCD: CPT | Performed by: INTERNAL MEDICINE

## 2022-04-04 PROCEDURE — 83036 HEMOGLOBIN GLYCOSYLATED A1C: CPT

## 2022-04-04 PROCEDURE — 85055 RETICULATED PLATELET ASSAY: CPT

## 2022-04-04 PROCEDURE — 85025 COMPLETE CBC W/AUTO DIFF WBC: CPT

## 2022-04-04 PROCEDURE — G8399 PT W/DXA RESULTS DOCUMENT: HCPCS | Performed by: INTERNAL MEDICINE

## 2022-04-04 PROCEDURE — 1036F TOBACCO NON-USER: CPT | Performed by: INTERNAL MEDICINE

## 2022-04-04 PROCEDURE — 36415 COLL VENOUS BLD VENIPUNCTURE: CPT

## 2022-04-04 PROCEDURE — 1090F PRES/ABSN URINE INCON ASSESS: CPT | Performed by: INTERNAL MEDICINE

## 2022-04-04 PROCEDURE — 4040F PNEUMOC VAC/ADMIN/RCVD: CPT | Performed by: INTERNAL MEDICINE

## 2022-04-04 PROCEDURE — 99214 OFFICE O/P EST MOD 30 MIN: CPT

## 2022-04-04 NOTE — PROGRESS NOTES
2022    HPI:  Sola Fuchs (:  1940) has requested an audio/video evaluation for the following concern(s):    Chief Complaint   Patient presents with    Follow-up     Renal mass, post urology consult     Patient ID: Sola Fuchs, 1940, [id number], 79 y.o. Referred by : Tamika Granados MD  Diagnosis:   Diagnosis of colon cancer, 18    low anterior resection of the rectosigmoid with proximal diverting ileostomy. Final pathology showed T1 N0, stage I disease. HISTORY OF PRESENT ILLNESS:    Oncologic History: This is a 79 y.o. -old female with new diagnosis of colon cancer was seen during initial consultation visit. She presented with rectal bleeding going on for about 4-6 weeks. She had a colonoscopy on 10/8/18 which showed multiple polyps and large distal sigmoid tumors with extensive diverticulosis. Biopsy showed invasive low-grade adenocarcinoma arising in a large tubulovillous adenoma with extensive high-grade dysplasia. Subsequently she had open low anterior resection of the rectosigmoid with proximal dilating ileostomy. Final pathology showed T1 N0, stage I disease. Now she is recovering well from surgery. She denied any pain, nausea vomiting. She does not have family history of colon cancer. She is adopted and does not know about her family history. Interval history:  Patient is returning for follow-up visit and to discuss lab results and further recommendations. She was evaluated by urology and given the small size of the renal mass close surveillance was recommended especially given her age, risk benefit ratio. She denies any abdominal pain, nausea vomiting, blood in stool or urine. During this visit patient's allergy, social, medical, surgical history and medications were reviewed and updated.     Allergies   Allergen Reactions    Pcn [Penicillins] Hives and Shortness Of Breath    Bextra [Valdecoxib] Diarrhea     Current Outpatient Medications Medication Sig Dispense Refill    amLODIPine (NORVASC) 10 MG tablet TAKE 1 TABLET BY MOUTH EVERY DAY 90 tablet 3    levothyroxine (SYNTHROID) 50 MCG tablet TAKE 1 TABLET BY MOUTH EVERY DAY 90 tablet 1    atorvastatin (LIPITOR) 40 MG tablet TAKE 1 TABLET BY MOUTH EVERY DAY AT NIGHT 90 tablet 1    furosemide (LASIX) 20 MG tablet TAKE ONE TABLET BY MOUTH DAILY 90 tablet 1    metoprolol tartrate (LOPRESSOR) 25 MG tablet TAKE ONE TABLET BY MOUTH TWICE A  tablet 1    warfarin (COUMADIN) 2 MG tablet Take 1 tablet by mouth daily 30 tablet 3   [address] [facility] MISC by Does not apply route 1 each 0    Lift Chair MISC by Does not apply route 1 each 0    Misc. Devices MISC 1 each by Does not apply route daily Lane-walker    DX: humerus fracture 1 Device 0    potassium chloride (KLOR-CON M) 20 MEQ extended release tablet Take 1 tablet by mouth daily 90 tablet 1    Handicap Placard MISC by Does not apply route Expires in 5 years 1 each 0    JANTOVEN 3 MG tablet TAKE 2 TABLETS BY MOUTH EVERY DAY OR AS DIRECTED PER INR RESULTS (Patient not taking: Reported on 4/4/2022) 60 tablet 0    acetaminophen (TYLENOL) 325 MG tablet Take 2 tablets by mouth every 4 hours as needed for Pain or Fever 60 tablet 0     No current facility-administered medications for this visit. REVIEW OF SYSTEM:   Constitutional: No fever or chills.  No night sweats, no weight loss   Eyes: No eye discharge, double vision, or eye pain   HEENT: negative for sore mouth, sore throat, hoarseness and voice change   Respiratory: negative for cough , sputum, dyspnea, wheezing, hemoptysis, chest pain   Cardiovascular: negative for chest pain, dyspnea, palpitations, orthopnea, PND   Gastrointestinal: negative for nausea, vomiting, diarrhea, constipation, abdominal pain, Dysphagia, hematemesis and hematochezia   Genitourinary: negative for frequency, dysuria, nocturia, urinary incontinence, and hematuria   Integument: negative for rash, skin lesions, bruises.    Hematologic/Lymphatic: negative for easy bruising, bleeding, lymphadenopathy, petechiae and swelling/edema   Endocrine: negative for heat or cold intolerance, tremor, weight changes, change in bowel habits and hair loss   Musculoskeletal: negative for myalgias, arthralgias, pain, joint swelling,and bone pain   Neurological: negative for headaches, dizziness, seizures, weakness, numbness   OBJECTIVE:         Vitals:    04/04/22 0949   BP: 138/76   Pulse: 104   Resp: 18   Temp: 97.7 °F (36.5 °C)   SpO2: 95%   PHYSICAL EXAM:   General appearance - well appearing, no in pain or distress   Mental status - alert and cooperative   Eyes - pupils equal and reactive, extraocular eye movements intact   Ears - bilateral TM's and external ear canals normal   Mouth - mucous membranes moist, pharynx normal without lesions   Neck - supple, no significant adenopathy   Lymphatics - no palpable lymphadenopathy, no hepatosplenomegaly   Chest - clear to auscultation, no wheezes, rales or rhonchi, symmetric air entry   Heart - normal rate, regular rhythm, normal S1, S2, no murmurs, rubs, clicks or gallops   Abdomen - Surgical scar healing well, soft, nontender, nondistended, no masses or organomegaly   Neurological - alert, oriented, normal speech, no focal findings or movement disorder noted   Musculoskeletal - no joint tenderness, deformity or swelling   Extremities - peripheral pulses normal, no pedal edema, no clubbing or cyanosis   Skin - normal coloration and turgor, no rashes, no suspicious skin lesions noted ,  LABORATORY DATA:     Lab Results   Component Value Date    WBC 8.0 03/03/2022    HGB 15.2 (H) 03/03/2022    HCT 47.6 (H) 03/03/2022    MCV 98.8 03/03/2022    PLT See Reflexed IPF Result 03/03/2022    LYMPHOPCT 21 (L) 03/03/2022    RBC 4.82 03/03/2022    MCH 31.5 03/03/2022    MCHC 31.9 03/03/2022    RDW 14.2 03/03/2022    MONOPCT 9 03/03/2022    BASOPCT 1 03/03/2022    NEUTROABS 5.37 03/03/2022    LYMPHSABS 1.65 03/03/2022    MONOSABS 0.68 03/03/2022    EOSABS 0.19 03/03/2022    BASOSABS 0.04 03/03/2022         Chemistry        Component Value Date/Time     03/03/2022 2122    K 4.1 03/03/2022 2122     03/03/2022 2122    CO2 27 03/03/2022 2122    BUN 19 03/03/2022 2122    CREATININE 0.96 (H) 03/03/2022 2122        Component Value Date/Time    CALCIUM 10.1 03/03/2022 2122    ALKPHOS 110 (H) 12/30/2021 1350    AST 13 12/30/2021 1350    ALT 8 12/30/2021 1350    BILITOT 0.62 12/30/2021 1350        PATHOLOGY DATA:     -- Diagnosis --   1.  SIGMOID COLON, SEGMENTAL RESECTION:   - LOW-GRADE ADENOCARCINOMA ARISING IN A LARGE SESSILE TUBULOVILLOUS   ADENOMA, INVADING INTO THE SUBMUCOSA.   - THE FINAL SURGICAL MARGINS ARE NEGATIVE FOR NEOPLASM. - BENIGN REGIONAL LYMPH NODES (0/14). 2.  SIGMOID COLON, DISTAL MARGIN, RESECTION:   - UNREMARKABLE COLON SEGMENT.   - SINGLE BENIGN LYMPH NODE (0/1). PATHOLOGIC STAGE CLASSIFICATION:     pT1  pN0   ADDENDUM DIAGNOSIS   AT THE REQUEST OF DR. Mare Llanes, BLOCK 14B WAS SENT TO [facility]   FOR MMR TESTING.  THE RESULTS ARE AS FOLLOWS:     MISMATCH REPAIR BY IHC RESULT:  NORMAL   MISMATCH REPAIR BY IHC WITH MLH1:  NORMAL   MISMATCH REPAIR BY IHC WITH MSH2:  NORMAL   MISMATCH REPAIR BY IHC WITH MSH6:  NORMAL   MISMATCH REPAIR BY IHC WITH PMS2:  NORMAL   IMAGING DATA:    CT abdomen pelvis 11/10/18  Impression   Dilated stomach and loops of bowel, possibly reflecting postoperative ileus.       Trace perihepatic and mild pelvic ascites.       Redemonstration of subcutaneous mass along the right buttock. CT abdomen pelvis 2/26/19:  Improved postoperative findings status post partial colon resection and   ileostomy.  No acute abnormality identified.       Stable chronic findings, as above. ASSESSMENT:    Candido Verdin has T1NO, stage I colon adenocarcinoma, MMR proficient. I discussed the NCCN guidelines.  I discussed that for stage I colon cancer, there is no role of adjuvant chemotherapy, so surveillance is recommended. I will follow her in three months with CEA, CBC and CMP. She will need colonoscopy in one year. I discussed the results of recent lab work and CT scans which showed no evidence of recurrence. Clinically she is doing well. She is planning to have follow up with Gen. surgery in May and possible colonoscopy for planning reversal colostomy. During today's visit, the patient and the family had a number of reasonable questions which were answered to their satisfaction. They verbalized understanding of the information provided and they agreed to proceed as outlined above. PLAN:   Continue surveillance  Will repeat lab work in 3 months and plan for CT scan in 6 months  She will see Gen. surgery in May for discussion about colostomy reversal  RTC 3 months with labs     I spent more than 25 minutes examining, evaluating, reviewing data and counseling the patient. Greater than 50% of that time was spent face-to-face with the patient in counseling and coordinating her care. Rory Salguero MD  Hematologist/Medical Oncologist          This note is created with the assistance of a speech recognition program.  While intending to generate a document that actually reflects the content of the visit, the document can still have some errors including those of syntax and sound a like substitutions which may escape proof reading. It such instances, actual meaning can be extrapolated by contextual diversion. Patient ID: Joceyln David, 1940, [id number], 79 y.o. Referred by : Chuck Thompson MD  Reason for consultation:   Diagnosis of colon cancer  HISTORY OF PRESENT ILLNESS:    Oncologic History: This is a 79-year-old female with new diagnosis of colon cancer was seen during initial consultation visit. She presented with rectal bleeding going on for about 4-6 weeks.   She had a colonoscopy on 10/8/18 which showed multiple polyps and large distal sigmoid tumors with extensive diverticulosis. Biopsy showed invasive low-grade adenocarcinoma arising in a large tubulovillous adenoma with extensive high-grade dysplasia. Subsequently she had open low anterior resection of the rectosigmoid with proximal dilating ileostomy. Final pathology showed T1 N0, stage I disease. Now she is recovering well from surgery. She denied any pain, nausea vomiting. She does not have family history of colon cancer. She is adopted and does not know about her family history.     Past Medical History:   Diagnosis Date    A-fib [facility]) 07/19/2017    Abdominal pain     Acute blood loss as cause of postoperative anemia 11/8/2018    Anxiety 4/30/2016    CHF (congestive heart failure) (HCC)     Class 2 severe obesity with serious comorbidity and body mass index (BMI) of 37.0 to 37.9 in adult [facility])     Closed fracture of proximal end of left humerus with routine healing 4/26/2021    Colitis 5/31/2019    Colitis due to Clostridium difficile     Depression 4/30/2016    Diarrhea     Dyslipidemia     Elevated fasting glucose 11/20/2018    FH: total abdominal hysterectomy and bilateral salpingo-oophorectomy 1966    Fractures 03/15/2021    Lt humerus impacted displaced    Glucose intolerance (impaired glucose tolerance)     Hypertension     Hypokalemia     Malignant neoplasm of sigmoid colon ([address]     Multiple closed fractures of ribs 9/22/2018    Obesity     JAYJAY on CPAP 10/6/2018    Osteoarthritis     Osteoporosis 8/30/2013    Other complete intestinal obstruction ([address] 11/9/2018    Other specified hypothyroidism     S/P small bowel resection 5/22/2019    Thrombocytopenia, unspecified ([address] 3/9/2020       Past Surgical History:   Procedure Laterality Date    CARDIAC CATHETERIZATION  09/05/2017    CHOLECYSTECTOMY  1960s    COLONOSCOPY N/A 10/8/2018    COLONOSCOPY WITH COLD ET HOT BIOPSIES ET POLYPECTOMIES performed by Liang Tsang MD at [address] [place] [address] N/A 4/25/2019    COLONOSCOPY performed by Riley [facility]adams MD at [facility] OR  stricture at 7 cm     COLONOSCOPY N/A 2/3/2020    tubular adenoma X 1, Dr. Regina Mena Bilateral 11/5/2018    CYSTO Bilateral Lighted stent placements performed by Jacky Jaramillo MD at [address], PARTIAL, W/ANAST N/A 11/5/2018    Open Sigmoid Colectomy w/ lighted stents ILEOSTOMY PLACEMENT performed by Gabrielle Sharif MD at [address] N/A 5/9/2019    Flexible Sigmoidoscopy with Dilatation of rectal stricture performed by Riley Mcadams MD at [address] N/A 5/22/2019    Loop Ileostomy take down performed by Riley Mcadams MD at [address] [place] [address] Right 1960s       Allergies   Allergen Reactions    Pcn [Penicillins] Hives and Shortness Of Breath    Bextra [Valdecoxib] Diarrhea       Current Outpatient Medications   Medication Sig Dispense Refill    amLODIPine (NORVASC) 10 MG tablet TAKE 1 TABLET BY MOUTH EVERY DAY 90 tablet 3    levothyroxine (SYNTHROID) 50 MCG tablet TAKE 1 TABLET BY MOUTH EVERY DAY 90 tablet 1    atorvastatin (LIPITOR) 40 MG tablet TAKE 1 TABLET BY MOUTH EVERY DAY AT NIGHT 90 tablet 1    furosemide (LASIX) 20 MG tablet TAKE ONE TABLET BY MOUTH DAILY 90 tablet 1    metoprolol tartrate (LOPRESSOR) 25 MG tablet TAKE ONE TABLET BY MOUTH TWICE A  tablet 1    warfarin (COUMADIN) 2 MG tablet Take 1 tablet by mouth daily 30 tablet 3   [address] [facility] [address] by Does not apply route 1 each 0    Lift Chair MISC by Does not apply route 1 each 0    Misc.  Devices MISC 1 each by Does not apply route daily Lane-walker    DX: humerus fracture 1 Device 0    potassium chloride (KLOR-CON M) 20 MEQ extended release tablet Take 1 tablet by mouth daily 90 tablet 1    Handicap Placard MISC by Does not apply route Expires in 5 years 1 each 0    JANTOVEN 3 MG tablet TAKE 2 TABLETS BY MOUTH EVERY DAY OR AS DIRECTED PER INR RESULTS (Patient not taking: Reported on 4/4/2022) 60 tablet 0    acetaminophen (TYLENOL) 325 MG tablet Take 2 tablets by mouth every 4 hours as needed for Pain or Fever 60 tablet 0     No current facility-administered medications for this visit. REVIEW OF SYSTEM:   Constitutional: No fever or chills. No night sweats, no weight loss   Eyes: No eye discharge, double vision, or eye pain   HEENT: negative for sore mouth, sore throat, hoarseness and voice change   Respiratory: negative for cough , sputum, dyspnea, wheezing, hemoptysis, chest pain   Cardiovascular: negative for chest pain, dyspnea, palpitations, orthopnea, PND   Gastrointestinal: negative for nausea, vomiting, diarrhea, constipation, abdominal pain, Dysphagia, hematemesis and hematochezia   Genitourinary: negative for frequency, dysuria, nocturia, urinary incontinence, and hematuria   Integument: negative for rash, skin lesions, bruises.    Hematologic/Lymphatic: negative for easy bruising, bleeding, lymphadenopathy, petechiae and swelling/edema   Endocrine: negative for heat or cold intolerance, tremor, weight changes, change in bowel habits and hair loss   Musculoskeletal: negative for myalgias, arthralgias, pain, joint swelling,and bone pain   Neurological: negative for headaches, dizziness, seizures, weakness, numbness   OBJECTIVE:         Vitals:    04/04/22 0949   BP: 138/76   Pulse: 104   Resp: 18   Temp: 97.7 °F (36.5 °C)   SpO2: 95%      PHYSICAL EXAM:   Physical Examination:  Gen: AAO X 3 NAD  Head: NC AT   HEENT:EOM intact, oral mucosa moist, No exudate  Neck Supple NO JVD  Lymph Nodes: No palpable lymphadenopathy  CVS: S1S2 normal , RRR no murmurs  Chest: CTA mariella, no wheezes or crackles  Abd: Soft NT ND BS +, no palpable orgamomegaly  Ext: No edema, cyanosis, calf tenderness, Positive varicose veins  Skin: No rash, moist skin, no erythema  Neuro: Non focal      LABORATORY DATA:     Lab Results   Component Value Date    WBC 8.0 03/03/2022    HGB 15.2 (H) 03/03/2022    HCT 47.6 (H) 03/03/2022    MCV 98.8 03/03/2022    PLT See Reflexed IPF Result 03/03/2022    LYMPHOPCT 21 (L) 03/03/2022    RBC 4.82 03/03/2022    MCH 31.5 03/03/2022    MCHC 31.9 03/03/2022    RDW 14.2 03/03/2022    MONOPCT 9 03/03/2022    BASOPCT 1 03/03/2022    NEUTROABS 5.37 03/03/2022    LYMPHSABS 1.65 03/03/2022    MONOSABS 0.68 03/03/2022    EOSABS 0.19 03/03/2022    BASOSABS 0.04 03/03/2022         Chemistry        Component Value Date/Time     03/03/2022 2122    K 4.1 03/03/2022 2122     03/03/2022 2122    CO2 27 03/03/2022 2122    BUN 19 03/03/2022 2122    CREATININE 0.96 (H) 03/03/2022 2122        Component Value Date/Time    CALCIUM 10.1 03/03/2022 2122    ALKPHOS 110 (H) 12/30/2021 1350    AST 13 12/30/2021 1350    ALT 8 12/30/2021 1350    BILITOT 0.62 12/30/2021 1350        PATHOLOGY DATA:     -- Diagnosis --   1.  SIGMOID COLON, SEGMENTAL RESECTION:   - LOW-GRADE ADENOCARCINOMA ARISING IN A LARGE SESSILE TUBULOVILLOUS   ADENOMA, INVADING INTO THE SUBMUCOSA.   - THE FINAL SURGICAL MARGINS ARE NEGATIVE FOR NEOPLASM. - BENIGN REGIONAL LYMPH NODES (0/14). 2.  SIGMOID COLON, DISTAL MARGIN, RESECTION:   - UNREMARKABLE COLON SEGMENT.   - SINGLE BENIGN LYMPH NODE (0/1). PATHOLOGIC STAGE CLASSIFICATION:     pT1  pN0   ADDENDUM DIAGNOSIS   AT THE REQUEST OF DR. Carmelo Whaley, BLOCK 14B WAS SENT TO [facility]   FOR MMR TESTING.  THE RESULTS ARE AS FOLLOWS:     MISMATCH REPAIR BY IHC RESULT:  NORMAL   MISMATCH REPAIR BY IHC WITH MLH1:  NORMAL   MISMATCH REPAIR BY IHC WITH MSH2:  NORMAL   MISMATCH REPAIR BY IHC WITH MSH6:  NORMAL   MISMATCH REPAIR BY IHC WITH PMS2:  NORMAL   IMAGING DATA:    CT abdomen pelvis 11/10/18  Impression   Dilated stomach and loops of bowel, possibly reflecting postoperative ileus.       Trace perihepatic and mild pelvic ascites.     Redemonstration of subcutaneous mass along the right buttock. CT abd 5/31/19  Mild diffuse colonic wall thickening may be related to recent surgery versus   a colitis.  No bowel obstruction.       Incompletely characterized right renal lesion may be a complex cyst but has   increased over multiple prior studies.  Recommend follow-up with MRI with   contrast.   CT abdomen pelvis 2/4/20  Impression   1. Sheikh Drain limited study due to lack of IV contrast, which the patient   refused after several unsuccessful IV access attempts.       2.  No definite evidence of metastatic disease in the chest, abdomen, or   pelvis.       3.  Redemonstration of patchy lucencies in the sacrum, which has been present   since at least 09/21/2018 and could be sequelae of previous insufficiency   fractures.  If clinically indicated, this could be further evaluated with MRI. CT abdomen pelvis 1/12/2022  Impression   1.  Right renal mass, highly suspicious for renal cell carcinoma.  Urologic   consultation is recommended.       2.  Other findings, as described above. ASSESSMENT:    Manda Obrien is a 79 y.o. female with T1NO, stage I colon adenocarcinoma, MMR proficient. I discussed the NCCN guidelines. I discussed that for stage I colon cancer, there is no role of adjuvant chemotherapy, so surveillance is recommended. I will follow her in three months with CEA, CBC and CMP. She had a colonoscopy in one year after her surgery. Right renal mass: Suspicious for malignancy and patient has been evaluated by urology and surveillance recommended at this point    Bone lucencies: Stable on recent scan  Mild thrombocytopenia: Stable  During today's visit, the patient and the family had a number of reasonable questions which were answered to their satisfaction. They verbalized understanding of the information provided and they agreed to proceed as outlined above.    PLAN:   I reviewed her recent lab work, imaging studies, discussed the diagnosis and treatment recommendations   Her recent lab work looks unremarkable for any recurrence of her colon cancer   Follow-up with urology for surveillance for renal mass   She has a CT scan scheduled in July   I will see her in 6 months with labs prior or earlier if needed     Abhay R. [place]ia Essex, MD  Hematologist/Medical Oncologist  On this date 4/4/22  I have spent 40 minutes reviewing previous notes, test results and face to face with the patient discussing the diagnosis and importance of compliance with the treatment plan. Greater than 50% of that time was spent face-to-face with the patient in counseling and coordinating her care. This note is created with the assistance of a speech recognition program.  While intending to generate a document that actually reflects the content of the visit, the document can still have some errors including those of syntax and sound a like substitutions which may escape proof reading. It such instances, actual meaning can be extrapolated by contextual diversion.

## 2022-04-05 ENCOUNTER — CARE COORDINATION (OUTPATIENT)
Dept: CARE COORDINATION | Age: 82
End: 2022-04-05

## 2022-04-05 NOTE — CARE COORDINATION
Ambulatory Care Coordination Note  4/5/2022  CM Risk Score: 13  Charlson 10 Year Mortality Risk Score: 100%     ACC: Joshua Garcia RN    Summary Note: Bhargavi Gauthier was referred for ACM.    Bhargavi Gauthier has:        CHF- EF- 60%, dyslipidemia, A-fib- INR follows by PCP - on low salt diet, weighs self daily, medications and follows with cardiology                                      JAYJAY- on CPAP                                      H/O colon cancer 2019, renal mass- following with oncologist and urologist                                       OA- ambulates with walker- uses wheelchair when out         Plan of Care :                        Continue assessments, education, and support                                                  Medications reviewed                                                  SDOH completed                                                  Zone tool- CHF reviewed                                                  Review HC decision maker, ACP                                                   Covid vaccinated                                                  Reviewed clinic, Urgent care and My Chart                                                  Nutrition- declined referral to dietitian. Tayler Hanks on [address] team updated                                                  Care Gaps                                                  Apt oncologist 10/17/2022                                                  Apt PCP 4/20/2022                                                  Apt urology 7/20/2022                                                  Apt cardiology 9/10/2022                                                  F/U on weights    4/5/2022- spoke with Bhargavi Gauthier. She stated her weight has been steady at 245#. She denied any concerns. Home safety reviewed through [employer]- she voiced understanding.       General Assessment    Do you have any symptoms that are causing concern?: No       Congestive Heart Failure Assessment    Are you currently restricting fluids?: No Restriction  Do you understand a low sodium diet?: Yes  Do you understand how to read food labels?: Yes  How many restaurant meals do you eat per week?: 0  Do you salt your food before tasting it?: No     No patient-reported symptoms      Symptoms:     Symptom course: stable  Patient-reported weight (lb): 245  Weight trend: stable  Salt intake watch compared to last visit: stable         Care Coordination Interventions    Program Enrollment: Complex Care  Referral from Primary Care Provider: No  Suggested Interventions and Community Resources  Fall Risk Prevention: In Process  Meals on Wheels: Completed  Registered Dietician: Declined  Zone Management Tools: In Process (Comment: CHF)         Goals Addressed                    This Visit's Progress       Patient Stated      Conditions and Symptoms (pt-stated)   On track      I will schedule office visits, as directed by my provider. I will keep my appointment or reschedule if I have to cancel. I will notify my provider of any barriers to my plan of care. I will follow my Zone Management tool to seek urgent or emergent care. I will notify my provider of any symptoms that indicate a worsening of my condition. Barriers: lack of support and lack of education  Plan for overcoming my barriers: Care Coordination Intervention   Confidence: 10/10  Anticipated Goal Completion Date: 6/28/2022              Prior to Admission medications    Medication Sig Start Date End Date Taking?  Authorizing Provider   JANTOVEN 3 MG tablet TAKE 2 TABLETS BY MOUTH EVERY DAY OR AS DIRECTED PER INR RESULTS  Patient not taking: Reported on 4/4/2022 3/22/22   RUSSELL Paulson   amLODIPine (NORVASC) 10 MG tablet TAKE 1 TABLET BY MOUTH EVERY DAY 2/21/22   Fredo Casas DO   levothyroxine (SYNTHROID) 50 MCG tablet TAKE 1 TABLET BY MOUTH EVERY DAY 11/22/21   Rosie Noriega DO atorvastatin (LIPITOR) 40 MG tablet TAKE 1 TABLET BY MOUTH EVERY DAY AT NIGHT 10/20/21   [facility], DO   furosemide (LASIX) 20 MG tablet TAKE ONE TABLET BY MOUTH DAILY 8/30/21   [facility], DO   metoprolol tartrate (LOPRESSOR) 25 MG tablet TAKE ONE TABLET BY MOUTH TWICE A DAY 8/30/21   [facility]   warfarin (COUMADIN) 2 MG tablet Take 1 tablet by mouth daily 7/15/21   Rhys Oquendo MD   Hospital Bed MISC by Does not apply route 4/28/21   [facility], DO   Lift Chair MISC by Does not apply route 4/28/21   [facility], DO   Misc.  Devices MISC 1 each by Does not apply route daily Lane-walker    DX: humerus fracture 4/26/21   XIOMARA Bah - CNP   potassium chloride (KLOR-CON M) 20 MEQ extended release tablet Take 1 tablet by mouth daily 3/15/21   [facility]   Handicap Placard MISC by Does not apply route Expires in 5 years 11/20/20   [facility], DO   acetaminophen (TYLENOL) 325 MG tablet Take 2 tablets by mouth every 4 hours as needed for Pain or Fever 11/9/18 3/10/22  Bina Boykin       Future Appointments   Date Time Provider Mira Kirkland   4/20/2022  2:00 PM [facility]   7/12/2022  9:35 AM SCHEDULE, [facility] LAB GUI LAB [place]   7/19/2022 10:00 AM [facility] CT ROOM MD CT SCAN STV [place]   7/20/2022 11:20 AM MD Jessee Kasper [facility]   9/1/2022  2:30 PM MD MIRYAM Haskins [facility]   10/13/2022  9:00 AM SCHEDULE, [facility] LAB GUI LAB [place]   10/17/2022  9:45 AM Dain Vicente MD [facility]

## 2022-04-06 LAB — INR BLD: 2.5

## 2022-04-07 ENCOUNTER — ANTI-COAG VISIT (OUTPATIENT)
Dept: FAMILY MEDICINE CLINIC | Age: 82
End: 2022-04-07
Payer: MEDICARE

## 2022-04-07 DIAGNOSIS — I48.91 ATRIAL FIBRILLATION, UNSPECIFIED TYPE (HCC): ICD-10-CM

## 2022-04-07 PROCEDURE — 93793 ANTICOAG MGMT PT WARFARIN: CPT | Performed by: FAMILY MEDICINE

## 2022-04-12 ENCOUNTER — CARE COORDINATION (OUTPATIENT)
Dept: CARE COORDINATION | Age: 82
End: 2022-04-12

## 2022-04-12 NOTE — CARE COORDINATION
Ambulatory Care Coordination Note  4/12/2022  CM Risk Score: 13  Charlson 10 Year Mortality Risk Score: 100%     ACC: Tonya Graf RN    Summary Note:  Lisa Davila was referred for ACM.    [place] has:        CHF- EF- 60%, dyslipidemia, A-fib- INR follows by PCP - on low salt diet, weighs self daily, medications and follows with cardiology                                      JAYJAY- on CPAP                                      H/O colon cancer 2019, renal mass- following with oncologist and urologist                                       OA- ambulates with walker- uses wheelchair when out         Plan of Care :                        Continue assessments, education, and support                                                  Medications reviewed                                                  SDOH completed                                                  Zone tool- CHF reviewed                                                  Review HC decision maker, ACP                                                   Covid vaccinated                                                  Reviewed clinic, Urgent care and My Chart                                                  Nutrition- declined referral to dietitian. Tayler Hanks on [employer] team updated  [employer] oncologist 10/17/2022                                                  Apt PCP 4/20/2022  [facility] urology 7/20/2022  [facility] cardiology 9/10/2022                                                  F/U on weights    4/12/2022- 11:03 am spoke with [place]. She denied swelling or SOB. She stated she is doing well.      .  General Assessment    Do you have any symptoms that are causing concern?: No       Congestive Heart Failure Assessment    Are you currently restricting fluids?: No Restriction  Do you understand a low sodium diet?: Yes  Do you understand how to read food labels?: Yes  How many restaurant meals do you eat per week?: 0  Do you salt your food before tasting it?: No     No patient-reported symptoms      Symptoms:     Symptom course: stable  Patient-reported weight (lb): 245  Weight trend: stable  Salt intake watch compared to last visit: stable         Care Coordination Interventions    Program Enrollment: Complex Care  Referral from Primary Care Provider: No  Suggested Interventions and Community Resources  Fall Risk Prevention: In Process  Meals on Wheels: Completed  Registered Dietician: Declined  Zone Management Tools: In Process (Comment: CHF)         Goals Addressed                    This Visit's Progress       Patient Stated      Conditions and Symptoms (pt-stated)   On track      I will schedule office visits, as directed by my provider. I will keep my appointment or reschedule if I have to cancel. I will notify my provider of any barriers to my plan of care. I will follow my Zone Management tool to seek urgent or emergent care. I will notify my provider of any symptoms that indicate a worsening of my condition. Barriers: lack of support and lack of education  Plan for overcoming my barriers: Care Coordination Intervention   Confidence: 10/10  Anticipated Goal Completion Date: 6/28/2022              Prior to Admission medications    Medication Sig Start Date End Date Taking?  Authorizing Provider   JANTOVEN 3 MG tablet TAKE 2 TABLETS BY MOUTH EVERY DAY OR AS DIRECTED PER INR RESULTS  Patient not taking: Reported on 4/4/2022 3/22/22   RUSSELL Paulson   amLODIPine (NORVASC) 10 MG tablet TAKE 1 TABLET BY MOUTH EVERY DAY 2/21/22   Fredo Casas,    levothyroxine (SYNTHROID) 50 MCG tablet TAKE 1 TABLET BY MOUTH EVERY DAY 11/22/21   Rosie Noriega, DO   atorvastatin (LIPITOR) 40 MG tablet TAKE 1 TABLET BY MOUTH EVERY DAY AT NIGHT 10/20/21   Shannon Kind, DO   furosemide (LASIX) 20 MG tablet TAKE ONE TABLET BY MOUTH DAILY 8/30/21   Shannon Kind, DO   metoprolol tartrate (LOPRESSOR) 25 MG tablet TAKE ONE TABLET BY MOUTH TWICE A DAY 8/30/21   Shannon Kind, DO   warfarin (COUMADIN) 2 MG tablet Take 1 tablet by mouth daily 7/15/21   Polo Parr MD   Hospital Bed MISC by Does not apply route 4/28/21   Shannon Kind, DO   Lift Chair MISC by Does not apply route 4/28/21   Shannon Kind, DO   Misc.  Devices MISC 1 each by Does not apply route daily Lane-walker    DX: humerus fracture 4/26/21   XIOMARA Reddy CNP   potassium chloride (KLOR-CON M) 20 MEQ extended release tablet Take 1 tablet by mouth daily 3/15/21   Shannon Kind, DO   Handicap Placard MISC by Does not apply route Expires in 5 years 11/20/20   Shannon Kind, DO   acetaminophen (TYLENOL) 325 MG tablet Take 2 tablets by mouth every 4 hours as needed for Pain or Fever 11/9/18 3/10/22  [facility]       Future Appointments   Date Time Provider Mira Marita   4/20/2022  2:00 PM Shannon Jackie, [address] [facility]   7/12/2022  9:35 AM SCHEDULE, [facility] MD LAB [place]   7/19/2022 10:00 AM [facility] ROOM GUI CT SCAN STV [place]   7/20/2022 11:20 AM MD Shelly Muhammad [facility]   9/1/2022  2:30 PM MD MIRYAM Chinchilla [facility]   10/13/2022  9:00 AM SCHEDULE, [facility] MD LAB [place]   10/17/2022  9:45 AM Staci Horowitz MD [facility]

## 2022-04-15 ENCOUNTER — ANTI-COAG VISIT (OUTPATIENT)
Dept: FAMILY MEDICINE CLINIC | Age: 82
End: 2022-04-15
Payer: MEDICARE

## 2022-04-15 DIAGNOSIS — I48.91 ATRIAL FIBRILLATION, UNSPECIFIED TYPE (HCC): ICD-10-CM

## 2022-04-15 LAB — INR BLD: 2.3

## 2022-04-15 PROCEDURE — 93793 ANTICOAG MGMT PT WARFARIN: CPT | Performed by: FAMILY MEDICINE

## 2022-04-18 ENCOUNTER — TELEMEDICINE (OUTPATIENT)
Dept: FAMILY MEDICINE CLINIC | Age: 82
End: 2022-04-18
Payer: MEDICARE

## 2022-04-18 DIAGNOSIS — Z00.00 MEDICARE ANNUAL WELLNESS VISIT, SUBSEQUENT: Primary | ICD-10-CM

## 2022-04-18 PROCEDURE — 4040F PNEUMOC VAC/ADMIN/RCVD: CPT | Performed by: FAMILY MEDICINE

## 2022-04-18 PROCEDURE — G0439 PPPS, SUBSEQ VISIT: HCPCS | Performed by: FAMILY MEDICINE

## 2022-04-18 PROCEDURE — 1123F ACP DISCUSS/DSCN MKR DOCD: CPT | Performed by: FAMILY MEDICINE

## 2022-04-18 ASSESSMENT — PATIENT HEALTH QUESTIONNAIRE - PHQ9
7. TROUBLE CONCENTRATING ON THINGS, SUCH AS READING THE NEWSPAPER OR WATCHING TELEVISION: 0
SUM OF ALL RESPONSES TO PHQ QUESTIONS 1-9: 0
8. MOVING OR SPEAKING SO SLOWLY THAT OTHER PEOPLE COULD HAVE NOTICED. OR THE OPPOSITE, BEING SO FIGETY OR RESTLESS THAT YOU HAVE BEEN MOVING AROUND A LOT MORE THAN USUAL: 0
1. LITTLE INTEREST OR PLEASURE IN DOING THINGS: 0
9. THOUGHTS THAT YOU WOULD BE BETTER OFF DEAD, OR OF HURTING YOURSELF: 0
2. FEELING DOWN, DEPRESSED OR HOPELESS: 0
10. IF YOU CHECKED OFF ANY PROBLEMS, HOW DIFFICULT HAVE THESE PROBLEMS MADE IT FOR YOU TO DO YOUR WORK, TAKE CARE OF THINGS AT HOME, OR GET ALONG WITH OTHER PEOPLE: 0
SUM OF ALL RESPONSES TO PHQ9 QUESTIONS 1 & 2: 0
5. POOR APPETITE OR OVEREATING: 0
3. TROUBLE FALLING OR STAYING ASLEEP: 0
SUM OF ALL RESPONSES TO PHQ QUESTIONS 1-9: 0
4. FEELING TIRED OR HAVING LITTLE ENERGY: 0
6. FEELING BAD ABOUT YOURSELF - OR THAT YOU ARE A FAILURE OR HAVE LET YOURSELF OR YOUR FAMILY DOWN: 0

## 2022-04-18 ASSESSMENT — LIFESTYLE VARIABLES: HOW OFTEN DO YOU HAVE A DRINK CONTAINING ALCOHOL: NEVER

## 2022-04-18 NOTE — PROGRESS NOTES
Medicare Annual Wellness Visit    Marifer Leyva is here for Medicare AWV (subsquent; last 3/17/2021)    Assessment & Plan    Recommendations for Preventive Services Due: see orders and patient instructions/AVS.    Patient declines tdap and shingles vaccines as insurance doesn't cover. States she recently saw Dr Gina Vargas and he said she was fine so declines colon screening at this time. Recommended screening schedule for the next 5-10 years is provided to the patient in written form: see Patient Instructions/AVS.     No follow-ups on file. Subjective     Patient's complete Health Risk Assessment and screening values have been reviewed and are found in Flowsheets. The following problems were reviewed today and where indicated follow up appointments were made and/or referrals ordered.     Positive Risk Factor Screenings with Interventions:              Health Habits/Nutrition:     Physical Activity: Inactive    Days of Exercise per Week: 0 days    Minutes of Exercise per Session: 0 min     Have you lost any weight without trying in the past 3 months?: No     Have you seen the dentist within the past year?: N/A - wear dentures    Health Habits/Nutrition Interventions:  · Inadequate physical activity:  patient does complete chair exercises she learned in therapy and uses exercise bands   · Nutritional issues:  educational materials for healthy, well-balanced diet provided    Hearing/Vision:  Do you or your family notice any trouble with your hearing that hasn't been managed with hearing aids?: No  Do you have difficulty driving, watching TV, or doing any of your daily activities because of your eyesight?: No  Have you had an eye exam within the past year?: (!) No  No exam data present    Hearing/Vision Interventions:  · Vision concerns:  patient encouraged to make appointment with his/her eye specialist            Objective      Patient-Reported Vitals  Patient-Reported Weight: 245lb  Patient-Reported Height: 5'7\"             Allergies   Allergen Reactions    Pcn [Penicillins] Hives and Shortness Of Breath    Bextra [Valdecoxib] Diarrhea     Prior to Visit Medications    Medication Sig Taking? Authorizing Provider   amLODIPine (NORVASC) 10 MG tablet TAKE 1 TABLET BY MOUTH EVERY DAY Yes Fredo Casas,    levothyroxine (SYNTHROID) 50 MCG tablet TAKE 1 TABLET BY MOUTH EVERY DAY Yes González Foley DO   atorvastatin (LIPITOR) 40 MG tablet TAKE 1 TABLET BY MOUTH EVERY DAY AT NIGHT Yes González Foley DO   furosemide (LASIX) 20 MG tablet TAKE ONE TABLET BY MOUTH DAILY Yes González Foley DO   metoprolol tartrate (LOPRESSOR) 25 MG tablet TAKE ONE TABLET BY MOUTH TWICE A DAY Yes González Foley DO   warfarin (COUMADIN) 2 MG tablet Take 1 tablet by mouth daily Yes Tari Wade MD   Hospital Bed MISC by Does not apply route Yes González Foley DO   Lift Chair MISC by Does not apply route Yes González Foley DO   Misc.  Devices MISC 1 each by Does not apply route daily Lane-walker    DX: humerus fracture Yes Usman Aguero APRN - CNP   potassium chloride (KLOR-CON M) 20 MEQ extended release tablet Take 1 tablet by mouth daily Yes González Foley DO   Handicap Placard MISC by Does not apply route Expires in 5 years Yes González Foley DO   acetaminophen (TYLENOL) 325 MG tablet Take 2 tablets by mouth every 4 hours as needed for Pain or Fever Yes Dimas Felipe Mcburney 3 MG tablet TAKE 2 TABLETS BY MOUTH EVERY DAY OR AS DIRECTED PER INR RESULTS  Patient not taking: Reported on 4/4/2022  RUSSELL Cason (Including outside providers/suppliers regularly involved in providing care):   Patient Care Team:  González Foley DO as PCP - General (Family Medicine)  González Foley DO as PCP - [facility]  Christa Ortega DO as Consulting Physician (Cardiology)  Anna Briggs MD as Consulting Physician (Hematology and Oncology)  Forrest Montano RN as Ambulatory Care Manager  Shanell Gramajo MD as Consulting Physician (Urology)    Reviewed and updated this visit:  Tobacco  Allergies  Meds  Med Hx  Surg Hx  Soc Hx  Fam Hx         AWV completed via telephone encounter. Sid Carroll, was evaluated through a synchronous (real-time) audio-video encounter. The patient (or guardian if applicable) is aware that this is a billable service, which includes applicable co-pays. This Virtual Visit was conducted with patient's (and/or legal guardian's) consent. The visit was conducted pursuant to the emergency declaration under the [address] [place] [address], [address] [place] [address] [facility] authority and the [employer] General Act. Patient identification was verified, and a caregiver was present when appropriate. The patient was located at home in a state where the provider was licensed to provide care. Halima Aldrich RN, 4/18/2022, performed the documented evaluation under the direct supervision of the attending physician.

## 2022-04-19 RX ORDER — ATORVASTATIN CALCIUM 40 MG/1
TABLET, FILM COATED ORAL
Qty: 90 TABLET | Refills: 1 | Status: SHIPPED | OUTPATIENT
Start: 2022-04-19 | End: 2022-10-20

## 2022-04-19 RX ORDER — WARFARIN SODIUM 3 MG/1
TABLET ORAL
Qty: 60 TABLET | Refills: 5 | Status: SHIPPED | OUTPATIENT
Start: 2022-04-19 | End: 2022-10-20

## 2022-04-19 RX ORDER — WARFARIN SODIUM 3 MG/1
TABLET ORAL
Qty: 60 TABLET | Refills: 0 | OUTPATIENT
Start: 2022-04-19

## 2022-04-19 NOTE — TELEPHONE ENCOUNTER
Bhargavi Gauthier called requesting a refill of the below medication which has been pended for you:     Requested Prescriptions     Pending Prescriptions Disp Refills    atorvastatin (LIPITOR) 40 MG tablet [Pharmacy Med Name: Atorvastatin Calcium Oral Tablet 40 MG] 90 tablet 0     Sig: TAKE 1 TABLET BY MOUTH EVERY DAY AT NIGHT       Last Appointment Date: 4/18/2022  Next Appointment Date: 4/20/2022    Allergies   Allergen Reactions    Pcn [Penicillins] Hives and Shortness Of Breath    Bextra [Valdecoxib] Diarrhea

## 2022-04-19 NOTE — TELEPHONE ENCOUNTER
Ashlee Perdomo called requesting a refill of the below medication which has been pended for you:     Requested Prescriptions     Pending Prescriptions Disp Refills    JANTOVEN 3 MG tablet [Pharmacy Med Name: Kim Sickle Oral Tablet 3 MG] 60 tablet 0     Sig: TAKE 2 TABLETS BY MOUTH EVERY DAY OR AS DIRECTED PER INR RESULTS       Last Appointment Date: 9/15/2021  Next Appointment Date: 4/20/2022    Allergies   Allergen Reactions    Pcn [Penicillins] Hives and Shortness Of Breath    Bextra [Valdecoxib] Diarrhea Secondary Defect Length (In Cm): 0

## 2022-04-20 ENCOUNTER — TELEMEDICINE (OUTPATIENT)
Dept: FAMILY MEDICINE CLINIC | Age: 82
End: 2022-04-20
Payer: MEDICARE

## 2022-04-20 DIAGNOSIS — I48.0 PAROXYSMAL ATRIAL FIBRILLATION (HCC): ICD-10-CM

## 2022-04-20 DIAGNOSIS — R73.01 IMPAIRED FASTING GLUCOSE: ICD-10-CM

## 2022-04-20 DIAGNOSIS — I10 ESSENTIAL HYPERTENSION: Primary | ICD-10-CM

## 2022-04-20 DIAGNOSIS — E78.2 MIXED HYPERLIPIDEMIA: ICD-10-CM

## 2022-04-20 DIAGNOSIS — G47.33 OSA ON CPAP: ICD-10-CM

## 2022-04-20 DIAGNOSIS — I50.9 CHRONIC CONGESTIVE HEART FAILURE, UNSPECIFIED HEART FAILURE TYPE (HCC): ICD-10-CM

## 2022-04-20 DIAGNOSIS — Z99.89 OSA ON CPAP: ICD-10-CM

## 2022-04-20 DIAGNOSIS — E03.9 ACQUIRED HYPOTHYROIDISM: ICD-10-CM

## 2022-04-20 PROCEDURE — 1123F ACP DISCUSS/DSCN MKR DOCD: CPT | Performed by: FAMILY MEDICINE

## 2022-04-20 PROCEDURE — 99214 OFFICE O/P EST MOD 30 MIN: CPT | Performed by: FAMILY MEDICINE

## 2022-04-20 PROCEDURE — 99211 OFF/OP EST MAY X REQ PHY/QHP: CPT | Performed by: FAMILY MEDICINE

## 2022-04-20 PROCEDURE — G8399 PT W/DXA RESULTS DOCUMENT: HCPCS | Performed by: FAMILY MEDICINE

## 2022-04-20 PROCEDURE — 1090F PRES/ABSN URINE INCON ASSESS: CPT | Performed by: FAMILY MEDICINE

## 2022-04-20 PROCEDURE — 4040F PNEUMOC VAC/ADMIN/RCVD: CPT | Performed by: FAMILY MEDICINE

## 2022-04-20 PROCEDURE — G8427 DOCREV CUR MEDS BY ELIG CLIN: HCPCS | Performed by: FAMILY MEDICINE

## 2022-04-20 RX ORDER — LEVOTHYROXINE SODIUM 0.05 MG/1
TABLET ORAL
Qty: 90 TABLET | Refills: 1 | Status: SHIPPED | OUTPATIENT
Start: 2022-04-20

## 2022-04-20 ASSESSMENT — ENCOUNTER SYMPTOMS
SHORTNESS OF BREATH: 0
ABDOMINAL PAIN: 0
SINUS PRESSURE: 0
NAUSEA: 0
EYE REDNESS: 0
COUGH: 0
VOMITING: 0
TROUBLE SWALLOWING: 0
WHEEZING: 0
CONSTIPATION: 0
DIARRHEA: 0
EYE DISCHARGE: 0
RHINORRHEA: 0
SORE THROAT: 0

## 2022-04-20 NOTE — PROGRESS NOTES
2022    TELEHEALTH EVALUATION -- Audio/Visual (During JFQLT-[id number] public health emergency)    HPI:    Claudeen Dar (:  1940) has requested an audio/video evaluation for the following concern(s):    Patient is seen today via a video visit for follow up of chronic health issues Patient overall has been doing relatively well. Did have imaging for Dr. Christianne Barragan in January for monitoring of her known colon cancer and did have evidence of a right renal mass. Did have follow-up with the urologist for opinion regarding the renal mass and due to the size he felt following up in 6 months with further imaging at that time would be reasonable. She is asymptomatic. She does have known osteoarthritis and has a lot of joint pain and we have tried various pain medications that she is not a candidate to take NSAID therapy but none really have provided her with any more benefit than just taking over-the-counter Tylenol. She does have a known history of hypertension and her blood pressure has been stable and controlled on her current medical regimen. Has a known history of hyperlipidemia and her cholesterol levels are stable and controlled on her current statin dose. Does have a known history of hypothyroidism and her thyroid levels are stable and adequately supplemented on her current thyroid dose. Does have a known history of impaired fasting glucose and her blood sugar is just mildly elevated but her hemoglobin A1c shows that her blood sugar averages or staying within in acceptable range. Does have a known history of obstructive sleep apnea and does use his CPAP consistently. It is compliant with its use and is getting ongoing medical benefit from its use. Does have a known history of chronic CHF with no current cardiac symptoms. Volume status is stable at this time. Has known paroxysmal atrial fibrillation which is stable and controlled at this time with her current medical regimen.   We do monitor her Coumadin weekly with her home INR monitor. Has been relatively stable. Patient otherwise has no other acute medical concerns. .  Patient's recent lab reports are as follows:    Lab Results   Component Value Date    WBC 7.0 04/04/2022    RBC 4.49 04/04/2022    HGB 14.1 04/04/2022    HCT 43.9 04/04/2022    MCV 97.8 04/04/2022    MCH 31.4 04/04/2022    MCHC 32.1 04/04/2022    RDW 14.6 04/04/2022    PLT See Reflexed IPF Result 04/04/2022    MPV NOT REPORTED 12/30/2021       Lab Results   Component Value Date    CHOL 90 04/04/2022    HDL 46 04/04/2022    CHOLHDLRATIO 2.0 04/04/2022    TRIG 63 04/04/2022    VLDL NOT REPORTED 09/13/2021       Lab Results   Component Value Date    TSH 1.75 04/04/2022       Lab Results   Component Value Date     04/04/2022    K 3.6 04/04/2022     04/04/2022    CO2 26 04/04/2022    BUN 24 04/04/2022    CREATININE 0.76 04/04/2022    GLUCOSE 109 04/04/2022    CALCIUM 9.4 04/04/2022       Lab Results   Component Value Date    LABA1C 5.7 04/04/2022          Other review of systems are as noted below. Review of Systems   Constitutional: Negative for chills, fatigue and fever. HENT: Negative for congestion, ear pain, postnasal drip, rhinorrhea, sinus pressure, sore throat and trouble swallowing. Eyes: Negative for discharge and redness. Respiratory: Negative for cough, shortness of breath and wheezing. Cardiovascular: Negative for chest pain. Gastrointestinal: Negative for abdominal pain, constipation, diarrhea, nausea and vomiting. Genitourinary: Negative for dysuria, flank pain, frequency and urgency. Musculoskeletal: Positive for arthralgias and gait problem. Negative for myalgias and neck pain. Skin: Negative for rash and wound. Allergic/Immunologic: Negative for environmental allergies. Neurological: Negative for dizziness, weakness, light-headedness and headaches. Hematological: Negative for adenopathy. Psychiatric/Behavioral: Negative. Prior to Visit Medications    Medication Sig Taking?  Authorizing Provider   levothyroxine (SYNTHROID) 50 MCG tablet TAKE 1 TABLET BY MOUTH EVERY DAY Yes Luis Mckeon, DO   metoprolol tartrate (LOPRESSOR) 25 MG tablet TAKE ONE TABLET BY MOUTH TWICE A DAY Yes Luis Mckeon, DO   atorvastatin (LIPITOR) 40 MG tablet TAKE 1 TABLET BY MOUTH EVERY DAY AT NIGHT Yes Luis Mckeon, DO   JANTOVEN 3 MG tablet TAKE 2 TABLETS BY MOUTH EVERY DAY OR AS DIRECTED PER INR RESULTS Yes Luis Mckeon, DO   amLODIPine (NORVASC) 10 MG tablet TAKE 1 TABLET BY MOUTH EVERY DAY Yes Fredo Casas, DO   furosemide (LASIX) 20 MG tablet TAKE ONE TABLET BY MOUTH DAILY Yes Luis Mckeon, DO   potassium chloride (KLOR-CON M) 20 MEQ extended release tablet Take 1 tablet by mouth daily Yes Lusi Mckeon, DO   acetaminophen (TYLENOL) 325 MG tablet Take 2 tablets by mouth every 4 hours as needed for Pain or Fever  Betty [place]       Social History     Tobacco Use    Smoking status: Never Smoker    Smokeless tobacco: Never Used    Tobacco comment: hernando 11/10/2018   Vaping Use    Vaping Use: Never used   Substance Use Topics    Alcohol use: No    Drug use: No        Allergies   Allergen Reactions    Pcn [Penicillins] Hives and Shortness Of Breath    Bextra [Valdecoxib] Diarrhea   ,   Past Medical History:   Diagnosis Date    A-fib ([facility] [address] 07/19/2017    Abdominal pain     Acute blood loss as cause of postoperative anemia 11/8/2018    Anxiety 4/30/2016    CHF (congestive heart failure) (HCC)     Class 2 severe obesity with serious comorbidity and body mass index (BMI) of 37.0 to 37.9 in adult [facility])     Closed fracture of proximal end of left humerus with routine healing 4/26/2021    Colitis 5/31/2019    Colitis due to Clostridium difficile     Depression 4/30/2016    Diarrhea     Dyslipidemia     Elevated fasting glucose 11/20/2018    FH: total abdominal hysterectomy and bilateral salpingo-oophorectomy 1966  Fractures 03/15/2021    Lt humerus impacted displaced    Glucose intolerance (impaired glucose tolerance)     Hypertension     Hypokalemia     Malignant neoplasm of sigmoid colon (HCC)     Multiple closed fractures of ribs 9/22/2018    Obesity     JAYJAY on CPAP 10/6/2018    Osteoarthritis     Osteoporosis 8/30/2013    Other complete intestinal obstruction ([address] 11/9/2018    Other specified hypothyroidism     S/P small bowel resection 5/22/2019    Thrombocytopenia, unspecified ([address] 3/9/2020   ,   Past Surgical History:   Procedure Laterality Date    CARDIAC CATHETERIZATION  09/05/2017    CHOLECYSTECTOMY  1960s    COLONOSCOPY N/A 10/8/2018    COLONOSCOPY WITH COLD ET HOT BIOPSIES ET POLYPECTOMIES performed by Phyllis Hines MD at [address] N/A 4/25/2019    COLONOSCOPY performed by Jeri Ugarte MD at [facility]  stricture at 7 cm     COLONOSCOPY N/A 2/3/2020    tubular adenoma X 1, Dr. Daley Rising Bilateral 11/5/2018    CYSTO Bilateral Lighted stent placements performed by Romina Grady MD at [address] [facility], PARTIAL, W/ANAST N/A 11/5/2018    Open Sigmoid Colectomy w/ lighted stents ILEOSTOMY PLACEMENT performed by Phyllis Hines MD at [address] [facility] N/A 5/9/2019    Flexible Sigmoidoscopy with Dilatation of rectal stricture performed by Jeri Ugarte MD at [facility] 5/22/2019    Loop Ileostomy take down performed by Jeri Ugarte MD at [address] [place] [address] Drive Right 1960s       Physical Exam  Vitals and nursing note reviewed. Constitutional:       General: She is not in acute distress. Appearance: Normal appearance. HENT:      Head: Normocephalic and atraumatic.       Right Ear: External ear normal.      Left Ear: External ear normal.      Nose: Nose normal. No congestion or rhinorrhea. Mouth/Throat:      Mouth: Mucous membranes are moist.   Eyes:      General:         Right eye: No discharge. Left eye: No discharge. Extraocular Movements: Extraocular movements intact. Conjunctiva/sclera: Conjunctivae normal.   Pulmonary:      Effort: Pulmonary effort is normal.   Musculoskeletal:      Cervical back: Normal range of motion. Skin:     Findings: No erythema or rash. Neurological:      Mental Status: She is alert and oriented to person, place, and time. Mental status is at baseline. ASSESSMENT/PLAN:    Encounter Diagnoses   Name Primary?  Essential hypertension Yes    Mixed hyperlipidemia     Acquired hypothyroidism     Impaired fasting glucose     JAYJAY on CPAP     Chronic congestive heart failure, unspecified heart failure type (HCC)     Paroxysmal atrial fibrillation (HCC)      Orders Placed This Encounter   Medications    levothyroxine (SYNTHROID) 50 MCG tablet     Sig: TAKE 1 TABLET BY MOUTH EVERY DAY     Dispense:  90 tablet     Refill:  1    metoprolol tartrate (LOPRESSOR) 25 MG tablet     Sig: TAKE ONE TABLET BY MOUTH TWICE A DAY     Dispense:  180 tablet     Refill:  1     Orders Placed This Encounter   Procedures    CBC with Auto Differential     Standing Status:   Future     Standing Expiration Date:   4/20/2023    Comprehensive Metabolic Panel     Standing Status:   Future     Standing Expiration Date:   4/20/2023    Hemoglobin A1C     Standing Status:   Future     Standing Expiration Date:   4/20/2023    Lipid Panel     Standing Status:   Future     Standing Expiration Date:   4/20/2023     Order Specific Question:   Is Patient Fasting?/# of Hours     Answer:   12 hours    TSH     Standing Status:   Future     Standing Expiration Date:   4/20/2023    T4, Free     Standing Status:   Future     Standing Expiration Date:   4/20/2023     Patient is to continue on her current medical therapy.   No changes were made at this time.    Patient is to continue to follow low carb/low sugar/low fat diet and increase exercise for optimal blood sugar and cholesterol control. Patient is to follow up with urology in July as scheduled. Patient is returned to my office in 6 months duration or sooner if any further problems or symptoms arise. (Please note that portions of this note were completed with a voice recognition program. Efforts were made to edit the dictations but occasionally words are mis-transcribed.)        No follow-ups on file. Epi Jessica, was evaluated through a synchronous (real-time) audio-video encounter. The patient (or guardian if applicable) is aware that this is a billable service, which includes applicable co-pays. This Virtual Visit was conducted with patient's (and/or legal guardian's) consent. The visit was conducted pursuant to the emergency declaration under the [address] [place] [address], [address] [place] [address] authority and the [employer] and [employer] General Act. Patient identification was verified, and a caregiver was present when appropriate. The patient was located in a state where the provider was licensed to provide care. Total time spent for this encounter: Not billed by time    --Luisana Marley, DO on 4/20/2022 at 2:44 PM    An electronic signature was used to authenticate this note. Services were provided through a video synchronous discussion virtually to substitute for in-person clinic visit. Patient was in their home setting on their mobile device and I was in my office on a secured video interface. --Luisana Marley DO on 4/20/2022 at 2:44 PM    An electronic signature was used to authenticate this note.

## 2022-04-21 ENCOUNTER — CARE COORDINATION (OUTPATIENT)
Dept: CARE COORDINATION | Age: 82
End: 2022-04-21

## 2022-04-21 NOTE — CARE COORDINATION
Ambulatory Care Coordination Note  4/21/2022  CM Risk Score: 13  Charlson 10 Year Mortality Risk Score: 100%     ACC: Johanna Perera RN    Summary Note: Kali Dimas was referred for ACM.    Dorina Arnold has:        CHF- EF- 60%, dyslipidemia, A-fib- INR follows by PCP - on low salt diet, weighs self daily, medications and follows with cardiology                                      JAYJAY- on CPAP                                      H/O colon cancer 2019, renal mass- following with oncologist and urologist                                       OA- ambulates with walker- uses wheelchair when out         Plan of Care :                        F/U in 2 weeks- if appropriate graduate from [facility] [address], education, and support                                                  Medications reviewed                                                  SDOH completed                                                  Zone tool- CHF reviewed                                                  Reviewed HC decision maker, ACP - she has completed fprms at home- aware to bring in for EMR                                                  Covid vaccinated                                                  Reviewed clinic, Urgent care and My Chart                                                  Nutrition- declined referral to dietitian. [employer] team updated                                                  UT[place] Gaps  [phone] oncologist 10/17/2022  [phone] PCP 4/20/2022 completed- F/U 10/17/2022  [phone] urology 7/20/2022  [phone] cardiology 9/10/2022                                                  F/U on weights    4/21/2022- spoke with Dorina Arnold. She had PCP apt- no changes.       General Assessment Do you have any symptoms that are causing concern?: No       Congestive Heart Failure Assessment    Are you currently restricting fluids?: No Restriction  Do you understand a low sodium diet?: Yes  Do you understand how to read food labels?: Yes  How many restaurant meals do you eat per week?: 0  Do you salt your food before tasting it?: No     No patient-reported symptoms      Symptoms:     Symptom course: stable  Patient-reported weight (lb): 246  Weight trend: fluctuating minimally  Salt intake watch compared to last visit: stable       Care Coordination Interventions    Program Enrollment: Complex Care  Referral from Primary Care Provider: No  Suggested Interventions and Community Resources  Fall Risk Prevention: In Process  Meals on Wheels: Completed  Registered Dietician: Declined  Zone Management Tools: In Process (Comment: CHF)         Goals Addressed                    This Visit's Progress       Patient Stated      Conditions and Symptoms (pt-stated)   On track      I will schedule office visits, as directed by my provider. I will keep my appointment or reschedule if I have to cancel. I will notify my provider of any barriers to my plan of care. I will follow my Zone Management tool to seek urgent or emergent care. I will notify my provider of any symptoms that indicate a worsening of my condition. Barriers: lack of support and lack of education  Plan for overcoming my barriers: Care Coordination Intervention   Confidence: 10/10  Anticipated Goal Completion Date: 6/28/2022              Prior to Admission medications    Medication Sig Start Date End Date Taking?  Authorizing Provider   levothyroxine (SYNTHROID) 50 MCG tablet TAKE 1 TABLET BY MOUTH EVERY DAY 4/20/22   Pietro Myers,    metoprolol tartrate (LOPRESSOR) 25 MG tablet TAKE ONE TABLET BY MOUTH TWICE A DAY 4/20/22   Pietro Myers DO   atorvastatin (LIPITOR) 40 MG tablet TAKE 1 TABLET BY MOUTH EVERY DAY AT NIGHT 4/19/22   Vangie Sampson Carolyn Elkins,    JANTOVEN 3 MG tablet TAKE 2 TABLETS BY MOUTH EVERY DAY OR AS DIRECTED PER INR RESULTS 4/19/22   Jose De Jesus Smith DO   amLODIPine (NORVASC) 10 MG tablet TAKE 1 TABLET BY MOUTH EVERY DAY 2/21/22   Fredo Casas DO   furosemide (LASIX) 20 MG tablet TAKE ONE TABLET BY MOUTH DAILY 8/30/21   Jose De Jesus Smith DO   potassium chloride (KLOR-CON M) 20 MEQ extended release tablet Take 1 tablet by mouth daily 3/15/21   Jose De Jesus Smith DO   acetaminophen (TYLENOL) 325 MG tablet Take 2 tablets by mouth every 4 hours as needed for Pain or Fever 11/9/18 4/18/22  [facility] App[facility]   Date Time Provider Mira Kirkland   7/12/2022  9:35 AM SCHEDULE, [facility] LAB [facility] LAB [place]   7/19/2022 10:00 AM [facility] CT ROOM [facility] CT SCAN STV [place]   7/20/2022 11:20 AM Grant Cunningham MD Sulayne Ramp [facility]   9/1/2022  2:30 PM [facility]   10/13/2022  9:00 AM SCHEDULE, Efe Noel [facility] [address] LAB [facility] LAB [place]   10/17/2022  9:45 AM Edgar Nichole MD [facility]   10/17/2022 10:20 AM Jose De Jesus Smith DO [facility]

## 2022-04-22 ENCOUNTER — ANTI-COAG VISIT (OUTPATIENT)
Dept: FAMILY MEDICINE CLINIC | Age: 82
End: 2022-04-22
Payer: MEDICARE

## 2022-04-22 DIAGNOSIS — I48.91 ATRIAL FIBRILLATION, UNSPECIFIED TYPE (HCC): ICD-10-CM

## 2022-04-22 LAB — INR BLD: 1.7

## 2022-04-22 PROCEDURE — 93793 ANTICOAG MGMT PT WARFARIN: CPT | Performed by: FAMILY MEDICINE

## 2022-04-29 ENCOUNTER — ANTI-COAG VISIT (OUTPATIENT)
Dept: FAMILY MEDICINE CLINIC | Age: 82
End: 2022-04-29
Payer: MEDICARE

## 2022-04-29 DIAGNOSIS — I48.91 ATRIAL FIBRILLATION, UNSPECIFIED TYPE (HCC): ICD-10-CM

## 2022-04-29 LAB — INR BLD: 2.3

## 2022-04-29 PROCEDURE — 93793 ANTICOAG MGMT PT WARFARIN: CPT | Performed by: FAMILY MEDICINE

## 2022-05-05 ENCOUNTER — CARE COORDINATION (OUTPATIENT)
Dept: CARE COORDINATION | Age: 82
End: 2022-05-05

## 2022-05-05 NOTE — CARE COORDINATION
Ambulatory Care Coordination Note  5/5/2022  CM Risk Score: 5  Charlson 10 Year Mortality Risk Score: 100%     ACC: Shaheen Remy RN    Summary Note: Nisha Florez was referred for ACM.    Nisha Florez has:        CHF- EF- 60%, dyslipidemia, A-fib- INR follows by PCP - on low salt diet, weighs self daily, medications and follows with cardiology                                      JAYJAY- on CPAP                                      H/O colon cancer 2019, renal mass- following with oncologist and urologist                                       OA- ambulates with walker- uses wheelchair when out         Plan of Care :                       Completion of ACM    5/5/2022- 9:46 am spoke with Nisha Florez. She denied any concerns. She has f/u apts scheduled. Education completed. She is aware and in agreement to this writer no longer calling. She has this writer's contact information and will reach out if with any concerns. Home and outdoor safety reviewed. She voiced understanding.      General Assessment    Do you have any symptoms that are causing concern?: No       Congestive Heart Failure Assessment    Are you currently restricting fluids?: No Restriction  Do you understand a low sodium diet?: Yes  Do you understand how to read food labels?: Yes  How many restaurant meals do you eat per week?: 0  Do you salt your food before tasting it?: No     No patient-reported symptoms      Symptoms:     Symptom course: stable  Patient-reported weight (lb): 244  Weight trend: decreasing steadily  Salt intake watch compared to last visit: stable       Care Coordination Interventions    Program Enrollment: Complex Care  Referral from Primary Care Provider: No  Suggested Interventions and Community Resources  Fall Risk Prevention: Completed  Meals on Wheels: Completed  Registered Dietician: Declined  Zone Management Tools: Completed (Comment: CHF)         Goals Addressed                    This Visit's Progress       Patient Stated      Conditions and Symptoms (pt-stated)   On track      I will schedule office visits, as directed by my provider. I will keep my appointment or reschedule if I have to cancel. I will notify my provider of any barriers to my plan of care. I will follow my Zone Management tool to seek urgent or emergent care. I will notify my provider of any symptoms that indicate a worsening of my condition. Barriers: lack of support and lack of education  Plan for overcoming my barriers: Care Coordination Intervention   Confidence: 10/10  Anticipated Goal Completion Date: 6/28/2022              Prior to Admission medications    Medication Sig Start Date End Date Taking?  Authorizing Provider   levothyroxine (SYNTHROID) 50 MCG tablet TAKE 1 TABLET BY MOUTH EVERY DAY 4/20/22   [facility], DO   metoprolol tartrate (LOPRESSOR) 25 MG tablet TAKE ONE TABLET BY MOUTH TWICE A DAY 4/20/22   [facility], DO   atorvastatin (LIPITOR) 40 MG tablet TAKE 1 TABLET BY MOUTH EVERY DAY AT NIGHT 4/19/22   [facility], DO   JANTOVEN 3 MG tablet TAKE 2 TABLETS BY MOUTH EVERY DAY OR AS DIRECTED PER INR RESULTS 4/19/22   [facility], DO   amLODIPine (NORVASC) 10 MG tablet TAKE 1 TABLET BY MOUTH EVERY DAY 2/21/22   Fredo Augusto, DO   furosemide (LASIX) 20 MG tablet TAKE ONE TABLET BY MOUTH DAILY 8/30/21   [facility], DO   potassium chloride (KLOR-CON M) 20 MEQ extended release tablet Take 1 tablet by mouth daily 3/15/21   [facility], DO   acetaminophen (TYLENOL) 325 MG tablet Take 2 tablets by mouth every 4 hours as needed for Pain or Fever 11/9/18 4/18/22  Eliseo Lacy       [facility] Appointments   Date Time Provider Mira Kirkland   7/12/2022  9:35 AM SCHEDULE, [facility] [place]   7/19/2022 10:00 AM [facility] CT ROOM MD CT SCAN STV [place]   7/20/2022 11:20 AM MD Brea Ho [facility]   9/1/2022  2:30 PM MD MIRYAM Lawson [facility]   10/13/2022  9:00 AM SCHEDULE, [facility] [place]   10/17/2022  9:45 AM [facility]   10/17/2022 10:20 [facility]

## 2022-05-06 ENCOUNTER — ANTI-COAG VISIT (OUTPATIENT)
Dept: FAMILY MEDICINE CLINIC | Age: 82
End: 2022-05-06
Payer: MEDICARE

## 2022-05-06 DIAGNOSIS — I48.91 ATRIAL FIBRILLATION, UNSPECIFIED TYPE (HCC): ICD-10-CM

## 2022-05-06 LAB — INR BLD: 3

## 2022-05-06 PROCEDURE — 93793 ANTICOAG MGMT PT WARFARIN: CPT | Performed by: FAMILY MEDICINE

## 2022-05-10 ENCOUNTER — APPOINTMENT (OUTPATIENT)
Dept: GENERAL RADIOLOGY | Age: 82
End: 2022-05-10
Payer: MEDICARE

## 2022-05-10 ENCOUNTER — HOSPITAL ENCOUNTER (EMERGENCY)
Age: 82
Discharge: HOME | End: 2022-05-10
Attending: EMERGENCY MEDICINE
Payer: MEDICARE

## 2022-05-10 VITALS
RESPIRATION RATE: 16 BRPM | BODY MASS INDEX: 45.99 KG/M2 | HEIGHT: 67 IN | OXYGEN SATURATION: 95 % | SYSTOLIC BLOOD PRESSURE: 115 MMHG | HEART RATE: 91 BPM | WEIGHT: 293 LBS | DIASTOLIC BLOOD PRESSURE: 61 MMHG | TEMPERATURE: 99.1 F

## 2022-05-10 DIAGNOSIS — S83.91XA SPRAIN OF RIGHT KNEE, UNSPECIFIED LIGAMENT, INITIAL ENCOUNTER: Primary | ICD-10-CM

## 2022-05-10 DIAGNOSIS — S93.401A SPRAIN OF RIGHT ANKLE, UNSPECIFIED LIGAMENT, INITIAL ENCOUNTER: ICD-10-CM

## 2022-05-10 DIAGNOSIS — S80.01XA HEMATOMA OF RIGHT KNEE REGION: ICD-10-CM

## 2022-05-10 PROCEDURE — 73610 X-RAY EXAM OF ANKLE: CPT

## 2022-05-10 PROCEDURE — 99283 EMERGENCY DEPT VISIT LOW MDM: CPT

## 2022-05-10 PROCEDURE — 73562 X-RAY EXAM OF KNEE 3: CPT

## 2022-05-10 ASSESSMENT — PAIN - FUNCTIONAL ASSESSMENT
PAIN_FUNCTIONAL_ASSESSMENT: 0-10
PAIN_FUNCTIONAL_ASSESSMENT: NONE - DENIES PAIN

## 2022-05-10 ASSESSMENT — PAIN SCALES - GENERAL
PAINLEVEL_OUTOF10: 2
PAINLEVEL_OUTOF10: 0

## 2022-05-10 ASSESSMENT — PAIN DESCRIPTION - ORIENTATION: ORIENTATION: RIGHT

## 2022-05-10 ASSESSMENT — PAIN DESCRIPTION - LOCATION: LOCATION: ANKLE;KNEE

## 2022-05-11 ENCOUNTER — TELEPHONE (OUTPATIENT)
Dept: CARDIOLOGY | Age: 82
End: 2022-05-11

## 2022-05-11 NOTE — ED PROVIDER NOTES
[facility]  [address]  [place] [address]  Phone: [phone]  [place]      Pt Name: Yuan Jones  MRN: [id number]  Travisgfandrew 1940  Date of evaluation: 5/10/2022    CHIEF COMPLAINT       Chief Complaint   Patient presents with    Fall    Knee Injury     right    Ankle Injury     right       HISTORY OF PRESENT ILLNESS    Yuan Jones is a 79 y.o. female who presents to the emerge department complaining of right knee pain and swelling and right ankle soreness following a fall that occurred at 9:00 this morning. She got tangled in her CPAP cords and fell directly on the knee. She denies any head injury or loss of consciousness no neck pain. No headache blurry vision or double vision. She is on Coumadin which she did held today her last INR check was on Friday which was 3.0. She denies any other injuries except her knee and her right ankle. She is able to put some weight on it with her walker.   She does have significant bruising to the knee    REVIEW OF SYSTEMS       Constitutional: No fevers or chills   HEENT: No sore throat, rhinorrhea, or earache   Eyes: No blurry vision or double vision no drainage   Cardiovascular: No chest pain or tachycardia   Respiratory: No wheezing or shortness of breath no cough   Gastrointestinal: No nausea, vomiting, diarrhea, constipation, or abdominal pain   : No hematuria or dysuria   Musculoskeletal: Right knee pain and right ankle soreness chronic bilateral lower extremity swelling  Skin: No rash right knee ecchymosis  Neurological: No focal neurologic complaints, paresthesias, weakness, or headache     PAST MEDICAL HISTORY    has a past medical history of A-fib ([address] Abdominal pain, Acute blood loss as cause of postoperative anemia, Anxiety, CHF (congestive heart failure) (HCC), Class 2 severe obesity with serious comorbidity and body mass index (BMI) of 37.0 to 37.9 in [facility]), Closed fracture of proximal end of left humerus with routine healing, Colitis, Colitis due to Clostridium difficile, Depression, Diarrhea, Dyslipidemia, Elevated fasting glucose, FH: total abdominal hysterectomy and bilateral salpingo-oophorectomy, Fractures, Glucose intolerance (impaired glucose tolerance), Hypertension, Hypokalemia, Malignant neoplasm of sigmoid colon (HCC), Multiple closed fractures of ribs, Obesity, JAYJAY on CPAP, Osteoarthritis, Osteoporosis, Other complete intestinal obstruction ([facility] [address] Other specified hypothyroidism, S/P small bowel resection, and Thrombocytopenia, unspecified ([facility] [address] SURGICAL HISTORY      has a past surgical history that includes kenyon and bso (cervix removed) (1966); Cholecystectomy (1960s); Varicose vein surgery (Right, 1960s); Hip Arthroplasty (Left); Knee Arthroplasty (Left); Knee Arthroplasty (Right); Cardiac catheterization (09/05/2017); Colonoscopy (N/A, 10/8/2018); pr lap,surg,colectomy, partial, w/anast (N/A, 11/5/2018); pr cystoscopy,insert ureteral stent (Bilateral, 11/5/2018); Colonoscopy (N/A, 4/25/2019); sigmoidoscopy (N/A, 5/9/2019); Small intestine surgery (N/A, 5/22/2019); and Colonoscopy (N/A, 2/3/2020).     CURRENT MEDICATIONS       Previous Medications    ACETAMINOPHEN (TYLENOL) 325 MG TABLET    Take 2 tablets by mouth every 4 hours as needed for Pain or Fever    AMLODIPINE (NORVASC) 10 MG TABLET    TAKE 1 TABLET BY MOUTH EVERY DAY    ATORVASTATIN (LIPITOR) 40 MG TABLET    TAKE 1 TABLET BY MOUTH EVERY DAY AT NIGHT    FUROSEMIDE (LASIX) 20 MG TABLET    TAKE ONE TABLET BY MOUTH DAILY    JANTOVEN 3 MG TABLET    TAKE 2 TABLETS BY MOUTH EVERY DAY OR AS DIRECTED PER INR RESULTS    LEVOTHYROXINE (SYNTHROID) 50 MCG TABLET    TAKE 1 TABLET BY MOUTH EVERY DAY    METOPROLOL TARTRATE (LOPRESSOR) 25 MG TABLET    TAKE ONE TABLET BY MOUTH TWICE A DAY    POTASSIUM CHLORIDE (KLOR-CON M) 20 MEQ EXTENDED RELEASE TABLET    Take 1 tablet by mouth daily       ALLERGIES     is allergic to pcn [penicillins] and bextra [valdecoxib]. FAMILY HISTORY     is adopted. family history includes High Blood Pressure in her brother. She was adopted. SOCIAL HISTORY      reports that she has never smoked. She has never used smokeless tobacco. She reports that she does not drink alcohol and does not use drugs. PHYSICAL EXAM       ED Triage Vitals [05/10/22 1958]   BP Temp Temp Source Pulse Resp SpO2 Height Weight   (!) 148/88 99.1 °F (37.3 °C) Tympanic 97 20 95 % 5' 7\" (1.702 m) (!) 340 lb (154.2 kg)       Constitutional: Alert, oriented x3, nontoxic, answering questions appropriately, acting properly for age, in no acute distress   HEENT: Extraocular muscles intact, Pupils equal, round, reactive to light,   Neck: Trachea midline   Cardiovascular: Regular rhythm and rate no murmurs   Respiratory: Diminished to auscultation bilaterally no wheezes, rhonchi, rales, no respiratory distress no tachypnea no retractions no hypoxia  Gastrointestinal: Soft, nontender, obese, diminished bowel sounds. No rebound, rigidity, or guarding. Musculoskeletal: Chronic bilateral lower extremity swelling no asymmetry circumferentially. Right lower extremity no pain at the hip or foot. No bony point tenderness to the right foot or ankle tenderness to the inferior aspect of the right knee with ecchymosis and swelling anteriorly. Decreased range of motion right lower extremity for drawer negative right knee. No pain in the actual joint, tenderness over the area of ecchymosis  Neurologic: No gross focal neurologic deficits  Skin: Warm and dry       DIFFERENTIAL DIAGNOSIS/ MDM:     Right knee trauma suspect hematoma. X-ray right knee and right ankle denies any significant pain at this time worthy of pain medicine.     DIAGNOSTIC RESULTS     EKG: All EKG's are interpreted by the Emergency Department Physician who either signs or Co-signs this chart in the absence of a cardiologist.        Not indicated unless otherwise documented above    LABS:  No results found for this visit on 05/10/22. Not indicated unless otherwise documented above    RADIOLOGY:   I reviewed the radiologist interpretations:    XR KNEE RIGHT (3 VIEWS)   Final Result   Total knee arthroplasty. No fracture. XR ANKLE RIGHT (MIN 3 VIEWS)   Final Result   Possible nondisplaced fracture distal fibula, age indeterminate. Not indicated unless otherwise documented above    EMERGENCY DEPARTMENT COURSE:     The patient was given the following medications:  No orders of the defined types were placed in this encounter. Vitals:   -------------------------  BP (!) 148/88   Pulse 97   Temp 99.1 °F (37.3 °C) (Tympanic)   Resp 20   Ht 5' 7\" (1.702 m)   Wt (!) 340 lb (154.2 kg)   LMP 01/01/1968   SpO2 95%   BMI 53.25 kg/m²     8:50 PM x-ray right knee negative for fracture x-ray right ankle possible nondisplaced fracture of the distal fibula on examination she has no bony point tenderness in the area they are talking age-indeterminate patient did fracture that ankle a long time ago. She is able to bear weight I do not suspect there is a fracture currently. Will discharge. Coumadin discussed with her doctor tomorrow restarting. Cool compresses to right leg/hematoma. Return if worsening symptoms or other concerns. The patient and her visitor understands that at this time there is no evidence for a more malignant underlying process, but also understands that early in the process of an illness or injury, an emergency department workup can be falsely reassuring. Routine discharge counseling was given, and it is understood that worsening, changing or persistent symptoms should prompt an immediate call or follow up with their primary physician or return to the emergency department. The importance of appropriate follow up was also discussed. I have reviewed the disposition diagnosis. I have answered the questions and given discharge instructions. There was voiced understanding of these instructions and no further questions or complaints. CRITICAL CARE:    None    CONSULTS:    None    PROCEDURES:    None      OARRS Report if indicated             FINAL IMPRESSION      1. Sprain of right knee, unspecified ligament, initial encounter    2. Hematoma of right knee region    3.  Sprain of right ankle, unspecified ligament, initial encounter          DISPOSITION/PLAN   DISPOSITION Decision To Discharge 05/10/2022 08:52:12 PM        CONDITION ON DISPOSITION: STABLE       PATIENT REFERRED TO:  Mario Pierre DO  [address] [facility]  [phone]    Schedule an appointment as soon as possible for a visit in 2 days        DISCHARGE MEDICATIONS:  New Prescriptions    No medications on file       (Please note that portions of this note were completed with a voice recognition program.  Efforts were made to edit the dictations but occasionally words are mis-transcribed.)    Kennedy Diaz DO   Attending Emergency Physician     Kennedy Diaz DO  05/10/22 [id number]

## 2022-05-11 NOTE — TELEPHONE ENCOUNTER
Pt fell yesterday and had to go to er due to her knee swelling. Pt was taken off warfarin by er doc and was instructed to call the office to see if she should restart it today or to wait until tomorrow evening. She takes with supper.     [phone]    Last Appt:  3/10/2022  Next Appt:   9/1/2022  Med verified in Epic

## 2022-05-11 NOTE — TELEPHONE ENCOUNTER
This needs to be addressed by PCP since she had knee swelling and if coumadin is safe to be restarted.

## 2022-05-13 ENCOUNTER — ANTI-COAG VISIT (OUTPATIENT)
Dept: FAMILY MEDICINE CLINIC | Age: 82
End: 2022-05-13
Payer: MEDICARE

## 2022-05-13 DIAGNOSIS — I48.91 ATRIAL FIBRILLATION, UNSPECIFIED TYPE (HCC): ICD-10-CM

## 2022-05-13 LAB — INR BLD: 1.6

## 2022-05-13 PROCEDURE — 93793 ANTICOAG MGMT PT WARFARIN: CPT | Performed by: FAMILY MEDICINE

## 2022-05-20 ENCOUNTER — ANTI-COAG VISIT (OUTPATIENT)
Dept: FAMILY MEDICINE CLINIC | Age: 82
End: 2022-05-20
Payer: MEDICARE

## 2022-05-20 DIAGNOSIS — I48.91 ATRIAL FIBRILLATION, UNSPECIFIED TYPE (HCC): ICD-10-CM

## 2022-05-20 LAB — INR BLD: 1.8

## 2022-05-20 PROCEDURE — 93793 ANTICOAG MGMT PT WARFARIN: CPT | Performed by: FAMILY MEDICINE

## 2022-05-28 LAB — INR BLD: 2.4

## 2022-05-31 ENCOUNTER — ANTI-COAG VISIT (OUTPATIENT)
Dept: FAMILY MEDICINE CLINIC | Age: 82
End: 2022-05-31
Payer: MEDICARE

## 2022-05-31 DIAGNOSIS — I48.91 ATRIAL FIBRILLATION, UNSPECIFIED TYPE (HCC): ICD-10-CM

## 2022-05-31 PROCEDURE — 93793 ANTICOAG MGMT PT WARFARIN: CPT | Performed by: FAMILY MEDICINE

## 2022-05-31 NOTE — PROGRESS NOTES
Instructed patient to continue current coumadin dosing and recheck INR in one week. Patient voices understanding and repeats instructions back.

## 2022-06-03 ENCOUNTER — ANTI-COAG VISIT (OUTPATIENT)
Dept: FAMILY MEDICINE CLINIC | Age: 82
End: 2022-06-03
Payer: MEDICARE

## 2022-06-03 DIAGNOSIS — I48.91 ATRIAL FIBRILLATION, UNSPECIFIED TYPE (HCC): ICD-10-CM

## 2022-06-03 LAB — INR BLD: 2.4

## 2022-06-03 PROCEDURE — 93793 ANTICOAG MGMT PT WARFARIN: CPT | Performed by: FAMILY MEDICINE

## 2022-06-03 NOTE — PROGRESS NOTES
Patient notified of INR result, coumadin dosing instructions, and next recommended INR lab draw via voicemail message. To contact the office with any questions or concerns.

## 2022-06-10 ENCOUNTER — ANTI-COAG VISIT (OUTPATIENT)
Dept: FAMILY MEDICINE CLINIC | Age: 82
End: 2022-06-10
Payer: MEDICARE

## 2022-06-10 DIAGNOSIS — I48.91 ATRIAL FIBRILLATION, UNSPECIFIED TYPE (HCC): ICD-10-CM

## 2022-06-10 LAB — INR BLD: 2.2

## 2022-06-10 PROCEDURE — 93793 ANTICOAG MGMT PT WARFARIN: CPT | Performed by: FAMILY MEDICINE

## 2022-06-10 NOTE — PROGRESS NOTES
Patient notified of INR result, coumadin dosing instructions, and next recommended INR lab draw-recheck in one week. Patient verbalizes understanding.

## 2022-06-17 ENCOUNTER — ANTI-COAG VISIT (OUTPATIENT)
Dept: FAMILY MEDICINE CLINIC | Age: 82
End: 2022-06-17
Payer: MEDICARE

## 2022-06-17 DIAGNOSIS — I48.91 ATRIAL FIBRILLATION, UNSPECIFIED TYPE (HCC): ICD-10-CM

## 2022-06-17 LAB — INR BLD: 2.6

## 2022-06-17 PROCEDURE — 93793 ANTICOAG MGMT PT WARFARIN: CPT | Performed by: FAMILY MEDICINE

## 2022-06-24 ENCOUNTER — ANTI-COAG VISIT (OUTPATIENT)
Dept: FAMILY MEDICINE CLINIC | Age: 82
End: 2022-06-24
Payer: MEDICARE

## 2022-06-24 DIAGNOSIS — I48.91 ATRIAL FIBRILLATION, UNSPECIFIED TYPE (HCC): ICD-10-CM

## 2022-06-24 LAB — INR BLD: 2.4

## 2022-06-24 PROCEDURE — 93793 ANTICOAG MGMT PT WARFARIN: CPT | Performed by: FAMILY MEDICINE

## 2022-07-01 ENCOUNTER — ANTI-COAG VISIT (OUTPATIENT)
Dept: FAMILY MEDICINE CLINIC | Age: 82
End: 2022-07-01
Payer: MEDICARE

## 2022-07-01 DIAGNOSIS — I48.91 ATRIAL FIBRILLATION, UNSPECIFIED TYPE (HCC): ICD-10-CM

## 2022-07-01 LAB — INR BLD: 2.9

## 2022-07-01 PROCEDURE — 93793 ANTICOAG MGMT PT WARFARIN: CPT | Performed by: FAMILY MEDICINE

## 2022-07-08 ENCOUNTER — ANTI-COAG VISIT (OUTPATIENT)
Dept: FAMILY MEDICINE CLINIC | Age: 82
End: 2022-07-08

## 2022-07-08 LAB — INR BLD: 3.5

## 2022-07-08 NOTE — PROGRESS NOTES
Patient reports nothing different this week. Advised of instructions to hold today's coumadin dose then resume regular coumadin schedule and recheck INR next Friday. Verbalizes understanding.

## 2022-07-08 NOTE — PROGRESS NOTES
INR is high. Did she do anything different this week?  If not hold dose for today and just recheck next week

## 2022-07-13 ENCOUNTER — HOSPITAL ENCOUNTER (OUTPATIENT)
Dept: LAB | Age: 82
Discharge: HOME | End: 2022-07-13
Payer: MEDICARE

## 2022-07-13 DIAGNOSIS — N28.89 RENAL MASS: Primary | ICD-10-CM

## 2022-07-13 DIAGNOSIS — N28.89 RENAL MASS: ICD-10-CM

## 2022-07-13 LAB
ANION GAP SERPL CALCULATED.3IONS-SCNC: 11 MMOL/L (ref 9–17)
BUN BLDV-MCNC: 23 MG/DL (ref 8–23)
BUN/CREAT BLD: 30 (ref 9–20)
CALCIUM SERPL-MCNC: 9.8 MG/DL (ref 8.6–10.4)
CHLORIDE BLD-SCNC: 104 MMOL/L (ref 98–107)
CO2: 28 MMOL/L (ref 20–31)
CREAT SERPL-MCNC: 0.76 MG/DL (ref 0.5–0.9)
GFR AFRICAN AMERICAN: >60 ML/MIN
GFR NON-AFRICAN AMERICAN: >60 ML/MIN
GFR SERPL CREATININE-BSD FRML MDRD: ABNORMAL ML/MIN/{1.73_M2}
GLUCOSE BLD-MCNC: 102 MG/DL (ref 70–99)
POTASSIUM SERPL-SCNC: 4.2 MMOL/L (ref 3.7–5.3)
SODIUM BLD-SCNC: 143 MMOL/L (ref 135–144)

## 2022-07-13 PROCEDURE — 80048 BASIC METABOLIC PNL TOTAL CA: CPT

## 2022-07-13 PROCEDURE — 36415 COLL VENOUS BLD VENIPUNCTURE: CPT

## 2022-07-15 ENCOUNTER — ANTI-COAG VISIT (OUTPATIENT)
Dept: FAMILY MEDICINE CLINIC | Age: 82
End: 2022-07-15
Payer: MEDICARE

## 2022-07-15 DIAGNOSIS — I48.0 PAROXYSMAL ATRIAL FIBRILLATION (HCC): Primary | ICD-10-CM

## 2022-07-15 LAB — INR BLD: 2.3

## 2022-07-15 PROCEDURE — 93793 ANTICOAG MGMT PT WARFARIN: CPT | Performed by: FAMILY MEDICINE

## 2022-07-19 ENCOUNTER — HOSPITAL ENCOUNTER (OUTPATIENT)
Dept: CT IMAGING | Age: 82
Discharge: HOME | End: 2022-07-21
Payer: MEDICARE

## 2022-07-19 DIAGNOSIS — N28.89 RENAL MASS: ICD-10-CM

## 2022-07-19 PROCEDURE — 2709999900 CT ABDOMEN PELVIS W WO CONTRAST

## 2022-07-19 PROCEDURE — 6360000004 HC RX CONTRAST MEDICATION: Performed by: UROLOGY

## 2022-07-19 RX ADMIN — IOPAMIDOL 100 ML: 755 INJECTION, SOLUTION INTRAVENOUS at 10:09

## 2022-07-20 ENCOUNTER — TELEPHONE (OUTPATIENT)
Dept: UROLOGY | Age: 82
End: 2022-07-20

## 2022-07-20 ENCOUNTER — OFFICE VISIT (OUTPATIENT)
Dept: UROLOGY | Age: 82
End: 2022-07-20
Payer: MEDICARE

## 2022-07-20 VITALS — HEIGHT: 67 IN | OXYGEN SATURATION: 96 % | HEART RATE: 98 BPM | BODY MASS INDEX: 53.25 KG/M2

## 2022-07-20 DIAGNOSIS — N28.89 RENAL MASS: Primary | ICD-10-CM

## 2022-07-20 PROCEDURE — 99215 OFFICE O/P EST HI 40 MIN: CPT | Performed by: UROLOGY

## 2022-07-20 PROCEDURE — 1090F PRES/ABSN URINE INCON ASSESS: CPT | Performed by: UROLOGY

## 2022-07-20 PROCEDURE — G8417 CALC BMI ABV UP PARAM F/U: HCPCS | Performed by: UROLOGY

## 2022-07-20 PROCEDURE — G8399 PT W/DXA RESULTS DOCUMENT: HCPCS | Performed by: UROLOGY

## 2022-07-20 PROCEDURE — 1123F ACP DISCUSS/DSCN MKR DOCD: CPT | Performed by: UROLOGY

## 2022-07-20 PROCEDURE — G8427 DOCREV CUR MEDS BY ELIG CLIN: HCPCS | Performed by: UROLOGY

## 2022-07-20 PROCEDURE — 1036F TOBACCO NON-USER: CPT | Performed by: UROLOGY

## 2022-07-20 NOTE — PROGRESS NOTES
Aki Johnson MD  Urology Clinic office visit      Patient: Estefany Hernández  YOB: 1940  Date: 7/20/2022    HISTORY OF PRESENT ILLNESS:   The patient is a 79 y.o. female who presents today for evaluation of the following problems:      1. Renal mass         Overall the problem(s) : are worsening. Associated Symptoms: No dysuria, gross hematuria. Pain Severity: Pain Score:   0 - No pain    Summary of old records: N/A  (Patient's old records, notes and chart reviewed and summarized above.)    CT was done for surveillance for Colon cancer  Had resection 2 years ago  Had no chemo    There is a fairly well-defined, rounded soft tissue mass involving the mid to lower pole of the right kidney. It measures 3.3 x 3.2 cm in size    Central, 60% endophytic, lower half of kidney    Denies pain  Hx of cholecystectomy; hysterectomy lower infraumbilical incision  Had ileostomy on right side for 6 months and was then reversed      I independently reviewed and verified the images and reports from:    CT ABDOMEN PELVIS W WO CONTRAST Additional Contrast? None    Result Date: 7/19/2022  EXAMINATION: CT OF THE ABDOMEN AND PELVIS WITH AND WITHOUT CONTRAST 7/19/2022 10:08 am TECHNIQUE: CT of the abdomen and pelvis was performed with and without the administration of intravenous contrast.  Multiplanar reformatted images are provided for review. Automated exposure control, iterative reconstruction, and/or weight based adjustment of the mA/kV was utilized to reduce the radiation dose to as low as reasonably achievable. COMPARISON: CT dated 01/12/2022 HISTORY: ORDERING SYSTEM PROVIDED HISTORY: Renal mass TECHNOLOGIST PROVIDED HISTORY: Reason for Exam: renal mass, no new symptoms FINDINGS: Lower Chest: Lung bases are clear. Organs: Liver without focal lesion. Gallbladder unremarkable. Pancreas and spleen unremarkable. Adrenals without nodule.   Kidneys again demonstrate a exophytic marginated right medial projecting interpolar renal mass with heterogeneous enhancement enlarged from prior comparison 4.1 cm versus 3.3 cm on prior consistent with progression. No gross renal vein thrombus with bilateral renal veins grossly patent on limited evaluation. GI/Bowel: Small hiatal hernia. No focal thickening or disproportion dilatation of bowel. Stable postsurgical changes right mid abdomen. No inflammatory findings. Pelvis: No suspicious pelvic lesion or bulky pelvic adenopathy/free fluid. Peritoneum/Retroperitoneum: No bulky retroperitoneal adenopathy. No suspicious peritoneal or mesenteric process. Trace ascites. Vasculature: Grossly normal caliber of abdominal aorta and vasculature Bones/Soft Tissues: No acute osseous or soft tissue findings. Interval enlargement of previously noted heterogeneous enhancing soft tissue mass right kidney now measuring 4.1 cm enlarged from 3.3 cm on 01/12/2022 comparison remaining concerning for renal cell carcinoma with interval progression of the primary mass. Renal veins appear grossly patent bilateral without evidence for disseminated metastatic disease in the abdomen or pelvis. Small hiatal hernia       Urinalysis today:  No results found for this visit on 07/20/22. Last BUN and creatinine:  Lab Results   Component Value Date    BUN 23 07/13/2022     Lab Results   Component Value Date    CREATININE 0.76 07/13/2022       Imaging Reviewed during this Office Visit:   (results were independently reviewed by physician and radiology report verified)  I independently reviewed and verified the images and reports from:    CT ABDOMEN PELVIS W IV CONTRAST Additional Contrast? Oral    Result Date: 1/12/2022  EXAMINATION: CT OF THE ABDOMEN AND PELVIS WITH CONTRAST 1/12/2022 10:55 am TECHNIQUE: CT of the abdomen and pelvis was performed with the administration of intravenous contrast. Multiplanar reformatted images are provided for review.  Dose modulation, iterative reconstruction, and/or weight based adjustment of the mA/kV was utilized to reduce the radiation dose to as low as reasonably achievable. COMPARISON: The previous study performed 11/27/2020. HISTORY: ORDERING SYSTEM PROVIDED HISTORY: Malignant neoplasm of sigmoid colon [facility]) TECHNOLOGIST PROVIDED HISTORY: follow up Reason for Exam: follow up, Malignant neoplasm of sigmoid colon, p states no new complaints FINDINGS: Lower Chest: Atelectasis is again seen involving the lung bases. Organs: The liver, spleen, biliary tree, pancreas, adrenal glands, and left kidney are unremarkable. The patient is again status post cholecystectomy. There is a fairly well-defined, rounded soft tissue mass involving the mid to lower pole of the right kidney. It measures 3.3 x 3.2 cm in size. The finding is highly suspicious for renal cell carcinoma. Urologic consultation is recommended. Mild bilateral perinephric linear stranding is again noted, which may be age related. GI/Bowel: A small hiatal hernia is noted. Postsurgical changes are again noted involving small bowel in the right lower quadrant. The remainder of small bowel is unremarkable. Postsurgical changes are again identified in the region of the rectum. The remainder of the colon is unremarkable. A moderate amount of retained stool is identified throughout most of the colon. The appendix is not definitely visualized. Pelvis: Evaluation of the pelvis is again slightly suboptimal, secondary to dense streak artifact created by the patient's total left hip prosthesis. The urinary bladder is suboptimally distended and grossly unremarkable. The patient is again status post hysterectomy. No adnexal abnormality is seen. Peritoneum/Retroperitoneum: No retroperitoneal or pelvic lymphadenopathy is identified. No free fluid is seen in the abdomen and pelvis. No abdominal or pelvic soft tissue mass is appreciated. The abdominal aorta is again atherosclerotic.  Bones/Soft Tissues: Again seen is a large area adenoma X 1, Dr. Bruno Carmona Bilateral 11/5/2018    CYSTO Bilateral Lighted stent placements performed by Lorene Jama MD at [facility], PARTIAL, W/ANAST N/A 11/5/2018    Open Sigmoid Colectomy w/ lighted stents ILEOSTOMY PLACEMENT performed by Nick Denny MD at [address] N/A 5/9/2019    Flexible Sigmoidoscopy with Dilatation of rectal stricture performed by Carly Cristina MD at [address] N/A 5/22/2019    Loop Ileostomy take down performed by Carly Cristina MD at [address] [facility] [address] (CERVIX REMOVED)  [address] [facility] Right 1960s     Family History   Adopted: Yes   Problem Relation Age of Onset    High Blood Pressure Brother         adopted brother     Outpatient Medications Marked as Taking for the 7/20/22 encounter (Office Visit) with Frankey Poster, MD   Medication Sig Dispense Refill    levothyroxine (SYNTHROID) 50 MCG tablet TAKE 1 TABLET BY MOUTH EVERY DAY 90 tablet 1    metoprolol tartrate (LOPRESSOR) 25 MG tablet TAKE ONE TABLET BY MOUTH TWICE A  tablet 1    atorvastatin (LIPITOR) 40 MG tablet TAKE 1 TABLET BY MOUTH EVERY DAY AT NIGHT 90 tablet 1    JANTOVEN 3 MG tablet TAKE 2 TABLETS BY MOUTH EVERY DAY OR AS DIRECTED PER INR RESULTS 60 tablet 5    amLODIPine (NORVASC) 10 MG tablet TAKE 1 TABLET BY MOUTH EVERY DAY 90 tablet 3    furosemide (LASIX) 20 MG tablet TAKE ONE TABLET BY MOUTH DAILY 90 tablet 1    potassium chloride (KLOR-CON M) 20 MEQ extended release tablet Take 1 tablet by mouth daily 90 tablet 1       Pcn [penicillins] and Bextra [valdecoxib]  Social History     Tobacco Use   Smoking Status Never   Smokeless Tobacco Never   Tobacco Comments    syant 11/10/2018       Social History     Substance and Sexual Activity   Alcohol Use No       REVIEW OF SYSTEMS:  Constitutional: negative  Eyes: negative  Respiratory: negative  Cardiovascular: negative  Gastrointestinal: negative  Genitourinary: negative except for what is in HPI  Musculoskeletal: negative  Skin: negative   Neurological: negative  Hematological/Lymphatic: negative  Psychological: negative    Physical Exam:    This a 79 y.o. female      Vitals:    07/20/22 1147   Pulse: 98   SpO2: 96%     Constitutional: Patient in no acute distress. Neuro: Alert and oriented to person, place and time. Psych: mood and affect normal  HEENT negative  Lungs: Respiratory effort is normal  Cardiovascular: Normal peripheral pulses  Abdomen: Soft, non-tender, non-distended   Lymphatics: No palpable lymphadenopathy. Bladder non-tender and not distended. Assessment and Plan      1. Renal mass           Plan:           I discussed and reviewed the images with the patient. I discussed al the options including observation, ablative treatment (cryo or RF ablation), radical and partial nephrectomy. I discussed all the risks, benefits, possible outcomes and complications of the above options. I answered all the patient's questions. For partial nephrectomy, I discussed risk of bleeding, conversion to radical nephrectomy or conversion to open approach.     CT 11/2020 reviewed it was 2.5cm at that time; 3.3 cm, now it is 4.1 cm  Discussed it will likely keep growing, but maybe 1-2 years before higher risk of mets    Discussed co-morbidities, obesity, surgical history discussed that surgery or observation or ablation    Open faiza, large RUQ incision; last major anesthesia was 2020  Midline lower incision  Hx of right sided ileostomy  Discussed ablative therapy, but it is a central and medial location; it is sub-optimal for this treatment plan    Patient and family leaning towards surgery  Discussed surgical risk  Would need cardiac and surgical clearance  Discussed robotic approach to start but will be prepared to convert to open approach  (Robotic right radical nephrectomy, possible open at [facility])           Prescriptions Ordered:  No orders of the defined types were placed in this encounter. Orders Placed:  No orders of the defined types were placed in this encounter.            MD Lesia Collazo M.D, MD  [facility]

## 2022-07-21 NOTE — TELEPHONE ENCOUNTER
Ok to [employer] (coumadin) for 5 days prior to procedure and resume after procedure once hemostasis is obtained.

## 2022-07-22 ENCOUNTER — ANTI-COAG VISIT (OUTPATIENT)
Dept: FAMILY MEDICINE CLINIC | Age: 82
End: 2022-07-22

## 2022-07-22 LAB — INR BLD: 2.6

## 2022-07-29 ENCOUNTER — ANTI-COAG VISIT (OUTPATIENT)
Dept: FAMILY MEDICINE CLINIC | Age: 82
End: 2022-07-29
Payer: MEDICARE

## 2022-07-29 DIAGNOSIS — I48.0 PAROXYSMAL ATRIAL FIBRILLATION (HCC): Primary | ICD-10-CM

## 2022-07-29 LAB — INR BLD: 1.7

## 2022-07-29 PROCEDURE — 93793 ANTICOAG MGMT PT WARFARIN: CPT | Performed by: FAMILY MEDICINE

## 2022-08-04 ENCOUNTER — OFFICE VISIT (OUTPATIENT)
Dept: CARDIOLOGY | Age: 82
End: 2022-08-04
Payer: MEDICARE

## 2022-08-04 VITALS
WEIGHT: 273 LBS | HEART RATE: 79 BPM | HEIGHT: 67 IN | SYSTOLIC BLOOD PRESSURE: 134 MMHG | DIASTOLIC BLOOD PRESSURE: 88 MMHG | BODY MASS INDEX: 42.85 KG/M2

## 2022-08-04 DIAGNOSIS — R94.31 ABNORMAL EKG: ICD-10-CM

## 2022-08-04 DIAGNOSIS — Z01.818 PRE-OP EXAM: ICD-10-CM

## 2022-08-04 DIAGNOSIS — I48.91 ATRIAL FIBRILLATION, UNSPECIFIED TYPE (HCC): Primary | ICD-10-CM

## 2022-08-04 PROCEDURE — 1036F TOBACCO NON-USER: CPT | Performed by: INTERNAL MEDICINE

## 2022-08-04 PROCEDURE — G8417 CALC BMI ABV UP PARAM F/U: HCPCS | Performed by: INTERNAL MEDICINE

## 2022-08-04 PROCEDURE — 93010 ELECTROCARDIOGRAM REPORT: CPT | Performed by: INTERNAL MEDICINE

## 2022-08-04 PROCEDURE — G8399 PT W/DXA RESULTS DOCUMENT: HCPCS | Performed by: INTERNAL MEDICINE

## 2022-08-04 PROCEDURE — 99214 OFFICE O/P EST MOD 30 MIN: CPT | Performed by: INTERNAL MEDICINE

## 2022-08-04 PROCEDURE — 1090F PRES/ABSN URINE INCON ASSESS: CPT | Performed by: INTERNAL MEDICINE

## 2022-08-04 PROCEDURE — 99213 OFFICE O/P EST LOW 20 MIN: CPT | Performed by: INTERNAL MEDICINE

## 2022-08-04 PROCEDURE — 1123F ACP DISCUSS/DSCN MKR DOCD: CPT | Performed by: INTERNAL MEDICINE

## 2022-08-04 PROCEDURE — G8427 DOCREV CUR MEDS BY ELIG CLIN: HCPCS | Performed by: INTERNAL MEDICINE

## 2022-08-04 PROCEDURE — 93005 ELECTROCARDIOGRAM TRACING: CPT | Performed by: INTERNAL MEDICINE

## 2022-08-05 ENCOUNTER — ANTI-COAG VISIT (OUTPATIENT)
Dept: FAMILY MEDICINE CLINIC | Age: 82
End: 2022-08-05
Payer: MEDICARE

## 2022-08-05 DIAGNOSIS — I48.91 ATRIAL FIBRILLATION, UNSPECIFIED TYPE (HCC): Primary | ICD-10-CM

## 2022-08-05 LAB — INR BLD: 2.2

## 2022-08-05 PROCEDURE — 93793 ANTICOAG MGMT PT WARFARIN: CPT | Performed by: PHYSICIAN ASSISTANT

## 2022-08-12 ENCOUNTER — ANTI-COAG VISIT (OUTPATIENT)
Dept: FAMILY MEDICINE CLINIC | Age: 82
End: 2022-08-12
Payer: MEDICARE

## 2022-08-12 DIAGNOSIS — I48.0 PAROXYSMAL ATRIAL FIBRILLATION (HCC): Primary | ICD-10-CM

## 2022-08-12 LAB — INR BLD: 2

## 2022-08-12 PROCEDURE — 93793 ANTICOAG MGMT PT WARFARIN: CPT | Performed by: FAMILY MEDICINE

## 2022-08-20 LAB — INR BLD: 2.1

## 2022-08-22 ENCOUNTER — ANTI-COAG VISIT (OUTPATIENT)
Dept: FAMILY MEDICINE CLINIC | Age: 82
End: 2022-08-22
Payer: MEDICARE

## 2022-08-22 DIAGNOSIS — I48.91 ATRIAL FIBRILLATION, UNSPECIFIED TYPE (HCC): Primary | ICD-10-CM

## 2022-08-22 PROCEDURE — 93793 ANTICOAG MGMT PT WARFARIN: CPT | Performed by: FAMILY MEDICINE

## 2022-08-26 ENCOUNTER — ANTI-COAG VISIT (OUTPATIENT)
Dept: FAMILY MEDICINE CLINIC | Age: 82
End: 2022-08-26

## 2022-08-26 LAB — INR BLD: 2.5

## 2022-09-01 ENCOUNTER — HOSPITAL ENCOUNTER (OUTPATIENT)
Dept: NON INVASIVE DIAGNOSTICS | Age: 82
Discharge: HOME | End: 2022-09-01
Payer: MEDICARE

## 2022-09-01 ENCOUNTER — HOSPITAL ENCOUNTER (OUTPATIENT)
Dept: NUCLEAR MEDICINE | Age: 82
Discharge: HOME | End: 2022-09-03
Payer: MEDICARE

## 2022-09-01 DIAGNOSIS — Z01.818 PRE-OP EXAM: ICD-10-CM

## 2022-09-01 DIAGNOSIS — R94.31 ABNORMAL EKG: ICD-10-CM

## 2022-09-01 DIAGNOSIS — I48.91 ATRIAL FIBRILLATION, UNSPECIFIED TYPE (HCC): ICD-10-CM

## 2022-09-01 PROCEDURE — 3430000000 HC RX DIAGNOSTIC RADIOPHARMACEUTICAL: Performed by: INTERNAL MEDICINE

## 2022-09-01 PROCEDURE — A9500 TC99M SESTAMIBI: HCPCS | Performed by: INTERNAL MEDICINE

## 2022-09-01 PROCEDURE — 6360000002 HC RX W HCPCS: Performed by: INTERNAL MEDICINE

## 2022-09-01 PROCEDURE — 93017 CV STRESS TEST TRACING ONLY: CPT

## 2022-09-01 PROCEDURE — G1010 CDSM STANSON: HCPCS

## 2022-09-01 RX ADMIN — TETRAKIS(2-METHOXYISOBUTYLISOCYANIDE)COPPER(I) TETRAFLUOROBORATE 30 MILLICURIE: 1 INJECTION, POWDER, LYOPHILIZED, FOR SOLUTION INTRAVENOUS at 11:11

## 2022-09-01 RX ADMIN — TETRAKIS(2-METHOXYISOBUTYLISOCYANIDE)COPPER(I) TETRAFLUOROBORATE 10 MILLICURIE: 1 INJECTION, POWDER, LYOPHILIZED, FOR SOLUTION INTRAVENOUS at 11:11

## 2022-09-01 RX ADMIN — REGADENOSON 0.4 MG: 0.08 INJECTION, SOLUTION INTRAVENOUS at 10:24

## 2022-09-01 NOTE — PROCEDURES
[address]                 [address] [place] [address] Drive [address]                              CARDIAC STRESS TEST    PATIENT NAME: Mook Tamayo                   :        1940  MED REC NO:   [id number]                             ROOM:  ACCOUNT NO:   [de-identified]                           ADMIT DATE: 2022  PROVIDER:     Compa Lin    DATE OF STUDY:  2022    STRESS TEST    Ordering Provider:  Dany Gunderson MD    Primary Care Provider:  Margoth Thakkar DO    Patient's Age: 79     Height: 5 feet 7 inches  Weight: 273 pounds    INDICATION:  Pre-op, atrial fibrillation, abnormal EKG. Lexiscan 0.4 mg injected over 10 seconds. IV Cardiolite injected 20 seconds post Lexiscan injection. Heart Rate: 58 Resting blood pressure:  142/87              HR   BP  1 min          93   132/87  2 min  3 min          90   132/87  4 min  5 min          85   137/86  6 min  7 min          96   145/86  8 min  9 min          90   133/85  10 min    Symptoms:  Chest Pain: No  Nausea: No  Headache:  No  Shortness of  breath: No  Other: No    Resting EKG:  Atrial fibrillation. EKG changes:  Few PVCs. Maximum changes:  None    INTERPRETATION:  1. Stress EKG negative for ischemia. 2.  Few PVCs. 3.  Nuclear scan will follow up. Nuclear Myocardial Perfusion Imaging (SPECT)    TESTING METHOD  STRESS:   Lexiscan  AGENT:    Cardiolite  REST:          Injection Date:  2022  Time:  0900  Amount:  10.2  mCi  STRESS:   Injection Date:  2022  Time:  1000  Amount:  31.1 mCi  IMAGE TIME:    Rest:  0945  Stress:  1100    EF:  51%  TID:  1.05  LHR:  0.93    FINDINGS:  Ischemia (Reversible Defect):           Yes, mid anterior wall ischemia  Infarction (Irreversible Defect):        Inferior wall defect, possible  infarction                                       (diaphragmatic)  Normal Ejection Fraction:               Yes  Normal Segmental Wall Motion: Yes    IMPRESSION:  LVEF 51%.       Ting Reyes    D: 09/01/2022 12:99:07       T: 09/01/2022 16:31:43     JENNIFER/CAROLA_GARY  Job#: [id number]     Doc#: Unknown    CC:  Marcella Melvin

## 2022-09-01 NOTE — PROGRESS NOTES
Patient Name:  Caryle Slate MRN:  [id number]   :  1940  Age:  79 y.o. Sex: female     Ordering Provider: Leroy Colon MD  Primary Care Provider:  Gilberto Rudd DO     Indications: preop, abnormal EKG, Afib     Medications:     Current Outpatient Medications:     levothyroxine (SYNTHROID) 50 MCG tablet, TAKE 1 TABLET BY MOUTH EVERY DAY, Disp: 90 tablet, Rfl: 1    metoprolol tartrate (LOPRESSOR) 25 MG tablet, TAKE ONE TABLET BY MOUTH TWICE A DAY, Disp: 180 tablet, Rfl: 1    atorvastatin (LIPITOR) 40 MG tablet, TAKE 1 TABLET BY MOUTH EVERY DAY AT NIGHT, Disp: 90 tablet, Rfl: 1    JANTOVEN 3 MG tablet, TAKE 2 TABLETS BY MOUTH EVERY DAY OR AS DIRECTED PER INR RESULTS, Disp: 60 tablet, Rfl: 5    amLODIPine (NORVASC) 10 MG tablet, TAKE 1 TABLET BY MOUTH EVERY DAY, Disp: 90 tablet, Rfl: 3    furosemide (LASIX) 20 MG tablet, TAKE ONE TABLET BY MOUTH DAILY, Disp: 90 tablet, Rfl: 1    potassium chloride (KLOR-CON M) 20 MEQ extended release tablet, Take 1 tablet by mouth daily, Disp: 90 tablet, Rfl: 1    acetaminophen (TYLENOL) 325 MG tablet, Take 2 tablets by mouth every 4 hours as needed for Pain or Fever, Disp: 60 tablet, Rfl: 0    Current Facility-Administered Medications:     regadenoson (LEXISCAN) injection 0.4 mg, 0.4 mg, IntraVENous, Once, Antoinette Leos MD      ----------------------------------------------------------------------------------------------------------                Lexiscan 0.4 mg injected over 10 seconds. IV Cardiolite injected 20 seconds post Lexiscan injection.      Heart Rate:  58  Resting Blood Pressure:  142/87   ----------------------------------------------------------------------------------------------------------     HR BP   1 min 93 132/87   2 min     3 min 90 132/87   4 min     5 min 85 137/86   6 min     7 min 96 145/86   8 min     9 min 90 133/85   10 min       Symptoms:  Chest Pain:  No      Nausea:  No     Headache:  No    Shortness of Breath:  No     Other:  No Electronically signed by Eric Rand RCP on 9/1/22 at 10:17 AM EDT    ----------------------------------------------------------------------------------------------------------  Resting EKG:  AFIB    Arrhythmias:       EKG Changes:  FEW VPC     Maximum Changes:  NONE      Leads with maximum changes:       EKG returned to baseline  minutes in recovery.      Interpretation:      Stress EKG negative for ischemia    Few PVCs    Nuclear scan will follow up      Supervising Physician:  Dr. Sulaiman Montenegro

## 2022-09-02 ENCOUNTER — TELEPHONE (OUTPATIENT)
Dept: CARDIOLOGY | Age: 82
End: 2022-09-02

## 2022-09-02 ENCOUNTER — ANTI-COAG VISIT (OUTPATIENT)
Dept: FAMILY MEDICINE CLINIC | Age: 82
End: 2022-09-02
Payer: MEDICARE

## 2022-09-02 DIAGNOSIS — R94.39 ABNORMAL STRESS TEST: Primary | ICD-10-CM

## 2022-09-02 DIAGNOSIS — I48.0 PAROXYSMAL ATRIAL FIBRILLATION (HCC): Primary | ICD-10-CM

## 2022-09-02 LAB — INR BLD: 1.8

## 2022-09-02 PROCEDURE — 93793 ANTICOAG MGMT PT WARFARIN: CPT | Performed by: FAMILY MEDICINE

## 2022-09-06 NOTE — TELEPHONE ENCOUNTER
Called pt and informed pt per Dr Kenya Stokes, of results and recommendations for cath prior to surgery. Pt verbalized understanding and stated she will do the cath and to inform her son of the results as well. Pt's son informed of results and of recommendation for cath . Please place orders for cath.

## 2022-09-09 ENCOUNTER — ANTI-COAG VISIT (OUTPATIENT)
Dept: FAMILY MEDICINE CLINIC | Age: 82
End: 2022-09-09
Payer: MEDICARE

## 2022-09-09 DIAGNOSIS — I48.0 PAROXYSMAL ATRIAL FIBRILLATION (HCC): Primary | ICD-10-CM

## 2022-09-09 LAB — INR BLD: 2.2

## 2022-09-09 PROCEDURE — 93793 ANTICOAG MGMT PT WARFARIN: CPT | Performed by: FAMILY MEDICINE

## 2022-09-09 NOTE — PROGRESS NOTES
Patient notified of INR result, coumadin dosing instructions, and next recommended INR lab draw. Patient verbalizes understanding. States she will be holding her coumadin starting tomorrow for a heart cath 9/16/22. She thinks they will restart her coumadin 9/17. She will check coumadin on 9/23/22 and let us know results.

## 2022-09-16 ENCOUNTER — HOSPITAL ENCOUNTER (OUTPATIENT)
Dept: CARDIAC CATH/INVASIVE PROCEDURES | Age: 82
Discharge: HOME | End: 2022-09-16
Payer: MEDICARE

## 2022-09-16 VITALS
HEART RATE: 79 BPM | WEIGHT: 265 LBS | HEIGHT: 67 IN | DIASTOLIC BLOOD PRESSURE: 72 MMHG | TEMPERATURE: 97.6 F | BODY MASS INDEX: 41.59 KG/M2 | OXYGEN SATURATION: 93 % | RESPIRATION RATE: 18 BRPM | SYSTOLIC BLOOD PRESSURE: 124 MMHG

## 2022-09-16 LAB
GFR NON-AFRICAN AMERICAN: >60 ML/MIN
GFR SERPL CREATININE-BSD FRML MDRD: >60 ML/MIN
GFR SERPL CREATININE-BSD FRML MDRD: NORMAL ML/MIN/{1.73_M2}
GLUCOSE BLD-MCNC: 96 MG/DL (ref 74–100)
PLATELET # BLD: NORMAL K/UL (ref 138–453)
PLATELET, FLUORESCENCE: 132 K/UL (ref 138–453)
PLATELET, IMMATURE FRACTION: 14.6 % (ref 1.1–10.3)
POC BUN: 25 MG/DL (ref 8–26)
POC CHLORIDE: 108 MMOL/L (ref 98–107)
POC CREATININE: 0.66 MG/DL (ref 0.51–1.19)
POC HEMATOCRIT: 43 % (ref 36–46)
POC HEMOGLOBIN: 14.7 G/DL (ref 12–16)
POC INR: 1.2
POC POTASSIUM: 4.1 MMOL/L (ref 3.5–4.5)
POC SODIUM: 143 MMOL/L (ref 138–146)
PROTHROMBIN TIME, POC: 14.8 SEC (ref 10.4–14.2)

## 2022-09-16 PROCEDURE — 85014 HEMATOCRIT: CPT

## 2022-09-16 PROCEDURE — 2500000003 HC RX 250 WO HCPCS

## 2022-09-16 PROCEDURE — 85610 PROTHROMBIN TIME: CPT

## 2022-09-16 PROCEDURE — 7100000001 HC PACU RECOVERY - ADDTL 15 MIN

## 2022-09-16 PROCEDURE — 99152 MOD SED SAME PHYS/QHP 5/>YRS: CPT

## 2022-09-16 PROCEDURE — 84132 ASSAY OF SERUM POTASSIUM: CPT

## 2022-09-16 PROCEDURE — C1894 INTRO/SHEATH, NON-LASER: HCPCS

## 2022-09-16 PROCEDURE — 84295 ASSAY OF SERUM SODIUM: CPT

## 2022-09-16 PROCEDURE — 82435 ASSAY OF BLOOD CHLORIDE: CPT

## 2022-09-16 PROCEDURE — 7100000000 HC PACU RECOVERY - FIRST 15 MIN

## 2022-09-16 PROCEDURE — 2709999900 HC NON-CHARGEABLE SUPPLY

## 2022-09-16 PROCEDURE — 82565 ASSAY OF CREATININE: CPT

## 2022-09-16 PROCEDURE — C1769 GUIDE WIRE: HCPCS

## 2022-09-16 PROCEDURE — 84520 ASSAY OF UREA NITROGEN: CPT

## 2022-09-16 PROCEDURE — 82947 ASSAY GLUCOSE BLOOD QUANT: CPT

## 2022-09-16 PROCEDURE — 76937 US GUIDE VASCULAR ACCESS: CPT

## 2022-09-16 PROCEDURE — 85049 AUTOMATED PLATELET COUNT: CPT

## 2022-09-16 PROCEDURE — 85055 RETICULATED PLATELET ASSAY: CPT

## 2022-09-16 PROCEDURE — 93458 L HRT ARTERY/VENTRICLE ANGIO: CPT

## 2022-09-16 PROCEDURE — 6360000002 HC RX W HCPCS

## 2022-09-16 PROCEDURE — 6360000004 HC RX CONTRAST MEDICATION

## 2022-09-16 RX ORDER — SODIUM CHLORIDE 0.9 % (FLUSH) 0.9 %
5-40 SYRINGE (ML) INJECTION EVERY 12 HOURS SCHEDULED
Status: DISCONTINUED | OUTPATIENT
Start: 2022-09-16 | End: 2022-09-17 | Stop reason: HOSPADM

## 2022-09-16 NOTE — OP NOTE
[facility]    CARDIAC CATHETERIZATION    Date:   9/16/2022  Patient name: Edis Earl  Date of admission:  9/16/2022  9:24 AM  MRN:   [id number]  YOB: 1940    Operators:  Primary:   Starr Chapin MD (Attending Physician)    Assistant/CV fellow:  Jacqueline Peralta MD      Procedure performed:     [x] Left Heart Catheterization. [] Graft Angiography. [x] Left Ventriculography. [] Right Heart Catheterization. [x] Coronary Angiography. [] Aortic Valve Studies. [] PCI:      [] Other:       Pre Procedure Conscious Sedation Data:  ASA Class:    [] I [x] II [] III [] IV    Mallampati Class:  [] I [x] II [] III [] IV      Indication:  [] STEMI      [x] + Stress test  [] ACS      [] + EKG Changes  [] Non Q MI       [] Significant Risk Factors  [] Recurrent Angina             [] Diabetes Mellitus    [] New LBBB      [] Uncontrolled HTN. [] CHF / Low EF changes     [] Abnormal CTA / Ca Score      Procedure:  Access:  [] Femoral  [x] Radial  artery       [x] Right  [] Left    Procedure: After informed consent was obtained with explanation of the risks and benefits, patient was brought to the cath lab. The access area was prepped and draped in sterile fashion. 1% lidocaine was used for local block. The artery was cannulated with 6  Fr sheath with brisk arterial blood return. The side port was frequently flushed and aspirated with normal saline. Findings:     Cardiac Arteries and Lesion Findings       LMCA: Normal 0% stenosis. LAD: Mild irregularities 20-30%. LCx: Mild irregularities 20-30%. RCA: Mild irregularities 20-30%. Coronary Tree        Dominance: Right       LV Analysis   LV function assessed as:Normal.   Ejection Fraction   +----------------------------------------------------------------------+---+   ! Method                                                                ! EF%! +----------------------------------------------------------------------+---+   ! LV gram                                                               !55 !   +----------------------------------------------------------------------+---+      LV Segment Contractility        1 - Normal    3 - Mild         5 - Severe       7 - Dyskinesis    hypokinesis      hypokinesis        2 -           4 - Moderate     6 - Akinesis     8 - Aneurysm    Hypokinesis   hypokinesis         Estimated Blood Loss: 10 mL     Conclusions        Procedure Summary        Minimal CAD. Normal LV systolic function. Recommendations        Medical therapy as needed. Risk factor modification. ____________________________________________________________________    History and Risk Factors    [x] Hypertension     [] Family history of CAD  [x] Hyperlipidemia     [] Cerebrovascular Disease   [] Prior MI       [] Peripheral Vascular disease   [] Prior PCI              [] Diabetes Mellitus    [] Left Main PCI. [] Currently on Dialysis. [] Prior CABG. [] Currently smoker. [] Cardiac Arrest outside of healthcare facility. [] Yes    [x] No        Witnessed     [] Yes   [] No     Arrest after arrival of EMS  [] Yes   [] No     [] Cardiac Arrest at other Facility. [] Yes   [x] No    Pre-Procedure Information. Heart Failure       [] Yes    [x] No        Class  [] I      [] II  [] III    [] IV. New Diagnosis    [] Yes  [] No    HF Type      [] Systolic   [] Diastolic          [] Unknown. Diagnostic Test:   EKG       [x] Normal   [] Abnormal    New antiarrhythmia medications:    [] Yes   [x] No   New onset atrial fibrillation / Flutter     [] Yes   [x] No   ECG Abnormalities:      [] V. Fib   [] Alia V. Tach           [] NS V. T   [] New LBBB           [] T.  Inv  []  ST dev > 0.5 mm         [] PVC's freq  [] PVC's infrequent    Stress Test Performed:      [x] Yes    [] No     Type:     [] Stress Echo   [] Exercise Stress Test (no imaging)      [x] Stress Nuclear  [] Stress Imaging     Results   [] Negative   [x] Positive        [] Indeterminate  [] Unavailable     If Positive/ Risk / Extent of Ischemia:       [] Low  [x] Intermediate         [] High  [] Unavailable      Cardiac CTA Performed:     [] Yes    [x] No      Results   [] CAD   [] Non obstructive CAD      [] No CAD   [] Uncertain      [] Unknown   [] Structural Disease. Pre Procedure Medications:   [] Yes    [] No         [] ASA   [x] Beta Blockers      [] Nitrate   [x] Ca Channel Blockers      [] Ranolazine   [x] Statin       [] Plavix/Others antiplatelets      Electronically signed on 9/16/2022 at 11:29 AM by:    Sonja Quiñonez MD  Fellow, [address]      Attending Physician Statement  I have discussed the case of Benny Arenas including pertinent history and exam findings with the resident. I have seen and examined the patient and the key elements of the encounter have been performed by me. I agree with the assessment, plan and orders as documented by the resident With changes made to the note.      Electronically signed by Chu Ray MD on 9/16/2022 at 5:19 PM.    [facility]      [phone]

## 2022-09-16 NOTE — H&P
[facility]   History & Physical               Today's Date: 9/16/2022  Patient Name: Denise Mayorga  Date of admission: No admission date for patient encounter. Patient's age: 79 y. o., 1940  Admission Dx: No admission diagnoses are documented for this encounter. Reason for Admission:  Coronary angiography +/- PCI    CHIEF COMPLAINT:    No chief complaint on file. History Obtained From:  patient, electronic medical record    HISTORY OF PRESENT ILLNESS:    79year old female presents for a coronary angiography to further evaluate a positive stress test. Patient was seen in the clinic for risk stratification for an upcoming radical nephrectomy. A stress test was ordered in the setting of patient having limited exercise capacity and multiple risk factors. Stress test showed a mid anterior wall ischemia with normal EF. Patient presents today for a coronary angiography to further evaluate that positive stress test. Reports no complaints this morning. Past Medical History:   has a past medical history of A-fib ([address] Abdominal pain, Acute blood loss as cause of postoperative anemia, Anxiety, CHF (congestive heart failure) (HCC), Class 2 severe obesity with serious comorbidity and body mass index (BMI) of 37.0 to 37.9 in [facility]), Closed fracture of proximal end of left humerus with routine healing, Colitis, Colitis due to Clostridium difficile, Depression, Diarrhea, Dyslipidemia, Elevated fasting glucose, FH: total abdominal hysterectomy and bilateral salpingo-oophorectomy, Fractures, Glucose intolerance (impaired glucose tolerance), Hypertension, Hypokalemia, Malignant neoplasm of sigmoid colon ([address] Multiple closed fractures of ribs, Obesity, JAYJAY on CPAP, Osteoarthritis, Osteoporosis, Other complete intestinal obstruction ([address] Other specified hypothyroidism, S/P small bowel resection, and Thrombocytopenia, unspecified ([address]     Past Surgical History:   has a past surgical history that includes Total abdominal hysterectomy w/ bilateral salpingoophorectomy (1966); Cholecystectomy (1960s); Varicose vein surgery (Right, 1960s); Hip Arthroplasty (Left); Knee Arthroplasty (Left); Knee Arthroplasty (Right); Cardiac catheterization (09/05/2017); Colonoscopy (N/A, 10/8/2018); pr lap,surg,colectomy, partial, w/anast (N/A, 11/5/2018); pr cystoscopy,insert ureteral stent (Bilateral, 11/5/2018); Colonoscopy (N/A, 4/25/2019); sigmoidoscopy (N/A, 5/9/2019); Small intestine surgery (N/A, 5/22/2019); and Colonoscopy (N/A, 2/3/2020). Home Medications:    Prior to Admission medications    Medication Sig Start Date End Date Taking? Authorizing Provider   levothyroxine (SYNTHROID) 50 MCG tablet TAKE 1 TABLET BY MOUTH EVERY DAY 4/20/22   Emmalene Rolling, DO   metoprolol tartrate (LOPRESSOR) 25 MG tablet TAKE ONE TABLET BY MOUTH TWICE A DAY 4/20/22   Emmalene Rolling, DO   atorvastatin (LIPITOR) 40 MG tablet TAKE 1 TABLET BY MOUTH EVERY DAY AT NIGHT 4/19/22   Emmalene Rolling, DO   JANTOVEN 3 MG tablet TAKE 2 TABLETS BY MOUTH EVERY DAY OR AS DIRECTED PER INR RESULTS 4/19/22   Emmalene Rolling, DO   amLODIPine (NORVASC) 10 MG tablet TAKE 1 TABLET BY MOUTH EVERY DAY 2/21/22   Fredo Augusto, DO   furosemide (LASIX) 20 MG tablet TAKE ONE TABLET BY MOUTH DAILY 8/30/21   Emmalene Rolling, DO   potassium chloride (KLOR-CON M) 20 MEQ extended release tablet Take 1 tablet by mouth daily 3/15/21   Emmalene Rolling, DO   acetaminophen (TYLENOL) 325 MG tablet Take 2 tablets by mouth every 4 hours as needed for Pain or Fever 11/9/18 4/18/22  Sigifredo Dia MD        No current facility-administered medications for this encounter. Allergies:  Pcn [penicillins] and Bextra [valdecoxib]    Social History:   reports that she has never smoked. She has never used smokeless tobacco. She reports that she does not drink alcohol and does not use drugs.      Family History: family history includes High Blood Pressure in her brother. She was adopted. REVIEW OF SYSTEMS:      Constitutional: there has been no unanticipated weight loss. Eyes: No visual changes or diplopia. ENT: No Headaches  Cardiovascular:  Remaining as above  Respiratory: No cough  Gastrointestinal: No abdominal pain. No change in bowel or bladder habits. Genitourinary: No dysuria, trouble voiding, or hematuria. Neurological: No headache. PHYSICAL EXAM:      LMP 01/01/1968    No intake or output data in the 24 hours ending 09/16/22 0845        Constitutional and General Appearance:   alert, cooperative, no distress and appears stated age  [de-identified]:  PERRL, EOMI  Respiratory:  Normal excursion and expansion without use of accessory muscles  Resp Auscultation:  Good respiratory effort. No for increased work of breathing. On auscultation: clear to auscultation bilaterally  Cardiovascular:  Irregular  S1/S2  No murmurs  The apical impulse is not displaced  Abdomen:  Soft  Bowel sounds present  Non-tender to palpation  Extremities:  No cyanosis or clubbing  Lower extremity edema: No  Skin:  Warm and dry  Neurological:  Not performed    Labs:   FASTING LIPID PANEL:  Lab Results   Component Value Date/Time    HDL 46 04/04/2022 10:36 AM    TRIG 63 04/04/2022 10:36 AM     LIVER PROFILE:No results for input(s): AST, ALT, LABALBU in the last 72 hours. Patient's Active Problem List  Active Problems:    * No active hospital problems. *  Resolved Problems:    * No resolved hospital problems. *    DATA:  Stress test FINDINGS 9/1/22:  Ischemia (Reversible Defect):           Yes, mid anterior wall ischemia  Infarction (Irreversible Defect): Inferior wall defect, possible  infarction                                       (diaphragmatic)  Normal Ejection Fraction:               Yes  Normal Segmental Wall Motion:           Yes     IMPRESSION:  LVEF 51%.     IMPRESSION:    Positive stress test with mid anterior wall ischemia and infarct/possible diaphragmatic artifact in inferior wall with normal EF  Risk stratification for upcoming radical nephrectomy due to renal mass  Atrial fibrillation on coumadin at home  HFpEF  HTN  HLD  Colon cancer s/p post colectomy    RECOMMENDATIONS:  Proceed with coronary angiography +/- PCI  Further recommendations to follow after procedure      Discussed with patient and Nurse. Gabriela Mcconnell MD  Fellow, [address] [place] [address]      Please note that part of this chart were generated using voice recognition  dictation software. Although every effort was made to ensure the accuracy of this automated transcription, some errors in transcription may have occurred. Attending Physician Statement  I have discussed the case of Eris Douglass including pertinent history and exam findings with the resident. I have seen and examined the patient and the key elements of the encounter have been performed by me. I agree with the assessment, plan and orders as documented by the resident With changes made to the note.      Electronically signed by Luke Gregorio MD on 9/16/2022 at [place] [address] [facility]      [phone]

## 2022-09-16 NOTE — PROGRESS NOTES
Ambulated wo BR with assistance. Vasc band removed and DSD to site. IV removed. Discharge teaching completed with family present. Discharged at 1400 with family.

## 2022-09-16 NOTE — PROGRESS NOTES
Patient admitted, consent signed, all questions answered. Pt ready for procedure. Bed in low position, call light to reach with side rails up 2 of 2. Groins  clipped. Jocelynn Tripathi RN present in room Family  at bedside with patient.

## 2022-09-16 NOTE — DISCHARGE INSTRUCTIONS
DISCHARGE INSTRUCTIONS / ARM CARE POST CATHERIZATION        ENCOURAGE FLUIDS    NO STRENUOUS LIFTING WITH AFFECTED ARM FOR 3 DAYS ANYTHING HEAVIER THAN 8 TO 10 POUNDS    REMOVE BAND-AID/PRESSURE DRESSING THE FOLLOWING DAY AND DO NOT APPLY ANY FURTHER BAND-AIDS    KEEP INCISION CLEAN DRY AND OPEN TO THE AIR / NO HAND LOTION NEAR PUNCTURE SITE    WATCH FOR SIGNS OF INFECTION /  REDNESS / SWELLING / DRAINAGE / [place] / TEMPERATURE GREATER THAN 101    IF BLEEDING OCCURS HOLD MANUAL PRESSURE DIRECTLY OVER SITE  (YOU WILL FEEL PULSATION OF ARTERY) AND IF BLEEDING DOES NOT STOP AFTER 2 MINUTES CALL 911    OK TO SHOWER THE NEXT DAY, NO TUB BATHING OR HOT TUBS/SWIMMING FOR 7 DAYS    IF AREA BECOMES HARD AND SWOLLEN AND IF YOU ARE AT ALL CONCERNED SEEK HELP IMMEDIATELY    SEEK HELP IMMEDIATELY IF AFFECTED ARM BECOMES COLD / Ashlee Crease / SEVERE PAIN / NAILBEDS TURN BLUE     IF ON METFORMIN / GLUCOPHAGE DO NOT RESTART MEDICATION FOR 48 HOURS    PLEASE PRACTICE GOOD HAND WASHING AND INCLUDE PUNCTURE SITE ESPECIALLY AFTER USING THE RESTROOM    AVOID USING ALCOHOL BASED HAND SANITIZERS FOR ONE WEEK      CALL 911 if you have symptoms including:   Drooping facial muscles   Changes in vision or speech   Difficulty walking or using your limbs   Change in sensation to affected leg, including numbness, feeling cold, or change in color   Extreme sweating, nausea or vomiting   Dizziness or lightheadedness   Chest pain   Rapid, irregular heartbeat   Palpitations   Cough, shortness of breath, or difficulty breathing   Weakness or fainting   If you think you have an emergency, CALL 911 . SEDATION / ANALGESIA INFORMATION / Y[address]air[address] 84 have received the sedation/analgesia medication during your visit    Sedation/analgesia is used during short medical procedures under controlled supervision. The medication will produce a strong relaxation.  You will be able to hear, speak and follow instructions, but your memory and alertness will be decreased. You will be able to swallow and breathe on your own. During sedation/analgesia your blood pressure, heart and breathing will be watched closely. After the procedure, you may not remember what was said or done. You may have the following effects from the medication. \" Drowsiness, dizziness, sleepiness or confusion. \" Difficulty remembering or delayed reaction times. \" Loss of fine muscle control or difficulty with your balance especially while walking. \" Difficulty focusing or blurred vision. You may not be aware of slight changes in your behavior and/or your reaction time because of the medication used during the procedure. Therefore you should follow these instructions. \" Have someone responsible help you with your care. \" Do not drive for 24 hours. \" Do not operate equipment for 24 hours (lawnmowers, power tools, kitchen accessories, stove). \" Do not drink any alcoholic beverages for a minimum of 24 hours. \" Do not make important personal, legal or business decisions for 24 hours. \" You may experience dizziness or lightheadedness. Move slowly and carefully, do not make sudden position changes. \" Drink extra amounts of fluids today. \" Increase your diet as tolerated (unless you have received specific instructions from your doctor). \" If you feel nauseated, continue with liquids until the nausea is gone. \" Notify your physician if you have not urinated within 8 hours after the procedure. \" Resume your medications unless otherwise instructed. Coronary artery disease (CAD) occurs when plaque builds up in the arteries that bring oxygen-rich blood to your heart. Plaque is a fatty substance made of cholesterol, calcium, and other substances in the blood. This process is called hardening of the arteries, or atherosclerosis. What happens when you have coronary artery disease? Plaque may narrow the coronary arteries. Narrowed arteries cause poor blood flow.  This can lead to angina symptoms such as chest pain or discomfort. If blood flow is completely blocked, you could have a heart attack. You can slow CAD and reduce the risk of future problems by making changes in your lifestyle. These include quitting smoking and eating heart-healthy foods. Treatments for CAD, along with changes in your lifestyle, can help you live a longer and healthier life. How can you prevent coronary artery disease? Do not smoke. It may be the best thing you can do to prevent heart disease. If you need help quitting, talk to your doctor about stop-smoking programs and medicines. These can increase your chances of quitting for good. Be active. Get at least 30 minutes of exercise on most days of the week. Walking is a good choice. You also may want to do other activities, such as running, swimming, cycling, or playing tennis or team sports. Eat heart-healthy foods. Eat more fruits and vegetables and less foods that contain saturated and trans fats. Limit alcohol, sodium, and sweets. Stay at a healthy weight. Lose weight if you need to. Manage other health problems such as diabetes, high blood pressure, and high cholesterol. Talk to your doctor about taking a daily aspirin. Manage stress. Stress can hurt your heart. To keep stress low, talk about your problems and feelings. Don't keep your feelings hidden. How is coronary artery disease treated? Your doctor will suggest that you make lifestyle changes. For example, your doctor may ask you to eat healthy foods, quit smoking, lose extra weight, and be more active. You will have to take medicines. Your doctor may suggest a procedure to open narrowed or blocked arteries. This is called angioplasty. Or your doctor may suggest using healthy blood vessels to create detours around narrowed or blocked arteries. This is called bypass surgery. Follow-up care is a key part of your treatment and safety.  Be sure to make and go to all appointments, and call your doctor if you are having problems. It's also a good idea to know your test results and keep a list of the medicines you take. Where can you learn more? Go to https://niurka.Lenda. org and sign in to your [employer] account. Enter [phone] in the The Training Room (TTR) box to learn more about Learning About Coronary Artery Disease (CAD).     If you do not have an account, please click on the Sign Up Now link.

## 2022-09-16 NOTE — PLAN OF CARE
The patient underwent cardiac cath for an abnormal stress test, found to have minimal CAD. The patient can proceed for the planned procedure as a low risk from Cardiology standpoint. Wayne Amaro MD  Fellow, [address]        Attending Physician Statement  I have discussed the case of Justin Stall including pertinent history and exam findings with the resident. I have seen and examined the patient and the key elements of the encounter have been performed by me. I agree with the assessment, plan and orders as documented by the resident With changes made to the note.      Electronically signed by Kelly Callejas MD on 9/16/2022 at 5:19 PM.    [facility]      [phone]

## 2022-09-22 ENCOUNTER — TELEPHONE (OUTPATIENT)
Dept: UROLOGY | Age: 82
End: 2022-09-22

## 2022-09-22 NOTE — TELEPHONE ENCOUNTER
Spoke with patient, had questions of whether she will be staying at the hospital after the procedure. Let her know at least a day.

## 2022-09-22 NOTE — TELEPHONE ENCOUNTER
Patient called the office asking to speak with Dr Mavis Ellis nurse. Patient received a call from [facility] regarding her upcoming surgery 10/7/22. Patient states she is confused after talking to [facility]'s.   Please call her back today @ # [phone]

## 2022-09-23 ENCOUNTER — ANTI-COAG VISIT (OUTPATIENT)
Dept: FAMILY MEDICINE CLINIC | Age: 82
End: 2022-09-23
Payer: MEDICARE

## 2022-09-23 DIAGNOSIS — I48.11 LONGSTANDING PERSISTENT ATRIAL FIBRILLATION (HCC): Primary | ICD-10-CM

## 2022-09-23 LAB — INR BLD: 1.3

## 2022-09-23 PROCEDURE — 93793 ANTICOAG MGMT PT WARFARIN: CPT | Performed by: FAMILY MEDICINE

## 2022-09-30 ENCOUNTER — ANTI-COAG VISIT (OUTPATIENT)
Dept: FAMILY MEDICINE CLINIC | Age: 82
End: 2022-09-30
Payer: MEDICARE

## 2022-09-30 DIAGNOSIS — I48.0 PAROXYSMAL ATRIAL FIBRILLATION (HCC): Primary | ICD-10-CM

## 2022-09-30 LAB — INR BLD: 1.6

## 2022-09-30 PROCEDURE — 93793 ANTICOAG MGMT PT WARFARIN: CPT | Performed by: FAMILY MEDICINE

## 2022-09-30 NOTE — PROGRESS NOTES
Unable to get in touch with patient. Voicemail full. Called her son and he said she is having issues with her phone. Asked him if he knew if she was off her coumadin recently or when she needed to start holding for her procedure on 10/7/2022. He was unsure. Dr Chapis Saleh advised to continue current dose until we can speak further with her on Monday and clarify when she restarted and when she needs to hold.

## 2022-10-03 ENCOUNTER — TELEPHONE (OUTPATIENT)
Dept: FAMILY MEDICINE CLINIC | Age: 82
End: 2022-10-03

## 2022-10-03 NOTE — PROGRESS NOTES
Spoke with patient and she said she took her last dose of Coumadin 09/29/2022 as they wanted her off Coumadin 7 days prior to surgery to remove right kidney. Surgery is 10/07/2022. Will repeat in INR on 10/14/2022.

## 2022-10-06 ENCOUNTER — TELEMEDICINE (OUTPATIENT)
Dept: FAMILY MEDICINE CLINIC | Age: 82
End: 2022-10-06
Payer: MEDICARE

## 2022-10-06 ENCOUNTER — TELEPHONE (OUTPATIENT)
Dept: FAMILY MEDICINE CLINIC | Age: 82
End: 2022-10-06

## 2022-10-06 ENCOUNTER — TELEPHONE (OUTPATIENT)
Dept: UROLOGY | Age: 82
End: 2022-10-06

## 2022-10-06 DIAGNOSIS — R73.01 IMPAIRED FASTING GLUCOSE: ICD-10-CM

## 2022-10-06 DIAGNOSIS — E78.2 MIXED HYPERLIPIDEMIA: ICD-10-CM

## 2022-10-06 DIAGNOSIS — I48.0 PAROXYSMAL ATRIAL FIBRILLATION (HCC): ICD-10-CM

## 2022-10-06 DIAGNOSIS — I10 ESSENTIAL HYPERTENSION: ICD-10-CM

## 2022-10-06 DIAGNOSIS — E03.9 ACQUIRED HYPOTHYROIDISM: ICD-10-CM

## 2022-10-06 DIAGNOSIS — N28.89 RIGHT RENAL MASS: Primary | ICD-10-CM

## 2022-10-06 PROCEDURE — 99213 OFFICE O/P EST LOW 20 MIN: CPT

## 2022-10-06 PROCEDURE — 1090F PRES/ABSN URINE INCON ASSESS: CPT | Performed by: FAMILY MEDICINE

## 2022-10-06 PROCEDURE — G8399 PT W/DXA RESULTS DOCUMENT: HCPCS | Performed by: FAMILY MEDICINE

## 2022-10-06 PROCEDURE — G8417 CALC BMI ABV UP PARAM F/U: HCPCS | Performed by: FAMILY MEDICINE

## 2022-10-06 PROCEDURE — G8427 DOCREV CUR MEDS BY ELIG CLIN: HCPCS | Performed by: FAMILY MEDICINE

## 2022-10-06 PROCEDURE — 99214 OFFICE O/P EST MOD 30 MIN: CPT | Performed by: FAMILY MEDICINE

## 2022-10-06 PROCEDURE — 1036F TOBACCO NON-USER: CPT | Performed by: FAMILY MEDICINE

## 2022-10-06 PROCEDURE — G8484 FLU IMMUNIZE NO ADMIN: HCPCS | Performed by: FAMILY MEDICINE

## 2022-10-06 PROCEDURE — 1123F ACP DISCUSS/DSCN MKR DOCD: CPT | Performed by: FAMILY MEDICINE

## 2022-10-06 SDOH — ECONOMIC STABILITY: FOOD INSECURITY: WITHIN THE PAST 12 MONTHS, THE FOOD YOU BOUGHT JUST DIDN'T LAST AND YOU DIDN'T HAVE MONEY TO GET MORE.: NEVER TRUE

## 2022-10-06 SDOH — ECONOMIC STABILITY: FOOD INSECURITY: WITHIN THE PAST 12 MONTHS, YOU WORRIED THAT YOUR FOOD WOULD RUN OUT BEFORE YOU GOT MONEY TO BUY MORE.: NEVER TRUE

## 2022-10-06 ASSESSMENT — PATIENT HEALTH QUESTIONNAIRE - PHQ9
1. LITTLE INTEREST OR PLEASURE IN DOING THINGS: 0
4. FEELING TIRED OR HAVING LITTLE ENERGY: 1
7. TROUBLE CONCENTRATING ON THINGS, SUCH AS READING THE NEWSPAPER OR WATCHING TELEVISION: 0
5. POOR APPETITE OR OVEREATING: 0
SUM OF ALL RESPONSES TO PHQ QUESTIONS 1-9: 1
SUM OF ALL RESPONSES TO PHQ9 QUESTIONS 1 & 2: 0
10. IF YOU CHECKED OFF ANY PROBLEMS, HOW DIFFICULT HAVE THESE PROBLEMS MADE IT FOR YOU TO DO YOUR WORK, TAKE CARE OF THINGS AT HOME, OR GET ALONG WITH OTHER PEOPLE: 0
2. FEELING DOWN, DEPRESSED OR HOPELESS: 0
8. MOVING OR SPEAKING SO SLOWLY THAT OTHER PEOPLE COULD HAVE NOTICED. OR THE OPPOSITE, BEING SO FIGETY OR RESTLESS THAT YOU HAVE BEEN MOVING AROUND A LOT MORE THAN USUAL: 0
6. FEELING BAD ABOUT YOURSELF - OR THAT YOU ARE A FAILURE OR HAVE LET YOURSELF OR YOUR FAMILY DOWN: 0
9. THOUGHTS THAT YOU WOULD BE BETTER OFF DEAD, OR OF HURTING YOURSELF: 0
3. TROUBLE FALLING OR STAYING ASLEEP: 0

## 2022-10-06 ASSESSMENT — ENCOUNTER SYMPTOMS
TROUBLE SWALLOWING: 0
NAUSEA: 0
EYE REDNESS: 0
VOMITING: 0
SHORTNESS OF BREATH: 0
SINUS PRESSURE: 0
EYE DISCHARGE: 0
RHINORRHEA: 0
DIARRHEA: 0
CONSTIPATION: 0
WHEEZING: 0
COUGH: 0
SORE THROAT: 0
ABDOMINAL PAIN: 0

## 2022-10-06 ASSESSMENT — SOCIAL DETERMINANTS OF HEALTH (SDOH): HOW HARD IS IT FOR YOU TO PAY FOR THE VERY BASICS LIKE FOOD, HOUSING, MEDICAL CARE, AND HEATING?: NOT VERY HARD

## 2022-10-06 NOTE — TELEPHONE ENCOUNTER
PAT at [facility] would like to know if patient is cleared for procedure

## 2022-10-06 NOTE — PROGRESS NOTES
10/6/2022    TELEHEALTH EVALUATION -- Audio/Visual (During [id number] public health emergency)    HPI:    Sujey Herr (:  1940) has requested an audio/video evaluation for the following concern(s):    Patient is seen today via a video visit for evaluation for medical clearance for upcoming proposed robotic assisted right nephrectomy. Patient was found to have a right renal mass that with follow-up imaging in 2022 had enlarged. It was decided at that time to pursue nephrectomy as there is concern regarding possible renal cell carcinoma. In preparation for this she did have an appointment to see the cardiologist for cardiac clearance and due to her known history of atrial fibrillation and limited functional capacity with cardiac risk factors it was decided to pursue stress testing. Ultimately the stress test did show some abnormalities so she proceeded with cardiac catheterization on 2022. Cardiac cath showed minimal coronary artery disease with normal LV systolic function. At that time she was felt to be stable enough from a cardiac standpoint to be cleared for surgical intervention. I was asked today if she was medically cleared but as I had not seen her in the office in several months I felt that it was best to at least connect with her via virtual visit to discuss her overall health. She does have difficulty with transportation and so we worked her into our schedule today for evaluation via virtual evaluation. Patient otherwise has felt relatively well. Is not having any complaint of shortness of breath chest pain nausea vomiting diarrhea fever chills headaches or other acute concerning issues. She has not had any issues with edema in her lower extremities. No other acute cardiac or medical concerns noted. She has been holding her Coumadin for 7 days. I did review recent lab testing that was done in preparation of her cardiac catheterization.    Point-of-care testing was done with her electrolytes creatinine glucose hemoglobin and hematocrit and platelet count which were all relatively normal.  Patient's recent lab reports are as follows:    Lab Results   Component Value Date/Time    WBC 7.0 04/04/2022 10:36 AM    RBC 4.49 04/04/2022 10:36 AM    HGB 14.1 04/04/2022 10:36 AM    HCT 43.9 04/04/2022 10:36 AM    MCV 97.8 04/04/2022 10:36 AM    MCH 31.4 04/04/2022 10:36 AM    MCHC 32.1 04/04/2022 10:36 AM    RDW 14.6 04/04/2022 10:36 AM    PLT See Reflexed IPF Result 09/16/2022 10:14 AM    MPV NOT REPORTED 12/30/2021 01:50 PM       Lab Results   Component Value Date/Time    CHOL 90 04/04/2022 10:36 AM    HDL 46 04/04/2022 10:36 AM    CHOLHDLRATIO 2.0 04/04/2022 10:36 AM    TRIG 63 04/04/2022 10:36 AM    VLDL NOT REPORTED 09/13/2021 10:34 AM       Lab Results   Component Value Date/Time    TSH 1.75 04/04/2022 10:36 AM       Lab Results   Component Value Date/Time     07/13/2022 09:12 AM    K 4.2 07/13/2022 09:12 AM     07/13/2022 09:12 AM    CO2 28 07/13/2022 09:12 AM    BUN 23 07/13/2022 09:12 AM    CREATININE 0.66 09/16/2022 10:08 AM    CREATININE 0.76 07/13/2022 09:12 AM    GLUCOSE 102 07/13/2022 09:12 AM    CALCIUM 9.8 07/13/2022 09:12 AM       Lab Results   Component Value Date/Time    LABA1C 5.7 04/04/2022 10:37 AM           Other review of systems are as noted below. Review of Systems   Constitutional:  Negative for chills, fatigue and fever. HENT:  Negative for congestion, ear pain, postnasal drip, rhinorrhea, sinus pressure, sore throat and trouble swallowing. Eyes:  Negative for discharge and redness. Respiratory:  Negative for cough, shortness of breath and wheezing. Cardiovascular:  Negative for chest pain. Gastrointestinal:  Negative for abdominal pain, constipation, diarrhea, nausea and vomiting. Genitourinary:  Negative for dysuria, flank pain, frequency and urgency. Musculoskeletal:  Negative for arthralgias, myalgias and neck pain. Skin:  Negative for rash and wound. Allergic/Immunologic: Negative for environmental allergies. Neurological:  Negative for dizziness, weakness, light-headedness and headaches. Hematological:  Negative for adenopathy. Psychiatric/Behavioral: Negative. Prior to Visit Medications    Medication Sig Taking? Authorizing Provider   levothyroxine (SYNTHROID) 50 MCG tablet TAKE 1 TABLET BY MOUTH EVERY DAY Yes Cloretta Pendroy, DO   metoprolol tartrate (LOPRESSOR) 25 MG tablet TAKE ONE TABLET BY MOUTH TWICE A DAY Yes Emy Trevino,    atorvastatin (LIPITOR) 40 MG tablet TAKE 1 TABLET BY MOUTH EVERY DAY AT NIGHT Yes Cloretta Pendroy, DO   amLODIPine (NORVASC) 10 MG tablet TAKE 1 TABLET BY MOUTH EVERY DAY Yes Fredo Casas, DO   furosemide (LASIX) 20 MG tablet TAKE ONE TABLET BY MOUTH DAILY Yes Cloretta Pendroy, DO   potassium chloride (KLOR-CON M) 20 MEQ extended release tablet Take 1 tablet by mouth daily Yes Cloretta Pendroy, DO   acetaminophen (TYLENOL) 325 MG tablet Take 2 tablets by mouth every 4 hours as needed for Pain or Fever Yes Hira Mckeon MD   JANTOVEN 3 MG tablet TAKE 2 TABLETS BY MOUTH EVERY DAY OR AS DIRECTED PER INR RESULTS  Patient taking differently: Pt. stopped 6-29-22 for OR 10-7-22/ Mgmt.  TCC  Cloretta Pendroy, DO       Social History     Tobacco Use    Smoking status: Never     Passive exposure: Never    Smokeless tobacco: Never   Vaping Use    Vaping Use: Never used   Substance Use Topics    Alcohol use: No    Drug use: No        Allergies   Allergen Reactions    Pcn [Penicillins] Hives and Shortness Of Breath    Bextra [Valdecoxib] Diarrhea   ,   Past Medical History:   Diagnosis Date    [facility] 07/19/2017    Abdominal pain     Acute blood loss as cause of postoperative anemia 11/08/2018    Anxiety 04/30/2016    CAD (coronary artery disease)     TCC/[facility]/ last seen 9-2022    CHF (congestive heart failure) (HCC)     Class 2 severe obesity with serious comorbidity and body mass index (BMI) of 37.0 to 37.9 in adult [facility])     Closed fracture of proximal end of left humerus with routine healing 04/26/2021    Colitis 05/31/2019    Colitis due to Clostridium difficile     Depression 04/30/2016    Diarrhea     Dyslipidemia     Elevated fasting glucose 11/20/2018    FH: total abdominal hysterectomy and bilateral salpingo-oophorectomy 1966    Fractures 03/15/2021    Lt humerus impacted displaced    Full dentures     Glucose intolerance (impaired glucose tolerance)     History of blood transfusion     no reaction    Hyperlipidemia     Hypertension     Hypokalemia     Malignant neoplasm of sigmoid colon ([address]     kidney cancer right    Multiple closed fractures of ribs 09/22/2018    Obesity     JAYJAY on CPAP 10/06/2018    Osteoarthritis     Osteoporosis 08/30/2013    Other complete intestinal obstruction ([address] 11/09/2018    Other specified hypothyroidism     Prolonged emergence from general anesthesia     S/P small bowel resection 05/22/2019    Thrombocytopenia, unspecified ([facility] 03/09/2020    Under care of team     Hem-Onc Dr. Miki puentes/last seen 4-2022    Wears glasses     Wellness examination     PCP Te Fuentes DO/ [facility] [place]/ last seen 4-2022   ,   Past Surgical History:   Procedure Laterality Date    CARDIAC CATHETERIZATION  09/05/2017    CARDIAC CATHETERIZATION N/A     CHOLECYSTECTOMY  1960s    COLONOSCOPY N/A 10/08/2018    COLONOSCOPY WITH COLD ET HOT BIOPSIES ET POLYPECTOMIES performed by Harley Barragan MD at [address] N/A 04/25/2019    COLONOSCOPY performed by Rosita Schneider MD at [facility]  stricture at 7 cm     COLONOSCOPY N/A 02/03/2020    tubular adenoma X 1, Dr. Alycia Cuadra (CERVIX STATUS UNKNOWN)      KNEE ARTHROPLASTY Left     KNEE ARTHROPLASTY Right     WI CYSTOSCOPY,INSERT URETERAL STENT Bilateral 11/05/2018    CYSTO Bilateral Lighted stent placements performed by Tanja Cutler MD at [address] [facility],SURG,COLECTOMY, PARTIAL, W/ANAST N/A 11/05/2018    Open Sigmoid Colectomy w/ lighted stents ILEOSTOMY PLACEMENT performed by Jane Pereira MD at [address] N/A 05/09/2019    Flexible Sigmoidoscopy with Dilatation of rectal stricture performed by Merrill Daniel MD at [address] N/A 05/22/2019    Loop Ileostomy take down performed by Merrill Daniel MD at [address] [place] [address] Sw (CERVIX REMOVED)  [address] [facility] Right 1960s       Physical Exam  Vitals and nursing note reviewed. Constitutional:       General: She is not in acute distress. Appearance: Normal appearance. HENT:      Head: Normocephalic and atraumatic. Right Ear: External ear normal.      Left Ear: External ear normal.      Nose: Nose normal. No congestion or rhinorrhea. Mouth/Throat:      Mouth: Mucous membranes are moist.   Eyes:      General:         Right eye: No discharge. Left eye: No discharge. Extraocular Movements: Extraocular movements intact. Conjunctiva/sclera: Conjunctivae normal.   Pulmonary:      Effort: Pulmonary effort is normal.   Musculoskeletal:      Cervical back: Normal range of motion. Skin:     Findings: No erythema or rash. Neurological:      Mental Status: She is alert and oriented to person, place, and time. Mental status is at baseline. ASSESSMENT/PLAN:    Encounter Diagnoses   Name Primary? Right renal mass Yes    Paroxysmal atrial fibrillation (HCC)     Essential hypertension     Mixed hyperlipidemia     Acquired hypothyroidism     Impaired fasting glucose      At this point after review of her preoperative testing, cardiac evaluation and office visit notes as well as our discussion today patient is medically stable and cleared for upcoming proposed right nephrectomy. She has been already holding her Coumadin therapy. Should have her Coumadin restarted after hemostasis is obtained post operatively.     Patient is to follow-up in my office postoperatively for transitional care follow-up and also for further assessment and management of her chronic health conditions. She can certainly return sooner if she has any further problems or symptomatology. (Please note that portions of this note were completed with a voice recognition program. Efforts were made to edit the dictations but occasionally words are mis-transcribed.)        No follow-ups on file. Clark Ozuna, was evaluated through a synchronous (real-time) audio-video encounter. The patient (or guardian if applicable) is aware that this is a billable service, which includes applicable co-pays. This Virtual Visit was conducted with patient's (and/or legal guardian's) consent. The visit was conducted pursuant to the emergency declaration under the [address] [place] [id number] [facility] authority and the [employer] and [employer] General Act. Patient identification was verified, and a caregiver was present when appropriate. The patient was located in a state where the provider was licensed to provide care. Total time spent for this encounter: Not billed by time    --Sheryl Lea DO on 10/6/2022 at 11:01 AM    An electronic signature was used to authenticate this note. Services were provided through a video synchronous discussion virtually to substitute for in-person clinic visit. Patient was in their home setting on their mobile device and I was in my office on a secured video interface. --Sheryl Lea DO on 10/6/2022 at 11:01 AM    An electronic signature was used to authenticate this note. room air

## 2022-10-06 NOTE — TELEPHONE ENCOUNTER
[address]         Patient: Freddy Jimenez           :1940           Surgical/Procedure Planned: Robotic right radical nephrectomy( possible open)    Date & Location: 10/7/22 [facility]       Inpatient   Planned Length of OR: 3    Sedation: general    Estimated Cardiac Risk for Non-Cardiac Surgery/Procedure     Low______ Moderate____x__ High______    Medication Instructions - Clarification needed by this date:   -Insulin:  -Other medications:    ASA 81 mg/325 mg Hold _5__ Days  Coumadin  Hold __5_ Days      Resume medications:     Lovenox indicated: _____Yes __x___NO    Provider: Dr Gavin Acuna of Provider Giving Orders for Medication hold  Medina Winter MD  _______

## 2022-10-06 NOTE — TELEPHONE ENCOUNTER
Spoke with Dr. Brianna Bravo, received medical clearance form in July but feels that patient should be evaluated for clearance again since that was so long ago. Appointment scheduled.

## 2022-10-06 NOTE — TELEPHONE ENCOUNTER
I haven't actually seen patient in the office in person for over a year. I did do a virtual visit on her in April. Not sure if her procedure is tomorrow, why they are just now requesting medical clearance. Hard for me to say since she hasn't been seen in person recently by myself. Unfortunately since she is having her procedure tomorrow, it doesn't give us an opportunity to assess her in the office to make sure that she is medically stable and doing well prior to procedure.

## 2022-10-07 ENCOUNTER — ANESTHESIA (OUTPATIENT)
Dept: OPERATING ROOM | Age: 82
DRG: 660 | End: 2022-10-07
Payer: MEDICARE

## 2022-10-07 ENCOUNTER — HOSPITAL ENCOUNTER (INPATIENT)
Age: 82
LOS: 3 days | Discharge: HOME HEALTH | DRG: 660 | End: 2022-10-10
Attending: UROLOGY | Admitting: UROLOGY
Payer: MEDICARE

## 2022-10-07 ENCOUNTER — ANESTHESIA EVENT (OUTPATIENT)
Dept: OPERATING ROOM | Age: 82
DRG: 660 | End: 2022-10-07
Payer: MEDICARE

## 2022-10-07 DIAGNOSIS — G89.18 ACUTE POST-OPERATIVE PAIN: Primary | ICD-10-CM

## 2022-10-07 DIAGNOSIS — N28.89 RENAL MASS, RIGHT: ICD-10-CM

## 2022-10-07 LAB
ANION GAP SERPL CALCULATED.3IONS-SCNC: 9 MMOL/L (ref 9–17)
BUN BLDV-MCNC: 17 MG/DL (ref 8–23)
CALCIUM SERPL-MCNC: 8.7 MG/DL (ref 8.6–10.4)
CHLORIDE BLD-SCNC: 107 MMOL/L (ref 98–107)
CO2: 25 MMOL/L (ref 20–31)
CREAT SERPL-MCNC: 0.73 MG/DL (ref 0.5–0.9)
GFR SERPL CREATININE-BSD FRML MDRD: >60 ML/MIN/1.73M2
GLUCOSE BLD-MCNC: 171 MG/DL (ref 70–99)
HCT VFR BLD CALC: 40.9 % (ref 36.3–47.1)
HEMOGLOBIN: 13.1 G/DL (ref 11.9–15.1)
MCH RBC QN AUTO: 31 PG (ref 25.2–33.5)
MCHC RBC AUTO-ENTMCNC: 32 G/DL (ref 28.4–34.8)
MCV RBC AUTO: 96.9 FL (ref 82.6–102.9)
NRBC AUTOMATED: 0 PER 100 WBC
PDW BLD-RTO: 14.5 % (ref 11.8–14.4)
PLATELET # BLD: ABNORMAL K/UL (ref 138–453)
PLATELET, FLUORESCENCE: 112 K/UL (ref 138–453)
PLATELET, IMMATURE FRACTION: 19.9 % (ref 1.1–10.3)
POC POTASSIUM: 3.8 MMOL/L (ref 3.5–4.5)
POTASSIUM SERPL-SCNC: 3.9 MMOL/L (ref 3.7–5.3)
RBC # BLD: 4.22 M/UL (ref 3.95–5.11)
SODIUM BLD-SCNC: 141 MMOL/L (ref 135–144)
WBC # BLD: 9.7 K/UL (ref 3.5–11.3)

## 2022-10-07 PROCEDURE — 88313 SPECIAL STAINS GROUP 2: CPT

## 2022-10-07 PROCEDURE — 1200000000 HC SEMI PRIVATE

## 2022-10-07 PROCEDURE — 85055 RETICULATED PLATELET ASSAY: CPT

## 2022-10-07 PROCEDURE — 3600000019 HC SURGERY ROBOT ADDTL 15MIN: Performed by: UROLOGY

## 2022-10-07 PROCEDURE — 93005 ELECTROCARDIOGRAM TRACING: CPT | Performed by: ANESTHESIOLOGY

## 2022-10-07 PROCEDURE — 85027 COMPLETE CBC AUTOMATED: CPT

## 2022-10-07 PROCEDURE — 0DNW3ZZ RELEASE PERITONEUM, PERCUTANEOUS APPROACH: ICD-10-PCS | Performed by: UROLOGY

## 2022-10-07 PROCEDURE — 88341 IMHCHEM/IMCYTCHM EA ADD ANTB: CPT

## 2022-10-07 PROCEDURE — S2900 ROBOTIC SURGICAL SYSTEM: HCPCS | Performed by: UROLOGY

## 2022-10-07 PROCEDURE — 3600000009 HC SURGERY ROBOT BASE: Performed by: UROLOGY

## 2022-10-07 PROCEDURE — 84132 ASSAY OF SERUM POTASSIUM: CPT

## 2022-10-07 PROCEDURE — 8E0W4CZ ROBOTIC ASSISTED PROCEDURE OF TRUNK REGION, PERCUTANEOUS ENDOSCOPIC APPROACH: ICD-10-PCS | Performed by: UROLOGY

## 2022-10-07 PROCEDURE — 7100000000 HC PACU RECOVERY - FIRST 15 MIN: Performed by: UROLOGY

## 2022-10-07 PROCEDURE — 2580000003 HC RX 258: Performed by: UROLOGY

## 2022-10-07 PROCEDURE — 0TT04ZZ RESECTION OF RIGHT KIDNEY, PERCUTANEOUS ENDOSCOPIC APPROACH: ICD-10-PCS | Performed by: UROLOGY

## 2022-10-07 PROCEDURE — 6370000000 HC RX 637 (ALT 250 FOR IP): Performed by: STUDENT IN AN ORGANIZED HEALTH CARE EDUCATION/TRAINING PROGRAM

## 2022-10-07 PROCEDURE — 7100000001 HC PACU RECOVERY - ADDTL 15 MIN: Performed by: UROLOGY

## 2022-10-07 PROCEDURE — 2720000010 HC SURG SUPPLY STERILE: Performed by: UROLOGY

## 2022-10-07 PROCEDURE — 2500000003 HC RX 250 WO HCPCS: Performed by: UROLOGY

## 2022-10-07 PROCEDURE — 2580000003 HC RX 258: Performed by: STUDENT IN AN ORGANIZED HEALTH CARE EDUCATION/TRAINING PROGRAM

## 2022-10-07 PROCEDURE — 88307 TISSUE EXAM BY PATHOLOGIST: CPT

## 2022-10-07 PROCEDURE — 88342 IMHCHEM/IMCYTCHM 1ST ANTB: CPT

## 2022-10-07 PROCEDURE — 2580000003 HC RX 258

## 2022-10-07 PROCEDURE — 3700000001 HC ADD 15 MINUTES (ANESTHESIA): Performed by: UROLOGY

## 2022-10-07 PROCEDURE — 6360000002 HC RX W HCPCS

## 2022-10-07 PROCEDURE — 6360000002 HC RX W HCPCS: Performed by: ANESTHESIOLOGY

## 2022-10-07 PROCEDURE — 2500000003 HC RX 250 WO HCPCS

## 2022-10-07 PROCEDURE — 3700000000 HC ANESTHESIA ATTENDED CARE: Performed by: UROLOGY

## 2022-10-07 PROCEDURE — 80048 BASIC METABOLIC PNL TOTAL CA: CPT

## 2022-10-07 PROCEDURE — 87086 URINE CULTURE/COLONY COUNT: CPT

## 2022-10-07 PROCEDURE — 6360000002 HC RX W HCPCS: Performed by: STUDENT IN AN ORGANIZED HEALTH CARE EDUCATION/TRAINING PROGRAM

## 2022-10-07 PROCEDURE — 2709999900 HC NON-CHARGEABLE SUPPLY: Performed by: UROLOGY

## 2022-10-07 RX ORDER — SODIUM CHLORIDE 0.9 % (FLUSH) 0.9 %
5-40 SYRINGE (ML) INJECTION PRN
Status: DISCONTINUED | OUTPATIENT
Start: 2022-10-07 | End: 2022-10-10 | Stop reason: HOSPADM

## 2022-10-07 RX ORDER — FUROSEMIDE 20 MG/1
20 TABLET ORAL DAILY
Status: DISCONTINUED | OUTPATIENT
Start: 2022-10-07 | End: 2022-10-10 | Stop reason: HOSPADM

## 2022-10-07 RX ORDER — MORPHINE SULFATE 4 MG/ML
4 INJECTION, SOLUTION INTRAMUSCULAR; INTRAVENOUS
Status: DISCONTINUED | OUTPATIENT
Start: 2022-10-07 | End: 2022-10-10 | Stop reason: HOSPADM

## 2022-10-07 RX ORDER — BUPIVACAINE HYDROCHLORIDE AND EPINEPHRINE 5; 5 MG/ML; UG/ML
INJECTION, SOLUTION PERINEURAL PRN
Status: DISCONTINUED | OUTPATIENT
Start: 2022-10-07 | End: 2022-10-07 | Stop reason: HOSPADM

## 2022-10-07 RX ORDER — FENTANYL CITRATE 50 UG/ML
50 INJECTION, SOLUTION INTRAMUSCULAR; INTRAVENOUS EVERY 5 MIN PRN
Status: DISCONTINUED | OUTPATIENT
Start: 2022-10-07 | End: 2022-10-10 | Stop reason: HOSPADM

## 2022-10-07 RX ORDER — MAGNESIUM SULFATE 1 G/100ML
INJECTION INTRAVENOUS PRN
Status: DISCONTINUED | OUTPATIENT
Start: 2022-10-07 | End: 2022-10-07 | Stop reason: SDUPTHER

## 2022-10-07 RX ORDER — FENTANYL CITRATE 50 UG/ML
INJECTION, SOLUTION INTRAMUSCULAR; INTRAVENOUS PRN
Status: DISCONTINUED | OUTPATIENT
Start: 2022-10-07 | End: 2022-10-07 | Stop reason: SDUPTHER

## 2022-10-07 RX ORDER — ATORVASTATIN CALCIUM 40 MG/1
40 TABLET, FILM COATED ORAL NIGHTLY
Status: DISCONTINUED | OUTPATIENT
Start: 2022-10-07 | End: 2022-10-10 | Stop reason: HOSPADM

## 2022-10-07 RX ORDER — SODIUM CHLORIDE 9 MG/ML
INJECTION, SOLUTION INTRAVENOUS CONTINUOUS
Status: DISCONTINUED | OUTPATIENT
Start: 2022-10-07 | End: 2022-10-08

## 2022-10-07 RX ORDER — CLINDAMYCIN PHOSPHATE 600 MG/50ML
INJECTION INTRAVENOUS PRN
Status: DISCONTINUED | OUTPATIENT
Start: 2022-10-07 | End: 2022-10-07 | Stop reason: SDUPTHER

## 2022-10-07 RX ORDER — DEXAMETHASONE SODIUM PHOSPHATE 10 MG/ML
INJECTION INTRAMUSCULAR; INTRAVENOUS PRN
Status: DISCONTINUED | OUTPATIENT
Start: 2022-10-07 | End: 2022-10-07 | Stop reason: SDUPTHER

## 2022-10-07 RX ORDER — SODIUM CHLORIDE 0.9 % (FLUSH) 0.9 %
5-40 SYRINGE (ML) INJECTION EVERY 12 HOURS SCHEDULED
Status: DISCONTINUED | OUTPATIENT
Start: 2022-10-07 | End: 2022-10-07 | Stop reason: HOSPADM

## 2022-10-07 RX ORDER — LEVOTHYROXINE SODIUM 0.05 MG/1
50 TABLET ORAL DAILY
Status: DISCONTINUED | OUTPATIENT
Start: 2022-10-07 | End: 2022-10-10 | Stop reason: HOSPADM

## 2022-10-07 RX ORDER — NEOSTIGMINE METHYLSULFATE 5 MG/5 ML
SYRINGE (ML) INTRAVENOUS PRN
Status: DISCONTINUED | OUTPATIENT
Start: 2022-10-07 | End: 2022-10-07 | Stop reason: SDUPTHER

## 2022-10-07 RX ORDER — CIPROFLOXACIN 2 MG/ML
INJECTION, SOLUTION INTRAVENOUS PRN
Status: DISCONTINUED | OUTPATIENT
Start: 2022-10-07 | End: 2022-10-07 | Stop reason: SDUPTHER

## 2022-10-07 RX ORDER — AMLODIPINE BESYLATE 10 MG/1
10 TABLET ORAL DAILY
Status: DISCONTINUED | OUTPATIENT
Start: 2022-10-08 | End: 2022-10-10 | Stop reason: HOSPADM

## 2022-10-07 RX ORDER — SODIUM CHLORIDE 0.9 % (FLUSH) 0.9 %
5-40 SYRINGE (ML) INJECTION EVERY 12 HOURS SCHEDULED
Status: DISCONTINUED | OUTPATIENT
Start: 2022-10-07 | End: 2022-10-10 | Stop reason: HOSPADM

## 2022-10-07 RX ORDER — POTASSIUM CHLORIDE 20 MEQ/1
20 TABLET, EXTENDED RELEASE ORAL DAILY
Status: DISCONTINUED | OUTPATIENT
Start: 2022-10-07 | End: 2022-10-10 | Stop reason: HOSPADM

## 2022-10-07 RX ORDER — SODIUM CHLORIDE, SODIUM LACTATE, POTASSIUM CHLORIDE, CALCIUM CHLORIDE 600; 310; 30; 20 MG/100ML; MG/100ML; MG/100ML; MG/100ML
INJECTION, SOLUTION INTRAVENOUS CONTINUOUS PRN
Status: DISCONTINUED | OUTPATIENT
Start: 2022-10-07 | End: 2022-10-07 | Stop reason: SDUPTHER

## 2022-10-07 RX ORDER — SODIUM CHLORIDE 9 MG/ML
INJECTION, SOLUTION INTRAVENOUS PRN
Status: DISCONTINUED | OUTPATIENT
Start: 2022-10-07 | End: 2022-10-10 | Stop reason: HOSPADM

## 2022-10-07 RX ORDER — MORPHINE SULFATE 2 MG/ML
2 INJECTION, SOLUTION INTRAMUSCULAR; INTRAVENOUS
Status: DISCONTINUED | OUTPATIENT
Start: 2022-10-07 | End: 2022-10-10 | Stop reason: HOSPADM

## 2022-10-07 RX ORDER — POLYETHYLENE GLYCOL 3350 17 G/17G
17 POWDER, FOR SOLUTION ORAL DAILY
Status: DISCONTINUED | OUTPATIENT
Start: 2022-10-07 | End: 2022-10-10 | Stop reason: HOSPADM

## 2022-10-07 RX ORDER — ACETAMINOPHEN 325 MG/1
650 TABLET ORAL EVERY 6 HOURS
Status: DISCONTINUED | OUTPATIENT
Start: 2022-10-07 | End: 2022-10-10 | Stop reason: HOSPADM

## 2022-10-07 RX ORDER — PROMETHAZINE HYDROCHLORIDE 12.5 MG/1
12.5 TABLET ORAL EVERY 6 HOURS PRN
Status: DISCONTINUED | OUTPATIENT
Start: 2022-10-07 | End: 2022-10-10 | Stop reason: HOSPADM

## 2022-10-07 RX ORDER — DOCUSATE SODIUM 100 MG/1
100 CAPSULE, LIQUID FILLED ORAL 2 TIMES DAILY
Qty: 60 CAPSULE | Refills: 0 | Status: SHIPPED | OUTPATIENT
Start: 2022-10-07 | End: 2022-10-19

## 2022-10-07 RX ORDER — OXYCODONE HYDROCHLORIDE 5 MG/1
5 TABLET ORAL EVERY 4 HOURS PRN
Status: DISCONTINUED | OUTPATIENT
Start: 2022-10-07 | End: 2022-10-10 | Stop reason: HOSPADM

## 2022-10-07 RX ORDER — CHOLECALCIFEROL (VITAMIN D3) 125 MCG
5 CAPSULE ORAL NIGHTLY PRN
Status: DISCONTINUED | OUTPATIENT
Start: 2022-10-07 | End: 2022-10-10 | Stop reason: HOSPADM

## 2022-10-07 RX ORDER — CALCIUM CARBONATE 200(500)MG
500 TABLET,CHEWABLE ORAL 3 TIMES DAILY PRN
Status: DISCONTINUED | OUTPATIENT
Start: 2022-10-07 | End: 2022-10-10 | Stop reason: HOSPADM

## 2022-10-07 RX ORDER — SODIUM CHLORIDE 9 MG/ML
INJECTION, SOLUTION INTRAVENOUS PRN
Status: DISCONTINUED | OUTPATIENT
Start: 2022-10-07 | End: 2022-10-07 | Stop reason: HOSPADM

## 2022-10-07 RX ORDER — ONDANSETRON 2 MG/ML
4 INJECTION INTRAMUSCULAR; INTRAVENOUS EVERY 6 HOURS PRN
Status: DISCONTINUED | OUTPATIENT
Start: 2022-10-07 | End: 2022-10-10 | Stop reason: HOSPADM

## 2022-10-07 RX ORDER — SODIUM CHLORIDE 0.9 % (FLUSH) 0.9 %
5-40 SYRINGE (ML) INJECTION PRN
Status: DISCONTINUED | OUTPATIENT
Start: 2022-10-07 | End: 2022-10-07 | Stop reason: HOSPADM

## 2022-10-07 RX ORDER — ONDANSETRON 2 MG/ML
INJECTION INTRAMUSCULAR; INTRAVENOUS PRN
Status: DISCONTINUED | OUTPATIENT
Start: 2022-10-07 | End: 2022-10-07 | Stop reason: SDUPTHER

## 2022-10-07 RX ORDER — ROCURONIUM BROMIDE 10 MG/ML
INJECTION, SOLUTION INTRAVENOUS PRN
Status: DISCONTINUED | OUTPATIENT
Start: 2022-10-07 | End: 2022-10-07 | Stop reason: SDUPTHER

## 2022-10-07 RX ORDER — MAGNESIUM HYDROXIDE 1200 MG/15ML
LIQUID ORAL PRN
Status: DISCONTINUED | OUTPATIENT
Start: 2022-10-07 | End: 2022-10-07 | Stop reason: HOSPADM

## 2022-10-07 RX ORDER — GLYCOPYRROLATE 0.2 MG/ML
INJECTION INTRAMUSCULAR; INTRAVENOUS PRN
Status: DISCONTINUED | OUTPATIENT
Start: 2022-10-07 | End: 2022-10-07 | Stop reason: SDUPTHER

## 2022-10-07 RX ORDER — OXYCODONE HYDROCHLORIDE 5 MG/1
10 TABLET ORAL EVERY 4 HOURS PRN
Status: DISCONTINUED | OUTPATIENT
Start: 2022-10-07 | End: 2022-10-10 | Stop reason: HOSPADM

## 2022-10-07 RX ORDER — POLYETHYLENE GLYCOL 3350 17 G/17G
17 POWDER, FOR SOLUTION ORAL DAILY
Qty: 510 G | Refills: 0 | Status: SHIPPED | OUTPATIENT
Start: 2022-10-07 | End: 2022-10-19

## 2022-10-07 RX ORDER — HYDROCODONE BITARTRATE AND ACETAMINOPHEN 5; 325 MG/1; MG/1
1 TABLET ORAL EVERY 4 HOURS PRN
Qty: 18 TABLET | Refills: 0 | Status: SHIPPED | OUTPATIENT
Start: 2022-10-07 | End: 2022-10-10

## 2022-10-07 RX ORDER — LIDOCAINE HYDROCHLORIDE 10 MG/ML
INJECTION, SOLUTION EPIDURAL; INFILTRATION; INTRACAUDAL; PERINEURAL PRN
Status: DISCONTINUED | OUTPATIENT
Start: 2022-10-07 | End: 2022-10-07 | Stop reason: SDUPTHER

## 2022-10-07 RX ORDER — PROPOFOL 10 MG/ML
INJECTION, EMULSION INTRAVENOUS PRN
Status: DISCONTINUED | OUTPATIENT
Start: 2022-10-07 | End: 2022-10-07 | Stop reason: SDUPTHER

## 2022-10-07 RX ADMIN — MAGNESIUM SULFATE HEPTAHYDRATE 1000 MG: 1 INJECTION, SOLUTION INTRAVENOUS at 09:49

## 2022-10-07 RX ADMIN — HYDROMORPHONE HYDROCHLORIDE 0.5 MG: 1 INJECTION, SOLUTION INTRAMUSCULAR; INTRAVENOUS; SUBCUTANEOUS at 12:27

## 2022-10-07 RX ADMIN — CLINDAMYCIN PHOSPHATE 600 MG: 600 INJECTION, SOLUTION INTRAVENOUS at 09:15

## 2022-10-07 RX ADMIN — FUROSEMIDE 20 MG: 20 TABLET ORAL at 17:40

## 2022-10-07 RX ADMIN — ROCURONIUM BROMIDE 50 MG: 10 INJECTION, SOLUTION INTRAVENOUS at 08:27

## 2022-10-07 RX ADMIN — LIDOCAINE HYDROCHLORIDE 50 MG: 10 INJECTION, SOLUTION EPIDURAL; INFILTRATION; INTRACAUDAL; PERINEURAL at 08:27

## 2022-10-07 RX ADMIN — ACETAMINOPHEN 650 MG: 325 TABLET ORAL at 23:51

## 2022-10-07 RX ADMIN — HYDROMORPHONE HYDROCHLORIDE 0.5 MG: 1 INJECTION, SOLUTION INTRAMUSCULAR; INTRAVENOUS; SUBCUTANEOUS at 12:40

## 2022-10-07 RX ADMIN — DESMOPRESSIN ACETATE 40 MG: 0.2 TABLET ORAL at 20:12

## 2022-10-07 RX ADMIN — BENZOCAINE AND MENTHOL 1 LOZENGE: 15; 3.6 LOZENGE ORAL at 22:04

## 2022-10-07 RX ADMIN — FENTANYL CITRATE 50 MCG: 50 INJECTION, SOLUTION INTRAMUSCULAR; INTRAVENOUS at 13:26

## 2022-10-07 RX ADMIN — METOPROLOL TARTRATE 25 MG: 25 TABLET ORAL at 20:12

## 2022-10-07 RX ADMIN — GLYCOPYRROLATE 1 MG: 0.2 INJECTION INTRAMUSCULAR; INTRAVENOUS at 11:38

## 2022-10-07 RX ADMIN — SODIUM CHLORIDE, SODIUM LACTATE, POTASSIUM CHLORIDE, CALCIUM CHLORIDE: 600; 310; 30; 20 INJECTION, SOLUTION INTRAVENOUS at 08:37

## 2022-10-07 RX ADMIN — FENTANYL CITRATE 50 MCG: 50 INJECTION, SOLUTION INTRAMUSCULAR; INTRAVENOUS at 09:25

## 2022-10-07 RX ADMIN — Medication 5 MG: at 11:38

## 2022-10-07 RX ADMIN — ROCURONIUM BROMIDE 50 MG: 10 INJECTION, SOLUTION INTRAVENOUS at 09:17

## 2022-10-07 RX ADMIN — SODIUM CHLORIDE, PRESERVATIVE FREE 10 ML: 5 INJECTION INTRAVENOUS at 20:12

## 2022-10-07 RX ADMIN — FENTANYL CITRATE 50 MCG: 50 INJECTION, SOLUTION INTRAMUSCULAR; INTRAVENOUS at 09:21

## 2022-10-07 RX ADMIN — OXYCODONE 10 MG: 5 TABLET ORAL at 17:38

## 2022-10-07 RX ADMIN — HYDROMORPHONE HYDROCHLORIDE 0.25 MG: 1 INJECTION, SOLUTION INTRAMUSCULAR; INTRAVENOUS; SUBCUTANEOUS at 15:06

## 2022-10-07 RX ADMIN — SODIUM CHLORIDE: 9 INJECTION, SOLUTION INTRAVENOUS at 15:43

## 2022-10-07 RX ADMIN — ONDANSETRON 4 MG: 2 INJECTION INTRAMUSCULAR; INTRAVENOUS at 11:36

## 2022-10-07 RX ADMIN — CIPROFLOXACIN 400 MG: 2 INJECTION, SOLUTION INTRAVENOUS at 09:25

## 2022-10-07 RX ADMIN — OXYCODONE 10 MG: 5 TABLET ORAL at 22:07

## 2022-10-07 RX ADMIN — FENTANYL CITRATE 50 MCG: 50 INJECTION, SOLUTION INTRAMUSCULAR; INTRAVENOUS at 13:22

## 2022-10-07 RX ADMIN — SODIUM CHLORIDE, POTASSIUM CHLORIDE, SODIUM LACTATE AND CALCIUM CHLORIDE: 600; 310; 30; 20 INJECTION, SOLUTION INTRAVENOUS at 08:22

## 2022-10-07 RX ADMIN — PROPOFOL 100 MG: 10 INJECTION, EMULSION INTRAVENOUS at 08:27

## 2022-10-07 RX ADMIN — ACETAMINOPHEN 650 MG: 325 TABLET ORAL at 17:38

## 2022-10-07 RX ADMIN — HYDROMORPHONE HYDROCHLORIDE 0.25 MG: 1 INJECTION, SOLUTION INTRAMUSCULAR; INTRAVENOUS; SUBCUTANEOUS at 15:00

## 2022-10-07 RX ADMIN — MORPHINE SULFATE 2 MG: 2 INJECTION, SOLUTION INTRAMUSCULAR; INTRAVENOUS at 16:07

## 2022-10-07 RX ADMIN — DEXAMETHASONE SODIUM PHOSPHATE 5 MG: 10 INJECTION INTRAMUSCULAR; INTRAVENOUS at 09:00

## 2022-10-07 RX ADMIN — FENTANYL CITRATE 100 MCG: 50 INJECTION, SOLUTION INTRAMUSCULAR; INTRAVENOUS at 08:27

## 2022-10-07 RX ADMIN — POTASSIUM CHLORIDE 20 MEQ: 1500 TABLET, EXTENDED RELEASE ORAL at 17:40

## 2022-10-07 ASSESSMENT — PAIN SCALES - GENERAL
PAINLEVEL_OUTOF10: 7
PAINLEVEL_OUTOF10: 5
PAINLEVEL_OUTOF10: 2
PAINLEVEL_OUTOF10: 10
PAINLEVEL_OUTOF10: 9
PAINLEVEL_OUTOF10: 0
PAINLEVEL_OUTOF10: 8
PAINLEVEL_OUTOF10: 6
PAINLEVEL_OUTOF10: 9
PAINLEVEL_OUTOF10: 8
PAINLEVEL_OUTOF10: 9

## 2022-10-07 ASSESSMENT — PAIN DESCRIPTION - DESCRIPTORS
DESCRIPTORS: ACHING
DESCRIPTORS: ACHING
DESCRIPTORS: CRAMPING;ACHING
DESCRIPTORS: DISCOMFORT

## 2022-10-07 ASSESSMENT — PAIN - FUNCTIONAL ASSESSMENT
PAIN_FUNCTIONAL_ASSESSMENT: ACTIVITIES ARE NOT PREVENTED
PAIN_FUNCTIONAL_ASSESSMENT: ACTIVITIES ARE NOT PREVENTED
PAIN_FUNCTIONAL_ASSESSMENT: 0-10
PAIN_FUNCTIONAL_ASSESSMENT: ACTIVITIES ARE NOT PREVENTED

## 2022-10-07 ASSESSMENT — PAIN DESCRIPTION - LOCATION
LOCATION: ABDOMEN
LOCATION: THROAT
LOCATION: ABDOMEN

## 2022-10-07 ASSESSMENT — PAIN DESCRIPTION - ORIENTATION
ORIENTATION: RIGHT;LOWER
ORIENTATION: RIGHT
ORIENTATION: RIGHT;LOWER
ORIENTATION: MID

## 2022-10-07 NOTE — H&P
Tono Damian  Urology History & Physical      Patient: Hiral Guerra  MRN: [id number]  YOB: 1940    CHIEF COMPLAINT:  Right renal mass    HISTORY OF PRESENT ILLNESS:   The patient is a 79 y.o. female who has a right renal mass found on CT obtained for surveillance for colon cancer which was resected 2 years ago, she did not receive chemo. Mass is in the mid to lower pole of the right kidney, 3.3cm x 3.2cm in size. Centrally located, 60% endophytic, within lower half of kidney. She presents today for radical right nephrectomy. Patient's old records, notes and chart reviewed and summarized above.     Past Medical History:    Past Medical History:   Diagnosis Date    A-fib ([facility] 07/19/2017    Abdominal pain     Acute blood loss as cause of postoperative anemia 11/08/2018    Anxiety 04/30/2016    CAD (coronary artery disease)     TCC/[facility]/ last seen 9-2022    CHF (congestive heart failure) (HCC)     Class 2 severe obesity with serious comorbidity and body mass index (BMI) of 37.0 to 37.9 in [facility])     Closed fracture of proximal end of left humerus with routine healing 04/26/2021    Colitis 05/31/2019    Colitis due to Clostridium difficile     Depression 04/30/2016    Diarrhea     Dyslipidemia     Elevated fasting glucose 11/20/2018    FH: total abdominal hysterectomy and bilateral salpingo-oophorectomy 1966    Fractures 03/15/2021    Lt humerus impacted displaced    Full dentures     Glucose intolerance (impaired glucose tolerance)     History of blood transfusion     no reaction    Hyperlipidemia     Hypertension     Hypokalemia     Malignant neoplasm of sigmoid colon ([address]     kidney cancer right    Multiple closed fractures of ribs 09/22/2018    Obesity     JAYJAY on CPAP 10/06/2018    Osteoarthritis     Osteoporosis 08/30/2013    Other complete intestinal obstruction ([address] 11/09/2018    Other specified hypothyroidism     Prolonged emergence from general anesthesia     S/P small bowel resection 05/22/2019    Thrombocytopenia, unspecified ([address] 03/09/2020    Under care of team     Hem-Onc Dr. Shala puentes/last seen 4-2022    Wears glasses     Wellness examination     PCP Hever Thomas DO/ [facility]/ last seen 4-2022       Past Surgical History:    Past Surgical History:   Procedure Laterality Date    CARDIAC CATHETERIZATION  09/05/2017    CARDIAC CATHETERIZATION N/A     CHOLECYSTECTOMY  1960s    COLONOSCOPY N/A 10/08/2018    COLONOSCOPY WITH COLD ET HOT BIOPSIES ET POLYPECTOMIES performed by Mynor Martin MD at [address] [facility] N/A 04/25/2019    COLONOSCOPY performed by Paulie Salcido MD at [address] [facility]  stricture at 7 cm     COLONOSCOPY N/A 02/03/2020    tubular adenoma X 1, Dr. Raj Strickland (CERVIX STATUS UNKNOWN)      KNEE ARTHROPLASTY Left     KNEE ARTHROPLASTY Right     MD CYSTOSCOPY,INSERT URETERAL STENT Bilateral 11/05/2018    CYSTO Bilateral Lighted stent placements performed by Christine Muhammad MD at [facility], PARTIAL, W/ANAST N/A 11/05/2018    Open Sigmoid Colectomy w/ lighted stents ILEOSTOMY PLACEMENT performed by Mynor Martin MD at [address] [facility] N/A 05/09/2019    Flexible Sigmoidoscopy with Dilatation of rectal stricture performed by Paulie Salcido MD at [address] [facility] N/A 05/22/2019    Loop Ileostomy take down performed by Paulie Salcido MD at [address] [place] [address] (CERVIX REMOVED)  1966    VARICOSE VEIN SURGERY Right 1960s       Medications:    No current facility-administered medications for this encounter.     Current Outpatient Medications:     levothyroxine (SYNTHROID) 50 MCG tablet, TAKE 1 TABLET BY MOUTH EVERY DAY, Disp: 90 tablet, Rfl: 1    metoprolol tartrate (LOPRESSOR) 25 MG tablet, TAKE ONE TABLET BY MOUTH TWICE A DAY, Disp: 180 tablet, Rfl: 1    atorvastatin (LIPITOR) 40 MG tablet, TAKE 1 TABLET BY MOUTH EVERY DAY AT NIGHT, Disp: 90 tablet, Rfl: 1    JANTOVEN 3 MG tablet, TAKE 2 TABLETS BY MOUTH EVERY DAY OR AS DIRECTED PER INR RESULTS (Patient taking differently: Pt. stopped 6-29-22 for OR 10-7-22/ Mgmt. TCC), Disp: 60 tablet, Rfl: 5    amLODIPine (NORVASC) 10 MG tablet, TAKE 1 TABLET BY MOUTH EVERY DAY, Disp: 90 tablet, Rfl: 3    furosemide (LASIX) 20 MG tablet, TAKE ONE TABLET BY MOUTH DAILY, Disp: 90 tablet, Rfl: 1    potassium chloride (KLOR-CON M) 20 MEQ extended release tablet, Take 1 tablet by mouth daily, Disp: 90 tablet, Rfl: 1    acetaminophen (TYLENOL) 325 MG tablet, Take 2 tablets by mouth every 4 hours as needed for Pain or Fever, Disp: 60 tablet, Rfl: 0    Allergies: Allergies   Allergen Reactions    Pcn [Penicillins] Hives and Shortness Of Breath    Bextra [Valdecoxib] Diarrhea       Social History:   Social History     Socioeconomic History    Marital status:      Spouse name: Not on file    Number of children: 5    Years of education: 15    Highest education level: 12th grade   Occupational History    Not on file   Tobacco Use    Smoking status: Never     Passive exposure: Never    Smokeless tobacco: Never   Vaping Use    Vaping Use: Never used   Substance and Sexual Activity    Alcohol use: No    Drug use: No    Sexual activity: Not Currently     Partners: Male   Other Topics Concern    Not on file   Social History Narrative    3/28/2022- receives PIPP, HEAP and Meals on Wheels. Social Determinants of Health     Financial Resource Strain: Low Risk     Difficulty of Paying Living Expenses: Not very hard   Food Insecurity: No Food Insecurity    Worried About Running Out of Food in the Last Year: Never true    Ran Out of Food in the Last Year: Never true   Transportation Needs: No Transportation Needs    Lack of Transportation (Medical): No    Lack of Transportation (Non-Medical):  No   Physical Activity: Inactive    Days of Exercise per Week: 0 days    Minutes of Exercise per Session: 0 min   Stress: No Stress Concern Present    Feeling of Stress : Not at all   Social Connections: Moderately Isolated    Frequency of Communication with Friends and Family: More than three times a week    Frequency of Social Gatherings with Friends and Family: More than three times a week    Attends [employer] Services: 1 to 4 times per year    Active Member of [employer] or Organizations: No    Attends Club or Organization Meetings: Never    Marital Status:    Intimate Partner Violence: Not on file   Housing Stability: Low Risk     Unable to Pay for Housing in the Last Year: No    Number of Places Lived in the Last Year: 1    Unstable Housing in the Last Year: No       Family History:    Family History   Adopted: Yes   Problem Relation Age of Onset    High Blood Pressure Brother         adopted brother       REVIEW OF SYSTEMS:  A comprehensive 14 point review of systems was obtained. Constitutional: No fatigue  Eyes: No blurry vision  Ears, nose, mouth, throat, face: No ringing in the ears; no facial droop. Respiratory: No cough or cold. Cardiovascular: No palpitations  Gastrointestinal: No diarrhea or constipation. Genitourinary: No burning with urination  Integument/Skin: No rashes  Hematologic/Lymphatic: No easy bruising  Musculoskeletal: No muscle pains  Neurologic: No weakness in the extremities. Psychiatric: No depression or suicidal thoughts. Endocrine: No heat or cold intolerances. Allergic/Immunologic: No current seasonal allergies; no skin hives. Physical Exam:    This a 79 y.o. male   There were no vitals filed for this visit. Constitutional: Patient in no acute distress. Neuro: alert and oriented to person place and time. Psych: Mood and affect normal.   Head: Atraumatic and normocephalic. Neck: Trachea midline   Skin: No rashes or bruising present. Lungs: Respiratory effort normal.  Cardiovascular:  Heart rate regular. Positive radial pulses bilaterally  Abdomen: Soft, non-tender, non-distended.  Neither side has CVA tenderness on exam.  Bladder non-tender and not distended. Lymphatics: no palpable lymphadenopathy. Labs:  No results for input(s): WBC, HGB, HCT, MCV, PLT in the last 72 hours. No results for input(s): NA, K, CL, CO2, PHOS, BUN, CREATININE, CA in the last 72 hours. No results for input(s): COLORU, PHUR, LABCAST, WBCUA, RBCUA, MUCUS, TRICHOMONAS, YEAST, BACTERIA, CLARITYU, SPECGRAV, LEUKOCYTESUR, UROBILINOGEN, Maryann Jacky in the last 72 hours. Invalid input(s): NITRATE, GLUCOSEUKETONESUAMORPHOUS    Culture Results:  @[facility]@      -----------------------------------------------------------------  Imaging Results:  No results found.     Assessment and Plan   Impression:    Right renal mass    Plan:   Robot assisted right radical nephrectomy    Kathy Pierson DO  Urology Resident, PGY3  9:18 PM 10/6/2022

## 2022-10-07 NOTE — OP NOTE
Operative Note      Patient: Balbina Gonzalez  YOB: 1940  MRN: [id number]    Date of Procedure: 10/7/2022    Pre-Op Diagnosis: RIGHT RENAL MASS    Post-Op Diagnosis: Right renal mass       Procedure(s):  RIGHT  Robotic laparoscopic radical nephrectomy  Extensive lysis of adhesions, 1 hour    Modifier-22    Surgeon(s):  Jabier Szymanski MD    Assistant:   Resident: Sanjuana Cockayne, MD    Anesthesia: General    Estimated Blood Loss (mL): < 50 cc    Complications: None    Specimens:   ID Type Source Tests Collected by Time Destination   1 : URINE FOR CULTURE Urine Urine, indwelling catheter CULTURE, Juvenal Unger MD 10/7/2022 1018    A : RIGHT KIDNEY Tissue Kidney SURGICAL PATHOLOGY Jabier Szymanski MD 10/7/2022 7697        Implants:  * No implants in log *      Drains:   Urinary Catheter 10/07/22 Kraus (Active)       Findings:   Extensive peritoneal adhesions  Right kidney with mass removed    Indications: Patient is an 79 y.o. female presenting with growing right renal mass 4 cm in size, centrally located, endophytic. Risks and benefits were explained to the patient and she elected to proceed. Informed consent was obtained. Detailed Description of Procedure:   After informed consent was obtained in the preoperative area, the patient was taken back to the operating room and transferred to the operating table in the supine position. EPC cuffs were placed. The machine was turned on. Anesthesia was induced and antibiotics were started. A kraus catheter was inserted in a sterile manner. The patient was then rolled onto the left side with their right side toward the ceiling. An axillary roll was place. The bottom leg was bent and the top leg remained strait. A pillow was placed between them. All areas of pressure including the knee and ankle were appropriately padded. The patient was appropriately strapped to the bed across the arm, the greater trochanter of the leg, and the lower legs also.  They were then sterilely prepped and draped. Veress needle was used to obtain pneumo-peritoneum successfully. Before the carbon dioxide was placed on the veress needle, aspiration demonstrated non-blood and non-feculant return. We entered the intra-abdominal cavity under direct visualization. The abdomen was inspected and there was no evidence of traumatic insertion. We did visualize extensive adhesions in the peritoneum from prior abdominal surgeries. A second port was placed and a laparoscopic extensive lysis of adhesions was performed for 1 hour. After most of the adhesions were cleared, the remainder of the ports were placed. The robot was docked, and the instruments were placed under direct visualization. Some additional adhesions were present to the colon which were released. The right colon was reflected medially with cold energy starting at the white line of Toldt. The duodenum was kocherized. With the colon freed off Gerota's fascia, the ureter and gonadal vein were identified. The kidney was then elevated off the psoas muscle keeping these structures anterior. Careful dissection toward the hilum ensued. The renal vessels were encountered and dissected free of surrounding tissues. The vessels were ligated using a vascular stapler. The remained of the kidney was freed from its superior and lateral attachments. The tail of gerota's and the ureter was also ligated with combination of stapler load and hem-o-loc clips. The specimen was placed in the endocatch bag. The kidney was freed and placed into an endocatch bag. The hilum was inspected and noted to be dry. The remainder of the bed was also inspected and no evidence of bleeding was noted. Vistaseal was applied to the field. The abdomen was re-inspected and there was no evidence of bleeding. The robot was then undocked. The instruments were removed. The kidney was extracted via a inferior robotic arm incision.  The inferior epigastric vessels were encountered and cauterized using Bovie. The fascia was re-approximated used 0 looped PDS sutures from the apices and tied across the mid-portion of the incision. Ports were removed under direct visualization after the closure was inspected from inside the abdomen. No bleeding was noted. All wounds were irrigated. The subcutaneous tissue of the incision made for removing the kidney was approximated using 2-0 vicryl. The skin was closed using 4-0 Monocryl sutures with dermabond placed on the skin. A drain was not placed. Dr. Abbe Alejandre MD  was  present for entirety of the procedure. Disposition: Patient is to be monitored in the PACU and sent to the inpatient floor for postoperative management.      Electronically signed by Caitlyn Mike MD on 10/7/2022 at 1:37 PM

## 2022-10-07 NOTE — ANESTHESIA PRE PROCEDURE
Department of Anesthesiology  Preprocedure Note       Name:  Charlee Juarez   Age:  79 y.o.  :  1940                                          MRN:  [id number]         Date:  10/7/2022      Surgeon: Jermaine Wilkes):  Al Rivera MD    Procedure: Procedure(s):  XI ROBOTIC LAPAROSCOPIC RADICAL NEPHRECTOMY, POSSIBLE OPEN    Medications prior to admission:   Prior to Admission medications    Medication Sig Start Date End Date Taking? Authorizing Provider   levothyroxine (SYNTHROID) 50 MCG tablet TAKE 1 TABLET BY MOUTH EVERY DAY 22   Merril Power, DO   metoprolol tartrate (LOPRESSOR) 25 MG tablet TAKE ONE TABLET BY MOUTH TWICE A DAY 22   Merril Power, DO   atorvastatin (LIPITOR) 40 MG tablet TAKE 1 TABLET BY MOUTH EVERY DAY AT NIGHT 22   Merril Power, DO   JANTOVEN 3 MG tablet TAKE 2 TABLETS BY MOUTH EVERY DAY OR AS DIRECTED PER INR RESULTS  Patient taking differently: Pt. stopped 22 for OR 10-7-22/ Mgmt. [facility] 22   Merril Power, DO   amLODIPine (NORVASC) 10 MG tablet TAKE 1 TABLET BY MOUTH EVERY DAY 22   Fredo Augusto, DO   furosemide (LASIX) 20 MG tablet TAKE ONE TABLET BY MOUTH DAILY 21   Merril Power, DO   potassium chloride (KLOR-CON M) 20 MEQ extended release tablet Take 1 tablet by mouth daily 3/15/21   Merril Power, DO   acetaminophen (TYLENOL) 325 MG tablet Take 2 tablets by mouth every 4 hours as needed for Pain or Fever 11/9/18 10/7/22  Sarahy Dan MD       Current medications:    Current Facility-Administered Medications   Medication Dose Route Frequency Provider Last Rate Last Admin    ceFAZolin (ANCEF) 2000 mg in sterile water 20 mL IV syringe  2,000 mg IntraVENous Once [facility], DO           Allergies:     Allergies   Allergen Reactions    Pcn [Penicillins] Hives and Shortness Of Breath    Bextra [Valdecoxib] Diarrhea       Problem List:    Patient Active Problem List   Diagnosis Code    Dyslipidemia E78.5    Osteoarthritis M19.90  Osteoporosis M81.0    Depression F32. A    Anxiety F41.9    CHF (congestive heart failure) ([facility]) I50.9    Primary insomnia F51.01    JAYJAY on CPAP G47.33, Z99.89    Class 2 severe obesity with serious comorbidity and body mass index (BMI) of 37.0 to 37.9 in adult ([facility]) E66.01, Z68.37    Hypertension I10    A-fib ([facility]) I48.91    Colon cancer ([facility]) C18.9    Elevated fasting glucose R73.01    Other specified hypothyroidism E03.8    S/P small bowel resection Z90.49    Closed fracture of proximal end of left humerus with routine healing S42.202D       Past Medical History:        Diagnosis Date    A-fib ([facility] [address] 07/19/2017    Abdominal pain     Acute blood loss as cause of postoperative anemia 11/08/2018    Anxiety 04/30/2016    CAD (coronary artery disease)     [facility]/ last seen 9-2022    CHF (congestive heart failure) ([facility])     Class 2 severe obesity with serious comorbidity and body mass index (BMI) of 37.0 to 37.9 in adult [facility])     Closed fracture of proximal end of left humerus with routine healing 04/26/2021    Colitis 05/31/2019    Colitis due to Clostridium difficile     Depression 04/30/2016    Diarrhea     Dyslipidemia     Elevated fasting glucose 11/20/2018    FH: total abdominal hysterectomy and bilateral salpingo-oophorectomy 1966    Fractures 03/15/2021    Lt humerus impacted displaced    Full dentures     Glucose intolerance (impaired glucose tolerance)     History of blood transfusion     no reaction    Hyperlipidemia     Hypertension     Hypokalemia     Malignant neoplasm of sigmoid colon ([facility])     kidney cancer right    Multiple closed fractures of ribs 09/22/2018    Obesity     JAYJAY on CPAP 10/06/2018    Osteoarthritis     Osteoporosis 08/30/2013    Other complete intestinal obstruction ([facility] [address] 11/09/2018    Other specified hypothyroidism     Prolonged emergence from general anesthesia     S/P small bowel resection 05/22/2019    Thrombocytopenia, unspecified ([facility] [address] 03/09/2020    Under care of team     Hem-Onc Dr. Vivian puentes/last seen [phone]    Wears glasses     Wellness examination     PCP Philippe Posey DO/ [facility]/ last seen 4-2022       Past Surgical History:        Procedure Laterality Date    CARDIAC CATHETERIZATION  09/05/2017   [employer] CARDIAC CATHETERIZATION N/A     CHOLECYSTECTOMY  1960s    COLONOSCOPY N/A 10/08/2018    COLONOSCOPY WITH COLD ET HOT BIOPSIES ET POLYPECTOMIES performed by Noam White MD at [facility] N/A 04/25/2019    COLONOSCOPY performed by Darlina Schlatter, MD at [facility]  stricture at 7 cm     COLONOSCOPY N/A 02/03/2020    tubular adenoma X 1, Dr. Rashmi Anderson (CERVIX STATUS UNKNOWN)      KNEE ARTHROPLASTY Left     KNEE ARTHROPLASTY Right     OK CYSTOSCOPY,INSERT URETERAL STENT Bilateral 11/05/2018    CYSTO Bilateral Lighted stent placements performed by Rivka Metz MD at [address], PARTIAL, W/ANAST N/A 11/05/2018    Open Sigmoid Colectomy w/ lighted stents ILEOSTOMY PLACEMENT performed by Noam White MD at [facility] N/A 05/09/2019    Flexible Sigmoidoscopy with Dilatation of rectal stricture performed by Darlina Schlatter, MD at [address] N/A 05/22/2019    Loop Ileostomy take down performed by Darlina Schlatter, MD at /[facility] (CERVIX REMOVED)  1966    VARICOSE VEIN SURGERY Right 1960s       Social History:    Social History     Tobacco Use    Smoking status: Never     Passive exposure: Never    Smokeless tobacco: Never   Substance Use Topics    Alcohol use:  No                                Counseling given: Not Answered      Vital Signs (Current):   Vitals:    10/06/22 0934   Weight: 265 lb (120.2 kg)   Height: 5' 7\" (1.702 m)                                              BP Readings from Last 3 Encounters:   09/16/22 124/72   08/04/22 134/88   05/10/22 115/61       NPO Status: BMI:   Wt Readings from Last 3 Encounters:   10/06/22 265 lb (120.2 kg)   09/16/22 265 lb (120.2 kg)   08/04/22 273 lb (123.8 kg)     Body mass index is 41.5 kg/m². CBC:   Lab Results   Component Value Date/Time    WBC 7.0 04/04/2022 10:36 AM    RBC 4.49 04/04/2022 10:36 AM    HGB 14.1 04/04/2022 10:36 AM    HCT 43.9 04/04/2022 10:36 AM    MCV 97.8 04/04/2022 10:36 AM    RDW 14.6 04/04/2022 10:36 AM    PLT See Reflexed IPF Result 09/16/2022 10:14 AM       CMP:   Lab Results   Component Value Date/Time     07/13/2022 09:12 AM    K 4.2 07/13/2022 09:12 AM     07/13/2022 09:12 AM    CO2 28 07/13/2022 09:12 AM    BUN 23 07/13/2022 09:12 AM    CREATININE 0.66 09/16/2022 10:08 AM    CREATININE 0.76 07/13/2022 09:12 AM    GFRAA >60 07/13/2022 09:12 AM    LABGLOM >60 09/16/2022 10:08 AM    GLUCOSE 102 07/13/2022 09:12 AM    PROT 7.3 04/04/2022 10:36 AM    CALCIUM 9.8 07/13/2022 09:12 AM    BILITOT 0.57 04/04/2022 10:36 AM    ALKPHOS 123 04/04/2022 10:36 AM    AST 18 04/04/2022 10:36 AM    ALT 16 04/04/2022 10:36 AM       POC Tests: No results for input(s): POCGLU, POCNA, POCK, POCCL, POCBUN, POCHEMO, POCHCT in the last 72 hours.     Coags:   Lab Results   Component Value Date/Time    PROTIME 14.8 09/16/2022 10:07 AM    INR 1.60 09/30/2022 12:00 AM    APTT 34.8 09/21/2018 08:18 PM       HCG (If Applicable): No results found for: PREGTESTUR, PREGSERUM, HCG, HCGQUANT     ABGs: No results found for: PHART, PO2ART, WLD7ECS, ACH7CED, BEART, G8CJEMML     Type & Screen (If Applicable):  No results found for: LABABO, LABRH    Drug/Infectious Status (If Applicable):  No results found for: HIV, HEPCAB    COVID-19 Screening (If Applicable): No results found for: COVID19        Anesthesia Evaluation    Airway: Mallampati: III     Neck ROM: full     Dental: normal exam         Pulmonary: breath sounds clear to auscultation  (+) sleep apnea: on CPAP, Cardiovascular:    (+) hypertension:, CAD:, dysrhythmias: atrial fibrillation, CHF:,         Rhythm: regular  Rate: normal                 ROS comment: Cath min CAD. Normal EF     Neuro/Psych:   (+) psychiatric history:   (-) seizures, TIA and CVA           GI/Hepatic/Renal:   (+) renal disease:, morbid obesity          Endo/Other:    (+) hypothyroidism::., .                 Abdominal:   (+) obese,           Vascular: Other Findings:           Anesthesia Plan      general     ASA 3       Induction: intravenous. arterial line    Anesthetic plan and risks discussed with patient. Use of blood products discussed with patient whom consented to blood products. Plan discussed with CRNA.                     Dorian Crow MD   10/7/2022

## 2022-10-08 LAB
ANION GAP SERPL CALCULATED.3IONS-SCNC: 13 MMOL/L (ref 9–17)
BUN BLDV-MCNC: 20 MG/DL (ref 8–23)
CALCIUM SERPL-MCNC: 8.3 MG/DL (ref 8.6–10.4)
CHLORIDE BLD-SCNC: 105 MMOL/L (ref 98–107)
CO2: 22 MMOL/L (ref 20–31)
CREAT SERPL-MCNC: 1.09 MG/DL (ref 0.5–0.9)
GFR SERPL CREATININE-BSD FRML MDRD: 51 ML/MIN/1.73M2
GLUCOSE BLD-MCNC: 124 MG/DL (ref 70–99)
HCT VFR BLD CALC: 36.6 % (ref 36.3–47.1)
HEMOGLOBIN: 11.9 G/DL (ref 11.9–15.1)
MCH RBC QN AUTO: 30.9 PG (ref 25.2–33.5)
MCHC RBC AUTO-ENTMCNC: 32.5 G/DL (ref 28.4–34.8)
MCV RBC AUTO: 95.1 FL (ref 82.6–102.9)
NRBC AUTOMATED: 0 PER 100 WBC
PDW BLD-RTO: 14.7 % (ref 11.8–14.4)
PLATELET # BLD: ABNORMAL K/UL (ref 138–453)
PLATELET, FLUORESCENCE: 113 K/UL (ref 138–453)
PLATELET, IMMATURE FRACTION: 18.1 % (ref 1.1–10.3)
POTASSIUM SERPL-SCNC: 4.8 MMOL/L (ref 3.7–5.3)
RBC # BLD: 3.85 M/UL (ref 3.95–5.11)
SODIUM BLD-SCNC: 140 MMOL/L (ref 135–144)
WBC # BLD: 11.1 K/UL (ref 3.5–11.3)

## 2022-10-08 PROCEDURE — 85027 COMPLETE CBC AUTOMATED: CPT

## 2022-10-08 PROCEDURE — 51798 US URINE CAPACITY MEASURE: CPT

## 2022-10-08 PROCEDURE — 2580000003 HC RX 258: Performed by: STUDENT IN AN ORGANIZED HEALTH CARE EDUCATION/TRAINING PROGRAM

## 2022-10-08 PROCEDURE — 85055 RETICULATED PLATELET ASSAY: CPT

## 2022-10-08 PROCEDURE — 6370000000 HC RX 637 (ALT 250 FOR IP): Performed by: STUDENT IN AN ORGANIZED HEALTH CARE EDUCATION/TRAINING PROGRAM

## 2022-10-08 PROCEDURE — 36415 COLL VENOUS BLD VENIPUNCTURE: CPT

## 2022-10-08 PROCEDURE — 1200000000 HC SEMI PRIVATE

## 2022-10-08 PROCEDURE — 80048 BASIC METABOLIC PNL TOTAL CA: CPT

## 2022-10-08 RX ADMIN — OXYCODONE 10 MG: 5 TABLET ORAL at 08:57

## 2022-10-08 RX ADMIN — METOPROLOL TARTRATE 25 MG: 25 TABLET ORAL at 08:56

## 2022-10-08 RX ADMIN — OXYCODONE 10 MG: 5 TABLET ORAL at 13:37

## 2022-10-08 RX ADMIN — SODIUM CHLORIDE, PRESERVATIVE FREE 10 ML: 5 INJECTION INTRAVENOUS at 08:56

## 2022-10-08 RX ADMIN — SODIUM CHLORIDE: 9 INJECTION, SOLUTION INTRAVENOUS at 08:50

## 2022-10-08 RX ADMIN — ACETAMINOPHEN 650 MG: 325 TABLET ORAL at 05:49

## 2022-10-08 RX ADMIN — POTASSIUM CHLORIDE 20 MEQ: 1500 TABLET, EXTENDED RELEASE ORAL at 08:56

## 2022-10-08 RX ADMIN — SODIUM CHLORIDE, PRESERVATIVE FREE 10 ML: 5 INJECTION INTRAVENOUS at 20:35

## 2022-10-08 RX ADMIN — SODIUM CHLORIDE: 9 INJECTION, SOLUTION INTRAVENOUS at 00:25

## 2022-10-08 RX ADMIN — AMLODIPINE BESYLATE 10 MG: 10 TABLET ORAL at 08:56

## 2022-10-08 RX ADMIN — BISACODYL 5 MG: 5 TABLET, COATED ORAL at 08:56

## 2022-10-08 RX ADMIN — OXYCODONE 10 MG: 5 TABLET ORAL at 04:44

## 2022-10-08 RX ADMIN — LEVOTHYROXINE SODIUM 50 MCG: 50 TABLET ORAL at 05:49

## 2022-10-08 RX ADMIN — FUROSEMIDE 20 MG: 20 TABLET ORAL at 08:56

## 2022-10-08 RX ADMIN — ACETAMINOPHEN 650 MG: 325 TABLET ORAL at 20:35

## 2022-10-08 RX ADMIN — ACETAMINOPHEN 650 MG: 325 TABLET ORAL at 13:38

## 2022-10-08 RX ADMIN — OXYCODONE 10 MG: 5 TABLET ORAL at 18:51

## 2022-10-08 RX ADMIN — DESMOPRESSIN ACETATE 40 MG: 0.2 TABLET ORAL at 20:35

## 2022-10-08 RX ADMIN — METOPROLOL TARTRATE 25 MG: 25 TABLET ORAL at 20:35

## 2022-10-08 ASSESSMENT — PAIN DESCRIPTION - LOCATION
LOCATION: ABDOMEN;FLANK
LOCATION: ABDOMEN
LOCATION: ABDOMEN;FLANK
LOCATION: ABDOMEN;INCISION;FLANK

## 2022-10-08 ASSESSMENT — PAIN SCALES - WONG BAKER
WONGBAKER_NUMERICALRESPONSE: 0
WONGBAKER_NUMERICALRESPONSE: 0
WONGBAKER_NUMERICALRESPONSE: 2
WONGBAKER_NUMERICALRESPONSE: 2;0
WONGBAKER_NUMERICALRESPONSE: 0
WONGBAKER_NUMERICALRESPONSE: 2
WONGBAKER_NUMERICALRESPONSE: 2

## 2022-10-08 ASSESSMENT — PAIN DESCRIPTION - DESCRIPTORS
DESCRIPTORS: ACHING
DESCRIPTORS: ACHING;SHARP
DESCRIPTORS: ACHING

## 2022-10-08 ASSESSMENT — PAIN SCALES - GENERAL
PAINLEVEL_OUTOF10: 6
PAINLEVEL_OUTOF10: 0
PAINLEVEL_OUTOF10: 7
PAINLEVEL_OUTOF10: 5
PAINLEVEL_OUTOF10: 0
PAINLEVEL_OUTOF10: 0
PAINLEVEL_OUTOF10: 2
PAINLEVEL_OUTOF10: 9
PAINLEVEL_OUTOF10: 0
PAINLEVEL_OUTOF10: 0
PAINLEVEL_OUTOF10: 2
PAINLEVEL_OUTOF10: 7
PAINLEVEL_OUTOF10: 2

## 2022-10-08 ASSESSMENT — PAIN - FUNCTIONAL ASSESSMENT
PAIN_FUNCTIONAL_ASSESSMENT: ACTIVITIES ARE NOT PREVENTED

## 2022-10-08 ASSESSMENT — PAIN DESCRIPTION - ORIENTATION
ORIENTATION: RIGHT;LOWER
ORIENTATION: RIGHT
ORIENTATION: RIGHT;LOWER
ORIENTATION: RIGHT;LOWER
ORIENTATION: RIGHT
ORIENTATION: RIGHT

## 2022-10-08 NOTE — ANESTHESIA POSTPROCEDURE EVALUATION
Department of Anesthesiology  Postprocedure Note    Patient: Andrew Lu  MRN: [id number]  YOB: 1940  Date of evaluation: 10/8/2022      Procedure Summary     Date: 10/07/22 Room / Location: [address]    Anesthesia Start: 4124 Anesthesia Stop: 8945    Procedure: XI ROBOTIC LAPAROSCOPIC RADICAL NEPHRECTOMY, LYSIS OF ADHESIONS (Right) Diagnosis:       Renal mass, right      (RIGHT RENAL MASS)    Surgeons: Cristopher Sorenson MD Responsible Provider: Sushma Fleming MD    Anesthesia Type: general ASA Status: 3          Anesthesia Type: No value filed.     Shankar Phase I: Shankar Score: 8    Shankar Phase II:        Anesthesia Post Evaluation    Patient location during evaluation: PACU  Patient participation: complete - patient participated  Level of consciousness: awake and alert  Pain score: 2  Airway patency: patent  Nausea & Vomiting: no nausea and no vomiting  Complications: no  Cardiovascular status: hemodynamically stable  Respiratory status: acceptable  Hydration status: euvolemic

## 2022-10-08 NOTE — PLAN OF CARE
Problem: Discharge Planning  Goal: Discharge to home or other facility with appropriate resources  Outcome: Progressing     Problem: Pain  Goal: Verbalizes/displays adequate comfort level or baseline comfort level  Outcome: Progressing     Problem: Safety - Adult  Goal: Free from fall injury  Outcome: Progressing     Problem: ABCDS Injury Assessment  Goal: Absence of physical injury  Outcome: Progressing     Problem: Skin/Tissue Integrity  Goal: Absence of new skin breakdown  Description: 1. Monitor for areas of redness and/or skin breakdown  2. Assess vascular access sites hourly  3. Every 4-6 hours minimum:  Change oxygen saturation probe site  4. Every 4-6 hours:  If on nasal continuous positive airway pressure, respiratory therapy assess nares and determine need for appliance change or resting period.   Outcome: Progressing     Problem: Musculoskeletal - Adult  Goal: Return mobility to safest level of function  Outcome: Progressing

## 2022-10-08 NOTE — PROGRESS NOTES
Anaya Richey, Tonya Garibay, Natty José  Urology Progress Note     Subjective:   Diet: Regular diet   no acute events overnight  Denies nausea, vomiting, fevers and chills  Pain well controlled  Has not ambulated  No flatus/bowel movement  Urine output-800 cc  Hemoglobin 11.9 from 13.1  Creatinine 1.0 from 0.7    Patient Vitals for the past 24 hrs:   BP Temp Temp src Pulse Resp SpO2   10/08/22 0857 -- -- -- -- 16 --   10/08/22 0514 -- -- -- -- 18 --   10/08/22 0439 (!) 151/87 99 °F (37.2 °C) -- (!) 127 18 93 %   10/07/22 2354 (!) 148/79 98 °F (36.7 °C) Oral (!) 108 16 94 %   10/07/22 2237 -- -- -- -- 16 --   10/07/22 2207 -- -- -- -- 16 --   10/07/22 2000 115/74 98.1 °F (36.7 °C) Oral (!) 120 16 94 %   10/07/22 1607 -- -- -- -- 16 --   10/07/22 1533 (!) 132/93 97.5 °F (36.4 °C) Oral (!) 111 16 93 %   10/07/22 1330 122/74 -- -- 94 -- --   10/07/22 1315 117/79 -- -- 99 15 --   10/07/22 1300 126/82 -- -- 88 19 95 %   10/07/22 1245 124/74 -- -- 85 20 96 %   10/07/22 1230 120/87 -- -- 76 17 95 %   10/07/22 1215 113/85 97.2 °F (36.2 °C) Temporal 100 19 97 %       Intake/Output Summary (Last 24 hours) at 10/8/2022 0928  Last data filed at 10/8/2022 0439  Gross per 24 hour   Intake 1200 ml   Output 850 ml   Net 350 ml       Recent Labs     10/07/22  1255 10/08/22  0556   WBC 9.7 11.1   HGB 13.1 11.9   HCT 40.9 36.6   MCV 96.9 95.1   PLT See Reflexed IPF Result See Reflexed IPF Result     Recent Labs     10/07/22  1255 10/08/22  0556    140   K 3.9 4.8    105   CO2 25 22   BUN 17 20   CREATININE 0.73 1.09*       No results for input(s): COLORU, PHUR, LABCAST, WBCUA, RBCUA, MUCUS, TRICHOMONAS, YEAST, BACTERIA, CLARITYU, SPECGRAV, LEUKOCYTESUR, UROBILINOGEN, Jennifer Jean in the last 72 hours.     Invalid input(s): NITRATE, GLUCOSEUKETONESUAMORPHOUS    Additional Lab/culture results:    Physical Exam:   AO X 3  Neck: Supple   Chest: bilateral symmetrical chest rise/ Non labored breathing Circulatory: Peripheries warm , well perfused   P/A: soft, nondistended, incision sites appropriately tender, clean, dry and intact with skin glue  Petty in with clear yellow urine        Interval Imaging Findings:    Impression:    Leandro Coles is a 69-year-old female with right renal mass, s/p robotic assisted laparoscopic right radical nephrectomy on 10/7/2022    Plan:   Diet: Regular diet  IV fluids: Discontinue IV fluids  Antibiotics: Perioperative Ancef  Pain control: Tylenol scheduled, Roxicodone as needed  Ambulation / IS 10 times per hour   AM labs: Hemoglobin stable, creatinine 1.0 from 0.7  DC planning -she needs to ambulate prior to discharge        Patrizia Sun MD  9:28 AM 10/8/2022

## 2022-10-08 NOTE — PLAN OF CARE
Problem: Discharge Planning  Goal: Discharge to home or other facility with appropriate resources  Outcome: Progressing  Flowsheets (Taken 10/7/2022 1619 by Regina Rodriguez RN)  Discharge to home or other facility with appropriate resources:   Identify barriers to discharge with patient and caregiver   Arrange for needed discharge resources and transportation as appropriate   Identify discharge learning needs (meds, wound care, etc)   Arrange for interpreters to assist at discharge as needed   Refer to discharge planning if patient needs post-hospital services based on physician order or complex needs related to functional status, cognitive ability or social support system     Problem: Pain  Goal: Verbalizes/displays adequate comfort level or baseline comfort level  Outcome: Progressing     Problem: Safety - Adult  Goal: Free from fall injury  Outcome: Progressing     Problem: Skin/Tissue Integrity  Goal: Absence of new skin breakdown  Description: 1. Monitor for areas of redness and/or skin breakdown  2. Assess vascular access sites hourly  3. Every 4-6 hours minimum:  Change oxygen saturation probe site  4. Every 4-6 hours:  If on nasal continuous positive airway pressure, respiratory therapy assess nares and determine need for appliance change or resting period.   Outcome: Progressing     Problem: Musculoskeletal - Adult  Goal: Return mobility to safest level of function  Outcome: Progressing  Goal: Maintain proper alignment of affected body part  Outcome: Progressing  Goal: Return ADL status to a safe level of function  Outcome: Progressing

## 2022-10-09 ENCOUNTER — APPOINTMENT (OUTPATIENT)
Dept: ULTRASOUND IMAGING | Age: 82
DRG: 660 | End: 2022-10-09
Attending: UROLOGY
Payer: MEDICARE

## 2022-10-09 PROBLEM — Z90.5 STATUS POST NEPHRECTOMY: Status: ACTIVE | Noted: 2022-10-09

## 2022-10-09 PROBLEM — G89.18 ACUTE POST-OPERATIVE PAIN: Status: ACTIVE | Noted: 2022-10-09

## 2022-10-09 PROBLEM — R33.9 URINARY RETENTION: Status: ACTIVE | Noted: 2022-10-09

## 2022-10-09 PROBLEM — N17.9 AKI (ACUTE KIDNEY INJURY) (HCC): Status: ACTIVE | Noted: 2022-10-09

## 2022-10-09 LAB
ANION GAP SERPL CALCULATED.3IONS-SCNC: 11 MMOL/L (ref 9–17)
BUN BLDV-MCNC: 22 MG/DL (ref 8–23)
CALCIUM SERPL-MCNC: 8.6 MG/DL (ref 8.6–10.4)
CHLORIDE BLD-SCNC: 104 MMOL/L (ref 98–107)
CO2: 24 MMOL/L (ref 20–31)
COMPLEMENT C3: 137 MG/DL (ref 90–180)
COMPLEMENT C4: 30 MG/DL (ref 10–40)
CREAT SERPL-MCNC: 1.35 MG/DL (ref 0.5–0.9)
CULTURE: NORMAL
FREE KAPPA/LAMBDA RATIO: 1.27 (ref 0.26–1.65)
GFR SERPL CREATININE-BSD FRML MDRD: 39 ML/MIN/1.73M2
GLUCOSE BLD-MCNC: 113 MG/DL (ref 70–99)
HAV IGM SER IA-ACNC: NONREACTIVE
HCT VFR BLD CALC: 35.3 % (ref 36.3–47.1)
HEMOGLOBIN: 11.5 G/DL (ref 11.9–15.1)
HEPATITIS B CORE IGM ANTIBODY: NONREACTIVE
HEPATITIS B SURFACE ANTIGEN: NONREACTIVE
HEPATITIS C ANTIBODY: NONREACTIVE
KAPPA FREE LIGHT CHAINS QNT: 2.65 MG/DL (ref 0.37–1.94)
LAMBDA FREE LIGHT CHAINS QNT: 2.09 MG/DL (ref 0.57–2.63)
MCH RBC QN AUTO: 30.9 PG (ref 25.2–33.5)
MCHC RBC AUTO-ENTMCNC: 32.6 G/DL (ref 28.4–34.8)
MCV RBC AUTO: 94.9 FL (ref 82.6–102.9)
NRBC AUTOMATED: 0 PER 100 WBC
PDW BLD-RTO: 14.7 % (ref 11.8–14.4)
PLATELET # BLD: ABNORMAL K/UL (ref 138–453)
PLATELET, FLUORESCENCE: 99 K/UL (ref 138–453)
PLATELET, IMMATURE FRACTION: 18 % (ref 1.1–10.3)
POTASSIUM SERPL-SCNC: 4.1 MMOL/L (ref 3.7–5.3)
RBC # BLD: 3.72 M/UL (ref 3.95–5.11)
SODIUM BLD-SCNC: 139 MMOL/L (ref 135–144)
SPECIMEN DESCRIPTION: NORMAL
WBC # BLD: 9.7 K/UL (ref 3.5–11.3)

## 2022-10-09 PROCEDURE — 86038 ANTINUCLEAR ANTIBODIES: CPT

## 2022-10-09 PROCEDURE — 51701 INSERT BLADDER CATHETER: CPT

## 2022-10-09 PROCEDURE — 85027 COMPLETE CBC AUTOMATED: CPT

## 2022-10-09 PROCEDURE — 1200000000 HC SEMI PRIVATE

## 2022-10-09 PROCEDURE — 85055 RETICULATED PLATELET ASSAY: CPT

## 2022-10-09 PROCEDURE — 6370000000 HC RX 637 (ALT 250 FOR IP): Performed by: STUDENT IN AN ORGANIZED HEALTH CARE EDUCATION/TRAINING PROGRAM

## 2022-10-09 PROCEDURE — 76775 US EXAM ABDO BACK WALL LIM: CPT

## 2022-10-09 PROCEDURE — 81001 URINALYSIS AUTO W/SCOPE: CPT

## 2022-10-09 PROCEDURE — 86225 DNA ANTIBODY NATIVE: CPT

## 2022-10-09 PROCEDURE — 80048 BASIC METABOLIC PNL TOTAL CA: CPT

## 2022-10-09 PROCEDURE — 83521 IG LIGHT CHAINS FREE EACH: CPT

## 2022-10-09 PROCEDURE — 86334 IMMUNOFIX E-PHORESIS SERUM: CPT

## 2022-10-09 PROCEDURE — 2580000003 HC RX 258: Performed by: STUDENT IN AN ORGANIZED HEALTH CARE EDUCATION/TRAINING PROGRAM

## 2022-10-09 PROCEDURE — 93005 ELECTROCARDIOGRAM TRACING: CPT | Performed by: UROLOGY

## 2022-10-09 PROCEDURE — 36415 COLL VENOUS BLD VENIPUNCTURE: CPT

## 2022-10-09 PROCEDURE — 86160 COMPLEMENT ANTIGEN: CPT

## 2022-10-09 PROCEDURE — 84155 ASSAY OF PROTEIN SERUM: CPT

## 2022-10-09 PROCEDURE — 80074 ACUTE HEPATITIS PANEL: CPT

## 2022-10-09 PROCEDURE — 99222 1ST HOSP IP/OBS MODERATE 55: CPT | Performed by: INTERNAL MEDICINE

## 2022-10-09 PROCEDURE — 84165 PROTEIN E-PHORESIS SERUM: CPT

## 2022-10-09 RX ADMIN — AMLODIPINE BESYLATE 10 MG: 10 TABLET ORAL at 09:43

## 2022-10-09 RX ADMIN — METOPROLOL TARTRATE 25 MG: 25 TABLET ORAL at 09:43

## 2022-10-09 RX ADMIN — POTASSIUM CHLORIDE 20 MEQ: 1500 TABLET, EXTENDED RELEASE ORAL at 09:43

## 2022-10-09 RX ADMIN — OXYCODONE 10 MG: 5 TABLET ORAL at 16:23

## 2022-10-09 RX ADMIN — ACETAMINOPHEN 650 MG: 325 TABLET ORAL at 12:39

## 2022-10-09 RX ADMIN — OXYCODONE 10 MG: 5 TABLET ORAL at 12:39

## 2022-10-09 RX ADMIN — SODIUM CHLORIDE, PRESERVATIVE FREE 10 ML: 5 INJECTION INTRAVENOUS at 09:44

## 2022-10-09 RX ADMIN — FUROSEMIDE 20 MG: 20 TABLET ORAL at 09:43

## 2022-10-09 RX ADMIN — DESMOPRESSIN ACETATE 40 MG: 0.2 TABLET ORAL at 20:12

## 2022-10-09 RX ADMIN — SODIUM CHLORIDE, PRESERVATIVE FREE 10 ML: 5 INJECTION INTRAVENOUS at 20:17

## 2022-10-09 RX ADMIN — METOPROLOL TARTRATE 25 MG: 25 TABLET ORAL at 20:12

## 2022-10-09 RX ADMIN — LEVOTHYROXINE SODIUM 50 MCG: 50 TABLET ORAL at 05:56

## 2022-10-09 RX ADMIN — ACETAMINOPHEN 650 MG: 325 TABLET ORAL at 16:23

## 2022-10-09 RX ADMIN — ACETAMINOPHEN 650 MG: 325 TABLET ORAL at 23:05

## 2022-10-09 RX ADMIN — ACETAMINOPHEN 650 MG: 325 TABLET ORAL at 05:56

## 2022-10-09 RX ADMIN — BISACODYL 5 MG: 5 TABLET, COATED ORAL at 09:43

## 2022-10-09 ASSESSMENT — PAIN - FUNCTIONAL ASSESSMENT
PAIN_FUNCTIONAL_ASSESSMENT: ACTIVITIES ARE NOT PREVENTED
PAIN_FUNCTIONAL_ASSESSMENT: ACTIVITIES ARE NOT PREVENTED

## 2022-10-09 ASSESSMENT — PAIN DESCRIPTION - LOCATION
LOCATION: ABDOMEN;FLANK
LOCATION: ABDOMEN
LOCATION: ABDOMEN
LOCATION: ABDOMEN;FLANK

## 2022-10-09 ASSESSMENT — PAIN DESCRIPTION - DESCRIPTORS
DESCRIPTORS: ACHING

## 2022-10-09 ASSESSMENT — PAIN DESCRIPTION - ORIENTATION
ORIENTATION: RIGHT
ORIENTATION: RIGHT;LOWER
ORIENTATION: RIGHT;LOWER
ORIENTATION: RIGHT

## 2022-10-09 ASSESSMENT — PAIN SCALES - GENERAL
PAINLEVEL_OUTOF10: 2
PAINLEVEL_OUTOF10: 0
PAINLEVEL_OUTOF10: 7
PAINLEVEL_OUTOF10: 0
PAINLEVEL_OUTOF10: 6
PAINLEVEL_OUTOF10: 0
PAINLEVEL_OUTOF10: 8

## 2022-10-09 ASSESSMENT — PAIN SCALES - WONG BAKER
WONGBAKER_NUMERICALRESPONSE: 0

## 2022-10-09 NOTE — PLAN OF CARE
Problem: Discharge Planning  Goal: Discharge to home or other facility with appropriate resources  10/9/2022 0424 by Lisa Barbosa RN  Outcome: Progressing  10/8/2022 1836 by Janna Echols RN  Outcome: Progressing     Problem: Pain  Goal: Verbalizes/displays adequate comfort level or baseline comfort level  10/9/2022 0424 by Lisa Barbosa RN  Outcome: Progressing  10/8/2022 1836 by Janna Echols RN  Outcome: Progressing     Problem: Safety - Adult  Goal: Free from fall injury  10/9/2022 0424 by Lisa Barbosa RN  Outcome: Progressing  10/8/2022 1836 by Janna Echols RN  Outcome: Progressing     Problem: ABCDS Injury Assessment  Goal: Absence of physical injury  10/9/2022 0424 by Lisa Barbosa RN  Outcome: Progressing  10/8/2022 1836 by Janna Echols RN  Outcome: Progressing     Problem: Skin/Tissue Integrity  Goal: Absence of new skin breakdown  Description: 1. Monitor for areas of redness and/or skin breakdown  2. Assess vascular access sites hourly  3. Every 4-6 hours minimum:  Change oxygen saturation probe site  4. Every 4-6 hours:  If on nasal continuous positive airway pressure, respiratory therapy assess nares and determine need for appliance change or resting period.   10/9/2022 0424 by Lisa Barbosa RN  Outcome: Progressing  10/8/2022 1836 by Janna Echosl RN  Outcome: Progressing     Problem: Musculoskeletal - Adult  Goal: Return mobility to safest level of function  10/9/2022 0424 by Lisa Barbosa RN  Outcome: Progressing  10/8/2022 1836 by Janna Echols RN  Outcome: Progressing  Goal: Maintain proper alignment of affected body part  10/9/2022 0424 by Lisa Barbosa RN  Outcome: Progressing  10/8/2022 1836 by Janna Echols RN  Outcome: Progressing  Goal: Return ADL status to a safe level of function  10/9/2022 0424 by Lisa Barbosa RN  Outcome: Progressing  10/8/2022 1836 by Janna Echols RN  Outcome: Progressing

## 2022-10-09 NOTE — CONSULTS
Nephrology Consult Note    Reason for Consult:  s/p nephrectomy with rising Cr  Requesting Physician:  Dr. Yennifer Magaña    Chief Complaint:  right renal mass  History Obtained From:  patient, EMR, RN    History of Present Illness: This is a 79 y.o. female PMH HTN, a-fib on anticoagulation, CHF, CAD, HLD, hypothyroidism, who presented to the hospital for evaluation of elective robotic assisted right nephrectomy which she underwent on 10/7/2022. Recently discovered right renal mass found on CT obtained for surveillance for colon cancer which was resected 2 years ago, she did not receive chemo. Mass is in the mid to lower pole of the right kidney, 3.3cm x 3.2cm in size. Post OR she did well other than development of transient a-fib rvr yesterday, better controlled today. No chest pain, no syncope, stable blood pressures. She reports non voiding much urine yesterday, and straight cath this am returned 750cc clear yellow urine. She had urinary retention and Petty catheter was placed and about 1 L urine was obtained after that. Nephrology was asked to see her for MICHAEL. No hx of retention in the past, no urinary complaints, chronic uti's, no hx kidney stones. Patient does not see a Nephrologist. Denies edema. Does take lasix 20mg PO daily, along with Norvasc. She also take potassium supplementation. Baseline Cr is normal. Admission Cr was normal at 0.73 up to 1.09 yesterday and up to 1.35 this am.   Na 139 K 4.1 CO2 24 Yves 8.6, glucose 113. No issues with n/v/d. Post op pain well controlled. Good PO intake. Pt denies any hx of heavy or prolonged NSAID use. There is no history of blood or bone marrow disorders. There is no hx of jaundice or hepatitis or sexually transmitted disease. Pt has no hx of collagen vascular disease or vasculitis. No history of dysuria or frequency. No recent procedures involving IV contrast.   There is no hx of paraprotein disease.  No hx of recurrent UTI , incontinence or recurrent nephrolithiasis.      Past Medical History:        Diagnosis Date    A-fib ([address] 07/19/2017    Abdominal pain     Acute blood loss as cause of postoperative anemia 11/08/2018    Anxiety 04/30/2016    CAD (coronary artery disease)     [facility]CC/[facility]/ last seen 9-2022    CHF (congestive heart failure) (HCC)     Class 2 severe obesity with serious comorbidity and body mass index (BMI) of 37.0 to 37.9 in adult [facility])     Closed fracture of proximal end of left humerus with routine healing 04/26/2021    Colitis 05/31/2019    Colitis due to Clostridium difficile     Depression 04/30/2016    Diarrhea     Dyslipidemia     Elevated fasting glucose 11/20/2018    FH: total abdominal hysterectomy and bilateral salpingo-oophorectomy 1966    Fractures 03/15/2021    Lt humerus impacted displaced    Full dentures     Glucose intolerance (impaired glucose tolerance)     History of blood transfusion     no reaction    Hyperlipidemia     Hypertension     Hypokalemia     Malignant neoplasm of sigmoid colon ([address]     kidney cancer right    Multiple closed fractures of ribs 09/22/2018    Obesity     JAYJAY on CPAP 10/06/2018    Osteoarthritis     Osteoporosis 08/30/2013    Other complete intestinal obstruction ([address] 11/09/2018    Other specified hypothyroidism     Prolonged emergence from general anesthesia     S/P small bowel resection 05/22/2019    Thrombocytopenia, unspecified ([address] 03/09/2020    Under care of team     Hem-Onc Dr. Morro puentes/last seen 4-2022    Wears glasses     Wellness examination     PCP Brenden Ott DO/ [address] [facility]/ last seen 4-2022       Past Surgical History:        Procedure Laterality Date    CARDIAC CATHETERIZATION  09/05/2017    CARDIAC CATHETERIZATION N/A     CHOLECYSTECTOMY  1960s    COLONOSCOPY N/A 10/08/2018    COLONOSCOPY WITH COLD ET HOT BIOPSIES ET POLYPECTOMIES performed by Elder Steven MD at [facility] N/A 04/25/2019    COLONOSCOPY performed by Delvis Valadez MD at [facility]  stricture at 7 cm     COLONOSCOPY N/A 02/03/2020    tubular adenoma X 1, Dr. Willow Morris (CERVIX STATUS UNKNOWN)      KNEE ARTHROPLASTY Left     KNEE ARTHROPLASTY Right     MS CYSTOSCOPY,INSERT URETERAL STENT Bilateral 11/05/2018    CYSTO Bilateral Lighted stent placements performed by Indu Lawler MD at [facility], PARTIAL, W/ANAST N/A 11/05/2018    Open Sigmoid Colectomy w/ lighted stents ILEOSTOMY PLACEMENT performed by Arminda Donahue MD at [address] N/A 05/09/2019    Flexible Sigmoidoscopy with Dilatation of rectal stricture performed by Delvis Valadez MD at [address] N/A 05/22/2019    Loop Ileostomy take down performed by Delvis Valadez MD at [address] [facility] [address] Right 10/07/2022    Lysis of Adhesions    VARICOSE VEIN SURGERY Right 1960s       Current Medications:    ceFAZolin (ANCEF) 2000 mg in sterile water 20 mL IV syringe, Once  amLODIPine (NORVASC) tablet 10 mg, Daily  atorvastatin (LIPITOR) tablet 40 mg, Nightly  furosemide (LASIX) tablet 20 mg, Daily  levothyroxine (SYNTHROID) tablet 50 mcg, Daily  metoprolol tartrate (LOPRESSOR) tablet 25 mg, BID  potassium chloride (KLOR-CON M) extended release tablet 20 mEq, Daily  sodium chloride flush 0.9 % injection 5-40 mL, 2 times per day  sodium chloride flush 0.9 % injection 5-40 mL, PRN  0.9 % sodium chloride infusion, PRN  acetaminophen (TYLENOL) tablet 650 mg, Q6H  oxyCODONE (ROXICODONE) immediate release tablet 5 mg, Q4H PRN   Or  oxyCODONE (ROXICODONE) immediate release tablet 10 mg, Q4H PRN  morphine (PF) injection 2 mg, Q2H PRN   Or  morphine injection 4 mg, Q2H PRN  polyethylene glycol (GLYCOLAX) packet 17 g, Daily  bisacodyl (DULCOLAX) EC tablet 5 mg, Daily  promethazine (PHENERGAN) tablet 12.5 mg, Q6H PRN   Or  ondansetron (ZOFRAN) injection 4 mg, Q6H PRN  melatonin tablet 5 mg, Nightly PRN  hypromellose (ISOPTO TEARS) 0.5 % ophthalmic solution 1 drop, Q4H PRN  benzocaine-menthol (CEPACOL SORE THROAT) lozenge 1 lozenge, Q2H PRN  calcium carbonate (TUMS) chewable tablet 500 mg, TID PRN  fentaNYL (SUBLIMAZE) injection 50 mcg, Q5 Min PRN        Allergies:  Pcn [penicillins] and Bextra [valdecoxib]    Social History:   Social History     Socioeconomic History    Marital status:      Spouse name: Not on file    Number of children: 5    Years of education: 15    Highest education level: 12th grade   Occupational History    Not on file   Tobacco Use    Smoking status: Never     Passive exposure: Never    Smokeless tobacco: Never   Vaping Use    Vaping Use: Never used   Substance and Sexual Activity    Alcohol use: No    Drug use: No    Sexual activity: Not Currently     Partners: Male   Other Topics Concern    Not on file   Social History Narrative    3/28/2022- receives PIPP, HEAP and Meals on Wheels. Social Determinants of Health     Financial Resource Strain: Low Risk     Difficulty of Paying Living Expenses: Not very hard   Food Insecurity: No Food Insecurity    Worried About Running Out of Food in the Last Year: Never true    Ran Out of Food in the Last Year: Never true   Transportation Needs: No Transportation Needs    Lack of Transportation (Medical): No    Lack of Transportation (Non-Medical): No   Physical Activity: Inactive    Days of Exercise per Week: 0 days    Minutes of Exercise per Session: 0 min   Stress: No Stress Concern Present    Feeling of Stress : Not at all   Social Connections: Moderately Isolated    Frequency of Communication with Friends and Family: More than three times a week    Frequency of Social Gatherings with Friends and Family: More than three times a week    Attends Yarsanism Services: 1 to 4 times per year    Active Member of [employer] or Organizations: No    Attends Club or Organization Meetings: Never    Marital Status:     Intimate Partner Violence: Not on file   Housing Stability: Low Risk     Unable to Pay for Housing in the Last Year: No    Number of Places Lived in the Last Year: 1    Unstable Housing in the Last Year: No       Family History:   Family History   Adopted: Yes   Problem Relation Age of Onset    High Blood Pressure Brother         adopted brother       Review of Systems:    Constitutional: No fever, no chills, no lethargy, no weakness. HEENT:  No headache, otalgia, itchy eyes, nasal discharge or sore throat. Cardiac:  No chest pain, dyspnea, orthopnea or PND. Chest:              No cough, phlegm or wheezing. Abdomen:  Positive postop abdominal pain, no nausea or vomiting. Neuro:  No focal weakness, abnormal movements orseizure like activity. Skin:   No rashes, no itching. :   No hematuria, no pyuria, no dysuria, no flank pain. Extremities:  No swelling or joint pains. Objective:  CURRENT TEMPERATURE:  Temp: 98.9 °F (37.2 °C)  MAXIMUM TEMPERATURE OVER 24HRS:  Temp (24hrs), Av.3 °F (36.8 °C), Min:97.2 °F (36.2 °C), Max:100.3 °F (37.9 °C)    CURRENT RESPIRATORY RATE:  Resp: 16  CURRENT PULSE:  Heart Rate: (!) 105  CURRENT BLOOD PRESSURE:  BP: 116/70  24HR BLOOD PRESSURE RANGE:  Systolic (69QBW), QIB:168 , Min:116 , TS   ; Diastolic (11TSZ), NLI:62, Min:70, Max:84    24HR INTAKE/OUTPUT:    Intake/Output Summary (Last 24 hours) at 10/9/2022 1235  Last data filed at 10/9/2022 1218  Gross per 24 hour   Intake --   Output 2150 ml   Net -2150 ml     Patient Vitals for the past 96 hrs (Last 3 readings):   Weight   10/07/22 0756 265 lb (120.2 kg)   10/06/22 0934 265 lb (120.2 kg)     Physical Exam:  General appearance: awake, alert, in no acute distress  Skin: warm and dry, no rash or erythema  Eyes: conjunctivae normal and sclera anicteric  ENT: :no thrush no pharyngeal congestion    Neck: no carotid bruit ,no  JVD,no carotid Lymphadenopathy, noThyromegaly   Pulmonary: no wheezing or rhonchi. No rales heard.   Cardiovascular: normal S1 & S2,  No S3 or  S4, no Pericardial Rub no Murmur   Abdomen: soft obese, nontender, bowel sounds present, no organomegaly, no ascites  Extremities:no cyanosis, clubbing or edema    Labs:   CBC:  Recent Labs     10/07/22  1255 10/08/22  0556 10/09/22  0746   WBC 9.7 11.1 9.7   RBC 4.22 3.85* 3.72*   HGB 13.1 11.9 11.5*   HCT 40.9 36.6 35.3*   MCV 96.9 95.1 94.9   MCH 31.0 30.9 30.9   MCHC 32.0 32.5 32.6   RDW 14.5* 14.7* 14.7*   PLT See Reflexed IPF Result See Reflexed IPF Result See Reflexed IPF Result      BMP:   Recent Labs     10/07/22  1255 10/08/22  0556 10/09/22  0746    140 139   K 3.9 4.8 4.1    105 104   CO2 25 22 24   BUN 17 20 22   CREATININE 0.73 1.09* 1.35*   GLUCOSE 171* 124* 113*   CALCIUM 8.7 8.3* 8.6      SPEP:   Lab Results   Component Value Date/Time    PROT 7.3 04/04/2022 10:36 AM    ALBCAL 4.2 11/20/2020 09:55 AM    ALBPCT 63 11/20/2020 09:55 AM    LABALPH 0.2 11/20/2020 09:55 AM    LABALPH 0.8 11/20/2020 09:55 AM    A1PCT 4 11/20/2020 09:55 AM    A2PCT 12 11/20/2020 09:55 AM    LABBETA 0.7 11/20/2020 09:55 AM    BETAPCT 10 11/20/2020 09:55 AM    GAMGLOB 0.8 11/20/2020 09:55 AM    GGPCT 12 11/20/2020 09:55 AM    PATH ELECTRONICALLY SIGNED. Eliane Funk M.D. 11/20/2020 09:55 AM    PATH ELECTRONICALLY SIGNED.  Eliane Funk M.D. 11/20/2020 09:55 AM     Urinalysis:  U/A:   Lab Results   Component Value Date/Time    NITRU NEGATIVE 05/31/2019 02:38 PM    COLORU NOT REPORTED 05/31/2019 02:38 PM    PHUR 6.5 05/31/2019 02:38 PM    WBCUA 0 TO 4 05/31/2019 02:38 PM    RBCUA 0 TO 4 05/31/2019 02:38 PM    MUCUS 1+ 05/31/2019 02:38 PM    TRICHOMONAS NOT REPORTED 05/31/2019 02:38 PM    YEAST NOT REPORTED 05/31/2019 02:38 PM    BACTERIA TRACE 05/31/2019 02:38 PM    SPECGRAV 1.015 05/31/2019 02:38 PM    LEUKOCYTESUR NEGATIVE 05/31/2019 02:38 PM    UROBILINOGEN Normal 05/31/2019 02:38 PM    BILIRUBINUR NEGATIVE 05/31/2019 02:38 PM    GLUCOSEU NEGATIVE 05/31/2019 02:38 PM    KETUA TRACE 05/31/2019 02:38 PM    AMORPHOUS NOT REPORTED 05/31/2019 02:38 PM       Radiology:  Reviewed as available. Assessment:  1. MICHAEL nonoliguric likely related to urinary retention post op. Baseline creatinine seems to be normal.  2. Renal mass s/p right nephrectomy 10/7/22  3. Hypertension  4. Urinary retention. 5.  Minimal coronary artery disease status post cardiac catheterization 9/16/2022 .  6. History of diastolic heart failure. 7. A-fib  8. History of colon cancer status post colectomy. 9. Hypothyroidism. Plan:  Continue Petty for now. Agree with holding diuretics for now, patient did receive Lasix 20 mg p.o. in the last 3 days. 3.   Monitor off fluids as well. 4.   Monitor strict I's and O's and renal function. 5.   Will Check Renal Ultrasound to r/o element of obstruction and to assess the kidney size/echotexture. 6.   Comprehensive urine testing including Urinalysis, Urine sodium, potassium, chloride, Urine protein and creatinine to quantify the proteinuria if any at all. 7.   Will Order serum and urine protein electrophoresis to r/o element to occult paraprotein disease. 8.   Will order Hepatitis B and C, CORTES, Complement levels. 9.   BMP in AM.  10. Will follow. Thank you for the consultation. Please do not hesitate to call with questions. Electronically signed by ADELSO Moreland on 10/9/2022 at 12:35 PM  [facility]      Attending Physician Statement  I have discussed the care of this patient, including pertinent history and exam findings, with the Resident/CNP. I have reviewed and edited the key elements of all parts of the encounter with the Resident/CNP. I agree with the assessment, plan and orders as documented by the Resident/CNP. Felix Chen MD   Nephrology [address] [place] [address] Drive    This note is created with the assistance of a speech-recognition program. While intending to generate a document that actually reflects the content of the visit, no guarantees can be provided that every mistake has been identified and corrected by editing.

## 2022-10-09 NOTE — PROGRESS NOTES
Angela Rutherford, [address] [facility], John Garcia, Diana Perez, [facility]  Urology Progress Note     Subjective:   Diet: Regular diet   Had an episode of tachycardia, A. fib that was transient this morning, currently heart rate in the 100s. Was asymptomatic at the time  Denies nausea, vomiting, fevers and chills  Pain well controlled  Passing some flatus/no bowel movement  Petty catheter was removed yesterday and she required straight catheterization for 750 cc early this morning. Urine output-850 cc  AM labs pending    Patient Vitals for the past 24 hrs:   BP Temp Temp src Pulse Resp SpO2   10/09/22 0539 123/75 100.3 °F (37.9 °C) Axillary (!) 104 18 94 %   10/09/22 0000 (!) 140/77 98 °F (36.7 °C) Oral (!) 101 17 94 %   10/08/22 2035 -- -- -- 99 -- --   10/08/22 2000 134/84 98.1 °F (36.7 °C) Oral 99 17 95 %   10/08/22 1921 -- -- -- -- 17 --   10/08/22 1851 -- -- -- -- 16 --   10/08/22 1737 136/82 97.4 °F (36.3 °C) Oral 82 16 93 %   10/08/22 1407 -- -- -- -- 16 --   10/08/22 1337 -- -- -- -- 16 --   10/08/22 1308 119/83 97.2 °F (36.2 °C) Oral (!) 102 15 90 %   10/08/22 0937 (!) 163/81 97.6 °F (36.4 °C) Oral (!) 106 16 91 %   10/08/22 0927 -- -- -- -- 16 --   10/08/22 0857 -- -- -- -- 16 --       Intake/Output Summary (Last 24 hours) at 10/9/2022 0758  Last data filed at 10/9/2022 0610  Gross per 24 hour   Intake --   Output 850 ml   Net -850 ml       Recent Labs     10/07/22  1255 10/08/22  0556   WBC 9.7 11.1   HGB 13.1 11.9   HCT 40.9 36.6   MCV 96.9 95.1   PLT See Reflexed IPF Result See Reflexed IPF Result     Recent Labs     10/07/22  1255 10/08/22  0556    140   K 3.9 4.8    105   CO2 25 22   BUN 17 20   CREATININE 0.73 1.09*       No results for input(s): COLORU, PHUR, LABCAST, WBCUA, RBCUA, MUCUS, TRICHOMONAS, YEAST, BACTERIA, CLARITYU, SPECGRAV, LEUKOCYTESUR, UROBILINOGEN, Jennifer Cram in the last 72 hours.     Invalid input(s): NITRATE, GLUCOSEUKETONESUAMORPHOUS    Additional Lab/culture results:    Physical Exam:   AO X 3  Neck: Supple   Chest: bilateral symmetrical chest rise/ Non labored breathing   Circulatory: Peripheries warm , well perfused   P/A: soft, nondistended, incision sites appropriately tender, clean, dry and intact with skin glue  Petty in with clear yellow urine        Interval Imaging Findings:    Impression:    Shayna Velarde is a 79-year-old female with right renal mass, s/p robotic assisted laparoscopic right radical nephrectomy on 10/7/2022    Plan:   Diet: Regular diet  IV fluids: Off IV fluids  Consult cardiology after discussion with attending for transient A. fib. Warfarin on hold prior to surgery. Bladder scan every 4 hours, straight cath for PVR more than 300 cc  Continue nasal cannula oxygen during the day, CPAP during night  Antibiotics: Perioperative Ancef  Pain control: Tylenol scheduled, Roxicodone as needed  Ambulation / IS 10 times per hour   AM labs: Follow-up a.m. labs  DC planning -pending cardiology consult, ambulation and urinary retention management.         Cherelle Luciano MD  7:58 AM 10/9/2022

## 2022-10-09 NOTE — CARE COORDINATION
10/09/22 1441   Service Assessment   Patient Orientation Alert and Oriented   Cognition Alert   History Provided By Patient   Primary Caregiver Self   Support Systems Children;Friends/Neighbors   PCP Verified by CM Yes  Karin Berumen DO)   Last Visit to PCP Within last 3 months   Prior Functional Level Independent in ADLs/IADLs   Current Functional Level Independent in ADLs/IADLs   Can patient return to prior living arrangement Yes   Ability to make needs known: Good   Family able to assist with home care needs: Yes   Financial Resources Medicare; Other (Comment)  ([employer] secondary)   Social/Functional History   Lives With Alone   Type of [address] One level   Home Access Ramped entrance   [address]   Bathroom Toilet Handicap height   [address] bars in shower;Grab bars around toilet   [address] Cane;Walker, rolling  (Electric Scooter)   [address] [facility]   Homemaking Responsibilities Yes   Ambulation Assistance Independent   Transfer Assistance Independent   Active  Yes   Mode of Transportation Car   Type of Occupation Homemaker   Discharge Planning   Type of [address] [place] [address] Prior To Admission C-pap;Durable [address]   Current DME Prior to [address] Cpap;Walker;Cane;Other (Comment)  (Electric Scooter)   DME Ordered? No   Potential Assistance Purchasing Medications No   Patient expects to be discharged to: House   One/Two Story Residence One story   History of falls? 0   Services At/After Discharge   [place] Information Provided?  No   Mode of Transport at Discharge Other (see comment)  (Son)   Condition of Participation: Discharge Planning   The Plan for Transition of Care is related to the following treatment goals: Home independently, son will transport, has established PCP, denies need for Home Care, kraus placed- Nephrology consulted   The Patient and/or Patient Representative was provided with a Choice of Provider? Patient   The Patient and/Or Patient Representative agree with the Discharge Plan?  Yes  (Pt denies need for [facility]   PT confirmed demographics

## 2022-10-10 VITALS
SYSTOLIC BLOOD PRESSURE: 120 MMHG | DIASTOLIC BLOOD PRESSURE: 81 MMHG | WEIGHT: 293 LBS | TEMPERATURE: 98.8 F | HEART RATE: 98 BPM | OXYGEN SATURATION: 94 % | RESPIRATION RATE: 16 BRPM | BODY MASS INDEX: 45.99 KG/M2 | HEIGHT: 67 IN

## 2022-10-10 LAB
ANION GAP SERPL CALCULATED.3IONS-SCNC: 9 MMOL/L (ref 9–17)
ANTI DNA DOUBLE STRANDED: 1.2 IU/ML
ANTI-NUCLEAR ANTIBODY (ANA): NEGATIVE
BACTERIA: ABNORMAL
BILIRUBIN URINE: NEGATIVE
BUN BLDV-MCNC: 26 MG/DL (ref 8–23)
CALCIUM SERPL-MCNC: 8.6 MG/DL (ref 8.6–10.4)
CASTS UA: ABNORMAL /LPF (ref 0–8)
CHLORIDE BLD-SCNC: 101 MMOL/L (ref 98–107)
CHLORIDE, UR: 86 MMOL/L
CO2: 25 MMOL/L (ref 20–31)
COLOR: YELLOW
CREAT SERPL-MCNC: 1.38 MG/DL (ref 0.5–0.9)
CREATININE URINE: 41.3 MG/DL (ref 28–217)
EKG ATRIAL RATE: 111 BPM
EKG ATRIAL RATE: 70 BPM
EKG Q-T INTERVAL: 346 MS
EKG Q-T INTERVAL: 482 MS
EKG QRS DURATION: 100 MS
EKG QRS DURATION: 98 MS
EKG QTC CALCULATION (BAZETT): 480 MS
EKG QTC CALCULATION (BAZETT): 566 MS
EKG R AXIS: -10 DEGREES
EKG R AXIS: 5 DEGREES
EKG T AXIS: -2 DEGREES
EKG T AXIS: 30 DEGREES
EKG VENTRICULAR RATE: 116 BPM
EKG VENTRICULAR RATE: 83 BPM
ENA ANTIBODIES SCREEN: 0.2 U/ML
EPITHELIAL CELLS UA: ABNORMAL /HPF (ref 0–5)
GFR SERPL CREATININE-BSD FRML MDRD: 38 ML/MIN/1.73M2
GLUCOSE BLD-MCNC: 113 MG/DL (ref 70–99)
GLUCOSE URINE: NEGATIVE
HCT VFR BLD CALC: 35.2 % (ref 36.3–47.1)
HEMOGLOBIN: 11.4 G/DL (ref 11.9–15.1)
KETONES, URINE: NEGATIVE
LEUKOCYTE ESTERASE, URINE: ABNORMAL
MCH RBC QN AUTO: 30.7 PG (ref 25.2–33.5)
MCHC RBC AUTO-ENTMCNC: 32.4 G/DL (ref 28.4–34.8)
MCV RBC AUTO: 94.9 FL (ref 82.6–102.9)
NITRITE, URINE: POSITIVE
NRBC AUTOMATED: 0 PER 100 WBC
PDW BLD-RTO: 14.6 % (ref 11.8–14.4)
PH UA: 5 (ref 5–8)
PLATELET # BLD: ABNORMAL K/UL (ref 138–453)
PLATELET, FLUORESCENCE: 93 K/UL (ref 138–453)
PLATELET, IMMATURE FRACTION: 15.1 % (ref 1.1–10.3)
POTASSIUM SERPL-SCNC: 3.9 MMOL/L (ref 3.7–5.3)
PROTEIN UA: ABNORMAL
RBC # BLD: 3.71 M/UL (ref 3.95–5.11)
RBC UA: ABNORMAL /HPF (ref 0–4)
SODIUM BLD-SCNC: 135 MMOL/L (ref 135–144)
SODIUM,UR: 64 MMOL/L
SPECIFIC GRAVITY UA: 1.01 (ref 1–1.03)
TOTAL PROTEIN, URINE: 17 MG/DL
TURBIDITY: CLEAR
URINE HGB: ABNORMAL
URINE TOTAL PROTEIN CREATININE RATIO: 0.41 (ref 0–0.2)
UROBILINOGEN, URINE: NORMAL
WBC # BLD: 8.7 K/UL (ref 3.5–11.3)
WBC UA: ABNORMAL /HPF (ref 0–5)

## 2022-10-10 PROCEDURE — 6370000000 HC RX 637 (ALT 250 FOR IP): Performed by: STUDENT IN AN ORGANIZED HEALTH CARE EDUCATION/TRAINING PROGRAM

## 2022-10-10 PROCEDURE — 97162 PT EVAL MOD COMPLEX 30 MIN: CPT

## 2022-10-10 PROCEDURE — 85055 RETICULATED PLATELET ASSAY: CPT

## 2022-10-10 PROCEDURE — 84156 ASSAY OF PROTEIN URINE: CPT

## 2022-10-10 PROCEDURE — 94660 CPAP INITIATION&MGMT: CPT

## 2022-10-10 PROCEDURE — 2580000003 HC RX 258: Performed by: STUDENT IN AN ORGANIZED HEALTH CARE EDUCATION/TRAINING PROGRAM

## 2022-10-10 PROCEDURE — 85027 COMPLETE CBC AUTOMATED: CPT

## 2022-10-10 PROCEDURE — 82436 ASSAY OF URINE CHLORIDE: CPT

## 2022-10-10 PROCEDURE — 93010 ELECTROCARDIOGRAM REPORT: CPT | Performed by: INTERNAL MEDICINE

## 2022-10-10 PROCEDURE — 80048 BASIC METABOLIC PNL TOTAL CA: CPT

## 2022-10-10 PROCEDURE — 99232 SBSQ HOSP IP/OBS MODERATE 35: CPT | Performed by: INTERNAL MEDICINE

## 2022-10-10 PROCEDURE — 97530 THERAPEUTIC ACTIVITIES: CPT

## 2022-10-10 PROCEDURE — 81015 MICROSCOPIC EXAM OF URINE: CPT

## 2022-10-10 PROCEDURE — 84166 PROTEIN E-PHORESIS/URINE/CSF: CPT

## 2022-10-10 PROCEDURE — 84300 ASSAY OF URINE SODIUM: CPT

## 2022-10-10 PROCEDURE — 36415 COLL VENOUS BLD VENIPUNCTURE: CPT

## 2022-10-10 PROCEDURE — 82570 ASSAY OF URINE CREATININE: CPT

## 2022-10-10 RX ADMIN — LEVOTHYROXINE SODIUM 50 MCG: 50 TABLET ORAL at 05:00

## 2022-10-10 RX ADMIN — SODIUM CHLORIDE, PRESERVATIVE FREE 10 ML: 5 INJECTION INTRAVENOUS at 09:22

## 2022-10-10 RX ADMIN — POTASSIUM CHLORIDE 20 MEQ: 1500 TABLET, EXTENDED RELEASE ORAL at 09:22

## 2022-10-10 RX ADMIN — ACETAMINOPHEN 650 MG: 325 TABLET ORAL at 05:00

## 2022-10-10 RX ADMIN — BISACODYL 5 MG: 5 TABLET, COATED ORAL at 09:22

## 2022-10-10 RX ADMIN — METOPROLOL TARTRATE 25 MG: 25 TABLET ORAL at 09:22

## 2022-10-10 RX ADMIN — POLYETHYLENE GLYCOL 3350 17 G: 17 POWDER, FOR SOLUTION ORAL at 09:21

## 2022-10-10 RX ADMIN — ACETAMINOPHEN 650 MG: 325 TABLET ORAL at 13:01

## 2022-10-10 RX ADMIN — AMLODIPINE BESYLATE 10 MG: 10 TABLET ORAL at 09:22

## 2022-10-10 ASSESSMENT — PAIN SCALES - GENERAL
PAINLEVEL_OUTOF10: 3
PAINLEVEL_OUTOF10: 0
PAINLEVEL_OUTOF10: 4

## 2022-10-10 ASSESSMENT — PAIN SCALES - WONG BAKER
WONGBAKER_NUMERICALRESPONSE: 0

## 2022-10-10 ASSESSMENT — PAIN DESCRIPTION - LOCATION: LOCATION: ABDOMEN

## 2022-10-10 ASSESSMENT — PAIN DESCRIPTION - DESCRIPTORS: DESCRIPTORS: ACHING

## 2022-10-10 ASSESSMENT — PAIN DESCRIPTION - ORIENTATION: ORIENTATION: RIGHT;LOWER

## 2022-10-10 NOTE — DISCHARGE INSTR - COC
Continuity of Care Form    Patient Name: Yennifer Mcneill   :  1940  MRN:  [id number]    Admit date:  10/7/2022  Discharge date:  10/10/2022    Code Status Order: Full Code   Advance Directives:   Advance Care Flowsheet Documentation       Date/Time Healthcare Directive Type of Healthcare Directive Copy in [address] Agent's Name Healthcare Agent's Phone Number    10/07/22 6229 Yes, patient has an advance directive for healthcare treatment Durable power of  for health care No, copy requested from family Healthcare power of  Bere Check --            Admitting Physician:  Fly Haile MD  PCP: Anatoliy Bellamy DO    Discharging Nurse: [facility] Unit/Room#: 0331/0331-01  Discharging Unit Phone Number: [phone]    Emergency Contact:   Extended Emergency Contact Information  Primary Emergency Contact: [address] [place] [address] Phone: [phone]  Mobile Phone: [phone]  Relation: Child  Secondary Emergency Contact: Xiomara Black   [address] Phone: [phone]  Mobile Phone: [phone]  Relation: Child    Past Surgical History:  Past Surgical History:   Procedure Laterality Date    CARDIAC CATHETERIZATION  2017    CARDIAC CATHETERIZATION N/A     CHOLECYSTECTOMY  1960s    COLONOSCOPY N/A 10/08/2018    COLONOSCOPY WITH COLD ET HOT BIOPSIES ET POLYPECTOMIES performed by Elder Steven MD at [address] 2019    COLONOSCOPY performed by Gauri Hugo MD at [facility]  stricture at 7 cm     COLONOSCOPY N/A 2020    tubular adenoma X 1, Dr. Gomez Cornejo (CERVIX STATUS UNKNOWN)      KIDNEY SURGERY Right 10/7/2022    XI ROBOTIC LAPAROSCOPIC RADICAL NEPHRECTOMY, LYSIS OF ADHESIONS performed by Fly Haile MD at [facility] 75 Left     KNEE ARTHROPLASTY Right     [place] CYSTOSCOPY,INSERT URETERAL STENT Bilateral 2018 CYSTO Bilateral Lighted stent placements performed by Evelyn Kay MD at [facility], PARTIAL, W/ANAST N/A 11/05/2018    Open Sigmoid Colectomy w/ lighted stents ILEOSTOMY PLACEMENT performed by Leonarda Andrade MD at [facility] N/A 05/09/2019    Flexible Sigmoidoscopy with Dilatation of rectal stricture performed by Perfecto Thomas MD at [address] N/A 05/22/2019    Loop Ileostomy take down performed by Perfecto Thomas MD at [address] [place] [address] (CERVIX REMOVED)  1966    TOTAL NEPHRECTOMY Right 10/07/2022    Lysis of Adhesions    VARICOSE VEIN SURGERY Right 1960s       Immunization History:   Immunization History   Administered Date(s) Administered    COVID-19, MODERNA BLUE border, Primary or Immunocompromised, (age 12y+), IM, 100 mcg/0.5mL 01/27/2021, 02/24/2021    COVID-19, MODERNA Booster BLUE border, (age 18y+), IM, 50mcg/0.25mL 01/12/2022    Influenza, FLUAD, (age 72 y+), Adjuvanted, 0.5mL 09/14/2020    Influenza, High Dose (Fluzone 65 yrs and older) 10/28/2016, 09/13/2017, 09/21/2018    Pneumococcal Conjugate 13-valent ([id number]) 07/19/2017    Pneumococcal Polysaccharide ([id number]) 08/29/2018    Td, unspecified formulation 09/27/2018       Active Problems:  Patient Active Problem List   Diagnosis Code    Dyslipidemia E78.5    Osteoarthritis M19.90    Osteoporosis M81.0    Depression F32. A    Anxiety F41.9    CHF (congestive heart failure) ([facility]) I50.9    Primary insomnia F51.01    JAYJAY on CPAP G47.33, Z99.89    Class 2 severe obesity with serious comorbidity and body mass index (BMI) of 37.0 to 37.9 in adult ([facility]) E66.01, Z68.37    Hypertension I10    A-fib ([facility]) I48.91    Colon cancer ([facility]) C18.9    Elevated fasting glucose R73.01    Other specified hypothyroidism E03.8    S/P small bowel resection Z90.49    Closed fracture of proximal end of left humerus with routine healing S42.202D    Renal mass, right N28.89    MICHAEL (acute kidney injury) ([facility] N17.9 Acute post-operative pain G89.18    Status post nephrectomy Z90.5    Urinary retention R33.9       Isolation/Infection:   Isolation            No Isolation          Patient Infection Status       None to display            Nurse Assessment:  Last Vital Signs: /81   Pulse 98   Temp 98.8 °F (37.1 °C) (Oral)   Resp 16   Ht 5' 7\" (1.702 m)   Wt 299 lb (135.6 kg)   LMP 01/01/1968   SpO2 94%   BMI 46.83 kg/m²     Last documented pain score (0-10 scale): Pain Level: 4  Last Weight:   Wt Readings from Last 1 Encounters:   10/10/22 299 lb (135.6 kg)     Mental Status:  oriented and alert    IV Access:  - None    Nursing Mobility/ADLs:  Walking   Independent  Transfer  Independent  Bathing  Independent  Dressing  Independent  Toileting  Independent  Feeding  Independent  Med Admin  Independent  Med Delivery   whole    Wound Care Documentation and Therapy:  Incision 10/07/22 Abdomen Right;Upper (Active)   Dressing Status Clean;Dry; Intact 10/08/22 2035   Incision Cleansed Wound cleanser 10/10/22 0800   Dressing/Treatment Surgical glue; Open to air 10/10/22 0800   Closure Surgical glue 10/10/22 0800   Margins Approximated 10/10/22 0800   Incision Assessment Erythema 10/07/22 2000   Drainage Amount Scant 10/07/22 2000   Drainage Description Serosanguinous 10/07/22 2000   Odor None 10/07/22 2000   Destiny-incision Assessment Intact 10/07/22 2000   Number of days: 3        Elimination:  Continence: Bowel: Yes  Bladder: Yes  Urinary Catheter: Indication for Use of Catheter: Acute urinary retention/obstruction   Colostomy/Ileostomy/Ileal Conduit: No       Date of Last BM: Before surgery    Intake/Output Summary (Last 24 hours) at 10/10/2022 1624  Last data filed at 10/10/2022 0658  Gross per 24 hour   Intake --   Output 2000 ml   Net -2000 ml     I/O last 3 completed shifts:  In: -   Out: 4575 [Urine:4355]    Safety Concerns:      At Risk for Falls    Impairments/Disabilities:      None    Nutrition Therapy:  Current Nutrition Therapy:   - Oral Diet:  General    Routes of Feeding: Oral  Liquids: No Restrictions  Daily Fluid Restriction: no  Last Modified Barium Swallow with Video (Video Swallowing Test): not done    Treatments at the Time of Hospital Discharge:   Respiratory Treatments: None  Oxygen Therapy:  is not on home oxygen therapy. Ventilator:    - No ventilator support    Rehab Therapies: Physical Therapy, Occupational Therapy, and skilled nursing eval  Weight Bearing Status/Restrictions: No weight bearing restrictions  Other Medical Equipment (for information only, NOT a DME order):  walker  Other Treatments: None    Patient's personal belongings (please select all that are sent with patient):  [place]    RN SIGNATURE:  Electronically signed by Keshav Romero RN on 10/10/22 at 4:26 PM EDT    CASE MANAGEMENT/SOCIAL WORK SECTION    Inpatient Status Date: 10/10/2022    Readmission Risk Assessment Score:  Readmission Risk              Risk of Unplanned Readmission:  17           Discharging to Facility/ Agency   Name:   Address:  Phone:  Fax:    Dialysis Facility (if applicable)   Name:  Address:  Dialysis Schedule:  Phone:  Fax:    / signature:     PHYSICIAN SECTION    Prognosis: Good    Condition at Discharge: Stable    Rehab Potential (if transferring to Rehab): Good    Recommended Labs or Other Treatments After Discharge: Void trial in 1 week outpatient    Physician Certification: I certify the above information and transfer of Onel Nelson  is necessary for the continuing treatment of the diagnosis listed and that she requires Home Care for less 30 days.      Update Admission H&P: No change in H&P    PHYSICIAN SIGNATURE:  Electronically signed by Radha Oreilly MD on 10/10/22 at 4:36 PM EDT

## 2022-10-10 NOTE — CARE COORDINATION
Transitional Planning  Patient requesting home care, she has had [facility] in the past and requests [facility] back. List provided for additional choices. Referral faxed to [facility]. Message left for Ofelia Crawford at [facility].

## 2022-10-10 NOTE — PROGRESS NOTES
Physical Therapy  Facility/Department: [facility] RENAL//MED SURG  Physical Therapy Initial Assessment    Name: Arlington Dance  : 1940  MRN: [id number]  Date of Service: 10/10/2022  79 y.o. female presented for evaluation for right nephrectomy which she underwent on 10/7/2022. Discharge Recommendations:  Patient would benefit from continued therapy after discharge      Patient Diagnosis(es): The primary encounter diagnosis was Acute post-operative pain. A diagnosis of Renal mass, right was also pertinent to this visit. Past Medical History:  has a past medical history of A-fib ([address] Abdominal pain, Acute blood loss as cause of postoperative anemia, Anxiety, CAD (coronary artery disease), CHF (congestive heart failure) ([address] Class 2 severe obesity with serious comorbidity and body mass index (BMI) of 37.0 to 37.9 in [facility]), Closed fracture of proximal end of left humerus with routine healing, Colitis, Colitis due to Clostridium difficile, Depression, Diarrhea, Dyslipidemia, Elevated fasting glucose, FH: total abdominal hysterectomy and bilateral salpingo-oophorectomy, Fractures, Full dentures, Glucose intolerance (impaired glucose tolerance), History of blood transfusion, Hyperlipidemia, Hypertension, Hypokalemia, Malignant neoplasm of sigmoid colon ([address] Multiple closed fractures of ribs, Obesity, JAYJAY on CPAP, Osteoarthritis, Osteoporosis, Other complete intestinal obstruction ([address] Other specified hypothyroidism, Prolonged emergence from general anesthesia, S/P small bowel resection, Thrombocytopenia, unspecified ([address] Under care of team, Wears glasses, and Wellness examination. Past Surgical History:  has a past surgical history that includes Total abdominal hysterectomy w/ bilateral salpingoophorectomy (); Cholecystectomy (1960s); Varicose vein surgery (Right, 1960s);  Hip Arthroplasty (Left); Knee Arthroplasty (Left); Knee Arthroplasty (Right); Cardiac catheterization (2017); Colonoscopy (N/A, 10/08/2018); pr lap,surg,colectomy, partial, w/anast (N/A, 11/05/2018); pr cystoscopy,insert ureteral stent (Bilateral, 11/05/2018); Colonoscopy (N/A, 04/25/2019); sigmoidoscopy (N/A, 05/09/2019); Small intestine surgery (N/A, 05/22/2019); Colonoscopy (N/A, 02/03/2020); Cardiac catheterization (N/A); Hysterectomy; total nephrectomy (Right, 10/07/2022); and Kidney surgery (Right, 10/7/2022). Assessment   Body Structures, Functions, Activity Limitations Requiring Skilled Therapeutic Intervention: Decreased functional mobility ; Decreased strength;Decreased endurance  Assessment: The pt ambulated 30 ft with a RW x CGA. She ambulated with a decreased stride length and with a slow yazmin. She could benefit from  acontinuation of PT for gait and strenthening following her DC  Therapy Prognosis: Good  Decision Making: Medium Complexity  Requires PT Follow-Up: Yes  Activity Tolerance  Activity Tolerance: Patient limited by endurance; Patient limited by fatigue     Plan   Physcial Therapy Plan  General Plan:  (5-6x wk)  Current Treatment Recommendations: Strengthening, Functional mobility training, Transfer training, Endurance training, Gait training, Stair training, Safety education & training  Safety Devices  Type of Devices: Nurse notified, Left in chair, Call light within reach  Restraints  Restraints Initially in Place: No     Restrictions  Restrictions/Precautions  Required Braces or Orthoses?: No  Position Activity Restriction  Other position/activity restrictions:  Up with assist,   amb pt     Subjective   General  Patient assessed for rehabilitation services?: Yes  Response To Previous Treatment: Not applicable  Family / Caregiver Present: No  Follows Commands: Within Functional Limits  Subjective  Subjective: Pt reports 3/10 abd pain         Social/Functional History  Social/Functional History  Lives With: Alone  Type of Home: House  Home Layout: One level  Home Access: Ramped entrance  Bathroom Shower/Tub: Walk-in shower  Bathroom Toilet: Handicap height  Bathroom Equipment: Grab bars in shower, Grab bars around toilet  Bathroom Accessibility: Accessible  Home Equipment: [place] [employer] Amanda (Electric Scooter)  [employer] Help From: Family  ADL Assistance: [address]: Independent  Homemaking Responsibilities: Yes  Ambulation Assistance: Independent  Transfer Assistance: Independent  Active : Yes  Mode of Transportation: Car  Type of Occupation: Homemaker  Leisure & Hobbies: Time with grandchildren  Vision/Hearing  Vision  Vision: Impaired  Vision Exceptions: Wears glasses at all times  Hearing  Hearing: Within functional limits    Cognition   Orientation  Overall Orientation Status: Within Functional Limits  Cognition  Overall Cognitive Status: WFL     Objective   Heart Rate: 98  Heart Rate Source: Monitor  BP: 120/81  BP Location: Left lower arm  BP Method: Automatic  Patient Position: Semi fowlers  MAP (Calculated): 94  Resp: 16  SpO2: 94 %  O2 Device: Nasal cannula        AROM RLE (degrees)  RLE AROM: WNL  AROM LLE (degrees)  LLE AROM : WNL  AROM RUE (degrees)  RUE AROM : WNL  AROM LUE (degrees)  LUE AROM : WNL  Strength RLE  Strength RLE: WFL  Strength LLE  Strength LLE: WFL  Strength RUE  Strength RUE: WFL  Strength LUE  Strength LUE: WFL     Bed mobility  Supine to Sit: Contact guard assistance  Sit to Supine: Contact guard assistance  Scooting: Contact guard assistance  Transfers  Sit to Stand: Contact guard assistance  Stand to Sit: Contact guard assistance  Ambulation  Surface: Level tile  Device: Rolling Walker  Assistance: Contact guard assistance  Distance: amb 30 ft with a RW x CGA  More Ambulation?: No     Balance  Posture: Good  Sitting - Static: Good  Sitting - Dynamic: Fair  Standing - Static: Fair  Standing - Dynamic: Fair     OutComes Score     AM-PAC Score  AM-PAC Inpatient Mobility Raw Score : 19 (10/10/22 1469)  AM-PAC Inpatient T-Scale Score : 45.44

## 2022-10-10 NOTE — PLAN OF CARE
Discussed with RN. Recommend restarting Coumadin on discharge if ok with other services. No need to bridge.

## 2022-10-10 NOTE — PLAN OF CARE
Problem: Discharge Planning  Goal: Discharge to home or other facility with appropriate resources  Outcome: Progressing     Problem: Pain  Goal: Verbalizes/displays adequate comfort level or baseline comfort level  Outcome: Progressing     Problem: Safety - Adult  Goal: Free from fall injury  Outcome: Progressing     Problem: ABCDS Injury Assessment  Goal: Absence of physical injury  Outcome: Progressing     Problem: Skin/Tissue Integrity  Goal: Absence of new skin breakdown  Description: 1. Monitor for areas of redness and/or skin breakdown  2. Assess vascular access sites hourly  3. Every 4-6 hours minimum:  Change oxygen saturation probe site  4. Every 4-6 hours:  If on nasal continuous positive airway pressure, respiratory therapy assess nares and determine need for appliance change or resting period.   Outcome: Progressing     Problem: Musculoskeletal - Adult  Goal: Return mobility to safest level of function  Outcome: Progressing  Goal: Maintain proper alignment of affected body part  Outcome: Progressing  Goal: Return ADL status to a safe level of function  Outcome: Progressing     Problem: Genitourinary - Adult  Goal: Absence of urinary retention  Outcome: Progressing  Goal: Urinary catheter remains patent  Outcome: Progressing

## 2022-10-10 NOTE — DISCHARGE INSTRUCTIONS
Please follow up with Dr. Librado sorto as scheduled    ***Restart Warfarin tomorrow 10/11/2022    Call your doctor for the following:   Chills   Temperature greater than 101   Pain that is not tolerable despite taking pain medicine as ordered   There is increasing or spreading swelling, redness or warmth at surgical site   There is increased drainage or bleeding from surgical site   Do not remove surgical dressing unless instructed to do so by your surgeon   If you are unable to urinate (for >6 hours). Or if you have an indwelling catheter that is not draining urine. Activity Restrictions: no lifting more than 10 lbs, no driving under the influence of pain medications, and no tub baths or submerging of wounds under water.     Ensure to follow up in 1 week with Dr. Librado sorto for post operative care and void trial

## 2022-10-10 NOTE — PROGRESS NOTES
Jordan Miller Isa Pages, Angelique Chess  Urology Progress Note     Subjective:   Diet: Regular diet  HR 80s, /80  Denies nausea, vomiting, fevers and chills  Pain well controlled  Passing  flatus/no bowel movement  Petty catheter replaced after failed void trial clear urine 3.75 L over 24 hours  Hgb 11.4 from 11.5      Patient Vitals for the past 24 hrs:   BP Temp Temp src Pulse Resp SpO2 Weight   10/10/22 0504 -- -- -- -- -- -- 299 lb (135.6 kg)   10/10/22 0406 122/75 97.4 °F (36.3 °C) Oral 86 16 92 % --   10/10/22 0008 112/64 98.3 °F (36.8 °C) Oral 85 16 91 % --   10/09/22 2012 121/73 98.5 °F (36.9 °C) Axillary (!) 104 16 94 % --   10/09/22 1734 114/67 98.2 °F (36.8 °C) Oral 87 16 93 % --   10/09/22 1653 -- -- -- -- 16 -- --   10/09/22 1623 -- -- -- -- 16 -- --   10/09/22 1309 -- -- -- -- 16 -- --   10/09/22 1207 116/70 98.9 °F (37.2 °C) Oral (!) 105 16 93 % --   10/09/22 0954 119/77 98 °F (36.7 °C) Oral (!) 103 16 93 % --       Intake/Output Summary (Last 24 hours) at 10/10/2022 0724  Last data filed at 10/10/2022 0658  Gross per 24 hour   Intake --   Output 3750 ml   Net -3750 ml       Recent Labs     10/08/22  0556 10/09/22  0746 10/10/22  0703   WBC 11.1 9.7 8.7   HGB 11.9 11.5* 11.4*   HCT 36.6 35.3* 35.2*   MCV 95.1 94.9 94.9   PLT See Reflexed IPF Result See Reflexed IPF Result See Reflexed IPF Result     Recent Labs     10/07/22  1255 10/08/22  0556 10/09/22  0746    140 139   K 3.9 4.8 4.1    105 104   CO2 25 22 24   BUN 17 20 22   CREATININE 0.73 1.09* 1.35*       Recent Labs     10/10/22  0513   COLORU Yellow   PHUR 5.0   WBCUA 2 TO 5   RBCUA 10 TO 20   BACTERIA MODERATE*   SPECGRAV 1.010   LEUKOCYTESUR TRACE*   UROBILINOGEN Normal   BILIRUBINUR NEGATIVE       Additional Lab/culture results:    Physical Exam:   AO X 3  Neck: Supple   Chest: bilateral symmetrical chest rise/ Non labored breathing   Circulatory: Peripheries warm , well perfused   P/A: soft, nondistended, incision sites appropriately tender, clean, dry and intact with skin glue  Petty in with clear yellow urine        Interval Imaging Findings:    Impression:    Yennifer Mcneill is a 79-year-old female with right renal mass, s/p robotic assisted laparoscopic right radical nephrectomy on 10/7/2022    Plan:   Diet: Regular diet  IV fluids: Off IV fluids  Consult cardiology after discussion with attending for transient A. fib. Warfarin on hold prior to surgery. Continue nasal cannula oxygen during the day, CPAP during night  Antibiotics: Perioperative Ancef  Pain control: Tylenol scheduled, Roxicodone as needed  Ambulation / IS 10 times per hour   AM labs:  Follow-up a.m. labs  DC planning -pending cardiology consult, ambulation        Laci Ruffin MD  7:24 AM 10/10/2022

## 2022-10-10 NOTE — PROGRESS NOTES
Renal Progress Note    Patient : Shayna Velarde; 79 y.o. MRN# [id number]  Location:  [facility]-  Attending:  Veronica Chavira MD  Admit Date:  10/7/2022   Hospital Day: 3      Subjective: And examined at bedside  /75 afebrile pulse 86 on 2 L nasal cannula  Urine output 3.7 L in last 24 hours  Labs reviewed creatinine plateau 1.93, sodium 135 potassium 3.9 bicarb 25 calcium 8.6 glycemia 113  Urine sodium 64,  urine protein to creatinine ratio 0.41  Immunoglobulins pending,  kappa 2.6, lambda 2.09, kappa / lambda ratio 1.27, C3 137, C4 30, hepatitis panel negative  Urinalysis positive for leukocyte esterase and nitrates, trace protein, no casts, microscopic hematuria 10-20 cells per hpf  Renal ultrasound; unremarkable left kidney measuring 11 cm, right nephrectomy    Brief History    79 y.o. female presented for evaluation for right nephrectomy which she underwent on 10/7/2022. PMH HTN, a-fib on warfarin, CHF EF 51% sept 2022, CAD, HLD, hypothyroidism,  Recently discovered right renal mass found on CT obtained for surveillance for colon cancer which was resected 2 years ago, she did not receive chemo. Mass is in the mid to lower pole of the right kidney, 3.3cm x 3.2cm in size. Post OR she did well other than development of transient a-fib rvr  . She had urinary retention and Petty catheter was placed and about 1 L urine   . No hx of retention in the past, no urinary complaints, chronic uti's, no hx kidney stones. Patient does not see a Nephrologist. Denies edema. Does take lasix 20mg PO daily, along with Norvasc. She also take potassium supplementation. Baseline Cr is normal. Admission Cr 0.73 ->1.09 -> 1.35. Nephrology was asked to see her for MICHAEL  No issues with n/v/d. Post op pain well controlled. Good PO intake.     Outpatient Medications:     Medications Prior to Admission: levothyroxine (SYNTHROID) 50 MCG tablet, TAKE 1 TABLET BY MOUTH EVERY DAY  metoprolol tartrate (LOPRESSOR) 25 MG tablet, TAKE ONE TABLET BY MOUTH TWICE A DAY  atorvastatin (LIPITOR) 40 MG tablet, TAKE 1 TABLET BY MOUTH EVERY DAY AT NIGHT  JANTOVEN 3 MG tablet, TAKE 2 TABLETS BY MOUTH EVERY DAY OR AS DIRECTED PER INR RESULTS (Patient taking differently: Pt. stopped 22 for OR 10-7-22/ Mgmt. T[facility])  amLODIPine (NORVASC) 10 MG tablet, TAKE 1 TABLET BY MOUTH EVERY DAY  furosemide (LASIX) 20 MG tablet, TAKE ONE TABLET BY MOUTH DAILY  potassium chloride (KLOR-CON M) 20 MEQ extended release tablet, Take 1 tablet by mouth daily  acetaminophen (TYLENOL) 325 MG tablet, Take 2 tablets by mouth every 4 hours as needed for Pain or Fever    Current Medications:     Scheduled Meds:    ceFAZolin  2,000 mg IntraVENous Once    amLODIPine  10 mg Oral Daily    atorvastatin  40 mg Oral Nightly    [Held by provider] furosemide  20 mg Oral Daily    levothyroxine  50 mcg Oral Daily    metoprolol tartrate  25 mg Oral BID    potassium chloride  20 mEq Oral Daily    sodium chloride flush  5-40 mL IntraVENous 2 times per day    acetaminophen  650 mg Oral Q6H    polyethylene glycol  17 g Oral Daily    bisacodyl  5 mg Oral Daily     Continuous Infusions:    sodium chloride       PRN Meds:  sodium chloride flush, sodium chloride, oxyCODONE **OR** oxyCODONE, morphine **OR** morphine, promethazine **OR** ondansetron, melatonin, hypromellose, benzocaine-menthol, calcium carbonate, fentanNYL    Input/Output:       I/O last 3 completed shifts:  In: -   Out: 4575 [Urine:4575].     Patient Vitals for the past 96 hrs (Last 3 readings):   Weight   10/10/22 0504 299 lb (135.6 kg)   10/07/22 0756 265 lb (120.2 kg)   10/06/22 0934 265 lb (120.2 kg)       Vital Signs:   Temperature:  Temp: 97.4 °F (36.3 °C)  TMax:   Temp (24hrs), Av.2 °F (36.8 °C), Min:97.4 °F (36.3 °C), Max:98.9 °F (37.2 °C)    Respirations:  Resp: 16  Pulse:   Heart Rate: 86  BP:    BP: 122/75  BP Range: Systolic (77EDI), APS:502 , Min:112 , OFD:702       Diastolic (83VOL), AQH:19, Min:64, Max:77      Physical Examination:     General:  AAO x 3, speaking in full sentences, no accessory muscle use. HEENT: Atraumatic, normocephalic, no throat congestion, moist mucosa. Eyes:   Pupils equal, round and reactive to light, EOMI. Neck:   Supple  Chest:   Bilateral vesicular breath sounds, no rales or wheezes. Cardiac:  S1 S2 RR, no murmurs, gallops or rubs. Abdomen: Soft, non-tender, no masses or organomegaly, BS audible. :   No suprapubic or flank tenderness. Neuro:   AAO x 3, No FND. SKIN:  No rashes, good skin turgor. Extremities:  No edema. Labs:       Recent Labs     10/08/22  0556 10/09/22  0746 10/10/22  0703   WBC 11.1 9.7 8.7   RBC 3.85* 3.72* 3.71*   HGB 11.9 11.5* 11.4*   HCT 36.6 35.3* 35.2*   MCV 95.1 94.9 94.9   MCH 30.9 30.9 30.7   MCHC 32.5 32.6 32.4   RDW 14.7* 14.7* 14.6*   PLT See Reflexed IPF Result See Reflexed IPF Result See Reflexed IPF Result      BMP:   Recent Labs     10/08/22  0556 10/09/22  0746 10/10/22  0703    139 135   K 4.8 4.1 3.9    104 101   CO2 22 24 25   BUN 20 22 26*   CREATININE 1.09* 1.35* 1.38*   GLUCOSE 124* 113* 113*   CALCIUM 8.3* 8.6 8.6      Phosphorus:   No results for input(s): PHOS in the last 72 hours. Magnesium:  No results for input(s): MG in the last 72 hours. Albumin:  No results for input(s): LABALBU in the last 72 hours.   BNP:    No results found for: BNP  CORTES:    No results found for: CORTES  SPEP:  Lab Results   Component Value Date/Time    PROT 6.0 10/09/2022 05:07 PM    ALBCAL PENDING 10/09/2022 05:07 PM    ALBPCT PENDING 10/09/2022 05:07 PM    LABALPH PENDING 10/09/2022 05:07 PM    LABALPH PENDING 10/09/2022 05:07 PM    A1PCT PENDING 10/09/2022 05:07 PM    A2PCT PENDING 10/09/2022 05:07 PM    LABBETA PENDING 10/09/2022 05:07 PM    BETAPCT PENDING 10/09/2022 05:07 PM    GAMGLOB PENDING 10/09/2022 05:07 PM    GGPCT PENDING 10/09/2022 05:07 PM    PATH PENDING 10/09/2022 05:07 PM     UPEP:   No results found for: LABPE  C3:     Lab Results   Component Value Date/Time    C3 137 10/09/2022 05:07 PM     C4:     Lab Results   Component Value Date/Time    C4 30 10/09/2022 05:07 PM     MPO ANCA:   No results found for: MPO  PR3 ANCA:   No results found for: PR3  Anti-GBM:   No results found for: GBMABIGG  Hep BsAg:         Lab Results   Component Value Date/Time    HEPBSAG NONREACTIVE 10/09/2022 05:07 PM     Hep C AB:          Lab Results   Component Value Date/Time    HEPCAB NONREACTIVE 10/09/2022 05:07 PM       Urinalysis/Chemistries:      Lab Results   Component Value Date/Time    NITRU POSITIVE 10/10/2022 05:13 AM    COLORU Yellow 10/10/2022 05:13 AM    PHUR 5.0 10/10/2022 05:13 AM    WBCUA 2 TO 5 10/10/2022 05:13 AM    RBCUA 10 TO 20 10/10/2022 05:13 AM    MUCUS 1+ 05/31/2019 02:38 PM    TRICHOMONAS NOT REPORTED 05/31/2019 02:38 PM    YEAST NOT REPORTED 05/31/2019 02:38 PM    BACTERIA MODERATE 10/10/2022 05:13 AM    SPECGRAV 1.010 10/10/2022 05:13 AM    LEUKOCYTESUR TRACE 10/10/2022 05:13 AM    UROBILINOGEN Normal 10/10/2022 05:13 AM    BILIRUBINUR NEGATIVE 10/10/2022 05:13 AM    GLUCOSEU NEGATIVE 10/10/2022 05:13 AM    KETUA NEGATIVE 10/10/2022 05:13 AM    AMORPHOUS NOT REPORTED 05/31/2019 02:38 PM     Urine Sodium:     Lab Results   Component Value Date/Time    GILSON 64 10/10/2022 05:13 AM     Urine Potassium:  No results found for: KUR  Urine Chloride:    Lab Results   Component Value Date/Time    CLUR 86 10/10/2022 05:13 AM     Urine Osmolarity: No results found for: OSMOU  Urine Protein:   No components found for: TOTALPROTEIN, URINE   Urine Creatinine:     Lab Results   Component Value Date/Time    LABCREA 41.3 10/10/2022 05:13 AM     Urine Eosinophils:  No components found for: UEOS    Radiology:     Reviewed. Assessment:       MICHAEL nonoliguric likely related to urinary retention post op.   Baseline creatinine seems to be normal.  Component of ATN from UTI and tubular obstruction from retention  Renal mass s/p right nephrectomy 10/7/22  Hypertension  Urinary retention with Petty in place  UTI  5. Minimal coronary artery disease status post cardiac catheterization 9/16/2022 . History of diastolic heart failure. A-fib  History of colon cancer status post colectomy. Hypothyroidism. Plan: Petty management per urology  Agree with holding diuretics for now,. Monitor off fluids as well. Recommend antibiotics for UTI. She received 1 dose of Ancef  Monitor strict I's and O's and renal function. BMP in AM.  On the patient is discharged, she will need to follow up with nephrology in [place] in 1-2 weeks    Nutrition   Please ensure that patient is on a renal diet/TF. Avoid nephrotoxic drugs/contrast exposure. We will continue to follow along with you. Ashwin Mack MD  Internal Medicine Resident, PGY- [facility]; [place], [place]  10/10/2022, 8:13 AM    Attending Physician Statement  I have discussed the care of Vivian Cruz, including pertinent history and exam findings with the resident/fellow. I have reviewed the key elements of all parts of the encounter with the resident/fellow. I have seen and examined the patient with the resident/fellow. I agree with the assessment and plan and status of the problem list as documented. Addiitionally I recommend the patient is stable for discharge from a nephrology standpoint once okay with urology. Of note, I anticipate the serum creatinine is stabilizing and improving mildly over the next few weeks. With the patient's nephrectomy, she will be left with fewer functioning nephrons and an overall higher creatinine in the long-term. Compared to her initial creatinine of 0.7. Hopefully, final creatinine will not be clinically significant as far as impact on the patient's overall health.     Naomie Parham MD   Nephrology Attending Physician  [facility]  10/10/2022

## 2022-10-11 ENCOUNTER — CARE COORDINATION (OUTPATIENT)
Dept: CARE COORDINATION | Age: 82
End: 2022-10-11

## 2022-10-11 DIAGNOSIS — N28.89 RENAL MASS, RIGHT: Primary | ICD-10-CM

## 2022-10-11 LAB
ALBUMIN (CALCULATED): 3.9 G/DL (ref 3.2–5.2)
ALBUMIN PERCENT: 64 % (ref 45–65)
ALPHA 1 PERCENT: 4 % (ref 3–6)
ALPHA 2 PERCENT: 12 % (ref 6–13)
ALPHA-1-GLOBULIN: 0.2 G/DL (ref 0.1–0.4)
ALPHA-2-GLOBULIN: 0.7 G/DL (ref 0.5–0.9)
BETA GLOBULIN: 0.6 G/DL (ref 0.5–1.1)
BETA PERCENT: 10 % (ref 11–19)
GAMMA GLOBULIN %: 10 % (ref 9–20)
GAMMA GLOBULIN: 0.6 G/DL (ref 0.5–1.5)
PATHOLOGIST: ABNORMAL
PATHOLOGIST: NORMAL
PROTEIN ELECTROPHORESIS, SERUM: ABNORMAL
SERUM IFX INTERP: NORMAL
SURGICAL PATHOLOGY REPORT: NORMAL
TOTAL PROT. SUM,%: 100 % (ref 98–102)
TOTAL PROT. SUM: 6 G/DL (ref 6.3–8.2)
TOTAL PROTEIN: 6 G/DL (ref 6.4–8.3)

## 2022-10-11 PROCEDURE — 1111F DSCHRG MED/CURRENT MED MERGE: CPT | Performed by: FAMILY MEDICINE

## 2022-10-11 NOTE — DISCHARGE SUMMARY
DISCHARGE SUMMARY NOTE:        Patient Identification  PATIENT: Yennifer Mcneill is a 79 y.o. female. MRN: [id number]  :  1940  Admit Date:  10/7/2022  Discharge date:   10/10/2022                                  Disposition: Home with home health  Discharged Condition: Good  Discharge Diagnoses: Right renal mass    Patient Active Problem List   Diagnosis    Dyslipidemia    Osteoarthritis    Osteoporosis    Depression    Anxiety    CHF (congestive heart failure) (HCC)    Primary insomnia    JAYAJY on CPAP    Class 2 severe obesity with serious comorbidity and body mass index (BMI) of 37.0 to 37.9 in adult ([facility]    Hypertension    A-fib (HCC)    Colon cancer ([facility]    Elevated fasting glucose    Other specified hypothyroidism    S/P small bowel resection    Closed fracture of proximal end of left humerus with routine healing    Renal mass, right    MICHAEL (acute kidney injury) ([facility]    Acute post-operative pain    Status post nephrectomy    Urinary retention       Consults: Cardiology, nephrology    Surgery: XI ROBOTIC LAPAROSCOPIC RADICAL NEPHRECTOMY, LYSIS OF ADHESIONS     Patient Instructions: Activity: Per discharge instructions  Diet: As tolerated  Patient told to follow up with Fly Haile MD in 1-2 week(s). Discharge Medications:      Medication List        START taking these medications      docusate sodium 100 MG capsule  Commonly known as: COLACE  Take 1 capsule by mouth 2 times daily     polyethylene glycol 17 GM/SCOOP powder  Commonly known as: GLYCOLAX  Take 17 g by mouth daily            CHANGE how you take these medications      Jantoven 3 MG tablet  Generic drug: warfarin  Take as directed. If you are unsure how to take this medication, talk to your nurse or doctor. Original instructions: TAKE 2 TABLETS BY MOUTH EVERY DAY OR AS DIRECTED PER INR RESULTS  What changed: See the new instructions.             CONTINUE taking these medications      acetaminophen 325 MG tablet  Commonly known as: TYLENOL  Take 2 tablets by mouth every 4 hours as needed for Pain or Fever     amLODIPine 10 MG tablet  Commonly known as: NORVASC  TAKE 1 TABLET BY MOUTH EVERY DAY     atorvastatin 40 MG tablet  Commonly known as: LIPITOR  TAKE 1 TABLET BY MOUTH EVERY DAY AT NIGHT     furosemide 20 MG tablet  Commonly known as: LASIX  TAKE ONE TABLET BY MOUTH DAILY     levothyroxine 50 MCG tablet  Commonly known as: SYNTHROID  TAKE 1 TABLET BY MOUTH EVERY DAY     metoprolol tartrate 25 MG tablet  Commonly known as: LOPRESSOR  TAKE ONE TABLET BY MOUTH TWICE A DAY     potassium chloride 20 MEQ extended release tablet  Commonly known as: KLOR-CON M  Take 1 tablet by mouth daily            ASK your doctor about these medications      HYDROcodone-acetaminophen 5-325 MG per tablet  Commonly known as: Norco  Take 1 tablet by mouth every 4 hours as needed for Pain for up to 3 days. Intended supply: 3 days. Take lowest dose possible to manage pain  Ask about: Should I take this medication? Where to Get Your Medications        These medications were sent to [id number] Gilbert Herman [phone] Arnol Moore [phone]  [facility] [address], [facility] [address]      Phone: [phone]   docusate sodium 100 MG capsule  HYDROcodone-acetaminophen 5-325 MG per tablet  polyethylene glycol 17 GM/SCOOP powder         Hospital course:  Patient is an 74-year-old female with a right renal mass. She underwent right robotic laparoscopic radical nephrectomy with extensive lysis of adhesions on 10/07/2022. Patient did well in the post operative period. She developed afib with RVR but was asymptomatic. Cardiology was consulted and recommended restarting home Warfarin. Patient developed MICHAEL with increasing Cr and nephrology was consulted. MICHAEL was thought to be due to retention. Patient tolerated regular diet without nausea/vomiting. She ambulated. Her pain was controlled.   She failed a void trial and Petty catheter was replaced. She met all criteria for discharge on post operative day 3 and was discharged home with home health in good condition. She was instructed to resume her Warfarin 1 day after discharge. She was instructed to follow up in 1 week with Dr. Areli Ryder for void trial and postoperative care.     Fan Mendes MD  1:53 PM 10/11/2022

## 2022-10-11 NOTE — CARE COORDINATION
[facility] Care Transitions Initial Follow Up Call    Call within 2 business days of discharge: Yes    Care Transition Nurse contacted the patient by telephone to perform post hospital discharge assessment. Verified name and  with patient as identifiers. Provided introduction to self, and explanation of the Care Transition Nurse role. Patient: Suleman Coffey Patient : 1940   MRN: [id number]  Reason for Admission: R Renal mass  Discharge Date: 10/10/22 RARS: Readmission Risk Score: 15.7      Last Discharge  Street       Date Complaint Diagnosis Description Type Department Provider    10/7/22  Acute post-operative pain . .. Admission (Discharged) Tarun Griffith MD            Was this an external facility discharge? No Discharge Facility: [facility]    Challenges to be reviewed by the provider   Additional needs identified to be addressed with provider: No  none               Method of communication with provider: none. Spoke with: Patient    Spoke with patient, states she is feeling really well. Patient denies any fever, chills, chest pain, SOB, nausea/vomiting, weakness, elimination issues or loss of appetite. She report minimal soreness from nephrectomy but not pain. Her incisions are clean and dry without signs of infection. She has an indwelling kraus draining clear yellow urine. She is able to care for and empty the kraus herself. [address] [place] [address] has made contact and will be out to see her. Med rec completed with patient and her new rx's were delivered by the pharmacy today. She is scheduled to see her Oncologist on 10/17 and will call to schedule a urology appt this week for cath trial.      Care Transition Nurse reviewed discharge instructions with patient who verbalized understanding. The patient was given an opportunity to ask questions and does not have any further questions or concerns at this time. Were discharge instructions available to patient? Yes.  Reviewed appropriate site of care based on symptoms and resources available to patient including: PCP  Specialist  When to call [phone]. The patient agrees to contact the PCP office for questions related to their healthcare. Advance Care Planning:   Does patient have an Advance Directive: reviewed and current. Medication reconciliation was performed with patient, who verbalizes understanding of administration of home medications. Medications reviewed. Non-face-to-face services provided:  Obtained and reviewed discharge summary and/or continuity of care documents    Offered patient enrollment in the Remote Patient Monitoring (RPM) program for in-home monitoring: Will offer at next outreach . Care Transitions 24 Hour Call    Schedule Follow Up Appointment with PCP: Completed  Do you have a copy of your discharge instructions?: Yes  Do you have all of your prescriptions and are they filled?: Yes  Have you been contacted by a [id number] [employer] Pharmacist?: No  Have you scheduled your follow up appointment?: Yes  How are you going to get to your appointment?: Car - family or friend to transport  [address]: [address] [facility] ([facility]  Patient DME: Keira Tadeoer   Patient Home Equipment: CPAP (Comment: scale)  Do you have support at home?: Alone  Do you feel like you have everything you need to keep you well at home?: Yes  Are you an active caregiver in your home?: No  Care Transitions Interventions         Follow Up  Future Appointments   Date Time Provider iMra Kirkland   10/13/2022  9:00 AM SCHEDULE, Efe [address] LAB MD LAB [place]   10/13/2022  9:05 AM SCHEDULE, Efe Noel [address] LAB MD[facility] LAB [place]   10/17/2022  9:45 AM [facility]   11/2/2022 11:40 AM MD MICHEL Gibson DPP   12/1/2022 10:30 AM Fan Silver MD [facility] Transition Nurse provided contact information. Plan for follow-up call in 5-7 days based on severity of symptoms and risk factors.   Plan for next call:      - Offer RPM     - incisions/ pain     - Uro sched?                Jong Leonardo LPN  [facility] Transition Nurse  [phone]

## 2022-10-11 NOTE — CARE COORDINATION
Discharge [facility] Case Management Department  Written by: Martina Meadows RN    Patient Name: Arlington Dance  Attending Provider: No att. providers found  Admit Date: 10/7/2022  6:56 AM  MRN: [id number]  Account: [de-identified]                     : 1940  Discharge Date: 10/10/2022      Disposition: home with [facility] care     Martina Meadows RN

## 2022-10-12 LAB
P E INTERPRETATION, U: NORMAL
PATHOLOGIST: NORMAL
SPECIMEN TYPE: NORMAL
URINE TOTAL PROTEIN: 17 MG/DL

## 2022-10-14 ENCOUNTER — ANTI-COAG VISIT (OUTPATIENT)
Dept: FAMILY MEDICINE CLINIC | Age: 82
End: 2022-10-14
Payer: MEDICARE

## 2022-10-14 DIAGNOSIS — I48.91 ATRIAL FIBRILLATION, UNSPECIFIED TYPE (HCC): Primary | ICD-10-CM

## 2022-10-14 LAB — INR BLD: 1.3

## 2022-10-14 PROCEDURE — 93793 ANTICOAG MGMT PT WARFARIN: CPT | Performed by: PHYSICIAN ASSISTANT

## 2022-10-14 NOTE — PROGRESS NOTES
Patient had her kidney removed on 10/07/2022 and they did not want her restarting her coumadin until Tuesday so she has only had 3 doses of her coumadin.  She has an at home INR maureen and test on Friday's

## 2022-10-18 ENCOUNTER — CARE COORDINATION (OUTPATIENT)
Dept: CARE COORDINATION | Age: 82
End: 2022-10-18

## 2022-10-18 NOTE — CARE COORDINATION
Care Transitions Subsequent Outreach Attempt #1      Attempted to reach patient for transitions of care follow up. Unable to reach patient. HIPAA compliant message left on  requesting a return call. Patient: Yennifer Mcneill Patient : 1940 MRN: [id number]    Last Discharge  Street       Date Complaint Diagnosis Description Type Department Provider    10/7/22  Acute post-operative pain . .. Admission (Discharged) Chicho Quintana MD              Was this an external facility discharge?  No Discharge Facility: [facility]    Noted following upcoming appointments from discharge chart review:   [facility] follow up appointment(s):   Future Appointments   Date Time Provider Mira Kirkland   10/19/2022  8:40 AM MD Anai Holley DPP   2022 11:40 AM MD MICHEL Holley DPP   2022 10:30 [facility]   2022 10:30 AM Joaquina Tate MD [address] [place] [address] Health/ Care Transition Nurse  [phone]

## 2022-10-19 ENCOUNTER — OFFICE VISIT (OUTPATIENT)
Dept: UROLOGY | Age: 82
End: 2022-10-19
Payer: MEDICARE

## 2022-10-19 VITALS
DIASTOLIC BLOOD PRESSURE: 74 MMHG | BODY MASS INDEX: 45.99 KG/M2 | HEART RATE: 105 BPM | HEIGHT: 67 IN | SYSTOLIC BLOOD PRESSURE: 124 MMHG | OXYGEN SATURATION: 95 % | RESPIRATION RATE: 20 BRPM | WEIGHT: 293 LBS

## 2022-10-19 DIAGNOSIS — C64.1 RENAL CELL CARCINOMA OF RIGHT KIDNEY (HCC): Primary | ICD-10-CM

## 2022-10-19 PROCEDURE — 99212 OFFICE O/P EST SF 10 MIN: CPT | Performed by: UROLOGY

## 2022-10-19 PROCEDURE — 99024 POSTOP FOLLOW-UP VISIT: CPT | Performed by: UROLOGY

## 2022-10-19 NOTE — PROGRESS NOTES
Laura Chavez MD  Urology Clinic office visit      Patient: Aguilar Santiago  YOB: 1940  Date: 10/19/2022    HISTORY OF PRESENT ILLNESS:   The patient is a 79 y.o. female who presents today for evaluation of the following problems:      1. Renal cell carcinoma of right kidney (HCC)         Overall the problem(s) : are worsening. Associated Symptoms: No dysuria, gross hematuria. Pain Severity:      Summary of old records: N/A  (Patient's old records, notes and chart reviewed and summarized above.)    CT was done for surveillance for Colon cancer  Had resection 2 years ago  Had no chemo    There is a fairly well-defined, rounded soft tissue mass involving the mid to lower pole of the right kidney. It measures 3.3 x 3.2 cm in size    Central, 60% endophytic, lower half of kidney    Denies pain  Hx of cholecystectomy; hysterectomy lower infraumbilical incision  Had ileostomy on right side for 6 months and was then reversed      I independently reviewed and verified the images and reports from:    CT ABDOMEN PELVIS W WO CONTRAST Additional Contrast? None    Result Date: 7/19/2022  EXAMINATION: CT OF THE ABDOMEN AND PELVIS WITH AND WITHOUT CONTRAST 7/19/2022 10:08 am TECHNIQUE: CT of the abdomen and pelvis was performed with and without the administration of intravenous contrast.  Multiplanar reformatted images are provided for review. Automated exposure control, iterative reconstruction, and/or weight based adjustment of the mA/kV was utilized to reduce the radiation dose to as low as reasonably achievable. COMPARISON: CT dated 01/12/2022 HISTORY: ORDERING SYSTEM PROVIDED HISTORY: Renal mass TECHNOLOGIST PROVIDED HISTORY: Reason for Exam: renal mass, no new symptoms FINDINGS: Lower Chest: Lung bases are clear. Organs: Liver without focal lesion. Gallbladder unremarkable. Pancreas and spleen unremarkable. Adrenals without nodule.   Kidneys again demonstrate a exophytic marginated right medial projecting interpolar renal mass with heterogeneous enhancement enlarged from prior comparison 4.1 cm versus 3.3 cm on prior consistent with progression. No gross renal vein thrombus with bilateral renal veins grossly patent on limited evaluation. GI/Bowel: Small hiatal hernia. No focal thickening or disproportion dilatation of bowel. Stable postsurgical changes right mid abdomen. No inflammatory findings. Pelvis: No suspicious pelvic lesion or bulky pelvic adenopathy/free fluid. Peritoneum/Retroperitoneum: No bulky retroperitoneal adenopathy. No suspicious peritoneal or mesenteric process. Trace ascites. Vasculature: Grossly normal caliber of abdominal aorta and vasculature Bones/Soft Tissues: No acute osseous or soft tissue findings. Interval enlargement of previously noted heterogeneous enhancing soft tissue mass right kidney now measuring 4.1 cm enlarged from 3.3 cm on 01/12/2022 comparison remaining concerning for renal cell carcinoma with interval progression of the primary mass. Renal veins appear grossly patent bilateral without evidence for disseminated metastatic disease in the abdomen or pelvis. Small hiatal hernia       Urinalysis today:  No results found for this visit on 10/19/22. Last BUN and creatinine:  Lab Results   Component Value Date    BUN 26 (H) 10/10/2022     Lab Results   Component Value Date    CREATININE 1.38 (H) 10/10/2022       Imaging Reviewed during this Office Visit:   (results were independently reviewed by physician and radiology report verified)  I independently reviewed and verified the images and reports from:    CT ABDOMEN PELVIS W IV CONTRAST Additional Contrast? Oral    Result Date: 1/12/2022  EXAMINATION: CT OF THE ABDOMEN AND PELVIS WITH CONTRAST 1/12/2022 10:55 am TECHNIQUE: CT of the abdomen and pelvis was performed with the administration of intravenous contrast. Multiplanar reformatted images are provided for review.  Dose modulation, iterative reconstruction, and/or weight based adjustment of the mA/kV was utilized to reduce the radiation dose to as low as reasonably achievable. COMPARISON: The previous study performed 11/27/2020. HISTORY: ORDERING SYSTEM PROVIDED HISTORY: Malignant neoplasm of sigmoid colon [facility]) TECHNOLOGIST PROVIDED HISTORY: follow up Reason for Exam: follow up, Malignant neoplasm of sigmoid colon, p states no new complaints FINDINGS: Lower Chest: Atelectasis is again seen involving the lung bases. Organs: The liver, spleen, biliary tree, pancreas, adrenal glands, and left kidney are unremarkable. The patient is again status post cholecystectomy. There is a fairly well-defined, rounded soft tissue mass involving the mid to lower pole of the right kidney. It measures 3.3 x 3.2 cm in size. The finding is highly suspicious for renal cell carcinoma. Urologic consultation is recommended. Mild bilateral perinephric linear stranding is again noted, which may be age related. GI/Bowel: A small hiatal hernia is noted. Postsurgical changes are again noted involving small bowel in the right lower quadrant. The remainder of small bowel is unremarkable. Postsurgical changes are again identified in the region of the rectum. The remainder of the colon is unremarkable. A moderate amount of retained stool is identified throughout most of the colon. The appendix is not definitely visualized. Pelvis: Evaluation of the pelvis is again slightly suboptimal, secondary to dense streak artifact created by the patient's total left hip prosthesis. The urinary bladder is suboptimally distended and grossly unremarkable. The patient is again status post hysterectomy. No adnexal abnormality is seen. Peritoneum/Retroperitoneum: No retroperitoneal or pelvic lymphadenopathy is identified. No free fluid is seen in the abdomen and pelvis. No abdominal or pelvic soft tissue mass is appreciated. The abdominal aorta is again atherosclerotic.  Bones/Soft Tissues: Again seen is a large area of radiolucency involving the sacrum, without significant interval change. Osteo-degenerative changes are again noted involving the visualized thoracolumbar spine. A grade 1 spondylolisthesis is again seen at L4 over L5.     1.  Right renal mass, highly suspicious for renal cell carcinoma. Urologic consultation is recommended. 2.  Other findings, as described above.          PAST MEDICAL, FAMILY AND SOCIAL HISTORY:  Past Medical History:   Diagnosis Date    A-fib ([address] 07/19/2017    Abdominal pain     Acute blood loss as cause of postoperative anemia 11/08/2018    Anxiety 04/30/2016    CAD (coronary artery disease)     [facility]/ last seen 9-2022    CHF (congestive heart failure) (HCC)     Class 2 severe obesity with serious comorbidity and body mass index (BMI) of 37.0 to 37.9 in [facility])     Closed fracture of proximal end of left humerus with routine healing 04/26/2021    Colitis 05/31/2019    Colitis due to Clostridium difficile     Depression 04/30/2016    Diarrhea     Dyslipidemia     Elevated fasting glucose 11/20/2018    FH: total abdominal hysterectomy and bilateral salpingo-oophorectomy 1966    Fractures 03/15/2021    Lt humerus impacted displaced    Full dentures     Glucose intolerance (impaired glucose tolerance)     History of blood transfusion     no reaction    Hyperlipidemia     Hypertension     Hypokalemia     Malignant neoplasm of sigmoid colon ([address]     kidney cancer right    Multiple closed fractures of ribs 09/22/2018    Obesity     JAYJAY on CPAP 10/06/2018    Osteoarthritis     Osteoporosis 08/30/2013    Other complete intestinal obstruction ([address] 11/09/2018    Other specified hypothyroidism     Prolonged emergence from general anesthesia     S/P small bowel resection 05/22/2019    Thrombocytopenia, unspecified ([address] 03/09/2020    Under care of team     Hem-Onc Dr. Amber puentes/last seen 4-2022    Wears glasses     Wellness examination     PCP Karri Lopez DO/ [facility]/ last seen 4-2022     Past Surgical History:   Procedure Laterality Date    CARDIAC CATHETERIZATION  09/05/2017    CARDIAC CATHETERIZATION N/A     CHOLECYSTECTOMY  1960s    COLONOSCOPY N/A 10/08/2018    COLONOSCOPY WITH COLD ET HOT BIOPSIES ET POLYPECTOMIES performed by Maria Elena Mistry MD at [facility] N/A 04/25/2019    COLONOSCOPY performed by Simon Lynn MD at [facility]  stricture at 7 cm     COLONOSCOPY N/A 02/03/2020    tubular adenoma X 1, Dr. Lawson Hunter (CERVIX STATUS UNKNOWN)      KIDNEY SURGERY Right 10/7/2022    XI ROBOTIC LAPAROSCOPIC RADICAL NEPHRECTOMY, LYSIS OF ADHESIONS performed by Trina Shine MD at [facility] Right     UT CYSTOSCOPY,INSERT URETERAL STENT Bilateral 11/05/2018    CYSTO Bilateral Lighted stent placements performed by Keisha Chandra MD at [facility], PARTIAL, W/ANAST N/A 11/05/2018    Open Sigmoid Colectomy w/ lighted stents ILEOSTOMY PLACEMENT performed by Maria Elena Mistry MD at [facility] N/A 05/09/2019    Flexible Sigmoidoscopy with Dilatation of rectal stricture performed by Simon Lynn MD at [address] N/A 05/22/2019    Loop Ileostomy take down performed by Simon Lynn MD at [address] [place] [address] ([address] [place] [address])  1966    TOTAL NEPHRECTOMY Right 10/07/2022    Lysis of Adhesions    VARICOSE VEIN SURGERY Right 1960s     Family History   Adopted: Yes   Problem Relation Age of Onset    High Blood Pressure Brother         adopted brother     Outpatient Medications Marked as Taking for the 10/19/22 encounter (Office Visit) with Trina Shine MD   Medication Sig Dispense Refill    levothyroxine (SYNTHROID) 50 MCG tablet TAKE 1 TABLET BY MOUTH EVERY DAY 90 tablet 1    metoprolol tartrate (LOPRESSOR) 25 MG tablet TAKE ONE TABLET BY MOUTH TWICE A  tablet 1    atorvastatin (LIPITOR) 40 MG tablet TAKE 1 TABLET BY MOUTH EVERY DAY AT NIGHT 90 tablet 1    JANTOVEN 3 MG tablet TAKE 2 TABLETS BY MOUTH EVERY DAY OR AS DIRECTED PER INR RESULTS (Patient taking differently: Pt. stopped 6-29-22 for OR 10-7-22/ Mgmt. TCC) 60 tablet 5    amLODIPine (NORVASC) 10 MG tablet TAKE 1 TABLET BY MOUTH EVERY DAY 90 tablet 3    furosemide (LASIX) 20 MG tablet TAKE ONE TABLET BY MOUTH DAILY 90 tablet 1    potassium chloride (KLOR-CON M) 20 MEQ extended release tablet Take 1 tablet by mouth daily 90 tablet 1    acetaminophen (TYLENOL) 325 MG tablet Take 2 tablets by mouth every 4 hours as needed for Pain or Fever 60 tablet 0       Pcn [penicillins] and Bextra [valdecoxib]  Social History     Tobacco Use   Smoking Status Never    Passive exposure: Never   Smokeless Tobacco Never       Social History     Substance and Sexual Activity   Alcohol Use No       REVIEW OF SYSTEMS:  Constitutional: negative  Eyes: negative  Respiratory: negative  Cardiovascular: negative  Gastrointestinal: negative  Genitourinary: negative except for what is in HPI  Musculoskeletal: negative  Skin: negative   Neurological: negative  Hematological/Lymphatic: negative  Psychological: negative    Physical Exam:    This a 79 y.o. female      Vitals:    10/19/22 0848   BP: 124/74   Pulse: (!) 105   Resp: 20   SpO2: 95%     Constitutional: Patient in no acute distress. Neuro: Alert and oriented to person, place and time. Assessment and Plan      1. Renal cell carcinoma of right kidney [facility])           Plan:       Robotic right radical nephrectomy with extensive lysis of adhesions 10/7/2022    RIGHT KIDNEY, LAPAROSCOPIC RADICAL NEPHRECTOMY:             -  CLEAR-CELL RENAL CELL CARCINOMA, WHO GRADE 3 OUT OF 4,   4.6 CM GREATEST DIMENSION, FOCALLY INVADING RENAL SINUS FAT. -  VASCULAR, URETERAL AND RENAL SINUS FAT MARGINS NEGATIVE FOR   MALIGNANCY. -  PATHOLOGIC STAGE: pT3a, pN NOT ASSIGNED.      Path reviewed      Follow up 3-4 months; will check CT in future             Prescriptions Ordered:  No orders of the defined types were placed in this encounter. Orders Placed:  No orders of the defined types were placed in this encounter.            MD Prieto Mireles M.D, MD  [facility]

## 2022-10-20 ENCOUNTER — CARE COORDINATION (OUTPATIENT)
Dept: CARE COORDINATION | Age: 82
End: 2022-10-20

## 2022-10-20 RX ORDER — WARFARIN SODIUM 3 MG/1
TABLET ORAL
Qty: 60 TABLET | Refills: 0 | Status: SHIPPED | OUTPATIENT
Start: 2022-10-20 | End: 2022-10-21 | Stop reason: SDUPTHER

## 2022-10-20 RX ORDER — ATORVASTATIN CALCIUM 40 MG/1
TABLET, FILM COATED ORAL
Qty: 90 TABLET | Refills: 3 | Status: SHIPPED | OUTPATIENT
Start: 2022-10-20

## 2022-10-20 NOTE — CARE COORDINATION
Care Transitions Subsequent Outreach Attempt #2 final      Attempted to reach patient for transitions of care follow up. Unable to reach patient. HIPAA compliant message left on  requesting a return call. Final attempt, episode resolved. Patient: Christi Andrews Patient : 1940 MRN: [id number]    Last Discharge  [address]       Date Complaint Diagnosis Description Type Department Provider    10/7/22  Acute post-operative pain . .. Admission (Discharged) Philippe Segovia MD              Was this an external facility discharge?  No Discharge Facility: [facility]    Noted following upcoming appointments from discharge chart review:   [address] [place], [place] [address]can Dr follow up appointment(s):   Future Appointments   Date Time Provider Mira Kirkland   2022 10:30 AM Stan Carrasco MD [facility]   2022 10:30 AM Wendy Suh MD [facility]   2022 10:20 AM Lucio Goss  [place] [address] Health/ Care Transition Nurse  [phone]

## 2022-10-21 ENCOUNTER — ANTI-COAG VISIT (OUTPATIENT)
Dept: FAMILY MEDICINE CLINIC | Age: 82
End: 2022-10-21
Payer: MEDICARE

## 2022-10-21 DIAGNOSIS — I48.0 PAROXYSMAL ATRIAL FIBRILLATION (HCC): Primary | ICD-10-CM

## 2022-10-21 LAB — INR BLD: 2

## 2022-10-21 PROCEDURE — 93793 ANTICOAG MGMT PT WARFARIN: CPT | Performed by: FAMILY MEDICINE

## 2022-10-21 RX ORDER — WARFARIN SODIUM 3 MG/1
TABLET ORAL
Qty: 180 TABLET | Refills: 1 | Status: SHIPPED | OUTPATIENT
Start: 2022-10-21

## 2022-10-21 NOTE — TELEPHONE ENCOUNTER
Karine Bourdon called requesting a refill of the below medication which has been pended for you: left message for patient to call back to schedule follow up    Requested Prescriptions     Pending Prescriptions Disp Refills    warfarin (JANTOVEN) 3 MG tablet 60 tablet 0       Last Appointment Date: 10/6/2022  Next Appointment Date: Visit date not found    Allergies   Allergen Reactions    Pcn [Penicillins] Hives and Shortness Of Breath    Bextra [Valdecoxib] Diarrhea

## 2022-10-24 RX ORDER — WARFARIN SODIUM 3 MG/1
TABLET ORAL
Qty: 60 TABLET | Refills: 0 | OUTPATIENT
Start: 2022-10-24

## 2022-10-27 DIAGNOSIS — I10 ESSENTIAL HYPERTENSION: ICD-10-CM

## 2022-10-28 ENCOUNTER — ANTI-COAG VISIT (OUTPATIENT)
Dept: FAMILY MEDICINE CLINIC | Age: 82
End: 2022-10-28
Payer: MEDICARE

## 2022-10-28 DIAGNOSIS — I48.0 PAROXYSMAL ATRIAL FIBRILLATION (HCC): Primary | ICD-10-CM

## 2022-10-28 LAB — INR BLD: 2.8

## 2022-10-28 PROCEDURE — 93793 ANTICOAG MGMT PT WARFARIN: CPT | Performed by: FAMILY MEDICINE

## 2022-10-28 NOTE — TELEPHONE ENCOUNTER
Chetna Monae called requesting a refill of the below medication which has been pended for you:     Requested Prescriptions     Pending Prescriptions Disp Refills    metoprolol tartrate (LOPRESSOR) 25 MG tablet [Pharmacy Med Name: METOPROLOL TARTRATE 25 MG TAB] 180 tablet 1     Sig: TAKE ONE TABLET BY MOUTH TWICE A DAY       Last Appointment Date: 10/6/2022  Next Appointment Date: 11/15/2022    Allergies   Allergen Reactions    Pcn [Penicillins] Hives and Shortness Of Breath    Bextra [Valdecoxib] Diarrhea

## 2022-11-04 ENCOUNTER — TELEPHONE (OUTPATIENT)
Dept: FAMILY MEDICINE CLINIC | Age: 82
End: 2022-11-04
Payer: MEDICARE

## 2022-11-04 ENCOUNTER — ANTI-COAG VISIT (OUTPATIENT)
Dept: FAMILY MEDICINE CLINIC | Age: 82
End: 2022-11-04

## 2022-11-04 DIAGNOSIS — I50.30 DIASTOLIC CONGESTIVE HEART FAILURE, UNSPECIFIED HF CHRONICITY (HCC): ICD-10-CM

## 2022-11-04 DIAGNOSIS — G47.33 OBSTRUCTIVE SLEEP APNEA (ADULT) (PEDIATRIC): ICD-10-CM

## 2022-11-04 DIAGNOSIS — Z48.816 ENCOUNTER FOR SURGICAL AFTCR FOLLOWING SURGERY ON THE GU SYS: Primary | ICD-10-CM

## 2022-11-04 DIAGNOSIS — N39.0 URINARY TRACT INFECTION WITHOUT HEMATURIA, SITE UNSPECIFIED: ICD-10-CM

## 2022-11-04 DIAGNOSIS — I48.0 PAROXYSMAL ATRIAL FIBRILLATION (HCC): Primary | ICD-10-CM

## 2022-11-04 DIAGNOSIS — I48.91 ATRIAL FIBRILLATION, UNSPECIFIED TYPE (HCC): ICD-10-CM

## 2022-11-04 DIAGNOSIS — I11.0 HYPERTENSIVE HEART DISEASE WITH HEART FAILURE (HCC): ICD-10-CM

## 2022-11-04 LAB — INR BLD: 4.1

## 2022-11-04 PROCEDURE — G0180 MD CERTIFICATION HHA PATIENT: HCPCS | Performed by: FAMILY MEDICINE

## 2022-11-04 PROCEDURE — 93793 ANTICOAG MGMT PT WARFARIN: CPT | Performed by: FAMILY MEDICINE

## 2022-11-04 RX ORDER — WARFARIN SODIUM 2 MG/1
2 TABLET ORAL DAILY
Qty: 90 TABLET | Refills: 1 | Status: SHIPPED | OUTPATIENT
Start: 2022-11-04

## 2022-11-04 NOTE — TELEPHONE ENCOUNTER
Home health plan of care reviewed and certification completed 11/4/22 on patient for service dates 10/12/22-12/10/22. Verified current medications. Physician time spent on activities to coordinate service, documenting, medical decision making, review of reports/treatment plans/test results is 15 minutes.

## 2022-11-04 NOTE — PROGRESS NOTES
Spoke with patient. No changes in the past week to account for the increase in INR. Confirmed she is taking coumadin 6 mg daily.

## 2022-11-09 ENCOUNTER — HOSPITAL ENCOUNTER (OUTPATIENT)
Dept: LAB | Age: 82
Discharge: HOME | End: 2022-11-09
Payer: MEDICARE

## 2022-11-09 DIAGNOSIS — R73.01 IMPAIRED FASTING GLUCOSE: ICD-10-CM

## 2022-11-09 DIAGNOSIS — E03.9 ACQUIRED HYPOTHYROIDISM: ICD-10-CM

## 2022-11-09 DIAGNOSIS — E78.2 MIXED HYPERLIPIDEMIA: ICD-10-CM

## 2022-11-09 DIAGNOSIS — I10 ESSENTIAL HYPERTENSION: ICD-10-CM

## 2022-11-09 DIAGNOSIS — C18.7 MALIGNANT NEOPLASM OF SIGMOID COLON (HCC): ICD-10-CM

## 2022-11-09 LAB
ABSOLUTE EOS #: 0.33 K/UL (ref 0–0.44)
ABSOLUTE EOS #: 0.33 K/UL (ref 0–0.44)
ABSOLUTE IMMATURE GRANULOCYTE: <0.03 K/UL (ref 0–0.3)
ABSOLUTE IMMATURE GRANULOCYTE: <0.03 K/UL (ref 0–0.3)
ABSOLUTE LYMPH #: 1.62 K/UL (ref 1.1–3.7)
ABSOLUTE LYMPH #: 1.62 K/UL (ref 1.1–3.7)
ABSOLUTE MONO #: 0.66 K/UL (ref 0.1–1.2)
ABSOLUTE MONO #: 0.66 K/UL (ref 0.1–1.2)
ALBUMIN SERPL-MCNC: 4.1 G/DL (ref 3.5–5.2)
ALBUMIN SERPL-MCNC: 4.1 G/DL (ref 3.5–5.2)
ALBUMIN/GLOBULIN RATIO: 1.4 (ref 1–2.5)
ALBUMIN/GLOBULIN RATIO: 1.4 (ref 1–2.5)
ALP BLD-CCNC: 119 U/L (ref 35–104)
ALP BLD-CCNC: 119 U/L (ref 35–104)
ALT SERPL-CCNC: 11 U/L (ref 5–33)
ALT SERPL-CCNC: 11 U/L (ref 5–33)
ANION GAP SERPL CALCULATED.3IONS-SCNC: 11 MMOL/L (ref 9–17)
ANION GAP SERPL CALCULATED.3IONS-SCNC: 11 MMOL/L (ref 9–17)
AST SERPL-CCNC: 19 U/L
AST SERPL-CCNC: 19 U/L
BASOPHILS # BLD: 1 % (ref 0–2)
BASOPHILS # BLD: 1 % (ref 0–2)
BASOPHILS ABSOLUTE: 0.03 K/UL (ref 0–0.2)
BASOPHILS ABSOLUTE: 0.03 K/UL (ref 0–0.2)
BILIRUB SERPL-MCNC: 0.8 MG/DL (ref 0.3–1.2)
BILIRUB SERPL-MCNC: 0.8 MG/DL (ref 0.3–1.2)
BUN BLDV-MCNC: 30 MG/DL (ref 8–23)
BUN BLDV-MCNC: 30 MG/DL (ref 8–23)
BUN/CREAT BLD: 22 (ref 9–20)
BUN/CREAT BLD: 22 (ref 9–20)
CALCIUM SERPL-MCNC: 9.7 MG/DL (ref 8.6–10.4)
CALCIUM SERPL-MCNC: 9.7 MG/DL (ref 8.6–10.4)
CARCINOEMBRYONIC ANTIGEN: 3.4 NG/ML
CHLORIDE BLD-SCNC: 106 MMOL/L (ref 98–107)
CHLORIDE BLD-SCNC: 106 MMOL/L (ref 98–107)
CHOLESTEROL/HDL RATIO: 2.2
CHOLESTEROL: 96 MG/DL
CO2: 24 MMOL/L (ref 20–31)
CO2: 24 MMOL/L (ref 20–31)
CREAT SERPL-MCNC: 1.37 MG/DL (ref 0.5–0.9)
CREAT SERPL-MCNC: 1.37 MG/DL (ref 0.5–0.9)
EOSINOPHILS RELATIVE PERCENT: 5 % (ref 1–4)
EOSINOPHILS RELATIVE PERCENT: 5 % (ref 1–4)
ESTIMATED AVERAGE GLUCOSE: 114 MG/DL
GFR SERPL CREATININE-BSD FRML MDRD: 38 ML/MIN/1.73M2
GFR SERPL CREATININE-BSD FRML MDRD: 38 ML/MIN/1.73M2
GLUCOSE BLD-MCNC: 94 MG/DL (ref 70–99)
GLUCOSE BLD-MCNC: 94 MG/DL (ref 70–99)
HBA1C MFR BLD: 5.6 % (ref 4–6)
HCT VFR BLD CALC: 39.2 % (ref 36.3–47.1)
HCT VFR BLD CALC: 39.2 % (ref 36.3–47.1)
HDLC SERPL-MCNC: 43 MG/DL
HEMOGLOBIN: 12.5 G/DL (ref 11.9–15.1)
HEMOGLOBIN: 12.5 G/DL (ref 11.9–15.1)
IMMATURE GRANULOCYTES: 0 %
IMMATURE GRANULOCYTES: 0 %
LDL CHOLESTEROL: 37 MG/DL (ref 0–130)
LYMPHOCYTES # BLD: 26 % (ref 24–43)
LYMPHOCYTES # BLD: 26 % (ref 24–43)
MCH RBC QN AUTO: 30.9 PG (ref 25.2–33.5)
MCH RBC QN AUTO: 30.9 PG (ref 25.2–33.5)
MCHC RBC AUTO-ENTMCNC: 31.9 G/DL (ref 25.2–33.5)
MCHC RBC AUTO-ENTMCNC: 31.9 G/DL (ref 25.2–33.5)
MCV RBC AUTO: 96.8 FL (ref 82.6–102.9)
MCV RBC AUTO: 96.8 FL (ref 82.6–102.9)
MONOCYTES # BLD: 10 % (ref 3–12)
MONOCYTES # BLD: 10 % (ref 3–12)
NRBC AUTOMATED: 0 PER 100 WBC
NRBC AUTOMATED: 0 PER 100 WBC
PDW BLD-RTO: 14.9 % (ref 11.8–14.4)
PDW BLD-RTO: 14.9 % (ref 11.8–14.4)
PLATELET # BLD: ABNORMAL K/UL (ref 138–453)
PLATELET # BLD: ABNORMAL K/UL (ref 138–453)
PLATELET, FLUORESCENCE: 121 K/UL (ref 138–453)
PLATELET, FLUORESCENCE: 121 K/UL (ref 138–453)
PLATELET, IMMATURE FRACTION: 18.8 % (ref 1.1–10.3)
PLATELET, IMMATURE FRACTION: 18.8 % (ref 1.1–10.3)
POTASSIUM SERPL-SCNC: 3.9 MMOL/L (ref 3.7–5.3)
POTASSIUM SERPL-SCNC: 3.9 MMOL/L (ref 3.7–5.3)
RBC # BLD: 4.05 M/UL (ref 3.95–5.11)
RBC # BLD: 4.05 M/UL (ref 3.95–5.11)
SEG NEUTROPHILS: 58 % (ref 36–65)
SEG NEUTROPHILS: 58 % (ref 36–65)
SEGMENTED NEUTROPHILS ABSOLUTE COUNT: 3.69 K/UL (ref 1.5–8.1)
SEGMENTED NEUTROPHILS ABSOLUTE COUNT: 3.69 K/UL (ref 1.5–8.1)
SODIUM BLD-SCNC: 141 MMOL/L (ref 135–144)
SODIUM BLD-SCNC: 141 MMOL/L (ref 135–144)
THYROXINE, FREE: 1.45 NG/DL (ref 0.93–1.7)
TOTAL PROTEIN: 7 G/DL (ref 6.4–8.3)
TOTAL PROTEIN: 7 G/DL (ref 6.4–8.3)
TRIGL SERPL-MCNC: 80 MG/DL
TSH SERPL DL<=0.05 MIU/L-ACNC: 1.55 UIU/ML (ref 0.3–5)
WBC # BLD: 6.4 K/UL (ref 3.5–11.3)
WBC # BLD: 6.4 K/UL (ref 3.5–11.3)

## 2022-11-09 PROCEDURE — 80061 LIPID PANEL: CPT

## 2022-11-09 PROCEDURE — 85055 RETICULATED PLATELET ASSAY: CPT

## 2022-11-09 PROCEDURE — 84443 ASSAY THYROID STIM HORMONE: CPT

## 2022-11-09 PROCEDURE — 82378 CARCINOEMBRYONIC ANTIGEN: CPT

## 2022-11-09 PROCEDURE — 80053 COMPREHEN METABOLIC PANEL: CPT

## 2022-11-09 PROCEDURE — 36415 COLL VENOUS BLD VENIPUNCTURE: CPT

## 2022-11-09 PROCEDURE — 84439 ASSAY OF FREE THYROXINE: CPT

## 2022-11-09 PROCEDURE — 85025 COMPLETE CBC W/AUTO DIFF WBC: CPT

## 2022-11-09 PROCEDURE — 83036 HEMOGLOBIN GLYCOSYLATED A1C: CPT

## 2022-11-11 ENCOUNTER — ANTI-COAG VISIT (OUTPATIENT)
Dept: FAMILY MEDICINE CLINIC | Age: 82
End: 2022-11-11
Payer: MEDICARE

## 2022-11-11 DIAGNOSIS — I48.91 ATRIAL FIBRILLATION, UNSPECIFIED TYPE (HCC): Primary | ICD-10-CM

## 2022-11-11 LAB — INR BLD: 1.6

## 2022-11-11 PROCEDURE — 93793 ANTICOAG MGMT PT WARFARIN: CPT | Performed by: FAMILY MEDICINE

## 2022-11-15 ENCOUNTER — OFFICE VISIT (OUTPATIENT)
Dept: FAMILY MEDICINE CLINIC | Age: 82
End: 2022-11-15
Payer: MEDICARE

## 2022-11-15 VITALS
DIASTOLIC BLOOD PRESSURE: 72 MMHG | WEIGHT: 293 LBS | SYSTOLIC BLOOD PRESSURE: 110 MMHG | RESPIRATION RATE: 16 BRPM | BODY MASS INDEX: 45.99 KG/M2 | OXYGEN SATURATION: 96 % | HEIGHT: 67 IN | HEART RATE: 67 BPM

## 2022-11-15 DIAGNOSIS — E78.2 MIXED HYPERLIPIDEMIA: ICD-10-CM

## 2022-11-15 DIAGNOSIS — I48.11 LONGSTANDING PERSISTENT ATRIAL FIBRILLATION (HCC): ICD-10-CM

## 2022-11-15 DIAGNOSIS — Z99.89 OSA ON CPAP: ICD-10-CM

## 2022-11-15 DIAGNOSIS — E03.9 ACQUIRED HYPOTHYROIDISM: ICD-10-CM

## 2022-11-15 DIAGNOSIS — C64.1 RENAL CELL CARCINOMA OF RIGHT KIDNEY (HCC): ICD-10-CM

## 2022-11-15 DIAGNOSIS — G47.33 OSA ON CPAP: ICD-10-CM

## 2022-11-15 DIAGNOSIS — R73.01 IMPAIRED FASTING GLUCOSE: ICD-10-CM

## 2022-11-15 DIAGNOSIS — I10 ESSENTIAL HYPERTENSION: Primary | ICD-10-CM

## 2022-11-15 PROCEDURE — 1123F ACP DISCUSS/DSCN MKR DOCD: CPT | Performed by: FAMILY MEDICINE

## 2022-11-15 PROCEDURE — G8417 CALC BMI ABV UP PARAM F/U: HCPCS | Performed by: FAMILY MEDICINE

## 2022-11-15 PROCEDURE — G8484 FLU IMMUNIZE NO ADMIN: HCPCS | Performed by: FAMILY MEDICINE

## 2022-11-15 PROCEDURE — G8427 DOCREV CUR MEDS BY ELIG CLIN: HCPCS | Performed by: FAMILY MEDICINE

## 2022-11-15 PROCEDURE — 1090F PRES/ABSN URINE INCON ASSESS: CPT | Performed by: FAMILY MEDICINE

## 2022-11-15 PROCEDURE — 3074F SYST BP LT 130 MM HG: CPT | Performed by: FAMILY MEDICINE

## 2022-11-15 PROCEDURE — G8399 PT W/DXA RESULTS DOCUMENT: HCPCS | Performed by: FAMILY MEDICINE

## 2022-11-15 PROCEDURE — 1036F TOBACCO NON-USER: CPT | Performed by: FAMILY MEDICINE

## 2022-11-15 PROCEDURE — 99214 OFFICE O/P EST MOD 30 MIN: CPT | Performed by: FAMILY MEDICINE

## 2022-11-15 PROCEDURE — G0008 ADMIN INFLUENZA VIRUS VAC: HCPCS | Performed by: FAMILY MEDICINE

## 2022-11-15 PROCEDURE — 90471 IMMUNIZATION ADMIN: CPT | Performed by: FAMILY MEDICINE

## 2022-11-15 PROCEDURE — 3078F DIAST BP <80 MM HG: CPT | Performed by: FAMILY MEDICINE

## 2022-11-15 PROCEDURE — 99213 OFFICE O/P EST LOW 20 MIN: CPT | Performed by: FAMILY MEDICINE

## 2022-11-15 ASSESSMENT — ENCOUNTER SYMPTOMS
ABDOMINAL PAIN: 0
SHORTNESS OF BREATH: 0
TROUBLE SWALLOWING: 0
VOMITING: 0
SINUS PRESSURE: 0
SORE THROAT: 0
COUGH: 0
CONSTIPATION: 0
WHEEZING: 0
RHINORRHEA: 0
DIARRHEA: 0
EYE REDNESS: 0
NAUSEA: 0
EYE DISCHARGE: 0

## 2022-11-15 ASSESSMENT — PATIENT HEALTH QUESTIONNAIRE - PHQ9
SUM OF ALL RESPONSES TO PHQ9 QUESTIONS 1 & 2: 0
SUM OF ALL RESPONSES TO PHQ QUESTIONS 1-9: 0
1. LITTLE INTEREST OR PLEASURE IN DOING THINGS: 0
2. FEELING DOWN, DEPRESSED OR HOPELESS: 0
SUM OF ALL RESPONSES TO PHQ QUESTIONS 1-9: 0

## 2022-11-15 NOTE — PROGRESS NOTES
11/15/2022     Yennifer Mcneill (:  1940) is a 79 y.o. female, here for evaluation of the following medical concerns:    HPI  Patient comes in today for follow up of chronic health issues Patient did undergo right nephrectomy and has recovered relatively well. No further intervention is needed at this point. She states she still tired from the surgery but overall has recovered quite nicely. She does have a known history of hypertension and her blood pressure is stable and controlled on her current medical regimen. Has a known history of hyperlipidemia and her cholesterol levels are stable and controlled on her current statin dose. Does have known hypothyroidism and her thyroid levels are stable and adequately supplemented on her current thyroid dose. Does have a known history of impaired fasting glucose and her blood sugar levels remain stable with her current dietary regimen. Did encourage continued low-carb/low sugar diet to keep this optimally controlled. Does have a known history of persistent atrial fib and her rate is controlled at this time and she is not having any acute cardiac symptoms. He has known obstructive sleep apnea and is using her CPAP machine consistently. Is getting ongoing medical benefit from its use. Patient otherwise has no other acute medical concerns. .  Patient's recent lab reports are as follows:    Lab Results   Component Value Date/Time    WBC 6.4 2022 08:15 AM    RBC 4.05 2022 08:15 AM    HGB 12.5 2022 08:15 AM    HCT 39.2 2022 08:15 AM    MCV 96.8 2022 08:15 AM    MCH 30.9 2022 08:15 AM    MCHC 31.9 2022 08:15 AM    RDW 14.9 2022 08:15 AM    PLT See Reflexed IPF Result 2022 08:15 AM    MPV NOT REPORTED 2021 01:50 PM       Lab Results   Component Value Date/Time    CHOL 96 2022 08:15 AM    HDL 43 2022 08:15 AM    CHOLHDLRATIO 2.2 2022 08:15 AM    TRIG 80 2022 08:15 AM    VLDL NOT REPORTED 09/13/2021 10:34 AM       Lab Results   Component Value Date/Time    TSH 1.55 11/09/2022 08:15 AM       Lab Results   Component Value Date/Time     11/09/2022 08:15 AM    K 3.9 11/09/2022 08:15 AM     11/09/2022 08:15 AM    CO2 24 11/09/2022 08:15 AM    BUN 30 11/09/2022 08:15 AM    CREATININE 1.37 11/09/2022 08:15 AM    GLUCOSE 94 11/09/2022 08:15 AM    CALCIUM 9.7 11/09/2022 08:15 AM       Lab Results   Component Value Date/Time    LABA1C 5.6 11/09/2022 08:15 AM          Other review of systems are as noted below. Preventative measures are reviewed today. See health maintenance section for complete preventative plan of care. Did review patient's med list, allergies, social history, fam history, pmhx and pshx today as noted in the record. Review of Systems   Constitutional:  Negative for chills, fatigue and fever. HENT:  Negative for congestion, ear pain, postnasal drip, rhinorrhea, sinus pressure, sore throat and trouble swallowing. Eyes:  Negative for discharge and redness. Respiratory:  Negative for cough, shortness of breath and wheezing. Cardiovascular:  Negative for chest pain. Gastrointestinal:  Negative for abdominal pain, constipation, diarrhea, nausea and vomiting. Genitourinary:  Negative for dysuria, flank pain, frequency and urgency. Musculoskeletal:  Negative for arthralgias, myalgias and neck pain. Skin:  Negative for rash and wound. Allergic/Immunologic: Negative for environmental allergies. Neurological:  Negative for dizziness, weakness, light-headedness and headaches. Hematological:  Negative for adenopathy. Psychiatric/Behavioral: Negative. Prior to Visit Medications    Medication Sig Taking?  Authorizing Provider   warfarin (COUMADIN) 2 MG tablet Take 1 tablet by mouth daily  Levis Aase, DO   metoprolol tartrate (LOPRESSOR) 25 MG tablet TAKE ONE TABLET BY MOUTH TWICE A DAY  Emy Trevino, DO   warfarin (JANTOVEN) 3 MG tablet TAKE 2 TABLETS BY MOUTH EVERY DAY OR AS DIRECTED PER INR RESULTS  Irving Band, DO   atorvastatin (LIPITOR) 40 MG tablet TAKE 1 TABLET BY MOUTH EVERY DAY AT NIGHT  Edineet Alexa Sanchez MD   levothyroxine (SYNTHROID) 50 MCG tablet TAKE 1 TABLET BY MOUTH EVERY DAY  Emy Trevino, DO   amLODIPine (NORVASC) 10 MG tablet TAKE 1 TABLET BY MOUTH EVERY DAY  Fredo Augusto, DO   furosemide (LASIX) 20 MG tablet TAKE ONE TABLET BY MOUTH DAILY  Irving Band, DO   potassium chloride (KLOR-CON M) 20 MEQ extended release tablet Take 1 tablet by mouth daily  Emy Trevino DO   acetaminophen (TYLENOL) 325 MG tablet Take 2 tablets by mouth every 4 hours as needed for Pain or Fever  Harjeet Garcia MD        Social History     Tobacco Use    Smoking status: Never     Passive exposure: Never    Smokeless tobacco: Never   Substance Use Topics    Alcohol use: No        There were no vitals filed for this visit. Estimated body mass index is 46.83 kg/m² as calculated from the following:    Height as of 10/19/22: 5' 7\" (1.702 m). Weight as of 10/19/22: 299 lb (135.6 kg). Physical Exam  Vitals and nursing note reviewed. Constitutional:       General: She is not in acute distress. Appearance: Normal appearance. She is well-developed. She is not diaphoretic. HENT:      Head: Normocephalic and atraumatic. Right Ear: Tympanic membrane, ear canal and external ear normal.      Left Ear: Tympanic membrane, ear canal and external ear normal.      Nose: Nose normal.      Mouth/Throat:      Mouth: Mucous membranes are moist.      Pharynx: Oropharynx is clear. No oropharyngeal exudate. Eyes:      General:         Right eye: No discharge. Left eye: No discharge. Conjunctiva/sclera: Conjunctivae normal.      Pupils: Pupils are equal, round, and reactive to light. Neck:      Thyroid: No thyromegaly. Cardiovascular:      Rate and Rhythm: Normal rate. Rhythm irregular.       Heart sounds: Normal heart sounds. Pulmonary:      Effort: Pulmonary effort is normal.      Breath sounds: Normal breath sounds. No wheezing or rales. Abdominal:      General: Bowel sounds are normal. There is no distension. Palpations: Abdomen is soft. Tenderness: There is no abdominal tenderness. Musculoskeletal:      Cervical back: Normal range of motion and neck supple. Lymphadenopathy:      Cervical: No cervical adenopathy. Skin:     General: Skin is warm and dry. Findings: No rash. Neurological:      Mental Status: She is alert and oriented to person, place, and time. Psychiatric:         Behavior: Behavior normal.         Thought Content: Thought content normal.         Judgment: Judgment normal.         ASSESSMENT/PLAN:  Encounter Diagnoses   Name Primary? Essential hypertension Yes    Mixed hyperlipidemia     Acquired hypothyroidism     Impaired fasting glucose     Longstanding persistent atrial fibrillation (HCC)     JAYJAY on CPAP     Renal cell carcinoma of right kidney ([facility]      No orders of the defined types were placed in this encounter. Orders Placed This Encounter   Procedures    Influenza, FLUAD, (age 72 y+), IM, Preservative Free, 0.5 mL    CBC with Auto Differential     Standing Status:   Future     Standing Expiration Date:   11/15/2023    Comprehensive Metabolic Panel     Standing Status:   Future     Standing Expiration Date:   11/15/2023    Hemoglobin A1C     Standing Status:   Future     Standing Expiration Date:   11/15/2023    Lipid Panel     Standing Status:   Future     Standing Expiration Date:   11/15/2023     Order Specific Question:   Is Patient Fasting?/# of Hours     Answer:   12 hours    TSH     Standing Status:   Future     Standing Expiration Date:   11/15/2023    T4, Free     Standing Status:   Future     Standing Expiration Date:   11/15/2023     Patient is to continue on her current medical regimen. No changes are made at this time.     Patient is to continue to follow a low-carb/low sugar/low-fat diet and increase exercise for optimal blood sugar and cholesterol control. Patient is to return to my office in 6 months duration or sooner if any further problems or symptoms arise    (Please note that portions of this note were completed with a voice recognition program. Efforts were made to edit the dictations but occasionally words are mis-transcribed.)      No follow-ups on file. An electronic signature was used to authenticate this note.     --Aristides Cade, DO on 11/15/2022 at 7:10 AM

## 2022-11-18 ENCOUNTER — ANTI-COAG VISIT (OUTPATIENT)
Dept: FAMILY MEDICINE CLINIC | Age: 82
End: 2022-11-18
Payer: MEDICARE

## 2022-11-18 DIAGNOSIS — I48.11 LONGSTANDING PERSISTENT ATRIAL FIBRILLATION (HCC): Primary | ICD-10-CM

## 2022-11-18 LAB — INR BLD: 1.8

## 2022-11-18 PROCEDURE — 93793 ANTICOAG MGMT PT WARFARIN: CPT | Performed by: FAMILY MEDICINE

## 2022-11-21 RX ORDER — LEVOTHYROXINE SODIUM 0.05 MG/1
TABLET ORAL
Qty: 90 TABLET | Refills: 1 | Status: SHIPPED | OUTPATIENT
Start: 2022-11-21

## 2022-11-21 NOTE — TELEPHONE ENCOUNTER
Dallas Butler called requesting a refill of the below medication which has been pended for you:     Requested Prescriptions     Pending Prescriptions Disp Refills    levothyroxine (SYNTHROID) 50 MCG tablet [Pharmacy Med Name: Levothyroxine Sodium Oral Tablet 50 MCG] 90 tablet 0     Sig: TAKE 1 TABLET BY MOUTH EVERY DAY       Last Appointment Date: 11/15/2022  Next Appointment Date: 5/16/2023    Allergies   Allergen Reactions    Pcn [Penicillins] Hives and Shortness Of Breath    Bextra [Valdecoxib] Diarrhea

## 2022-11-26 LAB — INR BLD: 2.9

## 2022-11-28 ENCOUNTER — ANTI-COAG VISIT (OUTPATIENT)
Dept: FAMILY MEDICINE CLINIC | Age: 82
End: 2022-11-28
Payer: MEDICARE

## 2022-11-28 DIAGNOSIS — I48.91 ATRIAL FIBRILLATION, UNSPECIFIED TYPE (HCC): Primary | ICD-10-CM

## 2022-11-28 PROCEDURE — 93793 ANTICOAG MGMT PT WARFARIN: CPT | Performed by: FAMILY MEDICINE

## 2022-12-02 ENCOUNTER — ANTI-COAG VISIT (OUTPATIENT)
Dept: FAMILY MEDICINE CLINIC | Age: 82
End: 2022-12-02

## 2022-12-02 DIAGNOSIS — I48.91 ATRIAL FIBRILLATION, UNSPECIFIED TYPE (HCC): Primary | ICD-10-CM

## 2022-12-02 LAB — INR BLD: 3.6

## 2022-12-09 ENCOUNTER — ANTI-COAG VISIT (OUTPATIENT)
Dept: FAMILY MEDICINE CLINIC | Age: 82
End: 2022-12-09

## 2022-12-09 DIAGNOSIS — I48.91 ATRIAL FIBRILLATION, UNSPECIFIED TYPE (HCC): Primary | ICD-10-CM

## 2022-12-09 LAB — INR BLD: 3

## 2022-12-16 ENCOUNTER — ANTI-COAG VISIT (OUTPATIENT)
Dept: FAMILY MEDICINE CLINIC | Age: 82
End: 2022-12-16

## 2022-12-16 DIAGNOSIS — I48.91 ATRIAL FIBRILLATION, UNSPECIFIED TYPE (HCC): Primary | ICD-10-CM

## 2022-12-16 LAB — INR BLD: 2.1

## 2022-12-16 NOTE — PROGRESS NOTES
Patient notified of INR result, coumadin dosing instructions, and next recommended INR  draw. Patient verbalizes understanding. Advised patient that the clinic is closing at noon on 12/23/22 and will be closed 12/26/22. Verbalizes understanding.

## 2022-12-22 ENCOUNTER — ANTI-COAG VISIT (OUTPATIENT)
Dept: FAMILY MEDICINE CLINIC | Age: 82
End: 2022-12-22

## 2022-12-22 DIAGNOSIS — I48.91 ATRIAL FIBRILLATION, UNSPECIFIED TYPE (HCC): Primary | ICD-10-CM

## 2022-12-22 LAB — INR BLD: 1.7

## 2022-12-22 NOTE — PROGRESS NOTES
Spoke to pt, informed of INR at 1.7. to take 6 mg on Sat, 5 mg Sun, Tues and Thurs. Recheck in 1 week. Pt voiced understanding.

## 2022-12-30 LAB — INR BLD: 2.3

## 2023-01-03 ENCOUNTER — ANTI-COAG VISIT (OUTPATIENT)
Dept: FAMILY MEDICINE CLINIC | Age: 83
End: 2023-01-03
Payer: MEDICARE

## 2023-01-03 DIAGNOSIS — I48.91 ATRIAL FIBRILLATION, UNSPECIFIED TYPE (HCC): Primary | ICD-10-CM

## 2023-01-03 PROCEDURE — 93793 ANTICOAG MGMT PT WARFARIN: CPT | Performed by: FAMILY MEDICINE

## 2023-01-06 ENCOUNTER — ANTI-COAG VISIT (OUTPATIENT)
Dept: FAMILY MEDICINE CLINIC | Age: 83
End: 2023-01-06

## 2023-01-06 DIAGNOSIS — I48.91 ATRIAL FIBRILLATION, UNSPECIFIED TYPE (HCC): Primary | ICD-10-CM

## 2023-01-06 LAB — INR BLD: 2

## 2023-01-13 ENCOUNTER — ANTI-COAG VISIT (OUTPATIENT)
Dept: FAMILY MEDICINE CLINIC | Age: 83
End: 2023-01-13

## 2023-01-13 DIAGNOSIS — I48.91 ATRIAL FIBRILLATION, UNSPECIFIED TYPE (HCC): Primary | ICD-10-CM

## 2023-01-13 LAB — INR BLD: 2.1

## 2023-01-20 ENCOUNTER — ANTI-COAG VISIT (OUTPATIENT)
Dept: FAMILY MEDICINE CLINIC | Age: 83
End: 2023-01-20

## 2023-01-20 DIAGNOSIS — I48.91 ATRIAL FIBRILLATION, UNSPECIFIED TYPE (HCC): Primary | ICD-10-CM

## 2023-01-20 LAB — INR BLD: 1.5

## 2023-01-20 NOTE — PROGRESS NOTES
Contacted patient. She states no changes to regimen. Taking 5 mg 3 days per week and 6 mg 4 days.

## 2023-01-27 ENCOUNTER — ANTI-COAG VISIT (OUTPATIENT)
Dept: FAMILY MEDICINE CLINIC | Age: 83
End: 2023-01-27

## 2023-01-27 DIAGNOSIS — I48.91 ATRIAL FIBRILLATION, UNSPECIFIED TYPE (HCC): Primary | ICD-10-CM

## 2023-01-27 LAB — INR BLD: 1.8

## 2023-01-27 NOTE — PROGRESS NOTES
Increase to 6 mg every day. Repeat INR in 1 week.
Patient notified of INR result, coumadin dosing instructions, and next recommended INR lab draw. Patient verbalizes understanding.
none

## 2023-02-03 ENCOUNTER — HOSPITAL ENCOUNTER (OUTPATIENT)
Age: 83
Discharge: HOME | End: 2023-02-03
Payer: MEDICARE

## 2023-02-03 DIAGNOSIS — C18.7 MALIGNANT NEOPLASM OF SIGMOID COLON (HCC): Primary | ICD-10-CM

## 2023-02-03 DIAGNOSIS — C18.7 MALIGNANT NEOPLASM OF SIGMOID COLON (HCC): ICD-10-CM

## 2023-02-03 LAB
ABSOLUTE EOS #: 0.22 K/UL (ref 0–0.44)
ABSOLUTE IMMATURE GRANULOCYTE: <0.03 K/UL (ref 0–0.3)
ABSOLUTE LYMPH #: 1.58 K/UL (ref 1.1–3.7)
ABSOLUTE MONO #: 0.72 K/UL (ref 0.1–1.2)
ALBUMIN SERPL-MCNC: 4.1 G/DL (ref 3.5–5.2)
ALBUMIN/GLOBULIN RATIO: 1.3 (ref 1–2.5)
ALP SERPL-CCNC: 133 U/L (ref 35–104)
ALT SERPL-CCNC: 10 U/L (ref 5–33)
ANION GAP SERPL CALCULATED.3IONS-SCNC: 12 MMOL/L (ref 9–17)
AST SERPL-CCNC: 14 U/L
BASOPHILS # BLD: 0 % (ref 0–2)
BASOPHILS ABSOLUTE: <0.03 K/UL (ref 0–0.2)
BILIRUB SERPL-MCNC: 0.6 MG/DL (ref 0.3–1.2)
BUN SERPL-MCNC: 29 MG/DL (ref 8–23)
BUN/CREAT BLD: 26 (ref 9–20)
CALCIUM SERPL-MCNC: 9.5 MG/DL (ref 8.6–10.4)
CEA SERPL-MCNC: 4.4 NG/ML
CHLORIDE SERPL-SCNC: 108 MMOL/L (ref 98–107)
CO2 SERPL-SCNC: 24 MMOL/L (ref 20–31)
CREAT SERPL-MCNC: 1.1 MG/DL (ref 0.5–0.9)
EOSINOPHILS RELATIVE PERCENT: 4 % (ref 1–4)
GFR SERPL CREATININE-BSD FRML MDRD: 50 ML/MIN/1.73M2
GLUCOSE SERPL-MCNC: 101 MG/DL (ref 70–99)
HCT VFR BLD AUTO: 39.7 % (ref 36.3–47.1)
HGB BLD-MCNC: 12.5 G/DL (ref 11.9–15.1)
IMMATURE GRANULOCYTES: 0 %
INR BLD: 2.2
LYMPHOCYTES # BLD: 25 % (ref 24–43)
MCH RBC QN AUTO: 30.6 PG (ref 25.2–33.5)
MCHC RBC AUTO-ENTMCNC: 31.5 G/DL (ref 25.2–33.5)
MCV RBC AUTO: 97.3 FL (ref 82.6–102.9)
MONOCYTES # BLD: 12 % (ref 3–12)
NRBC AUTOMATED: 0 PER 100 WBC
PDW BLD-RTO: 14.2 % (ref 11.8–14.4)
PLATELET # BLD AUTO: NORMAL K/UL (ref 138–453)
PLATELET, FLUORESCENCE: 120 K/UL (ref 138–453)
PLATELET, IMMATURE FRACTION: 18.5 % (ref 1.1–10.3)
POTASSIUM SERPL-SCNC: 3.9 MMOL/L (ref 3.7–5.3)
PROT SERPL-MCNC: 7.3 G/DL (ref 6.4–8.3)
RBC # BLD: 4.08 M/UL (ref 3.95–5.11)
SEG NEUTROPHILS: 59 % (ref 36–65)
SEGMENTED NEUTROPHILS ABSOLUTE COUNT: 3.73 K/UL (ref 1.5–8.1)
SODIUM SERPL-SCNC: 144 MMOL/L (ref 135–144)
WBC # BLD AUTO: 6.3 K/UL (ref 3.5–11.3)

## 2023-02-03 PROCEDURE — 82378 CARCINOEMBRYONIC ANTIGEN: CPT

## 2023-02-03 PROCEDURE — 80053 COMPREHEN METABOLIC PANEL: CPT

## 2023-02-03 PROCEDURE — 36415 COLL VENOUS BLD VENIPUNCTURE: CPT

## 2023-02-03 PROCEDURE — 85025 COMPLETE CBC W/AUTO DIFF WBC: CPT

## 2023-02-03 PROCEDURE — 85055 RETICULATED PLATELET ASSAY: CPT

## 2023-02-06 ENCOUNTER — TELEPHONE (OUTPATIENT)
Dept: SURGERY | Age: 83
End: 2023-02-06

## 2023-02-06 ENCOUNTER — ANTI-COAG VISIT (OUTPATIENT)
Dept: FAMILY MEDICINE CLINIC | Age: 83
End: 2023-02-06
Payer: MEDICARE

## 2023-02-06 ENCOUNTER — OFFICE VISIT (OUTPATIENT)
Dept: ONCOLOGY | Age: 83
End: 2023-02-06
Payer: MEDICARE

## 2023-02-06 VITALS
RESPIRATION RATE: 16 BRPM | TEMPERATURE: 97.4 F | DIASTOLIC BLOOD PRESSURE: 80 MMHG | BODY MASS INDEX: 45.99 KG/M2 | SYSTOLIC BLOOD PRESSURE: 126 MMHG | HEIGHT: 67 IN | OXYGEN SATURATION: 96 % | WEIGHT: 293 LBS | HEART RATE: 76 BPM

## 2023-02-06 DIAGNOSIS — C64.1 RENAL CELL CARCINOMA OF RIGHT KIDNEY (HCC): ICD-10-CM

## 2023-02-06 DIAGNOSIS — C18.7 MALIGNANT NEOPLASM OF SIGMOID COLON (HCC): Primary | ICD-10-CM

## 2023-02-06 DIAGNOSIS — I48.91 ATRIAL FIBRILLATION, UNSPECIFIED TYPE (HCC): Primary | ICD-10-CM

## 2023-02-06 PROBLEM — E66.01 CLASS 2 SEVERE OBESITY WITH SERIOUS COMORBIDITY AND BODY MASS INDEX (BMI) OF 37.0 TO 37.9 IN ADULT (HCC): Status: ACTIVE | Noted: 2021-04-26

## 2023-02-06 PROBLEM — E66.812 CLASS 2 SEVERE OBESITY WITH SERIOUS COMORBIDITY AND BODY MASS INDEX (BMI) OF 37.0 TO 37.9 IN ADULT: Status: ACTIVE | Noted: 2021-04-26

## 2023-02-06 PROBLEM — E03.8 OTHER SPECIFIED HYPOTHYROIDISM: Status: ACTIVE | Noted: 2018-11-20

## 2023-02-06 PROBLEM — I10 HYPERTENSION: Status: ACTIVE | Noted: 2018-10-08

## 2023-02-06 PROCEDURE — 3079F DIAST BP 80-89 MM HG: CPT | Performed by: INTERNAL MEDICINE

## 2023-02-06 PROCEDURE — 99214 OFFICE O/P EST MOD 30 MIN: CPT | Performed by: INTERNAL MEDICINE

## 2023-02-06 PROCEDURE — 93793 ANTICOAG MGMT PT WARFARIN: CPT | Performed by: FAMILY MEDICINE

## 2023-02-06 PROCEDURE — G8417 CALC BMI ABV UP PARAM F/U: HCPCS | Performed by: INTERNAL MEDICINE

## 2023-02-06 PROCEDURE — 1036F TOBACCO NON-USER: CPT | Performed by: INTERNAL MEDICINE

## 2023-02-06 PROCEDURE — G8427 DOCREV CUR MEDS BY ELIG CLIN: HCPCS | Performed by: INTERNAL MEDICINE

## 2023-02-06 PROCEDURE — 1123F ACP DISCUSS/DSCN MKR DOCD: CPT | Performed by: INTERNAL MEDICINE

## 2023-02-06 PROCEDURE — G8484 FLU IMMUNIZE NO ADMIN: HCPCS | Performed by: INTERNAL MEDICINE

## 2023-02-06 PROCEDURE — 1090F PRES/ABSN URINE INCON ASSESS: CPT | Performed by: INTERNAL MEDICINE

## 2023-02-06 PROCEDURE — 3074F SYST BP LT 130 MM HG: CPT | Performed by: INTERNAL MEDICINE

## 2023-02-06 PROCEDURE — G8399 PT W/DXA RESULTS DOCUMENT: HCPCS | Performed by: INTERNAL MEDICINE

## 2023-02-06 NOTE — TELEPHONE ENCOUNTER
Mikel Fan from Dr. Suazo Friends office called and states that patient needs a colonoscopy scheduled soon. Patient seen Dr. Brianna Riley [phone]. Patient's CEA number had increased to 4.4 on 2-3-23. She had sigmoid colon cancer in 2018 with path results of T1NO stage 1. Ileostomy take down on 5- with Dr. Bernice Bates. Patient does have a CT scan of ABD scheduled on 2-17-23. Patient is on Coumadin. Called over to the [facility] and they can squeeze her on 2-14-23 or 2-21-23.  Is this okay with you or do you need to see her first?

## 2023-02-06 NOTE — TELEPHONE ENCOUNTER
[address]         Patient: Brand Bence           :1940           Surgical/Procedure Planned: Colonoscopy    Date & Location: 23 at [facility]       Outpatient   Planned Length of OR: 30 mins    Sedation: Local MAC        Estimated Cardiac Risk for Non-Cardiac Surgery/Procedure     Low______ Moderate______ High______    Medication Instructions - Clarification needed by this date:         Coumadin 2 mg daily  Hold ___ Days      Resume medications:     Lovenox indicated: _____Yes _____NO    Provider:Dr. Jorge Patino of Provider Giving Orders for Medication holds:    _____________________________________________

## 2023-02-06 NOTE — TELEPHONE ENCOUNTER
Ok to hold coumadin for 5 days duration prior to procedure then can resume once procedure is complete.

## 2023-02-07 ENCOUNTER — TELEPHONE (OUTPATIENT)
Dept: SURGERY | Age: 83
End: 2023-02-07

## 2023-02-07 NOTE — TELEPHONE ENCOUNTER
H &P Colonoscopy  Patient: Brand Bence                 :1940  (yes) patient has seen Dr. Samuel Montgomery prior to procedure  PCP: Dr. Jayesh Barrios in 2018 with patho results of T1ND Stage 1   Ileostomy takedown on 2019 by Dr. Samuel Montgomery   CEA elevated 4.4 on 2-3-23   CT scan being done on 23             Colonoscopy  Abd pain: no  Anemia: no  Bloating:no  Diarrhea: yes depends on the food she eats  Constipation: no  Melena: no  Hematochezia:no  Rectal Bleeding:no  Rectal/Anal Pain:no  Pruritus: no  Family history colon Cancer: no  Previous colon cancer: yes in   Previous Colon Polyp: yes,  tubular adenoma  Change in bowels: no  Decrease caliber of stool: no  Change in color of stool: no    Previous work up date: 2-3-2020 by Dr. Samuel Montgomery with findings of diverticulosis, colonic mucosa with mild architectural distortion       Family History   Adopted: Yes   Problem Relation Age of Onset    High Blood Pressure Brother         adopted brother     Social History     Socioeconomic History    Marital status:      Spouse name: Not on file    Number of children: 5    Years of education: 15    Highest education level: 12th grade   Occupational History    Not on file   Tobacco Use    Smoking status: Never     Passive exposure: Never    Smokeless tobacco: Never   Vaping Use    Vaping Use: Never used   Substance and Sexual Activity    Alcohol use: No    Drug use: No    Sexual activity: Not Currently     Partners: Male   Other Topics Concern    Not on file   Social History Narrative    3/28/2022- receives PIPP, HEAP and Meals on Wheels. Social Determinants of Health     Financial Resource Strain: Low Risk     Difficulty of Paying Living Expenses: Not very hard   Food Insecurity: No Food Insecurity    Worried About Running Out of Food in the Last Year: Never true    Ran Out of Food in the Last Year: Never true   Transportation Needs: No Transportation Needs    Lack of Transportation (Medical):  No Lack of Transportation (Non-Medical): No   Physical Activity: Inactive    Days of Exercise per Week: 0 days    Minutes of Exercise per Session: 0 min   Stress: No Stress Concern Present    Feeling of Stress : Not at all   Social Connections: Moderately Isolated    Frequency of Communication with Friends and Family: More than three times a week    Frequency of Social Gatherings with Friends and Family: More than three times a week    Attends L[employer] Services: 1 to 4 times per year    Active Member of [employer] Group or Organizations: No    Attends Club or Organization Meetings: Never    Marital Status:     Intimate Partner Violence: Not on file   Housing Stability: Low Risk     Unable to Pay for Housing in the Last Year: No    Number of Places Lived in the Last Year: 1    Unstable Housing in the Last Year: No     Past Surgical History:   Procedure Laterality Date    CARDIAC CATHETERIZATION  09/05/2017    CARDIAC CATHETERIZATION N/A     CHOLECYSTECTOMY  1960s    COLONOSCOPY N/A 10/08/2018    COLONOSCOPY WITH COLD ET HOT BIOPSIES ET POLYPECTOMIES performed by Grant Hunter MD at [facility] 04/25/2019    COLONOSCOPY performed by Benji Srinivasan MD at [facility]  stricture at 7 cm     COLONOSCOPY N/A 02/03/2020    tubular adenoma X 1, Dr. Arjun Steen (CERVIX STATUS UNKNOWN)      KIDNEY SURGERY Right 10/7/2022    XI ROBOTIC LAPAROSCOPIC RADICAL NEPHRECTOMY, LYSIS OF ADHESIONS performed by Lisa Marie MD at [address] Right     MT CYSTO W/INSERT URETERAL STENT Bilateral 11/05/2018    CYSTO Bilateral Lighted stent placements performed by Kaye Tellez MD at [facility] N/A 11/05/2018    Open Sigmoid Colectomy w/ lighted stents ILEOSTOMY PLACEMENT performed by Grant Hunter MD at [address] [place] [address] N/A 05/09/2019    Flexible Sigmoidoscopy with Dilatation of rectal stricture performed by Stefanie Jarrell MD at [address] N/A 05/22/2019    Loop Ileostomy take down performed by Stefanie Jarrell MD at [address] [place] [address] Sw (CERVIX REMOVED)  [address] Right 10/07/2022    Lysis of Adhesions    VARICOSE VEIN SURGERY Right 1960s     Past Medical History:   Diagnosis Date    A-fib ([address] 07/19/2017    Acute blood loss as cause of postoperative anemia 11/08/2018    Anxiety 04/30/2016    CAD (coronary artery disease)     T[facility]/ last seen 9-2022    CHF (congestive heart failure) ([facility])     Class 2 severe obesity with serious comorbidity and body mass index (BMI) of 37.0 to 37.9 in [facility])     Closed fracture of proximal end of left humerus with routine healing 04/26/2021    Colitis 05/31/2019    Colitis due to Clostridium difficile     Depression 04/30/2016    Diarrhea     Dyslipidemia     Elevated fasting glucose 11/20/2018    FH: total abdominal hysterectomy and bilateral salpingo-oophorectomy 1966    Fractures 03/15/2021    Lt humerus impacted displaced    Full dentures     Glucose intolerance (impaired glucose tolerance)     History of blood transfusion     no reaction    Hyperlipidemia     Hypertension     Hypokalemia     Malignant neoplasm of sigmoid colon ([address]     kidney cancer right    Multiple closed fractures of ribs 09/22/2018    Obesity     JAYJAY on CPAP 10/06/2018    Osteoarthritis     Osteoporosis 08/30/2013    Other complete intestinal obstruction ([address] 11/09/2018    Other specified hypothyroidism     Prolonged emergence from general anesthesia     Renal cell carcinoma of right kidney (HCC)     S/P small bowel resection 05/22/2019    Thrombocytopenia, unspecified ([address] 03/09/2020    Under care of team     Hem-Onc Dr. Diane puentes/last seen 4-2022    Wears glasses      Current Outpatient Medications on File Prior to Visit   Medication Sig Dispense Refill    levothyroxine (SYNTHROID) 50 MCG tablet TAKE 1 TABLET BY MOUTH EVERY DAY 90 tablet 1    warfarin (COUMADIN) 2 MG tablet Take 1 tablet by mouth daily 90 tablet 1    metoprolol tartrate (LOPRESSOR) 25 MG tablet TAKE ONE TABLET BY MOUTH TWICE A  tablet 1    warfarin (JANTOVEN) 3 MG tablet TAKE 2 TABLETS BY MOUTH EVERY DAY OR AS DIRECTED PER INR RESULTS 180 tablet 1    atorvastatin (LIPITOR) 40 MG tablet TAKE 1 TABLET BY MOUTH EVERY DAY AT NIGHT 90 tablet 3    amLODIPine (NORVASC) 10 MG tablet TAKE 1 TABLET BY MOUTH EVERY DAY 90 tablet 3    furosemide (LASIX) 20 MG tablet TAKE ONE TABLET BY MOUTH DAILY 90 tablet 1    potassium chloride (KLOR-CON M) 20 MEQ extended release tablet Take 1 tablet by mouth daily 90 tablet 1    acetaminophen (TYLENOL) 325 MG tablet Take 2 tablets by mouth every 4 hours as needed for Pain or Fever 60 tablet 0     No current facility-administered medications on file prior to visit.      Allergies as of 02/07/2023 - Fully Reviewed 02/06/2023   Allergen Reaction Noted    Pcn [penicillins] Hives and Shortness Of Breath 07/17/2013    Bextra [valdecoxib] Diarrhea 07/17/2013           PEx:                ASSESSMENT:        PLAN:Colonoscopy scheduled for 2-21-23 @ [facility]

## 2023-02-07 NOTE — TELEPHONE ENCOUNTER
Instructions given and review with the patient. All questions answered and patient denies any further questions at this time. Mailed out bowel and pre op instruction. Patient scheduled for colonoscopy  on 2-21-23. Instructed patient she can purchase the Miralax/Gatorade bowel prep over the counter at her pharmacy. Received medication hold regarding Coumadin from PCP, hold it for 5 days prior to to procedure. Placed this information on pre op instruction and mailed to patient.

## 2023-02-09 ENCOUNTER — OFFICE VISIT (OUTPATIENT)
Dept: CARDIOLOGY | Age: 83
End: 2023-02-09
Payer: MEDICARE

## 2023-02-09 VITALS
BODY MASS INDEX: 42.69 KG/M2 | OXYGEN SATURATION: 97 % | HEIGHT: 67 IN | DIASTOLIC BLOOD PRESSURE: 83 MMHG | HEART RATE: 109 BPM | SYSTOLIC BLOOD PRESSURE: 150 MMHG | WEIGHT: 272 LBS

## 2023-02-09 DIAGNOSIS — R94.39 ABNORMAL STRESS TEST: ICD-10-CM

## 2023-02-09 DIAGNOSIS — I48.91 ATRIAL FIBRILLATION, UNSPECIFIED TYPE (HCC): Primary | ICD-10-CM

## 2023-02-09 DIAGNOSIS — I25.10 CORONARY ARTERY DISEASE INVOLVING NATIVE HEART WITHOUT ANGINA PECTORIS, UNSPECIFIED VESSEL OR LESION TYPE: ICD-10-CM

## 2023-02-09 DIAGNOSIS — N18.30 STAGE 3 CHRONIC KIDNEY DISEASE, UNSPECIFIED WHETHER STAGE 3A OR 3B CKD (HCC): ICD-10-CM

## 2023-02-09 DIAGNOSIS — E66.01 OBESITY, CLASS III, BMI 40-49.9 (MORBID OBESITY) (HCC): ICD-10-CM

## 2023-02-09 DIAGNOSIS — I10 PRIMARY HYPERTENSION: ICD-10-CM

## 2023-02-09 DIAGNOSIS — I50.9 CHRONIC CONGESTIVE HEART FAILURE, UNSPECIFIED HEART FAILURE TYPE (HCC): ICD-10-CM

## 2023-02-09 DIAGNOSIS — I48.21 PERMANENT ATRIAL FIBRILLATION (HCC): ICD-10-CM

## 2023-02-09 PROCEDURE — 1123F ACP DISCUSS/DSCN MKR DOCD: CPT | Performed by: INTERNAL MEDICINE

## 2023-02-09 PROCEDURE — 99212 OFFICE O/P EST SF 10 MIN: CPT | Performed by: INTERNAL MEDICINE

## 2023-02-09 PROCEDURE — G8427 DOCREV CUR MEDS BY ELIG CLIN: HCPCS | Performed by: INTERNAL MEDICINE

## 2023-02-09 PROCEDURE — 3075F SYST BP GE 130 - 139MM HG: CPT | Performed by: INTERNAL MEDICINE

## 2023-02-09 PROCEDURE — G8417 CALC BMI ABV UP PARAM F/U: HCPCS | Performed by: INTERNAL MEDICINE

## 2023-02-09 PROCEDURE — 93005 ELECTROCARDIOGRAM TRACING: CPT | Performed by: INTERNAL MEDICINE

## 2023-02-09 PROCEDURE — 93010 ELECTROCARDIOGRAM REPORT: CPT | Performed by: INTERNAL MEDICINE

## 2023-02-09 PROCEDURE — 3079F DIAST BP 80-89 MM HG: CPT | Performed by: INTERNAL MEDICINE

## 2023-02-09 PROCEDURE — 1090F PRES/ABSN URINE INCON ASSESS: CPT | Performed by: INTERNAL MEDICINE

## 2023-02-09 PROCEDURE — 1036F TOBACCO NON-USER: CPT | Performed by: INTERNAL MEDICINE

## 2023-02-09 PROCEDURE — 99214 OFFICE O/P EST MOD 30 MIN: CPT | Performed by: INTERNAL MEDICINE

## 2023-02-09 PROCEDURE — G8484 FLU IMMUNIZE NO ADMIN: HCPCS | Performed by: INTERNAL MEDICINE

## 2023-02-09 PROCEDURE — G8399 PT W/DXA RESULTS DOCUMENT: HCPCS | Performed by: INTERNAL MEDICINE

## 2023-02-09 NOTE — PROGRESS NOTES
[place] [address] [address] [facility] [address]  Dept: [phone]    CC: follow up for VINOD Perez     Subjective: The patient is a 79y.o. year old, , female is in the office for a follow up visit. Since last visit had abnormal stress preop, that lead to cardiac cath, minimal cad. Then went for Renal nephrectomy due to renal CA. No need for chemo. Denies any cp. Has no SOB. BP at home 128  BP at Dr. Autumn Ellison was 126 on 2/6/23. Past Medical History:   has a past medical history of A-fib ([address] Acute blood loss as cause of postoperative anemia, Anxiety, CAD (coronary artery disease), CHF (congestive heart failure) ([address] Class 2 severe obesity with serious comorbidity and body mass index (BMI) of 37.0 to 37.9 in [facility]), Closed fracture of proximal end of left humerus with routine healing, Colitis, Colitis due to Clostridium difficile, Depression, Diarrhea, Dyslipidemia, Elevated fasting glucose, FH: total abdominal hysterectomy and bilateral salpingo-oophorectomy, Fractures, Full dentures, Glucose intolerance (impaired glucose tolerance), History of blood transfusion, Hyperlipidemia, Hypertension, Hypokalemia, Malignant neoplasm of sigmoid colon ([address] Multiple closed fractures of ribs, Obesity, JAYJAY on CPAP, Osteoarthritis, Osteoporosis, Other complete intestinal obstruction ([address] Other specified hypothyroidism, Prolonged emergence from general anesthesia, Renal cell carcinoma of right kidney ([address] S/P small bowel resection, Thrombocytopenia, unspecified ([address] Under care of team, and Wears glasses. Past Surgical History:   has a past surgical history that includes Total abdominal hysterectomy w/ bilateral salpingoophorectomy (1966); Cholecystectomy (1960s); Varicose vein surgery (Right, 1960s);  Hip Arthroplasty (Left); Knee Arthroplasty (Left); Knee Arthroplasty (Right); Cardiac catheterization (09/05/2017); Colonoscopy (N/A, 10/08/2018); pr laparoscopy colectomy partial w/anastomosis (N/A, 11/05/2018); pr cysto w/insert ureteral stent (Bilateral, 11/05/2018); Colonoscopy (N/A, 04/25/2019); sigmoidoscopy (N/A, 05/09/2019); Small intestine surgery (N/A, 05/22/2019); Colonoscopy (N/A, 02/03/2020); Cardiac catheterization (N/A); Hysterectomy; total nephrectomy (Right, 10/07/2022); and Kidney surgery (Right, 10/7/2022). Home Medications:  Prior to Admission medications    Medication Sig Start Date End Date Taking? Authorizing Provider   levothyroxine (SYNTHROID) 50 MCG tablet TAKE 1 TABLET BY MOUTH EVERY DAY 11/21/22   Cat Johnson DO   warfarin (COUMADIN) 2 MG tablet Take 1 tablet by mouth daily 11/4/22   Cat Johnson DO   metoprolol tartrate (LOPRESSOR) 25 MG tablet TAKE ONE TABLET BY MOUTH TWICE A DAY 10/28/22   Cat Johnson DO   warfarin (JANTOVEN) 3 MG tablet TAKE 2 TABLETS BY MOUTH EVERY DAY OR AS DIRECTED PER INR RESULTS 10/21/22   Cat Johnson DO   atorvastatin (LIPITOR) 40 MG tablet TAKE 1 TABLET BY MOUTH EVERY DAY AT NIGHT 10/20/22   Lanendermngozi Perear MD   amLODIPine (NORVASC) 10 MG tablet TAKE 1 TABLET BY MOUTH EVERY DAY 2/21/22   Fredo Casas DO   furosemide (LASIX) 20 MG tablet TAKE ONE TABLET BY MOUTH DAILY 8/30/21   Cat Johnson DO   potassium chloride (KLOR-CON M) 20 MEQ extended release tablet Take 1 tablet by mouth daily 3/15/21   Cat Johnson DO   acetaminophen (TYLENOL) 325 MG tablet Take 2 tablets by mouth every 4 hours as needed for Pain or Fever 11/9/18   Areli Martins MD       Allergies:  Pcn [penicillins] and Bextra [valdecoxib]    Social History:   reports that she has never smoked. She has never been exposed to tobacco smoke. She has never used smokeless tobacco. She reports that she does not drink alcohol and does not use drugs.     Review of Systems:  Constitutional: there has been no unanticipated weight loss. There's been No change in energy level, No change in activity level. Eyes: No visual changes or diplopia. No scleral icterus. ENT: No Headaches, hearing loss or vertigo. No mouth sores or sore throat. Cardiovascular: As above. Respiratory: as hpi  Gastrointestinal: No abdominal pain, appetite loss, blood in stools. No change in bowel or bladder habits. Genitourinary: No dysuria, trouble voiding, or hematuria. Musculoskeletal:  No gait disturbance, No weakness or joint complaints. Integumentary: No rash or pruritis. Psychiatric: No anxiety, or depression. Hematologic/Lymphatic: No abnormal bruising or bleeding, blood clots or swollen lymph nodes. Allergic/Immunologic: No nasal congestion or hives. Physical Exam:  BP (!) 150/83   Pulse (!) 109   Ht 5' 7\" (1.702 m)   Wt 272 lb (123.4 kg)   LMP 01/01/1968   SpO2 97%   BMI 42.60 kg/m²     Constitutional and General Appearance: alert, cooperative, no distress and appears stated age  [de-identified]: PERRL, no cervical lymphadenopathy. No masses palpable. Normal oral mucosa  Respiratory:  Normal excursion and expansion without use of accessory muscles  Resp Auscultation: Good respiratory effort. No for increased work of breathing. On auscultation: clear to auscultation bilaterally  Cardiovascular: The apical impulse is not displaced  Heart tones are irregularly irregular.   Grade 2/6 ejection systolic murmur in the left lower sternal border  Jugular venous pulsation Normal  The carotid upstroke is normal in amplitude and contour without delay or bruit  Peripheral pulses are symmetrical and full   Abdomen:   No masses or tenderness  Bowel sounds present  Extremities:   No Cyanosis or Clubbing   Lower extremity edema: Trace bilateral pedal edema   Skin: Warm and dry    LABS    Lab Results   Component Value Date    CHOL 96 11/09/2022    TRIG 80 11/09/2022    HDL 43 11/09/2022    LDLCHOLESTEROL 37 11/09/2022    VLDL NOT REPORTED 09/13/2021 CHOLHDLRATIO 2.2 11/09/2022       Lab Results   Component Value Date     02/03/2023    K 3.9 02/03/2023     (H) 02/03/2023    CO2 24 02/03/2023    BUN 29 (H) 02/03/2023    CREATININE 1.10 (H) 02/03/2023    GLUCOSE 101 (H) 02/03/2023    CALCIUM 9.5 02/03/2023    PROT 7.3 02/03/2023    LABALBU 4.1 02/03/2023    BILITOT 0.6 02/03/2023    ALKPHOS 133 (H) 02/03/2023    AST 14 02/03/2023    ALT 10 02/03/2023    LABGLOM 50 (L) 02/03/2023    GFRAA >60 07/13/2022    GLOB 2.7 05/31/2019       Cardiac Data:  EKG-Possible atrial fibrillation   Low voltage in precordial leads.    -Poor R-wave progression -      TTE 2017  1. Normal left ventricular size and wall thickness. 2.  Normal systolic left ventricular function. Ejection fraction is 60%. 3.  Biatrial enlargement. 4.  Dilated right ventricle with normal function. 5.  Mitral annular calcification. Mild mitral regurgitation. 6.  Normal aortic valve. 7.  Mild tricuspid regurgitation. RVSP is 31 mm Hg. 8.  No pericardial effusion    Cardiac cath 9/5/2017  Non-obstructive coronary artery disease. Low normal LV systolic function. Nuclear Stress on 9/22:  EF:  51%  TID:  1.05  LHR:  0.93     FINDINGS:  Ischemia (Reversible Defect):           Yes, mid anterior wall ischemia  Infarction (Irreversible Defect): Inferior wall defect, possible  infarction                                       (diaphragmatic)  Normal Ejection Fraction:               Yes  Normal Segmental Wall Motion:           Yes    Cath on 9/22:   Minimal CAD. Normal LV systolic function. IMPRESSION/RECOMMENDATIONS:    1. Minimal Non ob CAD on Cath 9/22 after abnormal stress test    2. Atrial fibrillation, rate controlled, on therapeutic anticoagulation with coumadin,  - Coumadin managed by [facility].  - Continue Lopressor to 25 mg bid     3. HFpEF, currently compensated on Lasix 20 mg daily with no signs of volume overload     4.   Renal cell ca- s/p nephrectcomy on 10/22. Follows with heme/onc and urology. 5. HTN, higher here. But well controlled on check 2/6/23. Per patient, her BP at home usually 127. She will monitor BP at home for 2 weeks and call. If high, stop lopressor, start coreg. 6. DL- on statin    7. Colon cancer status post colectomy, follows with oncology and general surgery. The patient is to continue heart healthy diet, weight loss and exercise as tolerated. Patient's medications and side effects were discussed. Medication refills were provided if needed. Follow up appointment timing was discussed. All questions and concerns were addressed to patient's satisfaction. The patient is to follow up in 6 months or sooner if necessary. Thank you for allowing me to participate in the care of this patient, please do not hesitate to call if you have any questions. Mary Vazquez DO, [address], [address], [address], [place] [address] [facility]  [facility]  [phone]

## 2023-02-10 ENCOUNTER — ANTI-COAG VISIT (OUTPATIENT)
Dept: FAMILY MEDICINE CLINIC | Age: 83
End: 2023-02-10

## 2023-02-10 DIAGNOSIS — I48.91 ATRIAL FIBRILLATION, UNSPECIFIED TYPE (HCC): Primary | ICD-10-CM

## 2023-02-10 LAB — INR BLD: 2.5

## 2023-02-17 ENCOUNTER — HOSPITAL ENCOUNTER (OUTPATIENT)
Dept: CT IMAGING | Age: 83
End: 2023-02-17
Payer: MEDICARE

## 2023-02-17 ENCOUNTER — ANTI-COAG VISIT (OUTPATIENT)
Dept: FAMILY MEDICINE CLINIC | Age: 83
End: 2023-02-17

## 2023-02-17 DIAGNOSIS — C18.7 MALIGNANT NEOPLASM OF SIGMOID COLON (HCC): ICD-10-CM

## 2023-02-17 DIAGNOSIS — I48.91 ATRIAL FIBRILLATION, UNSPECIFIED TYPE (HCC): Primary | ICD-10-CM

## 2023-02-17 LAB — INR BLD: 3

## 2023-02-17 PROCEDURE — 2500000003 HC RX 250 WO HCPCS: Performed by: INTERNAL MEDICINE

## 2023-02-17 PROCEDURE — G1010 CDSM STANSON: HCPCS

## 2023-02-17 RX ADMIN — BARIUM SULFATE 450 ML: 20 SUSPENSION ORAL at 12:03

## 2023-02-20 ENCOUNTER — TELEPHONE (OUTPATIENT)
Dept: SURGERY | Age: 83
End: 2023-02-20

## 2023-02-20 NOTE — TELEPHONE ENCOUNTER
Patient called and states that her mail was mixed with her neighbors and she just received the medication hold for Coumadin for 5 days. She will only had stopped the Coumadin for 3 days prior to procedure. Talked with Dr. Prachi Shafer and patient needs to be off for the 5 days. Dr. Prachi Shafer would like to do the procedure this week. Talked with [facility] and Thursday is available for scope. Patient agreed for Colonoscopy to be moved to 2-23-23- Thursday.

## 2023-02-22 RX ORDER — AMLODIPINE BESYLATE 10 MG/1
TABLET ORAL
Qty: 90 TABLET | Refills: 0 | Status: SHIPPED | OUTPATIENT
Start: 2023-02-22

## 2023-02-23 ENCOUNTER — ANESTHESIA (OUTPATIENT)
Dept: OPERATING ROOM | Age: 83
End: 2023-02-23
Payer: MEDICARE

## 2023-02-23 ENCOUNTER — HOSPITAL ENCOUNTER (OUTPATIENT)
Age: 83
Setting detail: OUTPATIENT SURGERY
Discharge: HOME | End: 2023-02-23
Attending: SURGERY | Admitting: SURGERY
Payer: MEDICARE

## 2023-02-23 ENCOUNTER — ANESTHESIA EVENT (OUTPATIENT)
Dept: OPERATING ROOM | Age: 83
End: 2023-02-23
Payer: MEDICARE

## 2023-02-23 VITALS
RESPIRATION RATE: 18 BRPM | TEMPERATURE: 97.2 F | HEART RATE: 95 BPM | DIASTOLIC BLOOD PRESSURE: 62 MMHG | OXYGEN SATURATION: 96 % | BODY MASS INDEX: 43.95 KG/M2 | WEIGHT: 280 LBS | SYSTOLIC BLOOD PRESSURE: 113 MMHG | HEIGHT: 67 IN

## 2023-02-23 DIAGNOSIS — R97.0 ELEVATED CARCINOEMBRYONIC ANTIGEN (CEA): ICD-10-CM

## 2023-02-23 DIAGNOSIS — Z85.038 HISTORY OF COLON CANCER: ICD-10-CM

## 2023-02-23 PROCEDURE — 7100000011 HC PHASE II RECOVERY - ADDTL 15 MIN: Performed by: SURGERY

## 2023-02-23 PROCEDURE — 45380 COLONOSCOPY AND BIOPSY: CPT | Performed by: SURGERY

## 2023-02-23 PROCEDURE — 3700000001 HC ADD 15 MINUTES (ANESTHESIA): Performed by: SURGERY

## 2023-02-23 PROCEDURE — 45384 COLONOSCOPY W/LESION REMOVAL: CPT | Performed by: SURGERY

## 2023-02-23 PROCEDURE — 3700000000 HC ANESTHESIA ATTENDED CARE: Performed by: SURGERY

## 2023-02-23 PROCEDURE — 6360000002 HC RX W HCPCS: Performed by: NURSE ANESTHETIST, CERTIFIED REGISTERED

## 2023-02-23 PROCEDURE — 2580000003 HC RX 258: Performed by: SURGERY

## 2023-02-23 PROCEDURE — 2709999900 HC NON-CHARGEABLE SUPPLY: Performed by: SURGERY

## 2023-02-23 PROCEDURE — 45385 COLONOSCOPY W/LESION REMOVAL: CPT | Performed by: SURGERY

## 2023-02-23 PROCEDURE — 3609009300 HC COLONOSCOPY BIOPSY/STOMA: Performed by: SURGERY

## 2023-02-23 PROCEDURE — 7100000010 HC PHASE II RECOVERY - FIRST 15 MIN: Performed by: SURGERY

## 2023-02-23 PROCEDURE — 88305 TISSUE EXAM BY PATHOLOGIST: CPT

## 2023-02-23 RX ORDER — SODIUM CHLORIDE, SODIUM LACTATE, POTASSIUM CHLORIDE, CALCIUM CHLORIDE 600; 310; 30; 20 MG/100ML; MG/100ML; MG/100ML; MG/100ML
INJECTION, SOLUTION INTRAVENOUS CONTINUOUS
Status: DISCONTINUED | OUTPATIENT
Start: 2023-02-23 | End: 2023-02-23 | Stop reason: HOSPADM

## 2023-02-23 RX ORDER — PROPOFOL 10 MG/ML
INJECTION, EMULSION INTRAVENOUS CONTINUOUS PRN
Status: DISCONTINUED | OUTPATIENT
Start: 2023-02-23 | End: 2023-02-23 | Stop reason: SDUPTHER

## 2023-02-23 RX ADMIN — PROPOFOL 40 MCG/KG/MIN: 10 INJECTION, EMULSION INTRAVENOUS at 12:56

## 2023-02-23 RX ADMIN — SODIUM CHLORIDE, SODIUM LACTATE, POTASSIUM CHLORIDE, AND CALCIUM CHLORIDE: .6; .31; .03; .02 INJECTION, SOLUTION INTRAVENOUS at 12:35

## 2023-02-23 ASSESSMENT — PAIN SCALES - GENERAL
PAINLEVEL_OUTOF10: 0
PAINLEVEL_OUTOF10: 0

## 2023-02-23 NOTE — DISCHARGE INSTRUCTIONS
1. Dr. Jose M Stoddard office will call you in 7 - 10 days with results and further recommendations. DISCHARGE INSTRUCTIONS FOLLOWING COLONOSCOPY    Activity  You have received sedation. Your judgement and coordination may be impaired. Do not drive or operate any machinery today. Do not make personal, medical, legal or business decisions today. Do not consume alcohol, tranquilizers or sleeping medications today unless otherwise instructed by your physician. Rest today. You may resume normal activity tomorrow. Because air is put into your colon during this procedure, it is normal to pass large amounts of air from your rectum. Feelings of bloating, gas or cramping may persist for 24 hours. You may not have a bowel movement for 1-3 days after this procedure. Diet  Begin with sips of clear liquids and if no nausea, you may progress to your normal diet. Medications  You may resume your usual medications, unless otherwise instructed. Follow-Up  As instructed. CALL YOUR PHYSICIAN if you experience any of the following:  Bleeding from your rectum (a teaspoonful or more)      - If you had a biopsy taken today, a small amount of bleeding may occur. Severe abdominal pain/cramping and/or distention that is not relieved by passing gas. Persistent nausea or vomiting. Signs of infection including fever, chills, redness or swelling @ the IV site.       If you have questions or problems call [phone] [facility]) or after business hours call [phone] [facility])

## 2023-02-23 NOTE — ANESTHESIA POSTPROCEDURE EVALUATION
Department of Anesthesiology  Postprocedure Note    Patient: Balbina Gonzalez  MRN: [id number]  YOB: 1940  Date of evaluation: 2/23/2023      Procedure Summary     Date: 02/23/23 Room / Location: [address]    Anesthesia Start: 6430 Anesthesia Stop:     Procedure: COLONOSCOPY BIOPSY (Anus) Diagnosis:       History of colon cancer      Elevated carcinoembryonic antigen (CEA)      (History of colon cancer [Z85.038])      (Elevated carcinoembryonic antigen (CEA) [R97.0])    Surgeons: Rosita Schneider MD Responsible Provider: XIOMARA Armstrong CRNA    Anesthesia Type: MAC ASA Status: 3          Anesthesia Type: No value filed.     Shankar Phase I: Shankar Score: 10    Shankar Phase II: Shankar Score: 9      Anesthesia Post Evaluation    Patient location during evaluation: bedside  Patient participation: complete - patient participated  Level of consciousness: awake  Airway patency: patent  Nausea & Vomiting: no nausea and no vomiting  Complications: no  Cardiovascular status: blood pressure returned to baseline  Respiratory status: acceptable  Hydration status: euvolemic

## 2023-02-23 NOTE — OP NOTE
COLONOSCOPY PROCEDURE NOTE:      Pre op diagnosis:     1.  Increased diarrhea  2.  Previous colon [id number], last CS 2020 diverticulosis o/w normal  3.  Elevated CEA to 4.4 on 2/3/23  4.  CT scan no sign of mets or recurrence.       Operative Procedure:    1.  Colonoscopy with cold forceps, hot forceps and hot snare    Surgeon:    Dr. Dimas Briggs     Anesthesia:    IV sedation per CRNA    EBL:  minimal    Procedure:    Patient was taken to the endoscopy suite and placed in a left lateral decubitus position.  They were given IV sedation as mentioned above.  A digital rectal exam was performed.  There were no masses and anal tone was normal.  The colonoscope was inserted into the rectum and carefully manipulated up through the sigmoid colon, transverse colon, right colon and into the cecum.  The cecum was identified by the ileal cecal valve and transabdominal illumination.  Then the scope was slowly withdrawn.  The scope was retroflexed in the rectum and the dentate line was examined.  The scope was removed.  The patient tolerated the procedure well.  The following findings were noted.        Final Diagnosis:    Moderate sigmoid diverticulosis  5 mm polyps at 70cm, removed with cold forceps  8 mm polyps at 85, 55 cm and 15 cm, removed with hot forceps  1 cm polyp at 50 cm removed with hot snare    Plan:    Await bx  Then make further recommendations.  (May repeat CEA)

## 2023-02-23 NOTE — ANESTHESIA PRE PROCEDURE
Department of Anesthesiology  Preprocedure Note       Name:  Chilo Davis   Age:  79 y.o.  :  1940                                          MRN:  [id number]         Date:  2023      Surgeon: Wei Major):  Amanda Lucero MD    Procedure: Procedure(s):  Colonoscopy    Medications prior to admission:   Prior to Admission medications    Medication Sig Start Date End Date Taking? Authorizing Provider   amLODIPine (NORVASC) 10 MG tablet TAKE 1 TABLET BY MOUTH EVERY DAY 23   Polish Favor, APRN - CNP   levothyroxine (SYNTHROID) 50 MCG tablet TAKE 1 TABLET BY MOUTH EVERY DAY 22   Lyle Salinas, DO   warfarin (COUMADIN) 2 MG tablet Take 1 tablet by mouth daily 22   [facility], DO   metoprolol tartrate (LOPRESSOR) 25 MG tablet TAKE ONE TABLET BY MOUTH TWICE A DAY 10/28/22   [facility], DO   warfarin (JANTOVEN) 3 MG tablet TAKE 2 TABLETS BY MOUTH EVERY DAY OR AS DIRECTED PER INR RESULTS 10/21/22   Lyle Salinas, DO   atorvastatin (LIPITOR) 40 MG tablet TAKE 1 TABLET BY MOUTH EVERY DAY AT NIGHT 10/20/22   Hemindermeet Reynaldo Solorio MD   furosemide (LASIX) 20 MG tablet TAKE ONE TABLET BY MOUTH DAILY 21   [facility], DO   potassium chloride (KLOR-CON M) 20 MEQ extended release tablet Take 1 tablet by mouth daily 3/15/21   [facility], DO   acetaminophen (TYLENOL) 325 MG tablet Take 2 tablets by mouth every 4 hours as needed for Pain or Fever 18   Scott Mayers MD       Current medications:    No current facility-administered medications for this encounter. Allergies: Allergies   Allergen Reactions    Pcn [Penicillins] Hives and Shortness Of Breath    Bextra [Valdecoxib] Diarrhea       Problem List:    Patient Active Problem List   Diagnosis Code    Dyslipidemia E78.5    Osteoarthritis M19.90    Osteoporosis M81.0    Depression F32. A    Anxiety F41.9    CHF (congestive heart failure) (HCC) I50.9    Primary insomnia F51.01    JAYJAY on CPAP G47.33, Z99.89    Class 2 severe obesity with serious comorbidity and body mass index (BMI) of 37.0 to 37.9 in adult (HCC) E66.01, Z68.37    Hypertension I10    A-fib ([facility]) I48.91    Malignant tumor of colon (HCC) C18.9    Elevated fasting glucose R73.01    Other specified hypothyroidism E03.8    S/P small bowel resection Z90.49    Closed fracture of proximal end of left humerus with routine healing S42.202D    Renal mass, right N28.89    MICHAEL (acute kidney injury) ([facility]) N17.9    Acute post-operative pain G89.18    Status post nephrectomy Z90.5    Urinary retention R33.9    Renal cell carcinoma of right kidney ([facility]) C64.1    Chronic renal disease, stage III ([facility] [[id number]] N18.30       Past Medical History:        Diagnosis Date    A-fib ([facility] 07/19/2017    Acute blood loss as cause of postoperative anemia 11/08/2018    Anxiety 04/30/2016    CAD (coronary artery disease)     [facility]/ last seen 9-2022    CHF (congestive heart failure) ([facility])     Class 2 severe obesity with serious comorbidity and body mass index (BMI) of 37.0 to 37.9 in adult [facility])     Closed fracture of proximal end of left humerus with routine healing 04/26/2021    Colitis 05/31/2019    Colitis due to Clostridium difficile     Depression 04/30/2016    Diarrhea     Dyslipidemia     Elevated fasting glucose 11/20/2018    FH: total abdominal hysterectomy and bilateral salpingo-oophorectomy 1966    Fractures 03/15/2021    Lt humerus impacted displaced    Full dentures     Glucose intolerance (impaired glucose tolerance)     History of blood transfusion     no reaction    Hyperlipidemia     Hypertension     Hypokalemia     Malignant neoplasm of sigmoid colon (HCC)     kidney cancer right    Multiple closed fractures of ribs 09/22/2018    Obesity     JAYJAY on CPAP 10/06/2018    Osteoarthritis     Osteoporosis 08/30/2013    Other complete intestinal obstruction ([address] 11/09/2018    Other specified hypothyroidism     Prolonged emergence from general anesthesia     Renal cell carcinoma of right kidney ([facility]     S/P small bowel resection 05/22/2019    Thrombocytopenia, unspecified ([facility] 03/09/2020    Under care of team     Hem-Onc Dr. Sheryl puentes/last seen 4-2022    Wears glasses        Past Surgical History:        Procedure Laterality Date    CARDIAC CATHETERIZATION  09/05/2017   [facility] CARDIAC CATHETERIZATION N/A     CHOLECYSTECTOMY  1960s    COLONOSCOPY N/A 10/08/2018    COLONOSCOPY WITH COLD ET HOT BIOPSIES ET POLYPECTOMIES performed by Arminda Donahue MD at [facility] N/A 04/25/2019    COLONOSCOPY performed by Delvis Valadez MD at [facility]  stricture at 7 cm     COLONOSCOPY N/A 02/03/2020    tubular adenoma X 1, Dr. Jamie Collier (CERVIX STATUS UNKNOWN)      KIDNEY SURGERY Right 10/7/2022    XI ROBOTIC LAPAROSCOPIC RADICAL NEPHRECTOMY, LYSIS OF ADHESIONS performed by Ramiro Rascon MD at [address], [address] Right     [place] CYSTO W/INSERT URETERAL STENT Bilateral 11/05/2018    CYSTO Bilateral Lighted stent placements performed by Indu Lawler MD at [address] N/A 11/05/2018    Open Sigmoid Colectomy w/ lighted stents ILEOSTOMY PLACEMENT performed by Arminda Donahue MD at [address] [place] N/A 05/09/2019    Flexible Sigmoidoscopy with Dilatation of rectal stricture performed by Delvis Valadez MD at [address] N/A 05/22/2019    Loop Ileostomy take down performed by Delvis Valadez MD at [facility] (CERVIX REMOVED)  1966    TOTAL NEPHRECTOMY Right 10/07/2022    Lysis of Adhesions    VARICOSE VEIN SURGERY Right 1960s       Social History:    Social History     Tobacco Use    Smoking status: Never     Passive exposure: Never    Smokeless tobacco: Never   Substance Use Topics    Alcohol use:  No Counseling given: Not Answered      Vital Signs (Current): There were no vitals filed for this visit. BP Readings from Last 3 Encounters:   02/09/23 (!) 150/83   02/06/23 126/80   11/15/22 110/72       NPO Status: Time of last liquid consumption: 0700                        Time of last solid consumption: 2000                        Date of last liquid consumption: 02/23/23                        Date of last solid food consumption: 02/21/23    BMI:   Wt Readings from Last 3 Encounters:   02/09/23 272 lb (123.4 kg)   02/06/23 299 lb (135.6 kg)   11/15/22 299 lb (135.6 kg)     There is no height or weight on file to calculate BMI.    CBC:   Lab Results   Component Value Date/Time    WBC 6.3 02/03/2023 09:56 AM    RBC 4.08 02/03/2023 09:56 AM    HGB 12.5 02/03/2023 09:56 AM    HCT 39.7 02/03/2023 09:56 AM    MCV 97.3 02/03/2023 09:56 AM    RDW 14.2 02/03/2023 09:56 AM    PLT See Reflexed IPF Result 02/03/2023 09:56 AM       CMP:   Lab Results   Component Value Date/Time     02/03/2023 09:56 AM    K 3.9 02/03/2023 09:56 AM     02/03/2023 09:56 AM    CO2 24 02/03/2023 09:56 AM    BUN 29 02/03/2023 09:56 AM    CREATININE 1.10 02/03/2023 09:56 AM    GFRAA >60 07/13/2022 09:12 AM    LABGLOM 50 02/03/2023 09:56 AM    GLUCOSE 101 02/03/2023 09:56 AM    PROT 7.3 02/03/2023 09:56 AM    CALCIUM 9.5 02/03/2023 09:56 AM    BILITOT 0.6 02/03/2023 09:56 AM    ALKPHOS 133 02/03/2023 09:56 AM    AST 14 02/03/2023 09:56 AM    ALT 10 02/03/2023 09:56 AM       POC Tests: No results for input(s): POCGLU, POCNA, POCK, POCCL, POCBUN, POCHEMO, POCHCT in the last 72 hours.     Coags:   Lab Results   Component Value Date/Time    PROTIME 14.8 09/16/2022 10:07 AM    INR 3.00 02/17/2023 12:00 AM    APTT 34.8 09/21/2018 08:18 PM       HCG (If Applicable): No results found for: PREGTESTUR, PREGSERUM, HCG, HCGQUANT     ABGs: No results found for: PHART, PO2ART, LKB6VKQ, YMO2QUR, BEART, F5DBMXIJ Type & Screen (If Applicable):  No results found for: LABABO, LABRH    Drug/Infectious Status (If Applicable):  Lab Results   Component Value Date/Time    HEPCAB NONREACTIVE 10/09/2022 05:07 PM       COVID-19 Screening (If Applicable): No results found for: COVID19        Anesthesia Evaluation    Airway: Mallampati: I  TM distance: >3 FB   Neck ROM: limited     Dental:          Pulmonary:normal exam    (+) sleep apnea:                             Cardiovascular:    (+) hypertension:, CAD:, CHF:,         Rhythm: irregular  Rate: abnormal                   PE comment: A-fib   Neuro/Psych:   (+) psychiatric history:            GI/Hepatic/Renal: Neg GI/Hepatic/Renal ROS            Endo/Other:    (+) hypothyroidism::., .                 Abdominal:   (+) obese,           Vascular: negative vascular ROS. Other Findings:           Anesthesia Plan      MAC     ASA 3       Induction: intravenous. Anesthetic plan and risks discussed with patient.       Plan discussed with surgical team.                    Mary Robert, XIOMARA - CRNA   2/23/2023

## 2023-02-23 NOTE — H&P
H &P Colonoscopy  Patient: Farhad Hirsch                 :1940  (yes) patient has seen Dr. Mihir Rodarte prior to procedure  PCP: Dr. Khadijah Hsu in 2018 with patho results of T1ND Stage 1              Ileostomy takedown on 2019 by Dr. Mihir Rodarte              CEA elevated 4.4 on 2-3-23              CT scan being done on 23                  Colonoscopy  Abd pain: no  Anemia: no  Bloating:no  Diarrhea: yes depends on the food she eats  Constipation: no  Melena: no  Hematochezia:no  Rectal Bleeding:no  Rectal/Anal Pain:no  Pruritus: no  Family history colon Cancer: no  Previous colon cancer: yes in   Previous Colon Polyp: yes,  tubular adenoma  Change in bowels: no  Decrease caliber of stool: no  Change in color of stool: no     Previous work up date: 2-3-2020 by Dr. Mihir Rodarte with findings of diverticulosis, colonic mucosa with mild architectural distortion         Family History         Family History   Adopted: Yes   Problem Relation Age of Onset    High Blood Pressure Brother           adopted brother         Social History               Socioeconomic History    Marital status:        Spouse name: Not on file    Number of children: 5    Years of education: 15    Highest education level: 12th grade   Occupational History    Not on file   Tobacco Use    Smoking status: Never       Passive exposure: Never    Smokeless tobacco: Never   Vaping Use    Vaping Use: Never used   Substance and Sexual Activity    Alcohol use: No    Drug use: No    Sexual activity: Not Currently       Partners: Male   Other Topics Concern    Not on file   Social History Narrative     3/28/2022- receives PIPP, HEAP and Meals on Wheels.        Social Determinants of Health          Financial Resource Strain: Low Risk     Difficulty of Paying Living Expenses: Not very hard   Food Insecurity: No Food Insecurity    Worried About Running Out of Food in the Last Year: Never true    Ran Out of Food in the Last Year: Never true   Transportation Needs: No Transportation Needs    Lack of Transportation (Medical): No    Lack of Transportation (Non-Medical): No   Physical Activity: Inactive    Days of Exercise per Week: 0 days    Minutes of Exercise per Session: 0 min   Stress: No Stress Concern Present    Feeling of Stress : Not at all   Social Connections: Moderately Isolated    Frequency of Communication with Friends and Family: More than three times a week    Frequency of Social Gatherings with Friends and Family: More than three times a week    Attends [employer] Services: 1 to 4 times per year    Active Member of [address] [place] [address] [employer] or Organizations: No    Attends Club or Organization Meetings: Never    Marital Status:     Intimate Partner Violence: Not on file   Housing Stability: Low Risk     Unable to Pay for Housing in the Last Year: No    Number of Places Lived in the Last Year: 1    Unstable Housing in the Last Year: No         Past Surgical History         Past Surgical History:   Procedure Laterality Date    CARDIAC CATHETERIZATION   09/05/2017    CARDIAC CATHETERIZATION N/A      CHOLECYSTECTOMY   1960s    COLONOSCOPY N/A 10/08/2018     COLONOSCOPY WITH COLD ET HOT BIOPSIES ET POLYPECTOMIES performed by Shannon Law MD at [address] [facility] 04/25/2019     COLONOSCOPY performed by Krish Vera MD at [facility]  stricture at 7 cm     COLONOSCOPY N/A 02/03/2020     tubular adenoma X 1, Dr. Raudel Olmstead (CERVIX STATUS UNKNOWN)        KIDNEY SURGERY Right 10/7/2022     XI ROBOTIC LAPAROSCOPIC RADICAL NEPHRECTOMY, LYSIS OF ADHESIONS performed by Geovany Gant MD at [address] Right      [place] CYSTO W/INSERT URETERAL STENT Bilateral 11/05/2018     CYSTO Bilateral Lighted stent placements performed by Catrina Santacruz MD at [facility] N/A 11/05/2018     Open Sigmoid Colectomy w/ lighted stents ILEOSTOMY PLACEMENT performed by Devan Wang MD at [facility] N/A 05/09/2019     Flexible Sigmoidoscopy with Dilatation of rectal stricture performed by Dequan Lowe MD at [address] N/A 05/22/2019     Loop Ileostomy take down performed by Dequan Lowe MD at [address] [place] [address] (CERVIX REMOVED)   [address] Right 10/07/2022     Lysis of Adhesions    VARICOSE VEIN SURGERY Right 1960s         Past Medical History        Past Medical History:   Diagnosis Date    A-fib ([address] 07/19/2017    Acute blood loss as cause of postoperative anemia 11/08/2018    Anxiety 04/30/2016    CAD (coronary artery disease)       [facility]/ last seen 9-2022    CHF (congestive heart failure) (HCC)      Class 2 severe obesity with serious comorbidity and body mass index (BMI) of 37.0 to 37.9 in [facility])      Closed fracture of proximal end of left humerus with routine healing 04/26/2021    Colitis 05/31/2019    Colitis due to Clostridium difficile      Depression 04/30/2016    Diarrhea      Dyslipidemia      Elevated fasting glucose 11/20/2018    FH: total abdominal hysterectomy and bilateral salpingo-oophorectomy 1966    Fractures 03/15/2021     Lt humerus impacted displaced    Full dentures      Glucose intolerance (impaired glucose tolerance)      History of blood transfusion       no reaction    Hyperlipidemia      Hypertension      Hypokalemia      Malignant neoplasm of sigmoid colon ([address]       kidney cancer right    Multiple closed fractures of ribs 09/22/2018    Obesity      JAYJAY on CPAP 10/06/2018    Osteoarthritis      Osteoporosis 08/30/2013    Other complete intestinal obstruction ([address] 11/09/2018    Other specified hypothyroidism      Prolonged emergence from general anesthesia      Renal cell carcinoma of right kidney ([address]      S/P small bowel resection 05/22/2019    Thrombocytopenia, unspecified ([address] 03/09/2020    Under care of team       Hem-Onc Dr. Vaalrie puentes/last seen 4-2022    Wears glasses                  Current Outpatient Medications on File Prior to Visit   Medication Sig Dispense Refill    levothyroxine (SYNTHROID) 50 MCG tablet TAKE 1 TABLET BY MOUTH EVERY DAY 90 tablet 1    warfarin (COUMADIN) 2 MG tablet Take 1 tablet by mouth daily 90 tablet 1    metoprolol tartrate (LOPRESSOR) 25 MG tablet TAKE ONE TABLET BY MOUTH TWICE A  tablet 1    warfarin (JANTOVEN) 3 MG tablet TAKE 2 TABLETS BY MOUTH EVERY DAY OR AS DIRECTED PER INR RESULTS 180 tablet 1    atorvastatin (LIPITOR) 40 MG tablet TAKE 1 TABLET BY MOUTH EVERY DAY AT NIGHT 90 tablet 3    amLODIPine (NORVASC) 10 MG tablet TAKE 1 TABLET BY MOUTH EVERY DAY 90 tablet 3    furosemide (LASIX) 20 MG tablet TAKE ONE TABLET BY MOUTH DAILY 90 tablet 1    potassium chloride (KLOR-CON M) 20 MEQ extended release tablet Take 1 tablet by mouth daily 90 tablet 1    acetaminophen (TYLENOL) 325 MG tablet Take 2 tablets by mouth every 4 hours as needed for Pain or Fever 60 tablet 0      No current facility-administered medications on file prior to visit. Allergies as of 02/07/2023 - Fully Reviewed 02/06/2023   Allergen Reaction Noted    Pcn [penicillins] Hives and Shortness Of Breath 07/17/2013    Bextra [valdecoxib] Diarrhea 07/17/2013          PHYSICAL EXAM:    Blood pressure (!) 151/76, pulse 75, temperature 97.2 °F (36.2 °C), resp. rate 16, last menstrual period 01/01/1968, SpO2 98 %. Gen:  A and O x 3, NAD, well nourished  Eyes:  Sclera non icterus, PERRL  Lungs:  CTA, symmetrical  Chest:  RRR, no murmurs  Abd:  Soft, NT, ND, no HSM, no bruits                   ASSESSMENT:   1. Increased diarrhea  2. Previous colon [id number], last CS 2020 diverticulosis o/w normal  3. Elevated CEA to 4.4 on 2/3/23  4. CT scan no sign of mets or recurrence.         PLAN:Colonoscopy scheduled for 2-21-23 @ [facility]

## 2023-02-24 ENCOUNTER — ANTI-COAG VISIT (OUTPATIENT)
Dept: FAMILY MEDICINE CLINIC | Age: 83
End: 2023-02-24

## 2023-02-24 DIAGNOSIS — I48.91 ATRIAL FIBRILLATION, UNSPECIFIED TYPE (HCC): Primary | ICD-10-CM

## 2023-02-24 LAB — INR BLD: 1.2

## 2023-02-24 NOTE — PROGRESS NOTES
Spoke with patient. She was off of her coumadin for 5 days for a colonoscopy on 2/23/23. She restarted her coumadin the evening of 2/23/23. Instructed to resume her regular coumadin dosage schedule and recheck next Friday. Verbalizes understanding.

## 2023-02-27 LAB — SURGICAL PATHOLOGY REPORT: NORMAL

## 2023-03-03 ENCOUNTER — ANTI-COAG VISIT (OUTPATIENT)
Dept: FAMILY MEDICINE CLINIC | Age: 83
End: 2023-03-03

## 2023-03-03 DIAGNOSIS — I48.91 ATRIAL FIBRILLATION, UNSPECIFIED TYPE (HCC): Primary | ICD-10-CM

## 2023-03-03 LAB — INR BLD: 1.9

## 2023-03-06 ENCOUNTER — OFFICE VISIT (OUTPATIENT)
Dept: ONCOLOGY | Age: 83
End: 2023-03-06
Payer: MEDICARE

## 2023-03-06 VITALS
TEMPERATURE: 97.4 F | HEIGHT: 67 IN | HEART RATE: 97 BPM | SYSTOLIC BLOOD PRESSURE: 122 MMHG | BODY MASS INDEX: 43.95 KG/M2 | RESPIRATION RATE: 16 BRPM | WEIGHT: 280 LBS | OXYGEN SATURATION: 95 % | DIASTOLIC BLOOD PRESSURE: 64 MMHG

## 2023-03-06 DIAGNOSIS — C18.7 MALIGNANT NEOPLASM OF SIGMOID COLON (HCC): Primary | ICD-10-CM

## 2023-03-06 PROCEDURE — 99213 OFFICE O/P EST LOW 20 MIN: CPT | Performed by: INTERNAL MEDICINE

## 2023-03-06 PROCEDURE — 1123F ACP DISCUSS/DSCN MKR DOCD: CPT | Performed by: INTERNAL MEDICINE

## 2023-03-06 PROCEDURE — 99214 OFFICE O/P EST MOD 30 MIN: CPT | Performed by: INTERNAL MEDICINE

## 2023-03-06 PROCEDURE — G8484 FLU IMMUNIZE NO ADMIN: HCPCS | Performed by: INTERNAL MEDICINE

## 2023-03-06 PROCEDURE — 3074F SYST BP LT 130 MM HG: CPT | Performed by: INTERNAL MEDICINE

## 2023-03-06 PROCEDURE — G8427 DOCREV CUR MEDS BY ELIG CLIN: HCPCS | Performed by: INTERNAL MEDICINE

## 2023-03-06 PROCEDURE — 1090F PRES/ABSN URINE INCON ASSESS: CPT | Performed by: INTERNAL MEDICINE

## 2023-03-06 PROCEDURE — 1036F TOBACCO NON-USER: CPT | Performed by: INTERNAL MEDICINE

## 2023-03-06 PROCEDURE — G8417 CALC BMI ABV UP PARAM F/U: HCPCS | Performed by: INTERNAL MEDICINE

## 2023-03-06 PROCEDURE — 3078F DIAST BP <80 MM HG: CPT | Performed by: INTERNAL MEDICINE

## 2023-03-06 PROCEDURE — G8399 PT W/DXA RESULTS DOCUMENT: HCPCS | Performed by: INTERNAL MEDICINE

## 2023-03-06 NOTE — PROGRESS NOTES
3/6/2023    HPI:  Chuckie Moncada (:  1940) has requested an audio/video evaluation for the following concern(s):    Chief Complaint   Patient presents with    Colon Cancer     Follow up     Patient ID: Chuckie Moncada, 1940, [id number], 79 y.o. Referred by : Sari Gallardo MD  Diagnosis:   Diagnosis of colon cancer, 17    low anterior resection of the rectosigmoid with proximal diverting ileostomy. Final pathology showed T1 N0, stage I disease. Right renal cell carcinoma status post radical nephrectomy on 10/7/2022,pT3aNx disease  HISTORY OF PRESENT ILLNESS:    Oncologic History: This is a 79 y.o. -old female with new diagnosis of colon cancer was seen during initial consultation visit. She presented with rectal bleeding going on for about 4-6 weeks. She had a colonoscopy on 10/8/18 which showed multiple polyps and large distal sigmoid tumors with extensive diverticulosis. Biopsy showed invasive low-grade adenocarcinoma arising in a large tubulovillous adenoma with extensive high-grade dysplasia. Subsequently she had open low anterior resection of the rectosigmoid with proximal dilating ileostomy. Final pathology showed T1 N0, stage I disease. Now she is recovering well from surgery. She denied any pain, nausea vomiting. She does not have family history of colon cancer. She is adopted and does not know about her family history. Interval history:  Patient is returning for follow-up visit and to discuss lab results imaging studies, biopsy results and further recommendations. She was evaluated by general surgery and had a colonoscopy recently which did not show any obvious signs of recurrence. Her polyps were taken out which appeared benign on the biopsy specimen. Clinically denies any chest pain shortness of breath, abdominal pain nausea vomiting or blood in stool. Her CT abdomen showed no evidence of recurrence.       During this visit patient's allergy, social, medical, surgical history and medications were reviewed and updated. Allergies   Allergen Reactions    Pcn [Penicillins] Hives and Shortness Of Breath    Bextra [Valdecoxib] Diarrhea     Current Outpatient Medications   Medication Sig Dispense Refill    amLODIPine (NORVASC) 10 MG tablet TAKE 1 TABLET BY MOUTH EVERY DAY 90 tablet 0    levothyroxine (SYNTHROID) 50 MCG tablet TAKE 1 TABLET BY MOUTH EVERY DAY 90 tablet 1    warfarin (COUMADIN) 2 MG tablet Take 1 tablet by mouth daily 90 tablet 1    metoprolol tartrate (LOPRESSOR) 25 MG tablet TAKE ONE TABLET BY MOUTH TWICE A  tablet 1    warfarin (JANTOVEN) 3 MG tablet TAKE 2 TABLETS BY MOUTH EVERY DAY OR AS DIRECTED PER INR RESULTS 180 tablet 1    atorvastatin (LIPITOR) 40 MG tablet TAKE 1 TABLET BY MOUTH EVERY DAY AT NIGHT 90 tablet 3    furosemide (LASIX) 20 MG tablet TAKE ONE TABLET BY MOUTH DAILY 90 tablet 1    potassium chloride (KLOR-CON M) 20 MEQ extended release tablet Take 1 tablet by mouth daily 90 tablet 1    acetaminophen (TYLENOL) 325 MG tablet Take 2 tablets by mouth every 4 hours as needed for Pain or Fever 60 tablet 0     No current facility-administered medications for this visit. REVIEW OF SYSTEM:   Constitutional: No fever or chills. No night sweats, no weight loss   Eyes: No eye discharge, double vision, or eye pain   HEENT: negative for sore mouth, sore throat, hoarseness and voice change   Respiratory: negative for cough , sputum, dyspnea, wheezing, hemoptysis, chest pain   Cardiovascular: negative for chest pain, dyspnea, palpitations, orthopnea, PND   Gastrointestinal: negative for nausea, vomiting, diarrhea, constipation, abdominal pain, Dysphagia, hematemesis and hematochezia   Genitourinary: negative for frequency, dysuria, nocturia, urinary incontinence, and hematuria   Integument: negative for rash, skin lesions, bruises.    Hematologic/Lymphatic: negative for easy bruising, bleeding, lymphadenopathy, petechiae and swelling/edema   Endocrine: negative for heat or cold intolerance, tremor, weight changes, change in bowel habits and hair loss   Musculoskeletal: negative for myalgias, arthralgias, pain, joint swelling,and bone pain   Neurological: negative for headaches, dizziness, seizures, weakness, numbness   OBJECTIVE:         Vitals:    03/06/23 1025   BP: 122/64   Pulse: 97   Resp: 16   Temp: 97.4 °F (36.3 °C)   SpO2: 95%   PHYSICAL EXAM:   General appearance - well appearing, no in pain or distress   Mental status - alert and cooperative   Eyes - pupils equal and reactive, extraocular eye movements intact   Ears - bilateral TM's and external ear canals normal   Mouth - mucous membranes moist, pharynx normal without lesions   Neck - supple, no significant adenopathy   Lymphatics - no palpable lymphadenopathy, no hepatosplenomegaly   Chest - clear to auscultation, no wheezes, rales or rhonchi, symmetric air entry   Heart - normal rate, regular rhythm, normal S1, S2, no murmurs, rubs, clicks or gallops   Abdomen - Surgical scar healing well, soft, nontender, nondistended, no masses or organomegaly   Neurological - alert, oriented, normal speech, no focal findings or movement disorder noted   Musculoskeletal - no joint tenderness, deformity or swelling   Extremities - peripheral pulses normal, no pedal edema, no clubbing or cyanosis   Skin - normal coloration and turgor, no rashes, no suspicious skin lesions noted ,  LABORATORY DATA:     Lab Results   Component Value Date    WBC 6.3 02/03/2023    HGB 12.5 02/03/2023    HCT 39.7 02/03/2023    MCV 97.3 02/03/2023    PLT See Reflexed IPF Result 02/03/2023    LYMPHOPCT 25 02/03/2023    RBC 4.08 02/03/2023    MCH 30.6 02/03/2023    MCHC 31.5 02/03/2023    RDW 14.2 02/03/2023    MONOPCT 12 02/03/2023    BASOPCT 0 02/03/2023    NEUTROABS 3.73 02/03/2023    LYMPHSABS 1.58 02/03/2023    MONOSABS 0.72 02/03/2023    EOSABS 0.22 02/03/2023    BASOSABS <0.03 02/03/2023         Chemistry Component Value Date/Time     02/03/2023 0956    K 3.9 02/03/2023 0956     (H) 02/03/2023 0956    CO2 24 02/03/2023 0956    BUN 29 (H) 02/03/2023 0956    CREATININE 1.10 (H) 02/03/2023 0956        Component Value Date/Time    CALCIUM 9.5 02/03/2023 0956    ALKPHOS 133 (H) 02/03/2023 0956    AST 14 02/03/2023 0956    ALT 10 02/03/2023 0956    BILITOT 0.6 02/03/2023 0956        PATHOLOGY DATA:     -- Diagnosis --   1. SIGMOID COLON, SEGMENTAL RESECTION:   - LOW-GRADE ADENOCARCINOMA ARISING IN A LARGE SESSILE TUBULOVILLOUS   ADENOMA, INVADING INTO THE SUBMUCOSA.   - THE FINAL SURGICAL MARGINS ARE NEGATIVE FOR NEOPLASM. - BENIGN REGIONAL LYMPH NODES (0/14). 2.  SIGMOID COLON, DISTAL MARGIN, RESECTION:   - UNREMARKABLE COLON SEGMENT.   - SINGLE BENIGN LYMPH NODE (0/1). PATHOLOGIC STAGE CLASSIFICATION:     pT1  pN0   ADDENDUM DIAGNOSIS   AT THE REQUEST OF DR. Paul Cordon, BLOCK 14B WAS SENT TO [facility]   FOR MMR TESTING. THE RESULTS ARE AS FOLLOWS:     MISMATCH REPAIR BY IHC RESULT:  NORMAL   MISMATCH REPAIR BY IHC WITH MLH1:  NORMAL   MISMATCH REPAIR BY IHC WITH MSH2:  NORMAL   MISMATCH REPAIR BY IHC WITH MSH6:  NORMAL   MISMATCH REPAIR BY IHC WITH PMS2:  NORMAL     Surgical Pathology Report  SURGICAL PATHOLOGY REPORT  Collected: 10/07/22 6262   Lab status: Final   Resulting lab: [facility]   Value: -- Diagnosis --   RIGHT KIDNEY, LAPAROSCOPIC RADICAL NEPHRECTOMY:             -  CLEAR-CELL RENAL CELL CARCINOMA, WHO GRADE 3 OUT OF 4,   4.6 CM GREATEST DIMENSION, FOCALLY INVADING RENAL SINUS FAT. -  VASCULAR, URETERAL AND RENAL SINUS FAT MARGINS NEGATIVE FOR   MALIGNANCY. -  PATHOLOGIC STAGE: pT3a, pN NOT ASSIGNED. IMAGING DATA:    CT abdomen pelvis 11/10/18  Impression   Dilated stomach and loops of bowel, possibly reflecting postoperative ileus. Trace perihepatic and mild pelvic ascites. Redemonstration of subcutaneous mass along the right buttock. CT abd 5/31/19  Mild diffuse colonic wall thickening may be related to recent surgery versus   a colitis. No bowel obstruction. Incompletely characterized right renal lesion may be a complex cyst but has   increased over multiple prior studies. Recommend follow-up with MRI with   contrast.   CT abdomen pelvis 2/4/20  Impression   1. Mildly limited study due to lack of IV contrast, which the patient   refused after several unsuccessful IV access attempts. 2.  No definite evidence of metastatic disease in the chest, abdomen, or   pelvis. 3.  Redemonstration of patchy lucencies in the sacrum, which has been present   since at least 09/21/2018 and could be sequelae of previous insufficiency   fractures. If clinically indicated, this could be further evaluated with MRI. CT abdomen pelvis 1/12/2022  Impression   1. Right renal mass, highly suspicious for renal cell carcinoma. Urologic   consultation is recommended. 2.  Other findings, as described above. ASSESSMENT:    Leda Bird is a 79 y.o. female with T1NO, stage I colon adenocarcinoma, MMR proficient. I discussed the NCCN guidelines. I discussed that for stage I colon cancer, there is no role of adjuvant chemotherapy, so surveillance is recommended. I will follow her in three months with CEA, CBC and CMP. She had a colonoscopy in one year after her surgery. Right renal cell carcinoma, clear-cell type, grade 3, 4.6 cm,p T3apNx status post radical nephrectomy on 10/7/2022      Bone lucencies: Stable on recent scan  Mild thrombocytopenia: Stable  During today's visit, the patient and the family had a number of reasonable questions which were answered to their satisfaction. They verbalized understanding of the information provided and they agreed to proceed as outlined above.    PLAN:   I reviewed her recent lab work, discuss imaging studies surgical pathology results, diagnosis, prognosis and further recommendations   Colonoscopy showed no evidence of recurrence   CT scan negative for any recurrence   Clinically she is doing well   Return to clinic in 4 months with labs prior     Rory Loco MD  Hematologist/Medical Oncologist  On this date 3/6/23  I have spent 40 minutes reviewing previous notes, test results and face to face with the patient discussing the diagnosis and importance of compliance with the treatment plan. Greater than 50% of that time was spent face-to-face with the patient in counseling and coordinating her care. This note is created with the assistance of a speech recognition program.  While intending to generate a document that actually reflects the content of the visit, the document can still have some errors including those of syntax and sound a like substitutions which may escape proof reading. It such instances, actual meaning can be extrapolated by contextual diversion.

## 2023-03-10 ENCOUNTER — ANTI-COAG VISIT (OUTPATIENT)
Dept: FAMILY MEDICINE CLINIC | Age: 83
End: 2023-03-10

## 2023-03-10 DIAGNOSIS — I48.91 ATRIAL FIBRILLATION, UNSPECIFIED TYPE (HCC): Primary | ICD-10-CM

## 2023-03-10 LAB — INR BLD: 1.8

## 2023-03-14 ENCOUNTER — APPOINTMENT (OUTPATIENT)
Dept: CT IMAGING | Age: 83
End: 2023-03-14
Payer: MEDICARE

## 2023-03-14 ENCOUNTER — APPOINTMENT (OUTPATIENT)
Dept: GENERAL RADIOLOGY | Age: 83
End: 2023-03-14
Payer: MEDICARE

## 2023-03-14 ENCOUNTER — HOSPITAL ENCOUNTER (EMERGENCY)
Age: 83
Discharge: HOME | End: 2023-03-14
Attending: EMERGENCY MEDICINE
Payer: MEDICARE

## 2023-03-14 VITALS
SYSTOLIC BLOOD PRESSURE: 137 MMHG | HEIGHT: 67 IN | TEMPERATURE: 97.2 F | BODY MASS INDEX: 43.95 KG/M2 | RESPIRATION RATE: 16 BRPM | OXYGEN SATURATION: 94 % | DIASTOLIC BLOOD PRESSURE: 83 MMHG | WEIGHT: 280 LBS | HEART RATE: 85 BPM

## 2023-03-14 DIAGNOSIS — M25.559 HIP PAIN: Primary | ICD-10-CM
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
## 2023-03-14 LAB
ABSOLUTE EOS #: 0.11 K/UL (ref 0–0.44)
ABSOLUTE IMMATURE GRANULOCYTE: 0.04 K/UL (ref 0–0.3)
ABSOLUTE LYMPH #: 1.24 K/UL (ref 1.1–3.7)
ABSOLUTE MONO #: 0.87 K/UL (ref 0.1–1.2)
ANION GAP SERPL CALCULATED.3IONS-SCNC: 12 MMOL/L (ref 9–17)
BASOPHILS # BLD: 0 % (ref 0–2)
BASOPHILS ABSOLUTE: <0.03 K/UL (ref 0–0.2)
BUN SERPL-MCNC: 32 MG/DL (ref 8–23)
BUN/CREAT BLD: 25 (ref 9–20)
CALCIUM SERPL-MCNC: 9.4 MG/DL (ref 8.6–10.4)
CHLORIDE SERPL-SCNC: 107 MMOL/L (ref 98–107)
CO2 SERPL-SCNC: 22 MMOL/L (ref 20–31)
CREAT SERPL-MCNC: 1.28 MG/DL (ref 0.5–0.9)
EOSINOPHILS RELATIVE PERCENT: 1 % (ref 1–4)
GFR SERPL CREATININE-BSD FRML MDRD: 42 ML/MIN/1.73M2
GLUCOSE SERPL-MCNC: 115 MG/DL (ref 70–99)
HCT VFR BLD AUTO: 35.9 % (ref 36.3–47.1)
HGB BLD-MCNC: 11.6 G/DL (ref 11.9–15.1)
IMMATURE GRANULOCYTES: 0 %
INR PPP: 2.3
LYMPHOCYTES # BLD: 14 % (ref 24–43)
MCH RBC QN AUTO: 31.1 PG (ref 25.2–33.5)
MCHC RBC AUTO-ENTMCNC: 32.3 G/DL (ref 25.2–33.5)
MCV RBC AUTO: 96.2 FL (ref 82.6–102.9)
MONOCYTES # BLD: 10 % (ref 3–12)
NRBC AUTOMATED: 0 PER 100 WBC
PDW BLD-RTO: 14.3 % (ref 11.8–14.4)
PLATELET # BLD AUTO: ABNORMAL K/UL (ref 138–453)
PLATELET, FLUORESCENCE: 115 K/UL (ref 138–453)
PLATELET, IMMATURE FRACTION: 13.1 % (ref 1.1–10.3)
POTASSIUM SERPL-SCNC: 4.1 MMOL/L (ref 3.7–5.3)
PROTHROMBIN TIME: 24.5 SEC (ref 11.5–14.2)
RBC # BLD: 3.73 M/UL (ref 3.95–5.11)
SEG NEUTROPHILS: 75 % (ref 36–65)
SEGMENTED NEUTROPHILS ABSOLUTE COUNT: 6.87 K/UL (ref 1.5–8.1)
SODIUM SERPL-SCNC: 141 MMOL/L (ref 135–144)
WBC # BLD AUTO: 9.2 K/UL (ref 3.5–11.3)

## 2023-03-14 PROCEDURE — 6360000004 HC RX CONTRAST MEDICATION: Performed by: EMERGENCY MEDICINE

## 2023-03-14 PROCEDURE — 85055 RETICULATED PLATELET ASSAY: CPT

## 2023-03-14 PROCEDURE — 85610 PROTHROMBIN TIME: CPT

## 2023-03-14 PROCEDURE — 2709999900 CT ABDOMEN PELVIS W IV CONTRAST

## 2023-03-14 PROCEDURE — 99285 EMERGENCY DEPT VISIT HI MDM: CPT

## 2023-03-14 PROCEDURE — 6370000000 HC RX 637 (ALT 250 FOR IP): Performed by: EMERGENCY MEDICINE

## 2023-03-14 PROCEDURE — 80048 BASIC METABOLIC PNL TOTAL CA: CPT

## 2023-03-14 PROCEDURE — 70450 CT HEAD/BRAIN W/O DYE: CPT

## 2023-03-14 PROCEDURE — 36415 COLL VENOUS BLD VENIPUNCTURE: CPT

## 2023-03-14 PROCEDURE — 85025 COMPLETE CBC W/AUTO DIFF WBC: CPT

## 2023-03-14 PROCEDURE — 72131 CT LUMBAR SPINE W/O DYE: CPT

## 2023-03-14 PROCEDURE — 72125 CT NECK SPINE W/O DYE: CPT

## 2023-03-14 RX ORDER — HYDROCODONE BITARTRATE AND ACETAMINOPHEN 5; 325 MG/1; MG/1
0.5 TABLET ORAL EVERY 6 HOURS PRN
Qty: 5 TABLET | Refills: 0 | Status: SHIPPED | OUTPATIENT
Start: 2023-03-14 | End: 2023-03-17

## 2023-03-14 RX ORDER — HYDROCODONE BITARTRATE AND ACETAMINOPHEN 5; 325 MG/1; MG/1
1 TABLET ORAL ONCE
Status: COMPLETED | OUTPATIENT
Start: 2023-03-14 | End: 2023-03-14

## 2023-03-14 RX ADMIN — IOPAMIDOL 100 ML: 755 INJECTION, SOLUTION INTRAVENOUS at 14:46

## 2023-03-14 RX ADMIN — HYDROCODONE BITARTRATE AND ACETAMINOPHEN 1 TABLET: 5; 325 TABLET ORAL at 16:19

## 2023-03-14 ASSESSMENT — PAIN SCALES - GENERAL
PAINLEVEL_OUTOF10: 6
PAINLEVEL_OUTOF10: 4
PAINLEVEL_OUTOF10: 5

## 2023-03-14 ASSESSMENT — ENCOUNTER SYMPTOMS
PHOTOPHOBIA: 0
NAUSEA: 0
RHINORRHEA: 0
VOMITING: 0
SORE THROAT: 0
COUGH: 0
ABDOMINAL PAIN: 0
SHORTNESS OF BREATH: 0
BACK PAIN: 1

## 2023-03-14 ASSESSMENT — PAIN - FUNCTIONAL ASSESSMENT: PAIN_FUNCTIONAL_ASSESSMENT: 0-10

## 2023-03-14 ASSESSMENT — PAIN DESCRIPTION - ORIENTATION
ORIENTATION: LEFT
ORIENTATION: LEFT

## 2023-03-14 ASSESSMENT — LIFESTYLE VARIABLES
HOW OFTEN DO YOU HAVE A DRINK CONTAINING ALCOHOL: NEVER
HOW MANY STANDARD DRINKS CONTAINING ALCOHOL DO YOU HAVE ON A TYPICAL DAY: PATIENT DOES NOT DRINK

## 2023-03-14 ASSESSMENT — PAIN DESCRIPTION - LOCATION
LOCATION: HIP
LOCATION: HIP

## 2023-03-14 ASSESSMENT — PAIN DESCRIPTION - PAIN TYPE: TYPE: ACUTE PAIN

## 2023-03-14 NOTE — DISCHARGE INSTRUCTIONS
Please understand that at this time there is no evidence for a more serious underlying process, but that early in the process of an illness or injury, an emergency department workup can be falsely reassuring. You should contact your family doctor within the next 48 hours for a follow up appointment    Amadeo Cotto!!!    From [facility] ([facility]) and [facility]    On behalf of the Emergency Department staff at [facility]), I would like to thank you for giving us the opportunity to address your health care needs and concerns. We hope that during your visit, our service was delivered in a professional and caring manner. Please keep [facility] ([facility]) in mind as we walk with you down the path to your own personal wellness. Please expect an automated text message or email from us so we can ask a few questions about your health and progress. Based on your answers, a clinician may call you back to offer help and instructions. Please understand that early in the process of an illness or injury, an emergency department workup can be falsely reassuring. If you notice any worsening, changing or persistent symptoms please call your family doctor or return to the ER immediately. Tell us how we did during your visit at [email or web address]   and let us know about your experience

## 2023-03-14 NOTE — ED PROVIDER NOTES
[facility] ED  [address]  [place] [address]  Phone: [phone]        Pt Name: Corky Garzon  MRN: [id number]  Travisgfandrew 1940  Date of evaluation: 3/14/23      CHIEF COMPLAINT     Chief Complaint   Patient presents with    Fall         HISTORY OF PRESENT ILLNESS  (Location/Symptom, Timing/Onset, Context/Setting, Quality, Duration, Modifying Factors, Severity.)    Corky Garzon is a 79 y.o. female who presents after a fall from standing. She states she was walking into her bathroom to dry her hair, and her walker got stuck on a throw rug. She tripped, landing on her left side. She doesn't think she struck her head. No neck pain. She reports low back pain. No abdominal pain. Her left hip hurts and she is having difficulty with ambulation. She has a hip replacement on that side. She is on coumadin due to a history of atrial fibrillation. REVIEW OF SYSTEMS    (2-9 systems for level 4, 10 or more for level 5)     Review of Systems   Constitutional:  Negative for chills and fever. HENT:  Negative for congestion, rhinorrhea and sore throat. Eyes:  Negative for photophobia and visual disturbance. Respiratory:  Negative for cough and shortness of breath. Cardiovascular:  Negative for chest pain and palpitations. Gastrointestinal:  Negative for abdominal pain, nausea and vomiting. Genitourinary:  Negative for dysuria, frequency and urgency. Musculoskeletal:  Positive for back pain. Negative for neck pain. Skin:  Negative for rash and wound. Neurological:  Negative for dizziness, light-headedness and headaches. Hematological:  Negative for adenopathy. Bruises/bleeds easily.      PAST MEDICAL HISTORY    has a past medical history of A-fib ([address] Acute blood loss as cause of postoperative anemia, Anxiety, CAD (coronary artery disease), CHF (congestive heart failure) ([address] Class 2 severe obesity with serious comorbidity and body mass index (BMI) of 37.0 to 37.9 in adult [facility]), Closed fracture of proximal end of left humerus with routine healing, Colitis, Colitis due to Clostridium difficile, Depression, Diarrhea, Dyslipidemia, Elevated fasting glucose, FH: total abdominal hysterectomy and bilateral salpingo-oophorectomy, Fractures, Full dentures, Glucose intolerance (impaired glucose tolerance), History of blood transfusion, Hyperlipidemia, Hypertension, Hypokalemia, Malignant neoplasm of sigmoid colon ([address] Multiple closed fractures of ribs, Obesity, JAYJAY on CPAP, Osteoarthritis, Osteoporosis, Other complete intestinal obstruction ([address] Other specified hypothyroidism, Prolonged emergence from general anesthesia, Renal cell carcinoma of right kidney ([address] S/P small bowel resection, Thrombocytopenia, unspecified ([address] Under care of team, and Wears glasses. SURGICAL HISTORY      has a past surgical history that includes Total abdominal hysterectomy w/ bilateral salpingoophorectomy (1966); Cholecystectomy (1960s); Varicose vein surgery (Right, 1960s); Hip Arthroplasty (Left); Knee Arthroplasty (Left); Knee Arthroplasty (Right); Cardiac catheterization (09/05/2017); Colonoscopy (N/A, 10/08/2018); pr laparoscopy colectomy partial w/anastomosis (N/A, 11/05/2018); pr cysto w/insert ureteral stent (Bilateral, 11/05/2018); Colonoscopy (N/A, 04/25/2019); sigmoidoscopy (N/A, 05/09/2019); Small intestine surgery (N/A, 05/22/2019); Colonoscopy (N/A, 02/03/2020); Cardiac catheterization (N/A); Hysterectomy; total nephrectomy (Right, 10/07/2022); Kidney surgery (Right, 10/7/2022); and Colonoscopy (N/A, 2/23/2023).     CURRENTMEDICATIONS       Previous Medications    ACETAMINOPHEN (TYLENOL) 325 MG TABLET    Take 2 tablets by mouth every 4 hours as needed for Pain or Fever    AMLODIPINE (NORVASC) 10 MG TABLET    TAKE 1 TABLET BY MOUTH EVERY DAY    ATORVASTATIN (LIPITOR) 40 MG TABLET    TAKE 1 TABLET BY MOUTH EVERY DAY AT NIGHT    FUROSEMIDE (LASIX) 20 MG TABLET    TAKE ONE TABLET BY MOUTH DAILY    LEVOTHYROXINE (SYNTHROID) 50 MCG TABLET    TAKE 1 TABLET BY MOUTH EVERY DAY    METOPROLOL TARTRATE (LOPRESSOR) 25 MG TABLET    TAKE ONE TABLET BY MOUTH TWICE A DAY    POTASSIUM CHLORIDE (KLOR-CON M) 20 MEQ EXTENDED RELEASE TABLET    Take 1 tablet by mouth daily    WARFARIN (COUMADIN) 2 MG TABLET    Take 1 tablet by mouth daily    WARFARIN (JANTOVEN) 3 MG TABLET    TAKE 2 TABLETS BY MOUTH EVERY DAY OR AS DIRECTED PER INR RESULTS       ALLERGIES     is allergic to pcn [penicillins] and bextra [valdecoxib]. FAMILY HISTORY     is adopted. family history includes High Blood Pressure in her brother. She was adopted. SOCIAL HISTORY      reports that she has never smoked. She has never been exposed to tobacco smoke. She has never used smokeless tobacco. She reports that she does not drink alcohol and does not use drugs. PHYSICAL EXAM    (up to 7 for level 4, 8 or more for level 5)   INITIAL VITALS:  height is 5' 7\" (1.702 m) and weight is 280 lb (127 kg). Her temperature is 97.2 °F (36.2 °C). Her blood pressure is 176/92 (abnormal) and her pulse is 82. Her respiration is 16 and oxygen saturation is 92%. Physical Exam  Vitals and nursing note reviewed. Constitutional:       Appearance: Normal appearance. HENT:      Head: Normocephalic and atraumatic. Eyes:      Extraocular Movements: Extraocular movements intact. Pupils: Pupils are equal, round, and reactive to light. Cardiovascular:      Rate and Rhythm: Normal rate. Rhythm irregular. Pulses: Normal pulses. Heart sounds: Normal heart sounds. No murmur heard. No friction rub. No gallop. Pulmonary:      Effort: Pulmonary effort is normal.      Breath sounds: Normal breath sounds. No wheezing, rhonchi or rales. Abdominal:      General: Abdomen is flat. Bowel sounds are normal.      Palpations: Abdomen is soft. Tenderness: There is no abdominal tenderness. There is no guarding or rebound.    Musculoskeletal: General: Tenderness present. Cervical back: Normal range of motion and neck supple. No tenderness. Comments: Tender to palpation over the lumbar spine, and the lateral aspect of the left hip. She is able to flex the hip. No shortening or rotation. Distal motor and sensory intact. DP and PT pulses are palpable. Skin:     General: Skin is warm and dry. Capillary Refill: Capillary refill takes less than 2 seconds. Neurological:      General: No focal deficit present. Mental Status: She is alert and oriented to person, place, and time. Mental status is at baseline. Sensory: No sensory deficit. Motor: No weakness. Coordination: Coordination normal.   Psychiatric:         Mood and Affect: Mood normal.         Behavior: Behavior normal.       DIFFERENTIAL DIAGNOSIS/ MDM:     Pt presented with hip pain. A fracture was not detected on X-ray. The knee joint was not affected and is stable. The sacrum and back are not painful or tender on exam. No skin lesions were seen. There are no signs of compartment syndrome. The pulses are 2/4. The motor is 5/5. The sensation is intact. The pt was advised to return to the Emergency Department for increasing pain, numbness, weakness, or coldness to the extremity. The pt was further instructed to follow up in 2-3 days with their family doctor or orthopedics. Pt voiced understanding of instructions. The patient understands that at this time there is no evidence for a more malignant underlying process, but the patient also understands that early in the process of an illness or injury, an emergency department workup can be falsely reassuring. Routine discharge counseling was given, and the patient understands that worsening, changing or persistent symptoms should prompt an immediate call or follow up with their primary physician or return to the emergency department. The importance of appropriate follow up was also discussed.   I have reviewed the disposition diagnosis with the patient and or their family/guardian. I have answered their questions and given discharge instructions. They voiced understanding of these instructions and did not have any further questions or complaints. Differential diagnosis considered: hip fracture, hip contusion, back pain, back fracture    Chronic Conditions affecting care (DM,HTN,CA, etc): CKD, osteoporosis, CHF, atrial fib    Social Determinants of Health affecting care (unable to care for self, lives alone, unemployed, homeless,etc): uses a walker, lives alone    History source(s) (patient,spouse,parent,family,friend,EMS,etc): patient    Review of external sources (ECF,Hospital records,EMS report, radiology reports, etc): prior notes    Tests considered but not ordered: Not applicable    Independent interpretation of tests (eg.  X-ray, CAT scan, Doppler studies, EKG): none    Discussion of x-ray results with radiology: no    Consults: none    Consideration for admission/observation (even if discharged): yes    Prescription considerations: yes    Sepsis considered: no    Critical Care note written: no      DIAGNOSTIC RESULTS     EKG: All EKG's are interpreted by the Emergency Department Physician who either signs or Co-signs this chart in the absence of a cardiologist.    none    RADIOLOGY:        Interpretation per the Radiologist below, if available at the time of this note:    CT ABDOMEN PELVIS WO CONTRAST Additional Contrast? Oral    Result Date: 2/18/2023  EXAMINATION: CT OF THE ABDOMEN AND PELVIS WITHOUT CONTRAST 2/17/2023 12:03 pm TECHNIQUE: CT of the abdomen and pelvis was performed without the administration of intravenous contrast. Multiplanar reformatted images are provided for review. Automated exposure control, iterative reconstruction, and/or weight based adjustment of the mA/kV was utilized to reduce the radiation dose to as low as reasonably achievable. COMPARISON: 01/12/2022.  HISTORY: ORDERING SYSTEM PROVIDED HISTORY: Malignant neoplasm of sigmoid colon [facility]) TECHNOLOGIST PROVIDED HISTORY: follow up Reason for Exam: Hx sigmoid colon CA FINDINGS: Lower Chest: The lung bases are clear. The heart size is enlarged. Organs: The study is limited as patient's arms were by her side and the patient's body habitus coursing beam hardening artifact. No gross liver lesions are seen. The spleen, pancreas and left adrenal gland appear unremarkable. The right kidney and adrenal gland have been surgically removed. The left kidney demonstrates no calcifications. No hydronephrosis is seen. GI/Bowel: The bowel loops are not dilated. There is diverticular disease involving the colon but no findings to suggest active inflammation. No bowel wall thickening is seen. Pelvis: There is streak artifact from a left hip arthroplasty. No pelvic masses or fluid collections are seen. Peritoneum/Retroperitoneum: The abdominal aorta is not aneurysmal.  No retroperitoneal or mesenteric lymphadenopathy is seen. Bones/Soft Tissues: No acute bony abnormalities are noted. There are degenerative changes involving the spine with grade 1 spondylolisthesis of L4 on L5.     1. Limited study due to patient's body habitus and her arms by her side causing beam hardening artifact. 2. No acute intra-abdominal or pelvic abnormality with limitations for a noncontrast CT scan. 3. Diverticulosis without obvious inflammation. CT HEAD WO CONTRAST    Result Date: 3/14/2023  EXAMINATION: CT OF THE HEAD WITHOUT CONTRAST  3/14/2023 2:28 pm TECHNIQUE: CT of the head was performed without the administration of intravenous contrast. Automated exposure control, iterative reconstruction, and/or weight based adjustment of the mA/kV was utilized to reduce the radiation dose to as low as reasonably achievable.  COMPARISON: 09/21/2018 HISTORY: ORDERING SYSTEM PROVIDED HISTORY: fall, head injury TECHNOLOGIST PROVIDED HISTORY: fall, head injury Decision Support Exception - unselect if not a suspected or confirmed emergency medical condition->Emergency Medical Condition ([place]) Reason for Exam: Fall, ? Hit head, back pain FINDINGS: BRAIN/VENTRICLES: There is no acute intracranial hemorrhage, mass effect or midline shift. No abnormal extra-axial fluid collection. The gray-white differentiation is maintained without evidence of an acute infarct. There is no evidence of hydrocephalus. Moderate periventricular and deep subcortical white matter hypodensities most consistent with chronic microvascular ischemia. No new foci of abnormal attenuation are present within the brain. ORBITS: The visualized portion of the orbits demonstrate no acute abnormality. SINUSES: Chronic opacification of left maxillary sinus with dense inspissated secretions. Partial opacification with mucosal thickening of the left sphenoid sinuses. Mastoids are clear. SOFT TISSUES/SKULL:  No acute abnormality of the visualized skull or soft tissues. No acute intracranial abnormality. Chronic microvascular ischemic changes. Chronic left maxillary and ethmoid sinusitis. CT CERVICAL SPINE WO CONTRAST    Result Date: 3/14/2023  EXAMINATION: CT OF THE CERVICAL SPINE WITHOUT CONTRAST 3/14/2023 2:28 pm TECHNIQUE: CT of the cervical spine was performed without the administration of intravenous contrast. Multiplanar reformatted images are provided for review. Automated exposure control, iterative reconstruction, and/or weight based adjustment of the mA/kV was utilized to reduce the radiation dose to as low as reasonably achievable. COMPARISON: September 21, 2018 HISTORY: ORDERING SYSTEM PROVIDED HISTORY: neck pain TECHNOLOGIST PROVIDED HISTORY: neck pain Decision Support Exception - unselect if not a suspected or confirmed emergency medical condition->Emergency Medical Condition (MA) Reason for Exam: Fall, ? Hit head, back pain FINDINGS: BONES/ALIGNMENT: There is no acute fracture or traumatic malalignment. Bone mineral density is diffusely decreased. Grade 1 anterolisthesis C3 on C4, and grade 1 anterolisthesis C4 on C5, are stable from previous CT. DEGENERATIVE CHANGES: Mild multilevel degenerative disc disease as well as facet and uncovertebral joint osteoarthritis is present. No critical spinal canal stenosis. SOFT TISSUES: There is no prevertebral soft tissue swelling. No acute abnormality of the cervical spine. CT LUMBAR SPINE WO CONTRAST    Result Date: 3/14/2023  EXAMINATION: CT OF THE LUMBAR SPINE WITHOUT CONTRAST; CT OF THE ABDOMEN AND PELVIS WITH CONTRAST 3/14/2023 11:28 am; 3/14/2023 11:45 am TECHNIQUE: CT of the lumbar spine was performed without the administration of intravenous contrast. Multiplanar reformatted images are provided for review. Adjustment of mA and/or kV according to patient size was utilized. Automated exposure control, iterative reconstruction, and/or weight based adjustment of the mA/kV was utilized to reduce the radiation dose to as low as reasonably achievable.; CT of the abdomen and pelvis was performed with the administration of intravenous contrast. Multiplanar reformatted images are provided for review. Automated exposure control, iterative reconstruction, and/or weight based adjustment of the mA/kV was utilized to reduce the radiation dose to as low as reasonably achievable. COMPARISON: None. HISTORY: ORDERING SYSTEM PROVIDED HISTORY: fall, back pain TECHNOLOGIST PROVIDED HISTORY: fall, back pain Decision Support Exception - unselect if not a suspected or confirmed emergency medical condition->Emergency Medical Condition (MA) Reason for Exam: Fall, ?  Hit head, back pain ; ORDERING SYSTEM PROVIDED HISTORY: fall TECHNOLOGIST PROVIDED HISTORY: fall Decision Support Exception - unselect if not a suspected or confirmed emergency medical condition->Emergency Medical Condition (MA) FINDINGS: Abdomen and pelvis CT: Lower Chest: Cardiac chambers are again noted to be enlarged, unchanged. Dependent atelectasis noted within the lungs bilaterally. Evaluation is degraded by streak artifact and by the patient's body habitus, as well as by the patient's arms at her side. Organs: With that said, no acute hepatic abnormality identified. Small amount of fluid is seen in the subhepatic space, mildly increased when compared to the previous exam from February. Portal vein is patent. No acute splenic abnormality identified. Adrenals are unremarkable. Pancreas is atrophied but otherwise unremarkable. The right kidney is absent. Left kidney is unremarkable. GI/Bowel: Small hiatal hernia. Stomach otherwise unremarkable. No small bowel abnormalities are identified. Anastomotic staple line is seen in the distal rectum. Mild diverticulosis of the large bowel is present without CT evidence of diverticulitis. Large bowel is otherwise unremarkable. The appendix is not well visualized. Pelvis: Small amount ascites. Urinary bladder unremarkable. Uterus surgically absent. Peritoneum/Retroperitoneum: Abdominal aorta normal in caliber. Superior mesenteric artery is enhancing. Edema is seen within the root of the mesentery, similar when compared to the previous exam.  Small volume ascites is found, greatest in the subhepatic space. No retroperitoneal or mesenteric lymphadenopathy. Bones/Soft Tissues: Left total hip arthroplasty noted. 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 Please refer to the lumbar spine CT report below. Small ventral wall hernia is seen to the right of midline midway between the level the umbilicus and the xiphoid process, containing fat only. Lumbar spine CT: Bones/Alignment: No acute fracture or traumatic malalignment. Severe degenerative disease of the facet joints leads to grade 1/grade 2 anterolisthesis of L4 on L5, unchanged. Degenerative Changes: Multilevel degenerative disc disease is seen, greatest L4-L5, where there is at least moderate central canal stenosis.  Soft Tissues: Paraspinal muscles are atrophic but symmetric. No paravertebral edema identified. Abdomen and pelvis CT: No acute traumatic abnormality detected. Small amount of ascites. Edema at the root of the mesentery, similar compared to the previous exam, possibly reflecting mesenteritis. Lumbar spine CT: No acute fracture or traumatic malalignment. CT ABDOMEN PELVIS W IV CONTRAST Additional Contrast? None    Result Date: 3/14/2023  EXAMINATION: CT OF THE LUMBAR SPINE WITHOUT CONTRAST; CT OF THE ABDOMEN AND PELVIS WITH CONTRAST 3/14/2023 11:28 am; 3/14/2023 11:45 am TECHNIQUE: CT of the lumbar spine was performed without the administration of intravenous contrast. Multiplanar reformatted images are provided for review. Adjustment of mA and/or kV according to patient size was utilized. Automated exposure control, iterative reconstruction, and/or weight based adjustment of the mA/kV was utilized to reduce the radiation dose to as low as reasonably achievable.; CT of the abdomen and pelvis was performed with the administration of intravenous contrast. Multiplanar reformatted images are provided for review. Automated exposure control, iterative reconstruction, and/or weight based adjustment of the mA/kV was utilized to reduce the radiation dose to as low as reasonably achievable. COMPARISON: None. HISTORY: ORDERING SYSTEM PROVIDED HISTORY: fall, back pain TECHNOLOGIST PROVIDED HISTORY: fall, back pain Decision Support Exception - unselect if not a suspected or confirmed emergency medical condition->Emergency Medical Condition (MA) Reason for Exam: Fall, ? Hit head, back pain ; ORDERING SYSTEM PROVIDED HISTORY: fall TECHNOLOGIST PROVIDED HISTORY: fall Decision Support Exception - unselect if not a suspected or confirmed emergency medical condition->Emergency Medical Condition (MA) FINDINGS: Abdomen and pelvis CT: Lower Chest: Cardiac chambers are again noted to be enlarged, unchanged.  Dependent atelectasis noted within the lungs bilaterally. Evaluation is degraded by streak artifact and by the patient's body habitus, as well as by the patient's arms at her side. Organs: With that said, no acute hepatic abnormality identified. Small amount of fluid is seen in the subhepatic space, mildly increased when compared to the previous exam from February. Portal vein is patent. No acute splenic abnormality identified. Adrenals are unremarkable. Pancreas is atrophied but otherwise unremarkable. The right kidney is absent. Left kidney is unremarkable. GI/Bowel: Small hiatal hernia. Stomach otherwise unremarkable. No small bowel abnormalities are identified. Anastomotic staple line is seen in the distal rectum. Mild diverticulosis of the large bowel is present without CT evidence of diverticulitis. Large bowel is otherwise unremarkable. The appendix is not well visualized. Pelvis: Small amount ascites. Urinary bladder unremarkable. Uterus surgically absent. Peritoneum/Retroperitoneum: Abdominal aorta normal in caliber. Superior mesenteric artery is enhancing. Edema is seen within the root of the mesentery, similar when compared to the previous exam.  Small volume ascites is found, greatest in the subhepatic space. No retroperitoneal or mesenteric lymphadenopathy. Bones/Soft Tissues: Left total hip arthroplasty noted. 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 Please refer to the lumbar spine CT report below. Small ventral wall hernia is seen to the right of midline midway between the level the umbilicus and the xiphoid process, containing fat only. Lumbar spine CT: Bones/Alignment: No acute fracture or traumatic malalignment. Severe degenerative disease of the facet joints leads to grade 1/grade 2 anterolisthesis of L4 on L5, unchanged. Degenerative Changes: Multilevel degenerative disc disease is seen, greatest L4-L5, where there is at least moderate central canal stenosis.  Soft Tissues: Paraspinal muscles are atrophic but symmetric. No paravertebral edema identified. Abdomen and pelvis CT: No acute traumatic abnormality detected. Small amount of ascites. Edema at the root of the mesentery, similar compared to the previous exam, possibly reflecting mesenteritis. Lumbar spine CT: No acute fracture or traumatic malalignment.      Colonoscopy    Result Date: 2/23/2023  No dictation     Colonoscopy    Result Date: 2/23/2023  No dictation        LABS:  Results for orders placed or performed during the hospital encounter of 03/14/23   CBC with Auto Differential   Result Value Ref Range    WBC 9.2 3.5 - 11.3 k/uL    RBC 3.73 (L) 3.95 - 5.11 m/uL    Hemoglobin 11.6 (L) 11.9 - 15.1 g/dL    Hematocrit 35.9 (L) 36.3 - 47.1 %    MCV 96.2 82.6 - 102.9 fL    MCH 31.1 25.2 - 33.5 pg    MCHC 32.3 25.2 - 33.5 g/dL    RDW 14.3 11.8 - 14.4 %    Platelets See Reflexed IPF Result 138 - 453 k/uL    NRBC Automated 0.0 0.0 per 100 WBC    Seg Neutrophils 75 (H) 36 - 65 %    Lymphocytes 14 (L) 24 - 43 %    Monocytes 10 3 - 12 %    Eosinophils % 1 1 - 4 %    Basophils 0 0 - 2 %    Immature Granulocytes 0 0 %    Segs Absolute 6.87 1.50 - 8.10 k/uL    Absolute Lymph # 1.24 1.10 - 3.70 k/uL    Absolute Mono # 0.87 0.10 - 1.20 k/uL    Absolute Eos # 0.11 0.00 - 0.44 k/uL    Basophils Absolute <0.03 0.00 - 0.20 k/uL    Absolute Immature Granulocyte 0.04 0.00 - 0.30 k/uL   Basic Metabolic Panel   Result Value Ref Range    Glucose 115 (H) 70 - 99 mg/dL    BUN 32 (H) 8 - 23 mg/dL    Creatinine 1.28 (H) 0.50 - 0.90 mg/dL    Est, Glom Filt Rate 42 (L) >60 mL/min/1.73m2    Bun/Cre Ratio 25 (H) 9 - 20    Calcium 9.4 8.6 - 10.4 mg/dL    Sodium 141 135 - 144 mmol/L    Potassium 4.1 3.7 - 5.3 mmol/L    Chloride 107 98 - 107 mmol/L    CO2 22 20 - 31 mmol/L    Anion Gap 12 9 - 17 mmol/L   Protime-INR   Result Value Ref Range    Protime 24.5 (H) 11.5 - 14.2 sec    INR 2.3    Immature Platelet Fraction   Result Value Ref Range    Platelet, Immature Fraction 13.1 (H) 1.1 - 10.3 %    Platelet, Fluorescence 115 (L) 138 - 453 k/uL       INR acceptable    EMERGENCY DEPARTMENT COURSE:   Vitals:    Vitals:    03/14/23 1345   BP: (!) 176/92   Pulse: 82   Resp: 16   Temp: 97.2 °F (36.2 °C)   SpO2: 92%   Weight: 280 lb (127 kg)   Height: 5' 7\" (1.702 m)     -------------------------  BP: (!) 176/92, Temp: 97.2 °F (36.2 °C), Heart Rate: 82, Resp: 16    The patient was given the following medications:  Orders Placed This Encounter   Medications    iopamidol (ISOVUE-370) 76 % injection 100 mL    HYDROcodone-acetaminophen (NORCO) 5-325 MG per tablet 1 tablet    HYDROcodone-acetaminophen (NORCO) 5-325 MG per tablet     Sig: Take 0.5 tablets by mouth every 6 hours as needed for Pain for up to 3 days. Intended supply: 3 days. Take lowest dose possible to manage pain Max Daily Amount: 2 tablets     Dispense:  5 tablet     Refill:  0        RE-EVALUATION:  4:43 PM EDT  Patient ambulated to the commode with nursing. CONSULTS:  none    PROCEDURES:  None    FINAL IMPRESSION      1. Hip pain    2. Fall, initial encounter          DISPOSITION/PLAN   DISPOSITION Decision To Discharge 03/14/2023 04:41:13 PM      CONDITION ON DISPOSITION:   Stable     PATIENT REFERRED TO:  Vel Benitez DO  [id number] [facility]  [phone]    Schedule an appointment as soon as possible for a visit in 3 days      DISCHARGE MEDICATIONS:  New Prescriptions    HYDROCODONE-ACETAMINOPHEN (NORCO) 5-325 MG PER TABLET    Take 0.5 tablets by mouth every 6 hours as needed for Pain for up to 3 days. Intended supply: 3 days.  Take lowest dose possible to manage pain Max Daily Amount: 2 tablets       (Please note that portions of this note were completed with a voicerecognition program.  Efforts were made to edit the dictations but occasionally words are mis-transcribed.)    Sabrina Stanley MD  Attending Emergency Medicine Physician        Sabrina Stanley MD  03/14/23 [id number]

## 2023-03-16 ENCOUNTER — CARE COORDINATION (OUTPATIENT)
Dept: CARE COORDINATION | Age: 83
End: 2023-03-16

## 2023-03-16 NOTE — CARE COORDINATION
Ambulatory Care Coordination  ED Follow up Call    Siobhan Cardenas was seen at [facility] ED 3/14/2023    3/16/2023- 10:40 am  spoke with Siobhan Cardenas. She is using pain medication sparingly- using Tylenol. She denied constipation. She is using walker. She stated she has pain when she gets up. She has family bringing in meals. She declined an apt with PCP at this time. Reason for ED visit:  Fall, Hip pain    Status:     improving     Did you call your PCP prior to going to the ED? No      Did you receive a discharge instructions from the Emergency Room? Yes  Review of Instructions:     Understands what to report/when to return?:  Yes   Understands discharge instructions?:  Yes   Following discharge instructions?:  Yes   If not why? N/A    Are there any new complaints of pain? No  New Pain Meds? Yes    Constipation prophylaxis needed? No    If you have a wound is the dressing clean, dry, and intact? N/A  Understands wound care regimen? N/A    Are there any other complaints/concerns that you wish to tell your provider? No    FU appts/Provider:    Future Appointments   Date Time Provider Mira Kirkland   5/16/2023 11:00 AM [facility]   7/10/2023 10:30 AM [facility]   8/7/2023  2:30 PM XIOMARA Irvin - CNP [facility]       New Medications?:   Yes      Medication Reconciliation by phone - Yes  Understands Medications? Yes  Taking Medications? Yes  Can you swallow your pills? Yes    Any further needs in the home i.e. Equipment? No    Link to services in community?:  N/A   Which services:  N/A    Ambulatory Care Coordination Note  3/16/2023    Patient Current Location:  Home: [address] [place] [id number]    ACM contacted the patient by telephone. Provided introduction to self, and explanation of the ACM role.      ACM: Sindhu Chopra RN    Challenges to be reviewed by the provider   Additional needs identified to be addressed with provider: No  none Method of communication with provider: chart routing. Eduardo Bonilla has: Recent ED visit following a fall at home- tripped over rug- pain hip    CKD- stage 3- Renal cell carcinoma Rt Kidney- S/P nephrectomy 10/7/2022- on medications, follows with urology and oncology    H/O colon cancer 11/2018- follows with surgeon and oncology    CAD, CHF, A-Fib, HTN, Dyslipidemia- on anti-coag- monitored by PCP, medications and follows with cardiology    JAYJAY- on CPAP    OA, Osteopenia, Osteoporosis- on medications and follows with PCP, ambulates with a walker     Plan of Care : Enrolled in ACM    Continue assessments, education and support    SDOH completed    Medications reviewed    Review [facility]. decision maker and ACP    Review clinic hours, Urgent Care- now [address] [place] [address], and My Chart    Nutrition    Apt PCP 5/16/2023    Apt oncology 7/10/2023    Apt cardiology 8/7/2023    Apt- to see urology 3-4 months from 10/2022    F/U on pain, BP, and Sp02. She does not monitor wt at home. 3/16/2023- spoke with Eduardo Bonilla. In agreement to further calls. This writer's contact information given. She has family bringing her meals since she is having issues with pain. She is using Norco sparingly and Tylenol through out the day. She denied any constipation. She denied any swelling feet and ankles. She is monitor BP at home- 125/ 70's. Sp02 in the 90's. Denied any increase in SOB. She has grandson check her INR every Friday and they report it to PCP who manages Coumadin. Offered patient enrollment in the Remote Patient Monitoring (RPM) program for in-home monitoring: NA.         Future Appointments   Date Time Provider Mira Kirkland   5/16/2023 11:00 AM Levis Aase, [place] [facility]   7/10/2023 10:30 [facility]   8/7/2023  2:30 PM XI[facility]ARA [facility]ineda [facility]

## 2023-03-17 ENCOUNTER — ANTI-COAG VISIT (OUTPATIENT)
Dept: FAMILY MEDICINE CLINIC | Age: 83
End: 2023-03-17

## 2023-03-17 DIAGNOSIS — I48.91 ATRIAL FIBRILLATION, UNSPECIFIED TYPE (HCC): Primary | ICD-10-CM

## 2023-03-17 LAB — INR BLD: 2.9

## 2023-03-17 NOTE — PROGRESS NOTES
Would have her cut down to just 7 mg Mon  and Friday due to increase in INR in 1 week and then 6 mg rest of week. Repeat PT/INR 1week.

## 2023-03-23 ENCOUNTER — CARE COORDINATION (OUTPATIENT)
Dept: CARE COORDINATION | Age: 83
End: 2023-03-23

## 2023-03-23 NOTE — CARE COORDINATION
Interventions and Community Resources  Fall Risk Prevention: In Process  Zone Management Tools: In Process (Comment: CHF)          Goals Addressed                   This Visit's Progress     Conditions and Symptoms   On track     I will schedule office visits, as directed by my provider. I will keep my appointment or reschedule if I have to cancel. I will notify my provider of any barriers to my plan of care. I will follow my Zone Management tool to seek urgent or emergent care. I will notify my provider of any symptoms that indicate a worsening of my condition.     Barriers: lack of support and lack of education  Plan for overcoming my barriers: Care Coordination Intervention  Confidence: 10/10  Anticipated Goal Completion Date: 6/16/2023                Future Appointments   Date Time Provider Mira Kirkland   5/16/2023 11:00 AM DO J LUIS Arreguin [facility]   7/10/2023 10:30 AM [facility]   8/7/2023  2:30 PM Mich Jennings APRN - CNP [facility]

## 2023-03-24 ENCOUNTER — ANTI-COAG VISIT (OUTPATIENT)
Dept: FAMILY MEDICINE CLINIC | Age: 83
End: 2023-03-24

## 2023-03-24 DIAGNOSIS — I48.91 ATRIAL FIBRILLATION, UNSPECIFIED TYPE (HCC): Primary | ICD-10-CM

## 2023-03-24 LAB — INR BLD: 4.4

## 2023-03-30 ENCOUNTER — CARE COORDINATION (OUTPATIENT)
Dept: CARE COORDINATION | Age: 83
End: 2023-03-30

## 2023-03-30 NOTE — CARE COORDINATION
Ambulatory Care Coordination Note  3/30/2023    Patient Current Location: [facility]     ACM contacted the patient by telephone. ACM: Katie Wyatt RN    Meryle Beetaz has:        Recent ED visit following a fall at home- tripped over rug- pain hip                          CKD- stage 3- Renal cell carcinoma Rt Kidney- S/P nephrectomy 10/7/2022- on medications, follows with urology and oncology                          H/O colon cancer 11/2018- follows with surgeon and oncology                          CAD, CHF, A-Fib, HTN, Dyslipidemia- on anti-coag- monitored by PCP, medications and follows with cardiology                          JAYJAY- on CPAP                          OA, Osteopenia, Osteoporosis- on medications and follows with PCP, ambulates with a walker      Plan of Care : Continue assessments, education and support                          SDOH completed                          Medications reviewed                          [facility]. decision maker and ACP- he son has POA over her. She declined referral for assistance to complete forms. Review clinic hours, Urgent Care- now [address] [place] [address], and My Chart                          Nutrition                          Apt PCP 5/16/2023                          Apt oncology 7/10/2023                          Apt cardiology 8/7/2023                          F/U on pain, BP, and Sp02. She does not monitor wt at home. 3/30/2023- 10:08 am spoke with Meryle Beecham. She is staying with grand daughter for a few more days. She is able to get up by self now. She is using her walker. She has some pain yet. It is improving. She stated urology told her to f/u if with concerns. Lab Results       None            Care Coordination Interventions    Referral from Primary Care Provider: No  Suggested Interventions and Community Resources  Fall Risk Prevention: In Process  Zone Management Tools:  In Process (Comment: CHF)          Goals

## 2023-03-31 ENCOUNTER — ANTI-COAG VISIT (OUTPATIENT)
Dept: FAMILY MEDICINE CLINIC | Age: 83
End: 2023-03-31

## 2023-03-31 DIAGNOSIS — I48.91 ATRIAL FIBRILLATION, UNSPECIFIED TYPE (HCC): Primary | ICD-10-CM

## 2023-03-31 LAB — INR BLD: 1.7

## 2023-04-06 ENCOUNTER — CARE COORDINATION (OUTPATIENT)
Dept: CARE COORDINATION | Age: 83
End: 2023-04-06

## 2023-04-06 NOTE — CARE COORDINATION
4/6/2023- 12:40 pm  Left HIPAA compliant voice message requesting return call @ [phone] re: ACM F/U call.      Future Appointments   Date Time Provider Mira Marita   5/16/2023 11:[facility]   7/10/2023 10:30 AM Lori Albright MD [facility]   8/7/2023  2:30 PM Estefany Ybarra, APRN - CNP [facility]

## 2023-04-07 ENCOUNTER — ANTI-COAG VISIT (OUTPATIENT)
Dept: FAMILY MEDICINE CLINIC | Age: 83
End: 2023-04-07

## 2023-04-07 DIAGNOSIS — I48.91 ATRIAL FIBRILLATION, UNSPECIFIED TYPE (HCC): Primary | ICD-10-CM

## 2023-04-07 LAB — INR BLD: 2.4

## 2023-04-21 ENCOUNTER — ANTI-COAG VISIT (OUTPATIENT)
Dept: FAMILY MEDICINE CLINIC | Age: 83
End: 2023-04-21
Payer: MEDICARE

## 2023-04-21 DIAGNOSIS — I48.0 PAROXYSMAL ATRIAL FIBRILLATION (HCC): Primary | ICD-10-CM

## 2023-04-21 LAB — INR BLD: 3.4

## 2023-04-21 PROCEDURE — 93793 ANTICOAG MGMT PT WARFARIN: CPT | Performed by: FAMILY MEDICINE

## 2023-04-24 NOTE — PROGRESS NOTES
Patient states she was notified of instructions on 4/21/23 by Prem Edmond from Dr Brenna Guthrie office.

## 2023-04-25 ENCOUNTER — CARE COORDINATION (OUTPATIENT)
Dept: CARE COORDINATION | Age: 83
End: 2023-04-25

## 2023-04-25 NOTE — CARE COORDINATION
Ambulatory Care Coordination Note  4/25/2023    Patient Current Location:  Home: [address] [id number]     ACM contacted the patient by telephone. ACM: Delvis Funes RN      Ly Fang has:        Recent ED visit following a fall at home- tripped over rug- pain hip                          CKD- stage 3- Renal cell carcinoma Rt Kidney- S/P nephrectomy 10/7/2022- on medications, follows with urology and oncology                          H/O colon cancer 11/2018- follows with surgeon and oncology                          CAD, CHF, A-Fib, HTN, Dyslipidemia- on anti-coag- monitored by PCP, medications and follows with cardiology                          JAYJAY- on CPAP                          OA, Osteopenia, Osteoporosis- on medications and follows with PCP, ambulates with a walker      Plan of Care : Continue assessments, education and support                          SDOH completed                          Medications reviewed                          Reviewed [facility]. decision maker and ACP- he son has POA over her. She declined referral for assistance to complete forms. Reviewed clinic hours, Urgent Care- now [address] [place] [address], and My Chart                          Nutrition                          Apt PCP 5/16/2023                          Apt oncology 7/10/2023                          Apt cardiology 8/7/2023                          F/U on pain, BP, and Sp02. She does not monitor wt at home. 4/25/2023- 1:50 pm spoke with Ly Fang. She is planning on living at her grand daughters. She is going to get rid of her trailer. She stated she is having pain from her arthritis. She was advised by PCP to start some Arthritis Tylenol. She will get in near future. BP- 125-130/75-80. SpO2- on room air 97%.          Lab Results       None            Care Coordination Interventions    Referral from Primary Care Provider: No  Suggested Interventions and Community

## 2023-04-26 DIAGNOSIS — I10 ESSENTIAL HYPERTENSION: ICD-10-CM

## 2023-04-26 NOTE — TELEPHONE ENCOUNTER
William Martines called requesting a refill of the below medication which has been pended for you:     Requested Prescriptions     Pending Prescriptions Disp Refills    metoprolol tartrate (LOPRESSOR) 25 MG tablet [Pharmacy Med Name: METOPROLOL TARTRATE 25 MG TAB] 60 tablet      Sig: TAKE ONE TABLET BY MOUTH TWICE A DAY       Last Appointment Date: 11/15/2022  Next Appointment Date: 5/25/2023    Allergies   Allergen Reactions    Pcn [Penicillins] Hives and Shortness Of Breath    Bextra [Valdecoxib] Diarrhea

## 2023-04-28 ENCOUNTER — ANTI-COAG VISIT (OUTPATIENT)
Dept: FAMILY MEDICINE CLINIC | Age: 83
End: 2023-04-28

## 2023-04-28 DIAGNOSIS — I48.91 ATRIAL FIBRILLATION, UNSPECIFIED TYPE (HCC): Primary | ICD-10-CM

## 2023-04-28 LAB — INR BLD: 3.4

## 2023-04-28 NOTE — PROGRESS NOTES
Contacted patient to see if she had drawn INR. Just drawn. Is 3.4 again this week. States she took coumadin 7 mg on Friday and Monday and 6 mg the rest of the days.

## 2023-04-28 NOTE — PROGRESS NOTES
Patient notified of INR result, coumadin dosing instructions, and next recommended INR lab draw. Patient verbalizes understanding. Acute cystitis without hematuria Chronic atrial fibrillation Chronic bronchitis, unspecified chronic bronchitis type Acquired hypothyroidism Anemia in other chronic diseases classified elsewhere Coronary artery disease involving native coronary artery of native heart without angina pectoris

## 2023-05-05 ENCOUNTER — ANTI-COAG VISIT (OUTPATIENT)
Dept: FAMILY MEDICINE CLINIC | Age: 83
End: 2023-05-05

## 2023-05-05 DIAGNOSIS — I48.91 ATRIAL FIBRILLATION, UNSPECIFIED TYPE (HCC): Primary | ICD-10-CM

## 2023-05-05 LAB — INR BLD: 2.8

## 2023-05-11 ENCOUNTER — CARE COORDINATION (OUTPATIENT)
Dept: CARE COORDINATION | Age: 83
End: 2023-05-11

## 2023-05-11 NOTE — CARE COORDINATION
Ambulatory Care Coordination Note  5/11/2023    ACM: Caitlin Nesbitt, RN      Curt Bee has:        Recent ED visit following a fall at home- tripped over rug- pain hip                          CKD- stage 3- Renal cell carcinoma Rt Kidney- S/P nephrectomy 10/7/2022- on medications, follows with urology and oncology                          H/O colon cancer 11/2018- follows with surgeon and oncology                          CAD, CHF, A-Fib, HTN, Dyslipidemia- on anti-coag- monitored by PCP, medications and follows with cardiology                          JAYJAY- on CPAP                          OA, Osteopenia, Osteoporosis- on medications and follows with PCP, ambulates with a walker      Plan of Care : Continue assessments, education and support                          SDOH completed                          Medications reviewed                          Reviewed [facility]. decision maker and ACP- he son has POA over her. She declined referral for assistance to complete forms. Reviewed clinic hours, Urgent Care- now [address] [place] [address], and My Chart                          Nutrition                          Apt PCP 5/16/2023                          Apt oncology 7/10/2023                          Apt cardiology 8/7/2023                          F/U on pain, BP, and Sp02. She does not monitor wt at home. 5/11/2023- 2 pm  Left HIPAA compliant voice message requesting return call @ [phone] re: ACM  and CHF Initiative call. 5/12/2023- 11:13 am Left HIPAA compliant voice message requesting return call @ [phone] re: ACM  and CHF Initiative call. Lab Results       None            Care Coordination Interventions    Referral from Primary Care Provider: No  Suggested Interventions and Community Resources  Fall Risk Prevention: In Process  Zone Management Tools:  In Process (Comment: CHF)          Goals Addressed    None         Future Appointments   Date Time Provider

## 2023-05-12 ENCOUNTER — ANTI-COAG VISIT (OUTPATIENT)
Dept: FAMILY MEDICINE CLINIC | Age: 83
End: 2023-05-12

## 2023-05-12 DIAGNOSIS — I48.91 ATRIAL FIBRILLATION, UNSPECIFIED TYPE (HCC): Primary | ICD-10-CM

## 2023-05-12 LAB — INR BLD: 3.4

## 2023-05-12 NOTE — PROGRESS NOTES
Attempted to reach patient to verify that she has been taking 6 mg daily but got no answer.  Will attempt to reach her on Monday to clarify dosage and recommendations

## 2023-05-13 RX ORDER — PREDNISONE 20 MG/1
TABLET ORAL
Qty: 15 TABLET | Refills: 0 | Status: SHIPPED | OUTPATIENT
Start: 2023-05-13

## 2023-05-13 NOTE — PROGRESS NOTES
Patient made aware. Will decrease coumadin to 5mg MWF and 6 mg rest of week. Repeat PT/INR 1week. Also her arthritic pain is severely flared. She cannot take NSAIDs due to being on coumadin.   Will send in a script for prednisone to help calm down her arthritic pain

## 2023-05-15 ENCOUNTER — CARE COORDINATION (OUTPATIENT)
Dept: CARE COORDINATION | Age: 83
End: 2023-05-15

## 2023-05-15 NOTE — CARE COORDINATION
Ambulatory Care Coordination Note  5/15/2023    Patient Current Location:  Home: [address] [facility] [id number]     ACM contacted the patient by telephone. ACM: Татьяна Shin RN      ACM contacted the patient by telephone. ACM: Татьяна Shin RN        Morgan Shelton has:        Recent ED visit following a fall at home- tripped over rug- pain hip                          CKD- stage 3- Renal cell carcinoma Rt Kidney- S/P nephrectomy 10/7/2022- on medications, follows with urology and oncology                          H/O colon cancer 2018- follows with surgeon and oncology                          CAD, CHF, A-Fib, HTN, Dyslipidemia- on anti-coag- monitored by PCP, medications and follows with cardiology                          JAYJAY- on CPAP                          OA, Osteopenia, Osteoporosis- on medications and follows with PCP, ambulates with a walker      Plan of Care : Continue assessments, education and support                          SDOH completed                          Medications reviewed                          [facility]. decision maker and ACP- he son has POA over her. She declined referral for assistance to complete forms. Reviewed clinic hours, Urgent Care- now [address] [place] [address], and My Chart                          Nutrition                          Apt PCP 2023                          Apt oncology 7/10/2023                          Apt cardiology 2023                          F/U on pain, BP, and Sp02. She does not monitor wt at home. 5/15/2023- 10:08 am spoke with Morgan Shelton. She has moved in with grand daughter. She started Prednisone for joint pain and is feeling better. Heart Failure Education outreach Date/Time: 5/15/2023 10:11 AM    Ambulatory Care Manager (ACM) contacted the patient by telephone to perform Ambulatory Care Coordination. Verified name and  with patient as identifiers.  Provided introduction to self, and

## 2023-05-19 ENCOUNTER — ANTI-COAG VISIT (OUTPATIENT)
Dept: FAMILY MEDICINE CLINIC | Age: 83
End: 2023-05-19
Payer: MEDICARE

## 2023-05-19 DIAGNOSIS — I48.91 ATRIAL FIBRILLATION, UNSPECIFIED TYPE (HCC): Primary | ICD-10-CM

## 2023-05-19 LAB — INR BLD: 4.3

## 2023-05-19 PROCEDURE — 93793 ANTICOAG MGMT PT WARFARIN: CPT | Performed by: FAMILY MEDICINE

## 2023-05-22 ENCOUNTER — HOSPITAL ENCOUNTER (OUTPATIENT)
Age: 83
Discharge: HOME | End: 2023-05-22
Payer: MEDICARE

## 2023-05-22 DIAGNOSIS — I10 ESSENTIAL HYPERTENSION: ICD-10-CM

## 2023-05-22 DIAGNOSIS — E03.9 ACQUIRED HYPOTHYROIDISM: ICD-10-CM

## 2023-05-22 DIAGNOSIS — E78.2 MIXED HYPERLIPIDEMIA: ICD-10-CM

## 2023-05-22 DIAGNOSIS — R73.01 IMPAIRED FASTING GLUCOSE: ICD-10-CM

## 2023-05-22 LAB
ALBUMIN SERPL-MCNC: 3.9 G/DL (ref 3.5–5.2)
ALBUMIN/GLOB SERPL: 1.4 {RATIO} (ref 1–2.5)
ALP SERPL-CCNC: 108 U/L (ref 35–104)
ALT SERPL-CCNC: 12 U/L (ref 5–33)
ANION GAP SERPL CALCULATED.3IONS-SCNC: 12 MMOL/L (ref 9–17)
AST SERPL-CCNC: 12 U/L
BASOPHILS # BLD: 0.03 K/UL (ref 0–0.2)
BASOPHILS NFR BLD: 0 % (ref 0–2)
BILIRUB SERPL-MCNC: 0.7 MG/DL (ref 0.3–1.2)
BUN SERPL-MCNC: 29 MG/DL (ref 8–23)
BUN/CREAT SERPL: 21 (ref 9–20)
CALCIUM SERPL-MCNC: 9.5 MG/DL (ref 8.6–10.4)
CHLORIDE SERPL-SCNC: 108 MMOL/L (ref 98–107)
CHOLEST SERPL-MCNC: 100 MG/DL
CHOLESTEROL/HDL RATIO: 2
CO2 SERPL-SCNC: 24 MMOL/L (ref 20–31)
CREAT SERPL-MCNC: 1.36 MG/DL (ref 0.5–0.9)
EOSINOPHIL # BLD: 0.2 K/UL (ref 0–0.44)
EOSINOPHILS RELATIVE PERCENT: 3 % (ref 1–4)
ERYTHROCYTE [DISTWIDTH] IN BLOOD BY AUTOMATED COUNT: 14.4 % (ref 11.8–14.4)
GFR SERPL CREATININE-BSD FRML MDRD: 39 ML/MIN/1.73M2
GLUCOSE SERPL-MCNC: 98 MG/DL (ref 70–99)
HCT VFR BLD AUTO: 43.3 % (ref 36.3–47.1)
HDLC SERPL-MCNC: 50 MG/DL
HGB BLD-MCNC: 13.4 G/DL (ref 11.9–15.1)
IMM GRANULOCYTES # BLD AUTO: <0.03 K/UL (ref 0–0.3)
IMM GRANULOCYTES NFR BLD: 0 %
LDLC SERPL CALC-MCNC: 29 MG/DL (ref 0–130)
LYMPHOCYTES # BLD: 24 % (ref 24–43)
LYMPHOCYTES NFR BLD: 1.67 K/UL (ref 1.1–3.7)
MCH RBC QN AUTO: 30.7 PG (ref 25.2–33.5)
MCHC RBC AUTO-ENTMCNC: 30.9 G/DL (ref 25.2–33.5)
MCV RBC AUTO: 99.3 FL (ref 82.6–102.9)
MONOCYTES NFR BLD: 0.78 K/UL (ref 0.1–1.2)
MONOCYTES NFR BLD: 11 % (ref 3–12)
NEUTROPHILS NFR BLD: 62 % (ref 36–65)
NEUTS SEG NFR BLD: 4.42 K/UL (ref 1.5–8.1)
NRBC AUTOMATED: 0 PER 100 WBC
PLATELET # BLD AUTO: NORMAL K/UL (ref 138–453)
PLATELET, FLUORESCENCE: 139 K/UL (ref 138–453)
PLATELETS.RETICULATED NFR BLD AUTO: 13.6 % (ref 1.1–10.3)
POTASSIUM SERPL-SCNC: 4.3 MMOL/L (ref 3.7–5.3)
PROT SERPL-MCNC: 6.7 G/DL (ref 6.4–8.3)
RBC # BLD AUTO: 4.36 M/UL (ref 3.95–5.11)
SODIUM SERPL-SCNC: 144 MMOL/L (ref 135–144)
T4 FREE SERPL-MCNC: 1.7 NG/DL (ref 0.9–1.7)
TRIGL SERPL-MCNC: 106 MG/DL
TSH SERPL-MCNC: 1.04 UIU/ML (ref 0.3–5)
WBC OTHER # BLD: 7.1 K/UL (ref 3.5–11.3)

## 2023-05-22 PROCEDURE — 80053 COMPREHEN METABOLIC PANEL: CPT

## 2023-05-22 PROCEDURE — 85025 COMPLETE CBC W/AUTO DIFF WBC: CPT

## 2023-05-22 PROCEDURE — 83036 HEMOGLOBIN GLYCOSYLATED A1C: CPT

## 2023-05-22 PROCEDURE — 36415 COLL VENOUS BLD VENIPUNCTURE: CPT

## 2023-05-22 PROCEDURE — 80061 LIPID PANEL: CPT

## 2023-05-22 PROCEDURE — 85055 RETICULATED PLATELET ASSAY: CPT

## 2023-05-22 PROCEDURE — 84439 ASSAY OF FREE THYROXINE: CPT

## 2023-05-22 PROCEDURE — 84443 ASSAY THYROID STIM HORMONE: CPT

## 2023-05-22 RX ORDER — AMLODIPINE BESYLATE 10 MG/1
TABLET ORAL
Qty: 90 TABLET | Refills: 2 | Status: SHIPPED | OUTPATIENT
Start: 2023-05-22

## 2023-05-22 RX ORDER — LEVOTHYROXINE SODIUM 0.05 MG/1
TABLET ORAL
Qty: 90 TABLET | Refills: 1 | Status: SHIPPED | OUTPATIENT
Start: 2023-05-22

## 2023-05-22 NOTE — TELEPHONE ENCOUNTER
Awaiting lab results drawn today.     Rickey Lyles called requesting a refill of the below medication which has been pended for you:     Requested Prescriptions     Pending Prescriptions Disp Refills    levothyroxine (SYNTHROID) 50 MCG tablet [Pharmacy Med Name: Levothyroxine Sodium Oral Tablet 50 MCG] 90 tablet 0     Sig: TAKE 1 TABLET BY MOUTH EVERY DAY       Last Appointment Date: 11/15/2022  Next Appointment Date: 5/25/2023    Allergies   Allergen Reactions    Pcn [Penicillins] Hives and Shortness Of Breath    Bextra [Valdecoxib] Diarrhea

## 2023-05-22 NOTE — TELEPHONE ENCOUNTER
Last Appt:  2/9/2023  Next Appt:   8/7/2023  Med verified in [address] [facility] [address]

## 2023-05-23 LAB
EST. AVERAGE GLUCOSE BLD GHB EST-MCNC: 105 MG/DL
HBA1C MFR BLD: 5.3 % (ref 4–6)

## 2023-05-25 ENCOUNTER — TELEMEDICINE (OUTPATIENT)
Dept: FAMILY MEDICINE CLINIC | Age: 83
End: 2023-05-25
Payer: MEDICARE

## 2023-05-25 DIAGNOSIS — E03.9 ACQUIRED HYPOTHYROIDISM: ICD-10-CM

## 2023-05-25 DIAGNOSIS — I10 ESSENTIAL HYPERTENSION: Primary | ICD-10-CM

## 2023-05-25 DIAGNOSIS — Z99.89 OSA ON CPAP: ICD-10-CM

## 2023-05-25 DIAGNOSIS — G47.33 OSA ON CPAP: ICD-10-CM

## 2023-05-25 DIAGNOSIS — R73.01 IMPAIRED FASTING GLUCOSE: ICD-10-CM

## 2023-05-25 DIAGNOSIS — E78.2 MIXED HYPERLIPIDEMIA: ICD-10-CM

## 2023-05-25 PROCEDURE — 99214 OFFICE O/P EST MOD 30 MIN: CPT | Performed by: FAMILY MEDICINE

## 2023-05-25 PROCEDURE — 1036F TOBACCO NON-USER: CPT | Performed by: FAMILY MEDICINE

## 2023-05-25 PROCEDURE — 99213 OFFICE O/P EST LOW 20 MIN: CPT | Performed by: FAMILY MEDICINE

## 2023-05-25 PROCEDURE — G8427 DOCREV CUR MEDS BY ELIG CLIN: HCPCS | Performed by: FAMILY MEDICINE

## 2023-05-25 PROCEDURE — G8399 PT W/DXA RESULTS DOCUMENT: HCPCS | Performed by: FAMILY MEDICINE

## 2023-05-25 PROCEDURE — G8417 CALC BMI ABV UP PARAM F/U: HCPCS | Performed by: FAMILY MEDICINE

## 2023-05-25 PROCEDURE — 1090F PRES/ABSN URINE INCON ASSESS: CPT | Performed by: FAMILY MEDICINE

## 2023-05-25 PROCEDURE — 1123F ACP DISCUSS/DSCN MKR DOCD: CPT | Performed by: FAMILY MEDICINE

## 2023-05-25 SDOH — ECONOMIC STABILITY: FOOD INSECURITY: WITHIN THE PAST 12 MONTHS, THE FOOD YOU BOUGHT JUST DIDN'T LAST AND YOU DIDN'T HAVE MONEY TO GET MORE.: NEVER TRUE

## 2023-05-25 SDOH — ECONOMIC STABILITY: FOOD INSECURITY: WITHIN THE PAST 12 MONTHS, YOU WORRIED THAT YOUR FOOD WOULD RUN OUT BEFORE YOU GOT MONEY TO BUY MORE.: NEVER TRUE

## 2023-05-25 SDOH — ECONOMIC STABILITY: INCOME INSECURITY: HOW HARD IS IT FOR YOU TO PAY FOR THE VERY BASICS LIKE FOOD, HOUSING, MEDICAL CARE, AND HEATING?: NOT HARD AT ALL

## 2023-05-25 ASSESSMENT — PATIENT HEALTH QUESTIONNAIRE - PHQ9
2. FEELING DOWN, DEPRESSED OR HOPELESS: 0
SUM OF ALL RESPONSES TO PHQ QUESTIONS 1-9: 0
9. THOUGHTS THAT YOU WOULD BE BETTER OFF DEAD, OR OF HURTING YOURSELF: 0
6. FEELING BAD ABOUT YOURSELF - OR THAT YOU ARE A FAILURE OR HAVE LET YOURSELF OR YOUR FAMILY DOWN: 0
SUM OF ALL RESPONSES TO PHQ QUESTIONS 1-9: 0
5. POOR APPETITE OR OVEREATING: 0
SUM OF ALL RESPONSES TO PHQ9 QUESTIONS 1 & 2: 0
7. TROUBLE CONCENTRATING ON THINGS, SUCH AS READING THE NEWSPAPER OR WATCHING TELEVISION: 0
10. IF YOU CHECKED OFF ANY PROBLEMS, HOW DIFFICULT HAVE THESE PROBLEMS MADE IT FOR YOU TO DO YOUR WORK, TAKE CARE OF THINGS AT HOME, OR GET ALONG WITH OTHER PEOPLE: 0
SUM OF ALL RESPONSES TO PHQ QUESTIONS 1-9: 0
1. LITTLE INTEREST OR PLEASURE IN DOING THINGS: 0
3. TROUBLE FALLING OR STAYING ASLEEP: 0
4. FEELING TIRED OR HAVING LITTLE ENERGY: 0
SUM OF ALL RESPONSES TO PHQ QUESTIONS 1-9: 0
8. MOVING OR SPEAKING SO SLOWLY THAT OTHER PEOPLE COULD HAVE NOTICED. OR THE OPPOSITE, BEING SO FIGETY OR RESTLESS THAT YOU HAVE BEEN MOVING AROUND A LOT MORE THAN USUAL: 0

## 2023-05-25 ASSESSMENT — ENCOUNTER SYMPTOMS
ABDOMINAL PAIN: 0
DIARRHEA: 0
CONSTIPATION: 0
SORE THROAT: 0
RHINORRHEA: 0
SINUS PRESSURE: 0
TROUBLE SWALLOWING: 0
VOMITING: 0
EYE DISCHARGE: 0
NAUSEA: 0
EYE REDNESS: 0
WHEEZING: 0
SHORTNESS OF BREATH: 0
COUGH: 0

## 2023-05-25 NOTE — PROGRESS NOTES
Patient declines tdap and shingles vaccines at this time. Scheduled AWV.
TABLET BY MOUTH EVERY DAY AT NIGHT  Clinton Shin MD   furosemide (LASIX) 20 MG tablet TAKE ONE TABLET BY MOUTH DAILY  Larry Pulse, DO   potassium chloride (KLOR-CON M) 20 MEQ extended release tablet Take 1 tablet by mouth daily  Emy Last DO   acetaminophen (TYLENOL) 325 MG tablet Take 2 tablets by mouth every 4 hours as needed for Pain or Fever  Emy Shaw MD       Social History     Tobacco Use    Smoking status: Never     Passive exposure: Never    Smokeless tobacco: Never   Vaping Use    Vaping Use: Never used   Substance Use Topics    Alcohol use: No    Drug use: No        Allergies   Allergen Reactions    Pcn [Penicillins] Hives and Shortness Of Breath    Bextra [Valdecoxib] Diarrhea   ,   Past Medical History:   Diagnosis Date    A-fib ([facility] 07/19/2017    Acute blood loss as cause of postoperative anemia 11/08/2018    Anxiety 04/30/2016    CAD (coronary artery disease)     [facility]/ last seen 9-2022    CHF (congestive heart failure) (HCC)     Class 2 severe obesity with serious comorbidity and body mass index (BMI) of 37.0 to 37.9 in adult [facility])     Closed fracture of proximal end of left humerus with routine healing 04/26/2021    Colitis 05/31/2019    Colitis due to Clostridium difficile     Depression 04/30/2016    Diarrhea     Dyslipidemia     Elevated fasting glucose 11/20/2018    FH: total abdominal hysterectomy and bilateral salpingo-oophorectomy 1966    Fractures 03/15/2021    Lt humerus impacted displaced    Full dentures     Glucose intolerance (impaired glucose tolerance)     History of blood transfusion     no reaction    Hyperlipidemia     Hypertension     Hypokalemia     [facility]     kidney cancer right    Multiple closed fractures of ribs 09/22/2018    Obesity     JAYJAY on CPAP 10/06/2018    Osteoarthritis     Osteoporosis 08/30/2013    Other complete intestinal obstruction ([facility] 11/09/2018    Other specified hypothyroidism

## 2023-05-25 NOTE — PATIENT INSTRUCTIONS
Hospital Outpatient Visit on 05/22/2023   Component Date Value Ref Range Status    WBC 05/22/2023 7.1  3.5 - 11.3 k/uL Final    RBC 05/22/2023 4.36  3.95 - 5.11 m/uL Final    Hemoglobin 05/22/2023 13.4  11.9 - 15.1 g/dL Final    Hematocrit 05/22/2023 43.3  36.3 - 47.1 % Final    MCV 05/22/2023 99.3  82.6 - 102.9 fL Final    MCH 05/22/2023 30.7  25.2 - 33.5 pg Final    MCHC 05/22/2023 30.9  25.2 - 33.5 g/dL Final    RDW 05/22/2023 14.4  11.8 - 14.4 % Final    Platelets 87/70/3061 See Reflexed IPF Result  138 - 453 k/uL Final    NRBC Automated 05/22/2023 0.0  0.0 per 100 WBC Final    Seg Neutrophils 05/22/2023 62  36 - 65 % Final    Lymphocytes 05/22/2023 24  24 - 43 % Final    Monocytes 05/22/2023 11  3 - 12 % Final    Eosinophils % 05/22/2023 3  1 - 4 % Final    Basophils 05/22/2023 0  0 - 2 % Final    Immature Granulocytes 05/22/2023 0  0 % Final    Segs Absolute 05/22/2023 4.42  1.50 - 8.10 k/uL Final    Absolute Lymph # 05/22/2023 1.67  1.10 - 3.70 k/uL Final    Absolute Mono # 05/22/2023 0.78  0.10 - 1.20 k/uL Final    Absolute Eos # 05/22/2023 0.20  0.00 - 0.44 k/uL Final    Basophils Absolute 05/22/2023 0.03  0.00 - 0.20 k/uL Final    Absolute Immature Granulocyte 05/22/2023 <0.03  0.00 - 0.30 k/uL Final    Glucose 05/22/2023 98  70 - 99 mg/dL Final    BUN 05/22/2023 29 (H)  8 - 23 mg/dL Final    Creatinine 05/22/2023 1.36 (H)  0.50 - 0.90 mg/dL Final    Est, Glom Filt Rate 05/22/2023 39 (L)  >60 mL/min/1.73m2 Final    Comment:       These results are not intended for use in patients <25years of age. eGFR results are calculated without a race factor using the 2021 CKD-EPI equation. Careful clinical correlation is recommended, particularly when comparing to results   calculated using previous equations.   The CKD-EPI equation is less accurate in patients with extremes of muscle mass, extra-renal   metabolism of creatine, excessive creatine ingestion, or following therapy that affects   renal tubular

## 2023-05-26 ENCOUNTER — ANTI-COAG VISIT (OUTPATIENT)
Dept: FAMILY MEDICINE CLINIC | Age: 83
End: 2023-05-26

## 2023-05-26 DIAGNOSIS — I48.91 ATRIAL FIBRILLATION, UNSPECIFIED TYPE (HCC): Primary | ICD-10-CM

## 2023-05-26 LAB — INR BLD: 1.6

## 2023-06-02 ENCOUNTER — ANTI-COAG VISIT (OUTPATIENT)
Dept: FAMILY MEDICINE CLINIC | Age: 83
End: 2023-06-02
Payer: MEDICARE

## 2023-06-02 DIAGNOSIS — I48.91 ATRIAL FIBRILLATION, UNSPECIFIED TYPE (HCC): Primary | ICD-10-CM

## 2023-06-02 LAB — INR BLD: 1.9

## 2023-06-02 PROCEDURE — 93793 ANTICOAG MGMT PT WARFARIN: CPT | Performed by: FAMILY MEDICINE

## 2023-06-06 ENCOUNTER — TELEPHONE (OUTPATIENT)
Dept: FAMILY MEDICINE CLINIC | Age: 83
End: 2023-06-06

## 2023-06-06 ENCOUNTER — TELEMEDICINE (OUTPATIENT)
Dept: FAMILY MEDICINE CLINIC | Age: 83
End: 2023-06-06
Payer: MEDICARE

## 2023-06-06 DIAGNOSIS — Z00.00 MEDICARE ANNUAL WELLNESS VISIT, SUBSEQUENT: Primary | ICD-10-CM

## 2023-06-06 PROCEDURE — G0439 PPPS, SUBSEQ VISIT: HCPCS | Performed by: FAMILY MEDICINE

## 2023-06-06 PROCEDURE — 1123F ACP DISCUSS/DSCN MKR DOCD: CPT | Performed by: FAMILY MEDICINE

## 2023-06-06 ASSESSMENT — PATIENT HEALTH QUESTIONNAIRE - PHQ9
8. MOVING OR SPEAKING SO SLOWLY THAT OTHER PEOPLE COULD HAVE NOTICED. OR THE OPPOSITE, BEING SO FIGETY OR RESTLESS THAT YOU HAVE BEEN MOVING AROUND A LOT MORE THAN USUAL: 0
6. FEELING BAD ABOUT YOURSELF - OR THAT YOU ARE A FAILURE OR HAVE LET YOURSELF OR YOUR FAMILY DOWN: 0
SUM OF ALL RESPONSES TO PHQ QUESTIONS 1-9: 0
1. LITTLE INTEREST OR PLEASURE IN DOING THINGS: 0
SUM OF ALL RESPONSES TO PHQ9 QUESTIONS 1 & 2: 0
4. FEELING TIRED OR HAVING LITTLE ENERGY: 0
3. TROUBLE FALLING OR STAYING ASLEEP: 0
2. FEELING DOWN, DEPRESSED OR HOPELESS: 0
5. POOR APPETITE OR OVEREATING: 0
SUM OF ALL RESPONSES TO PHQ QUESTIONS 1-9: 0
10. IF YOU CHECKED OFF ANY PROBLEMS, HOW DIFFICULT HAVE THESE PROBLEMS MADE IT FOR YOU TO DO YOUR WORK, TAKE CARE OF THINGS AT HOME, OR GET ALONG WITH OTHER PEOPLE: 0
7. TROUBLE CONCENTRATING ON THINGS, SUCH AS READING THE NEWSPAPER OR WATCHING TELEVISION: 0
SUM OF ALL RESPONSES TO PHQ QUESTIONS 1-9: 0
SUM OF ALL RESPONSES TO PHQ QUESTIONS 1-9: 0
9. THOUGHTS THAT YOU WOULD BE BETTER OFF DEAD, OR OF HURTING YOURSELF: 0

## 2023-06-06 ASSESSMENT — LIFESTYLE VARIABLES
HOW MANY STANDARD DRINKS CONTAINING ALCOHOL DO YOU HAVE ON A TYPICAL DAY: PATIENT DOES NOT DRINK
HOW OFTEN DO YOU HAVE A DRINK CONTAINING ALCOHOL: NEVER

## 2023-06-06 NOTE — TELEPHONE ENCOUNTER
Patient is wondering if there is anything she can safely take for arthritis pain/discomfort. She states the prednisone really helped but realizes she cannot take it long term. With her history discussed there probably isn't much but advised her I would check.

## 2023-06-06 NOTE — PROGRESS NOTES
Medicare Annual Wellness Visit    Ej Pan is here for Medicare AWV (Subsequent; last 4/18/2022)    Assessment & Plan     Recommendations for Preventive Services Due: see orders and patient instructions/AVS.    Patient declines tdap and shingles vaccines at this time. Recommended screening schedule for the next 5-10 years is provided to the patient in written form: see Patient Instructions/AVS.     No follow-ups on file. Subjective       Patient's complete Health Risk Assessment and screening values have been reviewed and are found in Flowsheets. The following problems were reviewed today and where indicated follow up appointments were made and/or referrals ordered. Positive Risk Factor Screenings with Interventions:                 Weight and Activity:  Physical Activity: Inactive    Days of Exercise per Week: 0 days    Minutes of Exercise per Session: 0 min     On average, how many days per week do you engage in moderate to strenuous exercise (like a brisk walk)?: 0 days  Have you lost any weight without trying in the past 3 months?: No  There is no height or weight on file to calculate BMI. (!) Abnormal      Inactivity Interventions:  See AVS for additional education material  Obesity Interventions:  See AVS for additional education material            Vision Screen:  Do you have difficulty driving, watching TV, or doing any of your daily activities because of your eyesight?: No  Have you had an eye exam within the past year?: (!) No  No results found.     Interventions:   Patient encouraged to make appointment with their eye specialist     ADL's:   Patient reports needing help with:  Select all that apply: [employer], Housekeeping, Laundry    Interventions:  Patient declined any further interventions or treatment    Advanced Directives:  Do you have a Living Will?: (!) No (discussed purpose and methods to complete)    Intervention:  has NO advanced directive - information provided

## 2023-06-06 NOTE — TELEPHONE ENCOUNTER
Not a candidate for NSAIDs due to being on coumadin. Only option would be treatment with pain medication like tramadol or norco, but this is not ideal to take on a consistent basis and only treats the pain. Can use osteo bi flex supplement to see if this helps with some of her joint pain.

## 2023-06-06 NOTE — PATIENT INSTRUCTIONS
use. Make sure kitchen curtains are tied back. Move cords and appliances away from the sink and hot surfaces. If extension cords are needed, install wiring guides so they do not hang over the sink, range, or working areas. Look for coffee pots, kettles and toaster ovens with automatic shut-offs. Keep a mop handy in the kitchen so you can wipe up spills instantly. You should also have a small fire extinguisher. Arrange your kitchen with frequently used items on lower shelves to avoid the need to stand on a stepstool to reach them. Make sure countertops are well-lit to avoid injuries while cutting and preparing food. In the Bathroom    Use a non-slip mat or decals in the tub and shower, since wet, soapy tile or porcelain surfaces are extremely slippery. Make sure bathroom rugs are non-skid or tape them firmly to the floor. Bathtubs should have at least one, preferably two, grab bars, firmly attached to structural supports in the wall. (Do not use built-in soap holders or glass shower doors as grab bars.)    Tub seats fitted with non-slip material on the legs allow you to wash sitting down. For people with limited mobility, bathtub transfer benches allow you to slide safely into the tub. Raised toilet seats and toilet safety rails are helpful for those with knee or hip problems. In the [facility]    Make sure you use a nightlight and that the area around your bed is clear of potential obstacles. Be careful with electric blankets and never go to sleep with a heating pad, which can cause serious burns even if on a low setting. Use fire-resistant mattress covers and pillows, and NEVER smoke in bed. Keep a phone next to the bed that is programmed to dial 911 at the push of a button. If you have a chronic condition, you may want to sign on with an automatic call-in service.  Typically the system includes a small pendant that connects directly to an emergency medical voice-response

## 2023-06-09 ENCOUNTER — ANTI-COAG VISIT (OUTPATIENT)
Dept: FAMILY MEDICINE CLINIC | Age: 83
End: 2023-06-09

## 2023-06-09 DIAGNOSIS — I48.91 ATRIAL FIBRILLATION, UNSPECIFIED TYPE (HCC): Primary | ICD-10-CM

## 2023-06-09 LAB — INR BLD: 1.9

## 2023-06-09 RX ORDER — WARFARIN SODIUM 2 MG/1
2 TABLET ORAL DAILY
Qty: 90 TABLET | Refills: 1 | Status: SHIPPED | OUTPATIENT
Start: 2023-06-09

## 2023-06-09 NOTE — TELEPHONE ENCOUNTER
Jon Squires called requesting a refill of the below medication which has been pended for you:     Requested Prescriptions     Pending Prescriptions Disp Refills    warfarin (COUMADIN) 2 MG tablet 90 tablet 1     Sig: Take 1 tablet by mouth daily       Last Appointment Date: 6/6/2023  Next Appointment Date: 11/29/2023    Allergies   Allergen Reactions    Pcn [Penicillins] Hives and Shortness Of Breath    Bextra [Valdecoxib] Diarrhea

## 2023-06-23 ENCOUNTER — ANTI-COAG VISIT (OUTPATIENT)
Dept: FAMILY MEDICINE CLINIC | Age: 83
End: 2023-06-23

## 2023-06-23 DIAGNOSIS — I48.91 ATRIAL FIBRILLATION, UNSPECIFIED TYPE (HCC): Primary | ICD-10-CM

## 2023-06-23 LAB — INR BLD: 2.4

## 2023-06-30 ENCOUNTER — ANTI-COAG VISIT (OUTPATIENT)
Dept: FAMILY MEDICINE CLINIC | Age: 83
End: 2023-06-30

## 2023-06-30 DIAGNOSIS — I48.91 ATRIAL FIBRILLATION, UNSPECIFIED TYPE (HCC): Primary | ICD-10-CM

## 2023-06-30 LAB — INR BLD: 2.5

## 2023-07-07 ENCOUNTER — ANTI-COAG VISIT (OUTPATIENT)
Dept: FAMILY MEDICINE CLINIC | Age: 83
End: 2023-07-07

## 2023-07-07 ENCOUNTER — TELEPHONE (OUTPATIENT)
Dept: FAMILY MEDICINE CLINIC | Age: 83
End: 2023-07-07

## 2023-07-07 DIAGNOSIS — I48.91 ATRIAL FIBRILLATION, UNSPECIFIED TYPE (HCC): Primary | ICD-10-CM

## 2023-07-07 LAB — INR BLD: 2.5

## 2023-07-07 RX ORDER — PREDNISONE 10 MG/1
10 TABLET ORAL DAILY PRN
Qty: 15 TABLET | Refills: 0 | Status: SHIPPED | OUTPATIENT
Start: 2023-07-07

## 2023-07-07 NOTE — TELEPHONE ENCOUNTER
Sent in script for 10mg prednisone pills to take when needed for increased inflammation.   Is to only use if more severe pain days

## 2023-07-07 NOTE — TELEPHONE ENCOUNTER
Patient states when you had given her Prednisone in the past she would take a tablet here and there and it would relieve her pain in about an hour. Advised her she can not be on long term steroid but I would ask Dr Chang Miramontes if she could take only on severe days and use Tylenol all other days.      Please send Prednisone into pharmacy

## 2023-07-07 NOTE — TELEPHONE ENCOUNTER
Patient with complaint of \"unbearable\" arthritis pain. States the tylenol isn't helping. States she can barely get her legs lifted up into bed at night. She is aware that due to her coumadin therapy that she is limited on what she can take. States she tried topicals without relief. Do you have any other suggestions for her?

## 2023-07-07 NOTE — TELEPHONE ENCOUNTER
Only treatment option she would have would be narcotic pain medication like norco or tylenol with codeine,but this is not something that she should take consistently. Would use tylenol as much as able to help with less severe days and can send a small supply of pain medication if she would like something stronger to take when it gets severe.

## 2023-07-14 ENCOUNTER — ANTI-COAG VISIT (OUTPATIENT)
Dept: FAMILY MEDICINE CLINIC | Age: 83
End: 2023-07-14

## 2023-07-14 DIAGNOSIS — I48.91 ATRIAL FIBRILLATION, UNSPECIFIED TYPE (HCC): Primary | ICD-10-CM

## 2023-07-14 LAB — INR BLD: 3.2

## 2023-07-21 ENCOUNTER — ANTI-COAG VISIT (OUTPATIENT)
Dept: FAMILY MEDICINE CLINIC | Age: 83
End: 2023-07-21

## 2023-07-21 DIAGNOSIS — I48.91 ATRIAL FIBRILLATION, UNSPECIFIED TYPE (HCC): Primary | ICD-10-CM

## 2023-07-21 LAB — INR BLD: 2

## 2023-07-21 NOTE — PROGRESS NOTES
XIOMARA Wheeler - SHERI sent to Jabier Keys RN  Caller: Unspecified (Today,  1:53 PM)  INR wnl.  Continue with current coumadin dosing

## 2023-07-25 ENCOUNTER — HOSPITAL ENCOUNTER (OUTPATIENT)
Age: 83
Discharge: HOME | End: 2023-07-25
Payer: MEDICARE

## 2023-07-25 DIAGNOSIS — C18.7 MALIGNANT NEOPLASM OF SIGMOID COLON (HCC): ICD-10-CM

## 2023-07-25 LAB
ALBUMIN SERPL-MCNC: 4.1 G/DL (ref 3.5–5.2)
ALBUMIN/GLOB SERPL: 1.4 {RATIO} (ref 1–2.5)
ALP SERPL-CCNC: 117 U/L (ref 35–104)
ALT SERPL-CCNC: 11 U/L (ref 5–33)
ANION GAP SERPL CALCULATED.3IONS-SCNC: 11 MMOL/L (ref 9–17)
AST SERPL-CCNC: 15 U/L
BASOPHILS # BLD: 0.05 K/UL (ref 0–0.2)
BASOPHILS NFR BLD: 1 % (ref 0–2)
BILIRUB SERPL-MCNC: 0.6 MG/DL (ref 0.3–1.2)
BUN SERPL-MCNC: 23 MG/DL (ref 8–23)
BUN/CREAT SERPL: 16 (ref 9–20)
CALCIUM SERPL-MCNC: 9.7 MG/DL (ref 8.6–10.4)
CHLORIDE SERPL-SCNC: 109 MMOL/L (ref 98–107)
CO2 SERPL-SCNC: 24 MMOL/L (ref 20–31)
CREAT SERPL-MCNC: 1.4 MG/DL (ref 0.5–0.9)
EOSINOPHIL # BLD: 0.19 K/UL (ref 0–0.44)
EOSINOPHILS RELATIVE PERCENT: 3 % (ref 1–4)
ERYTHROCYTE [DISTWIDTH] IN BLOOD BY AUTOMATED COUNT: 14.4 % (ref 11.8–14.4)
GFR SERPL CREATININE-BSD FRML MDRD: 37 ML/MIN/1.73M2
GLUCOSE SERPL-MCNC: 97 MG/DL (ref 70–99)
HCT VFR BLD AUTO: 42.1 % (ref 36.3–47.1)
HGB BLD-MCNC: 12.8 G/DL (ref 11.9–15.1)
IMM GRANULOCYTES # BLD AUTO: <0.03 K/UL (ref 0–0.3)
IMM GRANULOCYTES NFR BLD: 0 %
LYMPHOCYTES NFR BLD: 1.59 K/UL (ref 1.1–3.7)
LYMPHOCYTES RELATIVE PERCENT: 24 % (ref 24–43)
MCH RBC QN AUTO: 30.8 PG (ref 25.2–33.5)
MCHC RBC AUTO-ENTMCNC: 30.4 G/DL (ref 25.2–33.5)
MCV RBC AUTO: 101.4 FL (ref 82.6–102.9)
MONOCYTES NFR BLD: 0.7 K/UL (ref 0.1–1.2)
MONOCYTES NFR BLD: 10 % (ref 3–12)
NEUTROPHILS NFR BLD: 62 % (ref 36–65)
NEUTS SEG NFR BLD: 4.18 K/UL (ref 1.5–8.1)
NRBC BLD-RTO: 0 PER 100 WBC
PLATELET # BLD AUTO: ABNORMAL K/UL (ref 138–453)
PLATELET, FLUORESCENCE: 134 K/UL (ref 138–453)
PLATELETS.RETICULATED NFR BLD AUTO: 12.6 % (ref 1.1–10.3)
POTASSIUM SERPL-SCNC: 4.6 MMOL/L (ref 3.7–5.3)
PROT SERPL-MCNC: 7.1 G/DL (ref 6.4–8.3)
RBC # BLD AUTO: 4.15 M/UL (ref 3.95–5.11)
SODIUM SERPL-SCNC: 144 MMOL/L (ref 135–144)
WBC OTHER # BLD: 6.7 K/UL (ref 3.5–11.3)

## 2023-07-25 PROCEDURE — 36415 COLL VENOUS BLD VENIPUNCTURE: CPT

## 2023-07-25 PROCEDURE — 80053 COMPREHEN METABOLIC PANEL: CPT

## 2023-07-25 PROCEDURE — 82378 CARCINOEMBRYONIC ANTIGEN: CPT

## 2023-07-25 PROCEDURE — 85027 COMPLETE CBC AUTOMATED: CPT

## 2023-07-26 LAB — CEA SERPL-MCNC: 4.2 NG/ML

## 2023-07-26 NOTE — TELEPHONE ENCOUNTER
Dr Bowen Bonilla patient. Dr Bowen Bonilla is out of the office.      Celester Reasons called requesting a refill of the below medication which has been pended for you:     Requested Prescriptions     Pending Prescriptions Disp Refills    warfarin (Zigmund Sizer) 3 MG tablet [Pharmacy Med Name: Zigmund Sizer Oral Tablet 3 MG] 180 tablet 0     Sig: TAKE 2 TABLETS BY MOUTH EVERY DAY OR AS DIRECTED PER INR RESULTS       Last Appointment Date: 6/6/2023  Next Appointment Date: 11/29/2023    Allergies   Allergen Reactions    Pcn [Penicillins] Hives and Shortness Of Breath    Bextra [Valdecoxib] Diarrhea

## 2023-07-27 RX ORDER — WARFARIN SODIUM 3 MG/1
TABLET ORAL
Qty: 180 TABLET | Refills: 0 | Status: SHIPPED | OUTPATIENT
Start: 2023-07-27

## 2023-07-28 ENCOUNTER — ANTI-COAG VISIT (OUTPATIENT)
Dept: FAMILY MEDICINE CLINIC | Age: 83
End: 2023-07-28

## 2023-07-28 DIAGNOSIS — I48.91 ATRIAL FIBRILLATION, UNSPECIFIED TYPE (HCC): Primary | ICD-10-CM

## 2023-07-28 LAB — INR BLD: 2.4

## 2023-07-28 RX ORDER — WARFARIN SODIUM 3 MG/1
TABLET ORAL
Qty: 180 TABLET | Refills: 0 | OUTPATIENT
Start: 2023-07-28

## 2023-07-28 NOTE — TELEPHONE ENCOUNTER
Confirmed with pharmacist at [facility] receipt of script sent 7/27/23.  Disregard this request.    Evette López called requesting a refill of the below medication which has been pended for you:     Requested Prescriptions     Pending Prescriptions Disp Refills    warfarin (Aguilar Cruz) 3 MG tablet [Pharmacy Med Name: Aguilar Cruz Oral Tablet 3 MG] 180 tablet 0     Sig: TAKE 2 TABLETS BY MOUTH EVERY DAY OR AS DIRECTED PER INR RESULTS       Last Appointment Date: 6/6/2023  Next Appointment Date: 11/29/2023    Allergies   Allergen Reactions    Pcn [Penicillins] Hives and Shortness Of Breath    Bextra [Valdecoxib] Diarrhea

## 2023-07-28 NOTE — PROGRESS NOTES
Patient notified of INR result, to continue same coumadin dosing and next recommended INR lab draw. Patient verbalizes understanding.

## 2023-08-04 ENCOUNTER — ANTI-COAG VISIT (OUTPATIENT)
Dept: FAMILY MEDICINE CLINIC | Age: 83
End: 2023-08-04

## 2023-08-04 DIAGNOSIS — I48.91 ATRIAL FIBRILLATION, UNSPECIFIED TYPE (HCC): Primary | ICD-10-CM

## 2023-08-04 LAB — INR BLD: 2.4

## 2023-08-04 NOTE — PROGRESS NOTES
Patient does weekly home INR checks. Advised her of normal INR and to continue current dosage and recheck in 1 wk.

## 2023-08-11 ENCOUNTER — ANTI-COAG VISIT (OUTPATIENT)
Dept: FAMILY MEDICINE CLINIC | Age: 83
End: 2023-08-11
Payer: MEDICARE

## 2023-08-11 DIAGNOSIS — I48.91 ATRIAL FIBRILLATION, UNSPECIFIED TYPE (HCC): Primary | ICD-10-CM

## 2023-08-11 LAB — INR BLD: 2.7

## 2023-08-11 PROCEDURE — 93793 ANTICOAG MGMT PT WARFARIN: CPT | Performed by: FAMILY MEDICINE

## 2023-08-18 ENCOUNTER — ANTI-COAG VISIT (OUTPATIENT)
Dept: FAMILY MEDICINE CLINIC | Age: 83
End: 2023-08-18
Payer: MEDICARE

## 2023-08-18 DIAGNOSIS — I48.91 ATRIAL FIBRILLATION, UNSPECIFIED TYPE (HCC): Primary | ICD-10-CM

## 2023-08-18 LAB — INR BLD: 2.6

## 2023-08-18 PROCEDURE — 93793 ANTICOAG MGMT PT WARFARIN: CPT | Performed by: FAMILY MEDICINE

## 2023-08-25 ENCOUNTER — ANTI-COAG VISIT (OUTPATIENT)
Dept: FAMILY MEDICINE CLINIC | Age: 83
End: 2023-08-25

## 2023-08-25 DIAGNOSIS — I48.91 ATRIAL FIBRILLATION, UNSPECIFIED TYPE (HCC): Primary | ICD-10-CM

## 2023-08-25 LAB — INR BLD: 2.5

## 2023-08-25 RX ORDER — WARFARIN SODIUM 3 MG/1
TABLET ORAL
Qty: 180 TABLET | Refills: 5 | Status: SHIPPED | OUTPATIENT
Start: 2023-08-25

## 2023-08-25 NOTE — TELEPHONE ENCOUNTER
Lewis Husbands called requesting a refill of the below medication which has been pended for you:     Requested Prescriptions     Pending Prescriptions Disp Refills    warfarin (Austin Number) 3 MG tablet [Pharmacy Med Name: Leti Berkowitz Oral Tablet 3 MG] 180 tablet 0     Sig: TAKE 2 TABLETS BY MOUTH EVERY DAY OR AS DIRECTED PER INR RESULTS       Last Appointment Date: 5/25/2023  Next Appointment Date: 11/29/2023    Allergies   Allergen Reactions    Pcn [Penicillins] Hives and Shortness Of Breath    Bextra [Valdecoxib] Diarrhea

## 2023-08-28 ENCOUNTER — OFFICE VISIT (OUTPATIENT)
Dept: CARDIOLOGY | Age: 83
End: 2023-08-28
Payer: MEDICARE

## 2023-08-28 ENCOUNTER — OFFICE VISIT (OUTPATIENT)
Dept: ONCOLOGY | Age: 83
End: 2023-08-28
Payer: MEDICARE

## 2023-08-28 VITALS
BODY MASS INDEX: 43.95 KG/M2 | OXYGEN SATURATION: 97 % | HEART RATE: 88 BPM | TEMPERATURE: 97.3 F | WEIGHT: 280 LBS | SYSTOLIC BLOOD PRESSURE: 120 MMHG | DIASTOLIC BLOOD PRESSURE: 74 MMHG | HEIGHT: 67 IN

## 2023-08-28 VITALS — SYSTOLIC BLOOD PRESSURE: 120 MMHG | DIASTOLIC BLOOD PRESSURE: 74 MMHG | HEART RATE: 76 BPM

## 2023-08-28 DIAGNOSIS — C64.1 CLEAR CELL RENAL CELL CARCINOMA, RIGHT (HCC): ICD-10-CM

## 2023-08-28 DIAGNOSIS — E78.5 HYPERLIPIDEMIA LDL GOAL <70: ICD-10-CM

## 2023-08-28 DIAGNOSIS — E66.01 MORBID OBESITY WITH BMI OF 40.0-44.9, ADULT (HCC): ICD-10-CM

## 2023-08-28 DIAGNOSIS — I25.84 CORONARY ARTERY DISEASE DUE TO CALCIFIED CORONARY LESION: ICD-10-CM

## 2023-08-28 DIAGNOSIS — I48.91 ATRIAL FIBRILLATION, UNSPECIFIED TYPE (HCC): Primary | ICD-10-CM

## 2023-08-28 DIAGNOSIS — I25.10 CORONARY ARTERY DISEASE DUE TO CALCIFIED CORONARY LESION: ICD-10-CM

## 2023-08-28 DIAGNOSIS — C64.1 RENAL CELL CARCINOMA OF RIGHT KIDNEY (HCC): Primary | ICD-10-CM

## 2023-08-28 DIAGNOSIS — R97.0 ELEVATED CARCINOEMBRYONIC ANTIGEN (CEA): ICD-10-CM

## 2023-08-28 PROCEDURE — 99214 OFFICE O/P EST MOD 30 MIN: CPT | Performed by: INTERNAL MEDICINE

## 2023-08-28 PROCEDURE — 99214 OFFICE O/P EST MOD 30 MIN: CPT | Performed by: NURSE PRACTITIONER

## 2023-08-28 PROCEDURE — G8427 DOCREV CUR MEDS BY ELIG CLIN: HCPCS | Performed by: NURSE PRACTITIONER

## 2023-08-28 PROCEDURE — G8417 CALC BMI ABV UP PARAM F/U: HCPCS | Performed by: INTERNAL MEDICINE

## 2023-08-28 PROCEDURE — 1036F TOBACCO NON-USER: CPT | Performed by: NURSE PRACTITIONER

## 2023-08-28 PROCEDURE — G8399 PT W/DXA RESULTS DOCUMENT: HCPCS | Performed by: INTERNAL MEDICINE

## 2023-08-28 PROCEDURE — G8427 DOCREV CUR MEDS BY ELIG CLIN: HCPCS | Performed by: INTERNAL MEDICINE

## 2023-08-28 PROCEDURE — G8399 PT W/DXA RESULTS DOCUMENT: HCPCS | Performed by: NURSE PRACTITIONER

## 2023-08-28 PROCEDURE — 1036F TOBACCO NON-USER: CPT | Performed by: INTERNAL MEDICINE

## 2023-08-28 PROCEDURE — 93005 ELECTROCARDIOGRAM TRACING: CPT | Performed by: NURSE PRACTITIONER

## 2023-08-28 PROCEDURE — 1123F ACP DISCUSS/DSCN MKR DOCD: CPT | Performed by: INTERNAL MEDICINE

## 2023-08-28 PROCEDURE — 3078F DIAST BP <80 MM HG: CPT | Performed by: NURSE PRACTITIONER

## 2023-08-28 PROCEDURE — 1090F PRES/ABSN URINE INCON ASSESS: CPT | Performed by: INTERNAL MEDICINE

## 2023-08-28 PROCEDURE — 1090F PRES/ABSN URINE INCON ASSESS: CPT | Performed by: NURSE PRACTITIONER

## 2023-08-28 PROCEDURE — 93010 ELECTROCARDIOGRAM REPORT: CPT | Performed by: NURSE PRACTITIONER

## 2023-08-28 PROCEDURE — 3078F DIAST BP <80 MM HG: CPT | Performed by: INTERNAL MEDICINE

## 2023-08-28 PROCEDURE — 1123F ACP DISCUSS/DSCN MKR DOCD: CPT | Performed by: NURSE PRACTITIONER

## 2023-08-28 PROCEDURE — G8417 CALC BMI ABV UP PARAM F/U: HCPCS | Performed by: NURSE PRACTITIONER

## 2023-08-28 PROCEDURE — 3074F SYST BP LT 130 MM HG: CPT | Performed by: NURSE PRACTITIONER

## 2023-08-28 PROCEDURE — 3074F SYST BP LT 130 MM HG: CPT | Performed by: INTERNAL MEDICINE

## 2023-08-28 NOTE — PROGRESS NOTES
never been exposed to tobacco smoke. She has never used smokeless tobacco. She reports that she does not drink alcohol and does not use drugs. Review of Systems:  Constitutional: there has been no unanticipated weight loss. There's been No change in energy level, No change in activity level. Eyes: No visual changes or diplopia. No scleral icterus. ENT: No Headaches, hearing loss or vertigo. No mouth sores or sore throat. Cardiovascular: As above. Respiratory: as hpi  Gastrointestinal: No abdominal pain, appetite loss, blood in stools. No change in bowel or bladder habits. Genitourinary: No dysuria, trouble voiding, or hematuria. Musculoskeletal:  No gait disturbance, No weakness or joint complaints. Integumentary: No rash or pruritis. Psychiatric: No anxiety, or depression. Hematologic/Lymphatic: No abnormal bruising or bleeding, blood clots or swollen lymph nodes. Allergic/Immunologic: No nasal congestion or hives. Physical Exam:  /74 (Site: Right Upper Arm, Position: Sitting)   Pulse 76   LMP 01/01/1968     Constitutional and General Appearance: alert, cooperative, no distress and appears stated age  HEENT: PERRL, no cervical lymphadenopathy. No masses palpable. Normal oral mucosa  Respiratory:  Normal excursion and expansion without use of accessory muscles  Resp Auscultation: Good respiratory effort. No for increased work of breathing. On auscultation: clear to auscultation bilaterally  Cardiovascular: The apical impulse is not displaced  Heart tones are irregularly irregular.   Grade 2/6 ejection systolic murmur in the left lower sternal border  Jugular venous pulsation Normal  The carotid upstroke is normal in amplitude and contour without delay or bruit  Peripheral pulses are symmetrical and full   Abdomen:   No masses or tenderness  Bowel sounds present  Extremities:   No Cyanosis or Clubbing   Lower extremity edema: Trace bilateral pedal edema   Skin: Warm and dry    LABS    Lab

## 2023-08-28 NOTE — PROGRESS NOTES
2023    HPI:  Nishi Montilla (:  1940) has requested an audio/video evaluation for the following concern(s):    Chief Complaint   Patient presents with    Colon Cancer     4 month follow up with labs     Patient ID: Nishi Montilla, 1940, [id number], 79 y.o. Referred by : Kayce Huffman MD  Diagnosis:   Diagnosis of colon cancer, 17    low anterior resection of the rectosigmoid with proximal diverting ileostomy. Final pathology showed T1 N0, stage I disease. Currently on surveillance, recent colonoscopy on 2023    Right renal cell carcinoma status post radical nephrectomy on 10/7/2022,pT3aNx disease  HISTORY OF PRESENT ILLNESS:    Oncologic History: This is a 79 y.o. -old female with new diagnosis of colon cancer was seen during initial consultation visit. She presented with rectal bleeding going on for about 4-6 weeks. She had a colonoscopy on 10/8/18 which showed multiple polyps and large distal sigmoid tumors with extensive diverticulosis. Biopsy showed invasive low-grade adenocarcinoma arising in a large tubulovillous adenoma with extensive high-grade dysplasia. Subsequently she had open low anterior resection of the rectosigmoid with proximal dilating ileostomy. Final pathology showed T1 N0, stage I disease. Now she is recovering well from surgery. She denied any pain, nausea vomiting. She does not have family history of colon cancer. She is adopted and does not know about her family history. Interval history:  Patient is returning for follow-up visit and to discuss lab results and further recommendations. Denies any blood in stool, blood in urine. She denied any chest pain or shortness of breath. Her last colonoscopy was in February and was negative for recurrence. During this visit patient's allergy, social, medical, surgical history and medications were reviewed and updated.     Allergies   Allergen Reactions    Pcn [Penicillins] Hives and

## 2023-08-29 ENCOUNTER — TELEMEDICINE (OUTPATIENT)
Dept: FAMILY MEDICINE CLINIC | Age: 83
End: 2023-08-29
Payer: MEDICARE

## 2023-08-29 DIAGNOSIS — M15.9 PRIMARY OSTEOARTHRITIS INVOLVING MULTIPLE JOINTS: Primary | ICD-10-CM

## 2023-08-29 PROCEDURE — G8427 DOCREV CUR MEDS BY ELIG CLIN: HCPCS | Performed by: FAMILY MEDICINE

## 2023-08-29 PROCEDURE — 99213 OFFICE O/P EST LOW 20 MIN: CPT | Performed by: FAMILY MEDICINE

## 2023-08-29 PROCEDURE — 1123F ACP DISCUSS/DSCN MKR DOCD: CPT | Performed by: FAMILY MEDICINE

## 2023-08-29 PROCEDURE — 1090F PRES/ABSN URINE INCON ASSESS: CPT | Performed by: FAMILY MEDICINE

## 2023-08-29 PROCEDURE — G8399 PT W/DXA RESULTS DOCUMENT: HCPCS | Performed by: FAMILY MEDICINE

## 2023-08-29 ASSESSMENT — ENCOUNTER SYMPTOMS
EYES NEGATIVE: 1
BACK PAIN: 1
RESPIRATORY NEGATIVE: 1

## 2023-08-29 NOTE — PROGRESS NOTES
2023    TELEHEALTH EVALUATION -- Audio/Visual (During MCTBS-46 public health emergency)    HPI:    Shira Cruz (:  1940) has requested an audio/video evaluation for the following concern(s):    HPI    Patient is seen today for a virtual visit for a power mobility device evaluation. Patient has known severe osteoarthritis of multiple joints including her hips and knees as well as her shoulders which have severely limited her mobility. She does sit a fair majority of the time is walking and standing causes significant amount of pain and she does have difficulty ambulating. She had previously used a cane but when she fell a little over a year ago and broke her arm and ended up in the nursing home the therapist at that time felt that she was too unsteady on her feet to use a cane. Has transition to a walker but due to the increased pain the walker does not help with her ability to get around. She cannot stand for long periods of time to make a meal or to ambulate from room to room in her home. Her granddaughter does typically try to pull the car up is close to the house so she can get in when they do have to take her places but if the yard is wet she has to walk a little further and she cannot hardly get to the car from her door. She does have to sit to shower and also dress and groom. Would not be unable to maneuver a manual wheelchair due to ongoing chronic pain in her shoulders and upper extremities. With this in mind I do feel that she would be a candidate for a power mobility device. Patient also reports that due to her arthritis she has difficult time arising from a seated to a standing position and is questioning about getting a power lift chair. Again with the limitations as far as her pain and strength in her lower extremities related to her osteoarthritis I do think she would be a candidate for power lift chair.   We will send the prescription and she can check with the local DME

## 2023-09-01 ENCOUNTER — ANTI-COAG VISIT (OUTPATIENT)
Dept: FAMILY MEDICINE CLINIC | Age: 83
End: 2023-09-01

## 2023-09-01 DIAGNOSIS — I48.91 ATRIAL FIBRILLATION, UNSPECIFIED TYPE (HCC): Primary | ICD-10-CM

## 2023-09-01 LAB — INR BLD: 2.1

## 2023-09-08 ENCOUNTER — ANTI-COAG VISIT (OUTPATIENT)
Dept: FAMILY MEDICINE CLINIC | Age: 83
End: 2023-09-08

## 2023-09-08 DIAGNOSIS — I48.91 ATRIAL FIBRILLATION, UNSPECIFIED TYPE (HCC): Primary | ICD-10-CM

## 2023-09-08 LAB — INR BLD: 2

## 2023-09-18 ENCOUNTER — ANTI-COAG VISIT (OUTPATIENT)
Dept: FAMILY MEDICINE CLINIC | Age: 83
End: 2023-09-18
Payer: MEDICARE

## 2023-09-18 DIAGNOSIS — I48.91 ATRIAL FIBRILLATION, UNSPECIFIED TYPE (HCC): ICD-10-CM

## 2023-09-18 DIAGNOSIS — Z79.01 LONG TERM (CURRENT) USE OF ANTICOAGULANTS: Primary | ICD-10-CM

## 2023-09-18 LAB — INR BLD: 2.1

## 2023-09-18 PROCEDURE — 93793 ANTICOAG MGMT PT WARFARIN: CPT | Performed by: STUDENT IN AN ORGANIZED HEALTH CARE EDUCATION/TRAINING PROGRAM

## 2023-09-22 ENCOUNTER — ANTI-COAG VISIT (OUTPATIENT)
Dept: FAMILY MEDICINE CLINIC | Age: 83
End: 2023-09-22

## 2023-09-22 DIAGNOSIS — I48.91 ATRIAL FIBRILLATION, UNSPECIFIED TYPE (HCC): Primary | ICD-10-CM

## 2023-09-22 LAB — INR BLD: 2

## 2023-09-29 ENCOUNTER — ANTI-COAG VISIT (OUTPATIENT)
Dept: FAMILY MEDICINE CLINIC | Age: 83
End: 2023-09-29
Payer: MEDICARE

## 2023-09-29 DIAGNOSIS — I48.91 ATRIAL FIBRILLATION, UNSPECIFIED TYPE (HCC): Primary | ICD-10-CM

## 2023-09-29 LAB — INR BLD: 2.3

## 2023-09-29 PROCEDURE — 93793 ANTICOAG MGMT PT WARFARIN: CPT | Performed by: FAMILY MEDICINE

## 2023-10-06 ENCOUNTER — ANTI-COAG VISIT (OUTPATIENT)
Dept: FAMILY MEDICINE CLINIC | Age: 83
End: 2023-10-06

## 2023-10-06 DIAGNOSIS — I48.91 ATRIAL FIBRILLATION, UNSPECIFIED TYPE (HCC): Primary | ICD-10-CM

## 2023-10-06 LAB — INR BLD: 2.6

## 2023-10-13 ENCOUNTER — ANTI-COAG VISIT (OUTPATIENT)
Dept: FAMILY MEDICINE CLINIC | Age: 83
End: 2023-10-13
Payer: MEDICARE

## 2023-10-13 DIAGNOSIS — I48.91 ATRIAL FIBRILLATION, UNSPECIFIED TYPE (HCC): Primary | ICD-10-CM

## 2023-10-13 LAB — INR BLD: 2.5

## 2023-10-13 PROCEDURE — 93793 ANTICOAG MGMT PT WARFARIN: CPT | Performed by: FAMILY MEDICINE

## 2023-10-20 ENCOUNTER — ANTI-COAG VISIT (OUTPATIENT)
Dept: FAMILY MEDICINE CLINIC | Age: 83
End: 2023-10-20

## 2023-10-20 DIAGNOSIS — I48.91 ATRIAL FIBRILLATION, UNSPECIFIED TYPE (HCC): Primary | ICD-10-CM

## 2023-10-20 LAB — INR BLD: 2

## 2023-10-23 DIAGNOSIS — I10 ESSENTIAL HYPERTENSION: ICD-10-CM

## 2023-10-24 ENCOUNTER — APPOINTMENT (OUTPATIENT)
Dept: INTERVENTIONAL RADIOLOGY/VASCULAR | Age: 83
DRG: 291 | End: 2023-10-24
Payer: MEDICARE

## 2023-10-24 ENCOUNTER — APPOINTMENT (OUTPATIENT)
Dept: GENERAL RADIOLOGY | Age: 83
DRG: 291 | End: 2023-10-24
Payer: MEDICARE

## 2023-10-24 ENCOUNTER — OFFICE VISIT (OUTPATIENT)
Dept: PRIMARY CARE CLINIC | Age: 83
End: 2023-10-24
Payer: MEDICARE

## 2023-10-24 ENCOUNTER — APPOINTMENT (OUTPATIENT)
Age: 83
DRG: 291 | End: 2023-10-24
Attending: INTERNAL MEDICINE
Payer: MEDICARE

## 2023-10-24 ENCOUNTER — HOSPITAL ENCOUNTER (INPATIENT)
Age: 83
LOS: 3 days | Discharge: HOME | DRG: 291 | End: 2023-10-27
Attending: EMERGENCY MEDICINE | Admitting: INTERNAL MEDICINE
Payer: MEDICARE

## 2023-10-24 VITALS
DIASTOLIC BLOOD PRESSURE: 74 MMHG | OXYGEN SATURATION: 94 % | TEMPERATURE: 97.3 F | SYSTOLIC BLOOD PRESSURE: 130 MMHG | HEART RATE: 86 BPM

## 2023-10-24 DIAGNOSIS — R06.02 SHORTNESS OF BREATH: Primary | ICD-10-CM

## 2023-10-24 DIAGNOSIS — I50.9 ACUTE CONGESTIVE HEART FAILURE, UNSPECIFIED HEART FAILURE TYPE (HCC): Primary | ICD-10-CM

## 2023-10-24 DIAGNOSIS — I50.43 ACUTE ON CHRONIC COMBINED SYSTOLIC AND DIASTOLIC CONGESTIVE HEART FAILURE (HCC): ICD-10-CM

## 2023-10-24 PROBLEM — R60.1 ANASARCA: Status: ACTIVE | Noted: 2023-10-24

## 2023-10-24 LAB
ALBUMIN SERPL-MCNC: 4.1 G/DL (ref 3.5–5.2)
ALBUMIN/GLOB SERPL: 1.4 {RATIO} (ref 1–2.5)
ALP SERPL-CCNC: 145 U/L (ref 35–104)
ALT SERPL-CCNC: 12 U/L (ref 5–33)
ANION GAP SERPL CALCULATED.3IONS-SCNC: 12 MMOL/L (ref 9–17)
AST SERPL-CCNC: 16 U/L
BASOPHILS # BLD: 0.04 K/UL (ref 0–0.2)
BASOPHILS NFR BLD: 1 % (ref 0–2)
BILIRUB SERPL-MCNC: 0.6 MG/DL (ref 0.3–1.2)
BNP SERPL-MCNC: 2368 PG/ML
BUN SERPL-MCNC: 22 MG/DL (ref 8–23)
BUN/CREAT SERPL: 20 (ref 9–20)
CALCIUM SERPL-MCNC: 9 MG/DL (ref 8.6–10.4)
CHLORIDE SERPL-SCNC: 109 MMOL/L (ref 98–107)
CO2 SERPL-SCNC: 24 MMOL/L (ref 20–31)
CREAT SERPL-MCNC: 1.1 MG/DL (ref 0.5–0.9)
ECHO AO ROOT DIAM: 3.4 CM
ECHO AO ROOT INDEX: 1.3 CM/M2
ECHO AV PEAK GRADIENT: 10 MMHG
ECHO AV PEAK VELOCITY: 1.6 M/S
ECHO AV VELOCITY RATIO: 0.5
ECHO BSA: 2.79 M2
ECHO EST RA PRESSURE: 8 MMHG
ECHO LA AREA 2C: 27.9 CM2
ECHO LA AREA 4C: 30.3 CM2
ECHO LA DIAMETER INDEX: 2.15 CM/M2
ECHO LA DIAMETER: 5.6 CM
ECHO LA MAJOR AXIS: 6.2 CM
ECHO LA MINOR AXIS: 5.6 CM
ECHO LA TO AORTIC ROOT RATIO: 1.65
ECHO LA VOL 2C: 114 ML (ref 22–52)
ECHO LA VOL 4C: 122 ML (ref 22–52)
ECHO LA VOL BP: 123 ML (ref 22–52)
ECHO LA VOL/BSA BIPLANE: 47 ML/M2 (ref 16–34)
ECHO LA VOLUME INDEX A2C: 44 ML/M2 (ref 16–34)
ECHO LA VOLUME INDEX A4C: 47 ML/M2 (ref 16–34)
ECHO LV EDV A2C: 89 ML
ECHO LV EDV A4C: 131 ML
ECHO LV EDV INDEX A4C: 50 ML/M2
ECHO LV EDV NDEX A2C: 34 ML/M2
ECHO LV EJECTION FRACTION A2C: 67 %
ECHO LV EJECTION FRACTION A4C: 59 %
ECHO LV ESV A2C: 29 ML
ECHO LV ESV A4C: 54 ML
ECHO LV ESV INDEX A2C: 11 ML/M2
ECHO LV ESV INDEX A4C: 21 ML/M2
ECHO LV FRACTIONAL SHORTENING: 22 % (ref 28–44)
ECHO LV INTERNAL DIMENSION DIASTOLE INDEX: 1.92 CM/M2
ECHO LV INTERNAL DIMENSION DIASTOLIC: 5 CM (ref 3.9–5.3)
ECHO LV INTERNAL DIMENSION SYSTOLIC INDEX: 1.49 CM/M2
ECHO LV INTERNAL DIMENSION SYSTOLIC: 3.9 CM
ECHO LV IVSD: 0.9 CM (ref 0.6–0.9)
ECHO LV MASS 2D: 169.9 G (ref 67–162)
ECHO LV MASS INDEX 2D: 65.1 G/M2 (ref 43–95)
ECHO LV POSTERIOR WALL DIASTOLIC: 1 CM (ref 0.6–0.9)
ECHO LV RELATIVE WALL THICKNESS RATIO: 0.4
ECHO LVOT PEAK GRADIENT: 2 MMHG
ECHO LVOT PEAK VELOCITY: 0.8 M/S
ECHO MV MAX VELOCITY: 1.4 M/S
ECHO MV PEAK GRADIENT: 8 MMHG
ECHO PV MAX VELOCITY: 1.2 M/S
ECHO PV PEAK GRADIENT: 5 MMHG
ECHO RIGHT VENTRICULAR SYSTOLIC PRESSURE (RVSP): 51 MMHG
ECHO TV PEAK GRADIENT: 4 MMHG
ECHO TV REGURGITANT MAX VELOCITY: 3.26 M/S
ECHO TV REGURGITANT PEAK GRADIENT: 43 MMHG
EOSINOPHIL # BLD: 0.2 K/UL (ref 0–0.44)
EOSINOPHILS RELATIVE PERCENT: 3 % (ref 1–4)
ERYTHROCYTE [DISTWIDTH] IN BLOOD BY AUTOMATED COUNT: 14.6 % (ref 11.8–14.4)
GFR SERPL CREATININE-BSD FRML MDRD: 50 ML/MIN/1.73M2
GLUCOSE SERPL-MCNC: 104 MG/DL (ref 70–99)
HCT VFR BLD AUTO: 37.1 % (ref 36.3–47.1)
HGB BLD-MCNC: 11.6 G/DL (ref 11.9–15.1)
IMM GRANULOCYTES # BLD AUTO: <0.03 K/UL (ref 0–0.3)
IMM GRANULOCYTES NFR BLD: 0 %
INR PPP: 2
LYMPHOCYTES NFR BLD: 1.4 K/UL (ref 1.1–3.7)
LYMPHOCYTES RELATIVE PERCENT: 22 % (ref 24–43)
MAGNESIUM SERPL-MCNC: 1.9 MG/DL (ref 1.6–2.6)
MCH RBC QN AUTO: 30.9 PG (ref 25.2–33.5)
MCHC RBC AUTO-ENTMCNC: 31.3 G/DL (ref 25.2–33.5)
MCV RBC AUTO: 98.7 FL (ref 82.6–102.9)
MONOCYTES NFR BLD: 0.66 K/UL (ref 0.1–1.2)
MONOCYTES NFR BLD: 10 % (ref 3–12)
NEUTROPHILS NFR BLD: 64 % (ref 36–65)
NEUTS SEG NFR BLD: 4.16 K/UL (ref 1.5–8.1)
NRBC BLD-RTO: 0 PER 100 WBC
PLATELET # BLD AUTO: ABNORMAL K/UL (ref 138–453)
PLATELET, FLUORESCENCE: 126 K/UL (ref 138–453)
PLATELETS.RETICULATED NFR BLD AUTO: 13.1 % (ref 1.1–10.3)
POTASSIUM SERPL-SCNC: 3.9 MMOL/L (ref 3.7–5.3)
PROT SERPL-MCNC: 7 G/DL (ref 6.4–8.3)
PROTHROMBIN TIME: 22.2 SEC (ref 11.5–14.2)
RBC # BLD AUTO: 3.76 M/UL (ref 3.95–5.11)
SODIUM SERPL-SCNC: 145 MMOL/L (ref 135–144)
TROPONIN I SERPL HS-MCNC: 7 NG/L (ref 0–14)
TROPONIN I SERPL HS-MCNC: 7 NG/L (ref 0–14)
TROPONIN I SERPL HS-MCNC: <6 NG/L (ref 0–14)
WBC OTHER # BLD: 6.5 K/UL (ref 3.5–11.3)

## 2023-10-24 PROCEDURE — 71045 X-RAY EXAM CHEST 1 VIEW: CPT

## 2023-10-24 PROCEDURE — 80053 COMPREHEN METABOLIC PANEL: CPT

## 2023-10-24 PROCEDURE — 93005 ELECTROCARDIOGRAM TRACING: CPT | Performed by: EMERGENCY MEDICINE

## 2023-10-24 PROCEDURE — 99222 1ST HOSP IP/OBS MODERATE 55: CPT | Performed by: INTERNAL MEDICINE

## 2023-10-24 PROCEDURE — 93306 TTE W/DOPPLER COMPLETE: CPT

## 2023-10-24 PROCEDURE — 84484 ASSAY OF TROPONIN QUANT: CPT

## 2023-10-24 PROCEDURE — 94760 N-INVAS EAR/PLS OXIMETRY 1: CPT

## 2023-10-24 PROCEDURE — 99211 OFF/OP EST MAY X REQ PHY/QHP: CPT | Performed by: NURSE PRACTITIONER

## 2023-10-24 PROCEDURE — 2580000003 HC RX 258: Performed by: INTERNAL MEDICINE

## 2023-10-24 PROCEDURE — 6360000002 HC RX W HCPCS: Performed by: INTERNAL MEDICINE

## 2023-10-24 PROCEDURE — 99999 PR OFFICE/OUTPT VISIT,PROCEDURE ONLY: CPT | Performed by: NURSE PRACTITIONER

## 2023-10-24 PROCEDURE — 2060000000 HC ICU INTERMEDIATE R&B

## 2023-10-24 PROCEDURE — 6370000000 HC RX 637 (ALT 250 FOR IP): Performed by: NURSE PRACTITIONER

## 2023-10-24 PROCEDURE — 93306 TTE W/DOPPLER COMPLETE: CPT | Performed by: INTERNAL MEDICINE

## 2023-10-24 PROCEDURE — 83880 ASSAY OF NATRIURETIC PEPTIDE: CPT

## 2023-10-24 PROCEDURE — 85610 PROTHROMBIN TIME: CPT

## 2023-10-24 PROCEDURE — 94660 CPAP INITIATION&MGMT: CPT

## 2023-10-24 PROCEDURE — 85025 COMPLETE CBC W/AUTO DIFF WBC: CPT

## 2023-10-24 PROCEDURE — 93970 EXTREMITY STUDY: CPT

## 2023-10-24 PROCEDURE — 83735 ASSAY OF MAGNESIUM: CPT

## 2023-10-24 PROCEDURE — 6370000000 HC RX 637 (ALT 250 FOR IP): Performed by: INTERNAL MEDICINE

## 2023-10-24 PROCEDURE — 99285 EMERGENCY DEPT VISIT HI MDM: CPT

## 2023-10-24 PROCEDURE — 36415 COLL VENOUS BLD VENIPUNCTURE: CPT

## 2023-10-24 RX ORDER — WARFARIN SODIUM 2.5 MG/1
5 TABLET ORAL
Status: COMPLETED | OUTPATIENT
Start: 2023-10-24 | End: 2023-10-24

## 2023-10-24 RX ORDER — SODIUM CHLORIDE 0.9 % (FLUSH) 0.9 %
10 SYRINGE (ML) INJECTION EVERY 12 HOURS SCHEDULED
Status: DISCONTINUED | OUTPATIENT
Start: 2023-10-24 | End: 2023-10-27 | Stop reason: HOSPADM

## 2023-10-24 RX ORDER — AMLODIPINE BESYLATE 5 MG/1
10 TABLET ORAL DAILY
Status: DISCONTINUED | OUTPATIENT
Start: 2023-10-25 | End: 2023-10-27 | Stop reason: HOSPADM

## 2023-10-24 RX ORDER — ACETAMINOPHEN 325 MG/1
650 TABLET ORAL EVERY 4 HOURS PRN
Status: DISCONTINUED | OUTPATIENT
Start: 2023-10-24 | End: 2023-10-27 | Stop reason: HOSPADM

## 2023-10-24 RX ORDER — ATORVASTATIN CALCIUM 40 MG/1
40 TABLET, FILM COATED ORAL NIGHTLY
Status: DISCONTINUED | OUTPATIENT
Start: 2023-10-24 | End: 2023-10-27 | Stop reason: HOSPADM

## 2023-10-24 RX ORDER — ONDANSETRON 2 MG/ML
4 INJECTION INTRAMUSCULAR; INTRAVENOUS EVERY 6 HOURS PRN
Status: DISCONTINUED | OUTPATIENT
Start: 2023-10-24 | End: 2023-10-27 | Stop reason: HOSPADM

## 2023-10-24 RX ORDER — SODIUM CHLORIDE 9 MG/ML
INJECTION, SOLUTION INTRAVENOUS PRN
Status: DISCONTINUED | OUTPATIENT
Start: 2023-10-24 | End: 2023-10-27 | Stop reason: HOSPADM

## 2023-10-24 RX ORDER — LANOLIN ALCOHOL/MO/W.PET/CERES
3 CREAM (GRAM) TOPICAL NIGHTLY PRN
Status: DISCONTINUED | OUTPATIENT
Start: 2023-10-24 | End: 2023-10-27 | Stop reason: HOSPADM

## 2023-10-24 RX ORDER — POTASSIUM CHLORIDE 20 MEQ/1
20 TABLET, EXTENDED RELEASE ORAL DAILY
Status: DISCONTINUED | OUTPATIENT
Start: 2023-10-24 | End: 2023-10-27 | Stop reason: HOSPADM

## 2023-10-24 RX ORDER — LEVOTHYROXINE SODIUM 0.03 MG/1
50 TABLET ORAL DAILY
Status: DISCONTINUED | OUTPATIENT
Start: 2023-10-25 | End: 2023-10-27 | Stop reason: HOSPADM

## 2023-10-24 RX ORDER — ALBUTEROL SULFATE 2.5 MG/3ML
2.5 SOLUTION RESPIRATORY (INHALATION)
Status: DISCONTINUED | OUTPATIENT
Start: 2023-10-24 | End: 2023-10-27 | Stop reason: HOSPADM

## 2023-10-24 RX ORDER — FUROSEMIDE 20 MG/1
TABLET ORAL
Qty: 90 TABLET | Refills: 0 | Status: ON HOLD
Start: 2023-10-24 | End: 2023-10-27 | Stop reason: HOSPADM

## 2023-10-24 RX ORDER — FUROSEMIDE 10 MG/ML
20 INJECTION INTRAMUSCULAR; INTRAVENOUS 2 TIMES DAILY
Status: DISCONTINUED | OUTPATIENT
Start: 2023-10-24 | End: 2023-10-26

## 2023-10-24 RX ORDER — SODIUM CHLORIDE 0.9 % (FLUSH) 0.9 %
10 SYRINGE (ML) INJECTION PRN
Status: DISCONTINUED | OUTPATIENT
Start: 2023-10-24 | End: 2023-10-27 | Stop reason: HOSPADM

## 2023-10-24 RX ORDER — IPRATROPIUM BROMIDE AND ALBUTEROL SULFATE 2.5; .5 MG/3ML; MG/3ML
1 SOLUTION RESPIRATORY (INHALATION)
Status: DISCONTINUED | OUTPATIENT
Start: 2023-10-24 | End: 2023-10-24

## 2023-10-24 RX ORDER — SODIUM CHLORIDE FOR INHALATION 0.9 %
3 VIAL, NEBULIZER (ML) INHALATION
Status: DISCONTINUED | OUTPATIENT
Start: 2023-10-24 | End: 2023-10-27 | Stop reason: HOSPADM

## 2023-10-24 RX ADMIN — WARFARIN SODIUM 5 MG: 2.5 TABLET ORAL at 18:45

## 2023-10-24 RX ADMIN — METOPROLOL TARTRATE 25 MG: 25 TABLET, FILM COATED ORAL at 21:04

## 2023-10-24 RX ADMIN — ACETAMINOPHEN 650 MG: 325 TABLET ORAL at 22:24

## 2023-10-24 RX ADMIN — Medication 3 MG: at 22:24

## 2023-10-24 RX ADMIN — POTASSIUM CHLORIDE 20 MEQ: 1500 TABLET, EXTENDED RELEASE ORAL at 17:14

## 2023-10-24 RX ADMIN — SODIUM CHLORIDE, PRESERVATIVE FREE 10 ML: 5 INJECTION INTRAVENOUS at 21:05

## 2023-10-24 RX ADMIN — FUROSEMIDE 20 MG: 10 INJECTION, SOLUTION INTRAMUSCULAR; INTRAVENOUS at 18:45

## 2023-10-24 RX ADMIN — ATORVASTATIN CALCIUM 40 MG: 40 TABLET, FILM COATED ORAL at 21:04

## 2023-10-24 ASSESSMENT — PATIENT HEALTH QUESTIONNAIRE - PHQ9
SUM OF ALL RESPONSES TO PHQ9 QUESTIONS 1 & 2: 0
SUM OF ALL RESPONSES TO PHQ QUESTIONS 1-9: 0
SUM OF ALL RESPONSES TO PHQ QUESTIONS 1-9: 0
2. FEELING DOWN, DEPRESSED OR HOPELESS: 0
1. LITTLE INTEREST OR PLEASURE IN DOING THINGS: 0
SUM OF ALL RESPONSES TO PHQ QUESTIONS 1-9: 0
SUM OF ALL RESPONSES TO PHQ QUESTIONS 1-9: 0

## 2023-10-24 ASSESSMENT — PAIN - FUNCTIONAL ASSESSMENT
PAIN_FUNCTIONAL_ASSESSMENT: ACTIVITIES ARE NOT PREVENTED
PAIN_FUNCTIONAL_ASSESSMENT: 0-10
PAIN_FUNCTIONAL_ASSESSMENT: PREVENTS OR INTERFERES SOME ACTIVE ACTIVITIES AND ADLS
PAIN_FUNCTIONAL_ASSESSMENT: ACTIVITIES ARE NOT PREVENTED
PAIN_FUNCTIONAL_ASSESSMENT: 0-10

## 2023-10-24 ASSESSMENT — ENCOUNTER SYMPTOMS
VOMITING: 0
BLOOD IN STOOL: 0
ABDOMINAL PAIN: 0
NAUSEA: 0
COUGH: 0
EYE PAIN: 0
CONSTIPATION: 0
SHORTNESS OF BREATH: 1
DIARRHEA: 0
BACK PAIN: 0

## 2023-10-24 ASSESSMENT — PAIN DESCRIPTION - ONSET
ONSET: ON-GOING
ONSET: ON-GOING

## 2023-10-24 ASSESSMENT — PAIN DESCRIPTION - ORIENTATION
ORIENTATION: POSTERIOR
ORIENTATION: RIGHT;LEFT
ORIENTATION: RIGHT;LEFT

## 2023-10-24 ASSESSMENT — PAIN DESCRIPTION - DESCRIPTORS
DESCRIPTORS: ACHING;DULL
DESCRIPTORS: TINGLING
DESCRIPTORS: THROBBING

## 2023-10-24 ASSESSMENT — PAIN DESCRIPTION - LOCATION
LOCATION: FOOT
LOCATION: HEAD

## 2023-10-24 ASSESSMENT — PAIN DESCRIPTION - FREQUENCY
FREQUENCY: CONTINUOUS
FREQUENCY: CONTINUOUS

## 2023-10-24 ASSESSMENT — PAIN SCALES - GENERAL
PAINLEVEL_OUTOF10: 5
PAINLEVEL_OUTOF10: 5
PAINLEVEL_OUTOF10: 1

## 2023-10-24 ASSESSMENT — PAIN DESCRIPTION - PAIN TYPE
TYPE: ACUTE PAIN
TYPE: ACUTE PAIN

## 2023-10-24 NOTE — PROGRESS NOTES
Warfarin Dosing - Pharmacy Consult Note  Consulting Provider: Sergio Cage  Indication:  Atrial Fibrillation  Warfarin Dose prior to admission: 5 mg daily   Concurrent anticoagulants/antiplatelets: n/a  Significant Drug Interactions: No obvious interactions  Recent Labs     10/24/23  1255   INR 2.0   HGB 11.6*   PLT See Reflexed IPF Result   LABALBU 4.1     Recent warfarin administrations        No warfarin orders with administrations found. Orders not given:            warfarin (COUMADIN) tablet 5 mg    warfarin placeholder: dosing by pharmacy                   Date             INR    Dose  10/24/23          2.0          5 mg    Assessment/Plan  (Goal INR: 2 - 3)  Continue home dose. Active problem list reviewed. INR orders are placed. Chart reviewed for pertinent labs, drug/diet interactions, and past doses. Documentation of patient's clinical condition was reviewed. Pharmacy Dosing:  Pharmacy will continue to follow.      Ciro Sethi  10/24/2023  5:07 PM

## 2023-10-24 NOTE — H&P
HOSPITALIST ADMISSION H&P      REASON FOR ADMISSION:  CHF--exacerbation  ESTIMATED LENGTH OF STAY:   > 2 midnights---2-4 days    ATTENDING/ADMITTING PHYSICIAN: Rashaun Cabrera MD MD  PCP: Reagan Gomez DO    HISTORY OF PRESENT ILLNESS:      The patient is a 79 y.o. female patient of Reagan Gomez DO who presents with  CHF---increasing BLE edema---\"heavy legs\"--pain in heels when standing----increasing over the last several weeks---while she denies respiratory distress or chest discomfort--she finds it more difficult to ambulate, but for short distances----troponin = 7---ProBNP = 2368  EKG---atrial fibrillation--PVCs--86--rate controlled on Coumadin [INR = 2.0]  recent TSH = 1.04--normal.  BLE brawny edema consistent with fluid overload and lymphedema---DUS BLE---no DVT---10.24.2023. Atrial fibrillation---chronic    CKD Stage 3a---sp right nephrectomy for RCC--2022    Thrombocytopenia---chronic    Anemia--chronic    HTN /78    Morbid obesity     See below for additional PMH. Patient xgef-rbvvgxedxi-lvosmuag-available records reviewed, including, but not limited to, ER reports--labs--imaging--EKGs--office records---personal notes.        Past Medical History:   Diagnosis Date    A-fib ([address]) 07/19/2017    Acute blood loss as cause of postoperative anemia 11/08/2018    Anxiety 04/30/2016    CAD (coronary artery disease)     TCC/[facility]/ last seen 9-2022    CHF (congestive heart failure) (HCC)     Class 2 severe obesity with serious comorbidity and body mass index (BMI) of 37.0 to 37.9 in adult [facility])     Closed fracture of proximal end of left humerus with routine healing 04/26/2021    Colitis 05/31/2019    Colitis due to Clostridium difficile     Depression 04/30/2016    Diarrhea     Dyslipidemia     Elevated fasting glucose 11/20/2018    FH: total abdominal hysterectomy and bilateral salpingo-oophorectomy 1966    Fractures 03/15/2021    Lt humerus impacted displaced    Full dentures OXYGEN--CPAP    Anti-infectives:  --    CHF---acute--diastolic---10.24.2023  BLE anasarca---10.24.2023  Lymphedema---BLE            2D ECHO--10.24.2023---pending            CXR---10.24.2023---no infiltrates--hazy pulmonary vasculature--mild CHF          DUS---BLE---10.24.2023---no DVT            ProBNP--10.24.2023---2368          Troponin---10.24.2023---7          EKG---10.24.2023---atrial fibrillation---PVCs--86--QTc 493          EKG---8.28.2024-----atrial fibrillation--76--low voltage---RSR' VI---NSTCs    CKD--Stage 3a             MICHAEL--11.5.2018--11.6.2018--Stage 3 superposed on Stage 2             DUS----kidney----11.6.2018---unremarkable---normal cortical echogenicity--no                                  hydronephrosis---no stones  Thrombocytopenia---chronic    Anemia--chronic  Atrial fibrillation----history              Bradycardia               EKG---11.9.2018---NSR              EKG---11.5.2018--sinus bradycardia---51--1st degree AVB---LVH--QTc 490              BALDO cardioversion----9.10. 2017---successful atrial fibrillation---to--NSR              BALDO---9.10. 2017--LA dilatation---preserved LVSF--atrial septal aneurysm                           without shunt--LA appendage n o clot--negative bubble study---                          mild MR---moderate TR--normal TV leaflets             Cardiac catheterization---9. 5.2017--0% LM--mid 20%---normal D1---0% left circumflex--                         40% proximal OM1---mild aneurysmal dilatation proximal RCA---LVEF ~ 55%             2D ECHO----8. 3.2017--ROVERTO--NLVSF---dilated RV---NRVSF---MAC--mild MR--                          mild TR---RVSP ~ 31 mm Hg---LVEF ~ 50%             RVR---newly diagnosed----7.19.2017----see cardioversion above---9.10.2017              MI ruled out--7.20.2017             EKG---7.20.2017--atrial fibrillation--78---low voltage              EKG---7.19.2017--atrial fibrillation---RVR---111--diffuse low                         voltage--RSR' V1-2

## 2023-10-24 NOTE — ED PROVIDER NOTES
[facility]  eMERGENCY dEPARTMENT eNCOUnter      Pt Name: Chema Edwards  MRN: [id number]  [address] [facility] [facility] [place] 1940  Date of evaluation: 10/24/2023      CHIEF COMPLAINT       Chief Complaint   Patient presents with    Leg Pain    Leg Swelling    Shortness of Breath     Worsening pain and swelling to bilateral legs today. HISTORY OF PRESENT ILLNESS    Chema Edwards is a 79 y.o. female who presents patient presents with worsening pain and swelling to both legs been in for about a week and a half she has some shortness of breath with activity there is been no fevers or chills she said her legs got to the point where she can hardly walk now she does have a history of A-fib and is on Coumadin she does have a history of CHF      REVIEW OF SYSTEMS         Review of Systems   Constitutional:  Negative for chills and fever. HENT:  Negative for congestion and ear pain. Eyes:  Negative for pain and visual disturbance. Respiratory:  Positive for shortness of breath. Negative for cough. Cardiovascular:  Positive for leg swelling. Negative for chest pain and palpitations. Gastrointestinal:  Negative for abdominal pain, blood in stool, constipation, diarrhea, nausea and vomiting. Endocrine: Negative for polydipsia and polyuria. Genitourinary:  Negative for difficulty urinating, dysuria, frequency, vaginal bleeding and vaginal discharge. Musculoskeletal:  Negative for back pain, joint swelling, myalgias, neck pain and neck stiffness. Lateral lower extremity pain and swelling   Skin:  Negative for rash. Neurological:  Negative for dizziness, weakness and headaches. Hematological:  Negative for adenopathy. Does not bruise/bleed easily. Psychiatric/Behavioral:  Negative for confusion, self-injury and suicidal ideas.           PAST MEDICAL HISTORY    has a past medical history of A-fib ([address]), Acute blood loss as cause of postoperative anemia, Anxiety, CAD (coronary artery disease), CHF breath sounds. Decreased breath sounds present. No wheezing, rhonchi or rales. Abdominal:      General: Bowel sounds are normal.      Palpations: Abdomen is soft. Musculoskeletal:         General: Normal range of motion. Cervical back: Normal range of motion. Right lower leg: Tenderness present. Edema present. Left lower leg: Tenderness present. Edema present. Comments: 3+ swelling of the lower extremities are warm there is no evidence of cellulitis the swelling is above the knees   Skin:     General: Skin is warm and dry. Neurological:      Mental Status: She is alert and oriented to person, place, and time. Psychiatric:         Behavior: Behavior normal.           DIFFERENTIAL DIAGNOSIS/ MDM:     Differential diagnosis considered: CHF DVT less likely as she is anticoagulated but due to immobilization we will check    Chronic Conditions affecting care (DM,HTN,CA, etc): Obesity    Social Determinants of Health affecting care (unable to care for self, lives alone, unemployed, homeless,etc): Patient here with family members    History source(s) (patient,spouse,parent,family,friend,EMS,etc): Patient and family    Review of external sources (ECF,Hospital records,EMS report, radiology reports, etc): Previous charts reviewed    Tests considered but not ordered: Not applicable    Independent interpretation of tests (eg.  X-ray, CAT scan, Doppler studies, EKG): No acute findings on EKG    Discussion of x-ray results with radiology:     Consults: Hospitalist Dr. John Goff    Consideration for admission/observation (even if discharged):  We will admit the patient for diuresis    Prescription considerations:     Sepsis considered: No evidence    Critical Care note written: Not applicable    DIAGNOSTIC RESULTS     EKG: All EKG's are interpreted by the Emergency Department Physician who either signs or Co-signs this chart in the absence of a cardiologist.    EKG A-fib with PVCs rate of 86 bpm and QRS

## 2023-10-24 NOTE — PROGRESS NOTES
Discussed with patient and caregiver due to significantly increased swelling, pain and SOB She should be evaluated in the ER. Pt verbalizes agreement  Pt was transferred to the ER via wheelchair.

## 2023-10-24 NOTE — PROGRESS NOTES
amount of viscous secretions []  Copius, Viscious Yellow/ Secretions   CXR as reported by physician [x]  clear  []  Unavailable []  Infiltrates and/or consolidation  []  Unavailable []  Mucus Plugging and or lobar consolidation  []  Unavailable   Cough [x]  Strong, productive cough []  Weak productive cough []  No cough or weak non-productive cough   Bela Quiroga, [facility]  5:34 PM                            FEMALE                                  MALE                            FEV1 Predicted Normal Values                        FEV1 Predicted Normal Values          Age                                     Height in Feet and Inches       Age                                     Height in Feet and Inches       4' 11\" 5' 1\" 5' 3\" 5' 5\" 5' 7\" 5' 9\" 5' 11\" 6' 1\"  4' 11\" 5' 1\" 5' 3\" 5' 5\" 5' 7\" 5' 9\" 5' 11\" 6' 1\"   42 - 45 2.49 2.66 2.84 3.03 3.22 3.42 3.62 3.83 42 - 45 2.82 3.03 3.26 3.49 3.72 3.96 4.22 4.47   46 - 49 2.40 2.57 2.76 2.94 3.14 3.33 3.54 3.75 46 - 49 2.70 2.92 3.14 3.37 3.61 3.85 4.10 4.36   50 - 53 2.31 2.48 2.66 2.85 3.04 3.24 3.45 3.66 50 - 53 2.58 2.80 3.02 3.25 3.49 3.73 3.98 4.24   54 - 57 2.21 2.38 2.57 2.75 2.95 3.14 3.35 3.56 54 - 57 2.46 2.67 2.89 3.12 3.36 3.60 3.85 4.11   58 - 61 2.10 2.28 2.46 2.65 2.84 3.04 3.24 3.45 58 - 61 2.32 2.54 2.76 2.99 3.23 3.47 3.72 3.98   62 - 65 1.99 2.17 2.35 2.54 2.73 2.93 3.13 3.34 62 - 65 2.19 2.40 2.62 2.85 3.09 3.33 3.58 3.84   66 - 69 1.88 2.05 2.23 2.42 2.61 2.81 3.02 3.23 66 - 69 2.04 2.26 2.48 2.71 2.95 3.19 3.44 3.70   70+ 1.82 1.99 2.17 2.36 2.55 2.75 2.95 3.16 70+ 1.97 2.19 2.41 2.64 2.87 3.12 3.37 3.62             Predicted Peak Expiratory Flow Rate                                       Height (in)  Female       Height (in) Male           Age 64 62 62 63 60 71 74 73 Age            81 555 364 531 320 496 169 377 703  01 59 31 60 53 70 72 74 76   25 337 352 366 381 396 411 426 441 25 447 476 505 533 562 591 619 648 677   30 329 344 359 374 389 404  277 292 306 321 336 351 366 381 65 363 392 421 449 478 507 535 564 594   70 269 284 299 314 329 344 359 374 70 353 382 410 439 468 496 525 554 583   75 261 274 289 305 319 334 348 364 75 344 372 400 429 458 487 515 544 573   80 253 266 282 296 312 327 342 356 80 904 607 640 488 487 332 473 354 718

## 2023-10-25 ENCOUNTER — APPOINTMENT (OUTPATIENT)
Dept: GENERAL RADIOLOGY | Age: 83
DRG: 291 | End: 2023-10-25
Payer: MEDICARE

## 2023-10-25 LAB
ANION GAP SERPL CALCULATED.3IONS-SCNC: 10 MMOL/L (ref 9–17)
BASOPHILS # BLD: 0.03 K/UL (ref 0–0.2)
BASOPHILS NFR BLD: 1 % (ref 0–2)
BUN SERPL-MCNC: 21 MG/DL (ref 8–23)
BUN/CREAT SERPL: 21 (ref 9–20)
CALCIUM SERPL-MCNC: 8.9 MG/DL (ref 8.6–10.4)
CHLORIDE SERPL-SCNC: 106 MMOL/L (ref 98–107)
CO2 SERPL-SCNC: 26 MMOL/L (ref 20–31)
CREAT SERPL-MCNC: 1 MG/DL (ref 0.5–0.9)
EKG Q-T INTERVAL: 412 MS
EKG QRS DURATION: 100 MS
EKG QTC CALCULATION (BAZETT): 493 MS
EKG R AXIS: 0 DEGREES
EKG T AXIS: 28 DEGREES
EKG VENTRICULAR RATE: 86 BPM
EOSINOPHIL # BLD: 0.15 K/UL (ref 0–0.44)
EOSINOPHILS RELATIVE PERCENT: 3 % (ref 1–4)
ERYTHROCYTE [DISTWIDTH] IN BLOOD BY AUTOMATED COUNT: 14.4 % (ref 11.8–14.4)
GFR SERPL CREATININE-BSD FRML MDRD: 56 ML/MIN/1.73M2
GLUCOSE SERPL-MCNC: 92 MG/DL (ref 70–99)
HCT VFR BLD AUTO: 34.9 % (ref 36.3–47.1)
HGB BLD-MCNC: 11.1 G/DL (ref 11.9–15.1)
IMM GRANULOCYTES # BLD AUTO: <0.03 K/UL (ref 0–0.3)
IMM GRANULOCYTES NFR BLD: 0 %
INR PPP: 2
LYMPHOCYTES NFR BLD: 1.35 K/UL (ref 1.1–3.7)
LYMPHOCYTES RELATIVE PERCENT: 28 % (ref 24–43)
MCH RBC QN AUTO: 31 PG (ref 25.2–33.5)
MCHC RBC AUTO-ENTMCNC: 31.8 G/DL (ref 25.2–33.5)
MCV RBC AUTO: 97.5 FL (ref 82.6–102.9)
MONOCYTES NFR BLD: 0.58 K/UL (ref 0.1–1.2)
MONOCYTES NFR BLD: 12 % (ref 3–12)
NEUTROPHILS NFR BLD: 56 % (ref 36–65)
NEUTS SEG NFR BLD: 2.64 K/UL (ref 1.5–8.1)
NRBC BLD-RTO: 0 PER 100 WBC
PLATELET # BLD AUTO: 106 K/UL (ref 138–453)
PMV BLD AUTO: 12.3 FL (ref 8.1–13.5)
POTASSIUM SERPL-SCNC: 3.8 MMOL/L (ref 3.7–5.3)
PROTHROMBIN TIME: 22.3 SEC (ref 11.5–14.2)
RBC # BLD AUTO: 3.58 M/UL (ref 3.95–5.11)
SODIUM SERPL-SCNC: 142 MMOL/L (ref 135–144)
TROPONIN I SERPL HS-MCNC: 6 NG/L (ref 0–14)
WBC OTHER # BLD: 4.8 K/UL (ref 3.5–11.3)

## 2023-10-25 PROCEDURE — 71045 X-RAY EXAM CHEST 1 VIEW: CPT

## 2023-10-25 PROCEDURE — 97161 PT EVAL LOW COMPLEX 20 MIN: CPT

## 2023-10-25 PROCEDURE — 99222 1ST HOSP IP/OBS MODERATE 55: CPT | Performed by: INTERNAL MEDICINE

## 2023-10-25 PROCEDURE — 97165 OT EVAL LOW COMPLEX 30 MIN: CPT | Performed by: OCCUPATIONAL THERAPIST

## 2023-10-25 PROCEDURE — 94660 CPAP INITIATION&MGMT: CPT

## 2023-10-25 PROCEDURE — 85610 PROTHROMBIN TIME: CPT

## 2023-10-25 PROCEDURE — 2580000003 HC RX 258: Performed by: INTERNAL MEDICINE

## 2023-10-25 PROCEDURE — 6370000000 HC RX 637 (ALT 250 FOR IP): Performed by: INTERNAL MEDICINE

## 2023-10-25 PROCEDURE — 99231 SBSQ HOSP IP/OBS SF/LOW 25: CPT | Performed by: INTERNAL MEDICINE

## 2023-10-25 PROCEDURE — 84484 ASSAY OF TROPONIN QUANT: CPT

## 2023-10-25 PROCEDURE — 85025 COMPLETE CBC W/AUTO DIFF WBC: CPT

## 2023-10-25 PROCEDURE — 80048 BASIC METABOLIC PNL TOTAL CA: CPT

## 2023-10-25 PROCEDURE — 2060000000 HC ICU INTERMEDIATE R&B

## 2023-10-25 PROCEDURE — 36415 COLL VENOUS BLD VENIPUNCTURE: CPT

## 2023-10-25 PROCEDURE — 6360000002 HC RX W HCPCS: Performed by: INTERNAL MEDICINE

## 2023-10-25 PROCEDURE — 94760 N-INVAS EAR/PLS OXIMETRY 1: CPT

## 2023-10-25 PROCEDURE — 6370000000 HC RX 637 (ALT 250 FOR IP): Performed by: NURSE PRACTITIONER

## 2023-10-25 RX ORDER — WARFARIN SODIUM 2.5 MG/1
5 TABLET ORAL
Status: COMPLETED | OUTPATIENT
Start: 2023-10-25 | End: 2023-10-25

## 2023-10-25 RX ORDER — SPIRONOLACTONE 25 MG/1
25 TABLET ORAL DAILY
Status: DISCONTINUED | OUTPATIENT
Start: 2023-10-25 | End: 2023-10-27 | Stop reason: HOSPADM

## 2023-10-25 RX ADMIN — POTASSIUM CHLORIDE 20 MEQ: 1500 TABLET, EXTENDED RELEASE ORAL at 07:38

## 2023-10-25 RX ADMIN — SPIRONOLACTONE 25 MG: 25 TABLET ORAL at 10:31

## 2023-10-25 RX ADMIN — SODIUM CHLORIDE, PRESERVATIVE FREE 10 ML: 5 INJECTION INTRAVENOUS at 07:38

## 2023-10-25 RX ADMIN — FUROSEMIDE 20 MG: 10 INJECTION, SOLUTION INTRAMUSCULAR; INTRAVENOUS at 07:38

## 2023-10-25 RX ADMIN — LEVOTHYROXINE SODIUM 50 MCG: 0.03 TABLET ORAL at 07:37

## 2023-10-25 RX ADMIN — METOPROLOL TARTRATE 25 MG: 25 TABLET, FILM COATED ORAL at 21:10

## 2023-10-25 RX ADMIN — AMLODIPINE BESYLATE 10 MG: 5 TABLET ORAL at 07:38

## 2023-10-25 RX ADMIN — ATORVASTATIN CALCIUM 40 MG: 40 TABLET, FILM COATED ORAL at 21:10

## 2023-10-25 RX ADMIN — Medication 3 MG: at 22:34

## 2023-10-25 RX ADMIN — FUROSEMIDE 20 MG: 10 INJECTION, SOLUTION INTRAMUSCULAR; INTRAVENOUS at 17:59

## 2023-10-25 RX ADMIN — WARFARIN SODIUM 5 MG: 2.5 TABLET ORAL at 17:59

## 2023-10-25 RX ADMIN — SODIUM CHLORIDE, PRESERVATIVE FREE 10 ML: 5 INJECTION INTRAVENOUS at 21:11

## 2023-10-25 RX ADMIN — METOPROLOL TARTRATE 25 MG: 25 TABLET, FILM COATED ORAL at 07:38

## 2023-10-25 ASSESSMENT — PAIN SCALES - GENERAL
PAINLEVEL_OUTOF10: 0
PAINLEVEL_OUTOF10: 0

## 2023-10-25 NOTE — CONSULTS
[facility] Cardiology    Consult                        Today's Date: 10/25/2023  Patient Name: Ector Preciado  Date of admission: 10/24/2023 12:21 PM  Patient's age: 79 y.o., 1940  Admission Dx: Acute on chronic combined systolic and diastolic congestive heart failure (HCC) [I50.43]  CHF (congestive heart failure), NYHA class I, acute on chronic, combined ([address]) [I50.43]  Acute congestive heart failure, unspecified heart failure type ([address]) [I50.9]    Reason for Consult:  Cardiac evaluation    Requesting Physician: Marisa Matson MD    CHIEF COMPLAINT:  leg edema, wt gain    History Obtained From:  patient, electronic medical record    HISTORY OF PRESENT ILLNESS:      The patient is a 79 y.o.  female who is admitted to the hospital for increasing bilateral leg edema, heavy legs and weight gain. Patient denies any chest pain or dyspnea.  Patient has history of chronic afib, anemia, thrombocytopenia    Past Medical History:   has a past medical history of A-fib ([address]), Acute blood loss as cause of postoperative anemia, Anxiety, CAD (coronary artery disease), CHF (congestive heart failure) ([address]), Class 2 severe obesity with serious comorbidity and body mass index (BMI) of 37.0 to 37.9 in adult [facility]), Closed fracture of proximal end of left humerus with routine healing, Colitis, Colitis due to Clostridium difficile, Depression, Diarrhea, Dyslipidemia, Elevated fasting glucose, FH: total abdominal hysterectomy and bilateral salpingo-oophorectomy, Fractures, Full dentures, Glucose intolerance (impaired glucose tolerance), History of blood transfusion, Hyperlipidemia, Hypertension, Hypokalemia, Malignant neoplasm of sigmoid colon ([address]), Multiple closed fractures of ribs, Obesity, JAYJAY on CPAP, Osteoarthritis, Osteoporosis, Other complete intestinal obstruction ([address]), Other specified hypothyroidism, Prolonged emergence from general anesthesia, Renal cell carcinoma of right kidney ([address]), S/P small bowel

## 2023-10-25 NOTE — PROGRESS NOTES
Occupational Therapy  Facility/Department: [address] [place] [address]  Occupational Therapy Initial Assessment    Name: Barney Gillespie  : 1940  MRN: [id number]  Date of Service: 10/25/2023    Discharge Recommendations:  Patient would benefit from continued therapy after discharge, Home with Home health OT          Patient Diagnosis(es): The primary encounter diagnosis was Acute congestive heart failure, unspecified heart failure type ([address]). A diagnosis of Acute on chronic combined systolic and diastolic congestive heart failure (HCC) was also pertinent to this visit. Past Medical History:  has a past medical history of A-fib ([address]), Acute blood loss as cause of postoperative anemia, Anxiety, CAD (coronary artery disease), CHF (congestive heart failure) ([address]), Class 2 severe obesity with serious comorbidity and body mass index (BMI) of 37.0 to 37.9 in [facility]), Closed fracture of proximal end of left humerus with routine healing, Colitis, Colitis due to Clostridium difficile, Depression, Diarrhea, Dyslipidemia, Elevated fasting glucose, FH: total abdominal hysterectomy and bilateral salpingo-oophorectomy, Fractures, Full dentures, Glucose intolerance (impaired glucose tolerance), History of blood transfusion, Hyperlipidemia, Hypertension, Hypokalemia, Malignant neoplasm of sigmoid colon ([address]), Multiple closed fractures of ribs, Obesity, JAYJAY on CPAP, Osteoarthritis, Osteoporosis, Other complete intestinal obstruction ([address]), Other specified hypothyroidism, Prolonged emergence from general anesthesia, Renal cell carcinoma of right kidney ([address]), S/P small bowel resection, Thrombocytopenia, unspecified ([address]), Under care of team, and Wears glasses. Past Surgical History:  has a past surgical history that includes Total abdominal hysterectomy w/ bilateral salpingoophorectomy (); Cholecystectomy (); Varicose vein surgery (Right, 1960s);  Hip Arthroplasty (Left); Knee Arthroplasty (Left); Knee Arthroplasty

## 2023-10-25 NOTE — PROGRESS NOTES
Physical Therapy  Facility/Department: [address] [place] [address]  Physical Therapy Initial Assessment    Name: Staci Moreno  : 1940  MRN: [id number]  Date of Service: 10/25/2023    Discharge Recommendations:  Home with assist PRN, Patient would benefit from continued therapy after discharge, Home with Home health PT, Therapy recommended at discharge          Patient Diagnosis(es): The primary encounter diagnosis was Acute congestive heart failure, unspecified heart failure type ([address]). A diagnosis of Acute on chronic combined systolic and diastolic congestive heart failure (HCC) was also pertinent to this visit. Past Medical History:  has a past medical history of A-fib ([address]), Acute blood loss as cause of postoperative anemia, Anxiety, CAD (coronary artery disease), CHF (congestive heart failure) ([address]), Class 2 severe obesity with serious comorbidity and body mass index (BMI) of 37.0 to 37.9 in [facility]), Closed fracture of proximal end of left humerus with routine healing, Colitis, Colitis due to Clostridium difficile, Depression, Diarrhea, Dyslipidemia, Elevated fasting glucose, FH: total abdominal hysterectomy and bilateral salpingo-oophorectomy, Fractures, Full dentures, Glucose intolerance (impaired glucose tolerance), History of blood transfusion, Hyperlipidemia, Hypertension, Hypokalemia, Malignant neoplasm of sigmoid colon ([address]), Multiple closed fractures of ribs, Obesity, JAYJAY on CPAP, Osteoarthritis, Osteoporosis, Other complete intestinal obstruction ([address]), Other specified hypothyroidism, Prolonged emergence from general anesthesia, Renal cell carcinoma of right kidney ([address]), S/P small bowel resection, Thrombocytopenia, unspecified ([address]), Under care of team, and Wears glasses. Past Surgical History:  has a past surgical history that includes Total abdominal hysterectomy w/ bilateral salpingoophorectomy (); Cholecystectomy (1960s); Varicose vein surgery (Right, 1960s);  Hip Arthroplasty (Left);

## 2023-10-25 NOTE — CARE COORDINATION
Case Management Assessment  Initial Evaluation    Date/Time of Evaluation: 10/25/2023 9:11 AM  Assessment Completed by: Flaco Veloz RN    If patient is discharged prior to next notation, then this note serves as note for discharge by case management. Patient Name: Selwyn Lara                   YOB: 1940  Diagnosis: Acute on chronic combined systolic and diastolic congestive heart failure (HCC) [I50.43]  CHF (congestive heart failure), NYHA class I, acute on chronic, combined ([address]) [I50.43]  Acute congestive heart failure, unspecified heart failure type ([address]) [I50.9]                   Date / Time: 10/24/2023 12:21 PM    Patient Admission Status: Inpatient   Readmission Risk (Low < 19, Mod (19-27), High > 27): Readmission Risk Score: 15.3    Current PCP: Timbo Jasso, DO  PCP verified by CM? Yes    Chart Reviewed: Yes      History Provided by: Patient  Patient Orientation: Alert and Oriented    Patient Cognition: Alert    Hospitalization in the last 30 days (Readmission):  No    If yes, Readmission Assessment in CM Navigator will be completed.     Advance Directives:      Code Status: Full Code   Patient's Primary Decision Maker is:      Primary Decision Maker: Kevin Steinberg (FLORENTINOA) - Child - [phone]    Secondary Decision Maker: Xiomara Black - Child - [phone]    Supplemental (Other) Decision Maker: Suleman Stage - [phone]    Discharge Planning:    Patient lives with: Family Members Type of Home: House  Primary Care Giver: Family  Patient Support Systems include: Children, Family Members   Current Financial resources: Medicare  Current community resources: None  Current services prior to admission: C-pap, Durable Medical Equipment            Current DME: [place], Cpap            Type of Home Care services:  None    ADLS  Prior functional level: Assistance with the following:, Cooking, Housework, Shopping  Current functional level: Assistance with the following:, Bathing, Toileting, Dressing, Cooking, Housework, Shopping, Mobility    PT AM-PAC:   /24  OT AM-PAC:   /24    Family can provide assistance at DC: Yes  Would you like Case Management to discuss the discharge plan with any other family members/significant others, and if so, who? No  Plans to Return to Present Housing: Unknown at present  Other Identified Issues/Barriers to RETURNING to current housing: yes  Potential Assistance needed at discharge: [address] [facility]            Potential DME: Cpap  Patient expects to discharge to: [address] [place] [address] for transportation at discharge:      Financial    Payor: [address] [facility] / Plan: MEDICARE PART A AND B / Product Type: *No Product type* /     Does insurance require precert for SNF: No    Potential assistance Purchasing Medications:    Meds-to-Beds request: [id number] - [place], [place] - [address] [facility]  [address] [facility]  [place] [address] [facility]  Phone: [phone] Fax: [phone]    [facility] [phone] [facility] [phone] Unknown Ph[address] [address]  Phone: [phone] Fax: [phone]      Notes:    Factors facilitating achievement of predicted outcomes: Family support and Has needed Durable Medical Equipment at home    Barriers to discharge: Decreased endurance    Additional Case Management Notes: Patient lives at home with her grand daughter and is mostly independent. Pt's daughter gets groceries and takes her to appts. PT/OT ordered. Pt denies any additional discharge needs at present time.     The Plan for Transition of Care is related to the following treatment goals of Acute on chronic combined systolic and diastolic congestive heart failure (HCC) [I50.43]  CHF (congestive heart failure), NYHA class I, acute on chronic, combined (HCC) [I50.43]  Acute congestive heart failure, unspecified heart failure type ([address]) [W63.6]    IF APPLICABLE: The Patient

## 2023-10-26 ENCOUNTER — APPOINTMENT (OUTPATIENT)
Dept: GENERAL RADIOLOGY | Age: 83
DRG: 291 | End: 2023-10-26
Payer: MEDICARE

## 2023-10-26 LAB
ANION GAP SERPL CALCULATED.3IONS-SCNC: 10 MMOL/L (ref 9–17)
BASOPHILS # BLD: 0.03 K/UL (ref 0–0.2)
BASOPHILS NFR BLD: 1 % (ref 0–2)
BUN SERPL-MCNC: 22 MG/DL (ref 8–23)
BUN/CREAT SERPL: 20 (ref 9–20)
CALCIUM SERPL-MCNC: 9.4 MG/DL (ref 8.6–10.4)
CHLORIDE SERPL-SCNC: 105 MMOL/L (ref 98–107)
CO2 SERPL-SCNC: 29 MMOL/L (ref 20–31)
CREAT SERPL-MCNC: 1.1 MG/DL (ref 0.5–0.9)
EOSINOPHIL # BLD: 0.18 K/UL (ref 0–0.44)
EOSINOPHILS RELATIVE PERCENT: 3 % (ref 1–4)
ERYTHROCYTE [DISTWIDTH] IN BLOOD BY AUTOMATED COUNT: 14.4 % (ref 11.8–14.4)
GFR SERPL CREATININE-BSD FRML MDRD: 50 ML/MIN/1.73M2
GLUCOSE SERPL-MCNC: 97 MG/DL (ref 70–99)
HCT VFR BLD AUTO: 35.7 % (ref 36.3–47.1)
HGB BLD-MCNC: 11.5 G/DL (ref 11.9–15.1)
IMM GRANULOCYTES # BLD AUTO: <0.03 K/UL (ref 0–0.3)
IMM GRANULOCYTES NFR BLD: 0 %
INR PPP: 1.9
LYMPHOCYTES NFR BLD: 1.55 K/UL (ref 1.1–3.7)
LYMPHOCYTES RELATIVE PERCENT: 25 % (ref 24–43)
MCH RBC QN AUTO: 31.1 PG (ref 25.2–33.5)
MCHC RBC AUTO-ENTMCNC: 32.2 G/DL (ref 25.2–33.5)
MCV RBC AUTO: 96.5 FL (ref 82.6–102.9)
MONOCYTES NFR BLD: 0.74 K/UL (ref 0.1–1.2)
MONOCYTES NFR BLD: 12 % (ref 3–12)
NEUTROPHILS NFR BLD: 59 % (ref 36–65)
NEUTS SEG NFR BLD: 3.69 K/UL (ref 1.5–8.1)
NRBC BLD-RTO: 0 PER 100 WBC
PLATELET # BLD AUTO: ABNORMAL K/UL (ref 138–453)
PLATELET, FLUORESCENCE: 119 K/UL (ref 138–453)
PLATELETS.RETICULATED NFR BLD AUTO: 12.6 % (ref 1.1–10.3)
POTASSIUM SERPL-SCNC: 4 MMOL/L (ref 3.7–5.3)
PROTHROMBIN TIME: 21 SEC (ref 11.5–14.2)
RBC # BLD AUTO: 3.7 M/UL (ref 3.95–5.11)
SODIUM SERPL-SCNC: 144 MMOL/L (ref 135–144)
WBC OTHER # BLD: 6.2 K/UL (ref 3.5–11.3)

## 2023-10-26 PROCEDURE — 6370000000 HC RX 637 (ALT 250 FOR IP): Performed by: INTERNAL MEDICINE

## 2023-10-26 PROCEDURE — 6360000002 HC RX W HCPCS: Performed by: INTERNAL MEDICINE

## 2023-10-26 PROCEDURE — 2580000003 HC RX 258: Performed by: INTERNAL MEDICINE

## 2023-10-26 PROCEDURE — 2060000000 HC ICU INTERMEDIATE R&B

## 2023-10-26 PROCEDURE — 85025 COMPLETE CBC W/AUTO DIFF WBC: CPT

## 2023-10-26 PROCEDURE — 99231 SBSQ HOSP IP/OBS SF/LOW 25: CPT | Performed by: INTERNAL MEDICINE

## 2023-10-26 PROCEDURE — 85610 PROTHROMBIN TIME: CPT

## 2023-10-26 PROCEDURE — 71045 X-RAY EXAM CHEST 1 VIEW: CPT

## 2023-10-26 PROCEDURE — 97110 THERAPEUTIC EXERCISES: CPT

## 2023-10-26 PROCEDURE — 6370000000 HC RX 637 (ALT 250 FOR IP): Performed by: NURSE PRACTITIONER

## 2023-10-26 PROCEDURE — 80048 BASIC METABOLIC PNL TOTAL CA: CPT

## 2023-10-26 PROCEDURE — 36415 COLL VENOUS BLD VENIPUNCTURE: CPT

## 2023-10-26 RX ORDER — FUROSEMIDE 10 MG/ML
20 INJECTION INTRAMUSCULAR; INTRAVENOUS DAILY
Status: DISCONTINUED | OUTPATIENT
Start: 2023-10-27 | End: 2023-10-27 | Stop reason: HOSPADM

## 2023-10-26 RX ADMIN — METOPROLOL TARTRATE 25 MG: 25 TABLET, FILM COATED ORAL at 21:26

## 2023-10-26 RX ADMIN — Medication 3 MG: at 22:25

## 2023-10-26 RX ADMIN — SODIUM CHLORIDE, PRESERVATIVE FREE 10 ML: 5 INJECTION INTRAVENOUS at 08:24

## 2023-10-26 RX ADMIN — ACETAMINOPHEN 650 MG: 325 TABLET ORAL at 08:22

## 2023-10-26 RX ADMIN — SODIUM CHLORIDE, PRESERVATIVE FREE 10 ML: 5 INJECTION INTRAVENOUS at 21:26

## 2023-10-26 RX ADMIN — FUROSEMIDE 20 MG: 10 INJECTION, SOLUTION INTRAMUSCULAR; INTRAVENOUS at 08:24

## 2023-10-26 RX ADMIN — POTASSIUM CHLORIDE 20 MEQ: 1500 TABLET, EXTENDED RELEASE ORAL at 08:21

## 2023-10-26 RX ADMIN — ATORVASTATIN CALCIUM 40 MG: 40 TABLET, FILM COATED ORAL at 21:26

## 2023-10-26 RX ADMIN — AMLODIPINE BESYLATE 10 MG: 5 TABLET ORAL at 08:21

## 2023-10-26 RX ADMIN — WARFARIN SODIUM 7 MG: 2.5 TABLET ORAL at 18:17

## 2023-10-26 RX ADMIN — METOPROLOL TARTRATE 25 MG: 25 TABLET, FILM COATED ORAL at 08:24

## 2023-10-26 RX ADMIN — LEVOTHYROXINE SODIUM 50 MCG: 0.03 TABLET ORAL at 08:24

## 2023-10-26 RX ADMIN — SPIRONOLACTONE 25 MG: 25 TABLET ORAL at 08:24

## 2023-10-26 ASSESSMENT — PAIN DESCRIPTION - LOCATION: LOCATION: GENERALIZED

## 2023-10-26 ASSESSMENT — PAIN - FUNCTIONAL ASSESSMENT: PAIN_FUNCTIONAL_ASSESSMENT: ACTIVITIES ARE NOT PREVENTED

## 2023-10-26 ASSESSMENT — PAIN SCALES - GENERAL: PAINLEVEL_OUTOF10: 6

## 2023-10-26 ASSESSMENT — PAIN DESCRIPTION - DESCRIPTORS: DESCRIPTORS: ACHING

## 2023-10-26 NOTE — CARE COORDINATION
Plan:  Spoke with patient/family/representative about discharge plan. Patient/Family/Representative verbalizes understanding of the plan of care and denies discharge needs or further services at this time. SW provided business card. SW will continue to monitor needs and assist as appropriate.          Electronically signed by SHREYAS Fowler on 10/26/2023 at 11:50 AM

## 2023-10-26 NOTE — PROGRESS NOTES
rounding on PCU. Assessment: Patient believes she is getting out tomorrow. They have been able to get rid of excess fluid, the past two days. She is grateful for her healing. Intervention: Engaged in conversation and prayer. Patient expressed gratitude for visit and offer of continued prayer. Plan: Chaplains are available 24/7 to help with spiritual and emotional concerns.         10/26/23 1016   Encounter Summary   Encounter Overview/Reason  Volunteer Encounter   Service Provided For: Patient and family together   Referral/Consult From:  [address] Family members   Last Encounter  10/26/23   Complexity of Encounter Low   Begin Time 1003   End Time  1020   Total Time Calculated 17 min   Spiritual/Emotional needs   Type Spiritual Support   Assessment/Intervention/Outcome   Assessment Calm   Intervention Active listening;Prayer (assurance of)/Capay;Sustaining Presence/Ministry of presence   Outcome Expressed Gratitude

## 2023-10-26 NOTE — ACP (ADVANCE CARE PLANNING)
Advance Care Planning     Advance Care Planning Activator (Inpatient)  Conversation Note      Date of ACP Conversation: 10/26/2023     Conversation Conducted with: Patient with Decision Making Capacity    ACP Activator: Vince Liborio, [address] [facility]:     Current Designated Health Care Decision Maker:     Primary Decision Maker: Zen Reyes [facility]) - Child - [phone]    Secondary Decision Maker: Xiomara Black - Child - [phone]    Supplemental (Other) Decision Maker: Arleen Wilson - [phone]  Click here to complete Healthcare Decision Makers including section of the Healthcare Decision Maker Relationship (ie \"Primary\")  Today we documented Decision Maker(s). The patient will provide ACP documents. Care Preferences    Ventilation: \"If you were in your present state of health and suddenly became very ill and were unable to breathe on your own, what would your preference be about the use of a ventilator (breathing machine) if it were available to you? \"      Would the patient desire the use of ventilator (breathing machine)?:  \"that is a decision we would have to make at that time\"    \"If your health worsens and it becomes clear that your chance of recovery is unlikely, what would your preference be about the use of a ventilator (breathing machine) if it were available to you? \"     Would the patient desire the use of ventilator (breathing machine)?: \"it depends on the situation at that time. I do not want to be a vegetable\"        Resuscitation  \"CPR works best to restart the heart when there is a sudden event, like a heart attack, in someone who is otherwise healthy. Unfortunately, CPR does not typically restart the heart for people who have serious health conditions or who are very sick. \"    \"In the event your heart stopped as a result of an underlying serious health condition, would you want attempts to be made to restart your heart (answer \"yes\" for attempt to

## 2023-10-26 NOTE — PROGRESS NOTES
Physical Therapy  Facility/Department: [facility]  PROGRESSIVE C[facility]  Daily Treatment Note  NAME: Roma An  : 1940  MRN: [id number]    Date of Service: 10/26/2023    Discharge Recommendations:  Home with assist PRN, Patient would benefit from continued therapy after discharge, Home with Home health PT, Therapy recommended at discharge        Patient Diagnosis(es): The primary encounter diagnosis was Acute congestive heart failure, unspecified heart failure type ([address] A diagnosis of Acute on chronic combined systolic and diastolic congestive heart failure (HCC) was also pertinent to this visit. Assessment   Activity Tolerance: Patient tolerated treatment well     Plan    Physcial Therapy Plan  General Plan: 5-7 times per week  Current Treatment Recommendations: Strengthening;Balance training;Functional mobility training;Transfer training;ADL/Self-care training;IADL training; Endurance training;Gait training;Neuromuscular re-education;Manual;Home exercise program;Safety education & training;Equipment evaluation, education, & procurement;Patient/Caregiver education & training; Therapeutic activities; Co-Treatment     Restrictions  Restrictions/Precautions  Restrictions/Precautions: Fall Risk  Implants present? : Metal implants (knee)     Subjective    Subjective  Subjective: Patienton bedside commode upon arrival with nursing at bedside. Agreeabel to PT treat.   Orientation  Overall Orientation Status: Within Functional Limits  Cognition  Overall Cognitive Status: WFL     Objective   Vitals     Bed Mobility Training  Bed Mobility Training: No  Transfer Training  Transfer Training: Yes  Overall Level of Assistance: Minimum assistance  Sit to Stand: Contact-guard assistance;Minimum assistance  Stand to Sit: Contact-guard assistance  Gait Training  Gait Training: Yes  Right Side Weight Bearing: Full  Left Side Weight Bearing: Full  Gait  Overall Level of Assistance: Contact-guard assistance;Stand-by

## 2023-10-26 NOTE — PROGRESS NOTES
Warfarin Dosing - Pharmacy Consult Note  Consulting Provider: Zen Stark  Indication:  Atrial Fibrillation  Warfarin Dose prior to admission: 5 mg daily   Concurrent anticoagulants/antiplatelets: n/a  Significant Drug Interactions: No obvious interactions  Recent Labs     10/24/23  1255 10/25/23  0341 10/26/23  0515   INR 2.0 2.0 1.9   HGB 11.6* 11.1* 11.5*   PLT See Reflexed IPF Result 106* See Reflexed IPF Result   LABALBU 4.1  --   --      Recent warfarin administrations                     warfarin (COUMADIN) tablet 5 mg (mg) 5 mg Given 10/25/23 1759    warfarin (COUMADIN) tablet 5 mg (mg) 5 mg Given 10/24/23 1845                     Date              INR          Dose  10/24/23        2.0           5 mg  10/25/23        2.0           5 mg  10/26/23        1.9           7 mg      Assessment/Plan  (Goal INR: 2 - 3)  Will a slightly higher dose as INR is below target. Active problem list reviewed. INR orders are placed. Chart reviewed for pertinent labs, drug/diet interactions, and past doses. Documentation of patient's clinical condition was reviewed. Pharmacy Dosing:  Pharmacy will continue to follow.      Chuy Rapp  10/26/2023  7:05 AM

## 2023-10-26 NOTE — PROGRESS NOTES
Occupational Therapy    Patient met supine in bed with son present. Patient refused OT treatment at this time stating intent to remain in bed due to being hooked up to external catheter and not wanting to complete transfers. This writer educated patient on role and importance for completing OT with patient verbalizing understanding but continued refusal. OT to attempt session at later time schedule permitting.     SAAD Roman/KOBY

## 2023-10-27 ENCOUNTER — APPOINTMENT (OUTPATIENT)
Dept: GENERAL RADIOLOGY | Age: 83
DRG: 291 | End: 2023-10-27
Payer: MEDICARE

## 2023-10-27 ENCOUNTER — TELEPHONE (OUTPATIENT)
Dept: FAMILY MEDICINE CLINIC | Age: 83
End: 2023-10-27

## 2023-10-27 VITALS
HEART RATE: 74 BPM | DIASTOLIC BLOOD PRESSURE: 73 MMHG | HEIGHT: 67 IN | OXYGEN SATURATION: 91 % | TEMPERATURE: 97.9 F | BODY MASS INDEX: 45.99 KG/M2 | RESPIRATION RATE: 16 BRPM | SYSTOLIC BLOOD PRESSURE: 119 MMHG | WEIGHT: 293 LBS

## 2023-10-27 LAB
ANION GAP SERPL CALCULATED.3IONS-SCNC: 10 MMOL/L (ref 9–17)
BASOPHILS # BLD: <0.03 K/UL (ref 0–0.2)
BASOPHILS NFR BLD: 0 % (ref 0–2)
BUN SERPL-MCNC: 24 MG/DL (ref 8–23)
BUN/CREAT SERPL: 22 (ref 9–20)
CALCIUM SERPL-MCNC: 9.1 MG/DL (ref 8.6–10.4)
CHLORIDE SERPL-SCNC: 105 MMOL/L (ref 98–107)
CO2 SERPL-SCNC: 28 MMOL/L (ref 20–31)
CREAT SERPL-MCNC: 1.1 MG/DL (ref 0.5–0.9)
EOSINOPHIL # BLD: 0.24 K/UL (ref 0–0.44)
EOSINOPHILS RELATIVE PERCENT: 3 % (ref 1–4)
ERYTHROCYTE [DISTWIDTH] IN BLOOD BY AUTOMATED COUNT: 14.3 % (ref 11.8–14.4)
GFR SERPL CREATININE-BSD FRML MDRD: 50 ML/MIN/1.73M2
GLUCOSE SERPL-MCNC: 102 MG/DL (ref 70–99)
HCT VFR BLD AUTO: 36.5 % (ref 36.3–47.1)
HGB BLD-MCNC: 11.7 G/DL (ref 11.9–15.1)
IMM GRANULOCYTES # BLD AUTO: <0.03 K/UL (ref 0–0.3)
IMM GRANULOCYTES NFR BLD: 0 %
INR PPP: 1.8
LYMPHOCYTES NFR BLD: 1.44 K/UL (ref 1.1–3.7)
LYMPHOCYTES RELATIVE PERCENT: 20 % (ref 24–43)
MAGNESIUM SERPL-MCNC: 1.8 MG/DL (ref 1.6–2.6)
MCH RBC QN AUTO: 31.1 PG (ref 25.2–33.5)
MCHC RBC AUTO-ENTMCNC: 32.1 G/DL (ref 25.2–33.5)
MCV RBC AUTO: 97.1 FL (ref 82.6–102.9)
MONOCYTES NFR BLD: 0.86 K/UL (ref 0.1–1.2)
MONOCYTES NFR BLD: 12 % (ref 3–12)
NEUTROPHILS NFR BLD: 65 % (ref 36–65)
NEUTS SEG NFR BLD: 4.73 K/UL (ref 1.5–8.1)
NRBC BLD-RTO: 0 PER 100 WBC
PLATELET # BLD AUTO: ABNORMAL K/UL (ref 138–453)
PLATELET, FLUORESCENCE: 123 K/UL (ref 138–453)
PLATELETS.RETICULATED NFR BLD AUTO: 13.3 % (ref 1.1–10.3)
POTASSIUM SERPL-SCNC: 4 MMOL/L (ref 3.7–5.3)
PROTHROMBIN TIME: 20.1 SEC (ref 11.5–14.2)
RBC # BLD AUTO: 3.76 M/UL (ref 3.95–5.11)
SODIUM SERPL-SCNC: 143 MMOL/L (ref 135–144)
WBC OTHER # BLD: 7.3 K/UL (ref 3.5–11.3)

## 2023-10-27 PROCEDURE — 6370000000 HC RX 637 (ALT 250 FOR IP): Performed by: INTERNAL MEDICINE

## 2023-10-27 PROCEDURE — 36415 COLL VENOUS BLD VENIPUNCTURE: CPT

## 2023-10-27 PROCEDURE — 2580000003 HC RX 258: Performed by: INTERNAL MEDICINE

## 2023-10-27 PROCEDURE — 97110 THERAPEUTIC EXERCISES: CPT

## 2023-10-27 PROCEDURE — 83735 ASSAY OF MAGNESIUM: CPT

## 2023-10-27 PROCEDURE — 6360000002 HC RX W HCPCS: Performed by: INTERNAL MEDICINE

## 2023-10-27 PROCEDURE — 85025 COMPLETE CBC W/AUTO DIFF WBC: CPT

## 2023-10-27 PROCEDURE — 85610 PROTHROMBIN TIME: CPT

## 2023-10-27 PROCEDURE — 80048 BASIC METABOLIC PNL TOTAL CA: CPT

## 2023-10-27 PROCEDURE — 71045 X-RAY EXAM CHEST 1 VIEW: CPT

## 2023-10-27 PROCEDURE — 99238 HOSP IP/OBS DSCHRG MGMT 30/<: CPT | Performed by: INTERNAL MEDICINE

## 2023-10-27 RX ORDER — FUROSEMIDE 40 MG/1
40 TABLET ORAL DAILY
Qty: 30 TABLET | Refills: 0 | Status: SHIPPED | OUTPATIENT
Start: 2023-10-27 | End: 2023-11-02 | Stop reason: SDUPTHER

## 2023-10-27 RX ORDER — WARFARIN SODIUM 2.5 MG/1
7.5 TABLET ORAL
Status: DISCONTINUED | OUTPATIENT
Start: 2023-10-27 | End: 2023-10-27

## 2023-10-27 RX ORDER — SPIRONOLACTONE 25 MG/1
25 TABLET ORAL DAILY
Qty: 30 TABLET | Refills: 0 | Status: SHIPPED | OUTPATIENT
Start: 2023-10-28 | End: 2023-11-02

## 2023-10-27 RX ADMIN — POTASSIUM CHLORIDE 20 MEQ: 1500 TABLET, EXTENDED RELEASE ORAL at 08:15

## 2023-10-27 RX ADMIN — LEVOTHYROXINE SODIUM 50 MCG: 0.03 TABLET ORAL at 08:16

## 2023-10-27 RX ADMIN — SPIRONOLACTONE 25 MG: 25 TABLET ORAL at 08:16

## 2023-10-27 RX ADMIN — METOPROLOL TARTRATE 25 MG: 25 TABLET, FILM COATED ORAL at 08:16

## 2023-10-27 RX ADMIN — FUROSEMIDE 20 MG: 10 INJECTION, SOLUTION INTRAMUSCULAR; INTRAVENOUS at 08:16

## 2023-10-27 RX ADMIN — ACETAMINOPHEN 650 MG: 325 TABLET ORAL at 08:35

## 2023-10-27 RX ADMIN — AMLODIPINE BESYLATE 10 MG: 5 TABLET ORAL at 08:15

## 2023-10-27 RX ADMIN — SODIUM CHLORIDE, PRESERVATIVE FREE 10 ML: 5 INJECTION INTRAVENOUS at 08:16

## 2023-10-27 RX ADMIN — WARFARIN SODIUM 7 MG: 2.5 TABLET ORAL at 12:13

## 2023-10-27 ASSESSMENT — PAIN DESCRIPTION - ORIENTATION: ORIENTATION: RIGHT;LEFT

## 2023-10-27 ASSESSMENT — PAIN SCALES - GENERAL: PAINLEVEL_OUTOF10: 7

## 2023-10-27 ASSESSMENT — PAIN DESCRIPTION - DESCRIPTORS: DESCRIPTORS: ACHING

## 2023-10-27 NOTE — TELEPHONE ENCOUNTER
Upon discharge they should give her recommendations for her coumadin dosing and timing of follow up check. Did they give her dosing instructions?

## 2023-10-27 NOTE — PLAN OF CARE
Problem: Discharge Planning  Goal: Discharge to home or other facility with appropriate resources  10/24/2023 2220 by Job Limon RN  Outcome: Progressing     Problem: Pain  Goal: Verbalizes/displays adequate comfort level or baseline comfort level  10/24/2023 2220 by Job Limon RN  Outcome: Progressing  Flowsheets (Taken 10/24/2023 2220)  Verbalizes/displays adequate comfort level or baseline comfort level:   Encourage patient to monitor pain and request assistance   Assess pain using appropriate pain scale     Problem: ABCDS Injury Assessment  Goal: Absence of physical injury  10/24/2023 2220 by Job Limon RN  Outcome: Progressing  Note: Pt is encouraged to reposition and assessed for any injuries. Will continue to monitor this shift. Problem: Safety - Adult  Goal: Free from fall injury  10/24/2023 2220 by Job Limon RN  Outcome: Progressing  Note: Bed alarm on, bed locked, side rails up, gripper socks on, call-light within reach and able to use appropriately. Will continue to monitor this shift.
Problem: Discharge Planning  Goal: Discharge to home or other facility with appropriate resources  10/25/2023 0819 by Malvin Gonzales RN  Outcome: Progressing  10/24/2023 2220 by Cherelle Garnica RN  Outcome: Progressing       Problem: Pain  Goal: Verbalizes/displays adequate comfort level or baseline comfort level  10/25/2023 0819 by Malvin Gonzales RN  Outcome: Progressing  10/24/2023 2220 by Cherelle Garnica RN  Outcome: Progressing  Flowsheets (Taken 10/24/2023 2220)  Verbalizes/displays adequate comfort level or baseline comfort level:   Encourage patient to monitor pain and request assistance   Assess pain using appropriate pain scale     Problem: ABCDS Injury Assessment  Goal: Absence of physical injury  10/25/2023 0819 by Malvin Gonzales RN  Outcome: Progressing  10/24/2023 2220 by Cherelle Garnica RN  Outcome: Progressing  Note: Pt is encouraged to reposition and assessed for any injuries. Will continue to monitor this shift.
Problem: Discharge Planning  Goal: Discharge to home or other facility with appropriate resources  10/26/2023 0833 by Mike Chan RN  Outcome: Progressing  Flowsheets (Taken 10/26/2023 0831)  Discharge to home or other facility with appropriate resources:   Identify barriers to discharge with patient and caregiver   Arrange for needed discharge resources and transportation as appropriate   Identify discharge learning needs (meds, wound care, etc)   Refer to discharge planning if patient needs post-hospital services based on physician order or complex needs related to functional status, cognitive ability or social support system  10/25/2023 2050 by Yoselin Hung RN  Outcome: Progressing  Flowsheets (Taken 10/25/2023 2030)  Discharge to home or other facility with appropriate resources:   Identify barriers to discharge with patient and caregiver   Arrange for needed discharge resources and transportation as appropriate   Identify discharge learning needs (meds, wound care, etc)   Refer to discharge planning if patient needs post-hospital services based on physician order or complex needs related to functional status, cognitive ability or social support system     Problem: Pain  Goal: Verbalizes/displays adequate comfort level or baseline comfort level  10/26/2023 0833 by Mike Chan RN  Outcome: Progressing  10/25/2023 2050 by Yoselin Hung RN  Outcome: Progressing     Problem: ABCDS Injury Assessment  Goal: Absence of physical injury  10/26/2023 0833 by Mike Chan RN  Outcome: Progressing  10/25/2023 2050 by Yoselin Hung RN  Outcome: Progressing     Problem: Safety - Adult  Goal: Free from fall injury  10/26/2023 0833 by Mike Chan RN  Outcome: Progressing  10/25/2023 2050 by Yoselin Hung RN  Outcome: Progressing     Problem: Chronic Conditions and Co-morbidities  Goal: Patient's chronic conditions and co-morbidity symptoms are monitored and maintained or improved  10/26/2023
Problem: Discharge Planning  Goal: Discharge to home or other facility with appropriate resources  10/26/2023 2136 by Xander Reed RN  Outcome: Progressing  Flowsheets (Taken 10/26/2023 2000)  Discharge to home or other facility with appropriate resources:   Identify barriers to discharge with patient and caregiver   Arrange for needed discharge resources and transportation as appropriate   Identify discharge learning needs (meds, wound care, etc)   Refer to discharge planning if patient needs post-hospital services based on physician order or complex needs related to functional status, cognitive ability or social support system  10/26/2023 0833 by Tay Sweeney RN  Outcome: Progressing  Flowsheets (Taken 10/26/2023 0831)  Discharge to home or other facility with appropriate resources:   Identify barriers to discharge with patient and caregiver   Arrange for needed discharge resources and transportation as appropriate   Identify discharge learning needs (meds, wound care, etc)   Refer to discharge planning if patient needs post-hospital services based on physician order or complex needs related to functional status, cognitive ability or social support system     Problem: Pain  Goal: Verbalizes/displays adequate comfort level or baseline comfort level  10/26/2023 2136 by Xander Reed RN  Outcome: Progressing  10/26/2023 0833 by Tay Sweeney RN  Outcome: Progressing     Problem: ABCDS Injury Assessment  Goal: Absence of physical injury  10/26/2023 2136 by Xander Reed RN  Outcome: Progressing  10/26/2023 0833 by Tay Sweeney RN  Outcome: Progressing     Problem: Safety - Adult  Goal: Free from fall injury  10/26/2023 2136 by Xander Reed RN  Outcome: Progressing  10/26/2023 0833 by Tay Sweeney RN  Outcome: Progressing     Problem: Chronic Conditions and Co-morbidities  Goal: Patient's chronic conditions and co-morbidity symptoms are monitored and maintained or improved  10/26/2023
Problem: Discharge Planning  Goal: Discharge to home or other facility with appropriate resources  10/27/2023 0807 by Doretha Ennis RN  Outcome: Progressing  10/26/2023 2136 by Elkin Heath RN  Outcome: Progressing  Flowsheets (Taken 10/26/2023 2000)  Discharge to home or other facility with appropriate resources:   Identify barriers to discharge with patient and caregiver   Arrange for needed discharge resources and transportation as appropriate   Identify discharge learning needs (meds, wound care, etc)   Refer to discharge planning if patient needs post-hospital services based on physician order or complex needs related to functional status, cognitive ability or social support system     Problem: Pain  Goal: Verbalizes/displays adequate comfort level or baseline comfort level  10/27/2023 0807 by Doretha Ennis RN  Outcome: Progressing  10/26/2023 2136 by Elkin Heath RN  Outcome: Progressing     Problem: ABCDS Injury Assessment  Goal: Absence of physical injury  10/27/2023 0807 by Doretha Ennis RN  Outcome: Progressing  10/26/2023 2136 by Elkin Heath RN  Outcome: Progressing     Problem: Safety - Adult  Goal: Free from fall injury  10/27/2023 0807 by Doretha Ennis RN  Outcome: Progressing  10/26/2023 2136 by Elkin Heath RN  Outcome: Progressing
Problem: Discharge Planning  Goal: Discharge to home or other facility with appropriate resources  Outcome: Progressing     Problem: Pain  Goal: Verbalizes/displays adequate comfort level or baseline comfort level  Outcome: Progressing     Problem: ABCDS Injury Assessment  Goal: Absence of physical injury  Outcome: Progressing     Problem: Safety - Adult  Goal: Free from fall injury  Outcome: Progressing
symptoms are exacerbated and prevent overall improvement and discharge

## 2023-10-27 NOTE — PROGRESS NOTES
Occupational Therapy    Patient met with refusal for OT treatment. Denies need for OT and wanting to remain in bed until being discharged.  Re-educated patient on role and importance of completing OT to increase functional performance with ADLs with patient verbalizing understanding but continued refusal.     SAAD Luciano/KOBY

## 2023-10-27 NOTE — DISCHARGE INSTR - DIET
Good nutrition is important when healing from an illness, injury, or surgery. Follow any nutrition recommendations given to you during your hospital stay. If you were given an oral nutrition supplement while in the hospital, continue to take this supplement at home. You can take it with meals, in-between meals, and/or before bedtime. These supplements can be purchased at most local grocery stores, pharmacies, and chain to be-stores. If you have any questions about your diet or nutrition, call the hospital and ask for the dietitian.   Cardiac diet 1800 ml fluid restriction in a 24 hour period

## 2023-10-27 NOTE — TELEPHONE ENCOUNTER
Patient calling as she has just been discharged from [address] [place] [address]. She states her INR was 1.9 yesterday and 1.8 today. Dr Suraj Rees had her take coumadin 7 mg yesterday and she is to take 7 mg today. What dose should she take after that and should she recheck INR again before next Friday? She checks INR every Friday and has recently been on coumadin 5 mg daily. Please advise. Care Transitions Initial Follow Up Call    Outreach made within 2 business days of discharge: Yes    Patient: Oniel Way Patient : 1940   MRN: [id number]  Reason for Admission: CHF  Discharge Date: 10/27/2023       Spoke with: patient    Discharge department/facility: [address] [place] [address] Interactive Patient Contact:  Was patient able to fill all prescriptions: Yes  Was patient instructed to bring all medications to the follow-up visit: Yes  Is patient taking all medications as directed in the discharge summary?  Yes  Does patient understand their discharge instructions: Yes  Does patient have questions or concerns that need addressed prior to 7-14 day follow up office visit: yes - see note above regarding INR results and coumadin dosing    Scheduled appointment with PCP within 7-14 days    Follow Up  Future Appointments   Date Time Provider [address]   2023 11:40 AM DO J LUIS Lee [facility]   2023  9:40 [facility]   2024 11:00 AM MD CT ROOM MD CT SCAN [facility] [place]   2024 10:15 AM Michelle Carbajal MD [facility]   3/11/2024 11:30 AM DO MIRYAM Mccormack [facility]       Elin Hartman RN

## 2023-10-27 NOTE — PROGRESS NOTES
Physical Therapy  Facility/Department: [facility]  PROGRESSIVE C[facility]  Daily Treatment Note  NAME: Myrtle Lewis  : 1940  MRN: [id number]    Date of Service: 10/27/2023    Discharge Recommendations:  Home with assist PRN, Patient would benefit from continued therapy after discharge, Home with Home health PT, Therapy recommended at discharge        Patient Diagnosis(es): The primary encounter diagnosis was Acute congestive heart failure, unspecified heart failure type ([address]). A diagnosis of Acute on chronic combined systolic and diastolic congestive heart failure (HCC) was also pertinent to this visit. Assessment   Activity Tolerance: Patient tolerated treatment well     Plan    Physcial Therapy Plan  Current Treatment Recommendations: Strengthening;Balance training;Functional mobility training;Transfer training;ADL/Self-care training;IADL training; Endurance training;Gait training;Neuromuscular re-education;Manual;Home exercise program;Safety education & training;Equipment evaluation, education, & procurement;Patient/Caregiver education & training; Therapeutic activities; Co-Treatment     Restrictions  Restrictions/Precautions  Restrictions/Precautions: Fall Risk  Implants present? : Metal implants (knee)     Subjective    Subjective  Subjective: Patient agreeable to bed exercises. Requests not to get up and moving because she is having bladder issues. Offered to help patient to get into a brief to help, patient states they are uncomfortable due to not having her size. Pain: none rated. Orientation  Overall Orientation Status: Within Functional Limits     Objective   Vitals  O2 Device: None (Room air)  Bed Mobility Training  Bed Mobility Training: No  Transfer Training  Transfer Training: No  Gait Training  Gait Training: No     PT Exercises  Exercise Treatment: ankle pumps x20, quad sets x10, heel slides x10, SLR x10     Safety Devices  Type of Devices: Left in bed;Call light within reach; Bed alarm in place

## 2023-10-27 NOTE — CARE COORDINATION
Home health provider list given to patient. The patient currently denies needing home health PT/OT, however, she will discuss the matter with her granddaughter.

## 2023-10-27 NOTE — PROGRESS NOTES
Warfarin Dosing - Pharmacy Consult Note  Consulting Provider: Argelia Ruggiero  Indication:  Atrial Fibrillation  Warfarin Dose prior to admission: 5 mg daily   Concurrent anticoagulants/antiplatelets: none  Significant Drug Interactions: No obvious interactions  Recent Labs     10/24/23  1255 10/25/23  0341 10/26/23  0515 10/27/23  0520   INR 2.0 2.0 1.9 1.8   HGB 11.6* 11.1* 11.5* 11.7*   PLT See Reflexed IPF Result 106* See Reflexed IPF Result See Reflexed IPF Result   LABALBU 4.1  --   --   --      Recent warfarin administrations                     warfarin (COUMADIN) tablet 7 mg (mg) 7 mg Given 10/26/23 1817    warfarin (COUMADIN) tablet 5 mg (mg) 5 mg Given 10/25/23 1759    warfarin (COUMADIN) tablet 5 mg (mg) 5 mg Given 10/24/23 1845                   Date              INR          Dose  10/24/23        2.0           5 mg  10/25/23        2.0           5 mg  10/26/23        1.9           7 mg  10/26/23        1.8           7.5 mg    Assessment/Plan  (Goal INR: 2 - 3)  Will give higher dose again today due to low INR. Active problem list reviewed. INR orders are placed. Chart reviewed for pertinent labs, drug/diet interactions, and past doses. Documentation of patient's clinical condition was reviewed. Pharmacy Dosing:  Pharmacy will continue to follow.

## 2023-10-27 NOTE — PROGRESS NOTES
Hospitalist Progress Note    Patient: Suresh Gentile     YOB: 1940    MRN: [id number]   Admit date: 10/24/2023     Acct: [de-identified]     PCP: Harvinder Vargas DO    CC--Interval History: CHF--acute combined--markedly improved on IV Lasix---home---10.27.2023---Lasix to 40 mg PO daily    Weight off ~ 27.5 pounds--skin more normal contour---wrinkles    BLE lymphedema---stasis dermatitis improved    CKD--Stage 3a---stable    Atrial fibrillation--chronic     Thrombocytopenia---chronic----119 --> 123    Gait-balance instability---needs to consistently use a walker for mobility    See note below        All other ROS negative except noted in HPI    Diet:  ADULT DIET;  Regular; Low Fat/Low Chol/High Fiber/JONNA; 1800 ml    Medications:  Scheduled Meds:   warfarin  7 mg Oral Once    furosemide  20 mg IntraVENous Daily    spironolactone  25 mg Oral Daily    sodium chloride flush  10 mL IntraVENous 2 times per day    amLODIPine  10 mg Oral Daily    atorvastatin  40 mg Oral Nightly    levothyroxine  50 mcg Oral Daily    metoprolol tartrate  25 mg Oral BID    potassium chloride  20 mEq Oral Daily    warfarin placeholder: dosing by pharmacy   Other RX Placeholder     Continuous Infusions:   sodium chloride       PRN Meds:sodium chloride flush, sodium chloride, ondansetron, acetaminophen, sodium chloride nebulizer, albuterol, melatonin    Objective:  Labs:  CBC with Differential:    Lab Results   Component Value Date/Time    WBC 7.3 10/27/2023 05:20 AM    RBC 3.76 10/27/2023 05:20 AM    HGB 11.7 10/27/2023 05:20 AM    HCT 36.5 10/27/2023 05:20 AM    PLT See Reflexed IPF Result 10/27/2023 05:20 AM    MCV 97.1 10/27/2023 05:20 AM    MCH 31.1 10/27/2023 05:20 AM    MCHC 32.1 10/27/2023 05:20 AM    RDW 14.3 10/27/2023 05:20 AM    LYMPHOPCT 20 10/27/2023 05:20 AM    MONOPCT 12 10/27/2023 05:20 AM    BASOPCT 0 10/27/2023 05:20 AM    MONOSABS 0.86 10/27/2023 05:20 AM    LYMPHSABS 1.44 10/27/2023 05:20 AM    EOSABS 0.24

## 2023-10-28 ENCOUNTER — FOLLOWUP TELEPHONE ENCOUNTER (OUTPATIENT)
Dept: INPATIENT UNIT | Age: 83
End: 2023-10-28

## 2023-10-28 NOTE — DISCHARGE SUMMARY
[address]                 [facility], [address]                               DISCHARGE SUMMARY    PATIENT NAME: Sue Portillo                   :        1940  MED REC NO:   [id number]                             ROOM:       0203  ACCOUNT NO:   [de-identified]                           ADMIT DATE: 10/24/2023  PROVIDER:     Yadiel Plaza. Saúl Petersen MD                  DISCHARGE DATE:  10/27/2023    ATTENDING PHYSICIAN OF HOSPITALIZATION:  Marvin Landry MD    PERSONAL PHYSICIAN:  Edison Rivera DO, [facility], [facility]. The patient seen [facility], [facility]  Consultants. DIAGNOSES:  1. Congestive heart failure, acute on chronic combined systolic  diastolic failure, 6689.  2.  Bilateral lower extremity, anasarca, 10/24/2023.  3.  Underlying lymphedema, bilateral lower extremities. Chest x-ray,  10/27/2023, cardiomegaly with prior sustained perihilar infiltrate. Chest x-ray, 10/25/2023, mild congestive heart failure. A 2D echo,  10/24/2023, LA severely dilated, normal left ventricular systolic  function, RA severely dilated, reduced right ventricular systolic  function, JOHN, MAC, mild MR, moderate TR, IVC greater than 20 mm _____  greater than 50 mm at exhalation, RVSP 51 mmHg, moderate pulmonary  hypertension, LVEF 55% to 60%. Prior chest x-ray, 2023, no  infiltrates, hazy pulmonary vasculature, mild CHF. Doppler ultrasound  bilateral lower extremities, 10/24/2023, no DVT. ProBNP, 10/24/2023,  2368. Troponin 7. EKG, 10/24/2023, atrial fibrillation, PVCs, rate 86,  QTc 493. Compared to previous studies also in atrial fibrillation with  _____ only. 4.  MI ruled out 10/25/2023. 5.  Pulmonary hypertension. 6.  Chronic kidney disease, stage IIIA. 7.  Thrombocytopenia, chronic. 8.  Anemia, chronic. 9.  Atrial fibrillation, see note of 10/27/2023, for additional details.   10.  Coronary

## 2023-10-28 NOTE — TELEPHONE ENCOUNTER
Patient notified of response. States Dr Booker Lin told her to take 7 mg of coumadin. She does see us on Thursday. Checks INR every Friday so will follow hosp recommendation and check next Friday.

## 2023-10-30 ENCOUNTER — CARE COORDINATION (OUTPATIENT)
Dept: CASE MANAGEMENT | Age: 83
End: 2023-10-30

## 2023-10-30 DIAGNOSIS — I50.9 CHRONIC CONGESTIVE HEART FAILURE, UNSPECIFIED HEART FAILURE TYPE (HCC): Primary | ICD-10-CM

## 2023-10-30 PROCEDURE — 1111F DSCHRG MED/CURRENT MED MERGE: CPT | Performed by: FAMILY MEDICINE

## 2023-10-30 NOTE — CARE COORDINATION
Care Transitions Initial Follow Up Call    Call within 2 business days of discharge: Yes    Patient Current Location:  Home: [address] [place] [address]    Care Transition Nurse contacted the patient by telephone to perform post hospital discharge assessment. Verified name and  with patient as identifiers. Provided introduction to self, and explanation of the Care Transition Nurse role. Patient: Wade Granda Patient : 1940   MRN: [id number]  Reason for Admission: CHF  Discharge Date: 10/27/23 RARS: Readmission Risk Score: 15.2      Last Discharge Facility       Date Complaint Diagnosis Description Type Department Provider    10/24/23 Leg Pain; Leg Swelling; Shortness of Breath Acute congestive heart failure, unspecified heart failure type ([address]) . .. ED to Hosp-Admission (Discharged) (ADMITTED) [address] MD; Yadira Shi, ... Was this an external facility discharge? No Discharge Facility: [address] [place] [address] to be reviewed by the provider   Additional needs identified to be addressed with provider: No  none               Method of communication with provider: none. Was able to contact [facility] for initial transitional outreach. She stated that she was doing \"fine\". She denied any shortness of breath, chest pain, cough, has decreased swelling and no dizziness/lightheadedness. She said that she is to be on 1800 ml fluid restriction and she is doing daily weight and recording them. Reviewed low sodium diet. She also said that she does her BP. She is wearing ace wraps until her compression stockings arrive. Medications reviewed and she stated that she had all her medications. Reviewed change in her Lasix dose. VU. She has her follow up with PCP scheduled and is waiting on the cardiologist office to call. She said that she owns an INR meter and she checks her INR every Friday. She had no further questions or concerns.     Care Transition Nurse reviewed discharge

## 2023-11-02 ENCOUNTER — OFFICE VISIT (OUTPATIENT)
Dept: CARDIOLOGY | Age: 83
End: 2023-11-02
Payer: MEDICARE

## 2023-11-02 ENCOUNTER — OFFICE VISIT (OUTPATIENT)
Dept: FAMILY MEDICINE CLINIC | Age: 83
End: 2023-11-02

## 2023-11-02 VITALS
RESPIRATION RATE: 20 BRPM | OXYGEN SATURATION: 92 % | WEIGHT: 293 LBS | HEIGHT: 67 IN | TEMPERATURE: 97.4 F | HEART RATE: 80 BPM | BODY MASS INDEX: 45.99 KG/M2 | SYSTOLIC BLOOD PRESSURE: 114 MMHG | DIASTOLIC BLOOD PRESSURE: 68 MMHG

## 2023-11-02 VITALS
BODY MASS INDEX: 49.34 KG/M2 | SYSTOLIC BLOOD PRESSURE: 110 MMHG | WEIGHT: 293 LBS | DIASTOLIC BLOOD PRESSURE: 64 MMHG | HEART RATE: 80 BPM

## 2023-11-02 DIAGNOSIS — E78.2 MIXED HYPERLIPIDEMIA: ICD-10-CM

## 2023-11-02 DIAGNOSIS — I10 ESSENTIAL HYPERTENSION: ICD-10-CM

## 2023-11-02 DIAGNOSIS — R60.0 BILATERAL LOWER EXTREMITY EDEMA: ICD-10-CM

## 2023-11-02 DIAGNOSIS — R73.01 IMPAIRED FASTING GLUCOSE: ICD-10-CM

## 2023-11-02 DIAGNOSIS — E03.9 ACQUIRED HYPOTHYROIDISM: ICD-10-CM

## 2023-11-02 DIAGNOSIS — I48.91 ATRIAL FIBRILLATION, UNSPECIFIED TYPE (HCC): Primary | ICD-10-CM

## 2023-11-02 DIAGNOSIS — Z09 HOSPITAL DISCHARGE FOLLOW-UP: ICD-10-CM

## 2023-11-02 DIAGNOSIS — I50.43 CHF (CONGESTIVE HEART FAILURE), NYHA CLASS I, ACUTE ON CHRONIC, COMBINED (HCC): Primary | ICD-10-CM

## 2023-11-02 DIAGNOSIS — I25.10 CORONARY ARTERY DISEASE DUE TO CALCIFIED CORONARY LESION: ICD-10-CM

## 2023-11-02 DIAGNOSIS — G47.33 OSA ON CPAP: ICD-10-CM

## 2023-11-02 DIAGNOSIS — I25.84 CORONARY ARTERY DISEASE DUE TO CALCIFIED CORONARY LESION: ICD-10-CM

## 2023-11-02 DIAGNOSIS — I50.9 CHRONIC CONGESTIVE HEART FAILURE, UNSPECIFIED HEART FAILURE TYPE (HCC): ICD-10-CM

## 2023-11-02 DIAGNOSIS — I48.0 PAROXYSMAL ATRIAL FIBRILLATION (HCC): ICD-10-CM

## 2023-11-02 DIAGNOSIS — E66.01 OBESITY, CLASS III, BMI 40-49.9 (MORBID OBESITY) (HCC): ICD-10-CM

## 2023-11-02 DIAGNOSIS — Z23 NEED FOR VACCINATION: ICD-10-CM

## 2023-11-02 PROCEDURE — 93005 ELECTROCARDIOGRAM TRACING: CPT | Performed by: INTERNAL MEDICINE

## 2023-11-02 PROCEDURE — 1123F ACP DISCUSS/DSCN MKR DOCD: CPT | Performed by: INTERNAL MEDICINE

## 2023-11-02 PROCEDURE — 99214 OFFICE O/P EST MOD 30 MIN: CPT | Performed by: INTERNAL MEDICINE

## 2023-11-02 PROCEDURE — 3078F DIAST BP <80 MM HG: CPT | Performed by: INTERNAL MEDICINE

## 2023-11-02 PROCEDURE — 1090F PRES/ABSN URINE INCON ASSESS: CPT | Performed by: INTERNAL MEDICINE

## 2023-11-02 PROCEDURE — G8427 DOCREV CUR MEDS BY ELIG CLIN: HCPCS | Performed by: INTERNAL MEDICINE

## 2023-11-02 PROCEDURE — 1036F TOBACCO NON-USER: CPT | Performed by: INTERNAL MEDICINE

## 2023-11-02 PROCEDURE — 99212 OFFICE O/P EST SF 10 MIN: CPT | Performed by: INTERNAL MEDICINE

## 2023-11-02 PROCEDURE — 3074F SYST BP LT 130 MM HG: CPT | Performed by: INTERNAL MEDICINE

## 2023-11-02 PROCEDURE — G8399 PT W/DXA RESULTS DOCUMENT: HCPCS | Performed by: INTERNAL MEDICINE

## 2023-11-02 PROCEDURE — 99213 OFFICE O/P EST LOW 20 MIN: CPT | Performed by: INTERNAL MEDICINE

## 2023-11-02 PROCEDURE — 1111F DSCHRG MED/CURRENT MED MERGE: CPT | Performed by: INTERNAL MEDICINE

## 2023-11-02 PROCEDURE — G8484 FLU IMMUNIZE NO ADMIN: HCPCS | Performed by: INTERNAL MEDICINE

## 2023-11-02 PROCEDURE — G8417 CALC BMI ABV UP PARAM F/U: HCPCS | Performed by: INTERNAL MEDICINE

## 2023-11-02 PROCEDURE — 93010 ELECTROCARDIOGRAM REPORT: CPT | Performed by: INTERNAL MEDICINE

## 2023-11-02 RX ORDER — FUROSEMIDE 40 MG/1
40 TABLET ORAL DAILY
Qty: 30 TABLET | Refills: 0 | Status: SHIPPED | OUTPATIENT
Start: 2023-11-02 | End: 2023-12-02

## 2023-11-02 NOTE — PROGRESS NOTES
[place] [address]  [place] [address]  Dept: [phone]    Subjective: The patient is a 79y.o. year old, , female is in the office for a follow up visit. Hospital discharge  According to him she felt much much better has lost 3040 pounds  According to the daughter who is also in the room that she has seen her walking first time in 30 years  Patient denies any chest pain pressure  Patient does not feel any heart fluttering racing still in A-fib  Patient is on Coumadin denies any bleeding problem  Patient has a lymphedema continue to have same swelling uses Ace bandages  History of sleep apnea using CPAP    Past Medical History:   has a past medical history of A-fib ([address]), Acute blood loss as cause of postoperative anemia, Anxiety, CAD (coronary artery disease), CHF (congestive heart failure) ([facility]), Class 2 severe obesity with serious comorbidity and body mass index (BMI) of 37.0 to 37.9 in [facility]), Closed fracture of proximal end of left humerus with routine healing, Colitis, Colitis due to Clostridium difficile, Depression, Diarrhea, Dyslipidemia, Elevated fasting glucose, FH: total abdominal hysterectomy and bilateral salpingo-oophorectomy, Fractures, Full dentures, Glucose intolerance (impaired glucose tolerance), History of blood transfusion, Hyperlipidemia, Hypertension, Hypokalemia, Malignant neoplasm of sigmoid colon ([address]), Multiple closed fractures of ribs, Obesity, JAYJAY on CPAP, Osteoarthritis, Osteoporosis, Other complete intestinal obstruction ([address]), Other specified hypothyroidism, Prolonged emergence from general anesthesia, Renal cell carcinoma of right kidney ([address]), S/P small bowel resection, Thrombocytopenia, unspecified ([address]), Under care of team, and Wears glasses.     Past Surgical History:   has a past surgical history that includes Total

## 2023-11-03 LAB — INR BLD: 2.5

## 2023-11-07 ENCOUNTER — ANTI-COAG VISIT (OUTPATIENT)
Dept: FAMILY MEDICINE CLINIC | Age: 83
End: 2023-11-07
Payer: MEDICARE

## 2023-11-07 ENCOUNTER — CARE COORDINATION (OUTPATIENT)
Dept: CASE MANAGEMENT | Age: 83
End: 2023-11-07

## 2023-11-07 DIAGNOSIS — I48.0 PAROXYSMAL ATRIAL FIBRILLATION (HCC): Primary | ICD-10-CM

## 2023-11-07 PROCEDURE — 93793 ANTICOAG MGMT PT WARFARIN: CPT | Performed by: FAMILY MEDICINE

## 2023-11-07 NOTE — CARE COORDINATION
Care Transitions Follow Up Call    Patient Current Location:  Home: [address] [place] [address]    Care Transition Nurse contacted the patient by telephone to follow up after admission on 10/24/23. Verified name and  with patient as identifiers. Patient: Shira Cruz  Patient : 1940   MRN: [id number]  Reason for Admission: CHF  Discharge Date: 10/27/23 RARS: Readmission Risk Score: 15.2      Needs to be reviewed by the provider   Additional needs identified to be addressed with provider: No  none             Method of communication with provider: none. Was able to contact [facility] for transitional outreach. She said that she was doing well. She said that her weight was still down, no shortness of breath, no chest pain and on occasion, she  feels the Afib. She denied any dizziness/lightheadedness or cough. She did go to her cardio and PCP appointment and no new medications were ordered. PCP just wanted her to continue with the ace wraps to her legs. She said that she did get the compression stockings, but had to send them back to get a little bit larger size. Cardio made a suggestion about going to lymphedema clinic, but she declined. She had no further questions/concerns. Addressed changes since last contact:  none  Discussed follow-up appointments. If no appointment was previously scheduled, appointment scheduling offered: Yes. Is follow up appointment scheduled within 7 days of discharge? Yes. Follow Up  Future Appointments   Date Time Provider [address]   2024 11:00 AM MD CT ROOM GUI CT SCAN STV [place]   1/15/2024 10:15 [facility]   3/11/2024 11:30 AM DO MIRYAM [facility]   2024 11:40 AM DO J LUIS Bautista [facility]       Care Transition Nurse reviewed medical action plan with patient and discussed any barriers to care and/or understanding of plan of care after discharge.  Discussed appropriate site of care based on symptoms

## 2023-11-07 NOTE — PROGRESS NOTES
Called patient to see if she had checked her INR on Friday 11/03/2023 and she said she did, it was 2.5. she said she had called it in to [facility] as usual. Explained that they had not sent us the results but since it was in normal continue current dose and recheck on 11/10/2023

## 2023-11-08 RX ORDER — ATORVASTATIN CALCIUM 40 MG/1
TABLET, FILM COATED ORAL
Qty: 90 TABLET | Refills: 1 | Status: SHIPPED | OUTPATIENT
Start: 2023-11-08

## 2023-11-08 NOTE — TELEPHONE ENCOUNTER
hospitals called requesting a refill of the below medication which has been pended for you:     Requested Prescriptions     Pending Prescriptions Disp Refills    atorvastatin (LIPITOR) 40 MG tablet [Pharmacy Med Name: Atorvastatin Calcium Oral Tablet 40 MG] 90 tablet 0     Sig: TAKE 1 TABLET BY MOUTH EVERY DAY AT NIGHT       Last Appointment Date: 11/2/2023  Next Appointment Date: 5/2/2024    Allergies   Allergen Reactions    Pcn [Penicillins] Hives and Shortness Of Breath    Bextra [Valdecoxib] Diarrhea

## 2023-11-11 LAB — INR BLD: 1.7

## 2023-11-12 RX ORDER — SPIRONOLACTONE 25 MG/1
25 TABLET ORAL DAILY
Qty: 90 TABLET | Refills: 1 | Status: SHIPPED | OUTPATIENT
Start: 2023-11-12 | End: 2024-05-10

## 2023-11-12 ASSESSMENT — ENCOUNTER SYMPTOMS
SHORTNESS OF BREATH: 0
RHINORRHEA: 0
VOMITING: 0
TROUBLE SWALLOWING: 0
EYE DISCHARGE: 0
COUGH: 0
DIARRHEA: 0
CONSTIPATION: 0
EYE REDNESS: 0
SORE THROAT: 0
ABDOMINAL PAIN: 0
NAUSEA: 0
SINUS PRESSURE: 0
WHEEZING: 0

## 2023-11-13 ENCOUNTER — ANTI-COAG VISIT (OUTPATIENT)
Dept: FAMILY MEDICINE CLINIC | Age: 83
End: 2023-11-13
Payer: MEDICARE

## 2023-11-13 DIAGNOSIS — I48.0 PAROXYSMAL ATRIAL FIBRILLATION (HCC): Primary | ICD-10-CM

## 2023-11-13 PROCEDURE — 93793 ANTICOAG MGMT PT WARFARIN: CPT | Performed by: NURSE PRACTITIONER

## 2023-11-13 NOTE — PROGRESS NOTES
Pt's INR is from Saturday. I would recommend pt recheck her INR tomorrow to get a new value. I do not want to adjust off INR that was done Saturday.

## 2023-11-14 ENCOUNTER — CARE COORDINATION (OUTPATIENT)
Dept: CASE MANAGEMENT | Age: 83
End: 2023-11-14

## 2023-11-14 ENCOUNTER — TELEPHONE (OUTPATIENT)
Dept: FAMILY MEDICINE CLINIC | Age: 83
End: 2023-11-14

## 2023-11-14 NOTE — TELEPHONE ENCOUNTER
Patient was advised to recheck her INR today but she thinks she forgot a pill and that's why it was low and wants to know if she can wait until Friday to recheck INR.

## 2023-11-14 NOTE — CARE COORDINATION
Care Transitions Follow Up Call    Patient Current Location:  Home: [address] [facility]    Care Transition Nurse contacted the patient by telephone to follow up after admission on 10/24/23. Verified name and  with patient as identifiers. Patient: Staci Mckenna  Patient : 1940   MRN: [id number]  Reason for Admission: Acute CHF, A fib  Discharge Date: 10/27/23 RARS: Readmission Risk Score: 15.2      Needs to be reviewed by the provider   Additional needs identified to be addressed with provider: No  none             Method of communication with provider: none. Spoke to Whit for transitions call. Stated she is doing OK, was told by PCP office that she can wait until Friday to get INR. Stated she did get a pair of compression stockings but they were too small so had to order another pair, wearing Ace wraps for now. Stated she is checking weights, is down from 366 to 310 lb since discharge. Reminded to check weight daily and log results, call office with concerns. Noted pt has fluid restriction of 1.2 liters daily. Stated PCP told her she can have up to 2 liters daily. Stated Lasix is now 80 MG daily, not noted in PCP notes. Did note that PCP told pt to take extra lasix dose for weight increase of > 2 lb daily or 5 or more lb per week. Will message office to verify. Offered RPM, declined. Addressed changes since last contact:  medications-lasix increased per pt, taking Aldactone, checks BP. Weight daily  Discussed follow-up appointments. Follow Up  Future Appointments   Date Time Provider [address]   2024 11:00 AM [facility] CT ROOM MD CT SCAN [place]   1/15/2024 10:15 AM [facility]   3/11/2024 11:30 AM DO MIRYAM Kidd [facility]   2024 11:40 AM DO J LUIS Prieto [facility]         Care Transition Nurse reviewed discharge instructions with patient and discussed any barriers to care and/or understanding of plan of care after discharge.

## 2023-11-17 LAB — INR BLD: 2.4

## 2023-11-21 ENCOUNTER — ANTI-COAG VISIT (OUTPATIENT)
Dept: FAMILY MEDICINE CLINIC | Age: 83
End: 2023-11-21
Payer: MEDICARE

## 2023-11-21 ENCOUNTER — CARE COORDINATION (OUTPATIENT)
Dept: CASE MANAGEMENT | Age: 83
End: 2023-11-21

## 2023-11-21 DIAGNOSIS — I48.0 PAROXYSMAL ATRIAL FIBRILLATION (HCC): Primary | ICD-10-CM

## 2023-11-21 PROCEDURE — 93793 ANTICOAG MGMT PT WARFARIN: CPT | Performed by: FAMILY MEDICINE

## 2023-11-21 NOTE — CARE COORDINATION
Care Transitions Follow Up Call    Patient Current Location:  Home: [address] [facility]    Care Transition Nurse contacted the patient by telephone to follow up after admission on 10/24/23. Verified name and  with patient as identifiers. Patient: Barney Gillespie  Patient : 1940   MRN: [id number]  Reason for Admission: Acute CHF, A fib  Discharge Date: 10/27/23 RARS: Readmission Risk Score: 15.2      Needs to be reviewed by the provider   Additional needs identified to be addressed with provider: No  none             Method of communication with provider: none. Was able to contact Kathy Armstrong for transitional outreach. She stated that she was doing \"real good\". She denied any chest pain/palpitations, increased swelling cough, shortness of breath or dizziness/lightheadedness. She said that she did get the 2nd pair of compression stocking, but they did not fit. She said that her knees are swollen and the stocking roll down. She said that she is just sticking to the ace wraps. Reviewed her diuretics and she stated that she is only taking 1 lasix and 1 aldactone per day. She said that her weight was 310 lb today. She did express some concerns that she is urinating more that she is taking in. She is adhering to the 2 liter fluid restriction, but at night she said that she is very thirsty. Reviewed sucking on ice chips or chewing gum or sucking on sour candy. She had no further questions/concerns. Addressed changes since last contact:  none  Discussed follow-up appointments. If no appointment was previously scheduled, appointment scheduling offered: Yes. Is follow up appointment scheduled within 7 days of discharge? Yes.     Follow Up  Future Appointments   Date Time Provider [address]   2024 11:00 AM [facility] CT ROOM [facility] CT SCAN STV [place]   1/15/2024 10:15 AM Marilia Kruger MD [facility]   3/11/2024 11:30 AM DO MIRYAM Jacobsen [facility]   2024 11:40 AM [address]
Spontaneous, unlabored and symmetrical

## 2023-11-27 ENCOUNTER — ANTI-COAG VISIT (OUTPATIENT)
Dept: FAMILY MEDICINE CLINIC | Age: 83
End: 2023-11-27

## 2023-11-27 DIAGNOSIS — I48.0 PAROXYSMAL ATRIAL FIBRILLATION (HCC): Primary | ICD-10-CM

## 2023-11-27 LAB — INR BLD: 3.6

## 2023-11-27 NOTE — PROGRESS NOTES
Have patient hold coumadin for two days and repeat INR in 48hrs. Likely due to diet over the holiday weekend.

## 2023-11-28 ENCOUNTER — CARE COORDINATION (OUTPATIENT)
Dept: CASE MANAGEMENT | Age: 83
End: 2023-11-28

## 2023-11-28 NOTE — CARE COORDINATION
Care Transitions Follow Up Call    Patient Current Location:  Home: [address] [place] [address]    Care Transition Nurse contacted the patient by telephone to follow up after admission on 10/24/23. Verified name and  with patient as identifiers. Patient: Hannah Sheetsely  Patient : 1940   MRN: [id number]  Reason for Admission: Acute CHF, A fib  Discharge Date: 10/27/23 RARS: Readmission Risk Score: 15.2      Needs to be reviewed by the provider   Additional needs identified to be addressed with provider: No  none             Method of communication with provider: none. Was able to contact [facility] for final outreach. She stated that she was doing \"pretty good\". She said that she actually feels the best she has felt in a long time. She denies any chest pain/palpitations, increased swelling or shortness of breath. She said that she has lost another 3 lbs. She said that she is eating , but maybe not as much sweets as she did. She had no further questions/concerns. Informed of final outreach. Offered ACM referral, but she declined. Addressed changes since last contact:  none  Discussed follow-up appointments. If no appointment was previously scheduled, appointment scheduling offered: Yes. Follow Up  Future Appointments   Date Time Provider [address]   2024 11:00 AM MD CT ROOM MD CT SCAN [place]   1/15/2024 10:15 [facility]   3/11/2024 11:30 AM DO MIRYAM Garg [facility]   2024 11:40 [facility]         Care Transition Nurse reviewed medical action plan with patient and discussed any barriers to care and/or understanding of plan of care after discharge. Discussed appropriate site of care based on symptoms and resources available to patient including: PCP  Specialist  When to call 911. The patient agrees to contact the PCP office for questions related to their healthcare.      Patients top risk factors for

## 2023-11-29 ENCOUNTER — TELEPHONE (OUTPATIENT)
Dept: FAMILY MEDICINE CLINIC | Age: 83
End: 2023-11-29

## 2023-11-29 NOTE — TELEPHONE ENCOUNTER
Patient called and states she was supposed to recheck her INR today but she is out of strips for her home INR kit. Hopefully new ones will be shipped today. She has held her Coumadin the past two days and is not sure what to do with her Coumadin dosage until her new strips come.

## 2023-12-05 LAB — INR BLD: 1.2

## 2023-12-06 ENCOUNTER — ANTI-COAG VISIT (OUTPATIENT)
Dept: FAMILY MEDICINE CLINIC | Age: 83
End: 2023-12-06
Payer: MEDICARE

## 2023-12-06 DIAGNOSIS — I48.0 PAROXYSMAL ATRIAL FIBRILLATION (HCC): Primary | ICD-10-CM

## 2023-12-06 PROCEDURE — 93793 ANTICOAG MGMT PT WARFARIN: CPT | Performed by: FAMILY MEDICINE

## 2023-12-06 RX ORDER — LEVOTHYROXINE SODIUM 0.05 MG/1
TABLET ORAL
Qty: 90 TABLET | Refills: 1 | Status: SHIPPED | OUTPATIENT
Start: 2023-12-06

## 2023-12-06 RX ORDER — WARFARIN SODIUM 2 MG/1
2 TABLET ORAL DAILY
Qty: 90 TABLET | Refills: 1 | Status: SHIPPED | OUTPATIENT
Start: 2023-12-06

## 2023-12-06 NOTE — TELEPHONE ENCOUNTER
Whit called requesting a refill of the below medication which has been pended for you:     Requested Prescriptions     Pending Prescriptions Disp Refills    levothyroxine (SYNTHROID) 50 MCG tablet [Pharmacy Med Name: Levothyroxine Sodium Oral Tablet 50 MCG] 90 tablet 1     Sig: TAKE 1 TABLET BY MOUTH EVERY DAY    Frank Hay 2 MG tablet [Pharmacy Med Name: Frank Hay Oral Tablet 2 MG] 90 tablet 1     Sig: TAKE 1 TABLET BY MOUTH EVERY DAY       Last Appointment Date: 11/2/2023  Next Appointment Date: 5/2/2024    Allergies   Allergen Reactions    Pcn [Penicillins] Hives and Shortness Of Breath    Bextra [Valdecoxib] Diarrhea

## 2023-12-06 NOTE — PROGRESS NOTES
Spoke with patient and she had held her Coumadin on Monday and Tuesday of last week due to being high with her previous INR.  Spoke with Dr Sebastien Persaud and she advised me to tell patient to continue on the 5 mg of Coumadin and recheck on 12/08/2023

## 2023-12-08 ENCOUNTER — ANTI-COAG VISIT (OUTPATIENT)
Dept: FAMILY MEDICINE CLINIC | Age: 83
End: 2023-12-08

## 2023-12-08 ENCOUNTER — TELEPHONE (OUTPATIENT)
Dept: FAMILY MEDICINE CLINIC | Age: 83
End: 2023-12-08

## 2023-12-08 DIAGNOSIS — I48.0 PAROXYSMAL ATRIAL FIBRILLATION (HCC): Primary | ICD-10-CM

## 2023-12-08 LAB — INR BLD: 1.4

## 2023-12-08 NOTE — TELEPHONE ENCOUNTER
Caller states this was [facility] in[facility] calling. Patient has already been made aware and coumadin instructions given.

## 2023-12-15 ENCOUNTER — ANTI-COAG VISIT (OUTPATIENT)
Dept: FAMILY MEDICINE CLINIC | Age: 83
End: 2023-12-15
Payer: MEDICARE

## 2023-12-15 DIAGNOSIS — I48.0 PAROXYSMAL ATRIAL FIBRILLATION (HCC): Primary | ICD-10-CM

## 2023-12-15 LAB — INR BLD: 2.5

## 2023-12-15 PROCEDURE — 93793 ANTICOAG MGMT PT WARFARIN: CPT | Performed by: FAMILY MEDICINE

## 2023-12-22 NOTE — PROGRESS NOTES
Patient notified of INR result, coumadin dosing instructions, and next recommended INR lab draw via personal voicemail message. To contact the office with any questions or concerns. POC.    Patient will continue to benefit from skilled PT services to modify and progress therapeutic interventions, analyze and address functional mobility deficits, address strength deficits, analyze and address ROM deficits, analyze and address strength deficits, analyze and address soft tissue restrictions, analyze and cue for proper movement patterns, analyze and modify for postural abnormalities, analyze and modify body mechanics/ergonomics, analyze and address imbalance/dizziness, and instruct in home and community integration to address functional deficits and attain remaining goals. [x]  See Plan of Care  []  See progress note/recertification  []  See Discharge Summary         Progress towards goals / Updated goals: [x]  Making Progress toward goals each visit. Long Term Goals: 12/4/23/ to 1/4/24  Patient will demonstrate increased strength, balance, coordination, activity tolerance, and motor control in order to promote improved safety, independence, and participation in home and community environments . Short Term Goals: To be accomplished in 3 weeks     Patient will: Goal Status Timeline of Achievement   Roll prone to supine with MOD A  Partially Met:  At times able to perform with modA though up to maxA  Assessed on:  12/19/23 12/4/23 - 12/22/23   Sit with UE in immobilizers and MOD A at trunk/head x 10 seconds Partially met: Able to sit against a wall with UE immobilizers and hensinger collar for ~20-30 seconds.   12/4/23 - 12/22/23   Perform chin tuck from a wedge in 3/5 trials with MAX A at his trunk/arms Goal met  12/4/23 - 12/22/23   Sit against a wall with trunk in midline x 10 seconds Partially Met:  Required more consistent posterior support with additional A underneath his chin while sitting  Assessed on:  12/19/23 12/4/23 - 12/22/23   Bring head upright while prone on elbows with MIN A at head and MAX A to maintain trunk postioning Partially Met:  Required maxA at his head  Assessed

## 2023-12-27 ENCOUNTER — TELEPHONE (OUTPATIENT)
Dept: FAMILY MEDICINE CLINIC | Age: 83
End: 2023-12-27

## 2023-12-27 ENCOUNTER — ANTI-COAG VISIT (OUTPATIENT)
Dept: FAMILY MEDICINE CLINIC | Age: 83
End: 2023-12-27
Payer: MEDICARE

## 2023-12-27 DIAGNOSIS — I48.0 PAROXYSMAL ATRIAL FIBRILLATION (HCC): Primary | ICD-10-CM

## 2023-12-27 LAB — INR BLD: 3.7

## 2023-12-27 PROCEDURE — 93793 ANTICOAG MGMT PT WARFARIN: CPT | Performed by: FAMILY MEDICINE

## 2024-01-02 ENCOUNTER — HOSPITAL ENCOUNTER (OUTPATIENT)
Age: 84
Discharge: HOME | End: 2024-01-02
Payer: MEDICARE

## 2024-01-02 ENCOUNTER — HOSPITAL ENCOUNTER (OUTPATIENT)
Dept: CT IMAGING | Age: 84
Discharge: HOME | End: 2024-01-04
Attending: INTERNAL MEDICINE
Payer: MEDICARE

## 2024-01-02 DIAGNOSIS — C64.1 RENAL CELL CARCINOMA OF RIGHT KIDNEY (HCC): ICD-10-CM

## 2024-01-02 DIAGNOSIS — R97.0 ELEVATED CARCINOEMBRYONIC ANTIGEN (CEA): ICD-10-CM

## 2024-01-02 LAB
ALBUMIN SERPL-MCNC: 4 G/DL (ref 3.5–5.2)
ALBUMIN/GLOB SERPL: 1.2 {RATIO} (ref 1–2.5)
ALP SERPL-CCNC: 123 U/L (ref 35–104)
ALT SERPL-CCNC: 11 U/L (ref 5–33)
ANION GAP SERPL CALCULATED.3IONS-SCNC: 13 MMOL/L (ref 9–17)
AST SERPL-CCNC: 18 U/L
BASOPHILS # BLD: 0.03 K/UL (ref 0–0.2)
BASOPHILS NFR BLD: 0 % (ref 0–2)
BILIRUB SERPL-MCNC: 0.5 MG/DL (ref 0.3–1.2)
BUN SERPL-MCNC: 28 MG/DL (ref 8–23)
BUN/CREAT SERPL: 23 (ref 9–20)
CALCIUM SERPL-MCNC: 9.5 MG/DL (ref 8.6–10.4)
CHLORIDE SERPL-SCNC: 105 MMOL/L (ref 98–107)
CO2 SERPL-SCNC: 25 MMOL/L (ref 20–31)
CREAT SERPL-MCNC: 1.2 MG/DL (ref 0.5–0.9)
EOSINOPHIL # BLD: 0.21 K/UL (ref 0–0.44)
EOSINOPHILS RELATIVE PERCENT: 3 % (ref 1–4)
ERYTHROCYTE [DISTWIDTH] IN BLOOD BY AUTOMATED COUNT: 14.2 % (ref 11.8–14.4)
GFR SERPL CREATININE-BSD FRML MDRD: 45 ML/MIN/1.73M2
GLUCOSE SERPL-MCNC: 99 MG/DL (ref 70–99)
HCT VFR BLD AUTO: 44.2 % (ref 36.3–47.1)
HGB BLD-MCNC: 13.9 G/DL (ref 11.9–15.1)
IMM GRANULOCYTES # BLD AUTO: 0.04 K/UL (ref 0–0.3)
IMM GRANULOCYTES NFR BLD: 1 %
LYMPHOCYTES NFR BLD: 1.87 K/UL (ref 1.1–3.7)
LYMPHOCYTES RELATIVE PERCENT: 23 % (ref 24–43)
MCH RBC QN AUTO: 30.7 PG (ref 25.2–33.5)
MCHC RBC AUTO-ENTMCNC: 31.4 G/DL (ref 25.2–33.5)
MCV RBC AUTO: 97.6 FL (ref 82.6–102.9)
MONOCYTES NFR BLD: 0.73 K/UL (ref 0.1–1.2)
MONOCYTES NFR BLD: 9 % (ref 3–12)
NEUTROPHILS NFR BLD: 65 % (ref 36–65)
NEUTS SEG NFR BLD: 5.35 K/UL (ref 1.5–8.1)
NRBC BLD-RTO: 0 PER 100 WBC
PLATELET # BLD AUTO: ABNORMAL K/UL (ref 138–453)
PLATELET, FLUORESCENCE: 144 K/UL (ref 138–453)
PLATELETS.RETICULATED NFR BLD AUTO: 14.6 % (ref 1.1–10.3)
POTASSIUM SERPL-SCNC: 3.9 MMOL/L (ref 3.7–5.3)
PROT SERPL-MCNC: 7.4 G/DL (ref 6.4–8.3)
RBC # BLD AUTO: 4.53 M/UL (ref 3.95–5.11)
SODIUM SERPL-SCNC: 143 MMOL/L (ref 135–144)
WBC OTHER # BLD: 8.2 K/UL (ref 3.5–11.3)

## 2024-01-02 PROCEDURE — 80053 COMPREHEN METABOLIC PANEL: CPT

## 2024-01-02 PROCEDURE — 2500000003 HC RX 250 WO HCPCS: Performed by: INTERNAL MEDICINE

## 2024-01-02 PROCEDURE — 74176 CT ABD & PELVIS W/O CONTRAST: CPT

## 2024-01-02 PROCEDURE — 85025 COMPLETE CBC W/AUTO DIFF WBC: CPT

## 2024-01-02 PROCEDURE — 82378 CARCINOEMBRYONIC ANTIGEN: CPT

## 2024-01-02 PROCEDURE — 36415 COLL VENOUS BLD VENIPUNCTURE: CPT

## 2024-01-02 RX ADMIN — BARIUM SULFATE 450 ML: 20 SUSPENSION ORAL at 11:01

## 2024-01-03 LAB — CEA SERPL-MCNC: 3.3 NG/ML

## 2024-01-05 ENCOUNTER — ANTI-COAG VISIT (OUTPATIENT)
Dept: FAMILY MEDICINE CLINIC | Age: 84
End: 2024-01-05
Payer: MEDICARE

## 2024-01-05 DIAGNOSIS — I48.0 PAROXYSMAL ATRIAL FIBRILLATION (HCC): Primary | ICD-10-CM

## 2024-01-05 LAB — INR BLD: 2.8

## 2024-01-05 PROCEDURE — 93793 ANTICOAG MGMT PT WARFARIN: CPT | Performed by: FAMILY MEDICINE

## 2024-01-05 RX ORDER — FUROSEMIDE 40 MG/1
40 TABLET ORAL DAILY
Qty: 90 TABLET | Refills: 1 | Status: SHIPPED | OUTPATIENT
Start: 2024-01-05

## 2024-01-05 NOTE — TELEPHONE ENCOUNTER
Yvette called requesting a refill of the below medication which has been pended for you:     Requested Prescriptions     Pending Prescriptions Disp Refills    furosemide (LASIX) 40 MG tablet 90 tablet 1     Sig: Take 1 tablet by mouth daily       Last Appointment Date: 11/2/2023  Next Appointment Date: 5/2/2024    Allergies   Allergen Reactions    Pcn [Penicillins] Hives and Shortness Of Breath    Bextra [Valdecoxib] Diarrhea

## 2024-01-12 ENCOUNTER — ANTI-COAG VISIT (OUTPATIENT)
Dept: FAMILY MEDICINE CLINIC | Age: 84
End: 2024-01-12

## 2024-01-12 DIAGNOSIS — I48.0 PAROXYSMAL ATRIAL FIBRILLATION (HCC): Primary | ICD-10-CM

## 2024-01-12 LAB — INR BLD: 2.7

## 2024-01-19 ENCOUNTER — ANTI-COAG VISIT (OUTPATIENT)
Dept: FAMILY MEDICINE CLINIC | Age: 84
End: 2024-01-19

## 2024-01-19 DIAGNOSIS — I48.0 PAROXYSMAL ATRIAL FIBRILLATION (HCC): Primary | ICD-10-CM

## 2024-01-19 LAB — INR BLD: 1.8

## 2024-01-26 ENCOUNTER — ANTI-COAG VISIT (OUTPATIENT)
Dept: FAMILY MEDICINE CLINIC | Age: 84
End: 2024-01-26

## 2024-01-26 DIAGNOSIS — I48.0 PAROXYSMAL ATRIAL FIBRILLATION (HCC): Primary | ICD-10-CM

## 2024-01-26 LAB — INR BLD: 1.3

## 2024-01-29 ENCOUNTER — OFFICE VISIT (OUTPATIENT)
Dept: ONCOLOGY | Age: 84
End: 2024-01-29
Payer: MEDICARE

## 2024-01-29 VITALS
BODY MASS INDEX: 45.99 KG/M2 | SYSTOLIC BLOOD PRESSURE: 122 MMHG | WEIGHT: 293 LBS | HEIGHT: 67 IN | OXYGEN SATURATION: 92 % | HEART RATE: 80 BPM | DIASTOLIC BLOOD PRESSURE: 72 MMHG | RESPIRATION RATE: 16 BRPM | TEMPERATURE: 97.4 F

## 2024-01-29 DIAGNOSIS — C18.7 MALIGNANT NEOPLASM OF SIGMOID COLON (HCC): Primary | ICD-10-CM

## 2024-01-29 DIAGNOSIS — C64.1 RENAL CELL CARCINOMA OF RIGHT KIDNEY (HCC): ICD-10-CM

## 2024-01-29 PROCEDURE — G8484 FLU IMMUNIZE NO ADMIN: HCPCS | Performed by: INTERNAL MEDICINE

## 2024-01-29 PROCEDURE — G8399 PT W/DXA RESULTS DOCUMENT: HCPCS | Performed by: INTERNAL MEDICINE

## 2024-01-29 PROCEDURE — 1090F PRES/ABSN URINE INCON ASSESS: CPT | Performed by: INTERNAL MEDICINE

## 2024-01-29 PROCEDURE — G8417 CALC BMI ABV UP PARAM F/U: HCPCS | Performed by: INTERNAL MEDICINE

## 2024-01-29 PROCEDURE — 1123F ACP DISCUSS/DSCN MKR DOCD: CPT | Performed by: INTERNAL MEDICINE

## 2024-01-29 PROCEDURE — 99213 OFFICE O/P EST LOW 20 MIN: CPT | Performed by: INTERNAL MEDICINE

## 2024-01-29 PROCEDURE — G8427 DOCREV CUR MEDS BY ELIG CLIN: HCPCS | Performed by: INTERNAL MEDICINE

## 2024-01-29 PROCEDURE — 99214 OFFICE O/P EST MOD 30 MIN: CPT | Performed by: INTERNAL MEDICINE

## 2024-01-29 PROCEDURE — 3078F DIAST BP <80 MM HG: CPT | Performed by: INTERNAL MEDICINE

## 2024-01-29 PROCEDURE — 1036F TOBACCO NON-USER: CPT | Performed by: INTERNAL MEDICINE

## 2024-01-29 PROCEDURE — 3074F SYST BP LT 130 MM HG: CPT | Performed by: INTERNAL MEDICINE

## 2024-01-29 NOTE — PROGRESS NOTES
2024    HPI:  Yvette Das (:  1940) has requested an audio/video evaluation for the following concern(s):    Chief Complaint   Patient presents with    Colon Cancer     4 month follow up, post lab and scan      Patient ID: Yvette Das, 1940, [id number], 83 y.o.  Referred by : Bogdan Patel MD  Diagnosis:   Diagnosis of colon cancer, 17    low anterior resection of the rectosigmoid with proximal diverting ileostomy.  Final pathology showed T1 N0, stage I disease.  Currently on surveillance, recent colonoscopy on 2023    Right renal cell carcinoma status post radical nephrectomy on 10/7/2022,[id number] disease  HISTORY OF PRESENT ILLNESS:    Oncologic History:  This is a 83 y.o. -old female with new diagnosis of colon cancer was seen during initial consultation visit.  She presented with rectal bleeding going on for about 4-6 weeks.  She had a colonoscopy on 10/8/18 which showed multiple polyps and large distal sigmoid tumors with extensive diverticulosis.  Biopsy showed invasive low-grade adenocarcinoma arising in a large tubulovillous adenoma with extensive high-grade dysplasia.  Subsequently she had open low anterior resection of the rectosigmoid with proximal dilating ileostomy.  Final pathology showed T1 N0, stage I disease.  Now she is recovering well from surgery.  She denied any pain, nausea vomiting.  She does not have family history of colon cancer.  She is adopted and does not know about her family history.  Interval history:  Patient is return for follow visit and to discuss lab results, imaging studies and further recommendations.  Her CEA is improved and within normal limit now.  She denies any abdominal pain nausea vomiting.  Denies any constipation, diarrhea or blood in stool.      She is eating and drinking well.  Her recent lab work showed normal hemoglobin and also CT scan showed no evidence of recurrence.  She denies any lower back pain.  Her CT scan

## 2024-02-02 ENCOUNTER — ANTI-COAG VISIT (OUTPATIENT)
Dept: FAMILY MEDICINE CLINIC | Age: 84
End: 2024-02-02

## 2024-02-02 DIAGNOSIS — I48.0 PAROXYSMAL ATRIAL FIBRILLATION (HCC): Primary | ICD-10-CM

## 2024-02-02 LAB — INR BLD: 1.7

## 2024-02-09 ENCOUNTER — ANTI-COAG VISIT (OUTPATIENT)
Dept: FAMILY MEDICINE CLINIC | Age: 84
End: 2024-02-09
Payer: MEDICARE

## 2024-02-09 DIAGNOSIS — I48.0 PAROXYSMAL ATRIAL FIBRILLATION (HCC): Primary | ICD-10-CM

## 2024-02-09 LAB — INR BLD: 2.3

## 2024-02-09 PROCEDURE — 93793 ANTICOAG MGMT PT WARFARIN: CPT | Performed by: FAMILY MEDICINE

## 2024-02-09 RX ORDER — NYSTATIN 100000 [USP'U]/G
POWDER TOPICAL
Qty: 60 G | Refills: 2 | Status: SHIPPED | OUTPATIENT
Start: 2024-02-09

## 2024-02-09 NOTE — TELEPHONE ENCOUNTER
Patient calling requesting medicated powder for redness beneath bilateral breasts. Has used nystatin powder in the past. [facility].

## 2024-02-12 ENCOUNTER — CARE COORDINATION (OUTPATIENT)
Dept: CARE COORDINATION | Age: 84
End: 2024-02-12

## 2024-02-12 NOTE — CARE COORDINATION
Yvtete was referred for ACM.   2/12/2024- Spoke with Yvette. Discussed ACM. She reports that she lives with family and is doing well. She continues to monitor her vitals and no concerns. She is aware of target ranges. She denied any concerns. She stated she will reach out if with concerns/issues. She has this writer's contact information.     Future Appointments   Date Time Provider Department Center   3/11/2024 11:30 AM Fredo Casas DO [facility]   5/2/2024 11:40 AM Emy Trevino DO [facility]   7/29/2024 10:00 AM [facility]

## 2024-02-16 ENCOUNTER — ANTI-COAG VISIT (OUTPATIENT)
Dept: FAMILY MEDICINE CLINIC | Age: 84
End: 2024-02-16

## 2024-02-16 DIAGNOSIS — I48.0 PAROXYSMAL ATRIAL FIBRILLATION (HCC): Primary | ICD-10-CM

## 2024-02-16 LAB — INR BLD: 2.1

## 2024-02-23 ENCOUNTER — ANTI-COAG VISIT (OUTPATIENT)
Dept: FAMILY MEDICINE CLINIC | Age: 84
End: 2024-02-23

## 2024-02-23 DIAGNOSIS — I48.0 PAROXYSMAL ATRIAL FIBRILLATION (HCC): Primary | ICD-10-CM

## 2024-02-23 LAB — INR BLD: 2.4

## 2024-03-01 ENCOUNTER — ANTI-COAG VISIT (OUTPATIENT)
Dept: FAMILY MEDICINE CLINIC | Age: 84
End: 2024-03-01
Payer: MEDICARE

## 2024-03-01 DIAGNOSIS — I48.0 PAROXYSMAL ATRIAL FIBRILLATION (HCC): Primary | ICD-10-CM

## 2024-03-01 LAB — INR BLD: 1.7

## 2024-03-01 PROCEDURE — 93793 ANTICOAG MGMT PT WARFARIN: CPT | Performed by: FAMILY MEDICINE

## 2024-03-01 RX ORDER — AMLODIPINE BESYLATE 10 MG/1
10 TABLET ORAL DAILY
Qty: 90 TABLET | Refills: 1 | Status: SHIPPED | OUTPATIENT
Start: 2024-03-01

## 2024-03-01 NOTE — TELEPHONE ENCOUNTER
Yvette called requesting a refill of the below medication which has been pended for you:     Requested Prescriptions     Pending Prescriptions Disp Refills    amLODIPine (NORVASC) 10 MG tablet 90 tablet 2     Sig: Take 1 tablet by mouth daily       Last Appointment Date: 11/2/2023  Next Appointment Date: 5/2/2024    Allergies   Allergen Reactions    Pcn [Penicillins] Hives and Shortness Of Breath    Bextra [Valdecoxib] Diarrhea

## 2024-03-08 ENCOUNTER — ANTI-COAG VISIT (OUTPATIENT)
Dept: FAMILY MEDICINE CLINIC | Age: 84
End: 2024-03-08

## 2024-03-08 DIAGNOSIS — I48.0 PAROXYSMAL ATRIAL FIBRILLATION (HCC): Primary | ICD-10-CM

## 2024-03-08 LAB — INR BLD: 1.9

## 2024-03-15 ENCOUNTER — ANTI-COAG VISIT (OUTPATIENT)
Dept: FAMILY MEDICINE CLINIC | Age: 84
End: 2024-03-15

## 2024-03-15 DIAGNOSIS — I48.0 PAROXYSMAL ATRIAL FIBRILLATION (HCC): Primary | ICD-10-CM

## 2024-03-15 LAB — INR BLD: 1.4

## 2024-03-15 NOTE — PROGRESS NOTES
Spoke with patient to see if any changes this week to account for lower reading. States she has a lot of family issues going on and may have missed a dose one evening. Advised we will get back with her with instructions.

## 2024-03-22 ENCOUNTER — ANTI-COAG VISIT (OUTPATIENT)
Dept: FAMILY MEDICINE CLINIC | Age: 84
End: 2024-03-22

## 2024-03-22 DIAGNOSIS — I48.0 PAROXYSMAL ATRIAL FIBRILLATION (HCC): Primary | ICD-10-CM

## 2024-03-22 LAB — INR BLD: 2.2

## 2024-03-29 ENCOUNTER — ANTI-COAG VISIT (OUTPATIENT)
Dept: FAMILY MEDICINE CLINIC | Age: 84
End: 2024-03-29
Payer: MEDICARE

## 2024-03-29 DIAGNOSIS — I48.0 PAROXYSMAL ATRIAL FIBRILLATION (HCC): Primary | ICD-10-CM

## 2024-03-29 LAB — INR BLD: 2.1

## 2024-03-29 PROCEDURE — 93793 ANTICOAG MGMT PT WARFARIN: CPT | Performed by: FAMILY MEDICINE

## 2024-04-05 ENCOUNTER — ANTI-COAG VISIT (OUTPATIENT)
Dept: FAMILY MEDICINE CLINIC | Age: 84
End: 2024-04-05

## 2024-04-05 DIAGNOSIS — I48.0 PAROXYSMAL ATRIAL FIBRILLATION (HCC): Primary | ICD-10-CM

## 2024-04-05 LAB — INR BLD: 2.1

## 2024-04-12 LAB — INR BLD: 2.1

## 2024-04-16 ENCOUNTER — ANTI-COAG VISIT (OUTPATIENT)
Dept: FAMILY MEDICINE CLINIC | Age: 84
End: 2024-04-16
Payer: MEDICARE

## 2024-04-16 DIAGNOSIS — I48.0 PAROXYSMAL ATRIAL FIBRILLATION (HCC): Primary | ICD-10-CM

## 2024-04-16 PROCEDURE — 93793 ANTICOAG MGMT PT WARFARIN: CPT | Performed by: FAMILY MEDICINE

## 2024-04-17 RX ORDER — WARFARIN SODIUM 3 MG/1
TABLET ORAL
Qty: 180 TABLET | Refills: 0 | OUTPATIENT
Start: 2024-04-17

## 2024-04-17 NOTE — TELEPHONE ENCOUNTER
Yvette called requesting a refill of the below medication which has been pended for you: patient has refills left on current script.     Requested Prescriptions     Pending Prescriptions Disp Refills    warfarin (JANTOVEN) 3 MG tablet [Pharmacy Med Name: Jantoven Oral Tablet 3 MG] 180 tablet 0     Sig: TAKE 2 TABLETS BY MOUTH EVERY DAY OR AS DIRECTED PER INR RESULTS       Last Appointment Date: 11/2/2023  Next Appointment Date: 5/2/2024    Allergies   Allergen Reactions    Pcn [Penicillins] Hives and Shortness Of Breath    Bextra [Valdecoxib] Diarrhea

## 2024-04-19 ENCOUNTER — ANTI-COAG VISIT (OUTPATIENT)
Dept: FAMILY MEDICINE CLINIC | Age: 84
End: 2024-04-19

## 2024-04-19 DIAGNOSIS — I48.0 PAROXYSMAL ATRIAL FIBRILLATION (HCC): Primary | ICD-10-CM

## 2024-04-19 LAB — INR BLD: 2

## 2024-04-22 ENCOUNTER — OFFICE VISIT (OUTPATIENT)
Dept: CARDIOLOGY | Age: 84
End: 2024-04-22
Payer: MEDICARE

## 2024-04-22 VITALS
OXYGEN SATURATION: 96 % | SYSTOLIC BLOOD PRESSURE: 123 MMHG | WEIGHT: 293 LBS | HEIGHT: 67 IN | HEART RATE: 86 BPM | BODY MASS INDEX: 45.99 KG/M2 | RESPIRATION RATE: 18 BRPM | DIASTOLIC BLOOD PRESSURE: 80 MMHG

## 2024-04-22 DIAGNOSIS — I25.10 CORONARY ARTERY DISEASE DUE TO CALCIFIED CORONARY LESION: ICD-10-CM

## 2024-04-22 DIAGNOSIS — I25.84 CORONARY ARTERY DISEASE DUE TO CALCIFIED CORONARY LESION: ICD-10-CM

## 2024-04-22 DIAGNOSIS — N18.30 STAGE 3 CHRONIC KIDNEY DISEASE, UNSPECIFIED WHETHER STAGE 3A OR 3B CKD (HCC): ICD-10-CM

## 2024-04-22 DIAGNOSIS — I25.10 CORONARY ARTERY DISEASE INVOLVING NATIVE HEART WITHOUT ANGINA PECTORIS, UNSPECIFIED VESSEL OR LESION TYPE: ICD-10-CM

## 2024-04-22 DIAGNOSIS — R94.39 ABNORMAL STRESS TEST: ICD-10-CM

## 2024-04-22 DIAGNOSIS — I50.32 CHRONIC DIASTOLIC HEART FAILURE (HCC): ICD-10-CM

## 2024-04-22 DIAGNOSIS — I48.91 ATRIAL FIBRILLATION, UNSPECIFIED TYPE (HCC): Primary | ICD-10-CM

## 2024-04-22 PROCEDURE — 99214 OFFICE O/P EST MOD 30 MIN: CPT | Performed by: INTERNAL MEDICINE

## 2024-04-22 PROCEDURE — 1090F PRES/ABSN URINE INCON ASSESS: CPT | Performed by: INTERNAL MEDICINE

## 2024-04-22 PROCEDURE — 1123F ACP DISCUSS/DSCN MKR DOCD: CPT | Performed by: INTERNAL MEDICINE

## 2024-04-22 PROCEDURE — 99212 OFFICE O/P EST SF 10 MIN: CPT | Performed by: INTERNAL MEDICINE

## 2024-04-22 PROCEDURE — 1036F TOBACCO NON-USER: CPT | Performed by: INTERNAL MEDICINE

## 2024-04-22 PROCEDURE — 3074F SYST BP LT 130 MM HG: CPT | Performed by: INTERNAL MEDICINE

## 2024-04-22 PROCEDURE — G8417 CALC BMI ABV UP PARAM F/U: HCPCS | Performed by: INTERNAL MEDICINE

## 2024-04-22 PROCEDURE — 93005 ELECTROCARDIOGRAM TRACING: CPT | Performed by: INTERNAL MEDICINE

## 2024-04-22 PROCEDURE — 93010 ELECTROCARDIOGRAM REPORT: CPT | Performed by: INTERNAL MEDICINE

## 2024-04-22 PROCEDURE — G8399 PT W/DXA RESULTS DOCUMENT: HCPCS | Performed by: INTERNAL MEDICINE

## 2024-04-22 PROCEDURE — G8427 DOCREV CUR MEDS BY ELIG CLIN: HCPCS | Performed by: INTERNAL MEDICINE

## 2024-04-22 PROCEDURE — 3079F DIAST BP 80-89 MM HG: CPT | Performed by: INTERNAL MEDICINE

## 2024-04-22 NOTE — PROGRESS NOTES
[facility]  [facility] C[facility]IOLOGY A DEPARTMENT OF [place] [address]  Dept: [phone]    CC: follow up for VINOD Perez     Subjective:  The patient is a 83 y.o. year old, , female is in the office for a follow up visit.   Here for follow up.   No cp.   No sob.   No le edema.   States she is active at home, and able to do her home chores.   Doing well on lasix and aldactone.     Past Medical History:   has a past medical history of A-fib ([facility]), Acute blood loss as cause of postoperative anemia, Anxiety, CAD (coronary artery disease), CHF (congestive heart failure) ([facility]), Class 2 severe obesity with serious comorbidity and body mass index (BMI) of 37.0 to 37.9 in adult ([facility]), Closed fracture of proximal end of left humerus with routine healing, Colitis, Colitis due to Clostridium difficile, Depression, Diarrhea, Dyslipidemia, Elevated fasting glucose, FH: total abdominal hysterectomy and bilateral salpingo-oophorectomy, Fractures, Full dentures, Glucose intolerance (impaired glucose tolerance), History of blood transfusion, Hyperlipidemia, Hypertension, Hypokalemia, Malignant neoplasm of sigmoid colon ([facility]), Multiple closed fractures of ribs, Obesity, JAYJAY on CPAP, Osteoarthritis, Osteoporosis, Other complete intestinal obstruction ([facility]), Other specified hypothyroidism, Prolonged emergence from general anesthesia, Renal cell carcinoma of right kidney ([facility]), S/P small bowel resection, Thrombocytopenia, unspecified ([facility]), Under care of team, and Wears glasses.    Past Surgical History:   has a past surgical history that includes Total abdominal hysterectomy w/ bilateral salpingoophorectomy (1966); Cholecystectomy (1960s); Varicose vein surgery (Right, 1960s); Hip Arthroplasty (Left); Knee Arthroplasty (Left); Knee Arthroplasty (Right); Cardiac catheterization (09/05/2017); Colonoscopy (N/A, 10/08/2018); pr

## 2024-04-24 DIAGNOSIS — I10 ESSENTIAL HYPERTENSION: ICD-10-CM

## 2024-04-24 NOTE — TELEPHONE ENCOUNTER
Yvette called requesting a refill of the below medication which has been pended for you:     Requested Prescriptions     Pending Prescriptions Disp Refills    metoprolol tartrate (LOPRESSOR) 25 MG tablet [Pharmacy Med Name: METOPROLOL TARTRATE 25 MG TAB] 180 tablet 1     Sig: TAKE 1 TABLET BY MOUTH TWICE A DAY       Last Appointment Date: 11/2/2023  Next Appointment Date: 5/2/2024    Allergies   Allergen Reactions    Pcn [Penicillins] Hives and Shortness Of Breath    Bextra [Valdecoxib] Diarrhea

## 2024-04-26 ENCOUNTER — ANTI-COAG VISIT (OUTPATIENT)
Dept: FAMILY MEDICINE CLINIC | Age: 84
End: 2024-04-26
Payer: MEDICARE

## 2024-04-26 DIAGNOSIS — I48.0 PAROXYSMAL ATRIAL FIBRILLATION (HCC): Primary | ICD-10-CM

## 2024-04-26 LAB — INR BLD: 2.3

## 2024-04-26 PROCEDURE — 93793 ANTICOAG MGMT PT WARFARIN: CPT | Performed by: FAMILY MEDICINE

## 2024-04-29 ENCOUNTER — ANTI-COAG VISIT (OUTPATIENT)
Dept: FAMILY MEDICINE CLINIC | Age: 84
End: 2024-04-29
Payer: MEDICARE

## 2024-04-29 ENCOUNTER — HOSPITAL ENCOUNTER (OUTPATIENT)
Age: 84
Discharge: HOME | End: 2024-04-29
Payer: MEDICARE

## 2024-04-29 DIAGNOSIS — R73.01 IMPAIRED FASTING GLUCOSE: ICD-10-CM

## 2024-04-29 DIAGNOSIS — I48.0 PAROXYSMAL ATRIAL FIBRILLATION (HCC): Primary | ICD-10-CM

## 2024-04-29 DIAGNOSIS — I48.0 PAROXYSMAL ATRIAL FIBRILLATION (HCC): ICD-10-CM

## 2024-04-29 DIAGNOSIS — E78.2 MIXED HYPERLIPIDEMIA: ICD-10-CM

## 2024-04-29 DIAGNOSIS — E03.9 ACQUIRED HYPOTHYROIDISM: ICD-10-CM

## 2024-04-29 DIAGNOSIS — I10 ESSENTIAL HYPERTENSION: ICD-10-CM

## 2024-04-29 LAB
ALBUMIN SERPL-MCNC: 4 G/DL (ref 3.5–5.2)
ALBUMIN/GLOB SERPL: 1.4 {RATIO} (ref 1–2.5)
ALP SERPL-CCNC: 116 U/L (ref 35–104)
ALT SERPL-CCNC: 10 U/L (ref 5–33)
ANION GAP SERPL CALCULATED.3IONS-SCNC: 11 MMOL/L (ref 9–17)
AST SERPL-CCNC: 14 U/L
BASOPHILS # BLD: 0.03 K/UL (ref 0–0.2)
BASOPHILS NFR BLD: 1 % (ref 0–2)
BILIRUB SERPL-MCNC: 0.7 MG/DL (ref 0.3–1.2)
BUN SERPL-MCNC: 28 MG/DL (ref 8–23)
BUN/CREAT SERPL: 25 (ref 9–20)
CALCIUM SERPL-MCNC: 9.3 MG/DL (ref 8.6–10.4)
CHLORIDE SERPL-SCNC: 105 MMOL/L (ref 98–107)
CHOLEST SERPL-MCNC: 83 MG/DL (ref 0–199)
CHOLESTEROL/HDL RATIO: 2
CO2 SERPL-SCNC: 25 MMOL/L (ref 20–31)
CREAT SERPL-MCNC: 1.1 MG/DL (ref 0.5–0.9)
EOSINOPHIL # BLD: 0.21 K/UL (ref 0–0.44)
EOSINOPHILS RELATIVE PERCENT: 3 % (ref 1–4)
ERYTHROCYTE [DISTWIDTH] IN BLOOD BY AUTOMATED COUNT: 13.4 % (ref 11.8–14.4)
EST. AVERAGE GLUCOSE BLD GHB EST-MCNC: 108 MG/DL
GFR SERPL CREATININE-BSD FRML MDRD: 50 ML/MIN/1.73M2
GLUCOSE SERPL-MCNC: 91 MG/DL (ref 70–99)
HBA1C MFR BLD: 5.4 % (ref 4–6)
HCT VFR BLD AUTO: 40 % (ref 36.3–47.1)
HDLC SERPL-MCNC: 40 MG/DL
HGB BLD-MCNC: 12.8 G/DL (ref 11.9–15.1)
IMM GRANULOCYTES # BLD AUTO: <0.03 K/UL (ref 0–0.3)
IMM GRANULOCYTES NFR BLD: 0 %
INR PPP: 2.2
LDLC SERPL CALC-MCNC: 25 MG/DL (ref 0–100)
LYMPHOCYTES NFR BLD: 1.42 K/UL (ref 1.1–3.7)
LYMPHOCYTES RELATIVE PERCENT: 21 % (ref 24–43)
MCH RBC QN AUTO: 31.6 PG (ref 25.2–33.5)
MCHC RBC AUTO-ENTMCNC: 32 G/DL (ref 25.2–33.5)
MCV RBC AUTO: 98.8 FL (ref 82.6–102.9)
MONOCYTES NFR BLD: 0.62 K/UL (ref 0.1–1.2)
MONOCYTES NFR BLD: 9 % (ref 3–12)
NEUTROPHILS NFR BLD: 65 % (ref 36–65)
NEUTS SEG NFR BLD: 4.34 K/UL (ref 1.5–8.1)
NRBC BLD-RTO: 0 PER 100 WBC
PLATELET # BLD AUTO: ABNORMAL K/UL (ref 138–453)
PLATELET, FLUORESCENCE: 136 K/UL (ref 138–453)
PLATELETS.RETICULATED NFR BLD AUTO: 13.6 % (ref 1.1–10.3)
POTASSIUM SERPL-SCNC: 4.2 MMOL/L (ref 3.7–5.3)
PROT SERPL-MCNC: 6.9 G/DL (ref 6.4–8.3)
PROTHROMBIN TIME: 23 SEC (ref 11.5–14.2)
RBC # BLD AUTO: 4.05 M/UL (ref 3.95–5.11)
SODIUM SERPL-SCNC: 141 MMOL/L (ref 135–144)
T4 FREE SERPL-MCNC: 1.4 NG/DL (ref 0.92–1.68)
TRIGL SERPL-MCNC: 94 MG/DL
TSH SERPL DL<=0.05 MIU/L-ACNC: 1.02 UIU/ML (ref 0.3–5)
VLDLC SERPL CALC-MCNC: 19 MG/DL
WBC OTHER # BLD: 6.6 K/UL (ref 3.5–11.3)

## 2024-04-29 PROCEDURE — 36415 COLL VENOUS BLD VENIPUNCTURE: CPT

## 2024-04-29 PROCEDURE — 85610 PROTHROMBIN TIME: CPT

## 2024-04-29 PROCEDURE — 84443 ASSAY THYROID STIM HORMONE: CPT

## 2024-04-29 PROCEDURE — 93793 ANTICOAG MGMT PT WARFARIN: CPT | Performed by: FAMILY MEDICINE

## 2024-04-29 PROCEDURE — 84439 ASSAY OF FREE THYROXINE: CPT

## 2024-04-29 PROCEDURE — 80061 LIPID PANEL: CPT

## 2024-04-29 PROCEDURE — 85025 COMPLETE CBC W/AUTO DIFF WBC: CPT

## 2024-04-29 PROCEDURE — 80053 COMPREHEN METABOLIC PANEL: CPT

## 2024-04-29 PROCEDURE — 83036 HEMOGLOBIN GLYCOSYLATED A1C: CPT

## 2024-04-29 NOTE — PROGRESS NOTES
Patient gets weekly INRs at home, but got this one done with her routine lab work.  Would just continue with current coumadin dose and repeat on Friday as scheduled.

## 2024-04-30 RX ORDER — ATORVASTATIN CALCIUM 40 MG/1
TABLET, FILM COATED ORAL
Qty: 90 TABLET | Refills: 1 | Status: SHIPPED | OUTPATIENT
Start: 2024-04-30

## 2024-04-30 NOTE — TELEPHONE ENCOUNTER
Yvette called requesting a refill of the below medication which has been pended for you:     Requested Prescriptions     Pending Prescriptions Disp Refills    atorvastatin (LIPITOR) 40 MG tablet [Pharmacy Med Name: Atorvastatin Calcium Oral Tablet 40 MG] 90 tablet 0     Sig: TAKE 1 TABLET BY MOUTH EVERY DAY AT NIGHT       Last Appointment Date: 11/2/2023  Next Appointment Date: 5/2/2024    Allergies   Allergen Reactions    Pcn [Penicillins] Hives and Shortness Of Breath    Bextra [Valdecoxib] Diarrhea

## 2024-05-03 ENCOUNTER — ANTI-COAG VISIT (OUTPATIENT)
Dept: FAMILY MEDICINE CLINIC | Age: 84
End: 2024-05-03

## 2024-05-03 DIAGNOSIS — I48.0 PAROXYSMAL ATRIAL FIBRILLATION (HCC): Primary | ICD-10-CM

## 2024-05-03 LAB — INR BLD: 2

## 2024-05-03 RX ORDER — PREDNISONE 20 MG/1
20 TABLET ORAL DAILY PRN
Qty: 15 TABLET | Refills: 0 | Status: SHIPPED | OUTPATIENT
Start: 2024-05-03

## 2024-05-03 NOTE — TELEPHONE ENCOUNTER
Patient is really struggling with her arthritis pain. She can barely move today. She takes ES Tylenol but this doesn't help much. Wondered what else she could try. Advised her back on 7/7/2023 when she called with this same complaint you offered Prednisone, Tylenol #3, or Norco very sparingly. She tried the Prednisone 10 mg however it did not give much relief. She states she has taken a Prednisone 20 mg that  helped a lot more. She knows she has to use very sparingly on bad days. Please send to [facility]

## 2024-05-10 ENCOUNTER — ANTI-COAG VISIT (OUTPATIENT)
Dept: FAMILY MEDICINE CLINIC | Age: 84
End: 2024-05-10
Payer: MEDICARE

## 2024-05-10 DIAGNOSIS — I48.0 PAROXYSMAL ATRIAL FIBRILLATION (HCC): Primary | ICD-10-CM

## 2024-05-10 LAB — INR BLD: 1.7

## 2024-05-10 PROCEDURE — 93793 ANTICOAG MGMT PT WARFARIN: CPT | Performed by: FAMILY MEDICINE

## 2024-05-10 NOTE — PROGRESS NOTES
Patient notified of INR result, coumadin dosing instructions, and next recommended INR lab draw. Patient verbalizes understanding. Patient states she thought she forgot her coumadin one evening and that is why it was a bit lower this week.

## 2024-05-17 ENCOUNTER — ANTI-COAG VISIT (OUTPATIENT)
Dept: FAMILY MEDICINE CLINIC | Age: 84
End: 2024-05-17
Payer: MEDICARE

## 2024-05-17 DIAGNOSIS — I48.0 PAROXYSMAL ATRIAL FIBRILLATION (HCC): Primary | ICD-10-CM

## 2024-05-17 LAB — INR BLD: 2

## 2024-05-17 PROCEDURE — 93793 ANTICOAG MGMT PT WARFARIN: CPT | Performed by: NURSE PRACTITIONER

## 2024-05-19 ENCOUNTER — APPOINTMENT (OUTPATIENT)
Dept: CT IMAGING | Age: 84
DRG: 560 | End: 2024-05-19
Payer: MEDICARE

## 2024-05-19 ENCOUNTER — HOSPITAL ENCOUNTER (INPATIENT)
Age: 84
LOS: 3 days | Discharge: NURSING, REHAB OR LONG-TERM CARE | DRG: 560 | End: 2024-05-22
Attending: EMERGENCY MEDICINE | Admitting: FAMILY MEDICINE
Payer: MEDICARE

## 2024-05-19 ENCOUNTER — APPOINTMENT (OUTPATIENT)
Dept: GENERAL RADIOLOGY | Age: 84
DRG: 560 | End: 2024-05-19
Payer: MEDICARE

## 2024-05-19 DIAGNOSIS — S79.912A HIP INJURY, LEFT, INITIAL ENCOUNTER: Primary | ICD-10-CM

## 2024-05-19 PROBLEM — S72.90XA FEMORAL FRACTURE (HCC): Status: ACTIVE | Noted: 2024-05-19

## 2024-05-19 PROBLEM — S72.8X2A OTHER FRACTURE OF LEFT FEMUR, INITIAL ENCOUNTER FOR CLOSED FRACTURE (HCC): Status: ACTIVE | Noted: 2024-05-19

## 2024-05-19 LAB
ALBUMIN SERPL-MCNC: 4 G/DL (ref 3.5–5.2)
ALBUMIN/GLOB SERPL: 1.3 {RATIO} (ref 1–2.5)
ALP SERPL-CCNC: 123 U/L (ref 35–104)
ALT SERPL-CCNC: 12 U/L (ref 5–33)
ANION GAP SERPL CALCULATED.3IONS-SCNC: 14 MMOL/L (ref 9–17)
AST SERPL-CCNC: 19 U/L
BASOPHILS # BLD: 0.03 K/UL (ref 0–0.2)
BASOPHILS NFR BLD: 1 % (ref 0–2)
BILIRUB SERPL-MCNC: 0.6 MG/DL (ref 0.3–1.2)
BUN SERPL-MCNC: 27 MG/DL (ref 8–23)
BUN/CREAT SERPL: 25 (ref 9–20)
CALCIUM SERPL-MCNC: 9.4 MG/DL (ref 8.6–10.4)
CHLORIDE SERPL-SCNC: 100 MMOL/L (ref 98–107)
CO2 SERPL-SCNC: 22 MMOL/L (ref 20–31)
CREAT SERPL-MCNC: 1.1 MG/DL (ref 0.5–0.9)
EKG Q-T INTERVAL: 418 MS
EKG QRS DURATION: 90 MS
EKG QTC CALCULATION (BAZETT): 491 MS
EKG R AXIS: -10 DEGREES
EKG T AXIS: 0 DEGREES
EKG VENTRICULAR RATE: 83 BPM
EOSINOPHIL # BLD: 0.13 K/UL (ref 0–0.44)
EOSINOPHILS RELATIVE PERCENT: 2 % (ref 1–4)
ERYTHROCYTE [DISTWIDTH] IN BLOOD BY AUTOMATED COUNT: 13.6 % (ref 11.8–14.4)
GFR, ESTIMATED: 50 ML/MIN/1.73M2
GLUCOSE SERPL-MCNC: 121 MG/DL (ref 70–99)
HCT VFR BLD AUTO: 42.7 % (ref 36.3–47.1)
HGB BLD-MCNC: 14 G/DL (ref 11.9–15.1)
IMM GRANULOCYTES # BLD AUTO: 0.05 K/UL (ref 0–0.3)
IMM GRANULOCYTES NFR BLD: 1 %
INR PPP: 1.8
LYMPHOCYTES NFR BLD: 1.08 K/UL (ref 1.1–3.7)
LYMPHOCYTES RELATIVE PERCENT: 17 % (ref 24–43)
MCH RBC QN AUTO: 31.8 PG (ref 25.2–33.5)
MCHC RBC AUTO-ENTMCNC: 32.8 G/DL (ref 25.2–33.5)
MCV RBC AUTO: 97 FL (ref 82.6–102.9)
MONOCYTES NFR BLD: 0.65 K/UL (ref 0.1–1.2)
MONOCYTES NFR BLD: 10 % (ref 3–12)
NEUTROPHILS NFR BLD: 70 % (ref 36–65)
NEUTS SEG NFR BLD: 4.5 K/UL (ref 1.5–8.1)
NRBC BLD-RTO: 0 PER 100 WBC
PLATELET # BLD AUTO: ABNORMAL K/UL (ref 138–453)
PLATELET, FLUORESCENCE: 118 K/UL (ref 138–453)
PLATELETS.RETICULATED NFR BLD AUTO: 13.9 % (ref 1.1–10.3)
POTASSIUM SERPL-SCNC: 4.4 MMOL/L (ref 3.7–5.3)
PROT SERPL-MCNC: 7.1 G/DL (ref 6.4–8.3)
PROTHROMBIN TIME: 19.6 SEC (ref 11.5–14.2)
RBC # BLD AUTO: 4.4 M/UL (ref 3.95–5.11)
SODIUM SERPL-SCNC: 136 MMOL/L (ref 135–144)
WBC OTHER # BLD: 6.4 K/UL (ref 3.5–11.3)

## 2024-05-19 PROCEDURE — 85610 PROTHROMBIN TIME: CPT

## 2024-05-19 PROCEDURE — 6360000002 HC RX W HCPCS

## 2024-05-19 PROCEDURE — 96375 TX/PRO/DX INJ NEW DRUG ADDON: CPT

## 2024-05-19 PROCEDURE — 74177 CT ABD & PELVIS W/CONTRAST: CPT

## 2024-05-19 PROCEDURE — 96374 THER/PROPH/DIAG INJ IV PUSH: CPT

## 2024-05-19 PROCEDURE — 73552 X-RAY EXAM OF FEMUR 2/>: CPT

## 2024-05-19 PROCEDURE — 2500000003 HC RX 250 WO HCPCS

## 2024-05-19 PROCEDURE — 71260 CT THORAX DX C+: CPT

## 2024-05-19 PROCEDURE — 2060000000 HC ICU INTERMEDIATE R&B

## 2024-05-19 PROCEDURE — 94761 N-INVAS EAR/PLS OXIMETRY MLT: CPT

## 2024-05-19 PROCEDURE — 99222 1ST HOSP IP/OBS MODERATE 55: CPT

## 2024-05-19 PROCEDURE — 96376 TX/PRO/DX INJ SAME DRUG ADON: CPT

## 2024-05-19 PROCEDURE — 96372 THER/PROPH/DIAG INJ SC/IM: CPT

## 2024-05-19 PROCEDURE — 80053 COMPREHEN METABOLIC PANEL: CPT

## 2024-05-19 PROCEDURE — 85025 COMPLETE CBC W/AUTO DIFF WBC: CPT

## 2024-05-19 PROCEDURE — 6370000000 HC RX 637 (ALT 250 FOR IP)

## 2024-05-19 PROCEDURE — 6360000004 HC RX CONTRAST MEDICATION: Performed by: EMERGENCY MEDICINE

## 2024-05-19 PROCEDURE — 99285 EMERGENCY DEPT VISIT HI MDM: CPT

## 2024-05-19 PROCEDURE — 6360000002 HC RX W HCPCS: Performed by: EMERGENCY MEDICINE

## 2024-05-19 PROCEDURE — 6370000000 HC RX 637 (ALT 250 FOR IP): Performed by: EMERGENCY MEDICINE

## 2024-05-19 PROCEDURE — 51702 INSERT TEMP BLADDER CATH: CPT

## 2024-05-19 PROCEDURE — 70450 CT HEAD/BRAIN W/O DYE: CPT

## 2024-05-19 PROCEDURE — 2580000003 HC RX 258

## 2024-05-19 PROCEDURE — 93005 ELECTROCARDIOGRAM TRACING: CPT | Performed by: EMERGENCY MEDICINE

## 2024-05-19 PROCEDURE — 72125 CT NECK SPINE W/O DYE: CPT

## 2024-05-19 PROCEDURE — 2700000000 HC OXYGEN THERAPY PER DAY

## 2024-05-19 RX ORDER — POTASSIUM CHLORIDE 20 MEQ/1
20 TABLET, EXTENDED RELEASE ORAL DAILY
Status: DISCONTINUED | OUTPATIENT
Start: 2024-05-20 | End: 2024-05-23 | Stop reason: HOSPADM

## 2024-05-19 RX ORDER — OXYCODONE HYDROCHLORIDE AND ACETAMINOPHEN 5; 325 MG/1; MG/1
1 TABLET ORAL ONCE
Status: COMPLETED | OUTPATIENT
Start: 2024-05-19 | End: 2024-05-19

## 2024-05-19 RX ORDER — LEVOTHYROXINE SODIUM 0.03 MG/1
50 TABLET ORAL DAILY
Status: DISCONTINUED | OUTPATIENT
Start: 2024-05-19 | End: 2024-05-23 | Stop reason: HOSPADM

## 2024-05-19 RX ORDER — SODIUM CHLORIDE 9 MG/ML
INJECTION, SOLUTION INTRAVENOUS PRN
Status: DISCONTINUED | OUTPATIENT
Start: 2024-05-19 | End: 2024-05-23 | Stop reason: HOSPADM

## 2024-05-19 RX ORDER — ALBUTEROL SULFATE 2.5 MG/3ML
2.5 SOLUTION RESPIRATORY (INHALATION)
Status: DISCONTINUED | OUTPATIENT
Start: 2024-05-19 | End: 2024-05-23 | Stop reason: HOSPADM

## 2024-05-19 RX ORDER — MORPHINE SULFATE 2 MG/ML
2 INJECTION, SOLUTION INTRAMUSCULAR; INTRAVENOUS
Status: DISCONTINUED | OUTPATIENT
Start: 2024-05-19 | End: 2024-05-20

## 2024-05-19 RX ORDER — MORPHINE SULFATE 2 MG/ML
1 INJECTION, SOLUTION INTRAMUSCULAR; INTRAVENOUS
Status: DISCONTINUED | OUTPATIENT
Start: 2024-05-19 | End: 2024-05-20

## 2024-05-19 RX ORDER — AMLODIPINE BESYLATE 5 MG/1
10 TABLET ORAL DAILY
Status: DISCONTINUED | OUTPATIENT
Start: 2024-05-20 | End: 2024-05-23 | Stop reason: HOSPADM

## 2024-05-19 RX ORDER — MORPHINE SULFATE 2 MG/ML
2 INJECTION, SOLUTION INTRAMUSCULAR; INTRAVENOUS ONCE
Status: COMPLETED | OUTPATIENT
Start: 2024-05-19 | End: 2024-05-19

## 2024-05-19 RX ORDER — SODIUM CHLORIDE FOR INHALATION 0.9 %
3 VIAL, NEBULIZER (ML) INHALATION
Status: DISCONTINUED | OUTPATIENT
Start: 2024-05-19 | End: 2024-05-23 | Stop reason: HOSPADM

## 2024-05-19 RX ORDER — ATORVASTATIN CALCIUM 40 MG/1
40 TABLET, FILM COATED ORAL NIGHTLY
Status: DISCONTINUED | OUTPATIENT
Start: 2024-05-19 | End: 2024-05-23 | Stop reason: HOSPADM

## 2024-05-19 RX ORDER — SODIUM CHLORIDE 0.9 % (FLUSH) 0.9 %
5-40 SYRINGE (ML) INJECTION PRN
Status: DISCONTINUED | OUTPATIENT
Start: 2024-05-19 | End: 2024-05-23 | Stop reason: HOSPADM

## 2024-05-19 RX ORDER — POLYETHYLENE GLYCOL 3350 17 G/17G
17 POWDER, FOR SOLUTION ORAL DAILY PRN
Status: DISCONTINUED | OUTPATIENT
Start: 2024-05-19 | End: 2024-05-23 | Stop reason: HOSPADM

## 2024-05-19 RX ORDER — ACETAMINOPHEN 650 MG/1
650 SUPPOSITORY RECTAL EVERY 6 HOURS PRN
Status: DISCONTINUED | OUTPATIENT
Start: 2024-05-19 | End: 2024-05-23 | Stop reason: HOSPADM

## 2024-05-19 RX ORDER — ONDANSETRON 2 MG/ML
4 INJECTION INTRAMUSCULAR; INTRAVENOUS EVERY 6 HOURS PRN
Status: DISCONTINUED | OUTPATIENT
Start: 2024-05-19 | End: 2024-05-23 | Stop reason: HOSPADM

## 2024-05-19 RX ORDER — FUROSEMIDE 40 MG/1
40 TABLET ORAL DAILY
Status: DISCONTINUED | OUTPATIENT
Start: 2024-05-20 | End: 2024-05-23 | Stop reason: HOSPADM

## 2024-05-19 RX ORDER — ONDANSETRON 2 MG/ML
4 INJECTION INTRAMUSCULAR; INTRAVENOUS ONCE
Status: COMPLETED | OUTPATIENT
Start: 2024-05-19 | End: 2024-05-19

## 2024-05-19 RX ORDER — ONDANSETRON 4 MG/1
4 TABLET, ORALLY DISINTEGRATING ORAL EVERY 8 HOURS PRN
Status: DISCONTINUED | OUTPATIENT
Start: 2024-05-19 | End: 2024-05-23 | Stop reason: HOSPADM

## 2024-05-19 RX ORDER — OXYCODONE HYDROCHLORIDE AND ACETAMINOPHEN 5; 325 MG/1; MG/1
1 TABLET ORAL EVERY 4 HOURS PRN
Status: DISCONTINUED | OUTPATIENT
Start: 2024-05-19 | End: 2024-05-21

## 2024-05-19 RX ORDER — FENTANYL CITRATE 50 UG/ML
50 INJECTION, SOLUTION INTRAMUSCULAR; INTRAVENOUS ONCE
Status: COMPLETED | OUTPATIENT
Start: 2024-05-19 | End: 2024-05-19

## 2024-05-19 RX ORDER — ORPHENADRINE CITRATE 30 MG/ML
60 INJECTION INTRAMUSCULAR; INTRAVENOUS ONCE
Status: COMPLETED | OUTPATIENT
Start: 2024-05-19 | End: 2024-05-19

## 2024-05-19 RX ORDER — SODIUM CHLORIDE 0.9 % (FLUSH) 0.9 %
5-40 SYRINGE (ML) INJECTION EVERY 12 HOURS SCHEDULED
Status: DISCONTINUED | OUTPATIENT
Start: 2024-05-19 | End: 2024-05-23 | Stop reason: HOSPADM

## 2024-05-19 RX ORDER — ACETAMINOPHEN 325 MG/1
650 TABLET ORAL EVERY 6 HOURS PRN
Status: DISCONTINUED | OUTPATIENT
Start: 2024-05-19 | End: 2024-05-23 | Stop reason: HOSPADM

## 2024-05-19 RX ORDER — SODIUM CHLORIDE 9 MG/ML
INJECTION, SOLUTION INTRAVENOUS CONTINUOUS
Status: ACTIVE | OUTPATIENT
Start: 2024-05-19 | End: 2024-05-21

## 2024-05-19 RX ORDER — LANOLIN ALCOHOL/MO/W.PET/CERES
3 CREAM (GRAM) TOPICAL NIGHTLY PRN
Status: DISCONTINUED | OUTPATIENT
Start: 2024-05-19 | End: 2024-05-23 | Stop reason: HOSPADM

## 2024-05-19 RX ORDER — OXYCODONE HYDROCHLORIDE AND ACETAMINOPHEN 5; 325 MG/1; MG/1
2 TABLET ORAL EVERY 4 HOURS PRN
Status: DISCONTINUED | OUTPATIENT
Start: 2024-05-19 | End: 2024-05-23 | Stop reason: HOSPADM

## 2024-05-19 RX ORDER — SPIRONOLACTONE 25 MG/1
25 TABLET ORAL DAILY
Status: DISCONTINUED | OUTPATIENT
Start: 2024-05-20 | End: 2024-05-23 | Stop reason: HOSPADM

## 2024-05-19 RX ORDER — METAXALONE 800 MG/1
800 TABLET ORAL 3 TIMES DAILY
Status: DISCONTINUED | OUTPATIENT
Start: 2024-05-19 | End: 2024-05-23 | Stop reason: HOSPADM

## 2024-05-19 RX ADMIN — ATORVASTATIN CALCIUM 40 MG: 40 TABLET, FILM COATED ORAL at 22:08

## 2024-05-19 RX ADMIN — METOPROLOL TARTRATE 25 MG: 25 TABLET, FILM COATED ORAL at 22:08

## 2024-05-19 RX ADMIN — FENTANYL CITRATE 50 MCG: 50 INJECTION, SOLUTION INTRAMUSCULAR; INTRAVENOUS at 12:19

## 2024-05-19 RX ADMIN — ORPHENADRINE CITRATE 60 MG: 30 INJECTION, SOLUTION INTRAMUSCULAR; INTRAVENOUS at 14:20

## 2024-05-19 RX ADMIN — MICONAZOLE NITRATE: 20 POWDER TOPICAL at 22:07

## 2024-05-19 RX ADMIN — MORPHINE SULFATE 2 MG: 2 INJECTION, SOLUTION INTRAMUSCULAR; INTRAVENOUS at 13:41

## 2024-05-19 RX ADMIN — METAXALONE 800 MG: 800 TABLET ORAL at 23:00

## 2024-05-19 RX ADMIN — SODIUM CHLORIDE: 9 INJECTION, SOLUTION INTRAVENOUS at 22:04

## 2024-05-19 RX ADMIN — LEVOTHYROXINE SODIUM 50 MCG: 0.03 TABLET ORAL at 22:08

## 2024-05-19 RX ADMIN — OXYCODONE AND ACETAMINOPHEN 1 TABLET: 5; 325 TABLET ORAL at 19:26

## 2024-05-19 RX ADMIN — OXYCODONE AND ACETAMINOPHEN 2 TABLET: 5; 325 TABLET ORAL at 23:46

## 2024-05-19 RX ADMIN — ONDANSETRON 4 MG: 2 INJECTION INTRAMUSCULAR; INTRAVENOUS at 12:17

## 2024-05-19 RX ADMIN — MORPHINE SULFATE 2 MG: 2 INJECTION, SOLUTION INTRAMUSCULAR; INTRAVENOUS at 18:20

## 2024-05-19 RX ADMIN — SODIUM CHLORIDE, PRESERVATIVE FREE 10 ML: 5 INJECTION INTRAVENOUS at 22:08

## 2024-05-19 RX ADMIN — MORPHINE SULFATE 2 MG: 2 INJECTION, SOLUTION INTRAMUSCULAR; INTRAVENOUS at 15:36

## 2024-05-19 RX ADMIN — IOPAMIDOL 80 ML: 755 INJECTION, SOLUTION INTRAVENOUS at 13:28

## 2024-05-19 RX ADMIN — MORPHINE SULFATE 2 MG: 2 INJECTION, SOLUTION INTRAMUSCULAR; INTRAVENOUS at 22:05

## 2024-05-19 ASSESSMENT — PAIN SCALES - GENERAL
PAINLEVEL_OUTOF10: 9
PAINLEVEL_OUTOF10: 10
PAINLEVEL_OUTOF10: 10
PAINLEVEL_OUTOF10: 8
PAINLEVEL_OUTOF10: 9
PAINLEVEL_OUTOF10: 10
PAINLEVEL_OUTOF10: 3
PAINLEVEL_OUTOF10: 10

## 2024-05-19 ASSESSMENT — PAIN DESCRIPTION - LOCATION
LOCATION: HIP
LOCATION: LEG
LOCATION: HIP
LOCATION: LEG
LOCATION: LEG

## 2024-05-19 ASSESSMENT — PAIN DESCRIPTION - ORIENTATION
ORIENTATION: LEFT

## 2024-05-19 ASSESSMENT — PAIN DESCRIPTION - DESCRIPTORS
DESCRIPTORS: ACHING;SHARP

## 2024-05-19 ASSESSMENT — PAIN DESCRIPTION - FREQUENCY
FREQUENCY: CONTINUOUS

## 2024-05-19 ASSESSMENT — PAIN - FUNCTIONAL ASSESSMENT
PAIN_FUNCTIONAL_ASSESSMENT: ACTIVITIES ARE NOT PREVENTED
PAIN_FUNCTIONAL_ASSESSMENT: 0-10
PAIN_FUNCTIONAL_ASSESSMENT: ACTIVITIES ARE NOT PREVENTED

## 2024-05-19 ASSESSMENT — PAIN DESCRIPTION - PAIN TYPE
TYPE: ACUTE PAIN
TYPE: ACUTE PAIN

## 2024-05-19 ASSESSMENT — PAIN DESCRIPTION - ONSET
ONSET: ON-GOING
ONSET: ON-GOING

## 2024-05-19 NOTE — H&P
HOSPITALIST ADMISSION H&P      REASON FOR ADMISSION:  Left femur fracture, Fall  ESTIMATED LENGTH OF STAY:>2 midnights, 3-4 days    ATTENDING/ADMITTING PHYSICIAN: Mayank Carlin MD  PCP: Emy Trevino DO    HISTORY OF PRESENT ILLNESS:      The patient is a 83 y.o. female patient of Emy Trevino DO who presents from ER with c/o Fall with left hip pain. Patient reports she was in the walk-in shower and as she was getting out her left leg slipped and twisted under her when she stepped out with her right leg. She thinks she may have missed some soap on the floor of the shower. Patient c/o pain 9/10 to left hip and describes as stabbing at times, denies nausea, vomiting, diarrhea, constipation, shortness of breath or chest pain.     Atrial fib: rate controlled, on coumadin.    JAYJAY: uses CPAP.     CKD: [id number].     Hypertension: labile.    In ER: Fentanyl, Morphine x3, Zofran, Norflex, Percocet, Petty catheter  CT head w/o:   IMPRESSION:  No acute intracranial abnormality.  Opacification of the left maxillary sinus and posterior left ethmoid air  cells.    CT cervical w/o:   IMPRESSION:  No acute fracture or malalignment of the cervical spine.  Multilevel degenerative change with mild degenerative anterolisthesis of C4  on C5.    CT chest/abdomen and pelvis w/-CT lumbar-CT thoracic:  IMPRESSION:  Components of left total hip arthroplasty situated in near anatomic  alignment.  Questionable minimal cortical irregularity of the lateral cortex  of the proximal femur along the stem of the femoral component (series 605,  image 93) can conceivably represent nondisplaced periprosthetic fracture in  the correct clinical setting.  Mild vertebral body height loss of the superior endplate of the T3, age  indeterminate.  Correlation with point of tenderness is recommended  No specific imaging features of acute intrathoracic, intra-abdominal sequelae  of trauma seen.  Degenerative arthritic changes affecting the  needed (osteoarthritis)  Emy Trevino, DO   atorvastatin (LIPITOR) 40 MG tablet TAKE 1 TABLET BY MOUTH EVERY DAY AT NIGHT  Emy Trevino, DO   metoprolol tartrate (LOPRESSOR) 25 MG tablet TAKE 1 TABLET BY MOUTH TWICE A DAY  Emy Trevino, DO   amLODIPine (NORVASC) 10 MG tablet Take 1 tablet by mouth daily  Eym Trevino,    nystatin (MYCOSTATIN) 113214 UNIT/GM powder Apply topically 4 times daily.  Emy Trevino DO   furosemide (LASIX) 40 MG tablet Take 1 tablet by mouth daily  Emy Trevino DO   levothyroxine (SYNTHROID) 50 MCG tablet TAKE 1 TABLET BY MOUTH EVERY DAY  Emy Trevino,    spironolactone (ALDACTONE) 25 MG tablet Take 1 tablet by mouth daily  Emy Trevino DO   Lift Chair MISC by Does not apply route  Emy Trevino DO   warfarin (JANTOVEN) 3 MG tablet TAKE 2 TABLETS BY MOUTH EVERY DAY OR AS DIRECTED PER INR RESULTS  Emy Treivno DO   potassium chloride (KLOR-CON M) 20 MEQ extended release tablet Take 1 tablet by mouth daily  Emy Trevino DO   acetaminophen (TYLENOL) 325 MG tablet Take 2 tablets by mouth every 4 hours as needed for Pain or Fever  Dimas Arrieta MD         Allergies:    Pcn [penicillins] and Bextra [valdecoxib]    Social History:   Tobacco:   Social History     Tobacco Use   Smoking Status Never    Passive exposure: Never   Smokeless Tobacco Never       Alcohol:   Social History     Substance and Sexual Activity   Alcohol Use No       Drug:   Social History     Substance and Sexual Activity   Drug Use No       Family History:   family history includes High Blood Pressure in her brother. She was adopted.    REVIEW OF SYSTEMS:  See HPI and problem list; otherwise no other new complaints with respect to eyes, ENT, neck, pulmonary, coronary, chest, GI, , endocrine, musculoskeletal, immune system/connective tissue disease, hematologic, neurologic, psychiatric, skin, lymphatics, or malignancies.     Code status:

## 2024-05-19 NOTE — ED PROVIDER NOTES
XR FEMUR LEFT (MIN 2 VIEWS)   Final Result   No evidence of fracture. Osteoporosis.            I directly visualized the following  images and reviewed the radiologist interpretations:          ED BEDSIDE ULTRASOUND:       LABS:  Labs Reviewed   CBC WITH AUTO DIFFERENTIAL - Abnormal; Notable for the following components:       Result Value    Platelet, Fluorescence 118 (*)     Platelet, Immature Fraction 13.9 (*)     Neutrophils % 70 (*)     Lymphocytes % 17 (*)     Immature Granulocytes % 1 (*)     Lymphocytes Absolute 1.08 (*)     All other components within normal limits   COMPREHENSIVE METABOLIC PANEL W/ REFLEX TO MG FOR LOW K - Abnormal; Notable for the following components:    Glucose 121 (*)     BUN 27 (*)     Creatinine 1.1 (*)     Est, Glom Filt Rate 50 (*)     BUN/Creatinine Ratio 25 (*)     Alkaline Phosphatase 123 (*)     All other components within normal limits   PROTIME-INR - Abnormal; Notable for the following components:    Protime 19.6 (*)     All other components within normal limits           EMERGENCY DEPARTMENT COURSE:   Vitals:    Vitals:    05/19/24 1155 05/19/24 1400 05/19/24 1430 05/19/24 1500   BP: (!) 161/106 (!) 149/88 138/83 (!) 138/91   Pulse:    88   Resp:    16   Temp:       TempSrc:       SpO2:  94% 91% 94%   Weight:       Height:         -------------------------  BP: (!) 138/91, Temp: (!) 96.3 °F (35.7 °C), Pulse: 88, Respirations: 16        Re-evaluation Notes    Patient with persistent left hip pain plain x-rays were unremarkable CT showed a possible periprosthetic nondisplaced fracture I discussed the case with orthopedist Dr. Zeng will see in consult will bring in for pain control and physical therapy patient may need placement as she is unable to bear weight    CRITICAL CARE:   IP CONSULT TO ORTHOPEDIC SURGERY  IP CONSULT TO HOSPITALIST        CONSULTS:  Orthopedics and hospitalist    PROCEDURES:  None    FINAL IMPRESSION      1. Hip injury, left, initial encounter           DISPOSITION/PLAN   DISPOSITION Decision To Admit    Condition on Disposition    Stable    PATIENT REFERRED TO:  No follow-up provider specified.    DISCHARGE MEDICATIONS:  New Prescriptions    No medications on file       (Please note that portions of this note were completed with a voice recognition program.  Efforts were made to edit the dictations but occasionally words are mis-transcribed.)    Kit Winston MD,, MD, F.A.A.E.M.  Attending Emergency Physician                           Kit Winston MD  05/19/24 6714

## 2024-05-20 PROBLEM — S79.912A HIP INJURY, LEFT, INITIAL ENCOUNTER: Status: ACTIVE | Noted: 2024-05-20

## 2024-05-20 LAB
ANION GAP SERPL CALCULATED.3IONS-SCNC: 12 MMOL/L (ref 9–17)
BASOPHILS # BLD: 0.03 K/UL (ref 0–0.2)
BASOPHILS NFR BLD: 0 % (ref 0–2)
BUN SERPL-MCNC: 20 MG/DL (ref 8–23)
BUN/CREAT SERPL: 20 (ref 9–20)
CALCIUM SERPL-MCNC: 9.1 MG/DL (ref 8.6–10.4)
CHLORIDE SERPL-SCNC: 105 MMOL/L (ref 98–107)
CO2 SERPL-SCNC: 23 MMOL/L (ref 20–31)
CREAT SERPL-MCNC: 1 MG/DL (ref 0.5–0.9)
EOSINOPHIL # BLD: 0.1 K/UL (ref 0–0.44)
EOSINOPHILS RELATIVE PERCENT: 1 % (ref 1–4)
ERYTHROCYTE [DISTWIDTH] IN BLOOD BY AUTOMATED COUNT: 13.7 % (ref 11.8–14.4)
GFR, ESTIMATED: 56 ML/MIN/1.73M2
GLUCOSE SERPL-MCNC: 103 MG/DL (ref 70–99)
HCT VFR BLD AUTO: 38.2 % (ref 36.3–47.1)
HGB BLD-MCNC: 12.5 G/DL (ref 11.9–15.1)
IMM GRANULOCYTES # BLD AUTO: 0.04 K/UL (ref 0–0.3)
IMM GRANULOCYTES NFR BLD: 0 %
INR PPP: 1.6
LYMPHOCYTES NFR BLD: 1.54 K/UL (ref 1.1–3.7)
LYMPHOCYTES RELATIVE PERCENT: 17 % (ref 24–43)
MCH RBC QN AUTO: 31.9 PG (ref 25.2–33.5)
MCHC RBC AUTO-ENTMCNC: 32.7 G/DL (ref 25.2–33.5)
MCV RBC AUTO: 97.4 FL (ref 82.6–102.9)
MONOCYTES NFR BLD: 0.96 K/UL (ref 0.1–1.2)
MONOCYTES NFR BLD: 10 % (ref 3–12)
NEUTROPHILS NFR BLD: 71 % (ref 36–65)
NEUTS SEG NFR BLD: 6.55 K/UL (ref 1.5–8.1)
NRBC BLD-RTO: 0 PER 100 WBC
PLATELET # BLD AUTO: ABNORMAL K/UL (ref 138–453)
PLATELET, FLUORESCENCE: 115 K/UL (ref 138–453)
PLATELETS.RETICULATED NFR BLD AUTO: 14.9 % (ref 1.1–10.3)
POTASSIUM SERPL-SCNC: 4.1 MMOL/L (ref 3.7–5.3)
PROTHROMBIN TIME: 17.9 SEC (ref 11.5–14.2)
RBC # BLD AUTO: 3.92 M/UL (ref 3.95–5.11)
SODIUM SERPL-SCNC: 140 MMOL/L (ref 135–144)
WBC OTHER # BLD: 9.2 K/UL (ref 3.5–11.3)

## 2024-05-20 PROCEDURE — 85025 COMPLETE CBC W/AUTO DIFF WBC: CPT

## 2024-05-20 PROCEDURE — 36415 COLL VENOUS BLD VENIPUNCTURE: CPT

## 2024-05-20 PROCEDURE — 6370000000 HC RX 637 (ALT 250 FOR IP): Performed by: FAMILY MEDICINE

## 2024-05-20 PROCEDURE — 97161 PT EVAL LOW COMPLEX 20 MIN: CPT | Performed by: PHYSICAL THERAPIST

## 2024-05-20 PROCEDURE — 99222 1ST HOSP IP/OBS MODERATE 55: CPT | Performed by: NURSE PRACTITIONER

## 2024-05-20 PROCEDURE — 2060000000 HC ICU INTERMEDIATE R&B

## 2024-05-20 PROCEDURE — 97166 OT EVAL MOD COMPLEX 45 MIN: CPT | Performed by: OCCUPATIONAL THERAPIST

## 2024-05-20 PROCEDURE — 6360000002 HC RX W HCPCS

## 2024-05-20 PROCEDURE — 2700000000 HC OXYGEN THERAPY PER DAY

## 2024-05-20 PROCEDURE — 94761 N-INVAS EAR/PLS OXIMETRY MLT: CPT

## 2024-05-20 PROCEDURE — 99222 1ST HOSP IP/OBS MODERATE 55: CPT | Performed by: FAMILY MEDICINE

## 2024-05-20 PROCEDURE — 80048 BASIC METABOLIC PNL TOTAL CA: CPT

## 2024-05-20 PROCEDURE — 2580000003 HC RX 258

## 2024-05-20 PROCEDURE — 85610 PROTHROMBIN TIME: CPT

## 2024-05-20 PROCEDURE — 6370000000 HC RX 637 (ALT 250 FOR IP)

## 2024-05-20 PROCEDURE — 99223 1ST HOSP IP/OBS HIGH 75: CPT | Performed by: INTERNAL MEDICINE

## 2024-05-20 PROCEDURE — 6360000002 HC RX W HCPCS: Performed by: FAMILY MEDICINE

## 2024-05-20 RX ORDER — WARFARIN SODIUM 2.5 MG/1
7.5 TABLET ORAL
Status: COMPLETED | OUTPATIENT
Start: 2024-05-20 | End: 2024-05-20

## 2024-05-20 RX ORDER — WARFARIN SODIUM 2.5 MG/1
7.5 TABLET ORAL
Status: DISCONTINUED | OUTPATIENT
Start: 2024-05-20 | End: 2024-05-20

## 2024-05-20 RX ADMIN — HYDROMORPHONE HYDROCHLORIDE 1 MG: 1 INJECTION, SOLUTION INTRAMUSCULAR; INTRAVENOUS; SUBCUTANEOUS at 15:18

## 2024-05-20 RX ADMIN — METOPROLOL TARTRATE 25 MG: 25 TABLET, FILM COATED ORAL at 21:11

## 2024-05-20 RX ADMIN — SODIUM CHLORIDE, PRESERVATIVE FREE 10 ML: 5 INJECTION INTRAVENOUS at 21:18

## 2024-05-20 RX ADMIN — SPIRONOLACTONE 25 MG: 25 TABLET ORAL at 08:23

## 2024-05-20 RX ADMIN — HYDROMORPHONE HYDROCHLORIDE 1 MG: 1 INJECTION, SOLUTION INTRAMUSCULAR; INTRAVENOUS; SUBCUTANEOUS at 11:15

## 2024-05-20 RX ADMIN — MORPHINE SULFATE 2 MG: 2 INJECTION, SOLUTION INTRAMUSCULAR; INTRAVENOUS at 06:18

## 2024-05-20 RX ADMIN — MICONAZOLE NITRATE: 20 POWDER TOPICAL at 21:18

## 2024-05-20 RX ADMIN — OXYCODONE AND ACETAMINOPHEN 2 TABLET: 5; 325 TABLET ORAL at 08:28

## 2024-05-20 RX ADMIN — WARFARIN SODIUM 7.5 MG: 2.5 TABLET ORAL at 17:37

## 2024-05-20 RX ADMIN — METAXALONE 800 MG: 800 TABLET ORAL at 21:11

## 2024-05-20 RX ADMIN — OXYCODONE AND ACETAMINOPHEN 2 TABLET: 5; 325 TABLET ORAL at 04:28

## 2024-05-20 RX ADMIN — MICONAZOLE NITRATE: 20 POWDER TOPICAL at 11:19

## 2024-05-20 RX ADMIN — METOPROLOL TARTRATE 25 MG: 25 TABLET, FILM COATED ORAL at 08:23

## 2024-05-20 RX ADMIN — METAXALONE 800 MG: 800 TABLET ORAL at 15:19

## 2024-05-20 RX ADMIN — HYDROMORPHONE HYDROCHLORIDE 1 MG: 1 INJECTION, SOLUTION INTRAMUSCULAR; INTRAVENOUS; SUBCUTANEOUS at 21:14

## 2024-05-20 RX ADMIN — MORPHINE SULFATE 2 MG: 2 INJECTION, SOLUTION INTRAMUSCULAR; INTRAVENOUS at 02:53

## 2024-05-20 RX ADMIN — FUROSEMIDE 40 MG: 40 TABLET ORAL at 08:23

## 2024-05-20 RX ADMIN — ATORVASTATIN CALCIUM 40 MG: 40 TABLET, FILM COATED ORAL at 21:11

## 2024-05-20 RX ADMIN — POTASSIUM CHLORIDE 20 MEQ: 1500 TABLET, EXTENDED RELEASE ORAL at 08:23

## 2024-05-20 RX ADMIN — AMLODIPINE BESYLATE 10 MG: 5 TABLET ORAL at 08:23

## 2024-05-20 RX ADMIN — METAXALONE 800 MG: 800 TABLET ORAL at 08:23

## 2024-05-20 RX ADMIN — SODIUM CHLORIDE: 9 INJECTION, SOLUTION INTRAVENOUS at 11:18

## 2024-05-20 RX ADMIN — HYDROMORPHONE HYDROCHLORIDE 1 MG: 1 INJECTION, SOLUTION INTRAMUSCULAR; INTRAVENOUS; SUBCUTANEOUS at 23:22

## 2024-05-20 ASSESSMENT — PAIN DESCRIPTION - LOCATION
LOCATION: LEG;HIP
LOCATION: LEG
LOCATION: HIP
LOCATION: LEG

## 2024-05-20 ASSESSMENT — PAIN DESCRIPTION - ORIENTATION
ORIENTATION: LEFT

## 2024-05-20 ASSESSMENT — PAIN DESCRIPTION - DESCRIPTORS
DESCRIPTORS: ACHING;THROBBING
DESCRIPTORS: ACHING
DESCRIPTORS: STABBING;ACHING
DESCRIPTORS: ACHING;SHARP
DESCRIPTORS: ACHING;STABBING
DESCRIPTORS: ACHING;STABBING
DESCRIPTORS: ACHING
DESCRIPTORS: ACHING;SHARP
DESCRIPTORS: SHARP;ACHING

## 2024-05-20 ASSESSMENT — PAIN SCALES - GENERAL
PAINLEVEL_OUTOF10: 8
PAINLEVEL_OUTOF10: 6
PAINLEVEL_OUTOF10: 8
PAINLEVEL_OUTOF10: 3
PAINLEVEL_OUTOF10: 8
PAINLEVEL_OUTOF10: 9
PAINLEVEL_OUTOF10: 9
PAINLEVEL_OUTOF10: 1
PAINLEVEL_OUTOF10: 3
PAINLEVEL_OUTOF10: 3
PAINLEVEL_OUTOF10: 9
PAINLEVEL_OUTOF10: 6
PAINLEVEL_OUTOF10: 4
PAINLEVEL_OUTOF10: 8
PAINLEVEL_OUTOF10: 8

## 2024-05-20 ASSESSMENT — PAIN DESCRIPTION - PAIN TYPE: TYPE: ACUTE PAIN

## 2024-05-20 ASSESSMENT — PAIN - FUNCTIONAL ASSESSMENT
PAIN_FUNCTIONAL_ASSESSMENT: PREVENTS OR INTERFERES WITH ALL ACTIVE AND SOME PASSIVE ACTIVITIES
PAIN_FUNCTIONAL_ASSESSMENT: PREVENTS OR INTERFERES WITH ALL ACTIVE AND SOME PASSIVE ACTIVITIES
PAIN_FUNCTIONAL_ASSESSMENT: ACTIVITIES ARE NOT PREVENTED

## 2024-05-20 ASSESSMENT — PAIN DESCRIPTION - FREQUENCY: FREQUENCY: INTERMITTENT

## 2024-05-20 NOTE — CARE COORDINATION
Post Acute Facility/Agency List     Provided patient with the following list, the list includes the overall star ratings obtained from CMS per the Medicare Web site (www.Medicare.gov):     [] Long Term Acute Care Facilities  [] Acute Inpatient Rehabilitation Facilities  [x] Skilled Nursing Facilities  [x] Home Care    Provided verbal instructions on how to utilize the QR Code to obtain additional detailed star ratings from www.Medicare.gov     offered to print and provide the detailed list:    []Accepted   [x]Declined    Pt states has utilized [facility] and [facility] in the past and pending surgical outcome would like to use one or the other upon discharge.

## 2024-05-20 NOTE — PLAN OF CARE
Problem: Pain  Goal: Verbalizes/displays adequate comfort level or baseline comfort level  Outcome: Progressing     Problem: ABCDS Injury Assessment  Goal: Absence of physical injury  Recent Flowsheet Documentation  Taken 5/19/2024 2241 by Qi Lane RN  Absence of Physical Injury: Implement safety measures based on patient assessment

## 2024-05-20 NOTE — PROGRESS NOTES
[facility] Hubbard[facility]. Other fracture of left femur, initial encounter for closed fracture (HCC) [S72.8X2A]  Hip injury, left, initial encounter [S79.912A] .   Vitals:    05/19/24 2205   BP:    Pulse:    Resp: 16   Temp:    SpO2:    . Patients home meds are   Prior to Admission medications    Medication Sig Start Date End Date Taking? Authorizing Provider   predniSONE (DELTASONE) 20 MG tablet Take 1 tablet by mouth daily as needed (osteoarthritis) 5/3/24   Emy Trevino DO   atorvastatin (LIPITOR) 40 MG tablet TAKE 1 TABLET BY MOUTH EVERY DAY AT NIGHT 4/30/24   Emy Trevino DO   metoprolol tartrate (LOPRESSOR) 25 MG tablet TAKE 1 TABLET BY MOUTH TWICE A DAY 4/24/24   Emy Trevino DO   amLODIPine (NORVASC) 10 MG tablet Take 1 tablet by mouth daily 3/1/24   Emy Trevino DO   nystatin (MYCOSTATIN) 195741 UNIT/GM powder Apply topically 4 times daily. 2/9/24   Emy Trevino DO   furosemide (LASIX) 40 MG tablet Take 1 tablet by mouth daily 1/5/24   Emy Trevino DO   levothyroxine (SYNTHROID) 50 MCG tablet TAKE 1 TABLET BY MOUTH EVERY DAY 12/6/23   Emy Trevino DO   spironolactone (ALDACTONE) 25 MG tablet Take 1 tablet by mouth daily 11/12/23 5/19/24  Emy Trevino DO   Lift Chair MISC by Does not apply route 8/29/23   Emy Trevino DO   warfarin (JANTOVEN) 3 MG tablet TAKE 2 TABLETS BY MOUTH EVERY DAY OR AS DIRECTED PER INR RESULTS 8/25/23   Emy Trevino DO   potassium chloride (KLOR-CON M) 20 MEQ extended release tablet Take 1 tablet by mouth daily 3/15/21   Emy Trevino DO   acetaminophen (TYLENOL) 325 MG tablet Take 2 tablets by mouth every 4 hours as needed for Pain or Fever 11/9/18   Dimas Arrieta MD   .  Recent Surgical History: None = 0     Assessment     Peak Flow (asthma only)    Predicted: 327  Personal Best: 159    % Predicted 54%  Peak Flow : 50-59% = 3    FEV1/FVC    FEV1 Predicted 2.55      FEV1

## 2024-05-20 NOTE — PROGRESS NOTES
Warfarin Dosing - Pharmacy Consult Note  Consulting Provider: Dr Ernst  Indication:  Atrial Fibrillation  Warfarin Dose prior to admission: 7 mg, 5 mg AOD   Concurrent anticoagulants/antiplatelets:   Significant Drug Interactions: No obvious interactions  Recent Labs     05/19/24  1215 05/20/24  0524   INR 1.8 1.6   HGB 14.0 12.5   PLT See Reflexed IPF Result See Reflexed IPF Result     Recent warfarin administrations        No warfarin orders with administrations found.            Orders not given:            warfarin placeholder: dosing by pharmacy    warfarin (COUMADIN) tablet 7.5 mg                   Date       INR    Dose  5/19/24      1.8            No dose  5/20/24      1.6              7.5 mg     Assessment/Plan  (Goal INR: 2 - 3)  Dose was not taken at home yesterday PTA and no dose given on admission. I will give higher dose today due to low INR and missed dose yesterday.     Active problem list reviewed.  INR orders are placed.  Chart reviewed for pertinent labs, drug/diet interactions, and past doses.  Documentation of patient's clinical condition was reviewed.    Pharmacy Dosing:  Pharmacy will continue to follow.     Blu Dickerson RPh  5/20/2024  8:10 AM

## 2024-05-20 NOTE — CARE COORDINATION
DISCHARGE BARRIERS       Reason for Referral:  SW completed a Psychosocial Assessment for evaluation of patient's mental health, social status, and functional capacity within the community. Ananth Garcia is a 83 y.o. female admitted due to Other fracture of left femur, initial encounter for closed fracture (HCC). Patient accompanied by grandchild Marilyn. SW provided supportive listening while patient discussed past medical history and events leading up to hospitalization.       Mental Status:  Alert, oriented, and engaging during assessment.     Decision Making:  Makes own decisions.     Family/Social/Home Environment:  lives with her granddaughter Marilyn    Employment status: Retired     Health Insurance:     Active Insurance as of 5/19/2024       Primary Coverage       Payor Plan Insurance Group Employer/Plan Group    MEDICARE MEDICARE PART A AND B        Payor Address Payor Phone Number Payor Fax Number Effective Dates    [address] [address] [phone]  10/1/2004 - None Entered    [facility] [address]         Subscriber Name Subscriber Birth Date Member ID       ANANTH GARCIA 1940 [id number]               Secondary Coverage       Payor Plan Insurance Group Employer/Plan Group    [employer] AARP HEALTH CARE MEDICARE SUPP PLAN C       Payor Plan Address Payor Plan Phone Number Payor Plan Fax Number Effective Dates    [address] [phone]  1/1/2016 - None Entered    [facility] [address]         Subscriber Name Subscriber Birth Date Member ID       ANANTH GARCIA 1940 [id number]                     Support: Discussed a good social support network     Current Services:  None      Current DMEs: Walker, Shower chair, Grab bars, CPAP       PCP: Emy Trevino, DO and repots no issues affording medication.      status:   No     ADLs and means of transportation: Assistance with the following:, Shopping, Housework in ADLs prior to hospitalization and unable to transport

## 2024-05-20 NOTE — PROGRESS NOTES
rounding in PCU.    Assessment: Patient lives with her granddaughter's family and broke her leg when she fell in the shower.  Patient was extremely exhausted, so the  cut the visit short to allow for rest.     05/20/24 1611   Encounter Summary   Encounter Overview/Reason Initial Encounter;Spiritual/Emotional Needs   Service Provided For Patient   Referral/Consult From Rounding   Support System Family members   Last Encounter  05/20/24   Complexity of Encounter Low   Begin Time 1432   End Time  1437   Total Time Calculated 5 min   Spiritual/Emotional needs   Type Spiritual Support   Assessment/Intervention/Outcome   Assessment Coping;Hopeful;Other (Comment)  (Exhausted.)   Intervention Active listening;Prayer (assurance of)/Kemah;Sustaining Presence/Ministry of presence   Outcome Acceptance;Encouraged;Engaged in conversation;Expressed Gratitude;Receptive   Plan and Referrals   Plan/Referrals Continue to visit, (comment)         Intervention: Engaged in conversation and active listening. Prayed with Patient.     Outcome: Patient expressed appreciation for visit and offer of continued prayer.    Plan: Chaplains are available on site or on call 24/7 for spiritual and emotional support.    Electronically signed by Natty Lopez on 5/20/2024 at 4:12 PM

## 2024-05-20 NOTE — CONSULTS
[facility]  In Patient Cardiology Consult             Date:   5/20/2024  Patient name: Yvette Das  Date of admission:  5/19/2024 11:43 AM  MRN:   [id number]  YOB: 1940    Reason for Admission: Fall, hip fracture    CHIEF COMPLAINT: Fall, hip fracture    History Obtained From: The patient and chart    HISTORY OF PRESENT ILLNESS:    This is a 83-year-old female.  She comes in after a fall.  She states she was in the shower.  She stated that she was trying to clean up any leftover soap.  Apparently she did not get it all.  She slipped and fell.  After which she was having some hip pain.  She came to the emergency room.  She was found to have a hip fracture.  Cardiology was consulted for preoperative stratification.  She had no syncope.  She denies any chest pains, shortness of breath orthopnea, PND, lower extremity edema.  She had an echo from 9/23 which was reviewed, and a cath in 2022 which was reviewed.    Past Medical History:    Past Medical History:   Diagnosis Date    A-fib ([facility]) 07/19/2017    Acute blood loss as cause of postoperative anemia 11/08/2018    Anxiety 04/30/2016    CAD (coronary artery disease)     [facility]/ last seen 9-2022    CHF (congestive heart failure) ([facility])     Class 2 severe obesity with serious comorbidity and body mass index (BMI) of 37.0 to 37.9 in adult ([facility])     Closed fracture of proximal end of left humerus with routine healing 04/26/2021    Colitis 05/31/2019    Colitis due to Clostridium difficile     Depression 04/30/2016    Diarrhea     Dyslipidemia     Elevated fasting glucose 11/20/2018    FH: total abdominal hysterectomy and bilateral salpingo-oophorectomy 1966    Fractures 03/15/2021    Lt humerus impacted displaced    Full dentures     Glucose intolerance (impaired glucose tolerance)     History of blood transfusion     no reaction    Hyperlipidemia     Hypertension     Hypokalemia     Malignant neoplasm of sigmoid colon ([facility])      kidney cancer right    Multiple closed fractures of ribs 09/22/2018    Obesity     JAYJAY on CPAP 10/06/2018    Osteoarthritis     Osteoporosis 08/30/2013    Other complete intestinal obstruction (HCC) 11/09/2018    Other specified hypothyroidism     Prolonged emergence from general anesthesia     Renal cell carcinoma of right kidney (HCC)     S/P small bowel resection 05/22/2019    Thrombocytopenia, unspecified (HCC) 03/09/2020    Under care of team     Hem-Onc Dr. Hernandez/ dhaval/last seen 4-2022    Wears glasses          Past Surgical History:    Past Surgical History:   Procedure Laterality Date    CARDIAC CATHETERIZATION  09/05/2017    CARDIAC CATHETERIZATION N/A     CHOLECYSTECTOMY  1960s    COLONOSCOPY N/A 10/08/2018    COLONOSCOPY WITH COLD ET HOT BIOPSIES ET POLYPECTOMIES performed by Bogdan Patel MD at [facility] OR    COLONOSCOPY N/A 04/25/2019    COLONOSCOPY performed by Dimas Briggs MD at [facility] OR  stricture at 7 cm     COLONOSCOPY N/A 02/03/2020    tubular adenoma X 1, Dr. Briggs    COLONOSCOPY N/A 2/23/2023    COLONOSCOPY BIOPSY performed by Dimas Briggs MD at [facility] OR    HIP ARTHROPLASTY Left     HYSTERECTOMY (CERVIX STATUS UNKNOWN)      KIDNEY SURGERY Right 10/7/2022    XI ROBOTIC LAPAROSCOPIC RADICAL NEPHRECTOMY, LYSIS OF ADHESIONS performed by Dionisio Lance MD at [facility] OR    KNEE ARTHROPLASTY Left     KNEE ARTHROPLASTY Right     MS CYSTO W/INSERT URETERAL STENT Bilateral 11/05/2018    CYSTO Bilateral Lighted stent placements performed by Gabe Swift MD at [facility] OR    MS LAPAROSCOPY COLECTOMY PARTIAL W/ANASTOMOSIS N/A 11/05/2018    Open Sigmoid Colectomy w/ lighted stents ILEOSTOMY PLACEMENT performed by Bogdan Patel MD at [facility] OR    SIGMOIDOSCOPY N/A 05/09/2019    Flexible Sigmoidoscopy with Dilatation of rectal stricture performed by Dimas Briggs MD at [facility] OR    SMALL INTESTINE SURGERY N/A 05/22/2019    Loop Ileostomy take down performed by Dimas Briggs MD at [facility] OR    KARYN AND BSO (CERVIX

## 2024-05-20 NOTE — CONSULTS
Orthopedic Consult    Requesting Physician: Anabell Woods CNP    CHIEF COMPLAINT:  left hip pain, left hip periprosthetic proximal femur fx, closed, nondisplaced    HISTORY OF PRESENT ILLNESS:      The patient is a 83 y.o. female  who presents with left hip pain after a fall. Pt states she fell after getting out of the shower yesterday. She was unable to weight bear due to pain. BMI 49.27. she has no bruising or abrasions to the left hip. Pt can flex and extend her left toes, denies N/T. Pt has had the hip replacement several years ago. Pt lives at home with a granddaughter. XR do show irregularity of the left proximal femur hip stem. CT scan of the abd/pelvis showed a closed nondisplaced periprosthetic proximal femur fx around femoral stem. Discussed findings with Dr. Zeng who recommends non-op treatment. Pt does have pain to palpation of the left hip and with movement. Pt seen resting in bed. She notes she would be unable to return home NWB to LLE. Hgb 12.5.       Past Medical History:    Past Medical History:   Diagnosis Date    A-fib ([facility]) 07/19/2017    Acute blood loss as cause of postoperative anemia 11/08/2018    Anxiety 04/30/2016    CAD (coronary artery disease)     [facility]/ last seen 9-2022    CHF (congestive heart failure) ([facility])     Class 2 severe obesity with serious comorbidity and body mass index (BMI) of 37.0 to 37.9 in adult ([facility])     Closed fracture of proximal end of left humerus with routine healing 04/26/2021    Colitis 05/31/2019    Colitis due to Clostridium difficile     Depression 04/30/2016    Diarrhea     Dyslipidemia     Elevated fasting glucose 11/20/2018    FH: total abdominal hysterectomy and bilateral salpingo-oophorectomy 1966    Fractures 03/15/2021    Lt humerus impacted displaced    Full dentures     Glucose intolerance (impaired glucose tolerance)     History of blood transfusion     no reaction    Hyperlipidemia     Hypertension     Hypokalemia     Malignant neoplasm of

## 2024-05-20 NOTE — PLAN OF CARE
Problem: ABCDS Injury Assessment  Goal: Absence of physical injury  Recent Flowsheet Documentation  Taken 5/19/2024 2241 by Qi Lane RN  Absence of Physical Injury: Implement safety measures based on patient assessment

## 2024-05-20 NOTE — PROGRESS NOTES
Pharmacy addendum      Date          INR               Dose  5/19/24      1.8            No dose  5/20/24      1.6              HOLD for surgery 5/21/24  Chuy Rapp  5/20/2024  8:23 AM

## 2024-05-20 NOTE — DISCHARGE INSTRUCTIONS
Follow up with Cardiology in 2-4 weeks.       DR. ZENG DISCHARGE INSTRUCTIONS  Dr. Aydin Zeng MD       ACTIVITY / WEIGHTBEARING  INSTRUCTIONS:  Non Weightbearing left lower extremity.   PT/OT if ordered.    DIET:  Increase Fluid/Water intake, eat foods high in fiber, fruits and vegetables to help to prevent constipation.    WOUND/DRESSING INSTRUCTIONS:    Smoking impairs adequate wound healing.  If smoking, consider quitting and decreasing.  If diabetic, keeping blood glucose levels in acceptable range will limit complications.   May apply ice to surgical incision to help to prevent swelling. Apply 20 minutes at a time, as needed.      MEDICATION INSTRUCTIONS:  Take pain medication as prescribed.  Decrease as tolerable.   See orders regarding resuming your home medications and any new medications.  Continue blood thinner if ordered by your physician.   Wear compression stocking as directed, this will help reduce swelling and blood clots. Wear on during the day, may remove at bedtime. Wear until follow-up unless otherwise directed.     FOLLOW-UP CARE:  If a follow-up appointment was not made for you at discharge, call [phone] ([place] office) or [phone] ([place] office) to schedule an appointment for 3-4 weeks.    Call Dr Zeng's office with any concerns.     Signs of infection such as fever, chills, redness, pus, or bad smelling discharge from incision site.     Excessive bleeding, swelling, increasing pain, or discharge around incision site.     The stitches or staples come apart at the incision site.     Cough shortness of breath, or chest pain. Severe nausea or vomiting.     Pain that you cannot control with the medications you have been given or  pain becomes worse.     Numbness, tingling, or loss of feeling in your leg, knee, or foot.     Pain, burning, urgency, or frequency of urination.      If a medical emergerncy, please call 911 or go to your local emergency room.

## 2024-05-20 NOTE — PROGRESS NOTES
Physical Therapy  Facility/Department: [facility]  PROGRESSIVE C[facility]  Physical Therapy Initial Assessment    Name: Yvette Das  : 1940  MRN: [id number]  Date of Service: 2024    Discharge Recommendations:  Long Term Care with PT          Patient Diagnosis(es): The encounter diagnosis was Hip injury, left, initial encounter.  Past Medical History:  has a past medical history of A-fib (HCC), Acute blood loss as cause of postoperative anemia, Anxiety, CAD (coronary artery disease), CHF (congestive heart failure) (HCC), Class 2 severe obesity with serious comorbidity and body mass index (BMI) of 37.0 to 37.9 in adult (HCC), Closed fracture of proximal end of left humerus with routine healing, Colitis, Colitis due to Clostridium difficile, Depression, Diarrhea, Dyslipidemia, Elevated fasting glucose, FH: total abdominal hysterectomy and bilateral salpingo-oophorectomy, Fractures, Full dentures, Glucose intolerance (impaired glucose tolerance), History of blood transfusion, Hyperlipidemia, Hypertension, Hypokalemia, Malignant neoplasm of sigmoid colon (HCC), Multiple closed fractures of ribs, Obesity, JAYJAY on CPAP, Osteoarthritis, Osteoporosis, Other complete intestinal obstruction (HCC), Other specified hypothyroidism, Prolonged emergence from general anesthesia, Renal cell carcinoma of right kidney (HCC), S/P small bowel resection, Thrombocytopenia, unspecified ([facility]), Under care of team, and Wears glasses.  Past Surgical History:  has a past surgical history that includes Total abdominal hysterectomy w/ bilateral salpingoophorectomy (); Cholecystectomy (1960s); Varicose vein surgery (Right, 1960s); Hip Arthroplasty (Left); Knee Arthroplasty (Left); Knee Arthroplasty (Right); Cardiac catheterization (2017); Colonoscopy (N/A, 10/08/2018); pr laparoscopy colectomy partial w/anastomosis (N/A, 2018); pr cysto w/insert ureteral stent (Bilateral, 2018); Colonoscopy (N/A, 2019); sigmoidoscopy

## 2024-05-20 NOTE — PROGRESS NOTES
Occupational Therapy  Facility/Department: [facility]  PROGRESSIVE C[facility]  Occupational Therapy Initial Assessment    Name: Yvette Das  : 1940  MRN: [id number]  Date of Service: 2024    Discharge Recommendations:  Subacute/Skilled Nursing Facility, Long Term Care with OT, Patient would benefit from continued therapy after discharge     Patient Diagnosis(es): The encounter diagnosis was Hip injury, left, initial encounter.  Past Medical History:  has a past medical history of A-fib (HCC), Acute blood loss as cause of postoperative anemia, Anxiety, CAD (coronary artery disease), CHF (congestive heart failure) (HCC), Class 2 severe obesity with serious comorbidity and body mass index (BMI) of 37.0 to 37.9 in adult (HCC), Closed fracture of proximal end of left humerus with routine healing, Colitis, Colitis due to Clostridium difficile, Depression, Diarrhea, Dyslipidemia, Elevated fasting glucose, FH: total abdominal hysterectomy and bilateral salpingo-oophorectomy, Fractures, Full dentures, Glucose intolerance (impaired glucose tolerance), History of blood transfusion, Hyperlipidemia, Hypertension, Hypokalemia, Malignant neoplasm of sigmoid colon (HCC), Multiple closed fractures of ribs, Obesity, JAYJAY on CPAP, Osteoarthritis, Osteoporosis, Other complete intestinal obstruction (HCC), Other specified hypothyroidism, Prolonged emergence from general anesthesia, Renal cell carcinoma of right kidney (HCC), S/P small bowel resection, Thrombocytopenia, unspecified (HCC), Under care of team, and Wears glasses.  Past Surgical History:  has a past surgical history that includes Total abdominal hysterectomy w/ bilateral salpingoophorectomy (); Cholecystectomy (1960s); Varicose vein surgery (Right, 1960s); Hip Arthroplasty (Left); Knee Arthroplasty (Left); Knee Arthroplasty (Right); Cardiac catheterization (2017); Colonoscopy (N/A, 10/08/2018); pr laparoscopy colectomy partial w/anastomosis (N/A, 2018); pr

## 2024-05-20 NOTE — CARE COORDINATION
Case Management Assessment  Initial Evaluation    Date/Time of Evaluation: 5/20/2024 9:48 AM  Assessment Completed by: Rah James RN    If patient is discharged prior to next notation, then this note serves as note for discharge by case management.    Date / Time: 5/19/2024 11:43 AM  Patient Name: Yvette Das                     YOB: 1940  Diagnosis: Other fracture of left femur, initial encounter for closed fracture (HCC) [S72.8X2A]  Hip injury, left, initial encounter [S79.912A]                         Patient Admission Status: Inpatient   Readmission Risk (Low < 19, Mod (19-27), High > 27): Readmission Risk Score: 15.7    Current PCP: Emy Trevino DO  Last Visit: Within last year    Chart Reviewed: Yes      History Provided by: Patient  Patient Orientation: Alert and Oriented    Patient Cognition: Alert    Hospitalization in the last 30 days (Readmission):  No           Advance Directives:      Code Status: Full Code   Patient's Primary Decision Maker is: Patient Declined (Legal Next of Kin Remains as Decision Maker)    Primary Decision Maker: Mode Das (POA) - Child - [phone]    Secondary Decision Maker: Xiomara Black - Child - [phone]    Supplemental (Other) Decision Maker: Marilyn Ca - Grandchild - [phone]    Discharge Planning:    Patient lives with: Family Members   Type of Home: House  Primary Care Giver: Self  Patient Support Systems include: Family Members, Children   Current Financial resources: Medicare  Current community resources: None  Current services prior to admission: C-pap            Current DME: Walker - Standard (CPAP)            Type of Home Care services:  None    ADLS  Prior functional level: Assistance with the following:, Shopping, Housework  Current functional level: Assistance with the following:, Bathing, Dressing, Toileting, Cooking, Housework, Shopping, Mobility    Family can provide assistance at DC: Yes  Would you like Case  and Good insight into deficits    Barriers to discharge: Decreased endurance and Lower extremity weakness    Additional Case Management Notes: The patient is hoping to return home with [facility] PT/OT although the patient states she is willing to go to [facility] (been there in the past) for PT/OT if absolutely necessary. (pending surgical outcome)    The Plan for Transition of Care is related to the following treatment goals of Other fracture of left femur, initial encounter for closed fracture (HCC) [S72.8X2A]  Hip injury, left, initial encounter [S79.912A]    IF APPLICABLE: The Patient and/or patient representative Yvette and her family were provided with a choice of provider and agrees with the discharge plan. Freedom of choice list with basic dialogue that supports the patient's individualized plan of care/goals and shares the quality data associated with the providers was provided to: Patient   Patient Representative Name:       The Patient and/or Patient Representative Agree with the Discharge Plan? Yes    Rah James RN  Case Management Department

## 2024-05-20 NOTE — PROGRESS NOTES
Pharmacy addendum   Date          INR               Dose  5/19/24      1.8            No dose  5/20/24      1.6            7.5 mg not going to surgery   Chuy Rapp  5/20/2024  4:36 PM

## 2024-05-21 LAB
ANION GAP SERPL CALCULATED.3IONS-SCNC: 10 MMOL/L (ref 9–17)
BASOPHILS # BLD: <0.03 K/UL (ref 0–0.2)
BASOPHILS NFR BLD: 0 % (ref 0–2)
BUN SERPL-MCNC: 24 MG/DL (ref 8–23)
BUN/CREAT SERPL: 20 (ref 9–20)
CALCIUM SERPL-MCNC: 8.9 MG/DL (ref 8.6–10.4)
CHLORIDE SERPL-SCNC: 105 MMOL/L (ref 98–107)
CO2 SERPL-SCNC: 24 MMOL/L (ref 20–31)
CREAT SERPL-MCNC: 1.2 MG/DL (ref 0.5–0.9)
EOSINOPHIL # BLD: 0.07 K/UL (ref 0–0.44)
EOSINOPHILS RELATIVE PERCENT: 1 % (ref 1–4)
ERYTHROCYTE [DISTWIDTH] IN BLOOD BY AUTOMATED COUNT: 13.5 % (ref 11.8–14.4)
GFR, ESTIMATED: 45 ML/MIN/1.73M2
GLUCOSE SERPL-MCNC: 118 MG/DL (ref 70–99)
HCT VFR BLD AUTO: 37.8 % (ref 36.3–47.1)
HGB BLD-MCNC: 11.9 G/DL (ref 11.9–15.1)
IMM GRANULOCYTES # BLD AUTO: 0.04 K/UL (ref 0–0.3)
IMM GRANULOCYTES NFR BLD: 0 %
INR PPP: 1.6
LYMPHOCYTES NFR BLD: 1.06 K/UL (ref 1.1–3.7)
LYMPHOCYTES RELATIVE PERCENT: 10 % (ref 24–43)
MCH RBC QN AUTO: 31.4 PG (ref 25.2–33.5)
MCHC RBC AUTO-ENTMCNC: 31.5 G/DL (ref 25.2–33.5)
MCV RBC AUTO: 99.7 FL (ref 82.6–102.9)
MONOCYTES NFR BLD: 1.17 K/UL (ref 0.1–1.2)
MONOCYTES NFR BLD: 11 % (ref 3–12)
NEUTROPHILS NFR BLD: 77 % (ref 36–65)
NEUTS SEG NFR BLD: 7.86 K/UL (ref 1.5–8.1)
NRBC BLD-RTO: 0 PER 100 WBC
PLATELET # BLD AUTO: ABNORMAL K/UL (ref 138–453)
PLATELET, FLUORESCENCE: 116 K/UL (ref 138–453)
PLATELETS.RETICULATED NFR BLD AUTO: 14.9 % (ref 1.1–10.3)
POTASSIUM SERPL-SCNC: 4.9 MMOL/L (ref 3.7–5.3)
PROTHROMBIN TIME: 18.4 SEC (ref 11.5–14.2)
RBC # BLD AUTO: 3.79 M/UL (ref 3.95–5.11)
SODIUM SERPL-SCNC: 139 MMOL/L (ref 135–144)
WBC OTHER # BLD: 10.2 K/UL (ref 3.5–11.3)

## 2024-05-21 PROCEDURE — 6360000002 HC RX W HCPCS: Performed by: FAMILY MEDICINE

## 2024-05-21 PROCEDURE — 6370000000 HC RX 637 (ALT 250 FOR IP): Performed by: INTERNAL MEDICINE

## 2024-05-21 PROCEDURE — 6370000000 HC RX 637 (ALT 250 FOR IP): Performed by: FAMILY MEDICINE

## 2024-05-21 PROCEDURE — 85610 PROTHROMBIN TIME: CPT

## 2024-05-21 PROCEDURE — 36415 COLL VENOUS BLD VENIPUNCTURE: CPT

## 2024-05-21 PROCEDURE — 6370000000 HC RX 637 (ALT 250 FOR IP)

## 2024-05-21 PROCEDURE — 97110 THERAPEUTIC EXERCISES: CPT | Performed by: PHYSICAL THERAPY ASSISTANT

## 2024-05-21 PROCEDURE — 51701 INSERT BLADDER CATHETER: CPT

## 2024-05-21 PROCEDURE — 2700000000 HC OXYGEN THERAPY PER DAY

## 2024-05-21 PROCEDURE — 2580000003 HC RX 258

## 2024-05-21 PROCEDURE — 2060000000 HC ICU INTERMEDIATE R&B

## 2024-05-21 PROCEDURE — 94761 N-INVAS EAR/PLS OXIMETRY MLT: CPT

## 2024-05-21 PROCEDURE — 99232 SBSQ HOSP IP/OBS MODERATE 35: CPT | Performed by: FAMILY MEDICINE

## 2024-05-21 PROCEDURE — 80048 BASIC METABOLIC PNL TOTAL CA: CPT

## 2024-05-21 PROCEDURE — 85025 COMPLETE CBC W/AUTO DIFF WBC: CPT

## 2024-05-21 RX ORDER — DOCUSATE SODIUM 100 MG/1
100 CAPSULE, LIQUID FILLED ORAL DAILY
Status: DISCONTINUED | OUTPATIENT
Start: 2024-05-21 | End: 2024-05-23 | Stop reason: HOSPADM

## 2024-05-21 RX ORDER — WARFARIN SODIUM 2.5 MG/1
7.5 TABLET ORAL
Status: COMPLETED | OUTPATIENT
Start: 2024-05-21 | End: 2024-05-21

## 2024-05-21 RX ADMIN — HYDROMORPHONE HYDROCHLORIDE 1 MG: 1 INJECTION, SOLUTION INTRAMUSCULAR; INTRAVENOUS; SUBCUTANEOUS at 23:01

## 2024-05-21 RX ADMIN — SODIUM CHLORIDE: 9 INJECTION, SOLUTION INTRAVENOUS at 00:42

## 2024-05-21 RX ADMIN — OXYCODONE AND ACETAMINOPHEN 2 TABLET: 5; 325 TABLET ORAL at 21:05

## 2024-05-21 RX ADMIN — DOCUSATE SODIUM 100 MG: 100 CAPSULE, LIQUID FILLED ORAL at 12:07

## 2024-05-21 RX ADMIN — WARFARIN SODIUM 7.5 MG: 2.5 TABLET ORAL at 17:47

## 2024-05-21 RX ADMIN — METOPROLOL TARTRATE 25 MG: 25 TABLET, FILM COATED ORAL at 21:06

## 2024-05-21 RX ADMIN — METAXALONE 800 MG: 800 TABLET ORAL at 08:50

## 2024-05-21 RX ADMIN — MICONAZOLE NITRATE: 20 POWDER TOPICAL at 21:00

## 2024-05-21 RX ADMIN — HYDROMORPHONE HYDROCHLORIDE 1 MG: 1 INJECTION, SOLUTION INTRAMUSCULAR; INTRAVENOUS; SUBCUTANEOUS at 17:47

## 2024-05-21 RX ADMIN — OXYCODONE AND ACETAMINOPHEN 2 TABLET: 5; 325 TABLET ORAL at 16:45

## 2024-05-21 RX ADMIN — AMLODIPINE BESYLATE 10 MG: 5 TABLET ORAL at 08:50

## 2024-05-21 RX ADMIN — FUROSEMIDE 40 MG: 40 TABLET ORAL at 08:50

## 2024-05-21 RX ADMIN — MICONAZOLE NITRATE: 20 POWDER TOPICAL at 08:52

## 2024-05-21 RX ADMIN — METOPROLOL TARTRATE 25 MG: 25 TABLET, FILM COATED ORAL at 08:50

## 2024-05-21 RX ADMIN — POTASSIUM CHLORIDE 20 MEQ: 1500 TABLET, EXTENDED RELEASE ORAL at 08:50

## 2024-05-21 RX ADMIN — OXYCODONE AND ACETAMINOPHEN 2 TABLET: 5; 325 TABLET ORAL at 05:07

## 2024-05-21 RX ADMIN — METAXALONE 800 MG: 800 TABLET ORAL at 12:07

## 2024-05-21 RX ADMIN — SPIRONOLACTONE 25 MG: 25 TABLET ORAL at 08:50

## 2024-05-21 RX ADMIN — SODIUM CHLORIDE: 9 INJECTION, SOLUTION INTRAVENOUS at 14:30

## 2024-05-21 RX ADMIN — ATORVASTATIN CALCIUM 40 MG: 40 TABLET, FILM COATED ORAL at 21:07

## 2024-05-21 RX ADMIN — OXYCODONE AND ACETAMINOPHEN 2 TABLET: 5; 325 TABLET ORAL at 09:59

## 2024-05-21 RX ADMIN — METAXALONE 800 MG: 800 TABLET ORAL at 21:07

## 2024-05-21 RX ADMIN — LEVOTHYROXINE SODIUM 50 MCG: 0.03 TABLET ORAL at 05:05

## 2024-05-21 ASSESSMENT — PAIN SCALES - GENERAL
PAINLEVEL_OUTOF10: 0
PAINLEVEL_OUTOF10: 9
PAINLEVEL_OUTOF10: 8
PAINLEVEL_OUTOF10: 9
PAINLEVEL_OUTOF10: 8
PAINLEVEL_OUTOF10: 5
PAINLEVEL_OUTOF10: 5
PAINLEVEL_OUTOF10: 8
PAINLEVEL_OUTOF10: 10
PAINLEVEL_OUTOF10: 3

## 2024-05-21 ASSESSMENT — PAIN DESCRIPTION - DESCRIPTORS
DESCRIPTORS: STABBING
DESCRIPTORS: ACHING
DESCRIPTORS: ACHING
DESCRIPTORS: ACHING;STABBING
DESCRIPTORS: ACHING
DESCRIPTORS: ACHING

## 2024-05-21 ASSESSMENT — PAIN DESCRIPTION - ORIENTATION
ORIENTATION: LEFT

## 2024-05-21 ASSESSMENT — PAIN SCALES - WONG BAKER
WONGBAKER_NUMERICALRESPONSE: NO HURT

## 2024-05-21 ASSESSMENT — PAIN DESCRIPTION - LOCATION
LOCATION: HIP
LOCATION: HIP;LEG
LOCATION: HIP
LOCATION: HIP
LOCATION: LEG
LOCATION: LEG

## 2024-05-21 ASSESSMENT — PAIN - FUNCTIONAL ASSESSMENT
PAIN_FUNCTIONAL_ASSESSMENT: PREVENTS OR INTERFERES WITH MANY ACTIVE NOT PASSIVE ACTIVITIES
PAIN_FUNCTIONAL_ASSESSMENT: PREVENTS OR INTERFERES WITH ALL ACTIVE AND SOME PASSIVE ACTIVITIES
PAIN_FUNCTIONAL_ASSESSMENT: PREVENTS OR INTERFERES SOME ACTIVE ACTIVITIES AND ADLS

## 2024-05-21 NOTE — PROGRESS NOTES
Physician Progress Note      PATIENT:               ANANTH GARCIA  CSN #:                  [id number]  :                       1940  ADMIT DATE:       2024 11:43 AM  DISCH DATE:  RESPONDING  PROVIDER #:        Mayank Carlin MD          QUERY TEXT:    Pt admitted with left hip periprosthetic fracture. Pt noted to have   osteoporosis. If possible, please document in progress notes and discharge   summary if you are evaluating and/or treating any of the following:    The medical record reflects the following:    Risk Factors: s/p fall, osteoporosis  Clinical Indicators: CT Questionable minimal cortical irregularity of the   lateral cortex of the proximal femur along the stem of the femoral component   (series 605, image 93) can conceivably represent nondisplaced periprosthetic   fracture  Treatment: non op treatment,  PT/OT    Thank you!    Timmy Albright, BSN,RN, CRCR  RN Clinical   Options provided:  -- Osteoporotic left hip periprosthetic fracture following fall which would   not usually break a normal, healthy bone  -- Osteoporotic left hip periprosthetic fracture  -- Traumatic left hip periprosthetic fracture  -- Other - I will add my own diagnosis  -- Disagree - Not applicable / Not valid  -- Disagree - Clinically unable to determine / Unknown  -- Refer to Clinical Documentation Reviewer    PROVIDER RESPONSE TEXT:    This patient has a traumatic left hip periprosthetic fracture.    Query created by: Timmy Albright on 2024 3:38 PM      Electronically signed by:  Mayank Carlin MD 2024 5:13 PM

## 2024-05-21 NOTE — PROGRESS NOTES
Warfarin Dosing - Pharmacy Consult Note  Consulting Provider: Dr Ernst  Indication:  Atrial Fibrillation  Warfarin Dose prior to admission: 7 mg Fri, 5 mg AOD  Concurrent anticoagulants/antiplatelets: none  Significant Drug Interactions: No obvious interactions  Recent Labs     05/19/24  1215 05/20/24  0524 05/21/24  0503   INR 1.8 1.6 1.6   HGB 14.0 12.5 11.9   PLT See Reflexed IPF Result See Reflexed IPF Result See Reflexed IPF Result     Recent warfarin administrations                     warfarin (COUMADIN) tablet 7.5 mg (mg) 7.5 mg Given 05/20/24 1737                   Date             INR            Dose  05/19/24       1.8             No dose  05/20/24       1.6              7.5 mg  05/21/24       1.6              7.5 mg    Assessment/Plan  (Goal INR: 2 - 3)  INR remains below range today likely due to no dose on 05/19. Will give higher dose again today. Anticipate rise in INR tomorrow.     Active problem list reviewed.  INR orders are placed.  Chart reviewed for pertinent labs, drug/diet interactions, and past doses.  Documentation of patient's clinical condition was reviewed.    Pharmacy Dosing:  Pharmacy will continue to follow.     .cdw

## 2024-05-21 NOTE — PROGRESS NOTES
Physical Therapy  Facility/Department: [facility]  PROGRESSIVE C[facility]  Daily Treatment Note  NAME: Yvette Das  : 1940  MRN: [id number]    Date of Service: 2024    Discharge Recommendations:  Long Term Care with PT, Continue to assess pending progress        Patient Diagnosis(es): The encounter diagnosis was Hip injury, left, initial encounter.    Assessment   Activity Tolerance: Patient limited by pain;Treatment limited secondary to medical complications     Plan    Physical Therapy Plan  General Plan: 1 time a day 7 days a week  Current Treatment Recommendations: Gait training;Transfer training     Restrictions  Restrictions/Precautions  Restrictions/Precautions: Weight Bearing, Fall Risk  Lower Extremity Weight Bearing Restrictions  Left Lower Extremity Weight Bearing: Non Weight Bearing     Subjective    Subjective  Subjective: Pt. in bed at initiation of session. Agreeable to therapy at initiation of session.  Pain: Pain rated 8-9/10.  Orientation  Overall Orientation Status: Within Functional Limits  Cognition  Overall Cognitive Status: WFL     Objective   Vitals  O2 Device: Nasal cannula (2L)  Bed Mobility Training  Bed Mobility Training: No  Neuromuscular Education  Neuromuscular Education: No  PT Exercises  Exercise Treatment: Supine ankle pumps, glute sets 20x, quad sets,  heel slides x10 ea     Safety Devices  Type of Devices: Left in bed;Call light within reach;Nurse notified;Gait belt       Goals  Short Term Goals  Time Frame for Short Term Goals: 1 day  Short Term Goal 1: Assess functional status  Long Term Goals  Time Frame for Long Term Goals : 2-3 days  Long Term Goal 1: Able to participate with rolling, bed mobility  Long Term Goal 2: Supine to sit mod assit  Long Term Goal 3: Transfer to standing with a walker, NWB L    Education  Patient Education  Education Given To: Patient  Education Provided: Role of Therapy  Education Method: Demonstration  Barriers to Learning: None  Education

## 2024-05-21 NOTE — PROGRESS NOTES
The Respiratory Therapy Department completed the Respiratory Care evaluation. No therapy is indicated at this time. The reason therapy is not indicated is due to the following:   [x] Patient does not meet indications for therapy.   [x] Patient has returned to their home regime.    [x] Patient frequency has changed to prn, nursing to assess and call if needed.  Respiratory Care will not see this patient further unless otherwise requested. Please call the respiratory care charge therapist with questions or concerns at extension [phone] Thank you for using the Respiratory Therapy Protocol Program.    Ashli Gonzales RCP   5:11 PM

## 2024-05-21 NOTE — PLAN OF CARE
Problem: Discharge Planning  Goal: Discharge to home or other facility with appropriate resources  Outcome: Progressing  Flowsheets (Taken 5/20/2024 2003 by Piedad Flores RN)  Discharge to home or other facility with appropriate resources:   Identify barriers to discharge with patient and caregiver   Arrange for needed discharge resources and transportation as appropriate   Identify discharge learning needs (meds, wound care, etc)   Refer to discharge planning if patient needs post-hospital services based on physician order or complex needs related to functional status, cognitive ability or social support system     Problem: Pain  Goal: Verbalizes/displays adequate comfort level or baseline comfort level  Outcome: Progressing     Problem: Skin/Tissue Integrity  Goal: Absence of new skin breakdown  Description: 1.  Monitor for areas of redness and/or skin breakdown  2.  Assess vascular access sites hourly  3.  Every 4-6 hours minimum:  Change oxygen saturation probe site  4.  Every 4-6 hours:  If on nasal continuous positive airway pressure, respiratory therapy assess nares and determine need for appliance change or resting period.  Outcome: Progressing     Problem: Safety - Adult  Goal: Free from fall injury  Outcome: Progressing

## 2024-05-21 NOTE — PROGRESS NOTES
Hospitalist Progress Note  5/21/2024 11:18 AM  Subjective:   Admit Date: 5/19/2024  PCP: Emy Trevino DO     Full Code      C/c:  Chief Complaint   Patient presents with    Fall     L hip pain           Interval History: pt doing better,still has pain    Diet: ADULT DIET; Regular; 5 carb choices (75 gm/meal); Low Fat/Low Chol/High Fiber/JONNA                                ip days:2  Medications:   Scheduled Meds:   warfarin  7.5 mg Oral Once    docusate sodium  100 mg Oral Daily    warfarin placeholder: dosing by pharmacy   Other RX Placeholder    amLODIPine  10 mg Oral Daily    atorvastatin  40 mg Oral Nightly    furosemide  40 mg Oral Daily    levothyroxine  50 mcg Oral Daily    metoprolol tartrate  25 mg Oral BID    miconazole   Topical BID    potassium chloride  20 mEq Oral Daily    spironolactone  25 mg Oral Daily    sodium chloride flush  5-40 mL IntraVENous 2 times per day    metaxalone  800 mg Oral TID     Continuous Infusions:   sodium chloride      sodium chloride 75 mL/hr at 05/21/24 0704     PRN Meds:.HYDROmorphone, HYDROmorphone, sodium chloride flush, sodium chloride, ondansetron **OR** ondansetron, polyethylene glycol, acetaminophen **OR** acetaminophen, sodium chloride nebulizer, albuterol, melatonin, albuterol, [DISCONTINUED] oxyCODONE-acetaminophen **OR** oxyCODONE-acetaminophen     CBC:   Recent Labs     05/19/24  1215 05/20/24  0524 05/21/24  0503   WBC 6.4 9.2 10.2   HGB 14.0 12.5 11.9   PLT See Reflexed IPF Result See Reflexed IPF Result See Reflexed IPF Result     BMP:    Recent Labs     05/19/24  1215 05/20/24  0524 05/21/24  0503    140 139   K 4.4 4.1 4.9    105 105   CO2 22 23 24   BUN 27* 20 24*   CREATININE 1.1* 1.0* 1.2*   GLUCOSE 121* 103* 118*     Hepatic:   Recent Labs     05/19/24  1215   AST 19   ALT 12   BILITOT 0.6   ALKPHOS 123*     Troponin: No results for input(s): \"TROPONINI\" in the last 72 hours.  BNP: No results for input(s): \"BNP\" in the last 72  injury seen within the limitations of the study. GI/Bowel: No evidence of intra-free air is seen to suggest hollow viscus perforation.  Colonic diverticulosis seen.  There is a moderate hiatal hernia.  The patient is again noted be status post prior partial bowel resection with anastomosis seen within the right lower abdomen.  No evidence of bowel obstruction seen. Pelvis: Evaluation of pelvis is limited due to streak artifact arising from components of left total hip arthroplasty.  The uterus appears absent or atrophic. Peritoneum/Retroperitoneum: No evidence of intra-abdominal free air is seen. No evidence of ascites seen.  Vascular calcification changes of the abdominal aorta and its branches seen. Bones/Soft Tissues: Components of left total hip arthroplasty are situated in near anatomic alignment.  There is questionable minimal cortical irregularity of lateral cortex of the proximal femur along the stem of femoral component (series 605, image 93), not definitively visualized on the prior study.  Mild osteoarthritis affects the right hip.  Chronic appearing deformity of the sacrum and areas of lucency within the sacrum are redemonstrated, appearing grossly similar to the prior study. THORACIC/LUMBAR SPINE BONES/ALIGNMENT: There is mild vertebral body height loss of superior endplate of the T3, which is age indeterminate.  The alignment of the thoracic spine appears near anatomic.  There is redemonstration of compression deformity of the L1 vertebral body, appearing similar to the prior study.  There is redemonstration of grade 1 anterolisthesis of L4 on L5, favored to be degenerative in origin. DEGENERATIVE CHANGES:  Mild-to-moderate degenerative disc disease affects the lower thoracic spine.Multilevel degenerative disc disease affects the lumbar spine most notably affecting L5-S1 level.  Multilevel facet joint osteoarthritis affects the lumbar spine most notably affecting L4-L5 level. SOFT TISSUES: No

## 2024-05-21 NOTE — PROGRESS NOTES
rounding in PCU.    Assessment: Patient was much brighter than the day before, and was accompanied by her daughter (Xiomara), granddaughter (Marilyn), grandson-in-law (Dimas), and great granddaughter (Naomi).  Patient plans to stay at the [facility] for rehab before returning home.     05/21/24 1150   Encounter Summary   Encounter Overview/Reason Spiritual/Emotional Needs   Service Provided For Patient and family together   Referral/Consult From Rounding   Support System Children;Family members;Christian/niranjan community   Last Encounter  05/21/24   Complexity of Encounter Low   Begin Time 1127   End Time  1134   Total Time Calculated 7 min   Spiritual/Emotional needs   Type Spiritual Support   Assessment/Intervention/Outcome   Assessment Calm;Coping;Hopeful   Intervention Active listening;Prayer (assurance of)/[place];Sustaining Presence/Ministry of presence   Outcome Acceptance;Encouraged;Engaged in conversation;Expressed Gratitude;Receptive;Optimistic         Intervention: Engaged in conversation and active listening. Prayed with Patient and her family.     Outcome: Patient expressed appreciation for visit and offer of continued prayer.    Plan: Chaplains are available on site or on call 24/7 for spiritual and emotional support.    Electronically signed by Natty Lopez on 5/21/2024 at 11:52 AM

## 2024-05-22 VITALS
RESPIRATION RATE: 18 BRPM | BODY MASS INDEX: 45.99 KG/M2 | DIASTOLIC BLOOD PRESSURE: 89 MMHG | HEIGHT: 67 IN | WEIGHT: 293 LBS | OXYGEN SATURATION: 93 % | HEART RATE: 73 BPM | SYSTOLIC BLOOD PRESSURE: 128 MMHG | TEMPERATURE: 97.8 F

## 2024-05-22 LAB
ANION GAP SERPL CALCULATED.3IONS-SCNC: 10 MMOL/L (ref 9–17)
BASOPHILS # BLD: <0.03 K/UL (ref 0–0.2)
BASOPHILS NFR BLD: 0 % (ref 0–2)
BUN SERPL-MCNC: 23 MG/DL (ref 8–23)
BUN/CREAT SERPL: 19 (ref 9–20)
CALCIUM SERPL-MCNC: 9 MG/DL (ref 8.6–10.4)
CHLORIDE SERPL-SCNC: 100 MMOL/L (ref 98–107)
CO2 SERPL-SCNC: 25 MMOL/L (ref 20–31)
CREAT SERPL-MCNC: 1.2 MG/DL (ref 0.5–0.9)
EOSINOPHIL # BLD: 0.29 K/UL (ref 0–0.44)
EOSINOPHILS RELATIVE PERCENT: 3 % (ref 1–4)
ERYTHROCYTE [DISTWIDTH] IN BLOOD BY AUTOMATED COUNT: 13.2 % (ref 11.8–14.4)
GFR, ESTIMATED: 45 ML/MIN/1.73M2
GLUCOSE SERPL-MCNC: 96 MG/DL (ref 70–99)
HCT VFR BLD AUTO: 35.9 % (ref 36.3–47.1)
HGB BLD-MCNC: 11.4 G/DL (ref 11.9–15.1)
IMM GRANULOCYTES # BLD AUTO: 0.05 K/UL (ref 0–0.3)
IMM GRANULOCYTES NFR BLD: 1 %
INR PPP: 1.7
LYMPHOCYTES NFR BLD: 1.32 K/UL (ref 1.1–3.7)
LYMPHOCYTES RELATIVE PERCENT: 13 % (ref 24–43)
MAGNESIUM SERPL-MCNC: 1.9 MG/DL (ref 1.6–2.6)
MCH RBC QN AUTO: 31.5 PG (ref 25.2–33.5)
MCHC RBC AUTO-ENTMCNC: 31.8 G/DL (ref 25.2–33.5)
MCV RBC AUTO: 99.2 FL (ref 82.6–102.9)
MONOCYTES NFR BLD: 1.25 K/UL (ref 0.1–1.2)
MONOCYTES NFR BLD: 12 % (ref 3–12)
NEUTROPHILS NFR BLD: 72 % (ref 36–65)
NEUTS SEG NFR BLD: 7.65 K/UL (ref 1.5–8.1)
NRBC BLD-RTO: 0 PER 100 WBC
PLATELET # BLD AUTO: ABNORMAL K/UL (ref 138–453)
PLATELET, FLUORESCENCE: 107 K/UL (ref 138–453)
PLATELETS.RETICULATED NFR BLD AUTO: 16.2 % (ref 1.1–10.3)
POTASSIUM SERPL-SCNC: 4.3 MMOL/L (ref 3.7–5.3)
PROTHROMBIN TIME: 19.5 SEC (ref 11.5–14.2)
RBC # BLD AUTO: 3.62 M/UL (ref 3.95–5.11)
SODIUM SERPL-SCNC: 135 MMOL/L (ref 135–144)
WBC OTHER # BLD: 10.6 K/UL (ref 3.5–11.3)

## 2024-05-22 PROCEDURE — 97110 THERAPEUTIC EXERCISES: CPT

## 2024-05-22 PROCEDURE — 80048 BASIC METABOLIC PNL TOTAL CA: CPT

## 2024-05-22 PROCEDURE — 2060000000 HC ICU INTERMEDIATE R&B

## 2024-05-22 PROCEDURE — 36415 COLL VENOUS BLD VENIPUNCTURE: CPT

## 2024-05-22 PROCEDURE — 6370000000 HC RX 637 (ALT 250 FOR IP): Performed by: FAMILY MEDICINE

## 2024-05-22 PROCEDURE — 85610 PROTHROMBIN TIME: CPT

## 2024-05-22 PROCEDURE — 85025 COMPLETE CBC W/AUTO DIFF WBC: CPT

## 2024-05-22 PROCEDURE — 83735 ASSAY OF MAGNESIUM: CPT

## 2024-05-22 PROCEDURE — 99238 HOSP IP/OBS DSCHRG MGMT 30/<: CPT | Performed by: FAMILY MEDICINE

## 2024-05-22 PROCEDURE — 2580000003 HC RX 258

## 2024-05-22 PROCEDURE — 6370000000 HC RX 637 (ALT 250 FOR IP): Performed by: INTERNAL MEDICINE

## 2024-05-22 PROCEDURE — 6370000000 HC RX 637 (ALT 250 FOR IP): Performed by: NURSE PRACTITIONER

## 2024-05-22 PROCEDURE — 6370000000 HC RX 637 (ALT 250 FOR IP)

## 2024-05-22 PROCEDURE — 51798 US URINE CAPACITY MEASURE: CPT

## 2024-05-22 RX ORDER — OXYCODONE AND ACETAMINOPHEN 10; 325 MG/1; MG/1
1 TABLET ORAL EVERY 6 HOURS PRN
Qty: 20 TABLET | Refills: 0 | Status: SHIPPED | OUTPATIENT
Start: 2024-05-22 | End: 2024-05-27

## 2024-05-22 RX ORDER — BETHANECHOL CHLORIDE 25 MG/1
25 TABLET ORAL 4 TIMES DAILY
Qty: 90 TABLET | Refills: 0 | Status: SHIPPED | OUTPATIENT
Start: 2024-05-22

## 2024-05-22 RX ORDER — ALBUTEROL SULFATE 2.5 MG/3ML
2.5 SOLUTION RESPIRATORY (INHALATION) EVERY 6 HOURS PRN
Qty: 120 EACH | Refills: 3
Start: 2024-05-22 | End: 2024-06-21

## 2024-05-22 RX ORDER — POLYETHYLENE GLYCOL 3350 17 G/17G
17 POWDER, FOR SOLUTION ORAL DAILY PRN
Qty: 527 G | Refills: 1
Start: 2024-05-22 | End: 2024-07-23

## 2024-05-22 RX ORDER — PSEUDOEPHEDRINE HCL 30 MG
100 TABLET ORAL DAILY
Qty: 30 CAPSULE | Refills: 0 | Status: SHIPPED | OUTPATIENT
Start: 2024-05-23 | End: 2024-06-22

## 2024-05-22 RX ORDER — METAXALONE 800 MG/1
800 TABLET ORAL 3 TIMES DAILY
Qty: 30 TABLET | Refills: 0 | Status: SHIPPED | OUTPATIENT
Start: 2024-05-22 | End: 2024-06-01

## 2024-05-22 RX ORDER — BETHANECHOL CHLORIDE 25 MG/1
25 TABLET ORAL 4 TIMES DAILY
Status: DISCONTINUED | OUTPATIENT
Start: 2024-05-22 | End: 2024-05-23 | Stop reason: HOSPADM

## 2024-05-22 RX ORDER — WARFARIN SODIUM 2.5 MG/1
7.5 TABLET ORAL
Status: COMPLETED | OUTPATIENT
Start: 2024-05-22 | End: 2024-05-22

## 2024-05-22 RX ORDER — LANOLIN ALCOHOL/MO/W.PET/CERES
3 CREAM (GRAM) TOPICAL NIGHTLY PRN
Qty: 5 TABLET | Refills: 0
Start: 2024-05-22 | End: 2024-05-27

## 2024-05-22 RX ORDER — WARFARIN SODIUM 2.5 MG/1
TABLET ORAL
Qty: 30 TABLET | Refills: 3 | Status: SHIPPED | OUTPATIENT
Start: 2024-05-22

## 2024-05-22 RX ORDER — ONDANSETRON 4 MG/1
4 TABLET, ORALLY DISINTEGRATING ORAL EVERY 8 HOURS PRN
Qty: 15 TABLET | Refills: 0
Start: 2024-05-22 | End: 2024-05-27

## 2024-05-22 RX ADMIN — AMLODIPINE BESYLATE 10 MG: 5 TABLET ORAL at 10:42

## 2024-05-22 RX ADMIN — MICONAZOLE NITRATE: 20 POWDER TOPICAL at 20:02

## 2024-05-22 RX ADMIN — SPIRONOLACTONE 25 MG: 25 TABLET ORAL at 10:43

## 2024-05-22 RX ADMIN — BETHANECHOL CHLORIDE 25 MG: 25 TABLET ORAL at 19:58

## 2024-05-22 RX ADMIN — SODIUM CHLORIDE, PRESERVATIVE FREE 10 ML: 5 INJECTION INTRAVENOUS at 10:44

## 2024-05-22 RX ADMIN — SODIUM CHLORIDE, PRESERVATIVE FREE 10 ML: 5 INJECTION INTRAVENOUS at 20:02

## 2024-05-22 RX ADMIN — DOCUSATE SODIUM 100 MG: 100 CAPSULE, LIQUID FILLED ORAL at 10:43

## 2024-05-22 RX ADMIN — BETHANECHOL CHLORIDE 25 MG: 25 TABLET ORAL at 14:44

## 2024-05-22 RX ADMIN — OXYCODONE AND ACETAMINOPHEN 2 TABLET: 5; 325 TABLET ORAL at 14:44

## 2024-05-22 RX ADMIN — METAXALONE 800 MG: 800 TABLET ORAL at 19:58

## 2024-05-22 RX ADMIN — METOPROLOL TARTRATE 25 MG: 25 TABLET, FILM COATED ORAL at 19:59

## 2024-05-22 RX ADMIN — OXYCODONE AND ACETAMINOPHEN 2 TABLET: 5; 325 TABLET ORAL at 02:09

## 2024-05-22 RX ADMIN — METOPROLOL TARTRATE 25 MG: 25 TABLET, FILM COATED ORAL at 10:43

## 2024-05-22 RX ADMIN — MICONAZOLE NITRATE: 20 POWDER TOPICAL at 10:50

## 2024-05-22 RX ADMIN — BETHANECHOL CHLORIDE 25 MG: 25 TABLET ORAL at 17:46

## 2024-05-22 RX ADMIN — METAXALONE 800 MG: 800 TABLET ORAL at 10:42

## 2024-05-22 RX ADMIN — BETHANECHOL CHLORIDE 25 MG: 25 TABLET ORAL at 10:43

## 2024-05-22 RX ADMIN — FUROSEMIDE 40 MG: 40 TABLET ORAL at 10:43

## 2024-05-22 RX ADMIN — OXYCODONE AND ACETAMINOPHEN 2 TABLET: 5; 325 TABLET ORAL at 10:42

## 2024-05-22 RX ADMIN — METAXALONE 800 MG: 800 TABLET ORAL at 14:44

## 2024-05-22 RX ADMIN — LEVOTHYROXINE SODIUM 50 MCG: 0.03 TABLET ORAL at 05:21

## 2024-05-22 RX ADMIN — WARFARIN SODIUM 7.5 MG: 2.5 TABLET ORAL at 17:46

## 2024-05-22 RX ADMIN — OXYCODONE AND ACETAMINOPHEN 2 TABLET: 5; 325 TABLET ORAL at 06:46

## 2024-05-22 RX ADMIN — POTASSIUM CHLORIDE 20 MEQ: 1500 TABLET, EXTENDED RELEASE ORAL at 10:43

## 2024-05-22 RX ADMIN — ATORVASTATIN CALCIUM 40 MG: 40 TABLET, FILM COATED ORAL at 19:59

## 2024-05-22 RX ADMIN — OXYCODONE AND ACETAMINOPHEN 2 TABLET: 5; 325 TABLET ORAL at 19:59

## 2024-05-22 ASSESSMENT — PAIN - FUNCTIONAL ASSESSMENT
PAIN_FUNCTIONAL_ASSESSMENT: PREVENTS OR INTERFERES WITH ALL ACTIVE AND SOME PASSIVE ACTIVITIES
PAIN_FUNCTIONAL_ASSESSMENT: ACTIVITIES ARE NOT PREVENTED

## 2024-05-22 ASSESSMENT — PAIN SCALES - GENERAL
PAINLEVEL_OUTOF10: 8
PAINLEVEL_OUTOF10: 9
PAINLEVEL_OUTOF10: 6
PAINLEVEL_OUTOF10: 4

## 2024-05-22 ASSESSMENT — PAIN DESCRIPTION - ORIENTATION
ORIENTATION: LEFT

## 2024-05-22 ASSESSMENT — PAIN DESCRIPTION - PAIN TYPE
TYPE: ACUTE PAIN
TYPE: ACUTE PAIN

## 2024-05-22 ASSESSMENT — PAIN DESCRIPTION - LOCATION
LOCATION: LEG
LOCATION: HIP
LOCATION: LEG
LOCATION: HIP
LOCATION: HIP
LOCATION: LEG

## 2024-05-22 ASSESSMENT — PAIN DESCRIPTION - DESCRIPTORS
DESCRIPTORS: ACHING;DISCOMFORT
DESCRIPTORS: ACHING;STABBING;THROBBING
DESCRIPTORS: ACHING;DISCOMFORT
DESCRIPTORS: ACHING;STABBING;THROBBING
DESCRIPTORS: ACHING;DISCOMFORT
DESCRIPTORS: ACHING;STABBING

## 2024-05-22 NOTE — PROGRESS NOTES
Occupational Therapy    Patient met with refusal for OT due to being in too much pain and would like to rest. OT to attempt at a later time schedule permitting.    SAAD West/KOBY

## 2024-05-22 NOTE — PLAN OF CARE
Problem: Discharge Planning  Goal: Discharge to home or other facility with appropriate resources  Outcome: Progressing  Flowsheets (Taken 5/21/2024 2109)  Discharge to home or other facility with appropriate resources:   Identify barriers to discharge with patient and caregiver   Arrange for needed discharge resources and transportation as appropriate   Identify discharge learning needs (meds, wound care, etc)   Refer to discharge planning if patient needs post-hospital services based on physician order or complex needs related to functional status, cognitive ability or social support system     Problem: Pain  Goal: Verbalizes/displays adequate comfort level or baseline comfort level  Outcome: Progressing     Problem: Skin/Tissue Integrity  Goal: Absence of new skin breakdown  Description: 1.  Monitor for areas of redness and/or skin breakdown  2.  Assess vascular access sites hourly  3.  Every 4-6 hours minimum:  Change oxygen saturation probe site  4.  Every 4-6 hours:  If on nasal continuous positive airway pressure, respiratory therapy assess nares and determine need for appliance change or resting period.  Outcome: Progressing     Problem: Safety - Adult  Goal: Free from fall injury  Outcome: Progressing     Problem: ABCDS Injury Assessment  Goal: Absence of physical injury  Outcome: Progressing     Problem: Chronic Conditions and Co-morbidities  Goal: Patient's chronic conditions and co-morbidity symptoms are monitored and maintained or improved  Outcome: Progressing  Flowsheets (Taken 5/21/2024 2109)  Care Plan - Patient's Chronic Conditions and Co-Morbidity Symptoms are Monitored and Maintained or Improved:   Monitor and assess patient's chronic conditions and comorbid symptoms for stability, deterioration, or improvement   Collaborate with multidisciplinary team to address chronic and comorbid conditions and prevent exacerbation or deterioration   Update acute care plan with appropriate goals if chronic

## 2024-05-22 NOTE — DISCHARGE SUMMARY
Hospitalist Discharge Summary    Yvette Das  :  1940  MRN:  [id number]    Admit date:  2024  Discharge date:  24    Admitting Physician:  Talita Melendez MD    Discharge Diagnoses:   Patient Active Problem List   Diagnosis    Dyslipidemia    Osteoarthritis    Osteoporosis    Depression    Anxiety    CHF (congestive heart failure) (HCC)    Primary insomnia    JAYJAY on CPAP    Class 2 severe obesity with serious comorbidity and body mass index (BMI) of 37.0 to 37.9 in adult (HCC)    Hypertension    A-fib (HCC)    Malignant tumor of colon (HCC)    Elevated fasting glucose    Other specified hypothyroidism    S/P small bowel resection    Closed fracture of proximal end of left humerus with routine healing    Renal mass, right    MICHAEL (acute kidney injury) (HCC)    Acute post-operative pain    Status post nephrectomy    Urinary retention    Renal cell carcinoma of right kidney (HCC)    Chronic renal disease, stage III ([facility]) [[id number]]    CHF (congestive heart failure), NYHA class I, acute on chronic, combined (HCC)    Anasarca    Shortness of breath    Femoral fracture (HCC)    Other fracture of left femur, initial encounter for closed fracture ([facility])    Hip injury, left, initial encounter        Admission Condition:  fair      Discharged Condition:  good    Hospital Course/Treatments   admitted with h/o of mechanical fall, pt is s/p lt hip replacement was seen in ER and was diagnosed with periprosthetic fx. Pt was seen by ortho and was considered non surgical,pt will be d/c to rehab non weight bearing on left, pain meds rx    Discharge Medications:         Medication List        START taking these medications      albuterol (2.5 MG/3ML) 0.083% nebulizer solution  Commonly known as: PROVENTIL  Take 3 mLs by nebulization every 6 hours as needed for Wheezing     bethanechol 25 MG tablet  Commonly known as: URECHOLINE  Take 1 tablet by mouth 4 times daily     docusate 100 MG Caps  Commonly known as:  provided for review. Automated exposure control, iterative reconstruction, and/or weight based adjustment of the mA/kV was utilized to reduce the radiation dose to as low as reasonably achievable. COMPARISON: CT abdomen and pelvis dated 03/14/2023. HISTORY: ORDERING SYSTEM PROVIDED HISTORY: Fall on Coumadin TECHNOLOGIST PROVIDED HISTORY: Fall on Coumadin Decision Support Exception - unselect if not a suspected or confirmed emergency medical condition->Emergency Medical Condition (MA) Reason for Exam: Fall today FINDINGS: Evaluation is partially limited due to streak artifact arising from the patient's arms. Chest: Mediastinum: No evidence of pneumomediastinum seen.  No evidence of mediastinal hematoma seen.  The main pulmonary artery is normal in caliber. The thoracic aorta is normal in caliber.  Vascular calcification changes of the thoracic aorta and coronary arteries seen.  The heart is enlarged.  No significant pericardial effusion seen.  No evidence of enlarged mediastinal lymph nodes seen. Lungs/pleura: The central airway appears patent.  Scarring/atelectatic changes are seen at the lung bases.  No confluent airspace opacity, pleural effusion or pneumothorax seen. Soft Tissues/Bones: No evidence of acute appearing displaced rib fracture seen.  There is chronic appearing fracture deformity of the left proximal humerus. Abdomen/Pelvis: Organs: The liver appears grossly homogeneous without specific imaging features of hepatic injury seen.  The patient is status post cholecystectomy. The spleen appears grossly homogeneous without specific imaging features of splenic injury seen.  The adrenal glands appear within normal limits.  No specific imaging features of pancreatic injury seen.  The right kidney is again noted to be absent.  No convincing imaging features of left renal injury seen within the limitations of the study. GI/Bowel: No evidence of intra-free air is seen to suggest hollow viscus perforation.

## 2024-05-22 NOTE — PROGRESS NOTES
Physical Therapy  Facility/Department: [facility]  PROGRESSIVE C[facility]  Daily Treatment Note  NAME: Yvette Das  : 1940  MRN: [id number]    Date of Service: 2024    Discharge Recommendations:  Long Term Care with PT, Continue to assess pending progress        Patient Diagnosis(es): The encounter diagnosis was Hip injury, left, initial encounter.    Assessment   Assessment: Pt limited by L hip pain  Activity Tolerance: Patient limited by pain;Treatment limited secondary to medical complications     Plan    Physical Therapy Plan  General Plan: 1 time a day 7 days a week  Current Treatment Recommendations: Gait training;Transfer training     Restrictions  Restrictions/Precautions  Restrictions/Precautions: Weight Bearing, Fall Risk  Lower Extremity Weight Bearing Restrictions  Left Lower Extremity Weight Bearing: Non Weight Bearing     Subjective    Subjective  Subjective: Pt in bed upon arrival. Pt agreeable to therapy session.  Pain: L hip pain rated 9/10  Orientation  Overall Orientation Status: Within Functional Limits  Cognition  Overall Cognitive Status: WFL     Objective   Vitals  O2 Device: Nasal cannula  Bed Mobility Training  Bed Mobility Training: No  Neuromuscular Education  Neuromuscular Education: No  PT Exercises  Exercise Treatment: Supine ankle pumps, glute sets x 20 ea. Quad sets, heel slides x 10 R LE. Attempted 2 reps on L LE. Held after 2 reps due to hip pain.     Safety Devices  Type of Devices: Left in bed;Call light within reach;Nurse notified;Gait belt       Goals  Short Term Goals  Time Frame for Short Term Goals: 1 day  Short Term Goal 1: Assess functional status  Long Term Goals  Time Frame for Long Term Goals : 2-3 days  Long Term Goal 1: Able to participate with rolling, bed mobility  Long Term Goal 2: Supine to sit mod assit  Long Term Goal 3: Transfer to standing with a walker, NWB L    Education  Patient Education  Education Given To: Patient  Education Provided: Role of

## 2024-05-22 NOTE — PROGRESS NOTES
rounding on PCU      Assessment: Patient is resting in bed but has pain from a fracture suffered in a fall at home.She has family support.She will transfer to rehab facility today. Fracture is not operable.    Intervention: Engaged in conversation. Patient accepted offer of prayer. Patient expressed appreciation for visit and offer of continued prayer.    Plan: Chaplains are available on site or on call 24/7 for spiritual and emotional support.

## 2024-05-22 NOTE — PROGRESS NOTES
05/22/24 1142   Encounter Summary   Encounter Overview/Reason Spiritual/Emotional Needs   Service Provided For Patient   Referral/Consult From [facility]   Support System Children;Family members   Last Encounter  05/22/24   Complexity of Encounter Low   Begin Time 1132   End Time  1142   Total Time Calculated 10 min   Spiritual/Emotional needs   Type Spiritual Support   Assessment/Intervention/Outcome   Assessment Calm;Coping   Intervention Active listening;Prayer (assurance of)/[place]   Outcome Acceptance;Engaged in conversation;Expressed Gratitude

## 2024-05-22 NOTE — PROGRESS NOTES
Warfarin Dosing - Pharmacy Consult Note  Consulting Provider: Dr Ernst  Indication:  Atrial Fibrillation  Warfarin Dose prior to admission: 7 mg Fri; 5 mg AOD   Concurrent anticoagulants/antiplatelets: none  Significant Drug Interactions: No obvious interactions  Recent Labs     05/20/24  0524 05/21/24  0503 05/22/24  0509   INR 1.6 1.6 1.7   HGB 12.5 11.9 11.4*   PLT See Reflexed IPF Result See Reflexed IPF Result See Reflexed IPF Result     Recent warfarin administrations                     warfarin (COUMADIN) tablet 7.5 mg (mg) 7.5 mg Given 05/21/24 1747    warfarin (COUMADIN) tablet 7.5 mg (mg) 7.5 mg Given 05/20/24 1737                   Date             INR            Dose  05/19/24       1.8             No dose  05/20/24       1.6              7.5 mg  05/21/24       1.6              7.5 mg  05/22/24       1.7              7.5 mg    Assessment/Plan  (Goal INR: 2 - 3)  INR is suprisingly still below range and very little increase from increased dose the last two days. Will give higher dose again today.    Active problem list reviewed.  INR orders are placed.  Chart reviewed for pertinent labs, drug/diet interactions, and past doses.  Documentation of patient's clinical condition was reviewed.    Pharmacy Dosing:  Pharmacy will continue to follow.     Blu Dickerson RPh  5/22/2024  7:04 AM

## 2024-05-24 ENCOUNTER — ANTI-COAG VISIT (OUTPATIENT)
Dept: INTERNAL MEDICINE | Age: 84
End: 2024-05-24
Payer: MEDICARE

## 2024-05-24 DIAGNOSIS — Z51.81 MONITORING FOR LONG-TERM ANTICOAGULANT USE: ICD-10-CM

## 2024-05-24 DIAGNOSIS — Z79.01 MONITORING FOR LONG-TERM ANTICOAGULANT USE: ICD-10-CM

## 2024-05-24 DIAGNOSIS — I48.20 CHRONIC ATRIAL FIBRILLATION (HCC): Primary | ICD-10-CM

## 2024-05-24 LAB — INR BLD: 3

## 2024-05-24 PROCEDURE — 93793 ANTICOAG MGMT PT WARFARIN: CPT | Performed by: INTERNAL MEDICINE

## 2024-05-29 ENCOUNTER — ANTI-COAG VISIT (OUTPATIENT)
Dept: INTERNAL MEDICINE | Age: 84
End: 2024-05-29

## 2024-05-29 DIAGNOSIS — Z51.81 ENCOUNTER FOR THERAPEUTIC DRUG MONITORING: ICD-10-CM

## 2024-05-29 DIAGNOSIS — I48.0 PAROXYSMAL ATRIAL FIBRILLATION (HCC): Primary | ICD-10-CM

## 2024-05-29 DIAGNOSIS — S79.912A HIP INJURY, LEFT, INITIAL ENCOUNTER: ICD-10-CM

## 2024-05-29 LAB — INR BLD: 5.3

## 2024-05-29 RX ORDER — OXYCODONE AND ACETAMINOPHEN 10; 325 MG/1; MG/1
1 TABLET ORAL EVERY 6 HOURS PRN
Qty: 28 TABLET | Refills: 0 | Status: SHIPPED | OUTPATIENT
Start: 2024-05-29 | End: 2024-05-31 | Stop reason: SDUPTHER

## 2024-05-31 ENCOUNTER — ANTI-COAG VISIT (OUTPATIENT)
Dept: INTERNAL MEDICINE | Age: 84
End: 2024-05-31

## 2024-05-31 ENCOUNTER — HOSPITAL ENCOUNTER (OUTPATIENT)
Age: 84
Setting detail: SPECIMEN
Discharge: HOME | End: 2024-05-31

## 2024-05-31 DIAGNOSIS — Z51.81 MONITORING FOR LONG-TERM ANTICOAGULANT USE: ICD-10-CM

## 2024-05-31 DIAGNOSIS — S79.912A HIP INJURY, LEFT, INITIAL ENCOUNTER: ICD-10-CM

## 2024-05-31 DIAGNOSIS — I48.91 ATRIAL FIBRILLATION, UNSPECIFIED TYPE (HCC): Primary | ICD-10-CM

## 2024-05-31 DIAGNOSIS — Z79.01 MONITORING FOR LONG-TERM ANTICOAGULANT USE: ICD-10-CM

## 2024-05-31 LAB
ANION GAP SERPL CALCULATED.3IONS-SCNC: 14 MMOL/L (ref 9–17)
BASOPHILS # BLD: 0.07 K/UL (ref 0–0.2)
BASOPHILS NFR BLD: 1 % (ref 0–2)
BUN SERPL-MCNC: 38 MG/DL (ref 8–23)
BUN/CREAT SERPL: 35 (ref 9–20)
CALCIUM SERPL-MCNC: 9 MG/DL (ref 8.6–10.4)
CHLORIDE SERPL-SCNC: 99 MMOL/L (ref 98–107)
CO2 SERPL-SCNC: 25 MMOL/L (ref 20–31)
CREAT SERPL-MCNC: 1.1 MG/DL (ref 0.5–0.9)
EOSINOPHIL # BLD: 0.33 K/UL (ref 0–0.44)
EOSINOPHILS RELATIVE PERCENT: 4 % (ref 1–4)
ERYTHROCYTE [DISTWIDTH] IN BLOOD BY AUTOMATED COUNT: 13.2 % (ref 11.8–14.4)
GFR, ESTIMATED: 50 ML/MIN/1.73M2
GLUCOSE SERPL-MCNC: 103 MG/DL (ref 70–99)
HCT VFR BLD AUTO: 39.2 % (ref 36.3–47.1)
HGB BLD-MCNC: 13.3 G/DL (ref 11.9–15.1)
IMM GRANULOCYTES # BLD AUTO: 0.07 K/UL (ref 0–0.3)
IMM GRANULOCYTES NFR BLD: 1 %
INR BLD: 3.8
LYMPHOCYTES NFR BLD: 1.07 K/UL (ref 1.1–3.7)
LYMPHOCYTES RELATIVE PERCENT: 11 % (ref 24–43)
MCH RBC QN AUTO: 32 PG (ref 25.2–33.5)
MCHC RBC AUTO-ENTMCNC: 33.9 G/DL (ref 25.2–33.5)
MCV RBC AUTO: 94.2 FL (ref 82.6–102.9)
MONOCYTES NFR BLD: 1.02 K/UL (ref 0.1–1.2)
MONOCYTES NFR BLD: 11 % (ref 3–12)
NEUTROPHILS NFR BLD: 72 % (ref 36–65)
NEUTS SEG NFR BLD: 7 K/UL (ref 1.5–8.1)
NRBC BLD-RTO: 0 PER 100 WBC
PLATELET # BLD AUTO: 252 K/UL (ref 138–453)
PMV BLD AUTO: 12.9 FL (ref 8.1–13.5)
POTASSIUM SERPL-SCNC: 4 MMOL/L (ref 3.7–5.3)
RBC # BLD AUTO: 4.16 M/UL (ref 3.95–5.11)
SODIUM SERPL-SCNC: 138 MMOL/L (ref 135–144)
WBC OTHER # BLD: 9.6 K/UL (ref 3.5–11.3)

## 2024-05-31 PROCEDURE — 85025 COMPLETE CBC W/AUTO DIFF WBC: CPT

## 2024-05-31 PROCEDURE — 80048 BASIC METABOLIC PNL TOTAL CA: CPT

## 2024-05-31 PROCEDURE — 36415 COLL VENOUS BLD VENIPUNCTURE: CPT

## 2024-05-31 RX ORDER — OXYCODONE AND ACETAMINOPHEN 10; 325 MG/1; MG/1
1 TABLET ORAL EVERY 6 HOURS
Qty: 120 TABLET | Refills: 0 | Status: SHIPPED | OUTPATIENT
Start: 2024-05-31 | End: 2024-06-30

## 2024-05-31 NOTE — PROGRESS NOTES
Hold Coumadin for 3 more days. Restart Coumadin on 6/3/24 at 2 mg every day. Recheck INR in 7 days.

## 2024-06-03 ENCOUNTER — OUTSIDE SERVICES (OUTPATIENT)
Dept: INTERNAL MEDICINE | Age: 84
End: 2024-06-03

## 2024-06-03 DIAGNOSIS — K59.03 DRUG-INDUCED CONSTIPATION: ICD-10-CM

## 2024-06-03 DIAGNOSIS — I48.91 ATRIAL FIBRILLATION, UNSPECIFIED TYPE (HCC): ICD-10-CM

## 2024-06-03 DIAGNOSIS — R33.9 URINARY RETENTION: ICD-10-CM

## 2024-06-03 DIAGNOSIS — E78.5 DYSLIPIDEMIA: ICD-10-CM

## 2024-06-03 DIAGNOSIS — E03.9 HYPOTHYROIDISM, UNSPECIFIED TYPE: ICD-10-CM

## 2024-06-03 DIAGNOSIS — I10 BENIGN ESSENTIAL HTN: ICD-10-CM

## 2024-06-03 DIAGNOSIS — R53.1 GENERALIZED WEAKNESS: ICD-10-CM

## 2024-06-03 DIAGNOSIS — G47.33 OBSTRUCTIVE SLEEP APNEA: ICD-10-CM

## 2024-06-03 DIAGNOSIS — S72.92XG CLOSED FRACTURE OF LEFT FEMUR WITH DELAYED HEALING, UNSPECIFIED FRACTURE MORPHOLOGY, UNSPECIFIED PORTION OF FEMUR, SUBSEQUENT ENCOUNTER: Primary | ICD-10-CM

## 2024-06-03 DIAGNOSIS — N18.31 CHRONIC KIDNEY DISEASE, STAGE 3A (HCC): ICD-10-CM

## 2024-06-03 PROBLEM — I50.43 CHF (CONGESTIVE HEART FAILURE), NYHA CLASS I, ACUTE ON CHRONIC, COMBINED (HCC): Status: RESOLVED | Noted: 2023-10-24 | Resolved: 2024-06-03

## 2024-06-04 NOTE — PROGRESS NOTES
06/03/24  Yvette Das  1940    Patient Resident of [facility]    Chief Complaint  1. Closed fracture of left femur with delayed healing, unspecified fracture morphology, unspecified portion of femur, subsequent encounter    2. Benign essential HTN    3. Hypothyroidism, unspecified type    4. Dyslipidemia    5. Urinary retention    6. Atrial fibrillation, unspecified type ([facility])    7. Chronic kidney disease, stage 3a ([facility])    8. Obstructive sleep apnea    9. Drug-induced constipation    10. Generalized weakness        HPI: Pleasant 83-year-old female with history of hypertension, hypothyroidism, atrial fibs, chronic kidney disease, obstructive sleep apnea with recent fall/hospitalization at [facility] where she was admitted 5/19/2024 through 5/22/2024 after sustaining a fall.  Patient with a history of left hip replacement was seen by orthopedics and deemed nonsurgical at this time she is to be nonweightbearing to the left lower extremity.  Due to the inability to care for herself in the home setting was sent to [facility] for rehabilitation.  States therapy did get her up in the wheelchair yesterday however that wheelchair is way too small for her and she is not doing therapy unless they do it in her room.  States it is severely painful when she is up.  Her vitals have been stable she denies any issues with urinating at present she was placed on Urecholine for history of retention.  States she is having episodes of constipation.  We discussed adding MiraLAX which she is in agreement.  Is using her CPAP at night it is here at facility.  Vitals have been stable, nursing staff deny any acute nursing service issues        Allergies   Allergen Reactions    Pcn [Penicillins] Hives and Shortness Of Breath    Bextra [Valdecoxib] Diarrhea       Past Medical History:   Diagnosis Date    A-fib ([facility]) 07/19/2017    Acute blood loss as cause of postoperative anemia 11/08/2018    Anxiety

## 2024-06-07 ENCOUNTER — ANTI-COAG VISIT (OUTPATIENT)
Dept: INTERNAL MEDICINE | Age: 84
End: 2024-06-07

## 2024-06-07 DIAGNOSIS — Z79.01 MONITORING FOR LONG-TERM ANTICOAGULANT USE: ICD-10-CM

## 2024-06-07 DIAGNOSIS — Z51.81 MONITORING FOR LONG-TERM ANTICOAGULANT USE: ICD-10-CM

## 2024-06-07 DIAGNOSIS — I48.91 ATRIAL FIBRILLATION, UNSPECIFIED TYPE (HCC): Primary | ICD-10-CM

## 2024-06-07 LAB — INR BLD: 1.4

## 2024-06-10 ENCOUNTER — HOSPITAL ENCOUNTER (OUTPATIENT)
Age: 84
Setting detail: SPECIMEN
Discharge: HOME | End: 2024-06-10

## 2024-06-10 DIAGNOSIS — S72.8X2A OTHER FRACTURE OF LEFT FEMUR, INITIAL ENCOUNTER FOR CLOSED FRACTURE (HCC): Primary | ICD-10-CM

## 2024-06-10 LAB
ANION GAP SERPL CALCULATED.3IONS-SCNC: 13 MMOL/L (ref 9–17)
BUN SERPL-MCNC: 46 MG/DL (ref 8–23)
BUN/CREAT SERPL: 35 (ref 9–20)
CALCIUM SERPL-MCNC: 9.2 MG/DL (ref 8.6–10.4)
CHLORIDE SERPL-SCNC: 102 MMOL/L (ref 98–107)
CO2 SERPL-SCNC: 22 MMOL/L (ref 20–31)
CREAT SERPL-MCNC: 1.3 MG/DL (ref 0.5–0.9)
GFR, ESTIMATED: 41 ML/MIN/1.73M2
GLUCOSE SERPL-MCNC: 111 MG/DL (ref 70–99)
POTASSIUM SERPL-SCNC: 4.6 MMOL/L (ref 3.7–5.3)
SODIUM SERPL-SCNC: 137 MMOL/L (ref 135–144)

## 2024-06-10 PROCEDURE — 80048 BASIC METABOLIC PNL TOTAL CA: CPT

## 2024-06-10 PROCEDURE — 36415 COLL VENOUS BLD VENIPUNCTURE: CPT

## 2024-06-14 ENCOUNTER — ANTI-COAG VISIT (OUTPATIENT)
Dept: INTERNAL MEDICINE | Age: 84
End: 2024-06-14

## 2024-06-14 ENCOUNTER — OFFICE VISIT (OUTPATIENT)
Dept: CARDIOLOGY | Age: 84
End: 2024-06-14
Payer: MEDICARE

## 2024-06-14 ENCOUNTER — TELEPHONE (OUTPATIENT)
Dept: INTERNAL MEDICINE | Age: 84
End: 2024-06-14

## 2024-06-14 VITALS
WEIGHT: 293 LBS | SYSTOLIC BLOOD PRESSURE: 138 MMHG | HEART RATE: 106 BPM | BODY MASS INDEX: 45.99 KG/M2 | DIASTOLIC BLOOD PRESSURE: 82 MMHG | HEIGHT: 67 IN

## 2024-06-14 DIAGNOSIS — D68.69 SECONDARY HYPERCOAGULABLE STATE (HCC): ICD-10-CM

## 2024-06-14 DIAGNOSIS — I50.32 CHRONIC DIASTOLIC HEART FAILURE (HCC): ICD-10-CM

## 2024-06-14 DIAGNOSIS — R94.39 ABNORMAL STRESS TEST: ICD-10-CM

## 2024-06-14 DIAGNOSIS — E78.5 HYPERLIPIDEMIA LDL GOAL <70: ICD-10-CM

## 2024-06-14 DIAGNOSIS — I25.10 CORONARY ARTERY DISEASE INVOLVING NATIVE HEART WITHOUT ANGINA PECTORIS, UNSPECIFIED VESSEL OR LESION TYPE: ICD-10-CM

## 2024-06-14 DIAGNOSIS — I48.91 ATRIAL FIBRILLATION, UNSPECIFIED TYPE (HCC): ICD-10-CM

## 2024-06-14 DIAGNOSIS — I25.84 CORONARY ARTERY DISEASE DUE TO CALCIFIED CORONARY LESION: Primary | ICD-10-CM

## 2024-06-14 DIAGNOSIS — I48.0 PAROXYSMAL ATRIAL FIBRILLATION (HCC): Primary | ICD-10-CM

## 2024-06-14 DIAGNOSIS — E66.01 MORBID OBESITY WITH BMI OF 40.0-44.9, ADULT (HCC): ICD-10-CM

## 2024-06-14 DIAGNOSIS — I25.10 CORONARY ARTERY DISEASE DUE TO CALCIFIED CORONARY LESION: Primary | ICD-10-CM

## 2024-06-14 LAB — INR BLD: 2

## 2024-06-14 PROCEDURE — G8399 PT W/DXA RESULTS DOCUMENT: HCPCS | Performed by: INTERNAL MEDICINE

## 2024-06-14 PROCEDURE — G8417 CALC BMI ABV UP PARAM F/U: HCPCS | Performed by: INTERNAL MEDICINE

## 2024-06-14 PROCEDURE — 1036F TOBACCO NON-USER: CPT | Performed by: INTERNAL MEDICINE

## 2024-06-14 PROCEDURE — 99214 OFFICE O/P EST MOD 30 MIN: CPT | Performed by: INTERNAL MEDICINE

## 2024-06-14 PROCEDURE — 3075F SYST BP GE 130 - 139MM HG: CPT | Performed by: INTERNAL MEDICINE

## 2024-06-14 PROCEDURE — 1111F DSCHRG MED/CURRENT MED MERGE: CPT | Performed by: INTERNAL MEDICINE

## 2024-06-14 PROCEDURE — 1124F ACP DISCUSS-NO DSCNMKR DOCD: CPT | Performed by: INTERNAL MEDICINE

## 2024-06-14 PROCEDURE — 3079F DIAST BP 80-89 MM HG: CPT | Performed by: INTERNAL MEDICINE

## 2024-06-14 PROCEDURE — 93005 ELECTROCARDIOGRAM TRACING: CPT | Performed by: INTERNAL MEDICINE

## 2024-06-14 PROCEDURE — 1090F PRES/ABSN URINE INCON ASSESS: CPT | Performed by: INTERNAL MEDICINE

## 2024-06-14 PROCEDURE — 93010 ELECTROCARDIOGRAM REPORT: CPT | Performed by: INTERNAL MEDICINE

## 2024-06-14 PROCEDURE — G8427 DOCREV CUR MEDS BY ELIG CLIN: HCPCS | Performed by: INTERNAL MEDICINE

## 2024-06-14 NOTE — PROGRESS NOTES
[facility]i called and are having trouble faxing the INR for this patient.  States her INR is 2.0.  Did let them know to try to faxing this over as well.

## 2024-06-14 NOTE — PROGRESS NOTES
normal LV systolic function.    Nuclear Stress on 9/22:  EF:  51%  TID:  1.05  LHR:  0.93     FINDINGS:  Ischemia (Reversible Defect):           Yes, mid anterior wall ischemia  Infarction (Irreversible Defect):       Inferior wall defect, possible  infarction                                       (diaphragmatic)  Normal Ejection Fraction:               Yes  Normal Segmental Wall Motion:           Yes    Cath on 9/22:   Minimal CAD.   Normal LV systolic function.     Echo 10/23:    Left Ventricle: Normal left ventricular systolic function with a visually estimated EF of 55 - 60%. Left ventricle size is normal. Normal wall thickness. Normal wall motion.    Right Ventricle: Right ventricle is severely dilated. Reduced systolic function.    Aortic Valve: Trileaflet valve. Sclerosis of the aortic valve cusp.    Mitral Valve: Mild regurgitation with a centrally directed jet.    Tricuspid Valve: Moderate regurgitation with multiple jets. The estimated RVSP is 51 mmHg.Moderate pulmonary hypertension.    Left Atrium: Left atrium is severely dilated.    Right Atrium: Right atrium is dilated.     IMPRESSION/RECOMMENDATIONS:    1. Minimal Non ob CAD on Cath 9/22 after abnormal stress test    2. Atrial fibrillation, RVR- on therapeutic anticoagulation with coumadin,  - Coumadin managed by [facility].  - increase lopressor 37.5 bid      3. HFpEF, ch Cor Pulm- stable  - on lasix 40 qd  - on aldactone 25 qd     4.  Renal cell ca- s/p nephrectcomy on 10/22. Follows with heme/onc and urology.     5. HTN- controlled     6. DL- on statin.    7.  Colon cancer status post colectomy, follows with oncology and general surgery.    8. Recent fall and fracture. Followed by ortho.     The patient is to continue heart healthy diet, weight loss and exercise as tolerated. Patient's medications and side effects were discussed. Medication refills were provided if needed. Follow up appointment timing was discussed. All questions

## 2024-06-17 ENCOUNTER — HOSPITAL ENCOUNTER (OUTPATIENT)
Age: 84
Setting detail: SPECIMEN
Discharge: HOME | End: 2024-06-17

## 2024-06-17 LAB
ANION GAP SERPL CALCULATED.3IONS-SCNC: 16 MMOL/L (ref 9–17)
BUN SERPL-MCNC: 49 MG/DL (ref 8–23)
BUN/CREAT SERPL: 38 (ref 9–20)
CALCIUM SERPL-MCNC: 9.4 MG/DL (ref 8.6–10.4)
CHLORIDE SERPL-SCNC: 102 MMOL/L (ref 98–107)
CO2 SERPL-SCNC: 20 MMOL/L (ref 20–31)
CREAT SERPL-MCNC: 1.3 MG/DL (ref 0.5–0.9)
GFR, ESTIMATED: 41 ML/MIN/1.73M2
GLUCOSE SERPL-MCNC: 95 MG/DL (ref 70–99)
POTASSIUM SERPL-SCNC: 4.4 MMOL/L (ref 3.7–5.3)
SODIUM SERPL-SCNC: 138 MMOL/L (ref 135–144)

## 2024-06-17 PROCEDURE — 80048 BASIC METABOLIC PNL TOTAL CA: CPT

## 2024-06-17 PROCEDURE — 36415 COLL VENOUS BLD VENIPUNCTURE: CPT

## 2024-06-18 ENCOUNTER — HOSPITAL ENCOUNTER (OUTPATIENT)
Dept: GENERAL RADIOLOGY | Age: 84
Discharge: HOME | End: 2024-06-20
Attending: ORTHOPAEDIC SURGERY
Payer: MEDICARE

## 2024-06-18 ENCOUNTER — OFFICE VISIT (OUTPATIENT)
Dept: ORTHOPEDIC SURGERY | Age: 84
End: 2024-06-18
Attending: ORTHOPAEDIC SURGERY
Payer: MEDICARE

## 2024-06-18 ENCOUNTER — HOSPITAL ENCOUNTER (OUTPATIENT)
Age: 84
Setting detail: SPECIMEN
Discharge: HOME | End: 2024-06-18

## 2024-06-18 VITALS
OXYGEN SATURATION: 96 % | BODY MASS INDEX: 45.99 KG/M2 | HEART RATE: 107 BPM | DIASTOLIC BLOOD PRESSURE: 60 MMHG | WEIGHT: 293 LBS | SYSTOLIC BLOOD PRESSURE: 130 MMHG | HEIGHT: 67 IN

## 2024-06-18 DIAGNOSIS — Z96.649 PERIPROSTHETIC FRACTURE OF PROXIMAL END OF FEMUR: Primary | ICD-10-CM

## 2024-06-18 DIAGNOSIS — M97.8XXA PERIPROSTHETIC FRACTURE OF PROXIMAL END OF FEMUR: Primary | ICD-10-CM

## 2024-06-18 DIAGNOSIS — S72.8X2A OTHER FRACTURE OF LEFT FEMUR, INITIAL ENCOUNTER FOR CLOSED FRACTURE (HCC): ICD-10-CM

## 2024-06-18 PROCEDURE — 73552 X-RAY EXAM OF FEMUR 2/>: CPT

## 2024-06-18 PROCEDURE — 87086 URINE CULTURE/COLONY COUNT: CPT

## 2024-06-18 PROCEDURE — 81003 URINALYSIS AUTO W/O SCOPE: CPT

## 2024-06-18 NOTE — PROGRESS NOTES
study. THORACIC/LUMBAR SPINE BONES/ALIGNMENT: There is mild vertebral body height loss of superior endplate of the T3, which is age indeterminate.  The alignment of the thoracic spine appears near anatomic.  There is redemonstration of compression deformity of the L1 vertebral body, appearing similar to the prior study.  There is redemonstration of grade 1 anterolisthesis of L4 on L5, favored to be degenerative in origin. DEGENERATIVE CHANGES:  Mild-to-moderate degenerative disc disease affects the lower thoracic spine.Multilevel degenerative disc disease affects the lumbar spine most notably affecting L5-S1 level.  Multilevel facet joint osteoarthritis affects the lumbar spine most notably affecting L4-L5 level. SOFT TISSUES: No paraspinal mass is seen.     Components of left total hip arthroplasty situated in near anatomic alignment.  Questionable minimal cortical irregularity of the lateral cortex of the proximal femur along the stem of the femoral component (series 605, image 93) can conceivably represent nondisplaced periprosthetic fracture in the correct clinical setting. Mild vertebral body height loss of the superior endplate of the T3, age indeterminate.  Correlation with point of tenderness is recommended No specific imaging features of acute intrathoracic, intra-abdominal sequelae of trauma seen. Degenerative arthritic changes affecting the thoracolumbar spine. Other chronic findings as above.     CT LUMBAR SPINE BONY RECONSTRUCTION    Result Date: 5/22/2024  EXAMINATION: CT OF THE CHEST, ABDOMEN, AND PELVIS WITH CONTRAST; CT OF THE LUMBAR SPINE WITHOUT CONTRAST; CT OF THE THORACIC SPINE WITHOUT CONTRAST 5/19/2024 1:23 pm TECHNIQUE: CT of the chest, abdomen and pelvis was performed with the administration of intravenous contrast. Multiplanar reformatted images are provided for review. Automated exposure control, iterative reconstruction, and/or weight based adjustment of the mA/kV was utilized to reduce

## 2024-06-19 DIAGNOSIS — S79.912A HIP INJURY, LEFT, INITIAL ENCOUNTER: ICD-10-CM

## 2024-06-19 LAB
BILIRUB UR QL STRIP: NEGATIVE
CLARITY UR: ABNORMAL
COLOR UR: YELLOW
GLUCOSE UR STRIP-MCNC: NEGATIVE MG/DL
HGB UR QL STRIP.AUTO: ABNORMAL
KETONES UR STRIP-MCNC: NEGATIVE MG/DL
LEUKOCYTE ESTERASE UR QL STRIP: ABNORMAL
NITRITE UR QL STRIP: POSITIVE
PH UR STRIP: 6 [PH] (ref 5–6)
PROT UR STRIP-MCNC: NEGATIVE MG/DL
SP GR UR STRIP: 1.01 (ref 1.01–1.02)
UROBILINOGEN UR STRIP-ACNC: NORMAL EU/DL (ref 0–1)

## 2024-06-19 RX ORDER — OXYCODONE AND ACETAMINOPHEN 10; 325 MG/1; MG/1
1 TABLET ORAL EVERY 6 HOURS
Qty: 120 TABLET | Refills: 0 | Status: ON HOLD | OUTPATIENT
Start: 2024-06-19 | End: 2024-07-19

## 2024-06-20 ENCOUNTER — APPOINTMENT (OUTPATIENT)
Dept: CT IMAGING | Age: 84
DRG: 378 | End: 2024-06-20
Payer: MEDICARE

## 2024-06-20 ENCOUNTER — HOSPITAL ENCOUNTER (INPATIENT)
Age: 84
LOS: 4 days | Discharge: NURSING, REHAB OR LONG-TERM CARE | DRG: 378 | End: 2024-06-25
Attending: EMERGENCY MEDICINE | Admitting: INTERNAL MEDICINE
Payer: MEDICARE

## 2024-06-20 DIAGNOSIS — K92.2 GASTROINTESTINAL HEMORRHAGE, UNSPECIFIED GASTROINTESTINAL HEMORRHAGE TYPE: Primary | ICD-10-CM

## 2024-06-20 DIAGNOSIS — S79.912A HIP INJURY, LEFT, INITIAL ENCOUNTER: ICD-10-CM

## 2024-06-20 DIAGNOSIS — F41.9 ANXIETY: ICD-10-CM

## 2024-06-20 LAB
ANION GAP SERPL CALCULATED.3IONS-SCNC: 14 MMOL/L (ref 9–17)
BASOPHILS # BLD: 0.04 K/UL (ref 0–0.2)
BASOPHILS NFR BLD: 0 % (ref 0–2)
BUN SERPL-MCNC: 47 MG/DL (ref 8–23)
BUN/CREAT SERPL: 39 (ref 9–20)
CALCIUM SERPL-MCNC: 9.2 MG/DL (ref 8.6–10.4)
CHLORIDE SERPL-SCNC: 106 MMOL/L (ref 98–107)
CO2 SERPL-SCNC: 20 MMOL/L (ref 20–31)
CREAT SERPL-MCNC: 1.2 MG/DL (ref 0.5–0.9)
EOSINOPHIL # BLD: 0.2 K/UL (ref 0–0.44)
EOSINOPHILS RELATIVE PERCENT: 2 % (ref 1–4)
ERYTHROCYTE [DISTWIDTH] IN BLOOD BY AUTOMATED COUNT: 13.8 % (ref 11.8–14.4)
GFR, ESTIMATED: 45 ML/MIN/1.73M2
GLUCOSE SERPL-MCNC: 121 MG/DL (ref 70–99)
HCT VFR BLD AUTO: 32.4 % (ref 36.3–47.1)
HGB BLD-MCNC: 10.7 G/DL (ref 11.9–15.1)
IMM GRANULOCYTES # BLD AUTO: 0.05 K/UL (ref 0–0.3)
IMM GRANULOCYTES NFR BLD: 1 %
LYMPHOCYTES NFR BLD: 1.27 K/UL (ref 1.1–3.7)
LYMPHOCYTES RELATIVE PERCENT: 14 % (ref 24–43)
MCH RBC QN AUTO: 31.3 PG (ref 25.2–33.5)
MCHC RBC AUTO-ENTMCNC: 33 G/DL (ref 25.2–33.5)
MCV RBC AUTO: 94.7 FL (ref 82.6–102.9)
MICROORGANISM SPEC CULT: NORMAL
MONOCYTES NFR BLD: 1.11 K/UL (ref 0.1–1.2)
MONOCYTES NFR BLD: 12 % (ref 3–12)
NEUTROPHILS NFR BLD: 71 % (ref 36–65)
NEUTS SEG NFR BLD: 6.58 K/UL (ref 1.5–8.1)
NRBC BLD-RTO: 0 PER 100 WBC
PLATELET # BLD AUTO: 236 K/UL (ref 138–453)
PMV BLD AUTO: 11.5 FL (ref 8.1–13.5)
POTASSIUM SERPL-SCNC: 3.8 MMOL/L (ref 3.7–5.3)
RBC # BLD AUTO: 3.42 M/UL (ref 3.95–5.11)
SODIUM SERPL-SCNC: 140 MMOL/L (ref 135–144)
SPECIMEN DESCRIPTION: NORMAL
WBC OTHER # BLD: 9.3 K/UL (ref 3.5–11.3)

## 2024-06-20 PROCEDURE — 74176 CT ABD & PELVIS W/O CONTRAST: CPT

## 2024-06-20 PROCEDURE — 85025 COMPLETE CBC W/AUTO DIFF WBC: CPT

## 2024-06-20 PROCEDURE — 6370000000 HC RX 637 (ALT 250 FOR IP): Performed by: EMERGENCY MEDICINE

## 2024-06-20 PROCEDURE — 80048 BASIC METABOLIC PNL TOTAL CA: CPT

## 2024-06-20 PROCEDURE — 99285 EMERGENCY DEPT VISIT HI MDM: CPT

## 2024-06-20 RX ORDER — ONDANSETRON 4 MG/1
4 TABLET, ORALLY DISINTEGRATING ORAL ONCE
Status: COMPLETED | OUTPATIENT
Start: 2024-06-20 | End: 2024-06-20

## 2024-06-20 RX ADMIN — ONDANSETRON 4 MG: 4 TABLET, ORALLY DISINTEGRATING ORAL at 22:00

## 2024-06-20 ASSESSMENT — PAIN - FUNCTIONAL ASSESSMENT: PAIN_FUNCTIONAL_ASSESSMENT: NONE - DENIES PAIN

## 2024-06-21 PROBLEM — I27.20 PULMONARY HYPERTENSION (HCC): Status: ACTIVE | Noted: 2024-06-21

## 2024-06-21 PROBLEM — G89.18 ACUTE POST-OPERATIVE PAIN: Status: RESOLVED | Noted: 2022-10-09 | Resolved: 2024-06-21

## 2024-06-21 PROBLEM — E66.01 CLASS 3 SEVERE OBESITY DUE TO EXCESS CALORIES WITH SERIOUS COMORBIDITY AND BODY MASS INDEX (BMI) OF 45.0 TO 49.9 IN ADULT (HCC): Status: ACTIVE | Noted: 2024-06-21

## 2024-06-21 PROBLEM — N12 PYELONEPHRITIS OF LEFT KIDNEY: Status: ACTIVE | Noted: 2024-06-21

## 2024-06-21 PROBLEM — N17.9 AKI (ACUTE KIDNEY INJURY) (HCC): Status: RESOLVED | Noted: 2022-10-09 | Resolved: 2024-06-21

## 2024-06-21 PROBLEM — E66.812 CLASS 2 SEVERE OBESITY WITH SERIOUS COMORBIDITY AND BODY MASS INDEX (BMI) OF 37.0 TO 37.9 IN ADULT: Status: RESOLVED | Noted: 2021-04-26 | Resolved: 2024-06-21

## 2024-06-21 PROBLEM — E66.813 CLASS 3 SEVERE OBESITY DUE TO EXCESS CALORIES WITH SERIOUS COMORBIDITY AND BODY MASS INDEX (BMI) OF 45.0 TO 49.9 IN ADULT: Status: ACTIVE | Noted: 2024-06-21

## 2024-06-21 PROBLEM — R79.1 SUPRATHERAPEUTIC INR: Status: ACTIVE | Noted: 2024-06-21

## 2024-06-21 PROBLEM — E66.01 CLASS 2 SEVERE OBESITY WITH SERIOUS COMORBIDITY AND BODY MASS INDEX (BMI) OF 37.0 TO 37.9 IN ADULT (HCC): Status: RESOLVED | Noted: 2021-04-26 | Resolved: 2024-06-21

## 2024-06-21 PROBLEM — K92.2 GI BLEED: Status: ACTIVE | Noted: 2024-06-21

## 2024-06-21 LAB
AMORPH SED URNS QL MICRO: ABNORMAL
ANION GAP SERPL CALCULATED.3IONS-SCNC: 13 MMOL/L (ref 9–17)
BACTERIA URNS QL MICRO: ABNORMAL
BASOPHILS # BLD: <0.03 K/UL (ref 0–0.2)
BASOPHILS NFR BLD: 0 % (ref 0–2)
BILIRUB UR QL STRIP: NEGATIVE
BUN SERPL-MCNC: 47 MG/DL (ref 8–23)
BUN/CREAT SERPL: 43 (ref 9–20)
CALCIUM SERPL-MCNC: 8.8 MG/DL (ref 8.6–10.4)
CHARACTER UR: ABNORMAL
CHLORIDE SERPL-SCNC: 109 MMOL/L (ref 98–107)
CLARITY UR: ABNORMAL
CO2 SERPL-SCNC: 21 MMOL/L (ref 20–31)
COLOR UR: YELLOW
CREAT SERPL-MCNC: 1.1 MG/DL (ref 0.5–0.9)
EOSINOPHIL # BLD: 0.1 K/UL (ref 0–0.44)
EOSINOPHILS RELATIVE PERCENT: 1 % (ref 1–4)
EPI CELLS #/AREA URNS HPF: ABNORMAL /HPF (ref 0–5)
ERYTHROCYTE [DISTWIDTH] IN BLOOD BY AUTOMATED COUNT: 13.8 % (ref 11.8–14.4)
GFR, ESTIMATED: 50 ML/MIN/1.73M2
GLUCOSE SERPL-MCNC: 108 MG/DL (ref 70–99)
GLUCOSE UR STRIP-MCNC: NEGATIVE MG/DL
HCT VFR BLD AUTO: 29.6 % (ref 36.3–47.1)
HCT VFR BLD AUTO: 29.9 % (ref 36.3–47.1)
HCT VFR BLD AUTO: 30.9 % (ref 36.3–47.1)
HGB BLD-MCNC: 10 G/DL (ref 11.9–15.1)
HGB BLD-MCNC: 9.5 G/DL (ref 11.9–15.1)
HGB BLD-MCNC: 9.6 G/DL (ref 11.9–15.1)
HGB UR QL STRIP.AUTO: ABNORMAL
IMM GRANULOCYTES # BLD AUTO: 0.05 K/UL (ref 0–0.3)
IMM GRANULOCYTES NFR BLD: 1 %
INR PPP: 3.3
KETONES UR STRIP-MCNC: NEGATIVE MG/DL
LEUKOCYTE ESTERASE UR QL STRIP: ABNORMAL
LYMPHOCYTES NFR BLD: 0.97 K/UL (ref 1.1–3.7)
LYMPHOCYTES RELATIVE PERCENT: 13 % (ref 24–43)
MCH RBC QN AUTO: 31.3 PG (ref 25.2–33.5)
MCHC RBC AUTO-ENTMCNC: 32.4 G/DL (ref 25.2–33.5)
MCV RBC AUTO: 96.9 FL (ref 82.6–102.9)
MONOCYTES NFR BLD: 0.72 K/UL (ref 0.1–1.2)
MONOCYTES NFR BLD: 9 % (ref 3–12)
NEUTROPHILS NFR BLD: 76 % (ref 36–65)
NEUTS SEG NFR BLD: 5.84 K/UL (ref 1.5–8.1)
NITRITE UR QL STRIP: NEGATIVE
NRBC BLD-RTO: 0 PER 100 WBC
PH UR STRIP: 6 [PH] (ref 5–6)
PLATELET # BLD AUTO: 200 K/UL (ref 138–453)
PMV BLD AUTO: 11.9 FL (ref 8.1–13.5)
POTASSIUM SERPL-SCNC: 4.1 MMOL/L (ref 3.7–5.3)
PROT UR STRIP-MCNC: NEGATIVE MG/DL
PROTHROMBIN TIME: 31.5 SEC (ref 11.5–14.2)
RBC # BLD AUTO: 3.19 M/UL (ref 3.95–5.11)
RBC #/AREA URNS HPF: ABNORMAL /HPF (ref 0–4)
SODIUM SERPL-SCNC: 143 MMOL/L (ref 135–144)
SP GR UR STRIP: 1.01 (ref 1.01–1.02)
UROBILINOGEN UR STRIP-ACNC: NORMAL EU/DL (ref 0–1)
WBC #/AREA URNS HPF: ABNORMAL /HPF (ref 0–4)
WBC OTHER # BLD: 7.7 K/UL (ref 3.5–11.3)

## 2024-06-21 PROCEDURE — 6370000000 HC RX 637 (ALT 250 FOR IP): Performed by: NURSE PRACTITIONER

## 2024-06-21 PROCEDURE — 81001 URINALYSIS AUTO W/SCOPE: CPT

## 2024-06-21 PROCEDURE — 99222 1ST HOSP IP/OBS MODERATE 55: CPT | Performed by: SURGERY

## 2024-06-21 PROCEDURE — 97530 THERAPEUTIC ACTIVITIES: CPT

## 2024-06-21 PROCEDURE — 94760 N-INVAS EAR/PLS OXIMETRY 1: CPT

## 2024-06-21 PROCEDURE — 85025 COMPLETE CBC W/AUTO DIFF WBC: CPT

## 2024-06-21 PROCEDURE — 80048 BASIC METABOLIC PNL TOTAL CA: CPT

## 2024-06-21 PROCEDURE — 85018 HEMOGLOBIN: CPT

## 2024-06-21 PROCEDURE — 51702 INSERT TEMP BLADDER CATH: CPT

## 2024-06-21 PROCEDURE — 2580000003 HC RX 258: Performed by: NURSE PRACTITIONER

## 2024-06-21 PROCEDURE — 85610 PROTHROMBIN TIME: CPT

## 2024-06-21 PROCEDURE — 85014 HEMATOCRIT: CPT

## 2024-06-21 PROCEDURE — 97162 PT EVAL MOD COMPLEX 30 MIN: CPT

## 2024-06-21 PROCEDURE — 99222 1ST HOSP IP/OBS MODERATE 55: CPT | Performed by: INTERNAL MEDICINE

## 2024-06-21 PROCEDURE — 87086 URINE CULTURE/COLONY COUNT: CPT

## 2024-06-21 PROCEDURE — 2000000000 HC ICU R&B

## 2024-06-21 PROCEDURE — 6360000002 HC RX W HCPCS: Performed by: NURSE PRACTITIONER

## 2024-06-21 PROCEDURE — APPSS45 APP SPLIT SHARED TIME 31-45 MINUTES: Performed by: NURSE PRACTITIONER

## 2024-06-21 PROCEDURE — 2060000000 HC ICU INTERMEDIATE R&B

## 2024-06-21 PROCEDURE — 51701 INSERT BLADDER CATHETER: CPT

## 2024-06-21 PROCEDURE — 36415 COLL VENOUS BLD VENIPUNCTURE: CPT

## 2024-06-21 PROCEDURE — C9113 INJ PANTOPRAZOLE SODIUM, VIA: HCPCS | Performed by: NURSE PRACTITIONER

## 2024-06-21 PROCEDURE — 51798 US URINE CAPACITY MEASURE: CPT

## 2024-06-21 RX ORDER — POLYETHYLENE GLYCOL 3350 17 G/17G
17 POWDER, FOR SOLUTION ORAL DAILY PRN
Status: DISCONTINUED | OUTPATIENT
Start: 2024-06-21 | End: 2024-06-25 | Stop reason: HOSPADM

## 2024-06-21 RX ORDER — LEVOTHYROXINE SODIUM 0.03 MG/1
50 TABLET ORAL DAILY
Status: DISCONTINUED | OUTPATIENT
Start: 2024-06-21 | End: 2024-06-25 | Stop reason: HOSPADM

## 2024-06-21 RX ORDER — AMLODIPINE BESYLATE 5 MG/1
10 TABLET ORAL DAILY
Status: DISCONTINUED | OUTPATIENT
Start: 2024-06-21 | End: 2024-06-25 | Stop reason: HOSPADM

## 2024-06-21 RX ORDER — CIPROFLOXACIN 250 MG/1
250 TABLET, FILM COATED ORAL 2 TIMES DAILY
Status: ON HOLD | COMMUNITY
End: 2024-06-25 | Stop reason: HOSPADM

## 2024-06-21 RX ORDER — SODIUM CHLORIDE AND POTASSIUM CHLORIDE 150; 900 MG/100ML; MG/100ML
INJECTION, SOLUTION INTRAVENOUS CONTINUOUS
Status: DISCONTINUED | OUTPATIENT
Start: 2024-06-21 | End: 2024-06-25 | Stop reason: HOSPADM

## 2024-06-21 RX ORDER — POTASSIUM CHLORIDE 20 MEQ/1
40 TABLET, EXTENDED RELEASE ORAL PRN
Status: DISCONTINUED | OUTPATIENT
Start: 2024-06-21 | End: 2024-06-25 | Stop reason: HOSPADM

## 2024-06-21 RX ORDER — MAGNESIUM SULFATE IN WATER 40 MG/ML
2000 INJECTION, SOLUTION INTRAVENOUS PRN
Status: DISCONTINUED | OUTPATIENT
Start: 2024-06-21 | End: 2024-06-25 | Stop reason: HOSPADM

## 2024-06-21 RX ORDER — DOCUSATE SODIUM 100 MG/1
100 CAPSULE, LIQUID FILLED ORAL DAILY
Status: DISCONTINUED | OUTPATIENT
Start: 2024-06-21 | End: 2024-06-25 | Stop reason: HOSPADM

## 2024-06-21 RX ORDER — SODIUM CHLORIDE 0.9 % (FLUSH) 0.9 %
5-40 SYRINGE (ML) INJECTION PRN
Status: DISCONTINUED | OUTPATIENT
Start: 2024-06-21 | End: 2024-06-25 | Stop reason: HOSPADM

## 2024-06-21 RX ORDER — BETHANECHOL CHLORIDE 25 MG/1
25 TABLET ORAL 4 TIMES DAILY
Status: DISCONTINUED | OUTPATIENT
Start: 2024-06-21 | End: 2024-06-25 | Stop reason: HOSPADM

## 2024-06-21 RX ORDER — OXYCODONE HYDROCHLORIDE 5 MG/1
5 TABLET ORAL EVERY 6 HOURS
Status: DISCONTINUED | OUTPATIENT
Start: 2024-06-21 | End: 2024-06-21

## 2024-06-21 RX ORDER — SPIRONOLACTONE 25 MG/1
25 TABLET ORAL DAILY
Status: DISCONTINUED | OUTPATIENT
Start: 2024-06-21 | End: 2024-06-25 | Stop reason: HOSPADM

## 2024-06-21 RX ORDER — ALBUTEROL SULFATE 2.5 MG/3ML
2.5 SOLUTION RESPIRATORY (INHALATION) EVERY 6 HOURS PRN
Status: DISCONTINUED | OUTPATIENT
Start: 2024-06-21 | End: 2024-06-25 | Stop reason: HOSPADM

## 2024-06-21 RX ORDER — SODIUM CHLORIDE 9 MG/ML
INJECTION, SOLUTION INTRAVENOUS PRN
Status: DISCONTINUED | OUTPATIENT
Start: 2024-06-21 | End: 2024-06-25 | Stop reason: HOSPADM

## 2024-06-21 RX ORDER — ATORVASTATIN CALCIUM 40 MG/1
40 TABLET, FILM COATED ORAL NIGHTLY
Status: DISCONTINUED | OUTPATIENT
Start: 2024-06-21 | End: 2024-06-25 | Stop reason: HOSPADM

## 2024-06-21 RX ORDER — SODIUM CHLORIDE 0.9 % (FLUSH) 0.9 %
5-40 SYRINGE (ML) INJECTION EVERY 12 HOURS SCHEDULED
Status: DISCONTINUED | OUTPATIENT
Start: 2024-06-21 | End: 2024-06-25 | Stop reason: HOSPADM

## 2024-06-21 RX ORDER — OXYCODONE AND ACETAMINOPHEN 10; 325 MG/1; MG/1
1 TABLET ORAL EVERY 6 HOURS
Status: DISCONTINUED | OUTPATIENT
Start: 2024-06-21 | End: 2024-06-21

## 2024-06-21 RX ORDER — ACETAMINOPHEN 325 MG/1
650 TABLET ORAL EVERY 6 HOURS PRN
Status: DISCONTINUED | OUTPATIENT
Start: 2024-06-21 | End: 2024-06-25 | Stop reason: HOSPADM

## 2024-06-21 RX ORDER — OXYCODONE HYDROCHLORIDE AND ACETAMINOPHEN 5; 325 MG/1; MG/1
1 TABLET ORAL EVERY 6 HOURS PRN
Status: DISCONTINUED | OUTPATIENT
Start: 2024-06-21 | End: 2024-06-25 | Stop reason: HOSPADM

## 2024-06-21 RX ORDER — ACETAMINOPHEN 650 MG/1
650 SUPPOSITORY RECTAL EVERY 6 HOURS PRN
Status: DISCONTINUED | OUTPATIENT
Start: 2024-06-21 | End: 2024-06-25 | Stop reason: HOSPADM

## 2024-06-21 RX ORDER — ONDANSETRON 4 MG/1
4 TABLET, ORALLY DISINTEGRATING ORAL EVERY 8 HOURS PRN
Status: DISCONTINUED | OUTPATIENT
Start: 2024-06-21 | End: 2024-06-25 | Stop reason: HOSPADM

## 2024-06-21 RX ORDER — WARFARIN SODIUM 2 MG/1
2 TABLET ORAL
Status: ON HOLD | COMMUNITY
End: 2024-06-25 | Stop reason: HOSPADM

## 2024-06-21 RX ORDER — OXYCODONE HYDROCHLORIDE AND ACETAMINOPHEN 5; 325 MG/1; MG/1
1 TABLET ORAL EVERY 6 HOURS
Status: DISCONTINUED | OUTPATIENT
Start: 2024-06-21 | End: 2024-06-21

## 2024-06-21 RX ORDER — FUROSEMIDE 40 MG/1
40 TABLET ORAL DAILY
Status: DISCONTINUED | OUTPATIENT
Start: 2024-06-21 | End: 2024-06-25 | Stop reason: HOSPADM

## 2024-06-21 RX ORDER — POTASSIUM CHLORIDE 7.45 MG/ML
10 INJECTION INTRAVENOUS PRN
Status: DISCONTINUED | OUTPATIENT
Start: 2024-06-21 | End: 2024-06-25 | Stop reason: HOSPADM

## 2024-06-21 RX ORDER — ONDANSETRON 2 MG/ML
4 INJECTION INTRAMUSCULAR; INTRAVENOUS EVERY 6 HOURS PRN
Status: DISCONTINUED | OUTPATIENT
Start: 2024-06-21 | End: 2024-06-25 | Stop reason: HOSPADM

## 2024-06-21 RX ORDER — OXYCODONE HYDROCHLORIDE 5 MG/1
5 TABLET ORAL EVERY 6 HOURS PRN
Status: DISCONTINUED | OUTPATIENT
Start: 2024-06-21 | End: 2024-06-25 | Stop reason: HOSPADM

## 2024-06-21 RX ORDER — WARFARIN SODIUM 3 MG/1
3 TABLET ORAL DAILY
Status: ON HOLD | COMMUNITY
End: 2024-06-25 | Stop reason: HOSPADM

## 2024-06-21 RX ADMIN — FUROSEMIDE 40 MG: 40 TABLET ORAL at 09:36

## 2024-06-21 RX ADMIN — POTASSIUM CHLORIDE AND SODIUM CHLORIDE: 900; 150 INJECTION, SOLUTION INTRAVENOUS at 03:21

## 2024-06-21 RX ADMIN — LEVOTHYROXINE SODIUM 50 MCG: 0.03 TABLET ORAL at 09:36

## 2024-06-21 RX ADMIN — SODIUM CHLORIDE, PRESERVATIVE FREE 40 MG: 5 INJECTION INTRAVENOUS at 09:36

## 2024-06-21 RX ADMIN — ATORVASTATIN CALCIUM 40 MG: 40 TABLET, FILM COATED ORAL at 21:53

## 2024-06-21 RX ADMIN — METOPROLOL TARTRATE 37.5 MG: 25 TABLET, FILM COATED ORAL at 09:36

## 2024-06-21 RX ADMIN — POTASSIUM CHLORIDE AND SODIUM CHLORIDE: 900; 150 INJECTION, SOLUTION INTRAVENOUS at 15:54

## 2024-06-21 RX ADMIN — CEFTRIAXONE 1000 MG: 1 INJECTION, POWDER, FOR SOLUTION INTRAMUSCULAR; INTRAVENOUS at 03:24

## 2024-06-21 RX ADMIN — SPIRONOLACTONE 25 MG: 25 TABLET ORAL at 09:36

## 2024-06-21 RX ADMIN — BETHANECHOL CHLORIDE 25 MG: 25 TABLET ORAL at 09:36

## 2024-06-21 RX ADMIN — BETHANECHOL CHLORIDE 25 MG: 25 TABLET ORAL at 17:03

## 2024-06-21 RX ADMIN — METOPROLOL TARTRATE 37.5 MG: 25 TABLET, FILM COATED ORAL at 21:52

## 2024-06-21 RX ADMIN — BETHANECHOL CHLORIDE 25 MG: 25 TABLET ORAL at 21:53

## 2024-06-21 NOTE — PROGRESS NOTES
rounding on PCU    Assessment: Patient is resting in bed with daughter, granddaughter nad great-granddaughter present for support.    Intervention: Engaged in conversation. Patient expressed appreciation for visit and offer of continued prayer.    Plan: Chaplains are available on site or on call 24/7 for spiritual and emotional support.

## 2024-06-21 NOTE — H&P
cholecystectomy---               open---c. 1960s, right varicose veins--c. 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 I reviewed and agree with the findings and plan as documented in her note .    Electronically signed by Dimas Arrieta MD on 6/21/24 at 12:06 PM EDT     PLAN:    1. GI bleed -- this morning she is passing red clots per rectum -- NPO, IV hydration, general surgery consulted, coumadin held, monitor Hgb/Hct and transfuse prn, PPI  2. Left pyelonephritis -- Bladder scan and straight cath prn retention >400 ml -- urinalysis ordered, IV Rocephin  3. Supratherapeutic INR -- holding coumadin, daily INR, consider vitamin K if not improving, plan for pharmacy dosing once cleared by general surgery  4. CKD stage 3 -- stable -- Monitor I&O, renal function, and fluid balance. Avoid nephrotoxins when possible.  5. Home cpap at bedtime/rest  6. PT/OT eval and treat -- continue NWB LLE  7. Home medications reviewed  8. DVT prophylaxis -- SCDs  9. See orders     Note: [facility] contacted to request clarification on left versus right kidney findings --> ADDENDUM: Sidedness error on initial dictation: Moderate hydronephrosis of the LEFT kidney with mild perinephric fat stranding. Moderate ureterectasis of the left ureter. The distal left ureter near the vesicoureteral junction is poorly visualized secondary to  streak artifact from left hip arthroplasty.     Note that over 55 minutes was spent in reviewing and obtaining history, physical examination and evaluation of the patient, placing orders, providing education, coordinating care, reviewing test results, documenting in the medical record, and discussion/communicating with patient/caregiver/family.    Electronically signed by XIOMARA Scherer - CNP, NP-C, FNP-BC on 6/21/2024 at 6:00 AM  Hospitalist/Nocturnist

## 2024-06-21 NOTE — PROGRESS NOTES
06/21/24 1149   Encounter Summary   Encounter Overview/Reason Initial Encounter   Service Provided For Patient and family together   Referral/Consult From Rounding   Support System Children;Family members   Last Encounter  06/21/24   Complexity of Encounter Low   Begin Time 1144   End Time  1151   Total Time Calculated 7 min   Assessment/Intervention/Outcome   Assessment Calm   Intervention Active listening   Outcome Engaged in conversation;Expressed Gratitude

## 2024-06-21 NOTE — CARE COORDINATION
Case Management Assessment  Initial Evaluation    Date/Time of Evaluation: 6/21/2024 9:15 AM  Assessment Completed by: Gita Rosas RN    If patient is discharged prior to next notation, then this note serves as note for discharge by case management.    Patient Name: Yvette Das                   YOB: 1940  Diagnosis: GI bleed [K92.2]  Gastrointestinal hemorrhage, unspecified gastrointestinal hemorrhage type [K92.2]                   Date / Time: 6/20/2024  9:02 PM    Patient Admission Status: Inpatient   Readmission Risk (Low < 19, Mod (19-27), High > 27): Readmission Risk Score: 19.4    Current PCP: Emy Trevino, DO  PCP verified by CM? Yes    Chart Reviewed: Yes      History Provided by: Patient  Patient Orientation: Alert and Oriented    Patient Cognition: Alert    Hospitalization in the last 30 days (Readmission):  No    If yes, Readmission Assessment in CM Navigator will be completed.    Advance Directives:      Code Status: Full Code   Patient's Primary Decision Maker is:      Primary Decision Maker: Mode Das (POA) - Child - [phone]    Secondary Decision Maker: Xiomara Black - Child - [phone]    Supplemental (Other) Decision Maker: Marilyn Ca - Grandchild - [phone]    Discharge Planning:    Patient lives with: Other (Comment) (Currently at [facility]) Type of Home: Skilled Nursing Facility  Primary Care Giver: Self  Patient Support Systems include: Children, Family Members   Current Financial resources: Medicare  Current community resources: None  Current services prior to admission: C-pap            Current DME:              Type of Home Care services:  OT, PT, Nursing Services    ADLS  Prior functional level: Assistance with the following:, Bathing, Dressing, Toileting, Cooking, Housework, Shopping, Mobility  Current functional level: Assistance with the following:, Bathing, Dressing, Toileting, Cooking, Housework, Shopping, Mobility    PT AM-PAC:  with the Discharge Plan?      Gita Rosas RN  Case Management Department

## 2024-06-21 NOTE — CARE COORDINATION
DISCHARGE BARRIERS       Reason for Referral:  SW completed a Psychosocial Assessment for evaluation of patient's mental health, social status, and functional capacity within the community. Ananth Garcia is a 83 y.o. female admitted due to GI bleed. Patient alone. SW provided supportive listening while patient discussed past medical history and events leading up to hospitalization.       Mental Status:  Alert, oriented, and engaging during assessment.     Decision Making:  Makes own decisions.     Family/Social/Home Environment: lives in an assisted living facility. Patient states she was living with her granddaughter.    Employment status: Retired     Health Insurance:     Active Insurance as of 6/20/2024       Primary Coverage       Payor Plan Insurance Group Employer/Plan Group    MEDICARE MEDICARE PART A AND B        Payor Address Payor Phone Number Payor Fax Number Effective Dates    [address] [address] [phone]  10/1/2004 - None Entered    [facility] [id number]         Subscriber Name Subscriber Birth Date Member ID       ANANTH GARCIA 1940 [id number]               Secondary Coverage       Payor Plan Insurance Group Employer/Plan Group    UNITED [employer] AARP HEALTH CARE MEDICARE SUPP PLAN C       Payor Plan Address Payor Plan Phone Number Payor Plan Fax Number Effective Dates    [address] [phone]  1/1/2016 - None Entered    [facility] [id number]         Subscriber Name Subscriber Birth Date Member ID       ANANTH GARCIA 1940 [id number]                     Support: Discussed a good social support network     Current Services:  [facility]       Current DMEs:  CPAP walker      PCP: Emy Trevino, DO and repots no issues affording medication.      status:   No     ADLs and means of transportation: Assistance with the following:, Bathing, Dressing, Toileting, Cooking, Housework, Shopping, Mobility in ADLs prior to hospitalization and unable to transport

## 2024-06-21 NOTE — ED PROVIDER NOTES
eMERGENCY dEPARTMENT eNCOUnter      Pt Name: Yvette Das  MRN: [id number]  Birthdate 1940  Date of evaluation: 6/20/2024      CHIEF COMPLAINT       Chief Complaint   Patient presents with    Rectal Bleeding         HISTORY OF PRESENT ILLNESS    Yvette Das is a 83 y.o. female who presents with rectal bleed eating.  Patient states she feels a little nauseated otherwise she has no other complaints she is currently in nursing home for rehab secondary to a fracture she is on Coumadin sure INR was 3.5 this morning she was recently diagnosed with a UTI and was placed on Cipro and they thought that was causing the increase in her INR however while she was there since 2 PM she has had several episodes of what she thought was loose stools per her report they were anywhere from bright red to maroon in coloration.  She denies any abdominal pain  Patient has a history of prior cancer many years ago was first diagnosed via bloody stools        REVIEW OF SYSTEMS       Review of systems are all reviewed and negative except stated above in the HPI    PAST MEDICAL HISTORY    has a past medical history of A-fib (HCC), Acute blood loss as cause of postoperative anemia, Acute post-operative pain, Anxiety, CAD (coronary artery disease), CHF (congestive heart failure) (HCC), Class 2 severe obesity with serious comorbidity and body mass index (BMI) of 37.0 to 37.9 in adult (HCC), Closed fracture of proximal end of left humerus with routine healing, Colitis, Colitis due to Clostridium difficile, Depression, Diarrhea, Dyslipidemia, Elevated fasting glucose, FH: total abdominal hysterectomy and bilateral salpingo-oophorectomy, Fractures, Full dentures, Glucose intolerance (impaired glucose tolerance), History of blood transfusion, Hyperlipidemia, Hypertension, Hypokalemia, Malignant neoplasm of sigmoid colon (HCC), Multiple closed fractures of ribs, Obesity, JAYJAY on CPAP, Osteoarthritis, Osteoporosis, Other complete intestinal

## 2024-06-21 NOTE — PLAN OF CARE
Problem: Discharge Planning  Goal: Discharge to home or other facility with appropriate resources  6/21/2024 0727 by Shock, Rylee, RN  Outcome: Progressing  6/21/2024 0407 by Elena Sanchez RN  Outcome: Progressing     Problem: Skin/Tissue Integrity  Goal: Absence of new skin breakdown  Description: 1.  Monitor for areas of redness and/or skin breakdown  2.  Assess vascular access sites hourly  3.  Every 4-6 hours minimum:  Change oxygen saturation probe site  4.  Every 4-6 hours:  If on nasal continuous positive airway pressure, respiratory therapy assess nares and determine need for appliance change or resting period.  6/21/2024 0727 by Shock, Rylee, RN  Outcome: Progressing  6/21/2024 0407 by Elena Sanchez RN  Outcome: Progressing     Problem: Safety - Adult  Goal: Free from fall injury  6/21/2024 0727 by Shock, Rylee, RN  Outcome: Progressing  6/21/2024 0407 by Elena Sanchez RN  Outcome: Progressing     Problem: ABCDS Injury Assessment  Goal: Absence of physical injury  6/21/2024 0727 by Shock, Rylee, RN  Outcome: Progressing  6/21/2024 0407 by Elena Sanchez RN  Outcome: Progressing     Problem: Skin/Tissue Integrity - Adult  Goal: Skin integrity remains intact  6/21/2024 0727 by Shock, Rylee, RN  Outcome: Progressing  6/21/2024 0407 by Elena Sanchez RN  Outcome: Progressing     Problem: Musculoskeletal - Adult  Goal: Return mobility to safest level of function  6/21/2024 0727 by Shock, Rylee, RN  Outcome: Progressing  6/21/2024 0407 by Elena Sanchez RN  Outcome: Progressing     Problem: Gastrointestinal - Adult  Goal: Maintains or returns to baseline bowel function  6/21/2024 0727 by Shock, Rylee, RN  Outcome: Progressing  6/21/2024 0407 by Elena Sanchez RN  Outcome: Progressing     Problem: Genitourinary - Adult  Goal: Absence of urinary retention  6/21/2024 0727 by Shock, Rylee, RN  Outcome: Progressing  6/21/2024 0407 by Elena Sanchez RN  Outcome: Progressing     Problem: Hematologic - Adult  Goal:

## 2024-06-21 NOTE — PROGRESS NOTES
Physical Therapy  Facility/Department: [facility]  PROGRESSIVE C[facility]  Physical Therapy Initial Assessment    Name: Yvette Das  : 1940  MRN: [id number]  Date of Service: 2024    Discharge Recommendations:  Subacute/Skilled Nursing Facility          Patient Diagnosis(es): The encounter diagnosis was Gastrointestinal hemorrhage, unspecified gastrointestinal hemorrhage type.  Past Medical History:  has a past medical history of A-fib (HCC), Acute blood loss as cause of postoperative anemia, Acute post-operative pain, MICHAEL (acute kidney injury) (HCC), Anxiety, CAD (coronary artery disease), CHF (congestive heart failure) (HCC), Class 2 severe obesity with serious comorbidity and body mass index (BMI) of 37.0 to 37.9 in adult (HCC), Closed fracture of proximal end of left humerus with routine healing, Colitis, Colitis due to Clostridium difficile, Depression, Diarrhea, Dyslipidemia, Elevated fasting glucose, FH: total abdominal hysterectomy and bilateral salpingo-oophorectomy, Fractures, Full dentures, Glucose intolerance (impaired glucose tolerance), History of blood transfusion, Hyperlipidemia, Hypertension, Hypokalemia, Malignant neoplasm of sigmoid colon (HCC), Multiple closed fractures of ribs, Obesity, JAYJAY on CPAP, Osteoarthritis, Osteoporosis, Other complete intestinal obstruction (HCC), Other specified hypothyroidism, Prolonged emergence from general anesthesia, Renal cell carcinoma of right kidney (HCC), S/P small bowel resection, Thrombocytopenia, unspecified (HCC), Under care of team, and Wears glasses.  Past Surgical History:  has a past surgical history that includes Total abdominal hysterectomy w/ bilateral salpingoophorectomy (); Cholecystectomy (1960s); Varicose vein surgery (Right, 1960s); Hip Arthroplasty (Left); Knee Arthroplasty (Left); Knee Arthroplasty (Right); Cardiac catheterization (2017); Colonoscopy (N/A, 10/08/2018); pr laparoscopy colectomy partial w/anastomosis (N/A,

## 2024-06-21 NOTE — CONSULTS
General Surgery   Consultation        PATIENT NAME: Yvette Das   YOB: 1940    ADMISSION DATE: 6/20/2024  9:02 PM     Admitting Provider: Berny    Consulted Physician: Jos     TODAY'S DATE: 6/21/2024    Consult Regarding: GI bleeding    HISTORY OF PRESENT ILLNESS:  The patient is a 83 y.o. female  who presented from care facility overnight with complaints of blood per rectum.  Patient has recently been at the [facility] due to left femur fracture which is being managed nonoperatively.  She has been there for medical assistance and therapy.  Per the patient she states that she has been told that the leg is healing though slower than expected.  Seems that yesterday while she was being transferred by staff they noticed that she had some blood in bowel movement.  She had had some diarrhea earlier in the day and then this was noticed and then she had several more episodes of bleeding and came to the ER and was admitted.  Per patient report since admission she has had a couple more bowel movements with blood in them.  Prior to that was not having any issues.  Has not had any recent abdominal pain.  No nausea or emesis.  She does have a personal history of colon cancer and had a sigmoid colectomy in 2018.  Has had colonoscopy since then and according to records her last 1 was February 2023.  She was noted to have moderate sigmoid diverticulosis as well as cervical polyps.  Several of these were tubular adenomas but there was no dysplasia noted.  The patient is on Coumadin for A-fib and reports that for the past couple months \"they have not been able to get it leveled out\".  On admission her INR was supratherapeutic at 3.3.  Does seem that she has recently been on some antibiotics for a urinary tract infection that may have influenced her Coumadin.    Past Medical History:        Diagnosis Date    A-fib ([facility]) 07/19/2017    Acute blood loss as cause of postoperative anemia 11/08/2018    Acute  LABGLOM 50 04/29/2024 08:01 AM    GLUCOSE 108 06/21/2024 05:07 AM     Hepatic Function Panel:    Lab Results   Component Value Date/Time    ALKPHOS 123 05/19/2024 12:15 PM    ALT 12 05/19/2024 12:15 PM    AST 19 05/19/2024 12:15 PM    BILITOT 0.6 05/19/2024 12:15 PM    BILIDIR 0.20 05/31/2019 12:42 PM    IBILI 0.44 05/31/2019 12:42 PM     PT/INR:    Lab Results   Component Value Date/Time    PROTIME 31.5 06/21/2024 05:07 AM    PROTIME 14.8 09/16/2022 10:07 AM    INR 3.3 06/21/2024 05:07 AM     PTT:    Lab Results   Component Value Date/Time    APTT 34.8 09/21/2018 08:18 PM   [APTT}    Pertinent Radiology:  CT abd/pel images reviewed and read noted      ASSESSMENT   Principal Problem:    GI bleed  Active Problems:    Urinary retention    Pyelonephritis of left kidney    Supratherapeutic INR  Resolved Problems:    * No resolved hospital problems. *    GI bleeding-source not clear but likely lower GI source based on the appearance of the blood.    PLAN    At this point I think that she has had a probably a lower GI bleed though the cause of this is not immediately clear.  She does have history of diverticulosis she may have had a bleed from a exposed vessel and diverticulum or hemorrhoid bleed or could be from some other source.  Her supratherapeutic INR is probably at least contributory to this.  I had a discussion with the patient today about the potential workup which would possibly include endoscopic evaluation.  I would like to have her INR improved before any interventions and I suspect that once her INR is down into therapeutic range that probably her bleeding will resolve on its own.  She and her family are very anxious about the source of bleeding due to her history of colon cancer which is understandable.  I have discussed with him that I have very low concern for any sort of colon malignancy just based on the fact that her last colonoscopy was less than 2 years ago.  Will repeat lab work tomorrow and see how

## 2024-06-21 NOTE — PLAN OF CARE
Problem: Discharge Planning  Goal: Discharge to home or other facility with appropriate resources  Outcome: Progressing     Problem: Skin/Tissue Integrity  Goal: Absence of new skin breakdown  Description: 1.  Monitor for areas of redness and/or skin breakdown  2.  Assess vascular access sites hourly  3.  Every 4-6 hours minimum:  Change oxygen saturation probe site  4.  Every 4-6 hours:  If on nasal continuous positive airway pressure, respiratory therapy assess nares and determine need for appliance change or resting period.  Outcome: Progressing     Problem: Safety - Adult  Goal: Free from fall injury  Outcome: Progressing     Problem: ABCDS Injury Assessment  Goal: Absence of physical injury  Outcome: Progressing     Problem: Skin/Tissue Integrity - Adult  Goal: Skin integrity remains intact  Outcome: Progressing     Problem: Musculoskeletal - Adult  Goal: Return mobility to safest level of function  Outcome: Progressing     Problem: Gastrointestinal - Adult  Goal: Maintains or returns to baseline bowel function  Outcome: Progressing     Problem: Genitourinary - Adult  Goal: Absence of urinary retention  Outcome: Progressing     Problem: Hematologic - Adult  Goal: Maintains hematologic stability  Outcome: Progressing

## 2024-06-22 LAB
ANION GAP SERPL CALCULATED.3IONS-SCNC: 11 MMOL/L (ref 9–17)
BASOPHILS # BLD: 0.03 K/UL (ref 0–0.2)
BASOPHILS NFR BLD: 1 % (ref 0–2)
BUN SERPL-MCNC: 33 MG/DL (ref 8–23)
BUN/CREAT SERPL: 37 (ref 9–20)
CALCIUM SERPL-MCNC: 8.3 MG/DL (ref 8.6–10.4)
CHLORIDE SERPL-SCNC: 110 MMOL/L (ref 98–107)
CO2 SERPL-SCNC: 23 MMOL/L (ref 20–31)
CREAT SERPL-MCNC: 0.9 MG/DL (ref 0.5–0.9)
EOSINOPHIL # BLD: 0.16 K/UL (ref 0–0.44)
EOSINOPHILS RELATIVE PERCENT: 3 % (ref 1–4)
ERYTHROCYTE [DISTWIDTH] IN BLOOD BY AUTOMATED COUNT: 13.9 % (ref 11.8–14.4)
GFR, ESTIMATED: 63 ML/MIN/1.73M2
GLUCOSE SERPL-MCNC: 97 MG/DL (ref 70–99)
HCT VFR BLD AUTO: 24.7 % (ref 36.3–47.1)
HCT VFR BLD AUTO: 27.3 % (ref 36.3–47.1)
HCT VFR BLD AUTO: 27.4 % (ref 36.3–47.1)
HCT VFR BLD AUTO: 27.8 % (ref 36.3–47.1)
HGB BLD-MCNC: 7.9 G/DL (ref 11.9–15.1)
HGB BLD-MCNC: 8.9 G/DL (ref 11.9–15.1)
IMM GRANULOCYTES # BLD AUTO: 0.03 K/UL (ref 0–0.3)
IMM GRANULOCYTES NFR BLD: 1 %
INR PPP: 3.3
LYMPHOCYTES NFR BLD: 1.25 K/UL (ref 1.1–3.7)
LYMPHOCYTES RELATIVE PERCENT: 20 % (ref 24–43)
MCH RBC QN AUTO: 31.1 PG (ref 25.2–33.5)
MCHC RBC AUTO-ENTMCNC: 32 G/DL (ref 25.2–33.5)
MCV RBC AUTO: 97.2 FL (ref 82.6–102.9)
MONOCYTES NFR BLD: 0.57 K/UL (ref 0.1–1.2)
MONOCYTES NFR BLD: 9 % (ref 3–12)
NEUTROPHILS NFR BLD: 68 % (ref 36–65)
NEUTS SEG NFR BLD: 4.37 K/UL (ref 1.5–8.1)
NRBC BLD-RTO: 0 PER 100 WBC
PLATELET # BLD AUTO: ABNORMAL K/UL (ref 138–453)
PLATELET, FLUORESCENCE: 136 K/UL (ref 138–453)
PLATELETS.RETICULATED NFR BLD AUTO: 9.9 % (ref 1.1–10.3)
POTASSIUM SERPL-SCNC: 3.8 MMOL/L (ref 3.7–5.3)
PROTHROMBIN TIME: 31.9 SEC (ref 11.5–14.2)
RBC # BLD AUTO: 2.86 M/UL (ref 3.95–5.11)
SODIUM SERPL-SCNC: 144 MMOL/L (ref 135–144)
WBC OTHER # BLD: 6.4 K/UL (ref 3.5–11.3)

## 2024-06-22 PROCEDURE — 80048 BASIC METABOLIC PNL TOTAL CA: CPT

## 2024-06-22 PROCEDURE — 6360000002 HC RX W HCPCS: Performed by: NURSE PRACTITIONER

## 2024-06-22 PROCEDURE — 51702 INSERT TEMP BLADDER CATH: CPT

## 2024-06-22 PROCEDURE — 6370000000 HC RX 637 (ALT 250 FOR IP): Performed by: NURSE PRACTITIONER

## 2024-06-22 PROCEDURE — 94760 N-INVAS EAR/PLS OXIMETRY 1: CPT

## 2024-06-22 PROCEDURE — 85610 PROTHROMBIN TIME: CPT

## 2024-06-22 PROCEDURE — 85018 HEMOGLOBIN: CPT

## 2024-06-22 PROCEDURE — 94660 CPAP INITIATION&MGMT: CPT

## 2024-06-22 PROCEDURE — 85025 COMPLETE CBC W/AUTO DIFF WBC: CPT

## 2024-06-22 PROCEDURE — 2000000000 HC ICU R&B

## 2024-06-22 PROCEDURE — 2580000003 HC RX 258: Performed by: NURSE PRACTITIONER

## 2024-06-22 PROCEDURE — 85014 HEMATOCRIT: CPT

## 2024-06-22 PROCEDURE — 2060000000 HC ICU INTERMEDIATE R&B

## 2024-06-22 PROCEDURE — 36415 COLL VENOUS BLD VENIPUNCTURE: CPT

## 2024-06-22 PROCEDURE — C9113 INJ PANTOPRAZOLE SODIUM, VIA: HCPCS | Performed by: NURSE PRACTITIONER

## 2024-06-22 PROCEDURE — 99232 SBSQ HOSP IP/OBS MODERATE 35: CPT | Performed by: FAMILY MEDICINE

## 2024-06-22 PROCEDURE — 97110 THERAPEUTIC EXERCISES: CPT

## 2024-06-22 PROCEDURE — 99232 SBSQ HOSP IP/OBS MODERATE 35: CPT | Performed by: SURGERY

## 2024-06-22 PROCEDURE — 6360000002 HC RX W HCPCS: Performed by: SURGERY

## 2024-06-22 RX ORDER — PHYTONADIONE 10 MG/ML
5 INJECTION, EMULSION INTRAMUSCULAR; INTRAVENOUS; SUBCUTANEOUS ONCE
Status: COMPLETED | OUTPATIENT
Start: 2024-06-22 | End: 2024-06-22

## 2024-06-22 RX ORDER — ALPRAZOLAM 0.25 MG/1
0.25 TABLET ORAL EVERY 8 HOURS PRN
Status: DISCONTINUED | OUTPATIENT
Start: 2024-06-22 | End: 2024-06-25 | Stop reason: HOSPADM

## 2024-06-22 RX ORDER — PHYTONADIONE 5 MG/1
5 TABLET ORAL ONCE
Status: DISCONTINUED | OUTPATIENT
Start: 2024-06-22 | End: 2024-06-22 | Stop reason: ALTCHOICE

## 2024-06-22 RX ADMIN — SODIUM CHLORIDE, PRESERVATIVE FREE 40 MG: 5 INJECTION INTRAVENOUS at 09:01

## 2024-06-22 RX ADMIN — ATORVASTATIN CALCIUM 40 MG: 40 TABLET, FILM COATED ORAL at 20:03

## 2024-06-22 RX ADMIN — BETHANECHOL CHLORIDE 25 MG: 25 TABLET ORAL at 18:13

## 2024-06-22 RX ADMIN — ALPRAZOLAM 0.25 MG: 0.25 TABLET ORAL at 23:37

## 2024-06-22 RX ADMIN — BETHANECHOL CHLORIDE 25 MG: 25 TABLET ORAL at 15:26

## 2024-06-22 RX ADMIN — LEVOTHYROXINE SODIUM 50 MCG: 0.03 TABLET ORAL at 09:26

## 2024-06-22 RX ADMIN — POTASSIUM CHLORIDE AND SODIUM CHLORIDE: 900; 150 INJECTION, SOLUTION INTRAVENOUS at 05:20

## 2024-06-22 RX ADMIN — METOPROLOL TARTRATE 37.5 MG: 25 TABLET, FILM COATED ORAL at 20:03

## 2024-06-22 RX ADMIN — BETHANECHOL CHLORIDE 25 MG: 25 TABLET ORAL at 20:03

## 2024-06-22 RX ADMIN — CEFTRIAXONE 1000 MG: 1 INJECTION, POWDER, FOR SOLUTION INTRAMUSCULAR; INTRAVENOUS at 02:22

## 2024-06-22 RX ADMIN — POTASSIUM CHLORIDE AND SODIUM CHLORIDE: 900; 150 INJECTION, SOLUTION INTRAVENOUS at 18:13

## 2024-06-22 RX ADMIN — BETHANECHOL CHLORIDE 25 MG: 25 TABLET ORAL at 09:27

## 2024-06-22 RX ADMIN — PHYTONADIONE 5 MG: 10 INJECTION, EMULSION INTRAMUSCULAR; INTRAVENOUS; SUBCUTANEOUS at 09:25

## 2024-06-22 NOTE — PLAN OF CARE
Problem: Skin/Tissue Integrity  Goal: Absence of new skin breakdown  Description: 1.  Monitor for areas of redness and/or skin breakdown  2.  Assess vascular access sites hourly  3.  Every 4-6 hours minimum:  Change oxygen saturation probe site  4.  Every 4-6 hours:  If on nasal continuous positive airway pressure, respiratory therapy assess nares and determine need for appliance change or resting period.  Outcome: Progressing     Problem: Safety - Adult  Goal: Free from fall injury  Outcome: Progressing     Problem: Musculoskeletal - Adult  Goal: Return mobility to safest level of function  Outcome: Progressing

## 2024-06-22 NOTE — PROGRESS NOTES
Surgery Progress Note            PATIENT NAME: Yvette Das     TODAY'S DATE: 6/22/2024, 8:38 AM    CHIEF COMPLAINT:  GI bleeding, supra therapeutic INR    SUBJECTIVE:    Pt seen and examined. No acute events overnight.  Pt denies any N/V, abd pain.  Has continued to have bowel movements with blood overnight.  Tolerating clear liquids.     OBJECTIVE:   VITALS:  /63   Pulse 87   Temp 98.6 °F (37 °C) (Tympanic)   Resp 18   Ht 1.702 m (5' 7\")   Wt 135.4 kg (298 lb 6.4 oz)   LMP 01/01/1968   SpO2 94%   BMI 46.74 kg/m²      INTAKE/OUTPUT:      Intake/Output Summary (Last 24 hours) at 6/22/2024 0838  Last data filed at 6/22/2024 0633  Gross per 24 hour   Intake 2281.27 ml   Output 5300 ml   Net -3018.73 ml                 CONSTITUTIONAL:  awake and alert.  No acute distress  CARDIOVASCULAR:  regular rate and rhythm   LUNGS:  clear to auscultation  ABDOMEN:   Abdomen soft, non-tender, non-distended  EXTREMITIES:  negative    Data:  CBC:   Lab Results   Component Value Date/Time    WBC 6.4 06/22/2024 05:52 AM    RBC 2.86 06/22/2024 05:52 AM    HGB 8.9 06/22/2024 05:52 AM    HCT 27.8 06/22/2024 05:52 AM    MCV 97.2 06/22/2024 05:52 AM    MCH 31.1 06/22/2024 05:52 AM    MCHC 32.0 06/22/2024 05:52 AM    RDW 13.9 06/22/2024 05:52 AM    PLT See Reflexed IPF Result 06/22/2024 05:52 AM    MPV 11.9 06/21/2024 05:07 AM     Hemoglobin/Hematocrit:    Lab Results   Component Value Date/Time    HGB 8.9 06/22/2024 05:52 AM    HCT 27.8 06/22/2024 05:52 AM     BMP:    Lab Results   Component Value Date/Time     06/22/2024 05:52 AM    K 3.8 06/22/2024 05:52 AM     06/22/2024 05:52 AM    CO2 23 06/22/2024 05:52 AM    BUN 33 06/22/2024 05:52 AM    CREATININE 0.9 06/22/2024 05:52 AM    CALCIUM 8.3 06/22/2024 05:52 AM    GFRAA >60 07/13/2022 09:12 AM    LABGLOM 63 06/22/2024 05:52 AM    LABGLOM 50 04/29/2024 08:01 AM    GLUCOSE 97 06/22/2024 05:52 AM       Radiology Review:        ASSESSMENT   Patient Active  Problem List   Diagnosis    Dyslipidemia    Osteoarthritis    Osteoporosis    Depression    Anxiety    CHF (congestive heart failure) (HCC)    Primary insomnia    JAYJAY on CPAP    Hypertension    A-fib (HCC)    Malignant tumor of colon (HCC)    Elevated fasting glucose    Other specified hypothyroidism    S/P small bowel resection    Closed fracture of proximal end of left humerus with routine healing    Renal mass, right    Status post nephrectomy    Urinary retention    Renal cell carcinoma of right kidney (HCC)    Chronic renal disease, stage III (HCC) [087444]    Anasarca    Shortness of breath    Femoral fracture (HCC)    Other fracture of left femur, initial encounter for closed fracture (HCC)    Hip injury, left, initial encounter    Secondary hypercoagulable state (HCC)    GI bleed    Pyelonephritis of left kidney    Class 3 severe obesity due to excess calories with serious comorbidity and body mass index (BMI) of 45.0 to 49.9 in adult (HCC)    Pulmonary hypertension (HCC)    Supratherapeutic INR     GI bleeding  Supra therapeutic INR    Plan  - Will need to correct INR.  INR unchanged over last 24 hours despite cessation of coumadin.  Pt continues to have evidence of blood per rectum and Hgb slowly trending down.  Recommend reversal.  Tentatively planning for endoscopic evaluation but need correction of INR before proceeding.    - Will give 5mg oral Vit K now.  Recheck INR tomorrow morning.   - Continue clear liquid diet.  - Continue to trend hgb.  Transfuse as needed per primary recommendation.  - Supportive care    Electronically signed by Bogdan Arguello DO  on 6/22/2024 at 8:38 AM

## 2024-06-22 NOTE — PLAN OF CARE
Problem: Discharge Planning  Goal: Discharge to home or other facility with appropriate resources  6/22/2024 1634 by Kalani Jones RN  Outcome: Progressing  6/22/2024 0558 by Qi Lane RN  Outcome: Progressing     Problem: Skin/Tissue Integrity  Goal: Absence of new skin breakdown  Description: 1.  Monitor for areas of redness and/or skin breakdown  2.  Assess vascular access sites hourly  3.  Every 4-6 hours minimum:  Change oxygen saturation probe site  4.  Every 4-6 hours:  If on nasal continuous positive airway pressure, respiratory therapy assess nares and determine need for appliance change or resting period.  6/22/2024 1634 by Kalani Jones RN  Outcome: Progressing  6/22/2024 0558 by Qi Lane RN  Outcome: Progressing     Problem: Safety - Adult  Goal: Free from fall injury  6/22/2024 1634 by Kalani Jones RN  Outcome: Progressing  6/22/2024 0558 by Qi Lane RN  Outcome: Progressing     Problem: ABCDS Injury Assessment  Goal: Absence of physical injury  6/22/2024 1634 by Kalani Jones RN  Outcome: Progressing  6/22/2024 0558 by Qi Lane RN  Outcome: Progressing     Problem: Skin/Tissue Integrity - Adult  Goal: Skin integrity remains intact  6/22/2024 1634 by Kalani Jones RN  Outcome: Progressing  6/22/2024 0558 by Qi Lane RN  Outcome: Progressing     Problem: Musculoskeletal - Adult  Goal: Return mobility to safest level of function  6/22/2024 1634 by Kalani Jones RN  Outcome: Progressing  6/22/2024 0558 by Qi Lane RN  Outcome: Progressing     Problem: Gastrointestinal - Adult  Goal: Maintains or returns to baseline bowel function  6/22/2024 1634 by Kalani Jones RN  Outcome: Progressing  6/22/2024 0558 by Qi Lane RN  Outcome: Progressing     Problem: Genitourinary - Adult  Goal: Absence of urinary retention  6/22/2024 1634 by Kalani Jones RN  Outcome: Progressing  6/22/2024 0558 by Qi Lane RN  Outcome: Progressing     Problem:

## 2024-06-22 NOTE — PROGRESS NOTES
Physical Therapy  Facility/Department: [facility]  PROGRESSIVE C[facility]  Daily Treatment Note  NAME: Yvette Das  : 1940  MRN: [id number]    Date of Service: 2024    Discharge Recommendations:  Subacute/Skilled Nursing Facility        Patient Diagnosis(es): The encounter diagnosis was Gastrointestinal hemorrhage, unspecified gastrointestinal hemorrhage type.    Assessment   Activity Tolerance: Patient tolerated treatment well     Plan    Physical Therapy Plan  General Plan: 5-7 times per week  Current Treatment Recommendations: Strengthening;ROM;Balance training;Transfer training;Neuromuscular re-education;Endurance training;Home exercise program     Restrictions  Restrictions/Precautions  Restrictions/Precautions: Weight Bearing  Lower Extremity Weight Bearing Restrictions  Left Lower Extremity Weight Bearing: Non Weight Bearing     Subjective    Subjective  Subjective: Pt in bed upon arrival. Pt agreeable to bed exercises.  Pain: Denied having pain this morning.  Orientation  Overall Orientation Status: Within Functional Limits     Objective   Vitals     Bed Mobility Training  Bed Mobility Training: No  Transfer Training  Transfer Training: No     PT Exercises  Exercise Treatment: Supine ankle pumps x20 mariella, glute sets x 20. Quad sets x10 on L LE, supine hip abd, SLR, and heel slides x10 on L LE. Patient reporting after completing L LE exercsies that she would like to end the session. Unable to complete on the R LE this date.     Safety Devices  Type of Devices: Bed alarm in place;Call light within reach;Left in bed;Nurse notified       Goals  Short Term Goals  Time Frame for Short Term Goals: LOS  Short Term Goal 1: Sup to Sit with min x 1  Short Term Goal 2: Sitting EOB x 5 min  Short Term Goal 3: Patient to be able to complete 10-15 reps LE exs with CGA to min assist.  Patient Goals   Patient Goals : Imrpove strength, bed mob    Education  Patient Education  Education Given To: Patient;Family  Education  Provided: Role of Therapy  Education Provided Comments: Educated patient on importance and benefits of completing exercises / bed mobility to continue to maintain strength  Education Method: Verbal  Education Outcome: Verbalized understanding;Continued education needed    Therapy Time   Individual Concurrent Group Co-treatment   Time In 0813         Time Out 0825         Minutes 12         Timed Code Treatment Minutes: 12 Minutes       Jessica Roy, PT

## 2024-06-22 NOTE — PROGRESS NOTES
Hospitalist Progress Note  6/22/2024 10:23 AM  Subjective:   Admit Date: 6/20/2024  PCP: Emy Trevino DO     Full Code      C/c:  Chief Complaint   Patient presents with    Rectal Bleeding         Interval History: pt looks tired, hb stable planning for reversal of inr and plan for egd and colonoscopy tomorrow by surgery    Diet: ADULT DIET; Clear Liquid; No red dye                                ip days:1  Medications:   Scheduled Meds:   amLODIPine  10 mg Oral Daily    atorvastatin  40 mg Oral Nightly    docusate sodium  100 mg Oral Daily    bethanechol  25 mg Oral 4x Daily    furosemide  40 mg Oral Daily    levothyroxine  50 mcg Oral Daily    spironolactone  25 mg Oral Daily    metoprolol tartrate  37.5 mg Oral BID    sodium chloride flush  5-40 mL IntraVENous 2 times per day    cefTRIAXone (ROCEPHIN) IV  1,000 mg IntraVENous Q24H    pantoprazole (PROTONIX) 40 mg in sodium chloride (PF) 0.9 % 10 mL injection  40 mg IntraVENous Daily     Continuous Infusions:   sodium chloride      0.9% NaCl with KCl 20 mEq 75 mL/hr at 06/22/24 0633     PRN Meds:.albuterol, sodium chloride flush, sodium chloride, ondansetron **OR** ondansetron, polyethylene glycol, acetaminophen **OR** acetaminophen, potassium chloride **OR** potassium alternative oral replacement **OR** potassium chloride, magnesium sulfate, oxyCODONE-acetaminophen **AND** oxyCODONE     CBC:   Recent Labs     06/20/24 2139 06/21/24  0507 06/21/24  1153 06/21/24  1821 06/22/24  0015 06/22/24  0552   WBC 9.3 7.7  --   --   --  6.4   HGB 10.7* 10.0*   < > 9.6* 8.9* 8.9*    200  --   --   --  See Reflexed IPF Result    < > = values in this interval not displayed.     BMP:    Recent Labs     06/20/24 2139 06/21/24  0507 06/22/24  0552    143 144   K 3.8 4.1 3.8    109* 110*   CO2 20 21 23   BUN 47* 47* 33*   CREATININE 1.2* 1.1* 0.9   GLUCOSE 121* 108* 97     Hepatic: No results for input(s): \"AST\", \"ALT\", \"BILITOT\", \"ALKPHOS\" in the last  artery calcification..     1. Moderate volume of desiccated stool within the rectum. The rectum is expanded to 9.4 cm. 2. Moderate hydronephrosis of the right kidney with mild perinephric fat stranding. Moderate ureterectasis of the left ureter. The distal left ureter near the vesicoureteral junction is poorly visualized secondary to streak artifact from left hip arthroplasty. 3. The bladder is distended. 4. Colonic diverticulosis without evidence of diverticulitis. 5. Cardiomegaly with biatrial enlargement. Heavy three-vessel coronary artery calcification. 6. Trabecular thickening and sclerosis of the sacrum, may represent a component of Paget's disease. Unchanged from prior exam.       Assessment :   Gi bleed/hold coumadin/vit k for reversal/h/h q 6 hr  A fib/rate control/     Plan:   1.  Continue present plan  2.  Protonix iv    Patient Active Problem List:     Dyslipidemia     Osteoarthritis     Osteoporosis     Depression     Anxiety     CHF (congestive heart failure) (HCC)     Primary insomnia     JAYJAY on CPAP     Hypertension     A-fib (HCC)     Malignant tumor of colon (HCC)     Elevated fasting glucose     Other specified hypothyroidism     S/P small bowel resection     Closed fracture of proximal end of left humerus with routine healing     Renal mass, right     Status post nephrectomy     Urinary retention     Renal cell carcinoma of right kidney (HCC)     Chronic renal disease, stage III (HCC) [228338]     Anasarca     Shortness of breath     Femoral fracture (HCC)     Other fracture of left femur, initial encounter for closed fracture (HCC)     Hip injury, left, initial encounter     Secondary hypercoagulable state (HCC)     GI bleed     Pyelonephritis of left kidney     Class 3 severe obesity due to excess calories with serious comorbidity and body mass index (BMI) of 45.0 to 49.9 in adult (HCC)     Pulmonary hypertension (HCC)     Supratherapeutic INR      Anticipated Disposition upon discharge: []

## 2024-06-23 LAB
ANION GAP SERPL CALCULATED.3IONS-SCNC: 9 MMOL/L (ref 9–17)
BASOPHILS # BLD: 0.04 K/UL (ref 0–0.2)
BASOPHILS NFR BLD: 1 % (ref 0–2)
BUN SERPL-MCNC: 22 MG/DL (ref 8–23)
BUN/CREAT SERPL: 28 (ref 9–20)
CALCIUM SERPL-MCNC: 8.1 MG/DL (ref 8.6–10.4)
CHLORIDE SERPL-SCNC: 112 MMOL/L (ref 98–107)
CO2 SERPL-SCNC: 21 MMOL/L (ref 20–31)
CREAT SERPL-MCNC: 0.8 MG/DL (ref 0.5–0.9)
EOSINOPHIL # BLD: 0.31 K/UL (ref 0–0.44)
EOSINOPHILS RELATIVE PERCENT: 5 % (ref 1–4)
ERYTHROCYTE [DISTWIDTH] IN BLOOD BY AUTOMATED COUNT: 14 % (ref 11.8–14.4)
GFR, ESTIMATED: 73 ML/MIN/1.73M2
GLUCOSE SERPL-MCNC: 103 MG/DL (ref 70–99)
HCT VFR BLD AUTO: 22.3 % (ref 36.3–47.1)
HCT VFR BLD AUTO: 23.6 % (ref 36.3–47.1)
HCT VFR BLD AUTO: 29 % (ref 36.3–47.1)
HCT VFR BLD AUTO: 29.2 % (ref 36.3–47.1)
HGB BLD-MCNC: 7 G/DL (ref 11.9–15.1)
HGB BLD-MCNC: 7.5 G/DL (ref 11.9–15.1)
HGB BLD-MCNC: 9.4 G/DL (ref 11.9–15.1)
HGB BLD-MCNC: 9.7 G/DL (ref 11.9–15.1)
IMM GRANULOCYTES # BLD AUTO: 0.04 K/UL (ref 0–0.3)
IMM GRANULOCYTES NFR BLD: 1 %
INR PPP: 1.6
LYMPHOCYTES NFR BLD: 1.26 K/UL (ref 1.1–3.7)
LYMPHOCYTES RELATIVE PERCENT: 19 % (ref 24–43)
MCH RBC QN AUTO: 30.8 PG (ref 25.2–33.5)
MCHC RBC AUTO-ENTMCNC: 31.4 G/DL (ref 25.2–33.5)
MCV RBC AUTO: 98.2 FL (ref 82.6–102.9)
MICROORGANISM SPEC CULT: NORMAL
MONOCYTES NFR BLD: 0.69 K/UL (ref 0.1–1.2)
MONOCYTES NFR BLD: 10 % (ref 3–12)
NEUTROPHILS NFR BLD: 65 % (ref 36–65)
NEUTS SEG NFR BLD: 4.33 K/UL (ref 1.5–8.1)
NRBC BLD-RTO: 0 PER 100 WBC
PLATELET # BLD AUTO: 158 K/UL (ref 138–453)
PMV BLD AUTO: 11.9 FL (ref 8.1–13.5)
POTASSIUM SERPL-SCNC: 3.7 MMOL/L (ref 3.7–5.3)
PROTHROMBIN TIME: 18.4 SEC (ref 11.5–14.2)
RBC # BLD AUTO: 2.27 M/UL (ref 3.95–5.11)
SERVICE CMNT-IMP: NORMAL
SODIUM SERPL-SCNC: 142 MMOL/L (ref 135–144)
SPECIMEN DESCRIPTION: NORMAL
WBC OTHER # BLD: 6.7 K/UL (ref 3.5–11.3)

## 2024-06-23 PROCEDURE — 85014 HEMATOCRIT: CPT

## 2024-06-23 PROCEDURE — 86900 BLOOD TYPING SEROLOGIC ABO: CPT

## 2024-06-23 PROCEDURE — 86850 RBC ANTIBODY SCREEN: CPT

## 2024-06-23 PROCEDURE — 36430 TRANSFUSION BLD/BLD COMPNT: CPT

## 2024-06-23 PROCEDURE — 85018 HEMOGLOBIN: CPT

## 2024-06-23 PROCEDURE — 86920 COMPATIBILITY TEST SPIN: CPT

## 2024-06-23 PROCEDURE — C9113 INJ PANTOPRAZOLE SODIUM, VIA: HCPCS | Performed by: NURSE PRACTITIONER

## 2024-06-23 PROCEDURE — 6370000000 HC RX 637 (ALT 250 FOR IP): Performed by: SURGERY

## 2024-06-23 PROCEDURE — 85025 COMPLETE CBC W/AUTO DIFF WBC: CPT

## 2024-06-23 PROCEDURE — 99232 SBSQ HOSP IP/OBS MODERATE 35: CPT | Performed by: SURGERY

## 2024-06-23 PROCEDURE — 2580000003 HC RX 258: Performed by: NURSE PRACTITIONER

## 2024-06-23 PROCEDURE — 80048 BASIC METABOLIC PNL TOTAL CA: CPT

## 2024-06-23 PROCEDURE — 86901 BLOOD TYPING SEROLOGIC RH(D): CPT

## 2024-06-23 PROCEDURE — 2000000000 HC ICU R&B

## 2024-06-23 PROCEDURE — 97110 THERAPEUTIC EXERCISES: CPT

## 2024-06-23 PROCEDURE — 85610 PROTHROMBIN TIME: CPT

## 2024-06-23 PROCEDURE — 94760 N-INVAS EAR/PLS OXIMETRY 1: CPT

## 2024-06-23 PROCEDURE — 36415 COLL VENOUS BLD VENIPUNCTURE: CPT

## 2024-06-23 PROCEDURE — 6360000002 HC RX W HCPCS: Performed by: NURSE PRACTITIONER

## 2024-06-23 PROCEDURE — 6370000000 HC RX 637 (ALT 250 FOR IP): Performed by: NURSE PRACTITIONER

## 2024-06-23 PROCEDURE — P9016 RBC LEUKOCYTES REDUCED: HCPCS

## 2024-06-23 PROCEDURE — 2060000000 HC ICU INTERMEDIATE R&B

## 2024-06-23 RX ORDER — BISACODYL 5 MG/1
10 TABLET, DELAYED RELEASE ORAL ONCE
Status: COMPLETED | OUTPATIENT
Start: 2024-06-23 | End: 2024-06-23

## 2024-06-23 RX ORDER — SODIUM CHLORIDE 9 MG/ML
INJECTION, SOLUTION INTRAVENOUS PRN
Status: DISCONTINUED | OUTPATIENT
Start: 2024-06-23 | End: 2024-06-25 | Stop reason: HOSPADM

## 2024-06-23 RX ORDER — BISACODYL 5 MG/1
10 TABLET, DELAYED RELEASE ORAL DAILY PRN
Status: DISCONTINUED | OUTPATIENT
Start: 2024-06-23 | End: 2024-06-23

## 2024-06-23 RX ADMIN — POLYETHYLENE GLYCOL-3350 AND ELECTROLYTES 4000 ML: 236; 6.74; 5.86; 2.97; 22.74 POWDER, FOR SOLUTION ORAL at 15:46

## 2024-06-23 RX ADMIN — CEFTRIAXONE 1000 MG: 1 INJECTION, POWDER, FOR SOLUTION INTRAMUSCULAR; INTRAVENOUS at 02:08

## 2024-06-23 RX ADMIN — OXYCODONE HYDROCHLORIDE 5 MG: 5 TABLET ORAL at 23:22

## 2024-06-23 RX ADMIN — OXYCODONE HYDROCHLORIDE AND ACETAMINOPHEN 1 TABLET: 5; 325 TABLET ORAL at 14:15

## 2024-06-23 RX ADMIN — SPIRONOLACTONE 25 MG: 25 TABLET ORAL at 09:14

## 2024-06-23 RX ADMIN — ATORVASTATIN CALCIUM 40 MG: 40 TABLET, FILM COATED ORAL at 20:36

## 2024-06-23 RX ADMIN — POTASSIUM CHLORIDE AND SODIUM CHLORIDE: 900; 150 INJECTION, SOLUTION INTRAVENOUS at 06:51

## 2024-06-23 RX ADMIN — BETHANECHOL CHLORIDE 25 MG: 25 TABLET ORAL at 14:16

## 2024-06-23 RX ADMIN — BETHANECHOL CHLORIDE 25 MG: 25 TABLET ORAL at 20:36

## 2024-06-23 RX ADMIN — OXYCODONE HYDROCHLORIDE 5 MG: 5 TABLET ORAL at 03:54

## 2024-06-23 RX ADMIN — METOPROLOL TARTRATE 37.5 MG: 25 TABLET, FILM COATED ORAL at 09:13

## 2024-06-23 RX ADMIN — AMLODIPINE BESYLATE 10 MG: 5 TABLET ORAL at 09:13

## 2024-06-23 RX ADMIN — SODIUM CHLORIDE, PRESERVATIVE FREE 40 MG: 5 INJECTION INTRAVENOUS at 09:16

## 2024-06-23 RX ADMIN — BETHANECHOL CHLORIDE 25 MG: 25 TABLET ORAL at 09:14

## 2024-06-23 RX ADMIN — METOPROLOL TARTRATE 37.5 MG: 25 TABLET, FILM COATED ORAL at 20:36

## 2024-06-23 RX ADMIN — LEVOTHYROXINE SODIUM 50 MCG: 0.03 TABLET ORAL at 09:14

## 2024-06-23 RX ADMIN — SODIUM CHLORIDE, PRESERVATIVE FREE 10 ML: 5 INJECTION INTRAVENOUS at 09:16

## 2024-06-23 RX ADMIN — BISACODYL 10 MG: 5 TABLET, COATED ORAL at 14:16

## 2024-06-23 RX ADMIN — BETHANECHOL CHLORIDE 25 MG: 25 TABLET ORAL at 18:03

## 2024-06-23 RX ADMIN — FUROSEMIDE 40 MG: 40 TABLET ORAL at 09:14

## 2024-06-23 ASSESSMENT — PAIN DESCRIPTION - DESCRIPTORS
DESCRIPTORS: ACHING;SORE;JABBING
DESCRIPTORS: ACHING
DESCRIPTORS: ACHING;HEAVINESS

## 2024-06-23 ASSESSMENT — PAIN DESCRIPTION - ORIENTATION
ORIENTATION: LEFT
ORIENTATION: LEFT
ORIENTATION: RIGHT

## 2024-06-23 ASSESSMENT — PAIN DESCRIPTION - LOCATION
LOCATION: LEG
LOCATION: HIP;LEG
LOCATION: LEG

## 2024-06-23 ASSESSMENT — PAIN SCALES - GENERAL
PAINLEVEL_OUTOF10: 0
PAINLEVEL_OUTOF10: 8
PAINLEVEL_OUTOF10: 5
PAINLEVEL_OUTOF10: 2
PAINLEVEL_OUTOF10: 8
PAINLEVEL_OUTOF10: 4

## 2024-06-23 NOTE — PROGRESS NOTES
Notified by primary RN Xiomara that patient requesting something to help her sleep. I went and talked with the patient -- she reports feeling frustrated, tired, and irritable over her health status. She has not slept well since Wednesday due to her GI bleed and frequent bowel movements. She is requesting something to help calm her down and help her get some rest.

## 2024-06-23 NOTE — PROGRESS NOTES
Physical Therapy  Facility/Department: [facility]  PROGRESSIVE C[facility]  Daily Treatment Note  NAME: Yvette Das  : 1940  MRN: [id number]    Date of Service: 2024    Discharge Recommendations:  Subacute/Skilled Nursing Facility        Patient Diagnosis(es): The encounter diagnosis was Gastrointestinal hemorrhage, unspecified gastrointestinal hemorrhage type.    Assessment   Activity Tolerance: Patient tolerated treatment well     Plan    Physical Therapy Plan  General Plan: 5-7 times per week  Current Treatment Recommendations: Strengthening;ROM;Balance training;Transfer training;Neuromuscular re-education;Endurance training;Home exercise program     Restrictions  Restrictions/Precautions  Restrictions/Precautions: Weight Bearing  Lower Extremity Weight Bearing Restrictions  Left Lower Extremity Weight Bearing: Non Weight Bearing     Subjective    Subjective  Subjective: Pt in bed upon arrival. Pt agreeable to bed exercises.  Pain: 4/10 left hip pain  Orientation  Overall Orientation Status: Within Functional Limits     Objective   Vitals     Bed Mobility Training  Bed Mobility Training: No  Transfer Training  Transfer Training: No     PT Exercises  Exercise Treatment: Supine ankle pumps x20 mariella, glute sets x 20. Quad sets x10, supine hip abd x 10, SLRx10, and heel slides x10 bilateral LE. Manual assistance with heel slides, SLR, supine hip abd.     Safety Devices  Type of Devices: Bed alarm in place;Call light within reach;Left in bed;Nurse notified       Goals  Short Term Goals  Time Frame for Short Term Goals: LOS  Short Term Goal 1: Sup to Sit with min x 1  Short Term Goal 2: Sitting EOB x 5 min  Short Term Goal 3: Patient to be able to complete 10-15 reps LE exs with CGA to min assist.  Patient Goals   Patient Goals : Imrpove strength, bed mob    Education  Patient Education  Education Given To: Patient  Education Provided: Home Exercise Program  Education Method: Verbal;Demonstration  Education Outcome:

## 2024-06-23 NOTE — PROGRESS NOTES
Hospitalist Progress Note  6/23/2024 12:23 PM  Subjective:   Admit Date: 6/20/2024  PCP: Emy Trevino DO     Full Code      C/c:  Chief Complaint   Patient presents with    Rectal Bleeding         Interval History: doing slightly better, hb was down, getting prbc now    Diet: Diet NPO Exceptions are: Sips of Water with Meds  ADULT DIET; Clear Liquid                                ip days:2  Medications:   Scheduled Meds:   polyethylene glycol  4,000 mL Oral Once    bisacodyl  10 mg Oral Once    amLODIPine  10 mg Oral Daily    atorvastatin  40 mg Oral Nightly    docusate sodium  100 mg Oral Daily    bethanechol  25 mg Oral 4x Daily    furosemide  40 mg Oral Daily    levothyroxine  50 mcg Oral Daily    spironolactone  25 mg Oral Daily    metoprolol tartrate  37.5 mg Oral BID    sodium chloride flush  5-40 mL IntraVENous 2 times per day    cefTRIAXone (ROCEPHIN) IV  1,000 mg IntraVENous Q24H    pantoprazole (PROTONIX) 40 mg in sodium chloride (PF) 0.9 % 10 mL injection  40 mg IntraVENous Daily     Continuous Infusions:   sodium chloride      sodium chloride      0.9% NaCl with KCl 20 mEq 75 mL/hr at 06/23/24 0651     PRN Meds:.sodium chloride, ALPRAZolam, albuterol, sodium chloride flush, sodium chloride, ondansetron **OR** ondansetron, polyethylene glycol, acetaminophen **OR** acetaminophen, potassium chloride **OR** potassium alternative oral replacement **OR** potassium chloride, magnesium sulfate, oxyCODONE-acetaminophen **AND** oxyCODONE     CBC:   Recent Labs     06/21/24  0507 06/21/24  1153 06/22/24  0552 06/22/24  1204 06/22/24  1817 06/23/24  0007 06/23/24  0547   WBC 7.7  --  6.4  --   --   --  6.7   HGB 10.0*   < > 8.9*   < > 7.9* 7.5* 7.0*     --  See Reflexed IPF Result  --   --   --  158    < > = values in this interval not displayed.     BMP:    Recent Labs     06/21/24  0507 06/22/24  0552 06/23/24  0547    144 142   K 4.1 3.8 3.7   * 110* 112*   CO2 21 23 21   BUN 47* 33* 22    CREATININE 1.1* 0.9 0.8   GLUCOSE 108* 97 103*     Hepatic: No results for input(s): \"AST\", \"ALT\", \"BILITOT\", \"ALKPHOS\" in the last 72 hours.    Invalid input(s): \"ALB\"  Troponin: No results for input(s): \"TROPONINI\" in the last 72 hours.  BNP: No results for input(s): \"BNP\" in the last 72 hours.  Lipids: No results for input(s): \"CHOL\", \"HDL\" in the last 72 hours.    Invalid input(s): \"LDLCALCU\"  INR:   Recent Labs     06/21/24  0507 06/22/24  0552 06/23/24  0547   INR 3.3 3.3 1.6       Objective:   Vitals: BP 99/77   Pulse 88   Temp 98 °F (36.7 °C) (Infrared)   Resp 16   Ht 1.702 m (5' 7\")   Wt 135.4 kg (298 lb 6.4 oz)   LMP 01/01/1968   SpO2 98%   BMI 46.74 kg/m²   General appearance: alert, appears stated age and cooperative  Skin: Skin color, texture, turgor normal. No rashes or lesions  Lungs: clear to auscultation bilaterally  Heart: irregular rate and rhythm, S1, S2 normal, no murmur, click, rub or gallop  Abdomen: soft, non-tender; bowel sounds normal; no masses,  no organomegaly  Extremities: extremities normal, atraumatic, no cyanosis or edema  Neurologic: Mental status: Alert, oriented, thought content appropriate    Prophylaxis:   DVT with  [] lovenox        [] heparin        [] Scd        [x] none:     Radiology:  CT ABDOMEN PELVIS WO CONTRAST Additional Contrast? None    Addendum Date: 6/21/2024    ADDENDUM: Sidedness error on initial dictation: Moderate hydronephrosis of the LEFT kidney with mild perinephric fat stranding. Moderate ureterectasis of the left ureter. The distal left ureter near the vesicoureteral junction is poorly visualized secondary to streak artifact from left hip arthroplasty.     Result Date: 6/21/2024  EXAMINATION: CT OF THE ABDOMEN AND PELVIS WITHOUT CONTRAST 6/20/2024 9:57 pm TECHNIQUE: CT of the abdomen and pelvis was performed without the administration of intravenous contrast. Multiplanar reformatted images are provided for review. Automated exposure control,

## 2024-06-23 NOTE — CONSENT
Informed Consent for Blood Component Transfusion Note    I have discussed with the patient the rationale for blood component transfusion; its benefits in treating or preventing fatigue, organ damage, or death; and its risk which includes mild transfusion reactions, rare risk of blood borne infection, or more serious but rare reactions. I have discussed the alternatives to transfusion, including the risk and consequences of not receiving transfusion. The patient had an opportunity to ask questions and had agreed to proceed with transfusion of blood components.    Electronically signed by XIOMARA Scherer CNP on 6/22/24 at 11:30 PM EDT

## 2024-06-23 NOTE — PLAN OF CARE
Problem: Discharge Planning  Goal: Discharge to home or other facility with appropriate resources  6/23/2024 1705 by Nanda Blanchard RN  Outcome: Progressing  6/23/2024 0408 by Qi Lane RN  Outcome: Progressing     Problem: Skin/Tissue Integrity  Goal: Absence of new skin breakdown  Description: 1.  Monitor for areas of redness and/or skin breakdown  2.  Assess vascular access sites hourly  3.  Every 4-6 hours minimum:  Change oxygen saturation probe site  4.  Every 4-6 hours:  If on nasal continuous positive airway pressure, respiratory therapy assess nares and determine need for appliance change or resting period.  6/23/2024 1705 by Nanda Blanchard RN  Outcome: Progressing  6/23/2024 0408 by Qi Lane RN  Outcome: Progressing     Problem: Safety - Adult  Goal: Free from fall injury  6/23/2024 1705 by Nanda Blanchard RN  Outcome: Progressing  6/23/2024 0408 by Qi Lane RN  Outcome: Progressing     Problem: ABCDS Injury Assessment  Goal: Absence of physical injury  6/23/2024 1705 by Nanda Blanchard RN  Outcome: Progressing  6/23/2024 0408 by Qi Lane RN  Outcome: Progressing     Problem: Skin/Tissue Integrity - Adult  Goal: Skin integrity remains intact  6/23/2024 1705 by Nanda Blanchard RN  Outcome: Progressing  6/23/2024 0408 by Qi Lane RN  Outcome: Progressing     Problem: Musculoskeletal - Adult  Goal: Return mobility to safest level of function  6/23/2024 1705 by Nanda Blanchard RN  Outcome: Progressing  6/23/2024 0408 by Qi Lane RN  Outcome: Progressing     Problem: Gastrointestinal - Adult  Goal: Maintains or returns to baseline bowel function  6/23/2024 1705 by Nanda Blanchard RN  Outcome: Progressing  6/23/2024 0408 by Qi Lane RN  Outcome: Progressing     Problem: Genitourinary - Adult  Goal: Absence of urinary retention  6/23/2024 1705 by Nanda Blanchard RN  Outcome: Progressing  6/23/2024 0408 by Qi Lane RN  Outcome: Progressing

## 2024-06-23 NOTE — PLAN OF CARE
Problem: Skin/Tissue Integrity - Adult  Goal: Skin integrity remains intact  6/23/2024 0408 by Qi Lane RN  Outcome: Progressing  6/22/2024 1634 by Kalani Jones RN  Outcome: Progressing     Problem: Musculoskeletal - Adult  Goal: Return mobility to safest level of function  6/23/2024 0408 by Qi Lane RN  Outcome: Progressing  6/22/2024 1634 by Kalani Jones RN  Outcome: Progressing     Problem: Gastrointestinal - Adult  Goal: Maintains or returns to baseline bowel function  6/23/2024 0408 by Qi Lane RN  Outcome: Progressing  6/22/2024 1634 by aKlani Jones RN  Outcome: Progressing

## 2024-06-23 NOTE — PROGRESS NOTES
Surgery Progress Note            PATIENT NAME: Yvette Das     TODAY'S DATE: 6/23/2024, 8:18 AM    CHIEF COMPLAINT:  GI bleeding    SUBJECTIVE:    Pt seen and examined. No acute events overnight.  Denies any abd pain.  No N/V.  Had BMs yesterday but none overnight.  Labs noted.     OBJECTIVE:   VITALS:  BP (!) 125/50   Pulse 92   Temp 98.8 °F (37.1 °C) (Tympanic)   Resp 16   Ht 1.702 m (5' 7\")   Wt 135.4 kg (298 lb 6.4 oz)   LMP 01/01/1968   SpO2 94%   BMI 46.74 kg/m²      INTAKE/OUTPUT:      Intake/Output Summary (Last 24 hours) at 6/23/2024 0818  Last data filed at 6/23/2024 0238  Gross per 24 hour   Intake 2021.8 ml   Output 1675 ml   Net 346.8 ml                 CONSTITUTIONAL:  awake and alert.  No acute distress  CARDIOVASCULAR:  irregularly irregular rhythm   LUNGS:  clear to auscultation  ABDOMEN:   Abdomen soft, non-tender, non-distended  EXTREMITIES:  negative    Data:  CBC:   Lab Results   Component Value Date/Time    WBC 6.7 06/23/2024 05:47 AM    RBC 2.27 06/23/2024 05:47 AM    HGB 7.0 06/23/2024 05:47 AM    HCT 22.3 06/23/2024 05:47 AM    MCV 98.2 06/23/2024 05:47 AM    MCH 30.8 06/23/2024 05:47 AM    MCHC 31.4 06/23/2024 05:47 AM    RDW 14.0 06/23/2024 05:47 AM     06/23/2024 05:47 AM    MPV 11.9 06/23/2024 05:47 AM     Hemoglobin/Hematocrit:    Lab Results   Component Value Date/Time    HGB 7.0 06/23/2024 05:47 AM    HCT 22.3 06/23/2024 05:47 AM     BMP:    Lab Results   Component Value Date/Time     06/23/2024 05:47 AM    K 3.7 06/23/2024 05:47 AM     06/23/2024 05:47 AM    CO2 21 06/23/2024 05:47 AM    BUN 22 06/23/2024 05:47 AM    CREATININE 0.8 06/23/2024 05:47 AM    CALCIUM 8.1 06/23/2024 05:47 AM    GFRAA >60 07/13/2022 09:12 AM    LABGLOM 73 06/23/2024 05:47 AM    LABGLOM 50 04/29/2024 08:01 AM    GLUCOSE 103 06/23/2024 05:47 AM     PT/INR:    Lab Results   Component Value Date/Time    PROTIME 18.4 06/23/2024 05:47 AM    PROTIME 14.8 09/16/2022 10:07 AM    INR  1.6 06/23/2024 05:47 AM       Radiology Review:        ASSESSMENT   Patient Active Problem List   Diagnosis    Dyslipidemia    Osteoarthritis    Osteoporosis    Depression    Anxiety    CHF (congestive heart failure) (HCC)    Primary insomnia    JAYJAY on CPAP    Hypertension    A-fib (HCC)    Malignant tumor of colon (HCC)    Elevated fasting glucose    Other specified hypothyroidism    S/P small bowel resection    Closed fracture of proximal end of left humerus with routine healing    Renal mass, right    Status post nephrectomy    Urinary retention    Renal cell carcinoma of right kidney (HCC)    Chronic renal disease, stage III (HCC) [429505]    Anasarca    Shortness of breath    Femoral fracture (HCC)    Other fracture of left femur, initial encounter for closed fracture (HCC)    Hip injury, left, initial encounter    Secondary hypercoagulable state (HCC)    GI bleed    Pyelonephritis of left kidney    Class 3 severe obesity due to excess calories with serious comorbidity and body mass index (BMI) of 45.0 to 49.9 in adult (HCC)    Pulmonary hypertension (HCC)    Supratherapeutic INR     82 yo female with GI bleeding - INR now improved with vit K.  Hgb slowly trending down    Plan  Plan for EGD and colonoscopy tomorrow.  Time TBD.    Clear liquids today.  NPO at midnight.  Bowel prep this afternoon.  Trend hgb.  Transfuse as needed.  Will defer transfusions to primary.  Medical management per primary.    Electronically signed by Bogdan Arguello DO  on 6/23/2024 at 8:18 AM

## 2024-06-24 ENCOUNTER — ANESTHESIA EVENT (OUTPATIENT)
Dept: OPERATING ROOM | Age: 84
End: 2024-06-24
Payer: MEDICARE

## 2024-06-24 ENCOUNTER — ANESTHESIA (OUTPATIENT)
Dept: OPERATING ROOM | Age: 84
End: 2024-06-24
Payer: MEDICARE

## 2024-06-24 LAB
ABO/RH: NORMAL
ALBUMIN SERPL-MCNC: 3 G/DL (ref 3.5–5.2)
ALBUMIN/GLOB SERPL: 1.3 {RATIO} (ref 1–2.5)
ALP SERPL-CCNC: 98 U/L (ref 35–104)
ALT SERPL-CCNC: 11 U/L (ref 5–33)
ANION GAP SERPL CALCULATED.3IONS-SCNC: 12 MMOL/L (ref 9–17)
ANTIBODY SCREEN: NEGATIVE
ARM BAND NUMBER: NORMAL
AST SERPL-CCNC: 18 U/L
BASOPHILS # BLD: <0.03 K/UL (ref 0–0.2)
BASOPHILS NFR BLD: 0 % (ref 0–2)
BILIRUB SERPL-MCNC: 0.6 MG/DL (ref 0.3–1.2)
BLOOD BANK BLOOD PRODUCT EXPIRATION DATE: NORMAL
BLOOD BANK DISPENSE STATUS: NORMAL
BLOOD BANK ISBT PRODUCT BLOOD TYPE: 5100
BLOOD BANK PRODUCT CODE: NORMAL
BLOOD BANK SAMPLE EXPIRATION: NORMAL
BLOOD BANK UNIT TYPE AND RH: NORMAL
BPU ID: NORMAL
BUN SERPL-MCNC: 13 MG/DL (ref 8–23)
BUN/CREAT SERPL: 19 (ref 9–20)
CALCIUM SERPL-MCNC: 8.3 MG/DL (ref 8.6–10.4)
CHLORIDE SERPL-SCNC: 107 MMOL/L (ref 98–107)
CO2 SERPL-SCNC: 18 MMOL/L (ref 20–31)
COMPONENT: NORMAL
CREAT SERPL-MCNC: 0.7 MG/DL (ref 0.5–0.9)
CROSSMATCH RESULT: NORMAL
EOSINOPHIL # BLD: 0.3 K/UL (ref 0–0.44)
EOSINOPHILS RELATIVE PERCENT: 5 % (ref 1–4)
ERYTHROCYTE [DISTWIDTH] IN BLOOD BY AUTOMATED COUNT: 14.6 % (ref 11.8–14.4)
GFR, ESTIMATED: 85 ML/MIN/1.73M2
GLUCOSE SERPL-MCNC: 89 MG/DL (ref 70–99)
HCT VFR BLD AUTO: 30 % (ref 36.3–47.1)
HCT VFR BLD AUTO: 35.7 % (ref 36.3–47.1)
HGB BLD-MCNC: 11.5 G/DL (ref 11.9–15.1)
HGB BLD-MCNC: 9.6 G/DL (ref 11.9–15.1)
IMM GRANULOCYTES # BLD AUTO: 0.05 K/UL (ref 0–0.3)
IMM GRANULOCYTES NFR BLD: 1 %
INR PPP: 1.3
LYMPHOCYTES NFR BLD: 1.11 K/UL (ref 1.1–3.7)
LYMPHOCYTES RELATIVE PERCENT: 18 % (ref 24–43)
MCH RBC QN AUTO: 31 PG (ref 25.2–33.5)
MCHC RBC AUTO-ENTMCNC: 32.2 G/DL (ref 25.2–33.5)
MCV RBC AUTO: 96.2 FL (ref 82.6–102.9)
MONOCYTES NFR BLD: 0.55 K/UL (ref 0.1–1.2)
MONOCYTES NFR BLD: 9 % (ref 3–12)
NEUTROPHILS NFR BLD: 67 % (ref 36–65)
NEUTS SEG NFR BLD: 4.13 K/UL (ref 1.5–8.1)
NRBC BLD-RTO: 0 PER 100 WBC
PLATELET # BLD AUTO: ABNORMAL K/UL (ref 138–453)
PLATELET, FLUORESCENCE: 115 K/UL (ref 138–453)
PLATELETS.RETICULATED NFR BLD AUTO: 9.7 % (ref 1.1–10.3)
POTASSIUM SERPL-SCNC: 4 MMOL/L (ref 3.7–5.3)
PROT SERPL-MCNC: 5.4 G/DL (ref 6.4–8.3)
PROTHROMBIN TIME: 15.9 SEC (ref 11.5–14.2)
RBC # BLD AUTO: 3.71 M/UL (ref 3.95–5.11)
SODIUM SERPL-SCNC: 137 MMOL/L (ref 135–144)
TRANSFUSION STATUS: NORMAL
UNIT DIVISION: 0
UNIT ISSUE DATE/TIME: NORMAL
WBC OTHER # BLD: 6.2 K/UL (ref 3.5–11.3)

## 2024-06-24 PROCEDURE — 43239 EGD BIOPSY SINGLE/MULTIPLE: CPT | Performed by: SURGERY

## 2024-06-24 PROCEDURE — 85018 HEMOGLOBIN: CPT

## 2024-06-24 PROCEDURE — 2709999900 HC NON-CHARGEABLE SUPPLY: Performed by: SURGERY

## 2024-06-24 PROCEDURE — 45385 COLONOSCOPY W/LESION REMOVAL: CPT | Performed by: SURGERY

## 2024-06-24 PROCEDURE — 99231 SBSQ HOSP IP/OBS SF/LOW 25: CPT | Performed by: INTERNAL MEDICINE

## 2024-06-24 PROCEDURE — 6360000002 HC RX W HCPCS: Performed by: NURSE ANESTHETIST, CERTIFIED REGISTERED

## 2024-06-24 PROCEDURE — 6370000000 HC RX 637 (ALT 250 FOR IP): Performed by: NURSE PRACTITIONER

## 2024-06-24 PROCEDURE — 85610 PROTHROMBIN TIME: CPT

## 2024-06-24 PROCEDURE — 3700000001 HC ADD 15 MINUTES (ANESTHESIA): Performed by: SURGERY

## 2024-06-24 PROCEDURE — 2580000003 HC RX 258: Performed by: NURSE PRACTITIONER

## 2024-06-24 PROCEDURE — C9113 INJ PANTOPRAZOLE SODIUM, VIA: HCPCS | Performed by: NURSE PRACTITIONER

## 2024-06-24 PROCEDURE — 88305 TISSUE EXAM BY PATHOLOGIST: CPT

## 2024-06-24 PROCEDURE — 6370000000 HC RX 637 (ALT 250 FOR IP): Performed by: SURGERY

## 2024-06-24 PROCEDURE — 36415 COLL VENOUS BLD VENIPUNCTURE: CPT

## 2024-06-24 PROCEDURE — 2580000003 HC RX 258: Performed by: NURSE ANESTHETIST, CERTIFIED REGISTERED

## 2024-06-24 PROCEDURE — 3609012400 HC EGD TRANSORAL BIOPSY SINGLE/MULTIPLE: Performed by: SURGERY

## 2024-06-24 PROCEDURE — 80053 COMPREHEN METABOLIC PANEL: CPT

## 2024-06-24 PROCEDURE — 3700000000 HC ANESTHESIA ATTENDED CARE: Performed by: SURGERY

## 2024-06-24 PROCEDURE — 0DBK8ZX EXCISION OF ASCENDING COLON, VIA NATURAL OR ARTIFICIAL OPENING ENDOSCOPIC, DIAGNOSTIC: ICD-10-PCS | Performed by: SURGERY

## 2024-06-24 PROCEDURE — 51702 INSERT TEMP BLADDER CATH: CPT

## 2024-06-24 PROCEDURE — 2060000000 HC ICU INTERMEDIATE R&B

## 2024-06-24 PROCEDURE — 45380 COLONOSCOPY AND BIOPSY: CPT | Performed by: SURGERY

## 2024-06-24 PROCEDURE — 3609010300 HC COLONOSCOPY W/BIOPSY SINGLE/MULTIPLE: Performed by: SURGERY

## 2024-06-24 PROCEDURE — 85025 COMPLETE CBC W/AUTO DIFF WBC: CPT

## 2024-06-24 PROCEDURE — 2500000003 HC RX 250 WO HCPCS: Performed by: NURSE ANESTHETIST, CERTIFIED REGISTERED

## 2024-06-24 PROCEDURE — 6360000002 HC RX W HCPCS: Performed by: NURSE PRACTITIONER

## 2024-06-24 PROCEDURE — 85014 HEMATOCRIT: CPT

## 2024-06-24 PROCEDURE — 6360000002 HC RX W HCPCS: Performed by: SURGERY

## 2024-06-24 PROCEDURE — 7100000010 HC PHASE II RECOVERY - FIRST 15 MIN: Performed by: SURGERY

## 2024-06-24 PROCEDURE — 45384 COLONOSCOPY W/LESION REMOVAL: CPT | Performed by: SURGERY

## 2024-06-24 PROCEDURE — 97110 THERAPEUTIC EXERCISES: CPT

## 2024-06-24 PROCEDURE — 0DB78ZX EXCISION OF STOMACH, PYLORUS, VIA NATURAL OR ARTIFICIAL OPENING ENDOSCOPIC, DIAGNOSTIC: ICD-10-PCS | Performed by: SURGERY

## 2024-06-24 PROCEDURE — 7100000011 HC PHASE II RECOVERY - ADDTL 15 MIN: Performed by: SURGERY

## 2024-06-24 RX ORDER — PROPOFOL 10 MG/ML
INJECTION, EMULSION INTRAVENOUS PRN
Status: DISCONTINUED | OUTPATIENT
Start: 2024-06-24 | End: 2024-06-24 | Stop reason: SDUPTHER

## 2024-06-24 RX ORDER — SODIUM CHLORIDE, SODIUM LACTATE, POTASSIUM CHLORIDE, CALCIUM CHLORIDE 600; 310; 30; 20 MG/100ML; MG/100ML; MG/100ML; MG/100ML
INJECTION, SOLUTION INTRAVENOUS CONTINUOUS PRN
Status: DISCONTINUED | OUTPATIENT
Start: 2024-06-24 | End: 2024-06-24 | Stop reason: SDUPTHER

## 2024-06-24 RX ORDER — LIDOCAINE HYDROCHLORIDE 40 MG/ML
INJECTION, SOLUTION RETROBULBAR PRN
Status: DISCONTINUED | OUTPATIENT
Start: 2024-06-24 | End: 2024-06-24 | Stop reason: SDUPTHER

## 2024-06-24 RX ADMIN — POTASSIUM CHLORIDE AND SODIUM CHLORIDE: 900; 150 INJECTION, SOLUTION INTRAVENOUS at 18:20

## 2024-06-24 RX ADMIN — POTASSIUM CHLORIDE AND SODIUM CHLORIDE: 900; 150 INJECTION, SOLUTION INTRAVENOUS at 01:00

## 2024-06-24 RX ADMIN — OXYCODONE HYDROCHLORIDE AND ACETAMINOPHEN 1 TABLET: 5; 325 TABLET ORAL at 20:13

## 2024-06-24 RX ADMIN — OXYCODONE HYDROCHLORIDE 5 MG: 5 TABLET ORAL at 20:13

## 2024-06-24 RX ADMIN — METOPROLOL TARTRATE 37.5 MG: 25 TABLET, FILM COATED ORAL at 08:40

## 2024-06-24 RX ADMIN — ATORVASTATIN CALCIUM 40 MG: 40 TABLET, FILM COATED ORAL at 20:13

## 2024-06-24 RX ADMIN — AMLODIPINE BESYLATE 10 MG: 5 TABLET ORAL at 08:41

## 2024-06-24 RX ADMIN — SPIRONOLACTONE 25 MG: 25 TABLET ORAL at 08:42

## 2024-06-24 RX ADMIN — BETHANECHOL CHLORIDE 25 MG: 25 TABLET ORAL at 18:19

## 2024-06-24 RX ADMIN — BETHANECHOL CHLORIDE 25 MG: 25 TABLET ORAL at 13:57

## 2024-06-24 RX ADMIN — LEVOTHYROXINE SODIUM 50 MCG: 0.03 TABLET ORAL at 08:41

## 2024-06-24 RX ADMIN — SODIUM CHLORIDE, PRESERVATIVE FREE 10 ML: 5 INJECTION INTRAVENOUS at 08:46

## 2024-06-24 RX ADMIN — PHENYLEPHRINE HYDROCHLORIDE 100 MCG: 10 INJECTION INTRAVENOUS at 12:25

## 2024-06-24 RX ADMIN — PROPOFOL 50 MG: 10 INJECTION, EMULSION INTRAVENOUS at 12:05

## 2024-06-24 RX ADMIN — PHENYLEPHRINE HYDROCHLORIDE 100 MCG: 10 INJECTION INTRAVENOUS at 12:15

## 2024-06-24 RX ADMIN — METOPROLOL TARTRATE 37.5 MG: 25 TABLET, FILM COATED ORAL at 20:12

## 2024-06-24 RX ADMIN — ACETAMINOPHEN 650 MG: 325 TABLET ORAL at 16:27

## 2024-06-24 RX ADMIN — LIDOCAINE HYDROCHLORIDE 60 MG: 40 INJECTION, SOLUTION RETROBULBAR; TOPICAL at 12:05

## 2024-06-24 RX ADMIN — BETHANECHOL CHLORIDE 25 MG: 25 TABLET ORAL at 20:12

## 2024-06-24 RX ADMIN — BETHANECHOL CHLORIDE 25 MG: 25 TABLET ORAL at 08:41

## 2024-06-24 RX ADMIN — SODIUM CHLORIDE, PRESERVATIVE FREE 40 MG: 5 INJECTION INTRAVENOUS at 08:42

## 2024-06-24 RX ADMIN — DOCUSATE SODIUM 100 MG: 100 CAPSULE, LIQUID FILLED ORAL at 08:42

## 2024-06-24 RX ADMIN — FUROSEMIDE 40 MG: 40 TABLET ORAL at 08:41

## 2024-06-24 RX ADMIN — SODIUM CHLORIDE, POTASSIUM CHLORIDE, SODIUM LACTATE AND CALCIUM CHLORIDE: 600; 310; 30; 20 INJECTION, SOLUTION INTRAVENOUS at 12:02

## 2024-06-24 RX ADMIN — PROPOFOL 140 MCG/KG/MIN: 10 INJECTION, EMULSION INTRAVENOUS at 12:07

## 2024-06-24 RX ADMIN — CEFTRIAXONE 1000 MG: 1 INJECTION, POWDER, FOR SOLUTION INTRAMUSCULAR; INTRAVENOUS at 01:49

## 2024-06-24 ASSESSMENT — PAIN - FUNCTIONAL ASSESSMENT
PAIN_FUNCTIONAL_ASSESSMENT: ACTIVITIES ARE NOT PREVENTED
PAIN_FUNCTIONAL_ASSESSMENT: 0-10
PAIN_FUNCTIONAL_ASSESSMENT: ACTIVITIES ARE NOT PREVENTED
PAIN_FUNCTIONAL_ASSESSMENT: ACTIVITIES ARE NOT PREVENTED
PAIN_FUNCTIONAL_ASSESSMENT: 0-10

## 2024-06-24 ASSESSMENT — PAIN DESCRIPTION - LOCATION
LOCATION: HEAD;HIP
LOCATION: OTHER (COMMENT)

## 2024-06-24 ASSESSMENT — PAIN SCALES - GENERAL
PAINLEVEL_OUTOF10: 5
PAINLEVEL_OUTOF10: 8
PAINLEVEL_OUTOF10: 1
PAINLEVEL_OUTOF10: 5
PAINLEVEL_OUTOF10: 7

## 2024-06-24 ASSESSMENT — PAIN DESCRIPTION - ORIENTATION
ORIENTATION: LEFT
ORIENTATION: OTHER (COMMENT)
ORIENTATION: LEFT
ORIENTATION: LEFT

## 2024-06-24 ASSESSMENT — PAIN DESCRIPTION - DESCRIPTORS
DESCRIPTORS: ACHING;DISCOMFORT
DESCRIPTORS: ACHING
DESCRIPTORS: ACHING;DISCOMFORT

## 2024-06-24 ASSESSMENT — PAIN SCALES - WONG BAKER: WONGBAKER_NUMERICALRESPONSE: NO HURT

## 2024-06-24 NOTE — ANESTHESIA PRE PROCEDURE
APRN - CNP   5 mg at 06/23/24 2322    pantoprazole (PROTONIX) 40 mg in sodium chloride (PF) 0.9 % 10 mL injection  40 mg IntraVENous Daily Tiffani العراقي APRN - CNP   40 mg at 06/23/24 0916       Allergies:    Allergies   Allergen Reactions    Pcn [Penicillins] Hives and Shortness Of Breath    Bextra [Valdecoxib] Diarrhea       Problem List:    Patient Active Problem List   Diagnosis Code    Dyslipidemia E78.5    Osteoarthritis M19.90    Osteoporosis M81.0    Depression F32.A    Anxiety F41.9    CHF (congestive heart failure) ([facility]) I50.9    Primary insomnia F51.01    JAYJAY on CPAP G47.33    Hypertension I10    A-fib ([facility]) I48.91    Malignant tumor of colon ([facility]) C18.9    Elevated fasting glucose R73.01    Other specified hypothyroidism E03.8    S/P small bowel resection Z90.49    Closed fracture of proximal end of left humerus with routine healing S42.202D    Renal mass, right N28.89    Status post nephrectomy Z90.5    Urinary retention R33.9    Renal cell carcinoma of right kidney ([facility]) C64.1    Chronic renal disease, stage III ([facility]) [137908] N18.30    Anasarca R60.1    Shortness of breath R06.02    Femoral fracture ([facility]) S72.90XA    Other fracture of left femur, initial encounter for closed fracture ([facility]) S72.8X2A    Hip injury, left, initial encounter S79.912A    Secondary hypercoagulable state ([facility]) D68.69    GI bleed K92.2    Pyelonephritis of left kidney N12    Class 3 severe obesity due to excess calories with serious comorbidity and body mass index (BMI) of 45.0 to 49.9 in adult ([facility]) E66.01, Z68.42    Pulmonary hypertension ([facility]) I27.20    Supratherapeutic INR R79.1       Past Medical History:        Diagnosis Date    A-fib ([facility]) 07/19/2017    Acute blood loss as cause of postoperative anemia 11/08/2018    Acute post-operative pain 10/09/2022    MICHAEL (acute kidney injury) ([facility]) 10/09/2022    Anxiety 04/30/2016    CAD (coronary artery disease)     TCC/[facility]/ last seen 9-2022    CHF (congestive heart

## 2024-06-24 NOTE — OP NOTE
[id number] [place] [address]                            OPERATIVE REPORT      PATIENT NAME: ANANTH GARCIA             : 1940  MED REC NO: [id number]                         ROOM: 0201  ACCOUNT NO: [id number]                       ADMIT DATE: 2024  PROVIDER: Bogdan Arguello DO      DATE OF PROCEDURE:  2024    SURGEON:  Bogdan Arguello DO    ASSISTANT:  None.    PREOPERATIVE DIAGNOSES:    1. Gastrointestinal bleeding.  2. Anemia.    POSTOPERATIVE DIAGNOSES:    1. Gastrointestinal bleeding.  2. Anemia.  3. Small sliding hiatal hernia.  4. Whole colon diverticulosis.  5. Colon polyps.    PROCEDURES:    1. Esophagogastroduodenoscopy with biopsy.  2. Colonoscopy with hot snare and hot forceps polypectomies as well as biopsies.    ANESTHESIA:  MAC.    ESTIMATED BLOOD LOSS:  Minimal.    FLUIDS:  Per Anesthesia record.    COMPLICATIONS:  None.    SPECIMENS:    1. Biopsy in antrum.  2. Polyp in ascending colon, removed with hot forceps.  3. Polyp at 45 cm, removed with hot snare.  4. Biopsies of colon at prior surgical anastomosis.    INDICATION FOR PROCEDURE:  This patient is an 83-year-old female who was admitted to the hospital with complaints of GI bleeding and anemia.  General Surgery consult was obtained.  When she was initially admitted, she was supratherapeutic in her INR and we monitored for 24 hours, but this did not seem to improve and so she was corrected with vitamin K.  She has received transfusions since admission, hemoglobin has been trending up.  Because of the fact that initially she was trending down and was continuing to have signs of bleeding, decision was made to proceed with EGD and colonoscopy.  Prior to the time of the procedure, risks, benefits, and alternatives were explained to the patient and consent was obtained.    DESCRIPTION OF PROCEDURE:  The patient was brought to endoscopy suite, kept on preop  representative of just polypoid type tissue.  I was having a little trouble adequately inspecting just because of the retained stool matter, but I did take several biopsies in that area and sent those together as a specimen.  The anastomosis though appeared intact and there was no bleeding from the anastomosis and no stricturing noted.  Once back in the rectum, retroflex view was obtained and as best as I could inspect in the rectum, I did not see any distal rectal abnormalities.  Again, there was a lot of hardened balls of stool in the rectum which made completely clearing that not possible with the scope, but I was able to appreciate no significant abnormalities.  The scope was straightened and removed, and the procedure was concluded.  At the conclusion of procedure, the patient was in stable condition and was taken to PACU for recovery.    PLAN:  We will follow up results of pathology and make additional recommendations at that time.  As the patient's hemoglobin has improved with transfusion and seems to be holding steady, I think we can go ahead and start her on a diet and soon as long as she tolerates, plan to discharge her.  I would hold on any resumption of anticoagulation for at least 48 hours due to the instrumentation that we did today and the increased risk of bleeding if we were to anticoagulate her to same.  If she has any recurrent episodes of bleeding at that point, may need to consider tagged red blood cell scan to better isolate source, though I do suspect it is probably in the lower colon somewhere.          IWONA GALLAGHER DO      D:  06/24/2024 12:52:29     T:  06/24/2024 18:13:36     ABDOULAYE/RUSS  Job #:  [id number]     Doc#:  [id number]    CC:   PCP

## 2024-06-24 NOTE — PROGRESS NOTES
Hospitalist Progress Note    Patient:  Yvette Das     YOB: 1940    MRN: [id number]   Admit date: 6/20/2024     Acct: [id number]     PCP: Emy Trevino DO    CC--Interval History: GI bleeding---rec'd one unit PRBCs--6.22.2024---HGB = 11.5----for EGD---colonoscopy---6.24.2024    CHF---PAF history and has been on Coumadin---difficult to control--Cardiology to evaluate need for cont'd anticoagulation and whether a NOAC would be appropriate if anticoagulation required in the future.    CKD----Stage 2    See note below    All other ROS negative except noted in HPI    Diet:  Diet NPO Exceptions are: Sips of Water with Meds    Medications:  Scheduled Meds:   amLODIPine  10 mg Oral Daily    atorvastatin  40 mg Oral Nightly    docusate sodium  100 mg Oral Daily    bethanechol  25 mg Oral 4x Daily    furosemide  40 mg Oral Daily    levothyroxine  50 mcg Oral Daily    spironolactone  25 mg Oral Daily    metoprolol tartrate  37.5 mg Oral BID    sodium chloride flush  5-40 mL IntraVENous 2 times per day    pantoprazole (PROTONIX) 40 mg in sodium chloride (PF) 0.9 % 10 mL injection  40 mg IntraVENous Daily     Continuous Infusions:   sodium chloride      sodium chloride      0.9% NaCl with KCl 20 mEq 75 mL/hr at 06/24/24 0652     PRN Meds:sodium chloride, ALPRAZolam, albuterol, sodium chloride flush, sodium chloride, ondansetron **OR** ondansetron, polyethylene glycol, acetaminophen **OR** acetaminophen, potassium chloride **OR** potassium alternative oral replacement **OR** potassium chloride, magnesium sulfate, oxyCODONE-acetaminophen **AND** oxyCODONE    Objective:  Labs:  CBC with Differential:    Lab Results   Component Value Date/Time    WBC 6.2 06/24/2024 05:06 AM    RBC 3.71 06/24/2024 05:06 AM    HGB 11.5 06/24/2024 05:06 AM    HCT 35.7 06/24/2024 05:06 AM    PLT See Reflexed IPF Result 06/24/2024 05:06 AM    MCV 96.2 06/24/2024 05:06 AM    MCH 31.0 06/24/2024 05:06 AM    MCHC 32.2 06/24/2024  additional surgeries below            POD _____ open low anterior resection rectosigmoid---proximal diverting                       ileostomy---11.5.2018          POD _____ cystoscopy-lighted stents---11.5.2018           Colon carcinoma--rectosigmoid----11.5.2018---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          Colonoscopy---10.8.2018--multiple polyps--large tumors distal sigmoid                       colon-one ulcerated--extensive diverticulosis---          GI bleeding ---10.6.2018---likely due to colon carcinoma--specifically  two                       large sigmoid masses---see above                   CT abdomen-pelvis----10.6.2018--diverticulosis--no acute diverticulitis---chronic                                osseous changes LS spine-sacrum--anterolithesis L4--partial fusion                                L5-S1-----decreased right gluteal hematoma          RCC--renal cell carcinoma----right kidney--2022    PMH:    osteoarthritis, osteoporosis, weakness with exertion---decreased exercise               tolerance----2017, atypical “chest pain”--aching right-left arms,  right rib               fractures---contusions---due to fall----2018,  dermatitis, suspected sleep apnea,                UTI---POA----11.5.2018, hypokalemia---11.5.2018, dermatitis---skin folds,                SBO--vs--postoperative ileus---11.9.2018, C DIFFICLE---[+]---colitis---5.31.2019,                rectal stricture @ 7 cm, hypokalemia   PSH:    KARYN-BSO--cervix absent--1966, cholecystectomy---               open---c. 1960s, right varicose veins--c. 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

## 2024-06-24 NOTE — CARE COORDINATION
D/w Dr. Arrieta, he states he doesn't need a consult, but just would like to know if we are able to change from coumadin to a DOAC.   Okay with me if okay surgery or GI.   If not okay for any AC, can see me in office to discuss watchman.     Fredo Casas DO, FACC, RPVI, LONNIE GARCIA  [facility]  [email or web address]  [phone]

## 2024-06-24 NOTE — PROGRESS NOTES
Physical Therapy  Facility/Department: [facility]  PROGRESSIVE C[facility]  Daily Treatment Note  NAME: Yvette Das  : 1940  MRN: [id number]    Date of Service: 2024    Discharge Recommendations:  Subacute/Skilled Nursing Facility        Patient Diagnosis(es): The encounter diagnosis was Gastrointestinal hemorrhage, unspecified gastrointestinal hemorrhage type.    Assessment   Assessment: Pt fatigues quickly after bed exercises  Activity Tolerance: Patient tolerated evaluation without incident;Patient limited by fatigue     Plan    Physical Therapy Plan  General Plan: 5-7 times per week  Current Treatment Recommendations: Strengthening;ROM;Balance training;Transfer training;Neuromuscular re-education;Endurance training;Home exercise program     Restrictions  Restrictions/Precautions  Restrictions/Precautions: Weight Bearing  Lower Extremity Weight Bearing Restrictions  Left Lower Extremity Weight Bearing: Non Weight Bearing     Subjective    Subjective  Subjective: Pt in bed upon arrival. Pt states she did not sleep well and just got covered up with warm blanket. Pt agreeable to bed exercises only.  Pain: no pain this date  Orientation  Overall Orientation Status: Within Functional Limits     Objective   Vitals  O2 Device: None (Room air)  Bed Mobility Training  Bed Mobility Training: No  Transfer Training  Transfer Training: No  Neuromuscular Education  Neuromuscular Education: No  PT Exercises  Exercise Treatment: Supine ankle pumps x20, glute sets x 20. Quad sets x15, supine hip abd/add x 10,  Manual assistance with supine hip abd     Safety Devices  Type of Devices: Bed alarm in place;Call light within reach;Left in bed;Nurse notified       Goals  Short Term Goals  Time Frame for Short Term Goals: LOS  Short Term Goal 1: Sup to Sit with min x 1  Short Term Goal 2: Sitting EOB x 5 min  Short Term Goal 3: Patient to be able to complete 10-15 reps LE exs with CGA to min assist.  Patient Goals   Patient Goals :  Imrpove strength, bed mob    Education  Patient Education  Education Given To: Patient  Education Provided: Home Exercise Program    AM-PAC - Mobility              Therapy Time   Individual Concurrent Group Co-treatment   Time In 1034         Time Out 1043         Minutes 9                 ENMANUEL RAMIREZ, PTA

## 2024-06-24 NOTE — PROGRESS NOTES
Physician Progress Note      PATIENT:               ANANTH GARCIA  CSN #:                  [id number]  :                       1940  ADMIT DATE:       2024 9:02 PM  DISCH DATE:  RESPONDING  PROVIDER #:        Dimas Moreau MD          QUERY TEXT:    Pt admitted with GI bleed. Pt noted to have HGB 10.7 on admission decreased to   7.0 on . If possible, please document in the progress notes and discharge   summary if you are evaluating and/or treating any of the following:    The medical record reflects the following:  Risk Factors: 83 yr, GI bleed, INR 3.3, CKD  Clinical Indicators: HGB 10.7 on admission decreased to 7.0 on , per H&P \"   Per report rectal exam showed melena.\"  Treatment: transfuse PRBC, lab monitoring, EGD, colonoscopy                  [email or web address]  Options provided:  -- Acute blood loss anemia  -- Other - I will add my own diagnosis  -- Disagree - Not applicable / Not valid  -- Disagree - Clinically unable to determine / Unknown  -- Refer to Clinical Documentation Reviewer    PROVIDER RESPONSE TEXT:    This patient has acute blood loss anemia.    Query created by: LUC RUIZ on 2024 2:24 PM      Electronically signed by:  Dimas Moreau MD 2024 3:52 PM

## 2024-06-24 NOTE — PLAN OF CARE
Problem: Discharge Planning  Goal: Discharge to home or other facility with appropriate resources  6/24/2024 1146 by Yolanda Maguire RN  Outcome: Progressing  6/24/2024 0653 by Valerie Lim RN  Outcome: Progressing  Flowsheets (Taken 6/23/2024 2000)  Discharge to home or other facility with appropriate resources:   Identify barriers to discharge with patient and caregiver   Arrange for needed discharge resources and transportation as appropriate   Identify discharge learning needs (meds, wound care, etc)   Refer to discharge planning if patient needs post-hospital services based on physician order or complex needs related to functional status, cognitive ability or social support system     Problem: Skin/Tissue Integrity  Goal: Absence of new skin breakdown  Description: 1.  Monitor for areas of redness and/or skin breakdown  2.  Assess vascular access sites hourly  3.  Every 4-6 hours minimum:  Change oxygen saturation probe site  4.  Every 4-6 hours:  If on nasal continuous positive airway pressure, respiratory therapy assess nares and determine need for appliance change or resting period.  6/24/2024 1146 by Yolanda Maguire RN  Outcome: Progressing  6/24/2024 0653 by Valerie Lim RN  Outcome: Progressing     Problem: Safety - Adult  Goal: Free from fall injury  6/24/2024 1146 by Yolanda Maguire RN  Outcome: Progressing  6/24/2024 0653 by Valerie Lim RN  Outcome: Progressing     Problem: ABCDS Injury Assessment  Goal: Absence of physical injury  6/24/2024 1146 by Yolanda Maguire RN  Outcome: Progressing  6/24/2024 0653 by Valerie Lim RN  Outcome: Progressing     Problem: Skin/Tissue Integrity - Adult  Goal: Skin integrity remains intact  6/24/2024 1146 by Yolanda Maguire RN  Outcome: Progressing  6/24/2024 0653 by Valerie Lim RN  Outcome: Progressing  Flowsheets (Taken 6/23/2024 2000)  Skin Integrity Remains Intact: Monitor for areas of redness and/or skin breakdown

## 2024-06-24 NOTE — PROGRESS NOTES
rounding in PCU.    Assessment: Patient shared about being overwhelmed by her health situation, recently losing her daughter-in-law, grieving for/with her son and family, and re-entering rehab at a nursing care facility.  Patient has a strong relationship with God, has the support of her large family, and has a determined attitude to \"get though\" the difficult times.  Patient expressed her gratefulness for the excellent care of her nurses.     06/24/24 1614   Encounter Summary   Encounter Overview/Reason Initial Encounter;Spiritual/Emotional Needs   Service Provided For Patient   Referral/Consult From Rounding   Support System Children;Family members;Sikh/niranjan community   Last Encounter  06/24/24   Complexity of Encounter Moderate   Begin Time 1434   End Time  1530   Total Time Calculated 56 min   Spiritual/Emotional needs   Type Spiritual Support   Assessment/Intervention/Outcome   Assessment Coping;Hopeful;Sad   Intervention Active listening;Discussed belief system/Religion practices/niranjan;Discussed illness injury and it’s impact;Prayer (assurance of)/[place];Sustaining Presence/Ministry of presence;Nurtured Hope   Outcome Acceptance;Comfort;Coping;Encouraged;Engaged in conversation;Expressed Gratitude;Receptive;Venting emotion         Intervention: Engaged in conversation and active listening. Prayed with Patient.     Outcome: Patient expressed appreciation for visit and offer of continued prayer.    Plan: Chaplains are available on site or on call 24/7 for spiritual and emotional support.    Electronically signed by Natty Lopez on 6/24/2024 at 4:15 PM

## 2024-06-24 NOTE — ANESTHESIA POSTPROCEDURE EVALUATION
Department of Anesthesiology  Postprocedure Note    Patient: Yvette Das  MRN: [id number]  YOB: 1940  Date of evaluation: 6/24/2024    Procedure Summary       Date: 06/24/24 Room / Location: [facility] / [facility]    Anesthesia Start: 1202 Anesthesia Stop: 1258    Procedures:       ESOPHAGOGASTRODUODENOSCOPY BIOPSY      COLONOSCOPY BIOPSY Diagnosis:       Gastrointestinal hemorrhage, unspecified gastrointestinal hemorrhage type      (Gastrointestinal hemorrhage, unspecified gastrointestinal hemorrhage type [K92.2])    Surgeons: Bogdan Arguello DO Responsible Provider: Marlon Mancilla APRN - CRNA    Anesthesia Type: General, TIVA ASA Status: 3            Anesthesia Type: General, TIVA    Shankar Phase I:      Shankar Phase II: Shankar Score: 9    Anesthesia Post Evaluation    Patient location during evaluation: bedside  Level of consciousness: sleepy but conscious  Airway patency: patent  Nausea & Vomiting: no nausea and no vomiting  Cardiovascular status: hemodynamically stable  Respiratory status: spontaneous ventilation  Hydration status: stable  Pain management: satisfactory to patient    No notable events documented.

## 2024-06-24 NOTE — PROGRESS NOTES
PT underwent EGD and colonoscopy.  No clear source of bleeding identified.  Based on findings suspect bleeding was coming from descending/sigmoid colon and most likely diverticular bleed.  No active bleeding sources noted.    Will start diet.    Monitor hgb.  If stable can discharge from surgical perspective.  Recommend not restarting anticoagulation for at least 48 hours.  If pt has any ongoing signs/symptoms of bleeding will need to consider tagged red blood cell scan which would require transfer to institution with that capability.    Electronically signed by Bogdan Arguello DO on 6/24/2024 at 1:01 PM

## 2024-06-24 NOTE — PROGRESS NOTES
Comprehensive Nutrition Assessment    Type and Reason for Visit:  Initial, NPO/Clear Liquid    Nutrition Recommendations/Plan:   Advance diet as able.  Monitor and follow.     Malnutrition Assessment:  Malnutrition Status:  At risk for malnutrition (Comment) (r/t high BMI and presence of chronic disease) (06/24/24 1219)    Context:  Acute Illness     Findings of the 6 clinical characteristics of malnutrition:  Energy Intake:  Mild decrease in energy intake (Comment)  Weight Loss:  No significant weight loss     Body Fat Loss:  Unable to assess     Muscle Mass Loss:  Unable to assess    Fluid Accumulation:  Mild Extremities   Strength:       Nutrition Assessment:    Pt assessed d/t NPO/CL x 4 days d/t GI bleed. EGD/colonoscopy this morning. PO Intake of 3850 ml since admit. Net I/O since admission: -2603 ml. LBM 6/23. Blood transfusion 6/23. Erratic weight hx likely d/t CHF.    Nutrition Related Findings:    +1 BLE edema. Meds/labs reviewed. Diuretics, fluids given. Currently all on hold. Wound Type: None       Current Nutrition Intake & Therapies:    Average Meal Intake: 51-75%, 26-50%  Average Supplements Intake: None Ordered  Diet NPO Exceptions are: Sips of Water with Meds    Anthropometric Measures:  Height: 170.2 cm (5' 7\")  Ideal Body Weight (IBW): 135 lbs (61 kg)    Admission Body Weight: 143.1 kg (315 lb 8 oz)  Current Body Weight: 138.8 kg (306 lb), 226.7 % IBW. Weight Source: Bed Scale  Current BMI (kg/m2): 47.9  Usual Body Weight:  (UTD; erratic weight pattern)     Weight Adjustment For: No Adjustment                 BMI Categories: Obese Class 3 (BMI 40.0 or greater)    Estimated Daily Nutrient Needs:  Energy Requirements Based On: Kcal/kg  Weight Used for Energy Requirements: Current  Energy (kcal/day): 2000 kcal - 500 to 750 kcal for weight loss = 9685-2055 kcal  Weight Used for Protein Requirements: Ideal  Protein (g/day): 70-80  Method Used for Fluid Requirements: 1 ml/kcal  Fluid (ml/day):

## 2024-06-25 VITALS
HEART RATE: 100 BPM | OXYGEN SATURATION: 96 % | HEIGHT: 67 IN | SYSTOLIC BLOOD PRESSURE: 99 MMHG | DIASTOLIC BLOOD PRESSURE: 52 MMHG | BODY MASS INDEX: 45.99 KG/M2 | RESPIRATION RATE: 15 BRPM | TEMPERATURE: 98 F | WEIGHT: 293 LBS

## 2024-06-25 LAB
ANION GAP SERPL CALCULATED.3IONS-SCNC: 10 MMOL/L (ref 9–17)
BASOPHILS # BLD: 0.04 K/UL (ref 0–0.2)
BASOPHILS NFR BLD: 1 % (ref 0–2)
BUN SERPL-MCNC: 11 MG/DL (ref 8–23)
BUN/CREAT SERPL: 12 (ref 9–20)
CALCIUM SERPL-MCNC: 8.1 MG/DL (ref 8.6–10.4)
CHLORIDE SERPL-SCNC: 108 MMOL/L (ref 98–107)
CO2 SERPL-SCNC: 21 MMOL/L (ref 20–31)
CREAT SERPL-MCNC: 0.9 MG/DL (ref 0.5–0.9)
EOSINOPHIL # BLD: 0.28 K/UL (ref 0–0.44)
EOSINOPHILS RELATIVE PERCENT: 4 % (ref 1–4)
ERYTHROCYTE [DISTWIDTH] IN BLOOD BY AUTOMATED COUNT: 14.6 % (ref 11.8–14.4)
GFR, ESTIMATED: 63 ML/MIN/1.73M2
GLUCOSE SERPL-MCNC: 101 MG/DL (ref 70–99)
HCT VFR BLD AUTO: 26.7 % (ref 36.3–47.1)
HCT VFR BLD AUTO: 29.5 % (ref 36.3–47.1)
HGB BLD-MCNC: 8.5 G/DL (ref 11.9–15.1)
HGB BLD-MCNC: 9.6 G/DL (ref 11.9–15.1)
IMM GRANULOCYTES # BLD AUTO: 0.09 K/UL (ref 0–0.3)
IMM GRANULOCYTES NFR BLD: 1 %
INR PPP: 1.3
LYMPHOCYTES NFR BLD: 1.75 K/UL (ref 1.1–3.7)
LYMPHOCYTES RELATIVE PERCENT: 23 % (ref 24–43)
MCH RBC QN AUTO: 31.5 PG (ref 25.2–33.5)
MCHC RBC AUTO-ENTMCNC: 31.8 G/DL (ref 25.2–33.5)
MCV RBC AUTO: 98.9 FL (ref 82.6–102.9)
MONOCYTES NFR BLD: 0.85 K/UL (ref 0.1–1.2)
MONOCYTES NFR BLD: 11 % (ref 3–12)
NEUTROPHILS NFR BLD: 60 % (ref 36–65)
NEUTS SEG NFR BLD: 4.65 K/UL (ref 1.5–8.1)
NRBC BLD-RTO: 0 PER 100 WBC
PLATELET # BLD AUTO: 171 K/UL (ref 138–453)
PMV BLD AUTO: 12.3 FL (ref 8.1–13.5)
POTASSIUM SERPL-SCNC: 3.9 MMOL/L (ref 3.7–5.3)
PROTHROMBIN TIME: 15.9 SEC (ref 11.5–14.2)
RBC # BLD AUTO: 2.7 M/UL (ref 3.95–5.11)
RBC # BLD: ABNORMAL 10*6/UL
SODIUM SERPL-SCNC: 139 MMOL/L (ref 135–144)
WBC OTHER # BLD: 7.7 K/UL (ref 3.5–11.3)

## 2024-06-25 PROCEDURE — 80048 BASIC METABOLIC PNL TOTAL CA: CPT

## 2024-06-25 PROCEDURE — 6360000002 HC RX W HCPCS: Performed by: SURGERY

## 2024-06-25 PROCEDURE — 2580000003 HC RX 258: Performed by: SURGERY

## 2024-06-25 PROCEDURE — 85018 HEMOGLOBIN: CPT

## 2024-06-25 PROCEDURE — 36415 COLL VENOUS BLD VENIPUNCTURE: CPT

## 2024-06-25 PROCEDURE — 99238 HOSP IP/OBS DSCHRG MGMT 30/<: CPT | Performed by: INTERNAL MEDICINE

## 2024-06-25 PROCEDURE — 97110 THERAPEUTIC EXERCISES: CPT

## 2024-06-25 PROCEDURE — 85610 PROTHROMBIN TIME: CPT

## 2024-06-25 PROCEDURE — C9113 INJ PANTOPRAZOLE SODIUM, VIA: HCPCS | Performed by: SURGERY

## 2024-06-25 PROCEDURE — 85025 COMPLETE CBC W/AUTO DIFF WBC: CPT

## 2024-06-25 PROCEDURE — 6370000000 HC RX 637 (ALT 250 FOR IP): Performed by: SURGERY

## 2024-06-25 PROCEDURE — 51702 INSERT TEMP BLADDER CATH: CPT

## 2024-06-25 PROCEDURE — 85014 HEMATOCRIT: CPT

## 2024-06-25 RX ORDER — PANTOPRAZOLE SODIUM 40 MG/1
40 TABLET, DELAYED RELEASE ORAL DAILY
Qty: 30 TABLET | Refills: 0 | Status: SHIPPED | OUTPATIENT
Start: 2024-06-25 | End: 2024-07-25

## 2024-06-25 RX ORDER — PSEUDOEPHEDRINE HCL 30 MG
100 TABLET ORAL DAILY
Qty: 30 CAPSULE | Refills: 0 | Status: SHIPPED | OUTPATIENT
Start: 2024-06-25 | End: 2024-07-25

## 2024-06-25 RX ORDER — OXYCODONE AND ACETAMINOPHEN 10; 325 MG/1; MG/1
1 TABLET ORAL EVERY 6 HOURS
Qty: 12 TABLET | Refills: 0 | Status: SHIPPED | OUTPATIENT
Start: 2024-06-25 | End: 2024-06-28

## 2024-06-25 RX ORDER — ALPRAZOLAM 0.25 MG/1
0.25 TABLET ORAL EVERY 8 HOURS PRN
Qty: 9 TABLET | Refills: 0 | Status: SHIPPED | OUTPATIENT
Start: 2024-06-25 | End: 2024-07-08 | Stop reason: SDUPTHER

## 2024-06-25 RX ADMIN — LEVOTHYROXINE SODIUM 50 MCG: 0.03 TABLET ORAL at 08:20

## 2024-06-25 RX ADMIN — FUROSEMIDE 40 MG: 40 TABLET ORAL at 08:20

## 2024-06-25 RX ADMIN — OXYCODONE HYDROCHLORIDE 5 MG: 5 TABLET ORAL at 02:45

## 2024-06-25 RX ADMIN — SODIUM CHLORIDE, PRESERVATIVE FREE 40 MG: 5 INJECTION INTRAVENOUS at 08:20

## 2024-06-25 RX ADMIN — DOCUSATE SODIUM 100 MG: 100 CAPSULE, LIQUID FILLED ORAL at 08:20

## 2024-06-25 RX ADMIN — SODIUM CHLORIDE, PRESERVATIVE FREE 10 ML: 5 INJECTION INTRAVENOUS at 08:21

## 2024-06-25 RX ADMIN — SPIRONOLACTONE 25 MG: 25 TABLET ORAL at 08:20

## 2024-06-25 RX ADMIN — OXYCODONE HYDROCHLORIDE AND ACETAMINOPHEN 1 TABLET: 5; 325 TABLET ORAL at 02:45

## 2024-06-25 RX ADMIN — BETHANECHOL CHLORIDE 25 MG: 25 TABLET ORAL at 08:20

## 2024-06-25 RX ADMIN — METOPROLOL TARTRATE 37.5 MG: 25 TABLET, FILM COATED ORAL at 08:20

## 2024-06-25 RX ADMIN — AMLODIPINE BESYLATE 10 MG: 5 TABLET ORAL at 08:20

## 2024-06-25 RX ADMIN — BETHANECHOL CHLORIDE 25 MG: 25 TABLET ORAL at 12:04

## 2024-06-25 RX ADMIN — POTASSIUM CHLORIDE AND SODIUM CHLORIDE: 900; 150 INJECTION, SOLUTION INTRAVENOUS at 07:32

## 2024-06-25 ASSESSMENT — PAIN DESCRIPTION - LOCATION
LOCATION: HIP
LOCATION: HIP

## 2024-06-25 ASSESSMENT — PAIN SCALES - WONG BAKER
WONGBAKER_NUMERICALRESPONSE: NO HURT

## 2024-06-25 ASSESSMENT — PAIN DESCRIPTION - ORIENTATION
ORIENTATION: LEFT
ORIENTATION: LEFT

## 2024-06-25 ASSESSMENT — PAIN DESCRIPTION - DESCRIPTORS
DESCRIPTORS: DISCOMFORT
DESCRIPTORS: DISCOMFORT

## 2024-06-25 ASSESSMENT — PAIN SCALES - GENERAL
PAINLEVEL_OUTOF10: 5
PAINLEVEL_OUTOF10: 8

## 2024-06-25 ASSESSMENT — PAIN - FUNCTIONAL ASSESSMENT: PAIN_FUNCTIONAL_ASSESSMENT: ACTIVITIES ARE NOT PREVENTED

## 2024-06-25 NOTE — PLAN OF CARE
Problem: Discharge Planning  Goal: Discharge to home or other facility with appropriate resources  6/25/2024 0902 by Chanda Lyle RN  Outcome: Progressing  6/24/2024 2047 by Elena Sanchez RN  Outcome: Progressing     Problem: Skin/Tissue Integrity  Goal: Absence of new skin breakdown  Description: 1.  Monitor for areas of redness and/or skin breakdown  2.  Assess vascular access sites hourly  3.  Every 4-6 hours minimum:  Change oxygen saturation probe site  4.  Every 4-6 hours:  If on nasal continuous positive airway pressure, respiratory therapy assess nares and determine need for appliance change or resting period.  6/25/2024 0902 by Chanda Lyle RN  Outcome: Progressing  6/24/2024 2047 by Elena Sanchez RN  Outcome: Progressing     Problem: Safety - Adult  Goal: Free from fall injury  6/25/2024 0902 by Chanda Lyle RN  Outcome: Progressing  6/24/2024 2047 by Elena Sanchez RN  Outcome: Progressing     Problem: ABCDS Injury Assessment  Goal: Absence of physical injury  6/25/2024 0902 by Chanda Lyle RN  Outcome: Progressing  6/24/2024 2047 by Elena Sanchez RN  Outcome: Progressing

## 2024-06-25 NOTE — PROGRESS NOTES
Physical Therapy  Facility/Department: [facility]  PROGRESSIVE C[facility]  Daily Treatment Note  NAME: Yvette Das  : 1940  MRN: [id number]    Date of Service: 2024    Discharge Recommendations:  Subacute/Skilled Nursing Facility        Patient Diagnosis(es): The encounter diagnosis was Gastrointestinal hemorrhage, unspecified gastrointestinal hemorrhage type.    Assessment   Assessment: Pt fatigues quickly after bed exercises  Activity Tolerance: Patient tolerated evaluation without incident;Patient limited by fatigue     Plan    Physical Therapy Plan  General Plan: 5-7 times per week  Current Treatment Recommendations: Strengthening;ROM;Balance training;Transfer training;Neuromuscular re-education;Endurance training;Home exercise program     Restrictions  Restrictions/Precautions  Restrictions/Precautions: Weight Bearing  Lower Extremity Weight Bearing Restrictions  Left Lower Extremity Weight Bearing: Non Weight Bearing     Subjective    Subjective  Subjective: Pt in bed upon arrival. Pt states she is cold and requests to complete bed exercises only. Family member in room during session  Pain: No pain reported  Orientation  Overall Orientation Status: Within Functional Limits     Objective   Vitals  O2 Device: None (Room air)  Bed Mobility Training  Bed Mobility Training: No  Transfer Training  Transfer Training: No  Neuromuscular Education  Neuromuscular Education: No  PT Exercises  Exercise Treatment: Bilateral supine ankle pumps x20, glute sets x 20. Quad sets x15, SLR, supine hip abd/add x 10,  Manual assistance with supine hip abd and SLR     Safety Devices  Type of Devices: Bed alarm in place;Call light within reach;Left in bed;Nurse notified       Goals  Short Term Goals  Time Frame for Short Term Goals: LOS  Short Term Goal 1: Sup to Sit with min x 1  Short Term Goal 2: Sitting EOB x 5 min  Short Term Goal 3: Patient to be able to complete 10-15 reps LE exs with CGA to min assist.  Patient Goals

## 2024-06-25 NOTE — DISCHARGE INSTR - DIET

## 2024-06-25 NOTE — PROGRESS NOTES
GI bleed- warfarin is held at least 48 hrs post op per Dr. Arguello.  Cardiology would like to have DOAC started if possible.  Cardiology to f/u with patient post discharge for Watchman.  Will continue to follow while inpatient.   Chuy Rapp  6/25/2024  7:51 AM

## 2024-06-25 NOTE — PLAN OF CARE
Problem: Discharge Planning  Goal: Discharge to home or other facility with appropriate resources  6/24/2024 2047 by Elena Sanchez RN  Outcome: Progressing  6/24/2024 1146 by Yolanda Maguire RN  Outcome: Progressing  6/24/2024 0653 by Valerie Lim RN  Outcome: Progressing  Flowsheets (Taken 6/23/2024 2000)  Discharge to home or other facility with appropriate resources:   Identify barriers to discharge with patient and caregiver   Arrange for needed discharge resources and transportation as appropriate   Identify discharge learning needs (meds, wound care, etc)   Refer to discharge planning if patient needs post-hospital services based on physician order or complex needs related to functional status, cognitive ability or social support system     Problem: Skin/Tissue Integrity  Goal: Absence of new skin breakdown  Description: 1.  Monitor for areas of redness and/or skin breakdown  2.  Assess vascular access sites hourly  3.  Every 4-6 hours minimum:  Change oxygen saturation probe site  4.  Every 4-6 hours:  If on nasal continuous positive airway pressure, respiratory therapy assess nares and determine need for appliance change or resting period.  6/24/2024 2047 by Elena Sanchez RN  Outcome: Progressing  6/24/2024 1146 by Yolanda Maguire RN  Outcome: Progressing  6/24/2024 0653 by Valerie Lim RN  Outcome: Progressing     Problem: Safety - Adult  Goal: Free from fall injury  6/24/2024 2047 by Elena Sanchez RN  Outcome: Progressing  6/24/2024 1146 by Yolanda Maguire RN  Outcome: Progressing  6/24/2024 0653 by Valerie Lim RN  Outcome: Progressing     Problem: ABCDS Injury Assessment  Goal: Absence of physical injury  6/24/2024 2047 by Elena Sanchez RN  Outcome: Progressing  6/24/2024 1146 by Yolanda Maguire RN  Outcome: Progressing  6/24/2024 0653 by Valerie Lim RN  Outcome: Progressing     Problem: Skin/Tissue Integrity - Adult  Goal: Skin integrity remains intact  6/24/2024  products/factors as ordered     Problem: Chronic Conditions and Co-morbidities  Goal: Patient's chronic conditions and co-morbidity symptoms are monitored and maintained or improved  6/24/2024 2047 by Elena Sanchez RN  Outcome: Progressing  6/24/2024 1146 by Yolanda Maguire RN  Outcome: Progressing  6/24/2024 0653 by Valerie Lim RN  Outcome: Progressing  Flowsheets (Taken 6/23/2024 2000)  Care Plan - Patient's Chronic Conditions and Co-Morbidity Symptoms are Monitored and Maintained or Improved:   Monitor and assess patient's chronic conditions and comorbid symptoms for stability, deterioration, or improvement   Collaborate with multidisciplinary team to address chronic and comorbid conditions and prevent exacerbation or deterioration   Update acute care plan with appropriate goals if chronic or comorbid symptoms are exacerbated and prevent overall improvement and discharge     Problem: Pain  Goal: Verbalizes/displays adequate comfort level or baseline comfort level  6/24/2024 2047 by Elena Sanchez RN  Outcome: Progressing  6/24/2024 1146 by Yolanda Maguire RN  Outcome: Progressing  6/24/2024 0653 by Valerie Lim RN  Outcome: Progressing     Problem: Nutrition Deficit:  Goal: Optimize nutritional status  6/24/2024 2047 by Elena Sanchez RN  Outcome: Progressing  6/24/2024 1227 by Gomez Ho RD  Outcome: Not Progressing  Flowsheets (Taken 6/24/2024 1227)  Nutrient intake appropriate for improving, restoring, or maintaining nutritional needs:   Assess nutritional status and recommend course of action   Monitor oral intake, labs, and treatment plans   Recommend appropriate diets, oral nutritional supplements, and vitamin/mineral supplements     Problem: Nutrition Deficit:  Goal: Optimize nutritional status  6/24/2024 2047 by Elena Sanchez RN  Outcome: Progressing  6/24/2024 1227 by Gomez Ho RD  Outcome: Not Progressing  Flowsheets (Taken 6/24/2024 1227)  Nutrient intake appropriate

## 2024-06-26 LAB — SURGICAL PATHOLOGY REPORT: NORMAL

## 2024-06-26 NOTE — DISCHARGE SUMMARY
[facility]                [address] [place] [address]                            DISCHARGE SUMMARY      PATIENT NAME: ANANTH GARCIA             : 1940  MED REC NO: [id number]                         ROOM: 0201  ACCOUNT NO: [id number]                       ADMIT DATE: 2024  PROVIDER: Dimas Arrieta MD      PERSONAL PHYSICIAN:  Emy Trevino DO, [facility], [facility].  The patient is a resident of [facility].  The patient was seen in the outpatient setting, [facility], [facility] and Dr. Rory Hernandez, Hematology/Oncology.    DIAGNOSES:    1. Gastrointestinal bleed with large gelatinous clot 2024, one unit packed red blood cell transfusion 2024, esophagogastroduodenoscopy and colonoscopy 2024, Dr. Arguello, General Surgery.  No clear source of bleeding identified, most likely diverticular bleed.  2. Supratherapeutic INR 2024.  The patient having significant difficulties in trying to adjust Coumadin.  3. Pyelonephritis, left solitary kidney, status post right nephrectomy, renal cell carcinoma, urinary retention, post void residual of .  Urine culture 2024, multiple organisms colonization.  No infection.  Pannus ulcer, wound ulcer of lower abdomen.  4. Rectal melena.  5. CTAP 2024, moderate volume of desiccated stool within the rectum.  Moderate ureterectasis of left ureter.  6. Colonic diverticulosis without diverticulitis.  7. Cardiomegaly by atrial enlargement.  8. Heavy 3-vessel calcification, trabecular sclerosis, sacrum, no change.  9. Left femur fracture, closed proximal 2024, not surgical, no weightbearing.  10. Congestive heart failure, chronic, combined systolic, diastolic.  11. Prior EKGs 10/24/2023 and 2023, atrial fibrillation with premature ventricular contractions.  12. Pulmonary hypertension.  13. Chronic kidney disease, stage 2,

## 2024-06-26 NOTE — PROGRESS NOTES
aneurysmal dilatation proximal RCA---LVEF ~ 55%             2D ECHO----8.3.2017--ROVRETO--NLVSF---dilated RV---NRVSF---MAC--mild MR--                          mild TR---RVSP ~ 31 mm Hg---LVEF ~ 50%             RVR---newly diagnosed----7.19.2017----see cardioversion above---9.10.2017              MI ruled out--7.20.2017             EKG---7.20.2017--atrial fibrillation--78---low voltage              EKG---7.19.2017--atrial fibrillation---RVR---111--diffuse low                         voltage--RSR' V1-2 only---cannot exclude prior high                        lateral infarction               Sinus bradycardia---1st-degree AVB, see above   ASCVD---see above  CHF--chronic--diastolic    II/VI FLORINDA  Hypertension  Hyperlipidemia  Insulin resistance--impaired glucose tolerance  JAYJAY---CPAP  Morbid obesity  Depression--anxiety     Prior--malignancies        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           See additional surgeries below            POD _____ open low anterior resection rectosigmoid---proximal diverting                       ileostomy---11.5.2018          POD _____ cystoscopy-lighted stents---11.5.2018           Colon carcinoma--rectosigmoid----11.5.2018---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          Colonoscopy---10.8.2018--multiple polyps--large tumors distal sigmoid                       colon-one ulcerated--extensive diverticulosis---          GI bleeding ---10.6.2018---likely due to colon carcinoma--specifically  two                       large sigmoid masses---see above                   CT abdomen-pelvis----10.6.2018--diverticulosis--no acute diverticulitis---chronic                                osseous changes LS spine-sacrum--anterolithesis L4--partial fusion                                L5-S1-----decreased right gluteal hematoma          RCC--renal cell carcinoma----right kidney--2022    PMH:    osteoarthritis, osteoporosis,  weakness with exertion---decreased exercise               tolerance----2017, atypical “chest pain”--aching right-left arms,  right rib               fractures---contusions---due to fall----2018,  dermatitis, suspected sleep apnea,                UTI---POA----11.5.2018, hypokalemia---11.5.2018, dermatitis---skin folds,                SBO--vs--postoperative ileus---11.9.2018, C DIFFICLE---[+]---colitis---5.31.2019,                rectal stricture @ 7 cm, hypokalemia   PSH:    KARYN-BSO--cervix absent--1966, cholecystectomy---               open---c. 1960s, right varicose veins--cDenise 1960s, left                YUMI, right TKA, left TKA, loop ileostomy due to adenocarcinoma colon--difficult anastomosis--               sigmoidoscopy---5.9.2019, colonoscopy---4.25.2019, colonoscopy--2020--tubular adenoma,               LRA right total nephrectomy--[place]--10.7.2022, colonoscopy--2.23.2023,                take-down loop ileostomy---5.22.2019---[facility] ---mild adhesions ileostomy    Allergies:        penicillins  Intolerances:  Betra--valdecoxib----diarrhea      Plan:     To [facility]----6.25.2024     Medications reviewed     TOV---3 days     Eliquis---6.26.2024     PPI     Anxiety----Xanax     Follow up Dr. Trevino, FP     Follow up Dr. Zeng, Orthopedics     If fails TOV, then Urology     See orders    Electronically signed by Dimas Arrieta MD on 6/25/2024 at 9:24 PM    Hospitalist

## 2024-06-27 ENCOUNTER — OUTSIDE SERVICES (OUTPATIENT)
Dept: INTERNAL MEDICINE | Age: 84
End: 2024-06-27
Payer: MEDICARE

## 2024-06-27 DIAGNOSIS — N18.31 CHRONIC KIDNEY DISEASE, STAGE 3A (HCC): ICD-10-CM

## 2024-06-27 DIAGNOSIS — E66.01 CLASS 3 SEVERE OBESITY DUE TO EXCESS CALORIES WITH SERIOUS COMORBIDITY AND BODY MASS INDEX (BMI) OF 45.0 TO 49.9 IN ADULT (HCC): ICD-10-CM

## 2024-06-27 DIAGNOSIS — G47.33 OBSTRUCTIVE SLEEP APNEA: ICD-10-CM

## 2024-06-27 DIAGNOSIS — I48.0 PAROXYSMAL ATRIAL FIBRILLATION (HCC): ICD-10-CM

## 2024-06-27 DIAGNOSIS — K92.2 GASTROINTESTINAL HEMORRHAGE, UNSPECIFIED GASTROINTESTINAL HEMORRHAGE TYPE: Primary | ICD-10-CM

## 2024-06-27 DIAGNOSIS — R33.9 URINARY RETENTION: ICD-10-CM

## 2024-06-27 DIAGNOSIS — S42.202D CLOSED FRACTURE OF PROXIMAL END OF LEFT HUMERUS WITH ROUTINE HEALING, UNSPECIFIED FRACTURE MORPHOLOGY, SUBSEQUENT ENCOUNTER: ICD-10-CM

## 2024-06-27 PROCEDURE — 99309 SBSQ NF CARE MODERATE MDM 30: CPT | Performed by: NURSE PRACTITIONER

## 2024-06-27 NOTE — PROGRESS NOTES
06/27/24  Yvette Das  1940    Patient Resident of [facility]    Chief Complaint  1. Gastrointestinal hemorrhage, unspecified gastrointestinal hemorrhage type    2. Paroxysmal atrial fibrillation (HCC)    3. Urinary retention    4. Chronic kidney disease, stage 3a ([facility])    5. Obstructive sleep apnea    6. Class 3 severe obesity due to excess calories with serious comorbidity and body mass index (BMI) of 45.0 to 49.9 in adult ([facility])    7. Closed fracture of proximal end of left humerus with routine healing, unspecified fracture morphology, subsequent encounter        HPI:  Patient being seen for posthospital follow-up was admitted at [facility] 6/20/2024 through 6/25/2024 for GI bleed.  Patient did require 1 unit of packed red blood cells transfusion.  Did undergo EGD and colonoscopy on 6/24/2024 with Dr. Arguello.  No clear source of bleeding identified.  Felt to be a diverticular bleed.  Patient did have a subtherapeutic INR.  Was on oral antibiotics for pyelonephritis.  Per hospital record urine culture 6/21/2020 for multiple organisms suggestive of colonization.  Patient again remains nonweightbearing due to history of left hip fracture deemed nonsurgical.  Her Coumadin was changed over to Eliquis for her atrial fibs blood counts stabilized and was subsequently sent back to [facility] with follow-up blood work.  Petty catheter remained in place due to urinary retention with orders for void trial in 3 days.  On exam patient is sitting up eating breakfast talking with family.  States pain has been well-controlled.  Denies any further blood in stool.  Petty draining light yellow urine.  Has had a good appetite.      Allergies   Allergen Reactions    Pcn [Penicillins] Hives and Shortness Of Breath    Bextra [Valdecoxib] Diarrhea       Past Medical History:   Diagnosis Date    A-fib ([facility]) 07/19/2017    Acute blood loss as cause of postoperative anemia 11/08/2018    Acute post-operative pain

## 2024-06-28 ENCOUNTER — HOSPITAL ENCOUNTER (OUTPATIENT)
Age: 84
Setting detail: SPECIMEN
Discharge: HOME | End: 2024-06-28

## 2024-06-28 LAB
HCT VFR BLD AUTO: 28.7 % (ref 36.3–47.1)
HGB BLD-MCNC: 9.3 G/DL (ref 11.9–15.1)

## 2024-06-28 PROCEDURE — 36415 COLL VENOUS BLD VENIPUNCTURE: CPT

## 2024-06-28 PROCEDURE — 85014 HEMATOCRIT: CPT

## 2024-06-28 PROCEDURE — 85018 HEMOGLOBIN: CPT

## 2024-06-29 ENCOUNTER — HOSPITAL ENCOUNTER (EMERGENCY)
Age: 84
Discharge: ANOTHER HOSPITAL | End: 2024-06-29
Attending: EMERGENCY MEDICINE
Payer: MEDICARE

## 2024-06-29 ENCOUNTER — HOSPITAL ENCOUNTER (INPATIENT)
Age: 84
LOS: 3 days | Discharge: NURSING, REHAB OR LONG-TERM CARE | End: 2024-07-02
Attending: HOSPITALIST | Admitting: STUDENT IN AN ORGANIZED HEALTH CARE EDUCATION/TRAINING PROGRAM
Payer: MEDICARE

## 2024-06-29 ENCOUNTER — APPOINTMENT (OUTPATIENT)
Dept: GENERAL RADIOLOGY | Age: 84
End: 2024-06-29
Attending: HOSPITALIST
Payer: MEDICARE

## 2024-06-29 ENCOUNTER — DIRECT ADMIT ORDERS (OUTPATIENT)
Dept: INTERNAL MEDICINE CLINIC | Age: 84
End: 2024-06-29

## 2024-06-29 VITALS
DIASTOLIC BLOOD PRESSURE: 70 MMHG | SYSTOLIC BLOOD PRESSURE: 113 MMHG | OXYGEN SATURATION: 93 % | RESPIRATION RATE: 21 BRPM | TEMPERATURE: 98.4 F | BODY MASS INDEX: 46.36 KG/M2 | HEART RATE: 96 BPM | WEIGHT: 293 LBS

## 2024-06-29 DIAGNOSIS — K92.2 ACUTE LOWER GASTROINTESTINAL BLEEDING: Primary | ICD-10-CM

## 2024-06-29 DIAGNOSIS — K92.2 LOWER GI BLEED: Primary | ICD-10-CM

## 2024-06-29 LAB
ALBUMIN SERPL-MCNC: 3.8 G/DL (ref 3.5–5.2)
ALBUMIN/GLOB SERPL: 1 {RATIO} (ref 1–2.5)
ALP SERPL-CCNC: 148 U/L (ref 35–104)
ALT SERPL-CCNC: 12 U/L (ref 5–33)
AMYLASE SERPL-CCNC: 397 U/L (ref 28–100)
ANION GAP SERPL CALCULATED.3IONS-SCNC: 16 MMOL/L (ref 9–17)
AST SERPL-CCNC: 20 U/L
BASOPHILS # BLD: 0 K/UL (ref 0–0.2)
BASOPHILS NFR BLD: 0 % (ref 0–2)
BILIRUB SERPL-MCNC: 0.9 MG/DL (ref 0.3–1.2)
BILIRUB UR QL STRIP: NEGATIVE
BUN SERPL-MCNC: 17 MG/DL (ref 8–23)
BUN/CREAT SERPL: 15 (ref 9–20)
CALCIUM SERPL-MCNC: 8.8 MG/DL (ref 8.6–10.4)
CASTS #/AREA URNS LPF: NORMAL /LPF (ref 0–2)
CASTS #/AREA URNS LPF: NORMAL /LPF (ref 0–2)
CHLORIDE SERPL-SCNC: 98 MMOL/L (ref 98–107)
CLARITY UR: ABNORMAL
CO2 SERPL-SCNC: 24 MMOL/L (ref 20–31)
COLOR UR: YELLOW
CREAT SERPL-MCNC: 1.1 MG/DL (ref 0.5–0.9)
EOSINOPHIL # BLD: 0 K/UL (ref 0–0.44)
EOSINOPHILS RELATIVE PERCENT: 0 % (ref 1–4)
EPI CELLS #/AREA URNS HPF: NORMAL /HPF (ref 0–5)
ERYTHROCYTE [DISTWIDTH] IN BLOOD BY AUTOMATED COUNT: 15.3 % (ref 11.8–14.4)
GFR, ESTIMATED: 50 ML/MIN/1.73M2
GLUCOSE SERPL-MCNC: 133 MG/DL (ref 70–99)
GLUCOSE UR STRIP-MCNC: NEGATIVE MG/DL
HCT VFR BLD AUTO: 32.7 % (ref 36.3–47.1)
HCT VFR BLD AUTO: 38.7 % (ref 36.3–47.1)
HGB BLD-MCNC: 12.5 G/DL (ref 11.9–15.1)
HGB BLD-MCNC: 9.9 G/DL (ref 11.9–15.1)
HGB UR QL STRIP.AUTO: NEGATIVE
IMM GRANULOCYTES # BLD AUTO: 0.18 K/UL (ref 0–0.3)
IMM GRANULOCYTES NFR BLD: 1 %
INR PPP: 1.7
KETONES UR STRIP-MCNC: NEGATIVE MG/DL
LACTATE BLDV-SCNC: 2.6 MMOL/L (ref 0.5–2.2)
LEUKOCYTE ESTERASE UR QL STRIP: ABNORMAL
LIPASE SERPL-CCNC: 43 U/L (ref 13–60)
LYMPHOCYTES NFR BLD: 1.23 K/UL (ref 1.1–3.7)
LYMPHOCYTES RELATIVE PERCENT: 7 % (ref 24–43)
MCH RBC QN AUTO: 31.3 PG (ref 25.2–33.5)
MCHC RBC AUTO-ENTMCNC: 32.3 G/DL (ref 25.2–33.5)
MCV RBC AUTO: 97 FL (ref 82.6–102.9)
MONOCYTES NFR BLD: 1.23 K/UL (ref 0.1–1.2)
MONOCYTES NFR BLD: 7 % (ref 3–12)
MORPHOLOGY: ABNORMAL
MORPHOLOGY: ABNORMAL
NEUTROPHILS NFR BLD: 85 % (ref 36–65)
NEUTS SEG NFR BLD: 14.86 K/UL (ref 1.5–8.1)
NITRITE UR QL STRIP: NEGATIVE
NRBC BLD-RTO: 0 PER 100 WBC
PARTIAL THROMBOPLASTIN TIME: 26.7 SEC (ref 23.9–33.8)
PH UR STRIP: 5.5 [PH] (ref 5–8)
PLATELET # BLD AUTO: ABNORMAL K/UL (ref 138–453)
PLATELET, FLUORESCENCE: 273 K/UL (ref 138–453)
PLATELETS.RETICULATED NFR BLD AUTO: 15.7 % (ref 1.1–10.3)
POTASSIUM SERPL-SCNC: 3.7 MMOL/L (ref 3.7–5.3)
PROT SERPL-MCNC: 7.7 G/DL (ref 6.4–8.3)
PROT UR STRIP-MCNC: NEGATIVE MG/DL
PROTHROMBIN TIME: 18.8 SEC (ref 11.5–14.2)
RBC # BLD AUTO: 3.99 M/UL (ref 3.95–5.11)
RBC #/AREA URNS HPF: NORMAL /HPF (ref 0–2)
SODIUM SERPL-SCNC: 138 MMOL/L (ref 135–144)
SP GR UR STRIP: 1.01 (ref 1–1.03)
TROPONIN I SERPL HS-MCNC: 14 NG/L (ref 0–14)
TSH SERPL DL<=0.05 MIU/L-ACNC: 0.71 UIU/ML (ref 0.27–4.2)
UROBILINOGEN UR STRIP-ACNC: NORMAL EU/DL (ref 0–1)
WBC #/AREA URNS HPF: NORMAL /HPF (ref 0–5)
WBC OTHER # BLD: 17.5 K/UL (ref 3.5–11.3)

## 2024-06-29 PROCEDURE — 83630 LACTOFERRIN FECAL (QUAL): CPT

## 2024-06-29 PROCEDURE — 51702 INSERT TEMP BLADDER CATH: CPT

## 2024-06-29 PROCEDURE — 36415 COLL VENOUS BLD VENIPUNCTURE: CPT

## 2024-06-29 PROCEDURE — 2580000003 HC RX 258: Performed by: CLINICAL NURSE SPECIALIST

## 2024-06-29 PROCEDURE — 84484 ASSAY OF TROPONIN QUANT: CPT

## 2024-06-29 PROCEDURE — 85610 PROTHROMBIN TIME: CPT

## 2024-06-29 PROCEDURE — 81001 URINALYSIS AUTO W/SCOPE: CPT

## 2024-06-29 PROCEDURE — 96365 THER/PROPH/DIAG IV INF INIT: CPT

## 2024-06-29 PROCEDURE — 96375 TX/PRO/DX INJ NEW DRUG ADDON: CPT

## 2024-06-29 PROCEDURE — 99285 EMERGENCY DEPT VISIT HI MDM: CPT

## 2024-06-29 PROCEDURE — 99223 1ST HOSP IP/OBS HIGH 75: CPT | Performed by: INTERNAL MEDICINE

## 2024-06-29 PROCEDURE — 80053 COMPREHEN METABOLIC PANEL: CPT

## 2024-06-29 PROCEDURE — 71045 X-RAY EXAM CHEST 1 VIEW: CPT

## 2024-06-29 PROCEDURE — C9113 INJ PANTOPRAZOLE SODIUM, VIA: HCPCS | Performed by: CLINICAL NURSE SPECIALIST

## 2024-06-29 PROCEDURE — 85730 THROMBOPLASTIN TIME PARTIAL: CPT

## 2024-06-29 PROCEDURE — 87205 SMEAR GRAM STAIN: CPT

## 2024-06-29 PROCEDURE — 51798 US URINE CAPACITY MEASURE: CPT

## 2024-06-29 PROCEDURE — 87154 CUL TYP ID BLD PTHGN 6+ TRGT: CPT

## 2024-06-29 PROCEDURE — 6360000002 HC RX W HCPCS: Performed by: EMERGENCY MEDICINE

## 2024-06-29 PROCEDURE — 2060000000 HC ICU INTERMEDIATE R&B

## 2024-06-29 PROCEDURE — 87506 IADNA-DNA/RNA PROBE TQ 6-11: CPT

## 2024-06-29 PROCEDURE — 84443 ASSAY THYROID STIM HORMONE: CPT

## 2024-06-29 PROCEDURE — 96376 TX/PRO/DX INJ SAME DRUG ADON: CPT

## 2024-06-29 PROCEDURE — 6360000002 HC RX W HCPCS: Performed by: CLINICAL NURSE SPECIALIST

## 2024-06-29 PROCEDURE — 85014 HEMATOCRIT: CPT

## 2024-06-29 PROCEDURE — 83605 ASSAY OF LACTIC ACID: CPT

## 2024-06-29 PROCEDURE — 82150 ASSAY OF AMYLASE: CPT

## 2024-06-29 PROCEDURE — 93005 ELECTROCARDIOGRAM TRACING: CPT | Performed by: EMERGENCY MEDICINE

## 2024-06-29 PROCEDURE — 83690 ASSAY OF LIPASE: CPT

## 2024-06-29 PROCEDURE — 87040 BLOOD CULTURE FOR BACTERIA: CPT

## 2024-06-29 PROCEDURE — 2580000003 HC RX 258: Performed by: EMERGENCY MEDICINE

## 2024-06-29 PROCEDURE — 85018 HEMOGLOBIN: CPT

## 2024-06-29 PROCEDURE — 85025 COMPLETE CBC W/AUTO DIFF WBC: CPT

## 2024-06-29 PROCEDURE — C9113 INJ PANTOPRAZOLE SODIUM, VIA: HCPCS | Performed by: EMERGENCY MEDICINE

## 2024-06-29 RX ORDER — ONDANSETRON 2 MG/ML
4 INJECTION INTRAMUSCULAR; INTRAVENOUS ONCE
Status: COMPLETED | OUTPATIENT
Start: 2024-06-29 | End: 2024-06-29

## 2024-06-29 RX ORDER — ONDANSETRON 2 MG/ML
4 INJECTION INTRAMUSCULAR; INTRAVENOUS EVERY 6 HOURS PRN
Status: DISCONTINUED | OUTPATIENT
Start: 2024-06-29 | End: 2024-07-02 | Stop reason: HOSPADM

## 2024-06-29 RX ORDER — ONDANSETRON 4 MG/1
4 TABLET, ORALLY DISINTEGRATING ORAL EVERY 8 HOURS PRN
Status: CANCELLED | OUTPATIENT
Start: 2024-06-29

## 2024-06-29 RX ORDER — ATORVASTATIN CALCIUM 40 MG/1
40 TABLET, FILM COATED ORAL NIGHTLY
Status: DISCONTINUED | OUTPATIENT
Start: 2024-06-29 | End: 2024-06-30

## 2024-06-29 RX ORDER — OXYCODONE HYDROCHLORIDE AND ACETAMINOPHEN 5; 325 MG/1; MG/1
2 TABLET ORAL EVERY 4 HOURS PRN
Status: DISCONTINUED | OUTPATIENT
Start: 2024-06-29 | End: 2024-07-02 | Stop reason: HOSPADM

## 2024-06-29 RX ORDER — SODIUM CHLORIDE 0.9 % (FLUSH) 0.9 %
5-40 SYRINGE (ML) INJECTION PRN
Status: DISCONTINUED | OUTPATIENT
Start: 2024-06-29 | End: 2024-07-02 | Stop reason: HOSPADM

## 2024-06-29 RX ORDER — BETHANECHOL CHLORIDE 25 MG/1
25 TABLET ORAL 4 TIMES DAILY
Status: DISCONTINUED | OUTPATIENT
Start: 2024-06-29 | End: 2024-07-02 | Stop reason: HOSPADM

## 2024-06-29 RX ORDER — DOCUSATE SODIUM 100 MG/1
100 CAPSULE, LIQUID FILLED ORAL DAILY
Status: DISCONTINUED | OUTPATIENT
Start: 2024-06-29 | End: 2024-06-30

## 2024-06-29 RX ORDER — LEVOTHYROXINE SODIUM 0.05 MG/1
50 TABLET ORAL DAILY
Status: DISCONTINUED | OUTPATIENT
Start: 2024-06-29 | End: 2024-07-02 | Stop reason: HOSPADM

## 2024-06-29 RX ORDER — SODIUM CHLORIDE 0.9 % (FLUSH) 0.9 %
5-40 SYRINGE (ML) INJECTION EVERY 12 HOURS SCHEDULED
Status: CANCELLED | OUTPATIENT
Start: 2024-06-29

## 2024-06-29 RX ORDER — POLYETHYLENE GLYCOL 3350 17 G/17G
17 POWDER, FOR SOLUTION ORAL DAILY PRN
Status: DISCONTINUED | OUTPATIENT
Start: 2024-06-29 | End: 2024-07-02 | Stop reason: HOSPADM

## 2024-06-29 RX ORDER — ACETAMINOPHEN 325 MG/1
650 TABLET ORAL EVERY 6 HOURS PRN
Status: CANCELLED | OUTPATIENT
Start: 2024-06-29

## 2024-06-29 RX ORDER — LORAZEPAM 0.5 MG/1
0.5 TABLET ORAL 3 TIMES DAILY PRN
Status: DISCONTINUED | OUTPATIENT
Start: 2024-06-29 | End: 2024-06-30

## 2024-06-29 RX ORDER — PANTOPRAZOLE SODIUM 40 MG/10ML
INJECTION, POWDER, LYOPHILIZED, FOR SOLUTION INTRAVENOUS
Status: DISCONTINUED
Start: 2024-06-29 | End: 2024-06-29 | Stop reason: HOSPADM

## 2024-06-29 RX ORDER — LANOLIN ALCOHOL/MO/W.PET/CERES
3 CREAM (GRAM) TOPICAL NIGHTLY PRN
Status: DISCONTINUED | OUTPATIENT
Start: 2024-06-29 | End: 2024-07-02 | Stop reason: HOSPADM

## 2024-06-29 RX ORDER — SODIUM CHLORIDE 9 MG/ML
INJECTION, SOLUTION INTRAVENOUS PRN
Status: CANCELLED | OUTPATIENT
Start: 2024-06-29

## 2024-06-29 RX ORDER — ACETAMINOPHEN 650 MG/1
650 SUPPOSITORY RECTAL EVERY 6 HOURS PRN
Status: CANCELLED | OUTPATIENT
Start: 2024-06-29

## 2024-06-29 RX ORDER — SODIUM CHLORIDE 9 MG/ML
INJECTION, SOLUTION INTRAVENOUS CONTINUOUS
Status: CANCELLED | OUTPATIENT
Start: 2024-06-29

## 2024-06-29 RX ORDER — SODIUM CHLORIDE 9 MG/ML
INJECTION, SOLUTION INTRAVENOUS PRN
Status: DISCONTINUED | OUTPATIENT
Start: 2024-06-29 | End: 2024-07-02 | Stop reason: HOSPADM

## 2024-06-29 RX ORDER — ALBUTEROL SULFATE 2.5 MG/3ML
2.5 SOLUTION RESPIRATORY (INHALATION) EVERY 6 HOURS PRN
Status: DISCONTINUED | OUTPATIENT
Start: 2024-06-29 | End: 2024-07-02 | Stop reason: HOSPADM

## 2024-06-29 RX ORDER — SODIUM CHLORIDE 0.9 % (FLUSH) 0.9 %
5-40 SYRINGE (ML) INJECTION EVERY 12 HOURS SCHEDULED
Status: DISCONTINUED | OUTPATIENT
Start: 2024-06-29 | End: 2024-07-02 | Stop reason: HOSPADM

## 2024-06-29 RX ORDER — ACETAMINOPHEN 325 MG/1
650 TABLET ORAL EVERY 6 HOURS PRN
Status: DISCONTINUED | OUTPATIENT
Start: 2024-06-29 | End: 2024-07-02 | Stop reason: HOSPADM

## 2024-06-29 RX ORDER — ACETAMINOPHEN 650 MG/1
650 SUPPOSITORY RECTAL EVERY 6 HOURS PRN
Status: DISCONTINUED | OUTPATIENT
Start: 2024-06-29 | End: 2024-07-02 | Stop reason: HOSPADM

## 2024-06-29 RX ORDER — OXYCODONE HYDROCHLORIDE AND ACETAMINOPHEN 5; 325 MG/1; MG/1
1 TABLET ORAL EVERY 4 HOURS PRN
Status: DISCONTINUED | OUTPATIENT
Start: 2024-06-29 | End: 2024-07-02 | Stop reason: HOSPADM

## 2024-06-29 RX ORDER — SODIUM CHLORIDE 9 MG/ML
INJECTION, SOLUTION INTRAVENOUS CONTINUOUS
Status: DISCONTINUED | OUTPATIENT
Start: 2024-06-29 | End: 2024-07-01

## 2024-06-29 RX ORDER — TAMSULOSIN HYDROCHLORIDE 0.4 MG/1
0.4 CAPSULE ORAL DAILY
Status: DISCONTINUED | OUTPATIENT
Start: 2024-06-30 | End: 2024-07-02 | Stop reason: HOSPADM

## 2024-06-29 RX ORDER — SODIUM CHLORIDE 9 MG/ML
INJECTION, SOLUTION INTRAVENOUS CONTINUOUS
Status: DISCONTINUED | OUTPATIENT
Start: 2024-06-29 | End: 2024-06-29 | Stop reason: HOSPADM

## 2024-06-29 RX ORDER — AMLODIPINE BESYLATE 10 MG/1
10 TABLET ORAL DAILY
Status: DISCONTINUED | OUTPATIENT
Start: 2024-06-29 | End: 2024-06-30

## 2024-06-29 RX ORDER — ONDANSETRON 4 MG/1
4 TABLET, ORALLY DISINTEGRATING ORAL EVERY 8 HOURS PRN
Status: DISCONTINUED | OUTPATIENT
Start: 2024-06-29 | End: 2024-07-02 | Stop reason: HOSPADM

## 2024-06-29 RX ORDER — SODIUM CHLORIDE 0.9 % (FLUSH) 0.9 %
5-40 SYRINGE (ML) INJECTION PRN
Status: CANCELLED | OUTPATIENT
Start: 2024-06-29

## 2024-06-29 RX ORDER — ONDANSETRON 2 MG/ML
4 INJECTION INTRAMUSCULAR; INTRAVENOUS EVERY 6 HOURS PRN
Status: CANCELLED | OUTPATIENT
Start: 2024-06-29

## 2024-06-29 RX ORDER — 0.9 % SODIUM CHLORIDE 0.9 %
1000 INTRAVENOUS SOLUTION INTRAVENOUS ONCE
Status: COMPLETED | OUTPATIENT
Start: 2024-06-29 | End: 2024-06-29

## 2024-06-29 RX ADMIN — SODIUM CHLORIDE: 9 INJECTION, SOLUTION INTRAVENOUS at 16:35

## 2024-06-29 RX ADMIN — SODIUM CHLORIDE 1000 ML: 9 INJECTION, SOLUTION INTRAVENOUS at 15:12

## 2024-06-29 RX ADMIN — SODIUM CHLORIDE, PRESERVATIVE FREE 40 MG: 5 INJECTION INTRAVENOUS at 19:50

## 2024-06-29 RX ADMIN — ONDANSETRON HYDROCHLORIDE 4 MG: 2 SOLUTION INTRAMUSCULAR; INTRAVENOUS at 15:45

## 2024-06-29 RX ADMIN — SODIUM CHLORIDE, PRESERVATIVE FREE 10 ML: 5 INJECTION INTRAVENOUS at 19:54

## 2024-06-29 RX ADMIN — SODIUM CHLORIDE 8 MG/HR: 900 INJECTION INTRAVENOUS at 15:28

## 2024-06-29 RX ADMIN — SODIUM CHLORIDE 40 MG: 9 INJECTION INTRAMUSCULAR; INTRAVENOUS; SUBCUTANEOUS at 15:24

## 2024-06-29 RX ADMIN — ONDANSETRON HYDROCHLORIDE 4 MG: 2 SOLUTION INTRAMUSCULAR; INTRAVENOUS at 15:07

## 2024-06-29 ASSESSMENT — PAIN - FUNCTIONAL ASSESSMENT: PAIN_FUNCTIONAL_ASSESSMENT: NONE - DENIES PAIN

## 2024-06-29 ASSESSMENT — PAIN SCALES - WONG BAKER
WONGBAKER_NUMERICALRESPONSE: NO HURT

## 2024-06-29 NOTE — ED PROVIDER NOTES
with recurrent GI bleeding.  She has been recently started on Eliquis for A-fib.  She was recently admitted and they could not find a source of the bleeding though suspected diverticular bleed.  The patient has been hemodynamically stable while here.  She does have an elevated lactate and her hemoglobin is high.  I suspect that she is hemoconcentrated.  I have initiated IV fluids as well as Protonix.    Prescription considerations: Protonix IV was administered and a drip was started as well.  IV fluid bolus was provided.    Sepsis considered: Not applicable      Critical Care note written:     Critical Care: The high probability of sudden, clinically significant deterioration in the patient's condition required the highest level of my preparedness to intervene urgently.  ?  The services I provided to this patient were to treat and/or prevent clinically significant deterioration. Services included the following: chart data review, reviewing nursing notes and/or old charts, documentation time, consultant collaboration regarding findings and treatment options, medication orders and management, direct patient care, vital sign assessments and ordering, interpreting and reviewing diagnostic studies/lab tests.  ?  Aggregate critical care time includes only time during which I was engaged in work directly related to the patient's care, as described above, whether at the bedside or elsewhere in the Emergency Department. It did not include time spent performing other reported procedures or the services of residents, students, nurses, nurse practitioners or physician assistants.  ?  Critical Care: 30 minutes.  Management of patient with acute GI bleed.  Potential for hemodynamic compromise and need for resuscitation        DIAGNOSTIC RESULTS     EKG: All EKG's are interpreted by the Emergency Department Physician who either signs or Co-signs this chart in the absence of a cardiologist.    A-fib 86 with nonspecific ST change.   Axis -9, QRS 98, .  No significant change compared to June 14, 2024    RADIOLOGY:   I reviewed the radiologist interpretations:  No orders to display       LABS:  Results for orders placed or performed during the hospital encounter of 06/29/24   Lactic Acid   Result Value Ref Range    Lactic Acid 2.6 (H) 0.5 - 2.2 mmol/L   CBC with Auto Differential   Result Value Ref Range    WBC 17.5 (H) 3.5 - 11.3 k/uL    RBC 3.99 3.95 - 5.11 m/uL    Hemoglobin 12.5 11.9 - 15.1 g/dL    Hematocrit 38.7 36.3 - 47.1 %    MCV 97.0 82.6 - 102.9 fL    MCH 31.3 25.2 - 33.5 pg    MCHC 32.3 25.2 - 33.5 g/dL    RDW 15.3 (H) 11.8 - 14.4 %    Platelets See Reflexed IPF Result 138 - 453 k/uL    Platelet, Fluorescence 273 138 - 453 k/uL    Platelet, Immature Fraction 15.7 (H) 1.1 - 10.3 %    NRBC Automated 0.0 0.0 per 100 WBC    Monocytes % 7 3 - 12 %    Lymphocytes % 7 (L) 24 - 43 %    Neutrophils % 85 (H) 36 - 65 %    Eosinophils % 0 (L) 1 - 4 %    Basophils % 0 0 - 2 %    Immature Granulocytes % 1 (H) 0 %    Monocytes Absolute 1.23 (H) 0.10 - 1.20 k/uL    Lymphocytes Absolute 1.23 1.10 - 3.70 k/uL    Neutrophils Absolute 14.86 (H) 1.50 - 8.10 k/uL    Eosinophils Absolute 0.00 0.00 - 0.44 k/uL    Basophils Absolute 0.00 0.00 - 0.20 k/uL    Immature Granulocytes Absolute 0.18 0.00 - 0.30 k/uL    Morphology Platelet count adequate     Morphology NO PLATELET CLUMPS SEEN ANISOCYTOSIS PRESENT    Comprehensive Metabolic Panel   Result Value Ref Range    Sodium 138 135 - 144 mmol/L    Potassium 3.7 3.7 - 5.3 mmol/L    Chloride 98 98 - 107 mmol/L    CO2 24 20 - 31 mmol/L    Anion Gap 16 9 - 17 mmol/L    Glucose 133 (H) 70 - 99 mg/dL    BUN 17 8 - 23 mg/dL    Creatinine 1.1 (H) 0.5 - 0.9 mg/dL    Est, Glom Filt Rate 50 (L) >60 mL/min/1.73m2    BUN/Creatinine Ratio 15 9 - 20    Calcium 8.8 8.6 - 10.4 mg/dL    Total Protein 7.7 6.4 - 8.3 g/dL    Albumin 3.8 3.5 - 5.2 g/dL    Albumin/Globulin Ratio 1.0 1.0 - 2.5    Total Bilirubin 0.9 0.3 - 1.2

## 2024-06-30 ENCOUNTER — APPOINTMENT (OUTPATIENT)
Dept: CT IMAGING | Age: 84
End: 2024-06-30
Attending: HOSPITALIST
Payer: MEDICARE

## 2024-06-30 ENCOUNTER — APPOINTMENT (OUTPATIENT)
Dept: ULTRASOUND IMAGING | Age: 84
End: 2024-06-30
Attending: HOSPITALIST
Payer: MEDICARE

## 2024-06-30 LAB
ALBUMIN SERPL-MCNC: 2.8 G/DL (ref 3.5–5.2)
ALBUMIN/GLOB SERPL: 1 {RATIO} (ref 1–2.5)
ALP SERPL-CCNC: 94 U/L (ref 35–104)
ALT SERPL-CCNC: 7 U/L (ref 10–35)
ANION GAP SERPL CALCULATED.3IONS-SCNC: 13 MMOL/L (ref 9–16)
AST SERPL-CCNC: 21 U/L (ref 10–35)
BASOPHILS # BLD: 0.05 K/UL (ref 0–0.2)
BASOPHILS NFR BLD: 1 % (ref 0–2)
BILIRUB SERPL-MCNC: 0.6 MG/DL (ref 0–1.2)
BUN SERPL-MCNC: 15 MG/DL (ref 8–23)
CALCIUM SERPL-MCNC: 7.6 MG/DL (ref 8.6–10.4)
CHLORIDE SERPL-SCNC: 108 MMOL/L (ref 98–107)
CO2 SERPL-SCNC: 20 MMOL/L (ref 20–31)
CREAT SERPL-MCNC: 0.9 MG/DL (ref 0.5–0.9)
EKG Q-T INTERVAL: 400 MS
EKG QRS DURATION: 98 MS
EKG QTC CALCULATION (BAZETT): 505 MS
EKG R AXIS: -9 DEGREES
EKG T AXIS: 3 DEGREES
EKG VENTRICULAR RATE: 96 BPM
EOSINOPHIL # BLD: 0.11 K/UL (ref 0–0.44)
EOSINOPHILS RELATIVE PERCENT: 1 % (ref 1–4)
ERYTHROCYTE [DISTWIDTH] IN BLOOD BY AUTOMATED COUNT: 15.2 % (ref 11.8–14.4)
GFR, ESTIMATED: 65 ML/MIN/1.73M2
GLUCOSE SERPL-MCNC: 93 MG/DL (ref 74–99)
HCT VFR BLD AUTO: 26.5 % (ref 36.3–47.1)
HCT VFR BLD AUTO: 28 % (ref 36.3–47.1)
HCT VFR BLD AUTO: 29.5 % (ref 36.3–47.1)
HCT VFR BLD AUTO: 31.3 % (ref 36.3–47.1)
HGB BLD-MCNC: 8.2 G/DL (ref 11.9–15.1)
HGB BLD-MCNC: 8.6 G/DL (ref 11.9–15.1)
HGB BLD-MCNC: 9.1 G/DL (ref 11.9–15.1)
HGB BLD-MCNC: 9.1 G/DL (ref 11.9–15.1)
IMM GRANULOCYTES # BLD AUTO: <0.03 K/UL (ref 0–0.3)
IMM GRANULOCYTES NFR BLD: 0 %
INR PPP: 1.6
IRON SATN MFR SERPL: 17 % (ref 20–55)
IRON SERPL-MCNC: 27 UG/DL (ref 37–145)
LACTIC ACID, WHOLE BLOOD: 1.1 MMOL/L (ref 0.7–2.1)
LACTOFERRIN STL QL: ABNORMAL
LYMPHOCYTES NFR BLD: 1.22 K/UL (ref 1.1–3.7)
LYMPHOCYTES RELATIVE PERCENT: 16 % (ref 24–43)
MCH RBC QN AUTO: 30.8 PG (ref 25.2–33.5)
MCHC RBC AUTO-ENTMCNC: 29.1 G/DL (ref 28.4–34.8)
MCV RBC AUTO: 106.1 FL (ref 82.6–102.9)
MONOCYTES NFR BLD: 0.84 K/UL (ref 0.1–1.2)
MONOCYTES NFR BLD: 11 % (ref 3–12)
NEUTROPHILS NFR BLD: 71 % (ref 36–65)
NEUTS SEG NFR BLD: 5.5 K/UL (ref 1.5–8.1)
NRBC BLD-RTO: 0 PER 100 WBC
PARTIAL THROMBOPLASTIN TIME: 34.1 SEC (ref 23–36.5)
PLATELET # BLD AUTO: 164 K/UL (ref 138–453)
PMV BLD AUTO: 12.8 FL (ref 8.1–13.5)
POTASSIUM SERPL-SCNC: 3.6 MMOL/L (ref 3.7–5.3)
PROT SERPL-MCNC: 5.6 G/DL (ref 6.6–8.7)
PROTHROMBIN TIME: 18.7 SEC (ref 11.7–14.9)
RBC # BLD AUTO: 2.95 M/UL (ref 3.95–5.11)
RBC # BLD: ABNORMAL 10*6/UL
RBC # BLD: ABNORMAL 10*6/UL
SODIUM SERPL-SCNC: 141 MMOL/L (ref 136–145)
TIBC SERPL-MCNC: 156 UG/DL (ref 250–450)
UNSATURATED IRON BINDING CAPACITY: 129 UG/DL (ref 112–347)
WBC OTHER # BLD: 7.7 K/UL (ref 3.5–11.3)

## 2024-06-30 PROCEDURE — 6370000000 HC RX 637 (ALT 250 FOR IP): Performed by: STUDENT IN AN ORGANIZED HEALTH CARE EDUCATION/TRAINING PROGRAM

## 2024-06-30 PROCEDURE — 85018 HEMOGLOBIN: CPT

## 2024-06-30 PROCEDURE — 2580000003 HC RX 258: Performed by: CLINICAL NURSE SPECIALIST

## 2024-06-30 PROCEDURE — 83540 ASSAY OF IRON: CPT

## 2024-06-30 PROCEDURE — 36415 COLL VENOUS BLD VENIPUNCTURE: CPT

## 2024-06-30 PROCEDURE — C9113 INJ PANTOPRAZOLE SODIUM, VIA: HCPCS | Performed by: CLINICAL NURSE SPECIALIST

## 2024-06-30 PROCEDURE — 6370000000 HC RX 637 (ALT 250 FOR IP): Performed by: INTERNAL MEDICINE

## 2024-06-30 PROCEDURE — 99232 SBSQ HOSP IP/OBS MODERATE 35: CPT | Performed by: STUDENT IN AN ORGANIZED HEALTH CARE EDUCATION/TRAINING PROGRAM

## 2024-06-30 PROCEDURE — 2060000000 HC ICU INTERMEDIATE R&B

## 2024-06-30 PROCEDURE — APPNB30 APP NON BILLABLE TIME 0-30 MINS: Performed by: NURSE PRACTITIONER

## 2024-06-30 PROCEDURE — 80053 COMPREHEN METABOLIC PANEL: CPT

## 2024-06-30 PROCEDURE — 85025 COMPLETE CBC W/AUTO DIFF WBC: CPT

## 2024-06-30 PROCEDURE — 76775 US EXAM ABDO BACK WALL LIM: CPT

## 2024-06-30 PROCEDURE — 85014 HEMATOCRIT: CPT

## 2024-06-30 PROCEDURE — 74176 CT ABD & PELVIS W/O CONTRAST: CPT

## 2024-06-30 PROCEDURE — 6360000002 HC RX W HCPCS: Performed by: CLINICAL NURSE SPECIALIST

## 2024-06-30 PROCEDURE — 94761 N-INVAS EAR/PLS OXIMETRY MLT: CPT

## 2024-06-30 PROCEDURE — 83550 IRON BINDING TEST: CPT

## 2024-06-30 PROCEDURE — 85730 THROMBOPLASTIN TIME PARTIAL: CPT

## 2024-06-30 PROCEDURE — 2500000003 HC RX 250 WO HCPCS: Performed by: INTERNAL MEDICINE

## 2024-06-30 PROCEDURE — 99223 1ST HOSP IP/OBS HIGH 75: CPT | Performed by: INTERNAL MEDICINE

## 2024-06-30 PROCEDURE — 83605 ASSAY OF LACTIC ACID: CPT

## 2024-06-30 PROCEDURE — 87040 BLOOD CULTURE FOR BACTERIA: CPT

## 2024-06-30 PROCEDURE — 85610 PROTHROMBIN TIME: CPT

## 2024-06-30 RX ORDER — POTASSIUM CHLORIDE 20 MEQ/1
40 TABLET, EXTENDED RELEASE ORAL PRN
Status: DISCONTINUED | OUTPATIENT
Start: 2024-06-30 | End: 2024-07-02 | Stop reason: HOSPADM

## 2024-06-30 RX ORDER — POTASSIUM CHLORIDE 7.45 MG/ML
10 INJECTION INTRAVENOUS PRN
Status: DISCONTINUED | OUTPATIENT
Start: 2024-06-30 | End: 2024-07-02 | Stop reason: HOSPADM

## 2024-06-30 RX ORDER — AMLODIPINE BESYLATE 10 MG/1
10 TABLET ORAL DAILY
Status: DISCONTINUED | OUTPATIENT
Start: 2024-06-30 | End: 2024-06-30

## 2024-06-30 RX ORDER — DOCUSATE SODIUM 100 MG/1
100 CAPSULE, LIQUID FILLED ORAL DAILY
Status: DISCONTINUED | OUTPATIENT
Start: 2024-07-01 | End: 2024-07-02 | Stop reason: HOSPADM

## 2024-06-30 RX ORDER — ATORVASTATIN CALCIUM 40 MG/1
40 TABLET, FILM COATED ORAL DAILY
Status: DISCONTINUED | OUTPATIENT
Start: 2024-07-01 | End: 2024-07-02 | Stop reason: HOSPADM

## 2024-06-30 RX ADMIN — SODIUM CHLORIDE, PRESERVATIVE FREE 40 MG: 5 INJECTION INTRAVENOUS at 06:06

## 2024-06-30 RX ADMIN — ANTI-FUNGAL POWDER MICONAZOLE NITRATE TALC FREE: 1.42 POWDER TOPICAL at 19:52

## 2024-06-30 RX ADMIN — ANTI-FUNGAL POWDER MICONAZOLE NITRATE TALC FREE: 1.42 POWDER TOPICAL at 01:00

## 2024-06-30 RX ADMIN — TAMSULOSIN HYDROCHLORIDE 0.4 MG: 0.4 CAPSULE ORAL at 10:36

## 2024-06-30 RX ADMIN — POTASSIUM CHLORIDE 40 MEQ: 1500 TABLET, EXTENDED RELEASE ORAL at 11:53

## 2024-06-30 RX ADMIN — SODIUM CHLORIDE, PRESERVATIVE FREE 10 ML: 5 INJECTION INTRAVENOUS at 19:51

## 2024-06-30 RX ADMIN — SODIUM CHLORIDE, PRESERVATIVE FREE 40 MG: 5 INJECTION INTRAVENOUS at 17:06

## 2024-06-30 RX ADMIN — METOPROLOL TARTRATE 25 MG: 25 TABLET, FILM COATED ORAL at 13:24

## 2024-06-30 RX ADMIN — SODIUM CHLORIDE: 9 INJECTION, SOLUTION INTRAVENOUS at 03:46

## 2024-06-30 RX ADMIN — LORAZEPAM 0.5 MG: 0.5 TABLET ORAL at 02:32

## 2024-06-30 ASSESSMENT — PAIN SCALES - WONG BAKER

## 2024-06-30 NOTE — PLAN OF CARE
Problem: Discharge Planning  Goal: Discharge to home or other facility with appropriate resources  Outcome: Progressing     Problem: Skin/Tissue Integrity  Goal: Absence of new skin breakdown  Description: 1.  Monitor for areas of redness and/or skin breakdown  2.  Assess vascular access sites hourly  3.  Every 4-6 hours minimum:  Change oxygen saturation probe site  4.  Every 4-6 hours:  If on nasal continuous positive airway pressure, respiratory therapy assess nares and determine need for appliance change or resting period.  Outcome: Progressing     Problem: Safety - Adult  Goal: Free from fall injury  Outcome: Progressing     Problem: ABCDS Injury Assessment  Goal: Absence of physical injury  Outcome: Progressing  Flowsheets (Taken 6/29/2024 1939 by CLEVELAND ADKINS)  Absence of Physical Injury: Implement safety measures based on patient assessment

## 2024-06-30 NOTE — PROGRESS NOTES
Reaction (PCR)    CULTURE  06/29/2024 09:41 PM     (NOTE) Direct Gram Stain from bottle result called to and read back by: AURY DECKER AT 1805 ON 6/30/24    CULTURE NO GROWTH 12 HOURS 06/29/2024 09:41 PM       Radiology:  US RETROPERITONEAL LIMITED    Result Date: 6/30/2024  Status post right nephrectomy. Normal sonographic appearance of the left kidney.     CT ABDOMEN PELVIS WO CONTRAST Additional Contrast? None    Result Date: 6/30/2024  1. Status post right-sided nephrectomy. No evidence of mass or infiltrating process in the right nephrectomy bed. 2. Left kidney is of generous size without any evidence of stone or obstruction. 3. Bladder is decompressed with Petty catheter. 4. Large amount of retained stool in the rectum. 5. Diverticulosis of the descending colon and sigmoid colon without any obvious findings of diverticulitis. 6. Evidence of previous partial resection and in of stenosis of lower sigmoid colon.  The midportion of sigmoid colon as well as lower portion of sigmoid colon has significantly obscured by the streak artifacts arising from prosthetic left hip joint. 7. Mild-to-moderate atelectatic changes and/or infiltrates at the lung bases. 8. Chronic appearing biconcave compression of L1 vertebral body. No new or acute fracture in the lumbar spine, pelvic bones and joints and bilateral hip joints. Intact left hip arthroplasty.     XR CHEST PORTABLE    Result Date: 6/29/2024  1. Stable cardiomegaly without evidence of CHF. 2. No confluent airspace consolidation.       Physical Examination:        General appearance:  alert, cooperative and no distress  Mental Status:  oriented to person, place and time and normal affect  Lungs:  clear to auscultation bilaterally, normal effort  Heart:  regular rate and rhythm, no murmur  Abdomen:  soft, nontender, nondistended, normal bowel sounds, no masses, hepatomegaly, splenomegaly  Extremities:  no edema, redness, tenderness in the calves  Skin:  no gross lesions,  rashes, induration    Assessment:        Hospital Problems             Last Modified POA    * (Principal) Lower GI bleed 6/29/2024 Yes    Status post nephrectomy 6/30/2024 Yes    Urinary retention 6/30/2024 Yes    CHF (congestive heart failure) ([facility]) 6/30/2024 Yes    JAYJAY on CPAP 6/30/2024 Yes    Hypertension 6/30/2024 Yes    A-fib ([facility]) 6/30/2024 Yes    Other specified hypothyroidism 6/30/2024 Yes    S/P small bowel resection 6/30/2024 Yes    Femoral fracture ([facility]) 6/30/2024 Yes    Secondary hypercoagulable state ([facility]) 6/30/2024 Yes    GI bleed 6/30/2024 Yes    Class 3 severe obesity due to excess calories with serious comorbidity and body mass index (BMI) of 45.0 to 49.9 in adult ([facility]) 6/30/2024 Yes       Plan:        Concern for recurrent GI bleed with history of diverticular disease  History of sigmoid colectomy with ileostomy s/p ileostomy takedown  History of renal cancer s/p right nephrectomy  Essential hypertension  Persistent A-fib on anticoagulation/Eliquis  Right heart failure/moderate pulmonary hypertension  Morbid obesity, sleep apnea on CPAP  Hypothyroidism  Recent history of left hip fracture  Concern for urinary retention    -Seen by GI service, recommending conservative treatment with IV fluids, continue to monitor H&H, if bleeding recurs, recommend bleeding scan versus CTA, consider colorectal surgery for colectomy  -Started on full liquid diet  -Continue Protonix 40 twice daily  -Continue beta-blocker with holding parameters, continue to hold anticoagulation, diuretics for now, continue strict input output, currently has Petty in place  -Continue CPAP  -Continue Synthroid  -Conservative management for left femur fracture  -Currently has Petty in place, continue Flomax    Arnold Gayle Sra, MD  6/30/2024  6:35 PM

## 2024-06-30 NOTE — CONSULTS
fracture in lumbar spine,  pelvic bones and joints and bilateral hip joints.  Intact left hip  arthroplasty.     No evidence of inguinal, femoral or ventral hernia.     IMPRESSION:  1. Status post right-sided nephrectomy. No evidence of mass or infiltrating  process in the right nephrectomy bed.  2. Left kidney is of generous size without any evidence of stone or  obstruction.  3. Bladder is decompressed with Petty catheter.  4. Large amount of retained stool in the rectum.  5. Diverticulosis of the descending colon and sigmoid colon without any  obvious findings of diverticulitis.  6. Evidence of previous partial resection and in of stenosis of lower sigmoid  colon.  The midportion of sigmoid colon as well as lower portion of sigmoid  colon has significantly obscured by the streak artifacts arising from  prosthetic left hip joint.  7. Mild-to-moderate atelectatic changes and/or infiltrates at the lung bases.  8. Chronic appearing biconcave compression of L1 vertebral body. No new or  acute fracture in the lumbar spine, pelvic bones and joints and bilateral hip  joints. Intact left hip arthroplasty.              Specimen Collected: 06/30/24 01:45 EDT           Hemotological labs:   Anemia studies:  Recent Labs     06/30/24  0408   TIBC 156*       CBC:  Recent Labs     06/28/24  0635 06/29/24  1503 06/29/24  2141 06/30/24  0408   WBC  --  17.5*  --   --    HGB 9.3* 12.5 9.9* 8.6*   MCV  --  97.0  --   --    RDW  --  15.3*  --   --    PLT  --  See Reflexed IPF Result  --   --        PT/INR:  Recent Labs     06/29/24  1503 06/30/24  0408   PROTIME 18.8* 18.7*   INR 1.7 1.6       BMP:  Recent Labs     06/29/24  1503      K 3.7   CL 98   CO2 24   BUN 17   CREATININE 1.1*   GLUCOSE 133*   CALCIUM 8.8       Liver work up:  Hepatitis Functional Panel:  Recent Labs     06/29/24  1503   ALKPHOS 148*   ALT 12   AST 20   BILITOT 0.9       Amylase/Lipase/Ammonia:  Recent Labs     06/29/24  1503   AMYLASE 397*   LIPASE 43

## 2024-06-30 NOTE — H&P
and sclerosis of the sacrum, may represent a  component of Paget's disease. Unchanged from prior exam.     EGD/colonoscopy 06/24/2024  POSTOPERATIVE DIAGNOSES:    1. Gastrointestinal bleeding.  2. Anemia.  3. Small sliding hiatal hernia.  4. Whole colon diverticulosis.  5. Colon polyps.     PROCEDURES:    1. Esophagogastroduodenoscopy with biopsy.  2. Colonoscopy with hot snare and hot forceps polypectomies as well as biopsies.    SPECIMENS:    1. Biopsy in antrum.  2. Polyp in ascending colon, removed with hot forceps.  3. Polyp at 45 cm, removed with hot snare.  4. Biopsies of colon at prior surgical anastomosis.    -- Path Diagnosis --   A.  Stomach, antrum biopsy:   -No histologic abnormality.   B.  Ascending colon, polyp, biopsy:   -Tubular adenoma.   C.  Colon, 45 cm polyp, biopsy:   -Tubular adenoma.   D.  Colon, anastomosis 15 cm, biopsy:   -Hyperplastic polyp.   -Colonic mucosa with focal ulceration and granulation tissue.     PLAN:  hold on any resumption of anticoagulation for at least 48 hours due to the instrumentation that we did today and the increased risk of bleeding if we were to anticoagulate her to same. If she has any recurrent episodes of bleeding at that point, may need to consider tagged red blood cell scan to better isolate source, though I do suspect it is probably in the lower colon somewhere.     Echo 10/2023      Left Ventricle: Normal left ventricular systolic function with a visually estimated EF of 55 - 60%. Left ventricle size is normal. Normal wall thickness. Normal wall motion.    Right Ventricle: Right ventricle is severely dilated. Reduced systolic function.    Aortic Valve: Trileaflet valve. Sclerosis of the aortic valve cusp.    Mitral Valve: Mild regurgitation with a centrally directed jet.    Tricuspid Valve: Moderate regurgitation with multiple jets. The estimated RVSP is 51 mmHg.Moderate pulmonary hypertension.    Left Atrium: Left atrium is severely dilated.    Right  Patient denies prior history of diabetes.  Diabetes ruled out.    Leukocytosis.  ?? Reactive vs related to acute infection, ? Viral enteritis.  Check stool cultures.  check UA urine culture, blood cultures.  Check chest x-ray, CT abdomen pelvis stat.  Consider antibiotics pending initial workup if appropriate.  Hold for now with concern for possible C. Difficile.   trend labs  ???Paget's disease.  Concern noted on CT imaging incidentally.  outpatient f/u  JAYJAY resume cpap ordered.    H/o left femur fx-previously nonweightbearing, recommended conservative treatment nonoperative management.  Continue supportive care  Morbid obesity.  No acute issues      Prior records reviewed independently by myself.  Home medications reconciled.      Discussed with nursing.  Question concerns and treatment plan discussed with patient including concern for possible Paget's disease, recommended outpatient follow-up with PCP with consideration for endocrine if appropriate.      Likely discharge in 2 to 4 days contingent on clinical progress.        Consultations:   IP CONSULT TO GI     Patient is admitted as inpatient status because of co-morbidities listed above, severity of signs and symptoms as outlined, requirement for current medical therapies and most importantly because of direct risk to patient if care not provided in a hospital setting.  Expected length of stay > 48 hours.    Gudelia Anna MD  6/29/2024  8:06 PM    Copy sent to Emy Bolden,

## 2024-07-01 LAB
ANION GAP SERPL CALCULATED.3IONS-SCNC: 8 MMOL/L (ref 9–16)
BUN SERPL-MCNC: 11 MG/DL (ref 8–23)
CALCIUM SERPL-MCNC: 7.7 MG/DL (ref 8.6–10.4)
CAMPYLOBACTER DNA SPEC NAA+PROBE: NORMAL
CHLORIDE SERPL-SCNC: 109 MMOL/L (ref 98–107)
CO2 SERPL-SCNC: 22 MMOL/L (ref 20–31)
CREAT SERPL-MCNC: 0.8 MG/DL (ref 0.5–0.9)
ETEC ELTA+ESTB GENES STL QL NAA+PROBE: NORMAL
GFR, ESTIMATED: 75 ML/MIN/1.73M2
GLUCOSE SERPL-MCNC: 89 MG/DL (ref 74–99)
HCT VFR BLD AUTO: 26.3 % (ref 36.3–47.1)
HCT VFR BLD AUTO: 28.7 % (ref 36.3–47.1)
HGB BLD-MCNC: 8.1 G/DL (ref 11.9–15.1)
HGB BLD-MCNC: 8.6 G/DL (ref 11.9–15.1)
MICROORGANISM SPEC CULT: ABNORMAL
P SHIGELLOIDES DNA STL QL NAA+PROBE: NORMAL
POTASSIUM SERPL-SCNC: 3.7 MMOL/L (ref 3.7–5.3)
SALMONELLA DNA SPEC QL NAA+PROBE: NORMAL
SERVICE CMNT-IMP: ABNORMAL
SHIGA TOXIN STX GENE SPEC NAA+PROBE: NORMAL
SHIGELLA DNA SPEC QL NAA+PROBE: NORMAL
SODIUM SERPL-SCNC: 139 MMOL/L (ref 136–145)
SPECIMEN DESCRIPTION: ABNORMAL
SPECIMEN DESCRIPTION: NORMAL
V CHOL+PARA RFBL+TRKH+TNAA STL QL NAA+PR: NORMAL
Y ENTERO RECN STL QL NAA+PROBE: NORMAL

## 2024-07-01 PROCEDURE — 94761 N-INVAS EAR/PLS OXIMETRY MLT: CPT

## 2024-07-01 PROCEDURE — 6370000000 HC RX 637 (ALT 250 FOR IP): Performed by: CLINICAL NURSE SPECIALIST

## 2024-07-01 PROCEDURE — 6360000002 HC RX W HCPCS: Performed by: CLINICAL NURSE SPECIALIST

## 2024-07-01 PROCEDURE — 6370000000 HC RX 637 (ALT 250 FOR IP): Performed by: INTERNAL MEDICINE

## 2024-07-01 PROCEDURE — APPNB30 APP NON BILLABLE TIME 0-30 MINS: Performed by: NURSE PRACTITIONER

## 2024-07-01 PROCEDURE — 2060000000 HC ICU INTERMEDIATE R&B

## 2024-07-01 PROCEDURE — 36415 COLL VENOUS BLD VENIPUNCTURE: CPT

## 2024-07-01 PROCEDURE — 99231 SBSQ HOSP IP/OBS SF/LOW 25: CPT | Performed by: INTERNAL MEDICINE

## 2024-07-01 PROCEDURE — 85018 HEMOGLOBIN: CPT

## 2024-07-01 PROCEDURE — 80048 BASIC METABOLIC PNL TOTAL CA: CPT

## 2024-07-01 PROCEDURE — 85014 HEMATOCRIT: CPT

## 2024-07-01 PROCEDURE — 99232 SBSQ HOSP IP/OBS MODERATE 35: CPT | Performed by: STUDENT IN AN ORGANIZED HEALTH CARE EDUCATION/TRAINING PROGRAM

## 2024-07-01 PROCEDURE — 2580000003 HC RX 258: Performed by: CLINICAL NURSE SPECIALIST

## 2024-07-01 PROCEDURE — 6370000000 HC RX 637 (ALT 250 FOR IP): Performed by: HOSPITALIST

## 2024-07-01 PROCEDURE — 6370000000 HC RX 637 (ALT 250 FOR IP): Performed by: STUDENT IN AN ORGANIZED HEALTH CARE EDUCATION/TRAINING PROGRAM

## 2024-07-01 RX ORDER — ROPINIROLE 0.25 MG/1
0.5 TABLET, FILM COATED ORAL NIGHTLY
Status: DISCONTINUED | OUTPATIENT
Start: 2024-07-01 | End: 2024-07-02 | Stop reason: HOSPADM

## 2024-07-01 RX ADMIN — METOPROLOL TARTRATE 25 MG: 25 TABLET, FILM COATED ORAL at 09:41

## 2024-07-01 RX ADMIN — LEVOTHYROXINE SODIUM 50 MCG: 50 TABLET ORAL at 09:41

## 2024-07-01 RX ADMIN — SODIUM CHLORIDE, PRESERVATIVE FREE 10 ML: 5 INJECTION INTRAVENOUS at 19:56

## 2024-07-01 RX ADMIN — ACETAMINOPHEN 650 MG: 325 TABLET ORAL at 16:45

## 2024-07-01 RX ADMIN — OXYCODONE HYDROCHLORIDE AND ACETAMINOPHEN 2 TABLET: 5; 325 TABLET ORAL at 22:42

## 2024-07-01 RX ADMIN — ANTI-FUNGAL POWDER MICONAZOLE NITRATE TALC FREE: 1.42 POWDER TOPICAL at 19:55

## 2024-07-01 RX ADMIN — ANTI-FUNGAL POWDER MICONAZOLE NITRATE TALC FREE: 1.42 POWDER TOPICAL at 09:45

## 2024-07-01 RX ADMIN — TAMSULOSIN HYDROCHLORIDE 0.4 MG: 0.4 CAPSULE ORAL at 09:41

## 2024-07-01 RX ADMIN — ATORVASTATIN CALCIUM 40 MG: 40 TABLET, FILM COATED ORAL at 09:40

## 2024-07-01 RX ADMIN — SODIUM CHLORIDE, PRESERVATIVE FREE 40 MG: 5 INJECTION INTRAVENOUS at 06:11

## 2024-07-01 RX ADMIN — ROPINIROLE HYDROCHLORIDE 0.5 MG: 0.25 TABLET, FILM COATED ORAL at 20:52

## 2024-07-01 RX ADMIN — SODIUM CHLORIDE: 9 INJECTION, SOLUTION INTRAVENOUS at 02:36

## 2024-07-01 RX ADMIN — SODIUM CHLORIDE, PRESERVATIVE FREE 40 MG: 5 INJECTION INTRAVENOUS at 16:47

## 2024-07-01 RX ADMIN — Medication 3 MG: at 22:42

## 2024-07-01 RX ADMIN — SODIUM CHLORIDE, PRESERVATIVE FREE 10 ML: 5 INJECTION INTRAVENOUS at 09:42

## 2024-07-01 ASSESSMENT — PAIN DESCRIPTION - LOCATION
LOCATION: LEG;HIP
LOCATION: LEG;HIP

## 2024-07-01 ASSESSMENT — PAIN SCALES - WONG BAKER
WONGBAKER_NUMERICALRESPONSE: NO HURT

## 2024-07-01 ASSESSMENT — PAIN DESCRIPTION - ORIENTATION
ORIENTATION: LEFT
ORIENTATION: LEFT

## 2024-07-01 ASSESSMENT — PAIN SCALES - GENERAL
PAINLEVEL_OUTOF10: 8
PAINLEVEL_OUTOF10: 7

## 2024-07-01 ASSESSMENT — PAIN DESCRIPTION - DESCRIPTORS
DESCRIPTORS: SHARP
DESCRIPTORS: ACHING

## 2024-07-01 NOTE — PROGRESS NOTES
[facility]   Gastroenterology Progress Note    Yvette Das is a 83 y.o. female patient.  Hospitalization Day:2      Chief consult reason:   GIB    Subjective:  Patient seen and examined, no acute events overnight  No rectal bleeding, tolerating diet  Denies any abdominal pain cramping or discomfort  Hemoglobin still slowly downtrending now at 8.1 but appears relatively stable over the last day      VITALS:  /66   Pulse 92   Temp 97.5 °F (36.4 °C) (Oral)   Resp 20   Ht 1.702 m (5' 7\")   Wt (!) 143.8 kg (317 lb 0.3 oz)   LMP 1968   SpO2 98%   BMI 49.65 kg/m²   TEMPERATURE:  Current - Temp: 97.5 °F (36.4 °C); Max - Temp  Av.5 °F (36.9 °C)  Min: 97.5 °F (36.4 °C)  Max: 99.1 °F (37.3 °C)    Physical Assessment:  General appearance:  alert, cooperative and no distress  Mental Status:  oriented to person, place and time and normal affect  Lungs:  clear to auscultation bilaterally, normal effort  Heart:  regular rate and rhythm, no murmur  Abdomen:  soft, nontender, nondistended, normal bowel sounds, no masses, hepatomegaly, splenomegaly  Extremities:  no edema, redness, tenderness in the calves  Skin:  no gross lesions, rashes, induration    Data Review:    Labs and Imaging:     CBC:  Recent Labs     24  1503 24  2141 24  0408 24  1048 24  1632 24  1943 24  0352   WBC 17.5*  --   --  7.7  --   --   --    HGB 12.5   < > 8.6* 9.1* 9.1* 8.2* 8.1*   MCV 97.0  --   --  106.1*  --   --   --    RDW 15.3*  --   --  15.2*  --   --   --    PLT See Reflexed IPF Result  --   --  164  --   --   --     < > = values in this interval not displayed.       ANEMIA STUDIES:  Recent Labs     24  0408   TIBC 156*       BMP:  Recent Labs     24  1503 24  1048 24  0352    141 139   K 3.7 3.6* 3.7   CL 98 108* 109*   CO2 24 20 22   BUN 17 15 11   CREATININE 1.1* 0.9 0.8   GLUCOSE 133* 93 89   CALCIUM 8.8 7.6* 7.7*  continues to downtrend to 8.1 g/dL this a.m.  -Patient asymptomatic    Plan and Recommendations:    Will advance diet to soft  Monitor for active GI bleeding  Most diverticular bleeds resolve with conservative measures  Recommend PTOT  Daily labs, trend hemoglobin, transfuse as needed  Will follow      This plan was formulated in collaboration with Dr. Jessica MD    Thank you for allowing me to participate in the care of your patient.  Please feel free to contact me with any questions or concerns.     [facility] G[facility]enter[facility]   [facility] Campus[facility]ina Green, APRN - CNP   [phone]  7/1/2024  3:09 PM    Estimated time of 20 mins reviewing chart, assessing patient and formulating plan of care    This note was created with the assistance of a speech-recognition program.  Although the intention is to generate a document that actually reflects the content of the visit, no guarantees can be provided that every mistake has been identified and corrected by editing.       Attending Physician Statement  I have discussed the care of Yvette Das and I have examined the patient myselft and taken ros and hpi , including pertinent history and exam findings,  with the author of this note . I have reviewed the key elements of all parts of the encounter with the nurse practitioner/resident.  I agree with the assessment, plan and orders as documented by the above health care provider     More than 50% of the time was spent taking care of this patient in addition to the nurse practitioner time.  That also included history taking follow-up physical examination and review of system.       The patient showed no sign of an ongoing bleed  Hemoglobin is stable    Electronically signed by Seth Lopez MD

## 2024-07-01 NOTE — PROGRESS NOTES
loss as cause of postoperative anemia, Acute post-operative pain, MICHAEL (acute kidney injury) ([facility]), Anxiety, CAD (coronary artery disease), CHF (congestive heart failure) ([facility]), Class 2 severe obesity with serious comorbidity and body mass index (BMI) of 37.0 to 37.9 in adult ([facility]), Closed fracture of proximal end of left humerus with routine healing, Colitis, Colitis due to Clostridium difficile, Depression, Diarrhea, Dyslipidemia, Elevated fasting glucose, FH: total abdominal hysterectomy and bilateral salpingo-oophorectomy, Fractures, Full dentures, Glucose intolerance (impaired glucose tolerance), History of blood transfusion, Hyperlipidemia, Hypertension, Hypokalemia, Malignant neoplasm of sigmoid colon ([facility]), Multiple closed fractures of ribs, Obesity, JAYJAY on CPAP, Osteoarthritis, Osteoporosis, Other complete intestinal obstruction ([facility]), Other specified hypothyroidism, Prolonged emergence from general anesthesia, Renal cell carcinoma of right kidney ([facility]), S/P small bowel resection, Thrombocytopenia, unspecified ([facility]), Under care of team, and Wears glasses.    Social History:   reports that she has never smoked. She has never been exposed to tobacco smoke. She has never used smokeless tobacco. She reports that she does not drink alcohol and does not use drugs.     Family History:   Family History   Adopted: Yes   Problem Relation Age of Onset   • High Blood Pressure Brother         adopted brother       Vitals:  /65   Pulse 84   Temp 97.8 °F (36.6 °C) (Oral)   Resp 18   Ht 1.702 m (5' 7\")   Wt (!) 143.8 kg (317 lb 0.3 oz)   LMP 1968   SpO2 95%   BMI 49.65 kg/m²   Temp (24hrs), Av.4 °F (36.9 °C), Min:97.5 °F (36.4 °C), Max:99.1 °F (37.3 °C)    No results for input(s): \"POCGLU\" in the last 72 hours.    I/O (24Hr):    Intake/Output Summary (Last 24 hours) at 2024 1744  Last data filed at 2024 1653  Gross per 24 hour   Intake 2375.37 ml   Output 1476 ml   Net 899.37 ml  specified hypothyroidism 6/30/2024 Yes    S/P small bowel resection 6/30/2024 Yes    Femoral fracture ([facility]) 6/30/2024 Yes    Secondary hypercoagulable state ([facility]) 6/30/2024 Yes    GI bleed 6/30/2024 Yes    Class 3 severe obesity due to excess calories with serious comorbidity and body mass index (BMI) of 45.0 to 49.9 in adult ([facility]) 6/30/2024 Yes     Plan:        Concern for recurrent GI bleed with history of diverticular disease  History of sigmoid colectomy with ileostomy s/p ileostomy takedown  History of renal cancer s/p right nephrectomy  Essential hypertension  Persistent A-fib on anticoagulation/Eliquis  Right heart failure/moderate pulmonary hypertension  Morbid obesity, sleep apnea on CPAP  Hypothyroidism  Recent history of left hip fracture  Concern for urinary retention    -Seen by GI service, diet advanced to soft today, continue to monitor H&H.  -Started on soft diet today  -Continue Protonix 40 twice daily  -Continue beta-blocker with holding parameters, continue to hold anticoagulation, diuretics for now, continue strict input output, currently has Petty in place  -Continue CPAP  -Continue Synthroid  -Conservative management for left femur fracture  -Currently has Petty in place, continue Flomax    Arnold Gayle Sra, MD  7/1/2024  5:44 PM

## 2024-07-01 NOTE — PLAN OF CARE
Problem: Discharge Planning  Goal: Discharge to home or other facility with appropriate resources  7/1/2024 1226 by Guillermina Yusuf, RN  Outcome: Progressing     Problem: Skin/Tissue Integrity  Goal: Absence of new skin breakdown  Description: 1.  Monitor for areas of redness and/or skin breakdown  2.  Assess vascular access sites hourly  3.  Every 4-6 hours minimum:  Change oxygen saturation probe site  4.  Every 4-6 hours:  If on nasal continuous positive airway pressure, respiratory therapy assess nares and determine need for appliance change or resting period.  7/1/2024 1226 by Guillermina Yusuf, RN  Outcome: Progressing    Problem: Safety - Adult  Goal: Free from fall injury  7/1/2024 1226 by Guillermina Yusuf, RN  Outcome: Progressing    Problem: ABCDS Injury Assessment  Goal: Absence of physical injury  7/1/2024 1226 by Guillermina Yusuf, RN  Outcome: Progressing

## 2024-07-01 NOTE — PLAN OF CARE
Problem: Discharge Planning  Goal: Discharge to home or other facility with appropriate resources  Outcome: Progressing     Problem: Skin/Tissue Integrity  Goal: Absence of new skin breakdown  Description: 1.  Monitor for areas of redness and/or skin breakdown  2.  Assess vascular access sites hourly  3.  Every 4-6 hours minimum:  Change oxygen saturation probe site  4.  Every 4-6 hours:  If on nasal continuous positive airway pressure, respiratory therapy assess nares and determine need for appliance change or resting period.  Outcome: Progressing     Problem: Safety - Adult  Goal: Free from fall injury  Outcome: Progressing  Flowsheets (Taken 6/30/2024 1934)  Free From Fall Injury: Instruct family/caregiver on patient safety     Problem: ABCDS Injury Assessment  Goal: Absence of physical injury  Outcome: Progressing  Flowsheets (Taken 6/30/2024 1934)  Absence of Physical Injury: Implement safety measures based on patient assessment

## 2024-07-02 ENCOUNTER — ENROLLMENT (OUTPATIENT)
Dept: CARE COORDINATION | Age: 84
End: 2024-07-02

## 2024-07-02 VITALS
TEMPERATURE: 98.6 F | HEIGHT: 67 IN | HEART RATE: 114 BPM | WEIGHT: 293 LBS | OXYGEN SATURATION: 94 % | BODY MASS INDEX: 45.99 KG/M2 | DIASTOLIC BLOOD PRESSURE: 76 MMHG | RESPIRATION RATE: 18 BRPM | SYSTOLIC BLOOD PRESSURE: 108 MMHG

## 2024-07-02 LAB
ANION GAP SERPL CALCULATED.3IONS-SCNC: 8 MMOL/L (ref 9–16)
BUN SERPL-MCNC: 10 MG/DL (ref 8–23)
CALCIUM SERPL-MCNC: 8.1 MG/DL (ref 8.6–10.4)
CHLORIDE SERPL-SCNC: 109 MMOL/L (ref 98–107)
CO2 SERPL-SCNC: 20 MMOL/L (ref 20–31)
CREAT SERPL-MCNC: 0.9 MG/DL (ref 0.5–0.9)
GFR, ESTIMATED: 66 ML/MIN/1.73M2
GLUCOSE SERPL-MCNC: 91 MG/DL (ref 74–99)
HCT VFR BLD AUTO: 28.6 % (ref 36.3–47.1)
HGB BLD-MCNC: 8.4 G/DL (ref 11.9–15.1)
POTASSIUM SERPL-SCNC: 3.7 MMOL/L (ref 3.7–5.3)
SODIUM SERPL-SCNC: 137 MMOL/L (ref 136–145)

## 2024-07-02 PROCEDURE — 99231 SBSQ HOSP IP/OBS SF/LOW 25: CPT | Performed by: INTERNAL MEDICINE

## 2024-07-02 PROCEDURE — 99239 HOSP IP/OBS DSCHRG MGMT >30: CPT | Performed by: STUDENT IN AN ORGANIZED HEALTH CARE EDUCATION/TRAINING PROGRAM

## 2024-07-02 PROCEDURE — 6370000000 HC RX 637 (ALT 250 FOR IP): Performed by: INTERNAL MEDICINE

## 2024-07-02 PROCEDURE — 2580000003 HC RX 258: Performed by: CLINICAL NURSE SPECIALIST

## 2024-07-02 PROCEDURE — 80048 BASIC METABOLIC PNL TOTAL CA: CPT

## 2024-07-02 PROCEDURE — 85014 HEMATOCRIT: CPT

## 2024-07-02 PROCEDURE — 97166 OT EVAL MOD COMPLEX 45 MIN: CPT

## 2024-07-02 PROCEDURE — APPNB30 APP NON BILLABLE TIME 0-30 MINS: Performed by: NURSE PRACTITIONER

## 2024-07-02 PROCEDURE — 6360000002 HC RX W HCPCS: Performed by: CLINICAL NURSE SPECIALIST

## 2024-07-02 PROCEDURE — 6370000000 HC RX 637 (ALT 250 FOR IP): Performed by: HOSPITALIST

## 2024-07-02 PROCEDURE — 85018 HEMOGLOBIN: CPT

## 2024-07-02 PROCEDURE — 97162 PT EVAL MOD COMPLEX 30 MIN: CPT

## 2024-07-02 PROCEDURE — 36415 COLL VENOUS BLD VENIPUNCTURE: CPT

## 2024-07-02 PROCEDURE — 97530 THERAPEUTIC ACTIVITIES: CPT

## 2024-07-02 PROCEDURE — 97535 SELF CARE MNGMENT TRAINING: CPT

## 2024-07-02 PROCEDURE — 6370000000 HC RX 637 (ALT 250 FOR IP): Performed by: STUDENT IN AN ORGANIZED HEALTH CARE EDUCATION/TRAINING PROGRAM

## 2024-07-02 RX ADMIN — ATORVASTATIN CALCIUM 40 MG: 40 TABLET, FILM COATED ORAL at 06:34

## 2024-07-02 RX ADMIN — SODIUM CHLORIDE, PRESERVATIVE FREE 40 MG: 5 INJECTION INTRAVENOUS at 06:34

## 2024-07-02 RX ADMIN — LEVOTHYROXINE SODIUM 50 MCG: 50 TABLET ORAL at 06:34

## 2024-07-02 RX ADMIN — METOPROLOL TARTRATE 25 MG: 25 TABLET, FILM COATED ORAL at 11:42

## 2024-07-02 RX ADMIN — ANTI-FUNGAL POWDER MICONAZOLE NITRATE TALC FREE: 1.42 POWDER TOPICAL at 08:18

## 2024-07-02 RX ADMIN — DOCUSATE SODIUM 100 MG: 100 CAPSULE, LIQUID FILLED ORAL at 06:34

## 2024-07-02 RX ADMIN — SODIUM CHLORIDE, PRESERVATIVE FREE 10 ML: 5 INJECTION INTRAVENOUS at 08:18

## 2024-07-02 RX ADMIN — TAMSULOSIN HYDROCHLORIDE 0.4 MG: 0.4 CAPSULE ORAL at 08:18

## 2024-07-02 NOTE — DISCHARGE INSTR - COC
Continuity of Care Form    Patient Name: Yvette Das   :  1940  MRN:  [id number]    Admit date:  2024  Discharge date:  2024    Code Status Order: Full Code   Advance Directives:     Admitting Physician:  Arnold Gayle Sra, MD  PCP: Emy Trevino DO    Discharging Nurse: Candace Palmer RN BSN  Discharging Hospital Unit/Room#: 0431/0431-01  Discharging Unit Phone Number: [phone]    Emergency Contact:   Extended Emergency Contact Information  Primary Emergency Contact: Mode Das (POA)   [facility]  Home Phone: [phone]  Mobile Phone: [phone]  Relation: Child  Secondary Emergency Contact: Xiomara Black   [facility]  Home Phone: [phone]  Mobile Phone: [phone]  Relation: Child    Past Surgical History:  Past Surgical History:   Procedure Laterality Date    CARDIAC CATHETERIZATION  2017    CARDIAC CATHETERIZATION N/A     CHOLECYSTECTOMY  1960s    COLONOSCOPY N/A 10/08/2018    COLONOSCOPY WITH COLD ET HOT BIOPSIES ET POLYPECTOMIES performed by Bogdan Patel MD at [facility] OR    COLONOSCOPY N/A 2019    COLONOSCOPY performed by Dimas Briggs MD at [facility] OR  stricture at 7 cm     COLONOSCOPY N/A 2020    tubular adenoma X 1, Dr. Briggs    COLONOSCOPY N/A 2023    COLONOSCOPY BIOPSY performed by Dimas Briggs MD at [facility] OR    COLONOSCOPY N/A 2024    COLONOSCOPY BIOPSY performed by Bogdan Arguello DO at [facility] OR    HIP ARTHROPLASTY Left     HYSTERECTOMY (CERVIX STATUS UNKNOWN)      KIDNEY SURGERY Right 10/7/2022    XI ROBOTIC LAPAROSCOPIC RADICAL NEPHRECTOMY, LYSIS OF ADHESIONS performed by Dionisio Lance MD at [facility] OR    KNEE ARTHROPLASTY Left     KNEE ARTHROPLASTY Right     NJ CYSTO W/INSERT URETERAL STENT Bilateral 2018    CYSTO Bilateral Lighted stent placements performed by Gabe Swift MD at [facility] OR    NJ LAPAROSCOPY COLECTOMY PARTIAL W/ANASTOMOSIS N/A 2018    Open Sigmoid Colectomy w/ lighted stents    None    Nutrition Therapy:  Current Nutrition Therapy:   - Oral Diet:  Dysphagia - Soft and Bite Sized    Routes of Feeding: Oral  Liquids: Thin Liquids  Daily Fluid Restriction: no  Last Modified Barium Swallow with Video (Video Swallowing Test): not done    Treatments at the Time of Hospital Discharge:   Respiratory Treatments: ***  Oxygen Therapy:  is not on home oxygen therapy.  Ventilator:    - No ventilator support    Rehab Therapies: Physical Therapy, Occupational Therapy, and Speech/Language Therapy  Weight Bearing Status/Restrictions: No weight bearing restrictions  Other Medical Equipment (for information only, NOT a DME order):  hospital bed  Other Treatments: ***    Patient's personal belongings (please select all that are sent with patient):  None    RN SIGNATURE:  Electronically signed by Candace Alvarez RN on 7/2/24 at 12:42 PM EDT    CASE MANAGEMENT/SOCIAL WORK SECTION    Inpatient Status Date: ***    Readmission Risk Assessment Score:  Readmission Risk              Risk of Unplanned Readmission:  26           Discharging to Facility/ Agency   Name: [facility] [place]  Address:  Phone: [phone]  Fax:    Dialysis Facility (if applicable)   Name:  Address:  Dialysis Schedule:  Phone:  Fax:    / signature: Electronically signed by Mary Ann Lopez RN on 7/2/24 at 12:44 PM EDT    PHYSICIAN SECTION    Prognosis: Fair    Condition at Discharge: Stable    Rehab Potential (if transferring to Rehab): Fair    Recommended Labs or Other Treatments After Discharge: PT/OT    Physician Certification: I certify the above information and transfer of Yvette Das  is necessary for the continuing treatment of the diagnosis listed and that she requires Skilled Nursing Facility for greater 30 days.     Update Admission H&P: No change in H&P    PHYSICIAN SIGNATURE:  Electronically signed by Arnold Gayle Sra, MD on 7/2/24 at 12:26 PM EDT

## 2024-07-02 NOTE — PROGRESS NOTES
Occupational Therapy  [facility] [address] [facility]  [facility] Initial Assessment    Name: Yvette Das  : 1940  MRN: [id number]  Date of Service: 2024    Discharge Recommendations:  Patient would benefit from continued therapy after discharge     Obtained from medical chart - Per Internal Medicine H&P 2024:   \" Yvette Das is a 83 y.o.  female w/ h/o morbid obesity (BMI 46), diverticular disease, recurrent colon polyps/ low grade adenoCA w/ tubulovillous polyp 2018 s/p sigmoid colectomy w/ ileostomy, subsequent takedown 2019, prior renal cell CA s/p radical right nephrectomy 10/2022, persistent afib on ac w/ HFpEF, JAYJAY (on CPAP) s/p recent femur fx 2024 rx conservatively w/ NWB, recently discharged [facility] on 2024 after presenting on 2024 with GIB s/p 1 unit PRBCs, s/p EGD/ colonoscopy (w/ hot snare polypectomy on 2024- recommended no ac x48 hours).  Patient returned to [place] ED w/ recurrent rectal bleeding and recommended to transfer to [facility] for the management of Lower GI bleed.     Patient recently discharged from [facility] after treatment for rectal bleed, status post PRBCs, EGD and colonoscopy with polypectomy.  Switched to Eliquis with start date .       Status post Petty for urine retention with initially over 2L residual.  Subsequently underwent voiding trial  with removal of Petty.  Was initially on Rocephin with prior hospitalization with suspected UTI.  Urine culture  suspected contaminated.  Rocephin was discontinued .       Patient states she was doing well up until early this morning.  States she awoke at her baseline but admits that she did not really have an appetite today.  Previously had been eating and drinking well.  Had a visit with her family.  Ate a light breakfast, light lunch and then later this afternoon developed acute nausea vomiting with diarrhea with  aid/reacher to assist with dressing.  Toileting: Dependent/Total  Toileting Skilled Clinical Factors: Pt on bedpan upon therapist arrival. Pt required MIN A for rolling to L side to remove bed pan and complete toilet hygiene.    Functional Mobility: Unable to assess (Comment)  Functional Mobility Skilled Clinical Factors: Unable to complete this date.    Additional Comments: Per pt and daughter, pt is unable to attempt any standing. Current orders are for NWB to LLE, however pt and daughter state she is to be NWB to RLE as well as they were told this specifically from her doctor. Pt and daughter unable to report name of doctor, however did not attempt any standing or ADLs in standing due to pt and daughter report. Educated pt and daughter to f/u regarding clarifciation on this.        Bed mobility  Supine to Sit: Minimal assistance  Sit to Supine: Moderate assistance;2 Person assistance  Bed Mobility Comments: HOB elevated. Assistance for LE progression and minimal assist at trunk sit > sup    Transfers  Transfer Comments: N/A due to pt and daughter reporting pt unable to bear weight through RLE as these were specific orders from her doctor per pt and daughter    Vision  Vision: Impaired  Vision Exceptions: Wears glasses at all times  Hearing  Hearing: Within functional limits    Cognition  Overall Cognitive Status: WFL  Orientation  Overall Orientation Status: Within Functional Limits  Orientation Level: Oriented X4          Education Given To: Patient;Family  Education Provided: Role of Therapy;Plan of Care;Precautions;Transfer Training;Mobility Training  Education Provided Comments: Education on role of therapy, OT POC, safety and sequencing during bed mobility, current NWB LLE status, benefits of being able to stand on RLE once clarification is provided by pt's MD as pt and daughter report they stated no WB on RLE at this time.  Education Method: Demonstration;Verbal  Barriers to Learning: None  Education

## 2024-07-02 NOTE — PROGRESS NOTES
[facility]   Gastroenterology Progress Note    Yvette Das is a 83 y.o. female patient.  Hospitalization Day:3      Chief consult reason:   GIB    Subjective:  Patient seen and examined, no acute events overnight  No rectal bleeding, tolerating diet  Working with therapy today  Hgb stable      VITALS:  /76   Pulse (!) 114   Temp 98.6 °F (37 °C) (Oral)   Resp 18   Ht 1.702 m (5' 7\")   Wt (!) 143.8 kg (317 lb 0.3 oz)   LMP 1968   SpO2 94%   BMI 49.65 kg/m²   TEMPERATURE:  Current - Temp: 98.6 °F (37 °C); Max - Temp  Av.9 °F (36.6 °C)  Min: 97 °F (36.1 °C)  Max: 98.6 °F (37 °C)    Physical Assessment:  General appearance:  alert, cooperative and no distress  Mental Status:  oriented to person, place and time and normal affect  Lungs:  clear to auscultation bilaterally, normal effort  Heart:  regular rate and rhythm, no murmur  Abdomen:  soft, nontender, nondistended, normal bowel sounds, no masses, hepatomegaly, splenomegaly  Extremities:  no edema, redness, tenderness in the calves  Skin:  no gross lesions, rashes, induration    Data Review:    Labs and Imaging:     CBC:  Recent Labs     24  1503 24  2141 24  1048 24  1632 24  1943 24  0352 24  1553 24  0411   WBC 17.5*  --  7.7  --   --   --   --   --    HGB 12.5   < > 9.1* 9.1* 8.2* 8.1* 8.6* 8.4*   MCV 97.0  --  106.1*  --   --   --   --   --    RDW 15.3*  --  15.2*  --   --   --   --   --    PLT See Reflexed IPF Result  --  164  --   --   --   --   --     < > = values in this interval not displayed.         ANEMIA STUDIES:  Recent Labs     24  0408   TIBC 156*         BMP:  Recent Labs     24  1048 24  0352 24  0411    139 137   K 3.6* 3.7 3.7   * 109* 109*   CO2 20 22 20   BUN 15 11 10   CREATININE 0.9 0.8 0.9   GLUCOSE 93 89 91   CALCIUM 7.6* 7.7* 8.1*         LFTS:  Recent Labs     24  1503 24  1048   ALKPHOS 148* 94

## 2024-07-02 NOTE — CARE COORDINATION
06/30/24 1102   Readmission Assessment   Number of Days since last admission? 8-30 days   Previous Disposition SNF   Who is being Interviewed Patient   What was the patient's/caregiver's perception as to why they think they needed to return back to the hospital? Other (Comment)  (States her return was related to her previous condition. The bleeding just began again.)   Did you visit your Primary Care Physician after you left the hospital, before you returned this time? No   Why weren't you able to visit your PCP? Other (Comment)  (Appointment coming up.)   Did you see a specialist, such as Cardiac, Pulmonary, Orthopedic Physician, etc. after you left the hospital? Yes   Who advised the patient to return to the hospital? Skilled Unit   Does the patient report anything that got in the way of taking their medications? No   In our efforts to provide the best possible care to you and others like you, can you think of anything that we could have done to help you after you left the hospital the first time, so that you might not have needed to return so soon? Other (Comment)  (State bleeding just began again.)       
Met with patient to discuss transitional planning. Plan is to return to [facility]. Patient is agreeable to plan. Patient will need stretcher transport to return. Notified antonio richmond at [facility] that patient is ready for discharge and antonio richmond states patient is able to return any time. Perfect serve message to dr patel to request vasu be completed    1300: faxed request for 3pm stretcher . Faxed vasu to facility    1326: 3:30pm  [facility] Notified antonio richmond in admissions at [facility] of  time  
Transitional planning note: plan is return to [facility] SN[facility]. Spoke with Yvette De La Rosa in admissions at [facility] who confirms that patient is able to return when medically ready. No precert or HENS required for return.   
DC: (P) No  Would you like Case Management to discuss the discharge plan with any other family members/significant others, and if so, who? (P) Yes (Son)  Plans to Return to Present Housing: (P) Yes (Plans to return to the [facility].)  Other Identified Issues/Barriers to RETURNING to current housing: No  Potential Assistance needed at discharge: (P) N/A            Potential DME:    Patient expects to discharge to: (P) House  Plan for transportation at discharge: (P) Other (see comment) (Stretcher)    Financial    Payor: MEDICARE / Plan: MEDICARE PART A AND B / Product Type: *No Product type* /     Does insurance require precert for SNF: No    Potential assistance Purchasing Medications: (P) No  Meds-to-Beds request:        [facility] [place] [id number] - P [phone] - F [phone]  [address] [place] [address]  [address]  [facility] [address]  Phone: [phone] Fax: [phone]      Notes:    Factors facilitating achievement of predicted outcomes: Family support, Motivated, Cooperative, and Pleasant    Barriers to discharge: Medical complications    Additional Case Management Notes: Discussed the transition plan with the patient. States she would like to return to the [facility] and complete rehab. States she has medical equipment: walker, scooter and a handicap accessible bathroom at home.    1140 Faxed referral to the [facility] [place].    The Plan for Transition of Care is related to the following treatment goals of Lower GI bleed [K92.2]    IF APPLICABLE: The Patient and/or patient representative Yvette and her family were provided with a choice of provider and agrees with the discharge plan. Freedom of choice list with basic dialogue that supports the patient's individualized plan of care/goals and shares the quality data associated with the providers was provided to:     Patient Representative Name:       The Patient and/or Patient Representative Agree with the Discharge 
General - NAD, sitting up in bed, well groomed  Eyes - PERRLA, EOM intact  ENT - Nonicteric sclerae, PERRLA, EOMI. Oropharynx clear. Moist mucous membranes. Conjunctivae appear well perfused.   Neck - No noticeable or palpable swelling, redness or rash around throat or on face  Lymph Nodes - No lymphadenopathy  Cardiovascular - RRR no m/r/g, no JVD, no carotid bruits  Lungs - Clear to auscultation, no use of accessory muscles, no crackles or wheezes.  Skin - No rashes, skin warm and dry, no erythematous areas  Abdomen - Normal bowel sounds, abdomen soft and nontender  Rectal – Rectal exam not performed since no symptoms indicated blood loss.  Extremities - No edema, cyanosis or clubbing  Musculoskeletal - 5/5 strength, normal range of motion, no swollen or erythematous joints.  Neuro– Alert and oriented x 3, CN 2-12 grossly intact.

## 2024-07-02 NOTE — PLAN OF CARE
Problem: Discharge Planning  Goal: Discharge to home or other facility with appropriate resources  7/2/2024 0013 by Daria Adair RN  Outcome: Progressing  7/1/2024 1226 by Guillermina Yusuf RN  Outcome: Progressing     Problem: Skin/Tissue Integrity  Goal: Absence of new skin breakdown  Description: 1.  Monitor for areas of redness and/or skin breakdown  2.  Assess vascular access sites hourly  3.  Every 4-6 hours minimum:  Change oxygen saturation probe site  4.  Every 4-6 hours:  If on nasal continuous positive airway pressure, respiratory therapy assess nares and determine need for appliance change or resting period.  7/2/2024 0013 by Daria Adair RN  Outcome: Progressing  7/1/2024 1226 by Guillermina Yusuf RN  Outcome: Progressing     Problem: Safety - Adult  Goal: Free from fall injury  7/2/2024 0013 by Daria Adair RN  Outcome: Progressing  7/1/2024 1226 by Guillermina Yusuf RN  Outcome: Progressing     Problem: ABCDS Injury Assessment  Goal: Absence of physical injury  7/2/2024 0013 by Daria Adair RN  Outcome: Progressing  7/1/2024 1226 by Guillermina Yusuf RN  Outcome: Progressing

## 2024-07-02 NOTE — DISCHARGE SUMMARY
@[email or web address] [email or web address]@    [facility]   IN-PATIENT SERVICE   [facility]    Discharge Summary     Patient ID: Yvette Das  :  1940   MRN: [id number]     ACCOUNT:  [id number]   Patient's PCP: Emy Trevino DO  Admit Date: 2024   Discharge Date: 2024   Length of Stay: 3  Code Status:  Full Code  Admitting Physician: Arnold Gayle Sra, MD  Discharge Physician: Arnold Gayle Sra, MD     Active Discharge Diagnoses:     Hospital Problem Lists:  Principal Problem:    Lower GI bleed  Active Problems:    Status post nephrectomy    Urinary retention    Dyslipidemia    CHF (congestive heart failure) (HCC)    JAYJAY on CPAP    Hemorrhage of rectum and anus    Hypertension    A-fib (HCC)    Other specified hypothyroidism    S/P small bowel resection    Femoral fracture (HCC)    Secondary hypercoagulable state (HCC)    GI bleed    Class 3 severe obesity due to excess calories with serious comorbidity and body mass index (BMI) of 45.0 to 49.9 in adult (HCC)  Resolved Problems:    * No resolved hospital problems. *      Admission Condition:  stable     Discharged Condition: stable    Hospital Stay:     Hospital Course: 83-year-old female with history of diverticular disease, recurrent colon polyps with low-grade adenocarcinoma with tubulovillous polyp s/p sigmoid colectomy with ileostomy and subsequent takedown, renal cancer s/p right-sided nephrectomy, A-fib on anticoagulation, recent femur fracture, who was admitted for recurrent episodes of GI bleed, she was just recently discharged from OSH after presenting with similar symptoms, given 1 unit of blood over there, s/p EGD and colonoscopy requiring polypectomy, came in with similar episode of black tarry stools, concerning for diverticular bleed, GI service was consulted, managed conservatively with IV fluids, Protonix, H&H has stayed stable, currently being discharged, will follow-up with her PCP and gastroenterologist as  100 MG Caps  Commonly known as: COLACE, DULCOLAX  Take 100 mg by mouth daily     furosemide 40 MG tablet  Commonly known as: Lasix  Take 1 tablet by mouth daily     levothyroxine 50 MCG tablet  Commonly known as: SYNTHROID  TAKE 1 TABLET BY MOUTH EVERY DAY     Lift Chair Misc  by Does not apply route     melatonin 3 MG Tabs tablet  Take 1 tablet by mouth nightly as needed (5)     metoprolol tartrate 25 MG tablet  Commonly known as: LOPRESSOR  Take 1.5 tablets by mouth 2 times daily     miconazole 2 % powder  Commonly known as: MICOTIN  Apply topically 2 times daily.     pantoprazole 40 MG tablet  Commonly known as: Protonix  Take 1 tablet by mouth daily     polyethylene glycol 17 g packet  Commonly known as: GLYCOLAX  Take 1 packet by mouth daily as needed for Constipation     potassium chloride 20 MEQ extended release tablet  Commonly known as: KLOR-CON M  Take 1 tablet by mouth daily            STOP taking these medications      amLODIPine 10 MG tablet  Commonly known as: NORVASC     spironolactone 25 MG tablet  Commonly known as: ALDACTONE              Discharge Procedure Orders   CBC with Auto Differential   Standing Status: Future Standing Exp. Date: 07/02/25   Order Comments: ROUTE TO Emy Trevino DO       Time Spent on discharge is  35 mins in patient examination, evaluation, counseling as well as medication reconciliation, prescriptions for required medications, discharge plan and follow up.    Electronically signed by   Arnold Gayle Sra, MD  7/2/2024  5:16 PM      Thank you Emy Bolden DO for the opportunity to be involved in this patient's care.

## 2024-07-02 NOTE — PLAN OF CARE
Problem: Discharge Planning  Goal: Discharge to home or other facility with appropriate resources  7/2/2024 1407 by Candace Palmer RN  Outcome: Adequate for Discharge  7/2/2024 0013 by Daria Adair RN  Outcome: Progressing     Problem: Skin/Tissue Integrity  Goal: Absence of new skin breakdown  Description: 1.  Monitor for areas of redness and/or skin breakdown  2.  Assess vascular access sites hourly  3.  Every 4-6 hours minimum:  Change oxygen saturation probe site  4.  Every 4-6 hours:  If on nasal continuous positive airway pressure, respiratory therapy assess nares and determine need for appliance change or resting period.  7/2/2024 1407 by Candace Palmer RN  Outcome: Adequate for Discharge  7/2/2024 0013 by Daria Adair RN  Outcome: Progressing     Problem: Safety - Adult  Goal: Free from fall injury  7/2/2024 1407 by Candace Palmer RN  Outcome: Adequate for Discharge  7/2/2024 0013 by Daria Adair RN  Outcome: Progressing     Problem: ABCDS Injury Assessment  Goal: Absence of physical injury  7/2/2024 1407 by Candace Palmer RN  Outcome: Adequate for Discharge  7/2/2024 0013 by Daria Adair RN  Outcome: Progressing

## 2024-07-02 NOTE — PLAN OF CARE
Problem: Discharge Planning  Goal: Discharge to home or other facility with appropriate resources  7/2/2024 1629 by Candace Palmer RN  Outcome: Adequate for Discharge  7/2/2024 1407 by Candace Palmer RN  Outcome: Adequate for Discharge     Problem: Skin/Tissue Integrity  Goal: Absence of new skin breakdown  Description: 1.  Monitor for areas of redness and/or skin breakdown  2.  Assess vascular access sites hourly  3.  Every 4-6 hours minimum:  Change oxygen saturation probe site  4.  Every 4-6 hours:  If on nasal continuous positive airway pressure, respiratory therapy assess nares and determine need for appliance change or resting period.  7/2/2024 1629 by Candace Palmer RN  Outcome: Adequate for Discharge  7/2/2024 1407 by Candace Palmer RN  Outcome: Adequate for Discharge     Problem: Safety - Adult  Goal: Free from fall injury  7/2/2024 1629 by Candace Palmer RN  Outcome: Adequate for Discharge  7/2/2024 1407 by Candace Palmer RN  Outcome: Adequate for Discharge     Problem: ABCDS Injury Assessment  Goal: Absence of physical injury  7/2/2024 1629 by Candace Palmer RN  Outcome: Adequate for Discharge  7/2/2024 1407 by Candace Palmer RN  Outcome: Adequate for Discharge

## 2024-07-02 NOTE — DISCHARGE INSTRUCTIONS
Continue taking medications as prescribed, follow up with PCP for further optimisation of medications  Return to ER for worsening symptoms

## 2024-07-02 NOTE — PROGRESS NOTES
or any fall with injury in the past year?: Yes  Receives Help From: Family  ADL Assistance: Independent  Homemaking Assistance: Independent  Ambulation Assistance: Needs assistance (uses electric w/c in community)  Transfer Assistance: Independent  Active : No  Patient's  Info: family  Mode of Transportation: SUV  Occupation: Retired  Type of Occupation: factory  Leisure & Hobbies: enjoys word search, krystal, gradening  Additional Comments: steveer able to provide 24 hr assistance as needed    Vision/Hearing  Vision  Vision: Impaired  Vision Exceptions: Wears glasses at all times  Hearing  Hearing: Within functional limits      Cognition   Orientation  Overall Orientation Status: Within Functional Limits  Orientation Level: Oriented X4  Cognition  Overall Cognitive Status: WFL     Objective     AROM RLE (degrees)  RLE AROM: Exceptions  RLE General AROM: AROM hip flexion limited ~0-90 degrees. AROM knee flexion, knee extension, DF, and PF WFL.  AROM LLE (degrees)  LLE AROM : Exceptions  LLE General AROM: AROM hip flexion limited ~0-90 degrees. AROM knee flexion, knee extension, DF, and PF WFL.  AROM RUE (degrees)  RUE AROM : Exceptions  RUE General AROM: AROM shoulder flexion limited ~0-90 degrees. AROM elbow flexion and extension WFL. Formally assessed by OT.  AROM LUE (degrees)  LUE AROM : Exceptions  LUE General AROM: AROM shoulder flexion limited ~0-90 degrees. AROM elbow flexion and extension WFL. Formally assessed by OT.  Strength RLE  Strength RLE: Exception  R Hip Flexion: 2+/5  R Knee Flexion: 4/5  R Knee Extension: 4/5  R Ankle Dorsiflexion: 4/5  R Ankle Plantar flexion: 4-/5  Strength LLE  Strength LLE: Exception  Comment: No resistance applied to L LE d/t NWB L LE restrictions.  L Hip Flexion: 2+/5  L Knee Flexion: 3/5  L Knee Extension: 3/5  L Ankle Dorsiflexion: 3/5  L Ankle Plantar Flexion: 3/5  Strength RUE  Strength RUE: Exception  Comment: Antigravity motion observed through AROM.  Method: Verbal  Barriers to Learning: None  Education Outcome: Verbalized understanding;Demonstrated understanding;Continued education needed      Therapy Time   Individual Concurrent Group Co-treatment   Time In 1037         Time Out 1113         Minutes 36         Timed Code Treatment Minutes: 23 Minutes       LISBET RUSSELL   This treatment/evaluation completed by signing SPT. Signing PT agrees with treatment and documentation.

## 2024-07-03 DIAGNOSIS — S79.912A HIP INJURY, LEFT, INITIAL ENCOUNTER: ICD-10-CM

## 2024-07-03 RX ORDER — OXYCODONE HYDROCHLORIDE AND ACETAMINOPHEN 5; 325 MG/1; MG/1
1 TABLET ORAL EVERY 6 HOURS PRN
Qty: 120 TABLET | Refills: 0 | Status: SHIPPED | OUTPATIENT
Start: 2024-07-03 | End: 2024-08-02

## 2024-07-04 LAB
MICROORGANISM SPEC CULT: NORMAL
SERVICE CMNT-IMP: NORMAL
SPECIMEN DESCRIPTION: NORMAL

## 2024-07-05 LAB
MICROORGANISM SPEC CULT: NORMAL
MICROORGANISM SPEC CULT: NORMAL
SERVICE CMNT-IMP: NORMAL
SERVICE CMNT-IMP: NORMAL
SPECIMEN DESCRIPTION: NORMAL
SPECIMEN DESCRIPTION: NORMAL

## 2024-07-08 ENCOUNTER — HOSPITAL ENCOUNTER (OUTPATIENT)
Age: 84
Setting detail: SPECIMEN
Discharge: HOME | End: 2024-07-08

## 2024-07-08 DIAGNOSIS — Z96.649 PERIPROSTHETIC FRACTURE OF PROXIMAL END OF FEMUR: Primary | ICD-10-CM

## 2024-07-08 DIAGNOSIS — M97.8XXA PERIPROSTHETIC FRACTURE OF PROXIMAL END OF FEMUR: Primary | ICD-10-CM

## 2024-07-08 DIAGNOSIS — F41.9 ANXIETY: ICD-10-CM

## 2024-07-08 LAB
ANION GAP SERPL CALCULATED.3IONS-SCNC: 12 MMOL/L (ref 9–17)
BASOPHILS # BLD: 0.04 K/UL (ref 0–0.2)
BASOPHILS NFR BLD: 1 % (ref 0–2)
BUN SERPL-MCNC: 16 MG/DL (ref 8–23)
BUN/CREAT SERPL: 16 (ref 9–20)
CALCIUM SERPL-MCNC: 8.5 MG/DL (ref 8.6–10.4)
CHLORIDE SERPL-SCNC: 104 MMOL/L (ref 98–107)
CO2 SERPL-SCNC: 26 MMOL/L (ref 20–31)
CREAT SERPL-MCNC: 1 MG/DL (ref 0.5–0.9)
EOSINOPHIL # BLD: 0.21 K/UL (ref 0–0.44)
EOSINOPHILS RELATIVE PERCENT: 4 % (ref 1–4)
ERYTHROCYTE [DISTWIDTH] IN BLOOD BY AUTOMATED COUNT: 14.8 % (ref 11.8–14.4)
GFR, ESTIMATED: 56 ML/MIN/1.73M2
GLUCOSE SERPL-MCNC: 93 MG/DL (ref 70–99)
HCT VFR BLD AUTO: 31.7 % (ref 36.3–47.1)
HGB BLD-MCNC: 10 G/DL (ref 11.9–15.1)
IMM GRANULOCYTES # BLD AUTO: <0.03 K/UL (ref 0–0.3)
IMM GRANULOCYTES NFR BLD: 0 %
LYMPHOCYTES NFR BLD: 1.6 K/UL (ref 1.1–3.7)
LYMPHOCYTES RELATIVE PERCENT: 27 % (ref 24–43)
MCH RBC QN AUTO: 31.1 PG (ref 25.2–33.5)
MCHC RBC AUTO-ENTMCNC: 31.5 G/DL (ref 25.2–33.5)
MCV RBC AUTO: 98.4 FL (ref 82.6–102.9)
MONOCYTES NFR BLD: 0.72 K/UL (ref 0.1–1.2)
MONOCYTES NFR BLD: 12 % (ref 3–12)
NEUTROPHILS NFR BLD: 56 % (ref 36–65)
NEUTS SEG NFR BLD: 3.33 K/UL (ref 1.5–8.1)
NRBC BLD-RTO: 0 PER 100 WBC
PLATELET # BLD AUTO: 227 K/UL (ref 138–453)
PMV BLD AUTO: 12.6 FL (ref 8.1–13.5)
POTASSIUM SERPL-SCNC: 3.8 MMOL/L (ref 3.7–5.3)
RBC # BLD AUTO: 3.22 M/UL (ref 3.95–5.11)
RBC # BLD: ABNORMAL 10*6/UL
SODIUM SERPL-SCNC: 142 MMOL/L (ref 135–144)
WBC OTHER # BLD: 5.9 K/UL (ref 3.5–11.3)

## 2024-07-08 PROCEDURE — 80048 BASIC METABOLIC PNL TOTAL CA: CPT

## 2024-07-08 PROCEDURE — 85025 COMPLETE CBC W/AUTO DIFF WBC: CPT

## 2024-07-08 PROCEDURE — 36415 COLL VENOUS BLD VENIPUNCTURE: CPT

## 2024-07-08 RX ORDER — ALPRAZOLAM 0.25 MG/1
0.25 TABLET ORAL EVERY 8 HOURS PRN
Qty: 90 TABLET | Refills: 0 | Status: SHIPPED | OUTPATIENT
Start: 2024-07-08 | End: 2024-08-07

## 2024-07-09 ENCOUNTER — OUTSIDE SERVICES (OUTPATIENT)
Dept: INTERNAL MEDICINE | Age: 84
End: 2024-07-09
Payer: MEDICARE

## 2024-07-09 DIAGNOSIS — S42.202D CLOSED FRACTURE OF PROXIMAL END OF LEFT HUMERUS WITH ROUTINE HEALING, UNSPECIFIED FRACTURE MORPHOLOGY, SUBSEQUENT ENCOUNTER: ICD-10-CM

## 2024-07-09 DIAGNOSIS — I10 BENIGN ESSENTIAL HTN: ICD-10-CM

## 2024-07-09 DIAGNOSIS — N18.31 CHRONIC KIDNEY DISEASE, STAGE 3A (HCC): ICD-10-CM

## 2024-07-09 DIAGNOSIS — K92.2 GASTROINTESTINAL HEMORRHAGE, UNSPECIFIED GASTROINTESTINAL HEMORRHAGE TYPE: Primary | ICD-10-CM

## 2024-07-09 DIAGNOSIS — R33.9 URINARY RETENTION: ICD-10-CM

## 2024-07-09 DIAGNOSIS — I48.0 PAROXYSMAL ATRIAL FIBRILLATION (HCC): ICD-10-CM

## 2024-07-09 DIAGNOSIS — G47.33 OBSTRUCTIVE SLEEP APNEA: ICD-10-CM

## 2024-07-09 DIAGNOSIS — E03.9 HYPOTHYROIDISM, UNSPECIFIED TYPE: ICD-10-CM

## 2024-07-09 DIAGNOSIS — E66.01 CLASS 3 SEVERE OBESITY DUE TO EXCESS CALORIES WITH SERIOUS COMORBIDITY AND BODY MASS INDEX (BMI) OF 45.0 TO 49.9 IN ADULT (HCC): ICD-10-CM

## 2024-07-09 PROCEDURE — 99309 SBSQ NF CARE MODERATE MDM 30: CPT | Performed by: NURSE PRACTITIONER

## 2024-07-09 NOTE — PROGRESS NOTES
OR  stricture at 7 cm     COLONOSCOPY N/A 02/03/2020    tubular adenoma X 1, Dr. Briggs    COLONOSCOPY N/A 02/23/2023    COLONOSCOPY BIOPSY performed by Dimas Briggs MD at [facility] OR    COLONOSCOPY N/A 06/24/2024    x 2 tubular adenomatous polyps, x 1 hyperplastic polyp, repeat in 5 years (2029); performed by Bogdan Arguello DO at [facility] OR    HIP ARTHROPLASTY Left     HYSTERECTOMY (CERVIX STATUS UNKNOWN)      KIDNEY SURGERY Right 10/07/2022    XI ROBOTIC LAPAROSCOPIC RADICAL NEPHRECTOMY, LYSIS OF ADHESIONS performed by Dionisio Lance MD at [facility] OR    KNEE ARTHROPLASTY Left     KNEE ARTHROPLASTY Right     LA CYSTO W/INSERT URETERAL STENT Bilateral 11/05/2018    CYSTO Bilateral Lighted stent placements performed by Gabe Swift MD at [facility] OR    LA LAPAROSCOPY COLECTOMY PARTIAL W/ANASTOMOSIS N/A 11/05/2018    Open Sigmoid Colectomy w/ lighted stents ILEOSTOMY PLACEMENT performed by Bogdan Patel MD at [facility] OR    SIGMOIDOSCOPY N/A 05/09/2019    Flexible Sigmoidoscopy with Dilatation of rectal stricture performed by Dimas Briggs MD at [facility] OR    SMALL INTESTINE SURGERY N/A 05/22/2019    Loop Ileostomy take down performed by Dimas Briggs MD at [facility] OR    KARYN AND BSO (CERVIX REMOVED)  1966    TOTAL NEPHRECTOMY Right 10/07/2022    Lysis of Adhesions    UPPER GASTROINTESTINAL ENDOSCOPY N/A 06/24/2024    ESOPHAGOGASTRODUODENOSCOPY BIOPSY performed by Bogdan Arguello DO at [facility] OR    VARICOSE VEIN SURGERY Right 1960s       Medications as per [facility] Chart /reviewed     Social History     Socioeconomic History    Marital status:      Spouse name: Not on file    Number of children: 5    Years of education: 12    Highest education level: 12th grade   Occupational History    Not on file   Tobacco Use    Smoking status: Never     Passive exposure: Never    Smokeless tobacco: Never   Vaping Use    Vaping Use: Never used   Substance and Sexual Activity    Alcohol use: No    Drug use: No    Sexual activity: Not

## 2024-07-11 ENCOUNTER — CARE COORDINATION (OUTPATIENT)
Dept: CARE COORDINATION | Age: 84
End: 2024-07-11

## 2024-07-11 NOTE — CARE COORDINATION
Yvette was referred for ACM.   She is currently at the [facility] rehab.   This writer will continue to review.     Future Appointments   Date Time Provider Department Center   7/16/2024 10:30 AM [facility]   [facility]29/2024 10:00 [facility]   12/9/2024  9:45 AM [facility]

## 2024-07-12 ENCOUNTER — HOSPITAL ENCOUNTER (OUTPATIENT)
Age: 84
Setting detail: SPECIMEN
Discharge: HOME | End: 2024-07-12

## 2024-07-12 LAB
ALBUMIN SERPL-MCNC: 3.1 G/DL (ref 3.5–5.2)
ALBUMIN/GLOB SERPL: 1.1 {RATIO} (ref 1–2.5)
ALP SERPL-CCNC: 112 U/L (ref 35–104)
ALT SERPL-CCNC: 8 U/L (ref 5–33)
ANION GAP SERPL CALCULATED.3IONS-SCNC: 11 MMOL/L (ref 9–17)
AST SERPL-CCNC: 14 U/L
BILIRUB SERPL-MCNC: 0.5 MG/DL (ref 0.3–1.2)
BUN SERPL-MCNC: 20 MG/DL (ref 8–23)
BUN/CREAT SERPL: 20 (ref 9–20)
CALCIUM SERPL-MCNC: 8.4 MG/DL (ref 8.6–10.4)
CHLORIDE SERPL-SCNC: 105 MMOL/L (ref 98–107)
CO2 SERPL-SCNC: 26 MMOL/L (ref 20–31)
CREAT SERPL-MCNC: 1 MG/DL (ref 0.5–0.9)
GFR, ESTIMATED: 56 ML/MIN/1.73M2
GLUCOSE SERPL-MCNC: 96 MG/DL (ref 70–99)
POTASSIUM SERPL-SCNC: 3.6 MMOL/L (ref 3.7–5.3)
PROT SERPL-MCNC: 6 G/DL (ref 6.4–8.3)
SODIUM SERPL-SCNC: 142 MMOL/L (ref 135–144)

## 2024-07-12 PROCEDURE — 80053 COMPREHEN METABOLIC PANEL: CPT

## 2024-07-12 PROCEDURE — 36415 COLL VENOUS BLD VENIPUNCTURE: CPT

## 2024-07-16 ENCOUNTER — OFFICE VISIT (OUTPATIENT)
Dept: ORTHOPEDIC SURGERY | Age: 84
End: 2024-07-16
Payer: MEDICARE

## 2024-07-16 ENCOUNTER — HOSPITAL ENCOUNTER (OUTPATIENT)
Dept: GENERAL RADIOLOGY | Age: 84
Discharge: HOME | End: 2024-07-18
Payer: MEDICARE

## 2024-07-16 VITALS — HEIGHT: 67 IN | WEIGHT: 293 LBS | BODY MASS INDEX: 45.99 KG/M2

## 2024-07-16 DIAGNOSIS — M97.8XXA PERIPROSTHETIC FRACTURE OF PROXIMAL END OF FEMUR: ICD-10-CM

## 2024-07-16 DIAGNOSIS — Z96.649 PERIPROSTHETIC FRACTURE OF PROXIMAL END OF FEMUR: Primary | ICD-10-CM

## 2024-07-16 DIAGNOSIS — M97.8XXA PERIPROSTHETIC FRACTURE OF PROXIMAL END OF FEMUR: Primary | ICD-10-CM

## 2024-07-16 DIAGNOSIS — Z96.649 PERIPROSTHETIC FRACTURE OF PROXIMAL END OF FEMUR: ICD-10-CM

## 2024-07-16 PROCEDURE — 99024 POSTOP FOLLOW-UP VISIT: CPT | Performed by: NURSE PRACTITIONER

## 2024-07-16 PROCEDURE — 99213 OFFICE O/P EST LOW 20 MIN: CPT | Performed by: NURSE PRACTITIONER

## 2024-07-16 PROCEDURE — 73552 X-RAY EXAM OF FEMUR 2/>: CPT

## 2024-07-16 NOTE — PROGRESS NOTES
ENDOSCOPY N/A 06/24/2024    ESOPHAGOGASTRODUODENOSCOPY BIOPSY performed by Bogdan Arguello DO at [facility] OR    VARICOSE VEIN SURGERY Right 1960s         Current  Medications:  Current Outpatient Medications   Medication Sig Dispense Refill    ALPRAZolam (XANAX) 0.25 MG tablet Take 1 tablet by mouth every 8 hours as needed for Anxiety or Sleep for up to 30 days. Max Daily Amount: 0.75 mg 90 tablet 0    oxyCODONE-acetaminophen (PERCOCET) 5-325 MG per tablet Take 1 tablet by mouth every 6 hours as needed for Pain for up to 30 days. Take lowest dose possible to manage pain Max Daily Amount: 4 tablets 120 tablet 0    docusate (COLACE, DULCOLAX) 100 MG CAPS Take 100 mg by mouth daily 30 capsule 0    pantoprazole (PROTONIX) 40 MG tablet Take 1 tablet by mouth daily 30 tablet 0    apixaban (ELIQUIS) 5 MG TABS tablet Take 1 tablet by mouth 2 times daily 60 tablet 0    metoprolol tartrate (LOPRESSOR) 25 MG tablet Take 1.5 tablets by mouth 2 times daily 270 tablet 3    albuterol (PROVENTIL) (2.5 MG/3ML) 0.083% nebulizer solution Take 3 mLs by nebulization every 6 hours as needed for Wheezing 120 each 3    miconazole (MICOTIN) 2 % powder Apply topically 2 times daily. 45 g 1    polyethylene glycol (GLYCOLAX) 17 g packet Take 1 packet by mouth daily as needed for Constipation 527 g 1    melatonin 3 MG TABS tablet Take 1 tablet by mouth nightly as needed (5) 5 tablet 0    bethanechol (URECHOLINE) 25 MG tablet Take 1 tablet by mouth 4 times daily 90 tablet 0    atorvastatin (LIPITOR) 40 MG tablet TAKE 1 TABLET BY MOUTH EVERY DAY AT NIGHT 90 tablet 1    furosemide (LASIX) 40 MG tablet Take 1 tablet by mouth daily 90 tablet 1    levothyroxine (SYNTHROID) 50 MCG tablet TAKE 1 TABLET BY MOUTH EVERY DAY 90 tablet 1    Lift Chair MISC by Does not apply route 1 each 0    potassium chloride (KLOR-CON M) 20 MEQ extended release tablet Take 1 tablet by mouth daily 90 tablet 1    acetaminophen (TYLENOL) 325 MG tablet Take 2 tablets by mouth

## 2024-07-22 ENCOUNTER — HOSPITAL ENCOUNTER (OUTPATIENT)
Age: 84
Setting detail: SPECIMEN
Discharge: HOME | End: 2024-07-22

## 2024-07-22 LAB
ALBUMIN SERPL-MCNC: 3.2 G/DL (ref 3.5–5.2)
ALBUMIN/GLOB SERPL: 1.1 {RATIO} (ref 1–2.5)
ALP SERPL-CCNC: 107 U/L (ref 35–104)
ALT SERPL-CCNC: 11 U/L (ref 5–33)
ANION GAP SERPL CALCULATED.3IONS-SCNC: 12 MMOL/L (ref 9–17)
AST SERPL-CCNC: 18 U/L
BASOPHILS # BLD: 0.04 K/UL (ref 0–0.2)
BASOPHILS NFR BLD: 1 % (ref 0–2)
BILIRUB SERPL-MCNC: 0.4 MG/DL (ref 0.3–1.2)
BUN SERPL-MCNC: 21 MG/DL (ref 8–23)
BUN/CREAT SERPL: 21 (ref 9–20)
CALCIUM SERPL-MCNC: 8.6 MG/DL (ref 8.6–10.4)
CHLORIDE SERPL-SCNC: 106 MMOL/L (ref 98–107)
CO2 SERPL-SCNC: 24 MMOL/L (ref 20–31)
CREAT SERPL-MCNC: 1 MG/DL (ref 0.5–0.9)
EOSINOPHIL # BLD: 0.16 K/UL (ref 0–0.44)
EOSINOPHILS RELATIVE PERCENT: 2 % (ref 1–4)
ERYTHROCYTE [DISTWIDTH] IN BLOOD BY AUTOMATED COUNT: 13.8 % (ref 11.8–14.4)
GFR, ESTIMATED: 56 ML/MIN/1.73M2
GLUCOSE SERPL-MCNC: 102 MG/DL (ref 70–99)
HCT VFR BLD AUTO: 34.6 % (ref 36.3–47.1)
HGB BLD-MCNC: 11.2 G/DL (ref 11.9–15.1)
IMM GRANULOCYTES # BLD AUTO: 0.03 K/UL (ref 0–0.3)
IMM GRANULOCYTES NFR BLD: 0 %
LYMPHOCYTES NFR BLD: 1.27 K/UL (ref 1.1–3.7)
LYMPHOCYTES RELATIVE PERCENT: 18 % (ref 24–43)
MCH RBC QN AUTO: 30.8 PG (ref 25.2–33.5)
MCHC RBC AUTO-ENTMCNC: 32.4 G/DL (ref 25.2–33.5)
MCV RBC AUTO: 95.1 FL (ref 82.6–102.9)
MONOCYTES NFR BLD: 0.75 K/UL (ref 0.1–1.2)
MONOCYTES NFR BLD: 11 % (ref 3–12)
NEUTROPHILS NFR BLD: 68 % (ref 36–65)
NEUTS SEG NFR BLD: 4.74 K/UL (ref 1.5–8.1)
NRBC BLD-RTO: 0 PER 100 WBC
PLATELET # BLD AUTO: ABNORMAL K/UL (ref 138–453)
PLATELET, FLUORESCENCE: 155 K/UL (ref 138–453)
PLATELETS.RETICULATED NFR BLD AUTO: 13.7 % (ref 1.1–10.3)
POTASSIUM SERPL-SCNC: 3.2 MMOL/L (ref 3.7–5.3)
PROT SERPL-MCNC: 6 G/DL (ref 6.4–8.3)
RBC # BLD AUTO: 3.64 M/UL (ref 3.95–5.11)
SODIUM SERPL-SCNC: 142 MMOL/L (ref 135–144)
WBC OTHER # BLD: 7 K/UL (ref 3.5–11.3)

## 2024-07-22 PROCEDURE — 85025 COMPLETE CBC W/AUTO DIFF WBC: CPT

## 2024-07-22 PROCEDURE — 36415 COLL VENOUS BLD VENIPUNCTURE: CPT

## 2024-07-22 PROCEDURE — 80053 COMPREHEN METABOLIC PANEL: CPT

## 2024-07-26 ENCOUNTER — HOSPITAL ENCOUNTER (OUTPATIENT)
Age: 84
Setting detail: SPECIMEN
Discharge: HOME | End: 2024-07-26

## 2024-07-26 LAB
ALBUMIN SERPL-MCNC: 3.1 G/DL (ref 3.5–5.2)
ALBUMIN/GLOB SERPL: 1.2 {RATIO} (ref 1–2.5)
ALP SERPL-CCNC: 113 U/L (ref 35–104)
ALT SERPL-CCNC: 15 U/L (ref 5–33)
ANION GAP SERPL CALCULATED.3IONS-SCNC: 10 MMOL/L (ref 9–17)
AST SERPL-CCNC: 22 U/L
BILIRUB SERPL-MCNC: 0.3 MG/DL (ref 0.3–1.2)
BUN SERPL-MCNC: 19 MG/DL (ref 8–23)
BUN/CREAT SERPL: 24 (ref 9–20)
CALCIUM SERPL-MCNC: 8.6 MG/DL (ref 8.6–10.4)
CHLORIDE SERPL-SCNC: 107 MMOL/L (ref 98–107)
CO2 SERPL-SCNC: 24 MMOL/L (ref 20–31)
CREAT SERPL-MCNC: 0.8 MG/DL (ref 0.5–0.9)
GFR, ESTIMATED: 73 ML/MIN/1.73M2
GLUCOSE SERPL-MCNC: 101 MG/DL (ref 70–99)
POTASSIUM SERPL-SCNC: 3.5 MMOL/L (ref 3.7–5.3)
PROT SERPL-MCNC: 5.7 G/DL (ref 6.4–8.3)
SODIUM SERPL-SCNC: 141 MMOL/L (ref 135–144)

## 2024-07-26 PROCEDURE — 36415 COLL VENOUS BLD VENIPUNCTURE: CPT

## 2024-07-26 PROCEDURE — 80053 COMPREHEN METABOLIC PANEL: CPT

## 2024-07-29 ENCOUNTER — OFFICE VISIT (OUTPATIENT)
Dept: ONCOLOGY | Age: 84
End: 2024-07-29
Payer: MEDICARE

## 2024-07-29 VITALS
SYSTOLIC BLOOD PRESSURE: 126 MMHG | DIASTOLIC BLOOD PRESSURE: 80 MMHG | HEIGHT: 67 IN | BODY MASS INDEX: 45.99 KG/M2 | OXYGEN SATURATION: 95 % | WEIGHT: 293 LBS | TEMPERATURE: 97.2 F | HEART RATE: 97 BPM

## 2024-07-29 DIAGNOSIS — C18.7 MALIGNANT NEOPLASM OF SIGMOID COLON (HCC): Primary | ICD-10-CM

## 2024-07-29 PROCEDURE — 3074F SYST BP LT 130 MM HG: CPT | Performed by: INTERNAL MEDICINE

## 2024-07-29 PROCEDURE — 3079F DIAST BP 80-89 MM HG: CPT | Performed by: INTERNAL MEDICINE

## 2024-07-29 PROCEDURE — G8399 PT W/DXA RESULTS DOCUMENT: HCPCS | Performed by: INTERNAL MEDICINE

## 2024-07-29 PROCEDURE — G8417 CALC BMI ABV UP PARAM F/U: HCPCS | Performed by: INTERNAL MEDICINE

## 2024-07-29 PROCEDURE — G8427 DOCREV CUR MEDS BY ELIG CLIN: HCPCS | Performed by: INTERNAL MEDICINE

## 2024-07-29 PROCEDURE — 1090F PRES/ABSN URINE INCON ASSESS: CPT | Performed by: INTERNAL MEDICINE

## 2024-07-29 PROCEDURE — 99213 OFFICE O/P EST LOW 20 MIN: CPT | Performed by: INTERNAL MEDICINE

## 2024-07-29 PROCEDURE — 1036F TOBACCO NON-USER: CPT | Performed by: INTERNAL MEDICINE

## 2024-07-29 PROCEDURE — 1111F DSCHRG MED/CURRENT MED MERGE: CPT | Performed by: INTERNAL MEDICINE

## 2024-07-29 PROCEDURE — 99214 OFFICE O/P EST MOD 30 MIN: CPT | Performed by: INTERNAL MEDICINE

## 2024-07-29 PROCEDURE — 1123F ACP DISCUSS/DSCN MKR DOCD: CPT | Performed by: INTERNAL MEDICINE

## 2024-07-29 RX ORDER — TRAZODONE HYDROCHLORIDE 50 MG/1
50 TABLET ORAL NIGHTLY
COMMUNITY

## 2024-07-29 RX ORDER — DOCUSATE SODIUM 100 MG/1
100 CAPSULE, LIQUID FILLED ORAL 2 TIMES DAILY
COMMUNITY

## 2024-07-29 RX ORDER — SPIRONOLACTONE 25 MG/1
25 TABLET ORAL DAILY
COMMUNITY

## 2024-07-29 NOTE — PROGRESS NOTES
and importance of compliance with the treatment plan. Greater than 50% of that time was spent face-to-face with the patient in counseling and coordinating her care.      This note is created with the assistance of a speech recognition program.  While intending to generate a document that actually reflects the content of the visit, the document can still have some errors including those of syntax and sound a like substitutions which may escape proof reading.  It such instances, actual meaning can be extrapolated by contextual diversion.

## 2024-07-31 ENCOUNTER — HOSPITAL ENCOUNTER (OUTPATIENT)
Age: 84
Setting detail: SPECIMEN
Discharge: HOME | End: 2024-07-31

## 2024-07-31 LAB — CEA SERPL-MCNC: 5 NG/ML (ref 0–3.8)

## 2024-07-31 PROCEDURE — 82378 CARCINOEMBRYONIC ANTIGEN: CPT

## 2024-07-31 PROCEDURE — 36415 COLL VENOUS BLD VENIPUNCTURE: CPT

## 2024-08-01 ENCOUNTER — TELEPHONE (OUTPATIENT)
Dept: ONCOLOGY | Age: 84
End: 2024-08-01

## 2024-08-01 ENCOUNTER — CARE COORDINATION (OUTPATIENT)
Dept: CARE COORDINATION | Age: 84
End: 2024-08-01

## 2024-08-01 NOTE — CARE COORDINATION
Ambulatory Care Coordination Note     8/1/2024 1:35 PM       ACM: Skyla Bajwa RN     Care Summary Note:     Yvette was referred for ACM.   She is currently at the [facility] for rehab.   This writer will continue to review.     PCP/Specialist follow up:   Future Appointments         Provider Specialty Dept Phone    8/13/2024 10:00 AM Aydin Zeng MD Orthopedic Surgery [phone]    12/9/2024 9:45 AM Fredo Casas DO Cardiology [phone]    2/3/2025 10:00 AM Rory Hernandez MD Oncology [phone]            Follow Up:   Plan for next ACM outreach in approximately 2 weeks to complete:  - outreach attempt to offer care management services.

## 2024-08-01 NOTE — TELEPHONE ENCOUNTER
Pts son Mode called wanting to make sure that there is not a need to be seen earlier for her CEA being 5

## 2024-08-05 DIAGNOSIS — M97.8XXA PERIPROSTHETIC FRACTURE OF PROXIMAL END OF FEMUR: Primary | ICD-10-CM

## 2024-08-05 DIAGNOSIS — Z96.649 PERIPROSTHETIC FRACTURE OF PROXIMAL END OF FEMUR: Primary | ICD-10-CM

## 2024-08-12 ENCOUNTER — OUTSIDE SERVICES (OUTPATIENT)
Dept: INTERNAL MEDICINE | Age: 84
End: 2024-08-12

## 2024-08-12 DIAGNOSIS — N18.31 CHRONIC KIDNEY DISEASE, STAGE 3A (HCC): ICD-10-CM

## 2024-08-12 DIAGNOSIS — S42.202D CLOSED FRACTURE OF PROXIMAL END OF LEFT HUMERUS WITH ROUTINE HEALING, UNSPECIFIED FRACTURE MORPHOLOGY, SUBSEQUENT ENCOUNTER: ICD-10-CM

## 2024-08-12 DIAGNOSIS — R33.9 URINARY RETENTION: Primary | ICD-10-CM

## 2024-08-12 NOTE — PROGRESS NOTES
straight cath.    2. Chronic kidney disease, stage 3a (HCC)  Most recent labs stable    3. Closed fracture of proximal end of left humerus with routine healing, unspecified fracture morphology, subsequent encounter  Recently has gotten weightbearing status working with physical therapy.  Progressing however slowly.        Plan:  As noted above.  Follow up for routine visit.  Call sooner with concerns prior.    Electronically signed by XIOMARA Gomez CNP on 8/12/2024 at 10:56 AM

## 2024-08-13 ENCOUNTER — OFFICE VISIT (OUTPATIENT)
Dept: ORTHOPEDIC SURGERY | Age: 84
End: 2024-08-13
Payer: MEDICARE

## 2024-08-13 ENCOUNTER — HOSPITAL ENCOUNTER (OUTPATIENT)
Dept: GENERAL RADIOLOGY | Age: 84
Discharge: HOME | End: 2024-08-15
Attending: ORTHOPAEDIC SURGERY
Payer: MEDICARE

## 2024-08-13 VITALS
HEART RATE: 84 BPM | SYSTOLIC BLOOD PRESSURE: 120 MMHG | OXYGEN SATURATION: 96 % | DIASTOLIC BLOOD PRESSURE: 70 MMHG | WEIGHT: 293 LBS | BODY MASS INDEX: 45.99 KG/M2 | HEIGHT: 67 IN

## 2024-08-13 DIAGNOSIS — Z96.649 PERIPROSTHETIC FRACTURE OF PROXIMAL END OF FEMUR: ICD-10-CM

## 2024-08-13 DIAGNOSIS — M97.8XXA PERIPROSTHETIC FRACTURE OF PROXIMAL END OF FEMUR: Primary | ICD-10-CM

## 2024-08-13 DIAGNOSIS — Z96.649 PERIPROSTHETIC FRACTURE OF PROXIMAL END OF FEMUR: Primary | ICD-10-CM

## 2024-08-13 DIAGNOSIS — M97.8XXA PERIPROSTHETIC FRACTURE OF PROXIMAL END OF FEMUR: ICD-10-CM

## 2024-08-13 PROCEDURE — G8417 CALC BMI ABV UP PARAM F/U: HCPCS | Performed by: PHYSICIAN ASSISTANT

## 2024-08-13 PROCEDURE — G8427 DOCREV CUR MEDS BY ELIG CLIN: HCPCS | Performed by: PHYSICIAN ASSISTANT

## 2024-08-13 PROCEDURE — 99213 OFFICE O/P EST LOW 20 MIN: CPT | Performed by: PHYSICIAN ASSISTANT

## 2024-08-13 PROCEDURE — 1090F PRES/ABSN URINE INCON ASSESS: CPT | Performed by: PHYSICIAN ASSISTANT

## 2024-08-13 PROCEDURE — 1036F TOBACCO NON-USER: CPT | Performed by: PHYSICIAN ASSISTANT

## 2024-08-13 PROCEDURE — 99214 OFFICE O/P EST MOD 30 MIN: CPT | Performed by: PHYSICIAN ASSISTANT

## 2024-08-13 PROCEDURE — 1123F ACP DISCUSS/DSCN MKR DOCD: CPT | Performed by: PHYSICIAN ASSISTANT

## 2024-08-13 PROCEDURE — 3078F DIAST BP <80 MM HG: CPT | Performed by: PHYSICIAN ASSISTANT

## 2024-08-13 PROCEDURE — G8399 PT W/DXA RESULTS DOCUMENT: HCPCS | Performed by: PHYSICIAN ASSISTANT

## 2024-08-13 PROCEDURE — 73552 X-RAY EXAM OF FEMUR 2/>: CPT

## 2024-08-13 PROCEDURE — 3074F SYST BP LT 130 MM HG: CPT | Performed by: PHYSICIAN ASSISTANT

## 2024-08-15 ENCOUNTER — CARE COORDINATION (OUTPATIENT)
Dept: CARE COORDINATION | Age: 84
End: 2024-08-15

## 2024-08-15 NOTE — CARE COORDINATION
Yvette was referred for ACM.   8/15/2024- this writer has been reviewing the chart for > 30 days- she is currently at the [facility].   No further outreaches at this time.     Future Appointments   Date Time Provider Department Center   12/9/2024  9:45 AM Fredo Casas DO [facility]   2/3/2025 10:00 [facility]

## 2024-08-27 NOTE — TELEPHONE ENCOUNTER
Writer son Mode pts POA to f/u as scheduled, no additional concerns at this time. Mode stated understanding.

## 2024-09-03 ENCOUNTER — TELEPHONE (OUTPATIENT)
Dept: CARDIOLOGY | Age: 84
End: 2024-09-03

## 2024-09-05 ENCOUNTER — OUTSIDE SERVICES (OUTPATIENT)
Dept: INTERNAL MEDICINE | Age: 84
End: 2024-09-05
Payer: MEDICARE

## 2024-09-05 ENCOUNTER — TELEPHONE (OUTPATIENT)
Dept: FAMILY MEDICINE CLINIC | Age: 84
End: 2024-09-05

## 2024-09-05 ENCOUNTER — TELEPHONE (OUTPATIENT)
Dept: INTERNAL MEDICINE | Age: 84
End: 2024-09-05

## 2024-09-05 DIAGNOSIS — N18.31 CHRONIC KIDNEY DISEASE, STAGE 3A (HCC): ICD-10-CM

## 2024-09-05 DIAGNOSIS — G47.33 OBSTRUCTIVE SLEEP APNEA: ICD-10-CM

## 2024-09-05 DIAGNOSIS — S79.912A HIP INJURY, LEFT, INITIAL ENCOUNTER: ICD-10-CM

## 2024-09-05 DIAGNOSIS — E66.01 CLASS 3 SEVERE OBESITY DUE TO EXCESS CALORIES WITH SERIOUS COMORBIDITY AND BODY MASS INDEX (BMI) OF 45.0 TO 49.9 IN ADULT (HCC): ICD-10-CM

## 2024-09-05 DIAGNOSIS — S42.202D CLOSED FRACTURE OF PROXIMAL END OF LEFT HUMERUS WITH ROUTINE HEALING, UNSPECIFIED FRACTURE MORPHOLOGY, SUBSEQUENT ENCOUNTER: Primary | ICD-10-CM

## 2024-09-05 DIAGNOSIS — I48.0 PAROXYSMAL ATRIAL FIBRILLATION (HCC): ICD-10-CM

## 2024-09-05 DIAGNOSIS — I10 BENIGN ESSENTIAL HTN: ICD-10-CM

## 2024-09-05 PROCEDURE — 99315 NF DSCHRG MGMT 30 MIN/LESS: CPT | Performed by: NURSE PRACTITIONER

## 2024-09-05 RX ORDER — POTASSIUM CHLORIDE 1500 MG/1
20 TABLET, EXTENDED RELEASE ORAL 3 TIMES DAILY
Qty: 90 TABLET | Refills: 0 | Status: SHIPPED | OUTPATIENT
Start: 2024-09-05

## 2024-09-05 RX ORDER — FUROSEMIDE 40 MG
40 TABLET ORAL DAILY
Qty: 30 TABLET | Refills: 0 | Status: SHIPPED | OUTPATIENT
Start: 2024-09-05

## 2024-09-05 RX ORDER — ATORVASTATIN CALCIUM 40 MG/1
40 TABLET, FILM COATED ORAL NIGHTLY
Qty: 30 TABLET | Refills: 0 | Status: SHIPPED | OUTPATIENT
Start: 2024-09-05

## 2024-09-05 RX ORDER — OXYCODONE AND ACETAMINOPHEN 5; 325 MG/1; MG/1
1 TABLET ORAL EVERY 6 HOURS PRN
Qty: 28 TABLET | Refills: 0 | Status: SHIPPED | OUTPATIENT
Start: 2024-09-05 | End: 2024-09-12

## 2024-09-05 RX ORDER — METOPROLOL TARTRATE 37.5 MG/1
37.5 TABLET, FILM COATED ORAL 2 TIMES DAILY
Qty: 60 TABLET | Refills: 0 | Status: SHIPPED | OUTPATIENT
Start: 2024-09-05

## 2024-09-05 RX ORDER — TRAZODONE HYDROCHLORIDE 50 MG/1
50 TABLET, FILM COATED ORAL NIGHTLY
Qty: 30 TABLET | Refills: 0 | Status: SHIPPED | OUTPATIENT
Start: 2024-09-05

## 2024-09-05 ASSESSMENT — ENCOUNTER SYMPTOMS
CHEST TIGHTNESS: 0
ABDOMINAL PAIN: 0
BLOOD IN STOOL: 0
EYE PAIN: 0
TROUBLE SWALLOWING: 0
RHINORRHEA: 0
COLOR CHANGE: 0
CONSTIPATION: 0
WHEEZING: 0
SINUS PRESSURE: 0
SORE THROAT: 0
SHORTNESS OF BREATH: 0
COUGH: 0
NAUSEA: 0
VOMITING: 0
DIARRHEA: 0
FACIAL SWELLING: 0

## 2024-09-05 NOTE — PROGRESS NOTES
09/05/24  Yvette Das  1940    Patient Resident of [facility]    Chief Complaint  1. Closed fracture of proximal end of left humerus with routine healing, unspecified fracture morphology, subsequent encounter    2. Chronic kidney disease, stage 3a (HCC)    3. Paroxysmal atrial fibrillation (HCC)    4. Obstructive sleep apnea    5. Class 3 severe obesity due to excess calories with serious comorbidity and body mass index (BMI) of 45.0 to 49.9 in adult (HCC)    6. Benign essential HTN        HPI:83-year-old patient being seen for rehospitalization at [facility] 6/29/2024 through 7/2/2024 for GI bleed. Was given IV Protonix. GI was consulted. Patient managed conservatively with IV fluids and Protonix. Hemoglobin stayed stable and suggested following up as outpatient. Per GI was okay to resume her anticoagulation for atrial fibs. Did undergo EGD and colonoscopy at prior hospitalization with a polypectomy. At prior hospitalization was felt bleeding related to diverticular bleed. Patient returned to [facility] for ongoing rehabilitation as she was initially placed here for hip fracture that was deemed nonsurgical. Hoping to get weightbearing status at her next visit with orthopedics which is on the 16th of this month. Currently they have her getting up to the wheelchair doing some physical therapy however again nonweightbearing to that left hip. Was having issues with urinary retention. Petty catheter removed and she has now been urinating without difficulty continues on Urecholine.     In the interim  Patient being seen for discharge needs from [facility].  She has been at facility 100 days and insurance cutting.  Patient states cannot afford to pay privately.  Will be discharging back home with her granddaughter and grandson.  Per nursing staff she has been able to stand and take 40-50 steps.  Patient wishing for further outpatient therapy.  Her Petty catheter has been removed.  Urecholine

## 2024-09-05 NOTE — TELEPHONE ENCOUNTER
[place] today    Controlled Substance Monitoring:    Acute and Chronic Pain Monitoring:   RX Monitoring Periodic Controlled Substance Monitoring   9/5/2024   3:54 PM No signs of potential drug abuse or diversion identified.;Obtaining appropriate analgesic effect of treatment.

## 2024-09-06 NOTE — TELEPHONE ENCOUNTER
Care Transitions Initial Follow Up Call    Outreach made within 2 business days of discharge: Yes    Patient: Yvette Das Patient : 1940   MRN: [id number]  Reason for Admission: fractured femur (-24 [facility] and -24 [facility]  Discharge Date: 2024       Spoke with: patient Yvette    Discharge department/facility: [facility] Interactive Patient Contact:  Was patient able to fill all prescriptions: Yes  Was patient instructed to bring all medications to the follow-up visit: Yes  Is patient taking all medications as directed in the discharge summary? Yes  Does patient understand their discharge instructions: Yes  Does patient have questions or concerns that need addressed prior to 7-14 day follow up office visit: no    Additional needs identified to be addressed with provider  No needs identified             Scheduled appointment with PCP within 7-14 days    Follow Up  Future Appointments   Date Time Provider Department Center   [facility] 11:[facility]   2024  9:45 AM Fredo Casas DO [facility]   2/3/2025 10:00 AM [facility]       Mallory Casiano RN

## 2024-09-09 ENCOUNTER — TELEPHONE (OUTPATIENT)
Dept: CARDIOLOGY | Age: 84
End: 2024-09-09

## 2024-09-09 RX ORDER — LEVOTHYROXINE SODIUM 50 UG/1
TABLET ORAL
Qty: 90 TABLET | Refills: 0 | Status: SHIPPED | OUTPATIENT
Start: 2024-09-09

## 2024-09-10 ENCOUNTER — TELEPHONE (OUTPATIENT)
Dept: FAMILY MEDICINE CLINIC | Age: 84
End: 2024-09-10

## 2024-09-12 ENCOUNTER — TELEMEDICINE (OUTPATIENT)
Dept: FAMILY MEDICINE CLINIC | Age: 84
End: 2024-09-12

## 2024-09-12 DIAGNOSIS — I48.0 PAROXYSMAL ATRIAL FIBRILLATION (HCC): ICD-10-CM

## 2024-09-12 DIAGNOSIS — R73.01 IMPAIRED FASTING GLUCOSE: ICD-10-CM

## 2024-09-12 DIAGNOSIS — S72.002S: Primary | ICD-10-CM

## 2024-09-12 DIAGNOSIS — R79.1 SUPRATHERAPEUTIC INR: ICD-10-CM

## 2024-09-12 DIAGNOSIS — E03.9 ACQUIRED HYPOTHYROIDISM: ICD-10-CM

## 2024-09-12 DIAGNOSIS — E78.2 MIXED HYPERLIPIDEMIA: ICD-10-CM

## 2024-09-12 DIAGNOSIS — Z09 HOSPITAL DISCHARGE FOLLOW-UP: ICD-10-CM

## 2024-09-12 DIAGNOSIS — K92.2 LOWER GI BLEED: ICD-10-CM

## 2024-09-12 DIAGNOSIS — I10 ESSENTIAL HYPERTENSION: ICD-10-CM

## 2024-09-12 RX ORDER — AMLODIPINE BESYLATE 10 MG/1
10 TABLET ORAL DAILY
COMMUNITY
Start: 2024-09-08

## 2024-09-12 RX ORDER — FUROSEMIDE 20 MG
TABLET ORAL
Qty: 180 TABLET | Refills: 1 | Status: SHIPPED | OUTPATIENT
Start: 2024-09-12

## 2024-09-12 SDOH — ECONOMIC STABILITY: FOOD INSECURITY: WITHIN THE PAST 12 MONTHS, YOU WORRIED THAT YOUR FOOD WOULD RUN OUT BEFORE YOU GOT MONEY TO BUY MORE.: NEVER TRUE

## 2024-09-12 SDOH — ECONOMIC STABILITY: FOOD INSECURITY: WITHIN THE PAST 12 MONTHS, THE FOOD YOU BOUGHT JUST DIDN'T LAST AND YOU DIDN'T HAVE MONEY TO GET MORE.: NEVER TRUE

## 2024-09-12 SDOH — ECONOMIC STABILITY: INCOME INSECURITY: HOW HARD IS IT FOR YOU TO PAY FOR THE VERY BASICS LIKE FOOD, HOUSING, MEDICAL CARE, AND HEATING?: NOT HARD AT ALL

## 2024-09-12 ASSESSMENT — PATIENT HEALTH QUESTIONNAIRE - PHQ9
9. THOUGHTS THAT YOU WOULD BE BETTER OFF DEAD, OR OF HURTING YOURSELF: NOT AT ALL
1. LITTLE INTEREST OR PLEASURE IN DOING THINGS: NOT AT ALL
SUM OF ALL RESPONSES TO PHQ QUESTIONS 1-9: 0
8. MOVING OR SPEAKING SO SLOWLY THAT OTHER PEOPLE COULD HAVE NOTICED. OR THE OPPOSITE, BEING SO FIGETY OR RESTLESS THAT YOU HAVE BEEN MOVING AROUND A LOT MORE THAN USUAL: NOT AT ALL
5. POOR APPETITE OR OVEREATING: NOT AT ALL
4. FEELING TIRED OR HAVING LITTLE ENERGY: NOT AT ALL
SUM OF ALL RESPONSES TO PHQ QUESTIONS 1-9: 0
SUM OF ALL RESPONSES TO PHQ QUESTIONS 1-9: 0
SUM OF ALL RESPONSES TO PHQ9 QUESTIONS 1 & 2: 0
SUM OF ALL RESPONSES TO PHQ QUESTIONS 1-9: 0
6. FEELING BAD ABOUT YOURSELF - OR THAT YOU ARE A FAILURE OR HAVE LET YOURSELF OR YOUR FAMILY DOWN: NOT AT ALL
3. TROUBLE FALLING OR STAYING ASLEEP: NOT AT ALL
2. FEELING DOWN, DEPRESSED OR HOPELESS: NOT AT ALL
7. TROUBLE CONCENTRATING ON THINGS, SUCH AS READING THE NEWSPAPER OR WATCHING TELEVISION: NOT AT ALL
10. IF YOU CHECKED OFF ANY PROBLEMS, HOW DIFFICULT HAVE THESE PROBLEMS MADE IT FOR YOU TO DO YOUR WORK, TAKE CARE OF THINGS AT HOME, OR GET ALONG WITH OTHER PEOPLE: NOT DIFFICULT AT ALL

## 2024-09-16 ASSESSMENT — ENCOUNTER SYMPTOMS
COUGH: 0
CONSTIPATION: 0
SHORTNESS OF BREATH: 0
NAUSEA: 0
SINUS PRESSURE: 0
EYE DISCHARGE: 0
WHEEZING: 0
RHINORRHEA: 0
EYE REDNESS: 0
DIARRHEA: 0
VOMITING: 0
ABDOMINAL PAIN: 0
SORE THROAT: 0
TROUBLE SWALLOWING: 0

## 2024-10-11 ENCOUNTER — APPOINTMENT (OUTPATIENT)
Dept: GENERAL RADIOLOGY | Age: 84
DRG: 291 | End: 2024-10-11
Payer: MEDICARE

## 2024-10-11 ENCOUNTER — TELEPHONE (OUTPATIENT)
Dept: FAMILY MEDICINE CLINIC | Age: 84
End: 2024-10-11

## 2024-10-11 ENCOUNTER — HOSPITAL ENCOUNTER (INPATIENT)
Age: 84
LOS: 4 days | Discharge: HOME | DRG: 291 | End: 2024-10-15
Attending: EMERGENCY MEDICINE | Admitting: FAMILY MEDICINE
Payer: MEDICARE

## 2024-10-11 DIAGNOSIS — I27.20 PULMONARY HYPERTENSION (HCC): ICD-10-CM

## 2024-10-11 DIAGNOSIS — I50.9 ACUTE ON CHRONIC CONGESTIVE HEART FAILURE, UNSPECIFIED HEART FAILURE TYPE (HCC): Primary | ICD-10-CM

## 2024-10-11 DIAGNOSIS — I50.32 CHF (CONGESTIVE HEART FAILURE), NYHA CLASS I, CHRONIC, DIASTOLIC (HCC): ICD-10-CM

## 2024-10-11 LAB
ALBUMIN SERPL-MCNC: 3.7 G/DL (ref 3.5–5.2)
ALBUMIN/GLOB SERPL: 1.3 {RATIO} (ref 1–2.5)
ALP SERPL-CCNC: 130 U/L (ref 35–104)
ALT SERPL-CCNC: 11 U/L (ref 5–33)
ANION GAP SERPL CALCULATED.3IONS-SCNC: 8 MMOL/L (ref 9–17)
AST SERPL-CCNC: 19 U/L
BASOPHILS # BLD: <0.03 K/UL (ref 0–0.2)
BASOPHILS NFR BLD: 0 % (ref 0–2)
BILIRUB SERPL-MCNC: 0.5 MG/DL (ref 0.3–1.2)
BNP SERPL-MCNC: 2787 PG/ML
BUN SERPL-MCNC: 20 MG/DL (ref 8–23)
BUN/CREAT SERPL: 22 (ref 9–20)
CALCIUM SERPL-MCNC: 9.1 MG/DL (ref 8.6–10.4)
CHLORIDE SERPL-SCNC: 108 MMOL/L (ref 98–107)
CO2 SERPL-SCNC: 26 MMOL/L (ref 20–31)
CREAT SERPL-MCNC: 0.9 MG/DL (ref 0.5–0.9)
EOSINOPHIL # BLD: 0.11 K/UL (ref 0–0.44)
EOSINOPHILS RELATIVE PERCENT: 2 % (ref 1–4)
ERYTHROCYTE [DISTWIDTH] IN BLOOD BY AUTOMATED COUNT: 15.2 % (ref 11.8–14.4)
GFR, ESTIMATED: 63 ML/MIN/1.73M2
GLUCOSE SERPL-MCNC: 104 MG/DL (ref 70–99)
HCT VFR BLD AUTO: 35.3 % (ref 36.3–47.1)
HGB BLD-MCNC: 11.1 G/DL (ref 11.9–15.1)
IMM GRANULOCYTES # BLD AUTO: <0.03 K/UL (ref 0–0.3)
IMM GRANULOCYTES NFR BLD: 0 %
LYMPHOCYTES NFR BLD: 0.86 K/UL (ref 1.1–3.7)
LYMPHOCYTES RELATIVE PERCENT: 15 % (ref 24–43)
MCH RBC QN AUTO: 30.3 PG (ref 25.2–33.5)
MCHC RBC AUTO-ENTMCNC: 31.4 G/DL (ref 25.2–33.5)
MCV RBC AUTO: 96.4 FL (ref 82.6–102.9)
MONOCYTES NFR BLD: 0.8 K/UL (ref 0.1–1.2)
MONOCYTES NFR BLD: 14 % (ref 3–12)
NEUTROPHILS NFR BLD: 69 % (ref 36–65)
NEUTS SEG NFR BLD: 4.08 K/UL (ref 1.5–8.1)
NRBC BLD-RTO: 0 PER 100 WBC
PLATELET # BLD AUTO: 124 K/UL (ref 138–453)
PMV BLD AUTO: 13.4 FL (ref 8.1–13.5)
POTASSIUM SERPL-SCNC: 4.4 MMOL/L (ref 3.7–5.3)
PROT SERPL-MCNC: 6.6 G/DL (ref 6.4–8.3)
RBC # BLD AUTO: 3.66 M/UL (ref 3.95–5.11)
RBC # BLD: ABNORMAL 10*6/UL
SODIUM SERPL-SCNC: 142 MMOL/L (ref 135–144)
TROPONIN I SERPL HS-MCNC: 12 NG/L (ref 0–14)
WBC OTHER # BLD: 5.9 K/UL (ref 3.5–11.3)

## 2024-10-11 PROCEDURE — 83880 ASSAY OF NATRIURETIC PEPTIDE: CPT

## 2024-10-11 PROCEDURE — 80053 COMPREHEN METABOLIC PANEL: CPT

## 2024-10-11 PROCEDURE — 85025 COMPLETE CBC W/AUTO DIFF WBC: CPT

## 2024-10-11 PROCEDURE — 2500000003 HC RX 250 WO HCPCS: Performed by: FAMILY MEDICINE

## 2024-10-11 PROCEDURE — 96374 THER/PROPH/DIAG INJ IV PUSH: CPT

## 2024-10-11 PROCEDURE — 71045 X-RAY EXAM CHEST 1 VIEW: CPT

## 2024-10-11 PROCEDURE — 36415 COLL VENOUS BLD VENIPUNCTURE: CPT

## 2024-10-11 PROCEDURE — 5A09357 ASSISTANCE WITH RESPIRATORY VENTILATION, LESS THAN 24 CONSECUTIVE HOURS, CONTINUOUS POSITIVE AIRWAY PRESSURE: ICD-10-PCS | Performed by: INTERNAL MEDICINE

## 2024-10-11 PROCEDURE — 99285 EMERGENCY DEPT VISIT HI MDM: CPT

## 2024-10-11 PROCEDURE — 51702 INSERT TEMP BLADDER CATH: CPT

## 2024-10-11 PROCEDURE — 93005 ELECTROCARDIOGRAM TRACING: CPT | Performed by: EMERGENCY MEDICINE

## 2024-10-11 PROCEDURE — 6360000002 HC RX W HCPCS: Performed by: FAMILY MEDICINE

## 2024-10-11 PROCEDURE — 6360000002 HC RX W HCPCS: Performed by: EMERGENCY MEDICINE

## 2024-10-11 PROCEDURE — 2060000000 HC ICU INTERMEDIATE R&B

## 2024-10-11 PROCEDURE — 84484 ASSAY OF TROPONIN QUANT: CPT

## 2024-10-11 PROCEDURE — 6370000000 HC RX 637 (ALT 250 FOR IP): Performed by: FAMILY MEDICINE

## 2024-10-11 PROCEDURE — 2580000003 HC RX 258: Performed by: FAMILY MEDICINE

## 2024-10-11 PROCEDURE — 99223 1ST HOSP IP/OBS HIGH 75: CPT | Performed by: FAMILY MEDICINE

## 2024-10-11 RX ORDER — BUMETANIDE 0.25 MG/ML
1 INJECTION INTRAMUSCULAR; INTRAVENOUS 2 TIMES DAILY
Status: DISCONTINUED | OUTPATIENT
Start: 2024-10-11 | End: 2024-10-15 | Stop reason: HOSPADM

## 2024-10-11 RX ORDER — MAGNESIUM SULFATE IN WATER 40 MG/ML
2000 INJECTION, SOLUTION INTRAVENOUS PRN
Status: DISCONTINUED | OUTPATIENT
Start: 2024-10-11 | End: 2024-10-15 | Stop reason: HOSPADM

## 2024-10-11 RX ORDER — LOSARTAN POTASSIUM 50 MG/1
50 TABLET ORAL DAILY
Status: DISCONTINUED | OUTPATIENT
Start: 2024-10-11 | End: 2024-10-15 | Stop reason: HOSPADM

## 2024-10-11 RX ORDER — TRAZODONE HYDROCHLORIDE 50 MG/1
50 TABLET, FILM COATED ORAL NIGHTLY
Status: DISCONTINUED | OUTPATIENT
Start: 2024-10-11 | End: 2024-10-15 | Stop reason: HOSPADM

## 2024-10-11 RX ORDER — SODIUM CHLORIDE 0.9 % (FLUSH) 0.9 %
5-40 SYRINGE (ML) INJECTION PRN
Status: DISCONTINUED | OUTPATIENT
Start: 2024-10-11 | End: 2024-10-15 | Stop reason: HOSPADM

## 2024-10-11 RX ORDER — POLYETHYLENE GLYCOL 3350 17 G/17G
17 POWDER, FOR SOLUTION ORAL DAILY PRN
Status: DISCONTINUED | OUTPATIENT
Start: 2024-10-11 | End: 2024-10-15 | Stop reason: HOSPADM

## 2024-10-11 RX ORDER — AMLODIPINE BESYLATE 5 MG/1
10 TABLET ORAL DAILY
Status: DISCONTINUED | OUTPATIENT
Start: 2024-10-12 | End: 2024-10-11

## 2024-10-11 RX ORDER — SODIUM CHLORIDE 0.9 % (FLUSH) 0.9 %
5-40 SYRINGE (ML) INJECTION EVERY 12 HOURS SCHEDULED
Status: DISCONTINUED | OUTPATIENT
Start: 2024-10-11 | End: 2024-10-15 | Stop reason: HOSPADM

## 2024-10-11 RX ORDER — ACETAMINOPHEN 325 MG/1
650 TABLET ORAL EVERY 4 HOURS PRN
Status: DISCONTINUED | OUTPATIENT
Start: 2024-10-11 | End: 2024-10-11 | Stop reason: SDUPTHER

## 2024-10-11 RX ORDER — ACETAMINOPHEN 650 MG/1
650 SUPPOSITORY RECTAL EVERY 6 HOURS PRN
Status: DISCONTINUED | OUTPATIENT
Start: 2024-10-11 | End: 2024-10-15 | Stop reason: HOSPADM

## 2024-10-11 RX ORDER — ONDANSETRON 2 MG/ML
4 INJECTION INTRAMUSCULAR; INTRAVENOUS EVERY 6 HOURS PRN
Status: DISCONTINUED | OUTPATIENT
Start: 2024-10-11 | End: 2024-10-15 | Stop reason: HOSPADM

## 2024-10-11 RX ORDER — LEVOTHYROXINE SODIUM 25 UG/1
50 TABLET ORAL DAILY
Status: DISCONTINUED | OUTPATIENT
Start: 2024-10-12 | End: 2024-10-15 | Stop reason: HOSPADM

## 2024-10-11 RX ORDER — POTASSIUM CHLORIDE 1500 MG/1
20 TABLET, EXTENDED RELEASE ORAL 3 TIMES DAILY
Status: DISCONTINUED | OUTPATIENT
Start: 2024-10-11 | End: 2024-10-13

## 2024-10-11 RX ORDER — POTASSIUM CHLORIDE 1500 MG/1
40 TABLET, EXTENDED RELEASE ORAL PRN
Status: DISCONTINUED | OUTPATIENT
Start: 2024-10-11 | End: 2024-10-15 | Stop reason: HOSPADM

## 2024-10-11 RX ORDER — METOPROLOL TARTRATE 25 MG/1
37.5 TABLET, FILM COATED ORAL 2 TIMES DAILY
Status: DISCONTINUED | OUTPATIENT
Start: 2024-10-11 | End: 2024-10-15 | Stop reason: HOSPADM

## 2024-10-11 RX ORDER — ONDANSETRON 4 MG/1
4 TABLET, ORALLY DISINTEGRATING ORAL EVERY 8 HOURS PRN
Status: DISCONTINUED | OUTPATIENT
Start: 2024-10-11 | End: 2024-10-15 | Stop reason: HOSPADM

## 2024-10-11 RX ORDER — FUROSEMIDE 10 MG/ML
40 INJECTION INTRAMUSCULAR; INTRAVENOUS ONCE
Status: COMPLETED | OUTPATIENT
Start: 2024-10-11 | End: 2024-10-11

## 2024-10-11 RX ORDER — ACETAMINOPHEN 325 MG/1
650 TABLET ORAL EVERY 6 HOURS PRN
Status: DISCONTINUED | OUTPATIENT
Start: 2024-10-11 | End: 2024-10-15 | Stop reason: HOSPADM

## 2024-10-11 RX ORDER — POTASSIUM CHLORIDE 7.45 MG/ML
10 INJECTION INTRAVENOUS PRN
Status: DISCONTINUED | OUTPATIENT
Start: 2024-10-11 | End: 2024-10-15 | Stop reason: HOSPADM

## 2024-10-11 RX ORDER — ATORVASTATIN CALCIUM 40 MG/1
40 TABLET, FILM COATED ORAL NIGHTLY
Status: DISCONTINUED | OUTPATIENT
Start: 2024-10-12 | End: 2024-10-15 | Stop reason: HOSPADM

## 2024-10-11 RX ORDER — SODIUM CHLORIDE 9 MG/ML
INJECTION, SOLUTION INTRAVENOUS PRN
Status: DISCONTINUED | OUTPATIENT
Start: 2024-10-11 | End: 2024-10-15 | Stop reason: HOSPADM

## 2024-10-11 RX ADMIN — SODIUM CHLORIDE, PRESERVATIVE FREE 10 ML: 5 INJECTION INTRAVENOUS at 20:46

## 2024-10-11 RX ADMIN — METOPROLOL TARTRATE 37.5 MG: 25 TABLET, FILM COATED ORAL at 20:46

## 2024-10-11 RX ADMIN — BUMETANIDE 1 MG: 0.25 INJECTION INTRAMUSCULAR; INTRAVENOUS at 16:13

## 2024-10-11 RX ADMIN — POTASSIUM CHLORIDE 20 MEQ: 1500 TABLET, EXTENDED RELEASE ORAL at 16:08

## 2024-10-11 RX ADMIN — POTASSIUM CHLORIDE 20 MEQ: 1500 TABLET, EXTENDED RELEASE ORAL at 20:46

## 2024-10-11 RX ADMIN — APIXABAN 5 MG: 5 TABLET, FILM COATED ORAL at 20:46

## 2024-10-11 RX ADMIN — TRAZODONE HYDROCHLORIDE 50 MG: 50 TABLET ORAL at 20:46

## 2024-10-11 RX ADMIN — FUROSEMIDE 40 MG: 10 INJECTION, SOLUTION INTRAMUSCULAR; INTRAVENOUS at 13:45

## 2024-10-11 RX ADMIN — MICONAZOLE NITRATE: 20 POWDER TOPICAL at 20:47

## 2024-10-11 NOTE — TELEPHONE ENCOUNTER
Patient called stating she has noticed increased shortness of breath and bilateral leg swelling x 2 days. She is currently taking Lasix 40 mg daily. She would like to know if she should increase the dose? She states she is short of breath even sitting down.   Patient did not have any current vitals or weights to document.   Advised patient she should go the ER for evaluation.   Patient requested to talk to Dr. Trevino staff first to discuss.   Patient call transferred

## 2024-10-11 NOTE — ED NOTES
ED Report    Presents to ED from: Home    Chief Complaint   Patient presents with    Shortness of Breath    Leg Swelling     No diagnosis found.  Present Condition: STABLE  Pertinent Event(s): Pt to ER 8 with son. Pt states the past 3 days she has felt more SOB and feels like her legs are swollen. Pt has been taking more lasix the past 3 days. She states she has been taking 40mg daily. Pt fx her left femur 3 months ago she states     LOC:  A+O x 4  Code Status: FULL    Vital Signs   Vitals:    10/11/24 1049 10/11/24 1130 10/11/24 1200 10/11/24 1300   BP:       Pulse: 92 79 78 80   Resp: 20 16 18 18   Temp:       SpO2:  96% 95% 96%   Weight:       Height:         Oxygen Baseline: Room Air, CPAP at night       Current Oxygen Needs: Room Air  Mobility: x2 Assist       Mobility Aide: Walker    LDAs:   Peripheral IV 10/11/24 Right Antecubital (Active)   Site Assessment Clean, dry & intact 10/11/24 1047   Line Status Blood return noted;Flushed;Normal saline locked;Specimen collected 10/11/24 1047   Dressing Status Clean, dry & intact 10/11/24 1047   Dressing Type Transparent 10/11/24 1047   Dressing Intervention New 10/11/24 1047     Abnormal ED Labs:    Labs Reviewed   BRAIN NATRIURETIC PEPTIDE - Abnormal; Notable for the following components:       Result Value    NT Pro-BNP 2,787 (*)     All other components within normal limits   CBC WITH AUTO DIFFERENTIAL - Abnormal; Notable for the following components:    RBC 3.66 (*)     Hemoglobin 11.1 (*)     Hematocrit 35.3 (*)     RDW 15.2 (*)     Platelets 124 (*)     Neutrophils % 69 (*)     Lymphocytes % 15 (*)     Monocytes % 14 (*)     Lymphocytes Absolute 0.86 (*)     All other components within normal limits   COMPREHENSIVE METABOLIC PANEL - Abnormal; Notable for the following components:    Chloride 108 (*)     Anion Gap 8 (*)     Glucose 104 (*)     BUN/Creatinine Ratio 22 (*)     Alkaline Phosphatase 130 (*)     All other components within normal limits   TROPONIN

## 2024-10-11 NOTE — ED PROVIDER NOTES
[facility]  eMERGENCY dEPARTMENT eNCOUnter      Pt Name: Yvette Das  MRN: [id number]  Birthdate 1940  Date of evaluation: 10/11/2024      CHIEF COMPLAINT       Chief Complaint   Patient presents with    Shortness of Breath    Leg Swelling         HISTORY OF PRESENT ILLNESS    Yvette Das is a 83 y.o. female who presents with bilateral leg swelling patient has been on Lasix and it has been increased she has some increasing shortness of breath with any kind of activity and weight gain she has been in rehab after having a periprosthetic fracture of her left femur several months ago.  She is on Eliquis.  There is no chest pain no orthopnea just increasing bilateral symmetric leg swelling  She has had fatigue    REVIEW OF SYSTEMS         Review of Systems   Constitutional:  Positive for fatigue. Negative for chills and fever.   HENT:  Negative for congestion and ear pain.    Eyes:  Negative for pain and visual disturbance.   Respiratory:  Negative for cough and shortness of breath.    Cardiovascular:  Positive for leg swelling. Negative for chest pain and palpitations.   Gastrointestinal:  Negative for abdominal pain, constipation, diarrhea, nausea and vomiting.   Endocrine: Negative for polydipsia and polyuria.   Genitourinary:  Negative for difficulty urinating, dysuria and frequency.   Musculoskeletal:  Negative for back pain, joint swelling, myalgias, neck pain and neck stiffness.   Skin:  Negative for rash.   Neurological:  Negative for dizziness, weakness and headaches.   Hematological:  Negative for adenopathy. Does not bruise/bleed easily.   Psychiatric/Behavioral:  Negative for confusion, self-injury and suicidal ideas.          PAST MEDICAL HISTORY    has a past medical history of A-fib ([facility]), Acute blood loss as cause of postoperative anemia, Acute post-operative pain, MICHAEL (acute kidney injury) (Formerly [facility]), Anxiety, CAD (coronary artery disease), CHF (congestive heart failure) (Formerly [facility]), Class

## 2024-10-11 NOTE — TELEPHONE ENCOUNTER
Spoke with patient and she states she is really short of breath with lower extremity swelling and increased pulse. Advised patient to go to ER for evaluation. Patient stated understanding.

## 2024-10-11 NOTE — H&P
dizziness, seizures, memory problems, visual disturbance, weakness and syncope      Physical Exam:    Vitals: /87   Pulse 75   Temp 97.5 °F (36.4 °C) (Tympanic)   Resp 16   Ht 1.702 m (5' 7\")   Wt (!) 159.5 kg (351 lb 9.6 oz)   LMP 01/01/1968   SpO2 95%   BMI 55.07 kg/m²   General appearance: alert, appears stated age and cooperative  Skin: Skin color, texture, turgor normal. No rashes or lesions  HEENT: Head: Normocephalic, no lesions, without obvious abnormality.  Eye: Normal external eye, conjunctiva, lids cornea, CHANELLE  Neck: no adenopathy, no carotid bruit, no JVD, supple, symmetrical, trachea midline and thyroid not enlarged, symmetric, no tenderness/mass/nodules  Lungs: clear to auscultation bilaterally  Heart: regular rate and rhythm, S1, S2 normal, no murmur, click, rub or gallop  Abdomen: soft, non-tender; bowel sounds normal; no masses,  no organomegaly  Extremities: extremities normal, atraumatic, edema present  Neurologic: Mental status: Alert, oriented, thought content appropriate    CBC:   Recent Labs     10/11/24  1137   WBC 5.9   HGB 11.1*   *     BMP:    Recent Labs     10/11/24  1137      K 4.4   *   CO2 26   BUN 20   CREATININE 0.9   GLUCOSE 104*     Hepatic:   Recent Labs     10/11/24  1137   AST 19   ALT 11   BILITOT 0.5   ALKPHOS 130*     Troponin: No results for input(s): \"TROPONINI\" in the last 72 hours.  BNP: No results for input(s): \"BNP\" in the last 72 hours.  Lipids: No results for input(s): \"CHOL\", \"HDL\" in the last 72 hours.    Invalid input(s): \"LDLCALCU\"  ABGs: No results found for: \"PHART\", \"PO2ART\", \"VDQ8NNN\"  INR: No results for input(s): \"INR\" in the last 72 hours.  -----------------------------------------------------------------  PA/lat CXR: XR CHEST PORTABLE    Result Date: 10/11/2024  EXAMINATION: ONE XRAY VIEW OF THE CHEST 10/11/2024 12:03 pm COMPARISON: June 29, 2024 HISTORY: ORDERING SYSTEM PROVIDED HISTORY: shortness of breath TECHNOLOGIST

## 2024-10-11 NOTE — PLAN OF CARE
Problem: Chronic Conditions and Co-morbidities  Goal: Patient's chronic conditions and co-morbidity symptoms are monitored and maintained or improved  Outcome: Progressing  Flowsheets (Taken 10/11/2024 1451)  Care Plan - Patient's Chronic Conditions and Co-Morbidity Symptoms are Monitored and Maintained or Improved: Monitor and assess patient's chronic conditions and comorbid symptoms for stability, deterioration, or improvement     Problem: Discharge Planning  Goal: Discharge to home or other facility with appropriate resources  Outcome: Progressing  Flowsheets  Taken 10/11/2024 1451  Discharge to home or other facility with appropriate resources: Identify barriers to discharge with patient and caregiver  Taken 10/11/2024 1444  Discharge to home or other facility with appropriate resources:   Identify barriers to discharge with patient and caregiver   Identify discharge learning needs (meds, wound care, etc)   Refer to discharge planning if patient needs post-hospital services based on physician order or complex needs related to functional status, cognitive ability or social support system     Problem: Safety - Adult  Goal: Free from fall injury  Outcome: Progressing     Problem: ABCDS Injury Assessment  Goal: Absence of physical injury  Outcome: Progressing

## 2024-10-12 ENCOUNTER — APPOINTMENT (OUTPATIENT)
Dept: GENERAL RADIOLOGY | Age: 84
DRG: 291 | End: 2024-10-12
Payer: MEDICARE

## 2024-10-12 LAB
ANION GAP SERPL CALCULATED.3IONS-SCNC: 7 MMOL/L (ref 9–17)
BASOPHILS # BLD: <0.03 K/UL (ref 0–0.2)
BASOPHILS NFR BLD: 0 % (ref 0–2)
BUN SERPL-MCNC: 17 MG/DL (ref 8–23)
BUN/CREAT SERPL: 19 (ref 9–20)
CALCIUM SERPL-MCNC: 9 MG/DL (ref 8.6–10.4)
CHLORIDE SERPL-SCNC: 108 MMOL/L (ref 98–107)
CO2 SERPL-SCNC: 28 MMOL/L (ref 20–31)
CREAT SERPL-MCNC: 0.9 MG/DL (ref 0.5–0.9)
EOSINOPHIL # BLD: 0.14 K/UL (ref 0–0.44)
EOSINOPHILS RELATIVE PERCENT: 3 % (ref 1–4)
ERYTHROCYTE [DISTWIDTH] IN BLOOD BY AUTOMATED COUNT: 15.1 % (ref 11.8–14.4)
GFR, ESTIMATED: 63 ML/MIN/1.73M2
GLUCOSE SERPL-MCNC: 96 MG/DL (ref 70–99)
HCT VFR BLD AUTO: 32.8 % (ref 36.3–47.1)
HGB BLD-MCNC: 10.4 G/DL (ref 11.9–15.1)
IMM GRANULOCYTES # BLD AUTO: <0.03 K/UL (ref 0–0.3)
IMM GRANULOCYTES NFR BLD: 0 %
LYMPHOCYTES NFR BLD: 1.37 K/UL (ref 1.1–3.7)
LYMPHOCYTES RELATIVE PERCENT: 26 % (ref 24–43)
MCH RBC QN AUTO: 30.1 PG (ref 25.2–33.5)
MCHC RBC AUTO-ENTMCNC: 31.7 G/DL (ref 25.2–33.5)
MCV RBC AUTO: 95.1 FL (ref 82.6–102.9)
MONOCYTES NFR BLD: 0.7 K/UL (ref 0.1–1.2)
MONOCYTES NFR BLD: 14 % (ref 3–12)
NEUTROPHILS NFR BLD: 57 % (ref 36–65)
NEUTS SEG NFR BLD: 2.96 K/UL (ref 1.5–8.1)
NRBC BLD-RTO: 0 PER 100 WBC
PLATELET # BLD AUTO: ABNORMAL K/UL (ref 138–453)
PLATELET, FLUORESCENCE: 121 K/UL (ref 138–453)
PLATELETS.RETICULATED NFR BLD AUTO: 14.4 % (ref 1.1–10.3)
POTASSIUM SERPL-SCNC: 4 MMOL/L (ref 3.7–5.3)
RBC # BLD AUTO: 3.45 M/UL (ref 3.95–5.11)
SODIUM SERPL-SCNC: 143 MMOL/L (ref 135–144)
WBC OTHER # BLD: 5.2 K/UL (ref 3.5–11.3)

## 2024-10-12 PROCEDURE — 2060000000 HC ICU INTERMEDIATE R&B

## 2024-10-12 PROCEDURE — 36415 COLL VENOUS BLD VENIPUNCTURE: CPT

## 2024-10-12 PROCEDURE — 97162 PT EVAL MOD COMPLEX 30 MIN: CPT

## 2024-10-12 PROCEDURE — 6370000000 HC RX 637 (ALT 250 FOR IP): Performed by: FAMILY MEDICINE

## 2024-10-12 PROCEDURE — 80048 BASIC METABOLIC PNL TOTAL CA: CPT

## 2024-10-12 PROCEDURE — 6360000002 HC RX W HCPCS: Performed by: FAMILY MEDICINE

## 2024-10-12 PROCEDURE — 85025 COMPLETE CBC W/AUTO DIFF WBC: CPT

## 2024-10-12 PROCEDURE — 94660 CPAP INITIATION&MGMT: CPT

## 2024-10-12 PROCEDURE — 2580000003 HC RX 258: Performed by: FAMILY MEDICINE

## 2024-10-12 PROCEDURE — 71045 X-RAY EXAM CHEST 1 VIEW: CPT

## 2024-10-12 PROCEDURE — 99222 1ST HOSP IP/OBS MODERATE 55: CPT | Performed by: FAMILY MEDICINE

## 2024-10-12 RX ADMIN — ACETAMINOPHEN 650 MG: 325 TABLET ORAL at 05:46

## 2024-10-12 RX ADMIN — SODIUM CHLORIDE, PRESERVATIVE FREE 10 ML: 5 INJECTION INTRAVENOUS at 08:15

## 2024-10-12 RX ADMIN — POTASSIUM CHLORIDE 20 MEQ: 1500 TABLET, EXTENDED RELEASE ORAL at 08:15

## 2024-10-12 RX ADMIN — ACETAMINOPHEN 650 MG: 325 TABLET ORAL at 13:31

## 2024-10-12 RX ADMIN — METOPROLOL TARTRATE 37.5 MG: 25 TABLET, FILM COATED ORAL at 20:51

## 2024-10-12 RX ADMIN — BUMETANIDE 1 MG: 0.25 INJECTION INTRAMUSCULAR; INTRAVENOUS at 17:42

## 2024-10-12 RX ADMIN — ATORVASTATIN CALCIUM 40 MG: 40 TABLET, FILM COATED ORAL at 20:51

## 2024-10-12 RX ADMIN — LEVOTHYROXINE SODIUM 50 MCG: 0.03 TABLET ORAL at 05:46

## 2024-10-12 RX ADMIN — POTASSIUM CHLORIDE 20 MEQ: 1500 TABLET, EXTENDED RELEASE ORAL at 13:31

## 2024-10-12 RX ADMIN — TRAZODONE HYDROCHLORIDE 50 MG: 50 TABLET ORAL at 20:51

## 2024-10-12 RX ADMIN — APIXABAN 5 MG: 5 TABLET, FILM COATED ORAL at 20:51

## 2024-10-12 RX ADMIN — MICONAZOLE NITRATE: 20 POWDER TOPICAL at 08:18

## 2024-10-12 RX ADMIN — MICONAZOLE NITRATE: 20 POWDER TOPICAL at 20:51

## 2024-10-12 RX ADMIN — SODIUM CHLORIDE, PRESERVATIVE FREE 10 ML: 5 INJECTION INTRAVENOUS at 20:51

## 2024-10-12 RX ADMIN — POTASSIUM CHLORIDE 20 MEQ: 1500 TABLET, EXTENDED RELEASE ORAL at 20:51

## 2024-10-12 RX ADMIN — LOSARTAN POTASSIUM 50 MG: 50 TABLET, FILM COATED ORAL at 08:13

## 2024-10-12 RX ADMIN — APIXABAN 5 MG: 5 TABLET, FILM COATED ORAL at 08:13

## 2024-10-12 RX ADMIN — METOPROLOL TARTRATE 37.5 MG: 25 TABLET, FILM COATED ORAL at 08:13

## 2024-10-12 RX ADMIN — BUMETANIDE 1 MG: 0.25 INJECTION INTRAMUSCULAR; INTRAVENOUS at 08:15

## 2024-10-12 ASSESSMENT — PAIN DESCRIPTION - DESCRIPTORS
DESCRIPTORS: ACHING
DESCRIPTORS: ACHING

## 2024-10-12 ASSESSMENT — PAIN SCALES - GENERAL
PAINLEVEL_OUTOF10: 1
PAINLEVEL_OUTOF10: 3

## 2024-10-12 ASSESSMENT — PAIN SCALES - WONG BAKER
WONGBAKER_NUMERICALRESPONSE: HURTS A LITTLE BIT
WONGBAKER_NUMERICALRESPONSE: HURTS A LITTLE BIT

## 2024-10-12 ASSESSMENT — PAIN DESCRIPTION - ORIENTATION: ORIENTATION: MID

## 2024-10-12 ASSESSMENT — PAIN DESCRIPTION - LOCATION
LOCATION: BACK
LOCATION: HEAD

## 2024-10-12 NOTE — PLAN OF CARE
Problem: Chronic Conditions and Co-morbidities  Goal: Patient's chronic conditions and co-morbidity symptoms are monitored and maintained or improved  10/11/2024 2135 by Clemencia Coughlin RN  Outcome: Progressing  Flowsheets (Taken 10/11/2024 2000)  Care Plan - Patient's Chronic Conditions and Co-Morbidity Symptoms are Monitored and Maintained or Improved:   Monitor and assess patient's chronic conditions and comorbid symptoms for stability, deterioration, or improvement   Collaborate with multidisciplinary team to address chronic and comorbid conditions and prevent exacerbation or deterioration   Update acute care plan with appropriate goals if chronic or comorbid symptoms are exacerbated and prevent overall improvement and discharge  10/11/2024 1513 by Chanda Lyle, RN  Outcome: Progressing  Flowsheets (Taken 10/11/2024 1451)  Care Plan - Patient's Chronic Conditions and Co-Morbidity Symptoms are Monitored and Maintained or Improved: Monitor and assess patient's chronic conditions and comorbid symptoms for stability, deterioration, or improvement     Problem: Discharge Planning  Goal: Discharge to home or other facility with appropriate resources  10/11/2024 2135 by Clemencia Coughlin RN  Outcome: Progressing  Flowsheets (Taken 10/11/2024 2000)  Discharge to home or other facility with appropriate resources:   Identify barriers to discharge with patient and caregiver   Refer to discharge planning if patient needs post-hospital services based on physician order or complex needs related to functional status, cognitive ability or social support system  10/11/2024 1513 by Chanda Lyle, RN  Outcome: Progressing  Flowsheets  Taken 10/11/2024 1451  Discharge to home or other facility with appropriate resources: Identify barriers to discharge with patient and caregiver  Taken 10/11/2024 1444  Discharge to home or other facility with appropriate resources:   Identify barriers to discharge with patient and

## 2024-10-12 NOTE — PLAN OF CARE
Problem: Chronic Conditions and Co-morbidities  Goal: Patient's chronic conditions and co-morbidity symptoms are monitored and maintained or improved  10/12/2024 0928 by Chanda Lyle RN  Outcome: Progressing  Flowsheets (Taken 10/12/2024 0852)  Care Plan - Patient's Chronic Conditions and Co-Morbidity Symptoms are Monitored and Maintained or Improved: Monitor and assess patient's chronic conditions and comorbid symptoms for stability, deterioration, or improvement  10/11/2024 2135 by Clemencia Coughlin RN  Outcome: Progressing  Flowsheets (Taken 10/11/2024 2000)  Care Plan - Patient's Chronic Conditions and Co-Morbidity Symptoms are Monitored and Maintained or Improved:   Monitor and assess patient's chronic conditions and comorbid symptoms for stability, deterioration, or improvement   Collaborate with multidisciplinary team to address chronic and comorbid conditions and prevent exacerbation or deterioration   Update acute care plan with appropriate goals if chronic or comorbid symptoms are exacerbated and prevent overall improvement and discharge     Problem: Discharge Planning  Goal: Discharge to home or other facility with appropriate resources  10/12/2024 0928 by Chanda Lyle RN  Outcome: Progressing  Flowsheets (Taken 10/12/2024 0852)  Discharge to home or other facility with appropriate resources: Identify barriers to discharge with patient and caregiver  10/11/2024 2135 by Clemencia Coughlin RN  Outcome: Progressing  Flowsheets (Taken 10/11/2024 2000)  Discharge to home or other facility with appropriate resources:   Identify barriers to discharge with patient and caregiver   Refer to discharge planning if patient needs post-hospital services based on physician order or complex needs related to functional status, cognitive ability or social support system     Problem: Safety - Adult  Goal: Free from fall injury  10/12/2024 0928 by Chanda Lyle RN  Outcome: Progressing  10/11/2024 2135 by

## 2024-10-13 LAB
ANION GAP SERPL CALCULATED.3IONS-SCNC: 7 MMOL/L (ref 9–17)
BASOPHILS # BLD: 0.03 K/UL (ref 0–0.2)
BASOPHILS NFR BLD: 1 % (ref 0–2)
BUN SERPL-MCNC: 19 MG/DL (ref 8–23)
BUN/CREAT SERPL: 19 (ref 9–20)
CALCIUM SERPL-MCNC: 8.8 MG/DL (ref 8.6–10.4)
CHLORIDE SERPL-SCNC: 107 MMOL/L (ref 98–107)
CO2 SERPL-SCNC: 29 MMOL/L (ref 20–31)
CREAT SERPL-MCNC: 1 MG/DL (ref 0.5–0.9)
EOSINOPHIL # BLD: 0.15 K/UL (ref 0–0.44)
EOSINOPHILS RELATIVE PERCENT: 3 % (ref 1–4)
ERYTHROCYTE [DISTWIDTH] IN BLOOD BY AUTOMATED COUNT: 15.1 % (ref 11.8–14.4)
GFR, ESTIMATED: 56 ML/MIN/1.73M2
GLUCOSE SERPL-MCNC: 97 MG/DL (ref 70–99)
HCT VFR BLD AUTO: 34.2 % (ref 36.3–47.1)
HGB BLD-MCNC: 10.7 G/DL (ref 11.9–15.1)
IMM GRANULOCYTES # BLD AUTO: <0.03 K/UL (ref 0–0.3)
IMM GRANULOCYTES NFR BLD: 0 %
LYMPHOCYTES NFR BLD: 1.17 K/UL (ref 1.1–3.7)
LYMPHOCYTES RELATIVE PERCENT: 22 % (ref 24–43)
MCH RBC QN AUTO: 29.7 PG (ref 25.2–33.5)
MCHC RBC AUTO-ENTMCNC: 31.3 G/DL (ref 25.2–33.5)
MCV RBC AUTO: 95 FL (ref 82.6–102.9)
MONOCYTES NFR BLD: 0.79 K/UL (ref 0.1–1.2)
MONOCYTES NFR BLD: 15 % (ref 3–12)
NEUTROPHILS NFR BLD: 60 % (ref 36–65)
NEUTS SEG NFR BLD: 3.23 K/UL (ref 1.5–8.1)
NRBC BLD-RTO: 0 PER 100 WBC
PLATELET # BLD AUTO: ABNORMAL K/UL (ref 138–453)
PLATELET, FLUORESCENCE: 123 K/UL (ref 138–453)
PLATELETS.RETICULATED NFR BLD AUTO: 13.1 % (ref 1.1–10.3)
POTASSIUM SERPL-SCNC: 4 MMOL/L (ref 3.7–5.3)
RBC # BLD AUTO: 3.6 M/UL (ref 3.95–5.11)
SODIUM SERPL-SCNC: 143 MMOL/L (ref 135–144)
WBC OTHER # BLD: 5.4 K/UL (ref 3.5–11.3)

## 2024-10-13 PROCEDURE — 80048 BASIC METABOLIC PNL TOTAL CA: CPT

## 2024-10-13 PROCEDURE — 6360000002 HC RX W HCPCS: Performed by: FAMILY MEDICINE

## 2024-10-13 PROCEDURE — 94760 N-INVAS EAR/PLS OXIMETRY 1: CPT

## 2024-10-13 PROCEDURE — 2060000000 HC ICU INTERMEDIATE R&B

## 2024-10-13 PROCEDURE — 36415 COLL VENOUS BLD VENIPUNCTURE: CPT

## 2024-10-13 PROCEDURE — 2580000003 HC RX 258: Performed by: FAMILY MEDICINE

## 2024-10-13 PROCEDURE — 6360000002 HC RX W HCPCS: Performed by: NURSE PRACTITIONER

## 2024-10-13 PROCEDURE — 6370000000 HC RX 637 (ALT 250 FOR IP): Performed by: FAMILY MEDICINE

## 2024-10-13 PROCEDURE — 85025 COMPLETE CBC W/AUTO DIFF WBC: CPT

## 2024-10-13 RX ORDER — POTASSIUM CHLORIDE 1500 MG/1
20 TABLET, EXTENDED RELEASE ORAL 2 TIMES DAILY
Status: DISCONTINUED | OUTPATIENT
Start: 2024-10-13 | End: 2024-10-15 | Stop reason: HOSPADM

## 2024-10-13 RX ORDER — BUMETANIDE 0.25 MG/ML
1 INJECTION INTRAMUSCULAR; INTRAVENOUS ONCE
Status: COMPLETED | OUTPATIENT
Start: 2024-10-13 | End: 2024-10-13

## 2024-10-13 RX ADMIN — BUMETANIDE 1 MG: 0.25 INJECTION INTRAMUSCULAR; INTRAVENOUS at 08:26

## 2024-10-13 RX ADMIN — LOSARTAN POTASSIUM 50 MG: 50 TABLET, FILM COATED ORAL at 08:26

## 2024-10-13 RX ADMIN — MICONAZOLE NITRATE: 20 POWDER TOPICAL at 08:27

## 2024-10-13 RX ADMIN — SODIUM CHLORIDE, PRESERVATIVE FREE 10 ML: 5 INJECTION INTRAVENOUS at 08:26

## 2024-10-13 RX ADMIN — POTASSIUM CHLORIDE 20 MEQ: 1500 TABLET, EXTENDED RELEASE ORAL at 08:25

## 2024-10-13 RX ADMIN — LEVOTHYROXINE SODIUM 50 MCG: 0.03 TABLET ORAL at 05:36

## 2024-10-13 RX ADMIN — METOPROLOL TARTRATE 37.5 MG: 25 TABLET, FILM COATED ORAL at 20:36

## 2024-10-13 RX ADMIN — APIXABAN 5 MG: 5 TABLET, FILM COATED ORAL at 20:36

## 2024-10-13 RX ADMIN — METOPROLOL TARTRATE 37.5 MG: 25 TABLET, FILM COATED ORAL at 08:25

## 2024-10-13 RX ADMIN — SODIUM CHLORIDE, PRESERVATIVE FREE 10 ML: 5 INJECTION INTRAVENOUS at 20:59

## 2024-10-13 RX ADMIN — TRAZODONE HYDROCHLORIDE 50 MG: 50 TABLET ORAL at 20:36

## 2024-10-13 RX ADMIN — ATORVASTATIN CALCIUM 40 MG: 40 TABLET, FILM COATED ORAL at 20:36

## 2024-10-13 RX ADMIN — BUMETANIDE 1 MG: 0.25 INJECTION INTRAMUSCULAR; INTRAVENOUS at 20:59

## 2024-10-13 RX ADMIN — POTASSIUM CHLORIDE 20 MEQ: 1500 TABLET, EXTENDED RELEASE ORAL at 20:36

## 2024-10-13 RX ADMIN — APIXABAN 5 MG: 5 TABLET, FILM COATED ORAL at 08:26

## 2024-10-13 NOTE — PLAN OF CARE
Problem: Chronic Conditions and Co-morbidities  Goal: Patient's chronic conditions and co-morbidity symptoms are monitored and maintained or improved  10/12/2024 2120 by Clemencia Coughlin RN  Outcome: Progressing  Flowsheets (Taken 10/12/2024 2000)  Care Plan - Patient's Chronic Conditions and Co-Morbidity Symptoms are Monitored and Maintained or Improved:   Monitor and assess patient's chronic conditions and comorbid symptoms for stability, deterioration, or improvement   Collaborate with multidisciplinary team to address chronic and comorbid conditions and prevent exacerbation or deterioration   Update acute care plan with appropriate goals if chronic or comorbid symptoms are exacerbated and prevent overall improvement and discharge  10/12/2024 0928 by Chanda Lyle, RN  Outcome: Progressing  Flowsheets (Taken 10/12/2024 0852)  Care Plan - Patient's Chronic Conditions and Co-Morbidity Symptoms are Monitored and Maintained or Improved: Monitor and assess patient's chronic conditions and comorbid symptoms for stability, deterioration, or improvement     Problem: Discharge Planning  Goal: Discharge to home or other facility with appropriate resources  10/12/2024 2120 by Clemencia Coughlin RN  Outcome: Progressing  Flowsheets (Taken 10/12/2024 2000)  Discharge to home or other facility with appropriate resources:   Identify barriers to discharge with patient and caregiver   Refer to discharge planning if patient needs post-hospital services based on physician order or complex needs related to functional status, cognitive ability or social support system  10/12/2024 0928 by Chanda Lyle, RN  Outcome: Progressing  Flowsheets (Taken 10/12/2024 0852)  Discharge to home or other facility with appropriate resources: Identify barriers to discharge with patient and caregiver     Problem: Safety - Adult  Goal: Free from fall injury  10/12/2024 2120 by Clemencia Coughlin RN  Outcome: Progressing  10/12/2024 0928 by

## 2024-10-13 NOTE — PLAN OF CARE
Problem: Chronic Conditions and Co-morbidities  Goal: Patient's chronic conditions and co-morbidity symptoms are monitored and maintained or improved  10/13/2024 0742 by Shock, Rylee, RN  Outcome: Progressing  10/12/2024 2120 by Clemencia Coughlin RN  Outcome: Progressing  Flowsheets (Taken 10/12/2024 2000)  Care Plan - Patient's Chronic Conditions and Co-Morbidity Symptoms are Monitored and Maintained or Improved:   Monitor and assess patient's chronic conditions and comorbid symptoms for stability, deterioration, or improvement   Collaborate with multidisciplinary team to address chronic and comorbid conditions and prevent exacerbation or deterioration   Update acute care plan with appropriate goals if chronic or comorbid symptoms are exacerbated and prevent overall improvement and discharge     Problem: Discharge Planning  Goal: Discharge to home or other facility with appropriate resources  10/13/2024 0742 by Shock, Rylee, RN  Outcome: Progressing  10/12/2024 2120 by Clemencia Coughlin RN  Outcome: Progressing  Flowsheets (Taken 10/12/2024 2000)  Discharge to home or other facility with appropriate resources:   Identify barriers to discharge with patient and caregiver   Refer to discharge planning if patient needs post-hospital services based on physician order or complex needs related to functional status, cognitive ability or social support system     Problem: Safety - Adult  Goal: Free from fall injury  10/13/2024 0742 by Shock, Rylee, RN  Outcome: Progressing  10/12/2024 2120 by Clemencia Coughlin RN  Outcome: Progressing     Problem: ABCDS Injury Assessment  Goal: Absence of physical injury  10/13/2024 0742 by Shock, Rylee, RN  Outcome: Progressing  10/12/2024 2120 by Clemencia Coughlin RN  Outcome: Progressing

## 2024-10-14 ENCOUNTER — APPOINTMENT (OUTPATIENT)
Age: 84
DRG: 291 | End: 2024-10-14
Payer: MEDICARE

## 2024-10-14 LAB
ANION GAP SERPL CALCULATED.3IONS-SCNC: 11 MMOL/L (ref 9–17)
BASOPHILS # BLD: 0.03 K/UL (ref 0–0.2)
BASOPHILS NFR BLD: 1 % (ref 0–2)
BUN SERPL-MCNC: 20 MG/DL (ref 8–23)
BUN/CREAT SERPL: 18 (ref 9–20)
CALCIUM SERPL-MCNC: 8.9 MG/DL (ref 8.6–10.4)
CHLORIDE SERPL-SCNC: 106 MMOL/L (ref 98–107)
CO2 SERPL-SCNC: 27 MMOL/L (ref 20–31)
CREAT SERPL-MCNC: 1.1 MG/DL (ref 0.5–0.9)
ECHO AO ASC DIAM: 3.7 CM
ECHO AO ASCENDING AORTA INDEX: 1.5 CM/M2
ECHO AO ROOT DIAM: 3.1 CM
ECHO AO ROOT INDEX: 1.26 CM/M2
ECHO AV AREA PEAK VELOCITY: 1.7 CM2
ECHO AV AREA VTI: 1.7 CM2
ECHO AV AREA/BSA PEAK VELOCITY: 0.7 CM2/M2
ECHO AV AREA/BSA VTI: 0.7 CM2/M2
ECHO AV MEAN GRADIENT: 7 MMHG
ECHO AV MEAN VELOCITY: 1.3 M/S
ECHO AV PEAK GRADIENT: 13 MMHG
ECHO AV PEAK VELOCITY: 1.8 M/S
ECHO AV VELOCITY RATIO: 0.5
ECHO AV VTI: 38.8 CM
ECHO BSA: 2.75 M2
ECHO EST RA PRESSURE: 8 MMHG
ECHO LA AREA 4C: 33.1 CM2
ECHO LA DIAMETER INDEX: 2.23 CM/M2
ECHO LA DIAMETER: 5.5 CM
ECHO LA MAJOR AXIS: 7.6 CM
ECHO LA TO AORTIC ROOT RATIO: 1.77
ECHO LA VOL MOD A4C: 118 ML (ref 22–52)
ECHO LA VOLUME INDEX MOD A4C: 48 ML/M2 (ref 16–34)
ECHO LV EDV A4C: 71 ML
ECHO LV EDV INDEX A4C: 29 ML/M2
ECHO LV EF PHYSICIAN: 50 %
ECHO LV EJECTION FRACTION A4C: 57 %
ECHO LV ESV A4C: 30 ML
ECHO LV ESV INDEX A4C: 12 ML/M2
ECHO LV FRACTIONAL SHORTENING: 27 % (ref 28–44)
ECHO LV INTERNAL DIMENSION DIASTOLE INDEX: 1.94 CM/M2
ECHO LV INTERNAL DIMENSION DIASTOLIC: 4.8 CM (ref 3.9–5.3)
ECHO LV INTERNAL DIMENSION SYSTOLIC INDEX: 1.42 CM/M2
ECHO LV INTERNAL DIMENSION SYSTOLIC: 3.5 CM
ECHO LV ISOVOLUMETRIC RELAXATION TIME (IVRT): 70 MS
ECHO LV IVSD: 1 CM (ref 0.6–0.9)
ECHO LV MASS 2D: 194 G (ref 67–162)
ECHO LV MASS INDEX 2D: 78.5 G/M2 (ref 43–95)
ECHO LV POSTERIOR WALL DIASTOLIC: 1.2 CM (ref 0.6–0.9)
ECHO LV RELATIVE WALL THICKNESS RATIO: 0.5
ECHO LVOT AREA: 3.1 CM2
ECHO LVOT AV VTI INDEX: 0.55
ECHO LVOT DIAM: 2 CM
ECHO LVOT MEAN GRADIENT: 2 MMHG
ECHO LVOT PEAK GRADIENT: 3 MMHG
ECHO LVOT PEAK VELOCITY: 0.9 M/S
ECHO LVOT STROKE VOLUME INDEX: 27 ML/M2
ECHO LVOT SV: 66.6 ML
ECHO LVOT VTI: 21.2 CM
ECHO MV E DECELERATION TIME (DT): 229 MS
ECHO MV E VELOCITY: 1.39 M/S
ECHO MV MAX VELOCITY: 1.4 M/S
ECHO MV PEAK GRADIENT: 8 MMHG
ECHO PV MAX VELOCITY: 0.9 M/S
ECHO PV PEAK GRADIENT: 3 MMHG
ECHO RIGHT VENTRICULAR SYSTOLIC PRESSURE (RVSP): 31 MMHG
ECHO TV REGURGITANT MAX VELOCITY: 2.4 M/S
ECHO TV REGURGITANT PEAK GRADIENT: 23 MMHG
EKG Q-T INTERVAL: 364 MS
EKG QRS DURATION: 90 MS
EKG QTC CALCULATION (BAZETT): 485 MS
EKG R AXIS: 7 DEGREES
EKG T AXIS: 27 DEGREES
EKG VENTRICULAR RATE: 107 BPM
EOSINOPHIL # BLD: 0.23 K/UL (ref 0–0.44)
EOSINOPHILS RELATIVE PERCENT: 4 % (ref 1–4)
ERYTHROCYTE [DISTWIDTH] IN BLOOD BY AUTOMATED COUNT: 15.1 % (ref 11.8–14.4)
GFR, ESTIMATED: 50 ML/MIN/1.73M2
GLUCOSE SERPL-MCNC: 97 MG/DL (ref 70–99)
HCT VFR BLD AUTO: 37.5 % (ref 36.3–47.1)
HGB BLD-MCNC: 11.8 G/DL (ref 11.9–15.1)
IMM GRANULOCYTES # BLD AUTO: <0.03 K/UL (ref 0–0.3)
IMM GRANULOCYTES NFR BLD: 0 %
LYMPHOCYTES NFR BLD: 1.44 K/UL (ref 1.1–3.7)
LYMPHOCYTES RELATIVE PERCENT: 24 % (ref 24–43)
MCH RBC QN AUTO: 30.2 PG (ref 25.2–33.5)
MCHC RBC AUTO-ENTMCNC: 31.5 G/DL (ref 25.2–33.5)
MCV RBC AUTO: 95.9 FL (ref 82.6–102.9)
MONOCYTES NFR BLD: 0.76 K/UL (ref 0.1–1.2)
MONOCYTES NFR BLD: 13 % (ref 3–12)
NEUTROPHILS NFR BLD: 59 % (ref 36–65)
NEUTS SEG NFR BLD: 3.58 K/UL (ref 1.5–8.1)
NRBC BLD-RTO: 0 PER 100 WBC
PLATELET # BLD AUTO: ABNORMAL K/UL (ref 138–453)
PLATELET, FLUORESCENCE: 120 K/UL (ref 138–453)
PLATELETS.RETICULATED NFR BLD AUTO: 15.5 % (ref 1.1–10.3)
POTASSIUM SERPL-SCNC: 4.2 MMOL/L (ref 3.7–5.3)
RBC # BLD AUTO: 3.91 M/UL (ref 3.95–5.11)
SODIUM SERPL-SCNC: 144 MMOL/L (ref 135–144)
WBC OTHER # BLD: 6.1 K/UL (ref 3.5–11.3)

## 2024-10-14 PROCEDURE — 2060000000 HC ICU INTERMEDIATE R&B

## 2024-10-14 PROCEDURE — 2580000003 HC RX 258: Performed by: FAMILY MEDICINE

## 2024-10-14 PROCEDURE — 36415 COLL VENOUS BLD VENIPUNCTURE: CPT

## 2024-10-14 PROCEDURE — 6360000002 HC RX W HCPCS: Performed by: FAMILY MEDICINE

## 2024-10-14 PROCEDURE — 85025 COMPLETE CBC W/AUTO DIFF WBC: CPT

## 2024-10-14 PROCEDURE — 80048 BASIC METABOLIC PNL TOTAL CA: CPT

## 2024-10-14 PROCEDURE — 93306 TTE W/DOPPLER COMPLETE: CPT | Performed by: INTERNAL MEDICINE

## 2024-10-14 PROCEDURE — 6370000000 HC RX 637 (ALT 250 FOR IP): Performed by: FAMILY MEDICINE

## 2024-10-14 PROCEDURE — 99231 SBSQ HOSP IP/OBS SF/LOW 25: CPT | Performed by: INTERNAL MEDICINE

## 2024-10-14 PROCEDURE — 99223 1ST HOSP IP/OBS HIGH 75: CPT | Performed by: INTERNAL MEDICINE

## 2024-10-14 PROCEDURE — 6370000000 HC RX 637 (ALT 250 FOR IP): Performed by: INTERNAL MEDICINE

## 2024-10-14 PROCEDURE — 94760 N-INVAS EAR/PLS OXIMETRY 1: CPT

## 2024-10-14 PROCEDURE — 93306 TTE W/DOPPLER COMPLETE: CPT

## 2024-10-14 PROCEDURE — 97110 THERAPEUTIC EXERCISES: CPT | Performed by: PHYSICAL THERAPY ASSISTANT

## 2024-10-14 RX ORDER — SPIRONOLACTONE 25 MG/1
25 TABLET ORAL DAILY
Status: DISCONTINUED | OUTPATIENT
Start: 2024-10-14 | End: 2024-10-15 | Stop reason: HOSPADM

## 2024-10-14 RX ADMIN — METOPROLOL TARTRATE 37.5 MG: 25 TABLET, FILM COATED ORAL at 21:08

## 2024-10-14 RX ADMIN — MICONAZOLE NITRATE: 20 POWDER TOPICAL at 21:15

## 2024-10-14 RX ADMIN — LOSARTAN POTASSIUM 50 MG: 50 TABLET, FILM COATED ORAL at 09:34

## 2024-10-14 RX ADMIN — APIXABAN 5 MG: 5 TABLET, FILM COATED ORAL at 21:05

## 2024-10-14 RX ADMIN — ACETAMINOPHEN 650 MG: 325 TABLET ORAL at 06:23

## 2024-10-14 RX ADMIN — BUMETANIDE 1 MG: 0.25 INJECTION INTRAMUSCULAR; INTRAVENOUS at 17:09

## 2024-10-14 RX ADMIN — LEVOTHYROXINE SODIUM 50 MCG: 0.03 TABLET ORAL at 06:20

## 2024-10-14 RX ADMIN — APIXABAN 5 MG: 5 TABLET, FILM COATED ORAL at 09:34

## 2024-10-14 RX ADMIN — METOPROLOL TARTRATE 37.5 MG: 25 TABLET, FILM COATED ORAL at 09:33

## 2024-10-14 RX ADMIN — POTASSIUM CHLORIDE 20 MEQ: 1500 TABLET, EXTENDED RELEASE ORAL at 09:34

## 2024-10-14 RX ADMIN — POTASSIUM CHLORIDE 20 MEQ: 1500 TABLET, EXTENDED RELEASE ORAL at 21:06

## 2024-10-14 RX ADMIN — SPIRONOLACTONE 25 MG: 25 TABLET ORAL at 09:39

## 2024-10-14 RX ADMIN — SODIUM CHLORIDE, PRESERVATIVE FREE 10 ML: 5 INJECTION INTRAVENOUS at 21:16

## 2024-10-14 RX ADMIN — MICONAZOLE NITRATE: 20 POWDER TOPICAL at 09:34

## 2024-10-14 RX ADMIN — SODIUM CHLORIDE, PRESERVATIVE FREE 10 ML: 5 INJECTION INTRAVENOUS at 09:35

## 2024-10-14 RX ADMIN — EMPAGLIFLOZIN 10 MG: 10 TABLET, FILM COATED ORAL at 09:39

## 2024-10-14 RX ADMIN — BUMETANIDE 1 MG: 0.25 INJECTION INTRAMUSCULAR; INTRAVENOUS at 09:34

## 2024-10-14 RX ADMIN — ACETAMINOPHEN 650 MG: 325 TABLET ORAL at 19:47

## 2024-10-14 RX ADMIN — ATORVASTATIN CALCIUM 40 MG: 40 TABLET, FILM COATED ORAL at 21:05

## 2024-10-14 RX ADMIN — TRAZODONE HYDROCHLORIDE 50 MG: 50 TABLET ORAL at 21:06

## 2024-10-14 ASSESSMENT — PAIN DESCRIPTION - LOCATION
LOCATION: OTHER (COMMENT);GENERALIZED
LOCATION: GENERALIZED
LOCATION: GENERALIZED

## 2024-10-14 ASSESSMENT — PAIN SCALES - GENERAL
PAINLEVEL_OUTOF10: 7
PAINLEVEL_OUTOF10: 5
PAINLEVEL_OUTOF10: 5
PAINLEVEL_OUTOF10: 2
PAINLEVEL_OUTOF10: 5

## 2024-10-14 NOTE — CONSULTS
hypokinesis      hypokinesis      2 -           4 - Moderate     6 - Akinesis     8 - Aneurysm   Hypokinesis   hypokinesis     Procedure Data  Procedure Start Time: 09/16/2022 11:00. Procedure End Time: 09/16/2022  11:25.    The procedure was explained in detail to the patient. Risks, complications  and alternative treatments were reviewed. Written consent was obtained.    Diagnostic Cath Status: Urgent    Entry Locations    - Retrograde Percutaneous access was performed through the Right Radial      artery. A 6 Fr sheath was inserted. Hemostasis was successfully obtained      using Vasc Band.    Procedure Medications:    - Lidocaine HCl 1% 10mg/ml S.Q. 5 ml.      - Versed 2 mg.      - Fentanyl I.V. 50 mcg.      - Heparin I.A. 2000 units.      - Verapamil I.A. 2.5 mg.      - Nitroglycerin I.A. 400 mcg.    Catheters and Wires:    - 6F Catheter JR 4 was used for Left ventriculography.      - 6F Catheter JR 4 was used for Right coronary angiography.      - 6F Catheter JL 3.5 was used for Left coronary angiography.    Contrast Material:    - Isovue 11585 ml    Fluoroscopy Time: Diagnostic: 3:30 minutes. Total: 3:30 minutes.     Medical History     Allergies    - *Unlisted:(PCN, bextra).     Risk Factors      The patient risk factors include:treated hypercholesterolemia, treated   hypertension, untreated diabetes mellitus, last creatinine: 0.6 mg/dl and   creatinine clearance: 137.17 ml/min.     Admission Data  Admission Date: 09/16/2022    Admission Status: Outpatient.        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     Hemodynamics      Condition: Baseline Room Air      Estimated: 219.26Heart Rate: 88 bpm     Pressure  +-----+--------------------------------------------------------------------+  !Site !Pressure

## 2024-10-14 NOTE — PLAN OF CARE
Problem: Chronic Conditions and Co-morbidities  Goal: Patient's chronic conditions and co-morbidity symptoms are monitored and maintained or improved  10/14/2024 0733 by Shock, Rylee, RN  Outcome: Progressing  10/14/2024 0528 by Qi Lane RN  Outcome: Progressing  Flowsheets (Taken 10/14/2024 0430)  Care Plan - Patient's Chronic Conditions and Co-Morbidity Symptoms are Monitored and Maintained or Improved: Monitor and assess patient's chronic conditions and comorbid symptoms for stability, deterioration, or improvement     Problem: Discharge Planning  Goal: Discharge to home or other facility with appropriate resources  10/14/2024 0733 by Shock, Rylee, RN  Outcome: Progressing  10/14/2024 0528 by Qi Lane RN  Outcome: Progressing  Flowsheets (Taken 10/14/2024 0430)  Discharge to home or other facility with appropriate resources: Identify barriers to discharge with patient and caregiver     Problem: Safety - Adult  Goal: Free from fall injury  10/14/2024 0733 by Shock, Rylee, RN  Outcome: Progressing  10/14/2024 0528 by Qi Lane RN  Outcome: Progressing     Problem: ABCDS Injury Assessment  Goal: Absence of physical injury  10/14/2024 0733 by Shock, Rylee, RN  Outcome: Progressing  10/14/2024 0528 by Qi Lane RN  Outcome: Progressing

## 2024-10-14 NOTE — DISCHARGE INSTR - DIET
Good nutrition is important when healing from an illness, injury, or surgery.  Follow any nutrition recommendations given to you during your hospital stay.   If you were given an oral nutrition supplement while in the hospital, continue to take this supplement at home.  You can take it with meals, in-between meals, and/or before bedtime. These supplements can be purchased at most local grocery stores, pharmacies, and chain [employer]-stores.   If you have any questions about your diet or nutrition, call the hospital and ask for the dietitian.  Regular, Cardiac Diet. 1800ml Fluid Restriction per day.

## 2024-10-14 NOTE — DISCHARGE INSTRUCTIONS
Follow up with Dr. Trevino in 1 week.    Follow up with Cardiology within 2 weeks.    Discuss Eliquis and possibly changing to another medication covered by insurance.

## 2024-10-14 NOTE — PLAN OF CARE
Problem: Discharge Planning  Goal: Discharge to home or other facility with appropriate resources  Outcome: Progressing  Flowsheets (Taken 10/14/2024 0430)  Discharge to home or other facility with appropriate resources: Identify barriers to discharge with patient and caregiver     Problem: Safety - Adult  Goal: Free from fall injury  Outcome: Progressing     Problem: Chronic Conditions and Co-morbidities  Goal: Patient's chronic conditions and co-morbidity symptoms are monitored and maintained or improved  Outcome: Progressing  Flowsheets (Taken 10/14/2024 0430)  Care Plan - Patient's Chronic Conditions and Co-Morbidity Symptoms are Monitored and Maintained or Improved: Monitor and assess patient's chronic conditions and comorbid symptoms for stability, deterioration, or improvement

## 2024-10-15 VITALS
DIASTOLIC BLOOD PRESSURE: 64 MMHG | OXYGEN SATURATION: 94 % | SYSTOLIC BLOOD PRESSURE: 125 MMHG | WEIGHT: 293 LBS | HEIGHT: 67 IN | HEART RATE: 84 BPM | TEMPERATURE: 97.8 F | RESPIRATION RATE: 18 BRPM | BODY MASS INDEX: 45.99 KG/M2

## 2024-10-15 LAB
ANION GAP SERPL CALCULATED.3IONS-SCNC: 7 MMOL/L (ref 9–17)
BASOPHILS # BLD: <0.03 K/UL (ref 0–0.2)
BASOPHILS NFR BLD: 0 % (ref 0–2)
BUN SERPL-MCNC: 25 MG/DL (ref 8–23)
BUN/CREAT SERPL: 21 (ref 9–20)
CALCIUM SERPL-MCNC: 8.7 MG/DL (ref 8.6–10.4)
CHLORIDE SERPL-SCNC: 106 MMOL/L (ref 98–107)
CO2 SERPL-SCNC: 30 MMOL/L (ref 20–31)
CREAT SERPL-MCNC: 1.2 MG/DL (ref 0.5–0.9)
EOSINOPHIL # BLD: 0.16 K/UL (ref 0–0.44)
EOSINOPHILS RELATIVE PERCENT: 3 % (ref 1–4)
ERYTHROCYTE [DISTWIDTH] IN BLOOD BY AUTOMATED COUNT: 15 % (ref 11.8–14.4)
GFR, ESTIMATED: 45 ML/MIN/1.73M2
GLUCOSE SERPL-MCNC: 95 MG/DL (ref 70–99)
HCT VFR BLD AUTO: 37.1 % (ref 36.3–47.1)
HGB BLD-MCNC: 11.6 G/DL (ref 11.9–15.1)
IMM GRANULOCYTES # BLD AUTO: <0.03 K/UL (ref 0–0.3)
IMM GRANULOCYTES NFR BLD: 0 %
LYMPHOCYTES NFR BLD: 1.41 K/UL (ref 1.1–3.7)
LYMPHOCYTES RELATIVE PERCENT: 26 % (ref 24–43)
MAGNESIUM SERPL-MCNC: 2.1 MG/DL (ref 1.6–2.6)
MCH RBC QN AUTO: 29.7 PG (ref 25.2–33.5)
MCHC RBC AUTO-ENTMCNC: 31.3 G/DL (ref 25.2–33.5)
MCV RBC AUTO: 94.9 FL (ref 82.6–102.9)
MONOCYTES NFR BLD: 0.66 K/UL (ref 0.1–1.2)
MONOCYTES NFR BLD: 12 % (ref 3–12)
NEUTROPHILS NFR BLD: 59 % (ref 36–65)
NEUTS SEG NFR BLD: 3.27 K/UL (ref 1.5–8.1)
NRBC BLD-RTO: 0 PER 100 WBC
PLATELET # BLD AUTO: ABNORMAL K/UL (ref 138–453)
PLATELET, FLUORESCENCE: 127 K/UL (ref 138–453)
PLATELETS.RETICULATED NFR BLD AUTO: 16.1 % (ref 1.1–10.3)
POTASSIUM SERPL-SCNC: 4.2 MMOL/L (ref 3.7–5.3)
RBC # BLD AUTO: 3.91 M/UL (ref 3.95–5.11)
SODIUM SERPL-SCNC: 143 MMOL/L (ref 135–144)
WBC OTHER # BLD: 5.5 K/UL (ref 3.5–11.3)

## 2024-10-15 PROCEDURE — 6370000000 HC RX 637 (ALT 250 FOR IP): Performed by: FAMILY MEDICINE

## 2024-10-15 PROCEDURE — 6370000000 HC RX 637 (ALT 250 FOR IP): Performed by: INTERNAL MEDICINE

## 2024-10-15 PROCEDURE — 97110 THERAPEUTIC EXERCISES: CPT | Performed by: PHYSICAL THERAPY ASSISTANT

## 2024-10-15 PROCEDURE — 80048 BASIC METABOLIC PNL TOTAL CA: CPT

## 2024-10-15 PROCEDURE — 6360000002 HC RX W HCPCS: Performed by: INTERNAL MEDICINE

## 2024-10-15 PROCEDURE — 83735 ASSAY OF MAGNESIUM: CPT

## 2024-10-15 PROCEDURE — 85025 COMPLETE CBC W/AUTO DIFF WBC: CPT

## 2024-10-15 PROCEDURE — 36415 COLL VENOUS BLD VENIPUNCTURE: CPT

## 2024-10-15 PROCEDURE — 2580000003 HC RX 258: Performed by: FAMILY MEDICINE

## 2024-10-15 PROCEDURE — G0008 ADMIN INFLUENZA VIRUS VAC: HCPCS | Performed by: INTERNAL MEDICINE

## 2024-10-15 PROCEDURE — 90656 IIV3 VACC NO PRSV 0.5 ML IM: CPT | Performed by: INTERNAL MEDICINE

## 2024-10-15 PROCEDURE — 6360000002 HC RX W HCPCS: Performed by: FAMILY MEDICINE

## 2024-10-15 PROCEDURE — 99238 HOSP IP/OBS DSCHRG MGMT 30/<: CPT | Performed by: INTERNAL MEDICINE

## 2024-10-15 RX ORDER — LOSARTAN POTASSIUM 50 MG/1
50 TABLET ORAL DAILY
Qty: 30 TABLET | Refills: 0 | Status: SHIPPED | OUTPATIENT
Start: 2024-10-16

## 2024-10-15 RX ORDER — SPIRONOLACTONE 25 MG/1
25 TABLET ORAL DAILY
Qty: 30 TABLET | Refills: 0 | Status: SHIPPED | OUTPATIENT
Start: 2024-10-16

## 2024-10-15 RX ORDER — BUMETANIDE 2 MG/1
2 TABLET ORAL DAILY
Qty: 30 TABLET | Refills: 0 | Status: SHIPPED | OUTPATIENT
Start: 2024-10-15

## 2024-10-15 RX ADMIN — SPIRONOLACTONE 25 MG: 25 TABLET ORAL at 08:34

## 2024-10-15 RX ADMIN — BUMETANIDE 1 MG: 0.25 INJECTION INTRAMUSCULAR; INTRAVENOUS at 08:37

## 2024-10-15 RX ADMIN — METOPROLOL TARTRATE 37.5 MG: 25 TABLET, FILM COATED ORAL at 08:37

## 2024-10-15 RX ADMIN — APIXABAN 5 MG: 5 TABLET, FILM COATED ORAL at 08:37

## 2024-10-15 RX ADMIN — EMPAGLIFLOZIN 10 MG: 10 TABLET, FILM COATED ORAL at 08:37

## 2024-10-15 RX ADMIN — ACETAMINOPHEN 650 MG: 325 TABLET ORAL at 04:27

## 2024-10-15 RX ADMIN — LOSARTAN POTASSIUM 50 MG: 50 TABLET, FILM COATED ORAL at 08:36

## 2024-10-15 RX ADMIN — LEVOTHYROXINE SODIUM 50 MCG: 0.03 TABLET ORAL at 05:14

## 2024-10-15 RX ADMIN — INFLUENZA A VIRUS A/VICTORIA/4897/2022 IVR-238 (H1N1) ANTIGEN (PROPIOLACTONE INACTIVATED), INFLUENZA A VIRUS A/THAILAND/8/2022 IVR-237 (H3N2) ANTIGEN (PROPIOLACTONE INACTIVATED), INFLUENZA B VIRUS B/AUSTRIA/1359417/2021 BVR-26 ANTIGEN (PROPIOLACTONE INACTIVATED) 0.5 ML: 15; 15; 15 INJECTION, SUSPENSION INTRAMUSCULAR at 11:40

## 2024-10-15 RX ADMIN — SODIUM CHLORIDE, PRESERVATIVE FREE 10 ML: 5 INJECTION INTRAVENOUS at 08:42

## 2024-10-15 RX ADMIN — MICONAZOLE NITRATE: 20 POWDER TOPICAL at 08:48

## 2024-10-15 RX ADMIN — POTASSIUM CHLORIDE 20 MEQ: 1500 TABLET, EXTENDED RELEASE ORAL at 08:37

## 2024-10-15 ASSESSMENT — PAIN DESCRIPTION - DESCRIPTORS: DESCRIPTORS: ACHING;DISCOMFORT

## 2024-10-15 ASSESSMENT — PAIN DESCRIPTION - LOCATION: LOCATION: GENERALIZED

## 2024-10-15 ASSESSMENT — PAIN SCALES - GENERAL
PAINLEVEL_OUTOF10: 2
PAINLEVEL_OUTOF10: 5
PAINLEVEL_OUTOF10: 5

## 2024-10-15 NOTE — CARE COORDINATION
10/15/24 1156   IMM Letter   IMM Letter given to Patient/Family/Significant other/Guardian/POA/by: Second IMM given to patient by BRIANNE Justin RN   IMM Letter date given: 10/15/24   IMM Letter time given: 1156     IMM letter provided to patient.  Patient offered four hours to make informed decision regarding appeal process; patient agreeable to discharge.   
Case Management Assessment  Initial Evaluation    Date/Time of Evaluation: 10/14/2024 12:58 PM  Assessment Completed by: Carmen Justin RN    If patient is discharged prior to next notation, then this note serves as note for discharge by case management.    Patient Name: Yvette Das                   YOB: 1940  Diagnosis: CHF (congestive heart failure), NYHA class I, chronic, diastolic (HCC) [I50.32]  Acute on chronic congestive heart failure, unspecified heart failure type (HCC) [I50.9]                   Date / Time: 10/11/2024 10:28 AM    Patient Admission Status: Inpatient   Readmission Risk (Low < 19, Mod (19-27), High > 27): Readmission Risk Score: 18.2    Current PCP: Emy Trevino, DO  PCP verified by CM? Yes    Chart Reviewed: Yes      History Provided by: Patient  Patient Orientation: Alert and Oriented    Patient Cognition: Alert    Hospitalization in the last 30 days (Readmission):  No    If yes, Readmission Assessment in CM Navigator will be completed.    Advance Directives:      Code Status: Full Code   Patient's Primary Decision Maker is: Legal Next of Kin    Primary Decision Maker: [employer] (POA) - Child - [phone]    Secondary Decision Maker: Xiomara Black - Child - [phone]    Supplemental (Other) Decision Maker: Marilyn Ca - Grandchild - [phone]    Discharge Planning:    Patient lives with: Family Members Type of Home: House  Primary Care Giver: Self  Patient Support Systems include: Family Members   Current Financial resources: Medicare  Current community resources: None  Current services prior to admission: C-pap, Durable Medical Equipment            Current DME: Cpap, Walker            Type of Home Care services:  PT    ADLS  Prior functional level: Assistance with the following:  Current functional level: Assistance with the following:, Housework, Cooking, Shopping    PT AM-PAC:   /24  OT AM-PAC:   /24    Family can provide assistance at DC: 
needed financial assistance: Denies any food insecurity or financial concerns at this time.    Income Source: social security and detention pension     ACP and Code Status:  SW discussed an Advance Directive which included the patient's choices for care and treatment in the case of a health event that adversely affects decision-making abilities. SW provided education and resources. Yvette Das has no questions at this time and has agreed to keep me up-to-date should anything change.    Yvette Das is a Full Code status and has NO advanced directive - not interested in additional information.      Collaborative List of SNF/ECF/HH were provided: offered, declined No discharge order at this time.    Anticipated Needs/Discharge Plan:  Spoke with patient/family/representative about discharge plan. Patient/Family/Representative verbalizes understanding of the plan of care and denies discharge needs or further services at this time. SW provided business card. SW will continue to monitor needs and assist as appropriate.              Electronically signed by SHREYAS Faustin on 10/14/2024 at 12:42 PM

## 2024-10-15 NOTE — PROGRESS NOTES
10/14/24 1202   Encounter Summary   Encounter Overview/Reason Spiritual/Emotional Needs   Encounter Code  Assessment by  services   Service Provided For Patient   Referral/Consult From [facility]   Support System Children   Last Encounter  10/14/24   Complexity of Encounter Low   Begin Time 1154   End Time  1206   Total Time Calculated 12 min   Spiritual/Emotional needs   Type Spiritual Support   Assessment/Intervention/Outcome   Assessment Calm   Intervention Active listening;Prayer (assurance of)/[place]   Outcome Comfort;Engaged in conversation;Expressed Gratitude     Spiritual Health History and Assessment/Progress Note  [facility]    Spiritual/Emotional Needs,  ,  ,      Name: Yvette Das MRN: [id number]    Age: 83 y.o.     Sex: female   Language: English   Taoist: None   CHF (congestive heart failure), NYHA class I, chronic, diastolic (HCC)     Date: 10/14/2024            Total Time Calculated: 12 min              Spiritual Assessment began in [facility]  PROGRESSIVE CARE        Referral/Consult From: Rounding   Encounter Overview/Reason: Spiritual/Emotional Needs  Service Provided For: Patient    Sari, Belief, Meaning:   Patient is connected with a sari tradition or spiritual practice  Family/Friends No family/friends present      Importance and Influence:  Patient has spiritual/personal beliefs that influence decisions regarding their health  Family/Friends No family/friends present    Community:  Patient is connected with a spiritual community  Family/Friends No family/friends present    Assessment and Plan of Care:     Patient Interventions include: Other: Patient appreciated prayer when offered.  Family/Friends Interventions include: No family/friends present    Patient Plan of Care: No spiritual needs identified for follow-up  Family/Friends Plan of Care: No family/friends present    Electronically signed by Chaplain Brandy on 10/14/2024 at 12:11 PM  
  Physician Progress Note      PATIENT:               ANANTH GARCIA  CSN #:                  [id number]  :                       1940  ADMIT DATE:       10/11/2024 10:28 AM  DISCH DATE:  RESPONDING  PROVIDER #:        Mayank Carlin MD          QUERY TEXT:    Patient admitted with acute on chronic diastolic CHF, noted to have atrial   fibrillation and is maintained on Eliquis.    If possible, please document in progress notes and discharge summary if you   are evaluating and/or treating any of the following:?  ?  The medical record reflects the following:  Risk Factors: 84 y/o female, CHF, HTN,  Clinical Indicators: atrial fibrillation, acute on chronic diastolic CHF,    HTN, age >75 years, female  Treatment: Eliquis    Mandy Olguin, MSN, RN, CCDS, CRCR  Clinical   .  Options provided:  -- Secondary hypercoagulable state in a patient with atrial fibrillation  -- Other - I will add my own diagnosis  -- Disagree - Not applicable / Not valid  -- Disagree - Clinically unable to determine / Unknown  -- Refer to Clinical Documentation Reviewer    PROVIDER RESPONSE TEXT:    This patient has secondary hypercoagulable state in a patient with atrial   fibrillation.    Query created by: Mandy Olguin on 10/14/2024 11:40 AM      Electronically signed by:  Mayank Carlin MD 10/14/2024 1:51 PM          
Hospitalist Progress Note    Patient:  Yvette Das     YOB: 1940    MRN: [id number]   Admit date: 10/11/2024     Acct: [id number]     PCP: Emy Trevino DO    CC--Interval History: CHF--acute-on-chronic combined systolic-diastolic---markedly improved---by report has lost ~ 40#---breathing better----markedly decreased edema BLE.  Patient had been converted from Lasix to Bumex IV ---> PO.    2D ECHO---10.14.2024--decreased LVEF ~ 50% and LVSF--to be further managed in the outpatient setting.    On Eliquis---patient has been relying on samples    Atrial fibrillation     ASCVD    Thrombocytopenia----chronic     See note below     All other ROS negative except noted in HPI    Diet:  ADULT DIET; Regular; 4 carb choices (60 gm/meal); Low Fat/Low Chol/High Fiber/JONNA; 1500 ml    Medications:  Scheduled Meds:   spironolactone  25 mg Oral Daily    empagliflozin  10 mg Oral Daily    potassium chloride  20 mEq Oral BID    apixaban  5 mg Oral BID    atorvastatin  40 mg Oral Nightly    levothyroxine  50 mcg Oral Daily    metoprolol tartrate  37.5 mg Oral BID    miconazole   Topical BID    traZODone  50 mg Oral Nightly    sodium chloride flush  5-40 mL IntraVENous 2 times per day    bumetanide  1 mg IntraVENous BID    losartan  50 mg Oral Daily     Continuous Infusions:   sodium chloride       PRN Meds:sodium chloride flush, sodium chloride, potassium chloride **OR** potassium alternative oral replacement **OR** potassium chloride, magnesium sulfate, ondansetron **OR** ondansetron, polyethylene glycol, acetaminophen **OR** acetaminophen    Objective:  Labs:  CBC with Differential:    Lab Results   Component Value Date/Time    WBC 5.5 10/15/2024 04:48 AM    RBC 3.91 10/15/2024 04:48 AM    HGB 11.6 10/15/2024 04:48 AM    HCT 37.1 10/15/2024 04:48 AM    PLT See Reflexed IPF Result 10/15/2024 04:48 AM    MCV 94.9 10/15/2024 04:48 AM    MCH 29.7 10/15/2024 04:48 AM    MCHC 31.3 10/15/2024 04:48 AM    RDW 15.0 
Hospitalist Progress Note  10/12/2024 1:55 PM  Subjective:   Admit Date: 10/11/2024  PCP: Emy Trevino DO     Full Code      C/c:  Chief Complaint   Patient presents with    Shortness of Breath    Leg Swelling         Interval History: pt doing better, lost about 13 lbs, good ouput with iv bumex    Diet: ADULT DIET; Regular; 4 carb choices (60 gm/meal); 1500 ml                                ip days:1  Medications:   Scheduled Meds:   apixaban  5 mg Oral BID    atorvastatin  40 mg Oral Nightly    levothyroxine  50 mcg Oral Daily    metoprolol tartrate  37.5 mg Oral BID    miconazole   Topical BID    potassium chloride  20 mEq Oral TID    traZODone  50 mg Oral Nightly    sodium chloride flush  5-40 mL IntraVENous 2 times per day    bumetanide  1 mg IntraVENous BID    losartan  50 mg Oral Daily     Continuous Infusions:   sodium chloride       PRN Meds:.sodium chloride flush, sodium chloride, potassium chloride **OR** potassium alternative oral replacement **OR** potassium chloride, magnesium sulfate, ondansetron **OR** ondansetron, polyethylene glycol, acetaminophen **OR** acetaminophen     CBC:   Recent Labs     10/11/24  1137 10/12/24  0516   WBC 5.9 5.2   HGB 11.1* 10.4*   * See Reflexed IPF Result     BMP:    Recent Labs     10/11/24  1137 10/12/24  0516    143   K 4.4 4.0   * 108*   CO2 26 28   BUN 20 17   CREATININE 0.9 0.9   GLUCOSE 104* 96     Hepatic:   Recent Labs     10/11/24  1137   AST 19   ALT 11   BILITOT 0.5   ALKPHOS 130*     Troponin: No results for input(s): \"TROPONINI\" in the last 72 hours.  BNP: No results for input(s): \"BNP\" in the last 72 hours.  Lipids: No results for input(s): \"CHOL\", \"HDL\" in the last 72 hours.    Invalid input(s): \"LDLCALCU\"  INR: No results for input(s): \"INR\" in the last 72 hours.    Objective:   Vitals: /83   Pulse 75   Temp 98.6 °F (37 °C) (Temporal)   Resp 15   Ht 1.702 m (5' 7\")   Wt (!) 153 kg (337 lb 4.8 oz)   LMP 01/01/1968   
Physical Therapy  Facility/Department: [facility]  PROGRESSIVE C[facility]  Daily Treatment Note  NAME: Yvette Das  : 1940  MRN: [id number]    Date of Service: 10/15/2024    Discharge Recommendations:  Continue to assess pending progress, Home with Home health PT, Therapy recommended at discharge        Patient Diagnosis(es): The primary encounter diagnosis was Acute on chronic congestive heart failure, unspecified heart failure type (HCC). Diagnoses of CHF (congestive heart failure), NYHA class I, chronic, diastolic (HCC) and Pulmonary hypertension (HCC) were also pertinent to this visit.    Assessment  Activity Tolerance: Patient limited by fatigue;Patient limited by endurance    Plan  Physical Therapy Plan  Current Treatment Recommendations: Strengthening;Balance training;Functional mobility training;Transfer training;Neuromuscular re-education;Gait training;Endurance training;Home exercise program;Therapeutic activities    Restrictions  Restrictions/Precautions  Restrictions/Precautions: Modified Diet, General Precautions, Bed Alarm  Required Braces or Orthoses?: No  Position Activity Restriction  Other position/activity restrictions: The patient has fluid restriction 1500mL     Subjective   Subjective  Subjective: Pt in bed at initiation of session. Agreeable to therapy at this time.  Pain: General arthritic pain only but increased this date.  Orientation  Overall Orientation Status: Within Normal Limits  Cognition  Overall Cognitive Status: WNL    Objective  Vitals     Bed Mobility Training  Bed Mobility Training: Yes  Overall Level of Assistance: Contact-guard assistance;Minimum assistance  Supine to Sit: Stand-by assistance  Sit to Supine: Minimum assistance  Scooting: Contact-guard assistance  Balance  Standing: High guard  Transfer Training  Transfer Training: Yes  Overall Level of Assistance: Contact-guard assistance  Sit to Stand: Contact-guard assistance  Stand to Sit: Contact-guard 
Physical Therapy  Facility/Department: [facility]  PROGRESSIVE C[facility]  Daily Treatment Note  NAME: Yvette Das  : 1940  MRN: [id number]    Date of Service: 10/13/2024      Subjective :  The patient was very fatigued this morning.  She was too tired to participate in therapy at this time.  Encouraged patient to complete sitting up, but she refused due to fatigue.                                  Therapy Time   Individual Concurrent Group Co-treatment   Time In           Time Out           Minutes                   Chau Padron PT           
Physical Therapy  Facility/Department: [facility]  PROGRESSIVE C[facility]  Daily Treatment Note  NAME: Yvette Das  : 1940  MRN: [id number]    Date of Service: 10/14/2024    Discharge Recommendations:  Continue to assess pending progress, Home with Home health PT, Therapy recommended at discharge        Patient Diagnosis(es): The primary encounter diagnosis was Acute on chronic congestive heart failure, unspecified heart failure type (HCC). Diagnoses of CHF (congestive heart failure), NYHA class I, chronic, diastolic (HCC) and Pulmonary hypertension (HCC) were also pertinent to this visit.    Assessment  Activity Tolerance: Patient limited by fatigue;Patient limited by endurance    Plan  Physical Therapy Plan  General Plan: 1 time a day 7 days a week  Current Treatment Recommendations: Strengthening;Balance training;Functional mobility training;Transfer training;Neuromuscular re-education;Gait training;Endurance training;Home exercise program;Therapeutic activities    Restrictions  Restrictions/Precautions  Restrictions/Precautions: Modified Diet, General Precautions, Bed Alarm  Required Braces or Orthoses?: No  Position Activity Restriction  Other position/activity restrictions: The patient has fluid restriction 1500mL     Subjective   Subjective  Subjective: Pt in bed at initiation of session. Agreeable to therapy at this time.  Pain: General arthritic pain only.  Orientation  Overall Orientation Status: Within Normal Limits  Cognition  Overall Cognitive Status: WNL    Objective  Vitals     Bed Mobility Training  Bed Mobility Training: Yes  Overall Level of Assistance: Minimum assistance  Interventions: Verbal cues  Supine to Sit: Stand-by assistance  Sit to Supine: Minimum assistance  Scooting: Contact-guard assistance  Balance  Sitting: Intact  Standing: High guard  Transfer Training  Transfer Training: Yes  Overall Level of Assistance: Contact-guard assistance  Sit to Stand: Contact-guard assistance  Stand to 
Physical Therapy  Facility/Department: [facility]  PROGRESSIVE C[facility]  Physical Therapy Initial Assessment    Name: Yvette Das  : 1940  MRN: [id number]  Date of Service: 10/12/2024    Discharge Recommendations:  Continue to assess pending progress, Home with Home health PT, Therapy recommended at discharge   PT Equipment Recommendations  Equipment Needed: No      Patient Diagnosis(es): The encounter diagnosis was Acute on chronic congestive heart failure, unspecified heart failure type (HCC).  Past Medical History:  has a past medical history of A-fib (HCC), Acute blood loss as cause of postoperative anemia, Acute post-operative pain, MICHAEL (acute kidney injury) (HCC), Anxiety, CAD (coronary artery disease), CHF (congestive heart failure) (HCC), Class 2 severe obesity with serious comorbidity and body mass index (BMI) of 37.0 to 37.9 in adult, Closed fracture of proximal end of left humerus with routine healing, Colitis, Colitis due to Clostridium difficile, Depression, Diarrhea, Dyslipidemia, Elevated fasting glucose, FH: total abdominal hysterectomy and bilateral salpingo-oophorectomy, Fractures, Full dentures, Glucose intolerance (impaired glucose tolerance), History of blood transfusion, Hyperlipidemia, Hypertension, Hypokalemia, Malignant neoplasm of sigmoid colon (HCC), Multiple closed fractures of ribs, Obesity, JAYJAY on CPAP, Osteoarthritis, Osteoporosis, Other complete intestinal obstruction (HCC), Other specified hypothyroidism, Prolonged emergence from general anesthesia, Renal cell carcinoma of right kidney (HCC), S/P small bowel resection, Thrombocytopenia, unspecified (HCC), Under care of team, and Wears glasses.  Past Surgical History:  has a past surgical history that includes Total abdominal hysterectomy w/ bilateral salpingoophorectomy (); Cholecystectomy (); Varicose vein surgery (Right, 1960s); Hip Arthroplasty (Left); Knee Arthroplasty (Left); Knee Arthroplasty (Right); Cardiac 
Mistry
      Assessment :   Diastolic heart failure/diuresis  A fib/rate control/eliquis  farhana     Plan:   1.  Continue present care  2.  See order    Patient Active Problem List:     Dyslipidemia     Osteoarthritis     Osteoporosis     Depression     Anxiety     CHF (congestive heart failure) (HCC)     Primary insomnia     FARHANA on CPAP     Hemorrhage of rectum and anus     Hypertension     A-fib (HCC)     Malignant tumor of colon (HCC)     Elevated fasting glucose     Other specified hypothyroidism     S/P small bowel resection     Closed fracture of proximal end of left humerus with routine healing     Renal mass, right     Status post nephrectomy     Urinary retention     Renal cell carcinoma of right kidney (HCC)     Chronic renal disease, stage III ([facility]) [614775]     Anasarca     Shortness of breath     Femoral fracture (HCC)     Other fracture of left femur, initial encounter for closed fracture ([facility])     Hip injury, left, initial encounter     Secondary hypercoagulable state (HCC)     GI bleed     Pyelonephritis of left kidney     Class 3 severe obesity due to excess calories with serious comorbidity and body mass index (BMI) of 45.0 to 49.9 in adult     Pulmonary hypertension (HCC)     Supratherapeutic INR     Lower GI bleed     CHF (congestive heart failure), NYHA class I, chronic, diastolic ([facility])      Anticipated Disposition upon discharge: [] Home                                                                         [] Home with Home Health                                                                         [] Skilled Nursing Facility                                                                         [] [facility] Hospital      Patient is admitted as inpatient status because of co-morbidities listed above, severity of signs and symptoms as outlined, requirement for current medical therapies and most importantly because of direct risk to patient if care not provided in a hospital 
[facility]  --  Agree with above    Brigette TRIVEDI
clots--6.21.2024               1 unit PRBCs---6.22.2024, supratherapeutic INR---6.21.2024, pyelonephritis---left--                solitary kidney---sp right nephrectomy--renal carcinoma, urinary retention, pannus                 wound--ulcer--lower abdomen, left femur fracture-----closed--proximal---5.19.2024---                non surgical no WB  PSH:    KARYN-BSO--cervix absent--1966, cholecystectomy---               open---c. 1960s, right varicose veins--c. 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

## 2024-10-15 NOTE — PLAN OF CARE
Problem: Chronic Conditions and Co-morbidities  Goal: Patient's chronic conditions and co-morbidity symptoms are monitored and maintained or improved  Outcome: Progressing     Problem: Discharge Planning  Goal: Discharge to home or other facility with appropriate resources  Outcome: Progressing     Problem: Safety - Adult  Goal: Free from fall injury  Outcome: Progressing     Problem: ABCDS Injury Assessment  Goal: Absence of physical injury  Outcome: Progressing     Problem: Cardiovascular - Adult  Goal: Maintains optimal cardiac output and hemodynamic stability  Outcome: Progressing     Problem: Musculoskeletal - Adult  Goal: Return mobility to safest level of function  Outcome: Progressing     Problem: Genitourinary - Adult  Goal: Absence of urinary retention  Outcome: Progressing     Problem: Metabolic/Fluid and Electrolytes - Adult  Goal: Electrolytes maintained within normal limits  Outcome: Progressing     Problem: Pain  Goal: Verbalizes/displays adequate comfort level or baseline comfort level  Outcome: Progressing

## 2024-10-15 NOTE — PLAN OF CARE
Problem: Chronic Conditions and Co-morbidities  Goal: Patient's chronic conditions and co-morbidity symptoms are monitored and maintained or improved  10/15/2024 1529 by Kalani Jones RN  Outcome: Adequate for Discharge  10/15/2024 1454 by Kalani Jones RN  Outcome: Progressing     Problem: Discharge Planning  Goal: Discharge to home or other facility with appropriate resources  10/15/2024 1529 by Kalani Jones RN  Outcome: Adequate for Discharge  10/15/2024 1454 by Kalani Jones RN  Outcome: Progressing     Problem: Safety - Adult  Goal: Free from fall injury  10/15/2024 1529 by Kalani Jones RN  Outcome: Adequate for Discharge  10/15/2024 1454 by Kalani Jones RN  Outcome: Progressing     Problem: ABCDS Injury Assessment  Goal: Absence of physical injury  10/15/2024 1529 by Kalani Jones RN  Outcome: Adequate for Discharge  10/15/2024 1454 by Kalani Jones RN  Outcome: Progressing     Problem: Cardiovascular - Adult  Goal: Maintains optimal cardiac output and hemodynamic stability  10/15/2024 1529 by Kalani Jones RN  Outcome: Adequate for Discharge  10/15/2024 1454 by Kalani Jones RN  Outcome: Progressing     Problem: Musculoskeletal - Adult  Goal: Return mobility to safest level of function  10/15/2024 1529 by Kalani Jones RN  Outcome: Adequate for Discharge  10/15/2024 1454 by Kalani Jones RN  Outcome: Progressing     Problem: Genitourinary - Adult  Goal: Absence of urinary retention  10/15/2024 1529 by Kalani Jones RN  Outcome: Adequate for Discharge  10/15/2024 1454 by Kalani Jones RN  Outcome: Progressing     Problem: Metabolic/Fluid and Electrolytes - Adult  Goal: Electrolytes maintained within normal limits  10/15/2024 1529 by Kalani Jones RN  Outcome: Adequate for Discharge  10/15/2024 1454 by Kalani Jones RN  Outcome: Progressing     Problem: Pain  Goal: Verbalizes/displays adequate comfort level or baseline comfort level  10/15/2024 1529 by Kalani Jones RN  Outcome: Adequate for Discharge  10/15/2024 1454 by

## 2024-10-16 ENCOUNTER — CARE COORDINATION (OUTPATIENT)
Dept: CARE COORDINATION | Age: 84
End: 2024-10-16

## 2024-10-16 ENCOUNTER — TELEPHONE (OUTPATIENT)
Dept: FAMILY MEDICINE CLINIC | Age: 84
End: 2024-10-16

## 2024-10-16 ENCOUNTER — OUTSIDE SERVICES (OUTPATIENT)
Dept: INTERNAL MEDICINE | Age: 84
End: 2024-10-16

## 2024-10-16 DIAGNOSIS — E03.4 HYPOTHYROIDISM DUE TO ACQUIRED ATROPHY OF THYROID: ICD-10-CM

## 2024-10-16 DIAGNOSIS — I27.20 PULMONARY HYPERTENSION (HCC): ICD-10-CM

## 2024-10-16 DIAGNOSIS — N18.31 STAGE 3A CHRONIC KIDNEY DISEASE (HCC): ICD-10-CM

## 2024-10-16 DIAGNOSIS — M97.02XD PERIPROSTHETIC FRACTURE AROUND INTERNAL PROSTHETIC LEFT HIP JOINT, SUBSEQUENT ENCOUNTER: Primary | ICD-10-CM

## 2024-10-16 DIAGNOSIS — E66.01 MORBID OBESITY: ICD-10-CM

## 2024-10-16 DIAGNOSIS — R73.01 ELEVATED FASTING GLUCOSE: ICD-10-CM

## 2024-10-16 DIAGNOSIS — I10 BENIGN ESSENTIAL HTN: ICD-10-CM

## 2024-10-16 DIAGNOSIS — I50.32 CHRONIC DIASTOLIC CONGESTIVE HEART FAILURE (HCC): ICD-10-CM

## 2024-10-16 DIAGNOSIS — E78.2 MIXED HYPERLIPIDEMIA: ICD-10-CM

## 2024-10-16 DIAGNOSIS — I48.0 PAROXYSMAL ATRIAL FIBRILLATION (HCC): ICD-10-CM

## 2024-10-16 NOTE — TELEPHONE ENCOUNTER
This also has an indication to help with CHF.  Would need to discuss treatment options with cardiology.

## 2024-10-16 NOTE — DISCHARGE SUMMARY
[place] [address]                            DISCHARGE SUMMARY      PATIENT NAME: ANANTH GARCIA             : 1940  MED REC NO: [id number]                         ROOM: 0203  ACCOUNT NO: [id number]                       ADMIT DATE: 10/11/2024  PROVIDER: Dimas Arrieta MD      PERSONAL PHYSICIAN:  Emy Trevino DO, [facility], [facility].  The patient [facility].    DIAGNOSES:    1. Congestive heart failure, 10/11/2024, acute on chronic combined systolic diastolic.  Bilateral lower extremity edema, had over approximately 40 pounds off as of 10/15/2024.  Chest x-ray 10/11/2024, pulmonary edema.  EKG 10/11/2024, atrial fibrillation, RVR, low voltage.  2. Chronic combined systolic-diastolic congestive heart failure.  3. Atrial fibrillation.  Prior history of paroxysmal atrial fibrillation, now chronic.  4. Grade 2/6 systolic ejection murmur.  5. Pulmonary hypertension.  6. Chronic kidney disease, stage 3A.  7. Thrombocytopenia, chronic.  8. Anemia, chronic.  9. Other medical problems, set forth in the progress note of 10/15/2024, incorporated for reference herein.    HISTORY OF PRESENT ILLNESS AND HOSPITAL COURSE:  The patient is an 83-year-old white female, patient of Emy Trevino DO, [facility] and [facility], [facility].      The patient presented with increasing bilateral lower extremity edema.  Ultimately, she has had about 30-40 pounds off during the course of this hospitalization with improved breathing, together with decreased bilateral lower extremity edema.  The patient was seen by Cardiology.  Medications adjusted, namely patient taken off Lasix and placed on Bumex, had spironolactone together with Cozaar, losartan added to her regimen.      DM2----diabetic type 2, Jardiance

## 2024-10-16 NOTE — CARE COORDINATION
Care Transitions Note    Initial Call - Call within 2 business days of discharge: Yes    Attempted to reach patient for transitions of care follow up. Unable to reach patient.    Outreach Attempts:   Patient reached for VIVEK call, unable to continue call when gets incoming call. Requests call back.    Patient: Yvette Das    Patient : 1940   MRN: [id number]    Reason for Admission: CHF  Discharge Date: 10/15/24  RURS: Readmission Risk Score: 18.3    Last Discharge Facility       Date Complaint Diagnosis Description Type Department Provider    10/11/24 Shortness of Breath; Leg Swelling Acute on chronic congestive heart failure, unspecified heart failure type (HCC) ... ED to Hosp-Admission (Discharged) (ADMITTED) Mayank Avina MD; Worthi...            Was this an external facility discharge? No    Follow Up Appointment:   Patient has hospital follow up appointment scheduled within 14 days of discharge.    Future Appointments         Provider Specialty Dept Phone    10/25/2024 1:20 PM Emy Trevino DO Family Medicine [phone]    10/29/2024 10:30 AM Anival Casas DO Cardiology [phone]    2024 9:45 AM Fredo Casas DO Cardiology [phone]    2/3/2025 10:00 AM Rory Hernandez MD Oncology [phone]    3/14/2025 11:40 AM Emy Trevino DO Family Medicine [phone]            Plan for follow-up on next business day.      Georgie Limon RN

## 2024-10-16 NOTE — TELEPHONE ENCOUNTER
Care Transitions Initial Follow Up Call    Outreach made within 2 business days of discharge: Yes    Patient: Yvette Das Patient : 1940   MRN: [id number]  Reason for Admission: CHF  Discharge Date: 10/15/24       Spoke with: patient    Discharge department/facility: [facility]    TCM Interactive Patient Contact:  Was patient able to fill all prescriptions: Yes  Was patient instructed to bring all medications to the follow-up visit: Yes  Is patient taking all medications as directed in the discharge summary? Yes  Does patient understand their discharge instructions: Yes  Does patient have questions or concerns that need addressed prior to 7-14 day follow up office visit: no    Additional needs identified to be addressed with provider  No needs identified             Scheduled appointment with PCP within 7-14 days    Follow Up  Future Appointments   Date Time Provider Department Center   10/25/2024  1:20 PM Emy Trevino DO [facility] DEP   10/29/2024 10:30 AM Anival Casas DO [facility]   2024  9:45 AM Fredo Casas DO [facility]   2/3/2025 10:00 [facility]   3/14/2025 11:40 AM Emy Trevino DO [facility] DEP       Annia Rosado CMA

## 2024-10-16 NOTE — TELEPHONE ENCOUNTER
Patient calling stating she was started on Jardiance while in hospital. When she went to  at pharmacy they told her it was going to be $600 and it was for diabetes. Patient is confused as she states she is not diabetic and also can not afford medication.

## 2024-10-16 NOTE — TELEPHONE ENCOUNTER
Patient made aware to bring up with the cardiologist at her upcoming appt to see if they want her on something else

## 2024-10-17 ENCOUNTER — TELEPHONE (OUTPATIENT)
Dept: CARDIOLOGY | Age: 84
End: 2024-10-17

## 2024-10-17 ENCOUNTER — CARE COORDINATION (OUTPATIENT)
Dept: CARE COORDINATION | Age: 84
End: 2024-10-17

## 2024-10-17 DIAGNOSIS — I50.9 CHRONIC CONGESTIVE HEART FAILURE, UNSPECIFIED HEART FAILURE TYPE (HCC): Primary | ICD-10-CM

## 2024-10-17 PROCEDURE — 1111F DSCHRG MED/CURRENT MED MERGE: CPT | Performed by: FAMILY MEDICINE

## 2024-10-17 NOTE — TELEPHONE ENCOUNTER
[facility] care coordiation called because patient was discharged on Jardiance 10mg daily and she is not able to get med due to cost.    Care coordination wants to see if there is something else she can have.

## 2024-10-17 NOTE — PROGRESS NOTES
DR. BUTLER - [facility] NEW PATIENT HISTORY & PHYSICAL EXAM    DATE OF SERVICE: 5/27/24    NURSING HOME: [facility]    CHRONIC/ACTIVE PROBLEM LIST:     Patient Active Problem List   Diagnosis    Dyslipidemia    Osteoarthritis    Osteoporosis    Depression    Anxiety    CHF (congestive heart failure) ([facility])    Primary insomnia    JAYJAY on CPAP    Hemorrhage of rectum and anus    Hypertension    A-fib (HCC)    Malignant tumor of colon (HCC)    Elevated fasting glucose    Other specified hypothyroidism    S/P small bowel resection    Closed fracture of proximal end of left humerus with routine healing    Renal mass, right    Status post nephrectomy    Urinary retention    Renal cell carcinoma of right kidney (HCC)    Chronic renal disease, stage III ([facility]) [[id number]]    Anasarca    Shortness of breath    Femoral fracture (HCC)    Other fracture of left femur, initial encounter for closed fracture ([facility])    Hip injury, left, initial encounter    Secondary hypercoagulable state ([facility])    GI bleed    Pyelonephritis of left kidney    Class 3 severe obesity due to excess calories with serious comorbidity and body mass index (BMI) of 45.0 to 49.9 in adult    Pulmonary hypertension ([facility])    Supratherapeutic INR    Lower GI bleed    CHF (congestive heart failure), NYHA class I, chronic, diastolic ([facility])       CHIEF COMPLAINT: Here for rehabilitation and physical therapy    HISTORY OF CHIEF COMPLAINT: This 83 y.o.  female was admitted to [facility] for rehabilitation and physical therapy following a recent hospitalization at [facility] for a left periprosthetic hip fracture. She was evaluated by Dr. Zeng and the fracture was felt to be nonsurgical. Following the hospitalization she was transferred to the nursing home. Since getting out of the hospital she seems to be doing fairly well with physical therapy. She has elevated fasting glucose, which she tries to

## 2024-10-17 NOTE — TELEPHONE ENCOUNTER
Pt called to ask about concerns over taking the losartan and spironolactone. Pt's granddaughter, Marilyn, went to  the pt medication for her and was told there would be concern for the pt potassium to become too high with these 2 medications and the fact that she takes 2 potassium tabs daily already. Medication was not picked up from pharmacy yet until she hears back from office. Please call to advise.    Yvette [phone]    Last Appt:  6/14/2024  Next Appt:   10/29/2024  Med verified in Epic

## 2024-10-17 NOTE — CARE COORDINATION
Care Transitions Note    Initial Call - Call within 2 business days of discharge: Yes    Patient Current Location:  Home: [address]  [place] [address]    Care Transition Nurse contacted the patient by telephone to perform post hospital discharge assessment, verified name and  as identifiers. Provided introduction to self, and explanation of the Care Transition Nurse role.     Patient: Yvette Das    Patient : 1940   MRN: [id number]    Reason for Admission: CHF  Discharge Date: 10/15/24  RURS: Readmission Risk Score: 18.3      Last Discharge Facility       Date Complaint Diagnosis Description Type Department Provider    10/11/24 Shortness of Breath; Leg Swelling Acute on chronic congestive heart failure, unspecified heart failure type (HCC) ... ED to Hosp-Admission (Discharged) (ADMITTED) Mayank Avina MD; Tam[place]oi...            Was this an external facility discharge? No    Additional needs identified to be addressed with provider   High priority: patient did not obtain jardiance, not started losartan or spironolactone, called to Dr. Casas's ofc and reviewed. Will  notify MD for further advice and f/u with patient.             Method of communication with provider: phone.    Patients top risk factors for readmission: medical condition-multiple  medical conditions    Interventions to address risk factors:   Education: when to call MD/ call 911  Review of patient management of conditions/medications: CHF, HFU appts, medications  Home Health: educated on benefit     Care Summary Note: Patient reached for VIVEK call. Confirms AVS, she is not taking Jardiance, citing cost barriers states PCP aware. [facility], states has samples and has submitted for assistance. She has followed up with cardiology re: potential interaction on losartan and spironolactone, waiting for response and holding these medications. Has cardiology appt 10/29, PCP appt 10/25. Reviewed labs pending,  cough

## 2024-10-18 ENCOUNTER — TELEPHONE (OUTPATIENT)
Dept: FAMILY MEDICINE CLINIC | Age: 84
End: 2024-10-18

## 2024-10-18 NOTE — TELEPHONE ENCOUNTER
Patient states when her granddaughter went to  the Losartan and Spirolactone the pharmacist stated he was concerned this medication change would make her potassium be elevated. Patient would like guidance on your thoughts about this. Patient did say that while in the hospital they told her she could decrease her Potassium down to twice daily which she did.     Spoke with Dr Trevino regarding this concern and she said since she did gown down on her potassium to BID from TID she should be ok but when she comes in for her hospital follow up next Friday we will repeat her BMP to make sure her potassium is staying WNL. Patient states understanding and will  medication.

## 2024-10-18 NOTE — TELEPHONE ENCOUNTER
Patient was at [facility] and discharged 10/15/24. Patient has questions concerning medication.

## 2024-10-21 NOTE — TELEPHONE ENCOUNTER
Called and spoke to patient and informed her of no alternative to Jardiance. Pt verbalized understanding and stated she also was denied for help from [employer] for the Eliquis and will need to discuss that as well at her next appt. Pt stated she thinks she has enough Eliquis to get her appt but was advised to call office if she needs samples. Pt verbalized understanding and had no further questions at the time.

## 2024-10-22 ENCOUNTER — CARE COORDINATION (OUTPATIENT)
Dept: CARE COORDINATION | Age: 84
End: 2024-10-22

## 2024-10-22 NOTE — CARE COORDINATION
Care Transitions Note    Follow Up Call     Patient Current Location:  Home: [address]  [place] [address]    Care Transition Nurse contacted the patient by telephone. Verified name and  as identifiers.    Additional needs identified to be addressed with provider   No needs identified                 Method of communication with provider: none.    Care Summary Note: Patient reached for follow up call. States doing well, planning on celebrating her birthday today. Denies weight gain, swelling or shortness of breath. Reviewed patient/ MD follow up on medication barriers. Continues to hold spironolactone and losartan, per PCP direction will have BMP drawn prior to HFU appt Friday 10/25. Continues on sample Eliquis and was denied assistance, per cardiology , no therapeutic alternative to jardiance. They will further discuss at cardiology appt 10/29. Patient states understanding of plan of care, educated on RPM for CHF management, declines states monitoring at home. Denies further concerns or questions, agrees to follow up next week.     Plan of care updates since last contact:  Review of patient management of conditions/medications: plan of care, symptoms       Advance Care Planning:   Does patient have an Advance Directive: not on file; education provided - per [facility] .    Medication Review:  Full medication reconciliation completed during previous call.    Remote Patient Monitoring:  Offered patient enrollment in the Remote Patient Monitoring (RPM) program for in-home monitoring: Yes, but did not enroll at this time: already monitoring with home equipment.    Assessments:  Care Transitions Subsequent and Final Call    Schedule Follow Up Appointment with PCP: Completed  Subsequent and Final Calls  Do you have any ongoing symptoms?: No  Have your medications changed?: No  Do you have any questions related to your medications?: No  Do you currently have any active services?: No  Are you currently active with

## 2024-10-24 ENCOUNTER — TELEPHONE (OUTPATIENT)
Dept: FAMILY MEDICINE CLINIC | Age: 84
End: 2024-10-24

## 2024-10-24 NOTE — TELEPHONE ENCOUNTER
Patient wanting to know if she should get her potassium rechecked prior to her appt tomorrow. Told her to wait until her appt and then we can order any additional labs on her way out if needed.

## 2024-10-24 NOTE — TELEPHONE ENCOUNTER
Pt calling requesting to speak with Annia. She states this is regarding an upcoming appt, no further details given. Please advise.

## 2024-10-25 ENCOUNTER — OFFICE VISIT (OUTPATIENT)
Dept: FAMILY MEDICINE CLINIC | Age: 84
End: 2024-10-25

## 2024-10-25 VITALS
RESPIRATION RATE: 18 BRPM | OXYGEN SATURATION: 94 % | SYSTOLIC BLOOD PRESSURE: 118 MMHG | HEIGHT: 67 IN | HEART RATE: 86 BPM | BODY MASS INDEX: 45.99 KG/M2 | WEIGHT: 293 LBS | TEMPERATURE: 97 F | DIASTOLIC BLOOD PRESSURE: 68 MMHG

## 2024-10-25 DIAGNOSIS — I10 ESSENTIAL HYPERTENSION: ICD-10-CM

## 2024-10-25 DIAGNOSIS — E78.2 MIXED HYPERLIPIDEMIA: ICD-10-CM

## 2024-10-25 DIAGNOSIS — E03.9 ACQUIRED HYPOTHYROIDISM: ICD-10-CM

## 2024-10-25 DIAGNOSIS — F51.01 PRIMARY INSOMNIA: ICD-10-CM

## 2024-10-25 DIAGNOSIS — R73.01 IMPAIRED FASTING GLUCOSE: ICD-10-CM

## 2024-10-25 DIAGNOSIS — Z09 HOSPITAL DISCHARGE FOLLOW-UP: ICD-10-CM

## 2024-10-25 DIAGNOSIS — I50.43 CHF (CONGESTIVE HEART FAILURE), NYHA CLASS I, ACUTE ON CHRONIC, COMBINED (HCC): Primary | ICD-10-CM

## 2024-10-25 DIAGNOSIS — I48.0 PAROXYSMAL ATRIAL FIBRILLATION (HCC): ICD-10-CM

## 2024-10-25 RX ORDER — DOXEPIN HYDROCHLORIDE 10 MG/1
10 CAPSULE ORAL NIGHTLY
Qty: 90 CAPSULE | Refills: 1 | Status: SHIPPED | OUTPATIENT
Start: 2024-10-25

## 2024-10-25 ASSESSMENT — ENCOUNTER SYMPTOMS
WHEEZING: 0
SINUS PRESSURE: 0
ABDOMINAL PAIN: 0
VOMITING: 0
NAUSEA: 0
EYE DISCHARGE: 0
EYE REDNESS: 0
DIARRHEA: 0
TROUBLE SWALLOWING: 0
SORE THROAT: 0
COUGH: 0
SHORTNESS OF BREATH: 0
CONSTIPATION: 0
RHINORRHEA: 0

## 2024-10-25 NOTE — PROGRESS NOTES
Post-Discharge Transitional Care Follow Up      Yvette Das   YOB: 1940    Date of Office Visit:  10/25/2024  Date of Hospital Admission: 10/11/24  Date of Hospital Discharge: 10/15/24  Readmission Risk Score (high >=14%. Medium >=10%):Readmission Risk Score: 18.3      Care management risk score Rising risk (score 2-5) and Complex Care (Scores >=6): No Risk Score On File     Non face to face  following discharge, date last encounter closed (first attempt may have been earlier): 10/17/2024     Call initiated 2 business days of discharge: Yes     CHF (congestive heart failure), NYHA class I, acute on chronic, combined (HCC)  -     Basic Metabolic Panel; Future  -     CBC with Auto Differential; Future  Essential hypertension  Acquired hypothyroidism  -     TSH; Future  -     T4, Free; Future  Mixed hyperlipidemia  Impaired fasting glucose  Hospital discharge follow-up  -     FL DISCHARGE MEDS RECONCILED W/ CURRENT OUTPATIENT MED LIST      Medical Decision Making: moderate complexity  No follow-ups on file.           Subjective:   HPI    History of Present Illness  The patient is an 84-year-old female here for a hospital follow-up from CHF, hypertension, hypothyroidism, hyperlipidemia, and impaired fasting glucose.    She reports an increase in fluid retention, which she attributes to inconsistent use of her Lasix medication. She experienced shortness of breath while walking, leading to a hospital visit on 10/11/2024. During her stay, she was administered IV diuretics and lost approximately 40 pounds of fluid. Her breathing has since improved. She continues to take Eliquis and is considering a switch back to warfarin due to cost concerns. She also mentions a previous bleeding incident, which was attributed to a combination of warfarin and antibiotics. She has a home INR machine for monitoring.    She reports that her leg is healing well and she is regaining strength. She is currently receiving

## 2024-10-29 ENCOUNTER — TELEPHONE (OUTPATIENT)
Dept: PHARMACY | Age: 84
End: 2024-10-29

## 2024-10-29 ENCOUNTER — CARE COORDINATION (OUTPATIENT)
Dept: CARE COORDINATION | Age: 84
End: 2024-10-29

## 2024-10-29 ENCOUNTER — HOSPITAL ENCOUNTER (OUTPATIENT)
Age: 84
Discharge: HOME | End: 2024-10-29
Payer: MEDICARE

## 2024-10-29 ENCOUNTER — TELEPHONE (OUTPATIENT)
Dept: FAMILY MEDICINE CLINIC | Age: 84
End: 2024-10-29

## 2024-10-29 ENCOUNTER — OFFICE VISIT (OUTPATIENT)
Dept: CARDIOLOGY | Age: 84
End: 2024-10-29

## 2024-10-29 VITALS
DIASTOLIC BLOOD PRESSURE: 66 MMHG | OXYGEN SATURATION: 98 % | SYSTOLIC BLOOD PRESSURE: 122 MMHG | BODY MASS INDEX: 45.99 KG/M2 | HEART RATE: 89 BPM | WEIGHT: 293 LBS | HEIGHT: 67 IN

## 2024-10-29 DIAGNOSIS — I50.43 CHF (CONGESTIVE HEART FAILURE), NYHA CLASS I, ACUTE ON CHRONIC, COMBINED (HCC): ICD-10-CM

## 2024-10-29 DIAGNOSIS — Z79.01 ANTICOAGULATED: ICD-10-CM

## 2024-10-29 DIAGNOSIS — E03.9 ACQUIRED HYPOTHYROIDISM: ICD-10-CM

## 2024-10-29 DIAGNOSIS — I48.91 ATRIAL FIBRILLATION, UNSPECIFIED TYPE (HCC): Primary | ICD-10-CM

## 2024-10-29 DIAGNOSIS — R94.39 ABNORMAL STRESS TEST: ICD-10-CM

## 2024-10-29 DIAGNOSIS — D68.69 SECONDARY HYPERCOAGULABLE STATE (HCC): ICD-10-CM

## 2024-10-29 DIAGNOSIS — I25.10 CORONARY ARTERY DISEASE DUE TO CALCIFIED CORONARY LESION: ICD-10-CM

## 2024-10-29 DIAGNOSIS — N18.30 STAGE 3 CHRONIC KIDNEY DISEASE, UNSPECIFIED WHETHER STAGE 3A OR 3B CKD (HCC): ICD-10-CM

## 2024-10-29 DIAGNOSIS — I25.84 CORONARY ARTERY DISEASE DUE TO CALCIFIED CORONARY LESION: ICD-10-CM

## 2024-10-29 DIAGNOSIS — R94.31 ABNORMAL ECG: ICD-10-CM

## 2024-10-29 DIAGNOSIS — I25.10 CORONARY ARTERY DISEASE INVOLVING NATIVE HEART WITHOUT ANGINA PECTORIS, UNSPECIFIED VESSEL OR LESION TYPE: ICD-10-CM

## 2024-10-29 DIAGNOSIS — I50.32 CHRONIC DIASTOLIC HEART FAILURE (HCC): ICD-10-CM

## 2024-10-29 LAB
ANION GAP SERPL CALCULATED.3IONS-SCNC: 11 MMOL/L (ref 9–17)
BASOPHILS # BLD: <0.03 K/UL (ref 0–0.2)
BASOPHILS NFR BLD: 0 % (ref 0–2)
BUN SERPL-MCNC: 30 MG/DL (ref 8–23)
BUN/CREAT SERPL: 25 (ref 9–20)
CALCIUM SERPL-MCNC: 9.2 MG/DL (ref 8.6–10.4)
CHLORIDE SERPL-SCNC: 108 MMOL/L (ref 98–107)
CO2 SERPL-SCNC: 24 MMOL/L (ref 20–31)
CREAT SERPL-MCNC: 1.2 MG/DL (ref 0.5–0.9)
EOSINOPHIL # BLD: 0.19 K/UL (ref 0–0.44)
EOSINOPHILS RELATIVE PERCENT: 3 % (ref 1–4)
ERYTHROCYTE [DISTWIDTH] IN BLOOD BY AUTOMATED COUNT: 15.2 % (ref 11.8–14.4)
GFR, ESTIMATED: 44 ML/MIN/1.73M2
GLUCOSE SERPL-MCNC: 100 MG/DL (ref 70–99)
HCT VFR BLD AUTO: 41.9 % (ref 36.3–47.1)
HGB BLD-MCNC: 12.8 G/DL (ref 11.9–15.1)
IMM GRANULOCYTES # BLD AUTO: <0.03 K/UL (ref 0–0.3)
IMM GRANULOCYTES NFR BLD: 0 %
LYMPHOCYTES NFR BLD: 1.4 K/UL (ref 1.1–3.7)
LYMPHOCYTES RELATIVE PERCENT: 21 % (ref 24–43)
MCH RBC QN AUTO: 29.6 PG (ref 25.2–33.5)
MCHC RBC AUTO-ENTMCNC: 30.5 G/DL (ref 25.2–33.5)
MCV RBC AUTO: 96.8 FL (ref 82.6–102.9)
MONOCYTES NFR BLD: 0.64 K/UL (ref 0.1–1.2)
MONOCYTES NFR BLD: 10 % (ref 3–12)
NEUTROPHILS NFR BLD: 66 % (ref 36–65)
NEUTS SEG NFR BLD: 4.38 K/UL (ref 1.5–8.1)
NRBC BLD-RTO: 0 PER 100 WBC
PLATELET # BLD AUTO: ABNORMAL K/UL (ref 138–453)
PLATELET, FLUORESCENCE: 139 K/UL (ref 138–453)
PLATELETS.RETICULATED NFR BLD AUTO: 19.1 % (ref 1.1–10.3)
POTASSIUM SERPL-SCNC: 4.8 MMOL/L (ref 3.7–5.3)
RBC # BLD AUTO: 4.33 M/UL (ref 3.95–5.11)
SODIUM SERPL-SCNC: 143 MMOL/L (ref 135–144)
T4 FREE SERPL-MCNC: 1.5 NG/DL (ref 0.92–1.68)
TSH SERPL DL<=0.05 MIU/L-ACNC: 0.44 UIU/ML (ref 0.3–5)
WBC OTHER # BLD: 6.7 K/UL (ref 3.5–11.3)

## 2024-10-29 PROCEDURE — 84439 ASSAY OF FREE THYROXINE: CPT

## 2024-10-29 PROCEDURE — 84443 ASSAY THYROID STIM HORMONE: CPT

## 2024-10-29 PROCEDURE — 85025 COMPLETE CBC W/AUTO DIFF WBC: CPT

## 2024-10-29 PROCEDURE — 36415 COLL VENOUS BLD VENIPUNCTURE: CPT

## 2024-10-29 PROCEDURE — 80048 BASIC METABOLIC PNL TOTAL CA: CPT

## 2024-10-29 RX ORDER — WARFARIN SODIUM 2.5 MG/1
2.5 TABLET ORAL DAILY
COMMUNITY

## 2024-10-29 NOTE — PROGRESS NOTES
[facility]  [facility] CARDIOLOGY A DEPARTMENT OF [facility]  [address] [facility] [address]  Dept: [phone]    CC: follow up for VINOD Perez     Subjective:  The patient is a 84 y.o. year old, , female is in the office for a follow up visit.   Was in hospital on 10/24.   We saw here for Ac on Ch HFpEF.   Now on bumex instead of lasix.   On fluid restriction.   Can't afford eliquis.   Can't afford jardiance.   Would like to go back on coumadin.   No cp.   SOB better.   No le edema.   No syncope.     Past Medical History:   has a past medical history of A-fib ([facility]), Acute blood loss as cause of postoperative anemia, Acute post-operative pain, MICHAEL (acute kidney injury) ([facility]), Anxiety, CAD (coronary artery disease), CHF (congestive heart failure) ([facility]), Class 2 severe obesity with serious comorbidity and body mass index (BMI) of 37.0 to 37.9 in adult, Closed fracture of proximal end of left humerus with routine healing, Colitis, Colitis due to Clostridium difficile, Depression, Diarrhea, Dyslipidemia, Elevated fasting glucose, FH: total abdominal hysterectomy and bilateral salpingo-oophorectomy, Fractures, Full dentures, Glucose intolerance (impaired glucose tolerance), History of blood transfusion, Hyperlipidemia, Hypertension, Hypokalemia, Malignant neoplasm of sigmoid colon (HCC), Multiple closed fractures of ribs, Obesity, JAYJAY on CPAP, Osteoarthritis, Osteoporosis, Other complete intestinal obstruction (HCC), Other specified hypothyroidism, Prolonged emergence from general anesthesia, Renal cell carcinoma of right kidney (HCC), S/P small bowel resection, Thrombocytopenia, unspecified ([facility]), Under care of team, and Wears glasses.    Past Surgical History:   has a past surgical history that includes Total abdominal hysterectomy w/ bilateral salpingoophorectomy (1966); Cholecystectomy (1960s); Varicose vein surgery (Right,

## 2024-10-29 NOTE — TELEPHONE ENCOUNTER
Initial contact for CC referral. Patient will not be seeing CC as patient stated Dr Trevino will be monitoring.

## 2024-10-29 NOTE — TELEPHONE ENCOUNTER
Patient had previously ended on 2.5 mg daily.  Would restart this dose and plan to recheck PT/INR in 1 week after starting.  May need to reinitiate the home INR machine.  She still has it, but not sure if anything needs done through the company to let them know that she is going to be returning to at home INR checks

## 2024-10-29 NOTE — CARE COORDINATION
Care Transitions Note    Follow Up Call     Patient Current Location:  Home: [address]  [place] [address]    Care Transition Nurse contacted the patient by telephone. Verified name and  as identifiers.    Additional needs identified to be addressed with provider   No needs identified                 Method of communication with provider: none.    Care Summary Note: Patient reached for follow up call. States leaving for cardiology appt. States concerns with 1500 fluid restrictions and will discuss with MD. Educated on ice chip consumption to help with dry mouth and maintain fluid restriction.  Reviewed events since last call. She has follow up with PCP. Denies swelling, shortness of breath with normal activity or weight gain. States monitoring weight and BP, did not check today. Unable to give numbers as call drops, CTN called back and had to leave VM, updated will follow up next week advised patient to call with concerns.      Plan of care updates since last contact:  Education: monitor daily weight, BP record and share with MDs  Review of patient management of conditions/medications: symptoms, HFU changes, med changes       Advance Care Planning:   Does patient have an Advance Directive: reviewed during previous call, see note. .    Medication Review:  Full medication reconciliation completed during previous call. and Medications changed since last call, reviewed today.     Remote Patient Monitoring:  Offered patient enrollment in the Remote Patient Monitoring (RPM) program for in-home monitoring: Yes, but did not enroll at this time: already monitoring with home equipment.    Assessments:  Care Transitions Subsequent and Final Call    Schedule Follow Up Appointment with PCP: Completed  Subsequent and Final Calls  Do you have any ongoing symptoms?: No  Have your medications changed?: Yes  Patient Reports: new doxepin  Do you have any questions related to your medications?: No  Do you currently have any active

## 2024-10-29 NOTE — TELEPHONE ENCOUNTER
Pt calling as she had an appt last week and discussed going back on her Warfarin, pt saw Cardio today an they told pt it was okay for her to restart, pt would like a call back on how to restart, pt uses pended pharmacy, please advise.

## 2024-10-31 RX ORDER — POTASSIUM CHLORIDE 1500 MG/1
20 TABLET, EXTENDED RELEASE ORAL 3 TIMES DAILY
Qty: 90 TABLET | Refills: 0 | OUTPATIENT
Start: 2024-10-31

## 2024-11-04 ENCOUNTER — OUTSIDE SERVICES (OUTPATIENT)
Dept: INTERNAL MEDICINE | Age: 84
End: 2024-11-04

## 2024-11-04 DIAGNOSIS — I48.0 PAROXYSMAL ATRIAL FIBRILLATION (HCC): ICD-10-CM

## 2024-11-04 DIAGNOSIS — I10 BENIGN ESSENTIAL HTN: ICD-10-CM

## 2024-11-04 DIAGNOSIS — E78.2 MIXED HYPERLIPIDEMIA: ICD-10-CM

## 2024-11-04 DIAGNOSIS — M97.02XD PERIPROSTHETIC FRACTURE AROUND INTERNAL PROSTHETIC LEFT HIP JOINT, SUBSEQUENT ENCOUNTER: ICD-10-CM

## 2024-11-04 DIAGNOSIS — E66.01 MORBID OBESITY: ICD-10-CM

## 2024-11-04 DIAGNOSIS — I27.20 PULMONARY HYPERTENSION (HCC): ICD-10-CM

## 2024-11-04 DIAGNOSIS — N18.31 STAGE 3A CHRONIC KIDNEY DISEASE (HCC): ICD-10-CM

## 2024-11-04 DIAGNOSIS — E03.4 HYPOTHYROIDISM DUE TO ACQUIRED ATROPHY OF THYROID: ICD-10-CM

## 2024-11-04 DIAGNOSIS — K92.2 GASTROINTESTINAL HEMORRHAGE, UNSPECIFIED GASTROINTESTINAL HEMORRHAGE TYPE: Primary | ICD-10-CM

## 2024-11-04 DIAGNOSIS — R73.01 ELEVATED FASTING GLUCOSE: ICD-10-CM

## 2024-11-04 DIAGNOSIS — I50.32 CHRONIC DIASTOLIC CONGESTIVE HEART FAILURE (HCC): ICD-10-CM

## 2024-11-04 RX ORDER — POTASSIUM CHLORIDE 1500 MG/1
20 TABLET, EXTENDED RELEASE ORAL 3 TIMES DAILY
Qty: 90 TABLET | Refills: 0 | OUTPATIENT
Start: 2024-11-04

## 2024-11-04 NOTE — PROGRESS NOTES
DR. BUTLER - NURSING HOME VISIT    DATE OF SERVICE: 7/28/24    NURSING HOME: [facility]    CHIEF COMPLAINT/HISTORY OF CHIEF COMPLAINT: This patient is being seen for ongoing evaluation and management of her periprosthetic hip fracture, elevated fasting glucose, hypertension, hyperlipidemia, hypothyroidism, congestive heart failure, atrial fibrillation, pulmonary hypertension, chronic kidney disease, and morbid obesity.  She was recently hospitalized, first at [facility] and then again at [facility], both for gastrointestinal bleeding.  She is doing better since those hospitalizations.  She continues to do well with physical therapy for her hip fracture. She tries to control her elevated fasting glucose with diet and exercise. She does have a history of hypothyroidism, for which she takes Synthroid. She is on Eliquis, Bumex, Cozaar, Lopressor, and spironolactone for hypertension, chronic diastolic congestive heart failure, atrial fibrillation, and pulmonary hypertension. She sees the cardiologists here for those conditions. She takes Lipitor for hyperlipidemia. She does also have a history of chronic kidney disease. There are no new complaints at this time.    ALLERGIES:   Allergies   Allergen Reactions    Pcn [Penicillins] Hives and Shortness Of Breath    Bextra [Valdecoxib] Diarrhea       MEDICATIONS: As noted on the [facility], referenced and incorporated herein.    PAST MEDICAL HISTORY:   Past Medical History:   Diagnosis Date    A-fib ([facility]) 07/19/2017    Acute blood loss as cause of postoperative anemia 11/08/2018    Acute post-operative pain 10/09/2022    MICHAEL (acute kidney injury) ([facility]) 10/09/2022    Anxiety 04/30/2016    CAD (coronary artery disease)     [facility]/ last seen 9-2022    CHF (congestive heart failure) ([facility])     Class 2 severe obesity with serious comorbidity and body mass index (BMI) of 37.0 to 37.9 in adult

## 2024-11-05 RX ORDER — POTASSIUM CHLORIDE 1500 MG/1
20 TABLET, EXTENDED RELEASE ORAL 2 TIMES DAILY
Qty: 180 TABLET | Refills: 1 | Status: SHIPPED | OUTPATIENT
Start: 2024-11-05

## 2024-11-05 NOTE — TELEPHONE ENCOUNTER
Yvette called requesting a refill of the below medication which has been pended for you:     Requested Prescriptions     Pending Prescriptions Disp Refills    potassium chloride (KLOR-CON M) 20 MEQ extended release tablet 180 tablet 1     Sig: Take 1 tablet by mouth 2 times daily       Last Appointment Date: 10/25/2024  Next Appointment Date: 3/14/2025    Allergies   Allergen Reactions    Pcn [Penicillins] Hives and Shortness Of Breath    Bextra [Valdecoxib] Diarrhea

## 2024-11-05 NOTE — TELEPHONE ENCOUNTER
Patients last dose of potassium was Saturday night. She is currently out of this medication.   She is taking 20 MEQ BID.  Pended for refill.

## 2024-11-06 ENCOUNTER — TELEMEDICINE (OUTPATIENT)
Dept: FAMILY MEDICINE CLINIC | Age: 84
End: 2024-11-06

## 2024-11-06 ENCOUNTER — CARE COORDINATION (OUTPATIENT)
Dept: CARE COORDINATION | Age: 84
End: 2024-11-06

## 2024-11-06 DIAGNOSIS — Z00.00 MEDICARE ANNUAL WELLNESS VISIT, SUBSEQUENT: Primary | ICD-10-CM

## 2024-11-06 ASSESSMENT — PATIENT HEALTH QUESTIONNAIRE - PHQ9
SUM OF ALL RESPONSES TO PHQ QUESTIONS 1-9: 0
SUM OF ALL RESPONSES TO PHQ9 QUESTIONS 1 & 2: 0
SUM OF ALL RESPONSES TO PHQ QUESTIONS 1-9: 0
SUM OF ALL RESPONSES TO PHQ QUESTIONS 1-9: 0
1. LITTLE INTEREST OR PLEASURE IN DOING THINGS: NOT AT ALL
6. FEELING BAD ABOUT YOURSELF - OR THAT YOU ARE A FAILURE OR HAVE LET YOURSELF OR YOUR FAMILY DOWN: NOT AT ALL
2. FEELING DOWN, DEPRESSED OR HOPELESS: NOT AT ALL
5. POOR APPETITE OR OVEREATING: NOT AT ALL
9. THOUGHTS THAT YOU WOULD BE BETTER OFF DEAD, OR OF HURTING YOURSELF: NOT AT ALL
10. IF YOU CHECKED OFF ANY PROBLEMS, HOW DIFFICULT HAVE THESE PROBLEMS MADE IT FOR YOU TO DO YOUR WORK, TAKE CARE OF THINGS AT HOME, OR GET ALONG WITH OTHER PEOPLE: NOT DIFFICULT AT ALL
4. FEELING TIRED OR HAVING LITTLE ENERGY: NOT AT ALL
7. TROUBLE CONCENTRATING ON THINGS, SUCH AS READING THE NEWSPAPER OR WATCHING TELEVISION: NOT AT ALL
3. TROUBLE FALLING OR STAYING ASLEEP: NOT AT ALL
SUM OF ALL RESPONSES TO PHQ QUESTIONS 1-9: 0
8. MOVING OR SPEAKING SO SLOWLY THAT OTHER PEOPLE COULD HAVE NOTICED. OR THE OPPOSITE, BEING SO FIGETY OR RESTLESS THAT YOU HAVE BEEN MOVING AROUND A LOT MORE THAN USUAL: NOT AT ALL

## 2024-11-06 NOTE — PROGRESS NOTES
Medicare Annual Wellness Visit    Yvette Das is here for Medicare AWV (Subsequent; last 6/6/2023)    Assessment & Plan     Recommendations for Preventive Services Due: see orders and patient instructions/AVS.    Patient declines covid, RSV, tdap and shingles vaccines.     Recommended screening schedule for the next 5-10 years is provided to the patient in written form: see Patient Instructions/AVS.     No follow-ups on file.     Subjective     Patient's complete Health Risk Assessment and screening values have been reviewed and are found in Flowsheets. The following problems were reviewed today and where indicated follow up appointments were made and/or referrals ordered.    Positive Risk Factor Screenings with Interventions:    Fall Risk:  Do you feel unsteady or are you worried about falling? : no (\"as long as I use my walker\")  2 or more falls in past year?: no  Fall with injury in past year?: (!) yes (fractured femur)     Interventions:    Reviewed medications, home hazards, visual acuity, and co-morbidities that can increase risk for falls  See AVS for additional education material               Abnormal BMI (obese):  There is no height or weight on file to calculate BMI. (!) Abnormal    Interventions:  See AVS for additional education material        Vision Screen:  Do you have difficulty driving, watching TV, or doing any of your daily activities because of your eyesight?: No  Have you had an eye exam within the past year?: (!) No    Interventions:   Patient encouraged to make appointment with their eye specialist     ADL's:   Patient reports needing help with:  Select all that apply: (!) Bathing (since fall was in the shower has someone available when she is showering)  Select all that apply: (!) Laundry, Housekeeping, Transportation, Shopping (lives with her granddaughter who provides assistance and completes needed tasks)    Interventions:  Patient declined any further interventions or treatment

## 2024-11-06 NOTE — CARE COORDINATION
Care Transitions Note    Follow Up Call     Patient Current Location:  Home: [address]  [facility] [address]    Care Transition Nurse contacted the patient by telephone. Verified name and  as identifiers.    Additional needs identified to be addressed with provider   No needs identified                 Method of communication with provider: none.    Care Summary Note: Patient reached for follow up call. Reviewed events since last call. States some fatigue, denies shortness of breath. She has completed cardiology appt, resumed coumadin 2.5 mg. Reports this was previously stopped due to concerns for GI bleeding, has had EGD and colonoscopy in interim, no bleeding note. Reviewed continued need to monitor urine and stool for bleeding, watch for increased bruising or bleeding if injured and report to MD. Verbalizes understanding. States checking INR at home and reports to PCP office for dose changes. States monitoring weight and BP, does not give readings. Reviewed need to contact cardiology ofc for weight gain of >3 lbs in 1 day or >5 lbs in week. States BP consistently <140/80, last reading 122/66. Denies further concerns or questions, agrees to follow up next week.     Plan of care updates since last contact:  Education: bleeding precautions  Review of patient management of conditions/medications: symptoms, follow up appts, medication changes       Advance Care Planning:   Does patient have an Advance Directive: reviewed during previous call, see note. .    Medication Review:  Full medication reconciliation completed during previous call. and Medications changed since last call, reviewed today.     Remote Patient Monitoring:  Offered patient enrollment in the Remote Patient Monitoring (RPM) program for in-home monitoring: Yes, but did not enroll at this time: already monitoring with home equipment.    Assessments:  Care Transitions Subsequent and Final Call    Schedule Follow Up Appointment with PCP:

## 2024-11-10 ENCOUNTER — OUTSIDE SERVICES (OUTPATIENT)
Dept: INTERNAL MEDICINE | Age: 84
End: 2024-11-10

## 2024-11-10 DIAGNOSIS — R73.01 ELEVATED FASTING GLUCOSE: ICD-10-CM

## 2024-11-10 DIAGNOSIS — E03.4 HYPOTHYROIDISM DUE TO ACQUIRED ATROPHY OF THYROID: ICD-10-CM

## 2024-11-10 DIAGNOSIS — E78.2 MIXED HYPERLIPIDEMIA: ICD-10-CM

## 2024-11-10 DIAGNOSIS — I10 ESSENTIAL HYPERTENSION: ICD-10-CM

## 2024-11-10 DIAGNOSIS — I50.32 CHRONIC DIASTOLIC CONGESTIVE HEART FAILURE (HCC): ICD-10-CM

## 2024-11-10 DIAGNOSIS — I48.0 PAROXYSMAL ATRIAL FIBRILLATION (HCC): ICD-10-CM

## 2024-11-10 DIAGNOSIS — I27.20 PULMONARY HYPERTENSION (HCC): ICD-10-CM

## 2024-11-10 DIAGNOSIS — M97.02XD PERIPROSTHETIC FRACTURE AROUND INTERNAL PROSTHETIC LEFT HIP JOINT, SUBSEQUENT ENCOUNTER: Primary | ICD-10-CM

## 2024-11-10 DIAGNOSIS — E66.01 MORBID OBESITY: ICD-10-CM

## 2024-11-10 DIAGNOSIS — N18.31 STAGE 3A CHRONIC KIDNEY DISEASE (HCC): ICD-10-CM

## 2024-11-11 ENCOUNTER — TELEPHONE (OUTPATIENT)
Dept: CARDIOLOGY | Age: 84
End: 2024-11-11

## 2024-11-11 RX ORDER — BUMETANIDE 2 MG/1
2 TABLET ORAL DAILY
Qty: 30 TABLET | Refills: 0 | Status: SHIPPED | OUTPATIENT
Start: 2024-11-11

## 2024-11-11 RX ORDER — LOSARTAN POTASSIUM 50 MG/1
50 TABLET ORAL DAILY
Qty: 30 TABLET | Refills: 0 | Status: SHIPPED | OUTPATIENT
Start: 2024-11-11

## 2024-11-11 RX ORDER — SPIRONOLACTONE 25 MG/1
25 TABLET ORAL DAILY
Qty: 30 TABLET | Refills: 0 | Status: SHIPPED | OUTPATIENT
Start: 2024-11-11

## 2024-11-11 NOTE — TELEPHONE ENCOUNTER
Pt has 1 eliquis left and is asking for samples.    Pt reports she is suppose to be going on coumadin but has not heard from the coumadin clinic yet.    I see 2 referrals but they appear to be closed.    Please advise    [phone]    Last Appt:  10/29/2024  Next Appt:   4/29/2025  Med verified in Epic

## 2024-11-11 NOTE — TELEPHONE ENCOUNTER
Yvette called requesting a refill of the below medication which has been pended for you:     Requested Prescriptions     Pending Prescriptions Disp Refills    bumetanide (BUMEX) 2 MG tablet [Pharmacy Med Name: Bumetanide Oral Tablet 2 MG] 30 tablet 0     Sig: Take 1 tablet by mouth daily    spironolactone (ALDACTONE) 25 MG tablet [Pharmacy Med Name: Spironolactone Oral Tablet 25 MG] 30 tablet 0     Sig: Take 1 tablet by mouth daily    losartan (COZAAR) 50 MG tablet [Pharmacy Med Name: Losartan Potassium Oral Tablet 50 MG] 30 tablet 0     Sig: Take 1 tablet by mouth daily       Last Appointment Date: 11/6/2024  Next Appointment Date: 3/14/2025    Allergies   Allergen Reactions    Pcn [Penicillins] Hives and Shortness Of Breath    Bextra [Valdecoxib] Diarrhea

## 2024-11-11 NOTE — TELEPHONE ENCOUNTER
Eliquis samples-- Please advise if OK to give.  Lot [id number], exp 2/26.  2 week supply prepared for her granddaughter to  later today if OK.    I called Blu at the coumadin clinic.  He said pt told him she already has an INR clinic--Blu stated [facility].  Dr. MIKE Casas referrals to the coumadin clinic here are CLOSED.    I called pt.  She said \"no\" when I said [facility], but is unsure where she goes.  She said she only has a phone number and is not home right now.  She said Annia with Dr. Trevino would know.

## 2024-11-12 NOTE — TELEPHONE ENCOUNTER
Patient is going to drop off a low subsidy letter from Medicare stating she does not qualify so we can send to [facility] Patient Assistance to see if we can get the Eliquis free from there program.

## 2024-11-13 ENCOUNTER — CARE COORDINATION (OUTPATIENT)
Dept: CARE COORDINATION | Age: 84
End: 2024-11-13

## 2024-11-13 NOTE — CARE COORDINATION
Care Transitions Note    Follow Up Call     Attempted to reach patient for transitions of care follow up.  Unable to reach patient.      Outreach Attempts: 2 attempt   HIPAA compliant voicemail left for patient.     Patient closed (unable to reach patient) from the Care Transitions program on 11/13/24.        Handoff:   Patient was not referred to the ACM team due to unable to contact patient.      Care Summary Note: 2nd attempt will resolve episode if no return call.     Assessments:  Care Transitions Subsequent and Final Call    Subsequent and Final Calls  Are you currently active with any services?: Home Health  Care Transitions Interventions   Home Care Waiver: Completed     Other Interventions:              Upcoming Appointments:    Future Appointments         Provider Specialty Dept Phone    2/3/2025 10:00 AM Rory Hernandez MD Oncology [phone]    3/14/2025 11:40 AM Emy Trevino DO Family Medicine [phone]    4/29/2025 10:30 AM Anival Casas DO Cardiology [phone]            Vianca Colindres LPN

## 2024-11-15 NOTE — TELEPHONE ENCOUNTER
Received approval letter for Eliquis from [employer].     Pt notified of approval and of med will be shipped to pt's house. Pt stated that she has about a week left of the Eliquis but will let us know if she is about to run out. Pt had no further questions at the time.

## 2024-11-25 ENCOUNTER — TELEPHONE (OUTPATIENT)
Dept: CARDIOLOGY | Age: 84
End: 2024-11-25

## 2024-11-25 DIAGNOSIS — I48.0 PAF (PAROXYSMAL ATRIAL FIBRILLATION) (HCC): Primary | ICD-10-CM

## 2024-11-25 NOTE — TELEPHONE ENCOUNTER
Please sign printed rx (in [place]) for eliquis 5 mg bid pt assistance.    Letter received via fax, stating pt's eliquis pt assistance needs renewal by 12/31/24.    I spoke to pt by phone.  She will come in to clinic to sign her portion.  Paperwork is up front in cardio drawer.

## 2024-12-09 RX ORDER — BUMETANIDE 2 MG/1
2 TABLET ORAL DAILY
Qty: 30 TABLET | Refills: 0 | Status: SHIPPED | OUTPATIENT
Start: 2024-12-09

## 2024-12-09 RX ORDER — LOSARTAN POTASSIUM 50 MG/1
50 TABLET ORAL DAILY
Qty: 30 TABLET | Refills: 0 | Status: SHIPPED | OUTPATIENT
Start: 2024-12-09

## 2024-12-09 RX ORDER — SPIRONOLACTONE 25 MG/1
25 TABLET ORAL DAILY
Qty: 30 TABLET | Refills: 0 | Status: SHIPPED | OUTPATIENT
Start: 2024-12-09

## 2024-12-09 NOTE — TELEPHONE ENCOUNTER
RODRIGUE pt, BE on notes   Yvette called requesting a refill of the below medication which has been pended for you:     Requested Prescriptions     Pending Prescriptions Disp Refills    spironolactone (ALDACTONE) 25 MG tablet [Pharmacy Med Name: Spironolactone Oral Tablet 25 MG] 30 tablet 0     Sig: TAKE 1 TABLET BY MOUTH EVERY DAY    bumetanide (BUMEX) 2 MG tablet [Pharmacy Med Name: Bumetanide Oral Tablet 2 MG] 30 tablet 0     Sig: TAKE 1 TABLET BY MOUTH EVERY DAY    losartan (COZAAR) 50 MG tablet [Pharmacy Med Name: Losartan Potassium Oral Tablet 50 MG] 30 tablet 0     Sig: TAKE 1 TABLET BY MOUTH EVERY DAY       Last Appointment Date: 11/6/2024  Next Appointment Date: 3/14/2025    Allergies   Allergen Reactions    Pcn [Penicillins] Hives and Shortness Of Breath    Bextra [Valdecoxib] Diarrhea

## 2024-12-10 RX ORDER — ATORVASTATIN CALCIUM 40 MG/1
40 TABLET, FILM COATED ORAL NIGHTLY
Qty: 30 TABLET | Refills: 0 | Status: SHIPPED | OUTPATIENT
Start: 2024-12-10

## 2024-12-16 ENCOUNTER — TELEPHONE (OUTPATIENT)
Dept: FAMILY MEDICINE CLINIC | Age: 84
End: 2024-12-16

## 2024-12-16 NOTE — TELEPHONE ENCOUNTER
Yvette called requesting a refill of the below medication which has been pended for you:     Requested Prescriptions     Pending Prescriptions Disp Refills    levothyroxine (SYNTHROID) 50 MCG tablet [Pharmacy Med Name: Levothyroxine Sodium Oral Tablet 50 MCG] 90 tablet 1     Sig: TAKE 1 TABLET BY MOUTH EVERY DAY       Last Appointment Date: 11/6/2024  Next Appointment Date: 12/16/2024    Allergies   Allergen Reactions    Pcn [Penicillins] Hives and Shortness Of Breath    Bextra [Valdecoxib] Diarrhea

## 2024-12-16 NOTE — TELEPHONE ENCOUNTER
Pt requesting call back from Annia tomorrow about some things Annia is handling for her, refuses to give more information, please call tomorrow.

## 2024-12-17 RX ORDER — LEVOTHYROXINE SODIUM 50 UG/1
TABLET ORAL
Qty: 90 TABLET | Refills: 1 | Status: SHIPPED | OUTPATIENT
Start: 2024-12-17

## 2024-12-17 NOTE — TELEPHONE ENCOUNTER
Patient had questions regarding the medications she should be taking. Each medication was reviewed with patient

## 2025-01-03 ENCOUNTER — ANTI-COAG VISIT (OUTPATIENT)
Dept: FAMILY MEDICINE CLINIC | Age: 85
End: 2025-01-03

## 2025-01-03 PROBLEM — I48.91 A-FIB (HCC): Status: RESOLVED | Noted: 2017-07-19 | Resolved: 2025-01-03

## 2025-01-13 RX ORDER — LOSARTAN POTASSIUM 50 MG/1
50 TABLET ORAL DAILY
Qty: 90 TABLET | Refills: 1 | Status: SHIPPED | OUTPATIENT
Start: 2025-01-13

## 2025-01-13 RX ORDER — SPIRONOLACTONE 25 MG/1
25 TABLET ORAL DAILY
Qty: 90 TABLET | Refills: 1 | Status: SHIPPED | OUTPATIENT
Start: 2025-01-13

## 2025-01-13 NOTE — TELEPHONE ENCOUNTER
Yvette called requesting a refill of the below medication which has been pended for you:     Requested Prescriptions     Pending Prescriptions Disp Refills    spironolactone (ALDACTONE) 25 MG tablet [Pharmacy Med Name: Spironolactone Oral Tablet 25 MG] 90 tablet 0     Sig: TAKE 1 TABLET BY MOUTH EVERY DAY    losartan (COZAAR) 50 MG tablet [Pharmacy Med Name: Losartan Potassium Oral Tablet 50 MG] 90 tablet 0     Sig: TAKE 1 TABLET BY MOUTH EVERY DAY       Last Appointment Date: 11/6/2024  Next Appointment Date: 3/14/2025    Allergies   Allergen Reactions    Pcn [Penicillins] Hives and Shortness Of Breath    Bextra [Valdecoxib] Diarrhea

## 2025-01-20 NOTE — TELEPHONE ENCOUNTER
Yvette called requesting a refill of the below medication which has been pended for you:     Requested Prescriptions     Pending Prescriptions Disp Refills    atorvastatin (LIPITOR) 40 MG tablet [Pharmacy Med Name: Atorvastatin Calcium Oral Tablet 40 MG] 90 tablet 0     Sig: TAKE 1 TABLET BY MOUTH AT NIGHT       Last Appointment Date: 10/25/2024  Next Appointment Date: 03/14/2025    Allergies   Allergen Reactions    Pcn [Penicillins] Hives and Shortness Of Breath    Bextra [Valdecoxib] Diarrhea

## 2025-01-21 RX ORDER — ATORVASTATIN CALCIUM 40 MG/1
40 TABLET, FILM COATED ORAL NIGHTLY
Qty: 90 TABLET | Refills: 1 | Status: SHIPPED | OUTPATIENT
Start: 2025-01-21

## 2025-02-04 ENCOUNTER — TELEPHONE (OUTPATIENT)
Dept: FAMILY MEDICINE CLINIC | Age: 85
End: 2025-02-04

## 2025-02-04 NOTE — TELEPHONE ENCOUNTER
Daughter stopped by the window and dropped off forms Intake and [place] forms filled out. [place] scanned into chart. Copy of forms made and placed in PCP mailbox. Original placed in [place] binder.

## 2025-02-17 RX ORDER — BUMETANIDE 2 MG/1
2 TABLET ORAL DAILY
Qty: 90 TABLET | Refills: 1 | Status: SHIPPED | OUTPATIENT
Start: 2025-02-17

## 2025-02-17 NOTE — TELEPHONE ENCOUNTER
Yvette called requesting a refill of the below medication which has been pended for you:     Requested Prescriptions     Pending Prescriptions Disp Refills    bumetanide (BUMEX) 2 MG tablet [Pharmacy Med Name: Bumetanide Oral Tablet 2 MG] 90 tablet 0     Sig: TAKE 1 TABLET BY MOUTH EVERY DAY       Last Appointment Date: 10/25/2024  Next Appointment Date: 3/14/2025    Allergies   Allergen Reactions    Pcn [Penicillins] Hives and Shortness Of Breath    Bextra [Valdecoxib] Diarrhea

## 2025-02-22 ENCOUNTER — HOSPITAL ENCOUNTER (OUTPATIENT)
Age: 85
Discharge: HOME | End: 2025-02-22
Payer: MEDICARE

## 2025-02-22 DIAGNOSIS — C18.7 MALIGNANT NEOPLASM OF SIGMOID COLON (HCC): ICD-10-CM

## 2025-02-22 LAB
ALBUMIN SERPL-MCNC: 4.1 G/DL (ref 3.5–5.2)
ALBUMIN/GLOB SERPL: 1.4 {RATIO} (ref 1–2.5)
ALP SERPL-CCNC: 121 U/L (ref 35–104)
ALT SERPL-CCNC: 9 U/L (ref 5–33)
ANION GAP SERPL CALCULATED.3IONS-SCNC: 12 MMOL/L (ref 9–17)
AST SERPL-CCNC: 13 U/L
BASOPHILS # BLD: 0.04 K/UL (ref 0–0.2)
BASOPHILS NFR BLD: 1 % (ref 0–2)
BILIRUB SERPL-MCNC: 0.5 MG/DL (ref 0.3–1.2)
BUN SERPL-MCNC: 42 MG/DL (ref 8–23)
BUN/CREAT SERPL: 32 (ref 9–20)
CALCIUM SERPL-MCNC: 9.5 MG/DL (ref 8.6–10.4)
CEA SERPL-MCNC: 3.8 NG/ML (ref 0–3.8)
CHLORIDE SERPL-SCNC: 107 MMOL/L (ref 98–107)
CO2 SERPL-SCNC: 21 MMOL/L (ref 20–31)
CREAT SERPL-MCNC: 1.3 MG/DL (ref 0.5–0.9)
EOSINOPHIL # BLD: 0.17 K/UL (ref 0–0.44)
EOSINOPHILS RELATIVE PERCENT: 3 % (ref 1–4)
ERYTHROCYTE [DISTWIDTH] IN BLOOD BY AUTOMATED COUNT: 14.2 % (ref 11.8–14.4)
GFR, ESTIMATED: 40 ML/MIN/1.73M2
GLUCOSE SERPL-MCNC: 104 MG/DL (ref 70–99)
HCT VFR BLD AUTO: 40.3 % (ref 36.3–47.1)
HGB BLD-MCNC: 12.7 G/DL (ref 11.9–15.1)
IMM GRANULOCYTES # BLD AUTO: <0.03 K/UL (ref 0–0.3)
IMM GRANULOCYTES NFR BLD: 0 %
LYMPHOCYTES NFR BLD: 1.43 K/UL (ref 1.1–3.7)
LYMPHOCYTES RELATIVE PERCENT: 25 % (ref 24–43)
MCH RBC QN AUTO: 31.3 PG (ref 25.2–33.5)
MCHC RBC AUTO-ENTMCNC: 31.5 G/DL (ref 25.2–33.5)
MCV RBC AUTO: 99.3 FL (ref 82.6–102.9)
MONOCYTES NFR BLD: 0.52 K/UL (ref 0.1–1.2)
MONOCYTES NFR BLD: 9 % (ref 3–12)
NEUTROPHILS NFR BLD: 62 % (ref 36–65)
NEUTS SEG NFR BLD: 3.56 K/UL (ref 1.5–8.1)
NRBC BLD-RTO: 0 PER 100 WBC
PLATELET # BLD AUTO: ABNORMAL K/UL (ref 138–453)
PLATELET, FLUORESCENCE: 114 K/UL (ref 138–453)
PLATELETS.RETICULATED NFR BLD AUTO: 13.7 % (ref 1.1–10.3)
POTASSIUM SERPL-SCNC: 4.6 MMOL/L (ref 3.7–5.3)
PROT SERPL-MCNC: 7.1 G/DL (ref 6.4–8.3)
RBC # BLD AUTO: 4.06 M/UL (ref 3.95–5.11)
SODIUM SERPL-SCNC: 140 MMOL/L (ref 135–144)
WBC OTHER # BLD: 5.7 K/UL (ref 3.5–11.3)

## 2025-02-22 PROCEDURE — 36415 COLL VENOUS BLD VENIPUNCTURE: CPT

## 2025-02-22 PROCEDURE — 80053 COMPREHEN METABOLIC PANEL: CPT

## 2025-02-22 PROCEDURE — 85025 COMPLETE CBC W/AUTO DIFF WBC: CPT

## 2025-02-22 PROCEDURE — 82378 CARCINOEMBRYONIC ANTIGEN: CPT

## 2025-02-24 ENCOUNTER — OFFICE VISIT (OUTPATIENT)
Dept: ONCOLOGY | Age: 85
End: 2025-02-24
Payer: MEDICARE

## 2025-02-24 VITALS
TEMPERATURE: 97.6 F | SYSTOLIC BLOOD PRESSURE: 122 MMHG | DIASTOLIC BLOOD PRESSURE: 70 MMHG | RESPIRATION RATE: 20 BRPM | HEART RATE: 85 BPM | OXYGEN SATURATION: 98 %

## 2025-02-24 DIAGNOSIS — C18.7 MALIGNANT NEOPLASM OF SIGMOID COLON (HCC): Primary | ICD-10-CM

## 2025-02-24 DIAGNOSIS — C64.1 RENAL CELL CARCINOMA OF RIGHT KIDNEY (HCC): ICD-10-CM

## 2025-02-24 PROCEDURE — 1036F TOBACCO NON-USER: CPT | Performed by: INTERNAL MEDICINE

## 2025-02-24 PROCEDURE — G8417 CALC BMI ABV UP PARAM F/U: HCPCS | Performed by: INTERNAL MEDICINE

## 2025-02-24 PROCEDURE — 3078F DIAST BP <80 MM HG: CPT | Performed by: INTERNAL MEDICINE

## 2025-02-24 PROCEDURE — G8427 DOCREV CUR MEDS BY ELIG CLIN: HCPCS | Performed by: INTERNAL MEDICINE

## 2025-02-24 PROCEDURE — 99204 OFFICE O/P NEW MOD 45 MIN: CPT | Performed by: INTERNAL MEDICINE

## 2025-02-24 PROCEDURE — G8399 PT W/DXA RESULTS DOCUMENT: HCPCS | Performed by: INTERNAL MEDICINE

## 2025-02-24 PROCEDURE — 1123F ACP DISCUSS/DSCN MKR DOCD: CPT | Performed by: INTERNAL MEDICINE

## 2025-02-24 PROCEDURE — 1159F MED LIST DOCD IN RCRD: CPT | Performed by: INTERNAL MEDICINE

## 2025-02-24 PROCEDURE — 1090F PRES/ABSN URINE INCON ASSESS: CPT | Performed by: INTERNAL MEDICINE

## 2025-02-24 PROCEDURE — 3074F SYST BP LT 130 MM HG: CPT | Performed by: INTERNAL MEDICINE

## 2025-02-24 PROCEDURE — 99213 OFFICE O/P EST LOW 20 MIN: CPT | Performed by: INTERNAL MEDICINE

## 2025-02-24 PROCEDURE — 1160F RVW MEDS BY RX/DR IN RCRD: CPT | Performed by: INTERNAL MEDICINE

## 2025-02-24 NOTE — PROGRESS NOTES
2025    HPI:  Yvette Das (:  1940) has requested an audio/video evaluation for the following concern(s):    Chief Complaint   Patient presents with    Colon Cancer     6 month follow up with labs      Kidney Cancer     Patient ID: Yvette Das, 1940, [id number], 84 y.o.  Referred by : Bogdan Patel MD  Diagnosis:   Diagnosis of colon cancer, 17    low anterior resection of the rectosigmoid with proximal diverting ileostomy.  Final pathology showed T1 N0, stage I disease.  Currently on surveillance, recent colonoscopy on 2023    Right renal cell carcinoma status post radical nephrectomy on 10/7/2022,pT3aNx disease  HISTORY OF PRESENT ILLNESS:    Oncologic History:  This is a 84 y.o. -old female with new diagnosis of colon cancer was seen during initial consultation visit.  She presented with rectal bleeding going on for about 4-6 weeks.  She had a colonoscopy on 10/8/18 which showed multiple polyps and large distal sigmoid tumors with extensive diverticulosis.  Biopsy showed invasive low-grade adenocarcinoma arising in a large tubulovillous adenoma with extensive high-grade dysplasia.  Subsequently she had open low anterior resection of the rectosigmoid with proximal dilating ileostomy.  Final pathology showed T1 N0, stage I disease.  Now she is recovering well from surgery.  She denied any pain, nausea vomiting.  She does not have family history of colon cancer.  She is adopted and does not know about her family history.  Interval history:  Patient is returning for follow-up visit and to discuss lab results, imaging studies and further recommendations.  She denied any abdominal pain nausea vomiting.  Denied any blood in stool or urine.  She denied any constipation, diarrhea.  She denied any unintentional weight loss night sweats fever chills.  Her recent lab work showed CEA level getting better.    She is on Eliquis for her atrial fibrillation.  She does report easy

## 2025-03-07 ENCOUNTER — APPOINTMENT (OUTPATIENT)
Dept: CT IMAGING | Age: 85
End: 2025-03-07
Payer: MEDICARE

## 2025-03-07 ENCOUNTER — HOSPITAL ENCOUNTER (OUTPATIENT)
Age: 85
Setting detail: SPECIMEN
Discharge: HOME | End: 2025-03-07
Payer: MEDICARE

## 2025-03-07 ENCOUNTER — HOSPITAL ENCOUNTER (EMERGENCY)
Age: 85
Discharge: HOME | End: 2025-03-07
Attending: EMERGENCY MEDICINE
Payer: MEDICARE

## 2025-03-07 VITALS
DIASTOLIC BLOOD PRESSURE: 71 MMHG | WEIGHT: 287 LBS | HEART RATE: 78 BPM | RESPIRATION RATE: 18 BRPM | BODY MASS INDEX: 45.04 KG/M2 | HEIGHT: 67 IN | OXYGEN SATURATION: 97 % | TEMPERATURE: 97.8 F | SYSTOLIC BLOOD PRESSURE: 116 MMHG

## 2025-03-07 DIAGNOSIS — S39.012A BACK STRAIN, INITIAL ENCOUNTER: Primary | ICD-10-CM

## 2025-03-07 DIAGNOSIS — C18.7 MALIGNANT NEOPLASM OF SIGMOID COLON (HCC): ICD-10-CM

## 2025-03-07 LAB
ALBUMIN SERPL-MCNC: 4 G/DL (ref 3.5–5.2)
ALBUMIN SERPL-MCNC: 4 G/DL (ref 3.5–5.2)
ALBUMIN/GLOB SERPL: 1.3 {RATIO} (ref 1–2.5)
ALBUMIN/GLOB SERPL: 1.3 {RATIO} (ref 1–2.5)
ALP SERPL-CCNC: 120 U/L (ref 35–104)
ALP SERPL-CCNC: 120 U/L (ref 35–104)
ALT SERPL-CCNC: 8 U/L (ref 5–33)
ALT SERPL-CCNC: 8 U/L (ref 5–33)
ANION GAP SERPL CALCULATED.3IONS-SCNC: 10 MMOL/L (ref 9–17)
ANION GAP SERPL CALCULATED.3IONS-SCNC: 10 MMOL/L (ref 9–17)
AST SERPL-CCNC: 13 U/L
AST SERPL-CCNC: 13 U/L
BASOPHILS # BLD: <0.03 K/UL (ref 0–0.2)
BASOPHILS # BLD: <0.03 K/UL (ref 0–0.2)
BASOPHILS NFR BLD: 0 % (ref 0–2)
BASOPHILS NFR BLD: 0 % (ref 0–2)
BILIRUB DIRECT SERPL-MCNC: 0.2 MG/DL
BILIRUB INDIRECT SERPL-MCNC: 0.6 MG/DL (ref 0–1)
BILIRUB SERPL-MCNC: 0.8 MG/DL (ref 0.3–1.2)
BILIRUB SERPL-MCNC: 0.8 MG/DL (ref 0.3–1.2)
BUN SERPL-MCNC: 38 MG/DL (ref 8–23)
BUN SERPL-MCNC: 38 MG/DL (ref 8–23)
BUN/CREAT SERPL: 32 (ref 9–20)
BUN/CREAT SERPL: 32 (ref 9–20)
CALCIUM SERPL-MCNC: 9.8 MG/DL (ref 8.6–10.4)
CALCIUM SERPL-MCNC: 9.8 MG/DL (ref 8.6–10.4)
CEA SERPL-MCNC: 3.7 NG/ML (ref 0–3.8)
CHLORIDE SERPL-SCNC: 111 MMOL/L (ref 98–107)
CHLORIDE SERPL-SCNC: 111 MMOL/L (ref 98–107)
CO2 SERPL-SCNC: 23 MMOL/L (ref 20–31)
CO2 SERPL-SCNC: 23 MMOL/L (ref 20–31)
CREAT SERPL-MCNC: 1.2 MG/DL (ref 0.5–0.9)
CREAT SERPL-MCNC: 1.2 MG/DL (ref 0.5–0.9)
EOSINOPHIL # BLD: 0.12 K/UL (ref 0–0.44)
EOSINOPHIL # BLD: 0.12 K/UL (ref 0–0.44)
EOSINOPHILS RELATIVE PERCENT: 2 % (ref 1–4)
EOSINOPHILS RELATIVE PERCENT: 2 % (ref 1–4)
ERYTHROCYTE [DISTWIDTH] IN BLOOD BY AUTOMATED COUNT: 14.2 % (ref 11.8–14.4)
ERYTHROCYTE [DISTWIDTH] IN BLOOD BY AUTOMATED COUNT: 14.2 % (ref 11.8–14.4)
GFR, ESTIMATED: 44 ML/MIN/1.73M2
GFR, ESTIMATED: 44 ML/MIN/1.73M2
GLOBULIN SER CALC-MCNC: 3 G/DL (ref 1.5–3.8)
GLUCOSE SERPL-MCNC: 108 MG/DL (ref 70–99)
GLUCOSE SERPL-MCNC: 108 MG/DL (ref 70–99)
HCT VFR BLD AUTO: 39.6 % (ref 36.3–47.1)
HCT VFR BLD AUTO: 39.6 % (ref 36.3–47.1)
HGB BLD-MCNC: 12.4 G/DL (ref 11.9–15.1)
HGB BLD-MCNC: 12.4 G/DL (ref 11.9–15.1)
IMM GRANULOCYTES # BLD AUTO: <0.03 K/UL (ref 0–0.3)
IMM GRANULOCYTES # BLD AUTO: <0.03 K/UL (ref 0–0.3)
IMM GRANULOCYTES NFR BLD: 0 %
IMM GRANULOCYTES NFR BLD: 0 %
LIPASE SERPL-CCNC: 24 U/L (ref 13–60)
LYMPHOCYTES NFR BLD: 1.13 K/UL (ref 1.1–3.7)
LYMPHOCYTES NFR BLD: 1.13 K/UL (ref 1.1–3.7)
LYMPHOCYTES RELATIVE PERCENT: 15 % (ref 24–43)
LYMPHOCYTES RELATIVE PERCENT: 15 % (ref 24–43)
MAGNESIUM SERPL-MCNC: 1.9 MG/DL (ref 1.6–2.6)
MCH RBC QN AUTO: 31.4 PG (ref 25.2–33.5)
MCH RBC QN AUTO: 31.4 PG (ref 25.2–33.5)
MCHC RBC AUTO-ENTMCNC: 31.3 G/DL (ref 25.2–33.5)
MCHC RBC AUTO-ENTMCNC: 31.3 G/DL (ref 25.2–33.5)
MCV RBC AUTO: 100.3 FL (ref 82.6–102.9)
MCV RBC AUTO: 100.3 FL (ref 82.6–102.9)
MONOCYTES NFR BLD: 0.54 K/UL (ref 0.1–1.2)
MONOCYTES NFR BLD: 0.54 K/UL (ref 0.1–1.2)
MONOCYTES NFR BLD: 7 % (ref 3–12)
MONOCYTES NFR BLD: 7 % (ref 3–12)
NEUTROPHILS NFR BLD: 75 % (ref 36–65)
NEUTROPHILS NFR BLD: 75 % (ref 36–65)
NEUTS SEG NFR BLD: 5.54 K/UL (ref 1.5–8.1)
NEUTS SEG NFR BLD: 5.54 K/UL (ref 1.5–8.1)
NRBC BLD-RTO: 0 PER 100 WBC
NRBC BLD-RTO: 0 PER 100 WBC
PLATELET # BLD AUTO: ABNORMAL K/UL (ref 138–453)
PLATELET # BLD AUTO: ABNORMAL K/UL (ref 138–453)
PLATELET, FLUORESCENCE: 124 K/UL (ref 138–453)
PLATELET, FLUORESCENCE: 124 K/UL (ref 138–453)
PLATELETS.RETICULATED NFR BLD AUTO: 13.4 % (ref 1.1–10.3)
PLATELETS.RETICULATED NFR BLD AUTO: 13.4 % (ref 1.1–10.3)
POTASSIUM SERPL-SCNC: 5.3 MMOL/L (ref 3.7–5.3)
POTASSIUM SERPL-SCNC: 5.3 MMOL/L (ref 3.7–5.3)
PROT SERPL-MCNC: 7 G/DL (ref 6.4–8.3)
PROT SERPL-MCNC: 7 G/DL (ref 6.4–8.3)
RBC # BLD AUTO: 3.95 M/UL (ref 3.95–5.11)
RBC # BLD AUTO: 3.95 M/UL (ref 3.95–5.11)
SODIUM SERPL-SCNC: 144 MMOL/L (ref 135–144)
SODIUM SERPL-SCNC: 144 MMOL/L (ref 135–144)
WBC OTHER # BLD: 7.4 K/UL (ref 3.5–11.3)
WBC OTHER # BLD: 7.4 K/UL (ref 3.5–11.3)

## 2025-03-07 PROCEDURE — 6360000002 HC RX W HCPCS: Performed by: EMERGENCY MEDICINE

## 2025-03-07 PROCEDURE — 85025 COMPLETE CBC W/AUTO DIFF WBC: CPT

## 2025-03-07 PROCEDURE — 80076 HEPATIC FUNCTION PANEL: CPT

## 2025-03-07 PROCEDURE — 83690 ASSAY OF LIPASE: CPT

## 2025-03-07 PROCEDURE — 82378 CARCINOEMBRYONIC ANTIGEN: CPT

## 2025-03-07 PROCEDURE — 80048 BASIC METABOLIC PNL TOTAL CA: CPT

## 2025-03-07 PROCEDURE — 83735 ASSAY OF MAGNESIUM: CPT

## 2025-03-07 PROCEDURE — 96374 THER/PROPH/DIAG INJ IV PUSH: CPT

## 2025-03-07 PROCEDURE — 99285 EMERGENCY DEPT VISIT HI MDM: CPT

## 2025-03-07 PROCEDURE — 71275 CT ANGIOGRAPHY CHEST: CPT

## 2025-03-07 PROCEDURE — 96376 TX/PRO/DX INJ SAME DRUG ADON: CPT

## 2025-03-07 PROCEDURE — 6360000004 HC RX CONTRAST MEDICATION: Performed by: EMERGENCY MEDICINE

## 2025-03-07 RX ORDER — LIDOCAINE 50 MG/G
1 PATCH TOPICAL DAILY
Qty: 30 PATCH | Refills: 0 | Status: SHIPPED | OUTPATIENT
Start: 2025-03-07 | End: 2025-04-06

## 2025-03-07 RX ORDER — MORPHINE SULFATE 4 MG/ML
4 INJECTION, SOLUTION INTRAMUSCULAR; INTRAVENOUS ONCE
Status: COMPLETED | OUTPATIENT
Start: 2025-03-07 | End: 2025-03-07

## 2025-03-07 RX ORDER — MORPHINE SULFATE 4 MG/ML
4 INJECTION, SOLUTION INTRAMUSCULAR; INTRAVENOUS ONCE
Status: DISCONTINUED | OUTPATIENT
Start: 2025-03-07 | End: 2025-03-07

## 2025-03-07 RX ORDER — HYDROCODONE BITARTRATE AND ACETAMINOPHEN 5; 325 MG/1; MG/1
1 TABLET ORAL EVERY 6 HOURS PRN
Qty: 18 TABLET | Refills: 0 | Status: SHIPPED | OUTPATIENT
Start: 2025-03-07 | End: 2025-03-10

## 2025-03-07 RX ORDER — IOPAMIDOL 755 MG/ML
100 INJECTION, SOLUTION INTRAVASCULAR
Status: COMPLETED | OUTPATIENT
Start: 2025-03-07 | End: 2025-03-07

## 2025-03-07 RX ADMIN — IOPAMIDOL 100 ML: 755 INJECTION, SOLUTION INTRAVENOUS at 11:32

## 2025-03-07 RX ADMIN — MORPHINE SULFATE 4 MG: 4 INJECTION, SOLUTION INTRAMUSCULAR; INTRAVENOUS at 10:10

## 2025-03-07 RX ADMIN — MORPHINE SULFATE 4 MG: 4 INJECTION, SOLUTION INTRAMUSCULAR; INTRAVENOUS at 11:54

## 2025-03-07 ASSESSMENT — PAIN SCALES - GENERAL
PAINLEVEL_OUTOF10: 5
PAINLEVEL_OUTOF10: 10
PAINLEVEL_OUTOF10: 4
PAINLEVEL_OUTOF10: 10

## 2025-03-07 ASSESSMENT — PAIN - FUNCTIONAL ASSESSMENT: PAIN_FUNCTIONAL_ASSESSMENT: 0-10

## 2025-03-07 ASSESSMENT — PAIN DESCRIPTION - ORIENTATION: ORIENTATION: MID;LOWER

## 2025-03-07 ASSESSMENT — PAIN DESCRIPTION - DESCRIPTORS: DESCRIPTORS: SHARP

## 2025-03-07 ASSESSMENT — PAIN DESCRIPTION - LOCATION: LOCATION: BACK

## 2025-03-07 ASSESSMENT — PAIN DESCRIPTION - PAIN TYPE: TYPE: ACUTE PAIN

## 2025-03-07 NOTE — ED PROVIDER NOTES
130.2 kg (287 lb)   LMP 01/01/1968   SpO2 97%   BMI 44.95 kg/m²       ED Course as of 03/07/25 1330   Fri Mar 07, 2025   1056 Review of labs normal CBC normal electrolytes elevated BUN/creatinine of 38 and 1.2 respectively.  Normal pancreatic and liver enzymes.  Awaiting urinalysis as well as CTA chest abdomen pelvis with thoracic and lumbar reconstructions. [TH]   1155 Still complaining of pain after having CAT scan will repeat morphine.  Her pain was improved went down to a 5 out of 10.  Awaiting CAT scan results. [TH]   1322 Reviewed results with the patient and her grandsons 1 of which is power of .  She has a prescription for pain medicine at home already will add Lidoderm patches.  Recommend following up with family physician for reevaluation may need an MRI.  Return if any other concerns. [TH]   1330 Patient is now not sure what pain medications she has at home. Will provide a limited prescription for [place]. [TH]      ED Course User Index  [TH] Ken Baltazar, DO           PROCEDURES:    None      OARRS Report if indicated           The patient and her grandson understands that at this time there is no evidence for a more malignant underlying process, but also understands that early in the process of an illness or injury, an emergency department workup can be falsely reassuring.  Routine discharge counseling was given, and it is understood that worsening, changing or persistent symptoms should prompt an immediate call or follow up with their primary physician or return to the emergency department. The importance of appropriate follow up was also discussed.  I have reviewed the disposition diagnosis.  I have answered the questions and given discharge instructions.  There was voiced understanding of these instructions and no further questions or complaints.    FINAL IMPRESSION      1. Back strain, initial encounter          DISPOSITION/PLAN   DISPOSITION Decision To Discharge 03/07/2025 01:22:45 PM

## 2025-03-07 NOTE — DISCHARGE INSTRUCTIONS
Take medications as prescribed  Follow-up with your family physician for reevaluation may need MRI if not improved    Return immediately if any worsening symptoms or any other concerns    Please understand that early in the process of an illness or injury, an emergency department workup can be falsely reassuring.      Tell us how we did visit: [email or web address]   and let us know about your experience

## 2025-03-13 ENCOUNTER — TELEPHONE (OUTPATIENT)
Dept: FAMILY MEDICINE CLINIC | Age: 85
End: 2025-03-13

## 2025-03-13 NOTE — TELEPHONE ENCOUNTER
Pt calling wanting to know if her appt tomorrow can be made a virtual due to her not having transportation. Please advise.

## 2025-03-13 NOTE — TELEPHONE ENCOUNTER
Last we seen her in person was 10/25/2024 for a hospital follow up all other previous appt were virtual. What are your thoughts?

## 2025-03-14 ENCOUNTER — TELEMEDICINE (OUTPATIENT)
Dept: FAMILY MEDICINE CLINIC | Age: 85
End: 2025-03-14

## 2025-03-14 DIAGNOSIS — I48.0 PAROXYSMAL ATRIAL FIBRILLATION (HCC): ICD-10-CM

## 2025-03-14 DIAGNOSIS — I10 ESSENTIAL HYPERTENSION: Primary | ICD-10-CM

## 2025-03-14 DIAGNOSIS — I50.32 CHF (CONGESTIVE HEART FAILURE), NYHA CLASS I, CHRONIC, DIASTOLIC (HCC): ICD-10-CM

## 2025-03-14 DIAGNOSIS — E78.2 MIXED HYPERLIPIDEMIA: ICD-10-CM

## 2025-03-14 DIAGNOSIS — E03.9 ACQUIRED HYPOTHYROIDISM: ICD-10-CM

## 2025-03-14 DIAGNOSIS — N18.31 STAGE 3A CHRONIC KIDNEY DISEASE (HCC): ICD-10-CM

## 2025-03-14 DIAGNOSIS — R73.01 IMPAIRED FASTING GLUCOSE: ICD-10-CM

## 2025-03-14 DIAGNOSIS — F51.01 PRIMARY INSOMNIA: ICD-10-CM

## 2025-03-14 RX ORDER — METOPROLOL TARTRATE 25 MG/1
25 TABLET, FILM COATED ORAL 2 TIMES DAILY
COMMUNITY
Start: 2025-02-17

## 2025-03-14 RX ORDER — MIRTAZAPINE 7.5 MG/1
7.5 TABLET, FILM COATED ORAL NIGHTLY
Qty: 90 TABLET | Refills: 1 | Status: SHIPPED | OUTPATIENT
Start: 2025-03-14

## 2025-03-14 ASSESSMENT — PATIENT HEALTH QUESTIONNAIRE - PHQ9
1. LITTLE INTEREST OR PLEASURE IN DOING THINGS: NOT AT ALL
6. FEELING BAD ABOUT YOURSELF - OR THAT YOU ARE A FAILURE OR HAVE LET YOURSELF OR YOUR FAMILY DOWN: NOT AT ALL
SUM OF ALL RESPONSES TO PHQ QUESTIONS 1-9: 0
5. POOR APPETITE OR OVEREATING: NOT AT ALL
SUM OF ALL RESPONSES TO PHQ QUESTIONS 1-9: 0
2. FEELING DOWN, DEPRESSED OR HOPELESS: NOT AT ALL
SUM OF ALL RESPONSES TO PHQ QUESTIONS 1-9: 0
4. FEELING TIRED OR HAVING LITTLE ENERGY: NOT AT ALL
7. TROUBLE CONCENTRATING ON THINGS, SUCH AS READING THE NEWSPAPER OR WATCHING TELEVISION: NOT AT ALL
8. MOVING OR SPEAKING SO SLOWLY THAT OTHER PEOPLE COULD HAVE NOTICED. OR THE OPPOSITE, BEING SO FIGETY OR RESTLESS THAT YOU HAVE BEEN MOVING AROUND A LOT MORE THAN USUAL: NOT AT ALL
SUM OF ALL RESPONSES TO PHQ QUESTIONS 1-9: 0
3. TROUBLE FALLING OR STAYING ASLEEP: NOT AT ALL
10. IF YOU CHECKED OFF ANY PROBLEMS, HOW DIFFICULT HAVE THESE PROBLEMS MADE IT FOR YOU TO DO YOUR WORK, TAKE CARE OF THINGS AT HOME, OR GET ALONG WITH OTHER PEOPLE: NOT DIFFICULT AT ALL
9. THOUGHTS THAT YOU WOULD BE BETTER OFF DEAD, OR OF HURTING YOURSELF: NOT AT ALL

## 2025-03-14 ASSESSMENT — ENCOUNTER SYMPTOMS
TROUBLE SWALLOWING: 0
WHEEZING: 0
ABDOMINAL PAIN: 0
VOMITING: 0
DIARRHEA: 0
NAUSEA: 0
COUGH: 0
SORE THROAT: 0
SINUS PRESSURE: 0
EYE REDNESS: 0
RHINORRHEA: 0
SHORTNESS OF BREATH: 0
CONSTIPATION: 0
EYE DISCHARGE: 0

## 2025-03-14 NOTE — PATIENT INSTRUCTIONS
Hospital Outpatient Visit on 03/07/2025   Component Date Value Ref Range Status    CEA 03/07/2025 3.7  0.0 - 3.8 ng/mL Final    Comment: The Roche \"ECLIA\" assay is used.  Results obtained with different assay methods cannot be   used interchangeably.      Sodium 03/07/2025 144  135 - 144 mmol/L Final    Potassium 03/07/2025 5.3  3.7 - 5.3 mmol/L Final    Chloride 03/07/2025 111 (H)  98 - 107 mmol/L Final    CO2 03/07/2025 23  20 - 31 mmol/L Final    Anion Gap 03/07/2025 10  9 - 17 mmol/L Final    Glucose 03/07/2025 108 (H)  70 - 99 mg/dL Final    BUN 03/07/2025 38 (H)  8 - 23 mg/dL Final    Creatinine 03/07/2025 1.2 (H)  0.5 - 0.9 mg/dL Final    Est, Glom Filt Rate 03/07/2025 44 (L)  >60 mL/min/1.73m2 Final    Comment:       These results are not intended for use in patients <18 years of age.        eGFR results are calculated without a race factor using the 2021 CKD-EPI equation.  Careful clinical correlation is recommended, particularly when comparing to results   calculated using previous equations.  The CKD-EPI equation is less accurate in patients with extremes of muscle mass, extra-renal   metabolism of creatine, excessive creatine ingestion, or following therapy that affects   renal tubular secretion.      BUN/Creatinine Ratio 03/07/2025 32 (H)  9 - 20 Final    Calcium 03/07/2025 9.8  8.6 - 10.4 mg/dL Final    Total Protein 03/07/2025 7.0  6.4 - 8.3 g/dL Final    Albumin 03/07/2025 4.0  3.5 - 5.2 g/dL Final    Albumin/Globulin Ratio 03/07/2025 1.3  1.0 - 2.5 Final    Total Bilirubin 03/07/2025 0.8  0.3 - 1.2 mg/dL Final    Alkaline Phosphatase 03/07/2025 120 (H)  35 - 104 U/L Final    ALT 03/07/2025 8  5 - 33 U/L Final    AST 03/07/2025 13  <32 U/L Final    WBC 03/07/2025 7.4  3.5 - 11.3 k/uL Final    RBC 03/07/2025 3.95  3.95 - 5.11 m/uL Final    Hemoglobin 03/07/2025 12.4  11.9 - 15.1 g/dL Final    Hematocrit 03/07/2025 39.6  36.3 - 47.1 % Final    MCV 03/07/2025 100.3  82.6 - 102.9 fL Final    MCH

## 2025-03-14 NOTE — PROGRESS NOTES
Patient declines covid, RSV, tdap, and shingles vaccines.   
144 135 - 144 mmol/L    Potassium 5.3 3.7 - 5.3 mmol/L    Chloride 111 (H) 98 - 107 mmol/L    CO2 23 20 - 31 mmol/L    Anion Gap 10 9 - 17 mmol/L    Glucose 108 (H) 70 - 99 mg/dL    BUN 38 (H) 8 - 23 mg/dL    Creatinine 1.2 (H) 0.5 - 0.9 mg/dL    Est, Glom Filt Rate 44 (L) >60 mL/min/1.73m2    BUN/Creatinine Ratio 32 (H) 9 - 20    Calcium 9.8 8.6 - 10.4 mg/dL    Total Protein 7.0 6.4 - 8.3 g/dL    Albumin 4.0 3.5 - 5.2 g/dL    Albumin/Globulin Ratio 1.3 1.0 - 2.5    Total Bilirubin 0.8 0.3 - 1.2 mg/dL    Alkaline Phosphatase 120 (H) 35 - 104 U/L    ALT 8 5 - 33 U/L    AST 13 <32 U/L   CBC with Auto Differential   Result Value Ref Range    WBC 7.4 3.5 - 11.3 k/uL    RBC 3.95 3.95 - 5.11 m/uL    Hemoglobin 12.4 11.9 - 15.1 g/dL    Hematocrit 39.6 36.3 - 47.1 %    .3 82.6 - 102.9 fL    MCH 31.4 25.2 - 33.5 pg    MCHC 31.3 25.2 - 33.5 g/dL    RDW 14.2 11.8 - 14.4 %    Platelets See Reflexed IPF Result 138 - 453 k/uL    Platelet, Fluorescence 124 (L) 138 - 453 k/uL    Platelet, Immature Fraction 13.4 (H) 1.1 - 10.3 %    NRBC Automated 0.0 0.0 per 100 WBC    Neutrophils % 75 (H) 36 - 65 %    Lymphocytes % 15 (L) 24 - 43 %    Monocytes % 7 3 - 12 %    Eosinophils % 2 1 - 4 %    Basophils % 0 0 - 2 %    Immature Granulocytes % 0 0 %    Neutrophils Absolute 5.54 1.50 - 8.10 k/uL    Lymphocytes Absolute 1.13 1.10 - 3.70 k/uL    Monocytes Absolute 0.54 0.10 - 1.20 k/uL    Eosinophils Absolute 0.12 0.00 - 0.44 k/uL    Basophils Absolute <0.03 0.00 - 0.20 k/uL    Immature Granulocytes Absolute <0.03 0.00 - 0.30 k/uL     *Note: Due to a large number of results and/or encounters for the requested time period, some results have not been displayed. A complete set of results can be found in Results Review.     Lab Results   Component Value Date/Time    WBC 7.4 03/07/2025 10:09 AM    WBC 7.4 03/07/2025 10:09 AM    RBC 3.95 03/07/2025 10:09 AM    RBC 3.95 03/07/2025 10:09 AM    HGB 12.4 03/07/2025 10:09 AM    HGB 12.4 03/07/2025

## 2025-03-21 RX ORDER — METOPROLOL TARTRATE 25 MG/1
25 TABLET, FILM COATED ORAL 2 TIMES DAILY
Qty: 180 TABLET | Refills: 1 | Status: SHIPPED | OUTPATIENT
Start: 2025-03-21

## 2025-03-21 NOTE — TELEPHONE ENCOUNTER
Yvette called requesting a refill of the below medication which has been pended for you: spoke with patient and she has been taking the 25 mg twice daily since being home and states he blood pressure has been normal    Requested Prescriptions     Pending Prescriptions Disp Refills    metoprolol tartrate (LOPRESSOR) 25 MG tablet 180 tablet 1     Sig: Take 1 tablet by mouth 2 times daily       Last Appointment Date: 3/14/2025  Next Appointment Date: 9/12/2025    Allergies   Allergen Reactions    Pcn [Penicillins] Hives and Shortness Of Breath    Bextra [Valdecoxib] Diarrhea

## 2025-03-21 NOTE — TELEPHONE ENCOUNTER
Pt is requesting a phone call to discuss her medication, metoprolol. States she got a call from her pharmacy that all of her scripts are all up to date but [facility] is stating they did not receive this.

## 2025-05-28 ENCOUNTER — OFFICE VISIT (OUTPATIENT)
Dept: CARDIOLOGY | Age: 85
End: 2025-05-28
Payer: MEDICARE

## 2025-05-28 ENCOUNTER — RESULTS FOLLOW-UP (OUTPATIENT)
Dept: CARDIOLOGY | Age: 85
End: 2025-05-28

## 2025-05-28 VITALS
WEIGHT: 287 LBS | HEART RATE: 83 BPM | HEIGHT: 67 IN | BODY MASS INDEX: 45.04 KG/M2 | DIASTOLIC BLOOD PRESSURE: 60 MMHG | SYSTOLIC BLOOD PRESSURE: 110 MMHG

## 2025-05-28 DIAGNOSIS — I48.0 PAF (PAROXYSMAL ATRIAL FIBRILLATION) (HCC): Primary | ICD-10-CM

## 2025-05-28 PROCEDURE — 3074F SYST BP LT 130 MM HG: CPT | Performed by: NURSE PRACTITIONER

## 2025-05-28 PROCEDURE — 93010 ELECTROCARDIOGRAM REPORT: CPT | Performed by: NURSE PRACTITIONER

## 2025-05-28 PROCEDURE — 99214 OFFICE O/P EST MOD 30 MIN: CPT | Performed by: NURSE PRACTITIONER

## 2025-05-28 PROCEDURE — 1036F TOBACCO NON-USER: CPT | Performed by: NURSE PRACTITIONER

## 2025-05-28 PROCEDURE — 99212 OFFICE O/P EST SF 10 MIN: CPT | Performed by: NURSE PRACTITIONER

## 2025-05-28 PROCEDURE — G8427 DOCREV CUR MEDS BY ELIG CLIN: HCPCS | Performed by: NURSE PRACTITIONER

## 2025-05-28 PROCEDURE — G8399 PT W/DXA RESULTS DOCUMENT: HCPCS | Performed by: NURSE PRACTITIONER

## 2025-05-28 PROCEDURE — 1123F ACP DISCUSS/DSCN MKR DOCD: CPT | Performed by: NURSE PRACTITIONER

## 2025-05-28 PROCEDURE — 1090F PRES/ABSN URINE INCON ASSESS: CPT | Performed by: NURSE PRACTITIONER

## 2025-05-28 PROCEDURE — G8417 CALC BMI ABV UP PARAM F/U: HCPCS | Performed by: NURSE PRACTITIONER

## 2025-05-28 PROCEDURE — 3078F DIAST BP <80 MM HG: CPT | Performed by: NURSE PRACTITIONER

## 2025-05-28 PROCEDURE — 93005 ELECTROCARDIOGRAM TRACING: CPT | Performed by: NURSE PRACTITIONER

## 2025-05-28 PROCEDURE — 1159F MED LIST DOCD IN RCRD: CPT | Performed by: NURSE PRACTITIONER

## 2025-05-28 NOTE — PROGRESS NOTES
[facility]  [facility] DEPARTMENT OF [place] [address]  Dept: [phone]    CC: follow up for VINOD Perez     Subjective:  The patient is a 84 y.o. year old, , female is in the office for a follow up visit.     No cp.   SOB better.   No le edema.   No syncope.   In wheelchair.   Denies any palps     Past Medical History:   has a past medical history of A-fib ([facility]), Acute blood loss as cause of postoperative anemia, Acute post-operative pain, MICHAEL (acute kidney injury), Anxiety, CAD (coronary artery disease), CHF (congestive heart failure) ([facility]), Class 2 severe obesity with serious comorbidity and body mass index (BMI) of 37.0 to 37.9 in adult ([facility]), Closed fracture of proximal end of left humerus with routine healing, Colitis, Colitis due to Clostridium difficile, Depression, Diarrhea, Dyslipidemia, Elevated fasting glucose, FH: total abdominal hysterectomy and bilateral salpingo-oophorectomy, Fractures, Full dentures, Glucose intolerance (impaired glucose tolerance), History of blood transfusion, Hyperlipidemia, Hypertension, Hypokalemia, Malignant neoplasm of sigmoid colon ([facility]), Multiple closed fractures of ribs, Obesity, JAYJAY on CPAP, Osteoarthritis, Osteoporosis, Other complete intestinal obstruction (HCC), Other specified hypothyroidism, Prolonged emergence from general anesthesia, Renal cell carcinoma of right kidney ([facility]), S/P small bowel resection, Thrombocytopenia, unspecified, Under care of team, and Wears glasses.    Past Surgical History:   has a past surgical history that includes Total abdominal hysterectomy w/ bilateral salpingoophorectomy (1966); Cholecystectomy (1960s); Varicose vein surgery (Right, 1960s); Hip Arthroplasty (Left); Knee Arthroplasty (Left); Knee Arthroplasty (Right); Cardiac catheterization (09/05/2017); Colonoscopy (N/A, 10/08/2018); pr laparoscopy

## 2025-06-23 RX ORDER — POTASSIUM CHLORIDE 1500 MG/1
20 TABLET, EXTENDED RELEASE ORAL 2 TIMES DAILY
Qty: 180 TABLET | Refills: 1 | Status: SHIPPED | OUTPATIENT
Start: 2025-06-23

## 2025-06-23 RX ORDER — LEVOTHYROXINE SODIUM 50 UG/1
50 TABLET ORAL DAILY
Qty: 90 TABLET | Refills: 1 | Status: SHIPPED | OUTPATIENT
Start: 2025-06-23

## 2025-07-17 RX ORDER — SPIRONOLACTONE 25 MG/1
25 TABLET ORAL DAILY
Qty: 90 TABLET | Refills: 1 | Status: SHIPPED | OUTPATIENT
Start: 2025-07-17

## 2025-07-17 RX ORDER — LOSARTAN POTASSIUM 50 MG/1
50 TABLET ORAL DAILY
Qty: 90 TABLET | Refills: 1 | Status: SHIPPED | OUTPATIENT
Start: 2025-07-17

## 2025-07-25 RX ORDER — ATORVASTATIN CALCIUM 40 MG/1
40 TABLET, FILM COATED ORAL NIGHTLY
Qty: 90 TABLET | Refills: 0 | Status: SHIPPED | OUTPATIENT
Start: 2025-07-25

## 2025-07-25 NOTE — TELEPHONE ENCOUNTER
Yvette called requesting a refill of the below medication which has been pended for you:     Requested Prescriptions     Pending Prescriptions Disp Refills    atorvastatin (LIPITOR) 40 MG tablet [Pharmacy Med Name: Atorvastatin Calcium Oral Tablet 40 MG] 90 tablet 0     Sig: TAKE 1 TABLET BY MOUTH AT NIGHT       Last Appointment Date: 3/14/2025  Next Appointment Date: 9/12/2025    Allergies   Allergen Reactions    Pcn [Penicillins] Hives and Shortness Of Breath    Bextra [Valdecoxib] Diarrhea

## (undated) DEVICE — COLONOSCOPE ENDOSCP ABSORBENT SM PEDIATRIC 104 MM VISION

## (undated) DEVICE — RELOAD STPL L60MM H1-2.6MM MESENTERY THN TISS WHT 6 ROW

## (undated) DEVICE — TOWEL,OR,DSP,ST,NATURAL,DLX,4/PK,20PK/CS: Brand: MEDLINE

## (undated) DEVICE — BLADE ES L6IN ELASTOMERIC COAT EXT DURABLE BEND UPTO 90DEG

## (undated) DEVICE — 1200CC GUARDIAN II: Brand: GUARDIAN

## (undated) DEVICE — RELOAD STPL L55MM OPN H3.8MM CLS H1.5MM WIRE DIA0.2MM REG

## (undated) DEVICE — SUTURE VCRL 0 L18IN ABSRB VLT POLYGLACTIN 910 BRAID COAT J906G

## (undated) DEVICE — GOWN,SIRUS,NONRNF,SETINSLV,XL,20/CS: Brand: MEDLINE

## (undated) DEVICE — APPLICATOR MEDICATED 26 CC SOLUTION HI LT ORNG CHLORAPREP

## (undated) DEVICE — PENCIL ES L3M BTTN SWCH HOLSTER W/ BLDE ELECTRD EDGE

## (undated) DEVICE — SUTURE PERMA-HAND SZ 0 L18IN NONABSORBABLE BLK CT-2 L26MM C027D

## (undated) DEVICE — SEALER LAP L20CM SHFT DIA10MM TISS FUS OPN INSTR STR BILAT

## (undated) DEVICE — BLACK RADIAL INTELLIGENT RELOAD: Brand: TRI-STAPLE 2.0

## (undated) DEVICE — CANNULA NSL AD L2IN ETCO2 SAMP SFT CRUSH RESIST FEM AIRLFE

## (undated) DEVICE — APPLIER LIG CLP M L11IN TI STR RNG HNDL FOR 20 CLP DISP

## (undated) DEVICE — FORCEPS BX L240CM JAW DIA2.4MM ORNG L CAP W/ NDL DISP RAD

## (undated) DEVICE — 3M™ WARMING BLANKET, UPPER BODY, 10 PER CASE, 42268: Brand: BAIR HUGGER™

## (undated) DEVICE — SUTURE PERMAHAND SZ 3-0 L30IN NONABSORBABLE BLK W/O NDL SA84H

## (undated) DEVICE — SUTURE VCRL SZ 2-0 L27IN ABSRB VLT L26MM SH 1/2 CIR J317H

## (undated) DEVICE — PAD,ABDOMINAL,5"X9",ST,LF,25/BX: Brand: MEDLINE INDUSTRIES, INC.

## (undated) DEVICE — GARMENT,MEDLINE,DVT,INT,CALF,MED, GEN2: Brand: MEDLINE

## (undated) DEVICE — SCISSOR SURG METZ CRV TIP

## (undated) DEVICE — YANKAUER,POOLE TIP,STERILE,50/CS: Brand: MEDLINE

## (undated) DEVICE — TROCAR: Brand: KII® SLEEVE

## (undated) DEVICE — 3000CC GUARDIAN II: Brand: GUARDIAN

## (undated) DEVICE — CONNECTOR TBNG AUX H2O JET DISP FOR OLY 160/180 SER

## (undated) DEVICE — SURGICEL ENDOSCP APPL

## (undated) DEVICE — GOWN,AURORA,NONRNF,XL,30/CS: Brand: MEDLINE

## (undated) DEVICE — NEEDLE HYPO 25GA L1.5IN BLU POLYPR HUB S STL REG BVL STR

## (undated) DEVICE — GAUZE,SPONGE,4"X4",12PLY,STERILE,LF,2'S: Brand: MEDLINE

## (undated) DEVICE — 60 ML SYRINGE REGULAR TIP: Brand: MONOJECT

## (undated) DEVICE — MARKER W/PRE PRINTED CUSTOM LABEL

## (undated) DEVICE — SUTURE PERMAHAND SZ 0 L30IN NONABSORBABLE BLK SILK UNIDIR SA86G

## (undated) DEVICE — BLADELESS OBTURATOR: Brand: WECK VISTA

## (undated) DEVICE — STAPLER INT L28CM DIA29MM CLS STPL H10-2.5MM OPN LEG L5.5MM

## (undated) DEVICE — GAUZE,SPONGE,FLUFF,6"X6.75",STRL,5/TRAY: Brand: MEDLINE

## (undated) DEVICE — MERCY DEFIANCE ENDO KIT: Brand: MEDLINE INDUSTRIES, INC.

## (undated) DEVICE — GOWN,AURORA,NONREINFORCED,LARGE: Brand: MEDLINE

## (undated) DEVICE — FORCEPS BX L240CM JAW DIA2.2MM RAD JAW 4 HOT DISP

## (undated) DEVICE — 3M™ IOBAN™ 2 ANTIMICROBIAL INCISE DRAPE 6650EZ: Brand: IOBAN™ 2

## (undated) DEVICE — STAPLER INT L60MM REG TISS BLU B FRM 8 FIRING 2 ROW AUTO

## (undated) DEVICE — 9165 UNIVERSAL PATIENT PLATE: Brand: 3M™

## (undated) DEVICE — TUBING, SUCTION, 3/16" X 20', STRAIGHT: Brand: MEDLINE

## (undated) DEVICE — DUP USE 240185 SOLUTION IV IRRIG WATER 1000ML IRRIG BAG 2B7114

## (undated) DEVICE — ENDOSCOPIC KIT 10X0.75 FT COLON W/ 1.1 OZ LUBE DBL BNDL SEAL

## (undated) DEVICE — STAPLER SKIN L440MM 32MM LNG 12 FIRING B FRM PWR + GRIPPING

## (undated) DEVICE — STAPLER INT L75MM CUT LN L73MM STPL LN L77MM BLU B FRM 8

## (undated) DEVICE — TRAP SURG QUAD PARABOLA SLOT DSGN SFTY SCRN TRAPEASE

## (undated) DEVICE — Z DUP USE 2565107 PACK SURG PROC LEG CYSTO T-DRAPE REINF TBL CVR HND TWL

## (undated) DEVICE — DRESSING TRNSPAR W5XL4.5IN FLM SHT SEMIPERMEABLE WIND

## (undated) DEVICE — GLOVE SURG SZ 65 THK91MIL LTX FREE SYN POLYISOPRENE

## (undated) DEVICE — TUBING, SUCTION, 9/32" X 20', STRAIGHT: Brand: MEDLINE INDUSTRIES, INC.

## (undated) DEVICE — BASIN SET: Brand: MEDLINE INDUSTRIES, INC.

## (undated) DEVICE — Z DISCONTINUED BY MEDLINE USE 2711682 TRAY SKIN PREP DRY W/ PREM GLV

## (undated) DEVICE — SUTURE PERMAHAND SZ 0 L30IN NONABSORBABLE BLK L26MM SH 1/2 K834H

## (undated) DEVICE — ARM DRAPE

## (undated) DEVICE — SUTURE VCRL SZ 2-0 L27IN ABSRB UD L36MM CP-1 1/2 CIR REV J266H

## (undated) DEVICE — CLIP INT L POLYMER LOK LIG HEM O LOK

## (undated) DEVICE — GLOVE ORANGE PI 7   MSG9070

## (undated) DEVICE — GOWN,SURGICAL,AURORA,SLEEVE: Brand: MEDLINE

## (undated) DEVICE — LAP CHOLE PK

## (undated) DEVICE — SOLUTION SCRB 4OZ 4% CHG CLN BASE FOR PT SKIN ANTISEPSIS

## (undated) DEVICE — Device

## (undated) DEVICE — SYRINGE IRRIG 60ML SFT PLIABLE BLB EZ TO GRP 1 HND USE W/

## (undated) DEVICE — BAG SPEC LAP 9X7.5 IN 12 MM 1500 CC MEM WIRE CANN NYL

## (undated) DEVICE — GLOVE ORANGE PI 7 1/2   MSG9075

## (undated) DEVICE — SPONGE LAP W18XL18IN WHT COT 4 PLY FLD STRUNG RADPQ DISP ST

## (undated) DEVICE — SUTURE VCRL SZ 3-0 L27IN ABSRB VLT L26MM SH 1/2 CIR J316H

## (undated) DEVICE — DRAPE,UTILITY,TAPE,15X26,STERILE: Brand: MEDLINE

## (undated) DEVICE — ESOPHAGEAL BALLOON DILATATION CATHETER: Brand: CRE FIXED WIRE

## (undated) DEVICE — YANKAUER,BULB TIP,W/O VENT,RIGID,STERILE: Brand: MEDLINE

## (undated) DEVICE — AGENT HEMSTAT W2XL14IN OXIDIZED REGENERATED CELOS ABSRB FOR

## (undated) DEVICE — SNARE ENDOSCP L240CM SHTH DIA2.4MM LOOP W20MM MIN WRK CHN

## (undated) DEVICE — TIP COVER ACCESSORY

## (undated) DEVICE — SEALANT TISS 10 CC FIBRIN VISTASEAL

## (undated) DEVICE — TOTAL TRAY, 16FR 10ML SIL FOLEY, URN: Brand: MEDLINE

## (undated) DEVICE — SUTURE PROL SZ 1 L60IN NONABSORBABLE BLU L65MM TP-1 3/8 CIR 8824G

## (undated) DEVICE — DRAPE THER FLUID WARMING 66X44 IN FLAT SLUSH DBL DISC ORS

## (undated) DEVICE — SUTURE MCRYL SZ 4-0 L18IN ABSRB UD L16MM PC-3 3/8 CIR PRIM Y845G

## (undated) DEVICE — APPLICATOR LAP 45CM FLX 2 VISTASEAL

## (undated) DEVICE — PACK SURG PROC REMINDER N WVN DISPOSABLE BEAC TIME OUT

## (undated) DEVICE — ELECTRODE ES L3IN S STL BLDE INSUL DISP VALLEYLAB EDGE

## (undated) DEVICE — CO2 CANNULA,SSOFT,ADLT,7O2,4CO2,FEMALE: Brand: MEDLINE

## (undated) DEVICE — TROCAR: Brand: KII FIOS FIRST ENTRY

## (undated) DEVICE — Device: Brand: DISPOSABLE BULB & BLADDER

## (undated) DEVICE — TOWEL,OR,DSP,ST,BLUE,STD,4/PK,20PK/CS: Brand: MEDLINE

## (undated) DEVICE — CYSTO/BLADDER IRRIGATION SET, REGULATING CLAMP

## (undated) DEVICE — COUNTER NDL 10 COUNT HLD 20 FOAM BLK SGL MAG

## (undated) DEVICE — SOLUTION IV IRRIG POUR BRL 0.9% SODIUM CHL 2F7124

## (undated) DEVICE — LINE SAMP O2 6.5FT W/FEMALE CONN F/ADULT CAPNOLINE PLUS

## (undated) DEVICE — ELECTRO LUBE IS A SINGLE PATIENT USE DEVICE THAT IS INTENDED TO BE USED ON ELECTROSURGICAL ELECTRODES TO REDUCE STICKING.: Brand: KEY SURGICAL ELECTRO LUBE

## (undated) DEVICE — SOLUTION ANTIFOG VIS SYS CLEARIFY LAPSCP

## (undated) DEVICE — SUTURE PDS II SZ 0 L60IN ABSRB VLT L65MM TP-1 1/2 CIR Z991G

## (undated) DEVICE — COVER LT HNDL BLU PLAS

## (undated) DEVICE — SUTURE ABSRB BRAID COAT UD OS-6 NO 1 27IN VCRL J535H

## (undated) DEVICE — RELOAD STPL L75MM OPN H3.8MM CLS 1.5MM WIRE DIA0.2MM REG

## (undated) DEVICE — INTENDED FOR TISSUE SEPARATION, AND OTHER PROCEDURES THAT REQUIRE A SHARP SURGICAL BLADE TO PUNCTURE OR CUT.: Brand: BARD-PARKER ® CARBON RIB-BACK BLADES

## (undated) DEVICE — STRAP,CATHETER,ELASTIC,HOOK&LOOP: Brand: MEDLINE

## (undated) DEVICE — GAUZE,PACKING STRIP,IODOFORM,1/2"X5YD,ST: Brand: CURAD

## (undated) DEVICE — GLOVE ORANGE PI 8   MSG9080

## (undated) DEVICE — TRAY URIN CATH 16FR DRNGE BG STATLOK STBL DEV F SURSTP

## (undated) DEVICE — GLOVE SURG SZ 6 THK91MIL LTX FREE SYN POLYISOPRENE ANTI

## (undated) DEVICE — SNARE ENDOSCP M W27MMXL240CM SHTH DIA2.4MM OVL FLX DISP

## (undated) DEVICE — SUTURE VCRL SZ 1 L36IN ABSRB VLT L36MM CT-1 1/2 CIR J347H

## (undated) DEVICE — POUCH DRNGE FLX BND INTEGR RAIL CLMP DISP EZ CTCH

## (undated) DEVICE — DRAPE SLUSH W O PLT POLYUR 44INCHX44INCHX112CM

## (undated) DEVICE — Z INACTIVE USE 2660664 SOLUTION IRRIG 3000ML 0.9% SOD CHL USP UROMATIC PLAS CONT

## (undated) DEVICE — SUTURE VCRL SZ 1 L18IN ABSRB VLT CT-1 L36MM 1/2 CIR J741D

## (undated) DEVICE — Z DISCONTINUED USE 2624853 GLOVE SURG SZ 75 L12IN THK91MIL BRN LTX FREE

## (undated) DEVICE — BITE BLOCK W/VELCRO STRAP

## (undated) DEVICE — SUTURE PROL SZ 2-0 L30IN NONABSORBABLE BLU L26MM CT-2 1/2 8411H

## (undated) DEVICE — JELLY LUBRICATING 4OZ FLIP TOP TB E Z

## (undated) DEVICE — SUTURE PDS II SZ 1 L54IN ABSRB VLT L65MM TP-1 1/2 CIR Z879G

## (undated) DEVICE — COUNTER NDL 40 COUNT HLD 70 FOAM BLK ADH W/ MAG

## (undated) DEVICE — SEALER LAP L37CM SHFT DIA10MM TISS FUS HAND/FOOT SWCH BLNT

## (undated) DEVICE — SUTURE PERMAHAND SZ 3-0 L18IN NONABSORBABLE BLK L26MM SH C013D

## (undated) DEVICE — GOWN,AURORA,NON-REINFORCED,2XL: Brand: MEDLINE

## (undated) DEVICE — TRI-LUMEN FILTERED TUBE SET WITH ACTIVATED CHARCOAL FILTER: Brand: AIRSEAL

## (undated) DEVICE — SUTURE VCRL SZ 3-0 L27IN ABSRB UD L26MM CT-2 1/2 CIR J232H

## (undated) DEVICE — PACK,AURORA,LAVH: Brand: MEDLINE

## (undated) DEVICE — CANNULA SEAL

## (undated) DEVICE — STAPLER SKIN STPL LN H1.5-3.5XL30MM REG TISS 2 ROW 8 FIRING

## (undated) DEVICE — TOWEL SURG W16XL26IN GRN NONFENESTRATED RADPQ ST

## (undated) DEVICE — PACK PROCEDURE SURG ROBOTIC KID SVMMC

## (undated) DEVICE — STAPLER INT L16CM STD UNIV RELD DISP TRI-STAPLE ENDO GIA

## (undated) DEVICE — STAPLER INT L55MM CUT LN L53MM STPL LN L57MM BLU B FRM 8

## (undated) DEVICE — PROTECTOR ULN NRV PUR FOAM HK LOOP STRP ANATOMICALLY

## (undated) DEVICE — POOLE SUCTION INSTRUMENT WITH REMOVABLE SHEATH: Brand: POOLE

## (undated) DEVICE — AIRSEAL 12 MM ACCESS PORT AND PALM GRIP OBTURATOR WITH BLADELESS OPTICAL TIP, 120 MM LENGTH: Brand: AIRSEAL

## (undated) DEVICE — 4-PORT MANIFOLD: Brand: NEPTUNE 2

## (undated) DEVICE — 3M™ TEGADERM™ TRANSPARENT FILM DRESSING FRAME STYLE, 1626W, 4 IN X 4-3/4 IN (10 CM X 12 CM), 50/CT 4CT/CASE: Brand: 3M™ TEGADERM™

## (undated) DEVICE — ADHESIVE SKIN CLOSURE TOP 36 CC HI VISC DERMBND MINI

## (undated) DEVICE — SUTURE VCRL SZ 0 L27IN ABSRB VLT L36MM CT-1 1/2 CIR J340H

## (undated) DEVICE — Z DISCONTINUED SUGG SUB 2622703 KIT OST L12IN FLNG DIA70MM ST TRNSPAR TWO PC MOLD

## (undated) DEVICE — COVER,LIGHT HANDLE,FLX,2/PK: Brand: MEDLINE INDUSTRIES, INC.

## (undated) DEVICE — NDLCTR: FOAM/MAG 40CT 64/CS: Brand: MEDICAL ACTION INDUSTRIES

## (undated) DEVICE — INSUFFLATION NEEDLE TO ESTABLISH PNEUMOPERITONEUM.: Brand: INSUFFLATION NEEDLE

## (undated) DEVICE — GARMENT,MEDLINE,DVT,INT,CALF,LG, GEN2: Brand: MEDLINE

## (undated) DEVICE — CHLORAPREP 26ML ORANGE